# Patient Record
Sex: MALE | Race: WHITE | NOT HISPANIC OR LATINO
[De-identification: names, ages, dates, MRNs, and addresses within clinical notes are randomized per-mention and may not be internally consistent; named-entity substitution may affect disease eponyms.]

---

## 2017-01-30 ENCOUNTER — RX RENEWAL (OUTPATIENT)
Age: 62
End: 2017-01-30

## 2017-02-24 ENCOUNTER — RX RENEWAL (OUTPATIENT)
Age: 62
End: 2017-02-24

## 2017-02-27 ENCOUNTER — RX RENEWAL (OUTPATIENT)
Age: 62
End: 2017-02-27

## 2017-03-02 ENCOUNTER — APPOINTMENT (OUTPATIENT)
Dept: INTERNAL MEDICINE | Facility: CLINIC | Age: 62
End: 2017-03-02

## 2017-03-02 VITALS
BODY MASS INDEX: 33.09 KG/M2 | SYSTOLIC BLOOD PRESSURE: 160 MMHG | HEART RATE: 80 BPM | WEIGHT: 244 LBS | DIASTOLIC BLOOD PRESSURE: 101 MMHG

## 2017-03-08 ENCOUNTER — APPOINTMENT (OUTPATIENT)
Dept: INTERNAL MEDICINE | Facility: CLINIC | Age: 62
End: 2017-03-08

## 2017-03-10 LAB
25(OH)D3 SERPL-MCNC: 13.7 NG/ML
ALBUMIN SERPL ELPH-MCNC: 4.9 G/DL
ALP BLD-CCNC: 70 U/L
ALT SERPL-CCNC: 39 U/L
ANION GAP SERPL CALC-SCNC: 20 MMOL/L
AST SERPL-CCNC: 27 U/L
BASOPHILS # BLD AUTO: 0.04 K/UL
BASOPHILS NFR BLD AUTO: 0.3 %
BILIRUB SERPL-MCNC: 0.3 MG/DL
BUN SERPL-MCNC: 19 MG/DL
CALCIUM SERPL-MCNC: 10 MG/DL
CHLORIDE SERPL-SCNC: 103 MMOL/L
CHOLEST SERPL-MCNC: 193 MG/DL
CHOLEST/HDLC SERPL: 5.1 RATIO
CO2 SERPL-SCNC: 20 MMOL/L
CREAT SERPL-MCNC: 1.05 MG/DL
EOSINOPHIL # BLD AUTO: 0.12 K/UL
EOSINOPHIL NFR BLD AUTO: 0.9 %
GLUCOSE SERPL-MCNC: 79 MG/DL
HBA1C MFR BLD HPLC: 6.1 %
HCT VFR BLD CALC: 40.2 %
HDLC SERPL-MCNC: 38 MG/DL
HGB BLD-MCNC: 13.8 G/DL
IMM GRANULOCYTES NFR BLD AUTO: 0.4 %
LDLC SERPL CALC-MCNC: 91 MG/DL
LYMPHOCYTES # BLD AUTO: 4.43 K/UL
LYMPHOCYTES NFR BLD AUTO: 33.1 %
MAN DIFF?: NORMAL
MCHC RBC-ENTMCNC: 30.3 PG
MCHC RBC-ENTMCNC: 34.3 GM/DL
MCV RBC AUTO: 88.4 FL
MONOCYTES # BLD AUTO: 1 K/UL
MONOCYTES NFR BLD AUTO: 7.5 %
NEUTROPHILS # BLD AUTO: 7.73 K/UL
NEUTROPHILS NFR BLD AUTO: 57.8 %
PLATELET # BLD AUTO: 304 K/UL
POTASSIUM SERPL-SCNC: 5 MMOL/L
PROT SERPL-MCNC: 7.1 G/DL
PSA SERPL-MCNC: 0.55 NG/ML
RBC # BLD: 4.55 M/UL
RBC # FLD: 13.7 %
SODIUM SERPL-SCNC: 143 MMOL/L
TRIGL SERPL-MCNC: 321 MG/DL
TSH SERPL-ACNC: 2.59 UIU/ML
WBC # FLD AUTO: 13.37 K/UL

## 2017-03-27 ENCOUNTER — RX RENEWAL (OUTPATIENT)
Age: 62
End: 2017-03-27

## 2017-05-16 ENCOUNTER — RX RENEWAL (OUTPATIENT)
Age: 62
End: 2017-05-16

## 2017-05-16 ENCOUNTER — APPOINTMENT (OUTPATIENT)
Dept: DERMATOLOGY | Facility: CLINIC | Age: 62
End: 2017-05-16

## 2017-05-22 ENCOUNTER — APPOINTMENT (OUTPATIENT)
Dept: DERMATOLOGY | Facility: CLINIC | Age: 62
End: 2017-05-22

## 2017-05-22 VITALS
BODY MASS INDEX: 33.32 KG/M2 | SYSTOLIC BLOOD PRESSURE: 148 MMHG | WEIGHT: 246 LBS | HEIGHT: 72 IN | DIASTOLIC BLOOD PRESSURE: 80 MMHG

## 2017-09-29 ENCOUNTER — MEDICATION RENEWAL (OUTPATIENT)
Age: 62
End: 2017-09-29

## 2018-01-12 ENCOUNTER — RX RENEWAL (OUTPATIENT)
Age: 63
End: 2018-01-12

## 2018-01-23 ENCOUNTER — TRANSCRIPTION ENCOUNTER (OUTPATIENT)
Age: 63
End: 2018-01-23

## 2018-01-31 ENCOUNTER — APPOINTMENT (OUTPATIENT)
Dept: INTERNAL MEDICINE | Facility: CLINIC | Age: 63
End: 2018-01-31
Payer: MEDICARE

## 2018-01-31 VITALS
HEART RATE: 101 BPM | DIASTOLIC BLOOD PRESSURE: 100 MMHG | WEIGHT: 243 LBS | BODY MASS INDEX: 32.91 KG/M2 | SYSTOLIC BLOOD PRESSURE: 150 MMHG | TEMPERATURE: 98 F | OXYGEN SATURATION: 97 % | HEIGHT: 72 IN

## 2018-01-31 PROCEDURE — 99214 OFFICE O/P EST MOD 30 MIN: CPT | Mod: 25

## 2018-01-31 PROCEDURE — 36415 COLL VENOUS BLD VENIPUNCTURE: CPT

## 2018-04-23 ENCOUNTER — RX RENEWAL (OUTPATIENT)
Age: 63
End: 2018-04-23

## 2018-05-14 LAB
25(OH)D3 SERPL-MCNC: 18.2 NG/ML
ALBUMIN SERPL ELPH-MCNC: 4.8 G/DL
ALP BLD-CCNC: 119 U/L
ALT SERPL-CCNC: 42 U/L
ANION GAP SERPL CALC-SCNC: 17 MMOL/L
AST SERPL-CCNC: 24 U/L
BASOPHILS # BLD AUTO: 0.04 K/UL
BASOPHILS NFR BLD AUTO: 0.3 %
BILIRUB SERPL-MCNC: 0.2 MG/DL
BUN SERPL-MCNC: 23 MG/DL
CALCIUM SERPL-MCNC: 9.8 MG/DL
CHLORIDE SERPL-SCNC: 106 MMOL/L
CHOLEST SERPL-MCNC: 197 MG/DL
CHOLEST/HDLC SERPL: 6 RATIO
CO2 SERPL-SCNC: 21 MMOL/L
CREAT SERPL-MCNC: 1.11 MG/DL
EOSINOPHIL # BLD AUTO: 0.3 K/UL
EOSINOPHIL NFR BLD AUTO: 2.2 %
GLUCOSE BLDC GLUCOMTR-MCNC: 110
GLUCOSE SERPL-MCNC: 115 MG/DL
HBA1C MFR BLD HPLC: 5.9 %
HBA1C MFR BLD HPLC: 6
HCT VFR BLD CALC: 43.7 %
HDLC SERPL-MCNC: 33 MG/DL
HGB BLD-MCNC: 14.6 G/DL
IMM GRANULOCYTES NFR BLD AUTO: 0.1 %
LDLC SERPL CALC-MCNC: NORMAL
LYMPHOCYTES # BLD AUTO: 4.39 K/UL
LYMPHOCYTES NFR BLD AUTO: 32.3 %
MAN DIFF?: NORMAL
MCHC RBC-ENTMCNC: 30.4 PG
MCHC RBC-ENTMCNC: 33.4 GM/DL
MCV RBC AUTO: 91 FL
MONOCYTES # BLD AUTO: 1.21 K/UL
MONOCYTES NFR BLD AUTO: 8.9 %
NEUTROPHILS # BLD AUTO: 7.64 K/UL
NEUTROPHILS NFR BLD AUTO: 56.2 %
PLATELET # BLD AUTO: 251 K/UL
POTASSIUM SERPL-SCNC: 4.9 MMOL/L
PROT SERPL-MCNC: 7.1 G/DL
PSA SERPL-MCNC: 0.61 NG/ML
RBC # BLD: 4.8 M/UL
RBC # FLD: 14.6 %
SODIUM SERPL-SCNC: 144 MMOL/L
TRIGL SERPL-MCNC: 492 MG/DL
TSH SERPL-ACNC: 3.04 UIU/ML
WBC # FLD AUTO: 13.6 K/UL

## 2018-05-16 ENCOUNTER — APPOINTMENT (OUTPATIENT)
Dept: INTERNAL MEDICINE | Facility: CLINIC | Age: 63
End: 2018-05-16
Payer: MEDICARE

## 2018-05-16 VITALS
HEIGHT: 72 IN | HEART RATE: 92 BPM | BODY MASS INDEX: 33.86 KG/M2 | OXYGEN SATURATION: 97 % | TEMPERATURE: 98.4 F | WEIGHT: 250 LBS | DIASTOLIC BLOOD PRESSURE: 90 MMHG | SYSTOLIC BLOOD PRESSURE: 150 MMHG

## 2018-05-16 PROCEDURE — 99214 OFFICE O/P EST MOD 30 MIN: CPT

## 2018-05-16 RX ORDER — AZITHROMYCIN 250 MG/1
250 TABLET, FILM COATED ORAL
Qty: 6 | Refills: 0 | Status: DISCONTINUED | COMMUNITY
Start: 2017-03-29 | End: 2018-05-16

## 2018-05-16 RX ORDER — CYCLOBENZAPRINE HYDROCHLORIDE 5 MG/1
5 TABLET, FILM COATED ORAL
Qty: 30 | Refills: 0 | Status: DISCONTINUED | COMMUNITY
Start: 2017-05-16 | End: 2018-05-16

## 2018-05-16 RX ORDER — AMOXICILLIN 875 MG/1
875 TABLET, FILM COATED ORAL
Qty: 20 | Refills: 0 | Status: COMPLETED | COMMUNITY
Start: 2018-04-04

## 2018-05-16 NOTE — REVIEW OF SYSTEMS
[Fever] : no fever [Chills] : no chills [Fatigue] : fatigue [Recent Change In Weight] : ~T no recent weight change [Night Sweats] : no night sweats [Hot Flashes] : no hot flashes [Vision Problems] : no vision problems [Abdominal Pain] : no abdominal pain [Frequency] : frequency [Impotence] : impotence [Joint Pain] : joint pain [Back Pain] : back pain [Skin Rash] : no skin rash [Suicidal] : not suicidal [Anxiety] : anxiety [Depression] : depression [Negative] : Heme/Lymph

## 2018-05-16 NOTE — PHYSICAL EXAM
[No Acute Distress] : no acute distress [Well Nourished] : well nourished [Well Developed] : well developed [Well-Appearing] : well-appearing [Normal Sclera/Conjunctiva] : normal sclera/conjunctiva [Fundoscopic Exam Performed] : fundoscopic ~T exam ~C was performed [Normal Outer Ear/Nose] : the outer ears and nose were normal in appearance [Normal Oropharynx] : the oropharynx was normal [No JVD] : no jugular venous distention [Supple] : supple [No Lymphadenopathy] : no lymphadenopathy [Thyroid Normal, No Nodules] : the thyroid was normal and there were no nodules present [No Respiratory Distress] : no respiratory distress  [Clear to Auscultation] : lungs were clear to auscultation bilaterally [No Accessory Muscle Use] : no accessory muscle use [Normal Rate] : normal rate  [Regular Rhythm] : with a regular rhythm [Normal S1, S2] : normal S1 and S2 [No Murmur] : no murmur heard [No Carotid Bruits] : no carotid bruits [Pedal Pulses Present] : the pedal pulses are present [No Edema] : there was no peripheral edema [No Extremity Clubbing/Cyanosis] : no extremity clubbing/cyanosis [Soft] : abdomen soft [Non Tender] : non-tender [Non-distended] : non-distended [No Masses] : no abdominal mass palpated [No HSM] : no HSM [Normal Bowel Sounds] : normal bowel sounds [Normal Supraclavicular Nodes] : no supraclavicular lymphadenopathy [Normal Posterior Cervical Nodes] : no posterior cervical lymphadenopathy [Normal Anterior Cervical Nodes] : no anterior cervical lymphadenopathy [No CVA Tenderness] : no CVA  tenderness [No Spinal Tenderness] : no spinal tenderness [No Joint Swelling] : no joint swelling [Grossly Normal Strength/Tone] : grossly normal strength/tone [No Rash] : no rash [Normal Gait] : normal gait [Coordination Grossly Intact] : coordination grossly intact [No Focal Deficits] : no focal deficits [Normal Affect] : the affect was normal [Normal Insight/Judgement] : insight and judgment were intact [de-identified] : Obese

## 2018-05-16 NOTE — ASSESSMENT
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 \par \par Problem # 1 Depression: Doing well on Wellbutrin.  Added BuSpar. \par Problem # 2 GERD: continue omeprazole. \par Problem # 3 Cluster headaches: resolved\par Problem # 4 Back pain: on NSAIDs as needed.   Sees pain management and has received nerve blocks.  Considering surgery.  Add tramadol\par Problem # 5 HTN: Elevated today but has had good control on meds.  Will follow.\par Problem # 6 hyperlipidemia: check level today. \par Problem # 7 BPH: on terazosin. F/U urology. Viagra for ED.\par Problem # 8 Bone spur: podiatry consult.\par Problem # 9 Vitamin D deficiency: start 2000 unit daily.\par Problem # 10 Health care maintenance: Flu shot last fall. Check labs. \par \par

## 2018-05-16 NOTE — HEALTH RISK ASSESSMENT
[No falls in past year] : Patient reported no falls in the past year [0] : 2) Feeling down, depressed, or hopeless: Not at all (0) [] : No [de-identified] : ortho-spine [[id number]] : 0

## 2018-05-16 NOTE — HISTORY OF PRESENT ILLNESS
[Spouse] : spouse [de-identified] : 62-year-old male with hypertension, hyperlipidemia, and GERD here for follow-up. Has h/o chronic low back pain and has been seeing pain service. S/p back injection. some relief but pain is still present. Not taking gabapentin any more. Still out of work. Lost some weight.  Feels fatigued. has nocturia   Bone spur pain.\par \par

## 2018-05-17 ENCOUNTER — TRANSCRIPTION ENCOUNTER (OUTPATIENT)
Age: 63
End: 2018-05-17

## 2018-05-18 ENCOUNTER — TRANSCRIPTION ENCOUNTER (OUTPATIENT)
Age: 63
End: 2018-05-18

## 2018-05-25 LAB
BASOPHILS # BLD AUTO: 0.04 K/UL
BASOPHILS NFR BLD AUTO: 0.4 %
CHOLEST SERPL-MCNC: 177 MG/DL
CHOLEST/HDLC SERPL: 5.7 RATIO
EOSINOPHIL # BLD AUTO: 0.22 K/UL
EOSINOPHIL NFR BLD AUTO: 1.9 %
HCT VFR BLD CALC: 40 %
HDLC SERPL-MCNC: 31 MG/DL
HGB BLD-MCNC: 13.3 G/DL
IMM GRANULOCYTES NFR BLD AUTO: 0.1 %
LDLC SERPL CALC-MCNC: 88 MG/DL
LYMPHOCYTES # BLD AUTO: 3.88 K/UL
LYMPHOCYTES NFR BLD AUTO: 34.2 %
MAN DIFF?: NORMAL
MCHC RBC-ENTMCNC: 30.1 PG
MCHC RBC-ENTMCNC: 33.3 GM/DL
MCV RBC AUTO: 90.5 FL
MONOCYTES # BLD AUTO: 0.96 K/UL
MONOCYTES NFR BLD AUTO: 8.5 %
NEUTROPHILS # BLD AUTO: 6.22 K/UL
NEUTROPHILS NFR BLD AUTO: 54.9 %
PLATELET # BLD AUTO: 243 K/UL
RBC # BLD: 4.42 M/UL
RBC # FLD: 14.7 %
TRIGL SERPL-MCNC: 288 MG/DL
WBC # FLD AUTO: 11.33 K/UL

## 2018-06-08 ENCOUNTER — MEDICATION RENEWAL (OUTPATIENT)
Age: 63
End: 2018-06-08

## 2018-06-13 ENCOUNTER — RX RENEWAL (OUTPATIENT)
Age: 63
End: 2018-06-13

## 2018-06-20 ENCOUNTER — APPOINTMENT (OUTPATIENT)
Dept: INTERNAL MEDICINE | Facility: CLINIC | Age: 63
End: 2018-06-20

## 2018-07-02 ENCOUNTER — RX RENEWAL (OUTPATIENT)
Age: 63
End: 2018-07-02

## 2018-09-18 ENCOUNTER — NON-APPOINTMENT (OUTPATIENT)
Age: 63
End: 2018-09-18

## 2018-09-18 ENCOUNTER — LABORATORY RESULT (OUTPATIENT)
Age: 63
End: 2018-09-18

## 2018-09-18 ENCOUNTER — APPOINTMENT (OUTPATIENT)
Dept: INTERNAL MEDICINE | Facility: CLINIC | Age: 63
End: 2018-09-18
Payer: MEDICARE

## 2018-09-18 VITALS
DIASTOLIC BLOOD PRESSURE: 82 MMHG | BODY MASS INDEX: 34.13 KG/M2 | HEART RATE: 100 BPM | WEIGHT: 252 LBS | SYSTOLIC BLOOD PRESSURE: 178 MMHG | HEIGHT: 72 IN | OXYGEN SATURATION: 96 %

## 2018-09-18 VITALS — SYSTOLIC BLOOD PRESSURE: 134 MMHG | DIASTOLIC BLOOD PRESSURE: 78 MMHG

## 2018-09-18 PROCEDURE — 99215 OFFICE O/P EST HI 40 MIN: CPT | Mod: 25

## 2018-09-18 PROCEDURE — 93000 ELECTROCARDIOGRAM COMPLETE: CPT

## 2018-09-18 RX ORDER — BUPROPION HYDROCHLORIDE 150 MG/1
150 TABLET, EXTENDED RELEASE ORAL DAILY
Qty: 30 | Refills: 3 | Status: DISCONTINUED | COMMUNITY
Start: 2017-03-02 | End: 2018-09-18

## 2018-09-18 RX ORDER — BUSPIRONE HYDROCHLORIDE 7.5 MG/1
7.5 TABLET ORAL
Qty: 60 | Refills: 2 | Status: DISCONTINUED | COMMUNITY
Start: 2018-01-31 | End: 2018-09-18

## 2018-09-18 RX ORDER — SILDENAFIL CITRATE 100 MG/1
100 TABLET, FILM COATED ORAL
Qty: 10 | Refills: 5 | Status: DISCONTINUED | COMMUNITY
Start: 2017-03-02 | End: 2018-09-18

## 2018-09-25 ENCOUNTER — APPOINTMENT (OUTPATIENT)
Dept: CV DIAGNOSTICS | Facility: HOSPITAL | Age: 63
End: 2018-09-25

## 2018-09-25 ENCOUNTER — APPOINTMENT (OUTPATIENT)
Dept: CARDIOLOGY | Facility: CLINIC | Age: 63
End: 2018-09-25
Payer: MEDICARE

## 2018-09-25 ENCOUNTER — OUTPATIENT (OUTPATIENT)
Dept: OUTPATIENT SERVICES | Facility: HOSPITAL | Age: 63
LOS: 1 days | End: 2018-09-25
Payer: MEDICARE

## 2018-09-25 VITALS
HEIGHT: 72 IN | SYSTOLIC BLOOD PRESSURE: 163 MMHG | HEART RATE: 84 BPM | BODY MASS INDEX: 34.27 KG/M2 | WEIGHT: 253 LBS | DIASTOLIC BLOOD PRESSURE: 89 MMHG | OXYGEN SATURATION: 95 % | TEMPERATURE: 99.1 F

## 2018-09-25 VITALS — DIASTOLIC BLOOD PRESSURE: 80 MMHG | SYSTOLIC BLOOD PRESSURE: 150 MMHG

## 2018-09-25 DIAGNOSIS — Z98.89 OTHER SPECIFIED POSTPROCEDURAL STATES: Chronic | ICD-10-CM

## 2018-09-25 DIAGNOSIS — R94.31 ABNORMAL ELECTROCARDIOGRAM [ECG] [EKG]: ICD-10-CM

## 2018-09-25 LAB
ALBUMIN SERPL ELPH-MCNC: 4.5 G/DL
ALP BLD-CCNC: 99 U/L
ALT SERPL-CCNC: 27 U/L
ANION GAP SERPL CALC-SCNC: 14 MMOL/L
APPEARANCE: CLEAR
APTT BLD: 29.2 SEC
AST SERPL-CCNC: 23 U/L
BASOPHILS # BLD AUTO: 0.03 K/UL
BASOPHILS NFR BLD AUTO: 0.3 %
BILIRUB SERPL-MCNC: 0.3 MG/DL
BILIRUBIN URINE: NEGATIVE
BLOOD URINE: NEGATIVE
BUN SERPL-MCNC: 23 MG/DL
CALCIUM SERPL-MCNC: 9.6 MG/DL
CHLORIDE SERPL-SCNC: 110 MMOL/L
CO2 SERPL-SCNC: 22 MMOL/L
COLOR: YELLOW
CREAT SERPL-MCNC: 1.22 MG/DL
EOSINOPHIL # BLD AUTO: 0.24 K/UL
EOSINOPHIL NFR BLD AUTO: 2.1 %
GLUCOSE QUALITATIVE U: NEGATIVE MG/DL
GLUCOSE SERPL-MCNC: 114 MG/DL
HCT VFR BLD CALC: 39.9 %
HGB BLD-MCNC: 13.3 G/DL
IMM GRANULOCYTES NFR BLD AUTO: 0.2 %
INR PPP: 0.99 RATIO
KETONES URINE: NEGATIVE
LEUKOCYTE ESTERASE URINE: NEGATIVE
LYMPHOCYTES # BLD AUTO: 3.25 K/UL
LYMPHOCYTES NFR BLD AUTO: 28.9 %
MAN DIFF?: NORMAL
MCHC RBC-ENTMCNC: 30.2 PG
MCHC RBC-ENTMCNC: 33.3 GM/DL
MCV RBC AUTO: 90.7 FL
MONOCYTES # BLD AUTO: 0.82 K/UL
MONOCYTES NFR BLD AUTO: 7.3 %
NEUTROPHILS # BLD AUTO: 6.89 K/UL
NEUTROPHILS NFR BLD AUTO: 61.2 %
NITRITE URINE: NEGATIVE
PH URINE: 5.5
PLATELET # BLD AUTO: 209 K/UL
POTASSIUM SERPL-SCNC: 4 MMOL/L
PROT SERPL-MCNC: 6.8 G/DL
PROTEIN URINE: ABNORMAL MG/DL
PT BLD: 11.2 SEC
RBC # BLD: 4.4 M/UL
RBC # FLD: 14.1 %
SODIUM SERPL-SCNC: 147 MMOL/L
SPECIFIC GRAVITY URINE: 1.02
UROBILINOGEN URINE: NEGATIVE MG/DL
WBC # FLD AUTO: 11.25 K/UL

## 2018-09-25 PROCEDURE — 93016 CV STRESS TEST SUPVJ ONLY: CPT

## 2018-09-25 PROCEDURE — 99204 OFFICE O/P NEW MOD 45 MIN: CPT

## 2018-09-25 PROCEDURE — 93018 CV STRESS TEST I&R ONLY: CPT

## 2018-09-25 PROCEDURE — A9500: CPT

## 2018-09-25 PROCEDURE — 78452 HT MUSCLE IMAGE SPECT MULT: CPT | Mod: 26

## 2018-09-25 PROCEDURE — 93017 CV STRESS TEST TRACING ONLY: CPT

## 2018-09-25 PROCEDURE — 78452 HT MUSCLE IMAGE SPECT MULT: CPT

## 2018-09-26 ENCOUNTER — APPOINTMENT (OUTPATIENT)
Dept: CARDIOLOGY | Facility: CLINIC | Age: 63
End: 2018-09-26
Payer: MEDICARE

## 2018-09-26 PROCEDURE — 93306 TTE W/DOPPLER COMPLETE: CPT

## 2018-09-27 ENCOUNTER — TRANSCRIPTION ENCOUNTER (OUTPATIENT)
Age: 63
End: 2018-09-27

## 2018-09-27 LAB
ANION GAP SERPL CALC-SCNC: 15 MMOL/L
BUN SERPL-MCNC: 20 MG/DL
CALCIUM SERPL-MCNC: 9.8 MG/DL
CHLORIDE SERPL-SCNC: 102 MMOL/L
CO2 SERPL-SCNC: 23 MMOL/L
CREAT SERPL-MCNC: 1.1 MG/DL
GLUCOSE SERPL-MCNC: 99 MG/DL
POTASSIUM SERPL-SCNC: 3.7 MMOL/L
SODIUM SERPL-SCNC: 140 MMOL/L

## 2018-09-27 NOTE — PLAN
[FreeTextEntry1] : advised post-op f/u with surgeon and with us PRN\par -advised f/u with PMD within 1 month

## 2018-09-27 NOTE — REVIEW OF SYSTEMS
[Lower Ext Edema] : lower extremity edema [Dyspnea on Exertion] : dyspnea on exertion [Back Pain] : back pain [Fever] : no fever [Pain] : no pain [Sore Throat] : no sore throat [Chest Pain] : no chest pain [Palpitations] : no palpitations [Claudication] : no  leg claudication [Orthopena] : no orthopnea [Paroysmal Nocturnal Dyspnea] : no paroysmal nocturnal dyspnea [Cough] : no cough [Abdominal Pain] : no abdominal pain [Diarrhea] : no diarrhea [Dysuria] : no dysuria [Joint Swelling] : no joint swelling [Skin Rash] : no skin rash [Dizziness] : no dizziness [Fainting] : no fainting [Easy Bleeding] : no easy bleeding [Easy Bruising] : no easy bruising

## 2018-09-27 NOTE — ASSESSMENT
[Cardiology consultation] : Cardiology consultation [Modify medications prior to procedure] : Modify medications prior to procedure [As per surgery] : as per surgery [High Risk Surgery - Intraperitoneal, Intrathoracic or Supringuinal Vascular Procedures] : High Risk Surgery - Intraperitoneal, Intrathoracic or Supringuinal Vascular Procedures - No (0) [Ischemic Heart Disease] : Ischemic Heart Disease - No (0) [Congestive Heart Failure] : Congestive Heart Failure - No (0) [Prior Cerebrovascular Accident or TIA] : Prior Cerebrovascular Accident or TIA - No (0) [Creatinine >= 2mg/dL (1 Point)] : Creatinine >= 2mg/dL - No (0) [Insulin-dependent Diabetic (1 Point)] : Insulin-dependent Diabetic - No (0) [0] : 0 , RCRI Class: I, Risk of Post-Op Cardiac Complications: 0.4%, Procedure Risk: Low-Risk [Patient Optimized for Surgery] : Patient optimized for surgery [FreeTextEntry7] : NO NSAIDS/ASA prior to surgery, only tylenol PRN any pain prior to surgery all advised to the pt. otherwise cont current meds

## 2018-09-27 NOTE — HISTORY OF PRESENT ILLNESS
[Unable to assess] : unable to assess [NSAIDs: _____] : NSAIDs: [unfilled] [Aortic Stenosis] : no aortic stenosis [Atrial Fibrillation] : no atrial fibrillation [Coronary Artery Disease] : no coronary artery disease [Recent Myocardial Infarction] : no recent myocardial infarction [Implantable Device/Pacemaker] : no implantable device/pacemaker [Asthma] : no asthma [COPD] : no COPD [Sleep Apnea] : no sleep apnea [Smoker] : not a smoker [Family Member] : no family member with adverse anesthesia reaction/sudden death [Self] : no previous adverse anesthesia reaction [Chronic Anticoagulation] : no chronic anticoagulation [Chronic Kidney Disease] : no chronic kidney disease [Diabetes] : no diabetes [FreeTextEntry1] : spinal fusion L3-4-5 [FreeTextEntry2] : 10/1/18 [FreeTextEntry3] : Dr. Long Ayon, [place] [FreeTextEntry4] : \par Pt with chronic low back pain, sciatica symptoms from spinal stenosis, lumbar radiculopathy\par -medication, PT, and injections have not been helpful per pt\par -pain has gotten worse\par -pt with trouble walking now and has not been walking much due to pain [FreeTextEntry6] : pt has some SOB with exertion [FreeTextEntry7] : EKG in 2013 that was normal \par -no other cardiac testing in the past [FreeTextEntry8] : pt unable to walk due to back pain

## 2018-09-27 NOTE — PHYSICAL EXAM
[No Acute Distress] : no acute distress [Well Developed] : well developed [Well-Appearing] : well-appearing [Normal Sclera/Conjunctiva] : normal sclera/conjunctiva [PERRL] : pupils equal round and reactive to light [EOMI] : extraocular movements intact [Normal Oropharynx] : the oropharynx was normal [Supple] : supple [No Lymphadenopathy] : no lymphadenopathy [No Respiratory Distress] : no respiratory distress  [Clear to Auscultation] : lungs were clear to auscultation bilaterally [No Accessory Muscle Use] : no accessory muscle use [Normal Rate] : normal rate  [Regular Rhythm] : with a regular rhythm [Normal S1, S2] : normal S1 and S2 [Soft] : abdomen soft [Non Tender] : non-tender [Normal Bowel Sounds] : normal bowel sounds [Normal Posterior Cervical Nodes] : no posterior cervical lymphadenopathy [Normal Anterior Cervical Nodes] : no anterior cervical lymphadenopathy [No CVA Tenderness] : no CVA  tenderness [No Spinal Tenderness] : no spinal tenderness [No Focal Deficits] : no focal deficits [Speech Grossly Normal] : speech grossly normal [Normal Affect] : the affect was normal [Alert and Oriented x3] : oriented to person, place, and time [Normal Mood] : the mood was normal [Normal Insight/Judgement] : insight and judgment were intact [Acne] : no acne [de-identified] : 3/6 systolic murmur in the aortic region [de-identified] : bipedal edema, no calf tenderness and no redness in the calfs bilaterally [de-identified] : normal turgor

## 2018-09-27 NOTE — RESULTS/DATA
[ECG Reviewed] : reviewed [NSR] : normal sinus rhythm [P Waves Normal] : the P wave is normal [Normal QRS] : the QRS is normal [T Waves Flat] : the T waves are flat [V5] : V5 [V6] : V6 [Interval Change] : there was an interval change in the ECG [] : results reviewed [de-identified] : unremarkable [de-identified] : WNL [de-identified] : repeat electrolytes are WNL and otherwise CMP was unremarkable [FreeTextEntry4] : compared to prior EKG in 2013 which was WNL [de-identified] : UA unremarkable

## 2018-09-28 ENCOUNTER — APPOINTMENT (OUTPATIENT)
Dept: CV DIAGNOSITCS | Facility: HOSPITAL | Age: 63
End: 2018-09-28

## 2018-10-01 ENCOUNTER — MEDICATION RENEWAL (OUTPATIENT)
Age: 63
End: 2018-10-01

## 2018-10-17 ENCOUNTER — RX RENEWAL (OUTPATIENT)
Age: 63
End: 2018-10-17

## 2018-11-30 ENCOUNTER — RX RENEWAL (OUTPATIENT)
Age: 63
End: 2018-11-30

## 2019-03-15 ENCOUNTER — TRANSCRIPTION ENCOUNTER (OUTPATIENT)
Age: 64
End: 2019-03-15

## 2019-03-27 ENCOUNTER — APPOINTMENT (OUTPATIENT)
Dept: INTERNAL MEDICINE | Facility: CLINIC | Age: 64
End: 2019-03-27
Payer: MEDICARE

## 2019-03-27 VITALS
SYSTOLIC BLOOD PRESSURE: 142 MMHG | HEIGHT: 72 IN | TEMPERATURE: 97.8 F | DIASTOLIC BLOOD PRESSURE: 84 MMHG | HEART RATE: 96 BPM | WEIGHT: 245 LBS | OXYGEN SATURATION: 97 % | BODY MASS INDEX: 33.18 KG/M2

## 2019-03-27 PROCEDURE — 36415 COLL VENOUS BLD VENIPUNCTURE: CPT

## 2019-03-27 PROCEDURE — G0442 ANNUAL ALCOHOL SCREEN 15 MIN: CPT

## 2019-03-27 PROCEDURE — G0444 DEPRESSION SCREEN ANNUAL: CPT

## 2019-03-27 PROCEDURE — G0439: CPT | Mod: 25

## 2019-03-29 NOTE — COUNSELING
[Weight management counseling provided] : Weight management [Healthy eating counseling provided] : healthy eating [Behavioral health counseling provided] : behavioral health  [Fall prevention counseling provided] : fall prevention  [None] : None [Good understanding] : Patient has a good understanding of lifestyle changes and the steps needed to achieve self management goals [ - Annual Alcohol Misuse Screening] : Annual Alcohol Misuse Screening [ - Annual Depression Screening] : Annual Depression Screening

## 2019-03-29 NOTE — HISTORY OF PRESENT ILLNESS
[de-identified] : 63-year-old male with hypertension, hyperlipidemia, and GERD here for CPE. Has h/o chronic low back pain and has been seeing pain service. S/p back surgery. some relief but pain is still present. Not taking gabapentin any more. Still out of work. Lost some weight.  Feels fatigued. has nocturia   Bone spur pain.  Of Note cardiac w/u before surgery with t-wave flattening and mildly reversible perfusion defect.  Patient was with out symptoms and no further therapy of other intervention needed. \par \par

## 2019-03-29 NOTE — PHYSICAL EXAM
[No Acute Distress] : no acute distress [Well Nourished] : well nourished [Well Developed] : well developed [Well-Appearing] : well-appearing [Normal Sclera/Conjunctiva] : normal sclera/conjunctiva [Fundoscopic Exam Performed] : fundoscopic ~T exam ~C was performed [Normal Outer Ear/Nose] : the outer ears and nose were normal in appearance [Normal Oropharynx] : the oropharynx was normal [No JVD] : no jugular venous distention [Supple] : supple [No Lymphadenopathy] : no lymphadenopathy [Thyroid Normal, No Nodules] : the thyroid was normal and there were no nodules present [No Respiratory Distress] : no respiratory distress  [Clear to Auscultation] : lungs were clear to auscultation bilaterally [No Accessory Muscle Use] : no accessory muscle use [Normal Rate] : normal rate  [Regular Rhythm] : with a regular rhythm [Normal S1, S2] : normal S1 and S2 [No Murmur] : no murmur heard [No Carotid Bruits] : no carotid bruits [Pedal Pulses Present] : the pedal pulses are present [No Edema] : there was no peripheral edema [No Extremity Clubbing/Cyanosis] : no extremity clubbing/cyanosis [Soft] : abdomen soft [Non Tender] : non-tender [Non-distended] : non-distended [No Masses] : no abdominal mass palpated [No HSM] : no HSM [Normal Bowel Sounds] : normal bowel sounds [Normal Supraclavicular Nodes] : no supraclavicular lymphadenopathy [Normal Posterior Cervical Nodes] : no posterior cervical lymphadenopathy [Normal Anterior Cervical Nodes] : no anterior cervical lymphadenopathy [No CVA Tenderness] : no CVA  tenderness [No Spinal Tenderness] : no spinal tenderness [No Joint Swelling] : no joint swelling [Grossly Normal Strength/Tone] : grossly normal strength/tone [No Rash] : no rash [Normal Gait] : normal gait [Coordination Grossly Intact] : coordination grossly intact [No Focal Deficits] : no focal deficits [Normal Affect] : the affect was normal [Normal Insight/Judgement] : insight and judgment were intact [de-identified] : Obese

## 2019-03-29 NOTE — REVIEW OF SYSTEMS
[Fatigue] : fatigue [Frequency] : frequency [Impotence] : impotence [Joint Pain] : joint pain [Back Pain] : back pain [Anxiety] : anxiety [Depression] : depression [Negative] : Heme/Lymph [Fever] : no fever [Chills] : no chills [Hot Flashes] : no hot flashes [Night Sweats] : no night sweats [Recent Change In Weight] : ~T no recent weight change [Vision Problems] : no vision problems [Abdominal Pain] : no abdominal pain [Skin Rash] : no skin rash [Suicidal] : not suicidal

## 2019-03-29 NOTE — HEALTH RISK ASSESSMENT
[Fair] : ~his/her~ current health as fair  [Good] : ~his/her~  mood as  good [No falls in past year] : Patient reported no falls in the past year [1] : 1) Little interest or pleasure doing things for several days (1) [0] : 2) Feeling down, depressed, or hopeless: Not at all (0) [Patient reported colonoscopy was normal] : Patient reported colonoscopy was normal [None] : None [With Family] : lives with family [On disability] : on disability [College] : College [] :  [# Of Children ___] : has [unfilled] children [Sexually Active] : sexually active [Feels Safe at Home] : Feels safe at home [Fully functional (bathing, dressing, toileting, transferring, walking, feeding)] : Fully functional (bathing, dressing, toileting, transferring, walking, feeding) [Fully functional (using the telephone, shopping, preparing meals, housekeeping, doing laundry, using] : Fully functional and needs no help or supervision to perform IADLs (using the telephone, shopping, preparing meals, housekeeping, doing laundry, using transportation, managing medications and managing finances) [Reports normal functional visual acuity (ie: able to read med bottle)] : Reports normal functional visual acuity [] : No [de-identified] : walking [de-identified] : regular [[id number]] : 1 [Change in mental status noted] : No change in mental status noted [Language] : denies difficulty with language [Behavior] : denies difficulty with behavior [Learning/Retaining New Information] : denies difficulty learning/retaining new information [Handling Complex Tasks] : denies difficulty handling complex tasks [Reasoning] : denies difficulty with reasoning [Spatial Ability and Orientation] : denies difficulty with spatial ability and orientation [Reports changes in hearing] : Reports no changes in hearing [Reports changes in vision] : Reports no changes in vision [Reports changes in dental health] : Reports no changes in dental health [TB Exposure] : is not being exposed to tuberculosis [ColonoscopyDate] : 12/20111

## 2019-03-29 NOTE — ASSESSMENT
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   Recent surgery.\par \par Problem # 1 Depression: Doing well on Wellbutrin.  Also on buspar. \par Problem # 2 GERD: continue omeprazole. Increase dose.  GI referral\par Problem # 3 Cluster headaches: resolved\par Problem # 4 Back pain: on NSAIDs as needed.   Sees pain management and has received nerve blocks.  Restart gabapentin.\par Problem # 5 HTN: Elevated today but has had good control on meds.  Will follow.\par Problem # 6 hyperlipidemia: check level today. \par Problem # 7 BPH: on terazosin. F/U urology. Viagra for ED.\par Problem # 8 Bone spur: podiatry consult.\par Problem # 9 Health care maintenance: Flu shot last fall. Check labs.  optho and GI referrals\par \par

## 2019-04-15 ENCOUNTER — RX RENEWAL (OUTPATIENT)
Age: 64
End: 2019-04-15

## 2019-04-16 LAB
25(OH)D3 SERPL-MCNC: 26.6 NG/ML
ALBUMIN SERPL ELPH-MCNC: 5.1 G/DL
ALP BLD-CCNC: 139 U/L
ALT SERPL-CCNC: 28 U/L
ANION GAP SERPL CALC-SCNC: 15 MMOL/L
AST SERPL-CCNC: 18 U/L
BASOPHILS # BLD AUTO: 0.07 K/UL
BASOPHILS NFR BLD AUTO: 0.5 %
BILIRUB SERPL-MCNC: 0.2 MG/DL
BUN SERPL-MCNC: 23 MG/DL
CALCIUM SERPL-MCNC: 9.7 MG/DL
CHLORIDE SERPL-SCNC: 106 MMOL/L
CHOLEST SERPL-MCNC: 179 MG/DL
CHOLEST/HDLC SERPL: 4.8 RATIO
CO2 SERPL-SCNC: 22 MMOL/L
CREAT SERPL-MCNC: 1.15 MG/DL
EOSINOPHIL # BLD AUTO: 0.21 K/UL
EOSINOPHIL NFR BLD AUTO: 1.5 %
ESTIMATED AVERAGE GLUCOSE: 126 MG/DL
GLUCOSE SERPL-MCNC: 114 MG/DL
HBA1C MFR BLD HPLC: 6 %
HCT VFR BLD CALC: 44.1 %
HDLC SERPL-MCNC: 37 MG/DL
HGB BLD-MCNC: 13.8 G/DL
IMM GRANULOCYTES NFR BLD AUTO: 0.3 %
LDLC SERPL CALC-MCNC: 67 MG/DL
LYMPHOCYTES # BLD AUTO: 4.3 K/UL
LYMPHOCYTES NFR BLD AUTO: 31 %
MAN DIFF?: NORMAL
MCHC RBC-ENTMCNC: 28.1 PG
MCHC RBC-ENTMCNC: 31.3 GM/DL
MCV RBC AUTO: 89.8 FL
MONOCYTES # BLD AUTO: 1.32 K/UL
MONOCYTES NFR BLD AUTO: 9.5 %
NEUTROPHILS # BLD AUTO: 7.92 K/UL
NEUTROPHILS NFR BLD AUTO: 57.2 %
PLATELET # BLD AUTO: 305 K/UL
POTASSIUM SERPL-SCNC: 4.7 MMOL/L
PROT SERPL-MCNC: 7.3 G/DL
PSA SERPL-MCNC: 0.75 NG/ML
RBC # BLD: 4.91 M/UL
RBC # FLD: 16 %
SODIUM SERPL-SCNC: 143 MMOL/L
TRIGL SERPL-MCNC: 374 MG/DL
TSH SERPL-ACNC: 2.83 UIU/ML
WBC # FLD AUTO: 13.86 K/UL

## 2019-04-19 LAB
BASOPHILS # BLD AUTO: 0.07 K/UL
BASOPHILS NFR BLD AUTO: 0.6 %
CRP SERPL-MCNC: 1.08 MG/DL
EOSINOPHIL # BLD AUTO: 0.27 K/UL
EOSINOPHIL NFR BLD AUTO: 2.1 %
ERYTHROCYTE [SEDIMENTATION RATE] IN BLOOD BY WESTERGREN METHOD: 10 MM/HR
HCT VFR BLD CALC: 43.5 %
HGB BLD-MCNC: 13.5 G/DL
IMM GRANULOCYTES NFR BLD AUTO: 0.4 %
LAP WBC-ACNC: 86
LYMPHOCYTES # BLD AUTO: 3.45 K/UL
LYMPHOCYTES NFR BLD AUTO: 27.3 %
MAN DIFF?: NORMAL
MCHC RBC-ENTMCNC: 28.5 PG
MCHC RBC-ENTMCNC: 31 GM/DL
MCV RBC AUTO: 91.8 FL
MONOCYTES # BLD AUTO: 1.15 K/UL
MONOCYTES NFR BLD AUTO: 9.1 %
NEUTROPHILS # BLD AUTO: 7.66 K/UL
NEUTROPHILS NFR BLD AUTO: 60.5 %
PLATELET # BLD AUTO: 273 K/UL
RBC # BLD: 4.74 M/UL
RBC # FLD: 15.2 %
WBC # FLD AUTO: 12.65 K/UL

## 2019-05-14 ENCOUNTER — RX RENEWAL (OUTPATIENT)
Age: 64
End: 2019-05-14

## 2019-08-05 ENCOUNTER — RX RENEWAL (OUTPATIENT)
Age: 64
End: 2019-08-05

## 2019-08-19 ENCOUNTER — APPOINTMENT (OUTPATIENT)
Dept: INTERNAL MEDICINE | Facility: CLINIC | Age: 64
End: 2019-08-19
Payer: MEDICARE

## 2019-08-19 VITALS
BODY MASS INDEX: 34 KG/M2 | WEIGHT: 251 LBS | HEIGHT: 72 IN | OXYGEN SATURATION: 98 % | DIASTOLIC BLOOD PRESSURE: 98 MMHG | TEMPERATURE: 98.3 F | SYSTOLIC BLOOD PRESSURE: 170 MMHG | HEART RATE: 84 BPM

## 2019-08-19 PROCEDURE — 99214 OFFICE O/P EST MOD 30 MIN: CPT

## 2019-08-19 RX ORDER — TRAMADOL HYDROCHLORIDE 50 MG/1
50 TABLET, COATED ORAL
Qty: 90 | Refills: 2 | Status: DISCONTINUED | COMMUNITY
Start: 2018-05-16 | End: 2019-08-19

## 2019-08-19 RX ORDER — MULTIVIT-MIN/FOLIC/VIT K/LYCOP 400-300MCG
50 MCG TABLET ORAL
Qty: 90 | Refills: 1 | Status: DISCONTINUED | COMMUNITY
Start: 2018-05-16 | End: 2019-08-19

## 2019-08-19 RX ORDER — CHOLECALCIFEROL (VITAMIN D3) 50 MCG
50 MCG TABLET ORAL
Qty: 90 | Refills: 3 | Status: DISCONTINUED | COMMUNITY
Start: 2018-11-30 | End: 2019-08-19

## 2019-08-19 NOTE — REVIEW OF SYSTEMS
[Fever] : no fever [Chills] : no chills [Fatigue] : fatigue [Hot Flashes] : no hot flashes [Night Sweats] : no night sweats [Recent Change In Weight] : ~T no recent weight change [Vision Problems] : no vision problems [Abdominal Pain] : no abdominal pain [Frequency] : frequency [Impotence] : impotence [Joint Pain] : joint pain [Back Pain] : back pain [Skin Rash] : no skin rash [Suicidal] : not suicidal [Anxiety] : anxiety [Depression] : depression [Negative] : Heme/Lymph

## 2019-08-19 NOTE — PHYSICAL EXAM
[No Acute Distress] : no acute distress [Well Nourished] : well nourished [Well Developed] : well developed [Normal] : no acute distress, well nourished, well developed and well-appearing [Well-Appearing] : well-appearing [Speech Grossly Normal] : speech grossly normal [Normal Affect] : the affect was normal [Normal Mood] : the mood was normal [Normal Insight/Judgement] : insight and judgment were intact

## 2019-08-19 NOTE — HISTORY OF PRESENT ILLNESS
[FreeTextEntry8] : 64-year-old male with hypertension, hyperlipidemia, and GERD here for acute visit. Has h/o chronic low back pain and has been seeing pain service. S/p back surgery. some relief but pain is still present. Back on gabapentin.  Last month had left shoulder and neck pain.  MRI/CT scan  c/w with c5-6 c6-7 stenosis.  Surgery recommended. here for my opinion.  No other symptoms. \par \par  [Spouse] : spouse

## 2019-08-19 NOTE — ASSESSMENT
[FreeTextEntry1] : 64-year-old male with hypertension, hyperlipidemia, and GERD here for acute visit. Has h/o chronic low back pain and has been seeing pain service. S/p back surgery. some relief but pain is still present. Back on gabapentin.  Last month had left shoulder and neck pain.  MRI/CT scan  c/w with c5-6 c6-7 stenosis.  Surgery recommended. here for my opinion.  No other symptoms. \par \par Problem # 1 Neck/shoulder pain: Severe disease.  I advised him to consider surgery in light of radiologic findings.  Tramadol given prn.\par Problem # 2 GERD: continue omeprazole. \par Problem # 3 Cluster headaches: better this week\par Problem # 4 Back pain: on NSAIDs as needed.   Sees pain management and has received nerve blocks\par Problem # 5 HTN: Elevated today. States that it has been chronically elevated.  Lisinopril started.\par Problem # 6 BPH: on terazosin. F/U urology. Viagra for ED.\par Problem # 7  Health care maintenance: Flu shot last fall. \par \par

## 2019-08-19 NOTE — HEALTH RISK ASSESSMENT
[] : No [No] : In the past 12 months have you used drugs other than those required for medical reasons? No [No falls in past year] : Patient reported no falls in the past year [de-identified] : spine surgery; pain management

## 2019-10-17 ENCOUNTER — RX RENEWAL (OUTPATIENT)
Age: 64
End: 2019-10-17

## 2019-12-30 ENCOUNTER — LABORATORY RESULT (OUTPATIENT)
Age: 64
End: 2019-12-30

## 2019-12-30 ENCOUNTER — NON-APPOINTMENT (OUTPATIENT)
Age: 64
End: 2019-12-30

## 2019-12-30 ENCOUNTER — APPOINTMENT (OUTPATIENT)
Dept: INTERNAL MEDICINE | Facility: CLINIC | Age: 64
End: 2019-12-30
Payer: MEDICARE

## 2019-12-30 VITALS
HEART RATE: 91 BPM | OXYGEN SATURATION: 98 % | WEIGHT: 252 LBS | SYSTOLIC BLOOD PRESSURE: 140 MMHG | BODY MASS INDEX: 34.13 KG/M2 | HEIGHT: 72 IN | DIASTOLIC BLOOD PRESSURE: 86 MMHG | TEMPERATURE: 98.3 F

## 2019-12-30 DIAGNOSIS — Z87.442 PERSONAL HISTORY OF URINARY CALCULI: ICD-10-CM

## 2019-12-30 DIAGNOSIS — Z82.3 FAMILY HISTORY OF STROKE: ICD-10-CM

## 2019-12-30 PROCEDURE — 99214 OFFICE O/P EST MOD 30 MIN: CPT | Mod: 25

## 2019-12-30 PROCEDURE — 93000 ELECTROCARDIOGRAM COMPLETE: CPT

## 2019-12-30 PROCEDURE — 36415 COLL VENOUS BLD VENIPUNCTURE: CPT

## 2019-12-30 NOTE — RESULTS/DATA
[] : results reviewed [Normal] : The 12 - lead ECG is normal [ECG Reviewed] : reviewed [NSR] : normal sinus rhythm [No Acute Ischemia] : No acute ischemia [P Waves Normal] : the P wave is normal [Atrial Rate___] : the atrial rate is [unfilled] beats per minute [Normal QRS] : the QRS is normal [ECG Intervals PA.] : PA interval is normal [ECG Axis] : the QRS axis is normal [Normal ST Segments] : the ST segments are normal [ECG T. Waves] : normal [No Interval Change] : no interval change

## 2019-12-30 NOTE — PHYSICAL EXAM
[No Acute Distress] : no acute distress [Well Nourished] : well nourished [Well Developed] : well developed [Well-Appearing] : well-appearing [Normal] : no acute distress, well nourished, well developed and well-appearing [Normal Sclera/Conjunctiva] : normal sclera/conjunctiva [Normal Oropharynx] : the oropharynx was normal [No JVD] : no jugular venous distention [No Respiratory Distress] : no respiratory distress  [No Accessory Muscle Use] : no accessory muscle use [Clear to Auscultation] : lungs were clear to auscultation bilaterally [Normal Rate] : normal rate  [Regular Rhythm] : with a regular rhythm [Normal S1, S2] : normal S1 and S2 [No Murmur] : no murmur heard [No Carotid Bruits] : no carotid bruits [Pedal Pulses Present] : the pedal pulses are present [No Edema] : there was no peripheral edema [No Extremity Clubbing/Cyanosis] : no extremity clubbing/cyanosis [Soft] : abdomen soft [Non Tender] : non-tender [Non-distended] : non-distended [No Masses] : no abdominal mass palpated [No HSM] : no HSM [Normal Bowel Sounds] : normal bowel sounds [Normal Anterior Cervical Nodes] : no anterior cervical lymphadenopathy [No CVA Tenderness] : no CVA  tenderness [Grossly Normal Strength/Tone] : grossly normal strength/tone [No Joint Swelling] : no joint swelling [No Rash] : no rash [Coordination Grossly Intact] : coordination grossly intact [No Focal Deficits] : no focal deficits [Normal Gait] : normal gait [Speech Grossly Normal] : speech grossly normal [Normal Affect] : the affect was normal [Normal Mood] : the mood was normal [Normal Insight/Judgement] : insight and judgment were intact

## 2020-01-02 LAB
ALBUMIN SERPL ELPH-MCNC: 4.7 G/DL
ALP BLD-CCNC: 129 U/L
ALT SERPL-CCNC: 33 U/L
ANION GAP SERPL CALC-SCNC: 15 MMOL/L
APTT BLD: 29.6 SEC
AST SERPL-CCNC: 23 U/L
BASOPHILS # BLD AUTO: 0.06 K/UL
BASOPHILS NFR BLD AUTO: 0.5 %
BILIRUB SERPL-MCNC: 0.3 MG/DL
BUN SERPL-MCNC: 18 MG/DL
CALCIUM SERPL-MCNC: 9.6 MG/DL
CHLORIDE SERPL-SCNC: 108 MMOL/L
CO2 SERPL-SCNC: 21 MMOL/L
CREAT SERPL-MCNC: 1.08 MG/DL
EOSINOPHIL # BLD AUTO: 0.21 K/UL
EOSINOPHIL NFR BLD AUTO: 1.7 %
GLUCOSE SERPL-MCNC: 90 MG/DL
HCT VFR BLD CALC: 43.2 %
HGB BLD-MCNC: 13.7 G/DL
IMM GRANULOCYTES NFR BLD AUTO: 0.5 %
LYMPHOCYTES # BLD AUTO: 3.75 K/UL
LYMPHOCYTES NFR BLD AUTO: 30.1 %
MAN DIFF?: NORMAL
MCHC RBC-ENTMCNC: 29.5 PG
MCHC RBC-ENTMCNC: 31.7 GM/DL
MCV RBC AUTO: 93.1 FL
MONOCYTES # BLD AUTO: 1.22 K/UL
MONOCYTES NFR BLD AUTO: 9.8 %
NEUTROPHILS # BLD AUTO: 7.14 K/UL
NEUTROPHILS NFR BLD AUTO: 57.4 %
PLATELET # BLD AUTO: 265 K/UL
POTASSIUM SERPL-SCNC: 4.6 MMOL/L
PROT SERPL-MCNC: 6.7 G/DL
RBC # BLD: 4.64 M/UL
RBC # FLD: 14.3 %
SODIUM SERPL-SCNC: 144 MMOL/L
WBC # FLD AUTO: 12.44 K/UL

## 2020-01-02 NOTE — HISTORY OF PRESENT ILLNESS
[No Pertinent Cardiac History] : no history of aortic stenosis, atrial fibrillation, coronary artery disease, recent myocardial infarction, or implantable device/pacemaker [No Pertinent Pulmonary History] : no history of asthma, COPD, sleep apnea, or smoking [No Adverse Anesthesia Reaction] : no adverse anesthesia reaction in self or family member [(Patient denies any chest pain, claudication, dyspnea on exertion, orthopnea, palpitations or syncope)] : Patient denies any chest pain, claudication, dyspnea on exertion, orthopnea, palpitations or syncope [Sleep Apnea] : sleep apnea [Good (7-10 METs)] : Good (7-10 METs) [Aortic Stenosis] : no aortic stenosis [Atrial Fibrillation] : no atrial fibrillation [Coronary Artery Disease] : no coronary artery disease [Implantable Device/Pacemaker] : no implantable device/pacemaker [Recent Myocardial Infarction] : no recent myocardial infarction [COPD] : no COPD [Asthma] : no asthma [Smoker] : not a smoker [Family Member] : no family member with adverse anesthesia reaction/sudden death [Chronic Anticoagulation] : no chronic anticoagulation [Self] : no previous adverse anesthesia reaction [Chronic Kidney Disease] : no chronic kidney disease [Diabetes] : no diabetes [FreeTextEntry1] : posterior cervical fusion [FreeTextEntry2] : 1/15/2020 [FreeTextEntry7] : Exercise Stress Test: mild reversible defect inferolateral wall.  [FreeTextEntry4] : 64-year-old male with hypertension, hyperlipidemia, and GERD here for preop clearance for posterior cervical fusion. Has h/o chronic low back pain and has been seeing pain service. S/p back surgery in 2018. some relief but pain is still present. Not taking gabapentin any more.  Of Note cardiac w/u before surgery in 2018 was negative.  Seen by cardiology as well.  Also evaluated for obstructive sleep apnea and diagnosed with mild ODIN not requiring CPAP.\par  [FreeTextEntry3] : Long Ayon

## 2020-01-02 NOTE — ASSESSMENT
[High Risk Surgery - Intraperitoneal, Intrathoracic or Supringuinal Vascular Procedures] : High Risk Surgery - Intraperitoneal, Intrathoracic or Supringuinal Vascular Procedures - No (0) [Ischemic Heart Disease] : Ischemic Heart Disease - No (0) [Congestive Heart Failure] : Congestive Heart Failure - No (0) [Prior Cerebrovascular Accident or TIA] : Prior Cerebrovascular Accident or TIA - No (0) [Creatinine >= 2mg/dL (1 Point)] : Creatinine >= 2mg/dL - No (0) [Insulin-dependent Diabetic (1 Point)] : Insulin-dependent Diabetic - No (0) [0] : 0 , RCRI Class: I, Risk of Post-Op Cardiac Complications: 0.4%, Procedure Risk: Low-Risk [No Further Testing Recommended] : no further testing recommended [Patient Optimized for Surgery] : Patient optimized for surgery [As per surgery] : as per surgery [Modify medications prior to procedure] : Modify medications prior to procedure [FreeTextEntry4] : 64-year-old male with hypertension, hyperlipidemia, and GERD here for preop clearance for posterior cervical fusion. Has h/o chronic low back pain and has been seeing pain service. S/p back surgery in 2018. some relief but pain is still present. Not taking gabapentin any more.  Of Note cardiac w/u before surgery in 2018 was negative.  Seen by cardiology as well.  Also evaluated for obstructive sleep apnea and diagnosed with mild ODIN not requiring CPAP.\par \par Doing better on Lisinopril for BP.  Will increase to 10 mg daily.

## 2020-01-02 NOTE — REVIEW OF SYSTEMS
[Fatigue] : fatigue [Frequency] : frequency [Impotence] : impotence [Joint Pain] : joint pain [Back Pain] : back pain [Anxiety] : anxiety [Depression] : depression [Negative] : Neurological [Fever] : no fever [Chills] : no chills [Hot Flashes] : no hot flashes [Night Sweats] : no night sweats [Recent Change In Weight] : ~T no recent weight change [Vision Problems] : no vision problems [Abdominal Pain] : no abdominal pain [Skin Rash] : no skin rash [Suicidal] : not suicidal [Easy Bleeding] : no easy bleeding

## 2020-01-11 ENCOUNTER — RX RENEWAL (OUTPATIENT)
Age: 65
End: 2020-01-11

## 2020-02-25 ENCOUNTER — INPATIENT (INPATIENT)
Facility: HOSPITAL | Age: 65
LOS: 6 days | Discharge: HOME | DRG: 871 | End: 2020-03-03
Attending: STUDENT IN AN ORGANIZED HEALTH CARE EDUCATION/TRAINING PROGRAM | Admitting: HOSPITALIST
Payer: MEDICARE

## 2020-02-25 VITALS
DIASTOLIC BLOOD PRESSURE: 83 MMHG | WEIGHT: 225.09 LBS | HEART RATE: 114 BPM | TEMPERATURE: 98 F | SYSTOLIC BLOOD PRESSURE: 131 MMHG | OXYGEN SATURATION: 95 % | RESPIRATION RATE: 18 BRPM

## 2020-02-25 DIAGNOSIS — I10 ESSENTIAL (PRIMARY) HYPERTENSION: ICD-10-CM
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
## 2020-02-25 DIAGNOSIS — N39.0 URINARY TRACT INFECTION, SITE NOT SPECIFIED: ICD-10-CM

## 2020-02-25 DIAGNOSIS — M48.00 SPINAL STENOSIS, SITE UNSPECIFIED: ICD-10-CM

## 2020-02-25 DIAGNOSIS — A41.9 SEPSIS, UNSPECIFIED ORGANISM: ICD-10-CM

## 2020-02-25 DIAGNOSIS — Z02.9 ENCOUNTER FOR ADMINISTRATIVE EXAMINATIONS, UNSPECIFIED: ICD-10-CM

## 2020-02-25 DIAGNOSIS — Z98.89 OTHER SPECIFIED POSTPROCEDURAL STATES: Chronic | ICD-10-CM

## 2020-02-25 DIAGNOSIS — Z29.9 ENCOUNTER FOR PROPHYLACTIC MEASURES, UNSPECIFIED: ICD-10-CM

## 2020-02-25 LAB
ALBUMIN SERPL ELPH-MCNC: 3.8 G/DL — SIGNIFICANT CHANGE UP (ref 3.3–5)
ALP SERPL-CCNC: 123 U/L — HIGH (ref 40–120)
ALT FLD-CCNC: 19 U/L — SIGNIFICANT CHANGE UP (ref 10–45)
ANION GAP SERPL CALC-SCNC: 19 MMOL/L — HIGH (ref 5–17)
APPEARANCE UR: ABNORMAL
AST SERPL-CCNC: 22 U/L — SIGNIFICANT CHANGE UP (ref 10–40)
BACTERIA # UR AUTO: ABNORMAL
BASOPHILS # BLD AUTO: 0.1 K/UL — SIGNIFICANT CHANGE UP (ref 0–0.2)
BASOPHILS NFR BLD AUTO: 0.5 % — SIGNIFICANT CHANGE UP (ref 0–2)
BILIRUB SERPL-MCNC: 0.9 MG/DL — SIGNIFICANT CHANGE UP (ref 0.2–1.2)
BILIRUB UR-MCNC: NEGATIVE — SIGNIFICANT CHANGE UP
BUN SERPL-MCNC: 18 MG/DL — SIGNIFICANT CHANGE UP (ref 7–23)
CALCIUM SERPL-MCNC: 9.8 MG/DL — SIGNIFICANT CHANGE UP (ref 8.4–10.5)
CHLORIDE SERPL-SCNC: 96 MMOL/L — SIGNIFICANT CHANGE UP (ref 96–108)
CO2 SERPL-SCNC: 20 MMOL/L — LOW (ref 22–31)
COLOR SPEC: YELLOW — SIGNIFICANT CHANGE UP
CREAT SERPL-MCNC: 1.24 MG/DL — SIGNIFICANT CHANGE UP (ref 0.5–1.3)
DIFF PNL FLD: ABNORMAL
EOSINOPHIL # BLD AUTO: 0.02 K/UL — SIGNIFICANT CHANGE UP (ref 0–0.5)
EOSINOPHIL NFR BLD AUTO: 0.1 % — SIGNIFICANT CHANGE UP (ref 0–6)
EPI CELLS # UR: 0 /HPF — SIGNIFICANT CHANGE UP
GAS PNL BLDV: SIGNIFICANT CHANGE UP
GAS PNL BLDV: SIGNIFICANT CHANGE UP
GLUCOSE SERPL-MCNC: 119 MG/DL — HIGH (ref 70–99)
GLUCOSE UR QL: NEGATIVE — SIGNIFICANT CHANGE UP
HCT VFR BLD CALC: 39 % — SIGNIFICANT CHANGE UP (ref 39–50)
HGB BLD-MCNC: 12.5 G/DL — LOW (ref 13–17)
HYALINE CASTS # UR AUTO: 2 /LPF — SIGNIFICANT CHANGE UP (ref 0–2)
IMM GRANULOCYTES NFR BLD AUTO: 0.6 % — SIGNIFICANT CHANGE UP (ref 0–1.5)
KETONES UR-MCNC: NEGATIVE — SIGNIFICANT CHANGE UP
LEUKOCYTE ESTERASE UR-ACNC: ABNORMAL
LYMPHOCYTES # BLD AUTO: 13.9 % — SIGNIFICANT CHANGE UP (ref 13–44)
LYMPHOCYTES # BLD AUTO: 2.83 K/UL — SIGNIFICANT CHANGE UP (ref 1–3.3)
MCHC RBC-ENTMCNC: 29.4 PG — SIGNIFICANT CHANGE UP (ref 27–34)
MCHC RBC-ENTMCNC: 32.1 GM/DL — SIGNIFICANT CHANGE UP (ref 32–36)
MCV RBC AUTO: 91.8 FL — SIGNIFICANT CHANGE UP (ref 80–100)
MONOCYTES # BLD AUTO: 2.41 K/UL — HIGH (ref 0–0.9)
MONOCYTES NFR BLD AUTO: 11.8 % — SIGNIFICANT CHANGE UP (ref 2–14)
NEUTROPHILS # BLD AUTO: 14.89 K/UL — HIGH (ref 1.8–7.4)
NEUTROPHILS NFR BLD AUTO: 73.1 % — SIGNIFICANT CHANGE UP (ref 43–77)
NITRITE UR-MCNC: NEGATIVE — SIGNIFICANT CHANGE UP
NRBC # BLD: 0 /100 WBCS — SIGNIFICANT CHANGE UP (ref 0–0)
PH UR: 6.5 — SIGNIFICANT CHANGE UP (ref 5–8)
PLATELET # BLD AUTO: 284 K/UL — SIGNIFICANT CHANGE UP (ref 150–400)
POTASSIUM SERPL-MCNC: 4.6 MMOL/L — SIGNIFICANT CHANGE UP (ref 3.5–5.3)
POTASSIUM SERPL-SCNC: 4.6 MMOL/L — SIGNIFICANT CHANGE UP (ref 3.5–5.3)
PROT SERPL-MCNC: 7.3 G/DL — SIGNIFICANT CHANGE UP (ref 6–8.3)
PROT UR-MCNC: ABNORMAL
RAPID RVP RESULT: SIGNIFICANT CHANGE UP
RBC # BLD: 4.25 M/UL — SIGNIFICANT CHANGE UP (ref 4.2–5.8)
RBC # FLD: 14.8 % — HIGH (ref 10.3–14.5)
RBC CASTS # UR COMP ASSIST: 32 /HPF — HIGH (ref 0–4)
SODIUM SERPL-SCNC: 135 MMOL/L — SIGNIFICANT CHANGE UP (ref 135–145)
SP GR SPEC: 1.02 — SIGNIFICANT CHANGE UP (ref 1.01–1.02)
UROBILINOGEN FLD QL: NEGATIVE — SIGNIFICANT CHANGE UP
WBC # BLD: 20.38 K/UL — HIGH (ref 3.8–10.5)
WBC # FLD AUTO: 20.38 K/UL — HIGH (ref 3.8–10.5)
WBC UR QL: 102 /HPF — HIGH (ref 0–5)

## 2020-02-25 PROCEDURE — 70450 CT HEAD/BRAIN W/O DYE: CPT | Mod: 26

## 2020-02-25 PROCEDURE — 71045 X-RAY EXAM CHEST 1 VIEW: CPT | Mod: 26

## 2020-02-25 PROCEDURE — 72125 CT NECK SPINE W/O DYE: CPT | Mod: 26

## 2020-02-25 PROCEDURE — 99285 EMERGENCY DEPT VISIT HI MDM: CPT

## 2020-02-25 PROCEDURE — 93010 ELECTROCARDIOGRAM REPORT: CPT

## 2020-02-25 PROCEDURE — 99223 1ST HOSP IP/OBS HIGH 75: CPT

## 2020-02-25 RX ORDER — HYDROCHLOROTHIAZIDE 25 MG
50 TABLET ORAL DAILY
Refills: 0 | Status: DISCONTINUED | OUTPATIENT
Start: 2020-02-25 | End: 2020-02-25

## 2020-02-25 RX ORDER — ACETAMINOPHEN 500 MG
650 TABLET ORAL ONCE
Refills: 0 | Status: DISCONTINUED | OUTPATIENT
Start: 2020-02-25 | End: 2020-02-25

## 2020-02-25 RX ORDER — SODIUM CHLORIDE 9 MG/ML
1000 INJECTION, SOLUTION INTRAVENOUS
Refills: 0 | Status: DISCONTINUED | OUTPATIENT
Start: 2020-02-25 | End: 2020-02-26

## 2020-02-25 RX ORDER — BACLOFEN 100 %
5 POWDER (GRAM) MISCELLANEOUS THREE TIMES A DAY
Refills: 0 | Status: DISCONTINUED | OUTPATIENT
Start: 2020-02-25 | End: 2020-03-03

## 2020-02-25 RX ORDER — POLYETHYLENE GLYCOL 3350 17 G/17G
17 POWDER, FOR SOLUTION ORAL DAILY
Refills: 0 | Status: DISCONTINUED | OUTPATIENT
Start: 2020-02-25 | End: 2020-02-26

## 2020-02-25 RX ORDER — ACETAMINOPHEN 500 MG
650 TABLET ORAL EVERY 6 HOURS
Refills: 0 | Status: DISCONTINUED | OUTPATIENT
Start: 2020-02-25 | End: 2020-02-26

## 2020-02-25 RX ORDER — DILTIAZEM HCL 120 MG
240 CAPSULE, EXT RELEASE 24 HR ORAL DAILY
Refills: 0 | Status: DISCONTINUED | OUTPATIENT
Start: 2020-02-25 | End: 2020-03-03

## 2020-02-25 RX ORDER — CEFTRIAXONE 500 MG/1
1000 INJECTION, POWDER, FOR SOLUTION INTRAMUSCULAR; INTRAVENOUS EVERY 24 HOURS
Refills: 0 | Status: COMPLETED | OUTPATIENT
Start: 2020-02-25 | End: 2020-02-28

## 2020-02-25 RX ORDER — IBUPROFEN 200 MG
600 TABLET ORAL ONCE
Refills: 0 | Status: COMPLETED | OUTPATIENT
Start: 2020-02-25 | End: 2020-02-25

## 2020-02-25 RX ORDER — LANOLIN ALCOHOL/MO/W.PET/CERES
5 CREAM (GRAM) TOPICAL AT BEDTIME
Refills: 0 | Status: DISCONTINUED | OUTPATIENT
Start: 2020-02-25 | End: 2020-03-03

## 2020-02-25 RX ORDER — OXYCODONE HYDROCHLORIDE 5 MG/1
2.5 TABLET ORAL EVERY 4 HOURS
Refills: 0 | Status: DISCONTINUED | OUTPATIENT
Start: 2020-02-25 | End: 2020-03-03

## 2020-02-25 RX ORDER — PANTOPRAZOLE SODIUM 20 MG/1
40 TABLET, DELAYED RELEASE ORAL
Refills: 0 | Status: DISCONTINUED | OUTPATIENT
Start: 2020-02-25 | End: 2020-03-03

## 2020-02-25 RX ORDER — PIPERACILLIN AND TAZOBACTAM 4; .5 G/20ML; G/20ML
3.38 INJECTION, POWDER, LYOPHILIZED, FOR SOLUTION INTRAVENOUS ONCE
Refills: 0 | Status: COMPLETED | OUTPATIENT
Start: 2020-02-25 | End: 2020-02-25

## 2020-02-25 RX ORDER — GABAPENTIN 400 MG/1
600 CAPSULE ORAL THREE TIMES A DAY
Refills: 0 | Status: DISCONTINUED | OUTPATIENT
Start: 2020-02-25 | End: 2020-03-03

## 2020-02-25 RX ORDER — OXYCODONE AND ACETAMINOPHEN 5; 325 MG/1; MG/1
1 TABLET ORAL EVERY 4 HOURS
Refills: 0 | Status: DISCONTINUED | OUTPATIENT
Start: 2020-02-25 | End: 2020-02-25

## 2020-02-25 RX ORDER — BACLOFEN 100 %
1 POWDER (GRAM) MISCELLANEOUS
Qty: 0 | Refills: 0 | DISCHARGE

## 2020-02-25 RX ORDER — ACETAMINOPHEN 500 MG
975 TABLET ORAL ONCE
Refills: 0 | Status: COMPLETED | OUTPATIENT
Start: 2020-02-25 | End: 2020-02-25

## 2020-02-25 RX ORDER — ENOXAPARIN SODIUM 100 MG/ML
40 INJECTION SUBCUTANEOUS DAILY
Refills: 0 | Status: DISCONTINUED | OUTPATIENT
Start: 2020-02-25 | End: 2020-03-03

## 2020-02-25 RX ORDER — SODIUM CHLORIDE 9 MG/ML
2000 INJECTION INTRAMUSCULAR; INTRAVENOUS; SUBCUTANEOUS ONCE
Refills: 0 | Status: COMPLETED | OUTPATIENT
Start: 2020-02-25 | End: 2020-02-25

## 2020-02-25 RX ORDER — ACETAMINOPHEN 500 MG
2 TABLET ORAL
Qty: 0 | Refills: 0 | DISCHARGE

## 2020-02-25 RX ORDER — ATORVASTATIN CALCIUM 80 MG/1
80 TABLET, FILM COATED ORAL AT BEDTIME
Refills: 0 | Status: DISCONTINUED | OUTPATIENT
Start: 2020-02-25 | End: 2020-03-03

## 2020-02-25 RX ADMIN — SODIUM CHLORIDE 2000 MILLILITER(S): 9 INJECTION INTRAMUSCULAR; INTRAVENOUS; SUBCUTANEOUS at 11:30

## 2020-02-25 RX ADMIN — Medication 975 MILLIGRAM(S): at 13:11

## 2020-02-25 RX ADMIN — Medication 650 MILLIGRAM(S): at 18:26

## 2020-02-25 RX ADMIN — SODIUM CHLORIDE 2000 MILLILITER(S): 9 INJECTION INTRAMUSCULAR; INTRAVENOUS; SUBCUTANEOUS at 10:00

## 2020-02-25 RX ADMIN — Medication 650 MILLIGRAM(S): at 23:47

## 2020-02-25 RX ADMIN — ATORVASTATIN CALCIUM 80 MILLIGRAM(S): 80 TABLET, FILM COATED ORAL at 22:56

## 2020-02-25 RX ADMIN — PIPERACILLIN AND TAZOBACTAM 200 GRAM(S): 4; .5 INJECTION, POWDER, LYOPHILIZED, FOR SOLUTION INTRAVENOUS at 10:26

## 2020-02-25 RX ADMIN — GABAPENTIN 600 MILLIGRAM(S): 400 CAPSULE ORAL at 22:57

## 2020-02-25 RX ADMIN — CEFTRIAXONE 100 MILLIGRAM(S): 500 INJECTION, POWDER, FOR SOLUTION INTRAMUSCULAR; INTRAVENOUS at 18:26

## 2020-02-25 RX ADMIN — Medication 600 MILLIGRAM(S): at 10:26

## 2020-02-25 RX ADMIN — Medication 600 MILLIGRAM(S): at 11:00

## 2020-02-25 RX ADMIN — PIPERACILLIN AND TAZOBACTAM 3.38 GRAM(S): 4; .5 INJECTION, POWDER, LYOPHILIZED, FOR SOLUTION INTRAVENOUS at 10:58

## 2020-02-25 RX ADMIN — Medication 5 MILLIGRAM(S): at 22:56

## 2020-02-25 NOTE — CHART NOTE - NSCHARTNOTEFT_GEN_A_CORE
SUNITHA ABERNATHY, PGY 3    TO BE COMPLETED WITHIN 6 HOURS OF INITIAL ASSESSMENT:    For use in patients that have 2 sepsis criteria and new organ dysfunction   •	New or increased oxygen requirement  •	Creatinine >2mg/dL  •	Bilirubin>2mg/dL  •	Platelet <100,00/mm3  •	INR >1.5, PTT>60  •	Lactate >2    If patient persistent hypotension (SBP<90) or any lactate >4 then provider evaluation (Physician/PA/NP) within 30 minutes of bolus completion is required.    Vital Signs Last 24 Hrs  T(C): 38.6 (25 Feb 2020 11:20), Max: 38.8 (25 Feb 2020 09:40)  T(F): 101.5 (25 Feb 2020 11:20), Max: 101.9 (25 Feb 2020 09:40)  HR: 119 (25 Feb 2020 11:20) (109 - 120)  BP: 143/93 (25 Feb 2020 11:20) (131/83 - 155/89)  BP(mean): --  RR: 21 (25 Feb 2020 11:20) (18 - 24)  SpO2: 96% (25 Feb 2020 11:20) (92% - 97%)  		  LUNGS:  [ X ]Clear bilaterally [  ] Wheeze [  ] Rhonchi [  ] Rales [  ] Crackles; Other:  HEART: [ X ]RRR [  ] No murmur[  ]  Normal S1S2[  ] Tachycardia;  Other:  CAPILLARY REFULL:  	Fingers: [ X ] less than 2 seconds [  ] more than 2 seconds                                           Toes: [ X ]  less than 2 seconds [  ] more than 2 seconds   PERIPHERAL PULSES:  Radial: [ X ] Palpable  [  ]  non-palpable                                         Dorsalis Pedis: [ X ] Palpable  [  ] non-palpable                                         Posterior Tibial: [ X ] Palpable  [  ] non-palpable                                          Other:  SKIN:   [  ]  Diaphoretic  [  ]  mottling  [  ]  Cold extremities  [ X ]  Warm [  ]  Dry                      Other:    BEDSIDE ULTRASOUND FINDINGS (IF APPLICABLE):    Labs:  25 Feb 2020 10:48    135    |  96     |  18     ----------------------------<  119    4.6     |  20     |  1.24     Ca    9.8        25 Feb 2020 10:48    TPro  7.3    /  Alb  3.8    /  TBili  0.9    /  DBili  x      /  AST  22     /  ALT  19     /  AlkPhos  123    25 Feb 2020 10:48                          12.5   20.38 )-----------( 284      ( 25 Feb 2020 10:48 )             39.0       Lactate:    Plan (orders must be placed in EMR):     [  ]  Check Repeat Lactate   [ X ]  No change in current plan  [  ]  Start Vasopressors:  [  ]  Repeat Fluid Bolus:  [  ] other:    Care Discussed with Consultants/Other Providers [ ] YES  [ X ] NO

## 2020-02-25 NOTE — H&P ADULT - PROBLEM SELECTOR PLAN 7
Dispo:   1.  Name of PCP:   2.  PCP Contacted on Admission: [ ] Y    [ ] N    3.  PCP contacted at Discharge: [ ] Y    [ ] N    [ ] N/A  4.  Post-Discharge Appointment Date and Location:  5.  Summary of Handoff given to PCP: Dispo:   1.  Name of PCP: Dr Rizzo  2.  PCP Contacted on Admission: [x] Y    [ ] N  emailed 2/25  3.  PCP contacted at Discharge: [ ] Y    [ ] N    [ ] N/A  4.  Post-Discharge Appointment Date and Location:  5.  Summary of Handoff given to PCP:

## 2020-02-25 NOTE — H&P ADULT - ASSESSMENT
64 M with HTN, HLD, GERD, s/p cervical spine fusion in January 2020, course c/b bilateral arm weakness after surgery, presenting from [facility] with fever, weakness and dysuria, admitted for sepsis 2/2 UTI. 65 yo M with HTN, HLD, BPH, GERD, s/p cervical spine fusion in January 2020 c/b postsurgical bilateral arm weakness presenting from [facility] with fever, weakness and dysuria, admitted for severe sepsis 2/2 UTI.

## 2020-02-25 NOTE — H&P ADULT - PROBLEM SELECTOR PLAN 5
Dispo:   1.  Name of PCP:   2.  PCP Contacted on Admission: [ ] Y    [ ] N    3.  PCP contacted at Discharge: [ ] Y    [ ] N    [ ] N/A  4.  Post-Discharge Appointment Date and Location:  5.  Summary of Handoff given to PCP: On hydrochlorothiazide 50 daily at home. On hydrochlorothiazide 50 daily at home. Hold HCTZ for now. Can restart if BP permits. borderline low SBP 100s, in setting of sepsis  - On hydrochlorothiazide 50 daily at home. Hold HCTZ for now. Can restart if BP permits  - c/w home cardizem  - monitor bp

## 2020-02-25 NOTE — PATIENT PROFILE ADULT - NSPROMUTANXFEARADDRESSFT_GEN_A_NUR
Provide frequent assistance while patient is treated for fevers/chills. Patient requires assistance with ADL's, feeding, etc.

## 2020-02-25 NOTE — ED PROVIDER NOTE - OBJECTIVE STATEMENT
64M with PMH of HTN, HLD, GERD, s/p cervical spine fusion on XXXX c/b right arm weakness presents from [facility] rehab center with fever and dysuria since yesterday. Also reports mechanical fall today. No head trauma or LOC. Has been on Macrobid since yesterday. Reports improvement in urinary symptoms since that time. 64M with PMH of HTN, HLD, GERD, s/p cervical spine fusion in January 2020 c/b bilateral arm weakness presents from [facility] rehab center with fever and dysuria over the past several days. Has been on Macrobid since yesterday. Reports improvement in urinary symptoms but continued fevers. Also reports mechanical fall today. No head trauma or LOC. Jordan any other symptoms including abd pain, neck stiffness, N/V/D, chest pain, SOB.

## 2020-02-25 NOTE — H&P ADULT - NSHPPHYSICALEXAM_GEN_ALL_CORE
Vital Signs Last 24 Hrs  T(C): 38.6 (25 Feb 2020 11:20), Max: 38.8 (25 Feb 2020 09:40)  T(F): 101.5 (25 Feb 2020 11:20), Max: 101.9 (25 Feb 2020 09:40)  HR: 119 (25 Feb 2020 11:20) (109 - 120)  BP: 143/93 (25 Feb 2020 11:20) (131/83 - 155/89)  BP(mean): --  RR: 21 (25 Feb 2020 11:20) (18 - 24)  SpO2: 96% (25 Feb 2020 11:20) (92% - 97%) Vital Signs Last 24 Hrs  T(C): 38.6 (25 Feb 2020 11:20), Max: 38.8 (25 Feb 2020 09:40)  T(F): 101.5 (25 Feb 2020 11:20), Max: 101.9 (25 Feb 2020 09:40)  HR: 119 (25 Feb 2020 11:20) (109 - 120)  BP: 143/93 (25 Feb 2020 11:20) (131/83 - 155/89)  BP(mean): --  RR: 21 (25 Feb 2020 11:20) (18 - 24)  SpO2: 96% (25 Feb 2020 11:20) (92% - 97%)    PHYSICAL EXAM:  GENERAL: NAD, well-developed, irritable  HEAD:  Atraumatic, Normocephalic  EYES: EOMI, conjunctiva and sclera clear  NECK: Supple, No JVD  CHEST/LUNG: Clear to auscultation bilaterally; No wheeze, ronchi or rales, no increased WOB  HEART: Regular rate and rhythm; No murmurs, rubs, or gallops  ABDOMEN: Soft, Nontender, Nondistended; Bowel sounds present, no rebound or guarding  EXTREMITIES:  2+ Peripheral Pulses, No clubbing, cyanosis, or edema  PSYCH: AAOx3  NEUROLOGY: non-focal  SKIN: No rashes or lesions Vital Signs Last 24 Hrs  T(C): 38.6 (25 Feb 2020 11:20), Max: 38.8 (25 Feb 2020 09:40)  T(F): 101.5 (25 Feb 2020 11:20), Max: 101.9 (25 Feb 2020 09:40)  HR: 119 (25 Feb 2020 11:20) (109 - 120)  BP: 143/93 (25 Feb 2020 11:20) (131/83 - 155/89)  BP(mean): --  RR: 21 (25 Feb 2020 11:20) (18 - 24)  SpO2: 96% (25 Feb 2020 11:20) (92% - 97%)    PHYSICAL EXAM:  GENERAL: NAD, well-developed, irritable  HEAD:  Atraumatic, Normocephalic  EYES: EOMI, conjunctiva and sclera clear  NECK: Supple, No JVD  CHEST/LUNG: Clear to auscultation bilaterally; No wheeze, ronchi or rales, no increased WOB  HEART: Regular rate and rhythm; No murmurs, rubs, or gallops  ABDOMEN: Soft, Nontender, Nondistended; Bowel sounds present, no rebound or guarding, no suprapubic or CVA tenderness  EXTREMITIES:  2+ Peripheral Pulses, No clubbing, cyanosis, or edema  PSYCH: AAOx3  NEUROLOGY: 5/5 strength in b/l LEs, 4/5 strength in LUE, 3/5 strength in RUE  SKIN: No rashes or lesions Vital Signs Last 24 Hrs  T(C): 38.6 (25 Feb 2020 11:20), Max: 38.8 (25 Feb 2020 09:40)  T(F): 101.5 (25 Feb 2020 11:20), Max: 101.9 (25 Feb 2020 09:40)  HR: 119 (25 Feb 2020 11:20) (109 - 120)  BP: 143/93 (25 Feb 2020 11:20) (131/83 - 155/89)  BP(mean): --  RR: 21 (25 Feb 2020 11:20) (18 - 24)  SpO2: 96% (25 Feb 2020 11:20) (92% - 97%)    PHYSICAL EXAM:  GENERAL: NAD, well-developed, easily irritable, breathing comfortably w NC  HEAD:  Atraumatic, Normocephalic  EYES: EOMI, conjunctiva and sclera clear  NECK: Supple, No JVD  CHEST/LUNG: Clear to auscultation bilaterally; No wheeze, ronchi or rales, no increased WOB  HEART: Regular rate and rhythm; No murmurs, rubs, or gallops  ABDOMEN: Soft, Nontender, Nondistended; Bowel sounds present, no rebound or guarding, no suprapubic or CVA tenderness  EXTREMITIES:  2+ Peripheral Pulses, No clubbing, cyanosis, or edema  PSYCH: AAOx3  NEUROLOGY: 5/5 strength in b/l LEs, 4/5 strength in LUE, 3/5 strength in RUE  SKIN: No rashes or lesions

## 2020-02-25 NOTE — PATIENT PROFILE ADULT - NSPROMUTINFOINDIVIDFT_GEN_A_NUR
Provide assistance w/ feeding and ADL's r/t upper extremity weakness/involuntary movement. Patient wears Neck brace when ambulating at home

## 2020-02-25 NOTE — ED PROVIDER NOTE - NS ED ROS FT
ROS:  GENERAL: +fever  EYES: no change in vision  HEENT: no trouble swallowing, no trouble speaking  CARDIAC: no chest pain  PULMONARY: no cough, no shortness of breath  GI: no abdominal pain, no nausea, no vomiting, no diarrhea, no constipation  : No dysuria, no frequency, no change in appearance, or odor of urine  SKIN: no rashes  NEURO: no headache, no weakness  MSK: No joint pain    Dillon Velasquez PGY2

## 2020-02-25 NOTE — H&P ADULT - ATTENDING COMMENTS
65 yo M PMH recent cervical spine fusion c/b b/l UE weakness now receiving physical therapy in rehab, hx BPH, HTN, HLD p/w f/dysuria admitted with severe sepsis due to UTI. Lactic acidosis now resolved. c/w ceftriaxone iv, f/u blood and urine cultures, check bladder scan for PVR and if >300cc, start tamsulosin for BPH as etiology of UTI may be related to urine retention/obstruction.

## 2020-02-25 NOTE — PATIENT PROFILE ADULT - TRANSPORTATION
Probiotic containing lactobacillius. We may need to cut down the thyroid dose. I will have you stop the iron if blood work better. This will make the stools less dark. no

## 2020-02-25 NOTE — PATIENT PROFILE ADULT - VISION (WITH CORRECTIVE LENSES IF THE PATIENT USUALLY WEARS THEM):
Reading glasses to be brought in by wife/Partially impaired: cannot see medication labels or newsprint, but can see obstacles in path, and the surrounding layout; can count fingers at arm's length

## 2020-02-25 NOTE — ED PROVIDER NOTE - CLINICAL SUMMARY MEDICAL DECISION MAKING FREE TEXT BOX
Urosepsis. Pt well appearing, normotensive. Fluids, abx, & admit. Low suspicion for any traumatic injury from the fall based one exam and history. Urosepsis. Pt well appearing, normotensive. Fluids, abx, & admit. Low suspicion for any traumatic injury from the fall based one exam and history. ZR

## 2020-02-25 NOTE — H&P ADULT - NSICDXPASTMEDICALHX_GEN_ALL_CORE_FT
PAST MEDICAL HISTORY:  BPH (benign prostatic hypertrophy)     Cluster headache     GERD (gastroesophageal reflux disease)     High cholesterol     HTN (hypertension)     HTN (hypertension)     Hyperlipidemia     Spinal stenosis

## 2020-02-25 NOTE — H&P ADULT - HISTORY OF PRESENT ILLNESS
64M with PMH of HTN, HLD, GERD, s/p cervical spine fusion in January 2020 c/b bilateral arm weakness presents from [facility] rehab center with fever and dysuria over the past several days. Has been on Macrobid since yesterday. Reports improvement in urinary symptoms but continued fevers. Also reports mechanical fall today. No head trauma or LOC. Jordan any other symptoms including abd pain, neck stiffness, N/V/D, chest pain, SOB. 64 M with HTN, HLD, GERD, s/p cervical spine fusion in January 2020, course c/b bilateral arm weakness after surgery, presenting from [facility] with fever, weakness and dysuria since yesterday. Patient was started on macrobid for UTI. He reports having a mechanical fall today; describes hitting his head on the bed. Denies LOC, no prodromal symptoms before fall. Symptoms include dysuria, urinary frequency, fevers and chills in rehab. Denies nausea or vomiting.     In the ED, patient febrile to 101.9 and tachycardic to 120. Remaining vital signs were wnl. Patient was given zosyn and 2L NS. UA was positive for LE. CT cervical spine and head did not show any acute changes. 63 yo M with HTN, HLD, GERD, BPH, s/p cervical spine fusion in January 2020 c/b postsurgical bilateral arm weakness presenting from [facility] with fever, weakness and dysuria since yesterday. Patient was started on macrobid for UTI. He reports hitting his head on bedrail while moving about in bed yesterday. Denies LOC, no prodromal symptoms before fall. Symptoms include dysuria, urinary frequency, fevers and chills in rehab. Denies nausea or vomiting, abd pain, new back pain.    In the ED, patient febrile to 101.9 and tachycardic to 120. Remaining vital signs were wnl. Patient was given zosyn and 2L NS. UA was positive for LE. CT cervical spine and head did not show any acute changes.

## 2020-02-25 NOTE — ED PROVIDER NOTE - PHYSICAL EXAMINATION
Gen: AAOx3, non-toxic  Head: NCAT, no midline spinal tenderness   HEENT: EOMI, oral mucosa moist, normal conjunctiva  Lung: CTAB, no respiratory distress, no wheezes/rhonchi/rales B/L, speaking in full sentences  CV: RRR, no murmurs, rubs or gallops  Abd: soft, NTND, no guarding, no CVA tenderness  MSK: no visible deformities  Neuro: unchanged R and L upper extremity weakness. no other deficits   Skin: Warm, well perfused, no rash  Psych: normal affect.     ~Dillon Velasquez PGY2

## 2020-02-25 NOTE — H&P ADULT - PROBLEM SELECTOR PLAN 3
Patient is s/p laminectomy.  - continue oxycodone 5 mg q 6 hrs prn   - patient was on baclofen in rehab, but unable to verify on Istop. Will hold for now. Patient is s/p laminectomy.  - continue oxycodone 5 mg q 6 hrs prn  - patient was on baclofen in rehab, but unable to verify on Istop. Will hold for now.  - F/u blood cultures. Patient is s/p posterior cervical laminectomy.  - continue oxycodone 2.5 mg q 4 hrs prn  - Will continue baclofen prn.  - F/u blood cultures. Patient is s/p posterior cervical laminectomy.  - continue home oxycodone 2.5 mg q 4 hrs prn  - Will continue baclofen prn.

## 2020-02-25 NOTE — H&P ADULT - PROBLEM SELECTOR PLAN 1
Patient with symptoms of UTI, meeting sepsis criteria.  - F/u urine cx  - Start CTX Patient with symptoms of UTI, meeting sepsis criteria.  - F/u urine cx  - Start CTX for 7 days. Patient with symptoms of UTI, meeting sepsis criteria.  - F/u urine cx  - Start CTX for 7 days.  - Bladder scan ordered. Will start tamsulosin if distension noted. Patient with symptoms of UTI, meeting sepsis criteria. Cause of UTI 2/2 BPH and urine retention?  - F/u urine cx  - Start CTX for 7 days.  - Bladder scan ordered to check for PVR. Will start tamsulosin if urine retention noted, given hx of BPH and not currently on meds.

## 2020-02-25 NOTE — ED PROVIDER NOTE - PMH
BPH (benign prostatic hypertrophy)    Cluster headache    GERD (gastroesophageal reflux disease)    High cholesterol    HTN (hypertension)    HTN (hypertension)    Hyperlipidemia    Spinal stenosis

## 2020-02-25 NOTE — ED ADULT NURSE REASSESSMENT NOTE - NS ED NURSE REASSESS COMMENT FT1
Pt AAOx4, NAD, resp nonlabored, resting comfortably in bed with family at bedside . Pt denies headache, dizziness, chest pain, palpitations, SOB, abd pain, n/v/d, urinary symptoms, weakness at this time. Pt admitted awaiting bed . Safety maintained.

## 2020-02-25 NOTE — CHART NOTE - NSCHARTNOTEFT_GEN_A_CORE
Istop inquiry Reference #: [id number]    Patient Name:	Mckinley Wilburn	YOB: 1955  Address:	[address] [place] [address]  [place] [address]	Sex:	Male    Rx Written	Rx Dispensed	Drug	                        Quantity     Days Supply    Prescriber Name  02/22/2020	02/22/2020	oxycodone hcl 5 mg tablet	10	 4	      ZamanAudra SUNDEEP    Patient Name:	Mckinley Wilburn	YOB: 1955  Address:	[address] [place] [address]	Sex:	Male  Rx Written	Rx Dispensed	Drug	                          Quantity  Days Supply 	Prescriber Name  08/19/2019	10/15/2019	tramadol hcl 50 mg tablet	18	 6	Claude Rizzo)  09/25/2019	09/25/2019	acetaminophen-cod #3 [id number]	2	Claude Younger  08/19/2019	08/23/2019	tramadol hcl 50 mg tablet  	21	 7	Claude Rizzo)

## 2020-02-25 NOTE — H&P ADULT - NSHPREVIEWOFSYSTEMS_GEN_ALL_CORE
CONSTITUTIONAL: + weakness, + fevers , + chills  EYES/ENT: No visual changes;  No vertigo or throat pain   NECK: No pain or stiffness  RESPIRATORY: No cough, wheezing, hemoptysis; No shortness of breath  CARDIOVASCULAR: No chest pain or palpitations  GASTROINTESTINAL: No abdominal or epigastric pain. No nausea, vomiting, or hematemesis; No diarrhea or constipation. No melena or hematochezia.  GENITOURINARY: + dysuria, + frequency, no hematuria  NEUROLOGICAL: 5/5 strength in b/l LEs, 4/5 strength in LUE, 3/5 strength in RUE  SKIN: No itching, rashes CONSTITUTIONAL: + weakness, + fevers , + chills  EYES/ENT: No visual changes;  No vertigo or throat pain   NECK: No pain or stiffness  RESPIRATORY: No cough, wheezing, hemoptysis; No shortness of breath  CARDIOVASCULAR: No chest pain or palpitations  GASTROINTESTINAL: No abdominal or epigastric pain. No nausea, vomiting, or hematemesis; No diarrhea or constipation. No melena or hematochezia.  GENITOURINARY: + dysuria, + frequency, no hematuria  NEUROLOGICAL: 5/5 strength in b/l LEs, 4/5 strength in LUE, 3/5 strength in RUE  SKIN: No itching, rashes  PSYCHIATRIC: No depression, anxiety, mood swings, or difficulty sleeping  HEME/LYMPH: No easy bruising, or bleeding gums

## 2020-02-25 NOTE — ED ADULT NURSE NOTE - OBJECTIVE STATEMENT
0910 64 yr old WM brought to ER via ambulance on stretcher from [facility] for further eval and tx of fever and burning upon urination. also bumped head accidentally on bed rail this Am when he lost his balance. No LOC. On Lovenox currently. s/p cervical fusion surgery Prashanth 15, 2020, States unable to move arms since then. Has been at rehab center for physical therapy. Was diagnosed with UTI yesterday and started on antibiotics. Droplet isolation maintained. Fall risk precautions maintained. c/o bodyaches, weakness and pain with urination. states fever was 102 at [facility]. states Tylenol was given this morning. A&Ox4. Bilat arm weakness. Lungs clear. abd soft. States he received a flu shot this season.

## 2020-02-25 NOTE — H&P ADULT - NSICDXFAMILYHX_GEN_ALL_CORE_FT
FAMILY HISTORY:  Family history of prostate cancer  Family history of stroke    Sibling  Still living? Yes, Estimated age: 61-70  Family history of breast cancer, Age at diagnosis: Age Unknown  Family history of oral cancer, Age at diagnosis: Age Unknown

## 2020-02-25 NOTE — ED ADULT NURSE REASSESSMENT NOTE - NS ED NURSE REASSESS COMMENT FT1
Pt AAOx4, NAD, resp nonlabored, resting comfortably in bed with family at bedside. Pt denies headache, dizziness, chest pain, palpitations, SOB, abd pain, n/v/d, urinary symptoms, weakness at this time.  . Safety maintained.

## 2020-02-25 NOTE — PROVIDER CONTACT NOTE (CHANGE IN STATUS NOTIFICATION) - ACTION/TREATMENT ORDERED:
Dr. Mejía paged x2 per provider to RN order. MAR notified as well. PRN Tylenol ok to give early w/ cold packs for persistent temperature. Will continue to monitor closely.

## 2020-02-25 NOTE — ED ADULT NURSE NOTE - HOW OFTEN DO YOU HAVE A DRINK CONTAINING ALCOHOL?
After Visit Summary   2/6/2017    Maggie Navarrete    MRN: [id number]           Patient Information     Date Of Birth          1949        Visit Information        Provider Department      2/6/2017 10:00 AM Ab Gutierrez MD [facility]        Today's Diagnoses     Uterus cancer, sarcoma (H)    -  1       Follow-ups after your visit        Your next 10 appointments already scheduled     Feb 24, 2017 10:30 AM CST   [facility] with  [facility]   [facility] ([facility])    [address] [id number] [phone]            Feb 24, 2017 11:00 AM CST   (Arrive by 10:45 AM)   CT CHEST ABDOMEN PELVIS W/O & W CONTRAST with UCCT2   [facility] CT ([facility])    [address] [place] [id number] [phone]           Please bring any scans or X-rays taken at other hospitals, if similar tests were done. Also bring a list of your medicines, including vitamins, minerals and over-the-counter drugs. It is safest to leave personal items at home.  Be sure to tell your doctor:   If you have any allergies.   If there s any chance you are pregnant.   If you are breastfeeding.   If you have any special needs.  You may have contrast for this exam. To prepare:   Do not eat or drink for 2 hours before your exam. If you need to take medicine, you may take it with small sips of water. (We may ask you to take liquid medicine as well.)   The day before your exam, drink extra fluids at least six 8-ounce glasses (unless your doctor tells you to restrict your fluids).  Patients over 70 or patients with diabetes or kidney problems:   If you haven t had a blood test (creatinine test) within the last 30 days, go to your clinic or Diagnostic Imaging Department for this test.  If you have diabetes:   If your kidney function is normal, continue taking  your metformin (Avandamet, Glucophage, Glucovance, Metaglip) on the day of your exam.   If your kidney function is abnormal, wait 48 hours before restarting this medicine.  You will have oral contrast for this exam:   You will drink the contrast at home. Get this from your clinic or Diagnostic Imaging Department. Please follow the directions given.  Please wear loose clothing, such as a sweat suit or jogging clothes. Avoid snaps, zippers and other metal. We may ask you to undress and put on a hospital gown.  If you have any questions, please call the Imaging Department where you will have your exam.            Feb 27, 2017 11:30 AM CST   (Arrive by 11:15 AM)   Return Visit with Ab Aguilar MD   [facility])    [address] [place] [id number] [phone]            Feb 27, 2017  1:00 PM CST   Infusion 120 with UC ONCOLOGY INFUSION, UC 18 ATC   [facility] ([facility])    [address] [place] [address]   [phone]            Feb 27, 2017  1:00 PM CST   Consult HOD with Vivienne Still RD   [facility] [place] ([facility])    [address] [place] [address] 84  [facility] [id number]               Feb 28, 2017  1:30 PM CST   Infusion 120 with UC ONCOLOGY INFUSION, UC 27 ATC   [facility] ([facility])    [address] [place] [address]   [phone]            Mar 01, 2017  1:30 PM CST   Infusion 120 with UC ONCOLOGY INFUSION, UC 27 ATC   [facility] ([facility])    [address] [place] [id number] [phone]              Who to contact     If you have questions or need follow up information about today's clinic visit or your schedule please  contact [facility] directly at [phone].  Normal or non-critical lab and imaging results will be communicated to you by MyChart, letter or phone within 4 business days after the clinic has received the results. If you do not hear from us within 7 days, please contact the clinic through [employer] or phone. If you have a critical or abnormal lab result, we will notify you by phone as soon as possible.  Submit refill requests through [employer] or call your pharmacy and they will forward the refill request to us. Please allow 3 business days for your refill to be completed.          Additional Information About Your Visit        [employer] Information     [employer] gives you secure access to your electronic health record. If you see a primary care provider, you can also send messages to your care team and make appointments. If you have questions, please call your primary care clinic.  If you do not have a primary care provider, please call [phone] and they will assist you.        Care EveryWhere ID     This is your Care EveryWhere ID. This could be used by other organizations to access your [place] medical records  [id number]        Your Vitals Were     Pulse Temperature Respirations Height Pulse Oximetry BMI (Body Mass Index)    108 97.7  F (36.5  C) (Oral) 18 1.524 m (5') 99% 23.9 kg/m2       Blood Pressure from Last 3 Encounters:   02/08/17 146/72   02/07/17 152/70   02/06/17 129/65    Weight from Last 3 Encounters:   02/06/17 55.5 kg (122 lb 6.4 oz)   01/16/17 55.7 kg (122 lb 12.8 oz)   12/28/16 56.7 kg (124 lb 14.4 oz)              Today, you had the following     No orders found for display       Primary Care Provider Office Phone # Fax #    Kristine Flynn MD PhD [phone]        [facility] [address] [facility] [address]  [facility] [address]        Thank you!     Thank you for choosing [facility]  for your care. Our goal is always to provide you with  excellent care. Hearing back from our patients is one way we can continue to improve our services. Please take a few minutes to complete the written survey that you may receive in the mail after your visit with us. Thank you!             Your Updated Medication List - Protect others around you: Learn how to safely use, store and throw away your medicines at www.disposemymeds.org.          This list is accurate as of: 2/6/17 11:59 PM.  Always use your most recent med list.                   Brand Name Dispense Instructions for use    alendronate 35 MG tablet    FOSAMAX    12 tablet    Take 1 tablet (35 mg) by mouth every 7 days       CALCIUM CITRATE + PO      Take 200 mg by mouth daily as needed (She supplements to meet her 1,200mg goal depending on how much dietary calcium she has gotten that day (up to 400mg once daily))       enoxaparin 80 MG/0.8ML injection    LOVENOX         FIRST-MOUTHWASH BLM Susp     237 mL    Swish and swallow 5-10 mLs in mouth every 6 hours as needed       HYDROcodone-acetaminophen 5-325 MG per tablet    NORCO    30 tablet    1-2 tablets every 6 hours as needed for pain       levothyroxine 100 MCG tablet    SYNTHROID/LEVOTHROID     Take 100 mcg by mouth daily       LORazepam 0.5 MG tablet    ATIVAN    30 tablet    Take 1 tablet (0.5 mg) by mouth every 4 hours as needed (Anxiety, Nausea/Vomiting or Sleep)       MELATONIN PO      Take 2.5 mg by mouth       MULTIVITAMINS PO      Take 1 tablet by mouth daily.       ondansetron 4 MG tablet    ZOFRAN    30 tablet    Take 1 tablet (4 mg) by mouth every 8 hours as needed (Nausea/Vomiting)       prochlorperazine 10 MG tablet    COMPAZINE    30 tablet    Take 1 tablet (10 mg) by mouth every 6 hours as needed (Nausea/Vomiting)       sterile water (bottle) irrigation          tretinoin 0.025 % cream    RETIN-A    45 g    Apply a pea-sized amount to cleansed and dried face at night       venlafaxine 75 MG 24 hr capsule    EFFEXOR-XR    90 capsule     Take 1 capsule (75 mg) by mouth daily       ZANTAC PO      Take 150 mg by mouth daily       zolpidem 5 MG tablet    AMBIEN    25 tablet    Take 1 tablet (5 mg) by mouth nightly as needed for sleep          Never

## 2020-02-25 NOTE — H&P ADULT - PROBLEM SELECTOR PLAN 2
- patient has tachycardic, febrile, with leukocytosis  - S/p 2 L IVF  - Start CTX  - F/u urine and blood cultures.  - Low c/f complications from recent laminectomy, but will f/u blood cultures. - patient was tachycardic, febrile, with leukocytosis  - S/p 2 L IVF  - Start CTX  - F/u urine and blood cultures.  - Low c/f complications from recent laminectomy. No signs on abscess on CTA cervical, but will f/u blood cultures. - patient was tachycardic, febrile, with leukocytosis, lactate elevated to 3.3  - S/p 2 L IVF  - Start CTX  - F/u urine and blood cultures.  - Low c/f complications from recent laminectomy. No signs on abscess on CTA cervical, but will f/u blood cultures.  - Encourage po intake. tachycardic, febrile, with leukocytosis, AG with lactate elevated to 3.3 (now normalizing)  S/p 2 L IVF  - F/u urine and blood cultures.  - abx as above  - Low c/f complications from recent laminectomy. No signs on abscess on CTA cervical, but will f/u blood cultures.  - Encourage po intake.

## 2020-02-25 NOTE — H&P ADULT - NSHPLABSRESULTS_GEN_ALL_CORE
CBC Full  -  ( 25 Feb 2020 10:48 )  WBC Count : 20.38 K/uL  RBC Count : 4.25 M/uL  Hemoglobin : 12.5 g/dL  Hematocrit : 39.0 %  Platelet Count - Automated : 284 K/uL  Mean Cell Volume : 91.8 fl  Mean Cell Hemoglobin : 29.4 pg  Mean Cell Hemoglobin Concentration : 32.1 gm/dL  Auto Neutrophil # : 14.89 K/uL  Auto Lymphocyte # : 2.83 K/uL  Auto Monocyte # : 2.41 K/uL  Auto Eosinophil # : 0.02 K/uL  Auto Basophil # : 0.10 K/uL  Auto Neutrophil % : 73.1 %  Auto Lymphocyte % : 13.9 %  Auto Monocyte % : 11.8 %  Auto Eosinophil % : 0.1 %  Auto Basophil % : 0.5 %    02-25    135  |  96  |  18  ----------------------------<  119<H>  4.6   |  20<L>  |  1.24    Ca    9.8      25 Feb 2020 10:48    TPro  7.3  /  Alb  3.8  /  TBili  0.9  /  DBili  x   /  AST  22  /  ALT  19  /  AlkPhos  123<H>  02-25 CBC Full  -  ( 25 Feb 2020 10:48 )  WBC Count : 20.38 K/uL  RBC Count : 4.25 M/uL  Hemoglobin : 12.5 g/dL  Hematocrit : 39.0 %  Platelet Count - Automated : 284 K/uL  Mean Cell Volume : 91.8 fl  Mean Cell Hemoglobin : 29.4 pg  Mean Cell Hemoglobin Concentration : 32.1 gm/dL  Auto Neutrophil # : 14.89 K/uL  Auto Lymphocyte # : 2.83 K/uL  Auto Monocyte # : 2.41 K/uL  Auto Eosinophil # : 0.02 K/uL  Auto Basophil # : 0.10 K/uL  Auto Neutrophil % : 73.1 %  Auto Lymphocyte % : 13.9 %  Auto Monocyte % : 11.8 %  Auto Eosinophil % : 0.1 %  Auto Basophil % : 0.5 %    02-25    135  |  96  |  18  ----------------------------<  119<H>  4.6   |  20<L>  |  1.24    Ca    9.8      25 Feb 2020 10:48    TPro  7.3  /  Alb  3.8  /  TBili  0.9  /  DBili  x   /  AST  22  /  ALT  19  /  AlkPhos  123<H>  02-25    EKG: sinus tachycardia    CT Head and CT cervical spine:  Brain CT: No acute hemorrhage mass effect or displaced calvarial fracture.    Cervical spine CT: No acute fracture or traumatic subluxation. Multilevel degenerative changes and postsurgical changes. CBC Full  -  ( 25 Feb 2020 10:48 )  WBC Count : 20.38 K/uL  RBC Count : 4.25 M/uL  Hemoglobin : 12.5 g/dL  Hematocrit : 39.0 %  Platelet Count - Automated : 284 K/uL  Mean Cell Volume : 91.8 fl  Mean Cell Hemoglobin : 29.4 pg  Mean Cell Hemoglobin Concentration : 32.1 gm/dL  Auto Neutrophil # : 14.89 K/uL  Auto Lymphocyte # : 2.83 K/uL  Auto Monocyte # : 2.41 K/uL  Auto Eosinophil # : 0.02 K/uL  Auto Basophil # : 0.10 K/uL  Auto Neutrophil % : 73.1 %  Auto Lymphocyte % : 13.9 %  Auto Monocyte % : 11.8 %  Auto Eosinophil % : 0.1 %  Auto Basophil % : 0.5 %    02-25    135  |  96  |  18  ----------------------------<  119<H>  4.6   |  20<L>  |  1.24    Ca    9.8      25 Feb 2020 10:48    TPro  7.3  /  Alb  3.8  /  TBili  0.9  /  DBili  x   /  AST  22  /  ALT  19  /  AlkPhos  123<H>  02-25    EKG personally reviewed with sinus tachycardia    CT Head and CT cervical spine:  Brain CT: No acute hemorrhage mass effect or displaced calvarial fracture.    Cervical spine CT: No acute fracture or traumatic subluxation. Multilevel degenerative changes and postsurgical changes.    Labs reviewed.  CXR personally reviewed no infiltrate

## 2020-02-25 NOTE — H&P ADULT - PROBLEM SELECTOR PLAN 4
DVT ppx: lovenox  DASH diet Patient describes mechanical fall and hitting head.  - CTH shows no acute changes. F/u orthostatics and PT recommendations. Patient describes ?mechanical fall and head trauma against bed rail.  - CTH shows no acute changes  - F/u orthostatics and PT recommendations.

## 2020-02-25 NOTE — H&P ADULT - NSHPSOCIALHISTORY_GEN_ALL_CORE
[facility] Prior to surgery in 1/2020 patient was living at home with wife and children. He was walking without assistive devices; able to do all ADLs independently. Distant smoking history, denies etoh or recreational drug use.

## 2020-02-26 LAB
ANION GAP SERPL CALC-SCNC: 15 MMOL/L — SIGNIFICANT CHANGE UP (ref 5–17)
BUN SERPL-MCNC: 14 MG/DL — SIGNIFICANT CHANGE UP (ref 7–23)
CALCIUM SERPL-MCNC: 8.7 MG/DL — SIGNIFICANT CHANGE UP (ref 8.4–10.5)
CHLORIDE SERPL-SCNC: 97 MMOL/L — SIGNIFICANT CHANGE UP (ref 96–108)
CO2 SERPL-SCNC: 22 MMOL/L — SIGNIFICANT CHANGE UP (ref 22–31)
CREAT SERPL-MCNC: 1.05 MG/DL — SIGNIFICANT CHANGE UP (ref 0.5–1.3)
CULTURE RESULTS: NO GROWTH — SIGNIFICANT CHANGE UP
GLUCOSE SERPL-MCNC: 131 MG/DL — HIGH (ref 70–99)
HCT VFR BLD CALC: 32.1 % — LOW (ref 39–50)
HCV AB S/CO SERPL IA: 0.11 S/CO — SIGNIFICANT CHANGE UP (ref 0–0.99)
HCV AB SERPL-IMP: SIGNIFICANT CHANGE UP
HGB BLD-MCNC: 10.5 G/DL — LOW (ref 13–17)
MAGNESIUM SERPL-MCNC: 1.6 MG/DL — SIGNIFICANT CHANGE UP (ref 1.6–2.6)
MCHC RBC-ENTMCNC: 29.5 PG — SIGNIFICANT CHANGE UP (ref 27–34)
MCHC RBC-ENTMCNC: 32.7 GM/DL — SIGNIFICANT CHANGE UP (ref 32–36)
MCV RBC AUTO: 90.2 FL — SIGNIFICANT CHANGE UP (ref 80–100)
NRBC # BLD: 0 /100 WBCS — SIGNIFICANT CHANGE UP (ref 0–0)
PHOSPHATE SERPL-MCNC: 3.2 MG/DL — SIGNIFICANT CHANGE UP (ref 2.5–4.5)
PLATELET # BLD AUTO: 225 K/UL — SIGNIFICANT CHANGE UP (ref 150–400)
POTASSIUM SERPL-MCNC: 3.2 MMOL/L — LOW (ref 3.5–5.3)
POTASSIUM SERPL-SCNC: 3.2 MMOL/L — LOW (ref 3.5–5.3)
RBC # BLD: 3.56 M/UL — LOW (ref 4.2–5.8)
RBC # FLD: 14.6 % — HIGH (ref 10.3–14.5)
SODIUM SERPL-SCNC: 134 MMOL/L — LOW (ref 135–145)
SPECIMEN SOURCE: SIGNIFICANT CHANGE UP
WBC # BLD: 17.39 K/UL — HIGH (ref 3.8–10.5)
WBC # FLD AUTO: 17.39 K/UL — HIGH (ref 3.8–10.5)

## 2020-02-26 PROCEDURE — 99233 SBSQ HOSP IP/OBS HIGH 50: CPT | Mod: GC

## 2020-02-26 RX ORDER — HYDROCHLOROTHIAZIDE 25 MG
50 TABLET ORAL DAILY
Refills: 0 | Status: DISCONTINUED | OUTPATIENT
Start: 2020-02-26 | End: 2020-03-03

## 2020-02-26 RX ORDER — ONDANSETRON 8 MG/1
4 TABLET, FILM COATED ORAL EVERY 6 HOURS
Refills: 0 | Status: DISCONTINUED | OUTPATIENT
Start: 2020-02-26 | End: 2020-03-03

## 2020-02-26 RX ORDER — ONDANSETRON 8 MG/1
4 TABLET, FILM COATED ORAL ONCE
Refills: 0 | Status: COMPLETED | OUTPATIENT
Start: 2020-02-26 | End: 2020-02-26

## 2020-02-26 RX ORDER — SODIUM CHLORIDE 9 MG/ML
1000 INJECTION, SOLUTION INTRAVENOUS
Refills: 0 | Status: COMPLETED | OUTPATIENT
Start: 2020-02-26 | End: 2020-02-26

## 2020-02-26 RX ORDER — ACETAMINOPHEN 500 MG
650 TABLET ORAL EVERY 6 HOURS
Refills: 0 | Status: DISCONTINUED | OUTPATIENT
Start: 2020-02-26 | End: 2020-03-03

## 2020-02-26 RX ORDER — POTASSIUM CHLORIDE 20 MEQ
40 PACKET (EA) ORAL ONCE
Refills: 0 | Status: COMPLETED | OUTPATIENT
Start: 2020-02-26 | End: 2020-02-26

## 2020-02-26 RX ADMIN — Medication 650 MILLIGRAM(S): at 13:01

## 2020-02-26 RX ADMIN — Medication 650 MILLIGRAM(S): at 05:27

## 2020-02-26 RX ADMIN — ENOXAPARIN SODIUM 40 MILLIGRAM(S): 100 INJECTION SUBCUTANEOUS at 12:58

## 2020-02-26 RX ADMIN — GABAPENTIN 600 MILLIGRAM(S): 400 CAPSULE ORAL at 12:58

## 2020-02-26 RX ADMIN — OXYCODONE HYDROCHLORIDE 2.5 MILLIGRAM(S): 5 TABLET ORAL at 23:10

## 2020-02-26 RX ADMIN — Medication 40 MILLIEQUIVALENT(S): at 18:07

## 2020-02-26 RX ADMIN — ONDANSETRON 4 MILLIGRAM(S): 8 TABLET, FILM COATED ORAL at 18:44

## 2020-02-26 RX ADMIN — PANTOPRAZOLE SODIUM 40 MILLIGRAM(S): 20 TABLET, DELAYED RELEASE ORAL at 05:27

## 2020-02-26 RX ADMIN — GABAPENTIN 600 MILLIGRAM(S): 400 CAPSULE ORAL at 05:28

## 2020-02-26 RX ADMIN — SODIUM CHLORIDE 100 MILLILITER(S): 9 INJECTION, SOLUTION INTRAVENOUS at 11:00

## 2020-02-26 RX ADMIN — Medication 5 MILLIGRAM(S): at 23:11

## 2020-02-26 RX ADMIN — GABAPENTIN 600 MILLIGRAM(S): 400 CAPSULE ORAL at 23:10

## 2020-02-26 RX ADMIN — ATORVASTATIN CALCIUM 80 MILLIGRAM(S): 80 TABLET, FILM COATED ORAL at 23:10

## 2020-02-26 RX ADMIN — Medication 650 MILLIGRAM(S): at 05:57

## 2020-02-26 RX ADMIN — Medication 240 MILLIGRAM(S): at 05:27

## 2020-02-26 RX ADMIN — Medication 650 MILLIGRAM(S): at 00:17

## 2020-02-26 RX ADMIN — Medication 650 MILLIGRAM(S): at 13:45

## 2020-02-26 RX ADMIN — Medication 50 MILLIGRAM(S): at 18:06

## 2020-02-26 NOTE — PROGRESS NOTE ADULT - PROBLEM SELECTOR PLAN 2
P/w tachycardic, febrile, with leukocytosis, AG with lactate elevated to 3.3 (now normalizing),  s/p 2 L IVF  - F/u urine and blood cultures  - abx as above  - Low c/f complications from recent laminectomy. No signs on abscess on CTA cervical, but will f/u blood cultures.  - Encourage po intake P/w tachycardic, febrile, with leukocytosis, AG with lactate elevated to 3.3 (now normalizing),  s/p 2 L IVF  - F/u urine and blood cultures: 2/25 UCx NGTD; 2/25 BCx pending; 2/26 BCx pending  - abx as above  - Low c/f complications from recent laminectomy. No signs on abscess on CTA cervical, but will f/u blood cultures  - Encourage po intake  - zofran prn

## 2020-02-26 NOTE — PROGRESS NOTE ADULT - ATTENDING COMMENTS
pt seen and examined.  above plan discussed on rounds today.  In addition,    Severe sepsis 2/2 UTI - c/w Ceftriaxone. Ucx noted to be negative, but sample was collected post abx administration.  leukocytosis is improving but pt is still having rigors. Bld Cxs checked while patient was having rigors with hopes that if he is bacteremic we would have a higher chance of inoculating the bld culture sample.  c/w fluid resuscitation.

## 2020-02-26 NOTE — PROVIDER CONTACT NOTE (OTHER) - ACTION/TREATMENT ORDERED:
administer Tylenol now
Continue to monitor VS Q 4 hours.  MD to assess at bedside, Administer hydrochlorothiazide for BP and Zofran PO for nausea.
Provider made aware.  Tylenol administered for headache, Ice packs given for comfort, continue to r

## 2020-02-26 NOTE — PROGRESS NOTE ADULT - PROBLEM SELECTOR PLAN 1
Patient with symptoms of UTI, meeting sepsis criteria. Cause of UTI 2/2 BPH and urine retention?  - F/u 2/25 urine cx  - Start CTX (2/25- ) x7 days given complicated UTI  - Bladder scan ordered to check for PVR. Will start tamsulosin if urine retention noted, given hx of BPH and not currently on meds  - tylenol  - consider either CT A/P to eval for pyelonephritis if pt continues to spike Patient with symptoms of UTI, met sepsis criteria on admission. Cause of UTI 2/2 BPH and urinary retention  - F/u 2/25 urine cx  - Start CTX (2/25- ) x5-7, d2 days given complicated UTI  - Bladder scan ordered to check for PVR. Will start tamsulosin if urine retention noted, given hx of BPH and not currently on meds  - tylenol 650 prn  - consider CT A/P to eval for pyelonephritis if pt continues to spike

## 2020-02-26 NOTE — PHYSICAL THERAPY INITIAL EVALUATION ADULT - PASSIVE RANGE OF MOTION EXAMINATION, REHAB EVAL
Right UE Passive ROM was WFL (within functional limits)/Left UE Passive ROM was WFL (within functional limits)

## 2020-02-26 NOTE — PHYSICAL THERAPY INITIAL EVALUATION ADULT - PERTINENT HX OF CURRENT PROBLEM, REHAB EVAL
63 yo M with HTN, HLD, GERD, BPH, s/p cervical spine fusion in January 2020 c/b postsurgical bilateral arm weakness presenting from [facility] with fever, weakness and dysuria since yesterday. Patient was started on macrobid for UTI. He reports hitting his head on bedrail while moving about in bed yesterday.

## 2020-02-26 NOTE — PHYSICAL THERAPY INITIAL EVALUATION ADULT - PRECAUTIONS/LIMITATIONS, REHAB EVAL
Denies LOC, no prodromal symptoms before fall. Symptoms include dysuria, urinary frequency, fevers and chills in rehab. Denies nausea or vomiting, abd pain, new back pain. In the ED, patient febrile to 101.9 and tachycardic to 120. Remaining vital signs were wnl. Patient was given zosyn and 2L NS. UA was positive for LE. CT cervical spine and head did not show any acute changes. + severe sepsis 2/2 UTI.

## 2020-02-26 NOTE — PROVIDER CONTACT NOTE (OTHER) - ASSESSMENT
pt aox4, rigoring, sweats on forehead, c/o chills, denies SOB or CP
Pt alert and oriented x4 , c/o slight headache, tylenol administered.  Pt with no other complaints visibly diaphoretic
Pt alert and oriented, Pt with visible rigors.  Pt afebrile, complains that he feels chills ad nauseous.

## 2020-02-26 NOTE — PROGRESS NOTE ADULT - SUBJECTIVE AND OBJECTIVE BOX
Tiny Harrell MD M[facility]  PGY-1 Internal Medicine  [facility] Pager: [phone] | NS: [phone]  For Night coverage 7pm-7am: NS: page [phone] Team 1-3, page [phone] Team4 & Care Model    Patient is a 64y old  Male who presents with a chief complaint of Fever, UTI (2020 13:55)    Subjective: Febrile o/n, receiving tylenol. Patient seen and examined. Denies CP, HA, Abd pain, N/V, diarrhea/constipation. ROS+ for fevers, dysuria.     MEDICATIONS  (STANDING):  atorvastatin 80 milliGRAM(s) Oral at bedtime  cefTRIAXone   IVPB 1000 milliGRAM(s) IV Intermittent every 24 hours  diltiazem    milliGRAM(s) Oral daily  enoxaparin Injectable 40 milliGRAM(s) SubCutaneous daily  gabapentin 600 milliGRAM(s) Oral three times a day  lactated ringers. 1000 milliLiter(s) (75 mL/Hr) IV Continuous <Continuous>  melatonin 5 milliGRAM(s) Oral at bedtime  pantoprazole    Tablet 40 milliGRAM(s) Oral before breakfast  polyethylene glycol 3350 17 Gram(s) Oral daily    MEDICATIONS  (PRN):  acetaminophen   Tablet .. 650 milliGRAM(s) Oral every 6 hours PRN Temp greater or equal to 38C (100.4F)  baclofen 5 milliGRAM(s) Oral three times a day PRN Muscle Spasm  oxyCODONE    IR 2.5 milliGRAM(s) Oral every 4 hours PRN Severe Pain (7 - 10)      Objective:  Vital Signs Last 24 Hrs  T(C): 38.8 (20 @ 04:24), Max: 39.3 (20 @ 22:02)  T(F): 101.8 (20 @ 04:24), Max: 102.8 (20 @ 22:02)  HR: 125 (20 @ 04:24) (89 - 125)  BP: 165/94 (20 @ 04:24) (100/63 - 165/94)  BP(mean): --  RR: 22 (20 @ 04:24) (18 - 24)  SpO2: 98% (20 @ 04:24) (92% - 99%)                I&O's Summary    2020 07:01  -  2020 07:00  --------------------------------------------------------  IN: 0 mL / OUT: 1125 mL / NET: -1125 mL        PHYSICAL EXAM:  GENERAL: NAD, Alert and awake  EYES: EOMI, conjunctiva and sclera clear  LUNGS: Clear to auscultation bilaterally; No wheezing  HEART: Regular rate and rhythm; Normal S1 and S2; No murmurs  ABDOMEN: Soft, Nontender, Nondistended; Bowel sounds present, ***CVA tenderness  EXTREMITIES: No LE swelling, 2+ peripheral pulses    LABS:                        10.5   17.39 )-----------( 225      ( 2020 07:03 )             32.1                         12.5   20.38 )-----------( 284      ( 2020 10:48 )             39.0               02-26    134<L>  |  97  |  14  ----------------------------<  131<H>  3.2<L>   |  22  |  1.05  -25    135  |  96  |  18  ----------------------------<  119<H>  4.6   |  20<L>  |  1.24    Ca    8.7      2020 06:58  Ca    9.8      2020 10:48  Phos  3.2     -  Mg     1.6         TPro  7.3  /  Alb  3.8  /  TBili  0.9  /  DBili  x   /  AST  22  /  ALT  19  /  AlkPhos  123<H>  0225      Urinalysis Basic - ( 2020 12:59 )    Color: Yellow / Appearance: Slightly Turbid / S.017 / pH: x  Gluc: x / Ketone: Negative  / Bili: Negative / Urobili: Negative   Blood: x / Protein: 30 mg/dL / Nitrite: Negative   Leuk Esterase: Large / RBC: 32 /hpf /  /HPF   Sq Epi: x / Non Sq Epi: 0 /hpf / Bacteria: Few            CAPILLARY BLOOD GLUCOSE          RADIOLOGY & ADDITIONAL TESTS:  < from: CT Head No Cont (20 @ 12:28) >  Impression:    Brain CT: No acute hemorrhage mass effect or displaced calvarial fracture.    Cervical spine CT: No acute fracture or traumatic subluxation. Multilevel degenerative changes and postsurgical changes.    < end of copied text >        Consultants involved in case:   Consultant(s) Notes Reviewed:  [ ] YES  [ ] NO:   Care Discussed with Consultants/Other Providers [ ] YES  [ ] NO Tiny Harrell MD MHS  PGY-1 Internal Medicine  [facility] Pager: [phone] | NS: [phone]  For Night coverage 7pm-7am: NS: page [phone] Team 1-3, page [phone] Team4 & Care Model    Patient is a 64y old  Male who presents with a chief complaint of Fever, UTI (2020 13:55)    Subjective: Febrile o/n, receiving tylenol. Patient seen and examined. Denies CP, HA, Abd pain, N/V, diarrhea/constipation. ROS+ for fevers, dysuria.   Had rigors on exam this morning; given tylenol and repeat BCx were taken today.    MEDICATIONS  (STANDING):  atorvastatin 80 milliGRAM(s) Oral at bedtime  cefTRIAXone   IVPB 1000 milliGRAM(s) IV Intermittent every 24 hours  diltiazem    milliGRAM(s) Oral daily  enoxaparin Injectable 40 milliGRAM(s) SubCutaneous daily  gabapentin 600 milliGRAM(s) Oral three times a day  lactated ringers. 1000 milliLiter(s) (75 mL/Hr) IV Continuous <Continuous>  melatonin 5 milliGRAM(s) Oral at bedtime  pantoprazole    Tablet 40 milliGRAM(s) Oral before breakfast  polyethylene glycol 3350 17 Gram(s) Oral daily    MEDICATIONS  (PRN):  acetaminophen   Tablet .. 650 milliGRAM(s) Oral every 6 hours PRN Temp greater or equal to 38C (100.4F)  baclofen 5 milliGRAM(s) Oral three times a day PRN Muscle Spasm  oxyCODONE    IR 2.5 milliGRAM(s) Oral every 4 hours PRN Severe Pain (7 - 10)      Objective:  Vital Signs Last 24 Hrs  T(C): 38.8 (20 @ 04:24), Max: 39.3 (20 @ 22:02)  T(F): 101.8 (20 @ 04:24), Max: 102.8 (20 @ 22:02)  HR: 125 (20 @ 04:24) (89 - 125)  BP: 165/94 (20 @ 04:24) (100/63 - 165/94)  BP(mean): --  RR: 22 (20 @ 04:24) (18 - 24)  SpO2: 98% (20 @ 04:24) (92% - 99%)                I&O's Summary    2020 07:01  -  2020 07:00  --------------------------------------------------------  IN: 0 mL / OUT: 1125 mL / NET: -1125 mL        PHYSICAL EXAM:  GENERAL: NAD, Alert and awake  EYES: EOMI, conjunctiva and sclera clear  LUNGS: Clear to auscultation bilaterally; No wheezing  HEART: Regular rate and rhythm; Normal S1 and S2; No murmurs  ABDOMEN: Soft, Nontender, Nondistended; Bowel sounds present, ***CVA tenderness  EXTREMITIES: No LE swelling, 2+ peripheral pulses    LABS:                        10.5   17.39 )-----------( 225      ( 2020 07:03 )             32.1                         12.5   20.38 )-----------( 284      ( 2020 10:48 )             39.0           02-    134<L>  |  97  |  14  ----------------------------<  131<H>  3.2<L>   |  22  |  1.05  25    135  |  96  |  18  ----------------------------<  119<H>  4.6   |  20<L>  |  1.24    Ca    8.7      2020 06:58  Ca    9.8      2020 10:48  Phos  3.2       Mg     1.6         TPro  7.3  /  Alb  3.8  /  TBili  0.9  /  DBili  x   /  AST  22  /  ALT  19  /  AlkPhos  123<H>  0225    Urinalysis Basic - ( 2020 12:59 )    Color: Yellow / Appearance: Slightly Turbid / S.017 / pH: x  Gluc: x / Ketone: Negative  / Bili: Negative / Urobili: Negative   Blood: x / Protein: 30 mg/dL / Nitrite: Negative   Leuk Esterase: Large / RBC: 32 /hpf /  /HPF   Sq Epi: x / Non Sq Epi: 0 /hpf / Bacteria: Few        CAPILLARY BLOOD GLUCOSE                IMAGING: Radiology personally reviewed  RADIOLOGY & ADDITIONAL TESTS:  < from: CT Head No Cont (20 @ 12:28) >  Impression:    Brain CT: No acute hemorrhage mass effect or displaced calvarial fracture.    Cervical spine CT: No acute fracture or traumatic subluxation. Multilevel degenerative changes and postsurgical changes.    < end of copied text >

## 2020-02-26 NOTE — PROGRESS NOTE ADULT - PROBLEM SELECTOR PLAN 3
Patient is s/p posterior cervical laminectomy.  - continue home oxycodone 2.5 mg q 4 hrs prn  - Will continue baclofen prn. Patient is s/p posterior cervical laminectomy.  - continue home oxycodone 2.5 mg q 4 hrs prn  - continue baclofen prn

## 2020-02-26 NOTE — PHYSICAL THERAPY INITIAL EVALUATION ADULT - ACTIVE RANGE OF MOTION EXAMINATION, REHAB EVAL
Right LE Active ROM was WFL (within functional limits)/Left LE Active ROM was WFL (within functional limits)

## 2020-02-26 NOTE — PROGRESS NOTE ADULT - PROBLEM SELECTOR PLAN 4
Patient describes ?mechanical fall and head trauma against bed rail.  - CTH shows no acute changes  - F/u orthostatics and PT recommendations. Patient describes mild head trauma against bed rail at [facility]  - [facility] 2/25 shows no acute changes  - F/u orthostatics and PT recommendations  - OT c/s 2/26 for UE weakness and rehabilitation

## 2020-02-26 NOTE — PHYSICAL THERAPY INITIAL EVALUATION ADULT - MANUAL MUSCLE TESTING RESULTS, REHAB EVAL
LE's grossly 3/5, Agustin shoulders 0/5, R wrist ext 4-/5, R elbow flext 2/5, R elbow ext 0/5, L wrist flex 3+/5, L wrist ext 3+/5, L elbow flex 3/5, L elbow ext 0/5

## 2020-02-27 LAB
ANION GAP SERPL CALC-SCNC: 16 MMOL/L — SIGNIFICANT CHANGE UP (ref 5–17)
BASOPHILS # BLD AUTO: 0.05 K/UL — SIGNIFICANT CHANGE UP (ref 0–0.2)
BASOPHILS NFR BLD AUTO: 0.3 % — SIGNIFICANT CHANGE UP (ref 0–2)
BUN SERPL-MCNC: 11 MG/DL — SIGNIFICANT CHANGE UP (ref 7–23)
CALCIUM SERPL-MCNC: 9.1 MG/DL — SIGNIFICANT CHANGE UP (ref 8.4–10.5)
CHLORIDE SERPL-SCNC: 97 MMOL/L — SIGNIFICANT CHANGE UP (ref 96–108)
CO2 SERPL-SCNC: 24 MMOL/L — SIGNIFICANT CHANGE UP (ref 22–31)
CREAT SERPL-MCNC: 1.07 MG/DL — SIGNIFICANT CHANGE UP (ref 0.5–1.3)
EOSINOPHIL # BLD AUTO: 0.06 K/UL — SIGNIFICANT CHANGE UP (ref 0–0.5)
EOSINOPHIL NFR BLD AUTO: 0.4 % — SIGNIFICANT CHANGE UP (ref 0–6)
GLUCOSE SERPL-MCNC: 132 MG/DL — HIGH (ref 70–99)
HCT VFR BLD CALC: 32.8 % — LOW (ref 39–50)
HGB BLD-MCNC: 10.7 G/DL — LOW (ref 13–17)
IMM GRANULOCYTES NFR BLD AUTO: 0.6 % — SIGNIFICANT CHANGE UP (ref 0–1.5)
LYMPHOCYTES # BLD AUTO: 18.6 % — SIGNIFICANT CHANGE UP (ref 13–44)
LYMPHOCYTES # BLD AUTO: 2.91 K/UL — SIGNIFICANT CHANGE UP (ref 1–3.3)
MAGNESIUM SERPL-MCNC: 1.8 MG/DL — SIGNIFICANT CHANGE UP (ref 1.6–2.6)
MCHC RBC-ENTMCNC: 29.4 PG — SIGNIFICANT CHANGE UP (ref 27–34)
MCHC RBC-ENTMCNC: 32.6 GM/DL — SIGNIFICANT CHANGE UP (ref 32–36)
MCV RBC AUTO: 90.1 FL — SIGNIFICANT CHANGE UP (ref 80–100)
MONOCYTES # BLD AUTO: 2.62 K/UL — HIGH (ref 0–0.9)
MONOCYTES NFR BLD AUTO: 16.7 % — HIGH (ref 2–14)
NEUTROPHILS # BLD AUTO: 9.94 K/UL — HIGH (ref 1.8–7.4)
NEUTROPHILS NFR BLD AUTO: 63.4 % — SIGNIFICANT CHANGE UP (ref 43–77)
NRBC # BLD: 0 /100 WBCS — SIGNIFICANT CHANGE UP (ref 0–0)
PHOSPHATE SERPL-MCNC: 3.9 MG/DL — SIGNIFICANT CHANGE UP (ref 2.5–4.5)
PLATELET # BLD AUTO: 222 K/UL — SIGNIFICANT CHANGE UP (ref 150–400)
POTASSIUM SERPL-MCNC: 3 MMOL/L — LOW (ref 3.5–5.3)
POTASSIUM SERPL-SCNC: 3 MMOL/L — LOW (ref 3.5–5.3)
RBC # BLD: 3.64 M/UL — LOW (ref 4.2–5.8)
RBC # FLD: 14.6 % — HIGH (ref 10.3–14.5)
SODIUM SERPL-SCNC: 137 MMOL/L — SIGNIFICANT CHANGE UP (ref 135–145)
WBC # BLD: 15.67 K/UL — HIGH (ref 3.8–10.5)
WBC # FLD AUTO: 15.67 K/UL — HIGH (ref 3.8–10.5)

## 2020-02-27 PROCEDURE — 99233 SBSQ HOSP IP/OBS HIGH 50: CPT | Mod: GC

## 2020-02-27 RX ORDER — LIDOCAINE 4 G/100G
1 CREAM TOPICAL THREE TIMES A DAY
Refills: 0 | Status: DISCONTINUED | OUTPATIENT
Start: 2020-02-27 | End: 2020-02-27

## 2020-02-27 RX ORDER — POTASSIUM CHLORIDE 20 MEQ
40 PACKET (EA) ORAL EVERY 4 HOURS
Refills: 0 | Status: COMPLETED | OUTPATIENT
Start: 2020-02-27 | End: 2020-02-27

## 2020-02-27 RX ORDER — TAMSULOSIN HYDROCHLORIDE 0.4 MG/1
0.4 CAPSULE ORAL AT BEDTIME
Refills: 0 | Status: DISCONTINUED | OUTPATIENT
Start: 2020-02-27 | End: 2020-03-03

## 2020-02-27 RX ORDER — LIDOCAINE 4 G/100G
1 CREAM TOPICAL DAILY
Refills: 0 | Status: DISCONTINUED | OUTPATIENT
Start: 2020-02-27 | End: 2020-03-03

## 2020-02-27 RX ADMIN — LIDOCAINE 1 PATCH: 4 CREAM TOPICAL at 19:23

## 2020-02-27 RX ADMIN — PANTOPRAZOLE SODIUM 40 MILLIGRAM(S): 20 TABLET, DELAYED RELEASE ORAL at 05:58

## 2020-02-27 RX ADMIN — Medication 50 MILLIGRAM(S): at 09:09

## 2020-02-27 RX ADMIN — GABAPENTIN 600 MILLIGRAM(S): 400 CAPSULE ORAL at 21:26

## 2020-02-27 RX ADMIN — Medication 5 MILLIGRAM(S): at 02:18

## 2020-02-27 RX ADMIN — CEFTRIAXONE 100 MILLIGRAM(S): 500 INJECTION, POWDER, FOR SOLUTION INTRAMUSCULAR; INTRAVENOUS at 02:46

## 2020-02-27 RX ADMIN — Medication 5 MILLIGRAM(S): at 21:26

## 2020-02-27 RX ADMIN — Medication 40 MILLIEQUIVALENT(S): at 09:11

## 2020-02-27 RX ADMIN — TAMSULOSIN HYDROCHLORIDE 0.4 MILLIGRAM(S): 0.4 CAPSULE ORAL at 21:26

## 2020-02-27 RX ADMIN — Medication 650 MILLIGRAM(S): at 02:19

## 2020-02-27 RX ADMIN — OXYCODONE HYDROCHLORIDE 2.5 MILLIGRAM(S): 5 TABLET ORAL at 21:26

## 2020-02-27 RX ADMIN — LIDOCAINE 1 PATCH: 4 CREAM TOPICAL at 16:52

## 2020-02-27 RX ADMIN — ENOXAPARIN SODIUM 40 MILLIGRAM(S): 100 INJECTION SUBCUTANEOUS at 13:40

## 2020-02-27 RX ADMIN — OXYCODONE HYDROCHLORIDE 2.5 MILLIGRAM(S): 5 TABLET ORAL at 13:43

## 2020-02-27 RX ADMIN — SODIUM CHLORIDE 100 MILLILITER(S): 9 INJECTION, SOLUTION INTRAVENOUS at 02:53

## 2020-02-27 RX ADMIN — Medication 40 MILLIEQUIVALENT(S): at 17:53

## 2020-02-27 RX ADMIN — GABAPENTIN 600 MILLIGRAM(S): 400 CAPSULE ORAL at 13:40

## 2020-02-27 RX ADMIN — Medication 40 MILLIEQUIVALENT(S): at 13:40

## 2020-02-27 RX ADMIN — OXYCODONE HYDROCHLORIDE 2.5 MILLIGRAM(S): 5 TABLET ORAL at 22:30

## 2020-02-27 RX ADMIN — OXYCODONE HYDROCHLORIDE 2.5 MILLIGRAM(S): 5 TABLET ORAL at 14:40

## 2020-02-27 RX ADMIN — ATORVASTATIN CALCIUM 80 MILLIGRAM(S): 80 TABLET, FILM COATED ORAL at 21:26

## 2020-02-27 RX ADMIN — Medication 240 MILLIGRAM(S): at 05:57

## 2020-02-27 RX ADMIN — GABAPENTIN 600 MILLIGRAM(S): 400 CAPSULE ORAL at 05:57

## 2020-02-27 NOTE — OCCUPATIONAL THERAPY INITIAL EVALUATION ADULT - LUE MMT, REHAB EVAL
shoulder flexion/extension/abduction=1+/5, elbow flexion=2-/5, elbow extension=3/5, pronation=3/5, supination=2/5, wrist flexion/extension=3/5, digit flexion/extension=3/5

## 2020-02-27 NOTE — OCCUPATIONAL THERAPY INITIAL EVALUATION ADULT - RUE MMT, REHAB EVAL
shoulder flexion/extension/abduction=1/5, elbow flexion=0/5, elbow extension=3/5, pronation=3/5, supination=2/5, wrist flexion/extension=3/5, digit flexion/extension=3/5

## 2020-02-27 NOTE — PROGRESS NOTE ADULT - ATTENDING COMMENTS
pt seen and examined.  above plan discussed on rounds today.  In addition,    Severe sepsis 2/2 UTI - c/w Ceftriaxone. Ucx noted to be negative, but sample was collected post abx administration. Ucx from 2/23 showing pansensitive E.Coli.  leukocytosis is improving but fever curve improving. Bld Cxs NGTD.  Dispo: Pt to be evaluated for Acute Rehab therapy

## 2020-02-27 NOTE — OCCUPATIONAL THERAPY INITIAL EVALUATION ADULT - LIVES WITH, PROFILE
Prior to surgery in Jan. 2020, pt lived in a house with his wife, +4 steps to enter, +first floor set-up, +shower tub, +pt is a

## 2020-02-27 NOTE — OCCUPATIONAL THERAPY INITIAL EVALUATION ADULT - FINE MOTOR COORDINATION TRAINING, OT EVAL
Goal: Pt will require MIN A manipulate buttons/zippers/fasteners on shirts/pants in 4 weeks to increase independence for ADL performance

## 2020-02-27 NOTE — PROGRESS NOTE ADULT - PROBLEM SELECTOR PLAN 4
Patient describes mild head trauma against bed rail at [facility]  - [facility] 2/25 shows no acute changes  - F/u orthostatics and PT recommendations  - OT c/s 2/26 for UE weakness and rehabilitation Patient describes mild head trauma against bed rail at [facility] 2/25 shows no acute changes  - F/u orthostatics and PT recommendations: AR  - OT c/s 2/26 for UE weakness and rehabilitation  - physiatry c/s 2/26 emailed

## 2020-02-27 NOTE — PROGRESS NOTE ADULT - ASSESSMENT
65 yo M with HTN, HLD, BPH, GERD, s/p cervical spine fusion in January 2020 c/b postsurgical bilateral arm weakness presenting from [facility] with fever, weakness and dysuria, admitted for severe sepsis 2/2 UTI, treated with antibiotics.

## 2020-02-27 NOTE — OCCUPATIONAL THERAPY INITIAL EVALUATION ADULT - PERTINENT HX OF CURRENT PROBLEM, REHAB EVAL
65 yo M with HTN, HLD, GERD, BPH, s/p cervical spine fusion in January 2020 c/b postsurgical bilateral arm weakness presenting from [facility] with fever, weakness and dysuria since yesterday. Patient was started on macrobid for UTI. He reports hitting his head on bedrail while moving about in bed yesterday. CT cervical spine and head did not show any acute changes.

## 2020-02-27 NOTE — PROGRESS NOTE ADULT - SUBJECTIVE AND OBJECTIVE BOX
Tiny Harrell MD MHS  PGY-1 Internal Medicine  [facility] Pager: [phone] | NS: [phone]  For Night coverage 7pm-7am: NS: page [phone] Team 1-3, page [phone] Team4 & Care Model    Patient is a 64y old  Male who presents with a chief complaint of Fever, UTI (2020 13:55)    Subjective: Afebrile o/n. No acute events overnight. Patient seen and examined. Denies CP, HA, Abd pain, N/V, diarrhea/constipation. Dysuria resolving. BCx from  and UCxs from  and  NGTD.    MEDICATIONS  (STANDING):  atorvastatin 80 milliGRAM(s) Oral at bedtime  cefTRIAXone   IVPB 1000 milliGRAM(s) IV Intermittent every 24 hours  diltiazem    milliGRAM(s) Oral daily  enoxaparin Injectable 40 milliGRAM(s) SubCutaneous daily  gabapentin 600 milliGRAM(s) Oral three times a day  hydrochlorothiazide 50 milliGRAM(s) Oral daily  melatonin 5 milliGRAM(s) Oral at bedtime  pantoprazole    Tablet 40 milliGRAM(s) Oral before breakfast    MEDICATIONS  (PRN):  acetaminophen   Tablet .. 650 milliGRAM(s) Oral every 6 hours PRN Temp greater or equal to 38C (100.4F), Mild Pain (1 - 3)  baclofen 5 milliGRAM(s) Oral three times a day PRN Muscle Spasm  ondansetron Injectable 4 milliGRAM(s) IV Push every 6 hours PRN Nausea and/or Vomiting  oxyCODONE    IR 2.5 milliGRAM(s) Oral every 4 hours PRN Severe Pain (7 - 10)    Allergies    No Known Allergies    Intolerances    Shrimp- Nasal congestion (Other)      VITAL SIGNS:  ICU Vital Signs Last 24 Hrs  T(C): 37 (2020 04:33), Max: 38.1 (2020 14:30)  T(F): 98.6 (2020 04:33), Max: 100.6 (2020 14:30)  HR: 98 (2020 05:50) (80 - 111)  BP: 126/75 (2020 05:50) (113/68 - 154/98)  BP(mean): --  ABP: --  ABP(mean): --  RR: 18 (2020 04:33) (18 - 20)  SpO2: 96% (2020 04:33) (94% - 100%)    PHYSICAL EXAM:  GENERAL: NAD, Alert and awake  EYES: EOMI, conjunctiva and sclera clear  LUNGS: Clear to auscultation bilaterally; No wheezing  HEART: Regular rate and rhythm; Normal S1 and S2; No murmurs  ABDOMEN: Soft, Nontender, Nondistended; Bowel sounds present, No CVA tenderness  EXTREMITIES: No LE swelling, 2+ peripheral pulses    LABS:                        10.5   17.39 )-----------( 225      ( 2020 07:03 )             32.1                         12.5   20.38 )-----------( 284      ( 2020 10:48 )             39.0               134<L>  |  97  |  14  ----------------------------<  131<H>  3.2<L>   |  22  |  1.05  25    135  |  96  |  18  ----------------------------<  119<H>  4.6   |  20<L>  |  1.24    Ca    8.7      2020 06:58  Ca    9.8      2020 10:48  Phos  3.2       Mg     1.6         TPro  7.3  /  Alb  3.8  /  TBili  0.9  /  DBili  x   /  AST  22  /  ALT  19  /  AlkPhos  123<H>  25    Urinalysis Basic - ( 2020 12:59 )    Color: Yellow / Appearance: Slightly Turbid / S.017 / pH: x  Gluc: x / Ketone: Negative  / Bili: Negative / Urobili: Negative   Blood: x / Protein: 30 mg/dL / Nitrite: Negative   Leuk Esterase: Large / RBC: 32 /hpf /  /HPF   Sq Epi: x / Non Sq Epi: 0 /hpf / Bacteria: Few    CAPILLARY BLOOD GLUCOSE      IMAGING: Radiology personally reviewed    RADIOLOGY & ADDITIONAL TESTS:  < from: CT Head No Cont (20 @ 12:28) >  Impression:    Brain CT: No acute hemorrhage mass effect or displaced calvarial fracture.    Cervical spine CT: No acute fracture or traumatic subluxation. Multilevel degenerative changes and postsurgical changes.    < end of copied text > Tiny Harrell MD MHS  PGY-1 Internal Medicine  [facility] Pager: [phone] | NS: [phone]  For Night coverage 7pm-7am: NS: page [phone] Team 1-3, page [phone] Team4 & Care Model    Patient is a 64y old  Male who presents with a chief complaint of Fever, UTI (25 Feb 2020 13:55)    Subjective: Afebrile o/n. No acute events overnight. Patient seen and examined. Denies CP, HA, Abd pain, N/V, diarrhea/constipation. Dysuria resolving. BCx from 2/25 and UCxs from 2/23 and 2/25 NGTD.    MEDICATIONS  (STANDING):  atorvastatin 80 milliGRAM(s) Oral at bedtime  cefTRIAXone   IVPB 1000 milliGRAM(s) IV Intermittent every 24 hours  diltiazem    milliGRAM(s) Oral daily  enoxaparin Injectable 40 milliGRAM(s) SubCutaneous daily  gabapentin 600 milliGRAM(s) Oral three times a day  hydrochlorothiazide 50 milliGRAM(s) Oral daily  melatonin 5 milliGRAM(s) Oral at bedtime  pantoprazole    Tablet 40 milliGRAM(s) Oral before breakfast    MEDICATIONS  (PRN):  acetaminophen   Tablet .. 650 milliGRAM(s) Oral every 6 hours PRN Temp greater or equal to 38C (100.4F), Mild Pain (1 - 3)  baclofen 5 milliGRAM(s) Oral three times a day PRN Muscle Spasm  ondansetron Injectable 4 milliGRAM(s) IV Push every 6 hours PRN Nausea and/or Vomiting  oxyCODONE    IR 2.5 milliGRAM(s) Oral every 4 hours PRN Severe Pain (7 - 10)    Allergies    No Known Allergies    Intolerances    Shrimp- Nasal congestion (Other)      VITAL SIGNS:  ICU Vital Signs Last 24 Hrs  T(C): 37 (27 Feb 2020 04:33), Max: 38.1 (26 Feb 2020 14:30)  T(F): 98.6 (27 Feb 2020 04:33), Max: 100.6 (26 Feb 2020 14:30)  HR: 98 (27 Feb 2020 05:50) (80 - 111)  BP: 126/75 (27 Feb 2020 05:50) (113/68 - 154/98)  BP(mean): --  ABP: --  ABP(mean): --  RR: 18 (27 Feb 2020 04:33) (18 - 20)  SpO2: 96% (27 Feb 2020 04:33) (94% - 100%)    PHYSICAL EXAM:  GENERAL: NAD, Alert and awake  EYES: EOMI, conjunctiva and sclera clear  LUNGS: Clear to auscultation bilaterally; No wheezing  HEART: Regular rate and rhythm; Normal S1 and S2; No murmurs  ABDOMEN: Soft, Nontender, Nondistended; Bowel sounds present, No CVA tenderness  EXTREMITIES: No LE swelling, 2+ peripheral pulses    LABS:                        10.7   15.67 )-----------( 222      ( 27 Feb 2020 07:11 )             32.8                         10.5   17.39 )-----------( 225      ( 26 Feb 2020 07:03 )             32.1                         12.5   20.38 )-----------( 284      ( 25 Feb 2020 10:48 )             39.0           02-27    137  |  97  |  11  ----------------------------<  132<H>  3.0<L>   |  24  |  1.07  02-26    134<L>  |  97  |  14  ----------------------------<  131<H>  3.2<L>   |  22  |  1.05  02-25    135  |  96  |  18  ----------------------------<  119<H>  4.6   |  20<L>  |  1.24    Ca    9.1      27 Feb 2020 07:10  Ca    8.7      26 Feb 2020 06:58  Ca    9.8      25 Feb 2020 10:48  Phos  3.9     02-27  Mg     1.8     02-27    TPro  7.3  /  Alb  3.8  /  TBili  0.9  /  DBili  x   /  AST  22  /  ALT  19  /  AlkPhos  123<H>  02-25        CAPILLARY BLOOD GLUCOSE                IMAGING: Radiology personally reviewed  < from: CT Head No Cont (02.25.20 @ 12:28) >  Impression:    Brain CT: No acute hemorrhage mass effect or displaced calvarial fracture.    Cervical spine CT: No acute fracture or traumatic subluxation. Multilevel degenerative changes and postsurgical changes.    < end of copied text >

## 2020-02-27 NOTE — PROGRESS NOTE ADULT - PROBLEM SELECTOR PLAN 2
P/w tachycardic, febrile, with leukocytosis, AG with lactate elevated to 3.3 (now normalizing),  s/p 2 L IVF  - F/u urine and blood cultures: 2/25 UCx NGTD; 2/25 BCx pending; 2/26 BCx pending  - abx as above  - Low c/f complications from recent laminectomy. No signs on abscess on CTA cervical, but will f/u blood cultures  - Encourage po intake  - zofran prn

## 2020-02-27 NOTE — PROGRESS NOTE ADULT - PROBLEM SELECTOR PLAN 3
Patient is s/p posterior cervical laminectomy.  - continue home oxycodone 2.5 mg q 4 hrs prn  - continue baclofen prn Patient is s/p posterior cervical laminectomy.  - continue home oxycodone 2.5 mg q 4 hrs prn  - continue baclofen prn  - lidocaine gel and warm compresses, added 2/27

## 2020-02-27 NOTE — OCCUPATIONAL THERAPY INITIAL EVALUATION ADULT - PLANNED THERAPY INTERVENTIONS, OT EVAL
ADL retraining/bed mobility training/fine motor coordination training/transfer training/balance training

## 2020-02-27 NOTE — PROGRESS NOTE ADULT - PROBLEM SELECTOR PLAN 6
DVT ppx: lovenox  DASH diet  PT pending DVT ppx: lovenox  Diet: DASH diet  PT: AR, pending physiatry consultation

## 2020-02-27 NOTE — PROGRESS NOTE ADULT - PROBLEM SELECTOR PLAN 1
Patient with symptoms of UTI, met sepsis criteria on admission. Cause of UTI 2/2 BPH and urinary retention  - F/u 2/25 urine cx  - Start CTX (2/25- ) x5-7, d2 days given complicated UTI  - Bladder scan ordered to check for PVR. Will start tamsulosin if urine retention noted, given hx of BPH and not currently on meds  - tylenol 650 prn  - consider CT A/P to eval for pyelonephritis if pt continues to spike vs rectal exam to evaluate for prostatitis Patient with symptoms of UTI, met sepsis criteria on admission. Cause of UTI 2/2 BPH and urinary retention  - F/u 2/25 urine cx NGTD and BCx from 2/26  - Start CTX (2/25- ) x5-7, d2 days given complicated UTI. Transition to PO Abx once no fever [id number]  - tylenol 650 prn  - fever curve downtrending 2/27, will ctm  - started flomax on 2/27

## 2020-02-28 LAB
ANION GAP SERPL CALC-SCNC: 14 MMOL/L — SIGNIFICANT CHANGE UP (ref 5–17)
BASOPHILS # BLD AUTO: 0.06 K/UL — SIGNIFICANT CHANGE UP (ref 0–0.2)
BASOPHILS NFR BLD AUTO: 0.5 % — SIGNIFICANT CHANGE UP (ref 0–2)
BUN SERPL-MCNC: 13 MG/DL — SIGNIFICANT CHANGE UP (ref 7–23)
CALCIUM SERPL-MCNC: 9.4 MG/DL — SIGNIFICANT CHANGE UP (ref 8.4–10.5)
CHLORIDE SERPL-SCNC: 98 MMOL/L — SIGNIFICANT CHANGE UP (ref 96–108)
CO2 SERPL-SCNC: 23 MMOL/L — SIGNIFICANT CHANGE UP (ref 22–31)
CREAT SERPL-MCNC: 1.04 MG/DL — SIGNIFICANT CHANGE UP (ref 0.5–1.3)
EOSINOPHIL # BLD AUTO: 0.1 K/UL — SIGNIFICANT CHANGE UP (ref 0–0.5)
EOSINOPHIL NFR BLD AUTO: 0.9 % — SIGNIFICANT CHANGE UP (ref 0–6)
GLUCOSE SERPL-MCNC: 124 MG/DL — HIGH (ref 70–99)
HCT VFR BLD CALC: 33.2 % — LOW (ref 39–50)
HGB BLD-MCNC: 10.9 G/DL — LOW (ref 13–17)
IMM GRANULOCYTES NFR BLD AUTO: 0.4 % — SIGNIFICANT CHANGE UP (ref 0–1.5)
LYMPHOCYTES # BLD AUTO: 2.96 K/UL — SIGNIFICANT CHANGE UP (ref 1–3.3)
LYMPHOCYTES # BLD AUTO: 26.1 % — SIGNIFICANT CHANGE UP (ref 13–44)
MAGNESIUM SERPL-MCNC: 1.9 MG/DL — SIGNIFICANT CHANGE UP (ref 1.6–2.6)
MCHC RBC-ENTMCNC: 29.8 PG — SIGNIFICANT CHANGE UP (ref 27–34)
MCHC RBC-ENTMCNC: 32.8 GM/DL — SIGNIFICANT CHANGE UP (ref 32–36)
MCV RBC AUTO: 90.7 FL — SIGNIFICANT CHANGE UP (ref 80–100)
MONOCYTES # BLD AUTO: 1.48 K/UL — HIGH (ref 0–0.9)
MONOCYTES NFR BLD AUTO: 13 % — SIGNIFICANT CHANGE UP (ref 2–14)
NEUTROPHILS # BLD AUTO: 6.71 K/UL — SIGNIFICANT CHANGE UP (ref 1.8–7.4)
NEUTROPHILS NFR BLD AUTO: 59.1 % — SIGNIFICANT CHANGE UP (ref 43–77)
NRBC # BLD: 0 /100 WBCS — SIGNIFICANT CHANGE UP (ref 0–0)
PHOSPHATE SERPL-MCNC: 3.6 MG/DL — SIGNIFICANT CHANGE UP (ref 2.5–4.5)
PLATELET # BLD AUTO: 230 K/UL — SIGNIFICANT CHANGE UP (ref 150–400)
POTASSIUM SERPL-MCNC: 4 MMOL/L — SIGNIFICANT CHANGE UP (ref 3.5–5.3)
POTASSIUM SERPL-SCNC: 4 MMOL/L — SIGNIFICANT CHANGE UP (ref 3.5–5.3)
RBC # BLD: 3.66 M/UL — LOW (ref 4.2–5.8)
RBC # FLD: 14.6 % — HIGH (ref 10.3–14.5)
SODIUM SERPL-SCNC: 135 MMOL/L — SIGNIFICANT CHANGE UP (ref 135–145)
WBC # BLD: 11.36 K/UL — HIGH (ref 3.8–10.5)
WBC # FLD AUTO: 11.36 K/UL — HIGH (ref 3.8–10.5)

## 2020-02-28 PROCEDURE — 99232 SBSQ HOSP IP/OBS MODERATE 35: CPT | Mod: GC

## 2020-02-28 PROCEDURE — 99222 1ST HOSP IP/OBS MODERATE 55: CPT | Mod: GC

## 2020-02-28 RX ORDER — CEPHALEXIN 500 MG
500 CAPSULE ORAL EVERY 12 HOURS
Refills: 0 | Status: DISCONTINUED | OUTPATIENT
Start: 2020-02-29 | End: 2020-03-02

## 2020-02-28 RX ADMIN — Medication 650 MILLIGRAM(S): at 02:06

## 2020-02-28 RX ADMIN — OXYCODONE HYDROCHLORIDE 2.5 MILLIGRAM(S): 5 TABLET ORAL at 20:00

## 2020-02-28 RX ADMIN — GABAPENTIN 600 MILLIGRAM(S): 400 CAPSULE ORAL at 13:29

## 2020-02-28 RX ADMIN — GABAPENTIN 600 MILLIGRAM(S): 400 CAPSULE ORAL at 05:15

## 2020-02-28 RX ADMIN — ATORVASTATIN CALCIUM 80 MILLIGRAM(S): 80 TABLET, FILM COATED ORAL at 21:37

## 2020-02-28 RX ADMIN — LIDOCAINE 1 PATCH: 4 CREAM TOPICAL at 19:48

## 2020-02-28 RX ADMIN — PANTOPRAZOLE SODIUM 40 MILLIGRAM(S): 20 TABLET, DELAYED RELEASE ORAL at 05:15

## 2020-02-28 RX ADMIN — Medication 50 MILLIGRAM(S): at 05:15

## 2020-02-28 RX ADMIN — Medication 650 MILLIGRAM(S): at 03:00

## 2020-02-28 RX ADMIN — Medication 5 MILLIGRAM(S): at 21:37

## 2020-02-28 RX ADMIN — LIDOCAINE 1 PATCH: 4 CREAM TOPICAL at 23:05

## 2020-02-28 RX ADMIN — Medication 650 MILLIGRAM(S): at 23:45

## 2020-02-28 RX ADMIN — TAMSULOSIN HYDROCHLORIDE 0.4 MILLIGRAM(S): 0.4 CAPSULE ORAL at 21:37

## 2020-02-28 RX ADMIN — GABAPENTIN 600 MILLIGRAM(S): 400 CAPSULE ORAL at 21:37

## 2020-02-28 RX ADMIN — CEFTRIAXONE 100 MILLIGRAM(S): 500 INJECTION, POWDER, FOR SOLUTION INTRAMUSCULAR; INTRAVENOUS at 02:07

## 2020-02-28 RX ADMIN — LIDOCAINE 1 PATCH: 4 CREAM TOPICAL at 04:13

## 2020-02-28 RX ADMIN — ENOXAPARIN SODIUM 40 MILLIGRAM(S): 100 INJECTION SUBCUTANEOUS at 11:54

## 2020-02-28 RX ADMIN — LIDOCAINE 1 PATCH: 4 CREAM TOPICAL at 11:53

## 2020-02-28 RX ADMIN — CEFTRIAXONE 100 MILLIGRAM(S): 500 INJECTION, POWDER, FOR SOLUTION INTRAMUSCULAR; INTRAVENOUS at 17:08

## 2020-02-28 RX ADMIN — Medication 240 MILLIGRAM(S): at 05:15

## 2020-02-28 NOTE — PROGRESS NOTE ADULT - SUBJECTIVE AND OBJECTIVE BOX
Tiny Harrell MD MHS  PGY-1 Internal Medicine  [facility] Pager: [phone] | NS: [phone]  For Night coverage 7pm-7am: NS: page [phone] Team 1-3, page [phone] Team4 & Care Model    Patient is a 64y old  Male who presents with a chief complaint of Fever, UTI (25 Feb 2020 13:55)    Subjective: Afebrile o/n. No acute events overnight. Patient seen and examined. Denies CP, HA, Abd pain, N/V, diarrhea/constipation. Dysuria resolved. BCx from 2/25 and UCxs from 2/23 and 2/25 NGTD.    MEDICATIONS  (STANDING):  atorvastatin 80 milliGRAM(s) Oral at bedtime  cefTRIAXone   IVPB 1000 milliGRAM(s) IV Intermittent every 24 hours  diltiazem    milliGRAM(s) Oral daily  enoxaparin Injectable 40 milliGRAM(s) SubCutaneous daily  gabapentin 600 milliGRAM(s) Oral three times a day  hydrochlorothiazide 50 milliGRAM(s) Oral daily  lidocaine   Patch 1 Patch Transdermal daily  melatonin 5 milliGRAM(s) Oral at bedtime  pantoprazole    Tablet 40 milliGRAM(s) Oral before breakfast  tamsulosin 0.4 milliGRAM(s) Oral at bedtime    MEDICATIONS  (PRN):  acetaminophen   Tablet .. 650 milliGRAM(s) Oral every 6 hours PRN Temp greater or equal to 38C (100.4F), Mild Pain (1 - 3)  baclofen 5 milliGRAM(s) Oral three times a day PRN Muscle Spasm  ondansetron Injectable 4 milliGRAM(s) IV Push every 6 hours PRN Nausea and/or Vomiting  oxyCODONE    IR 2.5 milliGRAM(s) Oral every 4 hours PRN Severe Pain (7 - 10)    Allergies    No Known Allergies    Intolerances    Shrimp- Nasal congestion (Other)      VITAL SIGNS:  ICU Vital Signs Last 24 Hrs  T(C): 36.7 (28 Feb 2020 05:10), Max: 36.7 (27 Feb 2020 21:21)  T(F): 98 (28 Feb 2020 05:10), Max: 98.1 (27 Feb 2020 21:21)  HR: 85 (28 Feb 2020 05:10) (84 - 106)  BP: 110/68 (28 Feb 2020 05:10) (110/68 - 167/80)  BP(mean): --  ABP: --  ABP(mean): --  RR: 18 (28 Feb 2020 05:10) (18 - 18)  SpO2: 96% (28 Feb 2020 05:10) (95% - 96%)    PHYSICAL EXAM:  GENERAL: NAD, Alert and awake  EYES: EOMI, conjunctiva and sclera clear  LUNGS: Clear to auscultation bilaterally; No wheezing  HEART: Regular rate and rhythm; Normal S1 and S2; No murmurs  ABDOMEN: Soft, Nontender, Nondistended; Bowel sounds present, No CVA tenderness  EXTREMITIES: No LE swelling, 2+ peripheral pulses    LABS:                        10.9   11.36 )-----------( 230      ( 28 Feb 2020 07:19 )             33.2                         10.7   15.67 )-----------( 222      ( 27 Feb 2020 07:11 )             32.8                         10.5   17.39 )-----------( 225      ( 26 Feb 2020 07:03 )             32.1           02-28    135  |  98  |  13  ----------------------------<  124<H>  4.0   |  23  |  1.04  02-27    137  |  97  |  11  ----------------------------<  132<H>  3.0<L>   |  24  |  1.07  02-26    134<L>  |  97  |  14  ----------------------------<  131<H>  3.2<L>   |  22  |  1.05    Ca    9.4      28 Feb 2020 07:19  Ca    9.1      27 Feb 2020 07:10  Ca    8.7      26 Feb 2020 06:58  Phos  3.6     02-28  Mg     1.9     02-28    TPro  7.3  /  Alb  3.8  /  TBili  0.9  /  DBili  x   /  AST  22  /  ALT  19  /  AlkPhos  123<H>  02-25        CAPILLARY BLOOD GLUCOSE                IMAGING: Radiology personally reviewed  < from: CT Head No Cont (02.25.20 @ 12:28) >  Impression:    Brain CT: No acute hemorrhage mass effect or displaced calvarial fracture.    Cervical spine CT: No acute fracture or traumatic subluxation. Multilevel degenerative changes and postsurgical changes.    < end of copied text > Tiny Harrell MD MHS  PGY-1 Internal Medicine  [facility] Pager: [phone] | NS: [phone]  For Night coverage 7pm-7am: NS: page [phone] Team 1-3, page [phone] Team4 & Care Model    Patient is a 64y old  Male who presents with a chief complaint of Fever, UTI (25 Feb 2020 13:55)    Subjective: Afebrile o/n. No acute events overnight. Patient seen and examined. Denies CP, HA, Abd pain, N/V, diarrhea/constipation. Dysuria resolved. BCx from 2/25 and UCxs from 2/25 NGTD. [facility] EColi pan-sensitive. Will switch IV CTX Abx to PO Keflex tomorrow . Appreciate Physiatry recs.    MEDICATIONS  (STANDING):  atorvastatin 80 milliGRAM(s) Oral at bedtime  cefTRIAXone   IVPB 1000 milliGRAM(s) IV Intermittent every 24 hours  diltiazem    milliGRAM(s) Oral daily  enoxaparin Injectable 40 milliGRAM(s) SubCutaneous daily  gabapentin 600 milliGRAM(s) Oral three times a day  hydrochlorothiazide 50 milliGRAM(s) Oral daily  lidocaine   Patch 1 Patch Transdermal daily  melatonin 5 milliGRAM(s) Oral at bedtime  pantoprazole    Tablet 40 milliGRAM(s) Oral before breakfast  tamsulosin 0.4 milliGRAM(s) Oral at bedtime    MEDICATIONS  (PRN):  acetaminophen   Tablet .. 650 milliGRAM(s) Oral every 6 hours PRN Temp greater or equal to 38C (100.4F), Mild Pain (1 - 3)  baclofen 5 milliGRAM(s) Oral three times a day PRN Muscle Spasm  ondansetron Injectable 4 milliGRAM(s) IV Push every 6 hours PRN Nausea and/or Vomiting  oxyCODONE    IR 2.5 milliGRAM(s) Oral every 4 hours PRN Severe Pain (7 - 10)    Allergies    No Known Allergies    Intolerances    Shrimp- Nasal congestion (Other)      VITAL SIGNS:  ICU Vital Signs Last 24 Hrs  T(C): 36.7 (28 Feb 2020 05:10), Max: 36.7 (27 Feb 2020 21:21)  T(F): 98 (28 Feb 2020 05:10), Max: 98.1 (27 Feb 2020 21:21)  HR: 85 (28 Feb 2020 05:10) (84 - 106)  BP: 110/68 (28 Feb 2020 05:10) (110/68 - 167/80)  BP(mean): --  ABP: --  ABP(mean): --  RR: 18 (28 Feb 2020 05:10) (18 - 18)  SpO2: 96% (28 Feb 2020 05:10) (95% - 96%)    PHYSICAL EXAM:  GENERAL: NAD, Alert and awake  EYES: EOMI, conjunctiva and sclera clear  LUNGS: Clear to auscultation bilaterally; No wheezing  HEART: Regular rate and rhythm; Normal S1 and S2; No murmurs  ABDOMEN: Soft, Nontender, Nondistended; Bowel sounds present, No CVA tenderness  EXTREMITIES: No LE swelling, 2+ peripheral pulses    LABS:                        10.9   11.36 )-----------( 230      ( 28 Feb 2020 07:19 )             33.2                         10.7   15.67 )-----------( 222      ( 27 Feb 2020 07:11 )             32.8                         10.5   17.39 )-----------( 225      ( 26 Feb 2020 07:03 )             32.1           02-28    135  |  98  |  13  ----------------------------<  124<H>  4.0   |  23  |  1.04  02-27    137  |  97  |  11  ----------------------------<  132<H>  3.0<L>   |  24  |  1.07  02-26    134<L>  |  97  |  14  ----------------------------<  131<H>  3.2<L>   |  22  |  1.05    Ca    9.4      28 Feb 2020 07:19  Ca    9.1      27 Feb 2020 07:10  Ca    8.7      26 Feb 2020 06:58  Phos  3.6     02-28  Mg     1.9     02-28    TPro  7.3  /  Alb  3.8  /  TBili  0.9  /  DBili  x   /  AST  22  /  ALT  19  /  AlkPhos  123<H>  02-25        CAPILLARY BLOOD GLUCOSE    Culture - Urine (02.23.20 @ 21:32)    -  Amikacin: S <=16    -  Ampicillin: S <=8 These ampicillin results predict results for amoxicillin    -  Ampicillin/Sulbactam: S <=8/4 Enterobacter, Citrobacter, and Serratia may develop resistance during prolonged therapy (3-4 days)    -  Aztreonam: S <=4    -  Cefazolin: S <=8 (MIC_CL_COM_ENTERIC_CEFAZU) For uncomplicated UTI with K. pneumoniae, E. coli, or P. mirablis: CYNTHIA <=16 is sensitive and CYNTHIA >=32 is resistant. This also predicts results for oral agents cefaclor, cefdinir, cefpodoxime, cefprozil, cefuroxime axetil, cephalexin and locarbef for uncomplicated UTI. Note that some isolates may be susceptible to these agents while testing resistant to cefazolin.    -  Cefepime: S <=4    -  Cefoxitin: S <=8    -  Ceftriaxone: S <=1 Enterobacter, Citrobacter, and Serratia may develop resistance during prolonged therapy    -  Ciprofloxacin: S <=1    -  Gentamicin: S <=4    -  Imipenem: S <=1    -  Levofloxacin: S <=2    -  Meropenem: S <=1    -  Nitrofurantoin: S <=32 Should not be used to treat pyelonephritis    -  Piperacillin/Tazobactam: S <=16    -  Tigecycline: S <=2    -  Tobramycin: S <=4    -  Trimethoprim/Sulfamethoxazole: S <=2/38    Specimen Source: .Urine CL CATCH MDSTRM    Culture Results:   >100,000 CFU/ml Escherichia coli    Organism Identification: Escherichia coli    Organism: Escherichia coli    Method Type: CYNTHIA        IMAGING: Radiology personally reviewed  < from: CT Head No Cont (02.25.20 @ 12:28) >  Impression:    Brain CT: No acute hemorrhage mass effect or displaced calvarial fracture.    Cervical spine CT: No acute fracture or traumatic subluxation. Multilevel degenerative changes and postsurgical changes.    < end of copied text >

## 2020-02-28 NOTE — PROGRESS NOTE ADULT - PROBLEM SELECTOR PLAN 6
DVT ppx: lovenox  Diet: DASH diet  PT: AR, pending physiatry consultation DVT ppx: lovenox  Diet: DASH diet  PT: AR, pending physiatry eval

## 2020-02-28 NOTE — PROGRESS NOTE ADULT - PROBLEM SELECTOR PLAN 4
Patient describes mild head trauma against bed rail at [facility] 2/25 shows no acute changes  - F/u orthostatics and PT recommendations: AR  - OT c/s 2/26 for UE weakness and rehabilitation  - physiatry c/s 2/26 emailed Patient describes mild head trauma against bed rail at [facility]  - [facility] 2/25 shows no acute changes  - F/u orthostatics and PT recommendations: AR  - OT c/s 2/26 for UE weakness and rehabilitation  - physiatry c/s 2/26, recs appreciated

## 2020-02-28 NOTE — PROGRESS NOTE ADULT - PROBLEM SELECTOR PLAN 3
Patient is s/p posterior cervical laminectomy.  - continue home oxycodone 2.5 mg q 4 hrs prn  - continue baclofen prn  - lidocaine gel and warm compresses, added 2/27 Patient is s/p posterior cervical laminectomy.  - continue home oxycodone 2.5 mg q 4 hrs prn  - continue baclofen prn  - lidocaine patches and warm compresses, added 2/27  - wear neck brace when not in bed

## 2020-02-28 NOTE — PROGRESS NOTE ADULT - ASSESSMENT
63 yo M with HTN, HLD, BPH, GERD, s/p cervical spine fusion in January 2020 c/b postsurgical bilateral arm weakness presenting from [facility] with fever, weakness and dysuria, admitted for severe sepsis 2/2 UTI, treated with IV antibiotics.

## 2020-02-28 NOTE — CONSULT NOTE ADULT - ASSESSMENT
JANELLE DEL CID is a 63yo Male w/ recent cervical laminectomy/fusion for cervical stenosis c/b post-op arm weakness admitted for sepsis due to UTI, now with gait instability, ADL impairments and functional impairments.     Precautions: Fall, spine    Rehab:  ***INCOMPLETE*** JANELLE DEL CID is a 65yo Male w/ recent cervical laminectomy/fusion for cervical stenosis c/b post-op arm weakness admitted for sepsis due to UTI, now with gait instability, ADL impairments and functional impairments.     Precautions: Fall, spine    Rehab: Patient now at same functional level as when he was last discharged from acute rehabilitation at [facility]. Would not be a candidate for further acute rehabilitation.  - Continue bedside therapy as well as OOB throughout the day with mobilization with staff to maintain cardiopulmonary function and prevention of secondary complications related to debility.   - Recommend DIMAS, patient DOES NOT meet acute inpatient rehabilitation criteria

## 2020-02-28 NOTE — PROGRESS NOTE ADULT - ATTENDING COMMENTS
pt seen and examined.  above plan discussed on rounds today.  In addition,    Severe sepsis 2/2 UTI - c/w Ceftriaxone. Ucx noted to be negative, but sample was collected post abx administration. Ucx from 2/23 showing pansensitive E.Coli.  leukocytosis is improving and fever curve improving. Bld Cxs NGTD. Will plan to discharge on keflex to complete a 7 day course.   Dispo: [facility]

## 2020-02-28 NOTE — CONSULT NOTE ADULT - ATTENDING COMMENTS
Seen and examined with resident. Agree with note.   Patient  is a 63yo Male w/ recent cervical laminectomy/fusion for cervical stenosis c/b post-op arm weakness admitted for sepsis due to UTI, now with gait instability, ADL impairments and functional impairments.   Patient will need subacute rehabilitation when stable.

## 2020-02-28 NOTE — CONSULT NOTE ADULT - SUBJECTIVE AND OBJECTIVE BOX
Patient is a 64y old  Male who presents with a chief complaint of Fever, UTI (28 Feb 2020 08:24)    HPI:  JANELLE DEL CID is a 63yo Male with HTN, HLD, GERD, BPH, s/p C3-7 posterior laminectomy/fusion in January 2020 c/b postsurgical bilateral arm weakness presenting from [facility] with fever, weakness and dysuria since yesterday. Patient was started on macrobid for UTI. He reports hitting his head on bedrail while moving about in bed yesterday.     Hospital Course:  n the ED, patient febrile to 101.9 and tachycardic to 120. Sepsis, +UA, Started on zosyn, now CTX. CT cervical spine and head did not show any acute changes.     REVIEW OF SYSTEMS  Constitutional - Denies fevers, chills  HEENT - Denies changes in vision or hearing  Respiratory - Denies cough, dyspnea  Cardiovascular - Denies chest pain, palpitations  Gastrointestinal - Denies n/v, constipation, bowel incontinence  Genitourinary - Denies dysuria, urinary incontinence  Neurological - Denies weakness, numbness, headaches  Skin - Denies rashes  Musculoskeletal - Denies arthralgia, myalgias, back pain  Psychiatric - Denies depressed mood, anxiety    PAST MEDICAL & SURGICAL HISTORY  Cluster headache  BPH (benign prostatic hypertrophy)  Spinal stenosis  GERD (gastroesophageal reflux disease)  Hyperlipidemia  HTN (hypertension)  High cholesterol  HTN (hypertension)  H/O laminectomy      SOCIAL HISTORY  Presents from [facility] for subacute after post-op arm weakness after cervical surgery.  Lives w/ wife in , ____   Prior to Jan surgery independent in ambulation and ADLs. Since surgery unable to move arms.    CURRENT FUNCTIONAL STATUS  Bed mobility - modA  Transfers - Aleah  Gait - Aleah 40'   ADL's - maxA UE dressing    FAMILY HISTORY:  Family history of breast cancer (Sibling)  Family history of oral cancer (Sibling): tongue ca  Family history of stroke  Family history of prostate cancer    ALLERGIES  No Known Allergies  Shrimp- Nasal congestion (Other)    MEDICATIONS   acetaminophen   Tablet .. 650 milliGRAM(s) Oral every 6 hours PRN  atorvastatin 80 milliGRAM(s) Oral at bedtime  baclofen 5 milliGRAM(s) Oral three times a day PRN  cefTRIAXone   IVPB 1000 milliGRAM(s) IV Intermittent every 24 hours  diltiazem    milliGRAM(s) Oral daily  enoxaparin Injectable 40 milliGRAM(s) SubCutaneous daily  gabapentin 600 milliGRAM(s) Oral three times a day  hydrochlorothiazide 50 milliGRAM(s) Oral daily  lidocaine   Patch 1 Patch Transdermal daily  melatonin 5 milliGRAM(s) Oral at bedtime  ondansetron Injectable 4 milliGRAM(s) IV Push every 6 hours PRN  oxyCODONE    IR 2.5 milliGRAM(s) Oral every 4 hours PRN  pantoprazole    Tablet 40 milliGRAM(s) Oral before breakfast  tamsulosin 0.4 milliGRAM(s) Oral at bedtime    ----------------------------------------------------------------------------------------  PHYSICAL EXAM  VITALS  T(C): 36.7 (02-28 @ 05:10)  HR: 85 (02-28 @ 05:10)  BP: 110/68 (02-28 @ 05:10)  RR: 18 (02-28 @ 05:10)  SpO2: 96% (02-28 @ 05:10)    Constitutional - NAD, Comfortable  HEENT - NCAT  Chest - No respiratory distress. No use of accessory muscles of respiration.  Cardiovascular - Warm, well perfused  Abdomen - nondistended  Extremities - no C/C/E  Neurologic Exam -                    Cognitive - Awake, Alert, AAO to self, place, date, year, situation     Communication - Fluent, No dysarthria     Cranial Nerves - CN 2-12 intact     Motor -                     LEFT    UE - ShAB 5/5, EF 5/5, EE 5/5, WE 5/5,  5/5                    RIGHT UE - ShAB 5/5, EF 5/5, EE 5/5, WE 5/5,  5/5                    LEFT    LE - HF 5/5, KE 5/5, DF 5/5, PF 5/5                    RIGHT LE - HF 5/5, KE 5/5, DF 5/5, PF 5/5        Sensory - Intact to LT     Reflexes - DTR Intact, No primitive reflexive     Coordination - FTN intact  Psychiatric - Mood stable, Affect WNL    RECENT LABS/IMAGING                        10.9   11.36 )-----------( 230      ( 28 Feb 2020 07:19 )             33.2     02-28    135  |  98  |  13  ----------------------------<  124<H>  4.0   |  23  |  1.04    Ca    9.4      28 Feb 2020 07:19  Phos  3.6     02-28  Mg     1.9     02-28    < from: CT Head No Cont (02.25.20 @ 12:28) >  Impression:  Brain CT: No acute hemorrhage mass effect or displaced calvarial fracture.  Cervical spine CT: No acute fracture or traumatic subluxation. Multilevel degenerative changes and postsurgical changes.    < from: CT Cervical Spine No Cont (02.25.20 @ 12:28) >  Cervical spine CT:  There is straightening of the normal cervical lordosis. No acute fracture or traumatic subluxation is identified. There are postsurgical changes with instrumented posterior fusion at C4-C7. The surgical hardware is intact. There is no evidence for loosening laminectomy defects are present at C3-C7. Mild multilevel degenerative changes present osteophyte formation, uncovertebral joint and facet arthropathy most pronounced at C4-5 through C6-7. Multilevel neural foraminal stenosis is present. Patient is a 64y old  Male who presents with a chief complaint of Fever, UTI (28 Feb 2020 08:24)    HPI:  JANELLE DEL CID is a 65yo Male with HTN, HLD, GERD, BPH, s/p C3-7 posterior laminectomy/fusion in January 2020 @[facility] c/b postsurgical bilateral arm weakness , was @[facility] for acute SCI rehab for 1mo, subsequently [facility], now presenting from [email or web address] with fever, weakness and dysuria since yesterday. Patient was started on macrobid for UTI. He reports hitting his head on bedrail while moving about in bed yesterday.     Hospital Course:  n the ED, patient febrile to 101.9 and tachycardic to 120. Sepsis, +UA, Started on zosyn, now CTX. CT cervical spine and head did not show any acute changes.     REVIEW OF SYSTEMS  Constitutional - Denies fevers, chills  HEENT - Denies changes in vision or hearing  Respiratory - Denies cough, dyspnea  Cardiovascular - Denies chest pain, palpitations  Gastrointestinal - Denies n/v, constipation, bowel incontinence  Genitourinary - Denies dysuria, urinary incontinence  Neurological - +Hand weakness +fingertip numbness  Skin - Denies rashes  Musculoskeletal - Denies arthralgia, myalgias, back pain  Psychiatric - Denies depressed mood, anxiety    PAST MEDICAL & SURGICAL HISTORY  Cluster headache  BPH (benign prostatic hypertrophy)  Spinal stenosis  GERD (gastroesophageal reflux disease)  Hyperlipidemia  HTN (hypertension)  High cholesterol  HTN (hypertension)  H/O laminectomy      SOCIAL HISTORY  Presents from [email or web address] for subacute after post-op arm weakness after cervical surgery.  Lives w/ wife in   Prior to Jan surgery independent in ambulation and ADLs. Since surgery unable to move arms. Upon discharge from [facility] acute rehab, pt still needed assist with ADLs, CG with gait. Was only at [email or web address]     CURRENT FUNCTIONAL STATUS  Bed mobility - modA  Transfers - Aleah  Gait - Aleah 40'   ADL's - maxA UE dressing    FAMILY HISTORY:  Family history of breast cancer (Sibling)  Family history of oral cancer (Sibling): tongue ca  Family history of stroke  Family history of prostate cancer    ALLERGIES  No Known Allergies  Shrimp- Nasal congestion (Other)    MEDICATIONS   acetaminophen   Tablet .. 650 milliGRAM(s) Oral every 6 hours PRN  atorvastatin 80 milliGRAM(s) Oral at bedtime  baclofen 5 milliGRAM(s) Oral three times a day PRN  cefTRIAXone   IVPB 1000 milliGRAM(s) IV Intermittent every 24 hours  diltiazem    milliGRAM(s) Oral daily  enoxaparin Injectable 40 milliGRAM(s) SubCutaneous daily  gabapentin 600 milliGRAM(s) Oral three times a day  hydrochlorothiazide 50 milliGRAM(s) Oral daily  lidocaine   Patch 1 Patch Transdermal daily  melatonin 5 milliGRAM(s) Oral at bedtime  ondansetron Injectable 4 milliGRAM(s) IV Push every 6 hours PRN  oxyCODONE    IR 2.5 milliGRAM(s) Oral every 4 hours PRN  pantoprazole    Tablet 40 milliGRAM(s) Oral before breakfast  tamsulosin 0.4 milliGRAM(s) Oral at bedtime    ----------------------------------------------------------------------------------------  PHYSICAL EXAM  VITALS  T(C): 36.7 (02-28 @ 05:10)  HR: 85 (02-28 @ 05:10)  BP: 110/68 (02-28 @ 05:10)  RR: 18 (02-28 @ 05:10)  SpO2: 96% (02-28 @ 05:10)    Constitutional - NAD, Comfortable  HEENT - NCAT  Chest - No respiratory distress. No use of accessory muscles of respiration.  Cardiovascular - Warm, well perfused  Abdomen - nondistended  Extremities - no C/C/E  Neurologic Exam -                    Cognitive - Awake, Alert, AAO to self, place, date, year, situation     Communication - Fluent, No dysarthria     Cranial Nerves - CN 2-12 intact     Motor -                     LEFT    UE - ShAB 1/5, EF 3/5, EE 3/5, WE 4/5,  5/5                    RIGHT UE - ShAB 0/5, EF 0/5, EE 2/5, WE 4/5,  5/5                    LEFT    LE - 5/5                    RIGHT LE - 5/5        Sensory - Intact to LT     Reflexes - DTR Intact, No primitive reflexive     Coordination - unable  Psychiatric - Mood stable, Affect WNL    RECENT LABS/IMAGING                        10.9   11.36 )-----------( 230      ( 28 Feb 2020 07:19 )             33.2     02-28    135  |  98  |  13  ----------------------------<  124<H>  4.0   |  23  |  1.04    Ca    9.4      28 Feb 2020 07:19  Phos  3.6     02-28  Mg     1.9     02-28    < from: CT Head No Cont (02.25.20 @ 12:28) >  Impression:  Brain CT: No acute hemorrhage mass effect or displaced calvarial fracture.  Cervical spine CT: No acute fracture or traumatic subluxation. Multilevel degenerative changes and postsurgical changes.    < from: CT Cervical Spine No Cont (02.25.20 @ 12:28) >  Cervical spine CT:  There is straightening of the normal cervical lordosis. No acute fracture or traumatic subluxation is identified. There are postsurgical changes with instrumented posterior fusion at C4-C7. The surgical hardware is intact. There is no evidence for loosening laminectomy defects are present at C3-C7. Mild multilevel degenerative changes present osteophyte formation, uncovertebral joint and facet arthropathy most pronounced at C4-5 through C6-7. Multilevel neural foraminal stenosis is present.

## 2020-02-28 NOTE — PROGRESS NOTE ADULT - PROBLEM SELECTOR PLAN 1
Patient with symptoms of UTI, met sepsis criteria on admission. Cause of UTI 2/2 BPH and urinary retention  - F/u 2/25 urine cx NGTD and BCx from 2/26  - Start CTX (2/25- ) x5-7, d2 days given complicated UTI. Transition to PO Abx once no fever [id number]  - tylenol 650 prn  - fever curve downtrending 2/27, will ctm  - started flomax on 2/27

## 2020-02-28 NOTE — PROGRESS NOTE ADULT - PROBLEM SELECTOR PLAN 2
P/w tachycardic, febrile, with leukocytosis, AG with lactate elevated to 3.3 (now normalizing),  s/p 2 L IVF  - F/u urine and blood cultures: 2/25 UCx NGTD; 2/25 BCx pending; 2/26 BCx pending  - abx as above  - Low c/f complications from recent laminectomy. No signs on abscess on CTA cervical, but will f/u blood cultures  - Encourage po intake  - zofran prn Resolved. P/w tachycardic, febrile, with leukocytosis, AG with lactate elevated to 3.3 (now normalizing),  s/p 2 L IVF  - UCx 2/23 100,000 CFU pan-sensitive E. Coli  - Abx as above  - Low c/f complications from recent laminectomy. No signs on abscess on CTA cervical, but will f/u blood cultures  - Encourage po intake  - Zofran prn

## 2020-02-29 LAB
ANION GAP SERPL CALC-SCNC: 17 MMOL/L — SIGNIFICANT CHANGE UP (ref 5–17)
BASOPHILS # BLD AUTO: 0.07 K/UL — SIGNIFICANT CHANGE UP (ref 0–0.2)
BASOPHILS NFR BLD AUTO: 0.6 % — SIGNIFICANT CHANGE UP (ref 0–2)
BUN SERPL-MCNC: 15 MG/DL — SIGNIFICANT CHANGE UP (ref 7–23)
CALCIUM SERPL-MCNC: 9.5 MG/DL — SIGNIFICANT CHANGE UP (ref 8.4–10.5)
CHLORIDE SERPL-SCNC: 100 MMOL/L — SIGNIFICANT CHANGE UP (ref 96–108)
CO2 SERPL-SCNC: 22 MMOL/L — SIGNIFICANT CHANGE UP (ref 22–31)
CREAT SERPL-MCNC: 0.99 MG/DL — SIGNIFICANT CHANGE UP (ref 0.5–1.3)
EOSINOPHIL # BLD AUTO: 0.14 K/UL — SIGNIFICANT CHANGE UP (ref 0–0.5)
EOSINOPHIL NFR BLD AUTO: 1.3 % — SIGNIFICANT CHANGE UP (ref 0–6)
GLUCOSE SERPL-MCNC: 124 MG/DL — HIGH (ref 70–99)
HCT VFR BLD CALC: 34 % — LOW (ref 39–50)
HGB BLD-MCNC: 11.2 G/DL — LOW (ref 13–17)
IMM GRANULOCYTES NFR BLD AUTO: 0.5 % — SIGNIFICANT CHANGE UP (ref 0–1.5)
LYMPHOCYTES # BLD AUTO: 2.83 K/UL — SIGNIFICANT CHANGE UP (ref 1–3.3)
LYMPHOCYTES # BLD AUTO: 26.3 % — SIGNIFICANT CHANGE UP (ref 13–44)
MAGNESIUM SERPL-MCNC: 1.8 MG/DL — SIGNIFICANT CHANGE UP (ref 1.6–2.6)
MCHC RBC-ENTMCNC: 29.7 PG — SIGNIFICANT CHANGE UP (ref 27–34)
MCHC RBC-ENTMCNC: 32.9 GM/DL — SIGNIFICANT CHANGE UP (ref 32–36)
MCV RBC AUTO: 90.2 FL — SIGNIFICANT CHANGE UP (ref 80–100)
MONOCYTES # BLD AUTO: 1.14 K/UL — HIGH (ref 0–0.9)
MONOCYTES NFR BLD AUTO: 10.6 % — SIGNIFICANT CHANGE UP (ref 2–14)
NEUTROPHILS # BLD AUTO: 6.55 K/UL — SIGNIFICANT CHANGE UP (ref 1.8–7.4)
NEUTROPHILS NFR BLD AUTO: 60.7 % — SIGNIFICANT CHANGE UP (ref 43–77)
NRBC # BLD: 0 /100 WBCS — SIGNIFICANT CHANGE UP (ref 0–0)
PHOSPHATE SERPL-MCNC: 4.7 MG/DL — HIGH (ref 2.5–4.5)
PLATELET # BLD AUTO: 261 K/UL — SIGNIFICANT CHANGE UP (ref 150–400)
POTASSIUM SERPL-MCNC: 3.8 MMOL/L — SIGNIFICANT CHANGE UP (ref 3.5–5.3)
POTASSIUM SERPL-SCNC: 3.8 MMOL/L — SIGNIFICANT CHANGE UP (ref 3.5–5.3)
RBC # BLD: 3.77 M/UL — LOW (ref 4.2–5.8)
RBC # FLD: 14.5 % — SIGNIFICANT CHANGE UP (ref 10.3–14.5)
SODIUM SERPL-SCNC: 139 MMOL/L — SIGNIFICANT CHANGE UP (ref 135–145)
WBC # BLD: 10.78 K/UL — HIGH (ref 3.8–10.5)
WBC # FLD AUTO: 10.78 K/UL — HIGH (ref 3.8–10.5)

## 2020-02-29 PROCEDURE — 99232 SBSQ HOSP IP/OBS MODERATE 35: CPT | Mod: GC

## 2020-02-29 RX ADMIN — OXYCODONE HYDROCHLORIDE 2.5 MILLIGRAM(S): 5 TABLET ORAL at 17:46

## 2020-02-29 RX ADMIN — GABAPENTIN 600 MILLIGRAM(S): 400 CAPSULE ORAL at 22:19

## 2020-02-29 RX ADMIN — OXYCODONE HYDROCHLORIDE 2.5 MILLIGRAM(S): 5 TABLET ORAL at 06:31

## 2020-02-29 RX ADMIN — PANTOPRAZOLE SODIUM 40 MILLIGRAM(S): 20 TABLET, DELAYED RELEASE ORAL at 06:24

## 2020-02-29 RX ADMIN — OXYCODONE HYDROCHLORIDE 2.5 MILLIGRAM(S): 5 TABLET ORAL at 14:09

## 2020-02-29 RX ADMIN — Medication 650 MILLIGRAM(S): at 00:15

## 2020-02-29 RX ADMIN — Medication 5 MILLIGRAM(S): at 22:19

## 2020-02-29 RX ADMIN — LIDOCAINE 1 PATCH: 4 CREAM TOPICAL at 13:55

## 2020-02-29 RX ADMIN — Medication 500 MILLIGRAM(S): at 06:24

## 2020-02-29 RX ADMIN — Medication 240 MILLIGRAM(S): at 06:24

## 2020-02-29 RX ADMIN — ATORVASTATIN CALCIUM 80 MILLIGRAM(S): 80 TABLET, FILM COATED ORAL at 22:18

## 2020-02-29 RX ADMIN — ENOXAPARIN SODIUM 40 MILLIGRAM(S): 100 INJECTION SUBCUTANEOUS at 13:54

## 2020-02-29 RX ADMIN — OXYCODONE HYDROCHLORIDE 2.5 MILLIGRAM(S): 5 TABLET ORAL at 18:10

## 2020-02-29 RX ADMIN — TAMSULOSIN HYDROCHLORIDE 0.4 MILLIGRAM(S): 0.4 CAPSULE ORAL at 22:19

## 2020-02-29 RX ADMIN — OXYCODONE HYDROCHLORIDE 2.5 MILLIGRAM(S): 5 TABLET ORAL at 22:22

## 2020-02-29 RX ADMIN — GABAPENTIN 600 MILLIGRAM(S): 400 CAPSULE ORAL at 13:54

## 2020-02-29 RX ADMIN — OXYCODONE HYDROCHLORIDE 2.5 MILLIGRAM(S): 5 TABLET ORAL at 22:30

## 2020-02-29 RX ADMIN — Medication 50 MILLIGRAM(S): at 06:24

## 2020-02-29 RX ADMIN — GABAPENTIN 600 MILLIGRAM(S): 400 CAPSULE ORAL at 06:24

## 2020-02-29 RX ADMIN — Medication 500 MILLIGRAM(S): at 17:42

## 2020-02-29 RX ADMIN — OXYCODONE HYDROCHLORIDE 2.5 MILLIGRAM(S): 5 TABLET ORAL at 13:52

## 2020-02-29 NOTE — PROGRESS NOTE ADULT - PROBLEM SELECTOR PLAN 3
Patient is s/p posterior cervical laminectomy.  - continue home oxycodone 2.5 mg q 4 hrs prn  - continue baclofen prn  - lidocaine patches and warm compresses, added 2/27  - wear neck brace when not in bed

## 2020-02-29 NOTE — PROGRESS NOTE ADULT - ATTENDING COMMENTS
pt seen and examined.  above plan discussed on rounds today.  In addition,    Severe sepsis 2/2 UTI - c/w Ceftriaxone. Ucx noted to be negative, but sample was collected post abx administration. Ucx from 2/23 showing pansensitive E.Coli.  leukocytosis is improving and fevers resolved. Bld Cxs NGTD. Will plan to discharge on keflex to complete a 7 day course.   Dispo: [facility] likely monday. pt seen and examined.  above plan discussed on rounds today.  In addition,    Severe sepsis 2/2 UTI - resolved. can transition to po abx. Ucx noted to be negative, but sample was collected post abx administration. Ucx from 2/23 showing pansensitive E.Coli.  leukocytosis is improving and fevers resolved. Bld Cxs NGTD. Will plan to discharge on keflex to complete a 7 day course.   Dispo: [facility] likely monday.

## 2020-02-29 NOTE — PROGRESS NOTE ADULT - PROBLEM SELECTOR PLAN 1
Patient with symptoms of UTI, met sepsis criteria on admission. Cause of UTI 2/2 BPH and urinary retention; dysuria now resolved  - 2/23 UCx: pan-sensitive E. Coli  - Start CTX (2/25- 2/28) x5d Transition to PO Abx Keflex (2/29 -3/2) for 7d course   - tylenol 650 prn  - fever curve downtrending 2/27, will ctm  - started flomax on 2/27, good urine output

## 2020-02-29 NOTE — PROGRESS NOTE ADULT - PROBLEM SELECTOR PLAN 4
Patient describes mild head trauma against bed rail at [facility]  - [facility] 2/25 shows no acute changes  - F/u orthostatics and PT recommendations: AR  - OT c/s 2/26 for UE weakness and rehabilitation  - physiatry c/s 2/26, recs appreciated

## 2020-02-29 NOTE — PROGRESS NOTE ADULT - SUBJECTIVE AND OBJECTIVE BOX
Tiny Harrell MD [facility]  PGY-1 Internal Medicine  [facility] Pager: [phone] | NS: [phone]  For Night coverage 7pm-7am: NS: page [phone] Team 1-3, page [phone] Team4 & Care Model    Patient is a 64y old  Male who presents with a chief complaint of Fever, UTI (25 Feb 2020 13:55)    Subjective: Afebrile o/n. No acute events overnight. Patient seen and examined. Denies CP, HA, Abd pain, N/V, diarrhea/constipation. Dysuria resolved. [facility] + EColi pan-sensitive. Will start PO Keflex today through 3/2 for a 7day course. Physiatry recs for [facility]; awaiting CM for placement.    MEDICATIONS  (STANDING):  atorvastatin 80 milliGRAM(s) Oral at bedtime  cephalexin 500 milliGRAM(s) Oral every 12 hours  diltiazem    milliGRAM(s) Oral daily  enoxaparin Injectable 40 milliGRAM(s) SubCutaneous daily  gabapentin 600 milliGRAM(s) Oral three times a day  hydrochlorothiazide 50 milliGRAM(s) Oral daily  lidocaine   Patch 1 Patch Transdermal daily  melatonin 5 milliGRAM(s) Oral at bedtime  pantoprazole    Tablet 40 milliGRAM(s) Oral before breakfast  tamsulosin 0.4 milliGRAM(s) Oral at bedtime    MEDICATIONS  (PRN):  acetaminophen   Tablet .. 650 milliGRAM(s) Oral every 6 hours PRN Temp greater or equal to 38C (100.4F), Mild Pain (1 - 3)  baclofen 5 milliGRAM(s) Oral three times a day PRN Muscle Spasm  ondansetron Injectable 4 milliGRAM(s) IV Push every 6 hours PRN Nausea and/or Vomiting  oxyCODONE    IR 2.5 milliGRAM(s) Oral every 4 hours PRN Severe Pain (7 - 10)    Allergies    No Known Allergies    Intolerances    Shrimp- Nasal congestion (Other)      VITAL SIGNS:  ICU Vital Signs Last 24 Hrs  T(C): 36.6 (29 Feb 2020 04:38), Max: 37.1 (28 Feb 2020 14:56)  T(F): 97.8 (29 Feb 2020 04:38), Max: 98.7 (28 Feb 2020 14:56)  HR: 80 (29 Feb 2020 04:38) (74 - 100)  BP: 101/68 (29 Feb 2020 04:38) (101/68 - 124/72)  BP(mean): --  ABP: --  ABP(mean): --  RR: 20 (29 Feb 2020 04:38) (18 - 20)  SpO2: 95% (29 Feb 2020 04:38) (91% - 99%)    PHYSICAL EXAM:  GENERAL: NAD, Alert and awake  EYES: EOMI, conjunctiva and sclera clear  LUNGS: Clear to auscultation bilaterally; No wheezing  HEART: Regular rate and rhythm; Normal S1 and S2; No murmurs  ABDOMEN: Soft, Nontender, Nondistended; Bowel sounds present, No CVA tenderness  EXTREMITIES: No LE swelling, 2+ peripheral pulses    LABS:                        10.9   11.36 )-----------( 230      ( 28 Feb 2020 07:19 )             33.2                         10.7   15.67 )-----------( 222      ( 27 Feb 2020 07:11 )             32.8           02-29    139  |  100  |  15  ----------------------------<  124<H>  3.8   |  22  |  0.99  02-28    135  |  98  |  13  ----------------------------<  124<H>  4.0   |  23  |  1.04  02-27    137  |  97  |  11  ----------------------------<  132<H>  3.0<L>   |  24  |  1.07    Ca    9.5      29 Feb 2020 06:54  Ca    9.4      28 Feb 2020 07:19  Ca    9.1      27 Feb 2020 07:10  Phos  4.7     02-29  Mg     1.8     02-29          CAPILLARY BLOOD GLUCOSE      Culture - Urine (02.23.20 @ 21:32)    -  Amikacin: S <=16    -  Ampicillin: S <=8 These ampicillin results predict results for amoxicillin    -  Ampicillin/Sulbactam: S <=8/4 Enterobacter, Citrobacter, and Serratia may develop resistance during prolonged therapy (3-4 days)    -  Aztreonam: S <=4    -  Cefazolin: S <=8 (MIC_CL_COM_ENTERIC_CEFAZU) For uncomplicated UTI with K. pneumoniae, E. coli, or P. mirablis: CYNTHIA <=16 is sensitive and CYNTHIA >=32 is resistant. This also predicts results for oral agents cefaclor, cefdinir, cefpodoxime, cefprozil, cefuroxime axetil, cephalexin and locarbef for uncomplicated UTI. Note that some isolates may be susceptible to these agents while testing resistant to cefazolin.    -  Cefepime: S <=4    -  Cefoxitin: S <=8    -  Ceftriaxone: S <=1 Enterobacter, Citrobacter, and Serratia may develop resistance during prolonged therapy    -  Ciprofloxacin: S <=1    -  Gentamicin: S <=4    -  Imipenem: S <=1    -  Levofloxacin: S <=2    -  Meropenem: S <=1    -  Nitrofurantoin: S <=32 Should not be used to treat pyelonephritis    -  Piperacillin/Tazobactam: S <=16    -  Tigecycline: S <=2    -  Tobramycin: S <=4    -  Trimethoprim/Sulfamethoxazole: S <=2/38    Specimen Source: .Urine CL CATCH MDSTRM    Culture Results:   >100,000 CFU/ml Escherichia coli    Organism Identification: Escherichia coli    Organism: Escherichia coli    Method Type: CYNTHIA      IMAGING: Radiology personally reviewed  < from: CT Head No Cont (02.25.20 @ 12:28) >  Impression:    Brain CT: No acute hemorrhage mass effect or displaced calvarial fracture.    Cervical spine CT: No acute fracture or traumatic subluxation. Multilevel degenerative changes and postsurgical changes.    < end of copied text >

## 2020-02-29 NOTE — PROGRESS NOTE ADULT - PROBLEM SELECTOR PLAN 2
Resolved. P/w tachycardic, febrile, with leukocytosis, AG with lactate elevated to 3.3 (now normalizing),  s/p 2 L IVF  - UCx 2/23 100,000 CFU pan-sensitive E. Coli  - Abx as above  - Low c/f complications from recent laminectomy. No signs on abscess on CTA cervical, but will f/u blood cultures  - Encourage po intake  - Zofran prn

## 2020-03-01 ENCOUNTER — TRANSCRIPTION ENCOUNTER (OUTPATIENT)
Age: 65
End: 2020-03-01

## 2020-03-01 LAB
BASOPHILS # BLD AUTO: 0.08 K/UL — SIGNIFICANT CHANGE UP (ref 0–0.2)
BASOPHILS NFR BLD AUTO: 0.7 % — SIGNIFICANT CHANGE UP (ref 0–2)
CULTURE RESULTS: SIGNIFICANT CHANGE UP
CULTURE RESULTS: SIGNIFICANT CHANGE UP
EOSINOPHIL # BLD AUTO: 0.21 K/UL — SIGNIFICANT CHANGE UP (ref 0–0.5)
EOSINOPHIL NFR BLD AUTO: 1.7 % — SIGNIFICANT CHANGE UP (ref 0–6)
HCT VFR BLD CALC: 37.1 % — LOW (ref 39–50)
HGB BLD-MCNC: 12.1 G/DL — LOW (ref 13–17)
IMM GRANULOCYTES NFR BLD AUTO: 0.7 % — SIGNIFICANT CHANGE UP (ref 0–1.5)
LYMPHOCYTES # BLD AUTO: 25 % — SIGNIFICANT CHANGE UP (ref 13–44)
LYMPHOCYTES # BLD AUTO: 3.06 K/UL — SIGNIFICANT CHANGE UP (ref 1–3.3)
MCHC RBC-ENTMCNC: 29.8 PG — SIGNIFICANT CHANGE UP (ref 27–34)
MCHC RBC-ENTMCNC: 32.6 GM/DL — SIGNIFICANT CHANGE UP (ref 32–36)
MCV RBC AUTO: 91.4 FL — SIGNIFICANT CHANGE UP (ref 80–100)
MONOCYTES # BLD AUTO: 1.12 K/UL — HIGH (ref 0–0.9)
MONOCYTES NFR BLD AUTO: 9.1 % — SIGNIFICANT CHANGE UP (ref 2–14)
NEUTROPHILS # BLD AUTO: 7.7 K/UL — HIGH (ref 1.8–7.4)
NEUTROPHILS NFR BLD AUTO: 62.8 % — SIGNIFICANT CHANGE UP (ref 43–77)
NRBC # BLD: 0 /100 WBCS — SIGNIFICANT CHANGE UP (ref 0–0)
PLATELET # BLD AUTO: 270 K/UL — SIGNIFICANT CHANGE UP (ref 150–400)
RBC # BLD: 4.06 M/UL — LOW (ref 4.2–5.8)
RBC # FLD: 14.4 % — SIGNIFICANT CHANGE UP (ref 10.3–14.5)
SPECIMEN SOURCE: SIGNIFICANT CHANGE UP
SPECIMEN SOURCE: SIGNIFICANT CHANGE UP
WBC # BLD: 12.25 K/UL — HIGH (ref 3.8–10.5)
WBC # FLD AUTO: 12.25 K/UL — HIGH (ref 3.8–10.5)

## 2020-03-01 PROCEDURE — 99232 SBSQ HOSP IP/OBS MODERATE 35: CPT | Mod: GC

## 2020-03-01 RX ORDER — LIDOCAINE 4 G/100G
1 CREAM TOPICAL
Qty: 0 | Refills: 0 | DISCHARGE
Start: 2020-03-01

## 2020-03-01 RX ORDER — TAMSULOSIN HYDROCHLORIDE 0.4 MG/1
1 CAPSULE ORAL
Qty: 0 | Refills: 0 | DISCHARGE
Start: 2020-03-01

## 2020-03-01 RX ORDER — ACETAMINOPHEN 500 MG
2 TABLET ORAL
Qty: 0 | Refills: 0 | DISCHARGE
Start: 2020-03-01

## 2020-03-01 RX ADMIN — TAMSULOSIN HYDROCHLORIDE 0.4 MILLIGRAM(S): 0.4 CAPSULE ORAL at 21:09

## 2020-03-01 RX ADMIN — Medication 500 MILLIGRAM(S): at 06:53

## 2020-03-01 RX ADMIN — GABAPENTIN 600 MILLIGRAM(S): 400 CAPSULE ORAL at 06:54

## 2020-03-01 RX ADMIN — Medication 500 MILLIGRAM(S): at 17:01

## 2020-03-01 RX ADMIN — GABAPENTIN 600 MILLIGRAM(S): 400 CAPSULE ORAL at 14:03

## 2020-03-01 RX ADMIN — GABAPENTIN 600 MILLIGRAM(S): 400 CAPSULE ORAL at 21:09

## 2020-03-01 RX ADMIN — ATORVASTATIN CALCIUM 80 MILLIGRAM(S): 80 TABLET, FILM COATED ORAL at 21:09

## 2020-03-01 RX ADMIN — LIDOCAINE 1 PATCH: 4 CREAM TOPICAL at 04:07

## 2020-03-01 RX ADMIN — LIDOCAINE 1 PATCH: 4 CREAM TOPICAL at 22:19

## 2020-03-01 RX ADMIN — Medication 5 MILLIGRAM(S): at 21:09

## 2020-03-01 RX ADMIN — OXYCODONE HYDROCHLORIDE 2.5 MILLIGRAM(S): 5 TABLET ORAL at 22:09

## 2020-03-01 RX ADMIN — OXYCODONE HYDROCHLORIDE 2.5 MILLIGRAM(S): 5 TABLET ORAL at 17:01

## 2020-03-01 RX ADMIN — Medication 240 MILLIGRAM(S): at 06:53

## 2020-03-01 RX ADMIN — LIDOCAINE 1 PATCH: 4 CREAM TOPICAL at 19:17

## 2020-03-01 RX ADMIN — OXYCODONE HYDROCHLORIDE 2.5 MILLIGRAM(S): 5 TABLET ORAL at 21:09

## 2020-03-01 RX ADMIN — ENOXAPARIN SODIUM 40 MILLIGRAM(S): 100 INJECTION SUBCUTANEOUS at 11:00

## 2020-03-01 RX ADMIN — PANTOPRAZOLE SODIUM 40 MILLIGRAM(S): 20 TABLET, DELAYED RELEASE ORAL at 06:53

## 2020-03-01 RX ADMIN — OXYCODONE HYDROCHLORIDE 2.5 MILLIGRAM(S): 5 TABLET ORAL at 17:27

## 2020-03-01 RX ADMIN — OXYCODONE HYDROCHLORIDE 2.5 MILLIGRAM(S): 5 TABLET ORAL at 11:48

## 2020-03-01 RX ADMIN — LIDOCAINE 1 PATCH: 4 CREAM TOPICAL at 10:58

## 2020-03-01 RX ADMIN — OXYCODONE HYDROCHLORIDE 2.5 MILLIGRAM(S): 5 TABLET ORAL at 11:05

## 2020-03-01 RX ADMIN — Medication 50 MILLIGRAM(S): at 06:53

## 2020-03-01 NOTE — DISCHARGE NOTE PROVIDER - NSDCFUADDAPPT_GEN_ALL_CORE_FT
1. Please follow up with your primary care doctor within two weeks of discharge from the hospital. 1. Please follow up with your primary care doctor within two weeks of discharge from the hospital.  Please have your white blood cell level checked at this appointment as it was high upon discharge.

## 2020-03-01 NOTE — PROGRESS NOTE ADULT - ATTENDING COMMENTS
pt seen and examined.  above plan discussed on rounds today.  In addition,    Severe sepsis 2/2 UTI - resolved. can transition to po abx. Ucx noted to be negative, but sample was collected post abx administration. Ucx from 2/23 showing pansensitive E.Coli.  leukocytosis is improving and fevers resolved. Bld Cxs NGTD. Will plan to discharge on keflex to complete a 7 day course.   Dispo: [facility] likely monday.

## 2020-03-01 NOTE — PROGRESS NOTE ADULT - PROBLEM SELECTOR PLAN 4
Patient describes mild head trauma against bed rail at [facility]  - [facility] 2/25 shows no acute changes  - F/u orthostatics and PT recommendations: AR  - OT c/s 2/26 for UE weakness and rehabilitation  - physiatry c/s 2/26, recs appreciated

## 2020-03-01 NOTE — PROGRESS NOTE ADULT - PROBLEM SELECTOR PLAN 1
Patient with symptoms of UTI, met sepsis criteria on admission. Cause of UTI 2/2 BPH and urinary retention; dysuria now resolved  - 2/23 UCx: pan-sensitive E. Coli 2/25 cultures neg  - Start CTX (2/25- 2/28) x5d Transition to PO Abx Keflex (2/29 -3/2) for 7d course   - tylenol 650 prn  - fever curve downtrending 2/27, will ctm  - started flomax on 2/27, good urine output

## 2020-03-01 NOTE — DISCHARGE NOTE PROVIDER - NSDCMRMEDTOKEN_GEN_ALL_CORE_FT
acetaminophen 325 mg oral tablet: 2 tab(s) orally every 6 hours, As needed, Temp greater or equal to 38C (100.4F), Mild Pain (1 - 3)  atorvastatin 80 mg oral tablet: 1 tab(s) orally once a day  baclofen 5 mg oral tablet: 1 tab(s) orally 3 times a day  Cardizem  mg/24 hours oral tablet, extended release: 1 tab(s) orally once a day  gabapentin 300 mg oral capsule: 2 cap(s) orally 3 times a day  hydroCHLOROthiazide 50 mg oral tablet: 1 tab(s) orally once a day  lidocaine 5% topical film: Apply topically to affected area in back once a day  Lovenox 40 mg/0.4 mL injectable solution: 1 milliliter(s) subcutaneous once a day  Macrobid 100 mg oral capsule: 1 cap(s) orally 2 times a day x 5 days (Day 2)  Melatonin 5 mg oral tablet: 1 tab(s) orally once a day (at bedtime)  oxycodone-acetaminophen 2.5 mg-300 mg oral tablet: 1 tab(s) orally every 4 hours, As Needed  Protonix 40 mg oral delayed release tablet: 1 tab(s) orally once a day  Senna 8.6 mg oral tablet: 2 tab(s) orally once a day (at bedtime)  tamsulosin 0.4 mg oral capsule: 1 cap(s) orally once a day (at bedtime) acetaminophen 325 mg oral tablet: 2 tab(s) orally every 6 hours, As needed, Temp greater or equal to 38C (100.4F), Mild Pain (1 - 3)  atorvastatin 80 mg oral tablet: 1 tab(s) orally once a day  baclofen 5 mg oral tablet: 1 tab(s) orally 3 times a day  Cardizem  mg/24 hours oral tablet, extended release: 1 tab(s) orally once a day  cefpodoxime 200 mg oral tablet: 1 tab(s) orally every 12 hours    -please take through 3/6  gabapentin 300 mg oral capsule: 2 cap(s) orally 3 times a day  hydroCHLOROthiazide 50 mg oral tablet: 1 tab(s) orally once a day  lidocaine 5% topical film: Apply topically to affected area in back once a day  Lovenox 40 mg/0.4 mL injectable solution: 1 milliliter(s) subcutaneous once a day  Melatonin 5 mg oral tablet: 1 tab(s) orally once a day (at bedtime)  oxycodone-acetaminophen 2.5 mg-300 mg oral tablet: 1 tab(s) orally every 4 hours, As Needed  Protonix 40 mg oral delayed release tablet: 1 tab(s) orally once a day  Senna 8.6 mg oral tablet: 2 tab(s) orally once a day (at bedtime)  tamsulosin 0.4 mg oral capsule: 1 cap(s) orally once a day (at bedtime)

## 2020-03-01 NOTE — DISCHARGE NOTE PROVIDER - HOSPITAL COURSE
65 yo M with HTN, HLD, GERD, BPH, s/p cervical spine fusion (laminectomy) in January 2020 at outside hospital c/b postsurgical bilateral arm weakness, was at [facility] rehab for 4 days when he developed fever, weakness and dysuria 1d PTA (2/24). He reports hitting his head on bedrail yesterday without LOC or prodromal symptoms. Associated sx: dysuria, urinary frequency, fever and chills in rehab. Denies n/v, abd pain, new back pain. Admitted for sepsis (febrile 101.9 and tachy 120) from UTI. UCx 2/23 grew pan-sensitive E. Coli and was treated with CTX (2/25-2/29) switched to PO Keflex (2/29-3/2). Patient was also treated wih 0.4mg flomax to help with BPH and prevent recurrence of UTI in the future.         Patient was evaluated by PT who recommends rehab facility, evaluated by physiatry on 2/28 who recommended [facility]. CM is assisting in [facility] placement.         Patient medically optimized for discharge to [facility]. 65 yo M with HTN, HLD, GERD, BPH, s/p cervical spine fusion (laminectomy) in January 2020 at outside hospital c/b postsurgical bilateral arm weakness, was at [facility] rehab for 4 days when he developed fever, weakness and dysuria 1d PTA (2/24). He reports hitting his head on bedrail yesterday without LOC or prodromal symptoms. Associated sx: dysuria, urinary frequency, fever and chills in rehab. Denies n/v, abd pain, new back pain. Admitted for sepsis (febrile 101.9 and tachy 120) from UTI. UCx 2/23 grew pan-sensitive E. Coli and was treated with CTX (2/25-2/29) switched to PO Keflex (2/29-3/2) and then cefpodoime (3/2-3/6). Patient was also treated wih 0.4mg flomax to help with BPH and prevent recurrence of UTI in the future.         Patient was evaluated by PT who recommends rehab facility, evaluated by physiatry on 2/28 who recommended [facility]. CM is assisting in [facility] placement.         Patient medically optimized for discharge to [facility] [place]. 63 yo M with HTN, HLD, GERD, BPH, s/p cervical spine fusion (laminectomy) in January 2020 at outside hospital c/b postsurgical bilateral arm weakness, was at [facility] rehab for 4 days when he developed fever, weakness and dysuria 1d PTA (2/24). He reports hitting his head on bedrail yesterday without LOC or prodromal symptoms. Associated sx: dysuria, urinary frequency, fever and chills in rehab. Denies n/v, abd pain, new back pain. Admitted for sepsis (febrile 101.9 and tachy 120) from UTI. UCx 2/23 grew pan-sensitive E. Coli and was treated with CTX (2/25-2/29) switched to PO Keflex (2/29-3/2) and then cefpodoime (3/2-3/6). Patient was also treated wih 0.4mg flomax to help with BPH and prevent recurrence of UTI in the future.         Patient was evaluated by PT who recommends rehab facility, evaluated by physiatry on 2/28 who recommended [facility]. CM is assisting in [facility] placement.         Patient medically optimized for discharge to [facility]. He will complete his cefpodoxime on 3/6. 65 yo M with HTN, HLD, GERD, BPH, s/p cervical spine fusion (laminectomy) in January 2020 at outside hospital c/b postsurgical bilateral arm weakness, was at [facility] rehab for 4 days when he developed fever, weakness and dysuria 1d PTA (2/24). He reports hitting his head on bedrail yesterday without LOC or prodromal symptoms. Associated sx: dysuria, urinary frequency, fever and chills in rehab. Denies n/v, abd pain, new back pain. Admitted for sepsis (febrile 101.9 and tachy 120) from UTI. UCx 2/23 grew pan-sensitive E. Coli and was treated with CTX (2/25-2/29) switched to PO Keflex (2/29-3/2) and then cefpodoime (3/2-3/6). Patient was also treated wih 0.4mg flomax to help with BPH and prevent recurrence of UTI in the future.  Patient had leukocytosis on discharge, which should be followed up by PCP following discharge from rehab.        Patient was evaluated by PT who recommends rehab facility, evaluated by physiatry on 2/28 who recommended [facility]. CM is assisting in [facility] placement.         Patient medically optimized for discharge to [facility]. He will complete his cefpodoxime on 3/6. 63 yo M with HTN, HLD, GERD, BPH, s/p cervical spine fusion (laminectomy) in January 2020 at outside hospital c/b postsurgical bilateral arm weakness, was at [facility] rehab for 4 days when he developed fever, weakness and dysuria 1d PTA (2/24). He reports hitting his head on bedrail yesterday without LOC or prodromal symptoms. Associated sx: dysuria, urinary frequency, fever and chills in rehab. Denies n/v, abd pain, new back pain. Admitted for sepsis (febrile 101.9 and tachy 120) from UTI. UCx 2/23 grew pan-sensitive E. Coli and was treated with CTX (2/25-2/29) switched to PO Keflex (2/29-3/2) and then cefpodoime (3/2-3/6). Patient was also treated wih 0.4mg flomax to help with BPH and prevent recurrence of UTI in the future.  Patient had leukocytosis on discharge, which should be followed up by PCP following discharge from rehab.        Patient was evaluated by PT who recommends rehab facility, evaluated by physiatry on 2/28 who recommended [facility] Patient medically optimized for discharge to [facility] He to complete his cefpodoxime on 3/6.

## 2020-03-01 NOTE — PROGRESS NOTE ADULT - SUBJECTIVE AND OBJECTIVE BOX
Contact info:  Ale George MD  Internal Medicine, PGY3  Pager: [phone] (NS)/[phone] (JASMINA)    M-F 7AM-7PM: pager covered by primary day team  Mon-Sun 7PM-7AM: Night float page [phone] for teams 1-3, [phone] for teams 4, CMA, CMB  Sa-Sun 7AM-12PM: please see contact sheet in front of chart and/or Provider to RN, primary day team  Sa-Sun 12PM-7PM: Page [phone] for teams 1-4 (NS), primary team for CMA/CMB, [facility] please page covering resident    24 HOUR EVENTS/ROS:    MEDICATIONS:  diltiazem    milliGRAM(s) Oral daily  enoxaparin Injectable 40 milliGRAM(s) SubCutaneous daily  hydrochlorothiazide 50 milliGRAM(s) Oral daily  tamsulosin 0.4 milliGRAM(s) Oral at bedtime    cephalexin 500 milliGRAM(s) Oral every 12 hours      acetaminophen   Tablet .. 650 milliGRAM(s) Oral every 6 hours PRN  baclofen 5 milliGRAM(s) Oral three times a day PRN  gabapentin 600 milliGRAM(s) Oral three times a day  melatonin 5 milliGRAM(s) Oral at bedtime  ondansetron Injectable 4 milliGRAM(s) IV Push every 6 hours PRN  oxyCODONE    IR 2.5 milliGRAM(s) Oral every 4 hours PRN    pantoprazole    Tablet 40 milliGRAM(s) Oral before breakfast    atorvastatin 80 milliGRAM(s) Oral at bedtime    lidocaine   Patch 1 Patch Transdermal daily      PHYSICAL EXAM:  T(C): 37.1 (03-01-20 @ 04:46), Max: 37.1 (03-01-20 @ 04:46)  HR: 90 (03-01-20 @ 04:46) (88 - 93)  BP: 114/78 (03-01-20 @ 04:46) (114/78 - 126/82)  RR: 18 (03-01-20 @ 04:46) (18 - 18)  SpO2: 94% (03-01-20 @ 04:46) (94% - 96%)  Wt(kg): --  Daily     Daily   I&O's Summary    29 Feb 2020 07:01  -  01 Mar 2020 07:00  --------------------------------------------------------  IN: 380 mL / OUT: 800 mL / NET: -420 mL      TELEMETRY:     Appearance: NAD	  HEENT:   Normal oral mucosa, PERRL, EOMI	  Lymphatic: No lymphadenopathy  Cardiovascular: Normal S1 S2, No JVD, No murmurs, No edema  Respiratory: Lungs clear to auscultation	  Neuro/psych: Grossly non-focal, CN 2-12 intact, AAOX3, mood and affect normal  Gastrointestinal:  Soft, Non-tender, + BS	  Skin: No rashes, No ecchymoses, No cyanosis	  Extremities: Normal range of motion, No clubbing, cyanosis or edema  Vascular: Peripheral pulses palpable 2+ bilaterally    LABS:	 	                        12.1   12.25 )-----------( 270      ( 01 Mar 2020 07:17 )             37.1     02-29    139  |  100  |  15  ----------------------------<  124<H>  3.8   |  22  |  0.99    Ca    9.5      29 Feb 2020 06:54  Phos  4.7     02-29  Mg     1.8     02-29      proBNP:   Lipid Profile:   HgA1c:   TSH:   FS: CAPILLARY BLOOD GLUCOSE        BCX/UCX:   Coags:     CARDIAC MARKERS:            	    ECG:  	  RADIOLOGY:    Consult notes reviewed:  Care discussed with providers: Contact info:  Ale George MD  Internal Medicine, PGY3  Pager: [phone] (NS)/[phone] ([facility])    M-F 7AM-7PM: pager covered by primary day team  Mon-Sun 7PM-7AM: Night float page [phone] for teams 1-3, [phone] for teams 4, CMA, CMB  Sa-Sun 7AM-12PM: please see contact sheet in front of chart and/or Provider to RN, primary day team  Sa-Sun 12PM-7PM: Page [phone] for teams 1-4 (NS), primary team for CMA/CMB, [facility] please page covering resident    24 HOUR EVENTS/ROS: Pt denies dysuria, hematuria. Tolerating PO well, denies F/C, CP/SOB, N/V.    MEDICATIONS:  diltiazem    milliGRAM(s) Oral daily  enoxaparin Injectable 40 milliGRAM(s) SubCutaneous daily  hydrochlorothiazide 50 milliGRAM(s) Oral daily  tamsulosin 0.4 milliGRAM(s) Oral at bedtime  cephalexin 500 milliGRAM(s) Oral every 12 hours  acetaminophen   Tablet .. 650 milliGRAM(s) Oral every 6 hours PRN  baclofen 5 milliGRAM(s) Oral three times a day PRN  gabapentin 600 milliGRAM(s) Oral three times a day  melatonin 5 milliGRAM(s) Oral at bedtime  ondansetron Injectable 4 milliGRAM(s) IV Push every 6 hours PRN  oxyCODONE    IR 2.5 milliGRAM(s) Oral every 4 hours PRN  pantoprazole    Tablet 40 milliGRAM(s) Oral before breakfast  atorvastatin 80 milliGRAM(s) Oral at bedtime  lidocaine   Patch 1 Patch Transdermal daily      PHYSICAL EXAM:  T(C): 37.1 (03-01-20 @ 04:46), Max: 37.1 (03-01-20 @ 04:46)  HR: 90 (03-01-20 @ 04:46) (88 - 93)  BP: 114/78 (03-01-20 @ 04:46) (114/78 - 126/82)  RR: 18 (03-01-20 @ 04:46) (18 - 18)  SpO2: 94% (03-01-20 @ 04:46) (94% - 96%)  Wt(kg): --  Daily     Daily   I&O's Summary    29 Feb 2020 07:01  -  01 Mar 2020 07:00  --------------------------------------------------------  IN: 380 mL / OUT: 800 mL / NET: -420 mL    PHYSICAL EXAM:  GENERAL: NAD, Alert and awake  EYES: EOMI, conjunctiva and sclera clear  LUNGS: Clear to auscultation bilaterally; No wheezing  HEART: Regular rate and rhythm; Normal S1 and S2; No murmurs  ABDOMEN: Soft, Nontender, Nondistended; Bowel sounds present, No CVA tenderness  EXTREMITIES: No LE swelling, 2+ peripheral pulses  LABS:	 	                        12.1   12.25 )-----------( 270      ( 01 Mar 2020 07:17 )             37.1     02-29    139  |  100  |  15  ----------------------------<  124<H>  3.8   |  22  |  0.99    Ca    9.5      29 Feb 2020 06:54  Phos  4.7     02-29  Mg     1.8     02-29    Culture - Urine (02.25.20 @ 18:54)    Specimen Source: .Urine Clean Catch (Midstream)    Culture Results:   No growth    Culture - Urine (02.23.20 @ 21:32)    -  Gentamicin: S <=4    -  Imipenem: S <=1    -  Levofloxacin: S <=2    -  Meropenem: S <=1    -  Nitrofurantoin: S <=32 Should not be used to treat pyelonephritis    -  Piperacillin/Tazobactam: S <=16    -  Tigecycline: S <=2    -  Tobramycin: S <=4    -  Trimethoprim/Sulfamethoxazole: S <=2/38    -  Amikacin: S <=16    -  Ampicillin: S <=8 These ampicillin results predict results for amoxicillin    -  Ampicillin/Sulbactam: S <=8/4 Enterobacter, Citrobacter, and Serratia may develop resistance during prolonged therapy (3-4 days)    -  Aztreonam: S <=4    -  Cefazolin: S <=8 (MIC_CL_COM_ENTERIC_CEFAZU) For uncomplicated UTI with K. pneumoniae, E. coli, or P. mirablis: CYNTHIA <=16 is sensitive and CYNTHIA >=32 is resistant. This also predicts results for oral agents cefaclor, cefdinir, cefpodoxime, cefprozil, cefuroxime axetil, cephalexin and locarbef for uncomplicated UTI. Note that some isolates may be susceptible to these agents while testing resistant to cefazolin.    -  Cefepime: S <=4    -  Cefoxitin: S <=8    -  Ceftriaxone: S <=1 Enterobacter, Citrobacter, and Serratia may develop resistance during prolonged therapy    -  Ciprofloxacin: S <=1    Specimen Source: .Urine CL CATCH MDSTRM    Culture Results:   >100,000 CFU/ml Escherichia coli    Organism Identification: Escherichia coli    Organism: Escherichia coli    Method Type: CYNTHIA

## 2020-03-01 NOTE — DISCHARGE NOTE PROVIDER - CARE PROVIDER_API CALL
Claude Rizzo; MPH)  Internal Medicine  [address], [address]  [place] [address]  Phone: [phone]  Fax: [phone]  Established Patient  Follow Up Time: 2 weeks

## 2020-03-01 NOTE — PROGRESS NOTE ADULT - ASSESSMENT
65 yo M with HTN, HLD, BPH, GERD, s/p cervical spine fusion in January 2020 c/b postsurgical bilateral arm weakness presenting from [facility] with fever, weakness and dysuria, admitted for severe sepsis 2/2 UTI, treated with IV antibiotics.

## 2020-03-01 NOTE — DISCHARGE NOTE PROVIDER - NSDCCPCAREPLAN_GEN_ALL_CORE_FT
PRINCIPAL DISCHARGE DIAGNOSIS  Diagnosis: UTI (urinary tract infection)  Assessment and Plan of Treatment: You came in with urinary tract infection, with symptoms of dysuria (painful urination), and urine cultures positive for E. Coli bacteria. This is likely in the setting of benign prostatic hypertrophy. We treated you with antibiotics for seven days, and started you on Flomax, a medication that will help with urinary retention to prevent more urinary tract infections in the future. Please follow up with your primary care doctor within 2-4 weeks of discharge from the hospital. PRINCIPAL DISCHARGE DIAGNOSIS  Diagnosis: UTI (urinary tract infection)  Assessment and Plan of Treatment: You came in with urinary tract infection, with symptoms of dysuria (painful urination), and urine cultures positive for E. Coli bacteria. This is likely in the setting of benign prostatic hypertrophy. We treated you with antibiotics for seven days, and started you on Flomax, a medication that will help with urinary retention to prevent more urinary tract infections in the future.  Please continue to take cefpodoxime through 3/6.  Please follow up with your primary care doctor within 2-4 weeks of discharge from the hospital. PRINCIPAL DISCHARGE DIAGNOSIS  Diagnosis: UTI (urinary tract infection)  Assessment and Plan of Treatment: You came in with urinary tract infection, with symptoms of dysuria (painful urination), and urine cultures positive for E. Coli bacteria. This is likely in the setting of benign prostatic hypertrophy. We treated you with antibiotics for seven days, and started you on Flomax, a medication that will help with urinary retention to prevent more urinary tract infections in the future.  Please continue to take cefpodoxime through 3/6.  Please follow up with your primary care doctor within 2-4 weeks of discharge from the hospital.      SECONDARY DISCHARGE DIAGNOSES  Diagnosis: Leukocytosis  Assessment and Plan of Treatment: you had an elevated white blood cell count.  please follow up with your primary care doctor after discharge to have this level re-checked PRINCIPAL DISCHARGE DIAGNOSIS  Diagnosis: UTI (urinary tract infection)  Assessment and Plan of Treatment: You came in with urinary tract infection, with symptoms of dysuria (painful urination), and urine cultures positive for E. Coli bacteria. This is likely in the setting of benign prostatic hypertrophy. We treated you with antibiotics for seven days, and started you on Flomax, a medication that will help with urinary retention to prevent more urinary tract infections in the future.  Please continue to take cefpodoxime through 3/6.  Please follow up with your primary care doctor within 2-4 weeks of discharge from the hospital.      SECONDARY DISCHARGE DIAGNOSES  Diagnosis: Leukocytosis  Assessment and Plan of Treatment: You were found to have an elevated white blood cell count, or leukocytosis, which is often elevated in a state of inflammation or infection. It was determined that your leukocytosis was caused by your urinary tract infection. Please follow up with your primary care doctor after discharge to have this level re-checked.

## 2020-03-02 LAB
BASOPHILS # BLD AUTO: 0.07 K/UL — SIGNIFICANT CHANGE UP (ref 0–0.2)
BASOPHILS NFR BLD AUTO: 0.5 % — SIGNIFICANT CHANGE UP (ref 0–2)
EOSINOPHIL # BLD AUTO: 0.23 K/UL — SIGNIFICANT CHANGE UP (ref 0–0.5)
EOSINOPHIL NFR BLD AUTO: 1.6 % — SIGNIFICANT CHANGE UP (ref 0–6)
HCT VFR BLD CALC: 36.5 % — LOW (ref 39–50)
HGB BLD-MCNC: 11.9 G/DL — LOW (ref 13–17)
IMM GRANULOCYTES NFR BLD AUTO: 1 % — SIGNIFICANT CHANGE UP (ref 0–1.5)
LYMPHOCYTES # BLD AUTO: 23.2 % — SIGNIFICANT CHANGE UP (ref 13–44)
LYMPHOCYTES # BLD AUTO: 3.28 K/UL — SIGNIFICANT CHANGE UP (ref 1–3.3)
MCHC RBC-ENTMCNC: 29.3 PG — SIGNIFICANT CHANGE UP (ref 27–34)
MCHC RBC-ENTMCNC: 32.6 GM/DL — SIGNIFICANT CHANGE UP (ref 32–36)
MCV RBC AUTO: 89.9 FL — SIGNIFICANT CHANGE UP (ref 80–100)
MONOCYTES # BLD AUTO: 1.22 K/UL — HIGH (ref 0–0.9)
MONOCYTES NFR BLD AUTO: 8.6 % — SIGNIFICANT CHANGE UP (ref 2–14)
NEUTROPHILS # BLD AUTO: 9.19 K/UL — HIGH (ref 1.8–7.4)
NEUTROPHILS NFR BLD AUTO: 65.1 % — SIGNIFICANT CHANGE UP (ref 43–77)
NRBC # BLD: 0 /100 WBCS — SIGNIFICANT CHANGE UP (ref 0–0)
PLATELET # BLD AUTO: 307 K/UL — SIGNIFICANT CHANGE UP (ref 150–400)
RBC # BLD: 4.06 M/UL — LOW (ref 4.2–5.8)
RBC # FLD: 14.4 % — SIGNIFICANT CHANGE UP (ref 10.3–14.5)
WBC # BLD: 14.13 K/UL — HIGH (ref 3.8–10.5)
WBC # FLD AUTO: 14.13 K/UL — HIGH (ref 3.8–10.5)

## 2020-03-02 PROCEDURE — 99232 SBSQ HOSP IP/OBS MODERATE 35: CPT | Mod: GC

## 2020-03-02 RX ORDER — CEFPODOXIME PROXETIL 100 MG
200 TABLET ORAL EVERY 12 HOURS
Refills: 0 | Status: DISCONTINUED | OUTPATIENT
Start: 2020-03-02 | End: 2020-03-03

## 2020-03-02 RX ADMIN — ENOXAPARIN SODIUM 40 MILLIGRAM(S): 100 INJECTION SUBCUTANEOUS at 11:54

## 2020-03-02 RX ADMIN — OXYCODONE HYDROCHLORIDE 2.5 MILLIGRAM(S): 5 TABLET ORAL at 17:35

## 2020-03-02 RX ADMIN — GABAPENTIN 600 MILLIGRAM(S): 400 CAPSULE ORAL at 21:59

## 2020-03-02 RX ADMIN — Medication 500 MILLIGRAM(S): at 06:49

## 2020-03-02 RX ADMIN — OXYCODONE HYDROCHLORIDE 2.5 MILLIGRAM(S): 5 TABLET ORAL at 21:59

## 2020-03-02 RX ADMIN — TAMSULOSIN HYDROCHLORIDE 0.4 MILLIGRAM(S): 0.4 CAPSULE ORAL at 21:59

## 2020-03-02 RX ADMIN — Medication 240 MILLIGRAM(S): at 06:48

## 2020-03-02 RX ADMIN — ATORVASTATIN CALCIUM 80 MILLIGRAM(S): 80 TABLET, FILM COATED ORAL at 21:59

## 2020-03-02 RX ADMIN — OXYCODONE HYDROCHLORIDE 2.5 MILLIGRAM(S): 5 TABLET ORAL at 22:57

## 2020-03-02 RX ADMIN — Medication 50 MILLIGRAM(S): at 06:48

## 2020-03-02 RX ADMIN — Medication 200 MILLIGRAM(S): at 17:30

## 2020-03-02 RX ADMIN — GABAPENTIN 600 MILLIGRAM(S): 400 CAPSULE ORAL at 06:48

## 2020-03-02 RX ADMIN — GABAPENTIN 600 MILLIGRAM(S): 400 CAPSULE ORAL at 15:20

## 2020-03-02 RX ADMIN — Medication 5 MILLIGRAM(S): at 21:59

## 2020-03-02 RX ADMIN — PANTOPRAZOLE SODIUM 40 MILLIGRAM(S): 20 TABLET, DELAYED RELEASE ORAL at 06:48

## 2020-03-02 NOTE — DIETITIAN INITIAL EVALUATION ADULT. - PROBLEM SELECTOR PLAN 5
borderline low SBP 100s, in setting of sepsis  - On hydrochlorothiazide 50 daily at home. Hold HCTZ for now. Can restart if BP permits  - c/w home cardizem  - monitor bp

## 2020-03-02 NOTE — DIETITIAN INITIAL EVALUATION ADULT. - PROBLEM SELECTOR PLAN 3
Patient is s/p posterior cervical laminectomy.  - continue home oxycodone 2.5 mg q 4 hrs prn  - Will continue baclofen prn. O-T Advancement Flap Text: The defect edges were debeveled with a #15 scalpel blade.  Given the location of the defect, shape of the defect and the proximity to free margins an O-T advancement flap was deemed most appropriate.  Using a sterile surgical marker, an appropriate advancement flap was drawn incorporating the defect and placing the expected incisions within the relaxed skin tension lines where possible.    The area thus outlined was incised deep to adipose tissue with a #15 scalpel blade.  The skin margins were undermined to an appropriate distance in all directions utilizing iris scissors.

## 2020-03-02 NOTE — DIETITIAN INITIAL EVALUATION ADULT. - PROBLEM SELECTOR PLAN 4
Patient describes ?mechanical fall and head trauma against bed rail.  - CTH shows no acute changes  - F/u orthostatics and PT recommendations.

## 2020-03-02 NOTE — PROGRESS NOTE ADULT - ATTENDING COMMENTS
Pt seen and examined. Agree with above:    64M admitted with severe sepsis 2/2 UTI s/p course of abx with resolution of sepsis as well as improvement in symptoms. Uptrending leukocytosis - switch to vantin as to treat as complicated UTI for 10-14 days total. Pt stable for discharge back to rehab - awaiting the bed availability.     Tina Wallace MD  Division of Hospital Medicine  Cell: [phone]  Pager: [phone]  Office: [phone]

## 2020-03-02 NOTE — PROGRESS NOTE ADULT - PROBLEM SELECTOR PLAN 1
likely 2/2 BPH/retention  - 2/23 UCx: pan-sensitive E. Coli   - s/p ctx  - c/w keflex through 3/2  - tylenol 650 prn  - c/w flomax

## 2020-03-02 NOTE — PROGRESS NOTE ADULT - PROBLEM SELECTOR PLAN 7
Dispo:   1.  Name of PCP: Dr Rizzo  2.  PCP Contacted on Admission: [x] Y    [ ] N  emailed 2/25  3.  PCP contacted at Discharge: [ ] Y    [ ] N    [ ] N/A  4.  Post-Discharge Appointment Date and Location:  5.  Summary of Handoff given to PCP:

## 2020-03-02 NOTE — DIETITIAN INITIAL EVALUATION ADULT. - OTHER INFO
PTA, pt endorses fair appetite and intake, states he has been at [facility] rehab centers over last 1-1.5 months and his intake has been less due to dislike of institution foods. Pt experiences some difficulty feeding self due to paralysis in arms, cannot always lift food to mouth. Pt reports weight of 252 pounds 6 weeks ago, reporting weight loss due to decreased PO intake and suspected muscle loss. Noted pt with dosing wt of 227.5 pounds suggesting weight loss.   Pt denies any micronutrient supplementation PTA.  States shrimp allergy is not a true allergy but does cause nasal congestion - pt consumes other shellfish.     Pt denies any N+V, last BM today per pt.  Since admission, pt with fair appetite and intake, typically consumes ~50% of meals.  Pt reports dislike of institution foods in house, would likely eat more if he enjoyed the food.  Pt open to receiving nutrition supplements while in house, discussed options.    Provided recommendations to optimize PO and protein intake, recommended small frequent meals by ordering nutrient-dense snacks and leaving non-perishable food away from tray for later consumption during the day or between meals, to start with protein, and sips of supplement throughout the day; reviewed foods with protein and menu order procedures in hospital.

## 2020-03-02 NOTE — DIETITIAN INITIAL EVALUATION ADULT. - PHYSICAL APPEARANCE
debilitated/other (specify) nutrition focused physical exam performed with pt permission: findings significant for mild fat loss to triceps and mild muscle depletion in temples.  Per RN assessment, skin intact. Per flow sheets pt with no edema.  Ht: 72 inches Wt: 227.5 pounds BMI: 30.9 kg/m2 IBW: 178 pounds (+/-10%) %IBW: 128%

## 2020-03-02 NOTE — DIETITIAN INITIAL EVALUATION ADULT. - ADD RECOMMEND
1. Continue with DASH/TLC diet, consider Ensure Enlive 2x daily (700 wilfred and 40 gm protein) to optimize PO intake.  2. Provide assistance with meals and PO encouragement as needed.  3. Provided recommendations to increase PO intake.  4. Consider obtaining HgA1c.  5. Malnutrition sticker placed - provider alerted.  5. Will continue to monitor PO intake, labs, weights, BM, skin, clinical course.

## 2020-03-02 NOTE — PROGRESS NOTE ADULT - PROBLEM SELECTOR PLAN 3
Patient is s/p posterior cervical laminectomy.  - continue home oxycodone 2.5 mg q 4 hrs prn  - continue baclofen prn  - lidocaine patches and warm compresses  - wear neck brace when not in bed

## 2020-03-02 NOTE — DIETITIAN INITIAL EVALUATION ADULT. - PROBLEM SELECTOR PLAN 2
tachycardic, febrile, with leukocytosis, AG with lactate elevated to 3.3 (now normalizing)  S/p 2 L IVF  - F/u urine and blood cultures.  - abx as above  - Low c/f complications from recent laminectomy. No signs on abscess on CTA cervical, but will f/u blood cultures.  - Encourage po intake.

## 2020-03-02 NOTE — DIETITIAN INITIAL EVALUATION ADULT. - REASON INDICATOR FOR ASSESSMENT
Pt seen for Length Of Stay assessment on 5MONTI.  Information obtained from pt and EMR. Per chart: pt is a "63 yo M with HTN, HLD, BPH, GERD, s/p cervical spine fusion in January 2020 c/b postsurgical bilateral arm weakness presenting from [facility] with fever, weakness and dysuria, admitted for severe sepsis 2/2 UTI, treated with IV antibiotics"

## 2020-03-02 NOTE — PROGRESS NOTE ADULT - PROBLEM SELECTOR PLAN 6
DVT ppx: lovenox  Diet: DASH diet  PT: [facility] after physiatry eval DVT ppx: lovenox  Diet: DASH diet  PT: [facility] after physiatry eval    patient requires and always asks for assistance when out of bed

## 2020-03-02 NOTE — CHART NOTE - NSCHARTNOTEFT_GEN_A_CORE
Upon Nutritional Assessment by the Registered Dietitian your patient was determined to meet criteria / has evidence of the following diagnosis/diagnoses:          [ ]  Mild Protein Calorie Malnutrition        [ ]  Moderate Protein Calorie Malnutrition        [x] Severe Protein Calorie Malnutrition        [ ] Unspecified Protein Calorie Malnutrition        [ ] Underweight / BMI <19        [ ] Morbid Obesity / BMI > 40      Findings as based on:  [x] Comprehensive nutrition assessment:  pt with 9.5% wt loss x 6 weeks, meeting <75% of energy needs x >1 month.   [x] Nutrition Focused Physical Exam:  mild fat loss to triceps and mild muscle depletion in temples.  [ ] Other:       Nutrition Plan/Recommendations:    1. Continue with DASH/TLC diet, consider Ensure Enlive 2x daily (700 wilfred and 40 gm protein) to optimize PO intake.  2. Provide assistance with meals and PO encouragement as needed.  3. Provided recommendations to increase PO intake.  4. Consider obtaining HgA1c.  5. Will continue to monitor PO intake, labs, weights, BM, skin, clinical course.    RD remains available.     Nanda Crews RD, #[phone]     PROVIDER Section:     By signing this assessment you are acknowledging and agree with the diagnosis/diagnoses assigned by the Registered Dietitian    Comments:

## 2020-03-02 NOTE — PROGRESS NOTE ADULT - PROBLEM SELECTOR PLAN 4
Patient describes mild head trauma against bed rail at [facility]  - [facility] 2/25 shows no acute changes  - F/u orthostatics and PT recommendations  - OT c/s 2/26 for UE weakness and rehabilitation  - physiatry c/s 2/26, recs appreciated

## 2020-03-02 NOTE — PROGRESS NOTE ADULT - SUBJECTIVE AND OBJECTIVE BOX
Authored by Dr Norris Mast [phone] [facility]/ [id number] [facility]    Patient is a 64y old  Male who presents with a chief complaint of Fever, UTI (01 Mar 2020 22:52)    SUBJECTIVE / OVERNIGHT EVENTS:    No acute events overnight, patient tolerating PO well.  NO CP, SOB, fever/chills.    REVIEW OF SYSTEMS:    CONSTITUTIONAL: Denies weakness, fevers, chills  EYES/ENT: Denies visual changes, vertigo, throat pain   NECK: Denies pain, stiffness  RESPIRATORY: Denies cough, wheezing, hemoptysis; Denies shortness of breath  CARDIOVASCULAR: Denies chest pain or palpitations  GASTROINTESTINAL: Denies abdominal or epigastric pain. Denies nausea, vomiting, hematemesis, diarrhea, constipation, melena, or hematochezia  GENITOURINARY: Denies dysuria, frequency or hematuria  NEUROLOGICAL: Denies numbness or weakness  SKIN: Denies itching, rashes      MEDICATIONS  (STANDING):  atorvastatin 80 milliGRAM(s) Oral at bedtime  cephalexin 500 milliGRAM(s) Oral every 12 hours  diltiazem    milliGRAM(s) Oral daily  enoxaparin Injectable 40 milliGRAM(s) SubCutaneous daily  gabapentin 600 milliGRAM(s) Oral three times a day  hydrochlorothiazide 50 milliGRAM(s) Oral daily  lidocaine   Patch 1 Patch Transdermal daily  melatonin 5 milliGRAM(s) Oral at bedtime  pantoprazole    Tablet 40 milliGRAM(s) Oral before breakfast  tamsulosin 0.4 milliGRAM(s) Oral at bedtime    MEDICATIONS  (PRN):  acetaminophen   Tablet .. 650 milliGRAM(s) Oral every 6 hours PRN Temp greater or equal to 38C (100.4F), Mild Pain (1 - 3)  baclofen 5 milliGRAM(s) Oral three times a day PRN Muscle Spasm  ondansetron Injectable 4 milliGRAM(s) IV Push every 6 hours PRN Nausea and/or Vomiting  oxyCODONE    IR 2.5 milliGRAM(s) Oral every 4 hours PRN Severe Pain (7 - 10)      Vital Signs Last 24 Hrs  T(C): 36.6 (02 Mar 2020 05:14), Max: 36.6 (02 Mar 2020 05:14)  T(F): 97.9 (02 Mar 2020 05:14), Max: 97.9 (02 Mar 2020 05:14)  HR: 94 (02 Mar 2020 06:45) (91 - 103)  BP: 112/72 (02 Mar 2020 06:45) (99/63 - 125/80)  BP(mean): --  RR: 20 (02 Mar 2020 05:14) (18 - 20)  SpO2: 91% (02 Mar 2020 05:14) (91% - 96%)  CAPILLARY BLOOD GLUCOSE        I&O's Summary      PHYSICAL EXAM  GENERAL: NAD, well-developed  EYES: conjunctiva and sclera clear  NECK: Supple, No JVD  ENT: MMM  CHEST/LUNG: Clear to auscultation bilaterally; No wheeze  HEART: Regular rate and rhythm; No murmurs  ABDOMEN: Soft, Nontender, Nondistended; Bowel sounds present  EXTREMITIES:  2+ Peripheral Pulses, No clubbing, cyanosis, or edema  NEURO: nonfocal, AOx3  PSYCH: calm   SKIN: No rashes or lesions    LABS:                        11.9   14.13 )-----------( 307      ( 02 Mar 2020 07:20 )             36.5                       RADIOLOGY & ADDITIONAL TESTS:    Imaging Personally Reviewed:  Consultant(s) Notes Reviewed:    Care Discussed with Consultants/Other Providers:

## 2020-03-02 NOTE — DIETITIAN INITIAL EVALUATION ADULT. - PROBLEM SELECTOR PLAN 1
Patient with symptoms of UTI, meeting sepsis criteria. Cause of UTI 2/2 BPH and urine retention?  - F/u urine cx  - Start CTX for 7 days.  - Bladder scan ordered to check for PVR. Will start tamsulosin if urine retention noted, given hx of BPH and not currently on meds.

## 2020-03-03 ENCOUNTER — TRANSCRIPTION ENCOUNTER (OUTPATIENT)
Age: 65
End: 2020-03-03

## 2020-03-03 VITALS
TEMPERATURE: 98 F | RESPIRATION RATE: 18 BRPM | SYSTOLIC BLOOD PRESSURE: 132 MMHG | DIASTOLIC BLOOD PRESSURE: 90 MMHG | HEART RATE: 99 BPM | OXYGEN SATURATION: 94 %

## 2020-03-03 DIAGNOSIS — N39.0 URINARY TRACT INFECTION, SITE NOT SPECIFIED: ICD-10-CM

## 2020-03-03 LAB
ANION GAP SERPL CALC-SCNC: 19 MMOL/L — HIGH (ref 5–17)
BASOPHILS # BLD AUTO: 0.08 K/UL — SIGNIFICANT CHANGE UP (ref 0–0.2)
BASOPHILS NFR BLD AUTO: 0.5 % — SIGNIFICANT CHANGE UP (ref 0–2)
BUN SERPL-MCNC: 25 MG/DL — HIGH (ref 7–23)
CALCIUM SERPL-MCNC: 9.5 MG/DL — SIGNIFICANT CHANGE UP (ref 8.4–10.5)
CHLORIDE SERPL-SCNC: 99 MMOL/L — SIGNIFICANT CHANGE UP (ref 96–108)
CO2 SERPL-SCNC: 22 MMOL/L — SIGNIFICANT CHANGE UP (ref 22–31)
CREAT SERPL-MCNC: 1.17 MG/DL — SIGNIFICANT CHANGE UP (ref 0.5–1.3)
CULTURE RESULTS: SIGNIFICANT CHANGE UP
EOSINOPHIL # BLD AUTO: 0.32 K/UL — SIGNIFICANT CHANGE UP (ref 0–0.5)
EOSINOPHIL NFR BLD AUTO: 2 % — SIGNIFICANT CHANGE UP (ref 0–6)
GLUCOSE SERPL-MCNC: 117 MG/DL — HIGH (ref 70–99)
HCT VFR BLD CALC: 36.8 % — LOW (ref 39–50)
HGB BLD-MCNC: 12 G/DL — LOW (ref 13–17)
IMM GRANULOCYTES NFR BLD AUTO: 0.9 % — SIGNIFICANT CHANGE UP (ref 0–1.5)
LYMPHOCYTES # BLD AUTO: 26.5 % — SIGNIFICANT CHANGE UP (ref 13–44)
LYMPHOCYTES # BLD AUTO: 4.3 K/UL — HIGH (ref 1–3.3)
MAGNESIUM SERPL-MCNC: 1.8 MG/DL — SIGNIFICANT CHANGE UP (ref 1.6–2.6)
MCHC RBC-ENTMCNC: 29.7 PG — SIGNIFICANT CHANGE UP (ref 27–34)
MCHC RBC-ENTMCNC: 32.6 GM/DL — SIGNIFICANT CHANGE UP (ref 32–36)
MCV RBC AUTO: 91.1 FL — SIGNIFICANT CHANGE UP (ref 80–100)
MONOCYTES # BLD AUTO: 1.36 K/UL — HIGH (ref 0–0.9)
MONOCYTES NFR BLD AUTO: 8.4 % — SIGNIFICANT CHANGE UP (ref 2–14)
NEUTROPHILS # BLD AUTO: 10.02 K/UL — HIGH (ref 1.8–7.4)
NEUTROPHILS NFR BLD AUTO: 61.7 % — SIGNIFICANT CHANGE UP (ref 43–77)
NRBC # BLD: 0 /100 WBCS — SIGNIFICANT CHANGE UP (ref 0–0)
PHOSPHATE SERPL-MCNC: 4.6 MG/DL — HIGH (ref 2.5–4.5)
PLATELET # BLD AUTO: 355 K/UL — SIGNIFICANT CHANGE UP (ref 150–400)
POTASSIUM SERPL-MCNC: 3.9 MMOL/L — SIGNIFICANT CHANGE UP (ref 3.5–5.3)
POTASSIUM SERPL-SCNC: 3.9 MMOL/L — SIGNIFICANT CHANGE UP (ref 3.5–5.3)
RBC # BLD: 4.04 M/UL — LOW (ref 4.2–5.8)
RBC # FLD: 14.6 % — HIGH (ref 10.3–14.5)
SODIUM SERPL-SCNC: 140 MMOL/L — SIGNIFICANT CHANGE UP (ref 135–145)
SPECIMEN SOURCE: SIGNIFICANT CHANGE UP
WBC # BLD: 16.6 K/UL — HIGH (ref 3.8–10.5)
WBC # FLD AUTO: 16.6 K/UL — HIGH (ref 3.8–10.5)

## 2020-03-03 PROCEDURE — 84100 ASSAY OF PHOSPHORUS: CPT

## 2020-03-03 PROCEDURE — 83605 ASSAY OF LACTIC ACID: CPT

## 2020-03-03 PROCEDURE — 96365 THER/PROPH/DIAG IV INF INIT: CPT

## 2020-03-03 PROCEDURE — 87633 RESP VIRUS 12-25 TARGETS: CPT

## 2020-03-03 PROCEDURE — 97110 THERAPEUTIC EXERCISES: CPT

## 2020-03-03 PROCEDURE — 87086 URINE CULTURE/COLONY COUNT: CPT

## 2020-03-03 PROCEDURE — 82435 ASSAY OF BLOOD CHLORIDE: CPT

## 2020-03-03 PROCEDURE — 97112 NEUROMUSCULAR REEDUCATION: CPT

## 2020-03-03 PROCEDURE — 87798 DETECT AGENT NOS DNA AMP: CPT

## 2020-03-03 PROCEDURE — 97116 GAIT TRAINING THERAPY: CPT

## 2020-03-03 PROCEDURE — 87486 CHLMYD PNEUM DNA AMP PROBE: CPT

## 2020-03-03 PROCEDURE — 82947 ASSAY GLUCOSE BLOOD QUANT: CPT

## 2020-03-03 PROCEDURE — 80053 COMPREHEN METABOLIC PANEL: CPT

## 2020-03-03 PROCEDURE — 85014 HEMATOCRIT: CPT

## 2020-03-03 PROCEDURE — 84132 ASSAY OF SERUM POTASSIUM: CPT

## 2020-03-03 PROCEDURE — 87040 BLOOD CULTURE FOR BACTERIA: CPT

## 2020-03-03 PROCEDURE — 97165 OT EVAL LOW COMPLEX 30 MIN: CPT

## 2020-03-03 PROCEDURE — 82803 BLOOD GASES ANY COMBINATION: CPT

## 2020-03-03 PROCEDURE — 93005 ELECTROCARDIOGRAM TRACING: CPT

## 2020-03-03 PROCEDURE — 87581 M.PNEUMON DNA AMP PROBE: CPT

## 2020-03-03 PROCEDURE — 85027 COMPLETE CBC AUTOMATED: CPT

## 2020-03-03 PROCEDURE — 70450 CT HEAD/BRAIN W/O DYE: CPT

## 2020-03-03 PROCEDURE — 97530 THERAPEUTIC ACTIVITIES: CPT

## 2020-03-03 PROCEDURE — 86803 HEPATITIS C AB TEST: CPT

## 2020-03-03 PROCEDURE — 82330 ASSAY OF CALCIUM: CPT

## 2020-03-03 PROCEDURE — 81001 URINALYSIS AUTO W/SCOPE: CPT

## 2020-03-03 PROCEDURE — 83735 ASSAY OF MAGNESIUM: CPT

## 2020-03-03 PROCEDURE — 84295 ASSAY OF SERUM SODIUM: CPT

## 2020-03-03 PROCEDURE — 97162 PT EVAL MOD COMPLEX 30 MIN: CPT

## 2020-03-03 PROCEDURE — 99285 EMERGENCY DEPT VISIT HI MDM: CPT | Mod: 25

## 2020-03-03 PROCEDURE — 99239 HOSP IP/OBS DSCHRG MGMT >30: CPT | Mod: GC

## 2020-03-03 PROCEDURE — 72125 CT NECK SPINE W/O DYE: CPT

## 2020-03-03 PROCEDURE — 96361 HYDRATE IV INFUSION ADD-ON: CPT

## 2020-03-03 PROCEDURE — 71045 X-RAY EXAM CHEST 1 VIEW: CPT

## 2020-03-03 PROCEDURE — 80048 BASIC METABOLIC PNL TOTAL CA: CPT

## 2020-03-03 RX ORDER — CEFPODOXIME PROXETIL 100 MG
1 TABLET ORAL
Qty: 0 | Refills: 0 | DISCHARGE
Start: 2020-03-03 | End: 2020-03-06

## 2020-03-03 RX ORDER — NITROFURANTOIN MACROCRYSTAL 50 MG
1 CAPSULE ORAL
Qty: 0 | Refills: 0 | DISCHARGE

## 2020-03-03 RX ADMIN — Medication 240 MILLIGRAM(S): at 05:22

## 2020-03-03 RX ADMIN — GABAPENTIN 600 MILLIGRAM(S): 400 CAPSULE ORAL at 13:11

## 2020-03-03 RX ADMIN — Medication 50 MILLIGRAM(S): at 05:22

## 2020-03-03 RX ADMIN — GABAPENTIN 600 MILLIGRAM(S): 400 CAPSULE ORAL at 05:23

## 2020-03-03 RX ADMIN — ENOXAPARIN SODIUM 40 MILLIGRAM(S): 100 INJECTION SUBCUTANEOUS at 13:11

## 2020-03-03 RX ADMIN — PANTOPRAZOLE SODIUM 40 MILLIGRAM(S): 20 TABLET, DELAYED RELEASE ORAL at 05:23

## 2020-03-03 RX ADMIN — Medication 200 MILLIGRAM(S): at 05:22

## 2020-03-03 NOTE — PROGRESS NOTE ADULT - PROBLEM SELECTOR PROBLEM 1
Complicated UTI (urinary tract infection)
UTI (urinary tract infection)

## 2020-03-03 NOTE — DISCHARGE NOTE NURSING/CASE MANAGEMENT/SOCIAL WORK - PATIENT PORTAL LINK FT
You can access the FollowMyHealth Patient Portal offered by [facility] by registering at the following website: [email or web address]. By joining [employer]’s FollowMyHealth portal, you will also be able to view your health information using other applications (apps) compatible with our system.

## 2020-03-03 NOTE — PROGRESS NOTE ADULT - SUBJECTIVE AND OBJECTIVE BOX
Authored by Dr Norris Mast [phone] [facility]/ [id number] [facility]    Patient is a 64y old  Male who presents with a chief complaint of Fever, UTI (02 Mar 2020 08:23)    SUBJECTIVE / OVERNIGHT EVENTS:    No acute events overnight.  No fever/chills, CP, SOB.    REVIEW OF SYSTEMS:    CONSTITUTIONAL: Denies weakness, fevers, chills  EYES/ENT: Denies visual changes, vertigo, throat pain   NECK: Denies pain, stiffness  RESPIRATORY: Denies cough, wheezing, hemoptysis; Denies shortness of breath  CARDIOVASCULAR: Denies chest pain or palpitations  GASTROINTESTINAL: Denies abdominal or epigastric pain. Denies nausea, vomiting, hematemesis, diarrhea, constipation, melena, or hematochezia  GENITOURINARY: Denies dysuria, frequency or hematuria  NEUROLOGICAL: Denies numbness or weakness  SKIN: Denies itching, rashes      MEDICATIONS  (STANDING):  atorvastatin 80 milliGRAM(s) Oral at bedtime  cefpodoxime 200 milliGRAM(s) Oral every 12 hours  diltiazem    milliGRAM(s) Oral daily  enoxaparin Injectable 40 milliGRAM(s) SubCutaneous daily  gabapentin 600 milliGRAM(s) Oral three times a day  hydrochlorothiazide 50 milliGRAM(s) Oral daily  lidocaine   Patch 1 Patch Transdermal daily  melatonin 5 milliGRAM(s) Oral at bedtime  pantoprazole    Tablet 40 milliGRAM(s) Oral before breakfast  tamsulosin 0.4 milliGRAM(s) Oral at bedtime    MEDICATIONS  (PRN):  acetaminophen   Tablet .. 650 milliGRAM(s) Oral every 6 hours PRN Temp greater or equal to 38C (100.4F), Mild Pain (1 - 3)  baclofen 5 milliGRAM(s) Oral three times a day PRN Muscle Spasm  ondansetron Injectable 4 milliGRAM(s) IV Push every 6 hours PRN Nausea and/or Vomiting  oxyCODONE    IR 2.5 milliGRAM(s) Oral every 4 hours PRN Severe Pain (7 - 10)      Vital Signs Last 24 Hrs  T(C): 36.7 (03 Mar 2020 04:24), Max: 36.8 (02 Mar 2020 20:48)  T(F): 98.1 (03 Mar 2020 04:24), Max: 98.2 (02 Mar 2020 20:48)  HR: 82 (03 Mar 2020 04:24) (82 - 105)  BP: 114/74 (03 Mar 2020 04:24) (114/74 - 117/82)  BP(mean): --  RR: 17 (03 Mar 2020 04:24) (17 - 20)  SpO2: 92% (03 Mar 2020 04:24) (92% - 94%)  CAPILLARY BLOOD GLUCOSE        I&O's Summary    02 Mar 2020 07:01  -  03 Mar 2020 07:00  --------------------------------------------------------  IN: 530 mL / OUT: 0 mL / NET: 530 mL        PHYSICAL EXAM  GENERAL: NAD, well-developed  EYES: conjunctiva and sclera clear  NECK: Supple, No JVD  ENT: MMM  CHEST/LUNG: Clear to auscultation bilaterally; No wheeze  HEART: Regular rate and rhythm; No murmurs  ABDOMEN: Soft, Nontender, Nondistended; Bowel sounds present  EXTREMITIES:  2+ Peripheral Pulses, No clubbing, cyanosis, or edema  NEURO: nonfocal, AOx3  PSYCH: calm   SKIN: No rashes or lesions    LABS:                        12.0   16.60 )-----------( 355      ( 03 Mar 2020 07:26 )             36.8                       RADIOLOGY & ADDITIONAL TESTS:    Imaging Personally Reviewed:  Consultant(s) Notes Reviewed:    Care Discussed with Consultants/Other Providers:

## 2020-03-03 NOTE — PROGRESS NOTE ADULT - PROBLEM SELECTOR PLAN 1
likely 2/2 BPH/retention  - 2/23 UCx: pan-sensitive E. Coli   - s/p ctx and keflex  - c/w cefpodoxime (3/3- )  - tylenol 650 prn  - c/w flomax

## 2020-03-03 NOTE — PROGRESS NOTE ADULT - ATTENDING COMMENTS
Pt seen and examined. Agree with above with additional findings:    64M with sepsis 2/2 UTI s/p abx course with improvement in symptoms and resolution of sepsis. Switched to vantin yesterday for treatment of complicated UTI. Leukocytosis still trending up from yesterday but pt remains symptomatics, hemodynamically stable, no signs/symptoms of infectious etiology at this time. Suspect leukocytosis to improve upon completion of abx. He was instructed to follow up with PMD to check cbc to make sure normalization. Pt verbalized the understandings. Pt is stable for discharge to rehab    46 minutes spent on discharge process    Tina Wallace MD  Division of Hospital Medicine  Cell: [phone]  Pager: [phone]  Office: [phone]

## 2020-03-03 NOTE — PROGRESS NOTE ADULT - PROBLEM SELECTOR PLAN 3
Patient describes mild head trauma against bed rail at [facility]  - [facility] 2/25 shows no acute changes  - F/u orthostatics and PT recommendations  - OT c/s 2/26 for UE weakness and rehabilitation  - physiatry c/s 2/26, recs appreciated

## 2020-03-03 NOTE — PROGRESS NOTE ADULT - PROBLEM SELECTOR PLAN 5
DVT ppx: lovenox  Diet: DASH diet  PT: DIMAS after physiatry eval    patient requires and always asks for assistance when out of bed
borderline low SBP 100s, in setting of sepsis  - On hydrochlorothiazide 50 daily at home. Hold HCTZ for now. Can restart if BP permits  - c/w home cardizem  - monitor bp
borderline low SBP 100s, in setting of sepsis  - On hydrochlorothiazide 50 daily at home. restarted 2/26  - c/w home cardizem  - monitor bp
borderline low SBP 100s, in setting of sepsis  - c/w HCTZ  - c/w home cardizem  - monitor bp
borderline low SBP 100s, in setting of sepsis  - On hydrochlorothiazide 50 daily at home. restarted 2/26  - c/w home cardizem  - monitor bp

## 2020-03-03 NOTE — PROGRESS NOTE ADULT - PROBLEM SELECTOR PROBLEM 6
Discharge planning issues
Prophylactic measure

## 2020-03-03 NOTE — PROGRESS NOTE ADULT - PROBLEM SELECTOR PROBLEM 3
Fall
Spinal stenosis

## 2020-03-24 ENCOUNTER — APPOINTMENT (OUTPATIENT)
Dept: INTERNAL MEDICINE | Facility: CLINIC | Age: 65
End: 2020-03-24
Payer: MEDICARE

## 2020-03-24 VITALS
DIASTOLIC BLOOD PRESSURE: 90 MMHG | HEIGHT: 72 IN | BODY MASS INDEX: 30.88 KG/M2 | OXYGEN SATURATION: 98 % | WEIGHT: 228 LBS | TEMPERATURE: 98 F | HEART RATE: 94 BPM | SYSTOLIC BLOOD PRESSURE: 150 MMHG

## 2020-03-24 VITALS — SYSTOLIC BLOOD PRESSURE: 144 MMHG | DIASTOLIC BLOOD PRESSURE: 92 MMHG

## 2020-03-24 VITALS — SYSTOLIC BLOOD PRESSURE: 142 MMHG | DIASTOLIC BLOOD PRESSURE: 90 MMHG

## 2020-03-24 PROCEDURE — 99496 TRANSJ CARE MGMT HIGH F2F 7D: CPT

## 2020-03-24 RX ORDER — TERAZOSIN 1 MG/1
1 CAPSULE ORAL
Qty: 180 | Refills: 1 | Status: DISCONTINUED | COMMUNITY
Start: 2017-03-02 | End: 2020-03-24

## 2020-03-24 RX ORDER — LISINOPRIL 10 MG/1
10 TABLET ORAL
Qty: 30 | Refills: 3 | Status: DISCONTINUED | COMMUNITY
Start: 2019-08-19 | End: 2020-03-24

## 2020-03-25 ENCOUNTER — TRANSCRIPTION ENCOUNTER (OUTPATIENT)
Age: 65
End: 2020-03-25

## 2020-03-26 NOTE — REVIEW OF SYSTEMS
[Frequency] : frequency [Negative] : Heme/Lymph [Fever] : no fever [Chills] : no chills [Fatigue] : no fatigue [Night Sweats] : no night sweats [Chest Pain] : no chest pain [Palpitations] : no palpitations [Lower Ext Edema] : no lower extremity edema [Orthopena] : no orthopnea [Paroxysmal Nocturnal Dyspnea] : no paroxysmal nocturnal dyspnea [Shortness Of Breath] : no shortness of breath [Wheezing] : no wheezing [Cough] : no cough [Dyspnea on Exertion] : not dyspnea on exertion [Dysuria] : no dysuria [Incontinence] : no incontinence [Hematuria] : no hematuria [Headache] : no headache [Fainting] : no fainting [Confusion] : no confusion [Unsteady Walk] : no ataxia [FreeTextEntry9] : see HPI - upper extremity weakness, neck and left arm pain [de-identified] : occasional dizziness; upper extremity weakness, numbness and paresthesias [de-identified] : stress/frustration/disappointment

## 2020-03-26 NOTE — HISTORY OF PRESENT ILLNESS
[Post-hospitalization from ___ Hospital] : Post-hospitalization from [unfilled] Hospital [Admitted on: ___] : The patient was admitted on [unfilled] [Discharged on ___] : discharged on [unfilled] [FreeTextEntry2] : 64 y.o. male with hypertension, hyperlipidemia, GERD, depression, anxiety, back and neck pain s/p lumbar spinal fusion 10/2018.  He presents for follow up s/p cervical spinal fusion on 1/25/20.  \par \par He was admitted to [facility] rehab following his cervical spinal fusion d/t paralysis of his upper extremities.  He entered rehab on 2/21 and was discharged home 3 days ago on Saturday, 3/21/20.  Will be getting some services at home, also able to do some exercises on his own.  Scheduled for PT and social work this week, possible OT as well.  \par Has gained some strength in the left arm, able to lift it slightly, but not able to get it over head.  Unable to lift right arm.  Still reports good  strength, slightly decreased on the right, still has numbness/paresthesias throughout the arms and hands.  He is needs to bend over when eating in order to reach his utensils to his mouth.  This causes additional neck and back strain.  He is continuing the gabapentin, had not used baclofen in 3 days (has not picked up prescription) and not sure he notices any difference.  He uses the oxycodone as needed as well.  Pain is in the neck and the left arm and shoulder.  \par Mood has improved now that he is home and since he has seen some progress, but still has moments of extreme frustration.  Wife has been able to help with dressing, bathing, toileting.  \par \par Brought to [facility] from rehab for UTI, admitted for 1 week then discharged back to [facility].  \par \par Has some dizzy episodes, otherwise feeling well.  No fever/chills/sweats, cough, dyspnea, chest pain, GI symptoms,  symptoms.  \par \par Med rec done: \par Back on tamsulosin.  \par Not needing senna.  \par Not sure the baclofen or gabapentin is helping.  \par Lisinopril stopped, started hydrochlorothiazide, which he has not taken in 3 days as has not picked up prescriptions.  \par \par Scheduled for follow up with surgery next week.

## 2020-03-26 NOTE — RESULTS/DATA
[FreeTextEntry1] : 3/19/20 CBC and CMP reviewed - anemia improving, WBCs at baseline, CMP normal\par 2/25/20 UA noted for bacteria and +LE, no urine culture results seen

## 2020-03-26 NOTE — ASSESSMENT
[FreeTextEntry1] : 64 y.o. male with hypertension, hyperlipidemia, GERD, depression, anxiety, back and neck pain s/p lumbar spinal fusion 10/2018 cervical stenosis s/p posterior cervical fusion on 1/25/20 complicated by upper extremity paralysis.  Returned home from rehab 3 days ago.  \par \par 1. Upper extremity weakness and numbness s/p cervical fusion \par Strength and sensation improving on the left.  Residual weakness and numbness throughout the right arm.  Although seems to be generating some nerve response. \par Will be starting home OT and PT.  Home exercises/ROM encouraged with patient and wife.  \par Continue gabapentin for pain.  Tylenol as needed.  Oxycodone as needed. \par Unsure if he wants to continue baclofen.  Discussed use of muscle relaxant which may facilitate his PT/OT work.  \par \par 2.  Hypertension\par Elevated today, only taking diltiazem.  \par Will restart hydrochlorothiazide.  Check home readings, BP cuff sent into the pharmacy.  \par \par 3.  Depression, anxiety\par Reports mood OK at present, better now that he is home. \par Will reach out if desires additional assistance.  \par \par 4.  Back pain\par Had been work recently.\par Discussed working with PT on back pain as well.  Home exercises/stretches limited d/t neck pain and upper extremity weakness and numbness.  \par

## 2020-03-26 NOTE — PHYSICAL EXAM
[No Edema] : there was no peripheral edema [Normal] : no CVA or spinal tenderness [No Rash] : no rash [Normal Gait] : normal gait [Normal Affect] : the affect was normal [Alert and Oriented x3] : oriented to person, place, and time [Normal Insight/Judgement] : insight and judgment were intact [de-identified] : upper extremity weakness left arm 3/5, right arm 0/5; slightly decreased  strength on the right; mildly ttp through right arm and shoulder; noted laxity at right shoulder  [de-identified] : decreased sensation throughout left and right arms, greater on the right

## 2020-03-31 RX ORDER — TRAMADOL HYDROCHLORIDE 50 MG/1
50 TABLET, COATED ORAL
Qty: 60 | Refills: 0 | Status: DISCONTINUED | COMMUNITY
Start: 2019-08-19 | End: 2020-03-31

## 2020-04-06 ENCOUNTER — RX RENEWAL (OUTPATIENT)
Age: 65
End: 2020-04-06

## 2020-05-11 ENCOUNTER — TRANSCRIPTION ENCOUNTER (OUTPATIENT)
Age: 65
End: 2020-05-11

## 2020-05-21 ENCOUNTER — APPOINTMENT (OUTPATIENT)
Dept: INTERNAL MEDICINE | Facility: CLINIC | Age: 65
End: 2020-05-21
Payer: MEDICARE

## 2020-05-21 ENCOUNTER — TRANSCRIPTION ENCOUNTER (OUTPATIENT)
Age: 65
End: 2020-05-21

## 2020-05-21 PROCEDURE — 99213 OFFICE O/P EST LOW 20 MIN: CPT | Mod: 25,95

## 2020-05-21 NOTE — REVIEW OF SYSTEMS
[Fever] : fever [Chills] : chills [Earache] : no earache [Nasal Discharge] : no nasal discharge [Postnasal Drip] : no postnasal drip [Sore Throat] : no sore throat [Chest Pain] : no chest pain [Hoarseness] : no hoarseness [Lower Ext Edema] : no lower extremity edema [Palpitations] : no palpitations [Paroxysmal Nocturnal Dyspnea] : no paroxysmal nocturnal dyspnea [Orthopena] : no orthopnea [Shortness Of Breath] : no shortness of breath [Wheezing] : no wheezing [Cough] : no cough [Abdominal Pain] : no abdominal pain [Nausea] : nausea [Constipation] : no constipation [Diarrhea] : no diarrhea [Heartburn] : heartburn [Vomiting] : no vomiting [Dysuria] : no dysuria [Hematuria] : no hematuria [Frequency] : frequency [Itching] : no itching [Skin Rash] : no skin rash [Fainting] : no fainting [Headache] : no headache [Confusion] : no confusion [Negative] : Heme/Lymph [FreeTextEntry2] : see HPI [FreeTextEntry9] : continued back pain, neck pain, arm pain, nothing new [de-identified] : lightheaded

## 2020-05-21 NOTE — PHYSICAL EXAM
[No Acute Distress] : no acute distress [Normal Affect] : the affect was normal [Normal Voice/Communication] : normal voice/communication [de-identified] : no pain with wife pushing over the abdomen [Normal Insight/Judgement] : insight and judgment were intact [de-identified] : wife and patient inspected skin - no areas of infection noted

## 2020-05-21 NOTE — HISTORY OF PRESENT ILLNESS
[Home] : at home, [unfilled] , at the time of the visit. [Medical Office: ([facility])___] : at the medical office located in  [Verbal consent obtained from patient] : the patient, [unfilled] [FreeTextEntry8] : Evaluated today via telehealth using [employer] platform with 2-way video communication. \par \par Severe GERD last night.  Woke up around 4am.  Took 2 Tums and felt better.  Felt well when woke up this morning, but developed chills at 7:30 am, temp 98.2.  Chills continues for about 45 min.  Then sweats, body aches, head and eyes got very heavy, nausea.  Temp then up to 103.4.  \par Took 2 Tylenol.  Also [place] for the nausea.  Temp now 100.9.  Feeling very lightheaded.  \par No abdominal pain, no new back pain, no diarrhea, no constipation.    Nausea still lingering, still some feeling of heartburn (continues 40mg omeprazole daily). \par No dysuria, no hematuria, but maybe some frequency.  Still extreme weakness with right arm, but making some progress.  No skin ulcerations, sores, redness, ulcers.  No URI symptoms.  No dyspnea, no cough.  \par Has been staying home during COVID-19 infection, other than short walks with the dog.  Wears a mask, no contact with anyone when out.  2 kids - 12 and 16, also staying home.  Wife goes out about 1x/week for essentials.  Everyone else has been feeling well.

## 2020-05-21 NOTE — ASSESSMENT
[FreeTextEntry1] : \par Fever/chills:\par H/o UTI with similar presentation while in rehab, likely source.  \par Will come in for lab work.  If UA abnormal start antibiotic.  \par Advised ER evaluation if worsening symptoms such as return of high fever, confusion, abdominal pain, vomiting\par

## 2020-05-26 LAB
ALBUMIN SERPL ELPH-MCNC: 4.6 G/DL
ALP BLD-CCNC: 99 U/L
ALT SERPL-CCNC: 23 U/L
ANION GAP SERPL CALC-SCNC: 19 MMOL/L
APPEARANCE: CLEAR
APPEARANCE: CLEAR
AST SERPL-CCNC: 19 U/L
BACTERIA: NEGATIVE
BASOPHILS # BLD AUTO: 0.07 K/UL
BASOPHILS NFR BLD AUTO: 0.3 %
BILIRUB SERPL-MCNC: 0.2 MG/DL
BILIRUBIN URINE: NEGATIVE
BILIRUBIN URINE: NEGATIVE
BLOOD URINE: NEGATIVE
BLOOD URINE: NEGATIVE
BUN SERPL-MCNC: 24 MG/DL
CALCIUM SERPL-MCNC: 9.7 MG/DL
CHLORIDE SERPL-SCNC: 101 MMOL/L
CO2 SERPL-SCNC: 22 MMOL/L
COLOR: NORMAL
COLOR: NORMAL
CREAT SERPL-MCNC: 1.1 MG/DL
EOSINOPHIL # BLD AUTO: 0.05 K/UL
EOSINOPHIL NFR BLD AUTO: 0.2 %
ESTIMATED AVERAGE GLUCOSE: 128 MG/DL
GLUCOSE QUALITATIVE U: NEGATIVE
GLUCOSE QUALITATIVE U: NEGATIVE
GLUCOSE SERPL-MCNC: 122 MG/DL
HBA1C MFR BLD HPLC: 6.1 %
HCT VFR BLD CALC: 41.7 %
HGB BLD-MCNC: 13.6 G/DL
HYALINE CASTS: 0 /LPF
IMM GRANULOCYTES NFR BLD AUTO: 0.4 %
KETONES URINE: NEGATIVE
KETONES URINE: NEGATIVE
LEUKOCYTE ESTERASE URINE: NEGATIVE
LEUKOCYTE ESTERASE URINE: NEGATIVE
LYMPHOCYTES # BLD AUTO: 2.21 K/UL
LYMPHOCYTES NFR BLD AUTO: 10.7 %
MAN DIFF?: NORMAL
MCHC RBC-ENTMCNC: 28.5 PG
MCHC RBC-ENTMCNC: 32.6 GM/DL
MCV RBC AUTO: 87.2 FL
MICROSCOPIC-UA: NORMAL
MONOCYTES # BLD AUTO: 1.4 K/UL
MONOCYTES NFR BLD AUTO: 6.8 %
NEUTROPHILS # BLD AUTO: 16.92 K/UL
NEUTROPHILS NFR BLD AUTO: 81.6 %
NITRITE URINE: NEGATIVE
NITRITE URINE: NEGATIVE
PH URINE: 6
PH URINE: 6
PLATELET # BLD AUTO: 261 K/UL
POTASSIUM SERPL-SCNC: 4.1 MMOL/L
PROT SERPL-MCNC: 6.5 G/DL
PROTEIN URINE: NEGATIVE
PROTEIN URINE: NEGATIVE
PSA SERPL-MCNC: 0.61 NG/ML
RBC # BLD: 4.78 M/UL
RBC # FLD: 14.3 %
RED BLOOD CELLS URINE: 1 /HPF
SODIUM SERPL-SCNC: 141 MMOL/L
SPECIFIC GRAVITY URINE: 1.02
SPECIFIC GRAVITY URINE: 1.02
SQUAMOUS EPITHELIAL CELLS: 0 /HPF
UROBILINOGEN URINE: NORMAL
UROBILINOGEN URINE: NORMAL
WBC # FLD AUTO: 20.73 K/UL
WHITE BLOOD CELLS URINE: 1 /HPF

## 2020-07-22 ENCOUNTER — TRANSCRIPTION ENCOUNTER (OUTPATIENT)
Age: 65
End: 2020-07-22

## 2020-11-01 ENCOUNTER — RX RENEWAL (OUTPATIENT)
Age: 65
End: 2020-11-01

## 2020-11-11 ENCOUNTER — RX RENEWAL (OUTPATIENT)
Age: 65
End: 2020-11-11

## 2020-11-13 ENCOUNTER — RX RENEWAL (OUTPATIENT)
Age: 65
End: 2020-11-13

## 2020-12-28 ENCOUNTER — TRANSCRIPTION ENCOUNTER (OUTPATIENT)
Age: 65
End: 2020-12-28

## 2021-01-29 ENCOUNTER — RX RENEWAL (OUTPATIENT)
Age: 66
End: 2021-01-29

## 2021-02-16 ENCOUNTER — NON-APPOINTMENT (OUTPATIENT)
Age: 66
End: 2021-02-16

## 2021-02-16 ENCOUNTER — TRANSCRIPTION ENCOUNTER (OUTPATIENT)
Age: 66
End: 2021-02-16

## 2021-02-16 NOTE — HISTORY OF PRESENT ILLNESS
[FreeTextEntry1] : \par Wife and son tested positive for COVID yesterday.  Patient developed symptoms yesterday - dry cough, fever, body aches, chills, headache, burning sensation through the chest with coughing.  Temp up to 102.  Breathing feels normal.  No appetite, no nausea, no vomiting, no diarrhea.  Thinks has loss of taste.  Has sinus congestion and sore throat.  \par O2 sat 99%\par \par Lots of fluids encouraged.  Tylenol as needed.\par Qualifies for monoclonal ab treatment, but needs +test result.  \par Will try to get home draw testing done tomorrow, discussed urgent care as alternative option.  \par \par Follow up over the phone tomorrow.

## 2021-02-17 ENCOUNTER — NON-APPOINTMENT (OUTPATIENT)
Age: 66
End: 2021-02-17

## 2021-02-17 NOTE — HISTORY OF PRESENT ILLNESS
[FreeTextEntry1] : Day 3\par \par Feels slightly better today.  \par Less body aches.  No fever, but has chills.  Cough is less.  Breathing still normal.  O2 sat 98-99%.\par Developed diarrhea today.  No appetite, no nausea or vomiting.  Trying to drink lots of water.  \par Awaiting home draw hopefully today for testing, then can sign up for monoclonal ab treatment.  \par \par Will follow up over the phone in 2 days.  Patient will call sooner with any concerns.  \par

## 2021-02-18 ENCOUNTER — TRANSCRIPTION ENCOUNTER (OUTPATIENT)
Age: 66
End: 2021-02-18

## 2021-02-18 ENCOUNTER — NON-APPOINTMENT (OUTPATIENT)
Age: 66
End: 2021-02-18

## 2021-02-18 LAB — SARS-COV-2 N GENE NPH QL NAA+PROBE: DETECTED

## 2021-02-20 ENCOUNTER — APPOINTMENT (OUTPATIENT)
Dept: DISASTER EMERGENCY | Facility: HOSPITAL | Age: 66
End: 2021-02-20

## 2021-02-21 ENCOUNTER — TRANSCRIPTION ENCOUNTER (OUTPATIENT)
Age: 66
End: 2021-02-21

## 2021-02-21 ENCOUNTER — NON-APPOINTMENT (OUTPATIENT)
Age: 66
End: 2021-02-21

## 2021-02-21 ENCOUNTER — INPATIENT (INPATIENT)
Facility: HOSPITAL | Age: 66
LOS: 25 days | Discharge: HOME | DRG: 177 | End: 2021-03-19
Attending: INTERNAL MEDICINE | Admitting: INTERNAL MEDICINE
Payer: COMMERCIAL

## 2021-02-21 VITALS
SYSTOLIC BLOOD PRESSURE: 128 MMHG | TEMPERATURE: 101 F | HEIGHT: 72 IN | DIASTOLIC BLOOD PRESSURE: 74 MMHG | OXYGEN SATURATION: 95 % | RESPIRATION RATE: 22 BRPM | HEART RATE: 80 BPM | WEIGHT: 199.96 LBS

## 2021-02-21 DIAGNOSIS — Z71.89 OTHER SPECIFIED COUNSELING: ICD-10-CM

## 2021-02-21 DIAGNOSIS — U07.1 COVID-19: ICD-10-CM

## 2021-02-21 DIAGNOSIS — E87.6 HYPOKALEMIA: ICD-10-CM

## 2021-02-21 DIAGNOSIS — K21.9 GASTRO-ESOPHAGEAL REFLUX DISEASE WITHOUT ESOPHAGITIS: ICD-10-CM

## 2021-02-21 DIAGNOSIS — Z29.9 ENCOUNTER FOR PROPHYLACTIC MEASURES, UNSPECIFIED: ICD-10-CM

## 2021-02-21 DIAGNOSIS — N17.9 ACUTE KIDNEY FAILURE, UNSPECIFIED: ICD-10-CM

## 2021-02-21 DIAGNOSIS — Z98.89 OTHER SPECIFIED POSTPROCEDURAL STATES: Chronic | ICD-10-CM

## 2021-02-21 DIAGNOSIS — E78.5 HYPERLIPIDEMIA, UNSPECIFIED: ICD-10-CM

## 2021-02-21 DIAGNOSIS — I10 ESSENTIAL (PRIMARY) HYPERTENSION: ICD-10-CM

## 2021-02-21 LAB
ALBUMIN SERPL ELPH-MCNC: 3.6 G/DL — SIGNIFICANT CHANGE UP (ref 3.5–5)
ALP SERPL-CCNC: 116 U/L — SIGNIFICANT CHANGE UP (ref 40–120)
ALT FLD-CCNC: 32 U/L DA — SIGNIFICANT CHANGE UP (ref 10–60)
ANION GAP SERPL CALC-SCNC: 12 MMOL/L — SIGNIFICANT CHANGE UP (ref 5–17)
APTT BLD: 28.9 SEC — SIGNIFICANT CHANGE UP (ref 27.5–35.5)
AST SERPL-CCNC: 38 U/L — SIGNIFICANT CHANGE UP (ref 10–40)
BASOPHILS # BLD AUTO: 0.01 K/UL — SIGNIFICANT CHANGE UP (ref 0–0.2)
BASOPHILS NFR BLD AUTO: 0.2 % — SIGNIFICANT CHANGE UP (ref 0–2)
BILIRUB SERPL-MCNC: 0.5 MG/DL — SIGNIFICANT CHANGE UP (ref 0.2–1.2)
BUN SERPL-MCNC: 33 MG/DL — HIGH (ref 7–18)
CALCIUM SERPL-MCNC: 8.5 MG/DL — SIGNIFICANT CHANGE UP (ref 8.4–10.5)
CHLORIDE SERPL-SCNC: 99 MMOL/L — SIGNIFICANT CHANGE UP (ref 96–108)
CO2 SERPL-SCNC: 25 MMOL/L — SIGNIFICANT CHANGE UP (ref 22–31)
CREAT ?TM UR-MCNC: 169 MG/DL — SIGNIFICANT CHANGE UP
CREAT SERPL-MCNC: 1.67 MG/DL — HIGH (ref 0.5–1.3)
D DIMER BLD IA.RAPID-MCNC: 290 NG/ML DDU — HIGH
EOSINOPHIL # BLD AUTO: 0 K/UL — SIGNIFICANT CHANGE UP (ref 0–0.5)
EOSINOPHIL NFR BLD AUTO: 0 % — SIGNIFICANT CHANGE UP (ref 0–6)
FERRITIN SERPL-MCNC: 640 NG/ML — HIGH (ref 30–400)
GLUCOSE SERPL-MCNC: 103 MG/DL — HIGH (ref 70–99)
HCT VFR BLD CALC: 43.6 % — SIGNIFICANT CHANGE UP (ref 39–50)
HGB BLD-MCNC: 14.5 G/DL — SIGNIFICANT CHANGE UP (ref 13–17)
IMM GRANULOCYTES NFR BLD AUTO: 0.3 % — SIGNIFICANT CHANGE UP (ref 0–1.5)
INR BLD: 1.13 RATIO — SIGNIFICANT CHANGE UP (ref 0.88–1.16)
LACTATE SERPL-SCNC: 2 MMOL/L — SIGNIFICANT CHANGE UP (ref 0.7–2)
LYMPHOCYTES # BLD AUTO: 1.26 K/UL — SIGNIFICANT CHANGE UP (ref 1–3.3)
LYMPHOCYTES # BLD AUTO: 21.5 % — SIGNIFICANT CHANGE UP (ref 13–44)
MCHC RBC-ENTMCNC: 28.3 PG — SIGNIFICANT CHANGE UP (ref 27–34)
MCHC RBC-ENTMCNC: 33.3 GM/DL — SIGNIFICANT CHANGE UP (ref 32–36)
MCV RBC AUTO: 85 FL — SIGNIFICANT CHANGE UP (ref 80–100)
MONOCYTES # BLD AUTO: 0.49 K/UL — SIGNIFICANT CHANGE UP (ref 0–0.9)
MONOCYTES NFR BLD AUTO: 8.4 % — SIGNIFICANT CHANGE UP (ref 2–14)
NEUTROPHILS # BLD AUTO: 4.08 K/UL — SIGNIFICANT CHANGE UP (ref 1.8–7.4)
NEUTROPHILS NFR BLD AUTO: 69.6 % — SIGNIFICANT CHANGE UP (ref 43–77)
NRBC # BLD: 0 /100 WBCS — SIGNIFICANT CHANGE UP (ref 0–0)
PLATELET # BLD AUTO: 131 K/UL — LOW (ref 150–400)
POTASSIUM SERPL-MCNC: 3.2 MMOL/L — LOW (ref 3.5–5.3)
POTASSIUM SERPL-SCNC: 3.2 MMOL/L — LOW (ref 3.5–5.3)
PROT SERPL-MCNC: 7.3 G/DL — SIGNIFICANT CHANGE UP (ref 6–8.3)
PROTHROM AB SERPL-ACNC: 13.4 SEC — SIGNIFICANT CHANGE UP (ref 10.6–13.6)
RBC # BLD: 5.13 M/UL — SIGNIFICANT CHANGE UP (ref 4.2–5.8)
RBC # FLD: 14.4 % — SIGNIFICANT CHANGE UP (ref 10.3–14.5)
SARS-COV-2 RNA SPEC QL NAA+PROBE: DETECTED
SODIUM SERPL-SCNC: 136 MMOL/L — SIGNIFICANT CHANGE UP (ref 135–145)
SODIUM UR-SCNC: 8 MMOL/L — SIGNIFICANT CHANGE UP
TROPONIN I SERPL-MCNC: 0.02 NG/ML — SIGNIFICANT CHANGE UP (ref 0–0.04)
WBC # BLD: 5.86 K/UL — SIGNIFICANT CHANGE UP (ref 3.8–10.5)
WBC # FLD AUTO: 5.86 K/UL — SIGNIFICANT CHANGE UP (ref 3.8–10.5)

## 2021-02-21 PROCEDURE — 99285 EMERGENCY DEPT VISIT HI MDM: CPT

## 2021-02-21 PROCEDURE — 71045 X-RAY EXAM CHEST 1 VIEW: CPT | Mod: 26

## 2021-02-21 RX ORDER — PANTOPRAZOLE SODIUM 20 MG/1
1 TABLET, DELAYED RELEASE ORAL
Qty: 0 | Refills: 0 | DISCHARGE

## 2021-02-21 RX ORDER — DEXAMETHASONE 0.5 MG/5ML
6 ELIXIR ORAL ONCE
Refills: 0 | Status: COMPLETED | OUTPATIENT
Start: 2021-02-21 | End: 2021-02-21

## 2021-02-21 RX ORDER — POTASSIUM CHLORIDE 20 MEQ
10 PACKET (EA) ORAL ONCE
Refills: 0 | Status: COMPLETED | OUTPATIENT
Start: 2021-02-21 | End: 2021-02-21

## 2021-02-21 RX ORDER — ACETAMINOPHEN 500 MG
650 TABLET ORAL EVERY 6 HOURS
Refills: 0 | Status: DISCONTINUED | OUTPATIENT
Start: 2021-02-21 | End: 2021-03-19

## 2021-02-21 RX ORDER — SENNA PLUS 8.6 MG/1
2 TABLET ORAL
Qty: 0 | Refills: 0 | DISCHARGE

## 2021-02-21 RX ORDER — LANOLIN ALCOHOL/MO/W.PET/CERES
1 CREAM (GRAM) TOPICAL
Qty: 0 | Refills: 0 | DISCHARGE

## 2021-02-21 RX ORDER — ONDANSETRON 8 MG/1
4 TABLET, FILM COATED ORAL EVERY 6 HOURS
Refills: 0 | Status: DISCONTINUED | OUTPATIENT
Start: 2021-02-21 | End: 2021-03-19

## 2021-02-21 RX ORDER — HEPARIN SODIUM 5000 [USP'U]/ML
5000 INJECTION INTRAVENOUS; SUBCUTANEOUS EVERY 8 HOURS
Refills: 0 | Status: DISCONTINUED | OUTPATIENT
Start: 2021-02-21 | End: 2021-02-22

## 2021-02-21 RX ORDER — PANTOPRAZOLE SODIUM 20 MG/1
40 TABLET, DELAYED RELEASE ORAL
Refills: 0 | Status: DISCONTINUED | OUTPATIENT
Start: 2021-02-21 | End: 2021-03-15

## 2021-02-21 RX ORDER — SODIUM CHLORIDE 9 MG/ML
500 INJECTION INTRAMUSCULAR; INTRAVENOUS; SUBCUTANEOUS ONCE
Refills: 0 | Status: COMPLETED | OUTPATIENT
Start: 2021-02-21 | End: 2021-02-21

## 2021-02-21 RX ORDER — DEXAMETHASONE 0.5 MG/5ML
6 ELIXIR ORAL DAILY
Refills: 0 | Status: COMPLETED | OUTPATIENT
Start: 2021-02-22 | End: 2021-03-02

## 2021-02-21 RX ORDER — ENOXAPARIN SODIUM 100 MG/ML
1 INJECTION SUBCUTANEOUS
Qty: 0 | Refills: 0 | DISCHARGE

## 2021-02-21 RX ORDER — POTASSIUM CHLORIDE 20 MEQ
20 PACKET (EA) ORAL ONCE
Refills: 0 | Status: COMPLETED | OUTPATIENT
Start: 2021-02-21 | End: 2021-02-21

## 2021-02-21 RX ORDER — ACETAMINOPHEN 500 MG
650 TABLET ORAL ONCE
Refills: 0 | Status: COMPLETED | OUTPATIENT
Start: 2021-02-21 | End: 2021-02-21

## 2021-02-21 RX ORDER — BACLOFEN 100 %
1 POWDER (GRAM) MISCELLANEOUS
Qty: 0 | Refills: 0 | DISCHARGE

## 2021-02-21 RX ORDER — ATORVASTATIN CALCIUM 80 MG/1
80 TABLET, FILM COATED ORAL AT BEDTIME
Refills: 0 | Status: DISCONTINUED | OUTPATIENT
Start: 2021-02-21 | End: 2021-03-19

## 2021-02-21 RX ORDER — GABAPENTIN 400 MG/1
2 CAPSULE ORAL
Qty: 0 | Refills: 0 | DISCHARGE

## 2021-02-21 RX ORDER — DILTIAZEM HCL 120 MG
240 CAPSULE, EXT RELEASE 24 HR ORAL DAILY
Refills: 0 | Status: DISCONTINUED | OUTPATIENT
Start: 2021-02-21 | End: 2021-03-19

## 2021-02-21 RX ADMIN — ATORVASTATIN CALCIUM 80 MILLIGRAM(S): 80 TABLET, FILM COATED ORAL at 21:44

## 2021-02-21 RX ADMIN — Medication 100 MILLIEQUIVALENT(S): at 19:20

## 2021-02-21 RX ADMIN — Medication 650 MILLIGRAM(S): at 17:59

## 2021-02-21 RX ADMIN — Medication 20 MILLIEQUIVALENT(S): at 19:01

## 2021-02-21 RX ADMIN — HEPARIN SODIUM 5000 UNIT(S): 5000 INJECTION INTRAVENOUS; SUBCUTANEOUS at 21:44

## 2021-02-21 RX ADMIN — SODIUM CHLORIDE 500 MILLILITER(S): 9 INJECTION INTRAMUSCULAR; INTRAVENOUS; SUBCUTANEOUS at 17:59

## 2021-02-21 RX ADMIN — Medication 6 MILLIGRAM(S): at 17:59

## 2021-02-21 NOTE — H&P ADULT - PROBLEM SELECTOR PLAN 1
patient has been COVID positive since 2/16  presented due to worsening symptoms  COVID positive in ED  saturating well on 2L nasal cannula  given dose of decadron; will continue decadron for total of 10 days   isolation precautions  f/u COVID antibodies  monitor O2 sats; titrate oxygen as tolerable   tylenol, robitussin and zofran PRN   f/u COVID markers

## 2021-02-21 NOTE — H&P ADULT - NSHPSOCIALHISTORY_GEN_ALL_CORE
Patient lives at home with his wife and children. Former smoker and smoked for 25 years and quit in 2002. Denies any alcohol use or use of illicit drugs.

## 2021-02-21 NOTE — H&P ADULT - HISTORY OF PRESENT ILLNESS
Patient is a 65 year old male from home, with PMH of HTN, HLD and GERD, who presented to the ED due to SOB, cough, fever, body aches for the past 6 days. Patient states he was tested positive for COVID on 2/16. Patient states his wife and son who he lives with have also tested positive for COVID as well. Patient states that he noted to have been saturating at 88% on room air and was advised to come to the ED by PCP for oxygen supplementation. Patient also complains of nausea, diarrhea and poor appetite at home for the past few days. Patient denies any chest pain, vomiting, abdominal pain or constipation.

## 2021-02-21 NOTE — H&P ADULT - ASSESSMENT
Patient is a 65 year old male from home, with PMH of HTN, HLD and GERD, who presented to the ED due to SOB, cough, fever, body aches for the past 6 days.    Saturating at 91% on room air. Potassium of 3.2. Fever noted at 100.8. Saturating 95% on 2L nasal cannula. COVID positive. D-dimer of 290. EKG showing sinus tachycardia. Given 500cc NS bolus. Given dose of tylenol, decadron and potassium 20mg PO and 10mg IV in ED.     Admitted for hypoxic respiratory failure secondary to COVID PNA.

## 2021-02-21 NOTE — ED PROVIDER NOTE - CLINICAL SUMMARY MEDICAL DECISION MAKING FREE TEXT BOX
Will closely monitor O2 level. PulseOx good on 2L nasal canula. Will obtain labs, EKG and CXR. Given IV Dexamethazone. Will closely monitor O2 level. Pulse Ox good on 2L nasal canula. Will obtain labs, EKG and CXR. Given IV Dexamethasone.

## 2021-02-21 NOTE — H&P ADULT - PROBLEM SELECTOR PLAN 2
noted to have potassium of 3.2 on admission   supplement ordered  monitor BMP daily and replace electrolytes as needed

## 2021-02-21 NOTE — ED PROVIDER NOTE - OBJECTIVE STATEMENT
64 y/o male h/o HTN, HLD, GERD, former smoker, was dx'ed with covid on February 16th as outpatient, states since then has been having progressive shortness of breath, chest pressure, body aches, N/D, cough, and fever.

## 2021-02-21 NOTE — H&P ADULT - ATTENDING COMMENTS
Patient is a 65 year old male from home, with PMH of HTN, HLD and GERD, who presented to the ED due to SOB, cough, fever, body aches for the past 6 days. Patient states he was tested positive for COVID on 2/16. Patient states his wife and son who he lives with have also tested positive for COVID as well. Patient states that he noted to have been saturating at 88% on room air and was advised to come to the ED by PCP for oxygen supplementation. Patient also complains of nausea, diarrhea and poor appetite at home for the past few days. Patient denies any chest pain, vomiting, abdominal pain or constipation.       assessment  --  pneumonia 2nd to covid 19, marya, hypokalemia, h/o  HTN, HLD, GERD    plan  --  admit to med, decadron, vit c, vitamin d, zinc, pepcid, singulair, contact and airborne isolation, cont albuterol inhaler, cont supportive care with tylenol prn, robitussin prn and O2 via nasal canula as needed, supplement potassium as needed, ivf    covid-19 antibody test, procalcitonin, D-dimer, esr, crp, ldh, ferritin, lactate, cbc, bmp, mg, phos, lipids, tsh, bld cx, ua, ucx      pulm cons

## 2021-02-21 NOTE — HISTORY OF PRESENT ILLNESS
[FreeTextEntry1] : \par Day 6\par Feeling worse through the weekend. \par Scheduled for monoclonal antibody infusion yesterday, but unable to drive d/t dizziness and kids do not drive, did not receive infusion.  \par Feels dizzy, chills, body aches, nausea, diarrhea, slight cough, slight dyspnea at times.  However, O2 sat down to 80% while on the phone with only a high of 90% at rest.\par Not able to move around much d/t dizziness.\par Drinking lots of water, some broth, otherwise not eating.  \par Discussed home care nurse evaluation and O2, however, may be able to get monoclonal ab infusion or other treatment inpatient.  [facility] ambulance called to bring patient to ER, discussed [facility].  \par Spoke with wife who is also quite sick with COVID symptoms, but ok to remain home at this time.  \par

## 2021-02-21 NOTE — H&P ADULT - PROBLEM SELECTOR PLAN 4
continue home med of diltiazem  hold HCTZ in setting of MECHELLE  monitor BP and adjust meds as needed

## 2021-02-22 DIAGNOSIS — U07.1 COVID-19: ICD-10-CM

## 2021-02-22 DIAGNOSIS — Z02.9 ENCOUNTER FOR ADMINISTRATIVE EXAMINATIONS, UNSPECIFIED: ICD-10-CM

## 2021-02-22 DIAGNOSIS — Z29.9 ENCOUNTER FOR PROPHYLACTIC MEASURES, UNSPECIFIED: ICD-10-CM

## 2021-02-22 LAB
24R-OH-CALCIDIOL SERPL-MCNC: 17.3 NG/ML — LOW (ref 30–80)
A1C WITH ESTIMATED AVERAGE GLUCOSE RESULT: 6.1 % — HIGH (ref 4–5.6)
ALBUMIN SERPL ELPH-MCNC: 3.2 G/DL — LOW (ref 3.5–5)
ALP SERPL-CCNC: 96 U/L — SIGNIFICANT CHANGE UP (ref 40–120)
ALT FLD-CCNC: 29 U/L DA — SIGNIFICANT CHANGE UP (ref 10–60)
ANION GAP SERPL CALC-SCNC: 11 MMOL/L — SIGNIFICANT CHANGE UP (ref 5–17)
APTT BLD: 32.6 SEC — SIGNIFICANT CHANGE UP (ref 27.5–35.5)
AST SERPL-CCNC: 36 U/L — SIGNIFICANT CHANGE UP (ref 10–40)
BASOPHILS # BLD AUTO: 0.01 K/UL — SIGNIFICANT CHANGE UP (ref 0–0.2)
BASOPHILS NFR BLD AUTO: 0.2 % — SIGNIFICANT CHANGE UP (ref 0–2)
BILIRUB SERPL-MCNC: 0.4 MG/DL — SIGNIFICANT CHANGE UP (ref 0.2–1.2)
BUN SERPL-MCNC: 28 MG/DL — HIGH (ref 7–18)
CALCIUM SERPL-MCNC: 8.3 MG/DL — LOW (ref 8.4–10.5)
CHLORIDE SERPL-SCNC: 102 MMOL/L — SIGNIFICANT CHANGE UP (ref 96–108)
CHOLEST SERPL-MCNC: 137 MG/DL — SIGNIFICANT CHANGE UP
CO2 SERPL-SCNC: 21 MMOL/L — LOW (ref 22–31)
CREAT SERPL-MCNC: 1.18 MG/DL — SIGNIFICANT CHANGE UP (ref 0.5–1.3)
CRP SERPL-MCNC: 4.28 MG/DL — HIGH (ref 0–0.4)
CRP SERPL-MCNC: 5.33 MG/DL — HIGH (ref 0–0.4)
EOSINOPHIL # BLD AUTO: 0 K/UL — SIGNIFICANT CHANGE UP (ref 0–0.5)
EOSINOPHIL NFR BLD AUTO: 0 % — SIGNIFICANT CHANGE UP (ref 0–6)
ERYTHROCYTE [SEDIMENTATION RATE] IN BLOOD: 28 MM/HR — HIGH (ref 0–20)
ESTIMATED AVERAGE GLUCOSE: 128 MG/DL — HIGH (ref 68–114)
FERRITIN SERPL-MCNC: 629 NG/ML — HIGH (ref 30–400)
GLUCOSE SERPL-MCNC: 130 MG/DL — HIGH (ref 70–99)
HCT VFR BLD CALC: 36.7 % — LOW (ref 39–50)
HDLC SERPL-MCNC: 27 MG/DL — LOW
HGB BLD-MCNC: 12.5 G/DL — LOW (ref 13–17)
IMM GRANULOCYTES NFR BLD AUTO: 0.4 % — SIGNIFICANT CHANGE UP (ref 0–1.5)
LDH SERPL L TO P-CCNC: 362 U/L — HIGH (ref 120–225)
LIPID PNL WITH DIRECT LDL SERPL: 69 MG/DL — SIGNIFICANT CHANGE UP
LYMPHOCYTES # BLD AUTO: 1.16 K/UL — SIGNIFICANT CHANGE UP (ref 1–3.3)
LYMPHOCYTES # BLD AUTO: 22.9 % — SIGNIFICANT CHANGE UP (ref 13–44)
MAGNESIUM SERPL-MCNC: 2 MG/DL — SIGNIFICANT CHANGE UP (ref 1.6–2.6)
MCHC RBC-ENTMCNC: 28.5 PG — SIGNIFICANT CHANGE UP (ref 27–34)
MCHC RBC-ENTMCNC: 34.1 GM/DL — SIGNIFICANT CHANGE UP (ref 32–36)
MCV RBC AUTO: 83.8 FL — SIGNIFICANT CHANGE UP (ref 80–100)
MONOCYTES # BLD AUTO: 0.45 K/UL — SIGNIFICANT CHANGE UP (ref 0–0.9)
MONOCYTES NFR BLD AUTO: 8.9 % — SIGNIFICANT CHANGE UP (ref 2–14)
NEUTROPHILS # BLD AUTO: 3.42 K/UL — SIGNIFICANT CHANGE UP (ref 1.8–7.4)
NEUTROPHILS NFR BLD AUTO: 67.6 % — SIGNIFICANT CHANGE UP (ref 43–77)
NON HDL CHOLESTEROL: 110 MG/DL — SIGNIFICANT CHANGE UP
NRBC # BLD: 0 /100 WBCS — SIGNIFICANT CHANGE UP (ref 0–0)
PHOSPHATE SERPL-MCNC: 2.7 MG/DL — SIGNIFICANT CHANGE UP (ref 2.5–4.5)
PLATELET # BLD AUTO: 130 K/UL — LOW (ref 150–400)
POTASSIUM SERPL-MCNC: 3.6 MMOL/L — SIGNIFICANT CHANGE UP (ref 3.5–5.3)
POTASSIUM SERPL-SCNC: 3.6 MMOL/L — SIGNIFICANT CHANGE UP (ref 3.5–5.3)
PROCALCITONIN SERPL-MCNC: 0.15 NG/ML — HIGH (ref 0.02–0.1)
PROT SERPL-MCNC: 6.1 G/DL — SIGNIFICANT CHANGE UP (ref 6–8.3)
RBC # BLD: 4.38 M/UL — SIGNIFICANT CHANGE UP (ref 4.2–5.8)
RBC # FLD: 14.2 % — SIGNIFICANT CHANGE UP (ref 10.3–14.5)
SARS-COV-2 IGG SERPL QL IA: NEGATIVE — SIGNIFICANT CHANGE UP
SARS-COV-2 IGM SERPL IA-ACNC: 0.07 INDEX — SIGNIFICANT CHANGE UP
SODIUM SERPL-SCNC: 134 MMOL/L — LOW (ref 135–145)
TRIGL SERPL-MCNC: 207 MG/DL — HIGH
TROPONIN I SERPL-MCNC: <0.015 NG/ML — SIGNIFICANT CHANGE UP (ref 0–0.04)
TSH SERPL-MCNC: 0.89 UU/ML — SIGNIFICANT CHANGE UP (ref 0.34–4.82)
VIT B12 SERPL-MCNC: 1083 PG/ML — SIGNIFICANT CHANGE UP (ref 232–1245)
WBC # BLD: 5.06 K/UL — SIGNIFICANT CHANGE UP (ref 3.8–10.5)
WBC # FLD AUTO: 5.06 K/UL — SIGNIFICANT CHANGE UP (ref 3.8–10.5)

## 2021-02-22 RX ORDER — ENOXAPARIN SODIUM 100 MG/ML
40 INJECTION SUBCUTANEOUS DAILY
Refills: 0 | Status: DISCONTINUED | OUTPATIENT
Start: 2021-02-22 | End: 2021-03-12

## 2021-02-22 RX ORDER — ZINC SULFATE TAB 220 MG (50 MG ZINC EQUIVALENT) 220 (50 ZN) MG
220 TAB ORAL DAILY
Refills: 0 | Status: DISCONTINUED | OUTPATIENT
Start: 2021-02-22 | End: 2021-03-19

## 2021-02-22 RX ORDER — REMDESIVIR 5 MG/ML
INJECTION INTRAVENOUS
Refills: 0 | Status: DISCONTINUED | OUTPATIENT
Start: 2021-02-22 | End: 2021-02-22

## 2021-02-22 RX ORDER — REMDESIVIR 5 MG/ML
200 INJECTION INTRAVENOUS EVERY 24 HOURS
Refills: 0 | Status: COMPLETED | OUTPATIENT
Start: 2021-02-22 | End: 2021-02-22

## 2021-02-22 RX ORDER — REMDESIVIR 5 MG/ML
INJECTION INTRAVENOUS
Refills: 0 | Status: COMPLETED | OUTPATIENT
Start: 2021-02-22 | End: 2021-02-26

## 2021-02-22 RX ORDER — HYDROCHLOROTHIAZIDE 25 MG
50 TABLET ORAL DAILY
Refills: 0 | Status: DISCONTINUED | OUTPATIENT
Start: 2021-02-22 | End: 2021-03-19

## 2021-02-22 RX ORDER — CHOLECALCIFEROL (VITAMIN D3) 125 MCG
2000 CAPSULE ORAL DAILY
Refills: 0 | Status: DISCONTINUED | OUTPATIENT
Start: 2021-02-22 | End: 2021-03-19

## 2021-02-22 RX ORDER — ASCORBIC ACID 60 MG
1000 TABLET,CHEWABLE ORAL DAILY
Refills: 0 | Status: DISCONTINUED | OUTPATIENT
Start: 2021-02-22 | End: 2021-03-19

## 2021-02-22 RX ORDER — MONTELUKAST 4 MG/1
10 TABLET, CHEWABLE ORAL AT BEDTIME
Refills: 0 | Status: DISCONTINUED | OUTPATIENT
Start: 2021-02-22 | End: 2021-03-19

## 2021-02-22 RX ORDER — REMDESIVIR 5 MG/ML
100 INJECTION INTRAVENOUS EVERY 24 HOURS
Refills: 0 | Status: COMPLETED | OUTPATIENT
Start: 2021-02-23 | End: 2021-02-26

## 2021-02-22 RX ADMIN — Medication 6 MILLIGRAM(S): at 05:35

## 2021-02-22 RX ADMIN — Medication 240 MILLIGRAM(S): at 05:35

## 2021-02-22 RX ADMIN — HEPARIN SODIUM 5000 UNIT(S): 5000 INJECTION INTRAVENOUS; SUBCUTANEOUS at 12:09

## 2021-02-22 RX ADMIN — REMDESIVIR 500 MILLIGRAM(S): 5 INJECTION INTRAVENOUS at 22:01

## 2021-02-22 RX ADMIN — HEPARIN SODIUM 5000 UNIT(S): 5000 INJECTION INTRAVENOUS; SUBCUTANEOUS at 05:35

## 2021-02-22 RX ADMIN — Medication 650 MILLIGRAM(S): at 23:25

## 2021-02-22 RX ADMIN — Medication 650 MILLIGRAM(S): at 05:36

## 2021-02-22 RX ADMIN — ZINC SULFATE TAB 220 MG (50 MG ZINC EQUIVALENT) 220 MILLIGRAM(S): 220 (50 ZN) TAB at 11:38

## 2021-02-22 RX ADMIN — Medication 650 MILLIGRAM(S): at 14:42

## 2021-02-22 RX ADMIN — MONTELUKAST 10 MILLIGRAM(S): 4 TABLET, CHEWABLE ORAL at 21:41

## 2021-02-22 RX ADMIN — Medication 2000 UNIT(S): at 11:38

## 2021-02-22 RX ADMIN — ATORVASTATIN CALCIUM 80 MILLIGRAM(S): 80 TABLET, FILM COATED ORAL at 21:41

## 2021-02-22 RX ADMIN — PANTOPRAZOLE SODIUM 40 MILLIGRAM(S): 20 TABLET, DELAYED RELEASE ORAL at 05:35

## 2021-02-22 RX ADMIN — Medication 1000 MILLIGRAM(S): at 11:38

## 2021-02-22 NOTE — PROGRESS NOTE ADULT - PROBLEM SELECTOR PLAN 1
-SPO2 94% on 4LNC  -COVID antibody negative  -started on remdesivir therapy  -c/w decadron  -c/w incentive spirometer therapy  -Pulmonology / [facility] -SPO2 94% on 4LNC  -COVID antibody negative  -febrile, resolved with Acetaminophen   -no leukocytosis  -started on remdesivir therapy  -c/w decadron  -c/w incentive spirometer therapy  -Pulmonology / [facility]

## 2021-02-22 NOTE — PROGRESS NOTE ADULT - ASSESSMENT
Patient is a 65 year old male from home, with PMH of HTN, HLD and GERD. patient presented to the ED due to SOB, cough, fever, body aches for the past 6 days. Patient states he was tested positive for COVID on 2/16. Patient states his wife and son who he lives with have also tested positive for COVID as well. Patient states that he noted to have been saturating at 88% on room air and was advised to come to the ED by PCP for oxygen supplementation. Patient also complains of nausea, diarrhea and poor appetite at home for the past few days. Patient admitted to medicine for acute respiratory failure secondary to COVID infection.

## 2021-02-22 NOTE — PROGRESS NOTE ADULT - SUBJECTIVE AND OBJECTIVE BOX
Patient is a 65y old  Male who presents with a chief complaint of SOB, fever, cough, body aches (21 Feb 2021 20:52)    pt seen in icu [  ], reg med floor [   ], bed [  ], chair at bedside [   ], a+o x3 [  ], lethargic [  ],  nad [  ]    cisneros [  ], ngt [  ], peg [  ], et tube [  ], cent line [  ], picc line [  ]        Allergies    No Known Allergies  Shrimp- Nasal congestion (Other)        Vitals    T(F): 98.9 (02-21-21 @ 22:20), Max: 100.8 (02-21-21 @ 16:55)  HR: 89 (02-21-21 @ 22:20) (80 - 97)  BP: 137/77 (02-21-21 @ 22:20) (128/74 - 141/82)  RR: 22 (02-21-21 @ 22:20) (16 - 22)  SpO2: 94% (02-21-21 @ 22:20) (91% - 96%)  Wt(kg): --  CAPILLARY BLOOD GLUCOSE          Labs                          14.5   5.86  )-----------( 131      ( 21 Feb 2021 17:53 )             43.6       02-21    136  |  99  |  33<H>  ----------------------------<  103<H>  3.2<L>   |  25  |  1.67<H>    Ca    8.5      21 Feb 2021 17:53    TPro  7.3  /  Alb  3.6  /  TBili  0.5  /  DBili  x   /  AST  38  /  ALT  32  /  AlkPhos  116  02-21      CARDIAC MARKERS ( 21 Feb 2021 17:53 )  0.023 ng/mL / x     / x     / x     / x                Radiology Results      Meds    MEDICATIONS  (STANDING):  atorvastatin 80 milliGRAM(s) Oral at bedtime  dexAMETHasone  Injectable 6 milliGRAM(s) IV Push daily  diltiazem    milliGRAM(s) Oral daily  heparin   Injectable 5000 Unit(s) SubCutaneous every 8 hours  pantoprazole    Tablet 40 milliGRAM(s) Oral before breakfast      MEDICATIONS  (PRN):  acetaminophen   Tablet .. 650 milliGRAM(s) Oral every 6 hours PRN Temp greater or equal to 38C (100.4F), Moderate Pain (4 - 6)  guaiFENesin   Syrup  (Sugar-Free) 200 milliGRAM(s) Oral every 6 hours PRN Cough  ondansetron Injectable 4 milliGRAM(s) IV Push every 6 hours PRN Nausea and/or Vomiting      Physical Exam    Neuro :  no focal deficits  Respiratory: CTA B/L  CV: RRR, S1S2, no murmurs,   Abdominal: Soft, NT, ND +BS,  Extremities: No edema, + peripheral pulses    ASSESSMENT    Infection due to severe acute respiratory syndrome coronavirus 2 (SARS-CoV-2)    Cluster headache    BPH (benign prostatic hypertrophy)    Spinal stenosis    GERD (gastroesophageal reflux disease)    Hyperlipidemia    HTN (hypertension)    High cholesterol    HTN (hypertension)    H/O laminectomy        PLAN     Patient is a 65y old  Male who presents with a chief complaint of SOB, fever, cough, body aches (21 Feb 2021 20:52)    pt seen in icu [  ], reg med floor [  x ], bed [x  ], chair at bedside [   ], a+o x3 [x ], lethargic [  ],  nad [x  ]      Allergies    No Known Allergies  Shrimp- Nasal congestion (Other)        Vitals    T(F): 98.9 (02-21-21 @ 22:20), Max: 100.8 (02-21-21 @ 16:55)  HR: 89 (02-21-21 @ 22:20) (80 - 97)  BP: 137/77 (02-21-21 @ 22:20) (128/74 - 141/82)  RR: 22 (02-21-21 @ 22:20) (16 - 22)  SpO2: 94% (02-21-21 @ 22:20) (91% - 96%)  Wt(kg): --  CAPILLARY BLOOD GLUCOSE          Labs                          14.5   5.86  )-----------( 131      ( 21 Feb 2021 17:53 )             43.6       02-21    136  |  99  |  33<H>  ----------------------------<  103<H>  3.2<L>   |  25  |  1.67<H>    Ca    8.5      21 Feb 2021 17:53    TPro  7.3  /  Alb  3.6  /  TBili  0.5  /  DBili  x   /  AST  38  /  ALT  32  /  AlkPhos  116  02-21      CARDIAC MARKERS ( 21 Feb 2021 17:53 )  0.023 ng/mL / x     / x     / x     / x                Radiology Results      Meds    MEDICATIONS  (STANDING):  atorvastatin 80 milliGRAM(s) Oral at bedtime  dexAMETHasone  Injectable 6 milliGRAM(s) IV Push daily  diltiazem    milliGRAM(s) Oral daily  heparin   Injectable 5000 Unit(s) SubCutaneous every 8 hours  pantoprazole    Tablet 40 milliGRAM(s) Oral before breakfast      MEDICATIONS  (PRN):  acetaminophen   Tablet .. 650 milliGRAM(s) Oral every 6 hours PRN Temp greater or equal to 38C (100.4F), Moderate Pain (4 - 6)  guaiFENesin   Syrup  (Sugar-Free) 200 milliGRAM(s) Oral every 6 hours PRN Cough  ondansetron Injectable 4 milliGRAM(s) IV Push every 6 hours PRN Nausea and/or Vomiting      Physical Exam    Neuro :  no focal deficits  Respiratory: CTA B/L  CV: RRR, S1S2, no murmurs,   Abdominal: Soft, NT, ND +BS,  Extremities: No edema, + peripheral pulses    ASSESSMENT    pneumonia 2nd to covid 19,   marya,   hypokalemia,   h/o Cluster headache  BPH (benign prostatic hypertrophy)  Spinal stenosis s/p laminectomy  GERD (gastroesophageal reflux disease)  Hyperlipidemia  HTN (hypertension)            PLAN      contact and airborne isolation  hold remdesevir 2nd to marya  cont dexamethasone  pepcid, singulair, vit c, vit d, zinc   pulm cons  cont albuterol inhaler   f/u procalcitonin,   F/u D-dimer, esr, crp, ldh, ferritin, lactate,   tmx 100.8  cont tylenol prn,   robitussin prn   O2 sat (91% - 96%) n/c 2  cont O2 via nasal canula as needed   cont ivf  supplement potassium for hypokalemia as needed  cont current meds

## 2021-02-22 NOTE — CONSULT NOTE ADULT - SUBJECTIVE AND OBJECTIVE BOX
PULMONARY CONSULT NOTE    JANELLE DEL CID  [id number]    Patient is a 65y old  Male who presents with a chief complaint of SOB, fever, cough, body aches (22 Feb 2021 06:16)    HISTORY OF PRESENT ILLNESS:  History of Present Illness:  Reason for Admission: SOB, fever, cough, body aches  History of Present Illness:   Patient is a 65 year old male from home, with PMH of HTN, HLD and GERD, who presented to the ED due to SOB, cough, fever, body aches for the past 6 days. Patient states he was tested positive for COVID on 2/16. Patient states his wife and son who he lives with have also tested positive for COVID as well. Patient states that he noted to have been saturating at 88% on room air and was advised to come to the ED by PCP for oxygen supplementation. Patient also complains of nausea, diarrhea and poor appetite at home for the past few days. Patient denies any chest pain, vomiting, abdominal pain or constipation.     Pt is awake, alert, lying in bed in NAD. On NC. Saturating 93%.     MEDICATIONS  (STANDING):  ascorbic acid 1000 milliGRAM(s) Oral daily  atorvastatin 80 milliGRAM(s) Oral at bedtime  cholecalciferol 2000 Unit(s) Oral daily  dexAMETHasone  Injectable 6 milliGRAM(s) IV Push daily  diltiazem    milliGRAM(s) Oral daily  heparin   Injectable 5000 Unit(s) SubCutaneous every 8 hours  montelukast 10 milliGRAM(s) Oral at bedtime  pantoprazole    Tablet 40 milliGRAM(s) Oral before breakfast  zinc sulfate 220 milliGRAM(s) Oral daily      MEDICATIONS  (PRN):  acetaminophen   Tablet .. 650 milliGRAM(s) Oral every 6 hours PRN Temp greater or equal to 38C (100.4F), Moderate Pain (4 - 6)  guaiFENesin   Syrup  (Sugar-Free) 200 milliGRAM(s) Oral every 6 hours PRN Cough  ondansetron Injectable 4 milliGRAM(s) IV Push every 6 hours PRN Nausea and/or Vomiting      Allergies    No Known Allergies    Intolerances    Shrimp- Nasal congestion (Other)      PAST MEDICAL & SURGICAL HISTORY:  Cluster headache    BPH (benign prostatic hypertrophy)    Spinal stenosis    GERD (gastroesophageal reflux disease)    Hyperlipidemia    HTN (hypertension)    High cholesterol    HTN (hypertension)    H/O laminectomy  2009, 2010 and 2013        FAMILY HISTORY:  Family history of breast cancer (Sibling)    Family history of oral cancer (Sibling)  tongue ca    Family history of stroke    Family history of prostate cancer        SOCIAL HISTORY  Smoking History:     REVIEW OF SYSTEMS:    CONSTITUTIONAL:  No fevers, chills, sweats    HEENT:  Eyes:  No diplopia or blurred vision. ENT:  No earache, sore throat or runny nose.    CARDIOVASCULAR:  No pressure, squeezing, tightness, or heaviness about the chest; no palpitations.    RESPIRATORY:  Per HPI    GASTROINTESTINAL:  No abdominal pain, nausea, vomiting or diarrhea.    GENITOURINARY:  No dysuria, frequency or urgency.    NEUROLOGIC:  No paresthesias, fasciculations, seizures or weakness.    PSYCHIATRIC:  No disorder of thought or mood.    Vital Signs Last 24 Hrs  T(C): 37.8 (22 Feb 2021 08:23), Max: 39.2 (22 Feb 2021 05:00)  T(F): 100.1 (22 Feb 2021 08:23), Max: 102.5 (22 Feb 2021 05:00)  HR: 99 (22 Feb 2021 05:00) (80 - 99)  BP: 142/83 (22 Feb 2021 05:00) (128/74 - 142/83)  BP(mean): --  RR: 20 (22 Feb 2021 05:00) (16 - 22)  SpO2: 93% (22 Feb 2021 05:00) (91% - 96%)  I&O's Detail      PHYSICAL EXAMINATION:    GENERAL: The patient is a well-developed, well-nourished _____in no apparent distress.     HEENT: Head is normocephalic and atraumatic. Extraocular muscles are intact. Mucous membranes are moist.     NECK: Supple.     LUNGS: Clear to auscultation without wheezing, rales, or rhonchi. Respirations unlabored    HEART: Regular rate and rhythm without murmur.    ABDOMEN: Soft, nontender, and nondistended.  No hepatosplenomegaly is noted.    EXTREMITIES: Without any cyanosis, clubbing, rash, lesions or edema.    NEUROLOGIC: Grossly intact.      LABS:                        12.5   5.06  )-----------( 130      ( 22 Feb 2021 07:22 )             36.7     02-22    134<L>  |  102  |  28<H>  ----------------------------<  130<H>  3.6   |  21<L>  |  1.18    Ca    8.3<L>      22 Feb 2021 07:22  Phos  2.7     02-22  Mg     2.0     02-22    TPro  6.1  /  Alb  3.2<L>  /  TBili  0.4  /  DBili  x   /  AST  36  /  ALT  29  /  AlkPhos  96  02-22    PT/INR - ( 21 Feb 2021 17:53 )   PT: 13.4 sec;   INR: 1.13 ratio         PTT - ( 22 Feb 2021 07:22 )  PTT:32.6 sec      CARDIAC MARKERS ( 22 Feb 2021 07:22 )  <0.015 ng/mL / x     / x     / x     / x      CARDIAC MARKERS ( 21 Feb 2021 17:53 )  0.023 ng/mL / x     / x     / x     / x        COVID-19 PCR . (02.21.21 @ 17:53)   COVID-19 PCR: Detected: EUA/IVD   D-Dimer Assay, Quantitative: 290 ng/mL DDU (02-21-21 @ 17:53)      Lactate, Blood: 2.0 mmol/L (02-21-21 @ 17:53)    Procalcitonin, Serum: 0.15 ng/mL (02-21-21 @ 21:49)      MICROBIOLOGY:    RADIOLOGY & ADDITIONAL STUDIES:    CXR:  < from: Xray Chest 1 View-PORTABLE IMMEDIATE (02.21.21 @ 17:45) >  IMPRESSION:    Bilateral infiltrates suggestive of Covid pneumonia.    < end of copied text >    Ct scan chest:    ekg;    echo:

## 2021-02-22 NOTE — PROGRESS NOTE ADULT - SUBJECTIVE AND OBJECTIVE BOX
NP Note discussed with  Primary Attending    Patient is a 65y old  Male who presents with a chief complaint of SOB, fever, cough, body aches (22 Feb 2021 11:53)      INTERVAL HPI/OVERNIGHT EVENTS: Patient seen and examined at bedside. SPO2 94% on 4LNC at rest, requiring up titration of oxygen therapy.     MEDICATIONS  (STANDING):  ascorbic acid 1000 milliGRAM(s) Oral daily  atorvastatin 80 milliGRAM(s) Oral at bedtime  cholecalciferol 2000 Unit(s) Oral daily  dexAMETHasone  Injectable 6 milliGRAM(s) IV Push daily  diltiazem    milliGRAM(s) Oral daily  heparin   Injectable 5000 Unit(s) SubCutaneous every 8 hours  montelukast 10 milliGRAM(s) Oral at bedtime  pantoprazole    Tablet 40 milliGRAM(s) Oral before breakfast  zinc sulfate 220 milliGRAM(s) Oral daily    MEDICATIONS  (PRN):  acetaminophen   Tablet .. 650 milliGRAM(s) Oral every 6 hours PRN Temp greater or equal to 38C (100.4F), Moderate Pain (4 - 6)  guaiFENesin   Syrup  (Sugar-Free) 200 milliGRAM(s) Oral every 6 hours PRN Cough  ondansetron Injectable 4 milliGRAM(s) IV Push every 6 hours PRN Nausea and/or Vomiting      __________________________________________________  REVIEW OF SYSTEMS:    CONSTITUTIONAL: No fever,   EYES: no acute visual disturbances  NECK: No pain or stiffness  RESPIRATORY: No cough; No shortness of breath  CARDIOVASCULAR: No chest pain, no palpitations  GASTROINTESTINAL: No pain. No nausea or vomiting; No diarrhea   NEUROLOGICAL: No headache or numbness, no tremors  MUSCULOSKELETAL: No joint pain, no muscle pain  GENITOURINARY: no dysuria, no frequency, no hesitancy  PSYCHIATRY: no depression , no anxiety  ALL OTHER  ROS negative        Vital Signs Last 24 Hrs  T(C): 37.1 (22 Feb 2021 12:56), Max: 39.2 (22 Feb 2021 05:00)  T(F): 98.7 (22 Feb 2021 12:56), Max: 102.5 (22 Feb 2021 05:00)  HR: 80 (22 Feb 2021 12:56) (80 - 99)  BP: 121/72 (22 Feb 2021 12:56) (121/72 - 142/83)  BP(mean): --  RR: 18 (22 Feb 2021 12:56) (16 - 22)  SpO2: 94% (22 Feb 2021 12:56) (93% - 96%)    ________________________________________________  PHYSICAL EXAM:  GENERAL: NAD  HEENT: Normocephalic;  conjunctivae and sclerae clear; moist mucous membranes;   NECK : supple  CHEST/LUNG: b/l bibasilar crackles, slightly diminished lung sound throughout   HEART: S1 S2  regular; no murmurs, gallops or rubs  ABDOMEN: Soft, Nontender, Nondistended; Bowel sounds present  EXTREMITIES: no cyanosis; no edema; no calf tenderness  SKIN: warm and dry; no rash  NERVOUS SYSTEM:  Awake and alert; Oriented  to place, person and time ; no new deficits    _________________________________________________  LABS:                        12.5   5.06  )-----------( 130      ( 22 Feb 2021 07:22 )             36.7     02-22    134<L>  |  102  |  28<H>  ----------------------------<  130<H>  3.6   |  21<L>  |  1.18    Ca    8.3<L>      22 Feb 2021 07:22  Phos  2.7     02-22  Mg     2.0     02-22    TPro  6.1  /  Alb  3.2<L>  /  TBili  0.4  /  DBili  x   /  AST  36  /  ALT  29  /  AlkPhos  96  02-22    PT/INR - ( 21 Feb 2021 17:53 )   PT: 13.4 sec;   INR: 1.13 ratio         PTT - ( 22 Feb 2021 07:22 )  PTT:32.6 sec    CAPILLARY BLOOD GLUCOSE    RADIOLOGY & ADDITIONAL TESTS:  < from: Xray Chest 1 View-PORTABLE IMMEDIATE (02.21.21 @ 17:45) >    EXAM:  XR CHEST PORTABLE IMMED 1V                            PROCEDURE DATE:  02/21/2021          INTERPRETATION:  CLINICAL INDICATION: 65 years  Male with Shortness of Breath, Cough,  Fever.    COMPARISON: 3/1/2016    AP view of the chest demonstrates patchy bilateral airspace infiltrates most pronounced in the right upper lobe. There is no pleural effusion. There is no pneumothorax.    The heart, mediastinum and marcie cannot be assessed due to projection.    The pulmonary vasculature is normal.    Mild thoracic degenerative changes are present. The patient is status post ACDF of the cervical spine.    IMPRESSION:    Bilateral infiltrates suggestive of Covid pneumonia.      < end of copied text >    Imaging  Reviewed:  YES    Consultant(s) Notes Reviewed:   YES      Plan of care was discussed with patient and /or primary care giver; all questions and concerns were addressed

## 2021-02-23 DIAGNOSIS — E55.9 VITAMIN D DEFICIENCY, UNSPECIFIED: ICD-10-CM

## 2021-02-23 LAB
ALBUMIN SERPL ELPH-MCNC: 3.1 G/DL — LOW (ref 3.5–5)
ALBUMIN SERPL ELPH-MCNC: 3.1 G/DL — LOW (ref 3.5–5)
ALP SERPL-CCNC: 94 U/L — SIGNIFICANT CHANGE UP (ref 40–120)
ALP SERPL-CCNC: 99 U/L — SIGNIFICANT CHANGE UP (ref 40–120)
ALT FLD-CCNC: 37 U/L DA — SIGNIFICANT CHANGE UP (ref 10–60)
ALT FLD-CCNC: 37 U/L DA — SIGNIFICANT CHANGE UP (ref 10–60)
ANION GAP SERPL CALC-SCNC: 12 MMOL/L — SIGNIFICANT CHANGE UP (ref 5–17)
AST SERPL-CCNC: 47 U/L — HIGH (ref 10–40)
AST SERPL-CCNC: 48 U/L — HIGH (ref 10–40)
BILIRUB DIRECT SERPL-MCNC: <0.1 MG/DL — SIGNIFICANT CHANGE UP (ref 0–0.2)
BILIRUB INDIRECT FLD-MCNC: >0.4 MG/DL — SIGNIFICANT CHANGE UP (ref 0.2–1)
BILIRUB SERPL-MCNC: 0.5 MG/DL — SIGNIFICANT CHANGE UP (ref 0.2–1.2)
BILIRUB SERPL-MCNC: 0.5 MG/DL — SIGNIFICANT CHANGE UP (ref 0.2–1.2)
BUN SERPL-MCNC: 27 MG/DL — HIGH (ref 7–18)
CALCIUM SERPL-MCNC: 8.6 MG/DL — SIGNIFICANT CHANGE UP (ref 8.4–10.5)
CHLORIDE SERPL-SCNC: 102 MMOL/L — SIGNIFICANT CHANGE UP (ref 96–108)
CO2 SERPL-SCNC: 20 MMOL/L — LOW (ref 22–31)
CREAT SERPL-MCNC: 1.17 MG/DL — SIGNIFICANT CHANGE UP (ref 0.5–1.3)
GLUCOSE SERPL-MCNC: 138 MG/DL — HIGH (ref 70–99)
HCT VFR BLD CALC: 36.7 % — LOW (ref 39–50)
HGB BLD-MCNC: 12.8 G/DL — LOW (ref 13–17)
MAGNESIUM SERPL-MCNC: 1.9 MG/DL — SIGNIFICANT CHANGE UP (ref 1.6–2.6)
MCHC RBC-ENTMCNC: 29.6 PG — SIGNIFICANT CHANGE UP (ref 27–34)
MCHC RBC-ENTMCNC: 34.9 GM/DL — SIGNIFICANT CHANGE UP (ref 32–36)
MCV RBC AUTO: 84.8 FL — SIGNIFICANT CHANGE UP (ref 80–100)
NRBC # BLD: 0 /100 WBCS — SIGNIFICANT CHANGE UP (ref 0–0)
PHOSPHATE SERPL-MCNC: 2.6 MG/DL — SIGNIFICANT CHANGE UP (ref 2.5–4.5)
PLATELET # BLD AUTO: 142 K/UL — LOW (ref 150–400)
POTASSIUM SERPL-MCNC: 3.4 MMOL/L — LOW (ref 3.5–5.3)
POTASSIUM SERPL-SCNC: 3.4 MMOL/L — LOW (ref 3.5–5.3)
PROT SERPL-MCNC: 6.1 G/DL — SIGNIFICANT CHANGE UP (ref 6–8.3)
PROT SERPL-MCNC: 6.3 G/DL — SIGNIFICANT CHANGE UP (ref 6–8.3)
RBC # BLD: 4.33 M/UL — SIGNIFICANT CHANGE UP (ref 4.2–5.8)
RBC # FLD: 14.3 % — SIGNIFICANT CHANGE UP (ref 10.3–14.5)
SODIUM SERPL-SCNC: 134 MMOL/L — LOW (ref 135–145)
WBC # BLD: 7.25 K/UL — SIGNIFICANT CHANGE UP (ref 3.8–10.5)
WBC # FLD AUTO: 7.25 K/UL — SIGNIFICANT CHANGE UP (ref 3.8–10.5)

## 2021-02-23 PROCEDURE — 71045 X-RAY EXAM CHEST 1 VIEW: CPT | Mod: 26

## 2021-02-23 RX ORDER — POTASSIUM CHLORIDE 20 MEQ
40 PACKET (EA) ORAL ONCE
Refills: 0 | Status: COMPLETED | OUTPATIENT
Start: 2021-02-23 | End: 2021-02-23

## 2021-02-23 RX ADMIN — Medication 1000 MILLIGRAM(S): at 12:13

## 2021-02-23 RX ADMIN — ZINC SULFATE TAB 220 MG (50 MG ZINC EQUIVALENT) 220 MILLIGRAM(S): 220 (50 ZN) TAB at 12:14

## 2021-02-23 RX ADMIN — Medication 650 MILLIGRAM(S): at 05:18

## 2021-02-23 RX ADMIN — Medication 40 MILLIEQUIVALENT(S): at 12:16

## 2021-02-23 RX ADMIN — ENOXAPARIN SODIUM 40 MILLIGRAM(S): 100 INJECTION SUBCUTANEOUS at 12:14

## 2021-02-23 RX ADMIN — PANTOPRAZOLE SODIUM 40 MILLIGRAM(S): 20 TABLET, DELAYED RELEASE ORAL at 05:17

## 2021-02-23 RX ADMIN — MONTELUKAST 10 MILLIGRAM(S): 4 TABLET, CHEWABLE ORAL at 21:30

## 2021-02-23 RX ADMIN — Medication 2000 UNIT(S): at 12:14

## 2021-02-23 RX ADMIN — ATORVASTATIN CALCIUM 80 MILLIGRAM(S): 80 TABLET, FILM COATED ORAL at 21:30

## 2021-02-23 RX ADMIN — Medication 50 MILLIGRAM(S): at 05:24

## 2021-02-23 RX ADMIN — Medication 6 MILLIGRAM(S): at 05:16

## 2021-02-23 RX ADMIN — Medication 240 MILLIGRAM(S): at 05:24

## 2021-02-23 RX ADMIN — REMDESIVIR 500 MILLIGRAM(S): 5 INJECTION INTRAVENOUS at 21:30

## 2021-02-23 NOTE — PROGRESS NOTE ADULT - PROBLEM SELECTOR PLAN 7
-need to complete remdesivir and decadron therapy  -monitor O2 sat. consider further titration O2 when necessary. -K 3.4, likely from COVID 19, decreased appetite  -replaced oral supplement  -monitor BMP

## 2021-02-23 NOTE — PROGRESS NOTE ADULT - SUBJECTIVE AND OBJECTIVE BOX
Patient is a 65y old  Male who presents with a chief complaint of SOB, fever, cough, body aches (22 Feb 2021 18:06)      pt seen in icu [  ], reg med floor [  x ], bed [x  ], chair at bedside [   ], a+o x3 [x ], lethargic [  ],    nad [x  ]      Allergies    No Known Allergies  Shrimp- Nasal congestion (Other)        Vitals    T(F): 102.8 (02-23-21 @ 05:04), Max: 102.8 (02-23-21 @ 05:04)  HR: 104 (02-23-21 @ 05:04) (80 - 104)  BP: 139/75 (02-23-21 @ 05:04) (121/72 - 139/75)  RR: 19 (02-23-21 @ 05:04) (18 - 20)  SpO2: 92% (02-23-21 @ 05:04) (92% - 94%)  Wt(kg): --  CAPILLARY BLOOD GLUCOSE          Labs                          12.5   5.06  )-----------( 130      ( 22 Feb 2021 07:22 )             36.7       02-22    134<L>  |  102  |  28<H>  ----------------------------<  130<H>  3.6   |  21<L>  |  1.18    Ca    8.3<L>      22 Feb 2021 07:22  Phos  2.7     02-22  Mg     2.0     02-22    TPro  6.1  /  Alb  3.2<L>  /  TBili  0.4  /  DBili  x   /  AST  36  /  ALT  29  /  AlkPhos  96  02-22      CARDIAC MARKERS ( 22 Feb 2021 07:22 )  <0.015 ng/mL / x     / x     / x     / x      CARDIAC MARKERS ( 21 Feb 2021 17:53 )  0.023 ng/mL / x     / x     / x     / x            .Blood Blood-Peripheral  02-21 @ 22:03   No growth to date.  --  --      .Blood Blood-Peripheral  02-21 @ 22:00   No growth to date.  --  --          Radiology Results      Meds    MEDICATIONS  (STANDING):  ascorbic acid 1000 milliGRAM(s) Oral daily  atorvastatin 80 milliGRAM(s) Oral at bedtime  cholecalciferol 2000 Unit(s) Oral daily  dexAMETHasone  Injectable 6 milliGRAM(s) IV Push daily  diltiazem    milliGRAM(s) Oral daily  enoxaparin Injectable 40 milliGRAM(s) SubCutaneous daily  hydrochlorothiazide 50 milliGRAM(s) Oral daily  montelukast 10 milliGRAM(s) Oral at bedtime  pantoprazole    Tablet 40 milliGRAM(s) Oral before breakfast  remdesivir  IVPB   IV Intermittent   remdesivir  IVPB 100 milliGRAM(s) IV Intermittent every 24 hours  zinc sulfate 220 milliGRAM(s) Oral daily      MEDICATIONS  (PRN):  acetaminophen   Tablet .. 650 milliGRAM(s) Oral every 6 hours PRN Temp greater or equal to 38C (100.4F), Moderate Pain (4 - 6)  guaiFENesin   Syrup  (Sugar-Free) 200 milliGRAM(s) Oral every 6 hours PRN Cough  ondansetron Injectable 4 milliGRAM(s) IV Push every 6 hours PRN Nausea and/or Vomiting      Physical Exam    Neuro :  no focal deficits  Respiratory: CTA B/L  CV: RRR, S1S2, no murmurs,   Abdominal: Soft, NT, ND +BS,  Extremities: No edema, + peripheral pulses    ASSESSMENT    pneumonia 2nd to covid 19,   marya,   hypokalemia,   h/o Cluster headache  BPH (benign prostatic hypertrophy)  Spinal stenosis s/p laminectomy  GERD (gastroesophageal reflux disease)  Hyperlipidemia  HTN (hypertension)            PLAN      contact and airborne isolation  hold remdesevir 2nd to marya  cont dexamethasone  pepcid, singulair, vit c, vit d, zinc   pulm cons  cont albuterol inhaler   f/u procalcitonin,   F/u D-dimer, esr, crp, ldh, ferritin, lactate,   tmx 100.8  cont tylenol prn,   robitussin prn   O2 sat (91% - 96%) n/c 2  cont O2 via nasal canula as needed   cont ivf  supplement potassium for hypokalemia as needed  cont current meds       Patient is a 65y old  Male who presents with a chief complaint of SOB, fever, cough, body aches (22 Feb 2021 18:06)      pt seen in icu [  ], reg med floor [  x ], bed [x  ], chair at bedside [   ], a+o x3 [x ], lethargic [  ],    nad [x  ]      Allergies    No Known Allergies  Shrimp- Nasal congestion (Other)        Vitals    T(F): 102.8 (02-23-21 @ 05:04), Max: 102.8 (02-23-21 @ 05:04)  HR: 104 (02-23-21 @ 05:04) (80 - 104)  BP: 139/75 (02-23-21 @ 05:04) (121/72 - 139/75)  RR: 19 (02-23-21 @ 05:04) (18 - 20)  SpO2: 92% (02-23-21 @ 05:04) (92% - 94%)  Wt(kg): --  CAPILLARY BLOOD GLUCOSE          Labs                          12.5   5.06  )-----------( 130      ( 22 Feb 2021 07:22 )             36.7       02-22    134<L>  |  102  |  28<H>  ----------------------------<  130<H>  3.6   |  21<L>  |  1.18    Ca    8.3<L>      22 Feb 2021 07:22  Phos  2.7     02-22  Mg     2.0     02-22    TPro  6.1  /  Alb  3.2<L>  /  TBili  0.4  /  DBili  x   /  AST  36  /  ALT  29  /  AlkPhos  96  02-22    Ferritin, Serum (02.22.21 @ 12:34)   Ferritin, Serum: 629 ng/mL  Lactate Dehydrogenase, Serum (02.22.21 @ 07:22)   Lactate Dehydrogenase, Serum: 362 U/L   Lactate, Blood (02.21.21 @ 17:53)   Lactate, Blood: 2.0 mmol/L   C-Reactive Protein, Serum (02.22.21 @ 12:28)   C-Reactive Protein, Serum: 4.28 mg/dL   D-Dimer Assay, Quantitative (02.21.21 @ 17:53)   D-Dimer Assay, Quantitative: 290COVID-19 Antibody - for prior infection screening (02.22.21 @ 12:25)   COVID-19 IgG Antibody Index: 0.07:    Procalcitonin, Serum (02.21.21 @ 21:49)   Procalcitonin, Serum: 0.15:      CARDIAC MARKERS ( 22 Feb 2021 07:22 )  <0.015 ng/mL / x     / x     / x     / x      CARDIAC MARKERS ( 21 Feb 2021 17:53 )  0.023 ng/mL / x     / x     / x     / x            .Blood Blood-Peripheral  02-21 @ 22:03   No growth to date.  --  --      .Blood Blood-Peripheral  02-21 @ 22:00   No growth to date.  --  --          Radiology Results      Meds    MEDICATIONS  (STANDING):  ascorbic acid 1000 milliGRAM(s) Oral daily  atorvastatin 80 milliGRAM(s) Oral at bedtime  cholecalciferol 2000 Unit(s) Oral daily  dexAMETHasone  Injectable 6 milliGRAM(s) IV Push daily  diltiazem    milliGRAM(s) Oral daily  enoxaparin Injectable 40 milliGRAM(s) SubCutaneous daily  hydrochlorothiazide 50 milliGRAM(s) Oral daily  montelukast 10 milliGRAM(s) Oral at bedtime  pantoprazole    Tablet 40 milliGRAM(s) Oral before breakfast  remdesivir  IVPB   IV Intermittent   remdesivir  IVPB 100 milliGRAM(s) IV Intermittent every 24 hours  zinc sulfate 220 milliGRAM(s) Oral daily      MEDICATIONS  (PRN):  acetaminophen   Tablet .. 650 milliGRAM(s) Oral every 6 hours PRN Temp greater or equal to 38C (100.4F), Moderate Pain (4 - 6)  guaiFENesin   Syrup  (Sugar-Free) 200 milliGRAM(s) Oral every 6 hours PRN Cough  ondansetron Injectable 4 milliGRAM(s) IV Push every 6 hours PRN Nausea and/or Vomiting      Physical Exam    Neuro :  no focal deficits  Respiratory: CTA B/L  CV: RRR, S1S2, no murmurs,   Abdominal: Soft, NT, ND +BS,  Extremities: No edema, + peripheral pulses    ASSESSMENT    pneumonia 2nd to covid 19,   marya,   hypokalemia,   h/o Cluster headache  BPH (benign prostatic hypertrophy)  Spinal stenosis s/p laminectomy  GERD (gastroesophageal reflux disease)  Hyperlipidemia  HTN (hypertension)            PLAN      contact and airborne isolation  cont remdesevir until 2/26/21  covid ab neg  cont dexamethasone  pepcid, singulair, vit c, vit d, zinc   pulm cons  cont albuterol inhaler   procalcitonin neg noted above,   D-dimer, crp, ldh, ferritin, lactate noted above,   tmx 102.8  cont tylenol prn,   robitussin prn   blood cx neg  O2 sat (92% - 94%) n/c 4L  cont O2 via nasal canula as needed   cont ivf   serum creat wnl   potassium wnl   cont current meds

## 2021-02-23 NOTE — CONSULT NOTE ADULT - ASSESSMENT
1. PNA 2nd to Covid-19  - PCR positive.   - CXR noted.  - Continue Vit C, Vit D, Zinc   - Continue Dexamethasone   - Continue Singulair and Pepcid   - Robitussin PRN for cough  - Tylenol PRN for temp   - O2 Supp - taper as tolerated by pt.   - Monitor O2 Sat.  - Monitor labs  - F.U CXR  - Prone positioning as tolerated   - Isolation precautions   - DVT and GI PPX     2. HTN  - Continue meds   - Monitor BP     3. HLD  - Cont meds  - Lipid panel     4. GERD  - Antireflux diet   - Cont meds
Patient is a 65 year old male from home, with PMH of HTN, HLD and GERD, who presented to the ED due to SOB, cough, fever, body aches for the past 6 days. Admitted for Acute hypoxic Respiratory failure secondary to Covid 19.     Acute Hypoxic Failure secondary to SARS-Cov 2:  -Patient admitted for AHRF sec to Covid   -Chest X-ray : worsening bilateral infiltrates   -Desaturated to 80's on 6L NC, currently saturating well on 8L Oxygen pendant.   -Follow repeat D- dimer, Ferritin, CRP   -Patient is currently in no distress and saturating well, would recommend to switch to NRB   -C/w Decadron and Remdesivir   -Patient can be managed on floor, no need for ICU level care at this time.

## 2021-02-23 NOTE — PROGRESS NOTE ADULT - ASSESSMENT
1. PNA 2nd to Covid-19  - PCR positive.   - CXR noted.  - Continue Vit C, Vit D, Zinc   - Continue Dexamethasone and Remdesivir  - Continue Singulair and Pepcid   - Robitussin PRN for cough  - Tylenol PRN for temp   - O2 Supp - on 4L NC. Taper as feasible.  - Monitor O2 Sat.  - Monitor labs  - F.U CXR  - Prone positioning as tolerated   - Incentive Spirometry  - Isolation precautions   - DVT and GI PPX     2. HTN  - Continue meds   - Monitor BP     3. HLD  - Cont meds  - Lipid panel     4. GERD  - Antireflux diet   - Cont meds

## 2021-02-23 NOTE — PROGRESS NOTE ADULT - PROBLEM SELECTOR PLAN 6
-DVT PPX: Lovenox  -GI PPX: PPI -Vt D 17.3  -c/w vitamin D supplement  -c/w diet with Ca/vt D  -f/u blood level as outpt

## 2021-02-23 NOTE — PROGRESS NOTE ADULT - SUBJECTIVE AND OBJECTIVE BOX
NP Note discussed with  Primary Attending    Patient is a 65y old  Male who presents with a chief complaint of SOB, fever, cough, body aches (23 Feb 2021 11:36)    HPI-Patient is a 65 year old male from home, with PMH of HTN, HLD and GERD. patient presented to the ED due to SOB, cough, fever, body aches for the past 6 days. Patient states he was tested positive for COVID on 2/16. Patient states his wife and son who he lives with have also tested positive for COVID as well. Patient states that he noted to have been saturating at 88% on room air and was advised to come to the ED by PCP for oxygen supplementation. Patient also complains of nausea, diarrhea and poor appetite at home for the past few days. Patient admitted to medicine for acute respiratory failure secondary to COVID infection. started Remdesivir, Decadron Day # 2. Pt is saturating 83% on room air. improving to 95% on 4L oxygen NC.       INTERVAL HPI/OVERNIGHT EVENTS: seen at bedside. c/o sob on exertion, eating. OOB. denies fever, chest pain     MEDICATIONS  (STANDING):  ascorbic acid 1000 milliGRAM(s) Oral daily  atorvastatin 80 milliGRAM(s) Oral at bedtime  cholecalciferol 2000 Unit(s) Oral daily  dexAMETHasone  Injectable 6 milliGRAM(s) IV Push daily  diltiazem    milliGRAM(s) Oral daily  enoxaparin Injectable 40 milliGRAM(s) SubCutaneous daily  hydrochlorothiazide 50 milliGRAM(s) Oral daily  montelukast 10 milliGRAM(s) Oral at bedtime  pantoprazole    Tablet 40 milliGRAM(s) Oral before breakfast  remdesivir  IVPB   IV Intermittent   remdesivir  IVPB 100 milliGRAM(s) IV Intermittent every 24 hours  zinc sulfate 220 milliGRAM(s) Oral daily    MEDICATIONS  (PRN):  acetaminophen   Tablet .. 650 milliGRAM(s) Oral every 6 hours PRN Temp greater or equal to 38C (100.4F), Moderate Pain (4 - 6)  guaiFENesin   Syrup  (Sugar-Free) 200 milliGRAM(s) Oral every 6 hours PRN Cough  ondansetron Injectable 4 milliGRAM(s) IV Push every 6 hours PRN Nausea and/or Vomiting      __________________________________________________  REVIEW OF SYSTEMS:    CONSTITUTIONAL: No fever,   EYES: no acute visual disturbances  NECK: No pain or stiffness  RESPIRATORY: No cough; _+ shortness of breath on exertion  CARDIOVASCULAR: No chest pain, no palpitations  GASTROINTESTINAL: No pain. No nausea or vomiting; No diarrhea   NEUROLOGICAL: No headache or numbness, no tremors  MUSCULOSKELETAL: No joint pain, no muscle pain  GENITOURINARY: no dysuria, no frequency, no hesitancy  PSYCHIATRY: no depression , no anxiety  ALL OTHER  ROS negative        Vital Signs Last 24 Hrs  T(C): 36.4 (23 Feb 2021 12:42), Max: 39.3 (23 Feb 2021 05:04)  T(F): 97.6 (23 Feb 2021 12:42), Max: 102.8 (23 Feb 2021 05:04)  HR: 80 (23 Feb 2021 12:42) (80 - 104)  BP: 125/77 (23 Feb 2021 12:42) (125/77 - 139/75)  BP(mean): --  RR: 30 (23 Feb 2021 12:45) (19 - 30)  SpO2: 90% (23 Feb 2021 13:00) (80% - 95%)    ________________________________________________  PHYSICAL EXAM:  GENERAL: NAD, conversant  HEENT: Normocephalic;  conjunctivae and sclerae clear; moist mucous membranes;   NECK : supple  CHEST/LUNG: Clear to auscultation bilaterally with fair air entry Tachypneic on room air  HEART: S1 S2  regular; no murmurs, gallops or rubs  ABDOMEN: Soft, Nontender, Nondistended; Bowel sounds present  EXTREMITIES: no cyanosis; no edema; no calf tenderness  SKIN: warm and dry; no rash  NERVOUS SYSTEM:  Awake and alert; Oriented  to place, person and time ; no new deficits    _________________________________________________  LABS:                        12.8   7.25  )-----------( 142      ( 23 Feb 2021 08:49 )             36.7     02-23    134<L>  |  102  |  27<H>  ----------------------------<  138<H>  3.4<L>   |  20<L>  |  1.17    Ca    8.6      23 Feb 2021 08:49  Phos  2.6     02-23  Mg     1.9     02-23    TPro  6.1  /  Alb  3.1<L>  /  TBili  0.5  /  DBili  <0.1  /  AST  48<H>  /  ALT  37  /  AlkPhos  94  02-23    PT/INR - ( 21 Feb 2021 17:53 )   PT: 13.4 sec;   INR: 1.13 ratio         PTT - ( 22 Feb 2021 07:22 )  PTT:32.6 sec    CAPILLARY BLOOD GLUCOSE            RADIOLOGY & ADDITIONAL TESTS:    Imaging  Reviewed:  YES   < from: Xray Chest 1 View-PORTABLE IMMEDIATE (02.21.21 @ 17:45) >    EXAM:  XR CHEST PORTABLE IMMED 1V                            PROCEDURE DATE:  02/21/2021          INTERPRETATION:  CLINICAL INDICATION: 65 years  Male with Shortness of Breath, Cough,  Fever.    COMPARISON: 3/1/2016    AP view of the chest demonstrates patchy bilateral airspace infiltrates most pronounced in the right upper lobe. There is no pleural effusion. There is no pneumothorax.    The heart, mediastinum and marcie cannot be assessed due to projection.    The pulmonary vasculature is normal.    Mild thoracic degenerative changes are present. The patient is status post ACDF of the cervical spine.    IMPRESSION:    Bilateral infiltrates suggestive of Covid pneumonia.            BRICE ST MD; Attending Radiologist  This document has been electronically signed. Feb 22 2021  6:58AM    < end of copied text >      Consultant(s) Notes Reviewed:   YES      Plan of care was discussed with patient and /or primary care giver; all questions and concerns were addressed

## 2021-02-23 NOTE — CONSULT NOTE ADULT - ATTENDING COMMENTS
Patient is a 65 year old male from home, with PMH of HTN, HLD and GERD, who presented to the ED due to SOB, cough, fever, body aches for the past 6 days. Admitted for Acute hypoxic Respiratory failure due to b/l pna  secondary to Covid 19 infection       Sugg;  -pat is relatively comfortable with O2 via pendent   -continue to monitor pulse oximetry  -monitor biomarkers TIW  -will not accept to icu at this time   -please reconsult as needed    d/c with floor NP and seen and examined with PGY-3

## 2021-02-23 NOTE — CHART NOTE - NSCHARTNOTEFT_GEN_A_CORE
Pt was reevaluated and noted 85%-88% with Pendant optimizer 12L. Pt states he keeps breathing through mouth, feeling uncomfortable with pendant NC.  Switched to NRB mask now. Improving to 93%. Pt feels better now.

## 2021-02-23 NOTE — PROGRESS NOTE ADULT - PROBLEM SELECTOR PLAN 1
-O2 sat 83 % on room air at rest  -SPO2 94% on 4LNC  -COVID antibody negative  -febrile, resolved with Acetaminophen   -no leukocytosis  -c/w remdesivir  D # 2  -c/w decadron D # 2  -c/w incentive spirometer therapy  -Pulmonology / [place]

## 2021-02-23 NOTE — PROGRESS NOTE ADULT - SUBJECTIVE AND OBJECTIVE BOX
Pt is awake, alert, lying in bed on 4L NC. Short of breath after ambulating to restroom. Saturation went up to 94% with NC. Had fever this AM, but has improved.     INTERVAL HPI/OVERNIGHT EVENTS:      VITAL SIGNS:  T(F): 102.8 (02-23-21 @ 05:04)  HR: 104 (02-23-21 @ 05:04)  BP: 139/75 (02-23-21 @ 05:04)  RR: 30 (02-23-21 @ 08:43)  SpO2: 90% (02-23-21 @ 08:43)  Wt(kg): --  I&O's Detail          REVIEW OF SYSTEMS:    CONSTITUTIONAL:  No fevers, chills, sweats    HEENT:  Eyes:  No diplopia or blurred vision. ENT:  No earache, sore throat or runny nose.    CARDIOVASCULAR:  No pressure, squeezing, tightness, or heaviness about the chest; no palpitations.    RESPIRATORY:  Per HPI    GASTROINTESTINAL:  No abdominal pain, nausea, vomiting or diarrhea.    GENITOURINARY:  No dysuria, frequency or urgency.    NEUROLOGIC:  No paresthesias, fasciculations, seizures or weakness.    PSYCHIATRIC:  No disorder of thought or mood.      PHYSICAL EXAM:    Constitutional: Well developed and nourished  Eyes: Perrla  ENMT: normal  Neck: supple  Respiratory: good air entry  Cardiovascular: S1 S2 regular  Gastrointestinal: Soft, Non tender  Extremities: No edema  Vascular: normal  Neurological: Awake, alert,Ox3  Musculoskeletal: Normal      MEDICATIONS  (STANDING):  ascorbic acid 1000 milliGRAM(s) Oral daily  atorvastatin 80 milliGRAM(s) Oral at bedtime  cholecalciferol 2000 Unit(s) Oral daily  dexAMETHasone  Injectable 6 milliGRAM(s) IV Push daily  diltiazem    milliGRAM(s) Oral daily  enoxaparin Injectable 40 milliGRAM(s) SubCutaneous daily  hydrochlorothiazide 50 milliGRAM(s) Oral daily  montelukast 10 milliGRAM(s) Oral at bedtime  pantoprazole    Tablet 40 milliGRAM(s) Oral before breakfast  potassium chloride    Tablet ER 40 milliEquivalent(s) Oral once  remdesivir  IVPB   IV Intermittent   remdesivir  IVPB 100 milliGRAM(s) IV Intermittent every 24 hours  zinc sulfate 220 milliGRAM(s) Oral daily    MEDICATIONS  (PRN):  acetaminophen   Tablet .. 650 milliGRAM(s) Oral every 6 hours PRN Temp greater or equal to 38C (100.4F), Moderate Pain (4 - 6)  guaiFENesin   Syrup  (Sugar-Free) 200 milliGRAM(s) Oral every 6 hours PRN Cough  ondansetron Injectable 4 milliGRAM(s) IV Push every 6 hours PRN Nausea and/or Vomiting      Allergies    No Known Allergies    Intolerances    Shrimp- Nasal congestion (Other)      LABS:                        12.8   7.25  )-----------( 142      ( 23 Feb 2021 08:49 )             36.7     02-23    134<L>  |  102  |  27<H>  ----------------------------<  138<H>  3.4<L>   |  20<L>  |  1.17    Ca    8.6      23 Feb 2021 08:49  Phos  2.6     02-23  Mg     1.9     02-23    TPro  6.1  /  Alb  3.1<L>  /  TBili  0.5  /  DBili  <0.1  /  AST  48<H>  /  ALT  37  /  AlkPhos  94  02-23    PT/INR - ( 21 Feb 2021 17:53 )   PT: 13.4 sec;   INR: 1.13 ratio         PTT - ( 22 Feb 2021 07:22 )  PTT:32.6 sec      CARDIAC MARKERS ( 22 Feb 2021 07:22 )  <0.015 ng/mL / x     / x     / x     / x      CARDIAC MARKERS ( 21 Feb 2021 17:53 )  0.023 ng/mL / x     / x     / x     / x          CAPILLARY BLOOD GLUCOSE        pro-bnp -- 02-21 @ 17:53     d-dimer 290  02-21 @ 17:53      RADIOLOGY & ADDITIONAL TESTS:    CXR:    < from: Xray Chest 1 View-PORTABLE IMMEDIATE (02.21.21 @ 17:45) >  IMPRESSION:    Bilateral infiltrates suggestive of Covid pneumonia.    < end of copied text >  Ct scan chest:    ekg;    echo:

## 2021-02-23 NOTE — CONSULT NOTE ADULT - SUBJECTIVE AND OBJECTIVE BOX
Patient is a 65y old  Male who presents with a chief complaint of SOB, fever, cough, body aches (23 Feb 2021 16:34)      HPI:  Patient is a 65 year old male from home, with PMH of HTN, HLD and GERD, who presented to the ED due to SOB, cough, fever, body aches for the past 6 days. Patient states he was tested positive for COVID on 2/16. Patient states his wife and son who he lives with have also tested positive for COVID as well. Patient states that he noted to have been saturating at 88% on room air and was advised to come to the ED by PCP for oxygen supplementation. Patient also complains of nausea, diarrhea and poor appetite at home for the past few days. Patient denies any chest pain, vomiting, abdominal pain or constipation.  (21 Feb 2021 20:52)    Brief Hospital Course: Patient is admitted for AHRF secondary to Covid, was saturating well on 4L NC. However, this afternoon his sats dropped to 80's, he was switched to an oxygen pendant 8L and was saturating 95%. ICU was consulted for further evaluation.   At present, patient's VS are stable, he is saturating well, VS stable. Patient appears comfortable, will switch to NRB.       Allergies    No Known Allergies    Intolerances    Shrimp- Nasal congestion (Other)      MEDICATIONS  (STANDING):  ascorbic acid 1000 milliGRAM(s) Oral daily  atorvastatin 80 milliGRAM(s) Oral at bedtime  cholecalciferol 2000 Unit(s) Oral daily  dexAMETHasone  Injectable 6 milliGRAM(s) IV Push daily  diltiazem    milliGRAM(s) Oral daily  enoxaparin Injectable 40 milliGRAM(s) SubCutaneous daily  hydrochlorothiazide 50 milliGRAM(s) Oral daily  montelukast 10 milliGRAM(s) Oral at bedtime  pantoprazole    Tablet 40 milliGRAM(s) Oral before breakfast  remdesivir  IVPB   IV Intermittent   remdesivir  IVPB 100 milliGRAM(s) IV Intermittent every 24 hours  zinc sulfate 220 milliGRAM(s) Oral daily    MEDICATIONS  (PRN):  acetaminophen   Tablet .. 650 milliGRAM(s) Oral every 6 hours PRN Temp greater or equal to 38C (100.4F), Moderate Pain (4 - 6)  guaiFENesin   Syrup  (Sugar-Free) 200 milliGRAM(s) Oral every 6 hours PRN Cough  ondansetron Injectable 4 milliGRAM(s) IV Push every 6 hours PRN Nausea and/or Vomiting      Daily     Daily     Drug Dosing Weight  Height (cm): 182.9 (21 Feb 2021 16:55)  Weight (kg): 90.7 (21 Feb 2021 16:55)  BMI (kg/m2): 27.1 (21 Feb 2021 16:55)  BSA (m2): 2.13 (21 Feb 2021 16:55)    PAST MEDICAL & SURGICAL HISTORY:  Cluster headache    BPH (benign prostatic hypertrophy)    Spinal stenosis    GERD (gastroesophageal reflux disease)    Hyperlipidemia    HTN (hypertension)    High cholesterol    HTN (hypertension)    H/O laminectomy  2009, 2010 and 2013        FAMILY HISTORY:  Family history of breast cancer (Sibling)    Family history of oral cancer (Sibling)  tongue ca    Family history of stroke    Family history of prostate cancer        SOCIAL HISTORY:    ADVANCE DIRECTIVES:    REVIEW OF SYSTEMS:    CONSTITUTIONAL: No fever, weight loss, or fatigue  EYES: No eye pain, visual disturbances, or discharge  ENMT:  No difficulty hearing, tinnitus, vertigo; No sinus or throat pain  NECK: No pain or stiffness  BREASTS: No pain, masses, or nipple discharge  RESPIRATORY: cough, shortness of breath   CARDIOVASCULAR: No chest pain, palpitations, dizziness, or leg swelling  GASTROINTESTINAL: No abdominal or epigastric pain. No nausea, vomiting, or hematemesis; No diarrhea or constipation. No melena or hematochezia.  GENITOURINARY: No dysuria, frequency, hematuria, or incontinence  NEUROLOGICAL: No headaches, memory loss, loss of strength, numbness, or tremors  ENDOCRINE: No heat or cold intolerance; No hair loss  MUSCULOSKELETAL: No joint pain or swelling; No muscle, back, or extremity pain            ICU Vital Signs Last 24 Hrs  T(C): 36.2 (23 Feb 2021 15:59), Max: 39.3 (23 Feb 2021 05:04)  T(F): 97.2 (23 Feb 2021 15:59), Max: 102.8 (23 Feb 2021 05:04)  HR: 79 (23 Feb 2021 15:59) (79 - 104)  BP: 108/65 (23 Feb 2021 15:59) (108/65 - 139/75)  BP(mean): --  ABP: --  ABP(mean): --  RR: 40 (23 Feb 2021 16:42) (19 - 40)  SpO2: 93% (23 Feb 2021 16:42) (80% - 95%)          I&O's Detail      PHYSICAL EXAM:    GENERAL: NAD, well-groomed, well-developed  HEAD:  Atraumatic, Normocephalic  EYES: EOMI, PERRLA, conjunctiva and sclera clear  ENMT: No tonsillar erythema, exudates, or enlargement; Moist mucous membranes, Good dentition, No lesions  NECK: Supple, No JVD, Normal thyroid  NERVOUS SYSTEM:  Alert & Oriented X3, Good concentration; Motor Strength 5/5 B/L upper and lower extremities; DTRs 2+ intact and symmetric  CHEST/LUNG: Clear to percussion bilaterally; No rales, rhonchi, wheezing, or rubs  HEART: Regular rate and rhythm; No murmurs, rubs, or gallops  ABDOMEN: Soft, Nontender, Nondistended; Bowel sounds present  EXTREMITIES:  2+ Peripheral Pulses, No clubbing, cyanosis, or edema  LYMPH: No lymphadenopathy noted  SKIN: No rashes or lesions    LABS:  CBC Full  -  ( 23 Feb 2021 08:49 )  WBC Count : 7.25 K/uL  RBC Count : 4.33 M/uL  Hemoglobin : 12.8 g/dL  Hematocrit : 36.7 %  Platelet Count - Automated : 142 K/uL  Mean Cell Volume : 84.8 fl  Mean Cell Hemoglobin : 29.6 pg  Mean Cell Hemoglobin Concentration : 34.9 gm/dL  Auto Neutrophil # : x  Auto Lymphocyte # : x  Auto Monocyte # : x  Auto Eosinophil # : x  Auto Basophil # : x  Auto Neutrophil % : x  Auto Lymphocyte % : x  Auto Monocyte % : x  Auto Eosinophil % : x  Auto Basophil % : x    02-23    134<L>  |  102  |  27<H>  ----------------------------<  138<H>  3.4<L>   |  20<L>  |  1.17    Ca    8.6      23 Feb 2021 08:49  Phos  2.6     02-23  Mg     1.9     02-23    TPro  6.1  /  Alb  3.1<L>  /  TBili  0.5  /  DBili  <0.1  /  AST  48<H>  /  ALT  37  /  AlkPhos  94  02-23    CAPILLARY BLOOD GLUCOSE        PT/INR - ( 21 Feb 2021 17:53 )   PT: 13.4 sec;   INR: 1.13 ratio         PTT - ( 22 Feb 2021 07:22 )  PTT:32.6 sec    CARDIAC MARKERS ( 22 Feb 2021 07:22 )  <0.015 ng/mL / x     / x     / x     / x      CARDIAC MARKERS ( 21 Feb 2021 17:53 )  0.023 ng/mL / x     / x     / x     / x          Culture Results:   No growth to date. (02-21 @ 22:03)  Culture Results:   No growth to date. (02-21 @ 22:00)        RADIOLOGY:  < from: Xray Chest 1 View- PORTABLE-Urgent (Xray Chest 1 View- PORTABLE-Urgent .) (02.23.21 @ 16:41) >  IMPRESSION:    Increasing bilateral infiltrates        < end of copied text >      CRITICAL CARE TIME SPENT:30 minutes

## 2021-02-23 NOTE — CHART NOTE - NSCHARTNOTEFT_GEN_A_CORE
Pt consulted for ICU. recommended non rebreather mask however, pt is doing better with Pendant optimizer 12L, now 93-95%. Mildly tachypneic. improving with position change to semi fowlers. continue current oxygen therapy. prone position as needed. will reconsult ICU if pt is deteriorating further. Pt consulted for ICU. recommended non rebreather mask however, pt is doing better with Pendant optimizer 12L, now 93-95%. Mildly tachypneic. improving with position change to semi fowlers. continue current oxygen therapy. prone position as needed. will reconsult ICU if pt is deteriorating further.  Updated pt condition to wife Brooklyn Wilburn at [phone]. explain plan of care with possible needs high flow O2 therapy in ICU if pt is not tolerating current oxygen. She verbalized understanding.

## 2021-02-24 LAB
ALBUMIN SERPL ELPH-MCNC: 3.1 G/DL — LOW (ref 3.5–5)
ALP SERPL-CCNC: 96 U/L — SIGNIFICANT CHANGE UP (ref 40–120)
ALT FLD-CCNC: 45 U/L DA — SIGNIFICANT CHANGE UP (ref 10–60)
ANION GAP SERPL CALC-SCNC: 7 MMOL/L — SIGNIFICANT CHANGE UP (ref 5–17)
AST SERPL-CCNC: 56 U/L — HIGH (ref 10–40)
BILIRUB SERPL-MCNC: 0.6 MG/DL — SIGNIFICANT CHANGE UP (ref 0.2–1.2)
BUN SERPL-MCNC: 30 MG/DL — HIGH (ref 7–18)
CALCIUM SERPL-MCNC: 9 MG/DL — SIGNIFICANT CHANGE UP (ref 8.4–10.5)
CHLORIDE SERPL-SCNC: 103 MMOL/L — SIGNIFICANT CHANGE UP (ref 96–108)
CO2 SERPL-SCNC: 26 MMOL/L — SIGNIFICANT CHANGE UP (ref 22–31)
CREAT SERPL-MCNC: 1.15 MG/DL — SIGNIFICANT CHANGE UP (ref 0.5–1.3)
GLUCOSE SERPL-MCNC: 123 MG/DL — HIGH (ref 70–99)
HCT VFR BLD CALC: 40.4 % — SIGNIFICANT CHANGE UP (ref 39–50)
HGB BLD-MCNC: 13.5 G/DL — SIGNIFICANT CHANGE UP (ref 13–17)
MAGNESIUM SERPL-MCNC: 2.2 MG/DL — SIGNIFICANT CHANGE UP (ref 1.6–2.6)
MCHC RBC-ENTMCNC: 28.9 PG — SIGNIFICANT CHANGE UP (ref 27–34)
MCHC RBC-ENTMCNC: 33.4 GM/DL — SIGNIFICANT CHANGE UP (ref 32–36)
MCV RBC AUTO: 86.5 FL — SIGNIFICANT CHANGE UP (ref 80–100)
NRBC # BLD: 0 /100 WBCS — SIGNIFICANT CHANGE UP (ref 0–0)
PHOSPHATE SERPL-MCNC: 3.6 MG/DL — SIGNIFICANT CHANGE UP (ref 2.5–4.5)
PLATELET # BLD AUTO: 149 K/UL — LOW (ref 150–400)
POTASSIUM SERPL-MCNC: 4.6 MMOL/L — SIGNIFICANT CHANGE UP (ref 3.5–5.3)
POTASSIUM SERPL-SCNC: 4.6 MMOL/L — SIGNIFICANT CHANGE UP (ref 3.5–5.3)
PROT SERPL-MCNC: 6.4 G/DL — SIGNIFICANT CHANGE UP (ref 6–8.3)
RBC # BLD: 4.67 M/UL — SIGNIFICANT CHANGE UP (ref 4.2–5.8)
RBC # FLD: 14.5 % — SIGNIFICANT CHANGE UP (ref 10.3–14.5)
SODIUM SERPL-SCNC: 136 MMOL/L — SIGNIFICANT CHANGE UP (ref 135–145)
WBC # BLD: 6.77 K/UL — SIGNIFICANT CHANGE UP (ref 3.8–10.5)
WBC # FLD AUTO: 6.77 K/UL — SIGNIFICANT CHANGE UP (ref 3.8–10.5)

## 2021-02-24 RX ADMIN — Medication 50 MILLIGRAM(S): at 05:33

## 2021-02-24 RX ADMIN — Medication 2000 UNIT(S): at 11:04

## 2021-02-24 RX ADMIN — PANTOPRAZOLE SODIUM 40 MILLIGRAM(S): 20 TABLET, DELAYED RELEASE ORAL at 05:32

## 2021-02-24 RX ADMIN — Medication 240 MILLIGRAM(S): at 05:32

## 2021-02-24 RX ADMIN — Medication 6 MILLIGRAM(S): at 05:33

## 2021-02-24 RX ADMIN — Medication 200 MILLIGRAM(S): at 21:22

## 2021-02-24 RX ADMIN — MONTELUKAST 10 MILLIGRAM(S): 4 TABLET, CHEWABLE ORAL at 21:21

## 2021-02-24 RX ADMIN — ZINC SULFATE TAB 220 MG (50 MG ZINC EQUIVALENT) 220 MILLIGRAM(S): 220 (50 ZN) TAB at 11:04

## 2021-02-24 RX ADMIN — Medication 200 MILLIGRAM(S): at 04:07

## 2021-02-24 RX ADMIN — Medication 1000 MILLIGRAM(S): at 12:07

## 2021-02-24 RX ADMIN — Medication 650 MILLIGRAM(S): at 04:06

## 2021-02-24 RX ADMIN — ATORVASTATIN CALCIUM 80 MILLIGRAM(S): 80 TABLET, FILM COATED ORAL at 21:21

## 2021-02-24 RX ADMIN — REMDESIVIR 500 MILLIGRAM(S): 5 INJECTION INTRAVENOUS at 21:22

## 2021-02-24 RX ADMIN — ENOXAPARIN SODIUM 40 MILLIGRAM(S): 100 INJECTION SUBCUTANEOUS at 11:04

## 2021-02-24 NOTE — PROGRESS NOTE ADULT - SUBJECTIVE AND OBJECTIVE BOX
Patient is a 65y old  Male who presents with a chief complaint of SOB, fever, cough, body aches (23 Feb 2021 16:34)    pt seen in icu [  ], reg med floor [  x ], bed [x  ], chair at bedside [   ], a+o x3 [x ], lethargic [  ],    nad [x  ]      Allergies    No Known Allergies  Shrimp- Nasal congestion (Other)        Vitals    T(F): 97.2 (02-23-21 @ 21:46), Max: 97.6 (02-23-21 @ 12:42)  HR: 69 (02-23-21 @ 23:08) (69 - 80)  BP: 117/62 (02-23-21 @ 21:46) (108/65 - 125/77)  RR: 24 (02-23-21 @ 23:08) (24 - 40)  SpO2: 96% (02-23-21 @ 23:08) (80% - 96%)  Wt(kg): --  CAPILLARY BLOOD GLUCOSE          Labs                          12.8   7.25  )-----------( 142      ( 23 Feb 2021 08:49 )             36.7       02-23    134<L>  |  102  |  27<H>  ----------------------------<  138<H>  3.4<L>   |  20<L>  |  1.17    Ca    8.6      23 Feb 2021 08:49  Phos  2.6     02-23  Mg     1.9     02-23    TPro  6.1  /  Alb  3.1<L>  /  TBili  0.5  /  DBili  <0.1  /  AST  48<H>  /  ALT  37  /  AlkPhos  94  02-23      CARDIAC MARKERS ( 22 Feb 2021 07:22 )  <0.015 ng/mL / x     / x     / x     / x            .Blood Blood-Peripheral  02-21 @ 22:03   No growth to date.  --  --      .Blood Blood-Peripheral  02-21 @ 22:00   No growth to date.  --  --          Radiology Results      Meds    MEDICATIONS  (STANDING):  ascorbic acid 1000 milliGRAM(s) Oral daily  atorvastatin 80 milliGRAM(s) Oral at bedtime  cholecalciferol 2000 Unit(s) Oral daily  dexAMETHasone  Injectable 6 milliGRAM(s) IV Push daily  diltiazem    milliGRAM(s) Oral daily  enoxaparin Injectable 40 milliGRAM(s) SubCutaneous daily  hydrochlorothiazide 50 milliGRAM(s) Oral daily  montelukast 10 milliGRAM(s) Oral at bedtime  pantoprazole    Tablet 40 milliGRAM(s) Oral before breakfast  remdesivir  IVPB   IV Intermittent   remdesivir  IVPB 100 milliGRAM(s) IV Intermittent every 24 hours  zinc sulfate 220 milliGRAM(s) Oral daily      MEDICATIONS  (PRN):  acetaminophen   Tablet .. 650 milliGRAM(s) Oral every 6 hours PRN Temp greater or equal to 38C (100.4F), Moderate Pain (4 - 6)  guaiFENesin   Syrup  (Sugar-Free) 200 milliGRAM(s) Oral every 6 hours PRN Cough  ondansetron Injectable 4 milliGRAM(s) IV Push every 6 hours PRN Nausea and/or Vomiting      Physical Exam    Neuro :  no focal deficits  Respiratory: CTA B/L  CV: RRR, S1S2, no murmurs,   Abdominal: Soft, NT, ND +BS,  Extremities: No edema, + peripheral pulses    ASSESSMENT    pneumonia 2nd to covid 19,   marya,   hypokalemia,   h/o Cluster headache  BPH (benign prostatic hypertrophy)  Spinal stenosis s/p laminectomy  GERD (gastroesophageal reflux disease)  Hyperlipidemia  HTN (hypertension)            PLAN      contact and airborne isolation  cont remdesevir until 2/26/21  covid ab neg  cont dexamethasone  pepcid, singulair, vit c, vit d, zinc   pulm cons  cont albuterol inhaler   procalcitonin neg noted above,   D-dimer, crp, ldh, ferritin, lactate noted above,   tmx 102.8  cont tylenol prn,   robitussin prn   blood cx neg  O2 sat (92% - 94%) n/c 4L  cont O2 via nasal canula as needed   cont ivf   serum creat wnl   potassium wnl   cont current meds         Patient is a 65y old  Male who presents with a chief complaint of SOB, fever, cough, body aches (23 Feb 2021 16:34)    pt seen in icu [  ], reg med floor [  x ], bed [x  ], chair at bedside [   ], a+o x3 [x ], lethargic [  ],    nad [x  ]    pt desat overnight and started on nrbm    Allergies    No Known Allergies  Shrimp- Nasal congestion (Other)        Vitals    T(F): 97.2 (02-23-21 @ 21:46), Max: 97.6 (02-23-21 @ 12:42)  HR: 69 (02-23-21 @ 23:08) (69 - 80)  BP: 117/62 (02-23-21 @ 21:46) (108/65 - 125/77)  RR: 24 (02-23-21 @ 23:08) (24 - 40)  SpO2: 96% (02-23-21 @ 23:08) (80% - 96%)  Wt(kg): --  CAPILLARY BLOOD GLUCOSE          Labs                          12.8   7.25  )-----------( 142      ( 23 Feb 2021 08:49 )             36.7       02-23    134<L>  |  102  |  27<H>  ----------------------------<  138<H>  3.4<L>   |  20<L>  |  1.17    Ca    8.6      23 Feb 2021 08:49  Phos  2.6     02-23  Mg     1.9     02-23    TPro  6.1  /  Alb  3.1<L>  /  TBili  0.5  /  DBili  <0.1  /  AST  48<H>  /  ALT  37  /  AlkPhos  94  02-23      CARDIAC MARKERS ( 22 Feb 2021 07:22 )  <0.015 ng/mL / x     / x     / x     / x            .Blood Blood-Peripheral  02-21 @ 22:03   No growth to date.  --  --      .Blood Blood-Peripheral  02-21 @ 22:00   No growth to date.  --  --          Radiology Results      Meds    MEDICATIONS  (STANDING):  ascorbic acid 1000 milliGRAM(s) Oral daily  atorvastatin 80 milliGRAM(s) Oral at bedtime  cholecalciferol 2000 Unit(s) Oral daily  dexAMETHasone  Injectable 6 milliGRAM(s) IV Push daily  diltiazem    milliGRAM(s) Oral daily  enoxaparin Injectable 40 milliGRAM(s) SubCutaneous daily  hydrochlorothiazide 50 milliGRAM(s) Oral daily  montelukast 10 milliGRAM(s) Oral at bedtime  pantoprazole    Tablet 40 milliGRAM(s) Oral before breakfast  remdesivir  IVPB   IV Intermittent   remdesivir  IVPB 100 milliGRAM(s) IV Intermittent every 24 hours  zinc sulfate 220 milliGRAM(s) Oral daily      MEDICATIONS  (PRN):  acetaminophen   Tablet .. 650 milliGRAM(s) Oral every 6 hours PRN Temp greater or equal to 38C (100.4F), Moderate Pain (4 - 6)  guaiFENesin   Syrup  (Sugar-Free) 200 milliGRAM(s) Oral every 6 hours PRN Cough  ondansetron Injectable 4 milliGRAM(s) IV Push every 6 hours PRN Nausea and/or Vomiting      Physical Exam    Neuro :  no focal deficits  Respiratory: CTA B/L  CV: RRR, S1S2, no murmurs,   Abdominal: Soft, NT, ND +BS,  Extremities: No edema, + peripheral pulses    ASSESSMENT    pneumonia 2nd to covid 19,   marya,   hypokalemia,   h/o Cluster headache  BPH (benign prostatic hypertrophy)  Spinal stenosis s/p laminectomy  GERD (gastroesophageal reflux disease)  Hyperlipidemia  HTN (hypertension)            PLAN      contact and airborne isolation  cont remdesevir until 2/26/21  covid ab neg  cont dexamethasone  pepcid, singulair, vit c, vit d, zinc   pulm f/u  cont albuterol inhaler   procalcitonin neg noted  ,   D-dimer, crp, ldh, ferritin, lactate noted ,   afebrile  cont tylenol prn,   robitussin prn   blood cx neg  O2 sat (80% - 96%) nrbm 15 L  cont O2 via nrbm and taper as tolerated    icu cons noted  continue to monitor pulse oximetry  monitor biomarkers TIW  will not accept to icu at this time  cont incentive spirometer   serum creat wnl   supplement potassium as needed for hypokalemia  cont current meds

## 2021-02-24 NOTE — PROGRESS NOTE ADULT - SUBJECTIVE AND OBJECTIVE BOX
Pt is awake, alert, lying in bed on NRM. Has been tachypneic, alternating between NRM and Oxymizer Pendant. Saturating 94% on NRM this AM. Comfortable. ICU consulted, no escalation at present.     INTERVAL HPI/OVERNIGHT EVENTS:      VITAL SIGNS:  T(F): 99.4 (02-24-21 @ 04:50)  HR: 67 (02-24-21 @ 04:50)  BP: 116/68 (02-24-21 @ 04:50)  RR: 26 (02-24-21 @ 09:09)  SpO2: 94% (02-24-21 @ 09:09)  Wt(kg): --  I&O's Detail          REVIEW OF SYSTEMS:    CONSTITUTIONAL:  No fevers, chills, sweats    HEENT:  Eyes:  No diplopia or blurred vision. ENT:  No earache, sore throat or runny nose.    CARDIOVASCULAR:  No pressure, squeezing, tightness, or heaviness about the chest; no palpitations.    RESPIRATORY:  Per HPI    GASTROINTESTINAL:  No abdominal pain, nausea, vomiting or diarrhea.    GENITOURINARY:  No dysuria, frequency or urgency.    NEUROLOGIC:  No paresthesias, fasciculations, seizures or weakness.    PSYCHIATRIC:  No disorder of thought or mood.      PHYSICAL EXAM:    Constitutional: Well developed and nourished  Eyes:Perrla  ENMT: normal  Neck:supple  Respiratory: good air entry; tachypneic at times.   Cardiovascular: S1 S2 regular  Gastrointestinal: Soft, Non tender  Extremities: No edema  Vascular:normal  Neurological:Awake, alert,Ox3  Musculoskeletal:Normal      MEDICATIONS  (STANDING):  ascorbic acid 1000 milliGRAM(s) Oral daily  atorvastatin 80 milliGRAM(s) Oral at bedtime  cholecalciferol 2000 Unit(s) Oral daily  dexAMETHasone  Injectable 6 milliGRAM(s) IV Push daily  diltiazem    milliGRAM(s) Oral daily  enoxaparin Injectable 40 milliGRAM(s) SubCutaneous daily  hydrochlorothiazide 50 milliGRAM(s) Oral daily  montelukast 10 milliGRAM(s) Oral at bedtime  pantoprazole    Tablet 40 milliGRAM(s) Oral before breakfast  remdesivir  IVPB   IV Intermittent   remdesivir  IVPB 100 milliGRAM(s) IV Intermittent every 24 hours  zinc sulfate 220 milliGRAM(s) Oral daily    MEDICATIONS  (PRN):  acetaminophen   Tablet .. 650 milliGRAM(s) Oral every 6 hours PRN Temp greater or equal to 38C (100.4F), Moderate Pain (4 - 6)  guaiFENesin   Syrup  (Sugar-Free) 200 milliGRAM(s) Oral every 6 hours PRN Cough  ondansetron Injectable 4 milliGRAM(s) IV Push every 6 hours PRN Nausea and/or Vomiting      Allergies    No Known Allergies    Intolerances    Shrimp- Nasal congestion (Other)      LABS:                        13.5   6.77  )-----------( 149      ( 24 Feb 2021 08:21 )             40.4     02-24    136  |  103  |  30<H>  ----------------------------<  123<H>  4.6   |  26  |  1.15    Ca    9.0      24 Feb 2021 08:21  Phos  3.6     02-24  Mg     2.2     02-24    TPro  6.4  /  Alb  3.1<L>  /  TBili  0.6  /  DBili  x   /  AST  56<H>  /  ALT  45  /  AlkPhos  96  02-24              CAPILLARY BLOOD GLUCOSE        pro-bnp -- 02-21 @ 17:53     d-dimer 290  02-21 @ 17:53      RADIOLOGY & ADDITIONAL TESTS:    CXR:    < from: Xray Chest 1 View- PORTABLE-Urgent (Xray Chest 1 View- PORTABLE-Urgent .) (02.23.21 @ 16:41) >  IMPRESSION:    Increasing bilateral infiltrates    < end of copied text >  Ct scan chest:    ekg;    echo:

## 2021-02-24 NOTE — PROGRESS NOTE ADULT - PROBLEM SELECTOR PLAN 1
-O2 sat 83 % on room air at rest, non tolerating NC, pendant optimizer  -COVID antibody negative  -febrile, resolved with Acetaminophen   -no leukocytosis  -c/w remdesivir  D # 3  -c/w decadron D # 3  -c/w incentive spirometer therapy  -Pulmonology / [facility]  -ICU consulted for hypoxia with pendant optimizer. no plan to ICU admission  -on NRB 15L, O2 sat 94-95%  -unable prone due to neck pain  -monitor O2 sat

## 2021-02-24 NOTE — PROGRESS NOTE ADULT - SUBJECTIVE AND OBJECTIVE BOX
NP Note discussed with  Primary Attending    Patient is a 65y old  Male who presents with a chief complaint of SOB, fever, cough, body aches (24 Feb 2021 10:54)    HPI- Patient is a 65 year old male from home, with PMH of HTN, HLD and GERD. patient presented to the ED due to SOB, cough, fever, body aches for the past 6 days. Patient states he was tested positive for COVID on 2/16. Patient states his wife and son who he lives with have also tested positive for COVID as well. Patient states that he noted to have been saturating at 88% on room air and was advised to come to the ED by PCP for oxygen supplementation. Patient also complains of nausea, diarrhea and poor appetite at home for the past few days. Patient admitted to medicine for acute respiratory failure secondary to COVID infection.     Pt consulted for ICU 2/23 for hypoxia with pendant 12L NC optimizer, Pt was not tolerating with pendant optimizer due to mouth breather. CXR shows worsening b/l infiltrates. switched to NRB 15L. now saturating 95%. encouraged pron but not tolerated due to neck pain.   on Remdesivir, Decadron Day # 3.          INTERVAL HPI/OVERNIGHT EVENTS: no new complaints    MEDICATIONS  (STANDING):  ascorbic acid 1000 milliGRAM(s) Oral daily  atorvastatin 80 milliGRAM(s) Oral at bedtime  cholecalciferol 2000 Unit(s) Oral daily  dexAMETHasone  Injectable 6 milliGRAM(s) IV Push daily  diltiazem    milliGRAM(s) Oral daily  enoxaparin Injectable 40 milliGRAM(s) SubCutaneous daily  hydrochlorothiazide 50 milliGRAM(s) Oral daily  montelukast 10 milliGRAM(s) Oral at bedtime  pantoprazole    Tablet 40 milliGRAM(s) Oral before breakfast  remdesivir  IVPB   IV Intermittent   remdesivir  IVPB 100 milliGRAM(s) IV Intermittent every 24 hours  zinc sulfate 220 milliGRAM(s) Oral daily    MEDICATIONS  (PRN):  acetaminophen   Tablet .. 650 milliGRAM(s) Oral every 6 hours PRN Temp greater or equal to 38C (100.4F), Moderate Pain (4 - 6)  guaiFENesin   Syrup  (Sugar-Free) 200 milliGRAM(s) Oral every 6 hours PRN Cough  ondansetron Injectable 4 milliGRAM(s) IV Push every 6 hours PRN Nausea and/or Vomiting      __________________________________________________  REVIEW OF SYSTEMS:    CONSTITUTIONAL: No fever,   EYES: no acute visual disturbances  NECK: No pain or stiffness  RESPIRATORY: No cough; No shortness of breath  CARDIOVASCULAR: No chest pain, no palpitations  GASTROINTESTINAL: No pain. No nausea or vomiting; No diarrhea   NEUROLOGICAL: No headache or numbness, no tremors  MUSCULOSKELETAL: No joint pain, no muscle pain  GENITOURINARY: no dysuria, no frequency, no hesitancy  PSYCHIATRY: no depression , no anxiety  ALL OTHER  ROS negative        Vital Signs Last 24 Hrs  T(C): 36.6 (24 Feb 2021 12:43), Max: 37.4 (24 Feb 2021 04:50)  T(F): 97.9 (24 Feb 2021 12:43), Max: 99.4 (24 Feb 2021 04:50)  HR: 67 (24 Feb 2021 12:43) (67 - 72)  BP: 99/64 (24 Feb 2021 12:43) (99/64 - 117/62)  BP(mean): --  RR: 24 (24 Feb 2021 14:49) (22 - 40)  SpO2: 91% (24 Feb 2021 14:49) (91% - 99%)    ________________________________________________  PHYSICAL EXAM:  GENERAL: NAD, ill appearing  HEENT: Normocephalic;  conjunctivae and sclerae clear; moist mucous membranes;   NECK : supple  CHEST/LUNG: Clear to auscultation bilaterally with fair air entry Eupneic on O2  HEART: S1 S2  regular; no murmurs, gallops or rubs  ABDOMEN: Soft, Nontender, Nondistended; Bowel sounds present  EXTREMITIES: no cyanosis; no edema; no calf tenderness  SKIN: warm and dry; no rash  NERVOUS SYSTEM:  Awake and alert; Oriented  to place, person and time ; no new deficits    _________________________________________________  LABS:                        13.5   6.77  )-----------( 149      ( 24 Feb 2021 08:21 )             40.4     02-24    136  |  103  |  30<H>  ----------------------------<  123<H>  4.6   |  26  |  1.15    Ca    9.0      24 Feb 2021 08:21  Phos  3.6     02-24  Mg     2.2     02-24    TPro  6.4  /  Alb  3.1<L>  /  TBili  0.6  /  DBili  x   /  AST  56<H>  /  ALT  45  /  AlkPhos  96  02-24        CAPILLARY BLOOD GLUCOSE            RADIOLOGY & ADDITIONAL TESTS:    Imaging  Reviewed:  YES  < from: Xray Chest 1 View- PORTABLE-Urgent (Xray Chest 1 View- PORTABLE-Urgent .) (02.23.21 @ 16:41) >    EXAM:  XR CHEST PORTABLE URGENT 1V                            PROCEDURE DATE:  02/23/2021          INTERPRETATION:  CLINICAL INDICATION: 65 years  Male with increasing SOB. Covid    COMPARISON: 2/21/2021    AP view of the chest demonstrates increasing patchy bilateral interstitial and airspace infiltrates. There is no pleural effusion. The right hemidiaphragm is again elevated. There is no pneumothorax.    The heart, mediastinum and marcie cannot be assessed due to projection.    The pulmonary vasculature is normal.    Mild thoracic degenerative changes are present.    IMPRESSION:    Increasing bilateral infiltrates            BRICE ST MD; Attending Radiologist  This document has been electronically signed. Feb 23 2021  4:50PM    < end of copied text >      Consultant(s) Notes Reviewed:   YES      Plan of care was discussed with patient and /or primary care giver; all questions and concerns were addressed

## 2021-02-24 NOTE — PROGRESS NOTE ADULT - ASSESSMENT
1. PNA 2nd to Covid-19  - PCR positive.   - CXR noted, 2/23 increasing infiltrates.   - Continue Vit C, Vit D, Zinc   - Continue Dexamethasone and Remdesivir  - Continue Singulair and Pepcid   - ICU consult noted - no escalation at present.   - Robitussin PRN for cough  - Tylenol PRN for temp   - O2 Supp - alternating between NRM and pendant.   - Monitor O2 Sat.  - Monitor labs  - F.U CXR  - Prone positioning as tolerated   - Incentive Spirometry  - Isolation precautions   - DVT and GI PPX     2. HTN  - Continue meds   - Monitor BP     3. HLD  - Cont meds  - Lipid panel     4. GERD  - Antireflux diet   - Cont meds

## 2021-02-25 LAB
ALBUMIN SERPL ELPH-MCNC: 3.1 G/DL — LOW (ref 3.5–5)
ALP SERPL-CCNC: 108 U/L — SIGNIFICANT CHANGE UP (ref 40–120)
ALT FLD-CCNC: 57 U/L DA — SIGNIFICANT CHANGE UP (ref 10–60)
ANION GAP SERPL CALC-SCNC: 7 MMOL/L — SIGNIFICANT CHANGE UP (ref 5–17)
AST SERPL-CCNC: 46 U/L — HIGH (ref 10–40)
BASOPHILS # BLD AUTO: 0.02 K/UL — SIGNIFICANT CHANGE UP (ref 0–0.2)
BASOPHILS NFR BLD AUTO: 0.2 % — SIGNIFICANT CHANGE UP (ref 0–2)
BILIRUB SERPL-MCNC: 0.7 MG/DL — SIGNIFICANT CHANGE UP (ref 0.2–1.2)
BUN SERPL-MCNC: 30 MG/DL — HIGH (ref 7–18)
CALCIUM SERPL-MCNC: 8.7 MG/DL — SIGNIFICANT CHANGE UP (ref 8.4–10.5)
CHLORIDE SERPL-SCNC: 102 MMOL/L — SIGNIFICANT CHANGE UP (ref 96–108)
CO2 SERPL-SCNC: 25 MMOL/L — SIGNIFICANT CHANGE UP (ref 22–31)
CREAT SERPL-MCNC: 1.17 MG/DL — SIGNIFICANT CHANGE UP (ref 0.5–1.3)
CRP SERPL-MCNC: 1.58 MG/DL — HIGH (ref 0–0.4)
D DIMER BLD IA.RAPID-MCNC: 283 NG/ML DDU — HIGH
EOSINOPHIL # BLD AUTO: 0 K/UL — SIGNIFICANT CHANGE UP (ref 0–0.5)
EOSINOPHIL NFR BLD AUTO: 0 % — SIGNIFICANT CHANGE UP (ref 0–6)
ERYTHROCYTE [SEDIMENTATION RATE] IN BLOOD: 20 MM/HR — SIGNIFICANT CHANGE UP (ref 0–20)
FERRITIN SERPL-MCNC: 652 NG/ML — HIGH (ref 30–400)
GLUCOSE SERPL-MCNC: 130 MG/DL — HIGH (ref 70–99)
HCT VFR BLD CALC: 41.2 % — SIGNIFICANT CHANGE UP (ref 39–50)
HGB BLD-MCNC: 13.7 G/DL — SIGNIFICANT CHANGE UP (ref 13–17)
IMM GRANULOCYTES NFR BLD AUTO: 0.7 % — SIGNIFICANT CHANGE UP (ref 0–1.5)
LYMPHOCYTES # BLD AUTO: 1.15 K/UL — SIGNIFICANT CHANGE UP (ref 1–3.3)
LYMPHOCYTES # BLD AUTO: 10.2 % — LOW (ref 13–44)
MAGNESIUM SERPL-MCNC: 2.2 MG/DL — SIGNIFICANT CHANGE UP (ref 1.6–2.6)
MCHC RBC-ENTMCNC: 28.3 PG — SIGNIFICANT CHANGE UP (ref 27–34)
MCHC RBC-ENTMCNC: 33.3 GM/DL — SIGNIFICANT CHANGE UP (ref 32–36)
MCV RBC AUTO: 85.1 FL — SIGNIFICANT CHANGE UP (ref 80–100)
MONOCYTES # BLD AUTO: 0.64 K/UL — SIGNIFICANT CHANGE UP (ref 0–0.9)
MONOCYTES NFR BLD AUTO: 5.7 % — SIGNIFICANT CHANGE UP (ref 2–14)
NEUTROPHILS # BLD AUTO: 9.38 K/UL — HIGH (ref 1.8–7.4)
NEUTROPHILS NFR BLD AUTO: 83.2 % — HIGH (ref 43–77)
NRBC # BLD: 0 /100 WBCS — SIGNIFICANT CHANGE UP (ref 0–0)
PHOSPHATE SERPL-MCNC: 2.7 MG/DL — SIGNIFICANT CHANGE UP (ref 2.5–4.5)
PLATELET # BLD AUTO: 192 K/UL — SIGNIFICANT CHANGE UP (ref 150–400)
POTASSIUM SERPL-MCNC: 4.1 MMOL/L — SIGNIFICANT CHANGE UP (ref 3.5–5.3)
POTASSIUM SERPL-SCNC: 4.1 MMOL/L — SIGNIFICANT CHANGE UP (ref 3.5–5.3)
PROT SERPL-MCNC: 6.4 G/DL — SIGNIFICANT CHANGE UP (ref 6–8.3)
RBC # BLD: 4.84 M/UL — SIGNIFICANT CHANGE UP (ref 4.2–5.8)
RBC # FLD: 13.9 % — SIGNIFICANT CHANGE UP (ref 10.3–14.5)
SODIUM SERPL-SCNC: 134 MMOL/L — LOW (ref 135–145)
WBC # BLD: 11.27 K/UL — HIGH (ref 3.8–10.5)
WBC # FLD AUTO: 11.27 K/UL — HIGH (ref 3.8–10.5)

## 2021-02-25 RX ORDER — SODIUM CHLORIDE 0.65 %
1 AEROSOL, SPRAY (ML) NASAL THREE TIMES A DAY
Refills: 0 | Status: DISCONTINUED | OUTPATIENT
Start: 2021-02-25 | End: 2021-03-19

## 2021-02-25 RX ADMIN — Medication 50 MILLIGRAM(S): at 05:39

## 2021-02-25 RX ADMIN — Medication 240 MILLIGRAM(S): at 05:39

## 2021-02-25 RX ADMIN — Medication 6 MILLIGRAM(S): at 05:40

## 2021-02-25 RX ADMIN — ENOXAPARIN SODIUM 40 MILLIGRAM(S): 100 INJECTION SUBCUTANEOUS at 12:38

## 2021-02-25 RX ADMIN — ATORVASTATIN CALCIUM 80 MILLIGRAM(S): 80 TABLET, FILM COATED ORAL at 22:31

## 2021-02-25 RX ADMIN — Medication 1000 MILLIGRAM(S): at 12:38

## 2021-02-25 RX ADMIN — Medication 200 MILLIGRAM(S): at 12:37

## 2021-02-25 RX ADMIN — MONTELUKAST 10 MILLIGRAM(S): 4 TABLET, CHEWABLE ORAL at 22:31

## 2021-02-25 RX ADMIN — PANTOPRAZOLE SODIUM 40 MILLIGRAM(S): 20 TABLET, DELAYED RELEASE ORAL at 05:39

## 2021-02-25 RX ADMIN — REMDESIVIR 500 MILLIGRAM(S): 5 INJECTION INTRAVENOUS at 22:32

## 2021-02-25 RX ADMIN — ZINC SULFATE TAB 220 MG (50 MG ZINC EQUIVALENT) 220 MILLIGRAM(S): 220 (50 ZN) TAB at 12:38

## 2021-02-25 RX ADMIN — Medication 2000 UNIT(S): at 12:38

## 2021-02-25 NOTE — PROGRESS NOTE ADULT - ASSESSMENT
1. PNA 2nd to Covid-19  - PCR positive.   - CXR noted, 2/23 increasing infiltrates.   - Continue Vit C, Vit D, Zinc   - Continue Dexamethasone and Remdesivir  - Continue Singulair and Pepcid   - ICU consult noted - no escalation at present.   - Robitussin PRN for cough  - Tylenol PRN for temp   - O2 Supp - on NRM   - Monitor O2 Sat.  - Monitor labs  - F.U CXR  - Unable to tolerate prone positioning due to prior neck surgery.   - Encouraged side lying periodically as tolerated.   - Incentive Spirometry  - Isolation precautions   - Nasal saline spray for dryness   - Cont Ensure Compact    - DVT and GI PPX     2. HTN  - Continue meds   - Monitor BP     3. HLD  - Cont meds  - Lipid panel     4. GERD  - Antireflux diet   - Cont meds

## 2021-02-25 NOTE — PROGRESS NOTE ADULT - SUBJECTIVE AND OBJECTIVE BOX
NP Note discussed with  Primary Attending    Patient is a 65y old  Male who presents with a chief complaint of SOB, fever, cough, body aches (25 Feb 2021 11:18)    HPI-  Patient is a 65 year old male from home, with PMH of HTN, HLD and GERD. patient presented to the ED due to SOB, cough, fever, body aches for the past 6 days. Patient states he was tested positive for COVID on 2/16. Patient states his wife and son who he lives with have also tested positive for COVID as well. Patient states that he noted to have been saturating at 88% on room air and was advised to come to the ED by PCP for oxygen supplementation. Patient also complains of nausea, diarrhea and poor appetite at home for the past few days. Patient admitted to medicine for acute respiratory failure secondary to COVID infection.     Pt consulted for ICU 2/23 for hypoxia with pendant 12L NC optimizer, Pt was not tolerating with pendant optimizer due to mouth breather. CXR shows worsening b/l infiltrates. switched to NRB 15L. now saturating 95%. encouraged pron but not tolerated due to neck pain.   on Remdesivir, Decadron Day # 4.      INTERVAL HPI/OVERNIGHT EVENTS: seen at bedside. feeling better today still sob on exertion or eating. Saturating 91-94% 15L NRB.     MEDICATIONS  (STANDING):  ascorbic acid 1000 milliGRAM(s) Oral daily  atorvastatin 80 milliGRAM(s) Oral at bedtime  cholecalciferol 2000 Unit(s) Oral daily  dexAMETHasone  Injectable 6 milliGRAM(s) IV Push daily  diltiazem    milliGRAM(s) Oral daily  enoxaparin Injectable 40 milliGRAM(s) SubCutaneous daily  hydrochlorothiazide 50 milliGRAM(s) Oral daily  montelukast 10 milliGRAM(s) Oral at bedtime  pantoprazole    Tablet 40 milliGRAM(s) Oral before breakfast  remdesivir  IVPB   IV Intermittent   remdesivir  IVPB 100 milliGRAM(s) IV Intermittent every 24 hours  zinc sulfate 220 milliGRAM(s) Oral daily    MEDICATIONS  (PRN):  acetaminophen   Tablet .. 650 milliGRAM(s) Oral every 6 hours PRN Temp greater or equal to 38C (100.4F), Moderate Pain (4 - 6)  guaiFENesin   Syrup  (Sugar-Free) 200 milliGRAM(s) Oral every 6 hours PRN Cough  ondansetron Injectable 4 milliGRAM(s) IV Push every 6 hours PRN Nausea and/or Vomiting  sodium chloride 0.65% Nasal 1 Spray(s) Both Nostrils three times a day PRN Nasal Congestion      __________________________________________________  REVIEW OF SYSTEMS:    CONSTITUTIONAL: No fever,   EYES: no acute visual disturbances  NECK: No pain or stiffness  RESPIRATORY: No cough; No shortness of breath  CARDIOVASCULAR: No chest pain, no palpitations  GASTROINTESTINAL: No pain. No nausea or vomiting; No diarrhea   NEUROLOGICAL: No headache or numbness, no tremors  MUSCULOSKELETAL: No joint pain, no muscle pain  GENITOURINARY: no dysuria, no frequency, no hesitancy  PSYCHIATRY: no depression , no anxiety  ALL OTHER  ROS negative        Vital Signs Last 24 Hrs  T(C): 36.4 (25 Feb 2021 13:02), Max: 36.9 (25 Feb 2021 04:45)  T(F): 97.5 (25 Feb 2021 13:02), Max: 98.5 (25 Feb 2021 04:45)  HR: 71 (25 Feb 2021 13:02) (70 - 72)  BP: 115/71 (25 Feb 2021 13:02) (115/71 - 125/70)  BP(mean): --  RR: 26 (25 Feb 2021 13:02) (20 - 44)  SpO2: 91% (25 Feb 2021 13:02) (88% - 92%)    ________________________________________________  PHYSICAL EXAM:  GENERAL: NAD  HEENT: Normocephalic;  conjunctivae and sclerae clear; moist mucous membranes;   NECK : supple  CHEST/LUNG: Clear to auscultation bilaterally with fair air entry   HEART: S1 S2  regular; no murmurs, gallops or rubs  ABDOMEN: Soft, Nontender, Nondistended; Bowel sounds present  EXTREMITIES: no cyanosis; no edema; no calf tenderness  SKIN: warm and dry; no rash  NERVOUS SYSTEM:  Awake and alert; Oriented  to place, person and time ; no new deficits    _________________________________________________  LABS:                        13.7   11.27 )-----------( 192      ( 25 Feb 2021 09:06 )             41.2     02-25    134<L>  |  102  |  30<H>  ----------------------------<  130<H>  4.1   |  25  |  1.17    Ca    8.7      25 Feb 2021 09:06  Phos  2.7     02-25  Mg     2.2     02-25    TPro  6.4  /  Alb  3.1<L>  /  TBili  0.7  /  DBili  x   /  AST  46<H>  /  ALT  57  /  AlkPhos  108  02-25        CAPILLARY BLOOD GLUCOSE            RADIOLOGY & ADDITIONAL TESTS:    Imaging  Reviewed:  YES    < from: Xray Chest 1 View- PORTABLE-Urgent (Xray Chest 1 View- PORTABLE-Urgent .) (02.23.21 @ 16:41) >    EXAM:  XR CHEST PORTABLE URGENT 1V                            PROCEDURE DATE:  02/23/2021          INTERPRETATION:  CLINICAL INDICATION: 65 years  Male with increasing SOB. Covid    COMPARISON: 2/21/2021    AP view of the chest demonstrates increasing patchy bilateral interstitial and airspace infiltrates. There is no pleural effusion. The right hemidiaphragm is again elevated. There is no pneumothorax.    The heart, mediastinum and marcie cannot be assessed due to projection.    The pulmonary vasculature is normal.    Mild thoracic degenerative changes are present.    IMPRESSION:    Increasing bilateral infiltrates            BRICE ST MD; Attending Radiologist  This document has been electronically signed. Feb 23 2021  4:50PM    < end of copied text >    Consultant(s) Notes Reviewed:   YES    Plan of care was discussed with patient and /or primary care giver; all questions and concerns were addressed

## 2021-02-25 NOTE — PROGRESS NOTE ADULT - SUBJECTIVE AND OBJECTIVE BOX
Pt is awake, alert, restless. On NRM. Saturating 91% this AM. Reports he gets more SOB with any exertion. Notes difficulty eating due to SOB and use of NRM. On nutritional supplement ensure.     INTERVAL HPI/OVERNIGHT EVENTS:      VITAL SIGNS:  T(F): 98.5 (02-25-21 @ 04:45)  HR: 70 (02-25-21 @ 04:45)  BP: 125/70 (02-25-21 @ 04:45)  RR: 40 (02-25-21 @ 09:44)  SpO2: 91% (02-25-21 @ 09:44)  Wt(kg): --  I&O's Detail    24 Feb 2021 07:01  -  25 Feb 2021 07:00  --------------------------------------------------------  IN:    IV PiggyBack: 250 mL  Total IN: 250 mL    OUT:    Voided (mL): 400 mL  Total OUT: 400 mL    Total NET: -150 mL    REVIEW OF SYSTEMS:    CONSTITUTIONAL:  No fevers, chills, sweats    HEENT:  Eyes:  No diplopia or blurred vision. ENT:  No earache, sore throat or runny nose.    CARDIOVASCULAR:  No pressure, squeezing, tightness, or heaviness about the chest; no palpitations.    RESPIRATORY:  Per HPI    GASTROINTESTINAL:  No abdominal pain, nausea, vomiting or diarrhea.    GENITOURINARY:  No dysuria, frequency or urgency.    NEUROLOGIC:  No paresthesias, fasciculations, seizures or weakness.    PSYCHIATRIC:  No disorder of thought or mood.      PHYSICAL EXAM:    Constitutional: Well developed and nourished  Eyes:Perrla  ENMT: normal  Neck:supple  Respiratory: tachypneic   Cardiovascular: S1 S2 regular  Gastrointestinal: Soft, Non tender  Extremities: No edema  Vascular:normal  Neurological:Awake, alert,Ox3  Musculoskeletal: restless.       MEDICATIONS  (STANDING):  ascorbic acid 1000 milliGRAM(s) Oral daily  atorvastatin 80 milliGRAM(s) Oral at bedtime  cholecalciferol 2000 Unit(s) Oral daily  dexAMETHasone  Injectable 6 milliGRAM(s) IV Push daily  diltiazem    milliGRAM(s) Oral daily  enoxaparin Injectable 40 milliGRAM(s) SubCutaneous daily  hydrochlorothiazide 50 milliGRAM(s) Oral daily  montelukast 10 milliGRAM(s) Oral at bedtime  pantoprazole    Tablet 40 milliGRAM(s) Oral before breakfast  remdesivir  IVPB   IV Intermittent   remdesivir  IVPB 100 milliGRAM(s) IV Intermittent every 24 hours  zinc sulfate 220 milliGRAM(s) Oral daily    MEDICATIONS  (PRN):  acetaminophen   Tablet .. 650 milliGRAM(s) Oral every 6 hours PRN Temp greater or equal to 38C (100.4F), Moderate Pain (4 - 6)  guaiFENesin   Syrup  (Sugar-Free) 200 milliGRAM(s) Oral every 6 hours PRN Cough  ondansetron Injectable 4 milliGRAM(s) IV Push every 6 hours PRN Nausea and/or Vomiting      Allergies    No Known Allergies    Intolerances    Shrimp- Nasal congestion (Other)      LABS:                        13.7   11.27 )-----------( 192      ( 25 Feb 2021 09:06 )             41.2     02-25    134<L>  |  102  |  30<H>  ----------------------------<  130<H>  4.1   |  25  |  1.17    Ca    8.7      25 Feb 2021 09:06  Phos  2.7     02-25  Mg     2.2     02-25    TPro  6.4  /  Alb  3.1<L>  /  TBili  0.7  /  DBili  x   /  AST  46<H>  /  ALT  57  /  AlkPhos  108  02-25              CAPILLARY BLOOD GLUCOSE        pro-bnp -- 02-25 @ 09:06     d-dimer 283  02-25 @ 09:06  pro-bnp -- 02-21 @ 17:53     d-dimer 290  02-21 @ 17:53      RADIOLOGY & ADDITIONAL TESTS:    CXR:    < from: Xray Chest 1 View- PORTABLE-Urgent (Xray Chest 1 View- PORTABLE-Urgent .) (02.23.21 @ 16:41) >  IMPRESSION:    Increasing bilateral infiltrates    < end of copied text >  Ct scan chest:    ekg;    echo:

## 2021-02-25 NOTE — PROGRESS NOTE ADULT - PROBLEM SELECTOR PLAN 1
-O2 sat 83 % on room air at rest, non tolerating NC or pendant optimizer, pt is mouth breather  -COVID antibody negative  -febrile, resolved with Acetaminophen   -no leukocytosis  -c/w remdesivir  D # 4  -c/w decadron D # 4  -c/w incentive spirometer therapy  -Pulmonology / [place]  -ICU consulted for hypoxia with pendant optimizer. no plan to ICU admission  -on NRB 15L, O2 sat 91-94%  -unable prone due to neck pain  -monitor O2 sat

## 2021-02-26 LAB
ALBUMIN SERPL ELPH-MCNC: 3.1 G/DL — LOW (ref 3.5–5)
ALP SERPL-CCNC: 117 U/L — SIGNIFICANT CHANGE UP (ref 40–120)
ALT FLD-CCNC: 60 U/L DA — SIGNIFICANT CHANGE UP (ref 10–60)
ANION GAP SERPL CALC-SCNC: 9 MMOL/L — SIGNIFICANT CHANGE UP (ref 5–17)
AST SERPL-CCNC: 43 U/L — HIGH (ref 10–40)
BASOPHILS # BLD AUTO: 0.01 K/UL — SIGNIFICANT CHANGE UP (ref 0–0.2)
BASOPHILS NFR BLD AUTO: 0.1 % — SIGNIFICANT CHANGE UP (ref 0–2)
BILIRUB SERPL-MCNC: 0.6 MG/DL — SIGNIFICANT CHANGE UP (ref 0.2–1.2)
BUN SERPL-MCNC: 27 MG/DL — HIGH (ref 7–18)
CALCIUM SERPL-MCNC: 8.6 MG/DL — SIGNIFICANT CHANGE UP (ref 8.4–10.5)
CHLORIDE SERPL-SCNC: 100 MMOL/L — SIGNIFICANT CHANGE UP (ref 96–108)
CO2 SERPL-SCNC: 25 MMOL/L — SIGNIFICANT CHANGE UP (ref 22–31)
CREAT SERPL-MCNC: 1.18 MG/DL — SIGNIFICANT CHANGE UP (ref 0.5–1.3)
CULTURE RESULTS: SIGNIFICANT CHANGE UP
CULTURE RESULTS: SIGNIFICANT CHANGE UP
EOSINOPHIL # BLD AUTO: 0.01 K/UL — SIGNIFICANT CHANGE UP (ref 0–0.5)
EOSINOPHIL NFR BLD AUTO: 0.1 % — SIGNIFICANT CHANGE UP (ref 0–6)
GLUCOSE SERPL-MCNC: 135 MG/DL — HIGH (ref 70–99)
HCT VFR BLD CALC: 39.3 % — SIGNIFICANT CHANGE UP (ref 39–50)
HGB BLD-MCNC: 13.4 G/DL — SIGNIFICANT CHANGE UP (ref 13–17)
IMM GRANULOCYTES NFR BLD AUTO: 0.6 % — SIGNIFICANT CHANGE UP (ref 0–1.5)
LYMPHOCYTES # BLD AUTO: 0.95 K/UL — LOW (ref 1–3.3)
LYMPHOCYTES # BLD AUTO: 7.3 % — LOW (ref 13–44)
MCHC RBC-ENTMCNC: 29 PG — SIGNIFICANT CHANGE UP (ref 27–34)
MCHC RBC-ENTMCNC: 34.1 GM/DL — SIGNIFICANT CHANGE UP (ref 32–36)
MCV RBC AUTO: 85.1 FL — SIGNIFICANT CHANGE UP (ref 80–100)
MONOCYTES # BLD AUTO: 0.73 K/UL — SIGNIFICANT CHANGE UP (ref 0–0.9)
MONOCYTES NFR BLD AUTO: 5.6 % — SIGNIFICANT CHANGE UP (ref 2–14)
NEUTROPHILS # BLD AUTO: 11.26 K/UL — HIGH (ref 1.8–7.4)
NEUTROPHILS NFR BLD AUTO: 86.3 % — HIGH (ref 43–77)
NRBC # BLD: 0 /100 WBCS — SIGNIFICANT CHANGE UP (ref 0–0)
PHOSPHATE SERPL-MCNC: 2.7 MG/DL — SIGNIFICANT CHANGE UP (ref 2.5–4.5)
PLATELET # BLD AUTO: 213 K/UL — SIGNIFICANT CHANGE UP (ref 150–400)
POTASSIUM SERPL-MCNC: 3.6 MMOL/L — SIGNIFICANT CHANGE UP (ref 3.5–5.3)
POTASSIUM SERPL-SCNC: 3.6 MMOL/L — SIGNIFICANT CHANGE UP (ref 3.5–5.3)
PROT SERPL-MCNC: 6.2 G/DL — SIGNIFICANT CHANGE UP (ref 6–8.3)
RBC # BLD: 4.62 M/UL — SIGNIFICANT CHANGE UP (ref 4.2–5.8)
RBC # FLD: 13.9 % — SIGNIFICANT CHANGE UP (ref 10.3–14.5)
SODIUM SERPL-SCNC: 134 MMOL/L — LOW (ref 135–145)
SPECIMEN SOURCE: SIGNIFICANT CHANGE UP
SPECIMEN SOURCE: SIGNIFICANT CHANGE UP
WBC # BLD: 13.04 K/UL — HIGH (ref 3.8–10.5)
WBC # FLD AUTO: 13.04 K/UL — HIGH (ref 3.8–10.5)

## 2021-02-26 RX ADMIN — REMDESIVIR 500 MILLIGRAM(S): 5 INJECTION INTRAVENOUS at 22:51

## 2021-02-26 RX ADMIN — ENOXAPARIN SODIUM 40 MILLIGRAM(S): 100 INJECTION SUBCUTANEOUS at 12:15

## 2021-02-26 RX ADMIN — Medication 2000 UNIT(S): at 12:15

## 2021-02-26 RX ADMIN — ATORVASTATIN CALCIUM 80 MILLIGRAM(S): 80 TABLET, FILM COATED ORAL at 22:51

## 2021-02-26 RX ADMIN — Medication 1000 MILLIGRAM(S): at 12:15

## 2021-02-26 RX ADMIN — PANTOPRAZOLE SODIUM 40 MILLIGRAM(S): 20 TABLET, DELAYED RELEASE ORAL at 05:48

## 2021-02-26 RX ADMIN — Medication 50 MILLIGRAM(S): at 05:48

## 2021-02-26 RX ADMIN — Medication 6 MILLIGRAM(S): at 05:49

## 2021-02-26 RX ADMIN — Medication 240 MILLIGRAM(S): at 05:47

## 2021-02-26 RX ADMIN — ZINC SULFATE TAB 220 MG (50 MG ZINC EQUIVALENT) 220 MILLIGRAM(S): 220 (50 ZN) TAB at 12:15

## 2021-02-26 RX ADMIN — MONTELUKAST 10 MILLIGRAM(S): 4 TABLET, CHEWABLE ORAL at 22:51

## 2021-02-26 NOTE — PROGRESS NOTE ADULT - SUBJECTIVE AND OBJECTIVE BOX
Pt is awake, alert, lying in bed. Less Tachypneic today. Comfortable. Saturation improved.     INTERVAL HPI/OVERNIGHT EVENTS:      VITAL SIGNS:  T(F): 97.8 (02-26-21 @ 05:05)  HR: 94 (02-26-21 @ 05:05)  BP: 140/75 (02-26-21 @ 05:05)  RR: 20 (02-26-21 @ 05:05)  SpO2: 93% (02-26-21 @ 05:05)  Wt(kg): --  I&O's Detail          REVIEW OF SYSTEMS:    CONSTITUTIONAL:  No fevers, chills, sweats    HEENT:  Eyes:  No diplopia or blurred vision. ENT:  No earache, sore throat or runny nose.    CARDIOVASCULAR:  No pressure, squeezing, tightness, or heaviness about the chest; no palpitations.    RESPIRATORY:  Per HPI    GASTROINTESTINAL:  No abdominal pain, nausea, vomiting or diarrhea.    GENITOURINARY:  No dysuria, frequency or urgency.    NEUROLOGIC:  No paresthesias, fasciculations, seizures or weakness.    PSYCHIATRIC:  No disorder of thought or mood.      PHYSICAL EXAM:    Constitutional: Well developed and nourished  Eyes:Perrla  ENMT: normal  Neck:supple  Respiratory: good air entry; less tachypneic   Cardiovascular: S1 S2 regular  Gastrointestinal: Soft, Non tender  Extremities: No edema  Vascular:normal  Neurological:Awake, alert,Ox3  Musculoskeletal:Normal      MEDICATIONS  (STANDING):  ascorbic acid 1000 milliGRAM(s) Oral daily  atorvastatin 80 milliGRAM(s) Oral at bedtime  cholecalciferol 2000 Unit(s) Oral daily  dexAMETHasone  Injectable 6 milliGRAM(s) IV Push daily  diltiazem    milliGRAM(s) Oral daily  enoxaparin Injectable 40 milliGRAM(s) SubCutaneous daily  hydrochlorothiazide 50 milliGRAM(s) Oral daily  montelukast 10 milliGRAM(s) Oral at bedtime  pantoprazole    Tablet 40 milliGRAM(s) Oral before breakfast  remdesivir  IVPB   IV Intermittent   remdesivir  IVPB 100 milliGRAM(s) IV Intermittent every 24 hours  zinc sulfate 220 milliGRAM(s) Oral daily    MEDICATIONS  (PRN):  acetaminophen   Tablet .. 650 milliGRAM(s) Oral every 6 hours PRN Temp greater or equal to 38C (100.4F), Moderate Pain (4 - 6)  guaiFENesin   Syrup  (Sugar-Free) 200 milliGRAM(s) Oral every 6 hours PRN Cough  ondansetron Injectable 4 milliGRAM(s) IV Push every 6 hours PRN Nausea and/or Vomiting  sodium chloride 0.65% Nasal 1 Spray(s) Both Nostrils three times a day PRN Nasal Congestion      Allergies    No Known Allergies    Intolerances    Shrimp- Nasal congestion (Other)      LABS:                        13.4   13.04 )-----------( 213      ( 26 Feb 2021 08:32 )             39.3     02-26    134<L>  |  100  |  27<H>  ----------------------------<  135<H>  3.6   |  25  |  1.18    Ca    8.6      26 Feb 2021 08:32  Phos  2.7     02-26  Mg     2.2     02-25    TPro  6.2  /  Alb  3.1<L>  /  TBili  0.6  /  DBili  x   /  AST  43<H>  /  ALT  60  /  AlkPhos  117  02-26              CAPILLARY BLOOD GLUCOSE        pro-bnp -- 02-25 @ 09:06     d-dimer 283  02-25 @ 09:06  pro-bnp -- 02-21 @ 17:53     d-dimer 290  02-21 @ 17:53      RADIOLOGY & ADDITIONAL TESTS:    CXR:    Ct scan chest:  < from: CT Cervical Spine No Cont (02.25.20 @ 12:28) >    Impression:    Brain CT: No acute hemorrhage mass effect or displaced calvarial fracture.    Cervical spine CT: No acute fracture or traumatic subluxation. Multilevel degenerative changes and postsurgical changes.      < end of copied text >    ekg;    echo:

## 2021-02-26 NOTE — PROGRESS NOTE ADULT - ASSESSMENT
1. PNA 2nd to Covid-19  - PCR positive.   - CXR noted, 2/23 increasing infiltrates.   - Continue Vit C, Vit D, Zinc   - Continue Dexamethasone and Remdesivir  - Continue Singulair and Pepcid   - ICU consult noted - no escalation at present.   - Robitussin PRN for cough  - Tylenol PRN for temp   - O2 Supp - Taper O2 as tolerated.    - Monitor O2 Sat.  - Monitor labs  - F.U CXR  - Unable to tolerate prone positioning due to prior neck surgery.   - Encouraged side lying periodically as tolerated.   - Incentive Spirometry  - Isolation precautions   - Nasal saline spray for dryness   - Cont Ensure Compact    - DVT and GI PPX     2. HTN  - Continue meds   - Monitor BP     3. HLD  - Cont meds  - Lipid panel     4. GERD  - Antireflux diet   - Cont meds

## 2021-02-26 NOTE — PROGRESS NOTE ADULT - SUBJECTIVE AND OBJECTIVE BOX
Patient is a 65y old  Male who presents with a chief complaint of SOB, fever, cough, body aches (25 Feb 2021 15:39)    pt seen in icu [  ], reg med floor [  x ], bed [x  ], chair at bedside [   ], a+o x3 [x ], lethargic [  ],    nad [x  ]      Allergies    No Known Allergies  Shrimp- Nasal congestion (Other)        Vitals    T(F): 97.8 (02-26-21 @ 05:05), Max: 97.8 (02-26-21 @ 05:05)  HR: 94 (02-26-21 @ 05:05) (71 - 94)  BP: 140/75 (02-26-21 @ 05:05) (115/71 - 140/75)  RR: 20 (02-26-21 @ 05:05) (20 - 44)  SpO2: 93% (02-26-21 @ 05:05) (88% - 93%)  Wt(kg): --  CAPILLARY BLOOD GLUCOSE          Labs                          13.7   11.27 )-----------( 192      ( 25 Feb 2021 09:06 )             41.2       02-25    134<L>  |  102  |  30<H>  ----------------------------<  130<H>  4.1   |  25  |  1.17    Ca    8.7      25 Feb 2021 09:06  Phos  2.7     02-25  Mg     2.2     02-25    TPro  6.4  /  Alb  3.1<L>  /  TBili  0.7  /  DBili  x   /  AST  46<H>  /  ALT  57  /  AlkPhos  108  02-25            .Blood Blood-Peripheral  02-21 @ 22:03   No growth to date.  --  --      .Blood Blood-Peripheral  02-21 @ 22:00   No growth to date.  --  --          Radiology Results      Meds    MEDICATIONS  (STANDING):  ascorbic acid 1000 milliGRAM(s) Oral daily  atorvastatin 80 milliGRAM(s) Oral at bedtime  cholecalciferol 2000 Unit(s) Oral daily  dexAMETHasone  Injectable 6 milliGRAM(s) IV Push daily  diltiazem    milliGRAM(s) Oral daily  enoxaparin Injectable 40 milliGRAM(s) SubCutaneous daily  hydrochlorothiazide 50 milliGRAM(s) Oral daily  montelukast 10 milliGRAM(s) Oral at bedtime  pantoprazole    Tablet 40 milliGRAM(s) Oral before breakfast  remdesivir  IVPB   IV Intermittent   remdesivir  IVPB 100 milliGRAM(s) IV Intermittent every 24 hours  zinc sulfate 220 milliGRAM(s) Oral daily      MEDICATIONS  (PRN):  acetaminophen   Tablet .. 650 milliGRAM(s) Oral every 6 hours PRN Temp greater or equal to 38C (100.4F), Moderate Pain (4 - 6)  guaiFENesin   Syrup  (Sugar-Free) 200 milliGRAM(s) Oral every 6 hours PRN Cough  ondansetron Injectable 4 milliGRAM(s) IV Push every 6 hours PRN Nausea and/or Vomiting  sodium chloride 0.65% Nasal 1 Spray(s) Both Nostrils three times a day PRN Nasal Congestion      Physical Exam    Neuro :  no focal deficits  Respiratory: CTA B/L  CV: RRR, S1S2, no murmurs,   Abdominal: Soft, NT, ND +BS,  Extremities: No edema, + peripheral pulses    ASSESSMENT    pneumonia 2nd to covid 19,   marya,   hypokalemia,   h/o Cluster headache  BPH (benign prostatic hypertrophy)  Spinal stenosis s/p laminectomy  GERD (gastroesophageal reflux disease)  Hyperlipidemia  HTN (hypertension)            PLAN      contact and airborne isolation  cont remdesevir until 2/26/21  covid ab neg  cont dexamethasone  pepcid, singulair, vit c, vit d, zinc   pulm f/u  cont albuterol inhaler   procalcitonin neg noted  ,   D-dimer, crp, ldh, ferritin, lactate noted ,   afebrile  cont tylenol prn,   robitussin prn   blood cx neg  O2 sat (91% - 94%) nrbm 15 L  cont O2 via nrbm and taper as tolerated    pt low threshold for intubation and icu montitoring  icu cons noted  continue to monitor pulse oximetry  monitor biomarkers TIW  will not accept to icu at this time  cont incentive spirometer   serum creat wnl   cont current meds         Patient is a 65y old  Male who presents with a chief complaint of SOB, fever, cough, body aches (25 Feb 2021 15:39)    pt seen in icu [  ], reg med floor [  x ], bed [x  ], chair at bedside [   ], a+o x3 [x ], lethargic [  ],    nad [x  ]      Allergies    No Known Allergies  Shrimp- Nasal congestion (Other)        Vitals    T(F): 97.8 (02-26-21 @ 05:05), Max: 97.8 (02-26-21 @ 05:05)  HR: 94 (02-26-21 @ 05:05) (71 - 94)  BP: 140/75 (02-26-21 @ 05:05) (115/71 - 140/75)  RR: 20 (02-26-21 @ 05:05) (20 - 44)  SpO2: 93% (02-26-21 @ 05:05) (88% - 93%)  Wt(kg): --  CAPILLARY BLOOD GLUCOSE          Labs                          13.7   11.27 )-----------( 192      ( 25 Feb 2021 09:06 )             41.2       02-25    134<L>  |  102  |  30<H>  ----------------------------<  130<H>  4.1   |  25  |  1.17    Ca    8.7      25 Feb 2021 09:06  Phos  2.7     02-25  Mg     2.2     02-25    TPro  6.4  /  Alb  3.1<L>  /  TBili  0.7  /  DBili  x   /  AST  46<H>  /  ALT  57  /  AlkPhos  108  02-25            .Blood Blood-Peripheral  02-21 @ 22:03   No growth to date.  --  --      .Blood Blood-Peripheral  02-21 @ 22:00   No growth to date.  --  --          Radiology Results      Meds    MEDICATIONS  (STANDING):  ascorbic acid 1000 milliGRAM(s) Oral daily  atorvastatin 80 milliGRAM(s) Oral at bedtime  cholecalciferol 2000 Unit(s) Oral daily  dexAMETHasone  Injectable 6 milliGRAM(s) IV Push daily  diltiazem    milliGRAM(s) Oral daily  enoxaparin Injectable 40 milliGRAM(s) SubCutaneous daily  hydrochlorothiazide 50 milliGRAM(s) Oral daily  montelukast 10 milliGRAM(s) Oral at bedtime  pantoprazole    Tablet 40 milliGRAM(s) Oral before breakfast  remdesivir  IVPB   IV Intermittent   remdesivir  IVPB 100 milliGRAM(s) IV Intermittent every 24 hours  zinc sulfate 220 milliGRAM(s) Oral daily      MEDICATIONS  (PRN):  acetaminophen   Tablet .. 650 milliGRAM(s) Oral every 6 hours PRN Temp greater or equal to 38C (100.4F), Moderate Pain (4 - 6)  guaiFENesin   Syrup  (Sugar-Free) 200 milliGRAM(s) Oral every 6 hours PRN Cough  ondansetron Injectable 4 milliGRAM(s) IV Push every 6 hours PRN Nausea and/or Vomiting  sodium chloride 0.65% Nasal 1 Spray(s) Both Nostrils three times a day PRN Nasal Congestion      Physical Exam    Neuro :  no focal deficits  Respiratory: CTA B/L  CV: RRR, S1S2, no murmurs,   Abdominal: Soft, NT, ND +BS,  Extremities: No edema, + peripheral pulses    ASSESSMENT    pneumonia 2nd to covid 19,   marya,   hypokalemia,   h/o Cluster headache  BPH (benign prostatic hypertrophy)  Spinal stenosis s/p laminectomy  GERD (gastroesophageal reflux disease)  Hyperlipidemia  HTN (hypertension)            PLAN      contact and airborne isolation  cont remdesevir until 2/26/21  covid ab neg  cont dexamethasone  pepcid, singulair, vit c, vit d, zinc   pulm f/u  cont albuterol inhaler   procalcitonin neg noted  ,   D-dimer, crp, ldh, ferritin, lactate noted ,   afebrile  cont tylenol prn,   robitussin prn   blood cx neg  O2 sat  (88% - 93%) nrbm 15 L  cont O2 via nrbm and taper as tolerated    pt low threshold for intubation and icu montitoring  icu cons noted  continue to monitor pulse oximetry  monitor biomarkers TIW  not accept to icu at this time  cont incentive spirometer   serum creat wnl   cont current meds

## 2021-02-26 NOTE — PROGRESS NOTE ADULT - ASSESSMENT
HPI-  Patient is a 65 year old male from home, with PMH of HTN, HLD and GERD. presented to the ED due to SOB, cough, fever, body aches.  Patient states he was tested positive for COVID on 2/16. Noted to have saturation 88% on room air at home   Pt was consulted by ICU 2/23 for hypoxia with pendant 12L NC optimizer, but not tolerated optimizer well.  CXR shows worsening b/l infiltrates. Pt was placed on  NRB 15L. with improvement in saturation to 95%. encouraged pron but not tolerated due to neck pain.   Continued with Remdesivir and Decadron      Pt was seen at bedside, states is feeling better, NAD, speaking in full sentences Saturating 95% 15L NRB. No fever.

## 2021-02-26 NOTE — PROGRESS NOTE ADULT - SUBJECTIVE AND OBJECTIVE BOX
NP Note discussed with  primary attending    Patient is a 65y old  Male who presents with a chief complaint of SOB, fever, cough, body aches (26 Feb 2021 11:01)      INTERVAL HPI/OVERNIGHT EVENTS: no new complaints    MEDICATIONS  (STANDING):  ascorbic acid 1000 milliGRAM(s) Oral daily  atorvastatin 80 milliGRAM(s) Oral at bedtime  cholecalciferol 2000 Unit(s) Oral daily  dexAMETHasone  Injectable 6 milliGRAM(s) IV Push daily  diltiazem    milliGRAM(s) Oral daily  enoxaparin Injectable 40 milliGRAM(s) SubCutaneous daily  hydrochlorothiazide 50 milliGRAM(s) Oral daily  montelukast 10 milliGRAM(s) Oral at bedtime  pantoprazole    Tablet 40 milliGRAM(s) Oral before breakfast  remdesivir  IVPB   IV Intermittent   remdesivir  IVPB 100 milliGRAM(s) IV Intermittent every 24 hours  zinc sulfate 220 milliGRAM(s) Oral daily    MEDICATIONS  (PRN):  acetaminophen   Tablet .. 650 milliGRAM(s) Oral every 6 hours PRN Temp greater or equal to 38C (100.4F), Moderate Pain (4 - 6)  guaiFENesin   Syrup  (Sugar-Free) 200 milliGRAM(s) Oral every 6 hours PRN Cough  ondansetron Injectable 4 milliGRAM(s) IV Push every 6 hours PRN Nausea and/or Vomiting  sodium chloride 0.65% Nasal 1 Spray(s) Both Nostrils three times a day PRN Nasal Congestion      __________________________________________________  REVIEW OF SYSTEMS:    CONSTITUTIONAL: No fever,   EYES: no acute visual disturbances  NECK: No pain or stiffness  RESPIRATORY: + cough; + intermittent  shortness of breath  CARDIOVASCULAR: No chest pain, no palpitations  GASTROINTESTINAL: No pain. No nausea or vomiting; No diarrhea   NEUROLOGICAL: No headache or numbness, no tremors  MUSCULOSKELETAL: No joint pain, no muscle pain  GENITOURINARY: no dysuria, no frequency, no hesitancy  PSYCHIATRY: no depression , no anxiety  ALL OTHER  ROS negative        Vital Signs Last 24 Hrs  T(C): 36.4 (26 Feb 2021 12:57), Max: 36.6 (26 Feb 2021 05:05)  T(F): 97.6 (26 Feb 2021 12:57), Max: 97.8 (26 Feb 2021 05:05)  HR: 77 (26 Feb 2021 12:57) (77 - 94)  BP: 120/73 (26 Feb 2021 12:57) (120/73 - 140/75)  BP(mean): --  RR: 22 (26 Feb 2021 12:57) (20 - 28)  SpO2: 93% (26 Feb 2021 12:57) (89% - 93%)    ________________________________________________  PHYSICAL EXAM:  GENERAL: NAD  HEENT: Normocephalic;  conjunctivae and sclerae clear; moist mucous membranes;   NECK : supple  CHEST/LUNG: scattered rhonchi bilaterally   HEART: S1 S2  regular; no murmurs, gallops or rubs  ABDOMEN: Soft, Nontender, Nondistended; Bowel sounds present  EXTREMITIES: no cyanosis; no edema; no calf tenderness  SKIN: warm and dry; no rash  NERVOUS SYSTEM:  Awake and alert; Oriented  to place, person and time ; no new deficits    _________________________________________________  LABS:                        13.4   13.04 )-----------( 213      ( 26 Feb 2021 08:32 )             39.3     02-26    134<L>  |  100  |  27<H>  ----------------------------<  135<H>  3.6   |  25  |  1.18    Ca    8.6      26 Feb 2021 08:32  Phos  2.7     02-26  Mg     2.2     02-25    TPro  6.2  /  Alb  3.1<L>  /  TBili  0.6  /  DBili  x   /  AST  43<H>  /  ALT  60  /  AlkPhos  117  02-26        CAPILLARY BLOOD GLUCOSE            RADIOLOGY & ADDITIONAL TESTS:    Imaging Personally Reviewed:  YES/NO    Consultant(s) Notes Reviewed:   YES/ No    Care Discussed with Consultants :     Plan of care was discussed with patient and /or primary care giver; all questions and concerns were addressed and care was aligned with patient's wishes.

## 2021-02-26 NOTE — PROGRESS NOTE ADULT - PROBLEM SELECTOR PLAN 1
-O2 sat 83 % on room air at rest, non tolerating NC or pendant optimizer, pt is mouth breather  -COVID antibody negative  -c/w remdesivir  D # 5  -c/w decadron D # 5  -c/w incentive spirometer therapy  -Pulmonology / [place]  -on NRB 15L, O2 sat 94%  -unable prone due to neck pain  -monitor O2 sat titrate oxygen as tolerated

## 2021-02-27 LAB
ALBUMIN SERPL ELPH-MCNC: 3 G/DL — LOW (ref 3.5–5)
ALP SERPL-CCNC: 113 U/L — SIGNIFICANT CHANGE UP (ref 40–120)
ALT FLD-CCNC: 57 U/L DA — SIGNIFICANT CHANGE UP (ref 10–60)
ANION GAP SERPL CALC-SCNC: 10 MMOL/L — SIGNIFICANT CHANGE UP (ref 5–17)
AST SERPL-CCNC: 33 U/L — SIGNIFICANT CHANGE UP (ref 10–40)
BILIRUB DIRECT SERPL-MCNC: 0.2 MG/DL — SIGNIFICANT CHANGE UP (ref 0–0.2)
BILIRUB INDIRECT FLD-MCNC: 0.6 MG/DL — SIGNIFICANT CHANGE UP (ref 0.2–1)
BILIRUB SERPL-MCNC: 0.8 MG/DL — SIGNIFICANT CHANGE UP (ref 0.2–1.2)
BUN SERPL-MCNC: 26 MG/DL — HIGH (ref 7–18)
CALCIUM SERPL-MCNC: 8.6 MG/DL — SIGNIFICANT CHANGE UP (ref 8.4–10.5)
CHLORIDE SERPL-SCNC: 99 MMOL/L — SIGNIFICANT CHANGE UP (ref 96–108)
CO2 SERPL-SCNC: 24 MMOL/L — SIGNIFICANT CHANGE UP (ref 22–31)
CREAT SERPL-MCNC: 1.14 MG/DL — SIGNIFICANT CHANGE UP (ref 0.5–1.3)
GLUCOSE SERPL-MCNC: 119 MG/DL — HIGH (ref 70–99)
HCT VFR BLD CALC: 39.1 % — SIGNIFICANT CHANGE UP (ref 39–50)
HGB BLD-MCNC: 13.2 G/DL — SIGNIFICANT CHANGE UP (ref 13–17)
MCHC RBC-ENTMCNC: 28.4 PG — SIGNIFICANT CHANGE UP (ref 27–34)
MCHC RBC-ENTMCNC: 33.8 GM/DL — SIGNIFICANT CHANGE UP (ref 32–36)
MCV RBC AUTO: 84.1 FL — SIGNIFICANT CHANGE UP (ref 80–100)
MRSA PCR RESULT.: SIGNIFICANT CHANGE UP
NRBC # BLD: 0 /100 WBCS — SIGNIFICANT CHANGE UP (ref 0–0)
PLATELET # BLD AUTO: 253 K/UL — SIGNIFICANT CHANGE UP (ref 150–400)
POTASSIUM SERPL-MCNC: 3.8 MMOL/L — SIGNIFICANT CHANGE UP (ref 3.5–5.3)
POTASSIUM SERPL-SCNC: 3.8 MMOL/L — SIGNIFICANT CHANGE UP (ref 3.5–5.3)
PROT SERPL-MCNC: 6.3 G/DL — SIGNIFICANT CHANGE UP (ref 6–8.3)
RBC # BLD: 4.65 M/UL — SIGNIFICANT CHANGE UP (ref 4.2–5.8)
RBC # FLD: 14 % — SIGNIFICANT CHANGE UP (ref 10.3–14.5)
S AUREUS DNA NOSE QL NAA+PROBE: DETECTED
SODIUM SERPL-SCNC: 133 MMOL/L — LOW (ref 135–145)
WBC # BLD: 16.61 K/UL — HIGH (ref 3.8–10.5)
WBC # FLD AUTO: 16.61 K/UL — HIGH (ref 3.8–10.5)

## 2021-02-27 RX ORDER — ACETAMINOPHEN 500 MG
1000 TABLET ORAL ONCE
Refills: 0 | Status: COMPLETED | OUTPATIENT
Start: 2021-02-27 | End: 2021-02-27

## 2021-02-27 RX ADMIN — Medication 50 MILLIGRAM(S): at 06:01

## 2021-02-27 RX ADMIN — Medication 6 MILLIGRAM(S): at 06:01

## 2021-02-27 RX ADMIN — Medication 240 MILLIGRAM(S): at 06:01

## 2021-02-27 RX ADMIN — Medication 2000 UNIT(S): at 12:15

## 2021-02-27 RX ADMIN — MONTELUKAST 10 MILLIGRAM(S): 4 TABLET, CHEWABLE ORAL at 21:20

## 2021-02-27 RX ADMIN — PANTOPRAZOLE SODIUM 40 MILLIGRAM(S): 20 TABLET, DELAYED RELEASE ORAL at 06:01

## 2021-02-27 RX ADMIN — ZINC SULFATE TAB 220 MG (50 MG ZINC EQUIVALENT) 220 MILLIGRAM(S): 220 (50 ZN) TAB at 12:15

## 2021-02-27 RX ADMIN — Medication 400 MILLIGRAM(S): at 06:01

## 2021-02-27 RX ADMIN — ATORVASTATIN CALCIUM 80 MILLIGRAM(S): 80 TABLET, FILM COATED ORAL at 21:20

## 2021-02-27 RX ADMIN — Medication 1000 MILLIGRAM(S): at 12:15

## 2021-02-27 RX ADMIN — ENOXAPARIN SODIUM 40 MILLIGRAM(S): 100 INJECTION SUBCUTANEOUS at 12:15

## 2021-02-27 RX ADMIN — Medication 200 MILLIGRAM(S): at 21:32

## 2021-02-27 NOTE — PROGRESS NOTE ADULT - ASSESSMENT
1. PNA 2nd to Covid-19  - PCR positive.   - CXR noted, 2/23 increasing infiltrates.   - Continue Vit C, Vit D, Zinc   - Continue Dexamethasone, Off Remdesivir  - Continue Singulair and Pepcid   - ICU consult noted - no escalation at present.   - Robitussin PRN for cough  - Tylenol PRN for temp   - O2 Supp - Taper O2 as tolerated.    - Monitor O2 Sat.  - Monitor labs  - F.U CXR  - Unable to tolerate prone positioning due to prior neck surgery.   - Encouraged side lying periodically as tolerated.   - Incentive Spirometry  - Isolation precautions   - Nasal saline spray for dryness   - Cont Ensure Compact    - DVT and GI PPX     2. HTN  - Continue meds   - Monitor BP     3. HLD  - Cont meds  - Lipid panel     4. GERD  - Antireflux diet   - Cont meds

## 2021-02-27 NOTE — PROGRESS NOTE ADULT - SUBJECTIVE AND OBJECTIVE BOX
Patient is a 65y old  Male who presents with a chief complaint of SOB, fever, cough, body aches (26 Feb 2021 19:00)    pt seen in icu [  ], reg med floor [  x ], bed [x  ], chair at bedside [   ], a+o x3 [x ], lethargic [  ],    nad [x  ]      Allergies    No Known Allergies  Shrimp- Nasal congestion (Other)        Vitals    T(F): 102.3 (02-27-21 @ 04:45), Max: 102.3 (02-27-21 @ 04:45)  HR: 103 (02-27-21 @ 04:45) (73 - 103)  BP: 118/62 (02-27-21 @ 04:45) (118/62 - 120/73)  RR: 20 (02-27-21 @ 04:45) (20 - 22)  SpO2: 94% (02-27-21 @ 04:45) (92% - 94%)  Wt(kg): --  CAPILLARY BLOOD GLUCOSE          Labs                          13.4   13.04 )-----------( 213      ( 26 Feb 2021 08:32 )             39.3       02-26    134<L>  |  100  |  27<H>  ----------------------------<  135<H>  3.6   |  25  |  1.18    Ca    8.6      26 Feb 2021 08:32  Phos  2.7     02-26  Mg     2.2     02-25    TPro  6.2  /  Alb  3.1<L>  /  TBili  0.6  /  DBili  x   /  AST  43<H>  /  ALT  60  /  AlkPhos  117  02-26            .Blood Blood-Peripheral  02-21 @ 22:03   No Growth Final  --  --      .Blood Blood-Peripheral  02-21 @ 22:00   No Growth Final  --  --          Radiology Results      Meds    MEDICATIONS  (STANDING):  ascorbic acid 1000 milliGRAM(s) Oral daily  atorvastatin 80 milliGRAM(s) Oral at bedtime  cholecalciferol 2000 Unit(s) Oral daily  dexAMETHasone  Injectable 6 milliGRAM(s) IV Push daily  diltiazem    milliGRAM(s) Oral daily  enoxaparin Injectable 40 milliGRAM(s) SubCutaneous daily  hydrochlorothiazide 50 milliGRAM(s) Oral daily  montelukast 10 milliGRAM(s) Oral at bedtime  pantoprazole    Tablet 40 milliGRAM(s) Oral before breakfast  zinc sulfate 220 milliGRAM(s) Oral daily      MEDICATIONS  (PRN):  acetaminophen   Tablet .. 650 milliGRAM(s) Oral every 6 hours PRN Temp greater or equal to 38C (100.4F), Moderate Pain (4 - 6)  guaiFENesin   Syrup  (Sugar-Free) 200 milliGRAM(s) Oral every 6 hours PRN Cough  ondansetron Injectable 4 milliGRAM(s) IV Push every 6 hours PRN Nausea and/or Vomiting  sodium chloride 0.65% Nasal 1 Spray(s) Both Nostrils three times a day PRN Nasal Congestion      Physical Exam    Neuro :  no focal deficits  Respiratory: CTA B/L  CV: RRR, S1S2, no murmurs,   Abdominal: Soft, NT, ND +BS,  Extremities: No edema, + peripheral pulses    ASSESSMENT    pneumonia 2nd to covid 19,   marya,   hypokalemia,   h/o Cluster headache  BPH (benign prostatic hypertrophy)  Spinal stenosis s/p laminectomy  GERD (gastroesophageal reflux disease)  Hyperlipidemia  HTN (hypertension)            PLAN      contact and airborne isolation  cont remdesevir until 2/26/21  covid ab neg  cont dexamethasone  pepcid, singulair, vit c, vit d, zinc   pulm f/u  cont albuterol inhaler   procalcitonin neg noted  ,   D-dimer, crp, ldh, ferritin, lactate noted ,   afebrile  cont tylenol prn,   robitussin prn   blood cx neg  O2 sat  (88% - 93%) nrbm 15 L  cont O2 via nrbm and taper as tolerated    pt low threshold for intubation and icu montitoring  icu cons noted  continue to monitor pulse oximetry  monitor biomarkers TIW  not accept to icu at this time  cont incentive spirometer   serum creat wnl   cont current meds           Patient is a 65y old  Male who presents with a chief complaint of SOB, fever, cough, body aches (26 Feb 2021 19:00)    pt seen in icu [  ], reg med floor [  x ], bed [x  ], chair at bedside [   ], a+o x3 [x ], lethargic [  ],    nad [x  ]      Allergies    No Known Allergies  Shrimp- Nasal congestion (Other)        Vitals    T(F): 102.3 (02-27-21 @ 04:45), Max: 102.3 (02-27-21 @ 04:45)  HR: 103 (02-27-21 @ 04:45) (73 - 103)  BP: 118/62 (02-27-21 @ 04:45) (118/62 - 120/73)  RR: 20 (02-27-21 @ 04:45) (20 - 22)  SpO2: 94% (02-27-21 @ 04:45) (92% - 94%)  Wt(kg): --  CAPILLARY BLOOD GLUCOSE          Labs                          13.4   13.04 )-----------( 213      ( 26 Feb 2021 08:32 )             39.3       02-26    134<L>  |  100  |  27<H>  ----------------------------<  135<H>  3.6   |  25  |  1.18    Ca    8.6      26 Feb 2021 08:32  Phos  2.7     02-26  Mg     2.2     02-25    TPro  6.2  /  Alb  3.1<L>  /  TBili  0.6  /  DBili  x   /  AST  43<H>  /  ALT  60  /  AlkPhos  117  02-26            .Blood Blood-Peripheral  02-21 @ 22:03   No Growth Final  --  --      .Blood Blood-Peripheral  02-21 @ 22:00   No Growth Final  --  --          Radiology Results      Meds    MEDICATIONS  (STANDING):  ascorbic acid 1000 milliGRAM(s) Oral daily  atorvastatin 80 milliGRAM(s) Oral at bedtime  cholecalciferol 2000 Unit(s) Oral daily  dexAMETHasone  Injectable 6 milliGRAM(s) IV Push daily  diltiazem    milliGRAM(s) Oral daily  enoxaparin Injectable 40 milliGRAM(s) SubCutaneous daily  hydrochlorothiazide 50 milliGRAM(s) Oral daily  montelukast 10 milliGRAM(s) Oral at bedtime  pantoprazole    Tablet 40 milliGRAM(s) Oral before breakfast  zinc sulfate 220 milliGRAM(s) Oral daily      MEDICATIONS  (PRN):  acetaminophen   Tablet .. 650 milliGRAM(s) Oral every 6 hours PRN Temp greater or equal to 38C (100.4F), Moderate Pain (4 - 6)  guaiFENesin   Syrup  (Sugar-Free) 200 milliGRAM(s) Oral every 6 hours PRN Cough  ondansetron Injectable 4 milliGRAM(s) IV Push every 6 hours PRN Nausea and/or Vomiting  sodium chloride 0.65% Nasal 1 Spray(s) Both Nostrils three times a day PRN Nasal Congestion      Physical Exam    Neuro :  no focal deficits  Respiratory: CTA B/L  CV: RRR, S1S2, no murmurs,   Abdominal: Soft, NT, ND +BS,  Extremities: No edema, + peripheral pulses    ASSESSMENT    pneumonia 2nd to covid 19,   marya,   hypokalemia,   h/o Cluster headache  BPH (benign prostatic hypertrophy)  Spinal stenosis s/p laminectomy  GERD (gastroesophageal reflux disease)  Hyperlipidemia  HTN (hypertension)            PLAN      contact and airborne isolation  cont remdesevir until 2/26/21  covid ab neg  cont dexamethasone  pepcid, singulair, vit c, vit d, zinc   pulm f/u  cont albuterol inhaler   procalcitonin neg noted  ,   D-dimer, crp, ldh, ferritin, lactate noted ,   tmx 102.3  cont tylenol prn,   robitussin prn   blood cx neg  O2 sat (92% - 94%) nrbm 15 L  cont O2 via nrbm and taper as tolerated    pt low threshold for intubation and icu montitoring  icu cons noted  continue to monitor pulse oximetry  monitor biomarkers TIW  not accept to icu at this time  cont incentive spirometer   serum creat wnl   cont current meds

## 2021-02-27 NOTE — PROGRESS NOTE ADULT - SUBJECTIVE AND OBJECTIVE BOX
Patient is a 65y old  Male who presents with a chief complaint of SOB, fever, cough, body aches (27 Feb 2021 06:17)  Awake, alert, comfortable in bed in NAD. Still on NRBM. Has [place]    INTERVAL HPI/OVERNIGHT EVENTS:      VITAL SIGNS:  T(F): 99.2 (02-27-21 @ 07:43)  HR: 103 (02-27-21 @ 04:45)  BP: 118/62 (02-27-21 @ 04:45)  RR: 20 (02-27-21 @ 04:45)  SpO2: 94% (02-27-21 @ 04:45)  Wt(kg): --  I&O's Detail          REVIEW OF SYSTEMS:    CONSTITUTIONAL:  No fevers, chills, sweats    HEENT:  Eyes:  No diplopia or blurred vision. ENT:  No earache, sore throat or runny nose.    CARDIOVASCULAR:  No pressure, squeezing, tightness, or heaviness about the chest; no palpitations.    RESPIRATORY:  Per HPI    GASTROINTESTINAL:  No abdominal pain, nausea, vomiting or diarrhea.    GENITOURINARY:  No dysuria, frequency or urgency.    NEUROLOGIC:  No paresthesias, fasciculations, seizures or weakness.    PSYCHIATRIC:  No disorder of thought or mood.      PHYSICAL EXAM:    Constitutional: Well developed and nourished  Eyes:Perrla  ENMT: normal  Neck:supple  Respiratory: good air entry  Cardiovascular: S1 S2 regular  Gastrointestinal: Soft, Non tender  Extremities: No edema  Vascular:normal  Neurological:Awake, alert,Ox3  Musculoskeletal:Normal      MEDICATIONS  (STANDING):  ascorbic acid 1000 milliGRAM(s) Oral daily  atorvastatin 80 milliGRAM(s) Oral at bedtime  cholecalciferol 2000 Unit(s) Oral daily  dexAMETHasone  Injectable 6 milliGRAM(s) IV Push daily  diltiazem    milliGRAM(s) Oral daily  enoxaparin Injectable 40 milliGRAM(s) SubCutaneous daily  hydrochlorothiazide 50 milliGRAM(s) Oral daily  montelukast 10 milliGRAM(s) Oral at bedtime  pantoprazole    Tablet 40 milliGRAM(s) Oral before breakfast  zinc sulfate 220 milliGRAM(s) Oral daily    MEDICATIONS  (PRN):  acetaminophen   Tablet .. 650 milliGRAM(s) Oral every 6 hours PRN Temp greater or equal to 38C (100.4F), Moderate Pain (4 - 6)  guaiFENesin   Syrup  (Sugar-Free) 200 milliGRAM(s) Oral every 6 hours PRN Cough  ondansetron Injectable 4 milliGRAM(s) IV Push every 6 hours PRN Nausea and/or Vomiting  sodium chloride 0.65% Nasal 1 Spray(s) Both Nostrils three times a day PRN Nasal Congestion      Allergies    No Known Allergies    Intolerances    Shrimp- Nasal congestion (Other)      LABS:                        13.2   16.61 )-----------( 253      ( 27 Feb 2021 08:25 )             39.1     02-27    133<L>  |  99  |  26<H>  ----------------------------<  119<H>  3.8   |  24  |  1.14    Ca    8.6      27 Feb 2021 08:25  Phos  2.7     02-26    TPro  6.3  /  Alb  3.0<L>  /  TBili  0.8  /  DBili  0.2  /  AST  33  /  ALT  57  /  AlkPhos  113  02-27              CAPILLARY BLOOD GLUCOSE        pro-bnp -- 02-25 @ 09:06     d-dimer 283  02-25 @ 09:06  pro-bnp -- 02-21 @ 17:53     d-dimer 290  02-21 @ 17:53      RADIOLOGY & ADDITIONAL TESTS:    CXR:  < from: Xray Chest 1 View- PORTABLE-Urgent (Xray Chest 1 View- PORTABLE-Urgent .) (02.23.21 @ 16:41) >  IMPRESSION:    Increasing bilateral infiltrates      < end of copied text >    Ct scan chest:    ekg;    echo:

## 2021-02-28 ENCOUNTER — TRANSCRIPTION ENCOUNTER (OUTPATIENT)
Age: 66
End: 2021-02-28

## 2021-02-28 LAB
ALBUMIN SERPL ELPH-MCNC: 3 G/DL — LOW (ref 3.5–5)
ALP SERPL-CCNC: 115 U/L — SIGNIFICANT CHANGE UP (ref 40–120)
ALT FLD-CCNC: 53 U/L DA — SIGNIFICANT CHANGE UP (ref 10–60)
AST SERPL-CCNC: 25 U/L — SIGNIFICANT CHANGE UP (ref 10–40)
BILIRUB DIRECT SERPL-MCNC: 0.2 MG/DL — SIGNIFICANT CHANGE UP (ref 0–0.2)
BILIRUB INDIRECT FLD-MCNC: 0.5 MG/DL — SIGNIFICANT CHANGE UP (ref 0.2–1)
BILIRUB SERPL-MCNC: 0.7 MG/DL — SIGNIFICANT CHANGE UP (ref 0.2–1.2)
CREAT SERPL-MCNC: 1.21 MG/DL — SIGNIFICANT CHANGE UP (ref 0.5–1.3)
D DIMER BLD IA.RAPID-MCNC: 428 NG/ML DDU — HIGH
FERRITIN SERPL-MCNC: 725 NG/ML — HIGH (ref 30–400)
PROT SERPL-MCNC: 6.6 G/DL — SIGNIFICANT CHANGE UP (ref 6–8.3)

## 2021-02-28 RX ADMIN — ATORVASTATIN CALCIUM 80 MILLIGRAM(S): 80 TABLET, FILM COATED ORAL at 20:47

## 2021-02-28 RX ADMIN — Medication 240 MILLIGRAM(S): at 06:24

## 2021-02-28 RX ADMIN — MONTELUKAST 10 MILLIGRAM(S): 4 TABLET, CHEWABLE ORAL at 20:47

## 2021-02-28 RX ADMIN — ENOXAPARIN SODIUM 40 MILLIGRAM(S): 100 INJECTION SUBCUTANEOUS at 11:34

## 2021-02-28 RX ADMIN — Medication 1000 MILLIGRAM(S): at 11:33

## 2021-02-28 RX ADMIN — Medication 6 MILLIGRAM(S): at 06:24

## 2021-02-28 RX ADMIN — ZINC SULFATE TAB 220 MG (50 MG ZINC EQUIVALENT) 220 MILLIGRAM(S): 220 (50 ZN) TAB at 11:34

## 2021-02-28 RX ADMIN — PANTOPRAZOLE SODIUM 40 MILLIGRAM(S): 20 TABLET, DELAYED RELEASE ORAL at 06:24

## 2021-02-28 RX ADMIN — Medication 200 MILLIGRAM(S): at 20:48

## 2021-02-28 RX ADMIN — Medication 2000 UNIT(S): at 11:34

## 2021-02-28 RX ADMIN — Medication 50 MILLIGRAM(S): at 06:24

## 2021-02-28 NOTE — DISCHARGE NOTE PROVIDER - NSDCCPCAREPLAN_GEN_ALL_CORE_FT
PRINCIPAL DISCHARGE DIAGNOSIS  Diagnosis: Acute hypoxemic respiratory failure due to COVID-19  Assessment and Plan of Treatment: You was found to have positive COVID 19 infection  Due to COVID 19 you required high amount of oxygen to maintain oxygen saturation. You completed remdersivir and decadron course as [facility] guideline for COVID infection  Now you were able to titrate down oxygen support to ---xxx so you can be discharged from the hospital and continue to use oxygen at home as prescribed  as far as quarantine, you completed the quarantine period since you stayed more than 14 days .      SECONDARY DISCHARGE DIAGNOSES  Diagnosis: Hypokalemia  Assessment and Plan of Treatment: this low potassium can have causes that are not due to underlying desease. can be due to inadequate dietary intake for your case  continue to have blood work to check potassium level and then get supplement is needed based on blood work   Weakness, fatigue, constipation are expected due to low potassium leve in your blood  If you have arrhythmia, chest pain please call your primary care provider right away       PRINCIPAL DISCHARGE DIAGNOSIS  Diagnosis: Acute hypoxemic respiratory failure due to COVID-19  Assessment and Plan of Treatment: CORONAVIRUS INSTRUCTIONS:   Based on your current clinical status and stability, it has been determined that you no longer need hospitalization and can recover while remaining in self-quarantine at home.  1. You should restrict activities outside your home, except for getting medical care.   2. Do not go to work, school, or public areas.   3. Avoid using public transportation, ride-sharing, or taxis.   4. Separate yourself from other people and animals in your home as much as possible.  When you are around other people (e.g., sharing a room or vehicle) you should wear a facemask.  5. Wash your hands often with soap and water for at least 20 seconds, especially after blowing your nose, coughing, or sneezing; going to the bathroom; and before eating or preparing food.  6. Cover your mouth and nose with a tissue when you cough or sneeze. Throw used tissues in a lined trash can. Immediately wash your hands with soap and water for at least 20 seconds  7. High touch surfaces include counters, tabletops, doorknobs, bathroom fixtures, toilets, phones, keyboards, tablets, and bedside tables.  8. Avoid sharing dishes, drinking glasses, cups, eating utensils, towels, or bedding with other people or pets in your home. After using these items, they should be washed thoroughly with soap and water.  You are strongly advised to seek prompt medical attention if your illness worsens or you develop new symptoms like fever, leg swelling or difficulty breathing.  You are being discharged on a course of steroids. Please do not abruptly stop taking this medication unless directed to by a medical professional.   COVID-19 makes patients more susceptible to blood clots. You will  need to take XXXXX for 30 days as prevention.   Please follow-up with your primary care provider in a week.       PRINCIPAL DISCHARGE DIAGNOSIS  Diagnosis: Acute hypoxemic respiratory failure due to COVID-19  Assessment and Plan of Treatment: CORONAVIRUS INSTRUCTIONS:   Based on your current clinical status and stability, it has been determined that you no longer need hospitalization and can recover while remaining in self-quarantine at home.  1. You should restrict activities outside your home, except for getting medical care.   2. Do not go to work, school, or public areas.   3. Avoid using public transportation, ride-sharing, or taxis.   4. Separate yourself from other people and animals in your home as much as possible.  When you are around other people (e.g., sharing a room or vehicle) you should wear a facemask.  5. Wash your hands often with soap and water for at least 20 seconds, especially after blowing your nose, coughing, or sneezing; going to the bathroom; and before eating or preparing food.  6. Cover your mouth and nose with a tissue when you cough or sneeze. Throw used tissues in a lined trash can. Immediately wash your hands with soap and water for at least 20 seconds  7. High touch surfaces include counters, tabletops, doorknobs, bathroom fixtures, toilets, phones, keyboards, tablets, and bedside tables.  8. Avoid sharing dishes, drinking glasses, cups, eating utensils, towels, or bedding with other people or pets in your home. After using these items, they should be washed thoroughly with soap and water.  You are strongly advised to seek prompt medical attention if your illness worsens or you develop new symptoms like fever, leg swelling or difficulty breathing.  You completed a course of steroids.   COVID-19 makes patients more susceptible to blood clots. You will  need to take Xarelto daily for 30 days as prevention.   Please follow-up with your primary care provider in a week.      SECONDARY DISCHARGE DIAGNOSES  Diagnosis: Renal calculi  Assessment and Plan of Treatment: A kidney stone was found in your right kidney stone incidentially found on your CT scan. No further treatment is needed as you have no symptoms. Managing a kidney stone at home involves treating your pain as needed, taking medication to help the stone pass more quickly (if your health care provider recommends this), and straining your urine so that the stone can be saved for testing once it does pass.

## 2021-02-28 NOTE — DIETITIAN INITIAL EVALUATION ADULT. - PROBLEM SELECTOR PLAN 2
noted to have potassium of 3.2 on admission   supplement ordered  monitor BMP daily and replace electrolytes as needed plan per MD

## 2021-02-28 NOTE — DISCHARGE NOTE PROVIDER - NSFOLLOWUPCLINICS_GEN_ALL_ED_FT
[facility]  Internal Medicine  [address] [place] [address]  Phone: [phone]  Fax: [phone]  Follow Up Time: 1 week

## 2021-02-28 NOTE — DIETITIAN INITIAL EVALUATION ADULT. - PROBLEM SELECTOR PLAN 1
patient has been COVID positive since 2/16  presented due to worsening symptoms  COVID positive in ED  saturating well on 2L nasal cannula  given dose of decadron; will continue decadron for total of 10 days   isolation precautions  f/u COVID antibodies  monitor O2 sats; titrate oxygen as tolerable   tylenol, robitussin and zofran PRN   f/u COVID markers plan per MD

## 2021-02-28 NOTE — PROGRESS NOTE ADULT - SUBJECTIVE AND OBJECTIVE BOX
Patient is a 65y old  Male who presents with a chief complaint of SOB, fever, cough, body aches (28 Feb 2021 06:14)  Awake, alert, laying in bed in NAD. Still on NRBM. Feels sob after exertion    INTERVAL HPI/OVERNIGHT EVENTS:      VITAL SIGNS:  T(F): 97.8 (02-28-21 @ 05:05)  HR: 68 (02-28-21 @ 05:05)  BP: 111/71 (02-28-21 @ 05:05)  RR: 20 (02-28-21 @ 05:05)  SpO2: 92% (02-28-21 @ 05:05)  Wt(kg): --  I&O's Detail          REVIEW OF SYSTEMS:    CONSTITUTIONAL:  No fevers, chills, sweats    HEENT:  Eyes:  No diplopia or blurred vision. ENT:  No earache, sore throat or runny nose.    CARDIOVASCULAR:  No pressure, squeezing, tightness, or heaviness about the chest; no palpitations.    RESPIRATORY:  Per HPI    GASTROINTESTINAL:  No abdominal pain, nausea, vomiting or diarrhea.    GENITOURINARY:  No dysuria, frequency or urgency.    NEUROLOGIC:  No paresthesias, fasciculations, seizures or weakness.    PSYCHIATRIC:  No disorder of thought or mood.      PHYSICAL EXAM:    Constitutional: Well developed and nourished  Eyes:Perrla  ENMT: normal  Neck:supple  Respiratory: good air entry  Cardiovascular: S1 S2 regular  Gastrointestinal: Soft, Non tender  Extremities: No edema  Vascular:normal  Neurological:Awake, alert,Ox3  Musculoskeletal:Normal      MEDICATIONS  (STANDING):  ascorbic acid 1000 milliGRAM(s) Oral daily  atorvastatin 80 milliGRAM(s) Oral at bedtime  cholecalciferol 2000 Unit(s) Oral daily  dexAMETHasone  Injectable 6 milliGRAM(s) IV Push daily  diltiazem    milliGRAM(s) Oral daily  enoxaparin Injectable 40 milliGRAM(s) SubCutaneous daily  hydrochlorothiazide 50 milliGRAM(s) Oral daily  montelukast 10 milliGRAM(s) Oral at bedtime  pantoprazole    Tablet 40 milliGRAM(s) Oral before breakfast  zinc sulfate 220 milliGRAM(s) Oral daily    MEDICATIONS  (PRN):  acetaminophen   Tablet .. 650 milliGRAM(s) Oral every 6 hours PRN Temp greater or equal to 38C (100.4F), Moderate Pain (4 - 6)  guaiFENesin   Syrup  (Sugar-Free) 200 milliGRAM(s) Oral every 6 hours PRN Cough  ondansetron Injectable 4 milliGRAM(s) IV Push every 6 hours PRN Nausea and/or Vomiting  sodium chloride 0.65% Nasal 1 Spray(s) Both Nostrils three times a day PRN Nasal Congestion      Allergies    No Known Allergies    Intolerances    Shrimp- Nasal congestion (Other)      LABS:                        13.2   16.61 )-----------( 253      ( 27 Feb 2021 08:25 )             39.1     02-28    x   |  x   |  x   ----------------------------<  x   x    |  x   |  1.21    Ca    8.6      27 Feb 2021 08:25    TPro  6.6  /  Alb  3.0<L>  /  TBili  0.7  /  DBili  0.2  /  AST  25  /  ALT  53  /  AlkPhos  115  02-28              CAPILLARY BLOOD GLUCOSE    COVID-19 PCR: Detected: EUA/IVD   You can help in the fight against COVID-19. [employer] may contact   you to see if you are interested in voluntarily participating in one of   our clinical trials.   This test has been validated by [employer] to be accurate;   though it has not been FDA cleared/approved by the usual pathway   As with all laboratory test, results should be correlated with clinical   findings.   https://www.fda.gov/media/400904/download   https://www.fda.gov/media/355699/download (02.21.21 @ 17:53) COVID-19 IgG Antibody Index: 0.07: Roche ECLIA Total AB (ALICE)   NOTE: This result index represents a total antibody measurement, which   includes IgG, IgA, and IgM   Measures Nucleocapsid   Negative <= 0.99 Index   Positive >= 1.00 Index Index (02.22.21 @ 12:25)     pro-bnp -- 02-28 @ 06:44     d-dimer 428  02-28 @ 06:44  pro-bnp -- 02-25 @ 09:06     d-dimer 283  02-25 @ 09:06  pro-bnp -- 02-21 @ 17:53     d-dimer 290  02-21 @ 17:53      RADIOLOGY & ADDITIONAL TESTS:    CXR:    Ct scan chest:    ekg;    echo:

## 2021-02-28 NOTE — PROGRESS NOTE ADULT - SUBJECTIVE AND OBJECTIVE BOX
Patient is a 65y old  Male who presents with a chief complaint of SOB, fever, cough, body aches (27 Feb 2021 11:07)    pt seen in icu [  ], reg med floor [  x ], bed [x  ], chair at bedside [   ], a+o x3 [x ], lethargic [  ],    nad [x  ]        Allergies    No Known Allergies  Shrimp- Nasal congestion (Other)        Vitals    T(F): 97.8 (02-28-21 @ 05:05), Max: 99.2 (02-27-21 @ 07:43)  HR: 68 (02-28-21 @ 05:05) (68 - 77)  BP: 111/71 (02-28-21 @ 05:05) (111/71 - 117/69)  RR: 20 (02-28-21 @ 05:05) (20 - 20)  SpO2: 92% (02-28-21 @ 05:05) (91% - 98%)  Wt(kg): --  CAPILLARY BLOOD GLUCOSE          Labs                          13.2   16.61 )-----------( 253      ( 27 Feb 2021 08:25 )             39.1       02-27    133<L>  |  99  |  26<H>  ----------------------------<  119<H>  3.8   |  24  |  1.14    Ca    8.6      27 Feb 2021 08:25  Phos  2.7     02-26    TPro  6.3  /  Alb  3.0<L>  /  TBili  0.8  /  DBili  0.2  /  AST  33  /  ALT  57  /  AlkPhos  113  02-27            .Blood Blood-Peripheral  02-21 @ 22:03   No Growth Final  --  --      .Blood Blood-Peripheral  02-21 @ 22:00   No Growth Final  --  --          Radiology Results      Meds    MEDICATIONS  (STANDING):  ascorbic acid 1000 milliGRAM(s) Oral daily  atorvastatin 80 milliGRAM(s) Oral at bedtime  cholecalciferol 2000 Unit(s) Oral daily  dexAMETHasone  Injectable 6 milliGRAM(s) IV Push daily  diltiazem    milliGRAM(s) Oral daily  enoxaparin Injectable 40 milliGRAM(s) SubCutaneous daily  hydrochlorothiazide 50 milliGRAM(s) Oral daily  montelukast 10 milliGRAM(s) Oral at bedtime  pantoprazole    Tablet 40 milliGRAM(s) Oral before breakfast  zinc sulfate 220 milliGRAM(s) Oral daily      MEDICATIONS  (PRN):  acetaminophen   Tablet .. 650 milliGRAM(s) Oral every 6 hours PRN Temp greater or equal to 38C (100.4F), Moderate Pain (4 - 6)  guaiFENesin   Syrup  (Sugar-Free) 200 milliGRAM(s) Oral every 6 hours PRN Cough  ondansetron Injectable 4 milliGRAM(s) IV Push every 6 hours PRN Nausea and/or Vomiting  sodium chloride 0.65% Nasal 1 Spray(s) Both Nostrils three times a day PRN Nasal Congestion      Physical Exam    Neuro :  no focal deficits  Respiratory: CTA B/L  CV: RRR, S1S2, no murmurs,   Abdominal: Soft, NT, ND +BS,  Extremities: No edema, + peripheral pulses    ASSESSMENT    pneumonia 2nd to covid 19,   marya,   hypokalemia,   h/o Cluster headache  BPH (benign prostatic hypertrophy)  Spinal stenosis s/p laminectomy  GERD (gastroesophageal reflux disease)  Hyperlipidemia  HTN (hypertension)            PLAN      contact and airborne isolation  cont remdesevir until 2/26/21  covid ab neg  cont dexamethasone  pepcid, singulair, vit c, vit d, zinc   pulm f/u  cont albuterol inhaler   procalcitonin neg noted  ,   D-dimer, crp, ldh, ferritin, lactate noted ,   tmx 102.3  cont tylenol prn,   robitussin prn   blood cx neg  O2 sat (92% - 94%) nrbm 15 L  cont O2 via nrbm and taper as tolerated    pt low threshold for intubation and icu montitoring  icu cons noted  continue to monitor pulse oximetry  monitor biomarkers TIW  not accept to icu at this time  cont incentive spirometer   serum creat wnl   cont current meds     Patient is a 65y old  Male who presents with a chief complaint of SOB, fever, cough, body aches (27 Feb 2021 11:07)    pt seen in icu [  ], reg med floor [  x ], bed [x  ], chair at bedside [   ], a+o x3 [x ], lethargic [  ],    nad [x  ]        Allergies    No Known Allergies  Shrimp- Nasal congestion (Other)        Vitals    T(F): 97.8 (02-28-21 @ 05:05), Max: 99.2 (02-27-21 @ 07:43)  HR: 68 (02-28-21 @ 05:05) (68 - 77)  BP: 111/71 (02-28-21 @ 05:05) (111/71 - 117/69)  RR: 20 (02-28-21 @ 05:05) (20 - 20)  SpO2: 92% (02-28-21 @ 05:05) (91% - 98%)  Wt(kg): --  CAPILLARY BLOOD GLUCOSE          Labs                          13.2   16.61 )-----------( 253      ( 27 Feb 2021 08:25 )             39.1       02-27    133<L>  |  99  |  26<H>  ----------------------------<  119<H>  3.8   |  24  |  1.14    Ca    8.6      27 Feb 2021 08:25  Phos  2.7     02-26    TPro  6.3  /  Alb  3.0<L>  /  TBili  0.8  /  DBili  0.2  /  AST  33  /  ALT  57  /  AlkPhos  113  02-27            .Blood Blood-Peripheral  02-21 @ 22:03   No Growth Final  --  --      .Blood Blood-Peripheral  02-21 @ 22:00   No Growth Final  --  --          Radiology Results      Meds    MEDICATIONS  (STANDING):  ascorbic acid 1000 milliGRAM(s) Oral daily  atorvastatin 80 milliGRAM(s) Oral at bedtime  cholecalciferol 2000 Unit(s) Oral daily  dexAMETHasone  Injectable 6 milliGRAM(s) IV Push daily  diltiazem    milliGRAM(s) Oral daily  enoxaparin Injectable 40 milliGRAM(s) SubCutaneous daily  hydrochlorothiazide 50 milliGRAM(s) Oral daily  montelukast 10 milliGRAM(s) Oral at bedtime  pantoprazole    Tablet 40 milliGRAM(s) Oral before breakfast  zinc sulfate 220 milliGRAM(s) Oral daily      MEDICATIONS  (PRN):  acetaminophen   Tablet .. 650 milliGRAM(s) Oral every 6 hours PRN Temp greater or equal to 38C (100.4F), Moderate Pain (4 - 6)  guaiFENesin   Syrup  (Sugar-Free) 200 milliGRAM(s) Oral every 6 hours PRN Cough  ondansetron Injectable 4 milliGRAM(s) IV Push every 6 hours PRN Nausea and/or Vomiting  sodium chloride 0.65% Nasal 1 Spray(s) Both Nostrils three times a day PRN Nasal Congestion      Physical Exam    Neuro :  no focal deficits  Respiratory: CTA B/L  CV: RRR, S1S2, no murmurs,   Abdominal: Soft, NT, ND +BS,  Extremities: No edema, + peripheral pulses    ASSESSMENT    pneumonia 2nd to covid 19,   marya,   hypokalemia,   h/o Cluster headache  BPH (benign prostatic hypertrophy)  Spinal stenosis s/p laminectomy  GERD (gastroesophageal reflux disease)  Hyperlipidemia  HTN (hypertension)            PLAN      contact and airborne isolation  completed remdesevir 2/26/21  covid ab neg  cont dexamethasone  pepcid, singulair, vit c, vit d, zinc   pulm f/u  cont albuterol inhaler   procalcitonin neg noted  ,   D-dimer, crp, ldh, ferritin, lactate noted ,   afebrile  cont tylenol prn,   robitussin prn   blood cx neg  O2 sat (91% - 98%) nrbm 15 L  cont O2 via nrbm and taper as tolerated    pt low threshold for intubation and icu monitoring  icu cons noted  continue to monitor pulse oximetry  monitor biomarkers TIW  not accept to icu at this time  cont incentive spirometer   serum creat wnl   cont current meds

## 2021-02-28 NOTE — DIETITIAN INITIAL EVALUATION ADULT. - FACTORS AFF FOOD INTAKE
acute on chronic comorbidities including Covid19 infection/persistent lack of appetite/Episcopalian/ethnic/cultural/personal food preferences

## 2021-02-28 NOTE — DIETITIAN INITIAL EVALUATION ADULT. - PROBLEM SELECTOR PLAN 4
continue home med of diltiazem  hold HCTZ in setting of MECHELLE  monitor BP and adjust meds as needed plan per MD

## 2021-02-28 NOTE — DIETITIAN INITIAL EVALUATION ADULT. - PERTINENT MEDS FT
MEDICATIONS  (STANDING):  ascorbic acid 1000 milliGRAM(s) Oral daily  atorvastatin 80 milliGRAM(s) Oral at bedtime  cholecalciferol 2000 Unit(s) Oral daily  dexAMETHasone  Injectable 6 milliGRAM(s) IV Push daily  diltiazem    milliGRAM(s) Oral daily  enoxaparin Injectable 40 milliGRAM(s) SubCutaneous daily  hydrochlorothiazide 50 milliGRAM(s) Oral daily  montelukast 10 milliGRAM(s) Oral at bedtime  pantoprazole    Tablet 40 milliGRAM(s) Oral before breakfast  zinc sulfate 220 milliGRAM(s) Oral daily

## 2021-02-28 NOTE — DISCHARGE NOTE PROVIDER - NSDCMRMEDTOKEN_GEN_ALL_CORE_FT
atorvastatin 80 mg oral tablet: 1 tab(s) orally once a day  Cardizem  mg/24 hours oral tablet, extended release: 1 tab(s) orally once a day  hydroCHLOROthiazide 50 mg oral tablet: 1 tab(s) orally once a day  omeprazole 40 mg oral delayed release capsule: 1 cap(s) orally once a day   atorvastatin 80 mg oral tablet: 1 tab(s) orally once a day  Cardizem  mg/24 hours oral tablet, extended release: 1 tab(s) orally once a day  hydroCHLOROthiazide 50 mg oral tablet: 1 tab(s) orally once a day  omeprazole 40 mg oral delayed release capsule: 1 cap(s) orally once a day  Optimizer Pendent: 99 months  J96, U07.1   acetaminophen 325 mg oral tablet: 2 tab(s) orally every 6 hours, As needed, Temp greater or equal to 38C (100.4F), Moderate Pain (4 - 6)  albuterol 90 mcg/inh inhalation aerosol: 2 puff(s) inhaled every 6 hours, As needed, Bronchospasm  ascorbic acid 1000 mg oral tablet: 1 tab(s) orally once a day  atorvastatin 80 mg oral tablet: 1 tab(s) orally once a day  Cardizem  mg/24 hours oral tablet, extended release: 1 tab(s) orally once a day  cholecalciferol oral tablet: 2000 unit(s) orally once a day  famotidine 20 mg oral tablet: 1 tab(s) orally 2 times a day  guaiFENesin 100 mg/5 mL oral liquid: 10 milliliter(s) orally every 6 hours, As needed, Cough  hydroCHLOROthiazide 50 mg oral tablet: 1 tab(s) orally once a day  montelukast 10 mg oral tablet: 1 tab(s) orally once a day (at bedtime)  sodium chloride 0.65% nasal spray: 1  nasal 2 times a day  Xarelto 10 mg oral tablet: 1 tab(s) orally once a day   zinc sulfate 220 mg oral capsule: 1 cap(s) orally once a day

## 2021-02-28 NOTE — DIETITIAN INITIAL EVALUATION ADULT. - PROBLEM SELECTOR PLAN 3
noted to have creatinine of 1.67 on admission   baseline appears to be normal   f/u urine lytes  given 500cc NS bolus in ED  monitor BMP daily   will hold HCTZ plan per MD

## 2021-02-28 NOTE — DIETITIAN INITIAL EVALUATION ADULT. - OTHER INFO
Pt lives home with family PTA, alert, oriented, COVID19+qasim, in airborne/contact isolation room, unable to conduct a face to face interview due to limited contact restrictions related to pt's medical condition and isolation precautions, attempt made to contact pt via bedside phone ( # [phone]), not answering, called cell phone # [phone] list in EMR, actually it's his wife's phone, spoke to family, reported decreased intake x a few days PTA, still not eating much in hospital, denied recent drastic wt changes, food choices obtained and forwarded to Dietary Pt lives home with family PTA, alert, oriented, COVID19+qasim, in airborne/contact isolation room, unable to conduct a face to face interview due to limited contact restrictions related to pt's medical condition and isolation precautions, attempt made to contact pt via bedside phone ( # [phone]), not answering, called cell phone # [phone] list in EMR, actually it's his wife's phone, spoke to family, reported decreased intake x a few days PTA, still not eating much in hospital, denied recent drastic wt changes, food choices obtained and forwarded to Dietary. [id number]=9.1, no h/o DM, pt not a good candidate for diet education at present

## 2021-02-28 NOTE — DISCHARGE NOTE PROVIDER - HOSPITAL COURSE
Patient is a 65 year old male from home, with PMH of HTN, HLD and GERD presented to the ED due to SOB, cough, fever, body aches.  Patient states he was tested positive for COVID on 2/16. Noted to have saturation 88% on room air at home. CXR showed Bilateral infiltrates suggestive of Covid pneumonia.  Pt was admitted for Acute respiratory failure due to COVID-19.  pneumonia. Started on Remdesivir and Dexamethasone   Pt was consulted by ICU 2/23 for persistent  hypoxia with pendant 12L NC optimizer, but not tolerated optimizer well. Repeat  CXR shows worsening b/l infiltrates. Pt was placed on  NRB 15L. with improvement in saturation to 95%. encouraged pron but not tolerated due to neck pain. Pulmonology followed     INCOMPLETE    Patient is a 65 year old male from home, with PMH of HTN, HLD and GERD presented to the ED due to SOB, cough, fever, body aches.  Patient states he was tested positive for COVID on 2/16. Noted to have saturation 88% on room air at home. CXR showed Bilateral infiltrates suggestive of Covid pneumonia.  Pt was admitted for Acute respiratory failure due to COVID-19.  pneumonia. Completed the course of  Remdesivir and Dexamethasone. Due to hypoxia on high amount of optimyzer pendant, ICU consulted however, saturation improved promptly with NRB mask. Pt was able to tolerated O2 titration slowly, eventually pt met the goal o2 saturation for d/c home with home O2.     incomplete 3/4   . Patient is a 65 year old male from home, with PMH of HTN, HLD and GERD presented to the ED due to SOB, cough, fever, body aches.  Patient states he was tested positive for COVID on 2/16. Noted to have saturation 88% on room air at home. CXR showed Bilateral infiltrates suggestive of Covid pneumonia.  Pt was admitted for Acute respiratory failure due to COVID-19.  pneumonia. Completed the course of  Remdesivir and Dexamethasone. Due to hypoxia on high amount of optimyzer pendant, ICU consulted however, saturation improved promptly with NRB mask. Pt was able to tolerated O2 titration slowly, eventually pt met the goal o2 saturation for d/c home with home O2.       >>>>>>>>>>>>>>>>>>>>>>>>>>>>>>>>>>>>>INCOMPLETE>>>>>>>>>>>>>>>>>>>>>>>>>>>>>>>>>>>>>>>>>>>  . Patient is a 65 year old male from home, with PMH of HTN, HLD and GERD who presented to the ED with c/o SOB, cough, fevers and myalgias for 6 days it he setting of a recent COVID-19 diagnosis. Patient reports desaturations to 88% on room air for which his PCP advised evaluation in the ED. Patient admitted to medicine for further management of his acute respiratory failure secondary to COVID infection. He is now s/p a course of  Remdesivir and Decadron. Leukocytosis improving. Remains on oxymizer pendant not tolerating off N/C- home O2 delivered. Plan to wean to N/C for safe discharge. Repeat CXR with ? free air under diaphragm, CT A/P without any acute findings - incidental renal exophytic hyperdensity noted and renal US recommended, however, given patient's COVID-19 status and non-emergent exam patient will have to f/u as OP. Patient was able to transfer out of bed to the chair today and tolerated well.  It was determined that you were medically stable and cleared for discharge home to self care.     This is a brief hospital course. Please refer to daily notes for a more in depth course. Patient is a 65 year old male from home, with PMH of HTN, HLD and GERD who presented to the ED with c/o SOB, cough, fevers and myalgias for 6 days it he setting of a recent COVID-19 diagnosis. Patient reports desaturations to 88% on room air for which his PCP advised evaluation in the ED. Patient admitted to medicine for further management of his acute respiratory failure secondary to COVID infection. He is now s/p a course of  Remdesivir and Decadron. Leukocytosis resolved. Weaned off oxymizer pendant but continued to require nasal cannula for supplemental 02. - home O2 delivered.  Repeat CXR with ? free air under diaphragm, CT A/P without any acute findings - incidental renal exophytic hyperdensity noted. Follow up renal US confirms nonobstrucitve renal calculi, however patient is asymptomatic.   Outpatient plan to continue extended VTE prophylaxis Xarelto for 30 days     It was determined that you were medically optimized for discharge home with home PT.     This is a brief hospital course. Please refer to daily notes for a more in depth course.

## 2021-02-28 NOTE — DIETITIAN INITIAL EVALUATION ADULT. - PERTINENT LABORATORY DATA
02-28 Na--    Glu --    K+ --    Cr  1.21 mg/dL BUN --      02-26 Phos 2.7 mg/dL   02-28 Alb 3.0 g/dL<L>       02-22 Chol 137 mg/dL LDL --    HDL 27 mg/dL<L> Trig 207 mg/dL<H>  02-22-21 @ 12:25 HgbA1C 6.1 [4.0 - 5.6]

## 2021-03-01 LAB
ALBUMIN SERPL ELPH-MCNC: 2.8 G/DL — LOW (ref 3.5–5)
ALP SERPL-CCNC: 110 U/L — SIGNIFICANT CHANGE UP (ref 40–120)
ALT FLD-CCNC: 57 U/L DA — SIGNIFICANT CHANGE UP (ref 10–60)
ANION GAP SERPL CALC-SCNC: 12 MMOL/L — SIGNIFICANT CHANGE UP (ref 5–17)
AST SERPL-CCNC: 21 U/L — SIGNIFICANT CHANGE UP (ref 10–40)
BILIRUB DIRECT SERPL-MCNC: 0.2 MG/DL — SIGNIFICANT CHANGE UP (ref 0–0.2)
BILIRUB INDIRECT FLD-MCNC: 0.5 MG/DL — SIGNIFICANT CHANGE UP (ref 0.2–1)
BILIRUB SERPL-MCNC: 0.7 MG/DL — SIGNIFICANT CHANGE UP (ref 0.2–1.2)
BUN SERPL-MCNC: 35 MG/DL — HIGH (ref 7–18)
CALCIUM SERPL-MCNC: 9 MG/DL — SIGNIFICANT CHANGE UP (ref 8.4–10.5)
CHLORIDE SERPL-SCNC: 98 MMOL/L — SIGNIFICANT CHANGE UP (ref 96–108)
CO2 SERPL-SCNC: 25 MMOL/L — SIGNIFICANT CHANGE UP (ref 22–31)
CREAT SERPL-MCNC: 1.17 MG/DL — SIGNIFICANT CHANGE UP (ref 0.5–1.3)
GLUCOSE SERPL-MCNC: 125 MG/DL — HIGH (ref 70–99)
HCT VFR BLD CALC: 39.6 % — SIGNIFICANT CHANGE UP (ref 39–50)
HGB BLD-MCNC: 13.6 G/DL — SIGNIFICANT CHANGE UP (ref 13–17)
MCHC RBC-ENTMCNC: 29.1 PG — SIGNIFICANT CHANGE UP (ref 27–34)
MCHC RBC-ENTMCNC: 34.3 GM/DL — SIGNIFICANT CHANGE UP (ref 32–36)
MCV RBC AUTO: 84.6 FL — SIGNIFICANT CHANGE UP (ref 80–100)
NRBC # BLD: 0 /100 WBCS — SIGNIFICANT CHANGE UP (ref 0–0)
PLATELET # BLD AUTO: 380 K/UL — SIGNIFICANT CHANGE UP (ref 150–400)
POTASSIUM SERPL-MCNC: 3.9 MMOL/L — SIGNIFICANT CHANGE UP (ref 3.5–5.3)
POTASSIUM SERPL-SCNC: 3.9 MMOL/L — SIGNIFICANT CHANGE UP (ref 3.5–5.3)
PROT SERPL-MCNC: 6.6 G/DL — SIGNIFICANT CHANGE UP (ref 6–8.3)
RBC # BLD: 4.68 M/UL — SIGNIFICANT CHANGE UP (ref 4.2–5.8)
RBC # FLD: 14.1 % — SIGNIFICANT CHANGE UP (ref 10.3–14.5)
SODIUM SERPL-SCNC: 135 MMOL/L — SIGNIFICANT CHANGE UP (ref 135–145)
WBC # BLD: 13.14 K/UL — HIGH (ref 3.8–10.5)
WBC # FLD AUTO: 13.14 K/UL — HIGH (ref 3.8–10.5)

## 2021-03-01 RX ORDER — MUPIROCIN 20 MG/G
1 OINTMENT TOPICAL
Refills: 0 | Status: COMPLETED | OUTPATIENT
Start: 2021-03-01 | End: 2021-03-06

## 2021-03-01 RX ORDER — CHLORHEXIDINE GLUCONATE 213 G/1000ML
1 SOLUTION TOPICAL
Refills: 0 | Status: DISCONTINUED | OUTPATIENT
Start: 2021-03-01 | End: 2021-03-19

## 2021-03-01 RX ORDER — MUPIROCIN 20 MG/G
1 OINTMENT TOPICAL
Refills: 0 | Status: DISCONTINUED | OUTPATIENT
Start: 2021-03-01 | End: 2021-03-01

## 2021-03-01 RX ADMIN — ZINC SULFATE TAB 220 MG (50 MG ZINC EQUIVALENT) 220 MILLIGRAM(S): 220 (50 ZN) TAB at 11:24

## 2021-03-01 RX ADMIN — Medication 200 MILLIGRAM(S): at 04:46

## 2021-03-01 RX ADMIN — Medication 240 MILLIGRAM(S): at 04:46

## 2021-03-01 RX ADMIN — Medication 6 MILLIGRAM(S): at 04:45

## 2021-03-01 RX ADMIN — ATORVASTATIN CALCIUM 80 MILLIGRAM(S): 80 TABLET, FILM COATED ORAL at 22:11

## 2021-03-01 RX ADMIN — PANTOPRAZOLE SODIUM 40 MILLIGRAM(S): 20 TABLET, DELAYED RELEASE ORAL at 05:00

## 2021-03-01 RX ADMIN — Medication 2000 UNIT(S): at 11:24

## 2021-03-01 RX ADMIN — MUPIROCIN 1 APPLICATION(S): 20 OINTMENT TOPICAL at 17:38

## 2021-03-01 RX ADMIN — CHLORHEXIDINE GLUCONATE 1 APPLICATION(S): 213 SOLUTION TOPICAL at 17:36

## 2021-03-01 RX ADMIN — MONTELUKAST 10 MILLIGRAM(S): 4 TABLET, CHEWABLE ORAL at 22:11

## 2021-03-01 RX ADMIN — Medication 1000 MILLIGRAM(S): at 11:24

## 2021-03-01 RX ADMIN — ENOXAPARIN SODIUM 40 MILLIGRAM(S): 100 INJECTION SUBCUTANEOUS at 11:24

## 2021-03-01 RX ADMIN — Medication 50 MILLIGRAM(S): at 05:00

## 2021-03-01 NOTE — PROGRESS NOTE ADULT - SUBJECTIVE AND OBJECTIVE BOX
NP Note discussed with  Primary Attending    Patient is a 65y old  Male who presents with a chief complaint of SOB, fever, cough, body aches (01 Mar 2021 11:10)    HPI-  Patient is a 65 year old male from home, with PMH of HTN, HLD and GERD. presented to the ED due to SOB, cough, fever, body aches.  Patient states he was tested positive for COVID on 2/16. Noted to have saturation 88% on room air at home   Pt was consulted by ICU 2/23 for hypoxia with pendant 12L NC optimizer, but not tolerated optimizer well.  CXR shows worsening b/l infiltrates. Pt was placed on  NRB 15L. with improvement in saturation to 95%. encouraged pron but not tolerated due to neck pain.   Completed  Remdesivir and continue Decadron  D8      Pt was seen at bedside, states is feeling better, NAD, speaking in full sentences Saturating 95% 12 L NRB. No fever.  INTERVAL HPI/OVERNIGHT EVENTS: no new complaints    MEDICATIONS  (STANDING):  ascorbic acid 1000 milliGRAM(s) Oral daily  atorvastatin 80 milliGRAM(s) Oral at bedtime  chlorhexidine 2% Cloths 1 Application(s) Topical <User Schedule>  cholecalciferol 2000 Unit(s) Oral daily  dexAMETHasone  Injectable 6 milliGRAM(s) IV Push daily  diltiazem    milliGRAM(s) Oral daily  enoxaparin Injectable 40 milliGRAM(s) SubCutaneous daily  hydrochlorothiazide 50 milliGRAM(s) Oral daily  montelukast 10 milliGRAM(s) Oral at bedtime  mupirocin 2% Ointment 1 Application(s) Both Nostrils two times a day  pantoprazole    Tablet 40 milliGRAM(s) Oral before breakfast  zinc sulfate 220 milliGRAM(s) Oral daily    MEDICATIONS  (PRN):  acetaminophen   Tablet .. 650 milliGRAM(s) Oral every 6 hours PRN Temp greater or equal to 38C (100.4F), Moderate Pain (4 - 6)  guaiFENesin   Syrup  (Sugar-Free) 200 milliGRAM(s) Oral every 6 hours PRN Cough  ondansetron Injectable 4 milliGRAM(s) IV Push every 6 hours PRN Nausea and/or Vomiting  sodium chloride 0.65% Nasal 1 Spray(s) Both Nostrils three times a day PRN Nasal Congestion      __________________________________________________  REVIEW OF SYSTEMS:    CONSTITUTIONAL: No fever,   EYES: no acute visual disturbances  NECK: No pain or stiffness  RESPIRATORY: No cough; No shortness of breath  CARDIOVASCULAR: No chest pain, no palpitations  GASTROINTESTINAL: No pain. No nausea or vomiting; No diarrhea   NEUROLOGICAL: No headache or numbness, no tremors  MUSCULOSKELETAL: No joint pain, no muscle pain  GENITOURINARY: no dysuria, no frequency, no hesitancy  PSYCHIATRY: no depression , no anxiety  ALL OTHER  ROS negative        Vital Signs Last 24 Hrs  T(C): 36.8 (01 Mar 2021 13:24), Max: 36.8 (01 Mar 2021 13:24)  T(F): 98.2 (01 Mar 2021 13:24), Max: 98.2 (01 Mar 2021 13:24)  HR: 71 (01 Mar 2021 13:24) (65 - 71)  BP: 122/79 (01 Mar 2021 13:24) (119/71 - 123/69)  BP(mean): --  RR: 24 (01 Mar 2021 13:24) (22 - 24)  SpO2: 96% (01 Mar 2021 13:24) (94% - 96%)    ________________________________________________  PHYSICAL EXAM:  GENERAL: NAD  HEENT: Normocephalic;  conjunctivae and sclerae clear; moist mucous membranes;   NECK : supple  CHEST/LUNG: diminished lung sounds but   HEART: S1 S2  regular; no murmurs, gallops or rubs  ABDOMEN: Soft, Nontender, Nondistended; Bowel sounds present  EXTREMITIES: no cyanosis; no edema; no calf tenderness  SKIN: warm and dry; no rash  NERVOUS SYSTEM:  Awake and alert; Oriented  to place, person and time ; no new deficits    _________________________________________________  LABS:                        13.6   13.14 )-----------( 380      ( 01 Mar 2021 08:39 )             39.6     03-01    135  |  98  |  35<H>  ----------------------------<  125<H>  3.9   |  25  |  1.17    Ca    9.0      01 Mar 2021 08:39    TPro  6.6  /  Alb  2.8<L>  /  TBili  0.7  /  DBili  0.2  /  AST  21  /  ALT  57  /  AlkPhos  110  03-01        CAPILLARY BLOOD GLUCOSE            RADIOLOGY & ADDITIONAL TESTS:    Imaging Personally Reviewed:  YES/NO    Consultant(s) Notes Reviewed:   YES/ No    Care Discussed with Consultants :     Plan of care was discussed with patient and /or primary care giver; all questions and concerns were addressed and care was aligned with patient's wishes.

## 2021-03-01 NOTE — PROGRESS NOTE ADULT - PROBLEM SELECTOR PLAN 1
-O2 sat 83 % on room air at rest, non tolerating NC or pendant optimizer, pt is mouth breather  -COVID antibody negative  -c/w remdesivir  D # 5  -c/w decadron D # 5  -c/w incentive spirometer therapy  -Pulmonology / [facility]  -on NRB 15L, O2 sat 94%  -unable prone due to neck pain  -monitor O2 sat titrate oxygen as tolerated with hypoxia  requiring NRB mask now   COVID antibody negative, completed remdersivir  continue decardon D8   pt is mouth breather - unable to tolerate pendant   c/w incentive spirometer therapy  Pulmonology Dr. Reid  titrate down oxygen as tolerates    -- on NRB 12L, O2 sat 94% - 95%   unable prone due to neck pain  monitor O2 sat

## 2021-03-01 NOTE — PROGRESS NOTE ADULT - PROBLEM SELECTOR PLAN 2
-likely prerenal to dehydration  -creatinine downtrended  -baseline creatinine 1.17  -monitor BMP likely prerenal to dehydration  Cr 1.7 on admission  resolved MECHELLE   baseline creatinine 1.17 - remains at baseline   monitor BMP

## 2021-03-01 NOTE — PROGRESS NOTE ADULT - ASSESSMENT
1. PNA 2nd to Covid-19  - PCR positive.   - CXR noted, 2/23 increasing infiltrates.   - Continue Vit C, Vit D, Zinc   - Continue Dexamethasone, Off Remdesivir  - Continue Singulair and Pepcid   - ICU consult noted - no escalation at present.   - Robitussin PRN for cough  - Tylenol PRN for temp   - O2 Supp - Taper O2 as tolerated; NRM  - Monitor O2 Sat.  - Monitor labs  - F.U CXR  - Unable to tolerate prone positioning due to prior neck surgery.   - Encouraged side lying periodically as tolerated.   - Incentive Spirometry  - Isolation precautions   - Nasal saline spray for dryness   - Cont Ensure Compact    - DVT and GI PPX     2. HTN  - Continue meds   - Monitor BP     3. HLD  - Cont meds  - Lipid panel     4. GERD  - Antireflux diet   - Cont meds

## 2021-03-01 NOTE — PROGRESS NOTE ADULT - SUBJECTIVE AND OBJECTIVE BOX
Patient is a 65y old  Male who presents with a chief complaint of SOB, fever, cough, body aches (28 Feb 2021 15:08)    pt seen in icu [  ], reg med floor [  x ], bed [x  ], chair at bedside [   ], a+o x3 [x ], lethargic [  ],    nad [x  ]      Allergies    No Known Allergies  Shrimp- Nasal congestion (Other)        Vitals    T(F): 96.1 (03-01-21 @ 04:40), Max: 97.8 (02-28-21 @ 13:41)  HR: 65 (03-01-21 @ 04:40) (65 - 75)  BP: 123/69 (03-01-21 @ 04:40) (119/71 - 127/72)  RR: 22 (03-01-21 @ 04:40) (22 - 24)  SpO2: 95% (03-01-21 @ 04:40) (88% - 95%)  Wt(kg): --  CAPILLARY BLOOD GLUCOSE          Labs                          13.2   16.61 )-----------( 253      ( 27 Feb 2021 08:25 )             39.1       02-28    x   |  x   |  x   ----------------------------<  x   x    |  x   |  1.21    Ca    8.6      27 Feb 2021 08:25    TPro  6.6  /  Alb  3.0<L>  /  TBili  0.7  /  DBili  0.2  /  AST  25  /  ALT  53  /  AlkPhos  115  02-28            .Blood Blood-Peripheral  02-21 @ 22:03   No Growth Final  --  --      .Blood Blood-Peripheral  02-21 @ 22:00   No Growth Final  --  --          Radiology Results      Meds    MEDICATIONS  (STANDING):  ascorbic acid 1000 milliGRAM(s) Oral daily  atorvastatin 80 milliGRAM(s) Oral at bedtime  cholecalciferol 2000 Unit(s) Oral daily  dexAMETHasone  Injectable 6 milliGRAM(s) IV Push daily  diltiazem    milliGRAM(s) Oral daily  enoxaparin Injectable 40 milliGRAM(s) SubCutaneous daily  hydrochlorothiazide 50 milliGRAM(s) Oral daily  montelukast 10 milliGRAM(s) Oral at bedtime  pantoprazole    Tablet 40 milliGRAM(s) Oral before breakfast  zinc sulfate 220 milliGRAM(s) Oral daily      MEDICATIONS  (PRN):  acetaminophen   Tablet .. 650 milliGRAM(s) Oral every 6 hours PRN Temp greater or equal to 38C (100.4F), Moderate Pain (4 - 6)  guaiFENesin   Syrup  (Sugar-Free) 200 milliGRAM(s) Oral every 6 hours PRN Cough  ondansetron Injectable 4 milliGRAM(s) IV Push every 6 hours PRN Nausea and/or Vomiting  sodium chloride 0.65% Nasal 1 Spray(s) Both Nostrils three times a day PRN Nasal Congestion      Physical Exam    Neuro :  no focal deficits  Respiratory: CTA B/L  CV: RRR, S1S2, no murmurs,   Abdominal: Soft, NT, ND +BS,  Extremities: No edema, + peripheral pulses    ASSESSMENT    pneumonia 2nd to covid 19,   marya,   hypokalemia,   h/o Cluster headache  BPH (benign prostatic hypertrophy)  Spinal stenosis s/p laminectomy  GERD (gastroesophageal reflux disease)  Hyperlipidemia  HTN (hypertension)            PLAN      contact and airborne isolation  completed remdesevir 2/26/21  covid ab neg  cont dexamethasone  pepcid, singulair, vit c, vit d, zinc   pulm f/u  cont albuterol inhaler   procalcitonin neg noted  ,   D-dimer, crp, ldh, ferritin, lactate noted ,   afebrile  cont tylenol prn,   robitussin prn   blood cx neg  O2 sat (91% - 98%) nrbm 15 L  cont O2 via nrbm and taper as tolerated    pt low threshold for intubation and icu monitoring  icu cons noted  continue to monitor pulse oximetry  monitor biomarkers TIW  not accept to icu at this time  cont incentive spirometer   serum creat wnl   cont current meds           Patient is a 65y old  Male who presents with a chief complaint of SOB, fever, cough, body aches (28 Feb 2021 15:08)    pt seen in icu [  ], reg med floor [  x ], bed [x  ], chair at bedside [   ], a+o x3 [x ], lethargic [  ],    nad [x  ]      Allergies    No Known Allergies  Shrimp- Nasal congestion (Other)        Vitals    T(F): 96.1 (03-01-21 @ 04:40), Max: 97.8 (02-28-21 @ 13:41)  HR: 65 (03-01-21 @ 04:40) (65 - 75)  BP: 123/69 (03-01-21 @ 04:40) (119/71 - 127/72)  RR: 22 (03-01-21 @ 04:40) (22 - 24)  SpO2: 95% (03-01-21 @ 04:40) (88% - 95%)  Wt(kg): --  CAPILLARY BLOOD GLUCOSE          Labs                          13.2   16.61 )-----------( 253      ( 27 Feb 2021 08:25 )             39.1       02-28    x   |  x   |  x   ----------------------------<  x   x    |  x   |  1.21    Ca    8.6      27 Feb 2021 08:25    TPro  6.6  /  Alb  3.0<L>  /  TBili  0.7  /  DBili  0.2  /  AST  25  /  ALT  53  /  AlkPhos  115  02-28            .Blood Blood-Peripheral  02-21 @ 22:03   No Growth Final  --  --      .Blood Blood-Peripheral  02-21 @ 22:00   No Growth Final  --  --          Radiology Results      Meds    MEDICATIONS  (STANDING):  ascorbic acid 1000 milliGRAM(s) Oral daily  atorvastatin 80 milliGRAM(s) Oral at bedtime  cholecalciferol 2000 Unit(s) Oral daily  dexAMETHasone  Injectable 6 milliGRAM(s) IV Push daily  diltiazem    milliGRAM(s) Oral daily  enoxaparin Injectable 40 milliGRAM(s) SubCutaneous daily  hydrochlorothiazide 50 milliGRAM(s) Oral daily  montelukast 10 milliGRAM(s) Oral at bedtime  pantoprazole    Tablet 40 milliGRAM(s) Oral before breakfast  zinc sulfate 220 milliGRAM(s) Oral daily      MEDICATIONS  (PRN):  acetaminophen   Tablet .. 650 milliGRAM(s) Oral every 6 hours PRN Temp greater or equal to 38C (100.4F), Moderate Pain (4 - 6)  guaiFENesin   Syrup  (Sugar-Free) 200 milliGRAM(s) Oral every 6 hours PRN Cough  ondansetron Injectable 4 milliGRAM(s) IV Push every 6 hours PRN Nausea and/or Vomiting  sodium chloride 0.65% Nasal 1 Spray(s) Both Nostrils three times a day PRN Nasal Congestion      Physical Exam    Neuro :  no focal deficits  Respiratory: CTA B/L  CV: RRR, S1S2, no murmurs,   Abdominal: Soft, NT, ND +BS,  Extremities: No edema, + peripheral pulses    ASSESSMENT    pneumonia 2nd to covid 19,   marya,   hypokalemia,   h/o Cluster headache  BPH (benign prostatic hypertrophy)  Spinal stenosis s/p laminectomy  GERD (gastroesophageal reflux disease)  Hyperlipidemia  HTN (hypertension)            PLAN      contact and airborne isolation  completed remdesevir 2/26/21  covid ab neg  cont dexamethasone  pepcid, singulair, vit c, vit d, zinc   pulm f/u  cont albuterol inhaler   cont lovenox  procalcitonin neg noted  ,   D-dimer, crp, ldh, ferritin, lactate noted ,   afebrile  cont tylenol prn,   robitussin prn   blood cx neg  O2 sat (88% - 95%) nrbm 15 L  cont O2 via nrbm and taper as tolerated    pt low threshold for intubation and icu monitoring  icu cons noted  continue to monitor pulse oximetry  monitor biomarkers TIW  not accept to icu at this time  cont incentive spirometer   serum creat wnl   cont current meds

## 2021-03-01 NOTE — PROGRESS NOTE ADULT - SUBJECTIVE AND OBJECTIVE BOX
Pt is awake, alert, lying in bed in NAD. On NRM - tapering O2. Saturating 95%. Less tachypneic.     INTERVAL HPI/OVERNIGHT EVENTS:      VITAL SIGNS:  T(F): 96.1 (03-01-21 @ 04:40)  HR: 65 (03-01-21 @ 04:40)  BP: 123/69 (03-01-21 @ 04:40)  RR: 22 (03-01-21 @ 04:40)  SpO2: 95% (03-01-21 @ 04:40)  Wt(kg): --  I&O's Detail          REVIEW OF SYSTEMS:    CONSTITUTIONAL:  No fevers, chills, sweats    HEENT:  Eyes:  No diplopia or blurred vision. ENT:  No earache, sore throat or runny nose.    CARDIOVASCULAR:  No pressure, squeezing, tightness, or heaviness about the chest; no palpitations.    RESPIRATORY:  Per HPI    GASTROINTESTINAL:  No abdominal pain, nausea, vomiting or diarrhea.    GENITOURINARY:  No dysuria, frequency or urgency.    NEUROLOGIC:  No paresthesias, fasciculations, seizures or weakness.    PSYCHIATRIC:  No disorder of thought or mood.      PHYSICAL EXAM:    Constitutional: Well developed and nourished  Eyes:Perrla  ENMT: normal  Neck:supple  Respiratory: good air entry  Cardiovascular: S1 S2 regular  Gastrointestinal: Soft, Non tender  Extremities: No edema  Vascular:normal  Neurological:Awake, alert,Ox3  Musculoskeletal:Normal      MEDICATIONS  (STANDING):  ascorbic acid 1000 milliGRAM(s) Oral daily  atorvastatin 80 milliGRAM(s) Oral at bedtime  cholecalciferol 2000 Unit(s) Oral daily  dexAMETHasone  Injectable 6 milliGRAM(s) IV Push daily  diltiazem    milliGRAM(s) Oral daily  enoxaparin Injectable 40 milliGRAM(s) SubCutaneous daily  hydrochlorothiazide 50 milliGRAM(s) Oral daily  montelukast 10 milliGRAM(s) Oral at bedtime  pantoprazole    Tablet 40 milliGRAM(s) Oral before breakfast  zinc sulfate 220 milliGRAM(s) Oral daily    MEDICATIONS  (PRN):  acetaminophen   Tablet .. 650 milliGRAM(s) Oral every 6 hours PRN Temp greater or equal to 38C (100.4F), Moderate Pain (4 - 6)  guaiFENesin   Syrup  (Sugar-Free) 200 milliGRAM(s) Oral every 6 hours PRN Cough  ondansetron Injectable 4 milliGRAM(s) IV Push every 6 hours PRN Nausea and/or Vomiting  sodium chloride 0.65% Nasal 1 Spray(s) Both Nostrils three times a day PRN Nasal Congestion      Allergies    No Known Allergies    Intolerances    Shrimp- Nasal congestion (Other)      LABS:                        13.6   13.14 )-----------( 380      ( 01 Mar 2021 08:39 )             39.6     03-01    135  |  98  |  35<H>  ----------------------------<  125<H>  3.9   |  25  |  1.17    Ca    9.0      01 Mar 2021 08:39    TPro  6.6  /  Alb  2.8<L>  /  TBili  0.7  /  DBili  0.2  /  AST  21  /  ALT  57  /  AlkPhos  110  03-01              CAPILLARY BLOOD GLUCOSE        pro-bnp -- 02-28 @ 06:44     d-dimer 428  02-28 @ 06:44  pro-bnp -- 02-25 @ 09:06     d-dimer 283  02-25 @ 09:06      RADIOLOGY & ADDITIONAL TESTS:    CXR:  x  Ct scan chest:    ekg;    echo:

## 2021-03-01 NOTE — PROGRESS NOTE ADULT - PROBLEM SELECTOR PLAN 3
-continue home med of diltiazem and hydrochlorothiazide  -monitor BP continue home med of diltiazem and hydrochlorothiazide  monitor BP

## 2021-03-01 NOTE — PROGRESS NOTE ADULT - PROBLEM SELECTOR PLAN 6
-Vt D 17.3  -c/w vitamin D supplement  -c/w diet with Ca/vt D  -f/u blood level as outpt Vt D 17.3  c/w vitamin D supplement  c/w diet with Ca/vt D  -f/u blood level as outpt

## 2021-03-02 LAB
ALBUMIN SERPL ELPH-MCNC: 3.4 G/DL — LOW (ref 3.5–5)
ALP SERPL-CCNC: 129 U/L — HIGH (ref 40–120)
ALT FLD-CCNC: 62 U/L DA — HIGH (ref 10–60)
ANION GAP SERPL CALC-SCNC: 12 MMOL/L — SIGNIFICANT CHANGE UP (ref 5–17)
AST SERPL-CCNC: 24 U/L — SIGNIFICANT CHANGE UP (ref 10–40)
BILIRUB DIRECT SERPL-MCNC: 0.1 MG/DL — SIGNIFICANT CHANGE UP (ref 0–0.2)
BILIRUB INDIRECT FLD-MCNC: 0.5 MG/DL — SIGNIFICANT CHANGE UP (ref 0.2–1)
BILIRUB SERPL-MCNC: 0.6 MG/DL — SIGNIFICANT CHANGE UP (ref 0.2–1.2)
BUN SERPL-MCNC: 31 MG/DL — HIGH (ref 7–18)
CALCIUM SERPL-MCNC: 9.1 MG/DL — SIGNIFICANT CHANGE UP (ref 8.4–10.5)
CHLORIDE SERPL-SCNC: 97 MMOL/L — SIGNIFICANT CHANGE UP (ref 96–108)
CO2 SERPL-SCNC: 27 MMOL/L — SIGNIFICANT CHANGE UP (ref 22–31)
CREAT SERPL-MCNC: 1.24 MG/DL — SIGNIFICANT CHANGE UP (ref 0.5–1.3)
GLUCOSE SERPL-MCNC: 111 MG/DL — HIGH (ref 70–99)
HCT VFR BLD CALC: 45.1 % — SIGNIFICANT CHANGE UP (ref 39–50)
HGB BLD-MCNC: 15 G/DL — SIGNIFICANT CHANGE UP (ref 13–17)
MCHC RBC-ENTMCNC: 28.6 PG — SIGNIFICANT CHANGE UP (ref 27–34)
MCHC RBC-ENTMCNC: 33.3 GM/DL — SIGNIFICANT CHANGE UP (ref 32–36)
MCV RBC AUTO: 85.9 FL — SIGNIFICANT CHANGE UP (ref 80–100)
NRBC # BLD: 0 /100 WBCS — SIGNIFICANT CHANGE UP (ref 0–0)
PLATELET # BLD AUTO: 511 K/UL — HIGH (ref 150–400)
POTASSIUM SERPL-MCNC: 3.6 MMOL/L — SIGNIFICANT CHANGE UP (ref 3.5–5.3)
POTASSIUM SERPL-SCNC: 3.6 MMOL/L — SIGNIFICANT CHANGE UP (ref 3.5–5.3)
PROT SERPL-MCNC: 7.7 G/DL — SIGNIFICANT CHANGE UP (ref 6–8.3)
RBC # BLD: 5.25 M/UL — SIGNIFICANT CHANGE UP (ref 4.2–5.8)
RBC # FLD: 13.7 % — SIGNIFICANT CHANGE UP (ref 10.3–14.5)
SODIUM SERPL-SCNC: 136 MMOL/L — SIGNIFICANT CHANGE UP (ref 135–145)
WBC # BLD: 20.87 K/UL — HIGH (ref 3.8–10.5)
WBC # FLD AUTO: 20.87 K/UL — HIGH (ref 3.8–10.5)

## 2021-03-02 RX ADMIN — MUPIROCIN 1 APPLICATION(S): 20 OINTMENT TOPICAL at 06:28

## 2021-03-02 RX ADMIN — PANTOPRAZOLE SODIUM 40 MILLIGRAM(S): 20 TABLET, DELAYED RELEASE ORAL at 06:27

## 2021-03-02 RX ADMIN — ENOXAPARIN SODIUM 40 MILLIGRAM(S): 100 INJECTION SUBCUTANEOUS at 13:28

## 2021-03-02 RX ADMIN — ATORVASTATIN CALCIUM 80 MILLIGRAM(S): 80 TABLET, FILM COATED ORAL at 22:32

## 2021-03-02 RX ADMIN — MUPIROCIN 1 APPLICATION(S): 20 OINTMENT TOPICAL at 18:52

## 2021-03-02 RX ADMIN — Medication 6 MILLIGRAM(S): at 06:27

## 2021-03-02 RX ADMIN — Medication 1000 MILLIGRAM(S): at 13:27

## 2021-03-02 RX ADMIN — CHLORHEXIDINE GLUCONATE 1 APPLICATION(S): 213 SOLUTION TOPICAL at 06:28

## 2021-03-02 RX ADMIN — ZINC SULFATE TAB 220 MG (50 MG ZINC EQUIVALENT) 220 MILLIGRAM(S): 220 (50 ZN) TAB at 13:27

## 2021-03-02 RX ADMIN — Medication 2000 UNIT(S): at 13:27

## 2021-03-02 RX ADMIN — MONTELUKAST 10 MILLIGRAM(S): 4 TABLET, CHEWABLE ORAL at 22:32

## 2021-03-02 NOTE — PROGRESS NOTE ADULT - SUBJECTIVE AND OBJECTIVE BOX
Pt is awake, alert, lying in bed - comfortably. Saturating well on NRM. Alternating between pendent and NRM.     INTERVAL HPI/OVERNIGHT EVENTS:      VITAL SIGNS:  T(F): 97.7 (03-02-21 @ 05:19)  HR: 62 (03-02-21 @ 05:19)  BP: 107/75 (03-02-21 @ 05:19)  RR: 20 (03-02-21 @ 05:19)  SpO2: 95% (03-02-21 @ 05:19)  Wt(kg): --  I&O's Detail    REVIEW OF SYSTEMS:    CONSTITUTIONAL:  No fevers, chills, sweats    HEENT:  Eyes:  No diplopia or blurred vision. ENT:  No earache, sore throat or runny nose.    CARDIOVASCULAR:  No pressure, squeezing, tightness, or heaviness about the chest; no palpitations.    RESPIRATORY:  Per HPI    GASTROINTESTINAL:  No abdominal pain, nausea, vomiting or diarrhea.    GENITOURINARY:  No dysuria, frequency or urgency.    NEUROLOGIC:  No paresthesias, fasciculations, seizures or weakness.    PSYCHIATRIC:  No disorder of thought or mood.      PHYSICAL EXAM:    Constitutional: Well developed and nourished  Eyes: Perrla  ENMT: normal  Neck: supple  Respiratory: good air entry  Cardiovascular: S1 S2 regular  Gastrointestinal: Soft, Non tender  Extremities: No edema  Vascular :normal  Neurological: Awake, alert,Ox3  Musculoskeletal: Normal      MEDICATIONS  (STANDING):  ascorbic acid 1000 milliGRAM(s) Oral daily  atorvastatin 80 milliGRAM(s) Oral at bedtime  chlorhexidine 2% Cloths 1 Application(s) Topical <User Schedule>  cholecalciferol 2000 Unit(s) Oral daily  diltiazem    milliGRAM(s) Oral daily  enoxaparin Injectable 40 milliGRAM(s) SubCutaneous daily  hydrochlorothiazide 50 milliGRAM(s) Oral daily  montelukast 10 milliGRAM(s) Oral at bedtime  mupirocin 2% Ointment 1 Application(s) Both Nostrils two times a day  pantoprazole    Tablet 40 milliGRAM(s) Oral before breakfast  zinc sulfate 220 milliGRAM(s) Oral daily    MEDICATIONS  (PRN):  acetaminophen   Tablet .. 650 milliGRAM(s) Oral every 6 hours PRN Temp greater or equal to 38C (100.4F), Moderate Pain (4 - 6)  guaiFENesin   Syrup  (Sugar-Free) 200 milliGRAM(s) Oral every 6 hours PRN Cough  ondansetron Injectable 4 milliGRAM(s) IV Push every 6 hours PRN Nausea and/or Vomiting  sodium chloride 0.65% Nasal 1 Spray(s) Both Nostrils three times a day PRN Nasal Congestion      Allergies    No Known Allergies    Intolerances    Shrimp- Nasal congestion (Other)      LABS:                        15.0   20.87 )-----------( 511      ( 02 Mar 2021 09:27 )             45.1     03-02    136  |  97  |  31<H>  ----------------------------<  111<H>  3.6   |  27  |  1.24    Ca    9.1      02 Mar 2021 09:27    TPro  7.7  /  Alb  3.4<L>  /  TBili  0.6  /  DBili  0.1  /  AST  24  /  ALT  62<H>  /  AlkPhos  129<H>  03-02      CAPILLARY BLOOD GLUCOSE    pro-bnp -- 02-28 @ 06:44     d-dimer 428  02-28 @ 06:44  pro-bnp -- 02-25 @ 09:06     d-dimer 283  02-25 @ 09:06      RADIOLOGY & ADDITIONAL TESTS:    CXR:    Ct scan chest:    ekg;    echo:

## 2021-03-02 NOTE — PROGRESS NOTE ADULT - PROBLEM SELECTOR PLAN 1
with hypoxia  requiring NRB mask now   COVID antibody negative, completed remdersivir  continue decardon   pt is mouth breather - unable to tolerate pendant   c/w incentive spirometer therapy  Pulmonology Dr. Reid  titrate down oxygen as tolerates    -- on NRB 12L, O2 sat 94% - 95%   unable prone due to neck pain  monitor O2 sat  O2 Supp - Taper O2 as tolerated; NRM and pendent - alernating.

## 2021-03-02 NOTE — PROGRESS NOTE ADULT - ASSESSMENT
1. PNA 2nd to Covid-19  - PCR positive.   - CXR noted, 2/23 increasing infiltrates.   - Continue Vit C, Vit D, Zinc   - Continue Dexamethasone, Off Remdesivir  - Continue Singulair and Pepcid   - ICU consult noted - no escalation at present.   - Robitussin PRN for cough  - Tylenol PRN for temp   - O2 Supp - Taper O2 as tolerated; NRM and pendent - alernating.   - Monitor O2 Sat.  - Monitor labs  - F.U CXR  - Unable to tolerate prone positioning due to prior neck surgery.   - Encouraged side lying periodically as tolerated.   - Incentive Spirometry  - Isolation precautions   - Nasal saline spray for dryness   - Cont Ensure Compact    - DVT and GI PPX     2. HTN  - Continue meds   - Monitor BP     3. HLD  - Cont meds  - Lipid panel     4. GERD  - Antireflux diet   - Cont meds

## 2021-03-02 NOTE — PROGRESS NOTE ADULT - SUBJECTIVE AND OBJECTIVE BOX
NP Note discussed with  Primary Attending    Patient is a 65y old  Male who presents with a chief complaint of SOB, fever, cough, body aches (02 Mar 2021 10:59)      INTERVAL HPI/OVERNIGHT EVENTS: no new complaints    MEDICATIONS  (STANDING):  ascorbic acid 1000 milliGRAM(s) Oral daily  atorvastatin 80 milliGRAM(s) Oral at bedtime  chlorhexidine 2% Cloths 1 Application(s) Topical <User Schedule>  cholecalciferol 2000 Unit(s) Oral daily  diltiazem    milliGRAM(s) Oral daily  enoxaparin Injectable 40 milliGRAM(s) SubCutaneous daily  hydrochlorothiazide 50 milliGRAM(s) Oral daily  montelukast 10 milliGRAM(s) Oral at bedtime  mupirocin 2% Ointment 1 Application(s) Both Nostrils two times a day  pantoprazole    Tablet 40 milliGRAM(s) Oral before breakfast  zinc sulfate 220 milliGRAM(s) Oral daily    MEDICATIONS  (PRN):  acetaminophen   Tablet .. 650 milliGRAM(s) Oral every 6 hours PRN Temp greater or equal to 38C (100.4F), Moderate Pain (4 - 6)  guaiFENesin   Syrup  (Sugar-Free) 200 milliGRAM(s) Oral every 6 hours PRN Cough  ondansetron Injectable 4 milliGRAM(s) IV Push every 6 hours PRN Nausea and/or Vomiting  sodium chloride 0.65% Nasal 1 Spray(s) Both Nostrils three times a day PRN Nasal Congestion      __________________________________________________  REVIEW OF SYSTEMS:    CONSTITUTIONAL: No fever,   EYES: no acute visual disturbances  NECK: No pain or stiffness  RESPIRATORY: No cough; No shortness of breath  CARDIOVASCULAR: No chest pain, no palpitations  GASTROINTESTINAL: No pain. No nausea or vomiting; No diarrhea   NEUROLOGICAL: No headache or numbness, no tremors  MUSCULOSKELETAL: No joint pain, no muscle pain  GENITOURINARY: no dysuria, no frequency, no hesitancy  PSYCHIATRY: no depression , no anxiety  ALL OTHER  ROS negative        Vital Signs Last 24 Hrs  T(C): 36.5 (02 Mar 2021 05:19), Max: 36.8 (01 Mar 2021 13:24)  T(F): 97.7 (02 Mar 2021 05:19), Max: 98.2 (01 Mar 2021 13:24)  HR: 62 (02 Mar 2021 05:19) (62 - 71)  BP: 107/75 (02 Mar 2021 05:19) (107/75 - 122/79)  BP(mean): --  RR: 20 (02 Mar 2021 05:19) (20 - 24)  SpO2: 95% (02 Mar 2021 05:19) (95% - 96%)    ________________________________________________  PHYSICAL EXAM:  GENERAL: NAD  HEENT: Normocephalic;  conjunctivae and sclerae clear; moist mucous membranes;   NECK : supple  CHEST/LUNG: Clear to auscultation bilaterally with good air entry   HEART: S1 S2  regular; no murmurs, gallops or rubs  ABDOMEN: Soft, Nontender, Nondistended; Bowel sounds present  EXTREMITIES: no cyanosis; no edema; no calf tenderness  SKIN: warm and dry; no rash  NERVOUS SYSTEM:  Awake and alert; Oriented  to place, person and time ; no new deficits    _________________________________________________  LABS:                        15.0   20.87 )-----------( 511      ( 02 Mar 2021 09:27 )             45.1     03-02    136  |  97  |  31<H>  ----------------------------<  111<H>  3.6   |  27  |  1.24    Ca    9.1      02 Mar 2021 09:27    TPro  7.7  /  Alb  3.4<L>  /  TBili  0.6  /  DBili  0.1  /  AST  24  /  ALT  62<H>  /  AlkPhos  129<H>  03-02        CAPILLARY BLOOD GLUCOSE            RADIOLOGY & ADDITIONAL TESTS:    Imaging Personally Reviewed:  YES/NO    Consultant(s) Notes Reviewed:   YES/ No    Care Discussed with Consultants :     Plan of care was discussed with patient and /or primary care giver; all questions and concerns were addressed and care was aligned with patient's wishes.

## 2021-03-02 NOTE — PROGRESS NOTE ADULT - SUBJECTIVE AND OBJECTIVE BOX
Patient is a 65y old  Male who presents with a chief complaint of SOB, fever, cough, body aches (01 Mar 2021 15:08)    pt seen in icu [  ], reg med floor [  x ], bed [x  ], chair at bedside [   ], a+o x3 [x ], lethargic [  ],    nad [x  ]        Allergies    No Known Allergies  Shrimp- Nasal congestion (Other)        Vitals    T(F): 97.7 (03-02-21 @ 05:19), Max: 98.2 (03-01-21 @ 13:24)  HR: 62 (03-02-21 @ 05:19) (62 - 71)  BP: 107/75 (03-02-21 @ 05:19) (107/75 - 122/79)  RR: 20 (03-02-21 @ 05:19) (20 - 24)  SpO2: 95% (03-02-21 @ 05:19) (95% - 96%)  Wt(kg): --  CAPILLARY BLOOD GLUCOSE          Labs                          13.6   13.14 )-----------( 380      ( 01 Mar 2021 08:39 )             39.6       03-01    135  |  98  |  35<H>  ----------------------------<  125<H>  3.9   |  25  |  1.17    Ca    9.0      01 Mar 2021 08:39    TPro  6.6  /  Alb  2.8<L>  /  TBili  0.7  /  DBili  0.2  /  AST  21  /  ALT  57  /  AlkPhos  110  03-01            .Blood Blood-Peripheral  02-21 @ 22:03   No Growth Final  --  --      .Blood Blood-Peripheral  02-21 @ 22:00   No Growth Final  --  --          Radiology Results      Meds    MEDICATIONS  (STANDING):  ascorbic acid 1000 milliGRAM(s) Oral daily  atorvastatin 80 milliGRAM(s) Oral at bedtime  chlorhexidine 2% Cloths 1 Application(s) Topical <User Schedule>  cholecalciferol 2000 Unit(s) Oral daily  dexAMETHasone  Injectable 6 milliGRAM(s) IV Push daily  diltiazem    milliGRAM(s) Oral daily  enoxaparin Injectable 40 milliGRAM(s) SubCutaneous daily  hydrochlorothiazide 50 milliGRAM(s) Oral daily  montelukast 10 milliGRAM(s) Oral at bedtime  mupirocin 2% Ointment 1 Application(s) Both Nostrils two times a day  pantoprazole    Tablet 40 milliGRAM(s) Oral before breakfast  zinc sulfate 220 milliGRAM(s) Oral daily      MEDICATIONS  (PRN):  acetaminophen   Tablet .. 650 milliGRAM(s) Oral every 6 hours PRN Temp greater or equal to 38C (100.4F), Moderate Pain (4 - 6)  guaiFENesin   Syrup  (Sugar-Free) 200 milliGRAM(s) Oral every 6 hours PRN Cough  ondansetron Injectable 4 milliGRAM(s) IV Push every 6 hours PRN Nausea and/or Vomiting  sodium chloride 0.65% Nasal 1 Spray(s) Both Nostrils three times a day PRN Nasal Congestion      Physical Exam    Neuro :  no focal deficits  Respiratory: CTA B/L  CV: RRR, S1S2, no murmurs,   Abdominal: Soft, NT, ND +BS,  Extremities: No edema, + peripheral pulses    ASSESSMENT    pneumonia 2nd to covid 19,   marya,   hypokalemia,   h/o Cluster headache  BPH (benign prostatic hypertrophy)  Spinal stenosis s/p laminectomy  GERD (gastroesophageal reflux disease)  Hyperlipidemia  HTN (hypertension)            PLAN      contact and airborne isolation  completed remdesevir 2/26/21  covid ab neg  cont dexamethasone  pepcid, singulair, vit c, vit d, zinc   pulm f/u  cont albuterol inhaler   cont lovenox  procalcitonin neg noted  ,   D-dimer, crp, ldh, ferritin, lactate noted ,   afebrile  cont tylenol prn,   robitussin prn   blood cx neg  O2 sat (88% - 95%) nrbm 15 L  cont O2 via nrbm and taper as tolerated    pt low threshold for intubation and icu monitoring  icu cons noted  continue to monitor pulse oximetry  monitor biomarkers TIW  not accept to icu at this time  cont incentive spirometer   serum creat wnl   cont current meds       Patient is a 65y old  Male who presents with a chief complaint of SOB, fever, cough, body aches (01 Mar 2021 15:08)    pt seen in icu [  ], reg med floor [  x ], bed [x  ], chair at bedside [   ], a+o x3 [x ], lethargic [  ],    nad [x  ]        Allergies    No Known Allergies  Shrimp- Nasal congestion (Other)        Vitals    T(F): 97.7 (03-02-21 @ 05:19), Max: 98.2 (03-01-21 @ 13:24)  HR: 62 (03-02-21 @ 05:19) (62 - 71)  BP: 107/75 (03-02-21 @ 05:19) (107/75 - 122/79)  RR: 20 (03-02-21 @ 05:19) (20 - 24)  SpO2: 95% (03-02-21 @ 05:19) (95% - 96%)  Wt(kg): --  CAPILLARY BLOOD GLUCOSE          Labs                          13.6   13.14 )-----------( 380      ( 01 Mar 2021 08:39 )             39.6       03-01    135  |  98  |  35<H>  ----------------------------<  125<H>  3.9   |  25  |  1.17    Ca    9.0      01 Mar 2021 08:39    TPro  6.6  /  Alb  2.8<L>  /  TBili  0.7  /  DBili  0.2  /  AST  21  /  ALT  57  /  AlkPhos  110  03-01            .Blood Blood-Peripheral  02-21 @ 22:03   No Growth Final  --  --      .Blood Blood-Peripheral  02-21 @ 22:00   No Growth Final  --  --          Radiology Results      Meds    MEDICATIONS  (STANDING):  ascorbic acid 1000 milliGRAM(s) Oral daily  atorvastatin 80 milliGRAM(s) Oral at bedtime  chlorhexidine 2% Cloths 1 Application(s) Topical <User Schedule>  cholecalciferol 2000 Unit(s) Oral daily  dexAMETHasone  Injectable 6 milliGRAM(s) IV Push daily  diltiazem    milliGRAM(s) Oral daily  enoxaparin Injectable 40 milliGRAM(s) SubCutaneous daily  hydrochlorothiazide 50 milliGRAM(s) Oral daily  montelukast 10 milliGRAM(s) Oral at bedtime  mupirocin 2% Ointment 1 Application(s) Both Nostrils two times a day  pantoprazole    Tablet 40 milliGRAM(s) Oral before breakfast  zinc sulfate 220 milliGRAM(s) Oral daily      MEDICATIONS  (PRN):  acetaminophen   Tablet .. 650 milliGRAM(s) Oral every 6 hours PRN Temp greater or equal to 38C (100.4F), Moderate Pain (4 - 6)  guaiFENesin   Syrup  (Sugar-Free) 200 milliGRAM(s) Oral every 6 hours PRN Cough  ondansetron Injectable 4 milliGRAM(s) IV Push every 6 hours PRN Nausea and/or Vomiting  sodium chloride 0.65% Nasal 1 Spray(s) Both Nostrils three times a day PRN Nasal Congestion      Physical Exam    Neuro :  no focal deficits  Respiratory: CTA B/L  CV: RRR, S1S2, no murmurs,   Abdominal: Soft, NT, ND +BS,  Extremities: No edema, + peripheral pulses    ASSESSMENT    pneumonia 2nd to covid 19,   marya,   hypokalemia,   h/o Cluster headache  BPH (benign prostatic hypertrophy)  Spinal stenosis s/p laminectomy  GERD (gastroesophageal reflux disease)  Hyperlipidemia  HTN (hypertension)            PLAN      contact and airborne isolation  completed remdesevir 2/26/21  covid ab neg  cont dexamethasone  pepcid, singulair, vit c, vit d, zinc   pulm f/u  cont albuterol inhaler   cont lovenox  procalcitonin neg noted  ,   D-dimer, crp, ldh, ferritin, lactate noted ,   afebrile  cont tylenol prn,   robitussin prn   blood cx neg  O2 sat (95% - 96%) optimizer pendant 12 L  cont O2 via pendant and taper as tolerated    pt low threshold for intubation and icu monitoring  icu cons noted  continue to monitor pulse oximetry  monitor biomarkers TIW  not accept to icu at this time  cont incentive spirometer   serum creat wnl   cont current meds

## 2021-03-02 NOTE — PROGRESS NOTE ADULT - PROBLEM SELECTOR PLAN 2
likely prerenal to dehydration  Cr 1.7 on admission  resolved MECHELLE   baseline creatinine 1.17 - remains at baseline   monitor BMP

## 2021-03-03 LAB
ALBUMIN SERPL ELPH-MCNC: 3.1 G/DL — LOW (ref 3.5–5)
ALP SERPL-CCNC: 118 U/L — SIGNIFICANT CHANGE UP (ref 40–120)
ALT FLD-CCNC: 61 U/L DA — HIGH (ref 10–60)
ANION GAP SERPL CALC-SCNC: 10 MMOL/L — SIGNIFICANT CHANGE UP (ref 5–17)
AST SERPL-CCNC: 20 U/L — SIGNIFICANT CHANGE UP (ref 10–40)
BILIRUB DIRECT SERPL-MCNC: 0.2 MG/DL — SIGNIFICANT CHANGE UP (ref 0–0.2)
BILIRUB INDIRECT FLD-MCNC: 0.4 MG/DL — SIGNIFICANT CHANGE UP (ref 0.2–1)
BILIRUB SERPL-MCNC: 0.6 MG/DL — SIGNIFICANT CHANGE UP (ref 0.2–1.2)
BUN SERPL-MCNC: 27 MG/DL — HIGH (ref 7–18)
CALCIUM SERPL-MCNC: 9 MG/DL — SIGNIFICANT CHANGE UP (ref 8.4–10.5)
CHLORIDE SERPL-SCNC: 100 MMOL/L — SIGNIFICANT CHANGE UP (ref 96–108)
CO2 SERPL-SCNC: 27 MMOL/L — SIGNIFICANT CHANGE UP (ref 22–31)
CREAT SERPL-MCNC: 1.17 MG/DL — SIGNIFICANT CHANGE UP (ref 0.5–1.3)
D DIMER BLD IA.RAPID-MCNC: 344 NG/ML DDU — HIGH
FERRITIN SERPL-MCNC: 584 NG/ML — HIGH (ref 30–400)
GLUCOSE SERPL-MCNC: 89 MG/DL — SIGNIFICANT CHANGE UP (ref 70–99)
HCT VFR BLD CALC: 44.2 % — SIGNIFICANT CHANGE UP (ref 39–50)
HGB BLD-MCNC: 14.7 G/DL — SIGNIFICANT CHANGE UP (ref 13–17)
LDH SERPL L TO P-CCNC: 398 U/L — HIGH (ref 120–225)
MCHC RBC-ENTMCNC: 28.5 PG — SIGNIFICANT CHANGE UP (ref 27–34)
MCHC RBC-ENTMCNC: 33.3 GM/DL — SIGNIFICANT CHANGE UP (ref 32–36)
MCV RBC AUTO: 85.8 FL — SIGNIFICANT CHANGE UP (ref 80–100)
NRBC # BLD: 0 /100 WBCS — SIGNIFICANT CHANGE UP (ref 0–0)
PLATELET # BLD AUTO: 446 K/UL — HIGH (ref 150–400)
POTASSIUM SERPL-MCNC: 3.6 MMOL/L — SIGNIFICANT CHANGE UP (ref 3.5–5.3)
POTASSIUM SERPL-SCNC: 3.6 MMOL/L — SIGNIFICANT CHANGE UP (ref 3.5–5.3)
PROCALCITONIN SERPL-MCNC: 0.07 NG/ML — SIGNIFICANT CHANGE UP (ref 0.02–0.1)
PROT SERPL-MCNC: 7.2 G/DL — SIGNIFICANT CHANGE UP (ref 6–8.3)
RBC # BLD: 5.15 M/UL — SIGNIFICANT CHANGE UP (ref 4.2–5.8)
RBC # FLD: 13.8 % — SIGNIFICANT CHANGE UP (ref 10.3–14.5)
SODIUM SERPL-SCNC: 137 MMOL/L — SIGNIFICANT CHANGE UP (ref 135–145)
WBC # BLD: 18.3 K/UL — HIGH (ref 3.8–10.5)
WBC # FLD AUTO: 18.3 K/UL — HIGH (ref 3.8–10.5)

## 2021-03-03 RX ADMIN — ENOXAPARIN SODIUM 40 MILLIGRAM(S): 100 INJECTION SUBCUTANEOUS at 12:09

## 2021-03-03 RX ADMIN — Medication 2000 UNIT(S): at 12:09

## 2021-03-03 RX ADMIN — CHLORHEXIDINE GLUCONATE 1 APPLICATION(S): 213 SOLUTION TOPICAL at 05:53

## 2021-03-03 RX ADMIN — Medication 1000 MILLIGRAM(S): at 12:09

## 2021-03-03 RX ADMIN — MONTELUKAST 10 MILLIGRAM(S): 4 TABLET, CHEWABLE ORAL at 21:05

## 2021-03-03 RX ADMIN — ZINC SULFATE TAB 220 MG (50 MG ZINC EQUIVALENT) 220 MILLIGRAM(S): 220 (50 ZN) TAB at 12:09

## 2021-03-03 RX ADMIN — Medication 200 MILLIGRAM(S): at 05:53

## 2021-03-03 RX ADMIN — MUPIROCIN 1 APPLICATION(S): 20 OINTMENT TOPICAL at 17:42

## 2021-03-03 RX ADMIN — ATORVASTATIN CALCIUM 80 MILLIGRAM(S): 80 TABLET, FILM COATED ORAL at 21:05

## 2021-03-03 RX ADMIN — PANTOPRAZOLE SODIUM 40 MILLIGRAM(S): 20 TABLET, DELAYED RELEASE ORAL at 05:53

## 2021-03-03 RX ADMIN — MUPIROCIN 1 APPLICATION(S): 20 OINTMENT TOPICAL at 05:53

## 2021-03-03 NOTE — PROGRESS NOTE ADULT - SUBJECTIVE AND OBJECTIVE BOX
Patient is a 65y old  Male who presents with a chief complaint of SOB, fever, cough, body aches (02 Mar 2021 13:10)    pt seen in icu [  ], reg med floor [  x ], bed [x  ], chair at bedside [   ], a+o x3 [x ], lethargic [  ],    nad [x  ]      Allergies    No Known Allergies  Shrimp- Nasal congestion (Other)        Vitals    T(F): 99.5 (03-03-21 @ 05:19), Max: 99.5 (03-03-21 @ 05:19)  HR: 67 (03-03-21 @ 05:19) (67 - 76)  BP: 109/75 (03-03-21 @ 05:19) (109/75 - 120/66)  RR: 22 (03-03-21 @ 05:19) (20 - 22)  SpO2: 94% (03-03-21 @ 05:19) (94% - 96%)  Wt(kg): --  CAPILLARY BLOOD GLUCOSE          Labs                          15.0   20.87 )-----------( 511      ( 02 Mar 2021 09:27 )             45.1       03-02    136  |  97  |  31<H>  ----------------------------<  111<H>  3.6   |  27  |  1.24    Ca    9.1      02 Mar 2021 09:27    TPro  7.7  /  Alb  3.4<L>  /  TBili  0.6  /  DBili  0.1  /  AST  24  /  ALT  62<H>  /  AlkPhos  129<H>  03-02            .Blood Blood-Peripheral  02-21 @ 22:03   No Growth Final  --  --      .Blood Blood-Peripheral  02-21 @ 22:00   No Growth Final  --  --          Radiology Results      Meds    MEDICATIONS  (STANDING):  ascorbic acid 1000 milliGRAM(s) Oral daily  atorvastatin 80 milliGRAM(s) Oral at bedtime  chlorhexidine 2% Cloths 1 Application(s) Topical <User Schedule>  cholecalciferol 2000 Unit(s) Oral daily  diltiazem    milliGRAM(s) Oral daily  enoxaparin Injectable 40 milliGRAM(s) SubCutaneous daily  hydrochlorothiazide 50 milliGRAM(s) Oral daily  montelukast 10 milliGRAM(s) Oral at bedtime  mupirocin 2% Ointment 1 Application(s) Both Nostrils two times a day  pantoprazole    Tablet 40 milliGRAM(s) Oral before breakfast  zinc sulfate 220 milliGRAM(s) Oral daily      MEDICATIONS  (PRN):  acetaminophen   Tablet .. 650 milliGRAM(s) Oral every 6 hours PRN Temp greater or equal to 38C (100.4F), Moderate Pain (4 - 6)  guaiFENesin   Syrup  (Sugar-Free) 200 milliGRAM(s) Oral every 6 hours PRN Cough  ondansetron Injectable 4 milliGRAM(s) IV Push every 6 hours PRN Nausea and/or Vomiting  sodium chloride 0.65% Nasal 1 Spray(s) Both Nostrils three times a day PRN Nasal Congestion      Physical Exam    Neuro :  no focal deficits  Respiratory: CTA B/L  CV: RRR, S1S2, no murmurs,   Abdominal: Soft, NT, ND +BS,  Extremities: No edema, + peripheral pulses    ASSESSMENT    pneumonia 2nd to covid 19,   marya,   hypokalemia,   h/o Cluster headache  BPH (benign prostatic hypertrophy)  Spinal stenosis s/p laminectomy  GERD (gastroesophageal reflux disease)  Hyperlipidemia  HTN (hypertension)            PLAN      contact and airborne isolation  completed remdesevir 2/26/21  covid ab neg  cont dexamethasone  pepcid, singulair, vit c, vit d, zinc   pulm f/u  cont albuterol inhaler   cont lovenox  procalcitonin neg noted  ,   D-dimer, crp, ldh, ferritin, lactate noted ,   afebrile  cont tylenol prn,   robitussin prn   blood cx neg  O2 sat (95% - 96%) optimizer pendant 12 L  cont O2 via pendant and taper as tolerated    pt low threshold for intubation and icu monitoring  icu cons noted  continue to monitor pulse oximetry  monitor biomarkers TIW  not accept to icu at this time  cont incentive spirometer   serum creat wnl   cont current meds         Patient is a 65y old  Male who presents with a chief complaint of SOB, fever, cough, body aches (02 Mar 2021 13:10)    pt seen in icu [  ], reg med floor [  x ], bed [x  ], chair at bedside [   ], a+o x3 [x ], lethargic [  ],    nad [x  ]      Allergies    No Known Allergies  Shrimp- Nasal congestion (Other)        Vitals    T(F): 99.5 (03-03-21 @ 05:19), Max: 99.5 (03-03-21 @ 05:19)  HR: 67 (03-03-21 @ 05:19) (67 - 76)  BP: 109/75 (03-03-21 @ 05:19) (109/75 - 120/66)  RR: 22 (03-03-21 @ 05:19) (20 - 22)  SpO2: 94% (03-03-21 @ 05:19) (94% - 96%)  Wt(kg): --  CAPILLARY BLOOD GLUCOSE          Labs                          15.0   20.87 )-----------( 511      ( 02 Mar 2021 09:27 )             45.1       03-02    136  |  97  |  31<H>  ----------------------------<  111<H>  3.6   |  27  |  1.24    Ca    9.1      02 Mar 2021 09:27    TPro  7.7  /  Alb  3.4<L>  /  TBili  0.6  /  DBili  0.1  /  AST  24  /  ALT  62<H>  /  AlkPhos  129<H>  03-02            .Blood Blood-Peripheral  02-21 @ 22:03   No Growth Final  --  --      .Blood Blood-Peripheral  02-21 @ 22:00   No Growth Final  --  --          Radiology Results      Meds    MEDICATIONS  (STANDING):  ascorbic acid 1000 milliGRAM(s) Oral daily  atorvastatin 80 milliGRAM(s) Oral at bedtime  chlorhexidine 2% Cloths 1 Application(s) Topical <User Schedule>  cholecalciferol 2000 Unit(s) Oral daily  diltiazem    milliGRAM(s) Oral daily  enoxaparin Injectable 40 milliGRAM(s) SubCutaneous daily  hydrochlorothiazide 50 milliGRAM(s) Oral daily  montelukast 10 milliGRAM(s) Oral at bedtime  mupirocin 2% Ointment 1 Application(s) Both Nostrils two times a day  pantoprazole    Tablet 40 milliGRAM(s) Oral before breakfast  zinc sulfate 220 milliGRAM(s) Oral daily      MEDICATIONS  (PRN):  acetaminophen   Tablet .. 650 milliGRAM(s) Oral every 6 hours PRN Temp greater or equal to 38C (100.4F), Moderate Pain (4 - 6)  guaiFENesin   Syrup  (Sugar-Free) 200 milliGRAM(s) Oral every 6 hours PRN Cough  ondansetron Injectable 4 milliGRAM(s) IV Push every 6 hours PRN Nausea and/or Vomiting  sodium chloride 0.65% Nasal 1 Spray(s) Both Nostrils three times a day PRN Nasal Congestion      Physical Exam    Neuro :  no focal deficits  Respiratory: CTA B/L  CV: RRR, S1S2, no murmurs,   Abdominal: Soft, NT, ND +BS,  Extremities: No edema, + peripheral pulses    ASSESSMENT    pneumonia 2nd to covid 19,   marya,   hypokalemia,   h/o Cluster headache  BPH (benign prostatic hypertrophy)  Spinal stenosis s/p laminectomy  GERD (gastroesophageal reflux disease)  Hyperlipidemia  HTN (hypertension)            PLAN      contact and airborne isolation  completed remdesevir 2/26/21  covid ab neg  cont dexamethasone  pepcid, singulair, vit c, vit d, zinc   pulm f/u  cont albuterol inhaler   cont lovenox  procalcitonin neg noted  ,   D-dimer, crp, ldh, ferritin, lactate noted ,   afebrile  cont tylenol prn,   robitussin prn   blood cx neg  O2 sat (94% - 96%) optimizer pendant 6 L  cont O2 via pendant and taper as tolerated    pt low threshold for intubation and icu monitoring  icu cons noted  continue to monitor pulse oximetry  monitor biomarkers TIW  not accept to icu at this time  cont incentive spirometer   serum creat wnl   cont current meds

## 2021-03-03 NOTE — PROGRESS NOTE ADULT - SUBJECTIVE AND OBJECTIVE BOX
NP Note discussed with  Primary Attending    Patient is a 65y old  Male who presents with a chief complaint of SOB, fever, cough, body aches (03 Mar 2021 06:26)    HPI-  Patient is a 65 year old male from home, with PMH of HTN, HLD and GERD. presented to the ED due to SOB, cough, fever, body aches.  Patient states he was tested positive for COVID on 2/16. Noted to have saturation 88% on room air at home   Pt was consulted by ICU 2/23 for hypoxia with pendant 12L NC optimizer, but not tolerated optimizer well.  CXR shows worsening b/l infiltrates. Pt was placed on  NRB 15L. with improvement in saturation to 95%. encouraged pron but not tolerated due to neck pain.  now titrated  down to pendant 6l/ min       Completed Remdesivir and Decadron      Pt was seen at bedside, states is feeling better, NAD, speaking in full sentences Saturating 95% 15L NRB. No fever.   INTERVAL HPI/OVERNIGHT EVENTS: no new complaints    MEDICATIONS  (STANDING):  ascorbic acid 1000 milliGRAM(s) Oral daily  atorvastatin 80 milliGRAM(s) Oral at bedtime  chlorhexidine 2% Cloths 1 Application(s) Topical <User Schedule>  cholecalciferol 2000 Unit(s) Oral daily  diltiazem    milliGRAM(s) Oral daily  enoxaparin Injectable 40 milliGRAM(s) SubCutaneous daily  hydrochlorothiazide 50 milliGRAM(s) Oral daily  montelukast 10 milliGRAM(s) Oral at bedtime  mupirocin 2% Ointment 1 Application(s) Both Nostrils two times a day  pantoprazole    Tablet 40 milliGRAM(s) Oral before breakfast  zinc sulfate 220 milliGRAM(s) Oral daily    MEDICATIONS  (PRN):  acetaminophen   Tablet .. 650 milliGRAM(s) Oral every 6 hours PRN Temp greater or equal to 38C (100.4F), Moderate Pain (4 - 6)  guaiFENesin   Syrup  (Sugar-Free) 200 milliGRAM(s) Oral every 6 hours PRN Cough  ondansetron Injectable 4 milliGRAM(s) IV Push every 6 hours PRN Nausea and/or Vomiting  sodium chloride 0.65% Nasal 1 Spray(s) Both Nostrils three times a day PRN Nasal Congestion      __________________________________________________  REVIEW OF SYSTEMS:    CONSTITUTIONAL: No fever,   EYES: no acute visual disturbances  NECK: No pain or stiffness  RESPIRATORY: No cough; No shortness of breath at resting, SOB when ambulating   CARDIOVASCULAR: No chest pain, no palpitations  GASTROINTESTINAL: No pain. No nausea or vomiting; No diarrhea   NEUROLOGICAL: No headache or numbness, no tremors  MUSCULOSKELETAL: No joint pain, no muscle pain  GENITOURINARY: no dysuria, no frequency, no hesitancy  PSYCHIATRY: no depression , no anxiety  ALL OTHER  ROS negative        Vital Signs Last 24 Hrs  T(C): 37.5 (03 Mar 2021 05:19), Max: 37.5 (03 Mar 2021 05:19)  T(F): 99.5 (03 Mar 2021 05:19), Max: 99.5 (03 Mar 2021 05:19)  HR: 67 (03 Mar 2021 05:19) (67 - 76)  BP: 109/75 (03 Mar 2021 05:19) (109/75 - 120/66)  BP(mean): --  RR: 22 (03 Mar 2021 05:19) (20 - 22)  SpO2: 94% (03 Mar 2021 05:19) (94% - 96%)    ________________________________________________  PHYSICAL EXAM:  GENERAL: NAD  HEENT: Normocephalic;  conjunctivae and sclerae clear; moist mucous membranes;   NECK : supple  CHEST/LUNG: Clear to auscultation bilaterally with good air entry , diminished breath sounds to both lungs, no wheezing or cough   HEART: S1 S2  regular; no murmurs, gallops or rubs  ABDOMEN: Soft, Nontender, Nondistended; Bowel sounds present  EXTREMITIES: no cyanosis; no edema; no calf tenderness  SKIN: warm and dry; no rash  NERVOUS SYSTEM:  Awake and alert; Oriented  to place, person and time ; no new deficits    _________________________________________________  LABS:                        15.0   20.87 )-----------( 511      ( 02 Mar 2021 09:27 )             45.1     03-02    136  |  97  |  31<H>  ----------------------------<  111<H>  3.6   |  27  |  1.24    Ca    9.1      02 Mar 2021 09:27    TPro  7.7  /  Alb  3.4<L>  /  TBili  0.6  /  DBili  0.1  /  AST  24  /  ALT  62<H>  /  AlkPhos  129<H>  03-02        CAPILLARY BLOOD GLUCOSE            RADIOLOGY & ADDITIONAL TESTS:  < from: Xray Chest 1 View- PORTABLE-Urgent (Xray Chest 1 View- PORTABLE-Urgent .) (02.23.21 @ 16:41) >    EXAM:  XR CHEST PORTABLE URGENT 1V                            PROCEDURE DATE:  02/23/2021          INTERPRETATION:  CLINICAL INDICATION: 65 years  Male with increasing SOB. Covid    COMPARISON: 2/21/2021    AP view of the chest demonstrates increasing patchy bilateral interstitial and airspace infiltrates. There is no pleural effusion. The right hemidiaphragm is again elevated. There is no pneumothorax.    The heart, mediastinum and marcie cannot be assessed due to projection.    The pulmonary vasculature is normal.    Mild thoracic degenerative changes are present.    IMPRESSION:    Increasing bilateral infiltrates            BRICE ST MD; Attending Radiologist  This document has been electronically signed. Feb 23 2021  4:50PM    < end of copied text >    Imaging Personally Reviewed:  YES    Consultant(s) Notes Reviewed:   YES  Care Discussed with Consultants : pulmonology     Plan of care was discussed with patient and /or primary care giver; all questions and concerns were addressed and care was aligned with patient's wishes.

## 2021-03-03 NOTE — PROGRESS NOTE ADULT - PROBLEM SELECTOR PLAN 1
with hypoxia  required  NRB mask   currently saturating >90 % on pendant 6liter   COVID antibody negative, completed remdersivir  completed  decardon   c/w incentive spirometer therapy  Pulmonology Dr. Reid  titrate down oxygen as tolerates   unable prone due to neck pain  monitor O2 sat  O2 Supp - Taper O2 as tolerated; NRM and pendent - alernating  reassure using O2 when to use bathroom

## 2021-03-03 NOTE — PROGRESS NOTE ADULT - SUBJECTIVE AND OBJECTIVE BOX
Pt is awake, alert, lying in bed in NAD. Resting comfortably. Sat 94%.     INTERVAL HPI/OVERNIGHT EVENTS:      VITAL SIGNS:  T(F): 99.5 (03-03-21 @ 05:19)  HR: 67 (03-03-21 @ 05:19)  BP: 109/75 (03-03-21 @ 05:19)  RR: 22 (03-03-21 @ 05:19)  SpO2: 94% (03-03-21 @ 05:19)  Wt(kg): --  I&O's Detail          REVIEW OF SYSTEMS:    CONSTITUTIONAL:  No fevers, chills, sweats    HEENT:  Eyes:  No diplopia or blurred vision. ENT:  No earache, sore throat or runny nose.    CARDIOVASCULAR:  No pressure, squeezing, tightness, or heaviness about the chest; no palpitations.    RESPIRATORY:  Per HPI    GASTROINTESTINAL:  No abdominal pain, nausea, vomiting or diarrhea.    GENITOURINARY:  No dysuria, frequency or urgency.    NEUROLOGIC:  No paresthesias, fasciculations, seizures or weakness.    PSYCHIATRIC:  No disorder of thought or mood.      PHYSICAL EXAM:    Constitutional: Well developed and nourished  Eyes:Perrla  ENMT: normal  Neck:supple  Respiratory: good air entry  Cardiovascular: S1 S2 regular  Gastrointestinal: Soft, Non tender  Extremities: No edema  Vascular:normal  Neurological:Awake, alert,Ox3  Musculoskeletal:Normal      MEDICATIONS  (STANDING):  ascorbic acid 1000 milliGRAM(s) Oral daily  atorvastatin 80 milliGRAM(s) Oral at bedtime  chlorhexidine 2% Cloths 1 Application(s) Topical <User Schedule>  cholecalciferol 2000 Unit(s) Oral daily  diltiazem    milliGRAM(s) Oral daily  enoxaparin Injectable 40 milliGRAM(s) SubCutaneous daily  hydrochlorothiazide 50 milliGRAM(s) Oral daily  montelukast 10 milliGRAM(s) Oral at bedtime  mupirocin 2% Ointment 1 Application(s) Both Nostrils two times a day  pantoprazole    Tablet 40 milliGRAM(s) Oral before breakfast  zinc sulfate 220 milliGRAM(s) Oral daily    MEDICATIONS  (PRN):  acetaminophen   Tablet .. 650 milliGRAM(s) Oral every 6 hours PRN Temp greater or equal to 38C (100.4F), Moderate Pain (4 - 6)  guaiFENesin   Syrup  (Sugar-Free) 200 milliGRAM(s) Oral every 6 hours PRN Cough  ondansetron Injectable 4 milliGRAM(s) IV Push every 6 hours PRN Nausea and/or Vomiting  sodium chloride 0.65% Nasal 1 Spray(s) Both Nostrils three times a day PRN Nasal Congestion      Allergies    No Known Allergies    Intolerances    Shrimp- Nasal congestion (Other)      LABS:                        14.7   18.30 )-----------( 446      ( 03 Mar 2021 09:19 )             44.2     03-03    137  |  100  |  27<H>  ----------------------------<  89  3.6   |  27  |  1.17    Ca    9.0      03 Mar 2021 09:19    TPro  7.2  /  Alb  3.1<L>  /  TBili  0.6  /  DBili  0.2  /  AST  20  /  ALT  61<H>  /  AlkPhos  118  03-03              CAPILLARY BLOOD GLUCOSE        pro-bnp -- 03-03 @ 09:19     d-dimer 344  03-03 @ 09:19  pro-bnp -- 02-28 @ 06:44     d-dimer 428  02-28 @ 06:44  pro-bnp -- 02-25 @ 09:06     d-dimer 283  02-25 @ 09:06      RADIOLOGY & ADDITIONAL TESTS:    CXR:    Ct scan chest:    ekg;    echo:

## 2021-03-04 LAB
ALBUMIN SERPL ELPH-MCNC: 2.6 G/DL — LOW (ref 3.5–5)
ALP SERPL-CCNC: 105 U/L — SIGNIFICANT CHANGE UP (ref 40–120)
ALT FLD-CCNC: 46 U/L DA — SIGNIFICANT CHANGE UP (ref 10–60)
ANION GAP SERPL CALC-SCNC: 9 MMOL/L — SIGNIFICANT CHANGE UP (ref 5–17)
AST SERPL-CCNC: 21 U/L — SIGNIFICANT CHANGE UP (ref 10–40)
BILIRUB SERPL-MCNC: 0.8 MG/DL — SIGNIFICANT CHANGE UP (ref 0.2–1.2)
BUN SERPL-MCNC: 20 MG/DL — HIGH (ref 7–18)
CALCIUM SERPL-MCNC: 8.6 MG/DL — SIGNIFICANT CHANGE UP (ref 8.4–10.5)
CHLORIDE SERPL-SCNC: 99 MMOL/L — SIGNIFICANT CHANGE UP (ref 96–108)
CO2 SERPL-SCNC: 25 MMOL/L — SIGNIFICANT CHANGE UP (ref 22–31)
CREAT SERPL-MCNC: 1.19 MG/DL — SIGNIFICANT CHANGE UP (ref 0.5–1.3)
D DIMER BLD IA.RAPID-MCNC: 515 NG/ML DDU — HIGH
GLUCOSE SERPL-MCNC: 94 MG/DL — SIGNIFICANT CHANGE UP (ref 70–99)
HCT VFR BLD CALC: 40.4 % — SIGNIFICANT CHANGE UP (ref 39–50)
HGB BLD-MCNC: 13.6 G/DL — SIGNIFICANT CHANGE UP (ref 13–17)
MCHC RBC-ENTMCNC: 28.6 PG — SIGNIFICANT CHANGE UP (ref 27–34)
MCHC RBC-ENTMCNC: 33.7 GM/DL — SIGNIFICANT CHANGE UP (ref 32–36)
MCV RBC AUTO: 84.9 FL — SIGNIFICANT CHANGE UP (ref 80–100)
NRBC # BLD: 0 /100 WBCS — SIGNIFICANT CHANGE UP (ref 0–0)
PLATELET # BLD AUTO: 385 K/UL — SIGNIFICANT CHANGE UP (ref 150–400)
POTASSIUM SERPL-MCNC: 3.8 MMOL/L — SIGNIFICANT CHANGE UP (ref 3.5–5.3)
POTASSIUM SERPL-SCNC: 3.8 MMOL/L — SIGNIFICANT CHANGE UP (ref 3.5–5.3)
PROT SERPL-MCNC: 6.3 G/DL — SIGNIFICANT CHANGE UP (ref 6–8.3)
RBC # BLD: 4.76 M/UL — SIGNIFICANT CHANGE UP (ref 4.2–5.8)
RBC # FLD: 14 % — SIGNIFICANT CHANGE UP (ref 10.3–14.5)
SODIUM SERPL-SCNC: 133 MMOL/L — LOW (ref 135–145)
WBC # BLD: 15.04 K/UL — HIGH (ref 3.8–10.5)
WBC # FLD AUTO: 15.04 K/UL — HIGH (ref 3.8–10.5)

## 2021-03-04 RX ADMIN — MUPIROCIN 1 APPLICATION(S): 20 OINTMENT TOPICAL at 05:16

## 2021-03-04 RX ADMIN — Medication 2000 UNIT(S): at 12:12

## 2021-03-04 RX ADMIN — CHLORHEXIDINE GLUCONATE 1 APPLICATION(S): 213 SOLUTION TOPICAL at 05:16

## 2021-03-04 RX ADMIN — PANTOPRAZOLE SODIUM 40 MILLIGRAM(S): 20 TABLET, DELAYED RELEASE ORAL at 05:15

## 2021-03-04 RX ADMIN — MONTELUKAST 10 MILLIGRAM(S): 4 TABLET, CHEWABLE ORAL at 21:05

## 2021-03-04 RX ADMIN — Medication 1 SPRAY(S): at 12:13

## 2021-03-04 RX ADMIN — MUPIROCIN 1 APPLICATION(S): 20 OINTMENT TOPICAL at 17:35

## 2021-03-04 RX ADMIN — Medication 1000 MILLIGRAM(S): at 12:12

## 2021-03-04 RX ADMIN — Medication 50 MILLIGRAM(S): at 05:15

## 2021-03-04 RX ADMIN — ATORVASTATIN CALCIUM 80 MILLIGRAM(S): 80 TABLET, FILM COATED ORAL at 21:05

## 2021-03-04 RX ADMIN — ZINC SULFATE TAB 220 MG (50 MG ZINC EQUIVALENT) 220 MILLIGRAM(S): 220 (50 ZN) TAB at 12:12

## 2021-03-04 RX ADMIN — Medication 240 MILLIGRAM(S): at 05:16

## 2021-03-04 RX ADMIN — ENOXAPARIN SODIUM 40 MILLIGRAM(S): 100 INJECTION SUBCUTANEOUS at 12:12

## 2021-03-04 RX ADMIN — Medication 200 MILLIGRAM(S): at 21:05

## 2021-03-04 NOTE — PROGRESS NOTE ADULT - SUBJECTIVE AND OBJECTIVE BOX
Pt is awake, alert, lying in bed in NAD. On Oxymizer pendent - still desaturating with ambulation/exertion. Lowgrade temp.     INTERVAL HPI/OVERNIGHT EVENTS:      VITAL SIGNS:  T(F): 99.1 (03-04-21 @ 05:29)  HR: 96 (03-04-21 @ 05:29)  BP: 126/71 (03-04-21 @ 05:29)  RR: 19 (03-04-21 @ 05:29)  SpO2: 90% (03-04-21 @ 09:15)  Wt(kg): --  I&O's Detail          REVIEW OF SYSTEMS:    CONSTITUTIONAL:  No fevers, chills, sweats    HEENT:  Eyes:  No diplopia or blurred vision. ENT:  No earache, sore throat or runny nose.    CARDIOVASCULAR:  No pressure, squeezing, tightness, or heaviness about the chest; no palpitations.    RESPIRATORY:  Per HPI    GASTROINTESTINAL:  No abdominal pain, nausea, vomiting or diarrhea.    GENITOURINARY:  No dysuria, frequency or urgency.    NEUROLOGIC:  No paresthesias, fasciculations, seizures or weakness.    PSYCHIATRIC:  No disorder of thought or mood.      PHYSICAL EXAM:    Constitutional: Well developed and nourished  Eyes:Perrla  ENMT: normal  Neck:supple  Respiratory: good air entry  Cardiovascular: S1 S2 regular  Gastrointestinal: Soft, Non tender  Extremities: No edema  Vascular:normal  Neurological:Awake, alert,Ox3  Musculoskeletal:Normal      MEDICATIONS  (STANDING):  ascorbic acid 1000 milliGRAM(s) Oral daily  atorvastatin 80 milliGRAM(s) Oral at bedtime  chlorhexidine 2% Cloths 1 Application(s) Topical <User Schedule>  cholecalciferol 2000 Unit(s) Oral daily  diltiazem    milliGRAM(s) Oral daily  enoxaparin Injectable 40 milliGRAM(s) SubCutaneous daily  hydrochlorothiazide 50 milliGRAM(s) Oral daily  montelukast 10 milliGRAM(s) Oral at bedtime  mupirocin 2% Ointment 1 Application(s) Both Nostrils two times a day  pantoprazole    Tablet 40 milliGRAM(s) Oral before breakfast  zinc sulfate 220 milliGRAM(s) Oral daily    MEDICATIONS  (PRN):  acetaminophen   Tablet .. 650 milliGRAM(s) Oral every 6 hours PRN Temp greater or equal to 38C (100.4F), Moderate Pain (4 - 6)  guaiFENesin   Syrup  (Sugar-Free) 200 milliGRAM(s) Oral every 6 hours PRN Cough  ondansetron Injectable 4 milliGRAM(s) IV Push every 6 hours PRN Nausea and/or Vomiting  sodium chloride 0.65% Nasal 1 Spray(s) Both Nostrils three times a day PRN Nasal Congestion      Allergies    No Known Allergies    Intolerances    Shrimp- Nasal congestion (Other)      LABS:                        13.6   15.04 )-----------( 385      ( 04 Mar 2021 08:03 )             40.4     03-04    133<L>  |  99  |  20<H>  ----------------------------<  94  3.8   |  25  |  1.19    Ca    8.6      04 Mar 2021 08:03    TPro  6.3  /  Alb  2.6<L>  /  TBili  0.8  /  DBili  x   /  AST  21  /  ALT  46  /  AlkPhos  105  03-04              CAPILLARY BLOOD GLUCOSE        pro-bnp -- 03-04 @ 08:03     d-dimer 515  03-04 @ 08:03  pro-bnp -- 03-03 @ 09:19     d-dimer 344  03-03 @ 09:19  pro-bnp -- 02-28 @ 06:44     d-dimer 428  02-28 @ 06:44      RADIOLOGY & ADDITIONAL TESTS:    CXR:    Ct scan chest:    ekg;    echo:

## 2021-03-04 NOTE — PROGRESS NOTE ADULT - SUBJECTIVE AND OBJECTIVE BOX
NP Note discussed with  Primary Attending    Patient is a 65y old  Male who presents with a chief complaint of SOB, fever, cough, body aches (04 Mar 2021 11:07)      INTERVAL HPI/OVERNIGHT EVENTS: Patient seen and examined at bedside. Patient SpO2 94% 6L optimizer at rest, SpO2 86% 6L optimizer on ambulation.    MEDICATIONS  (STANDING):  ascorbic acid 1000 milliGRAM(s) Oral daily  atorvastatin 80 milliGRAM(s) Oral at bedtime  chlorhexidine 2% Cloths 1 Application(s) Topical <User Schedule>  cholecalciferol 2000 Unit(s) Oral daily  diltiazem    milliGRAM(s) Oral daily  enoxaparin Injectable 40 milliGRAM(s) SubCutaneous daily  hydrochlorothiazide 50 milliGRAM(s) Oral daily  montelukast 10 milliGRAM(s) Oral at bedtime  mupirocin 2% Ointment 1 Application(s) Both Nostrils two times a day  pantoprazole    Tablet 40 milliGRAM(s) Oral before breakfast  zinc sulfate 220 milliGRAM(s) Oral daily    MEDICATIONS  (PRN):  acetaminophen   Tablet .. 650 milliGRAM(s) Oral every 6 hours PRN Temp greater or equal to 38C (100.4F), Moderate Pain (4 - 6)  guaiFENesin   Syrup  (Sugar-Free) 200 milliGRAM(s) Oral every 6 hours PRN Cough  ondansetron Injectable 4 milliGRAM(s) IV Push every 6 hours PRN Nausea and/or Vomiting  sodium chloride 0.65% Nasal 1 Spray(s) Both Nostrils three times a day PRN Nasal Congestion      __________________________________________________  REVIEW OF SYSTEMS:    CONSTITUTIONAL: No fever,   EYES: no acute visual disturbances  NECK: No pain or stiffness  RESPIRATORY: No cough; shortness of breath  CARDIOVASCULAR: No chest pain, no palpitations  GASTROINTESTINAL: No pain. No nausea or vomiting; No diarrhea   NEUROLOGICAL: No headache or numbness, no tremors  MUSCULOSKELETAL: No joint pain, no muscle pain  GENITOURINARY: no dysuria, no frequency, no hesitancy  PSYCHIATRY: no depression , no anxiety  ALL OTHER  ROS negative        Vital Signs Last 24 Hrs  T(C): 36.6 (04 Mar 2021 14:31), Max: 37.3 (04 Mar 2021 05:29)  T(F): 97.8 (04 Mar 2021 14:31), Max: 99.1 (04 Mar 2021 05:29)  HR: 98 (04 Mar 2021 14:31) (96 - 116)  BP: 102/73 (04 Mar 2021 14:31) (102/73 - 126/71)  BP(mean): --  RR: 20 (04 Mar 2021 14:31) (19 - 20)  SpO2: 94% (04 Mar 2021 14:31) (86% - 94%)    ________________________________________________  PHYSICAL EXAM:  GENERAL: NAD  HEENT: Normocephalic;  conjunctivae and sclerae clear; moist mucous membranes;   NECK : supple  CHEST/LUNG: diminished  HEART: S1 S2  regular; no murmurs, gallops or rubs  ABDOMEN: Soft, Nontender, Nondistended; Bowel sounds present  EXTREMITIES: no cyanosis; no edema; no calf tenderness  SKIN: pale, warm and dry; no rash  NERVOUS SYSTEM:  Awake and alert; Oriented  to place, person and time ; no new deficits    _________________________________________________  LABS:                        13.6   15.04 )-----------( 385      ( 04 Mar 2021 08:03 )             40.4     03-04    133<L>  |  99  |  20<H>  ----------------------------<  94  3.8   |  25  |  1.19    Ca    8.6      04 Mar 2021 08:03    TPro  6.3  /  Alb  2.6<L>  /  TBili  0.8  /  DBili  x   /  AST  21  /  ALT  46  /  AlkPhos  105  03-04        CAPILLARY BLOOD GLUCOSE    RADIOLOGY & ADDITIONAL TESTS:  < from: Xray Chest 1 View- PORTABLE-Urgent (Xray Chest 1 View- PORTABLE-Urgent .) (02.23.21 @ 16:41) >    EXAM:  XR CHEST PORTABLE URGENT 1V                            PROCEDURE DATE:  02/23/2021          INTERPRETATION:  CLINICAL INDICATION: 65 years  Male with increasing SOB. Covid    COMPARISON: 2/21/2021    AP view of the chest demonstrates increasing patchy bilateral interstitial and airspace infiltrates. There is no pleural effusion. The right hemidiaphragm is again elevated. There is no pneumothorax.    The heart, mediastinum and marcie cannot be assessed due to projection.    The pulmonary vasculature is normal.    Mild thoracic degenerative changes are present.    IMPRESSION:    Increasing bilateral infiltrates    < end of copied text >      Imaging  Reviewed:  YES    Consultant(s) Notes Reviewed:   YES      Plan of care was discussed with patient and /or primary care giver; all questions and concerns were addressed

## 2021-03-04 NOTE — PROGRESS NOTE ADULT - PROBLEM SELECTOR PLAN 1
-SpO2 94% 6L optimizer at rest, SpO2 86% 6L optimizer on ambulation.  -COVID antibody negative  -completed remdersivir  -completed  decadron   -c/w incentive spirometer therapy  Pulmonology Dr. Reid

## 2021-03-04 NOTE — PROGRESS NOTE ADULT - SUBJECTIVE AND OBJECTIVE BOX
Patient is a 65y old  Male who presents with a chief complaint of SOB, fever, cough, body aches (03 Mar 2021 11:17)    pt seen in icu [  ], reg med floor [  x ], bed [x  ], chair at bedside [   ], a+o x3 [x ], lethargic [  ],    nad [x  ]      Allergies    No Known Allergies  Shrimp- Nasal congestion (Other)        Vitals    T(F): 99.1 (03-04-21 @ 05:29), Max: 99.1 (03-04-21 @ 05:29)  HR: 96 (03-04-21 @ 05:29) (89 - 116)  BP: 126/71 (03-04-21 @ 05:29) (121/69 - 129/77)  RR: 19 (03-04-21 @ 05:29) (19 - 20)  SpO2: 90% (03-04-21 @ 05:29) (89% - 90%)  Wt(kg): --  CAPILLARY BLOOD GLUCOSE          Labs                          14.7   18.30 )-----------( 446      ( 03 Mar 2021 09:19 )             44.2       03-03    137  |  100  |  27<H>  ----------------------------<  89  3.6   |  27  |  1.17    Ca    9.0      03 Mar 2021 09:19    TPro  7.2  /  Alb  3.1<L>  /  TBili  0.6  /  DBili  0.2  /  AST  20  /  ALT  61<H>  /  AlkPhos  118  03-03            .Blood Blood-Peripheral  02-21 @ 22:03   No Growth Final  --  --      .Blood Blood-Peripheral  02-21 @ 22:00   No Growth Final  --  --          Radiology Results      Meds    MEDICATIONS  (STANDING):  ascorbic acid 1000 milliGRAM(s) Oral daily  atorvastatin 80 milliGRAM(s) Oral at bedtime  chlorhexidine 2% Cloths 1 Application(s) Topical <User Schedule>  cholecalciferol 2000 Unit(s) Oral daily  diltiazem    milliGRAM(s) Oral daily  enoxaparin Injectable 40 milliGRAM(s) SubCutaneous daily  hydrochlorothiazide 50 milliGRAM(s) Oral daily  montelukast 10 milliGRAM(s) Oral at bedtime  mupirocin 2% Ointment 1 Application(s) Both Nostrils two times a day  pantoprazole    Tablet 40 milliGRAM(s) Oral before breakfast  zinc sulfate 220 milliGRAM(s) Oral daily      MEDICATIONS  (PRN):  acetaminophen   Tablet .. 650 milliGRAM(s) Oral every 6 hours PRN Temp greater or equal to 38C (100.4F), Moderate Pain (4 - 6)  guaiFENesin   Syrup  (Sugar-Free) 200 milliGRAM(s) Oral every 6 hours PRN Cough  ondansetron Injectable 4 milliGRAM(s) IV Push every 6 hours PRN Nausea and/or Vomiting  sodium chloride 0.65% Nasal 1 Spray(s) Both Nostrils three times a day PRN Nasal Congestion      Physical Exam    Neuro :  no focal deficits  Respiratory: CTA B/L  CV: RRR, S1S2, no murmurs,   Abdominal: Soft, NT, ND +BS,  Extremities: No edema, + peripheral pulses    ASSESSMENT    pneumonia 2nd to covid 19,   marya,   hypokalemia,   h/o Cluster headache  BPH (benign prostatic hypertrophy)  Spinal stenosis s/p laminectomy  GERD (gastroesophageal reflux disease)  Hyperlipidemia  HTN (hypertension)            PLAN      contact and airborne isolation  completed remdesevir 2/26/21  covid ab neg  cont dexamethasone  pepcid, singulair, vit c, vit d, zinc   pulm f/u  cont albuterol inhaler   cont lovenox  procalcitonin neg noted  ,   D-dimer, crp, ldh, ferritin, lactate noted ,   afebrile  cont tylenol prn,   robitussin prn   blood cx neg  O2 sat (94% - 96%) optimizer pendant 6 L  cont O2 via pendant and taper as tolerated    pt low threshold for intubation and icu monitoring  icu cons noted  continue to monitor pulse oximetry  monitor biomarkers TIW  not accept to icu at this time  cont incentive spirometer   serum creat wnl   cont current meds       Patient is a 65y old  Male who presents with a chief complaint of SOB, fever, cough, body aches (03 Mar 2021 11:17)    pt seen in icu [  ], reg med floor [  x ], bed [x  ], chair at bedside [   ], a+o x3 [x ], lethargic [  ],    nad [x  ]      Allergies    No Known Allergies  Shrimp- Nasal congestion (Other)        Vitals    T(F): 99.1 (03-04-21 @ 05:29), Max: 99.1 (03-04-21 @ 05:29)  HR: 96 (03-04-21 @ 05:29) (89 - 116)  BP: 126/71 (03-04-21 @ 05:29) (121/69 - 129/77)  RR: 19 (03-04-21 @ 05:29) (19 - 20)  SpO2: 90% (03-04-21 @ 05:29) (89% - 90%)  Wt(kg): --  CAPILLARY BLOOD GLUCOSE          Labs                          14.7   18.30 )-----------( 446      ( 03 Mar 2021 09:19 )             44.2       03-03    137  |  100  |  27<H>  ----------------------------<  89  3.6   |  27  |  1.17    Ca    9.0      03 Mar 2021 09:19    TPro  7.2  /  Alb  3.1<L>  /  TBili  0.6  /  DBili  0.2  /  AST  20  /  ALT  61<H>  /  AlkPhos  118  03-03            .Blood Blood-Peripheral  02-21 @ 22:03   No Growth Final  --  --      .Blood Blood-Peripheral  02-21 @ 22:00   No Growth Final  --  --          Radiology Results      Meds    MEDICATIONS  (STANDING):  ascorbic acid 1000 milliGRAM(s) Oral daily  atorvastatin 80 milliGRAM(s) Oral at bedtime  chlorhexidine 2% Cloths 1 Application(s) Topical <User Schedule>  cholecalciferol 2000 Unit(s) Oral daily  diltiazem    milliGRAM(s) Oral daily  enoxaparin Injectable 40 milliGRAM(s) SubCutaneous daily  hydrochlorothiazide 50 milliGRAM(s) Oral daily  montelukast 10 milliGRAM(s) Oral at bedtime  mupirocin 2% Ointment 1 Application(s) Both Nostrils two times a day  pantoprazole    Tablet 40 milliGRAM(s) Oral before breakfast  zinc sulfate 220 milliGRAM(s) Oral daily      MEDICATIONS  (PRN):  acetaminophen   Tablet .. 650 milliGRAM(s) Oral every 6 hours PRN Temp greater or equal to 38C (100.4F), Moderate Pain (4 - 6)  guaiFENesin   Syrup  (Sugar-Free) 200 milliGRAM(s) Oral every 6 hours PRN Cough  ondansetron Injectable 4 milliGRAM(s) IV Push every 6 hours PRN Nausea and/or Vomiting  sodium chloride 0.65% Nasal 1 Spray(s) Both Nostrils three times a day PRN Nasal Congestion      Physical Exam    Neuro :  no focal deficits  Respiratory: CTA B/L  CV: RRR, S1S2, no murmurs,   Abdominal: Soft, NT, ND +BS,  Extremities: No edema, + peripheral pulses    ASSESSMENT    pneumonia 2nd to covid 19,   marya,   hypokalemia,   h/o Cluster headache  BPH (benign prostatic hypertrophy)  Spinal stenosis s/p laminectomy  GERD (gastroesophageal reflux disease)  Hyperlipidemia  HTN (hypertension)            PLAN      contact and airborne isolation  completed remdesevir 2/26/21  covid ab neg  cont dexamethasone  pepcid, singulair, vit c, vit d, zinc   pulm f/u  cont albuterol inhaler   cont lovenox  procalcitonin neg noted  ,   D-dimer, crp, ldh, ferritin, lactate noted ,   afebrile  cont tylenol prn,   robitussin prn   blood cx neg  O2 sat (89% - 90%) optimizer pendant 4 L  cont O2 via pendant and taper as tolerated    pt low threshold for intubation and icu monitoring  icu cons noted  continue to monitor pulse oximetry  monitor biomarkers TIW  not accept to icu at this time  cont incentive spirometer   serum creat wnl   cont current meds

## 2021-03-05 LAB
ALBUMIN SERPL ELPH-MCNC: 2.6 G/DL — LOW (ref 3.5–5)
ALP SERPL-CCNC: 96 U/L — SIGNIFICANT CHANGE UP (ref 40–120)
ALT FLD-CCNC: 48 U/L DA — SIGNIFICANT CHANGE UP (ref 10–60)
ANION GAP SERPL CALC-SCNC: 11 MMOL/L — SIGNIFICANT CHANGE UP (ref 5–17)
AST SERPL-CCNC: 19 U/L — SIGNIFICANT CHANGE UP (ref 10–40)
BILIRUB SERPL-MCNC: 0.8 MG/DL — SIGNIFICANT CHANGE UP (ref 0.2–1.2)
BUN SERPL-MCNC: 17 MG/DL — SIGNIFICANT CHANGE UP (ref 7–18)
CALCIUM SERPL-MCNC: 8.4 MG/DL — SIGNIFICANT CHANGE UP (ref 8.4–10.5)
CHLORIDE SERPL-SCNC: 97 MMOL/L — SIGNIFICANT CHANGE UP (ref 96–108)
CO2 SERPL-SCNC: 25 MMOL/L — SIGNIFICANT CHANGE UP (ref 22–31)
CREAT SERPL-MCNC: 1.16 MG/DL — SIGNIFICANT CHANGE UP (ref 0.5–1.3)
GLUCOSE SERPL-MCNC: 106 MG/DL — HIGH (ref 70–99)
HCT VFR BLD CALC: 41.1 % — SIGNIFICANT CHANGE UP (ref 39–50)
HGB BLD-MCNC: 13.9 G/DL — SIGNIFICANT CHANGE UP (ref 13–17)
MAGNESIUM SERPL-MCNC: 2.1 MG/DL — SIGNIFICANT CHANGE UP (ref 1.6–2.6)
MCHC RBC-ENTMCNC: 28.8 PG — SIGNIFICANT CHANGE UP (ref 27–34)
MCHC RBC-ENTMCNC: 33.8 GM/DL — SIGNIFICANT CHANGE UP (ref 32–36)
MCV RBC AUTO: 85.3 FL — SIGNIFICANT CHANGE UP (ref 80–100)
NRBC # BLD: 0 /100 WBCS — SIGNIFICANT CHANGE UP (ref 0–0)
PHOSPHATE SERPL-MCNC: 3 MG/DL — SIGNIFICANT CHANGE UP (ref 2.5–4.5)
PLATELET # BLD AUTO: 375 K/UL — SIGNIFICANT CHANGE UP (ref 150–400)
POTASSIUM SERPL-MCNC: 3.7 MMOL/L — SIGNIFICANT CHANGE UP (ref 3.5–5.3)
POTASSIUM SERPL-SCNC: 3.7 MMOL/L — SIGNIFICANT CHANGE UP (ref 3.5–5.3)
PROT SERPL-MCNC: 6.4 G/DL — SIGNIFICANT CHANGE UP (ref 6–8.3)
RBC # BLD: 4.82 M/UL — SIGNIFICANT CHANGE UP (ref 4.2–5.8)
RBC # FLD: 14.1 % — SIGNIFICANT CHANGE UP (ref 10.3–14.5)
SODIUM SERPL-SCNC: 133 MMOL/L — LOW (ref 135–145)
WBC # BLD: 17.24 K/UL — HIGH (ref 3.8–10.5)
WBC # FLD AUTO: 17.24 K/UL — HIGH (ref 3.8–10.5)

## 2021-03-05 RX ORDER — IBUPROFEN 200 MG
600 TABLET ORAL ONCE
Refills: 0 | Status: COMPLETED | OUTPATIENT
Start: 2021-03-05 | End: 2021-03-05

## 2021-03-05 RX ADMIN — MUPIROCIN 1 APPLICATION(S): 20 OINTMENT TOPICAL at 17:48

## 2021-03-05 RX ADMIN — PANTOPRAZOLE SODIUM 40 MILLIGRAM(S): 20 TABLET, DELAYED RELEASE ORAL at 06:18

## 2021-03-05 RX ADMIN — CHLORHEXIDINE GLUCONATE 1 APPLICATION(S): 213 SOLUTION TOPICAL at 06:17

## 2021-03-05 RX ADMIN — Medication 600 MILLIGRAM(S): at 22:47

## 2021-03-05 RX ADMIN — Medication 200 MILLIGRAM(S): at 21:12

## 2021-03-05 RX ADMIN — Medication 240 MILLIGRAM(S): at 06:17

## 2021-03-05 RX ADMIN — MONTELUKAST 10 MILLIGRAM(S): 4 TABLET, CHEWABLE ORAL at 21:04

## 2021-03-05 RX ADMIN — ZINC SULFATE TAB 220 MG (50 MG ZINC EQUIVALENT) 220 MILLIGRAM(S): 220 (50 ZN) TAB at 12:04

## 2021-03-05 RX ADMIN — MUPIROCIN 1 APPLICATION(S): 20 OINTMENT TOPICAL at 06:17

## 2021-03-05 RX ADMIN — Medication 600 MILLIGRAM(S): at 21:04

## 2021-03-05 RX ADMIN — Medication 2000 UNIT(S): at 12:04

## 2021-03-05 RX ADMIN — Medication 50 MILLIGRAM(S): at 06:17

## 2021-03-05 RX ADMIN — Medication 200 MILLIGRAM(S): at 12:28

## 2021-03-05 RX ADMIN — ENOXAPARIN SODIUM 40 MILLIGRAM(S): 100 INJECTION SUBCUTANEOUS at 12:04

## 2021-03-05 RX ADMIN — Medication 1000 MILLIGRAM(S): at 12:04

## 2021-03-05 RX ADMIN — ATORVASTATIN CALCIUM 80 MILLIGRAM(S): 80 TABLET, FILM COATED ORAL at 21:04

## 2021-03-05 NOTE — PROGRESS NOTE ADULT - SUBJECTIVE AND OBJECTIVE BOX
NP Note discussed with  Primary Attending    Patient is a 65y old  Male who presents with a chief complaint of SOB, fever, cough, body aches (05 Mar 2021 10:33)      INTERVAL HPI/OVERNIGHT EVENTS: Patient seen and examined at bedside. Patient comfortable in bed, SpO2 90-93% 6L optimizer at rest,    MEDICATIONS  (STANDING):  ascorbic acid 1000 milliGRAM(s) Oral daily  atorvastatin 80 milliGRAM(s) Oral at bedtime  chlorhexidine 2% Cloths 1 Application(s) Topical <User Schedule>  cholecalciferol 2000 Unit(s) Oral daily  diltiazem    milliGRAM(s) Oral daily  enoxaparin Injectable 40 milliGRAM(s) SubCutaneous daily  hydrochlorothiazide 50 milliGRAM(s) Oral daily  montelukast 10 milliGRAM(s) Oral at bedtime  mupirocin 2% Ointment 1 Application(s) Both Nostrils two times a day  pantoprazole    Tablet 40 milliGRAM(s) Oral before breakfast  zinc sulfate 220 milliGRAM(s) Oral daily    MEDICATIONS  (PRN):  acetaminophen   Tablet .. 650 milliGRAM(s) Oral every 6 hours PRN Temp greater or equal to 38C (100.4F), Moderate Pain (4 - 6)  guaiFENesin   Syrup  (Sugar-Free) 200 milliGRAM(s) Oral every 6 hours PRN Cough  ondansetron Injectable 4 milliGRAM(s) IV Push every 6 hours PRN Nausea and/or Vomiting  sodium chloride 0.65% Nasal 1 Spray(s) Both Nostrils three times a day PRN Nasal Congestion      __________________________________________________  REVIEW OF SYSTEMS:    CONSTITUTIONAL: No fever,   EYES: no acute visual disturbances  NECK: No pain or stiffness  RESPIRATORY: No cough; No shortness of breath  CARDIOVASCULAR: No chest pain, no palpitations  GASTROINTESTINAL: No pain. No nausea or vomiting; No diarrhea   NEUROLOGICAL: No headache or numbness, no tremors  MUSCULOSKELETAL: No joint pain, no muscle pain  GENITOURINARY: no dysuria, no frequency, no hesitancy  PSYCHIATRY: no depression , no anxiety  ALL OTHER  ROS negative        Vital Signs Last 24 Hrs  T(C): 36.6 (05 Mar 2021 13:16), Max: 36.9 (05 Mar 2021 05:20)  T(F): 97.8 (05 Mar 2021 13:16), Max: 98.5 (05 Mar 2021 05:20)  HR: 99 (05 Mar 2021 13:16) (77 - 99)  BP: 101/72 (05 Mar 2021 13:16) (101/72 - 112/64)  BP(mean): --  RR: 22 (05 Mar 2021 13:16) (19 - 22)  SpO2: 97% (05 Mar 2021 13:16) (88% - 97%)    ________________________________________________  PHYSICAL EXAM:  GENERAL: NAD  HEENT: Normocephalic;  conjunctivae and sclerae clear; moist mucous membranes;   NECK : supple  CHEST/LUNG: diminshed   HEART: S1 S2  regular; no murmurs, gallops or rubs  ABDOMEN: Soft, Nontender, Nondistended; Bowel sounds present  EXTREMITIES: no cyanosis; no edema; no calf tenderness  SKIN: warm and dry; no rash  NERVOUS SYSTEM:  Awake and alert; Oriented  to place, person and time ; no new deficits    _________________________________________________  LABS:                        13.9   17.24 )-----------( 375      ( 05 Mar 2021 08:19 )             41.1     03-05    133<L>  |  97  |  17  ----------------------------<  106<H>  3.7   |  25  |  1.16    Ca    8.4      05 Mar 2021 08:19  Phos  3.0     03-05  Mg     2.1     03-05    TPro  6.4  /  Alb  2.6<L>  /  TBili  0.8  /  DBili  x   /  AST  19  /  ALT  48  /  AlkPhos  96  03-05        CAPILLARY BLOOD GLUCOSE            RADIOLOGY & ADDITIONAL TESTS:  < from: Xray Chest 1 View- PORTABLE-Urgent (Xray Chest 1 View- PORTABLE-Urgent .) (02.23.21 @ 16:41) >    EXAM:  XR CHEST PORTABLE URGENT 1V                            PROCEDURE DATE:  02/23/2021          INTERPRETATION:  CLINICAL INDICATION: 65 years  Male with increasing SOB. Covid    COMPARISON: 2/21/2021    AP view of the chest demonstrates increasing patchy bilateral interstitial and airspace infiltrates. There is no pleural effusion. The right hemidiaphragm is again elevated. There is no pneumothorax.    The heart, mediastinum and marcie cannot be assessed due to projection.    The pulmonary vasculature is normal.    Mild thoracic degenerative changes are present.    IMPRESSION:    Increasing bilateral infiltrates    < end of copied text >      Imaging  Reviewed:  YES    Consultant(s) Notes Reviewed:   YES      Plan of care was discussed with patient and /or primary care giver; all questions and concerns were addressed

## 2021-03-05 NOTE — PROGRESS NOTE ADULT - PROBLEM SELECTOR PLAN 1
-SpO2 90-93% 6L optimizer at rest  -COVID antibody negative  -completed remdersivir  -completed  decadron   -c/w incentive spirometer therapy  Pulmonology Dr. Reid

## 2021-03-05 NOTE — PROGRESS NOTE ADULT - SUBJECTIVE AND OBJECTIVE BOX
Patient is a 65y old  Male who presents with a chief complaint of SOB, fever, cough, body aches (04 Mar 2021 16:36)    pt seen in icu [  ], reg med floor [  x ], bed [x  ], chair at bedside [   ], a+o x3 [x ], lethargic [  ],    nad [x  ]      Allergies    No Known Allergies  Shrimp- Nasal congestion (Other)        Vitals    T(F): 98.5 (03-05-21 @ 05:20), Max: 98.5 (03-05-21 @ 05:20)  HR: 91 (03-05-21 @ 05:20) (77 - 98)  BP: 112/64 (03-05-21 @ 05:20) (102/73 - 112/64)  RR: 19 (03-05-21 @ 05:20) (19 - 20)  SpO2: 92% (03-05-21 @ 05:20) (86% - 94%)  Wt(kg): --  CAPILLARY BLOOD GLUCOSE          Labs                          13.6   15.04 )-----------( 385      ( 04 Mar 2021 08:03 )             40.4       03-04    133<L>  |  99  |  20<H>  ----------------------------<  94  3.8   |  25  |  1.19    Ca    8.6      04 Mar 2021 08:03    TPro  6.3  /  Alb  2.6<L>  /  TBili  0.8  /  DBili  x   /  AST  21  /  ALT  46  /  AlkPhos  105  03-04            .Blood Blood-Peripheral  02-21 @ 22:03   No Growth Final  --  --      .Blood Blood-Peripheral  02-21 @ 22:00   No Growth Final  --  --          Radiology Results      Meds    MEDICATIONS  (STANDING):  ascorbic acid 1000 milliGRAM(s) Oral daily  atorvastatin 80 milliGRAM(s) Oral at bedtime  chlorhexidine 2% Cloths 1 Application(s) Topical <User Schedule>  cholecalciferol 2000 Unit(s) Oral daily  diltiazem    milliGRAM(s) Oral daily  enoxaparin Injectable 40 milliGRAM(s) SubCutaneous daily  hydrochlorothiazide 50 milliGRAM(s) Oral daily  montelukast 10 milliGRAM(s) Oral at bedtime  mupirocin 2% Ointment 1 Application(s) Both Nostrils two times a day  pantoprazole    Tablet 40 milliGRAM(s) Oral before breakfast  zinc sulfate 220 milliGRAM(s) Oral daily      MEDICATIONS  (PRN):  acetaminophen   Tablet .. 650 milliGRAM(s) Oral every 6 hours PRN Temp greater or equal to 38C (100.4F), Moderate Pain (4 - 6)  guaiFENesin   Syrup  (Sugar-Free) 200 milliGRAM(s) Oral every 6 hours PRN Cough  ondansetron Injectable 4 milliGRAM(s) IV Push every 6 hours PRN Nausea and/or Vomiting  sodium chloride 0.65% Nasal 1 Spray(s) Both Nostrils three times a day PRN Nasal Congestion      Physical Exam    Neuro :  no focal deficits  Respiratory: CTA B/L  CV: RRR, S1S2, no murmurs,   Abdominal: Soft, NT, ND +BS,  Extremities: No edema, + peripheral pulses    ASSESSMENT    pneumonia 2nd to covid 19,   marya,   hypokalemia,   h/o Cluster headache  BPH (benign prostatic hypertrophy)  Spinal stenosis s/p laminectomy  GERD (gastroesophageal reflux disease)  Hyperlipidemia  HTN (hypertension)            PLAN      contact and airborne isolation  completed remdesevir 2/26/21  covid ab neg  cont dexamethasone  pepcid, singulair, vit c, vit d, zinc   pulm f/u  cont albuterol inhaler   cont lovenox  procalcitonin neg noted  ,   D-dimer, crp, ldh, ferritin, lactate noted ,   afebrile  cont tylenol prn,   robitussin prn   blood cx neg  O2 sat (89% - 90%) optimizer pendant 4 L  cont O2 via pendant and taper as tolerated    pt low threshold for intubation and icu monitoring  icu cons noted  continue to monitor pulse oximetry  monitor biomarkers TIW  not accept to icu at this time  cont incentive spirometer   serum creat wnl   cont current meds       Patient is a 65y old  Male who presents with a chief complaint of SOB, fever, cough, body aches (04 Mar 2021 16:36)    pt seen in icu [  ], reg med floor [  x ], bed [x  ], chair at bedside [   ], a+o x3 [x ], lethargic [  ],    nad [x  ]      Allergies    No Known Allergies  Shrimp- Nasal congestion (Other)        Vitals    T(F): 98.5 (03-05-21 @ 05:20), Max: 98.5 (03-05-21 @ 05:20)  HR: 91 (03-05-21 @ 05:20) (77 - 98)  BP: 112/64 (03-05-21 @ 05:20) (102/73 - 112/64)  RR: 19 (03-05-21 @ 05:20) (19 - 20)  SpO2: 92% (03-05-21 @ 05:20) (86% - 94%)  Wt(kg): --  CAPILLARY BLOOD GLUCOSE          Labs                          13.6   15.04 )-----------( 385      ( 04 Mar 2021 08:03 )             40.4       03-04    133<L>  |  99  |  20<H>  ----------------------------<  94  3.8   |  25  |  1.19    Ca    8.6      04 Mar 2021 08:03    TPro  6.3  /  Alb  2.6<L>  /  TBili  0.8  /  DBili  x   /  AST  21  /  ALT  46  /  AlkPhos  105  03-04            .Blood Blood-Peripheral  02-21 @ 22:03   No Growth Final  --  --      .Blood Blood-Peripheral  02-21 @ 22:00   No Growth Final  --  --          Radiology Results      Meds    MEDICATIONS  (STANDING):  ascorbic acid 1000 milliGRAM(s) Oral daily  atorvastatin 80 milliGRAM(s) Oral at bedtime  chlorhexidine 2% Cloths 1 Application(s) Topical <User Schedule>  cholecalciferol 2000 Unit(s) Oral daily  diltiazem    milliGRAM(s) Oral daily  enoxaparin Injectable 40 milliGRAM(s) SubCutaneous daily  hydrochlorothiazide 50 milliGRAM(s) Oral daily  montelukast 10 milliGRAM(s) Oral at bedtime  mupirocin 2% Ointment 1 Application(s) Both Nostrils two times a day  pantoprazole    Tablet 40 milliGRAM(s) Oral before breakfast  zinc sulfate 220 milliGRAM(s) Oral daily      MEDICATIONS  (PRN):  acetaminophen   Tablet .. 650 milliGRAM(s) Oral every 6 hours PRN Temp greater or equal to 38C (100.4F), Moderate Pain (4 - 6)  guaiFENesin   Syrup  (Sugar-Free) 200 milliGRAM(s) Oral every 6 hours PRN Cough  ondansetron Injectable 4 milliGRAM(s) IV Push every 6 hours PRN Nausea and/or Vomiting  sodium chloride 0.65% Nasal 1 Spray(s) Both Nostrils three times a day PRN Nasal Congestion      Physical Exam    Neuro :  no focal deficits  Respiratory: CTA B/L  CV: RRR, S1S2, no murmurs,   Abdominal: Soft, NT, ND +BS,  Extremities: No edema, + peripheral pulses    ASSESSMENT    pneumonia 2nd to covid 19,   marya,   hypokalemia,   h/o Cluster headache  BPH (benign prostatic hypertrophy)  Spinal stenosis s/p laminectomy  GERD (gastroesophageal reflux disease)  Hyperlipidemia  HTN (hypertension)            PLAN      contact and airborne isolation  completed remdesevir 2/26/21  covid ab neg  completed dexamethasone 3/2/21  pepcid, singulair, vit c, vit d, zinc   pulm f/u  cont albuterol inhaler   cont lovenox  procalcitonin neg noted  ,   D-dimer, crp, ldh, ferritin, lactate noted ,   afebrile  cont tylenol prn,   robitussin prn   blood cx neg  O2 sat (86% - 94%) optimizer pendant 6 L   sat ambulating 86 on optimizer 6L  cont O2 via pendant and taper as tolerated    incentive spirometer   pt low threshold for intubation and icu monitoring  icu cons noted  continue to monitor pulse oximetry  monitor biomarkers TIW  not accept to icu at this time  cont incentive spirometer   serum creat wnl   cont current meds

## 2021-03-05 NOTE — PROGRESS NOTE ADULT - SUBJECTIVE AND OBJECTIVE BOX
Pt is awake, alert, lying in bed in NAD. On Pendent this AM. Sats 92%.     INTERVAL HPI/OVERNIGHT EVENTS:      VITAL SIGNS:  T(F): 98.5 (03-05-21 @ 05:20)  HR: 91 (03-05-21 @ 05:20)  BP: 112/64 (03-05-21 @ 05:20)  RR: 19 (03-05-21 @ 05:20)  SpO2: 92% (03-05-21 @ 05:20)  Wt(kg): --  I&O's Detail          REVIEW OF SYSTEMS:    CONSTITUTIONAL:  No fevers, chills, sweats    HEENT:  Eyes:  No diplopia or blurred vision. ENT:  No earache, sore throat or runny nose.    CARDIOVASCULAR:  No pressure, squeezing, tightness, or heaviness about the chest; no palpitations.    RESPIRATORY:  Per HPI    GASTROINTESTINAL:  No abdominal pain, nausea, vomiting or diarrhea.    GENITOURINARY:  No dysuria, frequency or urgency.    NEUROLOGIC:  No paresthesias, fasciculations, seizures or weakness.    PSYCHIATRIC:  No disorder of thought or mood.      PHYSICAL EXAM:    Constitutional: Well developed and nourished  Eyes:Perrla  ENMT: normal  Neck:supple  Respiratory: good air entry  Cardiovascular: S1 S2 regular  Gastrointestinal: Soft, Non tender  Extremities: No edema  Vascular:normal  Neurological:Awake, alert,Ox3  Musculoskeletal:Normal      MEDICATIONS  (STANDING):  ascorbic acid 1000 milliGRAM(s) Oral daily  atorvastatin 80 milliGRAM(s) Oral at bedtime  chlorhexidine 2% Cloths 1 Application(s) Topical <User Schedule>  cholecalciferol 2000 Unit(s) Oral daily  diltiazem    milliGRAM(s) Oral daily  enoxaparin Injectable 40 milliGRAM(s) SubCutaneous daily  hydrochlorothiazide 50 milliGRAM(s) Oral daily  montelukast 10 milliGRAM(s) Oral at bedtime  mupirocin 2% Ointment 1 Application(s) Both Nostrils two times a day  pantoprazole    Tablet 40 milliGRAM(s) Oral before breakfast  zinc sulfate 220 milliGRAM(s) Oral daily    MEDICATIONS  (PRN):  acetaminophen   Tablet .. 650 milliGRAM(s) Oral every 6 hours PRN Temp greater or equal to 38C (100.4F), Moderate Pain (4 - 6)  guaiFENesin   Syrup  (Sugar-Free) 200 milliGRAM(s) Oral every 6 hours PRN Cough  ondansetron Injectable 4 milliGRAM(s) IV Push every 6 hours PRN Nausea and/or Vomiting  sodium chloride 0.65% Nasal 1 Spray(s) Both Nostrils three times a day PRN Nasal Congestion      Allergies    No Known Allergies    Intolerances    Shrimp- Nasal congestion (Other)      LABS:                        13.9   17.24 )-----------( 375      ( 05 Mar 2021 08:19 )             41.1     03-05    133<L>  |  97  |  17  ----------------------------<  106<H>  3.7   |  25  |  1.16    Ca    8.4      05 Mar 2021 08:19  Phos  3.0     03-05  Mg     2.1     03-05    TPro  6.4  /  Alb  2.6<L>  /  TBili  0.8  /  DBili  x   /  AST  19  /  ALT  48  /  AlkPhos  96  03-05              CAPILLARY BLOOD GLUCOSE        pro-bnp -- 03-04 @ 08:03     d-dimer 515  03-04 @ 08:03  pro-bnp -- 03-03 @ 09:19     d-dimer 344  03-03 @ 09:19  pro-bnp -- 02-28 @ 06:44     d-dimer 428  02-28 @ 06:44      RADIOLOGY & ADDITIONAL TESTS:    CXR:    Ct scan chest:    ekg;    echo:

## 2021-03-05 NOTE — CHART NOTE - NSCHARTNOTEFT_GEN_A_CORE
EVENT: Received telephone call from RN that pt is c/o of R foot/ankle pain which he rated as a 8/10    HPI: Patient is a 65 year old male from home, with PMH of HTN, HLD and GERD, who presented to the ED due to SOB, cough, fever, body aches for the past 6 days. Saturating at 91% on room air. Potassium of 3.2. Fever noted at 100.8. Saturating 95% on 2L nasal cannula. COVID positive. D-dimer of 290. EKG showing sinus tachycardia. Given 500cc NS bolus. Given dose of Tylenol, decadron and potassium 20mg PO and 10mg IV in ED.  Admitted for hypoxic respiratory failure secondary to COVID PNA.     Subjective: "Tylenol does not help my pain, I need something stronger"    OBJECTIVE:  Vital Signs Last 24 Hrs  T(C): 37.7 (05 Mar 2021 20:27), Max: 37.7 (05 Mar 2021 20:27)  T(F): 99.8 (05 Mar 2021 20:27), Max: 99.8 (05 Mar 2021 20:27)  HR: 94 (05 Mar 2021 20:27) (91 - 99)  BP: 109/65 (05 Mar 2021 20:27) (101/72 - 112/64)  BP(mean): --  RR: 21 (05 Mar 2021 20:27) (19 - 22)  SpO2: 89% (05 Mar 2021 20:27) (89% - 97%)    FOCUSED PHYSICAL EXAM:  Neuro: awake, alert, oriented x 3. No neuro deficit  Cardiovascular: Pulses +2 B/L in lower and upper extremities, HR regular, BP stable, No edema.  Respiratory: Respirations regular, unlabored, breath sounds clear B/L.   GI: Abdomen soft, non-tender, positive bowel sounds.  : no bladder distention noted. No complaints at this time.  Skin: Dry, intact, no bruising, no diaphoresis.    LABS:                        13.9   17.24 )-----------( 375      ( 05 Mar 2021 08:19 )             41.1     03-05    133<L>  |  97  |  17  ----------------------------<  106<H>  3.7   |  25  |  1.16    Ca    8.4      05 Mar 2021 08:19  Phos  3.0     03-05  Mg     2.1     03-05    TPro  6.4  /  Alb  2.6<L>  /  TBili  0.8  /  DBili  x   /  AST  19  /  ALT  48  /  AlkPhos  96  03-05      EKG:   IMAGING:      ASSESSMENT/Problem: R foot/ankle pain      PLAN:   1. Motrin 600mg, PO x 1 dose ordered  2. Monitor response to Motrin  3. Cont present care/treatment  4. Supportive care

## 2021-03-06 LAB
ALBUMIN SERPL ELPH-MCNC: 2.6 G/DL — LOW (ref 3.5–5)
ALP SERPL-CCNC: 130 U/L — HIGH (ref 40–120)
ALT FLD-CCNC: 71 U/L DA — HIGH (ref 10–60)
ANION GAP SERPL CALC-SCNC: 10 MMOL/L — SIGNIFICANT CHANGE UP (ref 5–17)
AST SERPL-CCNC: 26 U/L — SIGNIFICANT CHANGE UP (ref 10–40)
BILIRUB SERPL-MCNC: 0.8 MG/DL — SIGNIFICANT CHANGE UP (ref 0.2–1.2)
BUN SERPL-MCNC: 21 MG/DL — HIGH (ref 7–18)
CALCIUM SERPL-MCNC: 9.3 MG/DL — SIGNIFICANT CHANGE UP (ref 8.4–10.5)
CHLORIDE SERPL-SCNC: 100 MMOL/L — SIGNIFICANT CHANGE UP (ref 96–108)
CO2 SERPL-SCNC: 26 MMOL/L — SIGNIFICANT CHANGE UP (ref 22–31)
CREAT SERPL-MCNC: 1.37 MG/DL — HIGH (ref 0.5–1.3)
GLUCOSE SERPL-MCNC: 130 MG/DL — HIGH (ref 70–99)
HCT VFR BLD CALC: 42.3 % — SIGNIFICANT CHANGE UP (ref 39–50)
HGB BLD-MCNC: 14.2 G/DL — SIGNIFICANT CHANGE UP (ref 13–17)
MAGNESIUM SERPL-MCNC: 2.4 MG/DL — SIGNIFICANT CHANGE UP (ref 1.6–2.6)
MCHC RBC-ENTMCNC: 28.9 PG — SIGNIFICANT CHANGE UP (ref 27–34)
MCHC RBC-ENTMCNC: 33.6 GM/DL — SIGNIFICANT CHANGE UP (ref 32–36)
MCV RBC AUTO: 86.2 FL — SIGNIFICANT CHANGE UP (ref 80–100)
NRBC # BLD: 0 /100 WBCS — SIGNIFICANT CHANGE UP (ref 0–0)
PHOSPHATE SERPL-MCNC: 2.8 MG/DL — SIGNIFICANT CHANGE UP (ref 2.5–4.5)
PLATELET # BLD AUTO: 344 K/UL — SIGNIFICANT CHANGE UP (ref 150–400)
POTASSIUM SERPL-MCNC: 4.3 MMOL/L — SIGNIFICANT CHANGE UP (ref 3.5–5.3)
POTASSIUM SERPL-SCNC: 4.3 MMOL/L — SIGNIFICANT CHANGE UP (ref 3.5–5.3)
PROT SERPL-MCNC: 6.9 G/DL — SIGNIFICANT CHANGE UP (ref 6–8.3)
RBC # BLD: 4.91 M/UL — SIGNIFICANT CHANGE UP (ref 4.2–5.8)
RBC # FLD: 14.2 % — SIGNIFICANT CHANGE UP (ref 10.3–14.5)
SODIUM SERPL-SCNC: 136 MMOL/L — SIGNIFICANT CHANGE UP (ref 135–145)
WBC # BLD: 16.79 K/UL — HIGH (ref 3.8–10.5)
WBC # FLD AUTO: 16.79 K/UL — HIGH (ref 3.8–10.5)

## 2021-03-06 RX ORDER — IBUPROFEN 200 MG
600 TABLET ORAL ONCE
Refills: 0 | Status: COMPLETED | OUTPATIENT
Start: 2021-03-06 | End: 2021-03-06

## 2021-03-06 RX ADMIN — ATORVASTATIN CALCIUM 80 MILLIGRAM(S): 80 TABLET, FILM COATED ORAL at 21:23

## 2021-03-06 RX ADMIN — Medication 600 MILLIGRAM(S): at 18:37

## 2021-03-06 RX ADMIN — MUPIROCIN 1 APPLICATION(S): 20 OINTMENT TOPICAL at 06:10

## 2021-03-06 RX ADMIN — CHLORHEXIDINE GLUCONATE 1 APPLICATION(S): 213 SOLUTION TOPICAL at 06:10

## 2021-03-06 RX ADMIN — Medication 200 MILLIGRAM(S): at 06:10

## 2021-03-06 RX ADMIN — Medication 2000 UNIT(S): at 11:12

## 2021-03-06 RX ADMIN — PANTOPRAZOLE SODIUM 40 MILLIGRAM(S): 20 TABLET, DELAYED RELEASE ORAL at 06:10

## 2021-03-06 RX ADMIN — Medication 1000 MILLIGRAM(S): at 11:12

## 2021-03-06 RX ADMIN — Medication 600 MILLIGRAM(S): at 17:37

## 2021-03-06 RX ADMIN — Medication 200 MILLIGRAM(S): at 17:37

## 2021-03-06 RX ADMIN — ZINC SULFATE TAB 220 MG (50 MG ZINC EQUIVALENT) 220 MILLIGRAM(S): 220 (50 ZN) TAB at 11:12

## 2021-03-06 RX ADMIN — MONTELUKAST 10 MILLIGRAM(S): 4 TABLET, CHEWABLE ORAL at 21:23

## 2021-03-06 RX ADMIN — ENOXAPARIN SODIUM 40 MILLIGRAM(S): 100 INJECTION SUBCUTANEOUS at 11:12

## 2021-03-06 NOTE — CHART NOTE - NSCHARTNOTEFT_GEN_A_CORE
RN reports patient having right foot pain    HPI: Patient is a 65 year old male from home, with PMH of HTN, HLD and GERD, who presented to the ED due to SOB, cough, fever, body aches for the past 6 days. Saturating at 91% on room air. Potassium of 3.2. Fever noted at 100.8. Saturating 95% on 2L nasal cannula. COVID positive. D-dimer of 290. EKG showing sinus tachycardia. Given 500cc NS bolus. Given dose of Tylenol, decadron and potassium 20mg PO and 10mg IV in ED.  Admitted for hypoxic respiratory failure secondary to COVID PNA.     Subjective: "I need something for my pain". Tylenol wont help, I get this pain at times, Motrin worked for me well yesterday    MEDICATIONS  (STANDING):  ascorbic acid 1000 milliGRAM(s) Oral daily  atorvastatin 80 milliGRAM(s) Oral at bedtime  chlorhexidine 2% Cloths 1 Application(s) Topical <User Schedule>  cholecalciferol 2000 Unit(s) Oral daily  diltiazem    milliGRAM(s) Oral daily  enoxaparin Injectable 40 milliGRAM(s) SubCutaneous daily  hydrochlorothiazide 50 milliGRAM(s) Oral daily  montelukast 10 milliGRAM(s) Oral at bedtime  pantoprazole    Tablet 40 milliGRAM(s) Oral before breakfast  zinc sulfate 220 milliGRAM(s) Oral daily    MEDICATIONS  (PRN):  acetaminophen   Tablet .. 650 milliGRAM(s) Oral every 6 hours PRN Temp greater or equal to 38C (100.4F), Moderate Pain (4 - 6)  guaiFENesin   Syrup  (Sugar-Free) 200 milliGRAM(s) Oral every 6 hours PRN Cough  ondansetron Injectable 4 milliGRAM(s) IV Push every 6 hours PRN Nausea and/or Vomiting  sodium chloride 0.65% Nasal 1 Spray(s) Both Nostrils three times a day PRN Nasal Congestion       Vital Signs Last 24 Hrs  T(C): 36.6 (06 Mar 2021 13:01), Max: 37.7 (05 Mar 2021 20:27)  T(F): 97.9 (06 Mar 2021 13:01), Max: 99.8 (05 Mar 2021 20:27)  HR: 107 (06 Mar 2021 13:01) (90 - 107)  BP: 125/74 (06 Mar 2021 13:01) (102/68 - 125/74)  BP(mean): --  RR: 20 (06 Mar 2021 13:01) (20 - 21)  SpO2: 96% (06 Mar 2021 13:01) (89% - 96%)      PHYSICAL EXAM    Neuro: awake, alert, oriented x 3=  Cardiovascular:  RRR, S1 S2 , No mumurs   Respiratory: Respirations regular, unlabored, breath sounds clear B/L.   GI: Abdomen soft, non-tender, positive bowel sounds.  : no bladder distention noted. No complaints at this time.  Skin: Dry, intact, no bruising, no diaphoresis.  musculoskeletal - FINK X 4, Right foot pain - no edema noted     PLAN    Will give one dose of Ibuprofen 600 mg PO  Monitor motrin effectiveness  Will continue current managemnt  Will follow

## 2021-03-06 NOTE — PROGRESS NOTE ADULT - SUBJECTIVE AND OBJECTIVE BOX
Patient is a 65y old  Male who presents with a chief complaint of SOB, fever, cough, body aches (05 Mar 2021 15:32)    pt seen in icu [  ], reg med floor [  x ], bed [x  ], chair at bedside [   ], a+o x3 [x ], lethargic [  ],    nad [x  ]        Allergies    No Known Allergies  Shrimp- Nasal congestion (Other)        Vitals    T(F): 97.6 (03-06-21 @ 05:10), Max: 99.8 (03-05-21 @ 20:27)  HR: 99 (03-06-21 @ 05:10) (90 - 99)  BP: 102/68 (03-06-21 @ 05:10) (101/72 - 109/65)  RR: 21 (03-06-21 @ 05:10) (21 - 22)  SpO2: 93% (03-06-21 @ 05:10) (89% - 97%)  Wt(kg): --  CAPILLARY BLOOD GLUCOSE          Labs                          13.9   17.24 )-----------( 375      ( 05 Mar 2021 08:19 )             41.1       03-05    133<L>  |  97  |  17  ----------------------------<  106<H>  3.7   |  25  |  1.16    Ca    8.4      05 Mar 2021 08:19  Phos  3.0     03-05  Mg     2.1     03-05    TPro  6.4  /  Alb  2.6<L>  /  TBili  0.8  /  DBili  x   /  AST  19  /  ALT  48  /  AlkPhos  96  03-05            .Blood Blood-Peripheral  02-21 @ 22:03   No Growth Final  --  --      .Blood Blood-Peripheral  02-21 @ 22:00   No Growth Final  --  --          Radiology Results      Meds    MEDICATIONS  (STANDING):  ascorbic acid 1000 milliGRAM(s) Oral daily  atorvastatin 80 milliGRAM(s) Oral at bedtime  chlorhexidine 2% Cloths 1 Application(s) Topical <User Schedule>  cholecalciferol 2000 Unit(s) Oral daily  diltiazem    milliGRAM(s) Oral daily  enoxaparin Injectable 40 milliGRAM(s) SubCutaneous daily  hydrochlorothiazide 50 milliGRAM(s) Oral daily  montelukast 10 milliGRAM(s) Oral at bedtime  pantoprazole    Tablet 40 milliGRAM(s) Oral before breakfast  zinc sulfate 220 milliGRAM(s) Oral daily      MEDICATIONS  (PRN):  acetaminophen   Tablet .. 650 milliGRAM(s) Oral every 6 hours PRN Temp greater or equal to 38C (100.4F), Moderate Pain (4 - 6)  guaiFENesin   Syrup  (Sugar-Free) 200 milliGRAM(s) Oral every 6 hours PRN Cough  ondansetron Injectable 4 milliGRAM(s) IV Push every 6 hours PRN Nausea and/or Vomiting  sodium chloride 0.65% Nasal 1 Spray(s) Both Nostrils three times a day PRN Nasal Congestion      Physical Exam    Neuro :  no focal deficits  Respiratory: CTA B/L  CV: RRR, S1S2, no murmurs,   Abdominal: Soft, NT, ND +BS,  Extremities: No edema, + peripheral pulses    ASSESSMENT    pneumonia 2nd to covid 19,   marya,   hypokalemia,   h/o Cluster headache  BPH (benign prostatic hypertrophy)  Spinal stenosis s/p laminectomy  GERD (gastroesophageal reflux disease)  Hyperlipidemia  HTN (hypertension)            PLAN      contact and airborne isolation  completed remdesevir 2/26/21  covid ab neg  completed dexamethasone 3/2/21  pepcid, singulair, vit c, vit d, zinc   pulm f/u  cont albuterol inhaler   cont lovenox  procalcitonin neg noted  ,   D-dimer, crp, ldh, ferritin, lactate noted ,   afebrile  cont tylenol prn,   robitussin prn   blood cx neg  O2 sat (86% - 94%) optimizer pendant 6 L   sat ambulating 86 on optimizer 6L  cont O2 via pendant and taper as tolerated    incentive spirometer   pt low threshold for intubation and icu monitoring  icu cons noted  continue to monitor pulse oximetry  monitor biomarkers TIW  not accept to icu at this time  cont incentive spirometer   serum creat wnl   cont current meds         Patient is a 65y old  Male who presents with a chief complaint of SOB, fever, cough, body aches (05 Mar 2021 15:32)    pt seen in icu [  ], reg med floor [  x ], bed [x  ], chair at bedside [   ], a+o x3 [x ], lethargic [  ],    nad [x  ]        Allergies    No Known Allergies  Shrimp- Nasal congestion (Other)        Vitals    T(F): 97.6 (03-06-21 @ 05:10), Max: 99.8 (03-05-21 @ 20:27)  HR: 99 (03-06-21 @ 05:10) (90 - 99)  BP: 102/68 (03-06-21 @ 05:10) (101/72 - 109/65)  RR: 21 (03-06-21 @ 05:10) (21 - 22)  SpO2: 93% (03-06-21 @ 05:10) (89% - 97%)  Wt(kg): --  CAPILLARY BLOOD GLUCOSE          Labs                          13.9   17.24 )-----------( 375      ( 05 Mar 2021 08:19 )             41.1       03-05    133<L>  |  97  |  17  ----------------------------<  106<H>  3.7   |  25  |  1.16    Ca    8.4      05 Mar 2021 08:19  Phos  3.0     03-05  Mg     2.1     03-05    TPro  6.4  /  Alb  2.6<L>  /  TBili  0.8  /  DBili  x   /  AST  19  /  ALT  48  /  AlkPhos  96  03-05            .Blood Blood-Peripheral  02-21 @ 22:03   No Growth Final  --  --      .Blood Blood-Peripheral  02-21 @ 22:00   No Growth Final  --  --          Radiology Results      Meds    MEDICATIONS  (STANDING):  ascorbic acid 1000 milliGRAM(s) Oral daily  atorvastatin 80 milliGRAM(s) Oral at bedtime  chlorhexidine 2% Cloths 1 Application(s) Topical <User Schedule>  cholecalciferol 2000 Unit(s) Oral daily  diltiazem    milliGRAM(s) Oral daily  enoxaparin Injectable 40 milliGRAM(s) SubCutaneous daily  hydrochlorothiazide 50 milliGRAM(s) Oral daily  montelukast 10 milliGRAM(s) Oral at bedtime  pantoprazole    Tablet 40 milliGRAM(s) Oral before breakfast  zinc sulfate 220 milliGRAM(s) Oral daily      MEDICATIONS  (PRN):  acetaminophen   Tablet .. 650 milliGRAM(s) Oral every 6 hours PRN Temp greater or equal to 38C (100.4F), Moderate Pain (4 - 6)  guaiFENesin   Syrup  (Sugar-Free) 200 milliGRAM(s) Oral every 6 hours PRN Cough  ondansetron Injectable 4 milliGRAM(s) IV Push every 6 hours PRN Nausea and/or Vomiting  sodium chloride 0.65% Nasal 1 Spray(s) Both Nostrils three times a day PRN Nasal Congestion      Physical Exam    Neuro :  no focal deficits  Respiratory: CTA B/L  CV: RRR, S1S2, no murmurs,   Abdominal: Soft, NT, ND +BS,  Extremities: No edema, + peripheral pulses    ASSESSMENT    pneumonia 2nd to covid 19,   marya,   hypokalemia,   h/o Cluster headache  BPH (benign prostatic hypertrophy)  Spinal stenosis s/p laminectomy  GERD (gastroesophageal reflux disease)  Hyperlipidemia  HTN (hypertension)            PLAN      contact and airborne isolation  completed remdesevir 2/26/21  covid ab neg  completed dexamethasone 3/2/21  pepcid, singulair, vit c, vit d, zinc   pulm f/u  cont albuterol inhaler   cont lovenox  procalcitonin neg noted  ,   D-dimer, crp, ldh, ferritin, lactate noted ,   afebrile  cont tylenol prn,   robitussin prn   blood cx neg  O2 sat (89% - 97%) optimizer pendant 6 L   sat ambulating 86 on optimizer 6L  cont O2 via pendant and taper as tolerated    incentive spirometer   pt low threshold for intubation and icu monitoring  icu cons noted  continue to monitor pulse oximetry  monitor biomarkers TIW  not accept to icu at this time  cont incentive spirometer   serum creat wnl   cont current meds

## 2021-03-06 NOTE — PROGRESS NOTE ADULT - SUBJECTIVE AND OBJECTIVE BOX
Patient is a 65y old  Male who presents with a chief complaint of SOB, fever, cough, body aches (06 Mar 2021 06:18)  Patient is awake, alert, laying in bed in NAD. Doing well with oxygen supp via optimizer. Still with [place]    INTERVAL HPI/OVERNIGHT EVENTS:      VITAL SIGNS:  T(F): 97.6 (03-06-21 @ 05:10)  HR: 99 (03-06-21 @ 05:10)  BP: 102/68 (03-06-21 @ 05:10)  RR: 21 (03-06-21 @ 05:10)  SpO2: 93% (03-06-21 @ 05:10)  Wt(kg): --  I&O's Detail          REVIEW OF SYSTEMS:    CONSTITUTIONAL:  No fevers, chills, sweats    HEENT:  Eyes:  No diplopia or blurred vision. ENT:  No earache, sore throat or runny nose.    CARDIOVASCULAR:  No pressure, squeezing, tightness, or heaviness about the chest; no palpitations.    RESPIRATORY:  Per HPI    GASTROINTESTINAL:  No abdominal pain, nausea, vomiting or diarrhea.    GENITOURINARY:  No dysuria, frequency or urgency.    NEUROLOGIC:  No paresthesias, fasciculations, seizures or weakness.    PSYCHIATRIC:  No disorder of thought or mood.      PHYSICAL EXAM:    Constitutional: Well developed and nourished  Eyes:Perrla  ENMT: normal  Neck:supple  Respiratory: good air entry  Cardiovascular: S1 S2 regular  Gastrointestinal: Soft, Non tender  Extremities: No edema  Vascular:normal  Neurological:Awake, alert,Ox3  Musculoskeletal:Normal      MEDICATIONS  (STANDING):  ascorbic acid 1000 milliGRAM(s) Oral daily  atorvastatin 80 milliGRAM(s) Oral at bedtime  chlorhexidine 2% Cloths 1 Application(s) Topical <User Schedule>  cholecalciferol 2000 Unit(s) Oral daily  diltiazem    milliGRAM(s) Oral daily  enoxaparin Injectable 40 milliGRAM(s) SubCutaneous daily  hydrochlorothiazide 50 milliGRAM(s) Oral daily  montelukast 10 milliGRAM(s) Oral at bedtime  pantoprazole    Tablet 40 milliGRAM(s) Oral before breakfast  zinc sulfate 220 milliGRAM(s) Oral daily    MEDICATIONS  (PRN):  acetaminophen   Tablet .. 650 milliGRAM(s) Oral every 6 hours PRN Temp greater or equal to 38C (100.4F), Moderate Pain (4 - 6)  guaiFENesin   Syrup  (Sugar-Free) 200 milliGRAM(s) Oral every 6 hours PRN Cough  ondansetron Injectable 4 milliGRAM(s) IV Push every 6 hours PRN Nausea and/or Vomiting  sodium chloride 0.65% Nasal 1 Spray(s) Both Nostrils three times a day PRN Nasal Congestion      Allergies    No Known Allergies    Intolerances    Shrimp- Nasal congestion (Other)      LABS:                        14.2   16.79 )-----------( 344      ( 06 Mar 2021 08:31 )             42.3     03-06    136  |  100  |  21<H>  ----------------------------<  130<H>  4.3   |  26  |  1.37<H>    Ca    9.3      06 Mar 2021 08:31  Phos  2.8     03-06  Mg     2.4     03-06    TPro  6.9  /  Alb  2.6<L>  /  TBili  0.8  /  DBili  x   /  AST  26  /  ALT  71<H>  /  AlkPhos  130<H>  03-06              CAPILLARY BLOOD GLUCOSE        pro-bnp -- 03-04 @ 08:03     d-dimer 515  03-04 @ 08:03  pro-bnp -- 03-03 @ 09:19     d-dimer 344  03-03 @ 09:19  pro-bnp -- 02-28 @ 06:44     d-dimer 428  02-28 @ 06:44      RADIOLOGY & ADDITIONAL TESTS:    CXR:    Ct scan chest:    ekg;    echo:

## 2021-03-06 NOTE — PROGRESS NOTE ADULT - ASSESSMENT
1. PNA 2nd to Covid-19  - PCR positive.   - CXR noted, 2/23 increasing infiltrates.   - Continue Vit C, Vit D, Zinc   - Continue Dexamethasone, Off Remdesivir  - Continue Singulair and Pepcid   - ICU consult noted - no escalation at present.   - Robitussin PRN for cough  - Tylenol PRN for temp   - O2 Supp - Taper O2 as tolerated; NRM and pendent - alernating.   - Monitor O2 Sat.  - Monitor labs  - F.U CXR  - Unable to tolerate prone positioning due to prior neck surgery.   - Encouraged side lying periodically as tolerated.   - Incentive Spirometry  - Isolation precautions   - Nasal saline spray for dryness   - Cont Ensure Compact    - DVT and GI PPX     2. HTN  - Continue meds   - Monitor BP     3. HLD  - Cont meds  - Lipid panel     4. GERD  - Antireflux diet   - Cont meds    5. MECHELLE  Avoid nephrotoxic drugs  Renal eval  monitor BMP  IVF

## 2021-03-07 LAB
ALBUMIN SERPL ELPH-MCNC: 2.5 G/DL — LOW (ref 3.5–5)
ALP SERPL-CCNC: 198 U/L — HIGH (ref 40–120)
ALT FLD-CCNC: 97 U/L DA — HIGH (ref 10–60)
ANION GAP SERPL CALC-SCNC: 7 MMOL/L — SIGNIFICANT CHANGE UP (ref 5–17)
AST SERPL-CCNC: 32 U/L — SIGNIFICANT CHANGE UP (ref 10–40)
BASOPHILS # BLD AUTO: 0.03 K/UL — SIGNIFICANT CHANGE UP (ref 0–0.2)
BASOPHILS NFR BLD AUTO: 0.2 % — SIGNIFICANT CHANGE UP (ref 0–2)
BILIRUB SERPL-MCNC: 0.6 MG/DL — SIGNIFICANT CHANGE UP (ref 0.2–1.2)
BUN SERPL-MCNC: 20 MG/DL — HIGH (ref 7–18)
CALCIUM SERPL-MCNC: 9 MG/DL — SIGNIFICANT CHANGE UP (ref 8.4–10.5)
CHLORIDE SERPL-SCNC: 102 MMOL/L — SIGNIFICANT CHANGE UP (ref 96–108)
CO2 SERPL-SCNC: 28 MMOL/L — SIGNIFICANT CHANGE UP (ref 22–31)
CREAT SERPL-MCNC: 1.22 MG/DL — SIGNIFICANT CHANGE UP (ref 0.5–1.3)
CRP SERPL-MCNC: 131 MG/L — HIGH
D DIMER BLD IA.RAPID-MCNC: 336 NG/ML DDU — HIGH
EOSINOPHIL # BLD AUTO: 0.17 K/UL — SIGNIFICANT CHANGE UP (ref 0–0.5)
EOSINOPHIL NFR BLD AUTO: 1.1 % — SIGNIFICANT CHANGE UP (ref 0–6)
FERRITIN SERPL-MCNC: 944 NG/ML — HIGH (ref 30–400)
GLUCOSE BLDC GLUCOMTR-MCNC: 116 MG/DL — HIGH (ref 70–99)
GLUCOSE SERPL-MCNC: 124 MG/DL — HIGH (ref 70–99)
HCT VFR BLD CALC: 41 % — SIGNIFICANT CHANGE UP (ref 39–50)
HGB BLD-MCNC: 13.6 G/DL — SIGNIFICANT CHANGE UP (ref 13–17)
IMM GRANULOCYTES NFR BLD AUTO: 0.6 % — SIGNIFICANT CHANGE UP (ref 0–1.5)
LDH SERPL L TO P-CCNC: 265 U/L — HIGH (ref 120–225)
LYMPHOCYTES # BLD AUTO: 14.3 % — SIGNIFICANT CHANGE UP (ref 13–44)
LYMPHOCYTES # BLD AUTO: 2.24 K/UL — SIGNIFICANT CHANGE UP (ref 1–3.3)
MAGNESIUM SERPL-MCNC: 2.4 MG/DL — SIGNIFICANT CHANGE UP (ref 1.6–2.6)
MCHC RBC-ENTMCNC: 28.5 PG — SIGNIFICANT CHANGE UP (ref 27–34)
MCHC RBC-ENTMCNC: 33.2 GM/DL — SIGNIFICANT CHANGE UP (ref 32–36)
MCV RBC AUTO: 85.8 FL — SIGNIFICANT CHANGE UP (ref 80–100)
MONOCYTES # BLD AUTO: 1.02 K/UL — HIGH (ref 0–0.9)
MONOCYTES NFR BLD AUTO: 6.5 % — SIGNIFICANT CHANGE UP (ref 2–14)
NEUTROPHILS # BLD AUTO: 12.14 K/UL — HIGH (ref 1.8–7.4)
NEUTROPHILS NFR BLD AUTO: 77.3 % — HIGH (ref 43–77)
NRBC # BLD: 0 /100 WBCS — SIGNIFICANT CHANGE UP (ref 0–0)
PHOSPHATE SERPL-MCNC: 3 MG/DL — SIGNIFICANT CHANGE UP (ref 2.5–4.5)
PLATELET # BLD AUTO: 338 K/UL — SIGNIFICANT CHANGE UP (ref 150–400)
POTASSIUM SERPL-MCNC: 4.1 MMOL/L — SIGNIFICANT CHANGE UP (ref 3.5–5.3)
POTASSIUM SERPL-SCNC: 4.1 MMOL/L — SIGNIFICANT CHANGE UP (ref 3.5–5.3)
PROT SERPL-MCNC: 7 G/DL — SIGNIFICANT CHANGE UP (ref 6–8.3)
RBC # BLD: 4.78 M/UL — SIGNIFICANT CHANGE UP (ref 4.2–5.8)
RBC # FLD: 13.9 % — SIGNIFICANT CHANGE UP (ref 10.3–14.5)
SODIUM SERPL-SCNC: 137 MMOL/L — SIGNIFICANT CHANGE UP (ref 135–145)
WBC # BLD: 15.7 K/UL — HIGH (ref 3.8–10.5)
WBC # FLD AUTO: 15.7 K/UL — HIGH (ref 3.8–10.5)

## 2021-03-07 RX ORDER — IBUPROFEN 200 MG
600 TABLET ORAL ONCE
Refills: 0 | Status: COMPLETED | OUTPATIENT
Start: 2021-03-07 | End: 2021-03-07

## 2021-03-07 RX ORDER — GABAPENTIN 400 MG/1
200 CAPSULE ORAL AT BEDTIME
Refills: 0 | Status: DISCONTINUED | OUTPATIENT
Start: 2021-03-07 | End: 2021-03-09

## 2021-03-07 RX ORDER — POLYETHYLENE GLYCOL 3350 17 G/17G
17 POWDER, FOR SOLUTION ORAL DAILY
Refills: 0 | Status: DISCONTINUED | OUTPATIENT
Start: 2021-03-07 | End: 2021-03-19

## 2021-03-07 RX ADMIN — POLYETHYLENE GLYCOL 3350 17 GRAM(S): 17 POWDER, FOR SOLUTION ORAL at 11:20

## 2021-03-07 RX ADMIN — GABAPENTIN 200 MILLIGRAM(S): 400 CAPSULE ORAL at 22:40

## 2021-03-07 RX ADMIN — Medication 1000 MILLIGRAM(S): at 11:20

## 2021-03-07 RX ADMIN — CHLORHEXIDINE GLUCONATE 1 APPLICATION(S): 213 SOLUTION TOPICAL at 06:21

## 2021-03-07 RX ADMIN — MONTELUKAST 10 MILLIGRAM(S): 4 TABLET, CHEWABLE ORAL at 22:40

## 2021-03-07 RX ADMIN — Medication 600 MILLIGRAM(S): at 12:15

## 2021-03-07 RX ADMIN — ENOXAPARIN SODIUM 40 MILLIGRAM(S): 100 INJECTION SUBCUTANEOUS at 11:19

## 2021-03-07 RX ADMIN — ZINC SULFATE TAB 220 MG (50 MG ZINC EQUIVALENT) 220 MILLIGRAM(S): 220 (50 ZN) TAB at 11:20

## 2021-03-07 RX ADMIN — ATORVASTATIN CALCIUM 80 MILLIGRAM(S): 80 TABLET, FILM COATED ORAL at 22:40

## 2021-03-07 RX ADMIN — PANTOPRAZOLE SODIUM 40 MILLIGRAM(S): 20 TABLET, DELAYED RELEASE ORAL at 06:21

## 2021-03-07 RX ADMIN — Medication 600 MILLIGRAM(S): at 12:45

## 2021-03-07 RX ADMIN — Medication 2000 UNIT(S): at 11:20

## 2021-03-07 NOTE — PROGRESS NOTE ADULT - SUBJECTIVE AND OBJECTIVE BOX
Patient is a 65y old  Male who presents with a chief complaint of SOB, fever, cough, body aches (06 Mar 2021 10:18)    pt seen in icu [  ], reg med floor [  x ], bed [x  ], chair at bedside [   ], a+o x3 [x ], lethargic [  ],    nad [x  ]      Allergies    No Known Allergies  Shrimp- Nasal congestion (Other)        Vitals    T(F): 97.2 (03-07-21 @ 05:56), Max: 97.9 (03-06-21 @ 13:01)  HR: 81 (03-07-21 @ 05:56) (81 - 111)  BP: 105/67 (03-07-21 @ 05:56) (105/67 - 125/74)  RR: 19 (03-07-21 @ 05:56) (19 - 21)  SpO2: 97% (03-07-21 @ 05:56) (93% - 97%)  Wt(kg): --  CAPILLARY BLOOD GLUCOSE          Labs                          14.2   16.79 )-----------( 344      ( 06 Mar 2021 08:31 )             42.3       03-06    136  |  100  |  21<H>  ----------------------------<  130<H>  4.3   |  26  |  1.37<H>    Ca    9.3      06 Mar 2021 08:31  Phos  2.8     03-06  Mg     2.4     03-06    TPro  6.9  /  Alb  2.6<L>  /  TBili  0.8  /  DBili  x   /  AST  26  /  ALT  71<H>  /  AlkPhos  130<H>  03-06            .Blood Blood-Peripheral  02-21 @ 22:03   No Growth Final  --  --      .Blood Blood-Peripheral  02-21 @ 22:00   No Growth Final  --  --          Radiology Results      Meds    MEDICATIONS  (STANDING):  ascorbic acid 1000 milliGRAM(s) Oral daily  atorvastatin 80 milliGRAM(s) Oral at bedtime  chlorhexidine 2% Cloths 1 Application(s) Topical <User Schedule>  cholecalciferol 2000 Unit(s) Oral daily  diltiazem    milliGRAM(s) Oral daily  enoxaparin Injectable 40 milliGRAM(s) SubCutaneous daily  hydrochlorothiazide 50 milliGRAM(s) Oral daily  montelukast 10 milliGRAM(s) Oral at bedtime  pantoprazole    Tablet 40 milliGRAM(s) Oral before breakfast  zinc sulfate 220 milliGRAM(s) Oral daily      MEDICATIONS  (PRN):  acetaminophen   Tablet .. 650 milliGRAM(s) Oral every 6 hours PRN Temp greater or equal to 38C (100.4F), Moderate Pain (4 - 6)  guaiFENesin   Syrup  (Sugar-Free) 200 milliGRAM(s) Oral every 6 hours PRN Cough  ondansetron Injectable 4 milliGRAM(s) IV Push every 6 hours PRN Nausea and/or Vomiting  sodium chloride 0.65% Nasal 1 Spray(s) Both Nostrils three times a day PRN Nasal Congestion      Physical Exam    Neuro :  no focal deficits  Respiratory: CTA B/L  CV: RRR, S1S2, no murmurs,   Abdominal: Soft, NT, ND +BS,  Extremities: No edema, + peripheral pulses    ASSESSMENT    pneumonia 2nd to covid 19,   marya,   hypokalemia,   h/o Cluster headache  BPH (benign prostatic hypertrophy)  Spinal stenosis s/p laminectomy  GERD (gastroesophageal reflux disease)  Hyperlipidemia  HTN (hypertension)            PLAN      contact and airborne isolation  completed remdesevir 2/26/21  covid ab neg  completed dexamethasone 3/2/21  pepcid, singulair, vit c, vit d, zinc   pulm f/u  cont albuterol inhaler   cont lovenox  procalcitonin neg noted  ,   D-dimer, crp, ldh, ferritin, lactate noted ,   afebrile  cont tylenol prn,   robitussin prn   blood cx neg  O2 sat (89% - 97%) optimizer pendant 6 L   sat ambulating 86 on optimizer 6L  cont O2 via pendant and taper as tolerated    incentive spirometer   pt low threshold for intubation and icu monitoring  icu cons noted  continue to monitor pulse oximetry  monitor biomarkers TIW  not accept to icu at this time  cont incentive spirometer   serum creat wnl   cont current meds         Patient is a 65y old  Male who presents with a chief complaint of SOB, fever, cough, body aches (06 Mar 2021 10:18)    pt seen in icu [  ], reg med floor [  x ], bed [x  ], chair at bedside [   ], a+o x3 [x ], lethargic [  ],    nad [x  ]      Allergies    No Known Allergies  Shrimp- Nasal congestion (Other)        Vitals    T(F): 97.2 (03-07-21 @ 05:56), Max: 97.9 (03-06-21 @ 13:01)  HR: 81 (03-07-21 @ 05:56) (81 - 111)  BP: 105/67 (03-07-21 @ 05:56) (105/67 - 125/74)  RR: 19 (03-07-21 @ 05:56) (19 - 21)  SpO2: 97% (03-07-21 @ 05:56) (93% - 97%)  Wt(kg): --  CAPILLARY BLOOD GLUCOSE          Labs                          14.2   16.79 )-----------( 344      ( 06 Mar 2021 08:31 )             42.3       03-06    136  |  100  |  21<H>  ----------------------------<  130<H>  4.3   |  26  |  1.37<H>    Ca    9.3      06 Mar 2021 08:31  Phos  2.8     03-06  Mg     2.4     03-06    TPro  6.9  /  Alb  2.6<L>  /  TBili  0.8  /  DBili  x   /  AST  26  /  ALT  71<H>  /  AlkPhos  130<H>  03-06            .Blood Blood-Peripheral  02-21 @ 22:03   No Growth Final  --  --      .Blood Blood-Peripheral  02-21 @ 22:00   No Growth Final  --  --          Radiology Results      Meds    MEDICATIONS  (STANDING):  ascorbic acid 1000 milliGRAM(s) Oral daily  atorvastatin 80 milliGRAM(s) Oral at bedtime  chlorhexidine 2% Cloths 1 Application(s) Topical <User Schedule>  cholecalciferol 2000 Unit(s) Oral daily  diltiazem    milliGRAM(s) Oral daily  enoxaparin Injectable 40 milliGRAM(s) SubCutaneous daily  hydrochlorothiazide 50 milliGRAM(s) Oral daily  montelukast 10 milliGRAM(s) Oral at bedtime  pantoprazole    Tablet 40 milliGRAM(s) Oral before breakfast  zinc sulfate 220 milliGRAM(s) Oral daily      MEDICATIONS  (PRN):  acetaminophen   Tablet .. 650 milliGRAM(s) Oral every 6 hours PRN Temp greater or equal to 38C (100.4F), Moderate Pain (4 - 6)  guaiFENesin   Syrup  (Sugar-Free) 200 milliGRAM(s) Oral every 6 hours PRN Cough  ondansetron Injectable 4 milliGRAM(s) IV Push every 6 hours PRN Nausea and/or Vomiting  sodium chloride 0.65% Nasal 1 Spray(s) Both Nostrils three times a day PRN Nasal Congestion      Physical Exam    Neuro :  no focal deficits  Respiratory: CTA B/L  CV: RRR, S1S2, no murmurs,   Abdominal: Soft, NT, ND +BS,  Extremities: No edema, + peripheral pulses    ASSESSMENT    pneumonia 2nd to covid 19,   marya,   hypokalemia,   h/o Cluster headache  BPH (benign prostatic hypertrophy)  Spinal stenosis s/p laminectomy  GERD (gastroesophageal reflux disease)  Hyperlipidemia  HTN (hypertension)            PLAN      contact and airborne isolation  completed remdesevir 2/26/21  covid ab neg  completed dexamethasone 3/2/21  pepcid, singulair, vit c, vit d, zinc   pulm f/u  cont albuterol inhaler   cont lovenox  procalcitonin neg noted  ,   D-dimer, crp, ldh, ferritin, lactate noted ,   afebrile  cont tylenol prn,   robitussin prn   blood cx neg  O2 sat (93% - 97%) optimizer pendant 6 L   sat ambulating 86 on optimizer 6L  cont O2 via pendant and taper as tolerated    incentive spirometer   pt low threshold for intubation and icu monitoring  icu cons noted  continue to monitor pulse oximetry  monitor biomarkers TIW  not accept to icu at this time  cont incentive spirometer   serum creat wnl   cont current meds

## 2021-03-07 NOTE — CHART NOTE - NSCHARTNOTEFT_GEN_A_CORE
RN reports patient having right foot pain    HPI: Patient is a 65 year old male from home, with PMH of HTN, HLD and GERD, who presented to the ED due to SOB, cough, fever, body aches for the past 6 days. Saturating at 91% on room air. Potassium of 3.2. Fever noted at 100.8. Saturating 95% on 2L nasal cannula. COVID positive. D-dimer of 290. EKG showing sinus tachycardia. Given 500cc NS bolus. Given dose of Tylenol, decadron and potassium 20mg PO and 10mg IV in ED.  Admitted for hypoxic respiratory failure secondary to COVID PNA.     Subjective: "I need something for my right foot pain". Patient reports that he gets this similar pain on and off in the past as well. He states his PCP thinks that he may have gout , also had X rays taken in the past which was negative for fracture .    Vital Signs Last 24 Hrs  T(C): 36.2 (07 Mar 2021 05:56), Max: 36.6 (06 Mar 2021 13:01)  T(F): 97.2 (07 Mar 2021 05:56), Max: 97.9 (06 Mar 2021 13:01)  HR: 81 (07 Mar 2021 05:56) (81 - 111)  BP: 105/67 (07 Mar 2021 05:56) (105/67 - 125/74)  BP(mean): --  RR: 19 (07 Mar 2021 05:56) (19 - 21)  SpO2: 97% (07 Mar 2021 05:56) (93% - 97%)    MEDICATIONS  (STANDING):  ascorbic acid 1000 milliGRAM(s) Oral daily  atorvastatin 80 milliGRAM(s) Oral at bedtime  chlorhexidine 2% Cloths 1 Application(s) Topical <User Schedule>  cholecalciferol 2000 Unit(s) Oral daily  diltiazem    milliGRAM(s) Oral daily  enoxaparin Injectable 40 milliGRAM(s) SubCutaneous daily  gabapentin 200 milliGRAM(s) Oral at bedtime  hydrochlorothiazide 50 milliGRAM(s) Oral daily  ibuprofen  Tablet. 600 milliGRAM(s) Oral once  montelukast 10 milliGRAM(s) Oral at bedtime  pantoprazole    Tablet 40 milliGRAM(s) Oral before breakfast  polyethylene glycol 3350 17 Gram(s) Oral daily  zinc sulfate 220 milliGRAM(s) Oral daily    MEDICATIONS  (PRN):  acetaminophen   Tablet .. 650 milliGRAM(s) Oral every 6 hours PRN Temp greater or equal to 38C (100.4F), Moderate Pain (4 - 6)  guaiFENesin   Syrup  (Sugar-Free) 200 milliGRAM(s) Oral every 6 hours PRN Cough  ondansetron Injectable 4 milliGRAM(s) IV Push every 6 hours PRN Nausea and/or Vomiting  sodium chloride 0.65% Nasal 1 Spray(s) Both Nostrils three times a day PRN Nasal Congestion    PHYSICAL EXAM    Neuro: awake, alert, oriented x 3  Cardiovascular:  RRR, S1 S2 , No mumurs   Respiratory: Respirations regular, unlabored, breath sounds clear B/L.   GI: Abdomen soft, non-tender, positive bowel sounds.  : no bladder distention noted. No complaints at this time.  Skin: Dry, intact, no bruising, no diaphoresis.  musculoskeletal - FINK X 4, Right foot pain - no edema noted     PLAN    Will give one dose of Ibuprofen 600 mg PO (Spoke to patient pharmacy and noted that he was given Indomethacin 50 mg tid )  Monitor Ibuprofen effectiveness  Will continue current management  Will follow.

## 2021-03-07 NOTE — PROGRESS NOTE ADULT - SUBJECTIVE AND OBJECTIVE BOX
Patient is a 65y old  Male who presents with a chief complaint of SOB, fever, cough, body aches (07 Mar 2021 06:18)  Awake, alert, comfortable out of bed in NAD. Remains on oxygen supp via optimizer    INTERVAL HPI/OVERNIGHT EVENTS:      VITAL SIGNS:  T(F): 97.2 (03-07-21 @ 05:56)  HR: 81 (03-07-21 @ 05:56)  BP: 105/67 (03-07-21 @ 05:56)  RR: 19 (03-07-21 @ 05:56)  SpO2: 97% (03-07-21 @ 05:56)  Wt(kg): --  I&O's Detail          REVIEW OF SYSTEMS:    CONSTITUTIONAL:  No fevers, chills, sweats    HEENT:  Eyes:  No diplopia or blurred vision. ENT:  No earache, sore throat or runny nose.    CARDIOVASCULAR:  No pressure, squeezing, tightness, or heaviness about the chest; no palpitations.    RESPIRATORY:  Per HPI    GASTROINTESTINAL:  No abdominal pain, nausea, vomiting or diarrhea.    GENITOURINARY:  No dysuria, frequency or urgency.    NEUROLOGIC:  No paresthesias, fasciculations, seizures or weakness.    PSYCHIATRIC:  No disorder of thought or mood.      PHYSICAL EXAM:    Constitutional: Well developed and nourished  Eyes:Perrla  ENMT: normal  Neck:supple  Respiratory: good air entry  Cardiovascular: S1 S2 regular  Gastrointestinal: Soft, Non tender  Extremities: No edema  Vascular:normal  Neurological:Awake, alert,Ox3  Musculoskeletal:Normal      MEDICATIONS  (STANDING):  ascorbic acid 1000 milliGRAM(s) Oral daily  atorvastatin 80 milliGRAM(s) Oral at bedtime  chlorhexidine 2% Cloths 1 Application(s) Topical <User Schedule>  cholecalciferol 2000 Unit(s) Oral daily  diltiazem    milliGRAM(s) Oral daily  enoxaparin Injectable 40 milliGRAM(s) SubCutaneous daily  gabapentin 200 milliGRAM(s) Oral at bedtime  hydrochlorothiazide 50 milliGRAM(s) Oral daily  montelukast 10 milliGRAM(s) Oral at bedtime  pantoprazole    Tablet 40 milliGRAM(s) Oral before breakfast  polyethylene glycol 3350 17 Gram(s) Oral daily  zinc sulfate 220 milliGRAM(s) Oral daily    MEDICATIONS  (PRN):  acetaminophen   Tablet .. 650 milliGRAM(s) Oral every 6 hours PRN Temp greater or equal to 38C (100.4F), Moderate Pain (4 - 6)  guaiFENesin   Syrup  (Sugar-Free) 200 milliGRAM(s) Oral every 6 hours PRN Cough  ondansetron Injectable 4 milliGRAM(s) IV Push every 6 hours PRN Nausea and/or Vomiting  sodium chloride 0.65% Nasal 1 Spray(s) Both Nostrils three times a day PRN Nasal Congestion      Allergies    No Known Allergies    Intolerances    Shrimp- Nasal congestion (Other)      LABS:                        13.6   15.70 )-----------( 338      ( 07 Mar 2021 07:52 )             41.0     03-07    137  |  102  |  20<H>  ----------------------------<  124<H>  4.1   |  28  |  1.22    Ca    9.0      07 Mar 2021 07:52  Phos  3.0     03-07  Mg     2.4     03-07    TPro  7.0  /  Alb  2.5<L>  /  TBili  0.6  /  DBili  x   /  AST  32  /  ALT  97<H>  /  AlkPhos  198<H>  03-07              CAPILLARY BLOOD GLUCOSE        pro-bnp -- 03-07 @ 07:52     d-dimer 336  03-07 @ 07:52  pro-bnp -- 03-04 @ 08:03     d-dimer 515  03-04 @ 08:03  pro-bnp -- 03-03 @ 09:19     d-dimer 344  03-03 @ 09:19      RADIOLOGY & ADDITIONAL TESTS:    CXR:  < from: Xray Chest 1 View- PORTABLE-Urgent (Xray Chest 1 View- PORTABLE-Urgent .) (02.23.21 @ 16:41) >  IMPRESSION:    Increasing bilateral infiltrates    < end of copied text >    Ct scan chest:    ekg;    echo:

## 2021-03-07 NOTE — PROGRESS NOTE ADULT - ASSESSMENT
1. PNA 2nd to Covid-19  - PCR positive.   - CXR noted, 2/23 increasing infiltrates.   - Continue Vit C, Vit D, Zinc   - Continue Dexamethasone, Off Remdesivir  - Continue Singulair and Pepcid   - ICU consult noted - no escalation at present.   - Robitussin PRN for cough  - Tylenol PRN for temp   - O2 Supp - Taper O2 as tolerated; NRM and pendent - alernating.   - Monitor O2 Sat.  - Monitor labs  - F.U CXR  - Unable to tolerate prone positioning due to prior neck surgery.   - Encouraged side lying periodically as tolerated.   - Incentive Spirometry  - Isolation precautions   - Nasal saline spray for dryness   - Cont Ensure Compact    - DVT and GI PPX     2. HTN  - Continue meds   - Monitor BP     3. HLD  - Cont meds  - Lipid panel     4. GERD  - Antireflux diet   - Cont meds    5. MECHELLE  Improved  Avoid nephrotoxic drugs  monitor BMP  IVF

## 2021-03-08 LAB
ALBUMIN SERPL ELPH-MCNC: 2.5 G/DL — LOW (ref 3.5–5)
ALP SERPL-CCNC: 195 U/L — HIGH (ref 40–120)
ALT FLD-CCNC: 87 U/L DA — HIGH (ref 10–60)
ANION GAP SERPL CALC-SCNC: 9 MMOL/L — SIGNIFICANT CHANGE UP (ref 5–17)
AST SERPL-CCNC: 25 U/L — SIGNIFICANT CHANGE UP (ref 10–40)
BASOPHILS # BLD AUTO: 0.06 K/UL — SIGNIFICANT CHANGE UP (ref 0–0.2)
BASOPHILS NFR BLD AUTO: 0.4 % — SIGNIFICANT CHANGE UP (ref 0–2)
BILIRUB SERPL-MCNC: 0.6 MG/DL — SIGNIFICANT CHANGE UP (ref 0.2–1.2)
BUN SERPL-MCNC: 23 MG/DL — HIGH (ref 7–18)
CALCIUM SERPL-MCNC: 9 MG/DL — SIGNIFICANT CHANGE UP (ref 8.4–10.5)
CHLORIDE SERPL-SCNC: 99 MMOL/L — SIGNIFICANT CHANGE UP (ref 96–108)
CO2 SERPL-SCNC: 28 MMOL/L — SIGNIFICANT CHANGE UP (ref 22–31)
CREAT SERPL-MCNC: 1.26 MG/DL — SIGNIFICANT CHANGE UP (ref 0.5–1.3)
EOSINOPHIL # BLD AUTO: 0.16 K/UL — SIGNIFICANT CHANGE UP (ref 0–0.5)
EOSINOPHIL NFR BLD AUTO: 1.2 % — SIGNIFICANT CHANGE UP (ref 0–6)
GLUCOSE SERPL-MCNC: 122 MG/DL — HIGH (ref 70–99)
HCT VFR BLD CALC: 39.6 % — SIGNIFICANT CHANGE UP (ref 39–50)
HGB BLD-MCNC: 13.1 G/DL — SIGNIFICANT CHANGE UP (ref 13–17)
IMM GRANULOCYTES NFR BLD AUTO: 0.6 % — SIGNIFICANT CHANGE UP (ref 0–1.5)
LYMPHOCYTES # BLD AUTO: 1.9 K/UL — SIGNIFICANT CHANGE UP (ref 1–3.3)
LYMPHOCYTES # BLD AUTO: 14 % — SIGNIFICANT CHANGE UP (ref 13–44)
MAGNESIUM SERPL-MCNC: 2.4 MG/DL — SIGNIFICANT CHANGE UP (ref 1.6–2.6)
MCHC RBC-ENTMCNC: 28.9 PG — SIGNIFICANT CHANGE UP (ref 27–34)
MCHC RBC-ENTMCNC: 33.1 GM/DL — SIGNIFICANT CHANGE UP (ref 32–36)
MCV RBC AUTO: 87.4 FL — SIGNIFICANT CHANGE UP (ref 80–100)
MONOCYTES # BLD AUTO: 0.98 K/UL — HIGH (ref 0–0.9)
MONOCYTES NFR BLD AUTO: 7.2 % — SIGNIFICANT CHANGE UP (ref 2–14)
NEUTROPHILS # BLD AUTO: 10.4 K/UL — HIGH (ref 1.8–7.4)
NEUTROPHILS NFR BLD AUTO: 76.6 % — SIGNIFICANT CHANGE UP (ref 43–77)
NRBC # BLD: 0 /100 WBCS — SIGNIFICANT CHANGE UP (ref 0–0)
PHOSPHATE SERPL-MCNC: 3.5 MG/DL — SIGNIFICANT CHANGE UP (ref 2.5–4.5)
PLATELET # BLD AUTO: 335 K/UL — SIGNIFICANT CHANGE UP (ref 150–400)
POTASSIUM SERPL-MCNC: 3.8 MMOL/L — SIGNIFICANT CHANGE UP (ref 3.5–5.3)
POTASSIUM SERPL-SCNC: 3.8 MMOL/L — SIGNIFICANT CHANGE UP (ref 3.5–5.3)
PROT SERPL-MCNC: 6.8 G/DL — SIGNIFICANT CHANGE UP (ref 6–8.3)
RBC # BLD: 4.53 M/UL — SIGNIFICANT CHANGE UP (ref 4.2–5.8)
RBC # FLD: 14.3 % — SIGNIFICANT CHANGE UP (ref 10.3–14.5)
SODIUM SERPL-SCNC: 136 MMOL/L — SIGNIFICANT CHANGE UP (ref 135–145)
WBC # BLD: 13.58 K/UL — HIGH (ref 3.8–10.5)
WBC # FLD AUTO: 13.58 K/UL — HIGH (ref 3.8–10.5)

## 2021-03-08 PROCEDURE — 71045 X-RAY EXAM CHEST 1 VIEW: CPT | Mod: 26

## 2021-03-08 RX ADMIN — Medication 650 MILLIGRAM(S): at 12:30

## 2021-03-08 RX ADMIN — Medication 200 MILLIGRAM(S): at 11:20

## 2021-03-08 RX ADMIN — ENOXAPARIN SODIUM 40 MILLIGRAM(S): 100 INJECTION SUBCUTANEOUS at 11:19

## 2021-03-08 RX ADMIN — Medication 650 MILLIGRAM(S): at 11:18

## 2021-03-08 RX ADMIN — PANTOPRAZOLE SODIUM 40 MILLIGRAM(S): 20 TABLET, DELAYED RELEASE ORAL at 06:47

## 2021-03-08 RX ADMIN — CHLORHEXIDINE GLUCONATE 1 APPLICATION(S): 213 SOLUTION TOPICAL at 06:48

## 2021-03-08 RX ADMIN — ZINC SULFATE TAB 220 MG (50 MG ZINC EQUIVALENT) 220 MILLIGRAM(S): 220 (50 ZN) TAB at 11:18

## 2021-03-08 RX ADMIN — POLYETHYLENE GLYCOL 3350 17 GRAM(S): 17 POWDER, FOR SOLUTION ORAL at 11:19

## 2021-03-08 RX ADMIN — GABAPENTIN 200 MILLIGRAM(S): 400 CAPSULE ORAL at 21:14

## 2021-03-08 RX ADMIN — Medication 2000 UNIT(S): at 11:18

## 2021-03-08 RX ADMIN — Medication 1000 MILLIGRAM(S): at 11:20

## 2021-03-08 RX ADMIN — ATORVASTATIN CALCIUM 80 MILLIGRAM(S): 80 TABLET, FILM COATED ORAL at 21:12

## 2021-03-08 RX ADMIN — MONTELUKAST 10 MILLIGRAM(S): 4 TABLET, CHEWABLE ORAL at 21:12

## 2021-03-08 NOTE — CHART NOTE - NSCHARTNOTEFT_GEN_A_CORE
Assessment:   65yMalePatient is a 65y old  Male who presents with a chief complaint of SOB, fever, cough, body aches (08 Mar 2021 09:11)      Factors impacting intake: [ ] none [ ] nausea  [ ] vomiting [ ] diarrhea [ ] constipation  [ ]chewing problems [ ] swallowing issues  [x ] other: unable to interview patient face to face as has covid-Ambrocio. contacted patient On extension in room and reports consuming<50% of food On tray. also drinking 1-2 of cans of ensure compact/d. No GI issues.     Diet Presciption: Diet, DASH/TLC:   Sodium & Cholesterol Restricted  Supplement Feeding Modality:  Oral  Ensure Compact Cans or Servings Per Day:  1       Frequency:  Three Times a day (02-23-21 @ 18:58)    Intake: inadequate     Current Weight: none   % Weight Change    Pertinent Medications: MEDICATIONS  (STANDING):  ascorbic acid 1000 milliGRAM(s) Oral daily  atorvastatin 80 milliGRAM(s) Oral at bedtime  chlorhexidine 2% Cloths 1 Application(s) Topical <User Schedule>  cholecalciferol 2000 Unit(s) Oral daily  diltiazem    milliGRAM(s) Oral daily  enoxaparin Injectable 40 milliGRAM(s) SubCutaneous daily  gabapentin 200 milliGRAM(s) Oral at bedtime  hydrochlorothiazide 50 milliGRAM(s) Oral daily  montelukast 10 milliGRAM(s) Oral at bedtime  pantoprazole    Tablet 40 milliGRAM(s) Oral before breakfast  polyethylene glycol 3350 17 Gram(s) Oral daily  zinc sulfate 220 milliGRAM(s) Oral daily    MEDICATIONS  (PRN):  acetaminophen   Tablet .. 650 milliGRAM(s) Oral every 6 hours PRN Temp greater or equal to 38C (100.4F), Moderate Pain (4 - 6)  guaiFENesin   Syrup  (Sugar-Free) 200 milliGRAM(s) Oral every 6 hours PRN Cough  ondansetron Injectable 4 milliGRAM(s) IV Push every 6 hours PRN Nausea and/or Vomiting  sodium chloride 0.65% Nasal 1 Spray(s) Both Nostrils three times a day PRN Nasal Congestion    Pertinent Labs: 03-08 Na136 mmol/L Glu 122 mg/dL<H> K+ 3.8 mmol/L Cr  1.26 mg/dL BUN 23 mg/dL<H> 03-08 Phos 3.5 mg/dL 03-08 Alb 2.5 g/dL<L> 02-22 Chol 137 mg/dL LDL --    HDL 27 mg/dL<L> Trig 207 mg/dL<H>     CAPILLARY BLOOD GLUCOSE      POCT Blood Glucose.: 116 mg/dL (07 Mar 2021 17:06)    Skin: No  pressure ulcers     Estimated Needs:   [x ] no change since previous assessment  [ ] recalculated:     Previous Nutrition Diagnosis:   [ ] Inadequate Energy Intake [x ]Inadequate Oral Intake [ ] Excessive Energy Intake   [ ] Underweight [ ] Increased Nutrient Needs [ ] Overweight/Obesity   [ ] Altered GI Function [ ] Unintended Weight Loss [ ] Food & Nutrition Related Knowledge Deficit [ ] Malnutrition     Nutrition Diagnosis is [x] ongoing  [ ] resolved [ ] not applicable     New Nutrition Diagnosis: [ ] not applicable       Interventions:   Recommend  [ ] Change Diet To:  [ ] Nutrition Supplement  [ ] Nutrition Support  [ x] Other: provide DASH/TLC diet and ensure compact tid as ordered. patient will tolerate diet with improved po intake     Monitoring and Evaluation:   [ x] PO intake [ x ] Tolerance to diet prescription [ x ] weights [ x ] labs[ x ] follow up per protocol  [ ] other:

## 2021-03-08 NOTE — PROGRESS NOTE ADULT - SUBJECTIVE AND OBJECTIVE BOX
Patient is a 65y old  Male who presents with a chief complaint of SOB, fever, cough, body aches (07 Mar 2021 09:32)    pt seen in icu [  ], reg med floor [  x ], bed [x  ], chair at bedside [   ], a+o x3 [x ], lethargic [  ],    nad [x  ]      Allergies    No Known Allergies  Shrimp- Nasal congestion (Other)        Vitals    T(F): 98.6 (03-08-21 @ 05:52), Max: 98.8 (03-07-21 @ 12:57)  HR: 101 (03-08-21 @ 05:52) (88 - 101)  BP: 105/69 (03-08-21 @ 05:52) (105/69 - 121/76)  RR: 18 (03-08-21 @ 05:52) (18 - 20)  SpO2: 94% (03-08-21 @ 05:52) (94% - 97%)  Wt(kg): --  CAPILLARY BLOOD GLUCOSE      POCT Blood Glucose.: 116 mg/dL (07 Mar 2021 17:06)      Labs                          13.6   15.70 )-----------( 338      ( 07 Mar 2021 07:52 )             41.0       03-07    137  |  102  |  20<H>  ----------------------------<  124<H>  4.1   |  28  |  1.22    Ca    9.0      07 Mar 2021 07:52  Phos  3.0     03-07  Mg     2.4     03-07    TPro  7.0  /  Alb  2.5<L>  /  TBili  0.6  /  DBili  x   /  AST  32  /  ALT  97<H>  /  AlkPhos  198<H>  03-07            .Blood Blood-Peripheral  02-21 @ 22:03   No Growth Final  --  --      .Blood Blood-Peripheral  02-21 @ 22:00   No Growth Final  --  --          Radiology Results      Meds    MEDICATIONS  (STANDING):  ascorbic acid 1000 milliGRAM(s) Oral daily  atorvastatin 80 milliGRAM(s) Oral at bedtime  chlorhexidine 2% Cloths 1 Application(s) Topical <User Schedule>  cholecalciferol 2000 Unit(s) Oral daily  diltiazem    milliGRAM(s) Oral daily  enoxaparin Injectable 40 milliGRAM(s) SubCutaneous daily  gabapentin 200 milliGRAM(s) Oral at bedtime  hydrochlorothiazide 50 milliGRAM(s) Oral daily  montelukast 10 milliGRAM(s) Oral at bedtime  pantoprazole    Tablet 40 milliGRAM(s) Oral before breakfast  polyethylene glycol 3350 17 Gram(s) Oral daily  zinc sulfate 220 milliGRAM(s) Oral daily      MEDICATIONS  (PRN):  acetaminophen   Tablet .. 650 milliGRAM(s) Oral every 6 hours PRN Temp greater or equal to 38C (100.4F), Moderate Pain (4 - 6)  guaiFENesin   Syrup  (Sugar-Free) 200 milliGRAM(s) Oral every 6 hours PRN Cough  ondansetron Injectable 4 milliGRAM(s) IV Push every 6 hours PRN Nausea and/or Vomiting  sodium chloride 0.65% Nasal 1 Spray(s) Both Nostrils three times a day PRN Nasal Congestion      Physical Exam    Neuro :  no focal deficits  Respiratory: CTA B/L  CV: RRR, S1S2, no murmurs,   Abdominal: Soft, NT, ND +BS,  Extremities: No edema, + peripheral pulses    ASSESSMENT    pneumonia 2nd to covid 19,   marya,   hypokalemia,   h/o Cluster headache  BPH (benign prostatic hypertrophy)  Spinal stenosis s/p laminectomy  GERD (gastroesophageal reflux disease)  Hyperlipidemia  HTN (hypertension)            PLAN      contact and airborne isolation  completed remdesevir 2/26/21  covid ab neg  completed dexamethasone 3/2/21  pepcid, singulair, vit c, vit d, zinc   pulm f/u  cont albuterol inhaler   cont lovenox  procalcitonin neg noted  ,   D-dimer, crp, ldh, ferritin, lactate noted ,   afebrile  cont tylenol prn,   robitussin prn   blood cx neg  O2 sat (93% - 97%) optimizer pendant 6 L   sat ambulating 86 on optimizer 6L  cont O2 via pendant and taper as tolerated    incentive spirometer   pt low threshold for intubation and icu monitoring  icu cons noted  continue to monitor pulse oximetry  monitor biomarkers TIW  not accept to icu at this time  cont incentive spirometer   serum creat wnl   cont current meds       Patient is a 65y old  Male who presents with a chief complaint of SOB, fever, cough, body aches (07 Mar 2021 09:32)    pt seen in icu [  ], reg med floor [  x ], bed [x  ], chair at bedside [   ], a+o x3 [x ], lethargic [  ],    nad [x  ]      Allergies    No Known Allergies  Shrimp- Nasal congestion (Other)        Vitals    T(F): 98.6 (03-08-21 @ 05:52), Max: 98.8 (03-07-21 @ 12:57)  HR: 101 (03-08-21 @ 05:52) (88 - 101)  BP: 105/69 (03-08-21 @ 05:52) (105/69 - 121/76)  RR: 18 (03-08-21 @ 05:52) (18 - 20)  SpO2: 94% (03-08-21 @ 05:52) (94% - 97%)  Wt(kg): --  CAPILLARY BLOOD GLUCOSE      POCT Blood Glucose.: 116 mg/dL (07 Mar 2021 17:06)      Labs                          13.6   15.70 )-----------( 338      ( 07 Mar 2021 07:52 )             41.0       03-07    137  |  102  |  20<H>  ----------------------------<  124<H>  4.1   |  28  |  1.22    Ca    9.0      07 Mar 2021 07:52  Phos  3.0     03-07  Mg     2.4     03-07    TPro  7.0  /  Alb  2.5<L>  /  TBili  0.6  /  DBili  x   /  AST  32  /  ALT  97<H>  /  AlkPhos  198<H>  03-07            .Blood Blood-Peripheral  02-21 @ 22:03   No Growth Final  --  --      .Blood Blood-Peripheral  02-21 @ 22:00   No Growth Final  --  --          Radiology Results      Meds    MEDICATIONS  (STANDING):  ascorbic acid 1000 milliGRAM(s) Oral daily  atorvastatin 80 milliGRAM(s) Oral at bedtime  chlorhexidine 2% Cloths 1 Application(s) Topical <User Schedule>  cholecalciferol 2000 Unit(s) Oral daily  diltiazem    milliGRAM(s) Oral daily  enoxaparin Injectable 40 milliGRAM(s) SubCutaneous daily  gabapentin 200 milliGRAM(s) Oral at bedtime  hydrochlorothiazide 50 milliGRAM(s) Oral daily  montelukast 10 milliGRAM(s) Oral at bedtime  pantoprazole    Tablet 40 milliGRAM(s) Oral before breakfast  polyethylene glycol 3350 17 Gram(s) Oral daily  zinc sulfate 220 milliGRAM(s) Oral daily      MEDICATIONS  (PRN):  acetaminophen   Tablet .. 650 milliGRAM(s) Oral every 6 hours PRN Temp greater or equal to 38C (100.4F), Moderate Pain (4 - 6)  guaiFENesin   Syrup  (Sugar-Free) 200 milliGRAM(s) Oral every 6 hours PRN Cough  ondansetron Injectable 4 milliGRAM(s) IV Push every 6 hours PRN Nausea and/or Vomiting  sodium chloride 0.65% Nasal 1 Spray(s) Both Nostrils three times a day PRN Nasal Congestion      Physical Exam    Neuro :  no focal deficits  Respiratory: CTA B/L  CV: RRR, S1S2, no murmurs,   Abdominal: Soft, NT, ND +BS,  Extremities: No edema, + peripheral pulses    ASSESSMENT    pneumonia 2nd to covid 19,   marya,   hypokalemia,   h/o Cluster headache  BPH (benign prostatic hypertrophy)  Spinal stenosis s/p laminectomy  GERD (gastroesophageal reflux disease)  Hyperlipidemia  HTN (hypertension)            PLAN      contact and airborne isolation  completed remdesevir 2/26/21  covid ab neg  completed dexamethasone 3/2/21  pepcid, singulair, vit c, vit d, zinc   pulm f/u  cont albuterol inhaler   cont lovenox  procalcitonin neg noted  ,   D-dimer, crp, ldh, ferritin, lactate noted ,   afebrile  cont tylenol prn,   robitussin prn   blood cx neg  O2 sat (94% - 97%) optimizer pendant 4 L   sat ambulating 86 on optimizer 6L  cont O2 via pendant and taper as tolerated    incentive spirometer   pt low threshold for intubation and icu monitoring  icu cons noted  continue to monitor pulse oximetry  monitor biomarkers TIW  not accept to icu at this time  cont incentive spirometer   serum creat wnl   cont current meds

## 2021-03-08 NOTE — PROGRESS NOTE ADULT - PROBLEM SELECTOR PLAN 1
-SpO2 97% 4L optimizer at rest  -COVID antibody negative  -completed remdersivir  -completed  decadron   -c/w incentive spirometer therapy  Pulmonology Dr. Reid

## 2021-03-08 NOTE — PROGRESS NOTE ADULT - SUBJECTIVE AND OBJECTIVE BOX
NP Note discussed with  Primary Attending    Patient is a 65y old  Male who presents with a chief complaint of SOB, fever, cough, body aches (08 Mar 2021 09:11)      INTERVAL HPI/OVERNIGHT EVENTS: Patient seen and examined at bedside. Patient comfortable in bed. SpO2 97% 4L optimizer at rest.     MEDICATIONS  (STANDING):  ascorbic acid 1000 milliGRAM(s) Oral daily  atorvastatin 80 milliGRAM(s) Oral at bedtime  chlorhexidine 2% Cloths 1 Application(s) Topical <User Schedule>  cholecalciferol 2000 Unit(s) Oral daily  diltiazem    milliGRAM(s) Oral daily  enoxaparin Injectable 40 milliGRAM(s) SubCutaneous daily  gabapentin 200 milliGRAM(s) Oral at bedtime  hydrochlorothiazide 50 milliGRAM(s) Oral daily  montelukast 10 milliGRAM(s) Oral at bedtime  pantoprazole    Tablet 40 milliGRAM(s) Oral before breakfast  polyethylene glycol 3350 17 Gram(s) Oral daily  zinc sulfate 220 milliGRAM(s) Oral daily    MEDICATIONS  (PRN):  acetaminophen   Tablet .. 650 milliGRAM(s) Oral every 6 hours PRN Temp greater or equal to 38C (100.4F), Moderate Pain (4 - 6)  guaiFENesin   Syrup  (Sugar-Free) 200 milliGRAM(s) Oral every 6 hours PRN Cough  ondansetron Injectable 4 milliGRAM(s) IV Push every 6 hours PRN Nausea and/or Vomiting  sodium chloride 0.65% Nasal 1 Spray(s) Both Nostrils three times a day PRN Nasal Congestion      __________________________________________________  REVIEW OF SYSTEMS:    CONSTITUTIONAL: No fever,   EYES: no acute visual disturbances  NECK: No pain or stiffness  RESPIRATORY: No cough; No shortness of breath  CARDIOVASCULAR: No chest pain, no palpitations  GASTROINTESTINAL: No pain. No nausea or vomiting; No diarrhea   NEUROLOGICAL: No headache or numbness, no tremors  MUSCULOSKELETAL: No joint pain, no muscle pain  GENITOURINARY: no dysuria, no frequency, no hesitancy  PSYCHIATRY: no depression , no anxiety  ALL OTHER  ROS negative        Vital Signs Last 24 Hrs  T(C): 37.1 (08 Mar 2021 13:30), Max: 37.1 (08 Mar 2021 13:30)  T(F): 98.8 (08 Mar 2021 13:30), Max: 98.8 (08 Mar 2021 13:30)  HR: 75 (08 Mar 2021 13:30) (75 - 106)  BP: 112/68 (08 Mar 2021 13:30) (105/69 - 121/76)  BP(mean): --  RR: 20 (08 Mar 2021 13:30) (18 - 22)  SpO2: 97% (08 Mar 2021 13:30) (91% - 97%)    ________________________________________________  PHYSICAL EXAM:  GENERAL: NAD  HEENT: Normocephalic;  conjunctivae and sclerae clear; moist mucous membranes;   NECK : supple  CHEST/LUNG: diminshed  HEART: S1 S2  regular; no murmurs, gallops or rubs  ABDOMEN: Soft, Nontender, Nondistended; Bowel sounds present  EXTREMITIES: no cyanosis; no edema; no calf tenderness  SKIN: warm and dry; no rash  NERVOUS SYSTEM:  Awake and alert; Oriented  to place, person and time ; no new deficits    _________________________________________________  LABS:                        13.1   13.58 )-----------( 335      ( 08 Mar 2021 07:51 )             39.6     03-08    136  |  99  |  23<H>  ----------------------------<  122<H>  3.8   |  28  |  1.26    Ca    9.0      08 Mar 2021 07:51  Phos  3.5     03-08  Mg     2.4     03-08    TPro  6.8  /  Alb  2.5<L>  /  TBili  0.6  /  DBili  x   /  AST  25  /  ALT  87<H>  /  AlkPhos  195<H>  03-08        CAPILLARY BLOOD GLUCOSE      POCT Blood Glucose.: 116 mg/dL (07 Mar 2021 17:06)    RADIOLOGY & ADDITIONAL TESTS:  < from: Xray Chest 1 View- PORTABLE-Urgent (Xray Chest 1 View- PORTABLE-Urgent .) (03.08.21 @ 11:24) >    EXAM:  XR CHEST PORTABLE URGENT 1V                            PROCEDURE DATE:  03/08/2021          INTERPRETATION:  CLINICAL STATEMENT: Follow-up chest pain.    TECHNIQUE: AP view of the chest.    COMPARISON: 2/23/2021    FINDINGS/  IMPRESSION:  Bilateral airspace opacities overall worsening. Elevation right hemidiaphragm.    Heart size cannot be accurately assessed in this projection.    Overlying chin obscures lung apices.    < end of copied text >    Imaging  Reviewed:  YES    Consultant(s) Notes Reviewed:   YES      Plan of care was discussed with patient and /or primary care giver; all questions and concerns were addressed

## 2021-03-08 NOTE — PROGRESS NOTE ADULT - SUBJECTIVE AND OBJECTIVE BOX
Patient is a 65y old  Male who presents with a chief complaint of SOB, fever, cough, body aches (08 Mar 2021 06:15)  Patient is awake, alert, laying in bed in NAD. Still on oxygen supp via optimizer    INTERVAL HPI/OVERNIGHT EVENTS:      VITAL SIGNS:  T(F): 98.6 (03-08-21 @ 05:52)  HR: 101 (03-08-21 @ 05:52)  BP: 105/69 (03-08-21 @ 05:52)  RR: 18 (03-08-21 @ 05:52)  SpO2: 94% (03-08-21 @ 05:52)  Wt(kg): --  I&O's Detail          REVIEW OF SYSTEMS:    CONSTITUTIONAL:  No fevers, chills, sweats    HEENT:  Eyes:  No diplopia or blurred vision. ENT:  No earache, sore throat or runny nose.    CARDIOVASCULAR:  No pressure, squeezing, tightness, or heaviness about the chest; no palpitations.    RESPIRATORY:  Per HPI    GASTROINTESTINAL:  No abdominal pain, nausea, vomiting or diarrhea.    GENITOURINARY:  No dysuria, frequency or urgency.    NEUROLOGIC:  No paresthesias, fasciculations, seizures or weakness.    PSYCHIATRIC:  No disorder of thought or mood.      PHYSICAL EXAM:    Constitutional: Well developed and nourished  Eyes:Perrla  ENMT: normal  Neck:supple  Respiratory: good air entry  Cardiovascular: S1 S2 regular  Gastrointestinal: Soft, Non tender  Extremities: No edema  Vascular:normal  Neurological:Awake, alert,Ox3  Musculoskeletal:Normal      MEDICATIONS  (STANDING):  ascorbic acid 1000 milliGRAM(s) Oral daily  atorvastatin 80 milliGRAM(s) Oral at bedtime  chlorhexidine 2% Cloths 1 Application(s) Topical <User Schedule>  cholecalciferol 2000 Unit(s) Oral daily  diltiazem    milliGRAM(s) Oral daily  enoxaparin Injectable 40 milliGRAM(s) SubCutaneous daily  gabapentin 200 milliGRAM(s) Oral at bedtime  hydrochlorothiazide 50 milliGRAM(s) Oral daily  montelukast 10 milliGRAM(s) Oral at bedtime  pantoprazole    Tablet 40 milliGRAM(s) Oral before breakfast  polyethylene glycol 3350 17 Gram(s) Oral daily  zinc sulfate 220 milliGRAM(s) Oral daily    MEDICATIONS  (PRN):  acetaminophen   Tablet .. 650 milliGRAM(s) Oral every 6 hours PRN Temp greater or equal to 38C (100.4F), Moderate Pain (4 - 6)  guaiFENesin   Syrup  (Sugar-Free) 200 milliGRAM(s) Oral every 6 hours PRN Cough  ondansetron Injectable 4 milliGRAM(s) IV Push every 6 hours PRN Nausea and/or Vomiting  sodium chloride 0.65% Nasal 1 Spray(s) Both Nostrils three times a day PRN Nasal Congestion      Allergies    No Known Allergies    Intolerances    Shrimp- Nasal congestion (Other)      LABS:                        13.1   13.58 )-----------( 335      ( 08 Mar 2021 07:51 )             39.6     03-08    136  |  99  |  23<H>  ----------------------------<  122<H>  3.8   |  28  |  1.26    Ca    9.0      08 Mar 2021 07:51  Phos  3.5     03-08  Mg     2.4     03-08    TPro  6.8  /  Alb  2.5<L>  /  TBili  0.6  /  DBili  x   /  AST  25  /  ALT  87<H>  /  AlkPhos  195<H>  03-08              CAPILLARY BLOOD GLUCOSE      POCT Blood Glucose.: 116 mg/dL (07 Mar 2021 17:06)    pro-bnp -- 03-07 @ 07:52     d-dimer 336  03-07 @ 07:52  pro-bnp -- 03-04 @ 08:03     d-dimer 515  03-04 @ 08:03  pro-bnp -- 03-03 @ 09:19     d-dimer 344  03-03 @ 09:19      RADIOLOGY & ADDITIONAL TESTS:    CXR:    Ct scan chest:    ekg;    echo:

## 2021-03-09 LAB
ALBUMIN SERPL ELPH-MCNC: 2.5 G/DL — LOW (ref 3.5–5)
ALP SERPL-CCNC: 221 U/L — HIGH (ref 40–120)
ALT FLD-CCNC: 101 U/L DA — HIGH (ref 10–60)
ANION GAP SERPL CALC-SCNC: 8 MMOL/L — SIGNIFICANT CHANGE UP (ref 5–17)
AST SERPL-CCNC: 41 U/L — HIGH (ref 10–40)
BASOPHILS # BLD AUTO: 0.05 K/UL — SIGNIFICANT CHANGE UP (ref 0–0.2)
BASOPHILS NFR BLD AUTO: 0.3 % — SIGNIFICANT CHANGE UP (ref 0–2)
BILIRUB SERPL-MCNC: 0.6 MG/DL — SIGNIFICANT CHANGE UP (ref 0.2–1.2)
BUN SERPL-MCNC: 19 MG/DL — HIGH (ref 7–18)
CALCIUM SERPL-MCNC: 9 MG/DL — SIGNIFICANT CHANGE UP (ref 8.4–10.5)
CHLORIDE SERPL-SCNC: 100 MMOL/L — SIGNIFICANT CHANGE UP (ref 96–108)
CO2 SERPL-SCNC: 28 MMOL/L — SIGNIFICANT CHANGE UP (ref 22–31)
CREAT SERPL-MCNC: 1.1 MG/DL — SIGNIFICANT CHANGE UP (ref 0.5–1.3)
EOSINOPHIL # BLD AUTO: 0.11 K/UL — SIGNIFICANT CHANGE UP (ref 0–0.5)
EOSINOPHIL NFR BLD AUTO: 0.6 % — SIGNIFICANT CHANGE UP (ref 0–6)
GLUCOSE SERPL-MCNC: 109 MG/DL — HIGH (ref 70–99)
HCT VFR BLD CALC: 38.7 % — LOW (ref 39–50)
HGB BLD-MCNC: 12.7 G/DL — LOW (ref 13–17)
IMM GRANULOCYTES NFR BLD AUTO: 0.7 % — SIGNIFICANT CHANGE UP (ref 0–1.5)
LYMPHOCYTES # BLD AUTO: 11.6 % — LOW (ref 13–44)
LYMPHOCYTES # BLD AUTO: 2.09 K/UL — SIGNIFICANT CHANGE UP (ref 1–3.3)
MAGNESIUM SERPL-MCNC: 2.3 MG/DL — SIGNIFICANT CHANGE UP (ref 1.6–2.6)
MCHC RBC-ENTMCNC: 28.4 PG — SIGNIFICANT CHANGE UP (ref 27–34)
MCHC RBC-ENTMCNC: 32.8 GM/DL — SIGNIFICANT CHANGE UP (ref 32–36)
MCV RBC AUTO: 86.6 FL — SIGNIFICANT CHANGE UP (ref 80–100)
MONOCYTES # BLD AUTO: 1.39 K/UL — HIGH (ref 0–0.9)
MONOCYTES NFR BLD AUTO: 7.7 % — SIGNIFICANT CHANGE UP (ref 2–14)
NEUTROPHILS # BLD AUTO: 14.21 K/UL — HIGH (ref 1.8–7.4)
NEUTROPHILS NFR BLD AUTO: 79.1 % — HIGH (ref 43–77)
NRBC # BLD: 0 /100 WBCS — SIGNIFICANT CHANGE UP (ref 0–0)
PHOSPHATE SERPL-MCNC: 3.1 MG/DL — SIGNIFICANT CHANGE UP (ref 2.5–4.5)
PLATELET # BLD AUTO: 323 K/UL — SIGNIFICANT CHANGE UP (ref 150–400)
POTASSIUM SERPL-MCNC: 4.1 MMOL/L — SIGNIFICANT CHANGE UP (ref 3.5–5.3)
POTASSIUM SERPL-SCNC: 4.1 MMOL/L — SIGNIFICANT CHANGE UP (ref 3.5–5.3)
PROT SERPL-MCNC: 6.7 G/DL — SIGNIFICANT CHANGE UP (ref 6–8.3)
RBC # BLD: 4.47 M/UL — SIGNIFICANT CHANGE UP (ref 4.2–5.8)
RBC # FLD: 14 % — SIGNIFICANT CHANGE UP (ref 10.3–14.5)
SODIUM SERPL-SCNC: 136 MMOL/L — SIGNIFICANT CHANGE UP (ref 135–145)
WBC # BLD: 17.98 K/UL — HIGH (ref 3.8–10.5)
WBC # FLD AUTO: 17.98 K/UL — HIGH (ref 3.8–10.5)

## 2021-03-09 RX ORDER — GABAPENTIN 400 MG/1
200 CAPSULE ORAL
Refills: 0 | Status: DISCONTINUED | OUTPATIENT
Start: 2021-03-09 | End: 2021-03-19

## 2021-03-09 RX ADMIN — Medication 1000 MILLIGRAM(S): at 12:41

## 2021-03-09 RX ADMIN — PANTOPRAZOLE SODIUM 40 MILLIGRAM(S): 20 TABLET, DELAYED RELEASE ORAL at 05:39

## 2021-03-09 RX ADMIN — CHLORHEXIDINE GLUCONATE 1 APPLICATION(S): 213 SOLUTION TOPICAL at 05:39

## 2021-03-09 RX ADMIN — ATORVASTATIN CALCIUM 80 MILLIGRAM(S): 80 TABLET, FILM COATED ORAL at 21:06

## 2021-03-09 RX ADMIN — Medication 2000 UNIT(S): at 12:41

## 2021-03-09 RX ADMIN — Medication 200 MILLIGRAM(S): at 05:43

## 2021-03-09 RX ADMIN — Medication 200 MILLIGRAM(S): at 21:06

## 2021-03-09 RX ADMIN — ENOXAPARIN SODIUM 40 MILLIGRAM(S): 100 INJECTION SUBCUTANEOUS at 12:41

## 2021-03-09 RX ADMIN — MONTELUKAST 10 MILLIGRAM(S): 4 TABLET, CHEWABLE ORAL at 21:06

## 2021-03-09 RX ADMIN — GABAPENTIN 200 MILLIGRAM(S): 400 CAPSULE ORAL at 17:16

## 2021-03-09 RX ADMIN — Medication 650 MILLIGRAM(S): at 14:22

## 2021-03-09 RX ADMIN — GABAPENTIN 200 MILLIGRAM(S): 400 CAPSULE ORAL at 14:22

## 2021-03-09 RX ADMIN — Medication 650 MILLIGRAM(S): at 15:00

## 2021-03-09 RX ADMIN — ZINC SULFATE TAB 220 MG (50 MG ZINC EQUIVALENT) 220 MILLIGRAM(S): 220 (50 ZN) TAB at 12:41

## 2021-03-09 RX ADMIN — POLYETHYLENE GLYCOL 3350 17 GRAM(S): 17 POWDER, FOR SOLUTION ORAL at 12:41

## 2021-03-09 RX ADMIN — Medication 50 MILLIGRAM(S): at 05:39

## 2021-03-09 RX ADMIN — Medication 240 MILLIGRAM(S): at 05:39

## 2021-03-09 NOTE — PROGRESS NOTE ADULT - SUBJECTIVE AND OBJECTIVE BOX
NP Note discussed with  Primary Attending    Patient is a 65y old  Male who presents with a chief complaint of SOB, fever, cough, body aches (09 Mar 2021 09:02)      INTERVAL HPI/OVERNIGHT EVENTS: Patient seen and examined at bedside. Patient SpO2 95% 4L optimizer at rest.     MEDICATIONS  (STANDING):  ascorbic acid 1000 milliGRAM(s) Oral daily  atorvastatin 80 milliGRAM(s) Oral at bedtime  chlorhexidine 2% Cloths 1 Application(s) Topical <User Schedule>  cholecalciferol 2000 Unit(s) Oral daily  diltiazem    milliGRAM(s) Oral daily  enoxaparin Injectable 40 milliGRAM(s) SubCutaneous daily  gabapentin 200 milliGRAM(s) Oral at bedtime  hydrochlorothiazide 50 milliGRAM(s) Oral daily  montelukast 10 milliGRAM(s) Oral at bedtime  pantoprazole    Tablet 40 milliGRAM(s) Oral before breakfast  polyethylene glycol 3350 17 Gram(s) Oral daily  zinc sulfate 220 milliGRAM(s) Oral daily    MEDICATIONS  (PRN):  acetaminophen   Tablet .. 650 milliGRAM(s) Oral every 6 hours PRN Temp greater or equal to 38C (100.4F), Moderate Pain (4 - 6)  guaiFENesin   Syrup  (Sugar-Free) 200 milliGRAM(s) Oral every 6 hours PRN Cough  ondansetron Injectable 4 milliGRAM(s) IV Push every 6 hours PRN Nausea and/or Vomiting  sodium chloride 0.65% Nasal 1 Spray(s) Both Nostrils three times a day PRN Nasal Congestion      __________________________________________________  REVIEW OF SYSTEMS:    CONSTITUTIONAL: No fever,   EYES: no acute visual disturbances  NECK: No pain or stiffness  RESPIRATORY: No cough; No shortness of breath  CARDIOVASCULAR: No chest pain, no palpitations  GASTROINTESTINAL: No pain. No nausea or vomiting; No diarrhea   NEUROLOGICAL: No headache or numbness, no tremors  MUSCULOSKELETAL: No joint pain, no muscle pain  GENITOURINARY: no dysuria, no frequency, no hesitancy  PSYCHIATRY: no depression , no anxiety  ALL OTHER  ROS negative        Vital Signs Last 24 Hrs  T(C): 36.8 (09 Mar 2021 05:00), Max: 37.1 (08 Mar 2021 13:30)  T(F): 98.2 (09 Mar 2021 05:00), Max: 98.8 (08 Mar 2021 13:30)  HR: 96 (09 Mar 2021 05:00) (75 - 101)  BP: 116/75 (09 Mar 2021 05:00) (112/68 - 117/65)  BP(mean): --  RR: 16 (09 Mar 2021 12:07) (16 - 20)  SpO2: 95% (09 Mar 2021 12:07) (93% - 97%)    ________________________________________________  PHYSICAL EXAM:  GENERAL: NAD  HEENT: Normocephalic;  conjunctivae and sclerae clear; moist mucous membranes;   NECK : supple  CHEST/LUNG: diminished   HEART: S1 S2  regular; no murmurs, gallops or rubs  ABDOMEN: Soft, Nontender, Nondistended; Bowel sounds present  EXTREMITIES: no cyanosis; no edema; no calf tenderness  SKIN: warm and dry; no rash  NERVOUS SYSTEM:  Awake and alert; Oriented  to place, person and time ; no new deficits    _________________________________________________  LABS:                        12.7   17.98 )-----------( 323      ( 09 Mar 2021 08:38 )             38.7     03-09    136  |  100  |  19<H>  ----------------------------<  109<H>  4.1   |  28  |  1.10    Ca    9.0      09 Mar 2021 08:38  Phos  3.1     03-09  Mg     2.3     03-09    TPro  6.7  /  Alb  2.5<L>  /  TBili  0.6  /  DBili  x   /  AST  41<H>  /  ALT  101<H>  /  AlkPhos  221<H>  03-09        CAPILLARY BLOOD GLUCOSE    RADIOLOGY & ADDITIONAL TESTS:  < from: Xray Chest 1 View- PORTABLE-Urgent (Xray Chest 1 View- PORTABLE-Urgent .) (03.08.21 @ 11:24) >  EXAM:  XR CHEST PORTABLE URGENT 1V                            PROCEDURE DATE:  03/08/2021          INTERPRETATION:  CLINICAL STATEMENT: Follow-up chest pain.    TECHNIQUE: AP view of the chest.    COMPARISON: 2/23/2021    FINDINGS/  IMPRESSION:  Bilateral airspace opacities overall worsening. Elevation right hemidiaphragm.    Heart size cannot be accurately assessed in this projection.    Overlying chin obscures lung apices.      < end of copied text >    Imaging  Reviewed:  YES    Consultant(s) Notes Reviewed:   YES      Plan of care was discussed with patient and /or primary care giver; all questions and concerns were addressed  NP Note discussed with  Primary Attending    Patient is a 65y old  Male who presents with a chief complaint of SOB, fever, cough, body aches (09 Mar 2021 09:02)      INTERVAL HPI/OVERNIGHT EVENTS: Patient seen and examined at bedside. Patient SpO2 95% 4L optimizer at rest, SpO2 87% RA at rest.    MEDICATIONS  (STANDING):  ascorbic acid 1000 milliGRAM(s) Oral daily  atorvastatin 80 milliGRAM(s) Oral at bedtime  chlorhexidine 2% Cloths 1 Application(s) Topical <User Schedule>  cholecalciferol 2000 Unit(s) Oral daily  diltiazem    milliGRAM(s) Oral daily  enoxaparin Injectable 40 milliGRAM(s) SubCutaneous daily  gabapentin 200 milliGRAM(s) Oral at bedtime  hydrochlorothiazide 50 milliGRAM(s) Oral daily  montelukast 10 milliGRAM(s) Oral at bedtime  pantoprazole    Tablet 40 milliGRAM(s) Oral before breakfast  polyethylene glycol 3350 17 Gram(s) Oral daily  zinc sulfate 220 milliGRAM(s) Oral daily    MEDICATIONS  (PRN):  acetaminophen   Tablet .. 650 milliGRAM(s) Oral every 6 hours PRN Temp greater or equal to 38C (100.4F), Moderate Pain (4 - 6)  guaiFENesin   Syrup  (Sugar-Free) 200 milliGRAM(s) Oral every 6 hours PRN Cough  ondansetron Injectable 4 milliGRAM(s) IV Push every 6 hours PRN Nausea and/or Vomiting  sodium chloride 0.65% Nasal 1 Spray(s) Both Nostrils three times a day PRN Nasal Congestion      __________________________________________________  REVIEW OF SYSTEMS:    CONSTITUTIONAL: No fever,   EYES: no acute visual disturbances  NECK: No pain or stiffness  RESPIRATORY: No cough; No shortness of breath  CARDIOVASCULAR: No chest pain, no palpitations  GASTROINTESTINAL: No pain. No nausea or vomiting; No diarrhea   NEUROLOGICAL: No headache or numbness, no tremors  MUSCULOSKELETAL: No joint pain, no muscle pain  GENITOURINARY: no dysuria, no frequency, no hesitancy  PSYCHIATRY: no depression , no anxiety  ALL OTHER  ROS negative        Vital Signs Last 24 Hrs  T(C): 36.8 (09 Mar 2021 05:00), Max: 37.1 (08 Mar 2021 13:30)  T(F): 98.2 (09 Mar 2021 05:00), Max: 98.8 (08 Mar 2021 13:30)  HR: 96 (09 Mar 2021 05:00) (75 - 101)  BP: 116/75 (09 Mar 2021 05:00) (112/68 - 117/65)  BP(mean): --  RR: 16 (09 Mar 2021 12:07) (16 - 20)  SpO2: 95% (09 Mar 2021 12:07) (93% - 97%)    ________________________________________________  PHYSICAL EXAM:  GENERAL: NAD  HEENT: Normocephalic;  conjunctivae and sclerae clear; moist mucous membranes;   NECK : supple  CHEST/LUNG: diminished   HEART: S1 S2  regular; no murmurs, gallops or rubs  ABDOMEN: Soft, Nontender, Nondistended; Bowel sounds present  EXTREMITIES: no cyanosis; no edema; no calf tenderness  SKIN: warm and dry; no rash  NERVOUS SYSTEM:  Awake and alert; Oriented  to place, person and time ; no new deficits    _________________________________________________  LABS:                        12.7   17.98 )-----------( 323      ( 09 Mar 2021 08:38 )             38.7     03-09    136  |  100  |  19<H>  ----------------------------<  109<H>  4.1   |  28  |  1.10    Ca    9.0      09 Mar 2021 08:38  Phos  3.1     03-09  Mg     2.3     03-09    TPro  6.7  /  Alb  2.5<L>  /  TBili  0.6  /  DBili  x   /  AST  41<H>  /  ALT  101<H>  /  AlkPhos  221<H>  03-09        CAPILLARY BLOOD GLUCOSE    RADIOLOGY & ADDITIONAL TESTS:  < from: Xray Chest 1 View- PORTABLE-Urgent (Xray Chest 1 View- PORTABLE-Urgent .) (03.08.21 @ 11:24) >  EXAM:  XR CHEST PORTABLE URGENT 1V                            PROCEDURE DATE:  03/08/2021          INTERPRETATION:  CLINICAL STATEMENT: Follow-up chest pain.    TECHNIQUE: AP view of the chest.    COMPARISON: 2/23/2021    FINDINGS/  IMPRESSION:  Bilateral airspace opacities overall worsening. Elevation right hemidiaphragm.    Heart size cannot be accurately assessed in this projection.    Overlying chin obscures lung apices.      < end of copied text >    Imaging  Reviewed:  YES    Consultant(s) Notes Reviewed:   YES      Plan of care was discussed with patient and /or primary care giver; all questions and concerns were addressed

## 2021-03-09 NOTE — PROGRESS NOTE ADULT - SUBJECTIVE AND OBJECTIVE BOX
Patient is a 65y old  Male who presents with a chief complaint of SOB, fever, cough, body aches (08 Mar 2021 14:15)    pt seen in icu [  ], reg med floor [  x ], bed [x  ], chair at bedside [   ], a+o x3 [x ], lethargic [  ],    nad [x  ]      Allergies    No Known Allergies  Shrimp- Nasal congestion (Other)        Vitals    T(F): 97.6 (03-08-21 @ 20:46), Max: 98.8 (03-08-21 @ 13:30)  HR: 101 (03-08-21 @ 20:46) (75 - 106)  BP: 117/65 (03-08-21 @ 20:46) (112/68 - 117/65)  RR: 20 (03-08-21 @ 20:46) (20 - 22)  SpO2: 96% (03-08-21 @ 20:46) (91% - 97%)  Wt(kg): --  CAPILLARY BLOOD GLUCOSE      POCT Blood Glucose.: 116 mg/dL (07 Mar 2021 17:06)      Labs                          13.1   13.58 )-----------( 335      ( 08 Mar 2021 07:51 )             39.6       03-08    136  |  99  |  23<H>  ----------------------------<  122<H>  3.8   |  28  |  1.26    Ca    9.0      08 Mar 2021 07:51  Phos  3.5     03-08  Mg     2.4     03-08    TPro  6.8  /  Alb  2.5<L>  /  TBili  0.6  /  DBili  x   /  AST  25  /  ALT  87<H>  /  AlkPhos  195<H>  03-08            .Blood Blood-Peripheral  02-21 @ 22:03   No Growth Final  --  --      .Blood Blood-Peripheral  02-21 @ 22:00   No Growth Final  --  --          Radiology Results      Meds    MEDICATIONS  (STANDING):  ascorbic acid 1000 milliGRAM(s) Oral daily  atorvastatin 80 milliGRAM(s) Oral at bedtime  chlorhexidine 2% Cloths 1 Application(s) Topical <User Schedule>  cholecalciferol 2000 Unit(s) Oral daily  diltiazem    milliGRAM(s) Oral daily  enoxaparin Injectable 40 milliGRAM(s) SubCutaneous daily  gabapentin 200 milliGRAM(s) Oral at bedtime  hydrochlorothiazide 50 milliGRAM(s) Oral daily  montelukast 10 milliGRAM(s) Oral at bedtime  pantoprazole    Tablet 40 milliGRAM(s) Oral before breakfast  polyethylene glycol 3350 17 Gram(s) Oral daily  zinc sulfate 220 milliGRAM(s) Oral daily      MEDICATIONS  (PRN):  acetaminophen   Tablet .. 650 milliGRAM(s) Oral every 6 hours PRN Temp greater or equal to 38C (100.4F), Moderate Pain (4 - 6)  guaiFENesin   Syrup  (Sugar-Free) 200 milliGRAM(s) Oral every 6 hours PRN Cough  ondansetron Injectable 4 milliGRAM(s) IV Push every 6 hours PRN Nausea and/or Vomiting  sodium chloride 0.65% Nasal 1 Spray(s) Both Nostrils three times a day PRN Nasal Congestion      Physical Exam    Neuro :  no focal deficits  Respiratory: CTA B/L  CV: RRR, S1S2, no murmurs,   Abdominal: Soft, NT, ND +BS,  Extremities: No edema, + peripheral pulses    ASSESSMENT    pneumonia 2nd to covid 19,   marya,   hypokalemia,   h/o Cluster headache  BPH (benign prostatic hypertrophy)  Spinal stenosis s/p laminectomy  GERD (gastroesophageal reflux disease)  Hyperlipidemia  HTN (hypertension)            PLAN      contact and airborne isolation  completed remdesevir 2/26/21  covid ab neg  completed dexamethasone 3/2/21  pepcid, singulair, vit c, vit d, zinc   pulm f/u  cont albuterol inhaler   cont lovenox  procalcitonin neg noted  ,   D-dimer, crp, ldh, ferritin, lactate noted ,   afebrile  cont tylenol prn,   robitussin prn   blood cx neg  O2 sat (94% - 97%) optimizer pendant 4 L   sat ambulating 86 on optimizer 6L  cont O2 via pendant and taper as tolerated    incentive spirometer   pt low threshold for intubation and icu monitoring  icu cons noted  continue to monitor pulse oximetry  monitor biomarkers TIW  not accept to icu at this time  cont incentive spirometer   serum creat wnl   cont current meds       Patient is a 65y old  Male who presents with a chief complaint of SOB, fever, cough, body aches (08 Mar 2021 14:15)    pt seen in icu [  ], reg med floor [  x ], bed [x  ], chair at bedside [   ], a+o x3 [x ], lethargic [  ],    nad [x  ]      Allergies    No Known Allergies  Shrimp- Nasal congestion (Other)        Vitals    T(F): 97.6 (03-08-21 @ 20:46), Max: 98.8 (03-08-21 @ 13:30)  HR: 101 (03-08-21 @ 20:46) (75 - 106)  BP: 117/65 (03-08-21 @ 20:46) (112/68 - 117/65)  RR: 20 (03-08-21 @ 20:46) (20 - 22)  SpO2: 96% (03-08-21 @ 20:46) (91% - 97%)  Wt(kg): --  CAPILLARY BLOOD GLUCOSE      POCT Blood Glucose.: 116 mg/dL (07 Mar 2021 17:06)      Labs                          13.1   13.58 )-----------( 335      ( 08 Mar 2021 07:51 )             39.6       03-08    136  |  99  |  23<H>  ----------------------------<  122<H>  3.8   |  28  |  1.26    Ca    9.0      08 Mar 2021 07:51  Phos  3.5     03-08  Mg     2.4     03-08    TPro  6.8  /  Alb  2.5<L>  /  TBili  0.6  /  DBili  x   /  AST  25  /  ALT  87<H>  /  AlkPhos  195<H>  03-08            .Blood Blood-Peripheral  02-21 @ 22:03   No Growth Final  --  --      .Blood Blood-Peripheral  02-21 @ 22:00   No Growth Final  --  --          Radiology Results      Meds    MEDICATIONS  (STANDING):  ascorbic acid 1000 milliGRAM(s) Oral daily  atorvastatin 80 milliGRAM(s) Oral at bedtime  chlorhexidine 2% Cloths 1 Application(s) Topical <User Schedule>  cholecalciferol 2000 Unit(s) Oral daily  diltiazem    milliGRAM(s) Oral daily  enoxaparin Injectable 40 milliGRAM(s) SubCutaneous daily  gabapentin 200 milliGRAM(s) Oral at bedtime  hydrochlorothiazide 50 milliGRAM(s) Oral daily  montelukast 10 milliGRAM(s) Oral at bedtime  pantoprazole    Tablet 40 milliGRAM(s) Oral before breakfast  polyethylene glycol 3350 17 Gram(s) Oral daily  zinc sulfate 220 milliGRAM(s) Oral daily      MEDICATIONS  (PRN):  acetaminophen   Tablet .. 650 milliGRAM(s) Oral every 6 hours PRN Temp greater or equal to 38C (100.4F), Moderate Pain (4 - 6)  guaiFENesin   Syrup  (Sugar-Free) 200 milliGRAM(s) Oral every 6 hours PRN Cough  ondansetron Injectable 4 milliGRAM(s) IV Push every 6 hours PRN Nausea and/or Vomiting  sodium chloride 0.65% Nasal 1 Spray(s) Both Nostrils three times a day PRN Nasal Congestion      Physical Exam    Neuro :  no focal deficits  Respiratory: CTA B/L  CV: RRR, S1S2, no murmurs,   Abdominal: Soft, NT, ND +BS,  Extremities: No edema, + peripheral pulses    ASSESSMENT    pneumonia 2nd to covid 19,   marya,   hypokalemia,   h/o Cluster headache  BPH (benign prostatic hypertrophy)  Spinal stenosis s/p laminectomy  GERD (gastroesophageal reflux disease)  Hyperlipidemia  HTN (hypertension)            PLAN      contact and airborne isolation  completed remdesevir 2/26/21  covid ab neg  completed dexamethasone 3/2/21  pepcid, singulair, vit c, vit d, zinc   pulm f/u  cont albuterol inhaler   cont lovenox  procalcitonin neg noted  ,   D-dimer, crp, ldh, ferritin, lactate noted ,   afebrile  cont tylenol prn,   robitussin prn   blood cx neg  O2 sat (91% - 97%) optimizer pendant 5 L   sat ambulating 86 on optimizer 6L  cont O2 via pendant and taper as tolerated    incentive spirometer   pt low threshold for intubation and icu monitoring  icu cons noted  continue to monitor pulse oximetry  monitor biomarkers TIW  not accept to icu at this time  cont incentive spirometer   serum creat wnl   cont current meds

## 2021-03-09 NOTE — PROGRESS NOTE ADULT - ASSESSMENT
1. PNA 2nd to Covid-19  - PCR positive.   - CXR noted with  increasing infiltrates.   - Continue Vit C, Vit D, Zinc   - Continue Dexamethasone, Off Remdesivir  - Continue Singulair and Pepcid   - ICU consult noted - no escalation at present.   - Robitussin PRN for cough  - Tylenol PRN for temp   - O2 Supp - Taper O2 as tolerated; NRM and pendent - alernating.   - Monitor O2 Sat.  - Monitor labs  - F.U CXR  - Unable to tolerate prone positioning due to prior neck surgery.   - Encouraged side lying periodically as tolerated.   - Incentive Spirometry  - Isolation precautions   - Nasal saline spray for dryness   - Cont Ensure Compact    - DVT and GI PPX     2. HTN  - Continue meds   - Monitor BP     3. HLD  - Cont meds  - Lipid panel     4. GERD  - Antireflux diet   - Cont meds    5. MECHELLE  Improved  Avoid nephrotoxic drugs  monitor BMP  IVF

## 2021-03-09 NOTE — PROGRESS NOTE ADULT - PROBLEM SELECTOR PLAN 1
-SpO2 95% 4L optimizer at rest  -COVID antibody negative  -completed remdersivir  -completed  decadron   -c/w incentive spirometer therapy  -f/u repeat chest x-ray 3/12  Pulmonology Dr. Reid

## 2021-03-10 LAB
ALBUMIN SERPL ELPH-MCNC: 2.4 G/DL — LOW (ref 3.5–5)
ALP SERPL-CCNC: 184 U/L — HIGH (ref 40–120)
ALT FLD-CCNC: 92 U/L DA — HIGH (ref 10–60)
ANION GAP SERPL CALC-SCNC: 9 MMOL/L — SIGNIFICANT CHANGE UP (ref 5–17)
AST SERPL-CCNC: 29 U/L — SIGNIFICANT CHANGE UP (ref 10–40)
BILIRUB SERPL-MCNC: 0.6 MG/DL — SIGNIFICANT CHANGE UP (ref 0.2–1.2)
BUN SERPL-MCNC: 16 MG/DL — SIGNIFICANT CHANGE UP (ref 7–18)
CALCIUM SERPL-MCNC: 9.2 MG/DL — SIGNIFICANT CHANGE UP (ref 8.4–10.5)
CHLORIDE SERPL-SCNC: 102 MMOL/L — SIGNIFICANT CHANGE UP (ref 96–108)
CO2 SERPL-SCNC: 25 MMOL/L — SIGNIFICANT CHANGE UP (ref 22–31)
CREAT SERPL-MCNC: 1.13 MG/DL — SIGNIFICANT CHANGE UP (ref 0.5–1.3)
GLUCOSE SERPL-MCNC: 116 MG/DL — HIGH (ref 70–99)
HCT VFR BLD CALC: 35.6 % — LOW (ref 39–50)
HGB BLD-MCNC: 11.9 G/DL — LOW (ref 13–17)
MAGNESIUM SERPL-MCNC: 2.2 MG/DL — SIGNIFICANT CHANGE UP (ref 1.6–2.6)
MCHC RBC-ENTMCNC: 28.8 PG — SIGNIFICANT CHANGE UP (ref 27–34)
MCHC RBC-ENTMCNC: 33.4 GM/DL — SIGNIFICANT CHANGE UP (ref 32–36)
MCV RBC AUTO: 86.2 FL — SIGNIFICANT CHANGE UP (ref 80–100)
NRBC # BLD: 0 /100 WBCS — SIGNIFICANT CHANGE UP (ref 0–0)
PHOSPHATE SERPL-MCNC: 3.6 MG/DL — SIGNIFICANT CHANGE UP (ref 2.5–4.5)
PLATELET # BLD AUTO: 271 K/UL — SIGNIFICANT CHANGE UP (ref 150–400)
POTASSIUM SERPL-MCNC: 3.5 MMOL/L — SIGNIFICANT CHANGE UP (ref 3.5–5.3)
POTASSIUM SERPL-SCNC: 3.5 MMOL/L — SIGNIFICANT CHANGE UP (ref 3.5–5.3)
PROT SERPL-MCNC: 6.7 G/DL — SIGNIFICANT CHANGE UP (ref 6–8.3)
RBC # BLD: 4.13 M/UL — LOW (ref 4.2–5.8)
RBC # FLD: 14 % — SIGNIFICANT CHANGE UP (ref 10.3–14.5)
SODIUM SERPL-SCNC: 136 MMOL/L — SIGNIFICANT CHANGE UP (ref 135–145)
WBC # BLD: 14.42 K/UL — HIGH (ref 3.8–10.5)
WBC # FLD AUTO: 14.42 K/UL — HIGH (ref 3.8–10.5)

## 2021-03-10 RX ADMIN — ZINC SULFATE TAB 220 MG (50 MG ZINC EQUIVALENT) 220 MILLIGRAM(S): 220 (50 ZN) TAB at 11:46

## 2021-03-10 RX ADMIN — Medication 200 MILLIGRAM(S): at 21:41

## 2021-03-10 RX ADMIN — Medication 2000 UNIT(S): at 11:46

## 2021-03-10 RX ADMIN — CHLORHEXIDINE GLUCONATE 1 APPLICATION(S): 213 SOLUTION TOPICAL at 06:15

## 2021-03-10 RX ADMIN — POLYETHYLENE GLYCOL 3350 17 GRAM(S): 17 POWDER, FOR SOLUTION ORAL at 11:46

## 2021-03-10 RX ADMIN — Medication 240 MILLIGRAM(S): at 06:15

## 2021-03-10 RX ADMIN — Medication 650 MILLIGRAM(S): at 22:22

## 2021-03-10 RX ADMIN — Medication 650 MILLIGRAM(S): at 14:26

## 2021-03-10 RX ADMIN — GABAPENTIN 200 MILLIGRAM(S): 400 CAPSULE ORAL at 06:15

## 2021-03-10 RX ADMIN — GABAPENTIN 200 MILLIGRAM(S): 400 CAPSULE ORAL at 17:32

## 2021-03-10 RX ADMIN — Medication 650 MILLIGRAM(S): at 23:00

## 2021-03-10 RX ADMIN — Medication 50 MILLIGRAM(S): at 06:15

## 2021-03-10 RX ADMIN — ATORVASTATIN CALCIUM 80 MILLIGRAM(S): 80 TABLET, FILM COATED ORAL at 21:40

## 2021-03-10 RX ADMIN — ENOXAPARIN SODIUM 40 MILLIGRAM(S): 100 INJECTION SUBCUTANEOUS at 11:46

## 2021-03-10 RX ADMIN — Medication 1000 MILLIGRAM(S): at 11:46

## 2021-03-10 RX ADMIN — PANTOPRAZOLE SODIUM 40 MILLIGRAM(S): 20 TABLET, DELAYED RELEASE ORAL at 06:15

## 2021-03-10 RX ADMIN — Medication 650 MILLIGRAM(S): at 16:26

## 2021-03-10 RX ADMIN — Medication 650 MILLIGRAM(S): at 07:40

## 2021-03-10 RX ADMIN — Medication 650 MILLIGRAM(S): at 06:40

## 2021-03-10 RX ADMIN — MONTELUKAST 10 MILLIGRAM(S): 4 TABLET, CHEWABLE ORAL at 21:40

## 2021-03-10 RX ADMIN — Medication 200 MILLIGRAM(S): at 06:16

## 2021-03-10 NOTE — PROGRESS NOTE ADULT - SUBJECTIVE AND OBJECTIVE BOX
NP Note discussed with  Primary Attending    Patient is a 65y old  Male who presents with a chief complaint of SOB, fever, cough, body aches (10 Mar 2021 09:27)      INTERVAL HPI/OVERNIGHT EVENTS: Patient seen and examined at bedside. SpO2 93% 3L optimizer at rest, no new complaints    MEDICATIONS  (STANDING):  ascorbic acid 1000 milliGRAM(s) Oral daily  atorvastatin 80 milliGRAM(s) Oral at bedtime  chlorhexidine 2% Cloths 1 Application(s) Topical <User Schedule>  cholecalciferol 2000 Unit(s) Oral daily  diltiazem    milliGRAM(s) Oral daily  enoxaparin Injectable 40 milliGRAM(s) SubCutaneous daily  gabapentin 200 milliGRAM(s) Oral two times a day  hydrochlorothiazide 50 milliGRAM(s) Oral daily  montelukast 10 milliGRAM(s) Oral at bedtime  pantoprazole    Tablet 40 milliGRAM(s) Oral before breakfast  polyethylene glycol 3350 17 Gram(s) Oral daily  zinc sulfate 220 milliGRAM(s) Oral daily    MEDICATIONS  (PRN):  acetaminophen   Tablet .. 650 milliGRAM(s) Oral every 6 hours PRN Temp greater or equal to 38C (100.4F), Moderate Pain (4 - 6)  guaiFENesin   Syrup  (Sugar-Free) 200 milliGRAM(s) Oral every 6 hours PRN Cough  ondansetron Injectable 4 milliGRAM(s) IV Push every 6 hours PRN Nausea and/or Vomiting  sodium chloride 0.65% Nasal 1 Spray(s) Both Nostrils three times a day PRN Nasal Congestion      __________________________________________________  REVIEW OF SYSTEMS:    CONSTITUTIONAL: No fever,   EYES: no acute visual disturbances  NECK: No pain or stiffness  RESPIRATORY: No cough; No shortness of breath  CARDIOVASCULAR: No chest pain, no palpitations  GASTROINTESTINAL: No pain. No nausea or vomiting; No diarrhea   NEUROLOGICAL: No headache or numbness, no tremors  MUSCULOSKELETAL: No joint pain, no muscle pain  GENITOURINARY: no dysuria, no frequency, no hesitancy  PSYCHIATRY: no depression , no anxiety  ALL OTHER  ROS negative        Vital Signs Last 24 Hrs  T(C): 37.7 (10 Mar 2021 12:50), Max: 37.7 (10 Mar 2021 12:50)  T(F): 99.8 (10 Mar 2021 12:50), Max: 99.8 (10 Mar 2021 12:50)  HR: 108 (10 Mar 2021 12:50) (78 - 108)  BP: 123/74 (10 Mar 2021 12:50) (103/64 - 123/74)  BP(mean): --  RR: 22 (10 Mar 2021 14:19) (18 - 36)  SpO2: 93% (10 Mar 2021 14:19) (92% - 95%)    ________________________________________________  PHYSICAL EXAM:  GENERAL: NAD  HEENT: Normocephalic;  conjunctivae and sclerae clear; moist mucous membranes;   NECK : supple  CHEST/LUNG: diminished   HEART: S1 S2  regular; no murmurs, gallops or rubs  ABDOMEN: Soft, Nontender, Nondistended; Bowel sounds present  EXTREMITIES: no cyanosis; no edema; no calf tenderness  SKIN: warm and dry; no rash  NERVOUS SYSTEM:  Awake and alert; Oriented  to place, person and time ; no new   _________________________________________________  LABS:                        11.9   14.42 )-----------( 271      ( 10 Mar 2021 08:37 )             35.6     03-10    136  |  102  |  16  ----------------------------<  116<H>  3.5   |  25  |  1.13    Ca    9.2      10 Mar 2021 08:37  Phos  3.6     03-10  Mg     2.2     03-10    TPro  6.7  /  Alb  2.4<L>  /  TBili  0.6  /  DBili  x   /  AST  29  /  ALT  92<H>  /  AlkPhos  184<H>  03-10        CAPILLARY BLOOD GLUCOSE    RADIOLOGY & ADDITIONAL TESTS:  < from: Xray Chest 1 View- PORTABLE-Urgent (Xray Chest 1 View- PORTABLE-Urgent .) (03.08.21 @ 11:24) >    EXAM:  XR CHEST PORTABLE URGENT 1V                            PROCEDURE DATE:  03/08/2021          INTERPRETATION:  CLINICAL STATEMENT: Follow-up chest pain.    TECHNIQUE: AP view of the chest.    COMPARISON: 2/23/2021    FINDINGS/  IMPRESSION:  Bilateral airspace opacities overall worsening. Elevation right hemidiaphragm.    Heart size cannot be accurately assessed in this projection.    Overlying chin obscures lung apices.    < end of copied text >    Imaging  Reviewed:  YES    Consultant(s) Notes Reviewed:   YES      Plan of care was discussed with patient and /or primary care giver; all questions and concerns were addressed

## 2021-03-10 NOTE — PROGRESS NOTE ADULT - SUBJECTIVE AND OBJECTIVE BOX
Patient is a 65y old  Male who presents with a chief complaint of SOB, fever, cough, body aches (10 Mar 2021 06:15)  Awake, alert, laying in bed in NAD. Still with oxygen supp via optimizer. Gets sob upon exertion with desaturation    INTERVAL HPI/OVERNIGHT EVENTS:      VITAL SIGNS:  T(F): 98 (03-10-21 @ 05:10)  HR: 90 (03-10-21 @ 05:10)  BP: 114/65 (03-10-21 @ 05:10)  RR: 18 (03-10-21 @ 05:10)  SpO2: 93% (03-10-21 @ 05:10)  Wt(kg): --  I&O's Detail          REVIEW OF SYSTEMS:    CONSTITUTIONAL:  No fevers, chills, sweats    HEENT:  Eyes:  No diplopia or blurred vision. ENT:  No earache, sore throat or runny nose.    CARDIOVASCULAR:  No pressure, squeezing, tightness, or heaviness about the chest; no palpitations.    RESPIRATORY:  Per HPI    GASTROINTESTINAL:  No abdominal pain, nausea, vomiting or diarrhea.    GENITOURINARY:  No dysuria, frequency or urgency.    NEUROLOGIC:  No paresthesias, fasciculations, seizures or weakness.    PSYCHIATRIC:  No disorder of thought or mood.      PHYSICAL EXAM:    Constitutional: Well developed and nourished  Eyes:Perrla  ENMT: normal  Neck:supple  Respiratory: good air entry  Cardiovascular: S1 S2 regular  Gastrointestinal: Soft, Non tender  Extremities: No edema  Vascular:normal  Neurological:Awake, alert,Ox3  Musculoskeletal:Normal      MEDICATIONS  (STANDING):  ascorbic acid 1000 milliGRAM(s) Oral daily  atorvastatin 80 milliGRAM(s) Oral at bedtime  chlorhexidine 2% Cloths 1 Application(s) Topical <User Schedule>  cholecalciferol 2000 Unit(s) Oral daily  diltiazem    milliGRAM(s) Oral daily  enoxaparin Injectable 40 milliGRAM(s) SubCutaneous daily  gabapentin 200 milliGRAM(s) Oral two times a day  hydrochlorothiazide 50 milliGRAM(s) Oral daily  montelukast 10 milliGRAM(s) Oral at bedtime  pantoprazole    Tablet 40 milliGRAM(s) Oral before breakfast  polyethylene glycol 3350 17 Gram(s) Oral daily  zinc sulfate 220 milliGRAM(s) Oral daily    MEDICATIONS  (PRN):  acetaminophen   Tablet .. 650 milliGRAM(s) Oral every 6 hours PRN Temp greater or equal to 38C (100.4F), Moderate Pain (4 - 6)  guaiFENesin   Syrup  (Sugar-Free) 200 milliGRAM(s) Oral every 6 hours PRN Cough  ondansetron Injectable 4 milliGRAM(s) IV Push every 6 hours PRN Nausea and/or Vomiting  sodium chloride 0.65% Nasal 1 Spray(s) Both Nostrils three times a day PRN Nasal Congestion      Allergies    No Known Allergies    Intolerances    Shrimp- Nasal congestion (Other)      LABS:                        11.9   14.42 )-----------( 271      ( 10 Mar 2021 08:37 )             35.6     03-10    136  |  102  |  16  ----------------------------<  116<H>  3.5   |  25  |  1.13    Ca    9.2      10 Mar 2021 08:37  Phos  3.6     03-10  Mg     2.2     03-10    TPro  6.7  /  Alb  2.4<L>  /  TBili  0.6  /  DBili  x   /  AST  29  /  ALT  92<H>  /  AlkPhos  184<H>  03-10              CAPILLARY BLOOD GLUCOSE        pro-bnp -- 03-07 @ 07:52     d-dimer 336  03-07 @ 07:52  pro-bnp -- 03-04 @ 08:03     d-dimer 515  03-04 @ 08:03      RADIOLOGY & ADDITIONAL TESTS:    CXR:  < from: Xray Chest 1 View- PORTABLE-Urgent (Xray Chest 1 View- PORTABLE-Urgent .) (03.08.21 @ 11:24) >  IMPRESSION:  Bilateral airspace opacities overall worsening. Elevation right hemidiaphragm.    Heart size cannot be accurately assessed in this projection.    Overlying chin obscures lung apices.      < end of copied text >    Ct scan chest:    ekg;    echo:

## 2021-03-10 NOTE — PROGRESS NOTE ADULT - SUBJECTIVE AND OBJECTIVE BOX
Patient is a 65y old  Male who presents with a chief complaint of SOB, fever, cough, body aches (09 Mar 2021 12:51)    pt seen in icu [  ], reg med floor [  x ], bed [x  ], chair at bedside [   ], a+o x3 [x ], lethargic [  ],    nad [x  ]      Allergies    No Known Allergies  Shrimp- Nasal congestion (Other)        Vitals    T(F): 98 (03-10-21 @ 05:10), Max: 98.8 (03-09-21 @ 12:55)  HR: 90 (03-10-21 @ 05:10) (78 - 98)  BP: 114/65 (03-10-21 @ 05:10) (102/64 - 114/65)  RR: 18 (03-10-21 @ 05:10) (16 - 20)  SpO2: 93% (03-10-21 @ 05:10) (87% - 98%)  Wt(kg): --  CAPILLARY BLOOD GLUCOSE          Labs                          12.7   17.98 )-----------( 323      ( 09 Mar 2021 08:38 )             38.7       03-09    136  |  100  |  19<H>  ----------------------------<  109<H>  4.1   |  28  |  1.10    Ca    9.0      09 Mar 2021 08:38  Phos  3.1     03-09  Mg     2.3     03-09    TPro  6.7  /  Alb  2.5<L>  /  TBili  0.6  /  DBili  x   /  AST  41<H>  /  ALT  101<H>  /  AlkPhos  221<H>  03-09            .Blood Blood-Peripheral  02-21 @ 22:03   No Growth Final  --  --      .Blood Blood-Peripheral  02-21 @ 22:00   No Growth Final  --  --          Radiology Results      Meds    MEDICATIONS  (STANDING):  ascorbic acid 1000 milliGRAM(s) Oral daily  atorvastatin 80 milliGRAM(s) Oral at bedtime  chlorhexidine 2% Cloths 1 Application(s) Topical <User Schedule>  cholecalciferol 2000 Unit(s) Oral daily  diltiazem    milliGRAM(s) Oral daily  enoxaparin Injectable 40 milliGRAM(s) SubCutaneous daily  gabapentin 200 milliGRAM(s) Oral two times a day  hydrochlorothiazide 50 milliGRAM(s) Oral daily  montelukast 10 milliGRAM(s) Oral at bedtime  pantoprazole    Tablet 40 milliGRAM(s) Oral before breakfast  polyethylene glycol 3350 17 Gram(s) Oral daily  zinc sulfate 220 milliGRAM(s) Oral daily      MEDICATIONS  (PRN):  acetaminophen   Tablet .. 650 milliGRAM(s) Oral every 6 hours PRN Temp greater or equal to 38C (100.4F), Moderate Pain (4 - 6)  guaiFENesin   Syrup  (Sugar-Free) 200 milliGRAM(s) Oral every 6 hours PRN Cough  ondansetron Injectable 4 milliGRAM(s) IV Push every 6 hours PRN Nausea and/or Vomiting  sodium chloride 0.65% Nasal 1 Spray(s) Both Nostrils three times a day PRN Nasal Congestion      Physical Exam    Neuro :  no focal deficits  Respiratory: CTA B/L  CV: RRR, S1S2, no murmurs,   Abdominal: Soft, NT, ND +BS,  Extremities: No edema, + peripheral pulses    ASSESSMENT    pneumonia 2nd to covid 19,   marya,   hypokalemia,   h/o Cluster headache  BPH (benign prostatic hypertrophy)  Spinal stenosis s/p laminectomy  GERD (gastroesophageal reflux disease)  Hyperlipidemia  HTN (hypertension)            PLAN      contact and airborne isolation  completed remdesevir 2/26/21  covid ab neg  completed dexamethasone 3/2/21  pepcid, singulair, vit c, vit d, zinc   pulm f/u  cont albuterol inhaler   cont lovenox  procalcitonin neg noted  ,   D-dimer, crp, ldh, ferritin, lactate noted ,   afebrile  cont tylenol prn,   robitussin prn   blood cx neg  O2 sat (91% - 97%) optimizer pendant 5 L   sat ambulating 86 on optimizer 6L  cont O2 via pendant and taper as tolerated    incentive spirometer   pt low threshold for intubation and icu monitoring  icu cons noted  continue to monitor pulse oximetry  monitor biomarkers TIW  not accept to icu at this time  cont incentive spirometer   serum creat wnl   cont current meds     Patient is a 65y old  Male who presents with a chief complaint of SOB, fever, cough, body aches (09 Mar 2021 12:51)    pt seen in icu [  ], reg med floor [  x ], bed [x  ], chair at bedside [   ], a+o x3 [x ], lethargic [  ],    nad [x  ]      Allergies    No Known Allergies  Shrimp- Nasal congestion (Other)        Vitals    T(F): 98 (03-10-21 @ 05:10), Max: 98.8 (03-09-21 @ 12:55)  HR: 90 (03-10-21 @ 05:10) (78 - 98)  BP: 114/65 (03-10-21 @ 05:10) (102/64 - 114/65)  RR: 18 (03-10-21 @ 05:10) (16 - 20)  SpO2: 93% (03-10-21 @ 05:10) (87% - 98%)  Wt(kg): --  CAPILLARY BLOOD GLUCOSE          Labs                          12.7   17.98 )-----------( 323      ( 09 Mar 2021 08:38 )             38.7       03-09    136  |  100  |  19<H>  ----------------------------<  109<H>  4.1   |  28  |  1.10    Ca    9.0      09 Mar 2021 08:38  Phos  3.1     03-09  Mg     2.3     03-09    TPro  6.7  /  Alb  2.5<L>  /  TBili  0.6  /  DBili  x   /  AST  41<H>  /  ALT  101<H>  /  AlkPhos  221<H>  03-09            .Blood Blood-Peripheral  02-21 @ 22:03   No Growth Final  --  --      .Blood Blood-Peripheral  02-21 @ 22:00   No Growth Final  --  --          Radiology Results      Meds    MEDICATIONS  (STANDING):  ascorbic acid 1000 milliGRAM(s) Oral daily  atorvastatin 80 milliGRAM(s) Oral at bedtime  chlorhexidine 2% Cloths 1 Application(s) Topical <User Schedule>  cholecalciferol 2000 Unit(s) Oral daily  diltiazem    milliGRAM(s) Oral daily  enoxaparin Injectable 40 milliGRAM(s) SubCutaneous daily  gabapentin 200 milliGRAM(s) Oral two times a day  hydrochlorothiazide 50 milliGRAM(s) Oral daily  montelukast 10 milliGRAM(s) Oral at bedtime  pantoprazole    Tablet 40 milliGRAM(s) Oral before breakfast  polyethylene glycol 3350 17 Gram(s) Oral daily  zinc sulfate 220 milliGRAM(s) Oral daily      MEDICATIONS  (PRN):  acetaminophen   Tablet .. 650 milliGRAM(s) Oral every 6 hours PRN Temp greater or equal to 38C (100.4F), Moderate Pain (4 - 6)  guaiFENesin   Syrup  (Sugar-Free) 200 milliGRAM(s) Oral every 6 hours PRN Cough  ondansetron Injectable 4 milliGRAM(s) IV Push every 6 hours PRN Nausea and/or Vomiting  sodium chloride 0.65% Nasal 1 Spray(s) Both Nostrils three times a day PRN Nasal Congestion      Physical Exam    Neuro :  no focal deficits  Respiratory: CTA B/L  CV: RRR, S1S2, no murmurs,   Abdominal: Soft, NT, ND +BS,  Extremities: No edema, + peripheral pulses    ASSESSMENT    pneumonia 2nd to covid 19,   marya,   hypokalemia,   h/o Cluster headache  BPH (benign prostatic hypertrophy)  Spinal stenosis s/p laminectomy  GERD (gastroesophageal reflux disease)  Hyperlipidemia  HTN (hypertension)            PLAN      contact and airborne isolation  completed remdesevir 2/26/21  covid ab neg  completed dexamethasone 3/2/21  pepcid, singulair, vit c, vit d, zinc   pulm f/u  cont albuterol inhaler   cont lovenox  procalcitonin neg noted  ,   D-dimer, crp, ldh, ferritin, lactate noted ,   afebrile  cont tylenol prn,   robitussin prn   blood cx neg  O2 sat (87% - 98%) optimizer pendant 3L   sat ambulating 86 on optimizer 6L  cont O2 via pendant and taper as tolerated    incentive spirometer   pt low threshold for intubation and icu monitoring  icu cons noted  continue to monitor pulse oximetry  monitor biomarkers TIW  not accept to icu at this time  cont incentive spirometer   serum creat wnl   cont current meds

## 2021-03-11 LAB
ALBUMIN SERPL ELPH-MCNC: 2.3 G/DL — LOW (ref 3.5–5)
ALP SERPL-CCNC: 171 U/L — HIGH (ref 40–120)
ALT FLD-CCNC: 79 U/L DA — HIGH (ref 10–60)
ANION GAP SERPL CALC-SCNC: 9 MMOL/L — SIGNIFICANT CHANGE UP (ref 5–17)
AST SERPL-CCNC: 28 U/L — SIGNIFICANT CHANGE UP (ref 10–40)
BILIRUB SERPL-MCNC: 0.5 MG/DL — SIGNIFICANT CHANGE UP (ref 0.2–1.2)
BUN SERPL-MCNC: 15 MG/DL — SIGNIFICANT CHANGE UP (ref 7–18)
CALCIUM SERPL-MCNC: 8.8 MG/DL — SIGNIFICANT CHANGE UP (ref 8.4–10.5)
CHLORIDE SERPL-SCNC: 100 MMOL/L — SIGNIFICANT CHANGE UP (ref 96–108)
CO2 SERPL-SCNC: 28 MMOL/L — SIGNIFICANT CHANGE UP (ref 22–31)
CREAT SERPL-MCNC: 1.07 MG/DL — SIGNIFICANT CHANGE UP (ref 0.5–1.3)
GLUCOSE SERPL-MCNC: 96 MG/DL — SIGNIFICANT CHANGE UP (ref 70–99)
HCT VFR BLD CALC: 34.4 % — LOW (ref 39–50)
HGB BLD-MCNC: 11.3 G/DL — LOW (ref 13–17)
MAGNESIUM SERPL-MCNC: 2.1 MG/DL — SIGNIFICANT CHANGE UP (ref 1.6–2.6)
MCHC RBC-ENTMCNC: 28.7 PG — SIGNIFICANT CHANGE UP (ref 27–34)
MCHC RBC-ENTMCNC: 32.8 GM/DL — SIGNIFICANT CHANGE UP (ref 32–36)
MCV RBC AUTO: 87.3 FL — SIGNIFICANT CHANGE UP (ref 80–100)
NRBC # BLD: 0 /100 WBCS — SIGNIFICANT CHANGE UP (ref 0–0)
PHOSPHATE SERPL-MCNC: 3.6 MG/DL — SIGNIFICANT CHANGE UP (ref 2.5–4.5)
PLATELET # BLD AUTO: 260 K/UL — SIGNIFICANT CHANGE UP (ref 150–400)
POTASSIUM SERPL-MCNC: 3.5 MMOL/L — SIGNIFICANT CHANGE UP (ref 3.5–5.3)
POTASSIUM SERPL-SCNC: 3.5 MMOL/L — SIGNIFICANT CHANGE UP (ref 3.5–5.3)
PROT SERPL-MCNC: 6.5 G/DL — SIGNIFICANT CHANGE UP (ref 6–8.3)
RBC # BLD: 3.94 M/UL — LOW (ref 4.2–5.8)
RBC # FLD: 13.8 % — SIGNIFICANT CHANGE UP (ref 10.3–14.5)
SODIUM SERPL-SCNC: 137 MMOL/L — SIGNIFICANT CHANGE UP (ref 135–145)
WBC # BLD: 12.76 K/UL — HIGH (ref 3.8–10.5)
WBC # FLD AUTO: 12.76 K/UL — HIGH (ref 3.8–10.5)

## 2021-03-11 RX ORDER — IBUPROFEN 200 MG
600 TABLET ORAL ONCE
Refills: 0 | Status: COMPLETED | OUTPATIENT
Start: 2021-03-11 | End: 2021-03-11

## 2021-03-11 RX ORDER — ALLOPURINOL 300 MG
100 TABLET ORAL DAILY
Refills: 0 | Status: DISCONTINUED | OUTPATIENT
Start: 2021-03-11 | End: 2021-03-19

## 2021-03-11 RX ADMIN — PANTOPRAZOLE SODIUM 40 MILLIGRAM(S): 20 TABLET, DELAYED RELEASE ORAL at 06:21

## 2021-03-11 RX ADMIN — Medication 650 MILLIGRAM(S): at 21:05

## 2021-03-11 RX ADMIN — Medication 2000 UNIT(S): at 12:16

## 2021-03-11 RX ADMIN — Medication 650 MILLIGRAM(S): at 13:00

## 2021-03-11 RX ADMIN — Medication 240 MILLIGRAM(S): at 06:21

## 2021-03-11 RX ADMIN — Medication 650 MILLIGRAM(S): at 20:05

## 2021-03-11 RX ADMIN — Medication 600 MILLIGRAM(S): at 18:36

## 2021-03-11 RX ADMIN — POLYETHYLENE GLYCOL 3350 17 GRAM(S): 17 POWDER, FOR SOLUTION ORAL at 12:16

## 2021-03-11 RX ADMIN — ENOXAPARIN SODIUM 40 MILLIGRAM(S): 100 INJECTION SUBCUTANEOUS at 12:16

## 2021-03-11 RX ADMIN — Medication 1000 MILLIGRAM(S): at 12:16

## 2021-03-11 RX ADMIN — GABAPENTIN 200 MILLIGRAM(S): 400 CAPSULE ORAL at 18:33

## 2021-03-11 RX ADMIN — CHLORHEXIDINE GLUCONATE 1 APPLICATION(S): 213 SOLUTION TOPICAL at 06:21

## 2021-03-11 RX ADMIN — Medication 650 MILLIGRAM(S): at 12:00

## 2021-03-11 RX ADMIN — MONTELUKAST 10 MILLIGRAM(S): 4 TABLET, CHEWABLE ORAL at 21:01

## 2021-03-11 RX ADMIN — Medication 600 MILLIGRAM(S): at 17:34

## 2021-03-11 RX ADMIN — Medication 200 MILLIGRAM(S): at 12:42

## 2021-03-11 RX ADMIN — Medication 100 MILLIGRAM(S): at 18:56

## 2021-03-11 RX ADMIN — ZINC SULFATE TAB 220 MG (50 MG ZINC EQUIVALENT) 220 MILLIGRAM(S): 220 (50 ZN) TAB at 12:16

## 2021-03-11 RX ADMIN — Medication 50 MILLIGRAM(S): at 06:21

## 2021-03-11 RX ADMIN — GABAPENTIN 200 MILLIGRAM(S): 400 CAPSULE ORAL at 06:21

## 2021-03-11 RX ADMIN — ATORVASTATIN CALCIUM 80 MILLIGRAM(S): 80 TABLET, FILM COATED ORAL at 21:01

## 2021-03-11 NOTE — PROGRESS NOTE ADULT - PROBLEM SELECTOR PLAN 2
-likely prerenal to dehydration  -Cr 1.7 on admission  -resolved MECHELLE   -baseline creatinine 1.17 - remains at baseline   -monitor BMP

## 2021-03-11 NOTE — PROGRESS NOTE ADULT - SUBJECTIVE AND OBJECTIVE BOX
Patient is a 65y old  Male who presents with a chief complaint of SOB, fever, cough, body aches (10 Mar 2021 14:54)    pt seen in icu [  ], reg med floor [  x ], bed [x  ], chair at bedside [   ], a+o x3 [x ], lethargic [  ],    nad [x  ]        Allergies    No Known Allergies  Shrimp- Nasal congestion (Other)        Vitals    T(F): 98.8 (03-11-21 @ 05:03), Max: 100 (03-10-21 @ 19:00)  HR: 82 (03-11-21 @ 05:03) (82 - 108)  BP: 119/73 (03-11-21 @ 05:03) (113/59 - 123/74)  RR: 18 (03-11-21 @ 05:03) (18 - 36)  SpO2: 93% (03-11-21 @ 05:03) (84% - 95%)  Wt(kg): --  CAPILLARY BLOOD GLUCOSE          Labs                          11.3   12.76 )-----------( 260      ( 11 Mar 2021 06:02 )             34.4       03-10    136  |  102  |  16  ----------------------------<  116<H>  3.5   |  25  |  1.13    Ca    9.2      10 Mar 2021 08:37  Phos  3.6     03-10  Mg     2.2     03-10    TPro  6.7  /  Alb  2.4<L>  /  TBili  0.6  /  DBili  x   /  AST  29  /  ALT  92<H>  /  AlkPhos  184<H>  03-10            .Blood Blood-Peripheral  02-21 @ 22:03   No Growth Final  --  --      .Blood Blood-Peripheral  02-21 @ 22:00   No Growth Final  --  --          Radiology Results      Meds    MEDICATIONS  (STANDING):  ascorbic acid 1000 milliGRAM(s) Oral daily  atorvastatin 80 milliGRAM(s) Oral at bedtime  chlorhexidine 2% Cloths 1 Application(s) Topical <User Schedule>  cholecalciferol 2000 Unit(s) Oral daily  diltiazem    milliGRAM(s) Oral daily  enoxaparin Injectable 40 milliGRAM(s) SubCutaneous daily  gabapentin 200 milliGRAM(s) Oral two times a day  hydrochlorothiazide 50 milliGRAM(s) Oral daily  montelukast 10 milliGRAM(s) Oral at bedtime  pantoprazole    Tablet 40 milliGRAM(s) Oral before breakfast  polyethylene glycol 3350 17 Gram(s) Oral daily  zinc sulfate 220 milliGRAM(s) Oral daily      MEDICATIONS  (PRN):  acetaminophen   Tablet .. 650 milliGRAM(s) Oral every 6 hours PRN Temp greater or equal to 38C (100.4F), Moderate Pain (4 - 6)  guaiFENesin   Syrup  (Sugar-Free) 200 milliGRAM(s) Oral every 6 hours PRN Cough  ondansetron Injectable 4 milliGRAM(s) IV Push every 6 hours PRN Nausea and/or Vomiting  sodium chloride 0.65% Nasal 1 Spray(s) Both Nostrils three times a day PRN Nasal Congestion      Physical Exam    Neuro :  no focal deficits  Respiratory: CTA B/L  CV: RRR, S1S2, no murmurs,   Abdominal: Soft, NT, ND +BS,  Extremities: No edema, + peripheral pulses    ASSESSMENT    pneumonia 2nd to covid 19,   marya,   hypokalemia,   h/o Cluster headache  BPH (benign prostatic hypertrophy)  Spinal stenosis s/p laminectomy  GERD (gastroesophageal reflux disease)  Hyperlipidemia  HTN (hypertension)            PLAN      contact and airborne isolation  completed remdesevir 2/26/21  covid ab neg  completed dexamethasone 3/2/21  pepcid, singulair, vit c, vit d, zinc   pulm f/u  cont albuterol inhaler   cont lovenox  procalcitonin neg noted  ,   D-dimer, crp, ldh, ferritin, lactate noted ,   afebrile  cont tylenol prn,   robitussin prn   blood cx neg  O2 sat (87% - 98%) optimizer pendant 3L   sat ambulating 86 on optimizer 6L  cont O2 via pendant and taper as tolerated    incentive spirometer   pt low threshold for intubation and icu monitoring  icu cons noted  continue to monitor pulse oximetry  monitor biomarkers TIW  not accept to icu at this time  cont incentive spirometer   serum creat wnl   cont current meds         Patient is a 65y old  Male who presents with a chief complaint of SOB, fever, cough, body aches (10 Mar 2021 14:54)    pt seen in icu [  ], reg med floor [  x ], bed [x  ], chair at bedside [   ], a+o x3 [x ], lethargic [  ],    nad [x  ]        Allergies    No Known Allergies  Shrimp- Nasal congestion (Other)        Vitals    T(F): 98.8 (03-11-21 @ 05:03), Max: 100 (03-10-21 @ 19:00)  HR: 82 (03-11-21 @ 05:03) (82 - 108)  BP: 119/73 (03-11-21 @ 05:03) (113/59 - 123/74)  RR: 18 (03-11-21 @ 05:03) (18 - 36)  SpO2: 93% (03-11-21 @ 05:03) (84% - 95%)  Wt(kg): --  CAPILLARY BLOOD GLUCOSE          Labs                          11.3   12.76 )-----------( 260      ( 11 Mar 2021 06:02 )             34.4       03-10    136  |  102  |  16  ----------------------------<  116<H>  3.5   |  25  |  1.13    Ca    9.2      10 Mar 2021 08:37  Phos  3.6     03-10  Mg     2.2     03-10    TPro  6.7  /  Alb  2.4<L>  /  TBili  0.6  /  DBili  x   /  AST  29  /  ALT  92<H>  /  AlkPhos  184<H>  03-10            .Blood Blood-Peripheral  02-21 @ 22:03   No Growth Final  --  --      .Blood Blood-Peripheral  02-21 @ 22:00   No Growth Final  --  --          Radiology Results      Meds    MEDICATIONS  (STANDING):  ascorbic acid 1000 milliGRAM(s) Oral daily  atorvastatin 80 milliGRAM(s) Oral at bedtime  chlorhexidine 2% Cloths 1 Application(s) Topical <User Schedule>  cholecalciferol 2000 Unit(s) Oral daily  diltiazem    milliGRAM(s) Oral daily  enoxaparin Injectable 40 milliGRAM(s) SubCutaneous daily  gabapentin 200 milliGRAM(s) Oral two times a day  hydrochlorothiazide 50 milliGRAM(s) Oral daily  montelukast 10 milliGRAM(s) Oral at bedtime  pantoprazole    Tablet 40 milliGRAM(s) Oral before breakfast  polyethylene glycol 3350 17 Gram(s) Oral daily  zinc sulfate 220 milliGRAM(s) Oral daily      MEDICATIONS  (PRN):  acetaminophen   Tablet .. 650 milliGRAM(s) Oral every 6 hours PRN Temp greater or equal to 38C (100.4F), Moderate Pain (4 - 6)  guaiFENesin   Syrup  (Sugar-Free) 200 milliGRAM(s) Oral every 6 hours PRN Cough  ondansetron Injectable 4 milliGRAM(s) IV Push every 6 hours PRN Nausea and/or Vomiting  sodium chloride 0.65% Nasal 1 Spray(s) Both Nostrils three times a day PRN Nasal Congestion      Physical Exam    Neuro :  no focal deficits  Respiratory: CTA B/L  CV: RRR, S1S2, no murmurs,   Abdominal: Soft, NT, ND +BS,  Extremities: No edema, + peripheral pulses    ASSESSMENT    pneumonia 2nd to covid 19,   marya,   hypokalemia,   h/o Cluster headache  BPH (benign prostatic hypertrophy)  Spinal stenosis s/p laminectomy  GERD (gastroesophageal reflux disease)  Hyperlipidemia  HTN (hypertension)            PLAN      contact and airborne isolation  completed remdesevir 2/26/21  covid ab neg  completed dexamethasone 3/2/21  pepcid, singulair, vit c, vit d, zinc   pulm f/u  cont albuterol inhaler   cont lovenox  procalcitonin neg noted  ,   D-dimer, crp, ldh, ferritin, lactate noted ,   afebrile  cont tylenol prn,   robitussin prn   blood cx neg  O2 sat (84% - 95%) optimizer pendant 4L   sat ambulating 84 on optimizer 4L  cont O2 via pendant and taper as tolerated    may give trial of n/c  incentive spirometer   pt low threshold for intubation and icu monitoring  icu cons noted  continue to monitor pulse oximetry  monitor biomarkers TIW  not accept to icu at this time  cont incentive spirometer   serum creat wnl   cont current meds

## 2021-03-11 NOTE — PROGRESS NOTE ADULT - PROBLEM SELECTOR PLAN 1
-SpO2 93% 4L optimizer at rest  -COVID antibody negative  -completed remdersivir  -completed  decadron   -c/w incentive spirometer therapy  -f/u repeat chest x-ray 3/12  -Pulmonology Dr. Reid

## 2021-03-11 NOTE — PROGRESS NOTE ADULT - ASSESSMENT
1. PNA 2nd to Covid-19  - PCR positive.   - CXR noted with  increasing infiltrates.   - Continue Vit C, Vit D, Zinc   - Off Dexamethasone, Off Remdesivir  - Continue Singulair and Pepcid   - ICU consult noted - no escalation at present.   - Robitussin PRN for cough  - Tylenol PRN for temp   - O2 Supp - Taper O2 as tolerated  - Monitor O2 Sat at rest and after exertion.  - Monitor labs  - F.U CXR  - Unable to tolerate prone positioning due to prior neck surgery.   - Encouraged side lying periodically as tolerated.   - Incentive Spirometry  - Isolation precautions   - Nasal saline spray for dryness   - Cont Ensure Compact    - DVT and GI PPX     2. HTN  - Continue meds   - Monitor BP     3. HLD  - Cont meds  - Lipid panel     4. GERD  - Antireflux diet   - Cont meds    5. MECHELLE  Improved  Avoid nephrotoxic drugs  monitor BMP  IVF

## 2021-03-11 NOTE — PROGRESS NOTE ADULT - ASSESSMENT
Patient is a 65 year old male from home, with PMH of HTN, HLD and GERD. patient presented to the ED due to SOB, cough, fever, body aches for the past 6 days. Patient states he was tested positive for COVID on 2/16. Patient states his wife and son who he lives with have also tested positive for COVID as well. Patient states that he noted to have been saturating at 88% on room air and was advised to come to the ED by PCP for oxygen supplementation. Patient also complains of nausea, diarrhea and poor appetite at home for the past few days. Patient admitted to medicine for acute respiratory failure secondary to COVID infection. completed Remdesivir and Decadron. Leukocytosis improving. noted low grade temp today. 100F. O2 sat 93% on 4L NC optimizer pendant. not tolerating off oxygen. Home O2 ordered.

## 2021-03-11 NOTE — PROGRESS NOTE ADULT - SUBJECTIVE AND OBJECTIVE BOX
Patient is a 65y old  Male who presents with a chief complaint of SOB, fever, cough, body aches (11 Mar 2021 06:18)  Awake, alert, comfortable in bed in NAD. Still with significant [place]. Remains on oxygen supp via optimizer    INTERVAL HPI/OVERNIGHT EVENTS:      VITAL SIGNS:  T(F): 98.8 (03-11-21 @ 05:03)  HR: 82 (03-11-21 @ 05:03)  BP: 119/73 (03-11-21 @ 05:03)  RR: 18 (03-11-21 @ 08:39)  SpO2: 99% (03-11-21 @ 08:39)  Wt(kg): --  I&O's Detail          REVIEW OF SYSTEMS:    CONSTITUTIONAL:  No fevers, chills, sweats    HEENT:  Eyes:  No diplopia or blurred vision. ENT:  No earache, sore throat or runny nose.    CARDIOVASCULAR:  No pressure, squeezing, tightness, or heaviness about the chest; no palpitations.    RESPIRATORY:  Per HPI    GASTROINTESTINAL:  No abdominal pain, nausea, vomiting or diarrhea.    GENITOURINARY:  No dysuria, frequency or urgency.    NEUROLOGIC:  No paresthesias, fasciculations, seizures or weakness.    PSYCHIATRIC:  No disorder of thought or mood.      PHYSICAL EXAM:    Constitutional: Well developed and nourished  Eyes:Perrla  ENMT: normal  Neck:supple  Respiratory: good air entry  Cardiovascular: S1 S2 regular  Gastrointestinal: Soft, Non tender  Extremities: No edema  Vascular:normal  Neurological:Awake, alert,Ox3  Musculoskeletal:Normal      MEDICATIONS  (STANDING):  ascorbic acid 1000 milliGRAM(s) Oral daily  atorvastatin 80 milliGRAM(s) Oral at bedtime  chlorhexidine 2% Cloths 1 Application(s) Topical <User Schedule>  cholecalciferol 2000 Unit(s) Oral daily  diltiazem    milliGRAM(s) Oral daily  enoxaparin Injectable 40 milliGRAM(s) SubCutaneous daily  gabapentin 200 milliGRAM(s) Oral two times a day  hydrochlorothiazide 50 milliGRAM(s) Oral daily  montelukast 10 milliGRAM(s) Oral at bedtime  pantoprazole    Tablet 40 milliGRAM(s) Oral before breakfast  polyethylene glycol 3350 17 Gram(s) Oral daily  zinc sulfate 220 milliGRAM(s) Oral daily    MEDICATIONS  (PRN):  acetaminophen   Tablet .. 650 milliGRAM(s) Oral every 6 hours PRN Temp greater or equal to 38C (100.4F), Moderate Pain (4 - 6)  guaiFENesin   Syrup  (Sugar-Free) 200 milliGRAM(s) Oral every 6 hours PRN Cough  ondansetron Injectable 4 milliGRAM(s) IV Push every 6 hours PRN Nausea and/or Vomiting  sodium chloride 0.65% Nasal 1 Spray(s) Both Nostrils three times a day PRN Nasal Congestion      Allergies    No Known Allergies    Intolerances    Shrimp- Nasal congestion (Other)      LABS:                        11.3   12.76 )-----------( 260      ( 11 Mar 2021 06:02 )             34.4     03-11    137  |  100  |  15  ----------------------------<  96  3.5   |  28  |  1.07    Ca    8.8      11 Mar 2021 06:02  Phos  3.6     03-11  Mg     2.1     03-11    TPro  6.5  /  Alb  2.3<L>  /  TBili  0.5  /  DBili  x   /  AST  28  /  ALT  79<H>  /  AlkPhos  171<H>  03-11              CAPILLARY BLOOD GLUCOSE        pro-bnp -- 03-07 @ 07:52     d-dimer 336  03-07 @ 07:52      RADIOLOGY & ADDITIONAL TESTS:    CXR:    Ct scan chest:    ekg;    echo:

## 2021-03-11 NOTE — PROGRESS NOTE ADULT - SUBJECTIVE AND OBJECTIVE BOX
NP Note discussed with  Primary Attending    Patient is a 65y old  Male who presents with a chief complaint of SOB, fever, cough, body aches (11 Mar 2021 09:35)      INTERVAL HPI/OVERNIGHT EVENTS: seen at bedside, resting comfortable. c/o sob while walking even with oxygen.     MEDICATIONS  (STANDING):  ascorbic acid 1000 milliGRAM(s) Oral daily  atorvastatin 80 milliGRAM(s) Oral at bedtime  chlorhexidine 2% Cloths 1 Application(s) Topical <User Schedule>  cholecalciferol 2000 Unit(s) Oral daily  diltiazem    milliGRAM(s) Oral daily  enoxaparin Injectable 40 milliGRAM(s) SubCutaneous daily  gabapentin 200 milliGRAM(s) Oral two times a day  hydrochlorothiazide 50 milliGRAM(s) Oral daily  montelukast 10 milliGRAM(s) Oral at bedtime  pantoprazole    Tablet 40 milliGRAM(s) Oral before breakfast  polyethylene glycol 3350 17 Gram(s) Oral daily  zinc sulfate 220 milliGRAM(s) Oral daily    MEDICATIONS  (PRN):  acetaminophen   Tablet .. 650 milliGRAM(s) Oral every 6 hours PRN Temp greater or equal to 38C (100.4F), Moderate Pain (4 - 6)  guaiFENesin   Syrup  (Sugar-Free) 200 milliGRAM(s) Oral every 6 hours PRN Cough  ondansetron Injectable 4 milliGRAM(s) IV Push every 6 hours PRN Nausea and/or Vomiting  sodium chloride 0.65% Nasal 1 Spray(s) Both Nostrils three times a day PRN Nasal Congestion      __________________________________________________  REVIEW OF SYSTEMS:    CONSTITUTIONAL: No fever,   EYES: no acute visual disturbances  NECK: No pain or stiffness  RESPIRATORY: No cough; + shortness of breath on exertion  CARDIOVASCULAR: No chest pain, no palpitations  GASTROINTESTINAL: No pain. No nausea or vomiting; No diarrhea   NEUROLOGICAL: No headache or numbness, no tremors  MUSCULOSKELETAL: No joint pain, no muscle pain  GENITOURINARY: no dysuria, no frequency, no hesitancy  PSYCHIATRY: no depression , no anxiety  ALL OTHER  ROS negative        Vital Signs Last 24 Hrs  T(C): 37.1 (11 Mar 2021 05:03), Max: 37.8 (10 Mar 2021 19:00)  T(F): 98.8 (11 Mar 2021 05:03), Max: 100 (10 Mar 2021 19:00)  HR: 82 (11 Mar 2021 05:03) (82 - 100)  BP: 119/73 (11 Mar 2021 05:03) (113/59 - 119/73)  BP(mean): --  RR: 28 (11 Mar 2021 11:09) (18 - 30)  SpO2: 90% (11 Mar 2021 11:09) (77% - 99%)    ________________________________________________  PHYSICAL EXAM:  GENERAL: NAD  HEENT: Normocephalic;  conjunctivae and sclerae clear; moist mucous membranes;   NECK : supple  CHEST/LUNG: Clear to auscultation bilaterally with good air entry, Eupneic at rest   HEART: S1 S2  regular; no murmurs, gallops or rubs  ABDOMEN: Soft, Nontender, Nondistended; Bowel sounds present  EXTREMITIES: no cyanosis; no edema; no calf tenderness  SKIN: warm and dry; no rash  NERVOUS SYSTEM:  Awake and alert; Oriented  to place, person and time ; no new deficits    _________________________________________________  LABS:                        11.3   12.76 )-----------( 260      ( 11 Mar 2021 06:02 )             34.4     03-11    137  |  100  |  15  ----------------------------<  96  3.5   |  28  |  1.07    Ca    8.8      11 Mar 2021 06:02  Phos  3.6     03-11  Mg     2.1     03-11    TPro  6.5  /  Alb  2.3<L>  /  TBili  0.5  /  DBili  x   /  AST  28  /  ALT  79<H>  /  AlkPhos  171<H>  03-11        CAPILLARY BLOOD GLUCOSE            RADIOLOGY & ADDITIONAL TESTS:    Imaging  Reviewed:  YES    < from: Xray Chest 1 View- PORTABLE-Urgent (Xray Chest 1 View- PORTABLE-Urgent .) (03.08.21 @ 11:24) >    EXAM:  XR CHEST PORTABLE URGENT 1V                            PROCEDURE DATE:  03/08/2021          INTERPRETATION:  CLINICAL STATEMENT: Follow-up chest pain.    TECHNIQUE: AP view of the chest.    COMPARISON: 2/23/2021    FINDINGS/  IMPRESSION:  Bilateral airspace opacities overall worsening. Elevation right hemidiaphragm.    Heart size cannot be accurately assessed in this projection.    Overlying chin obscures lung apices.              STACIA KAPADIA MD; Attending Radiologist  This document has been electronically signed. Mar  8 2021 12:27PM    < end of copied text >    Consultant(s) Notes Reviewed:   YES      Plan of care was discussed with patient and /or primary care giver; all questions and concerns were addressed

## 2021-03-12 LAB
ALBUMIN SERPL ELPH-MCNC: 2.3 G/DL — LOW (ref 3.5–5)
ALP SERPL-CCNC: 166 U/L — HIGH (ref 40–120)
ALT FLD-CCNC: 75 U/L DA — HIGH (ref 10–60)
ANION GAP SERPL CALC-SCNC: 8 MMOL/L — SIGNIFICANT CHANGE UP (ref 5–17)
AST SERPL-CCNC: 26 U/L — SIGNIFICANT CHANGE UP (ref 10–40)
BASOPHILS # BLD AUTO: 0.05 K/UL — SIGNIFICANT CHANGE UP (ref 0–0.2)
BASOPHILS NFR BLD AUTO: 0.5 % — SIGNIFICANT CHANGE UP (ref 0–2)
BILIRUB SERPL-MCNC: 0.4 MG/DL — SIGNIFICANT CHANGE UP (ref 0.2–1.2)
BUN SERPL-MCNC: 19 MG/DL — HIGH (ref 7–18)
CALCIUM SERPL-MCNC: 8.9 MG/DL — SIGNIFICANT CHANGE UP (ref 8.4–10.5)
CHLORIDE SERPL-SCNC: 101 MMOL/L — SIGNIFICANT CHANGE UP (ref 96–108)
CO2 SERPL-SCNC: 28 MMOL/L — SIGNIFICANT CHANGE UP (ref 22–31)
CREAT SERPL-MCNC: 1.17 MG/DL — SIGNIFICANT CHANGE UP (ref 0.5–1.3)
EOSINOPHIL # BLD AUTO: 0.16 K/UL — SIGNIFICANT CHANGE UP (ref 0–0.5)
EOSINOPHIL NFR BLD AUTO: 1.6 % — SIGNIFICANT CHANGE UP (ref 0–6)
GLUCOSE SERPL-MCNC: 164 MG/DL — HIGH (ref 70–99)
HCT VFR BLD CALC: 34.6 % — LOW (ref 39–50)
HGB BLD-MCNC: 11.5 G/DL — LOW (ref 13–17)
IMM GRANULOCYTES NFR BLD AUTO: 0.4 % — SIGNIFICANT CHANGE UP (ref 0–1.5)
LYMPHOCYTES # BLD AUTO: 2.07 K/UL — SIGNIFICANT CHANGE UP (ref 1–3.3)
LYMPHOCYTES # BLD AUTO: 21.1 % — SIGNIFICANT CHANGE UP (ref 13–44)
MCHC RBC-ENTMCNC: 29.2 PG — SIGNIFICANT CHANGE UP (ref 27–34)
MCHC RBC-ENTMCNC: 33.2 GM/DL — SIGNIFICANT CHANGE UP (ref 32–36)
MCV RBC AUTO: 87.8 FL — SIGNIFICANT CHANGE UP (ref 80–100)
MONOCYTES # BLD AUTO: 0.46 K/UL — SIGNIFICANT CHANGE UP (ref 0–0.9)
MONOCYTES NFR BLD AUTO: 4.7 % — SIGNIFICANT CHANGE UP (ref 2–14)
NEUTROPHILS # BLD AUTO: 7.01 K/UL — SIGNIFICANT CHANGE UP (ref 1.8–7.4)
NEUTROPHILS NFR BLD AUTO: 71.7 % — SIGNIFICANT CHANGE UP (ref 43–77)
NRBC # BLD: 0 /100 WBCS — SIGNIFICANT CHANGE UP (ref 0–0)
PLATELET # BLD AUTO: 254 K/UL — SIGNIFICANT CHANGE UP (ref 150–400)
POTASSIUM SERPL-MCNC: 3.3 MMOL/L — LOW (ref 3.5–5.3)
POTASSIUM SERPL-SCNC: 3.3 MMOL/L — LOW (ref 3.5–5.3)
PROT SERPL-MCNC: 6.8 G/DL — SIGNIFICANT CHANGE UP (ref 6–8.3)
RBC # BLD: 3.94 M/UL — LOW (ref 4.2–5.8)
RBC # FLD: 14 % — SIGNIFICANT CHANGE UP (ref 10.3–14.5)
SODIUM SERPL-SCNC: 137 MMOL/L — SIGNIFICANT CHANGE UP (ref 135–145)
URATE SERPL-MCNC: 8.6 MG/DL — SIGNIFICANT CHANGE UP (ref 3.4–8.8)
WBC # BLD: 9.79 K/UL — SIGNIFICANT CHANGE UP (ref 3.8–10.5)
WBC # FLD AUTO: 9.79 K/UL — SIGNIFICANT CHANGE UP (ref 3.8–10.5)

## 2021-03-12 PROCEDURE — 71045 X-RAY EXAM CHEST 1 VIEW: CPT | Mod: 26

## 2021-03-12 PROCEDURE — 74176 CT ABD & PELVIS W/O CONTRAST: CPT | Mod: 26

## 2021-03-12 RX ORDER — ENOXAPARIN SODIUM 100 MG/ML
40 INJECTION SUBCUTANEOUS DAILY
Refills: 0 | Status: DISCONTINUED | OUTPATIENT
Start: 2021-03-12 | End: 2021-03-19

## 2021-03-12 RX ORDER — ENOXAPARIN SODIUM 100 MG/ML
100 INJECTION SUBCUTANEOUS EVERY 12 HOURS
Refills: 0 | Status: DISCONTINUED | OUTPATIENT
Start: 2021-03-12 | End: 2021-03-12

## 2021-03-12 RX ORDER — POTASSIUM CHLORIDE 20 MEQ
40 PACKET (EA) ORAL ONCE
Refills: 0 | Status: COMPLETED | OUTPATIENT
Start: 2021-03-12 | End: 2021-03-12

## 2021-03-12 RX ADMIN — CHLORHEXIDINE GLUCONATE 1 APPLICATION(S): 213 SOLUTION TOPICAL at 06:18

## 2021-03-12 RX ADMIN — PANTOPRAZOLE SODIUM 40 MILLIGRAM(S): 20 TABLET, DELAYED RELEASE ORAL at 06:34

## 2021-03-12 RX ADMIN — Medication 40 MILLIEQUIVALENT(S): at 17:42

## 2021-03-12 RX ADMIN — Medication 240 MILLIGRAM(S): at 06:34

## 2021-03-12 RX ADMIN — ZINC SULFATE TAB 220 MG (50 MG ZINC EQUIVALENT) 220 MILLIGRAM(S): 220 (50 ZN) TAB at 12:22

## 2021-03-12 RX ADMIN — ENOXAPARIN SODIUM 40 MILLIGRAM(S): 100 INJECTION SUBCUTANEOUS at 12:22

## 2021-03-12 RX ADMIN — GABAPENTIN 200 MILLIGRAM(S): 400 CAPSULE ORAL at 06:35

## 2021-03-12 RX ADMIN — Medication 650 MILLIGRAM(S): at 17:47

## 2021-03-12 RX ADMIN — Medication 100 MILLIGRAM(S): at 16:06

## 2021-03-12 RX ADMIN — Medication 1000 MILLIGRAM(S): at 12:22

## 2021-03-12 RX ADMIN — POLYETHYLENE GLYCOL 3350 17 GRAM(S): 17 POWDER, FOR SOLUTION ORAL at 12:22

## 2021-03-12 RX ADMIN — Medication 650 MILLIGRAM(S): at 17:38

## 2021-03-12 RX ADMIN — Medication 50 MILLIGRAM(S): at 06:34

## 2021-03-12 RX ADMIN — ATORVASTATIN CALCIUM 80 MILLIGRAM(S): 80 TABLET, FILM COATED ORAL at 22:02

## 2021-03-12 RX ADMIN — Medication 2000 UNIT(S): at 12:22

## 2021-03-12 RX ADMIN — GABAPENTIN 200 MILLIGRAM(S): 400 CAPSULE ORAL at 17:38

## 2021-03-12 RX ADMIN — MONTELUKAST 10 MILLIGRAM(S): 4 TABLET, CHEWABLE ORAL at 22:02

## 2021-03-12 NOTE — PROGRESS NOTE ADULT - SUBJECTIVE AND OBJECTIVE BOX
Patient is a 65y old  Male who presents with a chief complaint of SOB, fever, cough, body aches (12 Mar 2021 06:22)  Awake, alert, laying in b ed in NAD. Remains on oxygen supp via optimizer. Has [place] still    INTERVAL HPI/OVERNIGHT EVENTS:      VITAL SIGNS:  T(F): 97 (03-12-21 @ 05:33)  HR: 64 (03-12-21 @ 05:33)  BP: 107/69 (03-12-21 @ 05:33)  RR: 20 (03-12-21 @ 05:33)  SpO2: 93% (03-12-21 @ 05:33)  Wt(kg): --  I&O's Detail          REVIEW OF SYSTEMS:    CONSTITUTIONAL:  No fevers, chills, sweats    HEENT:  Eyes:  No diplopia or blurred vision. ENT:  No earache, sore throat or runny nose.    CARDIOVASCULAR:  No pressure, squeezing, tightness, or heaviness about the chest; no palpitations.    RESPIRATORY:  Per HPI    GASTROINTESTINAL:  No abdominal pain, nausea, vomiting or diarrhea.    GENITOURINARY:  No dysuria, frequency or urgency.    NEUROLOGIC:  No paresthesias, fasciculations, seizures or weakness.    PSYCHIATRIC:  No disorder of thought or mood.      PHYSICAL EXAM:    Constitutional: Well developed and nourished  Eyes:Perrla  ENMT: normal  Neck:supple  Respiratory: good air entry  Cardiovascular: S1 S2 regular  Gastrointestinal: Soft, Non tender  Extremities: No edema  Vascular:normal  Neurological:Awake, alert,Ox3  Musculoskeletal:Normal      MEDICATIONS  (STANDING):  allopurinol 100 milliGRAM(s) Oral daily  ascorbic acid 1000 milliGRAM(s) Oral daily  atorvastatin 80 milliGRAM(s) Oral at bedtime  chlorhexidine 2% Cloths 1 Application(s) Topical <User Schedule>  cholecalciferol 2000 Unit(s) Oral daily  diltiazem    milliGRAM(s) Oral daily  enoxaparin Injectable 40 milliGRAM(s) SubCutaneous daily  gabapentin 200 milliGRAM(s) Oral two times a day  hydrochlorothiazide 50 milliGRAM(s) Oral daily  montelukast 10 milliGRAM(s) Oral at bedtime  pantoprazole    Tablet 40 milliGRAM(s) Oral before breakfast  polyethylene glycol 3350 17 Gram(s) Oral daily  zinc sulfate 220 milliGRAM(s) Oral daily    MEDICATIONS  (PRN):  acetaminophen   Tablet .. 650 milliGRAM(s) Oral every 6 hours PRN Temp greater or equal to 38C (100.4F), Moderate Pain (4 - 6)  guaiFENesin   Syrup  (Sugar-Free) 200 milliGRAM(s) Oral every 6 hours PRN Cough  ondansetron Injectable 4 milliGRAM(s) IV Push every 6 hours PRN Nausea and/or Vomiting  sodium chloride 0.65% Nasal 1 Spray(s) Both Nostrils three times a day PRN Nasal Congestion      Allergies    No Known Allergies    Intolerances    Shrimp- Nasal congestion (Other)      LABS:                        11.5   9.79  )-----------( 254      ( 12 Mar 2021 09:16 )             34.6     03-11    137  |  100  |  15  ----------------------------<  96  3.5   |  28  |  1.07    Ca    8.8      11 Mar 2021 06:02  Phos  3.6     03-11  Mg     2.1     03-11    TPro  6.5  /  Alb  2.3<L>  /  TBili  0.5  /  DBili  x   /  AST  28  /  ALT  79<H>  /  AlkPhos  171<H>  03-11              CAPILLARY BLOOD GLUCOSE        pro-bnp -- 03-07 @ 07:52     d-dimer 336  03-07 @ 07:52      RADIOLOGY & ADDITIONAL TESTS:    CXR:    Ct scan chest:    ekg;    echo:

## 2021-03-12 NOTE — PROGRESS NOTE ADULT - PROBLEM SELECTOR PLAN 1
Improving  -SpO2 95% 4L optimizer at rest  -COVID antibody negative  -completed remdersivir  -completed  decadron   -c/w incentive spirometer therapy  -f/u repeat chest x-ray found possible free air under diaphram   pending urgent CT A/P  -Pulmonology Dr. Reid

## 2021-03-12 NOTE — PROGRESS NOTE ADULT - SUBJECTIVE AND OBJECTIVE BOX
NP Note discussed with  primary attending    Patient is a 65y old  Male who presents with a chief complaint of SOB, fever, cough, body aches (12 Mar 2021 09:47)    Patient is a 65 year old male from home, with PMH of HTN, HLD and GERD. patient presented to the ED due to SOB, cough, fever, body aches for the past 6 days. Patient states he was tested positive for COVID on 2/16. Patient states his wife and son who he lives with have also tested positive for COVID as well. Patient states that he noted to have been saturating at 88% on room air and was advised to come to the ED by PCP for oxygen supplementation. Patient also complains of nausea, diarrhea and poor appetite at home for the past few days. Patient admitted to medicine for acute respiratory failure secondary to COVID infection. completed Remdesivir and Decadron. Leukocytosis improving. Remains on oxymizer pendant not tolerating off N/C. 02sat 95% at rest on oxymizer Home O2 ordered. Plan to wean to N/C for safe discharge. Repeat CXR found possible free air under diaphram, URGENT CT A/P  ordered for evaluate findings.     Patient seen and evaluated at bedside, appears comfortable on oxymizer and offers no complaints .    INTERVAL HPI/OVERNIGHT EVENTS: no new complaints    MEDICATIONS  (STANDING):  allopurinol 100 milliGRAM(s) Oral daily  ascorbic acid 1000 milliGRAM(s) Oral daily  atorvastatin 80 milliGRAM(s) Oral at bedtime  chlorhexidine 2% Cloths 1 Application(s) Topical <User Schedule>  cholecalciferol 2000 Unit(s) Oral daily  diltiazem    milliGRAM(s) Oral daily  enoxaparin Injectable 40 milliGRAM(s) SubCutaneous daily  gabapentin 200 milliGRAM(s) Oral two times a day  hydrochlorothiazide 50 milliGRAM(s) Oral daily  montelukast 10 milliGRAM(s) Oral at bedtime  pantoprazole    Tablet 40 milliGRAM(s) Oral before breakfast  polyethylene glycol 3350 17 Gram(s) Oral daily  zinc sulfate 220 milliGRAM(s) Oral daily    MEDICATIONS  (PRN):  acetaminophen   Tablet .. 650 milliGRAM(s) Oral every 6 hours PRN Temp greater or equal to 38C (100.4F), Moderate Pain (4 - 6)  guaiFENesin   Syrup  (Sugar-Free) 200 milliGRAM(s) Oral every 6 hours PRN Cough  ondansetron Injectable 4 milliGRAM(s) IV Push every 6 hours PRN Nausea and/or Vomiting  sodium chloride 0.65% Nasal 1 Spray(s) Both Nostrils three times a day PRN Nasal Congestion      __________________________________________________  REVIEW OF SYSTEMS:    CONSTITUTIONAL: No fever,   EYES: no acute visual disturbances  NECK: No pain or stiffness  RESPIRATORY: +COUGH +GUALLPA  CARDIOVASCULAR: No chest pain, no palpitations  GASTROINTESTINAL: No pain. No nausea or vomiting; No diarrhea   NEUROLOGICAL: No headache or numbness, no tremors  MUSCULOSKELETAL: No joint pain, no muscle pain  GENITOURINARY: no dysuria, no frequency, no hesitancy  PSYCHIATRY: no depression , no anxiety  ALL OTHER  ROS negative        Vital Signs Last 24 Hrs  T(C): 36.5 (12 Mar 2021 13:05), Max: 36.5 (12 Mar 2021 13:05)  T(F): 97.7 (12 Mar 2021 13:05), Max: 97.7 (12 Mar 2021 13:05)  HR: 96 (12 Mar 2021 13:05) (64 - 102)  BP: 108/68 (12 Mar 2021 13:05) (107/69 - 128/58)  BP(mean): --  RR: 20 (12 Mar 2021 13:05) (20 - 22)  SpO2: 91% (12 Mar 2021 13:05) (91% - 93%)    ________________________________________________  PHYSICAL EXAM:  GENERAL: NAD  HEENT: Normocephalic;  conjunctivae and sclerae clear; moist mucous membranes;   NECK : supple  CHEST/LUNG: Diminished b/l   HEART: S1 S2  regular; no murmurs, gallops or rubs  ABDOMEN: Soft, Nontender, Nondistended; Bowel sounds present  EXTREMITIES: no cyanosis; no edema; no calf tenderness  SKIN: warm and dry; no rash  NERVOUS SYSTEM:  Awake and alert; Oriented  to place, person and time ;     _________________________________________________  LABS:                        11.5   9.79  )-----------( 254      ( 12 Mar 2021 09:16 )             34.6     03-12    137  |  101  |  19<H>  ----------------------------<  164<H>  3.3<L>   |  28  |  1.17    Ca    8.9      12 Mar 2021 09:16  Phos  3.6     03-11  Mg     2.1     03-11    TPro  6.8  /  Alb  2.3<L>  /  TBili  0.4  /  DBili  x   /  AST  26  /  ALT  75<H>  /  AlkPhos  166<H>  03-12        CAPILLARY BLOOD GLUCOSE      Plan of care was discussed with patient and /or primary care giver; all questions and concerns were addressed and care was aligned with patient's wishes.

## 2021-03-12 NOTE — PROGRESS NOTE ADULT - PROBLEM SELECTOR PLAN 2
Improving   -likely prerenal to dehydration  -baseline creatinine 1.17 - remains at baseline   -monitor BMP

## 2021-03-12 NOTE — PROGRESS NOTE ADULT - SUBJECTIVE AND OBJECTIVE BOX
Patient is a 65y old  Male who presents with a chief complaint of SOB, fever, cough, body aches (11 Mar 2021 14:26)    pt seen in icu [  ], reg med floor [  x ], bed [x  ], chair at bedside [   ], a+o x3 [x ], lethargic [  ],    nad [x  ]      Allergies    No Known Allergies  Shrimp- Nasal congestion (Other)        Vitals    T(F): 97 (03-12-21 @ 05:33), Max: 99.8 (03-11-21 @ 14:30)  HR: 64 (03-12-21 @ 05:33) (64 - 102)  BP: 107/69 (03-12-21 @ 05:33) (104/62 - 128/58)  RR: 20 (03-12-21 @ 05:33) (18 - 28)  SpO2: 93% (03-12-21 @ 05:33) (77% - 99%)  Wt(kg): --  CAPILLARY BLOOD GLUCOSE          Labs                          11.3   12.76 )-----------( 260      ( 11 Mar 2021 06:02 )             34.4       03-11    137  |  100  |  15  ----------------------------<  96  3.5   |  28  |  1.07    Ca    8.8      11 Mar 2021 06:02  Phos  3.6     03-11  Mg     2.1     03-11    TPro  6.5  /  Alb  2.3<L>  /  TBili  0.5  /  DBili  x   /  AST  28  /  ALT  79<H>  /  AlkPhos  171<H>  03-11            .Blood Blood-Peripheral  02-21 @ 22:03   No Growth Final  --  --      .Blood Blood-Peripheral  02-21 @ 22:00   No Growth Final  --  --          Radiology Results      Meds    MEDICATIONS  (STANDING):  allopurinol 100 milliGRAM(s) Oral daily  ascorbic acid 1000 milliGRAM(s) Oral daily  atorvastatin 80 milliGRAM(s) Oral at bedtime  chlorhexidine 2% Cloths 1 Application(s) Topical <User Schedule>  cholecalciferol 2000 Unit(s) Oral daily  diltiazem    milliGRAM(s) Oral daily  enoxaparin Injectable 40 milliGRAM(s) SubCutaneous daily  gabapentin 200 milliGRAM(s) Oral two times a day  hydrochlorothiazide 50 milliGRAM(s) Oral daily  montelukast 10 milliGRAM(s) Oral at bedtime  pantoprazole    Tablet 40 milliGRAM(s) Oral before breakfast  polyethylene glycol 3350 17 Gram(s) Oral daily  zinc sulfate 220 milliGRAM(s) Oral daily      MEDICATIONS  (PRN):  acetaminophen   Tablet .. 650 milliGRAM(s) Oral every 6 hours PRN Temp greater or equal to 38C (100.4F), Moderate Pain (4 - 6)  guaiFENesin   Syrup  (Sugar-Free) 200 milliGRAM(s) Oral every 6 hours PRN Cough  ondansetron Injectable 4 milliGRAM(s) IV Push every 6 hours PRN Nausea and/or Vomiting  sodium chloride 0.65% Nasal 1 Spray(s) Both Nostrils three times a day PRN Nasal Congestion      Physical Exam    Neuro :  no focal deficits  Respiratory: CTA B/L  CV: RRR, S1S2, no murmurs,   Abdominal: Soft, NT, ND +BS,  Extremities: No edema, + peripheral pulses    ASSESSMENT    pneumonia 2nd to covid 19,   marya,   hypokalemia,   h/o Cluster headache  BPH (benign prostatic hypertrophy)  Spinal stenosis s/p laminectomy  GERD (gastroesophageal reflux disease)  Hyperlipidemia  HTN (hypertension)            PLAN      contact and airborne isolation  completed remdesevir 2/26/21  covid ab neg  completed dexamethasone 3/2/21  pepcid, singulair, vit c, vit d, zinc   pulm f/u  cont albuterol inhaler   cont lovenox  procalcitonin neg noted  ,   D-dimer, crp, ldh, ferritin, lactate noted ,   afebrile  cont tylenol prn,   robitussin prn   blood cx neg  O2 sat (84% - 95%) optimizer pendant 4L   sat ambulating 84 on optimizer 4L  cont O2 via pendant and taper as tolerated    may give trial of n/c  incentive spirometer   pt low threshold for intubation and icu monitoring  icu cons noted  continue to monitor pulse oximetry  monitor biomarkers TIW  not accept to icu at this time  cont incentive spirometer   serum creat wnl   cont current meds     Patient is a 65y old  Male who presents with a chief complaint of SOB, fever, cough, body aches (11 Mar 2021 14:26)    pt seen in icu [  ], reg med floor [  x ], bed [x  ], chair at bedside [   ], a+o x3 [x ], lethargic [  ],    nad [x  ]      Allergies    No Known Allergies  Shrimp- Nasal congestion (Other)        Vitals    T(F): 97 (03-12-21 @ 05:33), Max: 99.8 (03-11-21 @ 14:30)  HR: 64 (03-12-21 @ 05:33) (64 - 102)  BP: 107/69 (03-12-21 @ 05:33) (104/62 - 128/58)  RR: 20 (03-12-21 @ 05:33) (18 - 28)  SpO2: 93% (03-12-21 @ 05:33) (77% - 99%)  Wt(kg): --  CAPILLARY BLOOD GLUCOSE          Labs                          11.3   12.76 )-----------( 260      ( 11 Mar 2021 06:02 )             34.4       03-11    137  |  100  |  15  ----------------------------<  96  3.5   |  28  |  1.07    Ca    8.8      11 Mar 2021 06:02  Phos  3.6     03-11  Mg     2.1     03-11    TPro  6.5  /  Alb  2.3<L>  /  TBili  0.5  /  DBili  x   /  AST  28  /  ALT  79<H>  /  AlkPhos  171<H>  03-11            .Blood Blood-Peripheral  02-21 @ 22:03   No Growth Final  --  --      .Blood Blood-Peripheral  02-21 @ 22:00   No Growth Final  --  --          Radiology Results      Meds    MEDICATIONS  (STANDING):  allopurinol 100 milliGRAM(s) Oral daily  ascorbic acid 1000 milliGRAM(s) Oral daily  atorvastatin 80 milliGRAM(s) Oral at bedtime  chlorhexidine 2% Cloths 1 Application(s) Topical <User Schedule>  cholecalciferol 2000 Unit(s) Oral daily  diltiazem    milliGRAM(s) Oral daily  enoxaparin Injectable 40 milliGRAM(s) SubCutaneous daily  gabapentin 200 milliGRAM(s) Oral two times a day  hydrochlorothiazide 50 milliGRAM(s) Oral daily  montelukast 10 milliGRAM(s) Oral at bedtime  pantoprazole    Tablet 40 milliGRAM(s) Oral before breakfast  polyethylene glycol 3350 17 Gram(s) Oral daily  zinc sulfate 220 milliGRAM(s) Oral daily      MEDICATIONS  (PRN):  acetaminophen   Tablet .. 650 milliGRAM(s) Oral every 6 hours PRN Temp greater or equal to 38C (100.4F), Moderate Pain (4 - 6)  guaiFENesin   Syrup  (Sugar-Free) 200 milliGRAM(s) Oral every 6 hours PRN Cough  ondansetron Injectable 4 milliGRAM(s) IV Push every 6 hours PRN Nausea and/or Vomiting  sodium chloride 0.65% Nasal 1 Spray(s) Both Nostrils three times a day PRN Nasal Congestion      Physical Exam    Neuro :  no focal deficits  Respiratory: CTA B/L  CV: RRR, S1S2, no murmurs,   Abdominal: Soft, NT, ND +BS,  Extremities: No edema, + peripheral pulses    ASSESSMENT    pneumonia 2nd to covid 19,   marya,   hypokalemia,   h/o Cluster headache  BPH (benign prostatic hypertrophy)  Spinal stenosis s/p laminectomy  GERD (gastroesophageal reflux disease)  Hyperlipidemia  HTN (hypertension)            PLAN      contact and airborne isolation  completed remdesevir 2/26/21  covid ab neg  completed dexamethasone 3/2/21  pepcid, singulair, vit c, vit d, zinc   pulm f/u  cont albuterol inhaler   cont lovenox  procalcitonin neg noted  ,   D-dimer, crp, ldh, ferritin, lactate noted ,   afebrile  cont tylenol prn,   robitussin prn   blood cx neg  O2 sat (77% - 99%) optimizer pendant 4L   sat ambulating 84 on optimizer 4L  cont O2 via pendant and taper as tolerated    may give trial of n/c  incentive spirometer   pt low threshold for intubation and icu monitoring  icu cons noted  continue to monitor pulse oximetry  monitor biomarkers TIW  not accept to icu at this time  cont incentive spirometer   serum creat wnl   cont current meds

## 2021-03-13 LAB
ANION GAP SERPL CALC-SCNC: 8 MMOL/L — SIGNIFICANT CHANGE UP (ref 5–17)
BUN SERPL-MCNC: 20 MG/DL — HIGH (ref 7–18)
CALCIUM SERPL-MCNC: 9.3 MG/DL — SIGNIFICANT CHANGE UP (ref 8.4–10.5)
CHLORIDE SERPL-SCNC: 102 MMOL/L — SIGNIFICANT CHANGE UP (ref 96–108)
CO2 SERPL-SCNC: 28 MMOL/L — SIGNIFICANT CHANGE UP (ref 22–31)
CREAT SERPL-MCNC: 1.18 MG/DL — SIGNIFICANT CHANGE UP (ref 0.5–1.3)
GLUCOSE SERPL-MCNC: 128 MG/DL — HIGH (ref 70–99)
HCT VFR BLD CALC: 36.1 % — LOW (ref 39–50)
HGB BLD-MCNC: 11.9 G/DL — LOW (ref 13–17)
MCHC RBC-ENTMCNC: 28.7 PG — SIGNIFICANT CHANGE UP (ref 27–34)
MCHC RBC-ENTMCNC: 33 GM/DL — SIGNIFICANT CHANGE UP (ref 32–36)
MCV RBC AUTO: 87.2 FL — SIGNIFICANT CHANGE UP (ref 80–100)
NRBC # BLD: 0 /100 WBCS — SIGNIFICANT CHANGE UP (ref 0–0)
PLATELET # BLD AUTO: 284 K/UL — SIGNIFICANT CHANGE UP (ref 150–400)
POTASSIUM SERPL-MCNC: 3.8 MMOL/L — SIGNIFICANT CHANGE UP (ref 3.5–5.3)
POTASSIUM SERPL-SCNC: 3.8 MMOL/L — SIGNIFICANT CHANGE UP (ref 3.5–5.3)
RBC # BLD: 4.14 M/UL — LOW (ref 4.2–5.8)
RBC # FLD: 13.9 % — SIGNIFICANT CHANGE UP (ref 10.3–14.5)
SODIUM SERPL-SCNC: 138 MMOL/L — SIGNIFICANT CHANGE UP (ref 135–145)
WBC # BLD: 10.54 K/UL — HIGH (ref 3.8–10.5)
WBC # FLD AUTO: 10.54 K/UL — HIGH (ref 3.8–10.5)

## 2021-03-13 RX ADMIN — GABAPENTIN 200 MILLIGRAM(S): 400 CAPSULE ORAL at 18:12

## 2021-03-13 RX ADMIN — PANTOPRAZOLE SODIUM 40 MILLIGRAM(S): 20 TABLET, DELAYED RELEASE ORAL at 06:26

## 2021-03-13 RX ADMIN — CHLORHEXIDINE GLUCONATE 1 APPLICATION(S): 213 SOLUTION TOPICAL at 06:25

## 2021-03-13 RX ADMIN — GABAPENTIN 200 MILLIGRAM(S): 400 CAPSULE ORAL at 06:26

## 2021-03-13 RX ADMIN — MONTELUKAST 10 MILLIGRAM(S): 4 TABLET, CHEWABLE ORAL at 21:36

## 2021-03-13 RX ADMIN — ENOXAPARIN SODIUM 40 MILLIGRAM(S): 100 INJECTION SUBCUTANEOUS at 11:27

## 2021-03-13 RX ADMIN — Medication 2000 UNIT(S): at 11:26

## 2021-03-13 RX ADMIN — ZINC SULFATE TAB 220 MG (50 MG ZINC EQUIVALENT) 220 MILLIGRAM(S): 220 (50 ZN) TAB at 11:26

## 2021-03-13 RX ADMIN — Medication 50 MILLIGRAM(S): at 06:25

## 2021-03-13 RX ADMIN — Medication 240 MILLIGRAM(S): at 06:26

## 2021-03-13 RX ADMIN — ATORVASTATIN CALCIUM 80 MILLIGRAM(S): 80 TABLET, FILM COATED ORAL at 21:37

## 2021-03-13 RX ADMIN — Medication 1000 MILLIGRAM(S): at 11:27

## 2021-03-13 RX ADMIN — Medication 100 MILLIGRAM(S): at 11:27

## 2021-03-13 NOTE — PROGRESS NOTE ADULT - SUBJECTIVE AND OBJECTIVE BOX
Patient is a 65y old  Male who presents with a chief complaint of SOB, fever, cough, body aches (13 Mar 2021 06:22)  Awake, alert, comfortable in bed in NAD. Remains on oxygen supp via optimizer 6 lits    INTERVAL HPI/OVERNIGHT EVENTS:      VITAL SIGNS:  T(F): 98.4 (03-13-21 @ 05:18)  HR: 82 (03-13-21 @ 05:18)  BP: 114/67 (03-13-21 @ 05:18)  RR: 18 (03-13-21 @ 05:18)  SpO2: 96% (03-13-21 @ 05:18)  Wt(kg): --  I&O's Detail          REVIEW OF SYSTEMS:    CONSTITUTIONAL:  No fevers, chills, sweats    HEENT:  Eyes:  No diplopia or blurred vision. ENT:  No earache, sore throat or runny nose.    CARDIOVASCULAR:  No pressure, squeezing, tightness, or heaviness about the chest; no palpitations.    RESPIRATORY:  Per HPI    GASTROINTESTINAL:  No abdominal pain, nausea, vomiting or diarrhea.    GENITOURINARY:  No dysuria, frequency or urgency.    NEUROLOGIC:  No paresthesias, fasciculations, seizures or weakness.    PSYCHIATRIC:  No disorder of thought or mood.      PHYSICAL EXAM:    Constitutional: Well developed and nourished  Eyes:Perrla  ENMT: normal  Neck:supple  Respiratory: good air entry  Cardiovascular: S1 S2 regular  Gastrointestinal: Soft, Non tender  Extremities: No edema  Vascular:normal  Neurological:Awake, alert,Ox3  Musculoskeletal:Normal      MEDICATIONS  (STANDING):  allopurinol 100 milliGRAM(s) Oral daily  ascorbic acid 1000 milliGRAM(s) Oral daily  atorvastatin 80 milliGRAM(s) Oral at bedtime  chlorhexidine 2% Cloths 1 Application(s) Topical <User Schedule>  cholecalciferol 2000 Unit(s) Oral daily  diltiazem    milliGRAM(s) Oral daily  enoxaparin Injectable 40 milliGRAM(s) SubCutaneous daily  gabapentin 200 milliGRAM(s) Oral two times a day  hydrochlorothiazide 50 milliGRAM(s) Oral daily  montelukast 10 milliGRAM(s) Oral at bedtime  pantoprazole    Tablet 40 milliGRAM(s) Oral before breakfast  polyethylene glycol 3350 17 Gram(s) Oral daily  zinc sulfate 220 milliGRAM(s) Oral daily    MEDICATIONS  (PRN):  acetaminophen   Tablet .. 650 milliGRAM(s) Oral every 6 hours PRN Temp greater or equal to 38C (100.4F), Moderate Pain (4 - 6)  guaiFENesin   Syrup  (Sugar-Free) 200 milliGRAM(s) Oral every 6 hours PRN Cough  ondansetron Injectable 4 milliGRAM(s) IV Push every 6 hours PRN Nausea and/or Vomiting  sodium chloride 0.65% Nasal 1 Spray(s) Both Nostrils three times a day PRN Nasal Congestion      Allergies    No Known Allergies    Intolerances    Shrimp- Nasal congestion (Other)      LABS:                        11.9   10.54 )-----------( 284      ( 13 Mar 2021 08:47 )             36.1     03-13    138  |  102  |  20<H>  ----------------------------<  128<H>  3.8   |  28  |  1.18    Ca    9.3      13 Mar 2021 08:47    TPro  6.8  /  Alb  2.3<L>  /  TBili  0.4  /  DBili  x   /  AST  26  /  ALT  75<H>  /  AlkPhos  166<H>  03-12              CAPILLARY BLOOD GLUCOSE        pro-bnp -- 03-07 @ 07:52     d-dimer 336  03-07 @ 07:52      RADIOLOGY & ADDITIONAL TESTS:    CXR:    Ct scan chest:    < from: CT Abdomen and Pelvis No Cont (03.12.21 @ 22:10) >  LOWER CHEST: Scattered bilateral groundglass opacities, likely in keeping with multifocal pneumonia of reported COVID-19. Short-term chest radiographic imaging follow-up is advised.    < end of copied text >  < from: CT Abdomen and Pelvis No Cont (03.12.21 @ 22:10) >  IMPRESSION:    No ascites or pneumoperitoneum.    Punctate nonobstructive bilateral renal calculi without hydronephrosis. 1.2 cm right renal exophytic hyperdensity. This may represent hemorrhagic or proteinaceous cyst. Consider nonemergent correlation with renal ultrasound for better characterization.    Scattered bilateral groundglass opacities, likely in keeping with multifocal pneumonia of reported COVID-19. Short-term chest radiographic imaging follow-up is advised.        < end of copied text >  ekg;    echo:

## 2021-03-13 NOTE — PROGRESS NOTE ADULT - SUBJECTIVE AND OBJECTIVE BOX
Patient is a 65y old  Male who presents with a chief complaint of SOB, fever, cough, body aches (12 Mar 2021 13:00)    pt seen in icu [  ], reg med floor [  x ], bed [x  ], chair at bedside [   ], a+o x3 [x ], lethargic [  ],    nad [x  ]        Allergies    No Known Allergies  Shrimp- Nasal congestion (Other)        Vitals    T(F): 98.4 (03-13-21 @ 05:18), Max: 98.4 (03-13-21 @ 05:18)  HR: 82 (03-13-21 @ 05:18) (82 - 103)  BP: 114/67 (03-13-21 @ 05:18) (108/68 - 117/72)  RR: 18 (03-13-21 @ 05:18) (18 - 20)  SpO2: 96% (03-13-21 @ 05:18) (88% - 97%)  Wt(kg): --  CAPILLARY BLOOD GLUCOSE          Labs                          11.5   9.79  )-----------( 254      ( 12 Mar 2021 09:16 )             34.6       03-12    137  |  101  |  19<H>  ----------------------------<  164<H>  3.3<L>   |  28  |  1.17    Ca    8.9      12 Mar 2021 09:16    TPro  6.8  /  Alb  2.3<L>  /  TBili  0.4  /  DBili  x   /  AST  26  /  ALT  75<H>  /  AlkPhos  166<H>  03-12            .Blood Blood-Peripheral  02-21 @ 22:03   No Growth Final  --  --      .Blood Blood-Peripheral  02-21 @ 22:00   No Growth Final  --  --          Radiology Results      Meds    MEDICATIONS  (STANDING):  allopurinol 100 milliGRAM(s) Oral daily  ascorbic acid 1000 milliGRAM(s) Oral daily  atorvastatin 80 milliGRAM(s) Oral at bedtime  chlorhexidine 2% Cloths 1 Application(s) Topical <User Schedule>  cholecalciferol 2000 Unit(s) Oral daily  diltiazem    milliGRAM(s) Oral daily  enoxaparin Injectable 40 milliGRAM(s) SubCutaneous daily  gabapentin 200 milliGRAM(s) Oral two times a day  hydrochlorothiazide 50 milliGRAM(s) Oral daily  montelukast 10 milliGRAM(s) Oral at bedtime  pantoprazole    Tablet 40 milliGRAM(s) Oral before breakfast  polyethylene glycol 3350 17 Gram(s) Oral daily  zinc sulfate 220 milliGRAM(s) Oral daily      MEDICATIONS  (PRN):  acetaminophen   Tablet .. 650 milliGRAM(s) Oral every 6 hours PRN Temp greater or equal to 38C (100.4F), Moderate Pain (4 - 6)  guaiFENesin   Syrup  (Sugar-Free) 200 milliGRAM(s) Oral every 6 hours PRN Cough  ondansetron Injectable 4 milliGRAM(s) IV Push every 6 hours PRN Nausea and/or Vomiting  sodium chloride 0.65% Nasal 1 Spray(s) Both Nostrils three times a day PRN Nasal Congestion      Physical Exam    Neuro :  no focal deficits  Respiratory: CTA B/L  CV: RRR, S1S2, no murmurs,   Abdominal: Soft, NT, ND +BS,  Extremities: No edema, + peripheral pulses    ASSESSMENT    pneumonia 2nd to covid 19,   marya,   hypokalemia,   h/o Cluster headache  BPH (benign prostatic hypertrophy)  Spinal stenosis s/p laminectomy  GERD (gastroesophageal reflux disease)  Hyperlipidemia  HTN (hypertension)            PLAN      contact and airborne isolation  completed remdesevir 2/26/21  covid ab neg  completed dexamethasone 3/2/21  pepcid, singulair, vit c, vit d, zinc   pulm f/u  cont albuterol inhaler   cont lovenox  procalcitonin neg noted  ,   D-dimer, crp, ldh, ferritin, lactate noted ,   afebrile  cont tylenol prn,   robitussin prn   blood cx neg  O2 sat (77% - 99%) optimizer pendant 4L   sat ambulating 84 on optimizer 4L  cont O2 via pendant and taper as tolerated    may give trial of n/c  incentive spirometer   pt low threshold for intubation and icu monitoring  icu cons noted  continue to monitor pulse oximetry  monitor biomarkers TIW  not accept to icu at this time  cont incentive spirometer   serum creat wnl   cont current meds       Patient is a 65y old  Male who presents with a chief complaint of SOB, fever, cough, body aches (12 Mar 2021 13:00)    pt seen in icu [  ], reg med floor [  x ], bed [x  ], chair at bedside [   ], a+o x3 [x ], lethargic [  ],    nad [x  ]        Allergies    No Known Allergies  Shrimp- Nasal congestion (Other)        Vitals    T(F): 98.4 (03-13-21 @ 05:18), Max: 98.4 (03-13-21 @ 05:18)  HR: 82 (03-13-21 @ 05:18) (82 - 103)  BP: 114/67 (03-13-21 @ 05:18) (108/68 - 117/72)  RR: 18 (03-13-21 @ 05:18) (18 - 20)  SpO2: 96% (03-13-21 @ 05:18) (88% - 97%)  Wt(kg): --  CAPILLARY BLOOD GLUCOSE          Labs                          11.5   9.79  )-----------( 254      ( 12 Mar 2021 09:16 )             34.6       03-12    137  |  101  |  19<H>  ----------------------------<  164<H>  3.3<L>   |  28  |  1.17    Ca    8.9      12 Mar 2021 09:16    TPro  6.8  /  Alb  2.3<L>  /  TBili  0.4  /  DBili  x   /  AST  26  /  ALT  75<H>  /  AlkPhos  166<H>  03-12            .Blood Blood-Peripheral  02-21 @ 22:03   No Growth Final  --  --      .Blood Blood-Peripheral  02-21 @ 22:00   No Growth Final  --  --          Radiology Results      < from: CT Abdomen and Pelvis No Cont (03.12.21 @ 22:10) >  IMPRESSION:    No ascites or pneumoperitoneum.    Punctate nonobstructive bilateral renal calculi without hydronephrosis. 1.2 cm right renal exophytic hyperdensity. This may represent hemorrhagic or proteinaceous cyst. Consider nonemergent correlation with renal ultrasound for better characterization.    Scattered bilateral groundglass opacities, likely in keeping with multifocal pneumonia of reported COVID-19. Short-term chest radiographic imaging follow-up is advised    < end of copied text >          Meds    MEDICATIONS  (STANDING):  allopurinol 100 milliGRAM(s) Oral daily  ascorbic acid 1000 milliGRAM(s) Oral daily  atorvastatin 80 milliGRAM(s) Oral at bedtime  chlorhexidine 2% Cloths 1 Application(s) Topical <User Schedule>  cholecalciferol 2000 Unit(s) Oral daily  diltiazem    milliGRAM(s) Oral daily  enoxaparin Injectable 40 milliGRAM(s) SubCutaneous daily  gabapentin 200 milliGRAM(s) Oral two times a day  hydrochlorothiazide 50 milliGRAM(s) Oral daily  montelukast 10 milliGRAM(s) Oral at bedtime  pantoprazole    Tablet 40 milliGRAM(s) Oral before breakfast  polyethylene glycol 3350 17 Gram(s) Oral daily  zinc sulfate 220 milliGRAM(s) Oral daily      MEDICATIONS  (PRN):  acetaminophen   Tablet .. 650 milliGRAM(s) Oral every 6 hours PRN Temp greater or equal to 38C (100.4F), Moderate Pain (4 - 6)  guaiFENesin   Syrup  (Sugar-Free) 200 milliGRAM(s) Oral every 6 hours PRN Cough  ondansetron Injectable 4 milliGRAM(s) IV Push every 6 hours PRN Nausea and/or Vomiting  sodium chloride 0.65% Nasal 1 Spray(s) Both Nostrils three times a day PRN Nasal Congestion      Physical Exam    Neuro :  no focal deficits  Respiratory: CTA B/L  CV: RRR, S1S2, no murmurs,   Abdominal: Soft, NT, ND +BS,  Extremities: No edema, + peripheral pulses    ASSESSMENT    pneumonia 2nd to covid 19,   marya,   hypokalemia,   h/o Cluster headache  BPH (benign prostatic hypertrophy)  Spinal stenosis s/p laminectomy  GERD (gastroesophageal reflux disease)  Hyperlipidemia  HTN (hypertension)            PLAN      contact and airborne isolation  completed remdesevir 2/26/21  covid ab neg  completed dexamethasone 3/2/21  pepcid, singulair, vit c, vit d, zinc   pulm f/u  cont albuterol inhaler   cont lovenox  procalcitonin neg noted  ,   D-dimer, crp, ldh, ferritin, lactate noted ,   afebrile  cont tylenol prn,   robitussin prn   blood cx neg  O2 sat (88% - 97%) optimizer pendant 6L   sat ambulating 84 on optimizer 4L  cont O2 via pendant and taper as tolerated    incentive spirometer   pt low threshold for intubation and icu monitoring  icu cons noted  continue to monitor pulse oximetry  monitor biomarkers TIW  not accept to icu at this time  cont incentive spirometer   serum creat wnl   ct abd pelv with No ascites or pneumoperitoneum. Punctate nonobstructive bilateral renal calculi without hydronephrosis. 1.2 cm right renal exophytic hyperdensity. This may represent hemorrhagic or proteinaceous cyst. Consider nonemergent correlation   with renal ultrasound for better characterization. Scattered bilateral groundglass opacities, likely in keeping with multifocal pneumonia of reported COVID-19. Short-term chest radiographic imaging follow-up is advised noted above  f/u renal us to eval right renal hyperdensity  cont current meds

## 2021-03-14 RX ADMIN — ENOXAPARIN SODIUM 40 MILLIGRAM(S): 100 INJECTION SUBCUTANEOUS at 12:27

## 2021-03-14 RX ADMIN — PANTOPRAZOLE SODIUM 40 MILLIGRAM(S): 20 TABLET, DELAYED RELEASE ORAL at 06:03

## 2021-03-14 RX ADMIN — Medication 1 SPRAY(S): at 21:38

## 2021-03-14 RX ADMIN — Medication 100 MILLIGRAM(S): at 12:27

## 2021-03-14 RX ADMIN — CHLORHEXIDINE GLUCONATE 1 APPLICATION(S): 213 SOLUTION TOPICAL at 05:53

## 2021-03-14 RX ADMIN — GABAPENTIN 200 MILLIGRAM(S): 400 CAPSULE ORAL at 17:44

## 2021-03-14 RX ADMIN — Medication 2000 UNIT(S): at 12:27

## 2021-03-14 RX ADMIN — ATORVASTATIN CALCIUM 80 MILLIGRAM(S): 80 TABLET, FILM COATED ORAL at 21:30

## 2021-03-14 RX ADMIN — Medication 50 MILLIGRAM(S): at 05:52

## 2021-03-14 RX ADMIN — MONTELUKAST 10 MILLIGRAM(S): 4 TABLET, CHEWABLE ORAL at 21:30

## 2021-03-14 RX ADMIN — ZINC SULFATE TAB 220 MG (50 MG ZINC EQUIVALENT) 220 MILLIGRAM(S): 220 (50 ZN) TAB at 12:27

## 2021-03-14 RX ADMIN — Medication 1000 MILLIGRAM(S): at 12:27

## 2021-03-14 RX ADMIN — Medication 240 MILLIGRAM(S): at 05:52

## 2021-03-14 RX ADMIN — GABAPENTIN 200 MILLIGRAM(S): 400 CAPSULE ORAL at 05:55

## 2021-03-14 NOTE — PROGRESS NOTE ADULT - SUBJECTIVE AND OBJECTIVE BOX
Patient is a 65y old  Male who presents with a chief complaint of SOB, fever, cough, body aches (14 Mar 2021 06:25)  Patient is laying in bed in NAD. Still on oxygen supp via optimizer. Oxygen tapered to 4 lites    INTERVAL HPI/OVERNIGHT EVENTS:      VITAL SIGNS:  T(F): 98.7 (03-14-21 @ 05:52)  HR: 84 (03-14-21 @ 05:52)  BP: 114/69 (03-14-21 @ 05:52)  RR: 18 (03-14-21 @ 05:52)  SpO2: 94% (03-13-21 @ 21:43)  Wt(kg): --  I&O's Detail          REVIEW OF SYSTEMS:    CONSTITUTIONAL:  No fevers, chills, sweats    HEENT:  Eyes:  No diplopia or blurred vision. ENT:  No earache, sore throat or runny nose.    CARDIOVASCULAR:  No pressure, squeezing, tightness, or heaviness about the chest; no palpitations.    RESPIRATORY:  Per HPI    GASTROINTESTINAL:  No abdominal pain, nausea, vomiting or diarrhea.    GENITOURINARY:  No dysuria, frequency or urgency.    NEUROLOGIC:  No paresthesias, fasciculations, seizures or weakness.    PSYCHIATRIC:  No disorder of thought or mood.      PHYSICAL EXAM:    Constitutional: Well developed and nourished  Eyes:Perrla  ENMT: normal  Neck:supple  Respiratory: good air entry  Cardiovascular: S1 S2 regular  Gastrointestinal: Soft, Non tender  Extremities: No edema  Vascular:normal  Neurological:Awake, alert,Ox3  Musculoskeletal:Normal      MEDICATIONS  (STANDING):  allopurinol 100 milliGRAM(s) Oral daily  ascorbic acid 1000 milliGRAM(s) Oral daily  atorvastatin 80 milliGRAM(s) Oral at bedtime  chlorhexidine 2% Cloths 1 Application(s) Topical <User Schedule>  cholecalciferol 2000 Unit(s) Oral daily  diltiazem    milliGRAM(s) Oral daily  enoxaparin Injectable 40 milliGRAM(s) SubCutaneous daily  gabapentin 200 milliGRAM(s) Oral two times a day  hydrochlorothiazide 50 milliGRAM(s) Oral daily  montelukast 10 milliGRAM(s) Oral at bedtime  pantoprazole    Tablet 40 milliGRAM(s) Oral before breakfast  polyethylene glycol 3350 17 Gram(s) Oral daily  zinc sulfate 220 milliGRAM(s) Oral daily    MEDICATIONS  (PRN):  acetaminophen   Tablet .. 650 milliGRAM(s) Oral every 6 hours PRN Temp greater or equal to 38C (100.4F), Moderate Pain (4 - 6)  guaiFENesin   Syrup  (Sugar-Free) 200 milliGRAM(s) Oral every 6 hours PRN Cough  ondansetron Injectable 4 milliGRAM(s) IV Push every 6 hours PRN Nausea and/or Vomiting  sodium chloride 0.65% Nasal 1 Spray(s) Both Nostrils three times a day PRN Nasal Congestion      Allergies    No Known Allergies    Intolerances    Shrimp- Nasal congestion (Other)      LABS:                        11.9   10.54 )-----------( 284      ( 13 Mar 2021 08:47 )             36.1     03-13    138  |  102  |  20<H>  ----------------------------<  128<H>  3.8   |  28  |  1.18    Ca    9.3      13 Mar 2021 08:47                CAPILLARY BLOOD GLUCOSE            RADIOLOGY & ADDITIONAL TESTS:    CXR:    Ct scan chest:    ekg;    echo:

## 2021-03-14 NOTE — PROGRESS NOTE ADULT - SUBJECTIVE AND OBJECTIVE BOX
Patient is a 65y old  Male who presents with a chief complaint of SOB, fever, cough, body aches (13 Mar 2021 09:38)    pt seen in icu [  ], reg med floor [  x ], bed [x  ], chair at bedside [   ], a+o x3 [x ], lethargic [  ],    nad [x  ]        Allergies    No Known Allergies  Shrimp- Nasal congestion (Other)        Vitals    T(F): 98.7 (03-14-21 @ 05:52), Max: 98.7 (03-14-21 @ 05:52)  HR: 84 (03-14-21 @ 05:52) (84 - 87)  BP: 114/69 (03-14-21 @ 05:52) (109/63 - 119/68)  RR: 18 (03-14-21 @ 05:52) (18 - 18)  SpO2: 94% (03-13-21 @ 21:43) (94% - 97%)  Wt(kg): --  CAPILLARY BLOOD GLUCOSE          Labs                          11.9   10.54 )-----------( 284      ( 13 Mar 2021 08:47 )             36.1       03-13    138  |  102  |  20<H>  ----------------------------<  128<H>  3.8   |  28  |  1.18    Ca    9.3      13 Mar 2021 08:47    TPro  6.8  /  Alb  2.3<L>  /  TBili  0.4  /  DBili  x   /  AST  26  /  ALT  75<H>  /  AlkPhos  166<H>  03-12            .Blood Blood-Peripheral  02-21 @ 22:03   No Growth Final  --  --      .Blood Blood-Peripheral  02-21 @ 22:00   No Growth Final  --  --          Radiology Results      Meds    MEDICATIONS  (STANDING):  allopurinol 100 milliGRAM(s) Oral daily  ascorbic acid 1000 milliGRAM(s) Oral daily  atorvastatin 80 milliGRAM(s) Oral at bedtime  chlorhexidine 2% Cloths 1 Application(s) Topical <User Schedule>  cholecalciferol 2000 Unit(s) Oral daily  diltiazem    milliGRAM(s) Oral daily  enoxaparin Injectable 40 milliGRAM(s) SubCutaneous daily  gabapentin 200 milliGRAM(s) Oral two times a day  hydrochlorothiazide 50 milliGRAM(s) Oral daily  montelukast 10 milliGRAM(s) Oral at bedtime  pantoprazole    Tablet 40 milliGRAM(s) Oral before breakfast  polyethylene glycol 3350 17 Gram(s) Oral daily  zinc sulfate 220 milliGRAM(s) Oral daily      MEDICATIONS  (PRN):  acetaminophen   Tablet .. 650 milliGRAM(s) Oral every 6 hours PRN Temp greater or equal to 38C (100.4F), Moderate Pain (4 - 6)  guaiFENesin   Syrup  (Sugar-Free) 200 milliGRAM(s) Oral every 6 hours PRN Cough  ondansetron Injectable 4 milliGRAM(s) IV Push every 6 hours PRN Nausea and/or Vomiting  sodium chloride 0.65% Nasal 1 Spray(s) Both Nostrils three times a day PRN Nasal Congestion      Physical Exam    Neuro :  no focal deficits  Respiratory: CTA B/L  CV: RRR, S1S2, no murmurs,   Abdominal: Soft, NT, ND +BS,  Extremities: No edema, + peripheral pulses    ASSESSMENT    pneumonia 2nd to covid 19,   marya,   hypokalemia,   h/o Cluster headache  BPH (benign prostatic hypertrophy)  Spinal stenosis s/p laminectomy  GERD (gastroesophageal reflux disease)  Hyperlipidemia  HTN (hypertension)            PLAN      contact and airborne isolation  completed remdesevir 2/26/21  covid ab neg  completed dexamethasone 3/2/21  pepcid, singulair, vit c, vit d, zinc   pulm f/u  cont albuterol inhaler   cont lovenox  procalcitonin neg noted  ,   D-dimer, crp, ldh, ferritin, lactate noted ,   afebrile  cont tylenol prn,   robitussin prn   blood cx neg  O2 sat (88% - 97%) optimizer pendant 6L   sat ambulating 84 on optimizer 4L  cont O2 via pendant and taper as tolerated    incentive spirometer   pt low threshold for intubation and icu monitoring  icu cons noted  continue to monitor pulse oximetry  monitor biomarkers TIW  not accept to icu at this time  cont incentive spirometer   serum creat wnl   ct abd pelv with No ascites or pneumoperitoneum. Punctate nonobstructive bilateral renal calculi without hydronephrosis. 1.2 cm right renal exophytic hyperdensity. This may represent hemorrhagic or proteinaceous cyst. Consider nonemergent correlation   with renal ultrasound for better characterization. Scattered bilateral groundglass opacities, likely in keeping with multifocal pneumonia of reported COVID-19. Short-term chest radiographic imaging follow-up is advised noted above  f/u renal us to eval right renal hyperdensity  cont current meds               Patient is a 65y old  Male who presents with a chief complaint of SOB, fever, cough, body aches (13 Mar 2021 09:38)    pt seen in icu [  ], reg med floor [  x ], bed [x  ], chair at bedside [   ], a+o x3 [x ], lethargic [  ],    nad [x  ]        Allergies    No Known Allergies  Shrimp- Nasal congestion (Other)        Vitals    T(F): 98.7 (03-14-21 @ 05:52), Max: 98.7 (03-14-21 @ 05:52)  HR: 84 (03-14-21 @ 05:52) (84 - 87)  BP: 114/69 (03-14-21 @ 05:52) (109/63 - 119/68)  RR: 18 (03-14-21 @ 05:52) (18 - 18)  SpO2: 94% (03-13-21 @ 21:43) (94% - 97%)  Wt(kg): --  CAPILLARY BLOOD GLUCOSE          Labs                          11.9   10.54 )-----------( 284      ( 13 Mar 2021 08:47 )             36.1       03-13    138  |  102  |  20<H>  ----------------------------<  128<H>  3.8   |  28  |  1.18    Ca    9.3      13 Mar 2021 08:47    TPro  6.8  /  Alb  2.3<L>  /  TBili  0.4  /  DBili  x   /  AST  26  /  ALT  75<H>  /  AlkPhos  166<H>  03-12            .Blood Blood-Peripheral  02-21 @ 22:03   No Growth Final  --  --      .Blood Blood-Peripheral  02-21 @ 22:00   No Growth Final  --  --          Radiology Results      Meds    MEDICATIONS  (STANDING):  allopurinol 100 milliGRAM(s) Oral daily  ascorbic acid 1000 milliGRAM(s) Oral daily  atorvastatin 80 milliGRAM(s) Oral at bedtime  chlorhexidine 2% Cloths 1 Application(s) Topical <User Schedule>  cholecalciferol 2000 Unit(s) Oral daily  diltiazem    milliGRAM(s) Oral daily  enoxaparin Injectable 40 milliGRAM(s) SubCutaneous daily  gabapentin 200 milliGRAM(s) Oral two times a day  hydrochlorothiazide 50 milliGRAM(s) Oral daily  montelukast 10 milliGRAM(s) Oral at bedtime  pantoprazole    Tablet 40 milliGRAM(s) Oral before breakfast  polyethylene glycol 3350 17 Gram(s) Oral daily  zinc sulfate 220 milliGRAM(s) Oral daily      MEDICATIONS  (PRN):  acetaminophen   Tablet .. 650 milliGRAM(s) Oral every 6 hours PRN Temp greater or equal to 38C (100.4F), Moderate Pain (4 - 6)  guaiFENesin   Syrup  (Sugar-Free) 200 milliGRAM(s) Oral every 6 hours PRN Cough  ondansetron Injectable 4 milliGRAM(s) IV Push every 6 hours PRN Nausea and/or Vomiting  sodium chloride 0.65% Nasal 1 Spray(s) Both Nostrils three times a day PRN Nasal Congestion      Physical Exam    Neuro :  no focal deficits  Respiratory: CTA B/L  CV: RRR, S1S2, no murmurs,   Abdominal: Soft, NT, ND +BS,  Extremities: No edema, + peripheral pulses    ASSESSMENT    pneumonia 2nd to covid 19,   marya,   hypokalemia,   h/o Cluster headache  BPH (benign prostatic hypertrophy)  Spinal stenosis s/p laminectomy  GERD (gastroesophageal reflux disease)  Hyperlipidemia  HTN (hypertension)            PLAN      contact and airborne isolation  completed remdesevir 2/26/21  covid ab neg  completed dexamethasone 3/2/21  pepcid, singulair, vit c, vit d, zinc   pulm f/u  cont albuterol inhaler   cont lovenox  procalcitonin neg noted  ,   D-dimer, crp, ldh, ferritin, lactate noted ,   afebrile  cont tylenol prn,   robitussin prn   blood cx neg  O2 sat (94% - 97%) optimizer pendant 6L   sat ambulating 84 on optimizer 4L  cont O2 via pendant and taper as tolerated    incentive spirometer   pt low threshold for intubation and icu monitoring  icu cons noted  continue to monitor pulse oximetry  monitor biomarkers TIW  not accept to icu at this time  cont incentive spirometer   serum creat wnl   ct abd pelv with No ascites or pneumoperitoneum. Punctate nonobstructive bilateral renal calculi without hydronephrosis. 1.2 cm right renal exophytic hyperdensity. This may represent hemorrhagic or proteinaceous cyst. Consider nonemergent correlation   with renal ultrasound for better characterization. Scattered bilateral groundglass opacities, likely in keeping with multifocal pneumonia of reported COVID-19. Short-term chest radiographic imaging follow-up is advised noted above  f/u renal us to eval right renal hyperdensity  cont current meds

## 2021-03-15 LAB
ALBUMIN SERPL ELPH-MCNC: 2.6 G/DL — LOW (ref 3.5–5)
ALP SERPL-CCNC: 185 U/L — HIGH (ref 40–120)
ALT FLD-CCNC: 109 U/L DA — HIGH (ref 10–60)
ANION GAP SERPL CALC-SCNC: 11 MMOL/L — SIGNIFICANT CHANGE UP (ref 5–17)
AST SERPL-CCNC: 43 U/L — HIGH (ref 10–40)
BILIRUB SERPL-MCNC: 0.5 MG/DL — SIGNIFICANT CHANGE UP (ref 0.2–1.2)
BUN SERPL-MCNC: 22 MG/DL — HIGH (ref 7–18)
CALCIUM SERPL-MCNC: 9.6 MG/DL — SIGNIFICANT CHANGE UP (ref 8.4–10.5)
CHLORIDE SERPL-SCNC: 101 MMOL/L — SIGNIFICANT CHANGE UP (ref 96–108)
CO2 SERPL-SCNC: 28 MMOL/L — SIGNIFICANT CHANGE UP (ref 22–31)
CREAT SERPL-MCNC: 1.09 MG/DL — SIGNIFICANT CHANGE UP (ref 0.5–1.3)
GLUCOSE SERPL-MCNC: 99 MG/DL — SIGNIFICANT CHANGE UP (ref 70–99)
HCT VFR BLD CALC: 36.7 % — LOW (ref 39–50)
HGB BLD-MCNC: 12.2 G/DL — LOW (ref 13–17)
MCHC RBC-ENTMCNC: 29 PG — SIGNIFICANT CHANGE UP (ref 27–34)
MCHC RBC-ENTMCNC: 33.2 GM/DL — SIGNIFICANT CHANGE UP (ref 32–36)
MCV RBC AUTO: 87.4 FL — SIGNIFICANT CHANGE UP (ref 80–100)
NRBC # BLD: 0 /100 WBCS — SIGNIFICANT CHANGE UP (ref 0–0)
PLATELET # BLD AUTO: 314 K/UL — SIGNIFICANT CHANGE UP (ref 150–400)
POTASSIUM SERPL-MCNC: 3.7 MMOL/L — SIGNIFICANT CHANGE UP (ref 3.5–5.3)
POTASSIUM SERPL-SCNC: 3.7 MMOL/L — SIGNIFICANT CHANGE UP (ref 3.5–5.3)
PROT SERPL-MCNC: 7.4 G/DL — SIGNIFICANT CHANGE UP (ref 6–8.3)
RBC # BLD: 4.2 M/UL — SIGNIFICANT CHANGE UP (ref 4.2–5.8)
RBC # FLD: 14.1 % — SIGNIFICANT CHANGE UP (ref 10.3–14.5)
SODIUM SERPL-SCNC: 140 MMOL/L — SIGNIFICANT CHANGE UP (ref 135–145)
WBC # BLD: 8.85 K/UL — SIGNIFICANT CHANGE UP (ref 3.8–10.5)
WBC # FLD AUTO: 8.85 K/UL — SIGNIFICANT CHANGE UP (ref 3.8–10.5)

## 2021-03-15 RX ORDER — FAMOTIDINE 10 MG/ML
20 INJECTION INTRAVENOUS
Refills: 0 | Status: DISCONTINUED | OUTPATIENT
Start: 2021-03-15 | End: 2021-03-19

## 2021-03-15 RX ORDER — ALBUTEROL 90 UG/1
2 AEROSOL, METERED ORAL EVERY 6 HOURS
Refills: 0 | Status: DISCONTINUED | OUTPATIENT
Start: 2021-03-15 | End: 2021-03-19

## 2021-03-15 RX ADMIN — FAMOTIDINE 20 MILLIGRAM(S): 10 INJECTION INTRAVENOUS at 19:12

## 2021-03-15 RX ADMIN — MONTELUKAST 10 MILLIGRAM(S): 4 TABLET, CHEWABLE ORAL at 21:38

## 2021-03-15 RX ADMIN — Medication 100 MILLIGRAM(S): at 12:10

## 2021-03-15 RX ADMIN — Medication 1000 MILLIGRAM(S): at 12:10

## 2021-03-15 RX ADMIN — GABAPENTIN 200 MILLIGRAM(S): 400 CAPSULE ORAL at 17:55

## 2021-03-15 RX ADMIN — Medication 240 MILLIGRAM(S): at 06:15

## 2021-03-15 RX ADMIN — PANTOPRAZOLE SODIUM 40 MILLIGRAM(S): 20 TABLET, DELAYED RELEASE ORAL at 06:15

## 2021-03-15 RX ADMIN — Medication 50 MILLIGRAM(S): at 06:15

## 2021-03-15 RX ADMIN — Medication 2000 UNIT(S): at 12:10

## 2021-03-15 RX ADMIN — POLYETHYLENE GLYCOL 3350 17 GRAM(S): 17 POWDER, FOR SOLUTION ORAL at 12:09

## 2021-03-15 RX ADMIN — GABAPENTIN 200 MILLIGRAM(S): 400 CAPSULE ORAL at 06:15

## 2021-03-15 RX ADMIN — ZINC SULFATE TAB 220 MG (50 MG ZINC EQUIVALENT) 220 MILLIGRAM(S): 220 (50 ZN) TAB at 12:11

## 2021-03-15 RX ADMIN — ATORVASTATIN CALCIUM 80 MILLIGRAM(S): 80 TABLET, FILM COATED ORAL at 21:38

## 2021-03-15 RX ADMIN — CHLORHEXIDINE GLUCONATE 1 APPLICATION(S): 213 SOLUTION TOPICAL at 06:15

## 2021-03-15 RX ADMIN — ENOXAPARIN SODIUM 40 MILLIGRAM(S): 100 INJECTION SUBCUTANEOUS at 12:09

## 2021-03-15 NOTE — PROGRESS NOTE ADULT - ASSESSMENT
1. PNA 2nd to Covid-19  - PCR positive.   - CXR noted with  increasing infiltrates.   - CT abd/pelvis noted 3/12  - Continue Vit C, Vit D, Zinc   - Off Dexamethasone, Off Remdesivir  - Continue Singulair and Pepcid   - ICU consult noted - no escalation at present.   - Robitussin PRN for cough  - Tylenol PRN for temp   - O2 Supp - Taper O2 as tolerated - still using pendant.   - Monitor O2 Sat at rest and after exertion.  - Monitor labs  - F.U CXR  - Unable to tolerate prone positioning due to prior neck surgery.   - Encouraged side lying periodically as tolerated.   - Incentive Spirometry  - Isolation precautions   - Nasal saline spray for dryness    - DVT and GI PPX     2. HTN  - Continue meds   - Monitor BP     3. HLD  - Cont meds  - Lipid panel     4. GERD  - Antireflux diet   - Cont meds    5. MECHELLE  Improved  Avoid nephrotoxic drugs  monitor BMP    6. Renal Hyperdensity - Right  CT abdomen/pelvis noted.   US to further eval.

## 2021-03-15 NOTE — PROGRESS NOTE ADULT - PROBLEM SELECTOR PLAN 2
Improving   -likely prerenal to dehydration  -baseline creatinine 1.17 - remains at baseline   -monitor BMP Improving   - likely hemodynamically mediated in the setting of hypovolemia  - creatinine 1.09 - remains at baseline   - avoid nephrotoxic agents  - monitor BMP

## 2021-03-15 NOTE — PROGRESS NOTE ADULT - SUBJECTIVE AND OBJECTIVE BOX
Pt is awake, alert, lying in bed on Oxymizer pendant. Saturating 96%. For US of kidney.     INTERVAL HPI/OVERNIGHT EVENTS:      VITAL SIGNS:  T(F): 97.7 (03-15-21 @ 05:04)  HR: 83 (03-15-21 @ 05:04)  BP: 121/70 (03-15-21 @ 05:04)  RR: 19 (03-15-21 @ 05:04)  SpO2: 96% (03-15-21 @ 05:04)  Wt(kg): --  I&O's Detail    14 Mar 2021 07:01  -  15 Mar 2021 07:00  --------------------------------------------------------  IN:  Total IN: 0 mL    OUT:    Voided (mL): 400 mL  Total OUT: 400 mL    Total NET: -400 mL    REVIEW OF SYSTEMS:    CONSTITUTIONAL:  No fevers, chills, sweats    HEENT:  Eyes:  No diplopia or blurred vision. ENT:  No earache, sore throat or runny nose.    CARDIOVASCULAR:  No pressure, squeezing, tightness, or heaviness about the chest; no palpitations.    RESPIRATORY:  Per HPI    GASTROINTESTINAL:  No abdominal pain, nausea, vomiting or diarrhea.    GENITOURINARY:  No dysuria, frequency or urgency.    NEUROLOGIC:  No paresthesias, fasciculations, seizures or weakness.    PSYCHIATRIC:  No disorder of thought or mood.      PHYSICAL EXAM:    Constitutional: Well developed and nourished  Eyes: Perrla  ENMT: normal  Neck: supple  Respiratory: good air entry  Cardiovascular: S1 S2 regular  Gastrointestinal: Soft, Non tender  Extremities: No edema  Vascular: normal  Neurological: Awake, alert,Ox3  Musculoskeletal: Normal      MEDICATIONS  (STANDING):  allopurinol 100 milliGRAM(s) Oral daily  ascorbic acid 1000 milliGRAM(s) Oral daily  atorvastatin 80 milliGRAM(s) Oral at bedtime  chlorhexidine 2% Cloths 1 Application(s) Topical <User Schedule>  cholecalciferol 2000 Unit(s) Oral daily  diltiazem    milliGRAM(s) Oral daily  enoxaparin Injectable 40 milliGRAM(s) SubCutaneous daily  gabapentin 200 milliGRAM(s) Oral two times a day  hydrochlorothiazide 50 milliGRAM(s) Oral daily  montelukast 10 milliGRAM(s) Oral at bedtime  pantoprazole    Tablet 40 milliGRAM(s) Oral before breakfast  polyethylene glycol 3350 17 Gram(s) Oral daily  zinc sulfate 220 milliGRAM(s) Oral daily    MEDICATIONS  (PRN):  acetaminophen   Tablet .. 650 milliGRAM(s) Oral every 6 hours PRN Temp greater or equal to 38C (100.4F), Moderate Pain (4 - 6)  guaiFENesin   Syrup  (Sugar-Free) 200 milliGRAM(s) Oral every 6 hours PRN Cough  ondansetron Injectable 4 milliGRAM(s) IV Push every 6 hours PRN Nausea and/or Vomiting  sodium chloride 0.65% Nasal 1 Spray(s) Both Nostrils three times a day PRN Nasal Congestion      Allergies    No Known Allergies    Intolerances    Shrimp- Nasal congestion (Other)      LABS:                        12.2   8.85  )-----------( 314      ( 15 Mar 2021 08:29 )             36.7     03-15    140  |  101  |  22<H>  ----------------------------<  99  3.7   |  28  |  1.09    Ca    9.6      15 Mar 2021 08:29    TPro  7.4  /  Alb  2.6<L>  /  TBili  0.5  /  DBili  x   /  AST  43<H>  /  ALT  109<H>  /  AlkPhos  185<H>  03-15              CAPILLARY BLOOD GLUCOSE            RADIOLOGY & ADDITIONAL TESTS:    CXR:    Ct scan chest:    < from: CT Abdomen and Pelvis No Cont (03.12.21 @ 22:10) >  IMPRESSION:    No ascites or pneumoperitoneum.    Punctate nonobstructive bilateral renal calculi without hydronephrosis. 1.2 cm right renal exophytic hyperdensity. This may represent hemorrhagic or proteinaceous cyst. Consider nonemergent correlation with renal ultrasound for better characterization.    Scattered bilateral groundglass opacities, likely in keeping with multifocal pneumonia of reported COVID-19. Short-term chest radiographic imaging follow-up is advised.    < end of copied text >  ekg;    echo:

## 2021-03-15 NOTE — PROGRESS NOTE ADULT - PROBLEM SELECTOR PLAN 3
-continue home med of diltiazem and hydrochlorothiazide  -monitor BP diltiazem and hydrochlorothiazide with parameters

## 2021-03-15 NOTE — PROGRESS NOTE ADULT - PROBLEM SELECTOR PLAN 1
Improving  -SpO2 95% 4L optimizer at rest  -COVID antibody negative  -completed remdersivir  -completed  decadron   -c/w incentive spirometer therapy  -f/u repeat chest x-ray found possible free air under diaphram   pending urgent CT A/P  -Pulmonology Dr. Reid Improving  s/p remdesivir, dexamethasone  continue with 4L via oximizer pendant   - c/w incentive spirometer therapy  - CT chest with GGO's c/w COVID-19 infection  - Vitamin C, D, Zinc  - Albuterol INH, Montelukast  - Pepcid BID  -Pulmonology Dr. Reid

## 2021-03-15 NOTE — PROGRESS NOTE ADULT - SUBJECTIVE AND OBJECTIVE BOX
NP Note discussed with  Primary Attending    Patient is a 65y old  Male who presents with a chief complaint of SOB, fever, cough, body aches (15 Mar 2021 11:10)      INTERVAL HPI/OVERNIGHT EVENTS: no new complaints    MEDICATIONS  (STANDING):  allopurinol 100 milliGRAM(s) Oral daily  ascorbic acid 1000 milliGRAM(s) Oral daily  atorvastatin 80 milliGRAM(s) Oral at bedtime  chlorhexidine 2% Cloths 1 Application(s) Topical <User Schedule>  cholecalciferol 2000 Unit(s) Oral daily  diltiazem    milliGRAM(s) Oral daily  enoxaparin Injectable 40 milliGRAM(s) SubCutaneous daily  gabapentin 200 milliGRAM(s) Oral two times a day  hydrochlorothiazide 50 milliGRAM(s) Oral daily  montelukast 10 milliGRAM(s) Oral at bedtime  pantoprazole    Tablet 40 milliGRAM(s) Oral before breakfast  polyethylene glycol 3350 17 Gram(s) Oral daily  zinc sulfate 220 milliGRAM(s) Oral daily    MEDICATIONS  (PRN):  acetaminophen   Tablet .. 650 milliGRAM(s) Oral every 6 hours PRN Temp greater or equal to 38C (100.4F), Moderate Pain (4 - 6)  guaiFENesin   Syrup  (Sugar-Free) 200 milliGRAM(s) Oral every 6 hours PRN Cough  ondansetron Injectable 4 milliGRAM(s) IV Push every 6 hours PRN Nausea and/or Vomiting  sodium chloride 0.65% Nasal 1 Spray(s) Both Nostrils three times a day PRN Nasal Congestion      __________________________________________________  REVIEW OF SYSTEMS:    CONSTITUTIONAL: No fever,   EYES: no acute visual disturbances  NECK: No pain or stiffness  RESPIRATORY: No cough; No shortness of breath  CARDIOVASCULAR: No chest pain, no palpitations  GASTROINTESTINAL: No pain. No nausea or vomiting; No diarrhea   NEUROLOGICAL: No headache or numbness, no tremors  MUSCULOSKELETAL: No joint pain, no muscle pain  GENITOURINARY: no dysuria, no frequency, no hesitancy  PSYCHIATRY: no depression , no anxiety  ALL OTHER  ROS negative        Vital Signs Last 24 Hrs  T(C): 36.6 (15 Mar 2021 13:44), Max: 36.7 (14 Mar 2021 20:47)  T(F): 97.9 (15 Mar 2021 13:44), Max: 98 (14 Mar 2021 20:47)  HR: 81 (15 Mar 2021 13:44) (81 - 88)  BP: 112/67 (15 Mar 2021 13:44) (112/67 - 121/70)  BP(mean): --  RR: 18 (15 Mar 2021 13:44) (18 - 19)  SpO2: 97% (15 Mar 2021 13:44) (92% - 97%)    ________________________________________________  PHYSICAL EXAM:  GENERAL: NAD  HEENT: Normocephalic;  conjunctivae and sclerae clear; moist mucous membranes;   NECK : supple  CHEST/LUNG: diffuse rhonchi   HEART: S1 S2  regular; no murmurs, gallops or rubs  ABDOMEN: Soft, Nontender, Nondistended; Bowel sounds present  EXTREMITIES: no cyanosis; no edema; no calf tenderness  SKIN: warm and dry; no rash  NERVOUS SYSTEM:  Awake and alert; Oriented  to place, person and time ; no new deficits    _________________________________________________  LABS:                        12.2   8.85  )-----------( 314      ( 15 Mar 2021 08:29 )             36.7     03-15    140  |  101  |  22<H>  ----------------------------<  99  3.7   |  28  |  1.09    Ca    9.6      15 Mar 2021 08:29    TPro  7.4  /  Alb  2.6<L>  /  TBili  0.5  /  DBili  x   /  AST  43<H>  /  ALT  109<H>  /  AlkPhos  185<H>  03-15        CAPILLARY BLOOD GLUCOSE            RADIOLOGY & ADDITIONAL TESTS:    Imaging  Reviewed:  YES  < from: CT Abdomen and Pelvis No Cont (03.12.21 @ 22:10) >  IMPRESSION:    No ascites or pneumoperitoneum.    Punctate nonobstructive bilateral renal calculi without hydronephrosis. 1.2 cm right renal exophytic hyperdensity. This may represent hemorrhagic or proteinaceous cyst. Consider nonemergent correlation with renal ultrasound for better characterization.    Scattered bilateral groundglass opacities, likely in keeping with multifocal pneumonia of reported COVID-19. Short-term chest radiographic imaging follow-up is advised.        Consultant(s) Notes Reviewed:   YES      Plan of care was discussed with patient and /or primary care giver; all questions and concerns were addressed

## 2021-03-15 NOTE — PROGRESS NOTE ADULT - ASSESSMENT
Patient is a 65 year old male from home, with PMH of HTN, HLD and GERD who presented to the ED due to SOB, cough, fever, body aches for the past 6 days. Patient states he was tested positive for COVID on 2/16. Patient states his wife and son who he lives with have also tested positive for COVID as well. Patient states that he noted to have been saturating at 88% on room air and was advised to come to the ED by PCP for oxygen supplementation. Patient also complains of nausea, diarrhea and poor appetite at home for the past few days. Patient admitted to medicine for acute respiratory failure secondary to COVID infection. completed Remdesivir and Decadron. Leukocytosis improving. Remains on oxymizer pendant not tolerating off N/C. 02sat 95% at rest on oxymizer Home O2 ordered. Plan to wean to N/C for safe discharge. Repeat CXR found possible free air under diaphram, URGENT CT A/P  ordered for evaluate findings.  Patient is a 65 year old male from home, with PMH of HTN, HLD and GERD who presented to the ED with c/o SOB, cough, fevers and myalgias for 6 days it he setting of a recent COVID-19 diagnosis. Patient reports desaturations to 88% on room air for which his PCP advised evaluation in the ED. Patient admitted to medicine for further management of his acute respiratory failure secondary to COVID infection. He is now s/p a course of  Remdesivir and Decadron. Leukocytosis improving. Remains on oxymizer pendant not tolerating off N/C- home O2 delivered. Plan to wean to N/C for safe discharge. Repeat CXR with ? free air under diaphragm, CT A/P without any acute findings - incidental renal exophytic hyperdensity noted and renal US recommended, however, given patient's COVID-19 status and non-emergent exam patient will have to f/u as OP.

## 2021-03-15 NOTE — PROGRESS NOTE ADULT - PROBLEM SELECTOR PLAN 8
-SpO2 95% 4 L optimizer at rest  must be weaning off to n/c prior to d/c  -ordered home oxygen -SpO2 95% 4 L optimizer at rest  must be weaning off to n/c prior to d/c  - home oxygen delivered

## 2021-03-15 NOTE — PROGRESS NOTE ADULT - SUBJECTIVE AND OBJECTIVE BOX
Patient is a 65y old  Male who presents with a chief complaint of SOB, fever, cough, body aches (14 Mar 2021 10:35)    pt seen in icu [  ], reg med floor [  x ], bed [x  ], chair at bedside [   ], a+o x3 [x ], lethargic [  ],    nad [x  ]      Allergies    No Known Allergies  Shrimp- Nasal congestion (Other)        Vitals    T(F): 97.7 (03-15-21 @ 05:04), Max: 98.1 (03-14-21 @ 14:33)  HR: 83 (03-15-21 @ 05:04) (83 - 102)  BP: 121/70 (03-15-21 @ 05:04) (108/65 - 121/70)  RR: 19 (03-15-21 @ 05:04) (18 - 20)  SpO2: 96% (03-15-21 @ 05:04) (92% - 96%)  Wt(kg): --  CAPILLARY BLOOD GLUCOSE          Labs                          11.9   10.54 )-----------( 284      ( 13 Mar 2021 08:47 )             36.1       03-13    138  |  102  |  20<H>  ----------------------------<  128<H>  3.8   |  28  |  1.18    Ca    9.3      13 Mar 2021 08:47              .Blood Blood-Peripheral  02-21 @ 22:03   No Growth Final  --  --      .Blood Blood-Peripheral  02-21 @ 22:00   No Growth Final  --  --          Radiology Results      Meds    MEDICATIONS  (STANDING):  allopurinol 100 milliGRAM(s) Oral daily  ascorbic acid 1000 milliGRAM(s) Oral daily  atorvastatin 80 milliGRAM(s) Oral at bedtime  chlorhexidine 2% Cloths 1 Application(s) Topical <User Schedule>  cholecalciferol 2000 Unit(s) Oral daily  diltiazem    milliGRAM(s) Oral daily  enoxaparin Injectable 40 milliGRAM(s) SubCutaneous daily  gabapentin 200 milliGRAM(s) Oral two times a day  hydrochlorothiazide 50 milliGRAM(s) Oral daily  montelukast 10 milliGRAM(s) Oral at bedtime  pantoprazole    Tablet 40 milliGRAM(s) Oral before breakfast  polyethylene glycol 3350 17 Gram(s) Oral daily  zinc sulfate 220 milliGRAM(s) Oral daily      MEDICATIONS  (PRN):  acetaminophen   Tablet .. 650 milliGRAM(s) Oral every 6 hours PRN Temp greater or equal to 38C (100.4F), Moderate Pain (4 - 6)  guaiFENesin   Syrup  (Sugar-Free) 200 milliGRAM(s) Oral every 6 hours PRN Cough  ondansetron Injectable 4 milliGRAM(s) IV Push every 6 hours PRN Nausea and/or Vomiting  sodium chloride 0.65% Nasal 1 Spray(s) Both Nostrils three times a day PRN Nasal Congestion      Physical Exam    Neuro :  no focal deficits  Respiratory: CTA B/L  CV: RRR, S1S2, no murmurs,   Abdominal: Soft, NT, ND +BS,  Extremities: No edema, + peripheral pulses    ASSESSMENT    pneumonia 2nd to covid 19,   marya,   hypokalemia,   h/o Cluster headache  BPH (benign prostatic hypertrophy)  Spinal stenosis s/p laminectomy  GERD (gastroesophageal reflux disease)  Hyperlipidemia  HTN (hypertension)            PLAN      contact and airborne isolation  completed remdesevir 2/26/21  covid ab neg  completed dexamethasone 3/2/21  pepcid, singulair, vit c, vit d, zinc   pulm f/u  cont albuterol inhaler   cont lovenox  procalcitonin neg noted  ,   D-dimer, crp, ldh, ferritin, lactate noted ,   afebrile  cont tylenol prn,   robitussin prn   blood cx neg  O2 sat (94% - 97%) optimizer pendant 6L   sat ambulating 84 on optimizer 4L  cont O2 via pendant and taper as tolerated    incentive spirometer   pt low threshold for intubation and icu monitoring  icu cons noted  continue to monitor pulse oximetry  monitor biomarkers TIW  not accept to icu at this time  cont incentive spirometer   serum creat wnl   ct abd pelv with No ascites or pneumoperitoneum. Punctate nonobstructive bilateral renal calculi without hydronephrosis. 1.2 cm right renal exophytic hyperdensity. This may represent hemorrhagic or proteinaceous cyst. Consider nonemergent correlation   with renal ultrasound for better characterization. Scattered bilateral groundglass opacities, likely in keeping with multifocal pneumonia of reported COVID-19. Short-term chest radiographic imaging follow-up is advised noted above  f/u renal us to eval right renal hyperdensity  cont current meds         Patient is a 65y old  Male who presents with a chief complaint of SOB, fever, cough, body aches (14 Mar 2021 10:35)    pt seen in icu [  ], reg med floor [  x ], bed [x  ], chair at bedside [   ], a+o x3 [x ], lethargic [  ],    nad [x  ]      Allergies    No Known Allergies  Shrimp- Nasal congestion (Other)        Vitals    T(F): 97.7 (03-15-21 @ 05:04), Max: 98.1 (03-14-21 @ 14:33)  HR: 83 (03-15-21 @ 05:04) (83 - 102)  BP: 121/70 (03-15-21 @ 05:04) (108/65 - 121/70)  RR: 19 (03-15-21 @ 05:04) (18 - 20)  SpO2: 96% (03-15-21 @ 05:04) (92% - 96%)  Wt(kg): --  CAPILLARY BLOOD GLUCOSE          Labs                          11.9   10.54 )-----------( 284      ( 13 Mar 2021 08:47 )             36.1       03-13    138  |  102  |  20<H>  ----------------------------<  128<H>  3.8   |  28  |  1.18    Ca    9.3      13 Mar 2021 08:47              .Blood Blood-Peripheral  02-21 @ 22:03   No Growth Final  --  --      .Blood Blood-Peripheral  02-21 @ 22:00   No Growth Final  --  --          Radiology Results      Meds    MEDICATIONS  (STANDING):  allopurinol 100 milliGRAM(s) Oral daily  ascorbic acid 1000 milliGRAM(s) Oral daily  atorvastatin 80 milliGRAM(s) Oral at bedtime  chlorhexidine 2% Cloths 1 Application(s) Topical <User Schedule>  cholecalciferol 2000 Unit(s) Oral daily  diltiazem    milliGRAM(s) Oral daily  enoxaparin Injectable 40 milliGRAM(s) SubCutaneous daily  gabapentin 200 milliGRAM(s) Oral two times a day  hydrochlorothiazide 50 milliGRAM(s) Oral daily  montelukast 10 milliGRAM(s) Oral at bedtime  pantoprazole    Tablet 40 milliGRAM(s) Oral before breakfast  polyethylene glycol 3350 17 Gram(s) Oral daily  zinc sulfate 220 milliGRAM(s) Oral daily      MEDICATIONS  (PRN):  acetaminophen   Tablet .. 650 milliGRAM(s) Oral every 6 hours PRN Temp greater or equal to 38C (100.4F), Moderate Pain (4 - 6)  guaiFENesin   Syrup  (Sugar-Free) 200 milliGRAM(s) Oral every 6 hours PRN Cough  ondansetron Injectable 4 milliGRAM(s) IV Push every 6 hours PRN Nausea and/or Vomiting  sodium chloride 0.65% Nasal 1 Spray(s) Both Nostrils three times a day PRN Nasal Congestion      Physical Exam    Neuro :  no focal deficits  Respiratory: CTA B/L  CV: RRR, S1S2, no murmurs,   Abdominal: Soft, NT, ND +BS,  Extremities: No edema, + peripheral pulses    ASSESSMENT    pneumonia 2nd to covid 19,   marya,   hypokalemia,   h/o Cluster headache  BPH (benign prostatic hypertrophy)  Spinal stenosis s/p laminectomy  GERD (gastroesophageal reflux disease)  Hyperlipidemia  HTN (hypertension)            PLAN      contact and airborne isolation  completed remdesevir 2/26/21  covid ab neg  completed dexamethasone 3/2/21  pepcid, singulair, vit c, vit d, zinc   pulm f/u  cont albuterol inhaler   cont lovenox  procalcitonin neg noted  ,   D-dimer, crp, ldh, ferritin, lactate noted ,   afebrile  cont tylenol prn,   robitussin prn   blood cx neg  O2 sat (92% - 96%) optimizer pendant 4L   cont O2 via pendant and taper as tolerated    incentive spirometer   pt low threshold for intubation and icu monitoring  icu cons noted  continue to monitor pulse oximetry  monitor biomarkers TIW  not accept to icu at this time  cont incentive spirometer   serum creat wnl   ct abd pelv with No ascites or pneumoperitoneum. Punctate nonobstructive bilateral renal calculi without hydronephrosis. 1.2 cm right renal exophytic hyperdensity. This may represent hemorrhagic or proteinaceous cyst. Consider nonemergent correlation   with renal ultrasound for better characterization. Scattered bilateral groundglass opacities, likely in keeping with multifocal pneumonia of reported COVID-19. Short-term chest radiographic imaging follow-up is advised noted above  f/u renal us to eval right renal hyperdensity  cont current meds

## 2021-03-16 LAB
ALBUMIN SERPL ELPH-MCNC: 2.8 G/DL — LOW (ref 3.5–5)
ALP SERPL-CCNC: 186 U/L — HIGH (ref 40–120)
ALT FLD-CCNC: 105 U/L DA — HIGH (ref 10–60)
ANION GAP SERPL CALC-SCNC: 9 MMOL/L — SIGNIFICANT CHANGE UP (ref 5–17)
AST SERPL-CCNC: 33 U/L — SIGNIFICANT CHANGE UP (ref 10–40)
BASOPHILS # BLD AUTO: 0.04 K/UL — SIGNIFICANT CHANGE UP (ref 0–0.2)
BASOPHILS NFR BLD AUTO: 0.4 % — SIGNIFICANT CHANGE UP (ref 0–2)
BILIRUB SERPL-MCNC: 0.4 MG/DL — SIGNIFICANT CHANGE UP (ref 0.2–1.2)
BUN SERPL-MCNC: 18 MG/DL — SIGNIFICANT CHANGE UP (ref 7–18)
CALCIUM SERPL-MCNC: 10 MG/DL — SIGNIFICANT CHANGE UP (ref 8.4–10.5)
CHLORIDE SERPL-SCNC: 97 MMOL/L — SIGNIFICANT CHANGE UP (ref 96–108)
CO2 SERPL-SCNC: 29 MMOL/L — SIGNIFICANT CHANGE UP (ref 22–31)
CREAT SERPL-MCNC: 1.13 MG/DL — SIGNIFICANT CHANGE UP (ref 0.5–1.3)
D DIMER BLD IA.RAPID-MCNC: 359 NG/ML DDU — HIGH
EOSINOPHIL # BLD AUTO: 0.29 K/UL — SIGNIFICANT CHANGE UP (ref 0–0.5)
EOSINOPHIL NFR BLD AUTO: 2.9 % — SIGNIFICANT CHANGE UP (ref 0–6)
FERRITIN SERPL-MCNC: 601 NG/ML — HIGH (ref 30–400)
GLUCOSE SERPL-MCNC: 117 MG/DL — HIGH (ref 70–99)
HCT VFR BLD CALC: 39.8 % — SIGNIFICANT CHANGE UP (ref 39–50)
HGB BLD-MCNC: 13.1 G/DL — SIGNIFICANT CHANGE UP (ref 13–17)
IMM GRANULOCYTES NFR BLD AUTO: 0.7 % — SIGNIFICANT CHANGE UP (ref 0–1.5)
LDH SERPL L TO P-CCNC: 211 U/L — SIGNIFICANT CHANGE UP (ref 120–225)
LYMPHOCYTES # BLD AUTO: 2.5 K/UL — SIGNIFICANT CHANGE UP (ref 1–3.3)
LYMPHOCYTES # BLD AUTO: 25.2 % — SIGNIFICANT CHANGE UP (ref 13–44)
MCHC RBC-ENTMCNC: 29 PG — SIGNIFICANT CHANGE UP (ref 27–34)
MCHC RBC-ENTMCNC: 32.9 GM/DL — SIGNIFICANT CHANGE UP (ref 32–36)
MCV RBC AUTO: 88.1 FL — SIGNIFICANT CHANGE UP (ref 80–100)
MONOCYTES # BLD AUTO: 0.82 K/UL — SIGNIFICANT CHANGE UP (ref 0–0.9)
MONOCYTES NFR BLD AUTO: 8.3 % — SIGNIFICANT CHANGE UP (ref 2–14)
NEUTROPHILS # BLD AUTO: 6.19 K/UL — SIGNIFICANT CHANGE UP (ref 1.8–7.4)
NEUTROPHILS NFR BLD AUTO: 62.5 % — SIGNIFICANT CHANGE UP (ref 43–77)
NRBC # BLD: 0 /100 WBCS — SIGNIFICANT CHANGE UP (ref 0–0)
PLATELET # BLD AUTO: 363 K/UL — SIGNIFICANT CHANGE UP (ref 150–400)
POTASSIUM SERPL-MCNC: 3.6 MMOL/L — SIGNIFICANT CHANGE UP (ref 3.5–5.3)
POTASSIUM SERPL-SCNC: 3.6 MMOL/L — SIGNIFICANT CHANGE UP (ref 3.5–5.3)
PROT SERPL-MCNC: 7.8 G/DL — SIGNIFICANT CHANGE UP (ref 6–8.3)
RBC # BLD: 4.52 M/UL — SIGNIFICANT CHANGE UP (ref 4.2–5.8)
RBC # FLD: 14.3 % — SIGNIFICANT CHANGE UP (ref 10.3–14.5)
SARS-COV-2 RNA SPEC QL NAA+PROBE: SIGNIFICANT CHANGE UP
SODIUM SERPL-SCNC: 135 MMOL/L — SIGNIFICANT CHANGE UP (ref 135–145)
WBC # BLD: 9.91 K/UL — SIGNIFICANT CHANGE UP (ref 3.8–10.5)
WBC # FLD AUTO: 9.91 K/UL — SIGNIFICANT CHANGE UP (ref 3.8–10.5)

## 2021-03-16 RX ADMIN — FAMOTIDINE 20 MILLIGRAM(S): 10 INJECTION INTRAVENOUS at 05:41

## 2021-03-16 RX ADMIN — GABAPENTIN 200 MILLIGRAM(S): 400 CAPSULE ORAL at 17:21

## 2021-03-16 RX ADMIN — Medication 100 MILLIGRAM(S): at 12:30

## 2021-03-16 RX ADMIN — ZINC SULFATE TAB 220 MG (50 MG ZINC EQUIVALENT) 220 MILLIGRAM(S): 220 (50 ZN) TAB at 12:30

## 2021-03-16 RX ADMIN — CHLORHEXIDINE GLUCONATE 1 APPLICATION(S): 213 SOLUTION TOPICAL at 05:41

## 2021-03-16 RX ADMIN — FAMOTIDINE 20 MILLIGRAM(S): 10 INJECTION INTRAVENOUS at 17:21

## 2021-03-16 RX ADMIN — ENOXAPARIN SODIUM 40 MILLIGRAM(S): 100 INJECTION SUBCUTANEOUS at 12:30

## 2021-03-16 RX ADMIN — Medication 50 MILLIGRAM(S): at 05:41

## 2021-03-16 RX ADMIN — MONTELUKAST 10 MILLIGRAM(S): 4 TABLET, CHEWABLE ORAL at 21:17

## 2021-03-16 RX ADMIN — Medication 240 MILLIGRAM(S): at 05:41

## 2021-03-16 RX ADMIN — POLYETHYLENE GLYCOL 3350 17 GRAM(S): 17 POWDER, FOR SOLUTION ORAL at 12:31

## 2021-03-16 RX ADMIN — Medication 650 MILLIGRAM(S): at 10:38

## 2021-03-16 RX ADMIN — Medication 1000 MILLIGRAM(S): at 12:30

## 2021-03-16 RX ADMIN — Medication 650 MILLIGRAM(S): at 12:29

## 2021-03-16 RX ADMIN — ATORVASTATIN CALCIUM 80 MILLIGRAM(S): 80 TABLET, FILM COATED ORAL at 21:17

## 2021-03-16 RX ADMIN — Medication 2000 UNIT(S): at 12:31

## 2021-03-16 RX ADMIN — GABAPENTIN 200 MILLIGRAM(S): 400 CAPSULE ORAL at 05:41

## 2021-03-16 NOTE — PROGRESS NOTE ADULT - SUBJECTIVE AND OBJECTIVE BOX
Pt is awake, alert, oob to chair in NAD. On Oxymizer pendant. Saturating 96%.     INTERVAL HPI/OVERNIGHT EVENTS:      VITAL SIGNS:  T(F): 97.7 (03-16-21 @ 05:47)  HR: 92 (03-16-21 @ 05:47)  BP: 114/78 (03-16-21 @ 05:47)  RR: 18 (03-16-21 @ 05:47)  SpO2: 96% (03-16-21 @ 05:47)  Wt(kg): --  I&O's Detail    REVIEW OF SYSTEMS:    CONSTITUTIONAL:  No fevers, chills, sweats    HEENT:  Eyes:  No diplopia or blurred vision. ENT:  No earache, sore throat or runny nose.    CARDIOVASCULAR:  No pressure, squeezing, tightness, or heaviness about the chest; no palpitations.    RESPIRATORY:  Per HPI    GASTROINTESTINAL:  No abdominal pain, nausea, vomiting or diarrhea.    GENITOURINARY:  No dysuria, frequency or urgency.    NEUROLOGIC:  No paresthesias, fasciculations, seizures or weakness.    PSYCHIATRIC:  No disorder of thought or mood.      PHYSICAL EXAM:    Constitutional: Well developed and nourished  Eyes: Perrla  ENMT: normal  Neck: supple  Respiratory: good air entry  Cardiovascular: S1 S2 regular  Gastrointestinal: Soft, Non tender  Extremities: No edema  Vascular: normal  Neurological: Awake, alert,Ox3  Musculoskeletal: Normal      MEDICATIONS  (STANDING):  allopurinol 100 milliGRAM(s) Oral daily  ascorbic acid 1000 milliGRAM(s) Oral daily  atorvastatin 80 milliGRAM(s) Oral at bedtime  chlorhexidine 2% Cloths 1 Application(s) Topical <User Schedule>  cholecalciferol 2000 Unit(s) Oral daily  diltiazem    milliGRAM(s) Oral daily  enoxaparin Injectable 40 milliGRAM(s) SubCutaneous daily  famotidine    Tablet 20 milliGRAM(s) Oral two times a day  gabapentin 200 milliGRAM(s) Oral two times a day  hydrochlorothiazide 50 milliGRAM(s) Oral daily  montelukast 10 milliGRAM(s) Oral at bedtime  polyethylene glycol 3350 17 Gram(s) Oral daily  zinc sulfate 220 milliGRAM(s) Oral daily    MEDICATIONS  (PRN):  acetaminophen   Tablet .. 650 milliGRAM(s) Oral every 6 hours PRN Temp greater or equal to 38C (100.4F), Moderate Pain (4 - 6)  ALBUTerol    90 MICROgram(s) HFA Inhaler 2 Puff(s) Inhalation every 6 hours PRN Bronchospasm  guaiFENesin   Syrup  (Sugar-Free) 200 milliGRAM(s) Oral every 6 hours PRN Cough  ondansetron Injectable 4 milliGRAM(s) IV Push every 6 hours PRN Nausea and/or Vomiting  sodium chloride 0.65% Nasal 1 Spray(s) Both Nostrils three times a day PRN Nasal Congestion      Allergies    No Known Allergies    Intolerances    Shrimp- Nasal congestion (Other)      LABS:                        13.1   9.91  )-----------( 363      ( 16 Mar 2021 09:01 )             39.8     03-16    135  |  97  |  18  ----------------------------<  117<H>  3.6   |  29  |  1.13    Ca    10.0      16 Mar 2021 09:01    TPro  7.8  /  Alb  2.8<L>  /  TBili  0.4  /  DBili  x   /  AST  33  /  ALT  105<H>  /  AlkPhos  186<H>  03-16    CAPILLARY BLOOD GLUCOSE    pro-bnp -- 03-16 @ 09:01     d-dimer 359  03-16 @ 09:01      RADIOLOGY & ADDITIONAL TESTS:    CXR:    Ct scan chest:    ekg;    echo:

## 2021-03-16 NOTE — PROGRESS NOTE ADULT - PROBLEM SELECTOR PLAN 2
Improving   - likely hemodynamically mediated in the setting of hypovolemia  - creatinine 1.09 - remains at baseline   - avoid nephrotoxic agents  - monitor BMP

## 2021-03-16 NOTE — PROGRESS NOTE ADULT - PROBLEM SELECTOR PLAN 8
-SpO2 95% 4 L optimizer at rest  must be weaned off to n/c prior to d/c  - home oxygen delivered -SpO2 95% 4 L optimizer at rest  must be weaned off to n/c prior to d/c  - home oxygen delivered  - physical therapy consult placed for d/c recommendations

## 2021-03-16 NOTE — PROGRESS NOTE ADULT - SUBJECTIVE AND OBJECTIVE BOX
Patient is a 65y old  Male who presents with a chief complaint of SOB, fever, cough, body aches (15 Mar 2021 16:48)    pt seen in icu [  ], reg med floor [  x ], bed [x  ], chair at bedside [   ], a+o x3 [x ], lethargic [  ],    nad [x  ]        Allergies    No Known Allergies  Shrimp- Nasal congestion (Other)        Vitals    T(F): 97.7 (03-16-21 @ 05:47), Max: 97.9 (03-15-21 @ 13:44)  HR: 92 (03-16-21 @ 05:47) (81 - 95)  BP: 114/78 (03-16-21 @ 05:47) (112/67 - 116/69)  RR: 18 (03-16-21 @ 05:47) (18 - 18)  SpO2: 96% (03-16-21 @ 05:47) (96% - 99%)  Wt(kg): --  CAPILLARY BLOOD GLUCOSE          Labs                          12.2   8.85  )-----------( 314      ( 15 Mar 2021 08:29 )             36.7       03-15    140  |  101  |  22<H>  ----------------------------<  99  3.7   |  28  |  1.09    Ca    9.6      15 Mar 2021 08:29    TPro  7.4  /  Alb  2.6<L>  /  TBili  0.5  /  DBili  x   /  AST  43<H>  /  ALT  109<H>  /  AlkPhos  185<H>  03-15            .Blood Blood-Peripheral  02-21 @ 22:03   No Growth Final  --  --      .Blood Blood-Peripheral  02-21 @ 22:00   No Growth Final  --  --          Radiology Results      Meds    MEDICATIONS  (STANDING):  allopurinol 100 milliGRAM(s) Oral daily  ascorbic acid 1000 milliGRAM(s) Oral daily  atorvastatin 80 milliGRAM(s) Oral at bedtime  chlorhexidine 2% Cloths 1 Application(s) Topical <User Schedule>  cholecalciferol 2000 Unit(s) Oral daily  diltiazem    milliGRAM(s) Oral daily  enoxaparin Injectable 40 milliGRAM(s) SubCutaneous daily  famotidine    Tablet 20 milliGRAM(s) Oral two times a day  gabapentin 200 milliGRAM(s) Oral two times a day  hydrochlorothiazide 50 milliGRAM(s) Oral daily  montelukast 10 milliGRAM(s) Oral at bedtime  polyethylene glycol 3350 17 Gram(s) Oral daily  zinc sulfate 220 milliGRAM(s) Oral daily      MEDICATIONS  (PRN):  acetaminophen   Tablet .. 650 milliGRAM(s) Oral every 6 hours PRN Temp greater or equal to 38C (100.4F), Moderate Pain (4 - 6)  ALBUTerol    90 MICROgram(s) HFA Inhaler 2 Puff(s) Inhalation every 6 hours PRN Bronchospasm  guaiFENesin   Syrup  (Sugar-Free) 200 milliGRAM(s) Oral every 6 hours PRN Cough  ondansetron Injectable 4 milliGRAM(s) IV Push every 6 hours PRN Nausea and/or Vomiting  sodium chloride 0.65% Nasal 1 Spray(s) Both Nostrils three times a day PRN Nasal Congestion      Physical Exam    Neuro :  no focal deficits  Respiratory: CTA B/L  CV: RRR, S1S2, no murmurs,   Abdominal: Soft, NT, ND +BS,  Extremities: No edema, + peripheral pulses    ASSESSMENT    pneumonia 2nd to covid 19,   marya,   hypokalemia,   h/o Cluster headache  BPH (benign prostatic hypertrophy)  Spinal stenosis s/p laminectomy  GERD (gastroesophageal reflux disease)  Hyperlipidemia  HTN (hypertension)            PLAN      contact and airborne isolation  completed remdesevir 2/26/21  covid ab neg  completed dexamethasone 3/2/21  pepcid, singulair, vit c, vit d, zinc   pulm f/u  cont albuterol inhaler   cont lovenox  procalcitonin neg noted  ,   D-dimer, crp, ldh, ferritin, lactate noted ,   afebrile  cont tylenol prn,   robitussin prn   blood cx neg  O2 sat (92% - 96%) optimizer pendant 4L   cont O2 via pendant and taper as tolerated    incentive spirometer   pt low threshold for intubation and icu monitoring  icu cons noted  continue to monitor pulse oximetry  monitor biomarkers TIW  not accept to icu at this time  cont incentive spirometer   serum creat wnl   ct abd pelv with No ascites or pneumoperitoneum. Punctate nonobstructive bilateral renal calculi without hydronephrosis. 1.2 cm right renal exophytic hyperdensity. This may represent hemorrhagic or proteinaceous cyst. Consider nonemergent correlation   with renal ultrasound for better characterization. Scattered bilateral groundglass opacities, likely in keeping with multifocal pneumonia of reported COVID-19. Short-term chest radiographic imaging follow-up is advised noted above  f/u renal us to eval right renal hyperdensity  cont current meds     Patient is a 65y old  Male who presents with a chief complaint of SOB, fever, cough, body aches (15 Mar 2021 16:48)    pt seen in icu [  ], reg med floor [  x ], bed [x  ], chair at bedside [   ], a+o x3 [x ], lethargic [  ],    nad [x  ]        Allergies    No Known Allergies  Shrimp- Nasal congestion (Other)        Vitals    T(F): 97.7 (03-16-21 @ 05:47), Max: 97.9 (03-15-21 @ 13:44)  HR: 92 (03-16-21 @ 05:47) (81 - 95)  BP: 114/78 (03-16-21 @ 05:47) (112/67 - 116/69)  RR: 18 (03-16-21 @ 05:47) (18 - 18)  SpO2: 96% (03-16-21 @ 05:47) (96% - 99%)  Wt(kg): --  CAPILLARY BLOOD GLUCOSE          Labs                          12.2   8.85  )-----------( 314      ( 15 Mar 2021 08:29 )             36.7       03-15    140  |  101  |  22<H>  ----------------------------<  99  3.7   |  28  |  1.09    Ca    9.6      15 Mar 2021 08:29    TPro  7.4  /  Alb  2.6<L>  /  TBili  0.5  /  DBili  x   /  AST  43<H>  /  ALT  109<H>  /  AlkPhos  185<H>  03-15            .Blood Blood-Peripheral  02-21 @ 22:03   No Growth Final  --  --      .Blood Blood-Peripheral  02-21 @ 22:00   No Growth Final  --  --          Radiology Results      Meds    MEDICATIONS  (STANDING):  allopurinol 100 milliGRAM(s) Oral daily  ascorbic acid 1000 milliGRAM(s) Oral daily  atorvastatin 80 milliGRAM(s) Oral at bedtime  chlorhexidine 2% Cloths 1 Application(s) Topical <User Schedule>  cholecalciferol 2000 Unit(s) Oral daily  diltiazem    milliGRAM(s) Oral daily  enoxaparin Injectable 40 milliGRAM(s) SubCutaneous daily  famotidine    Tablet 20 milliGRAM(s) Oral two times a day  gabapentin 200 milliGRAM(s) Oral two times a day  hydrochlorothiazide 50 milliGRAM(s) Oral daily  montelukast 10 milliGRAM(s) Oral at bedtime  polyethylene glycol 3350 17 Gram(s) Oral daily  zinc sulfate 220 milliGRAM(s) Oral daily      MEDICATIONS  (PRN):  acetaminophen   Tablet .. 650 milliGRAM(s) Oral every 6 hours PRN Temp greater or equal to 38C (100.4F), Moderate Pain (4 - 6)  ALBUTerol    90 MICROgram(s) HFA Inhaler 2 Puff(s) Inhalation every 6 hours PRN Bronchospasm  guaiFENesin   Syrup  (Sugar-Free) 200 milliGRAM(s) Oral every 6 hours PRN Cough  ondansetron Injectable 4 milliGRAM(s) IV Push every 6 hours PRN Nausea and/or Vomiting  sodium chloride 0.65% Nasal 1 Spray(s) Both Nostrils three times a day PRN Nasal Congestion      Physical Exam    Neuro :  no focal deficits  Respiratory: CTA B/L  CV: RRR, S1S2, no murmurs,   Abdominal: Soft, NT, ND +BS,  Extremities: No edema, + peripheral pulses    ASSESSMENT    pneumonia 2nd to covid 19,   marya,   hypokalemia,   h/o Cluster headache  BPH (benign prostatic hypertrophy)  Spinal stenosis s/p laminectomy  GERD (gastroesophageal reflux disease)  Hyperlipidemia  HTN (hypertension)            PLAN      contact and airborne isolation  completed remdesevir 2/26/21  covid ab neg  completed dexamethasone 3/2/21  pepcid, singulair, vit c, vit d, zinc   pulm f/u  cont albuterol inhaler   cont lovenox  procalcitonin neg noted  ,   D-dimer, crp, ldh, ferritin, lactate noted ,   afebrile  cont tylenol prn,   robitussin prn   blood cx neg  O2 sat (96% - 99%) optimizer pendant 4L   cont O2 via pendant and taper as tolerated    incentive spirometer   pt low threshold for intubation and icu monitoring  icu cons noted  continue to monitor pulse oximetry  monitor biomarkers TIW  not accept to icu at this time  cont incentive spirometer   serum creat wnl   ct abd pelv with No ascites or pneumoperitoneum. Punctate nonobstructive bilateral renal calculi without hydronephrosis. 1.2 cm right renal exophytic hyperdensity. This may represent hemorrhagic or proteinaceous cyst. Consider nonemergent correlation   with renal ultrasound for better characterization. Scattered bilateral groundglass opacities, likely in keeping with multifocal pneumonia of reported COVID-19. Short-term chest radiographic imaging follow-up is advised noted above  f/u renal us to eval right renal hyperdensity  phys tx eval  f/u rept covid test  cont current meds   d/c plan for possible k[facility] pending phys tx eval and covid result

## 2021-03-16 NOTE — PROGRESS NOTE ADULT - ASSESSMENT
Patient is a 65 year old male from home, with PMH of HTN, HLD and GERD who presented to the ED with c/o SOB, cough, fevers and myalgias for 6 days it he setting of a recent COVID-19 diagnosis. Patient reports desaturations to 88% on room air for which his PCP advised evaluation in the ED. Patient admitted to medicine for further management of his acute respiratory failure secondary to COVID infection. He is now s/p a course of  Remdesivir and Decadron. Leukocytosis improving. Remains on oxymizer pendant not tolerating off N/C- home O2 delivered. Plan to wean to N/C for safe discharge. Repeat CXR with ? free air under diaphragm, CT A/P without any acute findings - incidental renal exophytic hyperdensity noted and renal US recommended, however, given patient's COVID-19 status and non-emergent exam patient will have to f/u as OP. Patient was able to transfer out of bed to the chair today and tolerated well.

## 2021-03-16 NOTE — PROGRESS NOTE ADULT - PROBLEM SELECTOR PLAN 1
Improving  s/p remdesivir, dexamethasone  continue with 4L via oximizer pendant   - c/w incentive spirometer therapy  - CT chest with GGO's c/w COVID-19 infection  - Vitamin C, D, Zinc  - Albuterol INH, Montelukast  - Pepcid BID  -Pulmonology Dr. Reid

## 2021-03-16 NOTE — PROGRESS NOTE ADULT - SUBJECTIVE AND OBJECTIVE BOX
NP Note discussed with  Primary Attending    Patient is a 65y old  Male who presents with a chief complaint of SOB, fever, cough, body aches (15 Mar 2021 11:10)      INTERVAL HPI/OVERNIGHT EVENTS: no new complaints    MEDICATIONS  (STANDING):  allopurinol 100 milliGRAM(s) Oral daily  ascorbic acid 1000 milliGRAM(s) Oral daily  atorvastatin 80 milliGRAM(s) Oral at bedtime  chlorhexidine 2% Cloths 1 Application(s) Topical <User Schedule>  cholecalciferol 2000 Unit(s) Oral daily  diltiazem    milliGRAM(s) Oral daily  enoxaparin Injectable 40 milliGRAM(s) SubCutaneous daily  gabapentin 200 milliGRAM(s) Oral two times a day  hydrochlorothiazide 50 milliGRAM(s) Oral daily  montelukast 10 milliGRAM(s) Oral at bedtime  pantoprazole    Tablet 40 milliGRAM(s) Oral before breakfast  polyethylene glycol 3350 17 Gram(s) Oral daily  zinc sulfate 220 milliGRAM(s) Oral daily    MEDICATIONS  (PRN):  acetaminophen   Tablet .. 650 milliGRAM(s) Oral every 6 hours PRN Temp greater or equal to 38C (100.4F), Moderate Pain (4 - 6)  guaiFENesin   Syrup  (Sugar-Free) 200 milliGRAM(s) Oral every 6 hours PRN Cough  ondansetron Injectable 4 milliGRAM(s) IV Push every 6 hours PRN Nausea and/or Vomiting  sodium chloride 0.65% Nasal 1 Spray(s) Both Nostrils three times a day PRN Nasal Congestion      __________________________________________________  REVIEW OF SYSTEMS:    CONSTITUTIONAL: No fever,   EYES: no acute visual disturbances  NECK: No pain or stiffness  RESPIRATORY: No cough; No shortness of breath  CARDIOVASCULAR: No chest pain, no palpitations  GASTROINTESTINAL: No pain. No nausea or vomiting; No diarrhea   NEUROLOGICAL: No headache or numbness, no tremors  MUSCULOSKELETAL: No joint pain, no muscle pain  GENITOURINARY: no dysuria, no frequency, no hesitancy  PSYCHIATRY: no depression , no anxiety  ALL OTHER  ROS negative        Vital Signs Last 24 Hrs  T(C): 36.6 (15 Mar 2021 13:44), Max: 36.7 (14 Mar 2021 20:47)  T(F): 97.9 (15 Mar 2021 13:44), Max: 98 (14 Mar 2021 20:47)  HR: 81 (15 Mar 2021 13:44) (81 - 88)  BP: 112/67 (15 Mar 2021 13:44) (112/67 - 121/70)  BP(mean): --  RR: 18 (15 Mar 2021 13:44) (18 - 19)  SpO2: 97% (15 Mar 2021 13:44) (92% - 97%)    ________________________________________________  PHYSICAL EXAM:  GENERAL: NAD  HEENT: Normocephalic;  conjunctivae and sclerae clear; moist mucous membranes;   NECK : supple  CHEST/LUNG: diffuse rhonchi, fair inspiratory effort    HEART: S1 S2  regular; no murmurs, gallops or rubs  ABDOMEN: Soft, Nontender, Nondistended; Bowel sounds present  EXTREMITIES: no cyanosis; no edema; no calf tenderness  SKIN: warm and dry; no rash  NERVOUS SYSTEM:  Awake and alert; Oriented  to place, person and time ; no new deficits    _________________________________________________  LABS:                        12.2   8.85  )-----------( 314      ( 15 Mar 2021 08:29 )             36.7     03-15    140  |  101  |  22<H>  ----------------------------<  99  3.7   |  28  |  1.09    Ca    9.6      15 Mar 2021 08:29    TPro  7.4  /  Alb  2.6<L>  /  TBili  0.5  /  DBili  x   /  AST  43<H>  /  ALT  109<H>  /  AlkPhos  185<H>  03-15        CAPILLARY BLOOD GLUCOSE            RADIOLOGY & ADDITIONAL TESTS:    Imaging  Reviewed:  YES  < from: CT Abdomen and Pelvis No Cont (03.12.21 @ 22:10) >  IMPRESSION:    No ascites or pneumoperitoneum.    Punctate nonobstructive bilateral renal calculi without hydronephrosis. 1.2 cm right renal exophytic hyperdensity. This may represent hemorrhagic or proteinaceous cyst. Consider nonemergent correlation with renal ultrasound for better characterization.    Scattered bilateral groundglass opacities, likely in keeping with multifocal pneumonia of reported COVID-19. Short-term chest radiographic imaging follow-up is advised.        Consultant(s) Notes Reviewed:   YES      Plan of care was discussed with patient and /or primary care giver; all questions and concerns were addressed

## 2021-03-17 LAB
ALBUMIN SERPL ELPH-MCNC: 2.8 G/DL — LOW (ref 3.5–5)
ALP SERPL-CCNC: 162 U/L — HIGH (ref 40–120)
ALT FLD-CCNC: 83 U/L DA — HIGH (ref 10–60)
ANION GAP SERPL CALC-SCNC: 10 MMOL/L — SIGNIFICANT CHANGE UP (ref 5–17)
AST SERPL-CCNC: 23 U/L — SIGNIFICANT CHANGE UP (ref 10–40)
BILIRUB SERPL-MCNC: 0.5 MG/DL — SIGNIFICANT CHANGE UP (ref 0.2–1.2)
BUN SERPL-MCNC: 21 MG/DL — HIGH (ref 7–18)
CALCIUM SERPL-MCNC: 9.8 MG/DL — SIGNIFICANT CHANGE UP (ref 8.4–10.5)
CHLORIDE SERPL-SCNC: 98 MMOL/L — SIGNIFICANT CHANGE UP (ref 96–108)
CO2 SERPL-SCNC: 30 MMOL/L — SIGNIFICANT CHANGE UP (ref 22–31)
CREAT SERPL-MCNC: 1.22 MG/DL — SIGNIFICANT CHANGE UP (ref 0.5–1.3)
GLUCOSE SERPL-MCNC: 109 MG/DL — HIGH (ref 70–99)
HCT VFR BLD CALC: 35.3 % — LOW (ref 39–50)
HGB BLD-MCNC: 11.7 G/DL — LOW (ref 13–17)
MCHC RBC-ENTMCNC: 29 PG — SIGNIFICANT CHANGE UP (ref 27–34)
MCHC RBC-ENTMCNC: 33.1 GM/DL — SIGNIFICANT CHANGE UP (ref 32–36)
MCV RBC AUTO: 87.4 FL — SIGNIFICANT CHANGE UP (ref 80–100)
NRBC # BLD: 0 /100 WBCS — SIGNIFICANT CHANGE UP (ref 0–0)
PLATELET # BLD AUTO: 348 K/UL — SIGNIFICANT CHANGE UP (ref 150–400)
POTASSIUM SERPL-MCNC: 3.4 MMOL/L — LOW (ref 3.5–5.3)
POTASSIUM SERPL-SCNC: 3.4 MMOL/L — LOW (ref 3.5–5.3)
PROT SERPL-MCNC: 7.1 G/DL — SIGNIFICANT CHANGE UP (ref 6–8.3)
RBC # BLD: 4.04 M/UL — LOW (ref 4.2–5.8)
RBC # FLD: 14.1 % — SIGNIFICANT CHANGE UP (ref 10.3–14.5)
SODIUM SERPL-SCNC: 138 MMOL/L — SIGNIFICANT CHANGE UP (ref 135–145)
WBC # BLD: 10.71 K/UL — HIGH (ref 3.8–10.5)
WBC # FLD AUTO: 10.71 K/UL — HIGH (ref 3.8–10.5)

## 2021-03-17 RX ORDER — POTASSIUM CHLORIDE 20 MEQ
40 PACKET (EA) ORAL ONCE
Refills: 0 | Status: COMPLETED | OUTPATIENT
Start: 2021-03-17 | End: 2021-03-17

## 2021-03-17 RX ADMIN — ENOXAPARIN SODIUM 40 MILLIGRAM(S): 100 INJECTION SUBCUTANEOUS at 11:25

## 2021-03-17 RX ADMIN — CHLORHEXIDINE GLUCONATE 1 APPLICATION(S): 213 SOLUTION TOPICAL at 05:22

## 2021-03-17 RX ADMIN — Medication 650 MILLIGRAM(S): at 05:41

## 2021-03-17 RX ADMIN — Medication 1000 MILLIGRAM(S): at 11:25

## 2021-03-17 RX ADMIN — FAMOTIDINE 20 MILLIGRAM(S): 10 INJECTION INTRAVENOUS at 05:23

## 2021-03-17 RX ADMIN — Medication 40 MILLIEQUIVALENT(S): at 09:49

## 2021-03-17 RX ADMIN — MONTELUKAST 10 MILLIGRAM(S): 4 TABLET, CHEWABLE ORAL at 21:26

## 2021-03-17 RX ADMIN — GABAPENTIN 200 MILLIGRAM(S): 400 CAPSULE ORAL at 05:23

## 2021-03-17 RX ADMIN — Medication 240 MILLIGRAM(S): at 05:23

## 2021-03-17 RX ADMIN — Medication 650 MILLIGRAM(S): at 04:44

## 2021-03-17 RX ADMIN — GABAPENTIN 200 MILLIGRAM(S): 400 CAPSULE ORAL at 17:13

## 2021-03-17 RX ADMIN — FAMOTIDINE 20 MILLIGRAM(S): 10 INJECTION INTRAVENOUS at 17:14

## 2021-03-17 RX ADMIN — Medication 50 MILLIGRAM(S): at 05:23

## 2021-03-17 RX ADMIN — Medication 100 MILLIGRAM(S): at 11:25

## 2021-03-17 RX ADMIN — ATORVASTATIN CALCIUM 80 MILLIGRAM(S): 80 TABLET, FILM COATED ORAL at 21:26

## 2021-03-17 RX ADMIN — ZINC SULFATE TAB 220 MG (50 MG ZINC EQUIVALENT) 220 MILLIGRAM(S): 220 (50 ZN) TAB at 11:25

## 2021-03-17 RX ADMIN — Medication 2000 UNIT(S): at 11:25

## 2021-03-17 NOTE — PHYSICAL THERAPY INITIAL EVALUATION ADULT - PERTINENT HX OF CURRENT PROBLEM, REHAB EVAL
Patient is a 65 year old male from home, with PMH of HTN, HLD and GERD, who presented to the ED due to SOB, cough, fever, body aches for the past 6 days. Patient states he was tested positive for COVID on 2/16. but current test (3/16/21) shows negative result.

## 2021-03-17 NOTE — PHYSICAL THERAPY INITIAL EVALUATION ADULT - ADDITIONAL COMMENTS
Patient reports living in a private house with 4 steps to access first floor from curbside. Patient independent in all ADL's, ambulation and IADL's prior to Covid-19 sickness.

## 2021-03-17 NOTE — PROGRESS NOTE ADULT - ASSESSMENT
Patient is a 65 year old male from home, with PMH of HTN, HLD and GERD who presented to the ED with c/o SOB, cough, fevers and myalgias for 6 days it he setting of a recent COVID-19 diagnosis. Patient reports desaturations to 88% on room air for which his PCP advised evaluation in the ED. Patient admitted to medicine for further management of his acute respiratory failure secondary to COVID infection. He is now s/p a course of  Remdesivir and Decadron. Leukocytosis improving. Remains on oxymizer pendant not tolerating off N/C- home O2 delivered. Plan to wean to N/C for safe discharge. Repeat CXR with ? free air under diaphragm, CT A/P without any acute findings - incidental renal exophytic hyperdensity noted and renal US recommended, however, given patient's COVID-19 status and non-emergent exam patient will have to f/u as OP. Patient was able to transfer out of bed to the chair today and tolerated well. Patient pending PT consult for discharge recommendations.

## 2021-03-17 NOTE — PHYSICAL THERAPY INITIAL EVALUATION ADULT - DIAGNOSIS, PT EVAL
Patient with Covid-19 related weakness, difficulty in breathing and limitation of functional mobility

## 2021-03-17 NOTE — PROGRESS NOTE ADULT - PROBLEM SELECTOR PLAN 1
Improving  s/p remdesivir, dexamethasone  continue with 4L via oximizer pendant   - c/w incentive spirometer therapy  - CT chest with GGO's c/w COVID-19 infection  - Vitamin C, D, Zinc  - Albuterol INH, Montelukast  - Pepcid BID  -Pulmonology Dr. Reid Improving  s/p remdesivir, dexamethasone  continue with 3L via oximizer pendant   - c/w incentive spirometer therapy  - CT chest with GGO's c/w COVID-19 infection  - Vitamin C, D, Zinc  - Albuterol INH, Montelukast  - Pepcid BID  -Pulmonology Dr. Reid

## 2021-03-17 NOTE — PROGRESS NOTE ADULT - SUBJECTIVE AND OBJECTIVE BOX
Pt is lying in bed in NAD. Saturating 97% on NC.     INTERVAL HPI/OVERNIGHT EVENTS:      VITAL SIGNS:  T(F): 98.9 (03-17-21 @ 05:18)  HR: 95 (03-17-21 @ 05:18)  BP: 121/75 (03-17-21 @ 05:18)  RR: 18 (03-17-21 @ 05:18)  SpO2: 97% (03-17-21 @ 05:18)  Wt(kg): --  I&O's Detail          REVIEW OF SYSTEMS:    CONSTITUTIONAL:  No fevers, chills, sweats    HEENT:  Eyes:  No diplopia or blurred vision. ENT:  No earache, sore throat or runny nose.    CARDIOVASCULAR:  No pressure, squeezing, tightness, or heaviness about the chest; no palpitations.    RESPIRATORY:  Per HPI    GASTROINTESTINAL:  No abdominal pain, nausea, vomiting or diarrhea.    GENITOURINARY:  No dysuria, frequency or urgency.    NEUROLOGIC:  No paresthesias, fasciculations, seizures or weakness.    PSYCHIATRIC:  No disorder of thought or mood.      PHYSICAL EXAM:    Constitutional: Well developed and nourished  Eyes:Perrla  ENMT: normal  Neck:supple  Respiratory: good air entry  Cardiovascular: S1 S2 regular  Gastrointestinal: Soft, Non tender  Extremities: No edema  Vascular:normal  Neurological:Awake, alert,Ox3  Musculoskeletal:Normal      MEDICATIONS  (STANDING):  allopurinol 100 milliGRAM(s) Oral daily  ascorbic acid 1000 milliGRAM(s) Oral daily  atorvastatin 80 milliGRAM(s) Oral at bedtime  chlorhexidine 2% Cloths 1 Application(s) Topical <User Schedule>  cholecalciferol 2000 Unit(s) Oral daily  diltiazem    milliGRAM(s) Oral daily  enoxaparin Injectable 40 milliGRAM(s) SubCutaneous daily  famotidine    Tablet 20 milliGRAM(s) Oral two times a day  gabapentin 200 milliGRAM(s) Oral two times a day  hydrochlorothiazide 50 milliGRAM(s) Oral daily  montelukast 10 milliGRAM(s) Oral at bedtime  polyethylene glycol 3350 17 Gram(s) Oral daily  zinc sulfate 220 milliGRAM(s) Oral daily    MEDICATIONS  (PRN):  acetaminophen   Tablet .. 650 milliGRAM(s) Oral every 6 hours PRN Temp greater or equal to 38C (100.4F), Moderate Pain (4 - 6)  ALBUTerol    90 MICROgram(s) HFA Inhaler 2 Puff(s) Inhalation every 6 hours PRN Bronchospasm  guaiFENesin   Syrup  (Sugar-Free) 200 milliGRAM(s) Oral every 6 hours PRN Cough  ondansetron Injectable 4 milliGRAM(s) IV Push every 6 hours PRN Nausea and/or Vomiting  sodium chloride 0.65% Nasal 1 Spray(s) Both Nostrils three times a day PRN Nasal Congestion      Allergies    No Known Allergies    Intolerances    Shrimp- Nasal congestion (Other)      LABS:                        11.7   10.71 )-----------( 348      ( 17 Mar 2021 07:15 )             35.3     03-17    138  |  98  |  21<H>  ----------------------------<  109<H>  3.4<L>   |  30  |  1.22    Ca    9.8      17 Mar 2021 07:15    TPro  7.1  /  Alb  2.8<L>  /  TBili  0.5  /  DBili  x   /  AST  23  /  ALT  83<H>  /  AlkPhos  162<H>  03-17              CAPILLARY BLOOD GLUCOSE        pro-bnp -- 03-16 @ 09:01     d-dimer 359  03-16 @ 09:01      RADIOLOGY & ADDITIONAL TESTS:  COVID-19 PCR . (03.16.21 @ 17:30)   COVID-19 PCR: NotDetec: EUA/IVD   CXR:    Ct scan chest:    ekg;    echo:

## 2021-03-17 NOTE — PROGRESS NOTE ADULT - PROBLEM SELECTOR PLAN 8
-SpO2 95% 4 L optimizer at rest  must be weaned off to n/c prior to d/c  - home oxygen delivered  - physical therapy consult placed for d/c recommendations -SpO2 95% 3 L optimizer at rest  must be weaned off to n/c prior to d/c  - home oxygen delivered  - physical therapy consult placed for d/c recommendations

## 2021-03-17 NOTE — PROGRESS NOTE ADULT - SUBJECTIVE AND OBJECTIVE BOX
Patient is a 65y old  Male who presents with a chief complaint of SOB, fever, cough, body aches (16 Mar 2021 12:27)    pt seen in icu [  ], reg med floor [  x ], bed [x  ], chair at bedside [   ], a+o x3 [x ], lethargic [  ],    nad [x  ]      Allergies    No Known Allergies  Shrimp- Nasal congestion (Other)        Vitals    T(F): 98.9 (03-17-21 @ 05:18), Max: 98.9 (03-17-21 @ 05:18)  HR: 95 (03-17-21 @ 05:18) (86 - 95)  BP: 121/75 (03-17-21 @ 05:18) (105/72 - 129/76)  RR: 18 (03-17-21 @ 05:18) (18 - 18)  SpO2: 97% (03-17-21 @ 05:18) (97% - 100%)  Wt(kg): --  CAPILLARY BLOOD GLUCOSE          Labs                          13.1   9.91  )-----------( 363      ( 16 Mar 2021 09:01 )             39.8       03-16    135  |  97  |  18  ----------------------------<  117<H>  3.6   |  29  |  1.13    Ca    10.0      16 Mar 2021 09:01    TPro  7.8  /  Alb  2.8<L>  /  TBili  0.4  /  DBili  x   /  AST  33  /  ALT  105<H>  /  AlkPhos  186<H>  03-16            .Blood Blood-Peripheral  02-21 @ 22:03   No Growth Final  --  --      .Blood Blood-Peripheral  02-21 @ 22:00   No Growth Final  --  --          Radiology Results      Meds    MEDICATIONS  (STANDING):  allopurinol 100 milliGRAM(s) Oral daily  ascorbic acid 1000 milliGRAM(s) Oral daily  atorvastatin 80 milliGRAM(s) Oral at bedtime  chlorhexidine 2% Cloths 1 Application(s) Topical <User Schedule>  cholecalciferol 2000 Unit(s) Oral daily  diltiazem    milliGRAM(s) Oral daily  enoxaparin Injectable 40 milliGRAM(s) SubCutaneous daily  famotidine    Tablet 20 milliGRAM(s) Oral two times a day  gabapentin 200 milliGRAM(s) Oral two times a day  hydrochlorothiazide 50 milliGRAM(s) Oral daily  montelukast 10 milliGRAM(s) Oral at bedtime  polyethylene glycol 3350 17 Gram(s) Oral daily  zinc sulfate 220 milliGRAM(s) Oral daily      MEDICATIONS  (PRN):  acetaminophen   Tablet .. 650 milliGRAM(s) Oral every 6 hours PRN Temp greater or equal to 38C (100.4F), Moderate Pain (4 - 6)  ALBUTerol    90 MICROgram(s) HFA Inhaler 2 Puff(s) Inhalation every 6 hours PRN Bronchospasm  guaiFENesin   Syrup  (Sugar-Free) 200 milliGRAM(s) Oral every 6 hours PRN Cough  ondansetron Injectable 4 milliGRAM(s) IV Push every 6 hours PRN Nausea and/or Vomiting  sodium chloride 0.65% Nasal 1 Spray(s) Both Nostrils three times a day PRN Nasal Congestion      Physical Exam    Neuro :  no focal deficits  Respiratory: CTA B/L  CV: RRR, S1S2, no murmurs,   Abdominal: Soft, NT, ND +BS,  Extremities: No edema, + peripheral pulses    ASSESSMENT    pneumonia 2nd to covid 19,   marya,   hypokalemia,   h/o Cluster headache  BPH (benign prostatic hypertrophy)  Spinal stenosis s/p laminectomy  GERD (gastroesophageal reflux disease)  Hyperlipidemia  HTN (hypertension)            PLAN      contact and airborne isolation  completed remdesevir 2/26/21  covid ab neg  completed dexamethasone 3/2/21  pepcid, singulair, vit c, vit d, zinc   pulm f/u  cont albuterol inhaler   cont lovenox  procalcitonin neg noted  ,   D-dimer, crp, ldh, ferritin, lactate noted ,   afebrile  cont tylenol prn,   robitussin prn   blood cx neg  O2 sat (96% - 99%) optimizer pendant 4L   cont O2 via pendant and taper as tolerated    incentive spirometer   pt low threshold for intubation and icu monitoring  icu cons noted  continue to monitor pulse oximetry  monitor biomarkers TIW  not accept to icu at this time  cont incentive spirometer   serum creat wnl   ct abd pelv with No ascites or pneumoperitoneum. Punctate nonobstructive bilateral renal calculi without hydronephrosis. 1.2 cm right renal exophytic hyperdensity. This may represent hemorrhagic or proteinaceous cyst. Consider nonemergent correlation   with renal ultrasound for better characterization. Scattered bilateral groundglass opacities, likely in keeping with multifocal pneumonia of reported COVID-19. Short-term chest radiographic imaging follow-up is advised noted above  f/u renal us to eval right renal hyperdensity  phys tx eval  f/u rept covid test  cont current meds   d/c plan for possible kishore pending phys tx eval and covid result          Patient is a 65y old  Male who presents with a chief complaint of SOB, fever, cough, body aches (16 Mar 2021 12:27)    pt seen in icu [  ], reg med floor [  x ], bed [x  ], chair at bedside [   ], a+o x3 [x ], lethargic [  ],    nad [x  ]      Allergies    No Known Allergies  Shrimp- Nasal congestion (Other)        Vitals    T(F): 98.9 (03-17-21 @ 05:18), Max: 98.9 (03-17-21 @ 05:18)  HR: 95 (03-17-21 @ 05:18) (86 - 95)  BP: 121/75 (03-17-21 @ 05:18) (105/72 - 129/76)  RR: 18 (03-17-21 @ 05:18) (18 - 18)  SpO2: 97% (03-17-21 @ 05:18) (97% - 100%)  Wt(kg): --  CAPILLARY BLOOD GLUCOSE          Labs                          13.1   9.91  )-----------( 363      ( 16 Mar 2021 09:01 )             39.8       03-16    135  |  97  |  18  ----------------------------<  117<H>  3.6   |  29  |  1.13    Ca    10.0      16 Mar 2021 09:01    TPro  7.8  /  Alb  2.8<L>  /  TBili  0.4  /  DBili  x   /  AST  33  /  ALT  105<H>  /  AlkPhos  186<H>  03-16      COVID-19 PCR . (03.16.21 @ 17:30)   COVID-19 PCR: NotDetec:       .Blood Blood-Peripheral  02-21 @ 22:03   No Growth Final  --  --      .Blood Blood-Peripheral  02-21 @ 22:00   No Growth Final  --  --          Radiology Results      Meds    MEDICATIONS  (STANDING):  allopurinol 100 milliGRAM(s) Oral daily  ascorbic acid 1000 milliGRAM(s) Oral daily  atorvastatin 80 milliGRAM(s) Oral at bedtime  chlorhexidine 2% Cloths 1 Application(s) Topical <User Schedule>  cholecalciferol 2000 Unit(s) Oral daily  diltiazem    milliGRAM(s) Oral daily  enoxaparin Injectable 40 milliGRAM(s) SubCutaneous daily  famotidine    Tablet 20 milliGRAM(s) Oral two times a day  gabapentin 200 milliGRAM(s) Oral two times a day  hydrochlorothiazide 50 milliGRAM(s) Oral daily  montelukast 10 milliGRAM(s) Oral at bedtime  polyethylene glycol 3350 17 Gram(s) Oral daily  zinc sulfate 220 milliGRAM(s) Oral daily      MEDICATIONS  (PRN):  acetaminophen   Tablet .. 650 milliGRAM(s) Oral every 6 hours PRN Temp greater or equal to 38C (100.4F), Moderate Pain (4 - 6)  ALBUTerol    90 MICROgram(s) HFA Inhaler 2 Puff(s) Inhalation every 6 hours PRN Bronchospasm  guaiFENesin   Syrup  (Sugar-Free) 200 milliGRAM(s) Oral every 6 hours PRN Cough  ondansetron Injectable 4 milliGRAM(s) IV Push every 6 hours PRN Nausea and/or Vomiting  sodium chloride 0.65% Nasal 1 Spray(s) Both Nostrils three times a day PRN Nasal Congestion      Physical Exam    Neuro :  no focal deficits  Respiratory: CTA B/L  CV: RRR, S1S2, no murmurs,   Abdominal: Soft, NT, ND +BS,  Extremities: No edema, + peripheral pulses    ASSESSMENT    pneumonia 2nd to covid 19,   marya,   hypokalemia,   h/o Cluster headache  BPH (benign prostatic hypertrophy)  Spinal stenosis s/p laminectomy  GERD (gastroesophageal reflux disease)  Hyperlipidemia  HTN (hypertension)            PLAN      contact and airborne isolation  completed remdesevir 2/26/21  covid ab neg  completed dexamethasone 3/2/21  pepcid, singulair, vit c, vit d, zinc   pulm f/u  cont albuterol inhaler   cont lovenox  procalcitonin neg noted  ,   D-dimer, crp, ldh, ferritin, lactate noted ,   afebrile  cont tylenol prn,   robitussin prn   blood cx neg  O2 sat (97% - 100%) venturi mask  cont O2 via venturi mask and taper as tolerated    incentive spirometer   pt low threshold for intubation and icu monitoring  icu cons noted  continue to monitor pulse oximetry  monitor biomarkers TIW  not accept to icu at this time  cont incentive spirometer   serum creat wnl   ct abd pelv with No ascites or pneumoperitoneum. Punctate nonobstructive bilateral renal calculi without hydronephrosis. 1.2 cm right renal exophytic hyperdensity. This may represent hemorrhagic or proteinaceous cyst. Consider nonemergent correlation   with renal ultrasound for better characterization. Scattered bilateral groundglass opacities, likely in keeping with multifocal pneumonia of reported COVID-19. Short-term chest radiographic imaging follow-up is advised noted above  f/u renal us to eval right renal hyperdensity  phys tx eval  rept covid test 3/16/21 neg  cont current meds   d/c plan for possible kishore pending phys tx eval

## 2021-03-17 NOTE — PHYSICAL THERAPY INITIAL EVALUATION ADULT - GENERAL OBSERVATIONS, REHAB EVAL
Patient was seen for PT evaluation today. EMR, laboratory and radiology results reviewed. Pt received supine in bed, NAD, [id number] in situ set at 3L

## 2021-03-18 DIAGNOSIS — N20.0 CALCULUS OF KIDNEY: ICD-10-CM

## 2021-03-18 LAB
ALBUMIN SERPL ELPH-MCNC: 2.9 G/DL — LOW (ref 3.5–5)
ALP SERPL-CCNC: 164 U/L — HIGH (ref 40–120)
ALT FLD-CCNC: 83 U/L DA — HIGH (ref 10–60)
ANION GAP SERPL CALC-SCNC: 6 MMOL/L — SIGNIFICANT CHANGE UP (ref 5–17)
AST SERPL-CCNC: 25 U/L — SIGNIFICANT CHANGE UP (ref 10–40)
BASOPHILS # BLD AUTO: 0.04 K/UL — SIGNIFICANT CHANGE UP (ref 0–0.2)
BASOPHILS NFR BLD AUTO: 0.4 % — SIGNIFICANT CHANGE UP (ref 0–2)
BILIRUB SERPL-MCNC: 0.4 MG/DL — SIGNIFICANT CHANGE UP (ref 0.2–1.2)
BUN SERPL-MCNC: 23 MG/DL — HIGH (ref 7–18)
CALCIUM SERPL-MCNC: 10 MG/DL — SIGNIFICANT CHANGE UP (ref 8.4–10.5)
CHLORIDE SERPL-SCNC: 100 MMOL/L — SIGNIFICANT CHANGE UP (ref 96–108)
CO2 SERPL-SCNC: 31 MMOL/L — SIGNIFICANT CHANGE UP (ref 22–31)
CREAT SERPL-MCNC: 1.23 MG/DL — SIGNIFICANT CHANGE UP (ref 0.5–1.3)
EOSINOPHIL # BLD AUTO: 0.24 K/UL — SIGNIFICANT CHANGE UP (ref 0–0.5)
EOSINOPHIL NFR BLD AUTO: 2.7 % — SIGNIFICANT CHANGE UP (ref 0–6)
GLUCOSE SERPL-MCNC: 111 MG/DL — HIGH (ref 70–99)
HCT VFR BLD CALC: 38.2 % — LOW (ref 39–50)
HGB BLD-MCNC: 12.6 G/DL — LOW (ref 13–17)
IMM GRANULOCYTES NFR BLD AUTO: 0.8 % — SIGNIFICANT CHANGE UP (ref 0–1.5)
LYMPHOCYTES # BLD AUTO: 2.14 K/UL — SIGNIFICANT CHANGE UP (ref 1–3.3)
LYMPHOCYTES # BLD AUTO: 23.9 % — SIGNIFICANT CHANGE UP (ref 13–44)
MCHC RBC-ENTMCNC: 29.1 PG — SIGNIFICANT CHANGE UP (ref 27–34)
MCHC RBC-ENTMCNC: 33 GM/DL — SIGNIFICANT CHANGE UP (ref 32–36)
MCV RBC AUTO: 88.2 FL — SIGNIFICANT CHANGE UP (ref 80–100)
MONOCYTES # BLD AUTO: 1.12 K/UL — HIGH (ref 0–0.9)
MONOCYTES NFR BLD AUTO: 12.5 % — SIGNIFICANT CHANGE UP (ref 2–14)
NEUTROPHILS # BLD AUTO: 5.34 K/UL — SIGNIFICANT CHANGE UP (ref 1.8–7.4)
NEUTROPHILS NFR BLD AUTO: 59.7 % — SIGNIFICANT CHANGE UP (ref 43–77)
NRBC # BLD: 0 /100 WBCS — SIGNIFICANT CHANGE UP (ref 0–0)
PLATELET # BLD AUTO: 387 K/UL — SIGNIFICANT CHANGE UP (ref 150–400)
POTASSIUM SERPL-MCNC: 4 MMOL/L — SIGNIFICANT CHANGE UP (ref 3.5–5.3)
POTASSIUM SERPL-SCNC: 4 MMOL/L — SIGNIFICANT CHANGE UP (ref 3.5–5.3)
PROT SERPL-MCNC: 7.6 G/DL — SIGNIFICANT CHANGE UP (ref 6–8.3)
RBC # BLD: 4.33 M/UL — SIGNIFICANT CHANGE UP (ref 4.2–5.8)
RBC # FLD: 14.2 % — SIGNIFICANT CHANGE UP (ref 10.3–14.5)
SODIUM SERPL-SCNC: 137 MMOL/L — SIGNIFICANT CHANGE UP (ref 135–145)
WBC # BLD: 8.95 K/UL — SIGNIFICANT CHANGE UP (ref 3.8–10.5)
WBC # FLD AUTO: 8.95 K/UL — SIGNIFICANT CHANGE UP (ref 3.8–10.5)

## 2021-03-18 PROCEDURE — 76775 US EXAM ABDO BACK WALL LIM: CPT | Mod: 26

## 2021-03-18 RX ADMIN — GABAPENTIN 200 MILLIGRAM(S): 400 CAPSULE ORAL at 17:05

## 2021-03-18 RX ADMIN — Medication 100 MILLIGRAM(S): at 12:03

## 2021-03-18 RX ADMIN — FAMOTIDINE 20 MILLIGRAM(S): 10 INJECTION INTRAVENOUS at 17:06

## 2021-03-18 RX ADMIN — Medication 240 MILLIGRAM(S): at 05:47

## 2021-03-18 RX ADMIN — Medication 2000 UNIT(S): at 12:03

## 2021-03-18 RX ADMIN — Medication 650 MILLIGRAM(S): at 18:19

## 2021-03-18 RX ADMIN — CHLORHEXIDINE GLUCONATE 1 APPLICATION(S): 213 SOLUTION TOPICAL at 05:47

## 2021-03-18 RX ADMIN — Medication 50 MILLIGRAM(S): at 05:47

## 2021-03-18 RX ADMIN — ATORVASTATIN CALCIUM 80 MILLIGRAM(S): 80 TABLET, FILM COATED ORAL at 21:25

## 2021-03-18 RX ADMIN — POLYETHYLENE GLYCOL 3350 17 GRAM(S): 17 POWDER, FOR SOLUTION ORAL at 12:03

## 2021-03-18 RX ADMIN — ENOXAPARIN SODIUM 40 MILLIGRAM(S): 100 INJECTION SUBCUTANEOUS at 12:02

## 2021-03-18 RX ADMIN — MONTELUKAST 10 MILLIGRAM(S): 4 TABLET, CHEWABLE ORAL at 21:25

## 2021-03-18 RX ADMIN — Medication 1000 MILLIGRAM(S): at 12:03

## 2021-03-18 RX ADMIN — GABAPENTIN 200 MILLIGRAM(S): 400 CAPSULE ORAL at 05:47

## 2021-03-18 RX ADMIN — ZINC SULFATE TAB 220 MG (50 MG ZINC EQUIVALENT) 220 MILLIGRAM(S): 220 (50 ZN) TAB at 12:03

## 2021-03-18 RX ADMIN — ALBUTEROL 2 PUFF(S): 90 AEROSOL, METERED ORAL at 21:25

## 2021-03-18 RX ADMIN — FAMOTIDINE 20 MILLIGRAM(S): 10 INJECTION INTRAVENOUS at 05:47

## 2021-03-18 NOTE — PROGRESS NOTE ADULT - SUBJECTIVE AND OBJECTIVE BOX
Patient is a 65y old  Male who presents with a chief complaint of SOB, fever, cough, body aches (17 Mar 2021 11:08)    pt seen in icu [  ], reg med floor [  x ], bed [x  ], chair at bedside [   ], a+o x3 [x ], lethargic [  ],    nad [x  ]      Allergies    No Known Allergies  Shrimp- Nasal congestion (Other)        Vitals    T(F): 97.9 (03-18-21 @ 05:23), Max: 99.5 (03-17-21 @ 20:20)  HR: 90 (03-18-21 @ 05:23) (90 - 100)  BP: 123/72 (03-18-21 @ 05:23) (107/78 - 123/72)  RR: 19 (03-18-21 @ 05:23) (18 - 20)  SpO2: 97% (03-18-21 @ 05:23) (95% - 97%)  Wt(kg): --  CAPILLARY BLOOD GLUCOSE          Labs                          12.6   8.95  )-----------( 387      ( 18 Mar 2021 06:01 )             38.2       03-17    138  |  98  |  21<H>  ----------------------------<  109<H>  3.4<L>   |  30  |  1.22    Ca    9.8      17 Mar 2021 07:15    TPro  7.1  /  Alb  2.8<L>  /  TBili  0.5  /  DBili  x   /  AST  23  /  ALT  83<H>  /  AlkPhos  162<H>  03-17            .Blood Blood-Peripheral  02-21 @ 22:03   No Growth Final  --  --      .Blood Blood-Peripheral  02-21 @ 22:00   No Growth Final  --  --          Radiology Results      Meds    MEDICATIONS  (STANDING):  allopurinol 100 milliGRAM(s) Oral daily  ascorbic acid 1000 milliGRAM(s) Oral daily  atorvastatin 80 milliGRAM(s) Oral at bedtime  chlorhexidine 2% Cloths 1 Application(s) Topical <User Schedule>  cholecalciferol 2000 Unit(s) Oral daily  diltiazem    milliGRAM(s) Oral daily  enoxaparin Injectable 40 milliGRAM(s) SubCutaneous daily  famotidine    Tablet 20 milliGRAM(s) Oral two times a day  gabapentin 200 milliGRAM(s) Oral two times a day  hydrochlorothiazide 50 milliGRAM(s) Oral daily  montelukast 10 milliGRAM(s) Oral at bedtime  polyethylene glycol 3350 17 Gram(s) Oral daily  zinc sulfate 220 milliGRAM(s) Oral daily      MEDICATIONS  (PRN):  acetaminophen   Tablet .. 650 milliGRAM(s) Oral every 6 hours PRN Temp greater or equal to 38C (100.4F), Moderate Pain (4 - 6)  ALBUTerol    90 MICROgram(s) HFA Inhaler 2 Puff(s) Inhalation every 6 hours PRN Bronchospasm  guaiFENesin   Syrup  (Sugar-Free) 200 milliGRAM(s) Oral every 6 hours PRN Cough  ondansetron Injectable 4 milliGRAM(s) IV Push every 6 hours PRN Nausea and/or Vomiting  sodium chloride 0.65% Nasal 1 Spray(s) Both Nostrils three times a day PRN Nasal Congestion      Physical Exam    Neuro :  no focal deficits  Respiratory: CTA B/L  CV: RRR, S1S2, no murmurs,   Abdominal: Soft, NT, ND +BS,  Extremities: No edema, + peripheral pulses    ASSESSMENT    pneumonia 2nd to covid 19,   marya,   hypokalemia,   h/o Cluster headache  BPH (benign prostatic hypertrophy)  Spinal stenosis s/p laminectomy  GERD (gastroesophageal reflux disease)  Hyperlipidemia  HTN (hypertension)            PLAN      contact and airborne isolation  completed remdesevir 2/26/21  covid ab neg  completed dexamethasone 3/2/21  pepcid, singulair, vit c, vit d, zinc   pulm f/u  cont albuterol inhaler   cont lovenox  procalcitonin neg noted  ,   D-dimer, crp, ldh, ferritin, lactate noted ,   afebrile  cont tylenol prn,   robitussin prn   blood cx neg  O2 sat (97% - 100%) venturi mask  cont O2 via venturi mask and taper as tolerated    incentive spirometer   pt low threshold for intubation and icu monitoring  icu cons noted  continue to monitor pulse oximetry  monitor biomarkers TIW  not accept to icu at this time  cont incentive spirometer   serum creat wnl   ct abd pelv with No ascites or pneumoperitoneum. Punctate nonobstructive bilateral renal calculi without hydronephrosis. 1.2 cm right renal exophytic hyperdensity. This may represent hemorrhagic or proteinaceous cyst. Consider nonemergent correlation   with renal ultrasound for better characterization. Scattered bilateral groundglass opacities, likely in keeping with multifocal pneumonia of reported COVID-19. Short-term chest radiographic imaging follow-up is advised noted above  f/u renal us to eval right renal hyperdensity  phys tx eval  rept covid test 3/16/21 neg  cont current meds   d/c plan for possible kishore pending phys tx eval        Patient is a 65y old  Male who presents with a chief complaint of SOB, fever, cough, body aches (17 Mar 2021 11:08)    pt seen in icu [  ], reg med floor [  x ], bed [x  ], chair at bedside [   ], a+o x3 [x ], lethargic [  ],    nad [x  ]      Allergies    No Known Allergies  Shrimp- Nasal congestion (Other)        Vitals    T(F): 97.9 (03-18-21 @ 05:23), Max: 99.5 (03-17-21 @ 20:20)  HR: 90 (03-18-21 @ 05:23) (90 - 100)  BP: 123/72 (03-18-21 @ 05:23) (107/78 - 123/72)  RR: 19 (03-18-21 @ 05:23) (18 - 20)  SpO2: 97% (03-18-21 @ 05:23) (95% - 97%)  Wt(kg): --  CAPILLARY BLOOD GLUCOSE          Labs                          12.6   8.95  )-----------( 387      ( 18 Mar 2021 06:01 )             38.2       03-17    138  |  98  |  21<H>  ----------------------------<  109<H>  3.4<L>   |  30  |  1.22    Ca    9.8      17 Mar 2021 07:15    TPro  7.1  /  Alb  2.8<L>  /  TBili  0.5  /  DBili  x   /  AST  23  /  ALT  83<H>  /  AlkPhos  162<H>  03-17            .Blood Blood-Peripheral  02-21 @ 22:03   No Growth Final  --  --      .Blood Blood-Peripheral  02-21 @ 22:00   No Growth Final  --  --          Radiology Results      Meds    MEDICATIONS  (STANDING):  allopurinol 100 milliGRAM(s) Oral daily  ascorbic acid 1000 milliGRAM(s) Oral daily  atorvastatin 80 milliGRAM(s) Oral at bedtime  chlorhexidine 2% Cloths 1 Application(s) Topical <User Schedule>  cholecalciferol 2000 Unit(s) Oral daily  diltiazem    milliGRAM(s) Oral daily  enoxaparin Injectable 40 milliGRAM(s) SubCutaneous daily  famotidine    Tablet 20 milliGRAM(s) Oral two times a day  gabapentin 200 milliGRAM(s) Oral two times a day  hydrochlorothiazide 50 milliGRAM(s) Oral daily  montelukast 10 milliGRAM(s) Oral at bedtime  polyethylene glycol 3350 17 Gram(s) Oral daily  zinc sulfate 220 milliGRAM(s) Oral daily      MEDICATIONS  (PRN):  acetaminophen   Tablet .. 650 milliGRAM(s) Oral every 6 hours PRN Temp greater or equal to 38C (100.4F), Moderate Pain (4 - 6)  ALBUTerol    90 MICROgram(s) HFA Inhaler 2 Puff(s) Inhalation every 6 hours PRN Bronchospasm  guaiFENesin   Syrup  (Sugar-Free) 200 milliGRAM(s) Oral every 6 hours PRN Cough  ondansetron Injectable 4 milliGRAM(s) IV Push every 6 hours PRN Nausea and/or Vomiting  sodium chloride 0.65% Nasal 1 Spray(s) Both Nostrils three times a day PRN Nasal Congestion      Physical Exam    Neuro :  no focal deficits  Respiratory: CTA B/L  CV: RRR, S1S2, no murmurs,   Abdominal: Soft, NT, ND +BS,  Extremities: No edema, + peripheral pulses    ASSESSMENT    pneumonia 2nd to covid 19,   marya,   hypokalemia,   h/o Cluster headache  BPH (benign prostatic hypertrophy)  Spinal stenosis s/p laminectomy  GERD (gastroesophageal reflux disease)  Hyperlipidemia  HTN (hypertension)            PLAN    rept covid test 3/16/21 neg  d/c contact and airborne isolation   pt completed quaranteen  completed remdesevir 2/26/21  covid ab neg  completed dexamethasone 3/2/21  pepcid, singulair, vit c, vit d, zinc   pulm f/u  cont albuterol inhaler   cont lovenox  procalcitonin neg noted  ,   D-dimer, crp, ldh, ferritin, lactate noted ,   afebrile  cont tylenol prn,   robitussin prn   blood cx neg  O2 sat (95% - 97%) optimizer pendant 3L  cont O2 via optimizer pendant and taper as tolerated    incentive spirometer   pt low threshold for intubation and icu monitoring  icu cons noted  continue to monitor pulse oximetry  monitor biomarkers TIW  not accept to icu at this time  cont incentive spirometer   serum creat wnl   ct abd pelv with No ascites or pneumoperitoneum. Punctate nonobstructive bilateral renal calculi without hydronephrosis. 1.2 cm right renal exophytic hyperdensity. This may represent hemorrhagic or proteinaceous cyst. Consider nonemergent correlation   with renal ultrasound for better characterization. Scattered bilateral groundglass opacities, likely in keeping with multifocal pneumonia of reported COVID-19. Short-term chest radiographic imaging follow-up is advised noted above  f/u renal us to eval right renal hyperdensity  phys tx eval noted and rec phys tx 2-3x/week x 4-6 weeks with rolling walker (5 inch wheels) at home w/ home PT; home w/ assist  cont current meds   d/c plan for home with O2 in am if stable

## 2021-03-18 NOTE — PROGRESS NOTE ADULT - SUBJECTIVE AND OBJECTIVE BOX
-*-* INCOMPLETE     NP Note discussed with  Primary Attending    Patient is a 65y old  Male who presents with a chief complaint of SOB, fever, cough, body aches (18 Mar 2021 06:19)      INTERVAL HPI/OVERNIGHT EVENTS: no new complaints    MEDICATIONS  (STANDING):  allopurinol 100 milliGRAM(s) Oral daily  ascorbic acid 1000 milliGRAM(s) Oral daily  atorvastatin 80 milliGRAM(s) Oral at bedtime  chlorhexidine 2% Cloths 1 Application(s) Topical <User Schedule>  cholecalciferol 2000 Unit(s) Oral daily  diltiazem    milliGRAM(s) Oral daily  enoxaparin Injectable 40 milliGRAM(s) SubCutaneous daily  famotidine    Tablet 20 milliGRAM(s) Oral two times a day  gabapentin 200 milliGRAM(s) Oral two times a day  hydrochlorothiazide 50 milliGRAM(s) Oral daily  montelukast 10 milliGRAM(s) Oral at bedtime  polyethylene glycol 3350 17 Gram(s) Oral daily  zinc sulfate 220 milliGRAM(s) Oral daily    MEDICATIONS  (PRN):  acetaminophen   Tablet .. 650 milliGRAM(s) Oral every 6 hours PRN Temp greater or equal to 38C (100.4F), Moderate Pain (4 - 6)  ALBUTerol    90 MICROgram(s) HFA Inhaler 2 Puff(s) Inhalation every 6 hours PRN Bronchospasm  guaiFENesin   Syrup  (Sugar-Free) 200 milliGRAM(s) Oral every 6 hours PRN Cough  ondansetron Injectable 4 milliGRAM(s) IV Push every 6 hours PRN Nausea and/or Vomiting  sodium chloride 0.65% Nasal 1 Spray(s) Both Nostrils three times a day PRN Nasal Congestion      __________________________________________________  REVIEW OF SYSTEMS:    CONSTITUTIONAL: No fever,   EYES: no acute visual disturbances  NECK: No pain or stiffness  RESPIRATORY: No cough; No shortness of breath  CARDIOVASCULAR: No chest pain, no palpitations  GASTROINTESTINAL: No pain. No nausea or vomiting; No diarrhea   NEUROLOGICAL: No headache or numbness, no tremors  MUSCULOSKELETAL: No joint pain, no muscle pain  GENITOURINARY: no dysuria, no frequency, no hesitancy  PSYCHIATRY: no depression , no anxiety  ALL OTHER  ROS negative        Vital Signs Last 24 Hrs  T(C): 36.6 (18 Mar 2021 05:23), Max: 37.5 (17 Mar 2021 20:20)  T(F): 97.9 (18 Mar 2021 05:23), Max: 99.5 (17 Mar 2021 20:20)  HR: 90 (18 Mar 2021 05:23) (90 - 100)  BP: 123/72 (18 Mar 2021 05:23) (107/78 - 123/72)  BP(mean): --  RR: 19 (18 Mar 2021 05:23) (18 - 20)  SpO2: 97% (18 Mar 2021 05:23) (95% - 97%)    ________________________________________________  PHYSICAL EXAM:  GENERAL: NAD  HEENT: Normocephalic;  conjunctivae and sclerae clear; moist mucous membranes;   NECK : supple  CHEST/LUNG: Clear to auscultation bilaterally with good air entry   HEART: S1 S2  regular; no murmurs, gallops or rubs  ABDOMEN: Soft, Nontender, Nondistended; Bowel sounds present  EXTREMITIES: no cyanosis; no edema; no calf tenderness  SKIN: warm and dry; no rash  NERVOUS SYSTEM:  Awake and alert; Oriented  to place, person and time ; no new deficits    _________________________________________________  LABS:                        12.6   8.95  )-----------( 387      ( 18 Mar 2021 06:01 )             38.2     03-18    137  |  100  |  23<H>  ----------------------------<  111<H>  4.0   |  31  |  1.23    Ca    10.0      18 Mar 2021 06:01    TPro  7.6  /  Alb  2.9<L>  /  TBili  0.4  /  DBili  x   /  AST  25  /  ALT  83<H>  /  AlkPhos  164<H>  03-18        CAPILLARY BLOOD GLUCOSE            RADIOLOGY & ADDITIONAL TESTS:    Imaging Personally Reviewed:  YES/NO    Consultant(s) Notes Reviewed:   YES/ No    Care Discussed with Consultants :     Plan of care was discussed with patient and /or primary care giver; all questions and concerns were addressed and care was aligned with patient's wishes.     NP Note discussed with  Primary Attending    Patient is a 65y old  Male who presents with a chief complaint of SOB, fever, cough, body aches (18 Mar 2021 06:19)    Patient is a 65 year old male from home, with PMH of HTN, HLD and GERD who presented to the ED with c/o SOB, cough, fevers and myalgias for 6 days PTA it he setting of a recent COVID-19 diagnosis. Patient reports desaturations to 88% on room air for which his PCP advised evaluation in the ED. Patient admitted to medicine for further management of his acute respiratory failure secondary to COVID infection. He is now s/p a course of  Remdesivir and Decadron. Leukocytosis resolved.  Repeat CXR with ? free air under diaphragm, CT A/P without any acute findings - incidental renal exophytic hyperdensity noted and renal US which found nonobtrusive renal calculi. Patient was able to tolerate n/c 3L, transfer out of bed to the chair today and tolerated well. Home oxygen delivered, plan for discharge home tomorrow.     INTERVAL HPI/OVERNIGHT EVENTS: no new complaints    MEDICATIONS  (STANDING):  allopurinol 100 milliGRAM(s) Oral daily  ascorbic acid 1000 milliGRAM(s) Oral daily  atorvastatin 80 milliGRAM(s) Oral at bedtime  chlorhexidine 2% Cloths 1 Application(s) Topical <User Schedule>  cholecalciferol 2000 Unit(s) Oral daily  diltiazem    milliGRAM(s) Oral daily  enoxaparin Injectable 40 milliGRAM(s) SubCutaneous daily  famotidine    Tablet 20 milliGRAM(s) Oral two times a day  gabapentin 200 milliGRAM(s) Oral two times a day  hydrochlorothiazide 50 milliGRAM(s) Oral daily  montelukast 10 milliGRAM(s) Oral at bedtime  polyethylene glycol 3350 17 Gram(s) Oral daily  zinc sulfate 220 milliGRAM(s) Oral daily    MEDICATIONS  (PRN):  acetaminophen   Tablet .. 650 milliGRAM(s) Oral every 6 hours PRN Temp greater or equal to 38C (100.4F), Moderate Pain (4 - 6)  ALBUTerol    90 MICROgram(s) HFA Inhaler 2 Puff(s) Inhalation every 6 hours PRN Bronchospasm  guaiFENesin   Syrup  (Sugar-Free) 200 milliGRAM(s) Oral every 6 hours PRN Cough  ondansetron Injectable 4 milliGRAM(s) IV Push every 6 hours PRN Nausea and/or Vomiting  sodium chloride 0.65% Nasal 1 Spray(s) Both Nostrils three times a day PRN Nasal Congestion      __________________________________________________  REVIEW OF SYSTEMS:    CONSTITUTIONAL: No fever,   EYES: no acute visual disturbances  NECK: No pain or stiffness  RESPIRATORY: No cough; No shortness of breath  CARDIOVASCULAR: No chest pain, no palpitations  GASTROINTESTINAL: No pain. No nausea or vomiting; No diarrhea   NEUROLOGICAL: No headache or numbness, no tremors  MUSCULOSKELETAL: No joint pain, no muscle pain  GENITOURINARY: no dysuria, no frequency, no hesitancy  PSYCHIATRY: no depression , no anxiety  ALL OTHER  ROS negative        Vital Signs Last 24 Hrs  T(C): 36.6 (18 Mar 2021 05:23), Max: 37.5 (17 Mar 2021 20:20)  T(F): 97.9 (18 Mar 2021 05:23), Max: 99.5 (17 Mar 2021 20:20)  HR: 90 (18 Mar 2021 05:23) (90 - 100)  BP: 123/72 (18 Mar 2021 05:23) (107/78 - 123/72)  BP(mean): --  RR: 19 (18 Mar 2021 05:23) (18 - 20)  SpO2: 97% (18 Mar 2021 05:23) (95% - 97%)    ________________________________________________  PHYSICAL EXAM:  GENERAL: NAD  HEENT: Normocephalic;  conjunctivae and sclerae clear; moist mucous membranes;   NECK : supple  CHEST/LUNG: diffuse rhonchi, fair inspiratory effort    HEART: S1 S2  regular; no murmurs, gallops or rubs  ABDOMEN: Soft, Nontender, Nondistended; Bowel sounds present  EXTREMITIES: no cyanosis; no edema; no calf tenderness  SKIN: warm and dry; no rash  NERVOUS SYSTEM:  Awake and alert; Oriented  to place, person and time _________________________________________________  LABS:                        12.6   8.95  )-----------( 387      ( 18 Mar 2021 06:01 )             38.2     03-18    137  |  100  |  23<H>  ----------------------------<  111<H>  4.0   |  31  |  1.23    Ca    10.0      18 Mar 2021 06:01    TPro  7.6  /  Alb  2.9<L>  /  TBili  0.4  /  DBili  x   /  AST  25  /  ALT  83<H>  /  AlkPhos  164<H>  03-18        CAPILLARY BLOOD GLUCOSE    Plan of care was discussed with patient and /or primary care giver; all questions and concerns were addressed and care was aligned with patient's wishes.

## 2021-03-18 NOTE — PROGRESS NOTE ADULT - PROBLEM SELECTOR PROBLEM 3
HTN (hypertension)

## 2021-03-18 NOTE — PROGRESS NOTE ADULT - PROBLEM SELECTOR PLAN 1
Improving  s/p remdesivir, dexamethasone  Weaned from oxymizer to n/c 3L and tolerating well  - c/w incentive spirometer therapy  - CT chest with GGO's c/w COVID-19 infection  - Vitamin C, D, Zinc  - Albuterol INH, Montelukast  - Pepcid BID  -Pulmonology Dr. Reid

## 2021-03-18 NOTE — PROGRESS NOTE ADULT - PROBLEM SELECTOR PROBLEM 2
MECHELLE (acute kidney injury)

## 2021-03-18 NOTE — PROGRESS NOTE ADULT - PROBLEM SELECTOR PROBLEM 1
Acute respiratory failure due to COVID-19

## 2021-03-18 NOTE — PROGRESS NOTE ADULT - PROBLEM SELECTOR PLAN 2
Improving   - likely hemodynamically mediated in the setting of hypovolemia  - avoid nephrotoxic agents  - monitor BMP

## 2021-03-18 NOTE — PROGRESS NOTE ADULT - SUBJECTIVE AND OBJECTIVE BOX
Pt is awake, alert, lying in bed in NAD. On O2 NC - saturating 97%. Feels better. Ambulating more without SOB.     INTERVAL HPI/OVERNIGHT EVENTS:      VITAL SIGNS:  T(F): 97.9 (03-18-21 @ 05:23)  HR: 90 (03-18-21 @ 05:23)  BP: 123/72 (03-18-21 @ 05:23)  RR: 19 (03-18-21 @ 05:23)  SpO2: 97% (03-18-21 @ 05:23)  Wt(kg): --  I&O's Detail          REVIEW OF SYSTEMS:    CONSTITUTIONAL:  No fevers, chills, sweats    HEENT:  Eyes:  No diplopia or blurred vision. ENT:  No earache, sore throat or runny nose.    CARDIOVASCULAR:  No pressure, squeezing, tightness, or heaviness about the chest; no palpitations.    RESPIRATORY:  Per HPI    GASTROINTESTINAL:  No abdominal pain, nausea, vomiting or diarrhea.    GENITOURINARY:  No dysuria, frequency or urgency.    NEUROLOGIC:  No paresthesias, fasciculations, seizures or weakness.    PSYCHIATRIC:  No disorder of thought or mood.      PHYSICAL EXAM:    Constitutional: Well developed and nourished  Eyes: Perrla  ENMT: normal  Neck: supple  Respiratory: good air entry  Cardiovascular: S1 S2 regular  Gastrointestinal: Soft, Non tender  Extremities: No edema  Vascular: normal  Neurological: Awake, alert,Ox3  Musculoskeletal: Normal      MEDICATIONS  (STANDING):  allopurinol 100 milliGRAM(s) Oral daily  ascorbic acid 1000 milliGRAM(s) Oral daily  atorvastatin 80 milliGRAM(s) Oral at bedtime  chlorhexidine 2% Cloths 1 Application(s) Topical <User Schedule>  cholecalciferol 2000 Unit(s) Oral daily  diltiazem    milliGRAM(s) Oral daily  enoxaparin Injectable 40 milliGRAM(s) SubCutaneous daily  famotidine    Tablet 20 milliGRAM(s) Oral two times a day  gabapentin 200 milliGRAM(s) Oral two times a day  hydrochlorothiazide 50 milliGRAM(s) Oral daily  montelukast 10 milliGRAM(s) Oral at bedtime  polyethylene glycol 3350 17 Gram(s) Oral daily  zinc sulfate 220 milliGRAM(s) Oral daily    MEDICATIONS  (PRN):  acetaminophen   Tablet .. 650 milliGRAM(s) Oral every 6 hours PRN Temp greater or equal to 38C (100.4F), Moderate Pain (4 - 6)  ALBUTerol    90 MICROgram(s) HFA Inhaler 2 Puff(s) Inhalation every 6 hours PRN Bronchospasm  guaiFENesin   Syrup  (Sugar-Free) 200 milliGRAM(s) Oral every 6 hours PRN Cough  ondansetron Injectable 4 milliGRAM(s) IV Push every 6 hours PRN Nausea and/or Vomiting  sodium chloride 0.65% Nasal 1 Spray(s) Both Nostrils three times a day PRN Nasal Congestion      Allergies    No Known Allergies    Intolerances    Shrimp- Nasal congestion (Other)      LABS:                        12.6   8.95  )-----------( 387      ( 18 Mar 2021 06:01 )             38.2     03-18    137  |  100  |  23<H>  ----------------------------<  111<H>  4.0   |  31  |  1.23    Ca    10.0      18 Mar 2021 06:01    TPro  7.6  /  Alb  2.9<L>  /  TBili  0.4  /  DBili  x   /  AST  25  /  ALT  83<H>  /  AlkPhos  164<H>  03-18              CAPILLARY BLOOD GLUCOSE        pro-bnp -- 03-16 @ 09:01     d-dimer 359  03-16 @ 09:01      RADIOLOGY & ADDITIONAL TESTS:    CXR:    Ct scan chest:    ekg;    echo:

## 2021-03-18 NOTE — PROGRESS NOTE ADULT - ASSESSMENT
1. PNA 2nd to Covid-19  - PCR negative 3/16.   - CXR noted with  increasing infiltrates.   - CT abd/pelvis noted 3/12  - Continue Vit C, Vit D, Zinc   - Off Dexamethasone, Off Remdesivir  - Continue Singulair and Pepcid   - ICU consult noted - no escalation at present.   - Robitussin PRN for cough  - Tylenol PRN for temp   - O2 Supp - Taper O2 as tolerated -  on NC.   - Monitor O2 Sat   - Monitor labs  - F.U CXR  - Incentive Spirometry  - DVT and GI PPX   - PT eval     2. HTN  - Continue meds   - Monitor BP     3. HLD  - Cont meds  - Lipid panel     4. GERD  - Antireflux diet   - Cont meds    5. MECHELLE  Improved  Avoid nephrotoxic drugs  monitor BMP    6. Renal Hyperdensity - Right  CT abdomen/pelvis noted.   US to further eval.

## 2021-03-18 NOTE — PROGRESS NOTE ADULT - PROBLEM SELECTOR PROBLEM 4
Hyperlipidemia

## 2021-03-18 NOTE — PROGRESS NOTE ADULT - PROBLEM SELECTOR PLAN 9
-need to complete remdesivir (last day 2/26) and decadron therapy  -monitor O2 sat. consider further titration O2 when necessary.
-need to complete remdesivir and decadron therapy  -monitor O2 sat. consider further titration O2 when necessary.
-need to complete remdesivir (last day 2/26) and decadron therapy  -monitor O2 sat. consider further titration O2 when necessary.
-need to complete remdesivir (last day 2/26) and decadron therapy  -monitor O2 sat. consider further titration O2 when necessary.
Successfully weaned to n/c today   - home oxygen delivered  plan for d/c in am
-need to complete remdesivir and decadron therapy  -monitor O2 sat. consider further titration O2 when necessary.

## 2021-03-19 ENCOUNTER — TRANSCRIPTION ENCOUNTER (OUTPATIENT)
Age: 66
End: 2021-03-19

## 2021-03-19 VITALS
DIASTOLIC BLOOD PRESSURE: 62 MMHG | HEART RATE: 100 BPM | OXYGEN SATURATION: 97 % | SYSTOLIC BLOOD PRESSURE: 86 MMHG | TEMPERATURE: 97 F | RESPIRATION RATE: 18 BRPM

## 2021-03-19 PROCEDURE — 71045 X-RAY EXAM CHEST 1 VIEW: CPT

## 2021-03-19 PROCEDURE — 83615 LACTATE (LD) (LDH) ENZYME: CPT

## 2021-03-19 PROCEDURE — 82306 VITAMIN D 25 HYDROXY: CPT

## 2021-03-19 PROCEDURE — 36415 COLL VENOUS BLD VENIPUNCTURE: CPT

## 2021-03-19 PROCEDURE — 80048 BASIC METABOLIC PNL TOTAL CA: CPT

## 2021-03-19 PROCEDURE — 84100 ASSAY OF PHOSPHORUS: CPT

## 2021-03-19 PROCEDURE — 93005 ELECTROCARDIOGRAM TRACING: CPT

## 2021-03-19 PROCEDURE — 96374 THER/PROPH/DIAG INJ IV PUSH: CPT

## 2021-03-19 PROCEDURE — 94640 AIRWAY INHALATION TREATMENT: CPT

## 2021-03-19 PROCEDURE — 96375 TX/PRO/DX INJ NEW DRUG ADDON: CPT

## 2021-03-19 PROCEDURE — 74176 CT ABD & PELVIS W/O CONTRAST: CPT

## 2021-03-19 PROCEDURE — 87640 STAPH A DNA AMP PROBE: CPT

## 2021-03-19 PROCEDURE — 99285 EMERGENCY DEPT VISIT HI MDM: CPT

## 2021-03-19 PROCEDURE — U0005: CPT

## 2021-03-19 PROCEDURE — 85610 PROTHROMBIN TIME: CPT

## 2021-03-19 PROCEDURE — 86140 C-REACTIVE PROTEIN: CPT

## 2021-03-19 PROCEDURE — 76775 US EXAM ABDO BACK WALL LIM: CPT

## 2021-03-19 PROCEDURE — 83605 ASSAY OF LACTIC ACID: CPT

## 2021-03-19 PROCEDURE — 86769 SARS-COV-2 COVID-19 ANTIBODY: CPT

## 2021-03-19 PROCEDURE — 85025 COMPLETE CBC W/AUTO DIFF WBC: CPT

## 2021-03-19 PROCEDURE — 84300 ASSAY OF URINE SODIUM: CPT

## 2021-03-19 PROCEDURE — 82570 ASSAY OF URINE CREATININE: CPT

## 2021-03-19 PROCEDURE — 80076 HEPATIC FUNCTION PANEL: CPT

## 2021-03-19 PROCEDURE — 87635 SARS-COV-2 COVID-19 AMP PRB: CPT

## 2021-03-19 PROCEDURE — 85730 THROMBOPLASTIN TIME PARTIAL: CPT

## 2021-03-19 PROCEDURE — 84443 ASSAY THYROID STIM HORMONE: CPT

## 2021-03-19 PROCEDURE — 85652 RBC SED RATE AUTOMATED: CPT

## 2021-03-19 PROCEDURE — 85379 FIBRIN DEGRADATION QUANT: CPT

## 2021-03-19 PROCEDURE — 97161 PT EVAL LOW COMPLEX 20 MIN: CPT

## 2021-03-19 PROCEDURE — 87040 BLOOD CULTURE FOR BACTERIA: CPT

## 2021-03-19 PROCEDURE — 87641 MR-STAPH DNA AMP PROBE: CPT

## 2021-03-19 PROCEDURE — 80061 LIPID PANEL: CPT

## 2021-03-19 PROCEDURE — 84484 ASSAY OF TROPONIN QUANT: CPT

## 2021-03-19 PROCEDURE — 84550 ASSAY OF BLOOD/URIC ACID: CPT

## 2021-03-19 PROCEDURE — 82962 GLUCOSE BLOOD TEST: CPT

## 2021-03-19 PROCEDURE — 82728 ASSAY OF FERRITIN: CPT

## 2021-03-19 PROCEDURE — 85027 COMPLETE CBC AUTOMATED: CPT

## 2021-03-19 PROCEDURE — 83036 HEMOGLOBIN GLYCOSYLATED A1C: CPT

## 2021-03-19 PROCEDURE — 82607 VITAMIN B-12: CPT

## 2021-03-19 PROCEDURE — 83735 ASSAY OF MAGNESIUM: CPT

## 2021-03-19 PROCEDURE — 84145 PROCALCITONIN (PCT): CPT

## 2021-03-19 PROCEDURE — 80053 COMPREHEN METABOLIC PANEL: CPT

## 2021-03-19 RX ORDER — CHOLECALCIFEROL (VITAMIN D3) 125 MCG
2000 CAPSULE ORAL
Qty: 0 | Refills: 0 | DISCHARGE
Start: 2021-03-19

## 2021-03-19 RX ORDER — ALBUTEROL 90 UG/1
2 AEROSOL, METERED ORAL
Qty: 1 | Refills: 0
Start: 2021-03-19

## 2021-03-19 RX ORDER — ZINC SULFATE TAB 220 MG (50 MG ZINC EQUIVALENT) 220 (50 ZN) MG
1 TAB ORAL
Qty: 0 | Refills: 0 | DISCHARGE
Start: 2021-03-19

## 2021-03-19 RX ORDER — RIVAROXABAN 15 MG-20MG
1 KIT ORAL
Qty: 30 | Refills: 0
Start: 2021-03-19 | End: 2021-04-17

## 2021-03-19 RX ORDER — FAMOTIDINE 10 MG/ML
1 INJECTION INTRAVENOUS
Qty: 60 | Refills: 0
Start: 2021-03-19 | End: 2021-04-17

## 2021-03-19 RX ORDER — OMEPRAZOLE 10 MG/1
1 CAPSULE, DELAYED RELEASE ORAL
Qty: 0 | Refills: 0 | DISCHARGE

## 2021-03-19 RX ORDER — ACETAMINOPHEN 500 MG
2 TABLET ORAL
Qty: 0 | Refills: 0 | DISCHARGE
Start: 2021-03-19

## 2021-03-19 RX ORDER — ASCORBIC ACID 60 MG
1 TABLET,CHEWABLE ORAL
Qty: 0 | Refills: 0 | DISCHARGE
Start: 2021-03-19

## 2021-03-19 RX ORDER — MONTELUKAST 4 MG/1
1 TABLET, CHEWABLE ORAL
Qty: 14 | Refills: 0
Start: 2021-03-19 | End: 2021-04-01

## 2021-03-19 RX ORDER — SODIUM CHLORIDE 0.65 %
1 AEROSOL, SPRAY (ML) NASAL
Qty: 0 | Refills: 0 | DISCHARGE
Start: 2021-03-19

## 2021-03-19 RX ADMIN — Medication 1000 MILLIGRAM(S): at 12:27

## 2021-03-19 RX ADMIN — GABAPENTIN 200 MILLIGRAM(S): 400 CAPSULE ORAL at 06:25

## 2021-03-19 RX ADMIN — FAMOTIDINE 20 MILLIGRAM(S): 10 INJECTION INTRAVENOUS at 06:26

## 2021-03-19 RX ADMIN — ZINC SULFATE TAB 220 MG (50 MG ZINC EQUIVALENT) 220 MILLIGRAM(S): 220 (50 ZN) TAB at 12:27

## 2021-03-19 RX ADMIN — Medication 100 MILLIGRAM(S): at 12:27

## 2021-03-19 RX ADMIN — Medication 2000 UNIT(S): at 12:27

## 2021-03-19 RX ADMIN — Medication 50 MILLIGRAM(S): at 06:25

## 2021-03-19 RX ADMIN — ENOXAPARIN SODIUM 40 MILLIGRAM(S): 100 INJECTION SUBCUTANEOUS at 12:27

## 2021-03-19 RX ADMIN — CHLORHEXIDINE GLUCONATE 1 APPLICATION(S): 213 SOLUTION TOPICAL at 06:25

## 2021-03-19 RX ADMIN — Medication 240 MILLIGRAM(S): at 06:25

## 2021-03-19 RX ADMIN — POLYETHYLENE GLYCOL 3350 17 GRAM(S): 17 POWDER, FOR SOLUTION ORAL at 12:26

## 2021-03-19 NOTE — PROGRESS NOTE ADULT - PROVIDER SPECIALTY LIST ADULT
Internal Medicine
Pulmonology
Internal Medicine
Pulmonology
Internal Medicine
Pulmonology
Internal Medicine
Pulmonology
Internal Medicine

## 2021-03-19 NOTE — PROGRESS NOTE ADULT - ASSESSMENT
1. PNA 2nd to Covid-19  - PCR negative 3/16.   - CXR noted with increasing infiltrates  - CT abd/pelvis noted 3/12  - Continue Vit C, Vit D, Zinc   - Off Dexamethasone, Off Remdesivir  - Continue Singulair and Pepcid   - ICU consult noted - no escalation at present.   - Robitussin PRN for cough  - Tylenol PRN for temp   - O2 Supp - D.C planning with home O2.   - Monitor O2 Sat   - Monitor labs  - F.U CXR  - Incentive Spirometry  - DVT and GI PPX   - PT F/U   - D/C planning. F/U as OP in 1-2 weeks.     2. HTN  - Continue meds   - Monitor BP     3. HLD  - Cont meds  - Lipid panel     4. GERD  - Antireflux diet   - Cont meds    5. MECHELLE  Improved  Avoid nephrotoxic drugs  monitor BMP    6. Renal Hyperdensity - Right  CT abdomen/pelvis noted.   US noted - non-obstructing renal calculi.  F.U as OP.  Fluids.

## 2021-03-19 NOTE — DISCHARGE NOTE NURSING/CASE MANAGEMENT/SOCIAL WORK - PATIENT PORTAL LINK FT
You can access the FollowMyHealth Patient Portal offered by [facility] by registering at the following website: [email or web address]. By joining [facility]’s FollowMyHealth portal, you will also be able to view your health information using other applications (apps) compatible with our system.

## 2021-03-19 NOTE — PROGRESS NOTE ADULT - SUBJECTIVE AND OBJECTIVE BOX
Patient is a 65y old  Male who presents with a chief complaint of SOB, fever, cough, body aches (18 Mar 2021 12:00)    pt seen in icu [  ], reg med floor [  x ], bed [x  ], chair at bedside [   ], a+o x3 [x ], lethargic [  ],    nad [x  ]      Allergies    No Known Allergies  Shrimp- Nasal congestion (Other)        Vitals    T(F): 97.6 (03-19-21 @ 06:00), Max: 98.6 (03-18-21 @ 20:15)  HR: 91 (03-19-21 @ 06:00) (90 - 100)  BP: 129/77 (03-19-21 @ 06:00) (112/67 - 129/77)  RR: 18 (03-19-21 @ 06:00) (18 - 22)  SpO2: 100% (03-19-21 @ 06:00) (94% - 100%)  Wt(kg): --  CAPILLARY BLOOD GLUCOSE          Labs                          12.6   8.95  )-----------( 387      ( 18 Mar 2021 06:01 )             38.2       03-18    137  |  100  |  23<H>  ----------------------------<  111<H>  4.0   |  31  |  1.23    Ca    10.0      18 Mar 2021 06:01    TPro  7.6  /  Alb  2.9<L>  /  TBili  0.4  /  DBili  x   /  AST  25  /  ALT  83<H>  /  AlkPhos  164<H>  03-18            .Blood Blood-Peripheral  02-21 @ 22:03   No Growth Final  --  --      .Blood Blood-Peripheral  02-21 @ 22:00   No Growth Final  --  --          Radiology Results      Meds    MEDICATIONS  (STANDING):  allopurinol 100 milliGRAM(s) Oral daily  ascorbic acid 1000 milliGRAM(s) Oral daily  atorvastatin 80 milliGRAM(s) Oral at bedtime  chlorhexidine 2% Cloths 1 Application(s) Topical <User Schedule>  cholecalciferol 2000 Unit(s) Oral daily  diltiazem    milliGRAM(s) Oral daily  enoxaparin Injectable 40 milliGRAM(s) SubCutaneous daily  famotidine    Tablet 20 milliGRAM(s) Oral two times a day  gabapentin 200 milliGRAM(s) Oral two times a day  hydrochlorothiazide 50 milliGRAM(s) Oral daily  montelukast 10 milliGRAM(s) Oral at bedtime  polyethylene glycol 3350 17 Gram(s) Oral daily  zinc sulfate 220 milliGRAM(s) Oral daily      MEDICATIONS  (PRN):  acetaminophen   Tablet .. 650 milliGRAM(s) Oral every 6 hours PRN Temp greater or equal to 38C (100.4F), Moderate Pain (4 - 6)  ALBUTerol    90 MICROgram(s) HFA Inhaler 2 Puff(s) Inhalation every 6 hours PRN Bronchospasm  guaiFENesin   Syrup  (Sugar-Free) 200 milliGRAM(s) Oral every 6 hours PRN Cough  ondansetron Injectable 4 milliGRAM(s) IV Push every 6 hours PRN Nausea and/or Vomiting  sodium chloride 0.65% Nasal 1 Spray(s) Both Nostrils three times a day PRN Nasal Congestion      Physical Exam    Neuro :  no focal deficits  Respiratory: CTA B/L  CV: RRR, S1S2, no murmurs,   Abdominal: Soft, NT, ND +BS,  Extremities: No edema, + peripheral pulses    ASSESSMENT    pneumonia 2nd to covid 19,   marya,   hypokalemia,   h/o Cluster headache  BPH (benign prostatic hypertrophy)  Spinal stenosis s/p laminectomy  GERD (gastroesophageal reflux disease)  Hyperlipidemia  HTN (hypertension)            PLAN    rept covid test 3/16/21 neg  d/c contact and airborne isolation   pt completed quaranteen  completed remdesevir 2/26/21  covid ab neg  completed dexamethasone 3/2/21  pepcid, singulair, vit c, vit d, zinc   pulm f/u  cont albuterol inhaler   cont lovenox  procalcitonin neg noted  ,   D-dimer, crp, ldh, ferritin, lactate noted ,   afebrile  cont tylenol prn,   robitussin prn   blood cx neg  O2 sat (95% - 97%) optimizer pendant 3L  cont O2 via optimizer pendant and taper as tolerated    incentive spirometer   pt low threshold for intubation and icu monitoring  icu cons noted  continue to monitor pulse oximetry  monitor biomarkers TIW  not accept to icu at this time  cont incentive spirometer   serum creat wnl   ct abd pelv with No ascites or pneumoperitoneum. Punctate nonobstructive bilateral renal calculi without hydronephrosis. 1.2 cm right renal exophytic hyperdensity. This may represent hemorrhagic or proteinaceous cyst. Consider nonemergent correlation   with renal ultrasound for better characterization. Scattered bilateral groundglass opacities, likely in keeping with multifocal pneumonia of reported COVID-19. Short-term chest radiographic imaging follow-up is advised noted above  f/u renal us to eval right renal hyperdensity  phys tx eval noted and rec phys tx 2-3x/week x 4-6 weeks with rolling walker (5 inch wheels) at home w/ home PT; home w/ assist  cont current meds   d/c plan for home with O2 in am if stable           Patient is a 65y old  Male who presents with a chief complaint of SOB, fever, cough, body aches (18 Mar 2021 12:00)    pt seen in icu [  ], reg med floor [  x ], bed [x  ], chair at bedside [   ], a+o x3 [x ], lethargic [  ],    nad [x  ]      Allergies    No Known Allergies  Shrimp- Nasal congestion (Other)        Vitals    T(F): 97.6 (03-19-21 @ 06:00), Max: 98.6 (03-18-21 @ 20:15)  HR: 91 (03-19-21 @ 06:00) (90 - 100)  BP: 129/77 (03-19-21 @ 06:00) (112/67 - 129/77)  RR: 18 (03-19-21 @ 06:00) (18 - 22)  SpO2: 100% (03-19-21 @ 06:00) (94% - 100%)  Wt(kg): --  CAPILLARY BLOOD GLUCOSE          Labs                          12.6   8.95  )-----------( 387      ( 18 Mar 2021 06:01 )             38.2       03-18    137  |  100  |  23<H>  ----------------------------<  111<H>  4.0   |  31  |  1.23    Ca    10.0      18 Mar 2021 06:01    TPro  7.6  /  Alb  2.9<L>  /  TBili  0.4  /  DBili  x   /  AST  25  /  ALT  83<H>  /  AlkPhos  164<H>  03-18            .Blood Blood-Peripheral  02-21 @ 22:03   No Growth Final  --  --      .Blood Blood-Peripheral  02-21 @ 22:00   No Growth Final  --  --          Radiology Results      < from: US Renal (03.18.21 @ 14:58) >  FINDINGS:    Right kidney: 9.7 cm. No renal mass or hydronephrosis. Punctate mid pole calculus is noted. 8 mm upper pole calculus Upper pole exophytic cyst measures 1.3 x 1.1 x 1.2 cm    Left kidney: 10.8 cm. No renal mass hydronephrosis or calculi.    Urinary bladder: Within normal limits.    IMPRESSION:    Nonobstructive right renal calculi.    < end of copied text >    Meds    MEDICATIONS  (STANDING):  allopurinol 100 milliGRAM(s) Oral daily  ascorbic acid 1000 milliGRAM(s) Oral daily  atorvastatin 80 milliGRAM(s) Oral at bedtime  chlorhexidine 2% Cloths 1 Application(s) Topical <User Schedule>  cholecalciferol 2000 Unit(s) Oral daily  diltiazem    milliGRAM(s) Oral daily  enoxaparin Injectable 40 milliGRAM(s) SubCutaneous daily  famotidine    Tablet 20 milliGRAM(s) Oral two times a day  gabapentin 200 milliGRAM(s) Oral two times a day  hydrochlorothiazide 50 milliGRAM(s) Oral daily  montelukast 10 milliGRAM(s) Oral at bedtime  polyethylene glycol 3350 17 Gram(s) Oral daily  zinc sulfate 220 milliGRAM(s) Oral daily      MEDICATIONS  (PRN):  acetaminophen   Tablet .. 650 milliGRAM(s) Oral every 6 hours PRN Temp greater or equal to 38C (100.4F), Moderate Pain (4 - 6)  ALBUTerol    90 MICROgram(s) HFA Inhaler 2 Puff(s) Inhalation every 6 hours PRN Bronchospasm  guaiFENesin   Syrup  (Sugar-Free) 200 milliGRAM(s) Oral every 6 hours PRN Cough  ondansetron Injectable 4 milliGRAM(s) IV Push every 6 hours PRN Nausea and/or Vomiting  sodium chloride 0.65% Nasal 1 Spray(s) Both Nostrils three times a day PRN Nasal Congestion      Physical Exam    Neuro :  no focal deficits  Respiratory: CTA B/L  CV: RRR, S1S2, no murmurs,   Abdominal: Soft, NT, ND +BS,  Extremities: No edema, + peripheral pulses    ASSESSMENT    pneumonia 2nd to covid 19,   marya,   hypokalemia,   h/o Cluster headache  BPH (benign prostatic hypertrophy)  Spinal stenosis s/p laminectomy  GERD (gastroesophageal reflux disease)  Hyperlipidemia  HTN (hypertension)            PLAN    rept covid test 3/16/21 neg  d/c contact and airborne isolation   pt completed quaranteen  completed remdesevir 2/26/21  covid ab neg  completed dexamethasone 3/2/21  pepcid, singulair, vit c, vit d, zinc   pulm f/u  cont albuterol inhaler   cont lovenox  procalcitonin neg noted  ,   D-dimer, crp, ldh, ferritin, lactate noted ,   afebrile  cont tylenol prn,   robitussin prn   blood cx neg  O2 sat (94% - 100%) n/c 3L  cont O2 via n/c and taper as tolerated    incentive spirometer   icu cons noted  continue to monitor pulse oximetry  monitor biomarkers TIW  not accept to icu at this time  cont incentive spirometer   serum creat wnl   ct abd pelv with No ascites or pneumoperitoneum. Punctate nonobstructive bilateral renal calculi without hydronephrosis. 1.2 cm right renal exophytic hyperdensity. This may represent hemorrhagic or proteinaceous cyst. Consider nonemergent correlation   with renal ultrasound for better characterization. Scattered bilateral groundglass opacities, likely in keeping with multifocal pneumonia of reported COVID-19. Short-term chest radiographic imaging follow-up is advised noted above  renal us with Right kidney: 9.7 cm. No renal mass or hydronephrosis. Punctate mid pole calculus is noted. 8 mm upper pole calculus Upper pole exophytic cyst measures 1.3 x 1.1 x 1.2 cm, Left kidney: 10.8 cm. No renal mass hydronephrosis or calculi.  Urinary bladder: Within normal limits noted above.  phys tx eval noted and rec phys tx 2-3x/week x 4-6 weeks with rolling walker (5 inch wheels) at home w/ home PT; home w/ assist  cont current meds   d/c plan for home with O2

## 2021-03-19 NOTE — PROGRESS NOTE ADULT - SUBJECTIVE AND OBJECTIVE BOX
Pt is awake, alert, lying in bed in NAD. Feels well. Saturating 90% on RA and 97% on 2L. For D/C home with home O2.     INTERVAL HPI/OVERNIGHT EVENTS:      VITAL SIGNS:  T(F): 97.6 (03-19-21 @ 06:00)  HR: 91 (03-19-21 @ 06:00)  BP: 129/77 (03-19-21 @ 06:00)  RR: 18 (03-19-21 @ 11:09)  SpO2: 90% (03-19-21 @ 11:09)  Wt(kg): --  I&O's Detail          REVIEW OF SYSTEMS:    CONSTITUTIONAL:  No fevers, chills, sweats    HEENT:  Eyes:  No diplopia or blurred vision. ENT:  No earache, sore throat or runny nose.    CARDIOVASCULAR:  No pressure, squeezing, tightness, or heaviness about the chest; no palpitations.    RESPIRATORY:  Per HPI    GASTROINTESTINAL:  No abdominal pain, nausea, vomiting or diarrhea.    GENITOURINARY:  No dysuria, frequency or urgency.    NEUROLOGIC:  No paresthesias, fasciculations, seizures or weakness.    PSYCHIATRIC:  No disorder of thought or mood.      PHYSICAL EXAM:    Constitutional: Well developed and nourished  Eyes:Perrla  ENMT: normal  Neck:supple  Respiratory: good air entry  Cardiovascular: S1 S2 regular  Gastrointestinal: Soft, Non tender  Extremities: No edema  Vascular:normal  Neurological:Awake, alert,Ox3  Musculoskeletal:Normal      MEDICATIONS  (STANDING):  allopurinol 100 milliGRAM(s) Oral daily  ascorbic acid 1000 milliGRAM(s) Oral daily  atorvastatin 80 milliGRAM(s) Oral at bedtime  chlorhexidine 2% Cloths 1 Application(s) Topical <User Schedule>  cholecalciferol 2000 Unit(s) Oral daily  diltiazem    milliGRAM(s) Oral daily  enoxaparin Injectable 40 milliGRAM(s) SubCutaneous daily  famotidine    Tablet 20 milliGRAM(s) Oral two times a day  gabapentin 200 milliGRAM(s) Oral two times a day  hydrochlorothiazide 50 milliGRAM(s) Oral daily  montelukast 10 milliGRAM(s) Oral at bedtime  polyethylene glycol 3350 17 Gram(s) Oral daily  zinc sulfate 220 milliGRAM(s) Oral daily    MEDICATIONS  (PRN):  acetaminophen   Tablet .. 650 milliGRAM(s) Oral every 6 hours PRN Temp greater or equal to 38C (100.4F), Moderate Pain (4 - 6)  ALBUTerol    90 MICROgram(s) HFA Inhaler 2 Puff(s) Inhalation every 6 hours PRN Bronchospasm  guaiFENesin   Syrup  (Sugar-Free) 200 milliGRAM(s) Oral every 6 hours PRN Cough  ondansetron Injectable 4 milliGRAM(s) IV Push every 6 hours PRN Nausea and/or Vomiting  sodium chloride 0.65% Nasal 1 Spray(s) Both Nostrils three times a day PRN Nasal Congestion      Allergies    No Known Allergies    Intolerances    Shrimp- Nasal congestion (Other)      LABS:                        12.6   8.95  )-----------( 387      ( 18 Mar 2021 06:01 )             38.2     03-18    137  |  100  |  23<H>  ----------------------------<  111<H>  4.0   |  31  |  1.23    Ca    10.0      18 Mar 2021 06:01    TPro  7.6  /  Alb  2.9<L>  /  TBili  0.4  /  DBili  x   /  AST  25  /  ALT  83<H>  /  AlkPhos  164<H>  03-18              CAPILLARY BLOOD GLUCOSE        pro-bnp -- 03-16 @ 09:01     d-dimer 359  03-16 @ 09:01      RADIOLOGY & ADDITIONAL TESTS:    < from: US Renal (03.18.21 @ 14:58) >  IMPRESSION:    Nonobstructive right renal calculi.    < end of copied text >  CXR:    Ct scan chest:    ekg;    echo:

## 2021-03-19 NOTE — PROGRESS NOTE ADULT - REASON FOR ADMISSION
SOB, fever, cough, body aches

## 2021-03-24 RX ORDER — SULFAMETHOXAZOLE AND TRIMETHOPRIM 800; 160 MG/1; MG/1
800-160 TABLET ORAL TWICE DAILY
Qty: 14 | Refills: 0 | Status: DISCONTINUED | COMMUNITY
Start: 2020-05-21 | End: 2021-03-23

## 2021-03-25 ENCOUNTER — APPOINTMENT (OUTPATIENT)
Dept: PULMONOLOGY | Facility: CLINIC | Age: 66
End: 2021-03-25
Payer: MEDICARE

## 2021-03-25 ENCOUNTER — APPOINTMENT (OUTPATIENT)
Dept: INTERNAL MEDICINE | Facility: CLINIC | Age: 66
End: 2021-03-25
Payer: MEDICARE

## 2021-03-25 PROCEDURE — 99213 OFFICE O/P EST LOW 20 MIN: CPT | Mod: 95

## 2021-03-25 PROCEDURE — 99202 OFFICE O/P NEW SF 15 MIN: CPT | Mod: 95

## 2021-03-25 RX ORDER — PANTOPRAZOLE 20 MG/1
20 TABLET, DELAYED RELEASE ORAL
Qty: 90 | Refills: 2 | Status: DISCONTINUED | COMMUNITY
Start: 2020-03-24 | End: 2021-03-25

## 2021-03-25 RX ORDER — DOCUSATE SODIUM 100 MG/1
100 CAPSULE ORAL 3 TIMES DAILY
Qty: 60 | Refills: 3 | Status: ACTIVE | COMMUNITY
Start: 2021-03-25 | End: 1900-01-01

## 2021-03-25 RX ORDER — BACLOFEN 5 MG/1
5 TABLET ORAL
Refills: 0 | Status: DISCONTINUED | COMMUNITY
Start: 2020-03-31 | End: 2021-03-25

## 2021-03-25 RX ORDER — OXYCODONE 5 MG/1
5 TABLET ORAL
Refills: 0 | Status: DISCONTINUED | COMMUNITY
Start: 2020-03-31 | End: 2021-03-25

## 2021-03-25 NOTE — REVIEW OF SYSTEMS
[Fatigue] : fatigue [Cough] : cough [Dyspnea on Exertion] : dyspnea on exertion [Constipation] : constipation [Heartburn] : heartburn [Back Pain] : back pain [Negative] : ENT [Fever] : no fever [Chills] : no chills [Night Sweats] : no night sweats [Recent Change In Weight] : ~T no recent weight change [Chest Pain] : no chest pain [Palpitations] : no palpitations [Lower Ext Edema] : no lower extremity edema [Orthopena] : no orthopnea [Paroxysmal Nocturnal Dyspnea] : no paroxysmal nocturnal dyspnea [Wheezing] : no wheezing [Abdominal Pain] : no abdominal pain [Nausea] : no nausea [Diarrhea] : no diarrhea [Vomiting] : no vomiting [Dysuria] : no dysuria [Hematuria] : no hematuria [Joint Pain] : no joint pain [Joint Swelling] : no joint swelling [Headache] : no headache [Dizziness] : no dizziness [Fainting] : no fainting [Confusion] : no confusion [Insomnia] : no insomnia [Anxiety] : no anxiety [FreeTextEntry2] : 20 pound weight loss during hospitalization

## 2021-03-25 NOTE — PLAN
[FreeTextEntry1] : 65 year old male with history of COVID pneumonia presents for follow up.  He is currently doing well on oxygen.  I ask that he monitor his oxygen levels with and without O2 and will touch base with him on Monday.  He will continue the xarelto as well.  \par \par I was present for the key portions of the history elicited by the nurse practitioner. I agree with the assessment and plan as written\par

## 2021-03-25 NOTE — ASSESSMENT
[FreeTextEntry1] : Discharge follow up s/p monthlong hospitalization for COVID-19 infection.\par Using O2 at all times at home.  Still with considerable dyspnea upon exertion.  Has not checked O2 with activity.  >95% at rest.  \par Will refer for pulmonary follow up.  \par Start stool softener for constipation.\par Home nursing visits have started, expect home PT soon as well. \par Discussed activity as tolerated.  Patient is avoiding being sedentary. \par Did not start xarelto at discharge d/t high copay.   Has been taking low dose ASA.  New Rx sent into [facility] which will dispense it free of charge, wife will pick it up.\par Plan for office visit in 1 month.  \par

## 2021-03-25 NOTE — HISTORY OF PRESENT ILLNESS
[Post-hospitalization from ___ Hospital] : Post-hospitalization from [unfilled] Hospital [Discharge Summary] : discharge summary [Pertinent Labs] : pertinent labs [Radiology Findings] : radiology findings [Discharge Med List] : discharge medication list [Med Reconciliation] : medication reconciliation has been completed [Admitted on: ___] : The patient was admitted on [unfilled] [Discharged on ___] : discharged on [unfilled] [FreeTextEntry2] : Patient is a 65 year old male from home, with PMH of HTN, HLD and GERD who presented to the ED with c/o SOB, cough, fevers and myalgias for 6 days i nthe setting of a recent COVID-19 diagnosis. Patient reports desaturations to 88% on room air for which his PCP advised evaluation in the ED. Patient admitted to medicine for further management of his acute respiratory failure secondary to COVID infection. He is now s/p a course of  Remdesivir and Decadron. \par Leukocytosis resolved. Weaned off oxymizer pendant but continued to require nasal cannula for supplemental 02. - home O2 delivered.  Repeat CXR with ? free air under diaphragm, CT A/P without any acute findings - incidental renal \par exophytic hyperdensity noted. Follow up renal US confirms nonobstrucitve renal calculi, however patient is  asymptomatic. \par Outpatient plan to continue extended VTE prophylaxis Xarelto for 30 days \par It was determined that you were medically optimized for discharge home with home PT. \par \par This is a brief hospital course. Please refer to daily notes for a more in depth course. \par \par Discharge Medications	\par acetaminophen 325 mg oral tablet: 2 tab(s) orally every 6 hours, As needed\par albuterol 90 mcg/inh inhalation aerosol: 2 puff(s) inhaled every 6 hours, As needed, Bronchospasm \par ascorbic acid 1000 mg oral tablet: 1 tab(s) orally once a day \par atorvastatin 80 mg oral tablet: 1 tab(s) orally once a day \par Cardizem  mg/24 hours oral tablet, extended release: 1 tab(s) orally once a day \par cholecalciferol oral tablet: 2000 unit(s) orally once a day \par famotidine 20 mg oral tablet: 1 tab(s) orally 2 times a day \par guaiFENesin 100 mg/5 mL oral liquid: 10 milliliter(s) orally every 6 hours, As needed, Cough \par hydroCHLOROthiazide 50 mg oral tablet: 1 tab(s) orally once a day \par montelukast 10 mg oral tablet: 1 tab(s) orally once a day (at bedtime) \par sodium chloride 0.65% nasal spray: 1  nasal 2 times a day \par Xarelto 10 mg oral tablet: 1 tab(s) orally once a day \par zinc sulfate 220 mg oral capsule: 1 cap(s) orally once a day \par  [de-identified] : \par Walking better since being home.  Breathing is better, but still winded with exertion or much talking.  Dry cough when short of breath.  \par O2 this morning was 97% at rest, has not check when ambulating.  Continues 2 L supplemental O2 via nasal cannula.  \par Lost 20 pounds during hospitalization.  Still no taste or smell.  Trying to stay hydrated.  \par No diarrhea, no nausea, no vomiting.  Has been constipated, eating prunes.  \par Given MiraLax while hospitalized, which was unpleasant d/t difficulty of getting to the bathroom. \par Currently getting around himself, may require some help with carrying oxygen.  \par Can stand for about 5 minutes before getting short of breath.  No dizziness.  No chest pain. No stairs at home.  \par Has some back pain. \par Excepts to be getting PT, has not started yet.  \par No pressure wounds.  \par Has not started xarelto d/t insurance coverage, taking low dose ASA.  [facility] for [facility], which wife will .

## 2021-03-25 NOTE — HISTORY OF PRESENT ILLNESS
[Home] : at home, [unfilled] , at the time of the visit. [Medical Office: ([facility])___] : at the medical office located in  [Verbal consent obtained from patient] : the patient, [unfilled] [FreeTextEntry1] : 65 year old male with history of COVID pneumonia. Initially tested positive 2/16.  He became hypoxic at home and his oxygen saturation was in the low 80s.\par  He was hospitalized for 1 month due to hypoxia.  In the hospital he was started on remdesivir and decadron as well as Xarelto for elevated d dimer.  \par Currently, pt is feeling a little better.  He continues to use oxygen at 2 L 24 hours/day.  His resting O2 saturation with oxygen on is 95-96%.  He tries to ambulate around the house to strengthen himself because he still feels very weak and fatigued.  \par He still does not have much sense of taste or smell but is eating and staying hydrated.  \par He denies nausea, vomiting, or dirrhea.

## 2021-03-26 ENCOUNTER — APPOINTMENT (OUTPATIENT)
Dept: PULMONOLOGY | Facility: CLINIC | Age: 66
End: 2021-03-26

## 2021-03-28 RX ORDER — INHALER, ASSIST DEVICES
SPACER (EA) MISCELLANEOUS
Qty: 1 | Refills: 0 | Status: ACTIVE | COMMUNITY
Start: 2021-03-28 | End: 1900-01-01

## 2021-03-30 ENCOUNTER — NON-APPOINTMENT (OUTPATIENT)
Age: 66
End: 2021-03-30

## 2021-04-01 ENCOUNTER — NON-APPOINTMENT (OUTPATIENT)
Age: 66
End: 2021-04-01

## 2021-04-01 RX ORDER — FLUTICASONE PROPIONATE AND SALMETEROL 50; 500 UG/1; UG/1
500-50 POWDER RESPIRATORY (INHALATION) TWICE DAILY
Qty: 1 | Refills: 3 | Status: DISCONTINUED | COMMUNITY
Start: 2021-03-28 | End: 2021-04-01

## 2021-04-01 RX ORDER — FLUTICASONE PROPIONATE AND SALMETEROL 500; 50 UG/1; UG/1
500-50 POWDER RESPIRATORY (INHALATION) TWICE DAILY
Qty: 1 | Refills: 3 | Status: DISCONTINUED | COMMUNITY
Start: 2021-03-28 | End: 2021-04-01

## 2021-04-03 ENCOUNTER — LABORATORY RESULT (OUTPATIENT)
Age: 66
End: 2021-04-03

## 2021-04-05 LAB
ALBUMIN SERPL ELPH-MCNC: 4.5 G/DL
ALP BLD-CCNC: 135 U/L
ALT SERPL-CCNC: 28 U/L
ANION GAP SERPL CALC-SCNC: 17 MMOL/L
AST SERPL-CCNC: 18 U/L
BASOPHILS # BLD AUTO: 0.07 K/UL
BASOPHILS NFR BLD AUTO: 0.4 %
BILIRUB SERPL-MCNC: 0.6 MG/DL
BUN SERPL-MCNC: 15 MG/DL
CALCIUM SERPL-MCNC: 10 MG/DL
CHLORIDE SERPL-SCNC: 102 MMOL/L
CO2 SERPL-SCNC: 25 MMOL/L
CREAT SERPL-MCNC: 1.09 MG/DL
CRP SERPL-MCNC: 141 MG/L
DEPRECATED D DIMER PPP IA-ACNC: 388 NG/ML DDU
EOSINOPHIL # BLD AUTO: 0.18 K/UL
EOSINOPHIL NFR BLD AUTO: 0.9 %
FERRITIN SERPL-MCNC: 542 NG/ML
GLUCOSE SERPL-MCNC: 115 MG/DL
HCT VFR BLD CALC: 39.5 %
HGB BLD-MCNC: 12.6 G/DL
IMM GRANULOCYTES NFR BLD AUTO: 0.4 %
LYMPHOCYTES # BLD AUTO: 2.16 K/UL
LYMPHOCYTES NFR BLD AUTO: 11.3 %
MAN DIFF?: NORMAL
MCHC RBC-ENTMCNC: 29.2 PG
MCHC RBC-ENTMCNC: 31.9 GM/DL
MCV RBC AUTO: 91.4 FL
MONOCYTES # BLD AUTO: 1.67 K/UL
MONOCYTES NFR BLD AUTO: 8.8 %
NEUTROPHILS # BLD AUTO: 14.9 K/UL
NEUTROPHILS NFR BLD AUTO: 78.2 %
PLATELET # BLD AUTO: 307 K/UL
POTASSIUM SERPL-SCNC: 4.4 MMOL/L
PROT SERPL-MCNC: 7.1 G/DL
RBC # BLD: 4.32 M/UL
RBC # FLD: 15.5 %
SODIUM SERPL-SCNC: 144 MMOL/L
WBC # FLD AUTO: 19.06 K/UL

## 2021-04-06 ENCOUNTER — OUTPATIENT (OUTPATIENT)
Dept: OUTPATIENT SERVICES | Facility: HOSPITAL | Age: 66
LOS: 1 days | End: 2021-04-06
Payer: MEDICARE

## 2021-04-06 ENCOUNTER — APPOINTMENT (OUTPATIENT)
Dept: RADIOLOGY | Facility: CLINIC | Age: 66
End: 2021-04-06
Payer: MEDICARE

## 2021-04-06 DIAGNOSIS — Z98.89 OTHER SPECIFIED POSTPROCEDURAL STATES: Chronic | ICD-10-CM

## 2021-04-06 DIAGNOSIS — U07.1 COVID-19: ICD-10-CM

## 2021-04-06 PROCEDURE — 71046 X-RAY EXAM CHEST 2 VIEWS: CPT | Mod: 26

## 2021-04-06 PROCEDURE — 71046 X-RAY EXAM CHEST 2 VIEWS: CPT

## 2021-04-08 ENCOUNTER — LABORATORY RESULT (OUTPATIENT)
Age: 66
End: 2021-04-08

## 2021-04-09 LAB
BASOPHILS # BLD AUTO: 0.08 K/UL
BASOPHILS NFR BLD AUTO: 0.5 %
CRP SERPL-MCNC: 11 MG/L
DEPRECATED D DIMER PPP IA-ACNC: 237 NG/ML DDU
EOSINOPHIL # BLD AUTO: 0.43 K/UL
EOSINOPHIL NFR BLD AUTO: 2.5 %
HCT VFR BLD CALC: 38.2 %
HGB BLD-MCNC: 12.3 G/DL
IMM GRANULOCYTES NFR BLD AUTO: 0.6 %
LYMPHOCYTES # BLD AUTO: 3.92 K/UL
LYMPHOCYTES NFR BLD AUTO: 22.6 %
MAN DIFF?: NORMAL
MCHC RBC-ENTMCNC: 29.5 PG
MCHC RBC-ENTMCNC: 32.2 GM/DL
MCV RBC AUTO: 91.6 FL
MONOCYTES # BLD AUTO: 1.65 K/UL
MONOCYTES NFR BLD AUTO: 9.5 %
NEUTROPHILS # BLD AUTO: 11.18 K/UL
NEUTROPHILS NFR BLD AUTO: 64.3 %
PLATELET # BLD AUTO: 332 K/UL
RBC # BLD: 4.17 M/UL
RBC # FLD: 15.7 %
WBC # FLD AUTO: 17.37 K/UL

## 2021-04-09 RX ORDER — BLOOD PRESSURE TEST KIT-LARGE
KIT MISCELLANEOUS
Qty: 1 | Refills: 0 | Status: ACTIVE | COMMUNITY
Start: 2020-03-24 | End: 1900-01-01

## 2021-04-09 RX ORDER — NEBULIZER ACCESSORIES
KIT MISCELLANEOUS
Qty: 1 | Refills: 1 | Status: ACTIVE | COMMUNITY
Start: 2021-04-09 | End: 1900-01-01

## 2021-04-09 RX ORDER — ALBUTEROL SULFATE 2.5 MG/3ML
(2.5 MG/3ML) SOLUTION RESPIRATORY (INHALATION)
Qty: 1 | Refills: 3 | Status: ACTIVE | COMMUNITY
Start: 2021-04-09 | End: 1900-01-01

## 2021-04-14 ENCOUNTER — OUTPATIENT (OUTPATIENT)
Dept: OUTPATIENT SERVICES | Facility: HOSPITAL | Age: 66
LOS: 1 days | End: 2021-04-14
Payer: MEDICARE

## 2021-04-14 ENCOUNTER — APPOINTMENT (OUTPATIENT)
Dept: CT IMAGING | Facility: IMAGING CENTER | Age: 66
End: 2021-04-14
Payer: MEDICARE

## 2021-04-14 ENCOUNTER — APPOINTMENT (OUTPATIENT)
Dept: INTERNAL MEDICINE | Facility: CLINIC | Age: 66
End: 2021-04-14
Payer: MEDICARE

## 2021-04-14 DIAGNOSIS — Z98.89 OTHER SPECIFIED POSTPROCEDURAL STATES: Chronic | ICD-10-CM

## 2021-04-14 DIAGNOSIS — U07.1 COVID-19: ICD-10-CM

## 2021-04-14 PROCEDURE — 71275 CT ANGIOGRAPHY CHEST: CPT

## 2021-04-14 PROCEDURE — 71275 CT ANGIOGRAPHY CHEST: CPT | Mod: 26

## 2021-04-14 PROCEDURE — 99447 NTRPROF PH1/NTRNET/EHR 11-20: CPT

## 2021-04-15 NOTE — REVIEW OF SYSTEMS
[Fatigue] : fatigue [Cough] : cough [Dyspnea on Exertion] : dyspnea on exertion [Depression] : depression [Negative] : Gastrointestinal [Fever] : no fever [Chills] : no chills [Night Sweats] : no night sweats [Earache] : no earache [Nasal Discharge] : no nasal discharge [Sore Throat] : no sore throat [Hoarseness] : no hoarseness [Chest Pain] : no chest pain [Lower Ext Edema] : no lower extremity edema [Orthopena] : no orthopnea [Paroxysmal Nocturnal Dyspnea] : no paroxysmal nocturnal dyspnea [Wheezing] : no wheezing [Suicidal] : not suicidal [Insomnia] : no insomnia [Anxiety] : no anxiety [FreeTextEntry9] : chronic neck and back pain  [de-identified] : right arm weakness s/p cervical spine surgery 1 year ago

## 2021-04-15 NOTE — HISTORY OF PRESENT ILLNESS
[Home] : at home, [unfilled] , at the time of the visit. [Medical Office: ([facility])___] : at the medical office located in  [Verbal consent obtained from patient] : the patient, [unfilled] [de-identified] : \delfina 65 y.o. male with hypertension, hyperlipidemia, GERD, depression, anxiety, back and neck pain s/p lumbar spinal fusion 10/2018 then cervical spinal fusion 1/2020 c/b right arm weakness. \par \delfina Contracted COVID 2 months ago with nearly 4 week hospital stay, discharged home on 3/19 with supplemental oxygen.  \par He had done follow up with pulmonology/[facility] program and started inhalers for continued dyspnea with exertion and continued oxygen requirements.  \par He had been stable and not requiring O2 at rest, then felt some worsening dyspnea and cough last week.  He spoke with the pulmonologist office who recommended ED evaluation, but patient declined.   \par He had a chest x-ray which showed some improvement.\par He had repeat labs done, which were also improved but d-dimer remains elevated. \delfina Had been prescribed DVT prophylaxis at discharge with Xarelto, but was without it for about 4 days d/t cost.  He is now taking a 30 day course.  Given this we decided to get a CTA of the chest, which he went for this morning.  \par \delfina Has good and bad moments.  Overall up and about more.  Felt exhausted and short of breath for the walk through the parking lot of radiaology office (did not bring O2, too heavy).  Cough seems to be better.  \par \delfina Has been using nebulizer twice daily.  \par \par He was also taking colchicine for gout flare.

## 2021-04-15 NOTE — ASSESSMENT
[FreeTextEntry1] : Dyspnea with exertion s/p COVID-19 infection:\par CTA this morning, negative for PE\par Chest x-ray last week noted some improvement\par Labs improving - CRP decreasing, leukocytosis improving, d-dimer decreasing, ferritin decreasing\par Will continue to monitor labs\par Continue inhalers\par Complete 30 days of xarelto\par Check labs and follow up in 2 weeks.  Patient will call sooner with any concerns. \par PT completed this week.  Copay too high in facility to continue, will see if can continue at home any longer. \par \par Schedule CPE with PCP

## 2021-04-28 ENCOUNTER — APPOINTMENT (OUTPATIENT)
Dept: INTERNAL MEDICINE | Facility: CLINIC | Age: 66
End: 2021-04-28
Payer: MEDICARE

## 2021-04-28 LAB
ALBUMIN SERPL ELPH-MCNC: 4.8 G/DL
ALP BLD-CCNC: 99 U/L
ALT SERPL-CCNC: 22 U/L
ANION GAP SERPL CALC-SCNC: 18 MMOL/L
AST SERPL-CCNC: 21 U/L
BASOPHILS # BLD AUTO: 0.08 K/UL
BASOPHILS NFR BLD AUTO: 0.7 %
BILIRUB SERPL-MCNC: 0.6 MG/DL
BUN SERPL-MCNC: 18 MG/DL
CALCIUM SERPL-MCNC: 9.8 MG/DL
CHLORIDE SERPL-SCNC: 103 MMOL/L
CO2 SERPL-SCNC: 23 MMOL/L
CREAT SERPL-MCNC: 1.19 MG/DL
DEPRECATED D DIMER PPP IA-ACNC: 272 NG/ML DDU
EOSINOPHIL # BLD AUTO: 0.29 K/UL
EOSINOPHIL NFR BLD AUTO: 2.4 %
FERRITIN SERPL-MCNC: 116 NG/ML
GLUCOSE SERPL-MCNC: 91 MG/DL
HCT VFR BLD CALC: 41.9 %
HGB BLD-MCNC: 13 G/DL
IMM GRANULOCYTES NFR BLD AUTO: 0.3 %
LYMPHOCYTES # BLD AUTO: 3 K/UL
LYMPHOCYTES NFR BLD AUTO: 25 %
MAN DIFF?: NORMAL
MCHC RBC-ENTMCNC: 29.1 PG
MCHC RBC-ENTMCNC: 31 GM/DL
MCV RBC AUTO: 93.9 FL
MONOCYTES # BLD AUTO: 1.36 K/UL
MONOCYTES NFR BLD AUTO: 11.3 %
NEUTROPHILS # BLD AUTO: 7.22 K/UL
NEUTROPHILS NFR BLD AUTO: 60.3 %
PLATELET # BLD AUTO: 316 K/UL
POTASSIUM SERPL-SCNC: 4.2 MMOL/L
PROT SERPL-MCNC: 7 G/DL
RBC # BLD: 4.46 M/UL
RBC # FLD: 17.1 %
SODIUM SERPL-SCNC: 144 MMOL/L
URATE SERPL-MCNC: 9.1 MG/DL
WBC # FLD AUTO: 11.99 K/UL

## 2021-04-28 PROCEDURE — 99214 OFFICE O/P EST MOD 30 MIN: CPT | Mod: 95

## 2021-04-28 RX ORDER — RIVAROXABAN 10 MG/1
10 TABLET, FILM COATED ORAL
Qty: 30 | Refills: 0 | Status: COMPLETED | COMMUNITY
Start: 2021-03-24 | End: 2021-04-28

## 2021-04-28 RX ORDER — TAMSULOSIN HYDROCHLORIDE 0.4 MG/1
0.4 CAPSULE ORAL
Qty: 90 | Refills: 3 | Status: DISCONTINUED | COMMUNITY
Start: 2020-03-31 | End: 2021-04-28

## 2021-04-28 RX ORDER — GABAPENTIN 300 MG/1
300 CAPSULE ORAL 3 TIMES DAILY
Refills: 0 | Status: DISCONTINUED | COMMUNITY
Start: 2019-03-27 | End: 2021-04-28

## 2021-04-28 NOTE — REVIEW OF SYSTEMS
[Negative] : Psychiatric [Abdominal Pain] : no abdominal pain [Nausea] : no nausea [Diarrhea] : no diarrhea [FreeTextEntry6] : dyspnea on exertion improving, cough improving  [FreeTextEntry7] : constipation the past few days  [FreeTextEntry9] : chronic neck and back pain and stiffness

## 2021-04-28 NOTE — ASSESSMENT
[FreeTextEntry1] : 65 y.o. male with hypertension, hyperlipidemia, GERD, depression, anxiety, back and neck pain s/p lumbar spinal fusion 10/2018 then cervical spinal fusion 1/2020 c/b right arm weakness, s/p COVID-19 infection with 4 week hospital stay (not intubated) discharged home 3/19/21.  \par \par s/p COVID infection:\par Continues to improve, no longer requires oxygen.  \par Labs done yesterday, reviewed - WBC at baseline, ferritin normal, d-dimer still mildly elevated, CMP normal. \par Will taper down Symbicort to 1 puff twice daily.  Cost is an issue with the inhaler, so will stop when it runs and monitor is cough and dyspnea from there.  \par \par GERD:\par Controlled on PPI\par \par Gout:\par Uric acid high - 9.2\par Has been taking colchicine, but continues to have some pain.  Will do prednisone taper and hopefully start allopurinol.  \par \par Chronic neck and back pain:\par Would like to resume PT, which I agree he could benefit from especially given his recent debilitation from COVID infection and hospitalization.  \par Will put in request for home PT.  \par \par Constipation:\par Discussed hydration, fiber, stool softener\par

## 2021-04-28 NOTE — PHYSICAL EXAM
[No Acute Distress] : no acute distress [Normal Voice/Communication] : normal voice/communication [Normal] : no acute distress, well nourished, well developed and well-appearing [No Respiratory Distress] : no respiratory distress

## 2021-04-28 NOTE — HISTORY OF PRESENT ILLNESS
[Home] : at home, [unfilled] , at the time of the visit. [Medical Office: ([facility])___] : at the medical office located in  [de-identified] : 65 y.o. male with hypertension, hyperlipidemia, GERD, depression, anxiety, back and neck pain s/p lumbar spinal fusion 10/2018 then cervical spinal fusion 1/2020 c/b right arm weakness, s/p COVID-19 infection with 4 week hospital stay (not intubated) discharged home 3/19/21.  \par \par Telehealth follow up today s/p COVID-19 infection.  \par Continues to improve.  Still has dyspnea with exertion, but endurance continues to improve.  Has done 1/2 mile outside.  Not using oxygen. O2 sat 94-96%, has occasional 99%.  Not much cough.  Some constipation, otherwise no GI symptoms.  Appetite is good. \par Nebulizer periodically, which does seem to help.  Symbicort 2 puffs twice daily.  \par Xarelto completed.  \par COVID immunization schedule May 14 - Moderna.  \par Nursing visits topped as of Monday.  Blood pressure has been good.  \par Would like to try to continue PT at home, particularly has neck pain.  \par May be going down to [place] end of June, looking forward to getting away for a while.  Has family there.  Wife and kids are doing well.   \par Gout pain improved, but still there, both feet.  Continues to take colchicine.  \par

## 2021-04-29 ENCOUNTER — RX RENEWAL (OUTPATIENT)
Age: 66
End: 2021-04-29

## 2021-05-11 ENCOUNTER — TRANSCRIPTION ENCOUNTER (OUTPATIENT)
Age: 66
End: 2021-05-11

## 2021-05-15 ENCOUNTER — RX RENEWAL (OUTPATIENT)
Age: 66
End: 2021-05-15

## 2021-06-03 ENCOUNTER — INPATIENT (INPATIENT)
Facility: HOSPITAL | Age: 66
LOS: 1 days | Discharge: HOME | DRG: 287 | End: 2021-06-05
Attending: INTERNAL MEDICINE | Admitting: INTERNAL MEDICINE
Payer: MEDICARE

## 2021-06-03 VITALS
TEMPERATURE: 98 F | DIASTOLIC BLOOD PRESSURE: 92 MMHG | RESPIRATION RATE: 18 BRPM | WEIGHT: 235.01 LBS | OXYGEN SATURATION: 98 % | HEART RATE: 108 BPM | SYSTOLIC BLOOD PRESSURE: 178 MMHG | HEIGHT: 72 IN

## 2021-06-03 DIAGNOSIS — Z98.89 OTHER SPECIFIED POSTPROCEDURAL STATES: Chronic | ICD-10-CM

## 2021-06-03 DIAGNOSIS — E78.5 HYPERLIPIDEMIA, UNSPECIFIED: ICD-10-CM

## 2021-06-03 DIAGNOSIS — M43.20 FUSION OF SPINE, SITE UNSPECIFIED: Chronic | ICD-10-CM

## 2021-06-03 DIAGNOSIS — U07.1 COVID-19: ICD-10-CM

## 2021-06-03 DIAGNOSIS — I10 ESSENTIAL (PRIMARY) HYPERTENSION: ICD-10-CM

## 2021-06-03 DIAGNOSIS — I24.9 ACUTE ISCHEMIC HEART DISEASE, UNSPECIFIED: ICD-10-CM

## 2021-06-03 LAB
ALBUMIN SERPL ELPH-MCNC: 4.9 G/DL — SIGNIFICANT CHANGE UP (ref 3.3–5)
ALP SERPL-CCNC: 116 U/L — SIGNIFICANT CHANGE UP (ref 40–120)
ALT FLD-CCNC: 25 U/L — SIGNIFICANT CHANGE UP (ref 10–45)
ANION GAP SERPL CALC-SCNC: 14 MMOL/L — SIGNIFICANT CHANGE UP (ref 5–17)
APPEARANCE UR: CLEAR — SIGNIFICANT CHANGE UP
AST SERPL-CCNC: 28 U/L — SIGNIFICANT CHANGE UP (ref 10–40)
BASE EXCESS BLDV CALC-SCNC: 3 MMOL/L — SIGNIFICANT CHANGE UP (ref -2–3)
BASOPHILS # BLD AUTO: 0.06 K/UL — SIGNIFICANT CHANGE UP (ref 0–0.2)
BASOPHILS NFR BLD AUTO: 0.6 % — SIGNIFICANT CHANGE UP (ref 0–2)
BILIRUB SERPL-MCNC: 0.6 MG/DL — SIGNIFICANT CHANGE UP (ref 0.2–1.2)
BILIRUB UR-MCNC: NEGATIVE — SIGNIFICANT CHANGE UP
BUN SERPL-MCNC: 19 MG/DL — SIGNIFICANT CHANGE UP (ref 7–23)
CA-I SERPL-SCNC: 1.14 MMOL/L — LOW (ref 1.15–1.33)
CALCIUM SERPL-MCNC: 9.8 MG/DL — SIGNIFICANT CHANGE UP (ref 8.4–10.5)
CHLORIDE SERPL-SCNC: 99 MMOL/L — SIGNIFICANT CHANGE UP (ref 96–108)
CO2 BLDV-SCNC: 28.3 MMOL/L — HIGH (ref 22–26)
CO2 SERPL-SCNC: 27 MMOL/L — SIGNIFICANT CHANGE UP (ref 22–31)
COLOR SPEC: YELLOW — SIGNIFICANT CHANGE UP
CREAT SERPL-MCNC: 1.09 MG/DL — SIGNIFICANT CHANGE UP (ref 0.5–1.3)
CRP SERPL-MCNC: 48.7 MG/L — HIGH (ref 0–4)
D DIMER BLD IA.RAPID-MCNC: 249 NG/ML DDU — HIGH
DIFF PNL FLD: NEGATIVE — SIGNIFICANT CHANGE UP
EOSINOPHIL # BLD AUTO: 0.01 K/UL — SIGNIFICANT CHANGE UP (ref 0–0.5)
EOSINOPHIL NFR BLD AUTO: 0.1 % — SIGNIFICANT CHANGE UP (ref 0–6)
FERRITIN SERPL-MCNC: 107 NG/ML — SIGNIFICANT CHANGE UP (ref 30–400)
GAS PNL BLDV: 135 MMOL/L — LOW (ref 136–145)
GAS PNL BLDV: SIGNIFICANT CHANGE UP
GAS PNL BLDV: SIGNIFICANT CHANGE UP
GLUCOSE SERPL-MCNC: 122 MG/DL — HIGH (ref 70–99)
GLUCOSE UR QL: NEGATIVE — SIGNIFICANT CHANGE UP
HCO3 BLDV-SCNC: 27 MMOL/L — SIGNIFICANT CHANGE UP (ref 22–29)
HCT VFR BLD CALC: 43.5 % — SIGNIFICANT CHANGE UP (ref 39–50)
HGB BLD-MCNC: 14.7 G/DL — SIGNIFICANT CHANGE UP (ref 13–17)
IMM GRANULOCYTES NFR BLD AUTO: 0.6 % — SIGNIFICANT CHANGE UP (ref 0–1.5)
KETONES UR-MCNC: NEGATIVE — SIGNIFICANT CHANGE UP
LACTATE SERPL-SCNC: 2.1 MMOL/L — HIGH (ref 0.5–2)
LEUKOCYTE ESTERASE UR-ACNC: NEGATIVE — SIGNIFICANT CHANGE UP
LYMPHOCYTES # BLD AUTO: 1.05 K/UL — SIGNIFICANT CHANGE UP (ref 1–3.3)
LYMPHOCYTES # BLD AUTO: 10 % — LOW (ref 13–44)
MCHC RBC-ENTMCNC: 30.1 PG — SIGNIFICANT CHANGE UP (ref 27–34)
MCHC RBC-ENTMCNC: 33.8 GM/DL — SIGNIFICANT CHANGE UP (ref 32–36)
MCV RBC AUTO: 89 FL — SIGNIFICANT CHANGE UP (ref 80–100)
MONOCYTES # BLD AUTO: 0.98 K/UL — HIGH (ref 0–0.9)
MONOCYTES NFR BLD AUTO: 9.4 % — SIGNIFICANT CHANGE UP (ref 2–14)
NEUTROPHILS # BLD AUTO: 8.3 K/UL — HIGH (ref 1.8–7.4)
NEUTROPHILS NFR BLD AUTO: 79.3 % — HIGH (ref 43–77)
NITRITE UR-MCNC: NEGATIVE — SIGNIFICANT CHANGE UP
NRBC # BLD: 0 /100 WBCS — SIGNIFICANT CHANGE UP (ref 0–0)
PCO2 BLDV: 39 MMHG — LOW (ref 42–55)
PH BLDV: 7.45 — HIGH (ref 7.32–7.43)
PH UR: 6 — SIGNIFICANT CHANGE UP (ref 5–8)
PLATELET # BLD AUTO: 278 K/UL — SIGNIFICANT CHANGE UP (ref 150–400)
PO2 BLDV: 22 MMHG — SIGNIFICANT CHANGE UP
POTASSIUM BLDV-SCNC: 3.5 MMOL/L — SIGNIFICANT CHANGE UP (ref 3.5–5.1)
POTASSIUM SERPL-MCNC: 3.5 MMOL/L — SIGNIFICANT CHANGE UP (ref 3.5–5.3)
POTASSIUM SERPL-SCNC: 3.5 MMOL/L — SIGNIFICANT CHANGE UP (ref 3.5–5.3)
PROCALCITONIN SERPL-MCNC: 0.28 NG/ML — HIGH (ref 0.02–0.1)
PROT SERPL-MCNC: 8 G/DL — SIGNIFICANT CHANGE UP (ref 6–8.3)
PROT UR-MCNC: NEGATIVE MG/DL — SIGNIFICANT CHANGE UP
RBC # BLD: 4.89 M/UL — SIGNIFICANT CHANGE UP (ref 4.2–5.8)
RBC # FLD: 14.9 % — HIGH (ref 10.3–14.5)
SAO2 % BLDV: 37.3 % — SIGNIFICANT CHANGE UP
SARS-COV-2 RNA SPEC QL NAA+PROBE: NEGATIVE — SIGNIFICANT CHANGE UP
SARS-COV-2 RNA SPEC QL NAA+PROBE: SIGNIFICANT CHANGE UP
SODIUM SERPL-SCNC: 140 MMOL/L — SIGNIFICANT CHANGE UP (ref 135–145)
SP GR SPEC: 1.02 — SIGNIFICANT CHANGE UP (ref 1–1.03)
TROPONIN T SERPL-MCNC: 0.08 NG/ML — CRITICAL HIGH (ref 0–0.01)
UROBILINOGEN FLD QL: 0.2 E.U./DL — SIGNIFICANT CHANGE UP
WBC # BLD: 10.46 K/UL — SIGNIFICANT CHANGE UP (ref 3.8–10.5)
WBC # FLD AUTO: 10.46 K/UL — SIGNIFICANT CHANGE UP (ref 3.8–10.5)

## 2021-06-03 PROCEDURE — 99285 EMERGENCY DEPT VISIT HI MDM: CPT

## 2021-06-03 PROCEDURE — 74174 CTA ABD&PLVS W/CONTRAST: CPT | Mod: 26,MA

## 2021-06-03 PROCEDURE — 71275 CT ANGIOGRAPHY CHEST: CPT | Mod: 26,MA

## 2021-06-03 PROCEDURE — 71045 X-RAY EXAM CHEST 1 VIEW: CPT | Mod: 26

## 2021-06-03 PROCEDURE — 93010 ELECTROCARDIOGRAM REPORT: CPT

## 2021-06-03 RX ORDER — ASPIRIN/CALCIUM CARB/MAGNESIUM 324 MG
325 TABLET ORAL ONCE
Refills: 0 | Status: COMPLETED | OUTPATIENT
Start: 2021-06-03 | End: 2021-06-03

## 2021-06-03 RX ORDER — BUDESONIDE AND FORMOTEROL FUMARATE DIHYDRATE 160; 4.5 UG/1; UG/1
2 AEROSOL RESPIRATORY (INHALATION)
Refills: 0 | Status: DISCONTINUED | OUTPATIENT
Start: 2021-06-03 | End: 2021-06-05

## 2021-06-03 RX ORDER — ALBUTEROL 90 UG/1
2 AEROSOL, METERED ORAL EVERY 6 HOURS
Refills: 0 | Status: DISCONTINUED | OUTPATIENT
Start: 2021-06-03 | End: 2021-06-05

## 2021-06-03 RX ORDER — CLOPIDOGREL BISULFATE 75 MG/1
300 TABLET, FILM COATED ORAL ONCE
Refills: 0 | Status: COMPLETED | OUTPATIENT
Start: 2021-06-03 | End: 2021-06-03

## 2021-06-03 RX ORDER — DILTIAZEM HCL 120 MG
240 CAPSULE, EXT RELEASE 24 HR ORAL DAILY
Refills: 0 | Status: DISCONTINUED | OUTPATIENT
Start: 2021-06-03 | End: 2021-06-03

## 2021-06-03 RX ORDER — PANTOPRAZOLE SODIUM 20 MG/1
40 TABLET, DELAYED RELEASE ORAL
Refills: 0 | Status: DISCONTINUED | OUTPATIENT
Start: 2021-06-03 | End: 2021-06-05

## 2021-06-03 RX ORDER — FAMOTIDINE 10 MG/ML
20 INJECTION INTRAVENOUS ONCE
Refills: 0 | Status: COMPLETED | OUTPATIENT
Start: 2021-06-03 | End: 2021-06-03

## 2021-06-03 RX ORDER — HEPARIN SODIUM 5000 [USP'U]/ML
INJECTION INTRAVENOUS; SUBCUTANEOUS
Qty: 25000 | Refills: 0 | Status: DISCONTINUED | OUTPATIENT
Start: 2021-06-03 | End: 2021-06-04

## 2021-06-03 RX ORDER — METOPROLOL TARTRATE 50 MG
25 TABLET ORAL
Refills: 0 | Status: DISCONTINUED | OUTPATIENT
Start: 2021-06-03 | End: 2021-06-05

## 2021-06-03 RX ORDER — HEPARIN SODIUM 5000 [USP'U]/ML
6000 INJECTION INTRAVENOUS; SUBCUTANEOUS EVERY 6 HOURS
Refills: 0 | Status: DISCONTINUED | OUTPATIENT
Start: 2021-06-03 | End: 2021-06-04

## 2021-06-03 RX ORDER — ATORVASTATIN CALCIUM 80 MG/1
80 TABLET, FILM COATED ORAL AT BEDTIME
Refills: 0 | Status: DISCONTINUED | OUTPATIENT
Start: 2021-06-03 | End: 2021-06-05

## 2021-06-03 RX ORDER — ASPIRIN/CALCIUM CARB/MAGNESIUM 324 MG
81 TABLET ORAL DAILY
Refills: 0 | Status: DISCONTINUED | OUTPATIENT
Start: 2021-06-04 | End: 2021-06-05

## 2021-06-03 RX ORDER — HYDROCHLOROTHIAZIDE 25 MG
50 TABLET ORAL DAILY
Refills: 0 | Status: DISCONTINUED | OUTPATIENT
Start: 2021-06-03 | End: 2021-06-05

## 2021-06-03 RX ORDER — HEPARIN SODIUM 5000 [USP'U]/ML
5000 INJECTION INTRAVENOUS; SUBCUTANEOUS ONCE
Refills: 0 | Status: COMPLETED | OUTPATIENT
Start: 2021-06-03 | End: 2021-06-03

## 2021-06-03 RX ORDER — SODIUM CHLORIDE 9 MG/ML
1000 INJECTION INTRAMUSCULAR; INTRAVENOUS; SUBCUTANEOUS ONCE
Refills: 0 | Status: COMPLETED | OUTPATIENT
Start: 2021-06-03 | End: 2021-06-03

## 2021-06-03 RX ORDER — ONDANSETRON 8 MG/1
4 TABLET, FILM COATED ORAL ONCE
Refills: 0 | Status: COMPLETED | OUTPATIENT
Start: 2021-06-03 | End: 2021-06-03

## 2021-06-03 RX ADMIN — ONDANSETRON 4 MILLIGRAM(S): 8 TABLET, FILM COATED ORAL at 16:11

## 2021-06-03 RX ADMIN — SODIUM CHLORIDE 1000 MILLILITER(S): 9 INJECTION INTRAMUSCULAR; INTRAVENOUS; SUBCUTANEOUS at 20:23

## 2021-06-03 RX ADMIN — CLOPIDOGREL BISULFATE 300 MILLIGRAM(S): 75 TABLET, FILM COATED ORAL at 20:22

## 2021-06-03 RX ADMIN — HEPARIN SODIUM 1000 UNIT(S)/HR: 5000 INJECTION INTRAVENOUS; SUBCUTANEOUS at 20:22

## 2021-06-03 RX ADMIN — SODIUM CHLORIDE 1000 MILLILITER(S): 9 INJECTION INTRAMUSCULAR; INTRAVENOUS; SUBCUTANEOUS at 16:39

## 2021-06-03 RX ADMIN — HEPARIN SODIUM 5000 UNIT(S): 5000 INJECTION INTRAVENOUS; SUBCUTANEOUS at 20:21

## 2021-06-03 RX ADMIN — ATORVASTATIN CALCIUM 80 MILLIGRAM(S): 80 TABLET, FILM COATED ORAL at 23:24

## 2021-06-03 RX ADMIN — Medication 325 MILLIGRAM(S): at 17:51

## 2021-06-03 RX ADMIN — FAMOTIDINE 20 MILLIGRAM(S): 10 INJECTION INTRAVENOUS at 16:11

## 2021-06-03 NOTE — H&P ADULT - NSICDXFAMILYHX_GEN_ALL_CORE_FT
FAMILY HISTORY:  Father  Still living? Unknown  Family history of stroke, Age at diagnosis: Age Unknown

## 2021-06-03 NOTE — H&P ADULT - NSHPSOCIALHISTORY_GEN_ALL_CORE
Tobacco: former smoker, quit 25 yrs ago  ETOH/illicit drugs: denies Tobacco: former smoker, quit 18 yrs ago in 2003, previously 1ppd x 24 yrs  ETOH: 2 drinks/4 times per week  illicit drugs: denies

## 2021-06-03 NOTE — H&P ADULT - NSHPLABSRESULTS_GEN_ALL_CORE
KG revealed SR@100BPM with no acute ST/T wave changes. EKG revealed SR@100BPM with no acute ST/T wave changes.

## 2021-06-03 NOTE — ED ADULT NURSE NOTE - OBJECTIVE STATEMENT
pt received into RESUS room a&ox3 ambulatory appears uncomfortable arrives via old walk in triage area was clutching his chest hyperventilating brought in chair to resus room upgraded to MD [place] no 12 lead ekg done nsr noted to ccm resp rapid but easily redirected. Reports sudden onset midsternal CP with SOB today about an hour ago no hx of similar. Denies head ache admits to some chills body aches as he got the 2nd dose of covid vaccine yesterday. Had covid in Feb hospitalized x 1 month no intubation. abd soft nondistended c/o nausea and epigastric burning. 18G placed to RAC labs drawn and sent pt in nad

## 2021-06-03 NOTE — H&P ADULT - HISTORY OF PRESENT ILLNESS
65 yr old M with FHx of CAD, former smoker, PMHx of HTN, hyperlipidemia, GERD, hx of COVID-19 in 2/2021 (hospitalized for 1 week@[facility]), received 2nd dose of Moderna vaccine (6/2/21) who presents to [facility] ED 6/3/21 c/o an episode of chest pain an hour PTA. Patient reports he was driving when he had sudden onset of nonradiating midsternal chest pressure 8/10 in severity, associated with SOB. Paitent denies any nausea/vomitting, diaphoresis, palpitations, dizziness, recent PND, orthopnea or LE edema. Patient reports last night he did experience fever, chills and LUE soreness which he attributed to the vaccine.     In ED, BP: 178/92, HR: 108, RR: 18, Temp: 98.4F, O2 sat: 98% RA. EKG revealed SR@100BPM with no acute ST/T wave changes. CXR unremarkable. CT Angio Chest/Abdomen/pelvis revealed no evidence of aortic aneurysm or dissection, negative for PE.    Labs notable for: K 3.5, 1st Troponin T 0.07 with second Troponin T 0.08, , , CRP 48.7, Lactate 2.1. UA unremarkable.     Patient R/I ACS, given ASA 325mg PO x 1 dose, Plavix 300mg PO x 1 dose and started on Heparin gtt as ER attending discussed with Dr. Figueroa.     Patient currently stable, admitted to cardiac telemetry and management of ACS.    65 yr old M former heavy smoker, PMHx of HTN, hyperlipidemia, GERD, hx of COVID-19 in 2/2021 (hospitalized for ~1 month, discharged w/ home O2--no longer requiring it), received 2nd dose of Pfizer vaccine (6/2/21) who presents to [facility] ED 6/3/21 c/o an episode of chest pain an hour PTA. Patient reports he was driving when he had sudden onset of non-radiating sharp midsternal chest pain 8/10 in severity, associated with SOB. Patient denies any nausea, vomiting, diaphoresis, palpitations, dizziness, recent PND, orthopnea, LE edema, recent travel, sick contacts, or prior cardiac work-up. Patient reports last night he did experience fever, chills and LUE soreness which he attributed to the vaccine. Patient does admit to a steady decline in his ET over the past year, states he becomes winded when climbing >1/2 flight of stairs or walking >1-2 city blocks at fast pace.    In ED, BP: 178/92, HR: 108, RR: 18, Temp: 98.4F, O2 sat: 98% RA. EKG revealed SR@100BPM with no acute ST/T wave changes. CXR unremarkable. CT Angio Chest/Abdomen/pelvis revealed no evidence of aortic aneurysm or dissection, negative for PE.    Labs notable for: K 3.5, 1st Troponin T 0.07 with second Troponin T 0.08, , , CRP 48.7, Lactate 2.1. UA unremarkable.     Patient R/I ACS, given ASA 325mg PO x 1 dose, Plavix 300mg PO x 1 dose and started on Heparin gtt in ED.     Patient currently stable, admitted to cardiac telemetry and management of ACS.    65 yr old M former heavy smoker, PMHx of HTN, hyperlipidemia, GERD, hx of COVID-19 in 2/2021 (hospitalized for ~1 month, discharged w/ home O2--no longer requiring it), received 2nd dose of Pfizer vaccine (6/2/21) who presents to [facility] ED 6/3/21 c/o an episode of chest pain x 1 hour.  Patient reports symptom onset was ~4PM while driving, describes it as sudden onset of non-radiating sharp midsternal chest pain 8/10 in severity, associated with SOB. Patient denies any nausea, vomiting, diaphoresis, palpitations, dizziness, recent PND, orthopnea, LE edema, recent travel, sick contacts, or prior cardiac work-up. Patient reports last night he did experience fever, chills and LUE soreness which he attributed to the vaccine. Patient does admit to a steady decline in his ET over the past year, states he becomes winded when climbing >1/2 flight of stairs or walking >1-2 city blocks at fast pace.    In ED, BP: 178/92, HR: 108, RR: 18, Temp: 98.4F, O2 sat: 98% RA. EKG revealed SR@100BPM with no acute ST/T wave changes. CXR unremarkable. CT Angio Chest/Abdomen/pelvis revealed no evidence of aortic aneurysm or dissection, negative for PE.    Labs notable for: K 3.5, 1st Troponin T 0.07 with second Troponin T 0.08, , , CRP 48.7, Lactate 2.1. UA unremarkable.     Patient given ASA 325mg PO x 1 dose, Plavix 300mg PO x 1 dose and started on Heparin gtt in ED.     Patient R/I ACS, admitted to cardiac telemetry and further ischemic work-up.

## 2021-06-03 NOTE — H&P ADULT - NSICDXPASTMEDICALHX_GEN_ALL_CORE_FT
PAST MEDICAL HISTORY:  HLD (hyperlipidemia)     HTN (hypertension)      PAST MEDICAL HISTORY:  COVID-19     HLD (hyperlipidemia)     HTN (hypertension)

## 2021-06-03 NOTE — PATIENT PROFILE ADULT - NS PRO AD NO ADVANCE DIRECTIVE
"Anesthesia Transfer of Care Note    Patient: Laly Baker    Procedure(s) Performed: Procedure(s) (LRB):  PHACOEMULSIFICATION-ASPIRATION-CATARACT (Right)  INSERTION-INTRAOCULAR LENS (IOL) (Right)    Patient location: PACU    Anesthesia Type: MAC    Transport from OR: Transported from OR on room air with adequate spontaneous ventilation    Post pain: adequate analgesia    Post assessment: no apparent anesthetic complications    Post vital signs: stable    Level of consciousness: awake    Nausea/Vomiting: no nausea/vomiting    Complications: none    Transfer of care protocol was followed      Last vitals:   Visit Vitals  /63 (BP Location: Right arm, Patient Position: Lying, BP Method: Automatic)   Pulse 70   Temp 36.7 °C (98.1 °F) (Skin)   Resp 16   Ht 5' 4" (1.626 m)   Wt 43.5 kg (96 lb)   SpO2 100%   Breastfeeding? No   BMI 16.48 kg/m²     "
No

## 2021-06-03 NOTE — H&P ADULT - PROBLEM SELECTOR PLAN 2
stable, continue home medications Hydrochlorothiazide 50mg by mouth daily. Holding Diltiazem for now. Will start Metoprolol Tartrate 25mg PO BID. stable, continue home medications Hydrochlorothiazide 50mg by mouth daily. Holding home Diltiazem ER 240mg QD for now. Will start Metoprolol Tartrate 25mg PO BID.

## 2021-06-03 NOTE — H&P ADULT - ASSESSMENT
65 yr old M former heavy smoker, PMHx of HTN, hyperlipidemia, GERD, hx of COVID-19 in 2/2021, received 2nd dose of Pfizer vaccine (6/2/21) who presents to [facility] ED 6/3/21 c/o an episode of non-radiating sharp midsternal chest pain 8/10 in severity, associated with SOB, EKG nonischemic, CT Angio Chest/Abd/pelvis negative for dissection/PE. 1st Troponin T 0.07 with second Troponin T 0.08, . Patient R/I ACS, admitted to cardiac telemetry and further ischemic work-up.

## 2021-06-03 NOTE — H&P ADULT - PROBLEM SELECTOR PLAN 1
currently chest pain free,  EKG revealed SR@100BPM with no acute ST/T wave changes. CXR unremarkable. CT Angio Chest/Abdomen/pelvis revealed no evidence of aortic aneurysm or dissection, negative for PE.  --1st Troponin T 0.07 with second Troponin T 0.08, .  --R/I ACS, loaded with ASA 325mg PO x 1 dose, Plavix 300mg PO x 1 dose and started on Heparin gtt in ED.   --Will obtain Echocardiogram.  --NPO after midnight for possible cardiac catheterization in AM. currently chest pain free,  EKG revealed SR@100BPM with no acute ST/T wave changes. CXR unremarkable. CT Angio Chest/Abdomen/pelvis revealed no evidence of aortic aneurysm or dissection, negative for PE.  --1st Troponin T 0.07 with second Troponin T 0.08, . Will continue to trend to peak.  --R/I ACS, loaded with ASA 325mg PO x 1 dose, Plavix 300mg PO x 1 dose and started on Heparin gtt in ED.   --Will obtain Echocardiogram.  --NPO after midnight for possible cardiac catheterization in AM.

## 2021-06-03 NOTE — ED PROVIDER NOTE - CLINICAL SUMMARY MEDICAL DECISION MAKING FREE TEXT BOX
ACS  --Cp  x 1 hr while driving  nausea  hx recent covid vaccine 1 day ago  2nd dose of moderna  no EKG changes   + trop .07   Covid neg  dw dr lassiter asa plavix heparin admit tele 66 yo M with x HTN former smoker with chest pain x 1 hr while driving --concern for ACS - hx recent covid vaccine 1 day ago  2nd dose of pfizer- no EKG changes   + trop .07   Covid neg  dw dr lassiter asa plavix heparin admit tele

## 2021-06-03 NOTE — ED PROVIDER NOTE - OBJECTIVE STATEMENT
66 yo male with hx HTN GERD elev chol, with onset of midsternal non-radiating pain 1 hr ago while driving in from [employer] [facility]   no syncope no N/V or diaphoresis  no back pain - had 2nd dose of covid vaccine 1 day ago- no sx adfter vaccine other than sore L arm  no spicy foods last night  no cough ro fevers or chills at present  did not eat anything all day --he had Covid in Feb 2021  hosp x 1 week at [facility] [place]  not intubated   no prior known hx CAD or MI  ? fam hx CAD 64 yo male with hx HTN GERD elev chol, with onset of midsternal non-radiating pain 1 hr ago while driving in from [employer] [facility]- no recent cough/ aches urinary sx-  no syncope no N/V or diaphoresis  no back pain - had 2nd dose of covid vaccine 1 day ago- no sx adfter vaccine other than sore L arm  no spicy foods last night  no cough ro fevers or chills at present  did not eat anything all day --he had Covid in Feb 2021 - hosp x 1 week at [place]  not intubated   no prior known hx CAD or MI  ? fam hx CAD 64 yo male with hx HTN GERD elev chol, with onset of midsternal non-radiating pain 1 hr ago while driving in from [place] [facility]- no recent cough/ aches urinary sx-  no syncope no N/V or diaphoresis  no back pain - had 2nd dose of covid vaccine 1 day ago- had fever chills last nite and sore L arm  no spicy foods last night  no cough ro fevers or chills at present  did not eat anything all day --he had Covid in Feb 2021 - hosp x 1 week at [place]  not intubated   no prior known hx CAD or MI  ? fam hx CAD

## 2021-06-04 ENCOUNTER — TRANSCRIPTION ENCOUNTER (OUTPATIENT)
Age: 66
End: 2021-06-04

## 2021-06-04 LAB
A1C WITH ESTIMATED AVERAGE GLUCOSE RESULT: 5.8 % — HIGH (ref 4–5.6)
ANION GAP SERPL CALC-SCNC: 12 MMOL/L — SIGNIFICANT CHANGE UP (ref 5–17)
APTT BLD: 60.3 SEC — HIGH (ref 27.5–35.5)
BLD GP AB SCN SERPL QL: NEGATIVE — SIGNIFICANT CHANGE UP
BUN SERPL-MCNC: 18 MG/DL — SIGNIFICANT CHANGE UP (ref 7–23)
CALCIUM SERPL-MCNC: 9 MG/DL — SIGNIFICANT CHANGE UP (ref 8.4–10.5)
CHLORIDE SERPL-SCNC: 103 MMOL/L — SIGNIFICANT CHANGE UP (ref 96–108)
CHOLEST SERPL-MCNC: 145 MG/DL — SIGNIFICANT CHANGE UP
CO2 SERPL-SCNC: 25 MMOL/L — SIGNIFICANT CHANGE UP (ref 22–31)
COVID-19 SPIKE DOMAIN AB INTERP: POSITIVE
COVID-19 SPIKE DOMAIN ANTIBODY RESULT: >250 U/ML — HIGH
CREAT SERPL-MCNC: 1.12 MG/DL — SIGNIFICANT CHANGE UP (ref 0.5–1.3)
ESTIMATED AVERAGE GLUCOSE: 120 MG/DL — HIGH (ref 68–114)
GLUCOSE SERPL-MCNC: 112 MG/DL — HIGH (ref 70–99)
HCT VFR BLD CALC: 39.6 % — SIGNIFICANT CHANGE UP (ref 39–50)
HCV AB S/CO SERPL IA: 0.03 S/CO — SIGNIFICANT CHANGE UP
HCV AB SERPL-IMP: SIGNIFICANT CHANGE UP
HDLC SERPL-MCNC: 36 MG/DL — LOW
HGB BLD-MCNC: 12.8 G/DL — LOW (ref 13–17)
LIPID PNL WITH DIRECT LDL SERPL: 60 MG/DL — SIGNIFICANT CHANGE UP
MAGNESIUM SERPL-MCNC: 1.8 MG/DL — SIGNIFICANT CHANGE UP (ref 1.6–2.6)
MCHC RBC-ENTMCNC: 29.2 PG — SIGNIFICANT CHANGE UP (ref 27–34)
MCHC RBC-ENTMCNC: 32.3 GM/DL — SIGNIFICANT CHANGE UP (ref 32–36)
MCV RBC AUTO: 90.2 FL — SIGNIFICANT CHANGE UP (ref 80–100)
NON HDL CHOLESTEROL: 109 MG/DL — SIGNIFICANT CHANGE UP
NRBC # BLD: 0 /100 WBCS — SIGNIFICANT CHANGE UP (ref 0–0)
PLATELET # BLD AUTO: 214 K/UL — SIGNIFICANT CHANGE UP (ref 150–400)
POTASSIUM SERPL-MCNC: 3.4 MMOL/L — LOW (ref 3.5–5.3)
POTASSIUM SERPL-SCNC: 3.4 MMOL/L — LOW (ref 3.5–5.3)
RBC # BLD: 4.39 M/UL — SIGNIFICANT CHANGE UP (ref 4.2–5.8)
RBC # FLD: 15 % — HIGH (ref 10.3–14.5)
RH IG SCN BLD-IMP: POSITIVE — SIGNIFICANT CHANGE UP
SARS-COV-2 IGG+IGM SERPL QL IA: >250 U/ML — HIGH
SARS-COV-2 IGG+IGM SERPL QL IA: POSITIVE
SODIUM SERPL-SCNC: 140 MMOL/L — SIGNIFICANT CHANGE UP (ref 135–145)
TRIGL SERPL-MCNC: 243 MG/DL — HIGH
WBC # BLD: 6.79 K/UL — SIGNIFICANT CHANGE UP (ref 3.8–10.5)
WBC # FLD AUTO: 6.79 K/UL — SIGNIFICANT CHANGE UP (ref 3.8–10.5)

## 2021-06-04 PROCEDURE — 93458 L HRT ARTERY/VENTRICLE ANGIO: CPT | Mod: 26

## 2021-06-04 PROCEDURE — 99152 MOD SED SAME PHYS/QHP 5/>YRS: CPT

## 2021-06-04 PROCEDURE — 99222 1ST HOSP IP/OBS MODERATE 55: CPT

## 2021-06-04 PROCEDURE — 93306 TTE W/DOPPLER COMPLETE: CPT | Mod: 26

## 2021-06-04 RX ORDER — HEPARIN SODIUM 5000 [USP'U]/ML
INJECTION INTRAVENOUS; SUBCUTANEOUS
Qty: 25000 | Refills: 0 | Status: DISCONTINUED | OUTPATIENT
Start: 2021-06-04 | End: 2021-06-04

## 2021-06-04 RX ORDER — SODIUM CHLORIDE 9 MG/ML
500 INJECTION INTRAMUSCULAR; INTRAVENOUS; SUBCUTANEOUS
Refills: 0 | Status: DISCONTINUED | OUTPATIENT
Start: 2021-06-04 | End: 2021-06-05

## 2021-06-04 RX ORDER — HEPARIN SODIUM 5000 [USP'U]/ML
6000 INJECTION INTRAVENOUS; SUBCUTANEOUS EVERY 6 HOURS
Refills: 0 | Status: DISCONTINUED | OUTPATIENT
Start: 2021-06-04 | End: 2021-06-04

## 2021-06-04 RX ORDER — MAGNESIUM SULFATE 500 MG/ML
1 VIAL (ML) INJECTION ONCE
Refills: 0 | Status: COMPLETED | OUTPATIENT
Start: 2021-06-04 | End: 2021-06-04

## 2021-06-04 RX ORDER — CLOPIDOGREL BISULFATE 75 MG/1
300 TABLET, FILM COATED ORAL ONCE
Refills: 0 | Status: COMPLETED | OUTPATIENT
Start: 2021-06-04 | End: 2021-06-04

## 2021-06-04 RX ORDER — POTASSIUM CHLORIDE 20 MEQ
40 PACKET (EA) ORAL ONCE
Refills: 0 | Status: COMPLETED | OUTPATIENT
Start: 2021-06-04 | End: 2021-06-04

## 2021-06-04 RX ADMIN — ATORVASTATIN CALCIUM 80 MILLIGRAM(S): 80 TABLET, FILM COATED ORAL at 22:16

## 2021-06-04 RX ADMIN — PANTOPRAZOLE SODIUM 40 MILLIGRAM(S): 20 TABLET, DELAYED RELEASE ORAL at 06:16

## 2021-06-04 RX ADMIN — Medication 81 MILLIGRAM(S): at 12:16

## 2021-06-04 RX ADMIN — Medication 25 MILLIGRAM(S): at 22:16

## 2021-06-04 RX ADMIN — Medication 40 MILLIEQUIVALENT(S): at 07:33

## 2021-06-04 RX ADMIN — Medication 25 MILLIGRAM(S): at 06:16

## 2021-06-04 RX ADMIN — BUDESONIDE AND FORMOTEROL FUMARATE DIHYDRATE 2 PUFF(S): 160; 4.5 AEROSOL RESPIRATORY (INHALATION) at 06:25

## 2021-06-04 RX ADMIN — Medication 100 GRAM(S): at 07:33

## 2021-06-04 RX ADMIN — SODIUM CHLORIDE 50 MILLILITER(S): 9 INJECTION INTRAMUSCULAR; INTRAVENOUS; SUBCUTANEOUS at 21:53

## 2021-06-04 RX ADMIN — HEPARIN SODIUM 1000 UNIT(S)/HR: 5000 INJECTION INTRAVENOUS; SUBCUTANEOUS at 02:32

## 2021-06-04 RX ADMIN — CLOPIDOGREL BISULFATE 300 MILLIGRAM(S): 75 TABLET, FILM COATED ORAL at 07:32

## 2021-06-04 RX ADMIN — Medication 50 MILLIGRAM(S): at 07:33

## 2021-06-04 RX ADMIN — HEPARIN SODIUM 1000 UNIT(S)/HR: 5000 INJECTION INTRAVENOUS; SUBCUTANEOUS at 10:28

## 2021-06-04 NOTE — PROGRESS NOTE ADULT - ASSESSMENT
65 yr old M former heavy smoker, PMHx of HTN, hyperlipidemia, GERD, hx of COVID-19 in 2/2021, received 2nd dose of Pfizer vaccine (6/2/21) who presents to [facility] ED 6/3/21 c/o an episode of non-radiating sharp midsternal chest pain 8/10 in severity, associated with SOB. NPO for cardiac cath today.  65 yr old M former heavy smoker, PMHx of HTN, hyperlipidemia, GERD, hx of COVID-19 in 2/2021, received 2nd dose of Pfizer vaccine (6/2/21) who presents to [facility] ED 6/3/21 c/o an episode of non-radiating sharp midsternal chest pain 8/10 in severity, associated with SOB. NPO for cardiac cath today.    ASA III Mallampati III  Precath consented    Risks & benefits of procedure and alternative therapy have been explained to the patient including but not limited to: allergic reaction, bleeding w/possible need for blood transfusion, infection, renal and vascular compromise, limb damage, arrhythmia, stroke, vessel dissection/perforation, Myocardial infarction, emergent CABG. Informed consent obtained and in chart.

## 2021-06-04 NOTE — DISCHARGE NOTE PROVIDER - CARE PROVIDER_API CALL
Jazmín Denton)  Adult Congenital Heart Disease; Cardiovascular Disease; Internal Medicine  [address], [address]  [place] [address]  Phone: [phone]  Fax: [phone]  Follow Up Time: 2 weeks

## 2021-06-04 NOTE — DISCHARGE NOTE PROVIDER - NSDCFUADDINST_GEN_ALL_CORE_FT
You underwent a coronary angiogram and should wait 3 days before returning to ordinary activities. Do not drive for 2 days. Consult your doctor before returning to vigorous activity. You may return to work in 3-5 days. The catheter from your wrist was removed and you should remove the dressing in 24 hours. You may shower once the dressing is removed, but avoid baths, hot tubs, or swimming for 5 days to prevent infection. If you notice bleeding from the site, hardening and pain at the site, drainage or redness from the site, coolness/paleness of the extremity, swelling, or fever, please call [phone]. Please continue your aspirin as prescribed unless otherwise indicated by your cardiologist. All of your prescriptions have been sent to the pharmacy. Please follow up with Dr. Denton in 1-2 weeks. All of your needed prescriptions have been sent

## 2021-06-04 NOTE — DISCHARGE NOTE PROVIDER - NSDCCPCAREPLAN_GEN_ALL_CORE_FT
PRINCIPAL DISCHARGE DIAGNOSIS  Diagnosis: ACS (acute coronary syndrome)  Assessment and Plan of Treatment:       SECONDARY DISCHARGE DIAGNOSES  Diagnosis: Mild coronary artery disease  Assessment and Plan of Treatment: non obstructive by cath 6/4/21    Diagnosis: HTN (hypertension)  Assessment and Plan of Treatment: Continue HCTZ and Diltiazem.    Diagnosis: Hyperlipidemia  Assessment and Plan of Treatment: Continue atorvastatin.     PRINCIPAL DISCHARGE DIAGNOSIS  Diagnosis: Nonobstructive atherosclerosis of coronary artery  Assessment and Plan of Treatment: You came in with chest pain, you underwent a cardiac catheterization which revealed nonobstructive disease. You also underwent an ultrasound of your heart which was normal. Please continue      SECONDARY DISCHARGE DIAGNOSES  Diagnosis: HTN (hypertension)  Assessment and Plan of Treatment: Continue HCTZ and Diltiazem.    Diagnosis: Hyperlipidemia  Assessment and Plan of Treatment: Continue atorvastatin.     PRINCIPAL DISCHARGE DIAGNOSIS  Diagnosis: Nonobstructive atherosclerosis of coronary artery  Assessment and Plan of Treatment: You came in with chest pain, you underwent a cardiac catheterization which revealed nonobstructive disease. You also underwent an ultrasound of your heart which was normal. Please continue aspirin 81mg orally daily and atorvastatin 80mg orally daily.   -Follow up with cardiologist in 1-2 weeks.      SECONDARY DISCHARGE DIAGNOSES  Diagnosis: HTN (hypertension)  Assessment and Plan of Treatment: You have a history of elevated blood pressure and you should continue your blood pressure medications as prescribed.   -Monitor blood pressure daily with goal <140/90.    Diagnosis: Hyperlipidemia  Assessment and Plan of Treatment: Please continue your atorvastatin 80mg orally daily.     PRINCIPAL DISCHARGE DIAGNOSIS  Diagnosis: Nonobstructive atherosclerosis of coronary artery  Assessment and Plan of Treatment: You came in with chest pain, you underwent a cardiac catheterization which revealed nonobstructive disease. You also underwent an ultrasound of your heart which was normal. Please continue aspirin 81mg orally daily, metoprolol succinate 50mg orally daily, and atorvastatin 80mg orally daily.   -Follow up with cardiologist in 1-2 weeks.      SECONDARY DISCHARGE DIAGNOSES  Diagnosis: HTN (hypertension)  Assessment and Plan of Treatment: You have a history of elevated blood pressure and you should continue your blood pressure medications as prescribed.   -Monitor blood pressure daily with goal <140/90.    Diagnosis: Hyperlipidemia  Assessment and Plan of Treatment: Please continue your atorvastatin 80mg orally daily.

## 2021-06-04 NOTE — PROGRESS NOTE ADULT - SUBJECTIVE AND OBJECTIVE BOX
PROCEDURE: CORONARY ANGIOGRAM, LHC, LVGRAM,  INDICATION: NSTEMI  REFERRING: DR. TY MD   ATTENDING: DR. TERENCE SKAGGS MD   ACCESS: RRA    FINDINGS   LM= normal   Lcx= normal (dominant vessel)  mLAD= 30%  RCA= mon dominant, normal        LVEF:60 %  LVEDP: 13 mmHg    A/P  -minimal non obstructive CAD  -aspirin 81 mg daily, statin therapy
Interventional Cardiology PA Adult Progress Note    Subjective Assessment: Patient is examined at bedside this AM, feeling well. Denies CP, SOB, palpitations, or any other complaints.   	  MEDICATIONS:  hydrochlorothiazide 50 milliGRAM(s) Oral daily  metoprolol tartrate 25 milliGRAM(s) Oral two times a day  ALBUTerol    90 MICROgram(s) HFA Inhaler 2 Puff(s) Inhalation every 6 hours PRN  budesonide  80 MICROgram(s)/formoterol 4.5 MICROgram(s) Inhaler 2 Puff(s) Inhalation two times a day  pantoprazole    Tablet 40 milliGRAM(s) Oral before breakfast  atorvastatin 80 milliGRAM(s) Oral at bedtime  aspirin enteric coated 81 milliGRAM(s) Oral daily  heparin   Injectable 6000 Unit(s) IV Push every 6 hours PRN  heparin  Infusion.  Unit(s)/Hr IV Continuous <Continuous>      	    [PHYSICAL EXAM:  TELEMETRY:  T(C): 36.6 (06-04-21 @ 10:21), Max: 36.9 (06-03-21 @ 15:43)  HR: 78 (06-04-21 @ 12:17) (75 - 108)  BP: 119/69 (06-04-21 @ 12:17) (103/59 - 178/92)  RR: 18 (06-04-21 @ 12:17) (16 - 18)  SpO2: 98% (06-04-21 @ 12:17) (93% - 98%)    I&O's Summary    03 Jun 2021 07:01  -  04 Jun 2021 07:00  --------------------------------------------------------  IN: 440 mL / OUT: 900 mL / NET: -460 mL    04 Jun 2021 07:01  -  04 Jun 2021 13:03  --------------------------------------------------------  IN: 230 mL / OUT: 0 mL / NET: 230 mL      Height (cm): 182.9 (06-03 @ 21:29)  Weight (kg): 106.6 (06-03 @ 21:29)  BMI (kg/m2): 31.9 (06-03 @ 21:29)  BSA (m2): 2.28 (06-03 @ 21:29)                                       Appearance: Normal	  HEENT:   Normal oral mucosa, PERRL, EOMI	  Neck: Supple, - JVD; it   Cardiovascular: Normal S1 S2, No JVD, No murmurs,   Respiratory: Lungs clear to auscultation, No Rales, Rhonchi, Wheezing	  Gastrointestinal:  Soft, Non-tender, + BS	  Skin: No rashes, No ecchymoses, No cyanosis  Extremities: Normal range of motion, No clubbing, cyanosis or edema  Vascular: Peripheral pulses palpable 2+ bilaterally  Neurologic: Non-focal  Psychiatry: A & O x 3, Mood & affect appropriate      	    LABS:	 	               12.8   6.79  )-----------( 214      ( 04 Jun 2021 06:44 )             39.6     06-04    140  |  103  |  18  ----------------------------<  112<H>  3.4<L>   |  25  |  1.12    Ca    9.0      04 Jun 2021 06:44  Mg     1.8     06-04    TPro  8.0  /  Alb  4.9  /  TBili  0.6  /  DBili  x   /  AST  28  /  ALT  25  /  AlkPhos  116  06-03    proBNP: Serum Pro-Brain Natriuretic Peptide: 33 pg/mL (06-03 @ 16:12)       PT/INR - ( 03 Jun 2021 16:12 )   PT: 12.8 sec;   INR: 1.07          PTT - ( 04 Jun 2021 08:40 )  PTT:60.3 sec

## 2021-06-04 NOTE — PROGRESS NOTE ADULT - ATTENDING COMMENTS
Initial attending contact date  6/4/21    . See PA note written above for details. I reviewed the PA documentation. I have personally seen and examined this patient. I reviewed vitals, labs, medications, cardiac studies, and additional imaging. I agree with the above PA's findings and plans as written above with the following additions/statements.    -65 yr old M former heavy smoker, PMHx of HTN, hyperlipidemia, GERD, hx of COVID-19 in 2/2021 admitted with NSTEMI  -Currently CP free. Trop .11, EKG Sinus tach @ 100 without ischemic changes   -Plan for cardiac cath today  -Cont ASA/plavix/IV heparin/atorva/metoprolol  -ECHO with normal LVEF  -BP controlled plan   -Likely DC 6/5  pending results of cath

## 2021-06-04 NOTE — DISCHARGE NOTE PROVIDER - HOSPITAL COURSE
HPI:  65 yr old M former heavy smoker, PMHx of HTN, hyperlipidemia, GERD, hx of COVID-19 in 2/2021 (hospitalized for ~1 month, discharged w/ home O2--no longer requiring it), received 2nd dose of Pfizer vaccine (6/2/21) who presents to [facility] ED 6/3/21 c/o an episode of chest pain x 1 hour.  Patient reports symptom onset was ~4PM while driving, describes it as sudden onset of non-radiating sharp midsternal chest pain 8/10 in severity, associated with SOB.     R/I NSTEMI with positive troponin, underwent diagnostic cath revealing non-obstructive CAD; echo with EF 60-65%.    Now stable for d/c home. 65 yr old M former heavy smoker, PMHx of HTN, hyperlipidemia, GERD, hx of COVID-19 in 2/2021 (hospitalized for ~1 month, discharged w/ home O2--no longer requiring it), received 2nd dose of Pfizer vaccine (6/2/21) who presents to [facility] ED 6/3/21 c/o an episode of chest pain x 1 hour.  Patient reports symptom onset was ~4PM while driving, describes it as sudden onset of non-radiating sharp midsternal chest pain 8/10 in severity, associated with SOB. Patient R/I NSTEMI with positive troponin, underwent diagnostic cath revealing non-obstructive CAD; echo with EF 60-65%.  R radial band removed without complications. Overnight, no events. This AM, feels well, denies CP, SOB, n/v/d, LE edema. VSS, no events on tele, labs K 3.7 repleted w/ KCL 40meq. PE sig for R radial site stable c/d/i, no bleeding, pain or hematoma. Patient was seen and examined by attending who agrees with above d/c plan. All meds rx to pharmacy and explained to patient. Patient instructed to return if sx worsen including not limited to chest pain, shortness of breath.

## 2021-06-04 NOTE — PROGRESS NOTE ADULT - PROBLEM SELECTOR PLAN 2
stable, continue home medications Hydrochlorothiazide 50mg by mouth daily. Holding home Diltiazem ER 240mg QD for now. continue Metoprolol Tartrate 25mg PO BID.

## 2021-06-04 NOTE — DISCHARGE NOTE PROVIDER - NSDCMRMEDTOKEN_GEN_ALL_CORE_FT
acetaminophen 325 mg oral tablet: 2 tab(s) orally every 6 hours, As needed, Temp greater or equal to 38C (100.4F), Moderate Pain (4 - 6)  Albuterol (Eqv-Proventil HFA) 90 mcg/inh inhalation aerosol: 2 puff(s) inhaled every 6 hours, As Needed  albuterol 90 mcg/inh inhalation aerosol: 2 puff(s) inhaled every 6 hours, As needed, Bronchospasm  ascorbic acid 1000 mg oral tablet: 1 tab(s) orally once a day  atorvastatin 80 mg oral tablet: 1 tab(s) orally once a day  atorvastatin 80 mg oral tablet: 1 tab(s) orally once a day  Cardizem  mg/24 hours oral tablet, extended release: 1 tab(s) orally once a day  cholecalciferol oral tablet: 2000 unit(s) orally once a day  DilTIAZem (Eqv-Cardizem CD) 240 mg/24 hours oral capsule, extended release: 1 cap(s) orally once a day  famotidine 20 mg oral tablet: 1 tab(s) orally 2 times a day  guaiFENesin 100 mg/5 mL oral liquid: 10 milliliter(s) orally every 6 hours, As needed, Cough  hydroCHLOROthiazide 50 mg oral tablet: 1 tab(s) orally once a day  hydroCHLOROthiazide 50 mg oral tablet: 1 tab(s) orally once a day  montelukast 10 mg oral tablet: 1 tab(s) orally once a day (at bedtime)  omeprazole 40 mg oral delayed release capsule: 1 cap(s) orally once a day  sodium chloride 0.65% nasal spray: 1  nasal 2 times a day  Symbicort 80 mcg-4.5 mcg/inh inhalation aerosol: 2 puff(s) inhaled 2 times a day  Xarelto 10 mg oral tablet: 1 tab(s) orally once a day   zinc sulfate 220 mg oral capsule: 1 cap(s) orally once a day   acetaminophen 325 mg oral tablet: 2 tab(s) orally every 6 hours, As needed, Temp greater or equal to 38C (100.4F), Moderate Pain (4 - 6)  Albuterol (Eqv-Proventil HFA) 90 mcg/inh inhalation aerosol: 2 puff(s) inhaled every 6 hours, As Needed  ascorbic acid 1000 mg oral tablet: 1 tab(s) orally once a day  aspirin 81 mg oral delayed release tablet: 1 tab(s) orally once a day  atorvastatin 80 mg oral tablet: 1 tab(s) orally once a day  cholecalciferol oral tablet: 2000 unit(s) orally once a day  DilTIAZem (Eqv-Cardizem CD) 240 mg/24 hours oral capsule, extended release: 1 cap(s) orally once a day  famotidine 20 mg oral tablet: 1 tab(s) orally 2 times a day  guaiFENesin 100 mg/5 mL oral liquid: 10 milliliter(s) orally every 6 hours, As needed, Cough  hydroCHLOROthiazide 50 mg oral tablet: 1 tab(s) orally once a day  metoprolol succinate 50 mg oral capsule, extended release: 1 cap(s) orally once a day  montelukast 10 mg oral tablet: 1 tab(s) orally once a day (at bedtime)  omeprazole 40 mg oral delayed release capsule: 1 cap(s) orally once a day  sodium chloride 0.65% nasal spray: 1  nasal 2 times a day  Symbicort 80 mcg-4.5 mcg/inh inhalation aerosol: 2 puff(s) inhaled 2 times a day  zinc sulfate 220 mg oral capsule: 1 cap(s) orally once a day

## 2021-06-04 NOTE — PROGRESS NOTE ADULT - PROBLEM SELECTOR PLAN 1
currently chest pain free  -Troponin T 0.07> 0.08>0.11   -continue ASA/Plavix/Statin   -Will obtain Echocardiogram.  -NPO for cath today, consented. Consent in chart

## 2021-06-04 NOTE — PROGRESS NOTE ADULT - PROBLEM SELECTOR PLAN 4
Hx of COVID 19 requiring hospitalization for ~1 month in 2/2021.  --patient initially required home O2 upon discharge from hospital, now 95-98% on RA.   --continue Symbicort 2 puffs INH BID and Albuterol HFA PRN.    VTE PPx: on Heparin gtt  GI PPx: on PPI  CODE: FULL  Dispo: pending cardiac cath     Case d/w Dr. Figueroa

## 2021-06-05 ENCOUNTER — TRANSCRIPTION ENCOUNTER (OUTPATIENT)
Age: 66
End: 2021-06-05

## 2021-06-05 VITALS — TEMPERATURE: 98 F

## 2021-06-05 LAB
ANION GAP SERPL CALC-SCNC: 12 MMOL/L — SIGNIFICANT CHANGE UP (ref 5–17)
BUN SERPL-MCNC: 17 MG/DL — SIGNIFICANT CHANGE UP (ref 7–23)
CALCIUM SERPL-MCNC: 9.2 MG/DL — SIGNIFICANT CHANGE UP (ref 8.4–10.5)
CHLORIDE SERPL-SCNC: 102 MMOL/L — SIGNIFICANT CHANGE UP (ref 96–108)
CO2 SERPL-SCNC: 23 MMOL/L — SIGNIFICANT CHANGE UP (ref 22–31)
CREAT SERPL-MCNC: 1.06 MG/DL — SIGNIFICANT CHANGE UP (ref 0.5–1.3)
CULTURE RESULTS: SIGNIFICANT CHANGE UP
GLUCOSE SERPL-MCNC: 112 MG/DL — HIGH (ref 70–99)
HCT VFR BLD CALC: 39.2 % — SIGNIFICANT CHANGE UP (ref 39–50)
HGB BLD-MCNC: 12.9 G/DL — LOW (ref 13–17)
MAGNESIUM SERPL-MCNC: 1.8 MG/DL — SIGNIFICANT CHANGE UP (ref 1.6–2.6)
MCHC RBC-ENTMCNC: 29.9 PG — SIGNIFICANT CHANGE UP (ref 27–34)
MCHC RBC-ENTMCNC: 32.9 GM/DL — SIGNIFICANT CHANGE UP (ref 32–36)
MCV RBC AUTO: 90.7 FL — SIGNIFICANT CHANGE UP (ref 80–100)
NRBC # BLD: 0 /100 WBCS — SIGNIFICANT CHANGE UP (ref 0–0)
PLATELET # BLD AUTO: 218 K/UL — SIGNIFICANT CHANGE UP (ref 150–400)
POTASSIUM SERPL-MCNC: 3.7 MMOL/L — SIGNIFICANT CHANGE UP (ref 3.5–5.3)
POTASSIUM SERPL-SCNC: 3.7 MMOL/L — SIGNIFICANT CHANGE UP (ref 3.5–5.3)
RBC # BLD: 4.32 M/UL — SIGNIFICANT CHANGE UP (ref 4.2–5.8)
RBC # FLD: 15 % — HIGH (ref 10.3–14.5)
SODIUM SERPL-SCNC: 137 MMOL/L — SIGNIFICANT CHANGE UP (ref 135–145)
SPECIMEN SOURCE: SIGNIFICANT CHANGE UP
WBC # BLD: 8.81 K/UL — SIGNIFICANT CHANGE UP (ref 3.8–10.5)
WBC # FLD AUTO: 8.81 K/UL — SIGNIFICANT CHANGE UP (ref 3.8–10.5)

## 2021-06-05 PROCEDURE — 84132 ASSAY OF SERUM POTASSIUM: CPT

## 2021-06-05 PROCEDURE — 85379 FIBRIN DEGRADATION QUANT: CPT

## 2021-06-05 PROCEDURE — 85027 COMPLETE CBC AUTOMATED: CPT

## 2021-06-05 PROCEDURE — C1894: CPT

## 2021-06-05 PROCEDURE — 84145 PROCALCITONIN (PCT): CPT

## 2021-06-05 PROCEDURE — 86900 BLOOD TYPING SEROLOGIC ABO: CPT

## 2021-06-05 PROCEDURE — 93306 TTE W/DOPPLER COMPLETE: CPT

## 2021-06-05 PROCEDURE — 86769 SARS-COV-2 COVID-19 ANTIBODY: CPT

## 2021-06-05 PROCEDURE — C1769: CPT

## 2021-06-05 PROCEDURE — 99285 EMERGENCY DEPT VISIT HI MDM: CPT | Mod: 25

## 2021-06-05 PROCEDURE — 74174 CTA ABD&PLVS W/CONTRAST: CPT

## 2021-06-05 PROCEDURE — 83880 ASSAY OF NATRIURETIC PEPTIDE: CPT

## 2021-06-05 PROCEDURE — 80048 BASIC METABOLIC PNL TOTAL CA: CPT

## 2021-06-05 PROCEDURE — 85610 PROTHROMBIN TIME: CPT

## 2021-06-05 PROCEDURE — 84484 ASSAY OF TROPONIN QUANT: CPT

## 2021-06-05 PROCEDURE — 96375 TX/PRO/DX INJ NEW DRUG ADDON: CPT

## 2021-06-05 PROCEDURE — 82728 ASSAY OF FERRITIN: CPT

## 2021-06-05 PROCEDURE — 82803 BLOOD GASES ANY COMBINATION: CPT

## 2021-06-05 PROCEDURE — 85384 FIBRINOGEN ACTIVITY: CPT

## 2021-06-05 PROCEDURE — 84295 ASSAY OF SERUM SODIUM: CPT

## 2021-06-05 PROCEDURE — 71045 X-RAY EXAM CHEST 1 VIEW: CPT

## 2021-06-05 PROCEDURE — 87635 SARS-COV-2 COVID-19 AMP PRB: CPT

## 2021-06-05 PROCEDURE — 82550 ASSAY OF CK (CPK): CPT

## 2021-06-05 PROCEDURE — 96374 THER/PROPH/DIAG INJ IV PUSH: CPT

## 2021-06-05 PROCEDURE — 82330 ASSAY OF CALCIUM: CPT

## 2021-06-05 PROCEDURE — 71275 CT ANGIOGRAPHY CHEST: CPT

## 2021-06-05 PROCEDURE — 87040 BLOOD CULTURE FOR BACTERIA: CPT

## 2021-06-05 PROCEDURE — 87086 URINE CULTURE/COLONY COUNT: CPT

## 2021-06-05 PROCEDURE — U0003: CPT

## 2021-06-05 PROCEDURE — 87389 HIV-1 AG W/HIV-1&-2 AB AG IA: CPT

## 2021-06-05 PROCEDURE — 99239 HOSP IP/OBS DSCHRG MGMT >30: CPT

## 2021-06-05 PROCEDURE — 83615 LACTATE (LD) (LDH) ENZYME: CPT

## 2021-06-05 PROCEDURE — 80053 COMPREHEN METABOLIC PANEL: CPT

## 2021-06-05 PROCEDURE — 85025 COMPLETE CBC W/AUTO DIFF WBC: CPT

## 2021-06-05 PROCEDURE — 83605 ASSAY OF LACTIC ACID: CPT

## 2021-06-05 PROCEDURE — 81003 URINALYSIS AUTO W/O SCOPE: CPT

## 2021-06-05 PROCEDURE — 86850 RBC ANTIBODY SCREEN: CPT

## 2021-06-05 PROCEDURE — 86803 HEPATITIS C AB TEST: CPT

## 2021-06-05 PROCEDURE — 80061 LIPID PANEL: CPT

## 2021-06-05 PROCEDURE — 83735 ASSAY OF MAGNESIUM: CPT

## 2021-06-05 PROCEDURE — 86901 BLOOD TYPING SEROLOGIC RH(D): CPT

## 2021-06-05 PROCEDURE — 94640 AIRWAY INHALATION TREATMENT: CPT

## 2021-06-05 PROCEDURE — 93005 ELECTROCARDIOGRAM TRACING: CPT

## 2021-06-05 PROCEDURE — U0005: CPT

## 2021-06-05 PROCEDURE — 83036 HEMOGLOBIN GLYCOSYLATED A1C: CPT

## 2021-06-05 PROCEDURE — 86140 C-REACTIVE PROTEIN: CPT

## 2021-06-05 PROCEDURE — 82553 CREATINE MB FRACTION: CPT

## 2021-06-05 PROCEDURE — 36415 COLL VENOUS BLD VENIPUNCTURE: CPT

## 2021-06-05 PROCEDURE — C1887: CPT

## 2021-06-05 PROCEDURE — 85730 THROMBOPLASTIN TIME PARTIAL: CPT

## 2021-06-05 RX ORDER — ATORVASTATIN CALCIUM 80 MG/1
1 TABLET, FILM COATED ORAL
Qty: 0 | Refills: 0 | DISCHARGE

## 2021-06-05 RX ORDER — POTASSIUM CHLORIDE 20 MEQ
40 PACKET (EA) ORAL ONCE
Refills: 0 | Status: COMPLETED | OUTPATIENT
Start: 2021-06-05 | End: 2021-06-05

## 2021-06-05 RX ORDER — MAGNESIUM OXIDE 400 MG ORAL TABLET 241.3 MG
400 TABLET ORAL ONCE
Refills: 0 | Status: COMPLETED | OUTPATIENT
Start: 2021-06-05 | End: 2021-06-05

## 2021-06-05 RX ORDER — ASPIRIN/CALCIUM CARB/MAGNESIUM 324 MG
1 TABLET ORAL
Qty: 30 | Refills: 6
Start: 2021-06-05 | End: 2021-12-31

## 2021-06-05 RX ORDER — METOPROLOL TARTRATE 50 MG
1 TABLET ORAL
Qty: 30 | Refills: 3
Start: 2021-06-05 | End: 2021-10-02

## 2021-06-05 RX ORDER — ATORVASTATIN CALCIUM 80 MG/1
1 TABLET, FILM COATED ORAL
Qty: 30 | Refills: 3
Start: 2021-06-05 | End: 2021-10-02

## 2021-06-05 RX ADMIN — Medication 25 MILLIGRAM(S): at 06:04

## 2021-06-05 RX ADMIN — Medication 50 MILLIGRAM(S): at 06:04

## 2021-06-05 RX ADMIN — PANTOPRAZOLE SODIUM 40 MILLIGRAM(S): 20 TABLET, DELAYED RELEASE ORAL at 06:04

## 2021-06-05 RX ADMIN — Medication 81 MILLIGRAM(S): at 11:32

## 2021-06-05 RX ADMIN — MAGNESIUM OXIDE 400 MG ORAL TABLET 400 MILLIGRAM(S): 241.3 TABLET ORAL at 09:24

## 2021-06-05 RX ADMIN — Medication 40 MILLIEQUIVALENT(S): at 09:24

## 2021-06-05 RX ADMIN — BUDESONIDE AND FORMOTEROL FUMARATE DIHYDRATE 2 PUFF(S): 160; 4.5 AEROSOL RESPIRATORY (INHALATION) at 06:04

## 2021-06-05 NOTE — DISCHARGE NOTE NURSING/CASE MANAGEMENT/SOCIAL WORK - PATIENT PORTAL LINK FT
You can access the FollowMyHealth Patient Portal offered by [facility] by registering at the following website: [email or web address]. By joining [employer]’s FollowMyHealth portal, you will also be able to view your health information using other applications (apps) compatible with our system.

## 2021-06-08 ENCOUNTER — NON-APPOINTMENT (OUTPATIENT)
Age: 66
End: 2021-06-08

## 2021-06-08 PROBLEM — U07.1 COVID-19: Chronic | Status: ACTIVE | Noted: 2021-06-03

## 2021-06-08 PROBLEM — E78.5 HYPERLIPIDEMIA, UNSPECIFIED: Chronic | Status: ACTIVE | Noted: 2021-06-03

## 2021-06-08 PROBLEM — I10 ESSENTIAL (PRIMARY) HYPERTENSION: Chronic | Status: ACTIVE | Noted: 2021-06-03

## 2021-06-08 LAB
CULTURE RESULTS: SIGNIFICANT CHANGE UP
CULTURE RESULTS: SIGNIFICANT CHANGE UP
SPECIMEN SOURCE: SIGNIFICANT CHANGE UP
SPECIMEN SOURCE: SIGNIFICANT CHANGE UP

## 2021-06-09 ENCOUNTER — APPOINTMENT (OUTPATIENT)
Dept: INTERNAL MEDICINE | Facility: CLINIC | Age: 66
End: 2021-06-09
Payer: MEDICARE

## 2021-06-09 ENCOUNTER — NON-APPOINTMENT (OUTPATIENT)
Age: 66
End: 2021-06-09

## 2021-06-09 VITALS
SYSTOLIC BLOOD PRESSURE: 130 MMHG | HEART RATE: 93 BPM | OXYGEN SATURATION: 98 % | TEMPERATURE: 98.1 F | BODY MASS INDEX: 30.2 KG/M2 | HEIGHT: 72 IN | WEIGHT: 223 LBS | DIASTOLIC BLOOD PRESSURE: 70 MMHG

## 2021-06-09 PROCEDURE — 93000 ELECTROCARDIOGRAM COMPLETE: CPT

## 2021-06-09 PROCEDURE — 99496 TRANSJ CARE MGMT HIGH F2F 7D: CPT | Mod: 25

## 2021-06-09 NOTE — PHYSICAL EXAM
[Normal Voice/Communication] : normal voice/communication [Normal Rate] : normal rate  [Regular Rhythm] : with a regular rhythm [Normal S1, S2] : normal S1 and S2 [No Carotid Bruits] : no carotid bruits [Pedal Pulses Present] : the pedal pulses are present [No Edema] : there was no peripheral edema [Normal Supraclavicular Nodes] : no supraclavicular lymphadenopathy [Normal Axillary Nodes] : no axillary lymphadenopathy [Normal] : no posterior cervical lymphadenopathy and no anterior cervical lymphadenopathy

## 2021-06-10 ENCOUNTER — NON-APPOINTMENT (OUTPATIENT)
Age: 66
End: 2021-06-10

## 2021-06-10 LAB
ALBUMIN SERPL ELPH-MCNC: 5 G/DL
ALP BLD-CCNC: 115 U/L
ALT SERPL-CCNC: 30 U/L
ANION GAP SERPL CALC-SCNC: 16 MMOL/L
AST SERPL-CCNC: 23 U/L
BASOPHILS # BLD AUTO: 0.08 K/UL
BASOPHILS NFR BLD AUTO: 0.7 %
BILIRUB SERPL-MCNC: 0.4 MG/DL
BUN SERPL-MCNC: 22 MG/DL
CALCIUM SERPL-MCNC: 10.1 MG/DL
CHLORIDE SERPL-SCNC: 104 MMOL/L
CK MB BLD-MCNC: 13.6 NG/ML
CO2 SERPL-SCNC: 23 MMOL/L
CREAT SERPL-MCNC: 1.29 MG/DL
CRP SERPL-MCNC: 6 MG/L
EOSINOPHIL # BLD AUTO: 0.2 K/UL
EOSINOPHIL NFR BLD AUTO: 1.6 %
ERYTHROCYTE [SEDIMENTATION RATE] IN BLOOD BY WESTERGREN METHOD: 17 MM/HR
FERRITIN SERPL-MCNC: 80 NG/ML
GLUCOSE SERPL-MCNC: 121 MG/DL
HCT VFR BLD CALC: 40.9 %
HGB BLD-MCNC: 13.4 G/DL
IMM GRANULOCYTES NFR BLD AUTO: 0.5 %
LYMPHOCYTES # BLD AUTO: 3.24 K/UL
LYMPHOCYTES NFR BLD AUTO: 26.4 %
MAN DIFF?: NORMAL
MCHC RBC-ENTMCNC: 29.9 PG
MCHC RBC-ENTMCNC: 32.8 GM/DL
MCV RBC AUTO: 91.3 FL
MONOCYTES # BLD AUTO: 0.97 K/UL
MONOCYTES NFR BLD AUTO: 7.9 %
NEUTROPHILS # BLD AUTO: 7.74 K/UL
NEUTROPHILS NFR BLD AUTO: 62.9 %
NT-PROBNP SERPL-MCNC: 23 PG/ML
PLATELET # BLD AUTO: 291 K/UL
POTASSIUM SERPL-SCNC: 4.4 MMOL/L
PROT SERPL-MCNC: 6.6 G/DL
RBC # BLD: 4.48 M/UL
RBC # FLD: 14.8 %
SODIUM SERPL-SCNC: 142 MMOL/L
TROPONIN I SERPL-MCNC: 0.01 NG/ML
TROPONIN-T, HIGH SENSITIVITY: 106 NG/L
WBC # FLD AUTO: 12.29 K/UL

## 2021-06-14 DIAGNOSIS — I25.10 ATHEROSCLEROTIC HEART DISEASE OF NATIVE CORONARY ARTERY WITHOUT ANGINA PECTORIS: ICD-10-CM

## 2021-06-14 DIAGNOSIS — K21.9 GASTRO-ESOPHAGEAL REFLUX DISEASE WITHOUT ESOPHAGITIS: ICD-10-CM

## 2021-06-14 DIAGNOSIS — Z79.82 LONG TERM (CURRENT) USE OF ASPIRIN: ICD-10-CM

## 2021-06-14 DIAGNOSIS — Z87.891 PERSONAL HISTORY OF NICOTINE DEPENDENCE: ICD-10-CM

## 2021-06-14 DIAGNOSIS — Z86.16 PERSONAL HISTORY OF COVID-19: ICD-10-CM

## 2021-06-14 DIAGNOSIS — E78.5 HYPERLIPIDEMIA, UNSPECIFIED: ICD-10-CM

## 2021-06-14 DIAGNOSIS — I10 ESSENTIAL (PRIMARY) HYPERTENSION: ICD-10-CM

## 2021-06-14 DIAGNOSIS — Z82.3 FAMILY HISTORY OF STROKE: ICD-10-CM

## 2021-06-14 DIAGNOSIS — R07.9 CHEST PAIN, UNSPECIFIED: ICD-10-CM

## 2021-06-14 DIAGNOSIS — Z79.01 LONG TERM (CURRENT) USE OF ANTICOAGULANTS: ICD-10-CM

## 2021-06-30 NOTE — ASSESSMENT
[FreeTextEntry1] : # NSTEMI:\par - EKG today - NSR, no ischemia\par - Cath report reviewed  - "nonobstructive CAD" with 30% stenosis of LAD, EF 60% \par - Echo report reviewed - mild aortic stenosis, EF 61%\par - CT angio chest, abdomen pelvis reviewed - "bilateral patchy groundglass lung opacities particularly peripherally with some areas associated with traction bronchiectasis; 1.5 cm exophytic cortical lesion in the mid right kidney posteriorly. Internal density measurement is 23 Hounsfield units. It is somewhat high for a simple cyst. It is indeterminate. Findings can be correlated with targeted renal sonography on a nonemergent basis. Two tiny nonobstructing calculi upper pole right kidney.  No evidence of aortic aneurysm or dissection."\par - Labs reviewed  - continued uptrend of troponin, elevated CKMB, mild anemia, elevated CRP\par - Will repeat labs today\par - Start low dose ASA\par - Will plan to continue diltiazem, remains off of metoprolol.  Will discuss with cardiology.  \par - Schedule cardiology follow up\par - US follow up of renal lesion\par - ADD, 6/30/21 - reviewed renal US from 3/12/2021 during COVID hospitalization, US images consistent with  "upper pole exophytic cyst renal cyst measuring 1.3 x 1.1 x 1.2 cm", will monitor.  \par  \par # Dyspnea with exertion s/p COVID-19 pneumonia:\par - Hospitalized x 1 month.  Received remdesivir and dexamethasone\par - Continues Symbicort and PRN albuterol \par - CT lats week (approx 4 months after start of infection) shows patchy ground glass opacities and associated traction bronchiectasis \par - Pulmonology follow up\par

## 2021-06-30 NOTE — HISTORY OF PRESENT ILLNESS
[Post-hospitalization from ___ Hospital] : Post-hospitalization from [unfilled] Hospital [Admitted on: ___] : The patient was admitted on [unfilled] [Discharged on ___] : discharged on [unfilled] [Discharge Summary] : discharge summary [Pertinent Labs] : pertinent labs [Radiology Findings] : radiology findings [Discharge Med List] : discharge medication list [Patient Contacted By: ____] : and contacted by [unfilled] [FreeTextEntry2] : \par CC/Diagnosis: ACS (ACUTE CORONARY SYNDROME)\par Admit date/time: 06/03/2021 20:05:00\par Discharge date/time: 06/05/2021 10:04:00\par Admitting MD: WINSTON CRAWFORD\par \par "65 yr old M former heavy smoker, PMHx of HTN, hyperlipidemia, GERD, hx of COVID-19 in 2/2021 (hospitalized for ~1 month, discharged w/ home O2--no longer requiring it), received 2nd dose of Pfizer vaccine (6/2/21) who presents to [facility] ED 6/3/21 c/o an episode of chest pain x 1 hour. Patient reports symptom onset was ~4PM while driving, describes it as sudden onset of non-radiating sharp midsternal chest pain 8/10 in severity, associated with SOB. Patient R/I NSTEMI with positive troponin, underwent diagnostic cath revealing non-obstructive CAD; echo with EF 60-65%. R radial band removed without complications.\par Overnight, no events. This AM, feels well, denies CP, SOB, n/v/d, LE edema. VSS, no events on tele, labs K 3.7 repleted w/ KCL 40meq. PE sig for R radial site stable c/d/i, no bleeding, pain or hematoma. Patient was seen and examined by attending who agrees with above d/c plan. All meds rx to pharmacy and explained to patient. Patient instructed to return if sx worsen including not limited to chest pain, shortness of breath.\par \par Discharge Medications \par acetaminophen 325 mg oral tablet: 2 tab(s) orally every 6 hours, As needed, Temp greater or equal to 38C (100.4F), Moderate Pain (4 - 6)\par Albuterol (Eqv-Proventil HFA) 90 mcg/inh inhalation aerosol: 2 puff(s) inhaled every 6 hours, As Needed\par ascorbic acid 1000 mg oral tablet: 1 tab(s) orally once a day\par aspirin 81 mg oral delayed release tablet: 1 tab(s) orally once a day\par atorvastatin 80 mg oral tablet: 1 tab(s) orally once a day\par cholecalciferol oral tablet: 2000 unit(s) orally once a day\par DilTIAZem (Eqv-Cardizem CD) 240 mg/24 hours oral capsule, extended release: 1 cap(s) orally once a day\par famotidine 20 mg oral tablet: 1 tab(s) orally 2 times a day\par guaiFENesin 100 mg/5 mL oral liquid: 10 milliliter(s) orally every 6 hours, As needed, Cough\par hydroCHLOROthiazide 50 mg oral tablet: 1 tab(s) orally once a day\par metoprolol succinate 50 mg oral capsule, extended release: 1 cap(s) orally once a day\par montelukast 10 mg oral tablet: 1 tab(s) orally once a day (at bedtime)\par omeprazole 40 mg oral delayed release capsule: 1 cap(s) orally once a day\par sodium chloride 0.65% nasal spray: 1 nasal 2 times a day\par Symbicort 80 mcg-4.5 mcg/inh inhalation aerosol: 2 puff(s) inhaled 2 times a day\par zinc sulfate 220 mg oral capsule: 1 cap(s) orally once a day" \par  [de-identified] : No return of chest pain.  Continues to experience dyspnea with exertion since COVID-19 infection.  No longer using home O2.  \par Denies any edema, palpitations, dizziness.  \par \delfina Had started diltiazem several years ago d/t cluster headaches.  Was seeing PCP (internal med/cardiology) when living in [place], started it for elevated BP and cluster headaches.  Cluster headaches occur infrequently, but regularly, about every 2 years.   \par \delfina Has not picked up metoprolol or ASA yet since d/c (4 days).  \par \par COVID vaccine (#2, [facility]) day prior to chest pain.  Vaccine given around 11 am, developed fever, chills, sweats, aches around 7pm.  Felt better through the next morning and was feeling overall well when he developed the chest pain in the afternoon. \par \delfina Continues to have neck pain and stiffness, back feeling ok.  Right arm weakness has improved s/p cervical spine surgery last year. \par

## 2021-07-30 NOTE — ED ADULT TRIAGE NOTE - HEART RATE (BEATS/MIN)
Eating breakfast upon arrival. reports going to go out and ride mower for awhile. Cautioned to not stay out for long periods due to heat voiced understanding. grandson here to help him. INR stabilizing if stable next check will discharge to have done at MD office. Pt aware discharge was upcoming if INR remains stable 80

## 2021-08-04 ENCOUNTER — TRANSCRIPTION ENCOUNTER (OUTPATIENT)
Age: 66
End: 2021-08-04

## 2021-08-04 ENCOUNTER — NON-APPOINTMENT (OUTPATIENT)
Age: 66
End: 2021-08-04

## 2021-08-04 RX ORDER — PREDNISONE 20 MG/1
20 TABLET ORAL
Qty: 18 | Refills: 0 | Status: DISCONTINUED | COMMUNITY
Start: 2021-04-28 | End: 2021-08-04

## 2021-08-05 ENCOUNTER — APPOINTMENT (OUTPATIENT)
Dept: INTERNAL MEDICINE | Facility: CLINIC | Age: 66
End: 2021-08-05
Payer: MEDICARE

## 2021-08-05 VITALS
WEIGHT: 226 LBS | SYSTOLIC BLOOD PRESSURE: 134 MMHG | OXYGEN SATURATION: 95 % | TEMPERATURE: 98.3 F | BODY MASS INDEX: 30.61 KG/M2 | HEART RATE: 98 BPM | HEIGHT: 72 IN | DIASTOLIC BLOOD PRESSURE: 80 MMHG

## 2021-08-05 PROCEDURE — 99214 OFFICE O/P EST MOD 30 MIN: CPT

## 2021-08-05 RX ORDER — FAMOTIDINE 20 MG/1
20 TABLET, FILM COATED ORAL
Qty: 60 | Refills: 2 | Status: DISCONTINUED | COMMUNITY
Start: 2021-03-28 | End: 2021-08-05

## 2021-08-05 NOTE — ASSESSMENT
[FreeTextEntry1] : Anxiety and depression:\par Severe anxiety on LASHELL-7, moderate depression on PHQ-9.  No SI/HI.\par Would like to start medication.  Discussed Cymbalta as it may also help with the neck pain and radicular symptoms he has with that.  \par Can continue xanax as needed should panic attack reoccur.  \par Strongly encouraged to start therapy, will consider, referral provided for  behavioral health.  \par Follow up scheduled with PCP next month, patient will call sooner with any concerns\par \par Chest pain, elevated troponin:\par Symptoms s/p COVID vaccination, cardiac cath showed nonobstructive CAD\par Dose not want to take metoprolol, agreeable to restarting low dose aspirin\par Cardiology follow up again encouraged. \par \par Dyspnea with exertion s/p COVID-19 infection 2/2021:\par Pulmonary follow up if continues, but will address above first\par Discussed Symbicort for maintenance and albuterol only as needed\par \par Right arm weakness, neck pain s/p spinal surgery:\par Has improved significantly \par Cannot continue PT d/t high co-pay.  Encouraged to continue home exercises\par Will be starting Cymbalta primarily for his mood, but discussed may help with the pain  \par \par Hypertension:\par Controlled, continues diltiazem and hctz\par \par hyperlipidemia:\par controlled on high dose statin\par \par PCP follow up scheduled

## 2021-08-05 NOTE — PHYSICAL EXAM
[Normal Voice/Communication] : normal voice/communication [Normal] : normal rate, regular rhythm, normal S1 and S2 and no murmur heard [No Edema] : there was no peripheral edema

## 2021-08-05 NOTE — HISTORY OF PRESENT ILLNESS
[FreeTextEntry8] : 66 y.o. male, PMHx of HTN, hyperlipidemia, GERD, hx of COVID-19 in 2/2021, cervical radiculopathy s/p fusion c/b right UE weakness which is improving \par Spoke on the phone yesterday - "Difficulty breathing.  Taking short breaths.  Not sure if just anxiety.  \par Started to develop some of these symptoms last night, noticing more this morning.  \par No wheeze.  No chest pain.  Does have tingling in this arm when lying down, not with standing.  \par No headaches, no dizziness unless bending over. \par Prior to today was getting shortness of breath on and off, especially with exertion, ever since COVID infection in the late winter.  \par Tried nebulizer, but did not seem to help.  \par Does feel like depression is worse.  Going through a divorce.  H/o anxiety and depression, not currently being treated.  \par O2 sat while on the phone is 98%.  HR 80.  \delfina Was in [place] for 3 weeks with his daughter, had a great time.  Visited with sister in [place] for 5 days.  Returned yesterday, about 2.5hr drive.  \par ER evaluation 2 months ago for chest pain, had cath done - nonobstructive CAD max stenosis 30% LAD, echo with EF of 60%.  \par Will treat for panic attack with alprazolam.  Patient will follow up for in-office visit tomorrow.  ER evaluation if no improvement with xanax or any worsening."\par \delfina Took the xanax last night with some improvement, sleep well through the night.  No dyspnea today, but still has edgy feeling.  Divorce talks for the last several months, started in April.  Papers served on his birthday, [birth year].  Had been away for a few weeks in July with his daughter visiting family in [place].  Thinks yesterday he finally reconciled with the idea of divorce and it really hit him. \par Couples counseling discussed, but wife not interested.  Feels like there are lots of secrets on his wife's side, she has become very distant, often not home.  Lost her job.  Shortness of breath yesterday was different that the dyspnea upon exertion he has been experiencing since COVID infection.  Today feels OK, breathing comfortably.  Never any chest pain.  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  Wife currently unemployed.  Patient on disability.  \par Not sure he wants to do therapy on his own.  \par Prescribed antidepressants and antianxiety medicine in the past, but never took it.  Does not recall that being prescribed, never felt he needed it.  \par \par Never took metoprolol prescribed at last visit, stopped aspirin after it finished.  Takes Symbicort sometimes, takes albuterol daily, but not sure it helps.

## 2021-09-30 LAB
ALBUMIN SERPL ELPH-MCNC: 5.1 G/DL
ALP BLD-CCNC: 122 U/L
ALT SERPL-CCNC: 23 U/L
ANION GAP SERPL CALC-SCNC: 16 MMOL/L
AST SERPL-CCNC: 25 U/L
BASOPHILS # BLD AUTO: 0.08 K/UL
BASOPHILS NFR BLD AUTO: 0.6 %
BILIRUB SERPL-MCNC: <0.2 MG/DL
BUN SERPL-MCNC: 25 MG/DL
CALCIUM SERPL-MCNC: 9.9 MG/DL
CHLORIDE SERPL-SCNC: 106 MMOL/L
CO2 SERPL-SCNC: 22 MMOL/L
CREAT SERPL-MCNC: 1.22 MG/DL
EOSINOPHIL # BLD AUTO: 0.21 K/UL
EOSINOPHIL NFR BLD AUTO: 1.6 %
GLUCOSE SERPL-MCNC: 91 MG/DL
HCT VFR BLD CALC: 45.6 %
HGB BLD-MCNC: 14.1 G/DL
IMM GRANULOCYTES NFR BLD AUTO: 0.3 %
LYMPHOCYTES # BLD AUTO: 3.42 K/UL
LYMPHOCYTES NFR BLD AUTO: 25.8 %
MAN DIFF?: NORMAL
MCHC RBC-ENTMCNC: 29.3 PG
MCHC RBC-ENTMCNC: 30.9 GM/DL
MCV RBC AUTO: 94.8 FL
MONOCYTES # BLD AUTO: 1.2 K/UL
MONOCYTES NFR BLD AUTO: 9.1 %
NEUTROPHILS # BLD AUTO: 8.29 K/UL
NEUTROPHILS NFR BLD AUTO: 62.6 %
PLATELET # BLD AUTO: 291 K/UL
POTASSIUM SERPL-SCNC: 4.4 MMOL/L
PROT SERPL-MCNC: 6.8 G/DL
RBC # BLD: 4.81 M/UL
RBC # FLD: 14.6 %
SODIUM SERPL-SCNC: 145 MMOL/L
URATE SERPL-MCNC: 10.5 MG/DL
WBC # FLD AUTO: 13.24 K/UL

## 2021-10-22 ENCOUNTER — TRANSCRIPTION ENCOUNTER (OUTPATIENT)
Age: 66
End: 2021-10-22

## 2021-10-28 NOTE — COUNSELING
Please triage and sent to Dr Borja   [Weight management counseling provided] : Weight management [Healthy eating counseling provided] : healthy eating [Activity counseling provided] : activity [Behavioral health counseling provided] : behavioral health  [Weight Self Once Weekly] : Weight self once weekly [Low Fat Diet] : Low fat diet [Decrease Portions] : Decrease food portions [Low Salt Diet] : Low salt diet [Walking] : Walking [Engage in a relaxing activity] : Engage in a relaxing activity [None] : None [Good understanding] : Patient has a good understanding of lifestyle changes and the steps needed to achieve self management goals

## 2021-11-07 ENCOUNTER — RX RENEWAL (OUTPATIENT)
Age: 66
End: 2021-11-07

## 2021-11-10 ENCOUNTER — APPOINTMENT (OUTPATIENT)
Dept: INTERNAL MEDICINE | Facility: CLINIC | Age: 66
End: 2021-11-10
Payer: MEDICARE

## 2021-11-10 VITALS
BODY MASS INDEX: 30.79 KG/M2 | DIASTOLIC BLOOD PRESSURE: 84 MMHG | OXYGEN SATURATION: 96 % | WEIGHT: 227 LBS | SYSTOLIC BLOOD PRESSURE: 138 MMHG | TEMPERATURE: 98 F | HEART RATE: 116 BPM

## 2021-11-10 DIAGNOSIS — Z86.69 PERSONAL HISTORY OF OTHER DISEASES OF THE NERVOUS SYSTEM AND SENSE ORGANS: ICD-10-CM

## 2021-11-10 DIAGNOSIS — Z87.898 PERSONAL HISTORY OF OTHER SPECIFIED CONDITIONS: ICD-10-CM

## 2021-11-10 DIAGNOSIS — Z87.19 PERSONAL HISTORY OF OTHER DISEASES OF THE DIGESTIVE SYSTEM: ICD-10-CM

## 2021-11-10 DIAGNOSIS — M77.9 ENTHESOPATHY, UNSPECIFIED: ICD-10-CM

## 2021-11-10 DIAGNOSIS — R01.1 CARDIAC MURMUR, UNSPECIFIED: ICD-10-CM

## 2021-11-10 DIAGNOSIS — Z87.39 PERSONAL HISTORY OF OTHER DISEASES OF THE MUSCULOSKELETAL SYSTEM AND CONNECTIVE TISSUE: ICD-10-CM

## 2021-11-10 DIAGNOSIS — Z87.438 PERSONAL HISTORY OF OTHER DISEASES OF MALE GENITAL ORGANS: ICD-10-CM

## 2021-11-10 DIAGNOSIS — Z01.810 ENCOUNTER FOR PREPROCEDURAL CARDIOVASCULAR EXAMINATION: ICD-10-CM

## 2021-11-10 DIAGNOSIS — Z01.818 ENCOUNTER FOR OTHER PREPROCEDURAL EXAMINATION: ICD-10-CM

## 2021-11-10 PROCEDURE — G0438: CPT

## 2021-11-10 PROCEDURE — G0442 ANNUAL ALCOHOL SCREEN 15 MIN: CPT

## 2021-11-14 PROBLEM — Z87.438 HISTORY OF IMPOTENCE: Status: RESOLVED | Noted: 2017-03-02 | Resolved: 2021-11-14

## 2021-11-14 PROBLEM — M77.9 BONE SPUR: Status: RESOLVED | Noted: 2018-01-31 | Resolved: 2021-11-14

## 2021-11-14 PROBLEM — Z01.810 PREOP CARDIOVASCULAR EXAM: Status: RESOLVED | Noted: 2018-09-25 | Resolved: 2021-11-14

## 2021-11-14 PROBLEM — R01.1 SYSTOLIC MURMUR: Status: RESOLVED | Noted: 2018-09-18 | Resolved: 2021-11-14

## 2021-11-14 NOTE — HISTORY OF PRESENT ILLNESS
[de-identified] : 66-year-old male with hypertension, hyperlipidemia, and GERD here for CPE. Has h/o chronic low back pain and had been seeing pain service. S/p back surgery. some relief but pain is still present. Not taking gabapentin any more. Still out of work. Lost some weight.  Feels fatigued. has nocturia   Bone spur pain.  Of Note cardiac w/u before surgery with t-wave flattening and mildly reversible perfusion defect.  Patient was with out symptoms and no further therapy of other intervention needed.  Last surgery left him with significant lower extremity and upper extremity deficits that are slowly resolving.  6 months ago had COVID and continues to have fatigue.  Wife left markus and they are in the process of divorce. Nocturia/ polyuria  was on tamsulosin. \par

## 2021-11-14 NOTE — HEALTH RISK ASSESSMENT
[Fair] :  ~his/her~ mood as fair [No] : In the past 12 months have you used drugs other than those required for medical reasons? No [No falls in past year] : Patient reported no falls in the past year [2] : 2) Feeling down, depressed, or hopeless for more than half of the days (2) [PHQ-2 Positive] : PHQ-2 Positive [Not at All (0)] : 8.) Moving or speaking so slowly that other people could have noticed, or the opposite, moving or speaking faster than usual? Not at all [Several Days (1)] : 7.) Trouble concentrating on things, such as reading a newspaper or watching television? Several days [Mild] : severity of depression is mild [Somewhat Difficult] : How difficult have these problems made it for you to do your work, take care of things at home, or get along with people? Somewhat difficult [I have developed a follow-up plan documented below in the note.] : I have developed a follow-up plan documented below in the note. [No Retinopathy] : No retinopathy [Patient reported colonoscopy was normal] : Patient reported colonoscopy was normal [None] : None [Alone] : lives alone [On disability] : on disability [] :  [Feels Safe at Home] : Feels safe at home [Fully functional (bathing, dressing, toileting, transferring, walking, feeding)] : Fully functional (bathing, dressing, toileting, transferring, walking, feeding) [Fully functional (using the telephone, shopping, preparing meals, housekeeping, doing laundry, using] : Fully functional and needs no help or supervision to perform IADLs (using the telephone, shopping, preparing meals, housekeeping, doing laundry, using transportation, managing medications and managing finances) [Reports normal functional visual acuity (ie: able to read med bottle)] : Reports normal functional visual acuity [] : No [Audit-CScore] : 0 [de-identified] : physical therapy [de-identified] : low fat [[facility]] : 5 [[id number]] : 4 [[id number]] : 7 [LowDoseCTScan] : NA [Change in mental status noted] : No change in mental status noted [Language] : denies difficulty with language [Behavior] : denies difficulty with behavior [Learning/Retaining New Information] : denies difficulty learning/retaining new information [Handling Complex Tasks] : denies difficulty handling complex tasks [Reasoning] : denies difficulty with reasoning [Spatial Ability and Orientation] : denies difficulty with spatial ability and orientation [Sexually Active] : not sexually active [High Risk Behavior] : no high risk behavior [Reports changes in hearing] : Reports no changes in hearing [Reports changes in vision] : Reports no changes in vision [Reports changes in dental health] : Reports no changes in dental health [MammogramDate] : NA [PapSmearDate] : NA [ColonoscopyDate] : 10/2011

## 2021-11-14 NOTE — ASSESSMENT
[FreeTextEntry1] : 66-year-old male with hypertension, hyperlipidemia, BPH, CAD, depression/anxiety, Gout, and GERD s/p COVID here for CPE. Has h/o chronic low back pain and had been seeing pain service. S/p back surgery. some relief but pain is still present. Not taking gabapentin any more. Still out of work. Lost some weight.  Feels fatigued. has nocturia   Bone spur pain.  Of Note cardiac w/u before surgery with t-wave flattening and mildly reversible perfusion defect.  Patient was with out symptoms and no further therapy of other intervention needed.  Last surgery left him with significant lower extremity and upper extremity deficits that are slowly resolving.  6 months ago had COVID and continues to have fatigue.  Wife left him and they are in the process of divorce. Nocturia/ polyuria  was on tamsulosin. \par \par Problem # 1 Depression/Anxiety: Doing better on Cymbalta.  \par Problem # 2 GERD: continue omeprazole. \par Problem # 3 Gout: resolved. on colchicine as needed\par Problem # 4 Back pain: on NSAIDs as needed.   Sees pain management and has received nerve blocks\par Problem # 5 HTN: Controlled today on meds. \par Problem # 6 hyperlipidemia: check level today.   on Lipitor. \par Problem # 7 BPH: Had bee on tamsulosin.  Start Alfuzosin. \par Problem # 8 CAD: stable.  ON ASA.  Risk factor management. \par Problem # 9 s/p COVID:  on inhalers for dyspnea.  slowly improving. \par Problem # 10 Health care maintenance: Flu shot this fall. Had COVID vaccine s/p COVID. Check labs. \par \par

## 2021-11-14 NOTE — PHYSICAL EXAM
[No Acute Distress] : no acute distress [Well Nourished] : well nourished [Well Developed] : well developed [Well-Appearing] : well-appearing [Normal Voice/Communication] : normal voice/communication [Normal] : no acute distress, well nourished, well developed and well-appearing [Normal Sclera/Conjunctiva] : normal sclera/conjunctiva [PERRL] : pupils equal round and reactive to light [EOMI] : extraocular movements intact [Normal Oropharynx] : the oropharynx was normal [No JVD] : no jugular venous distention [No Lymphadenopathy] : no lymphadenopathy [Supple] : supple [Thyroid Normal, No Nodules] : the thyroid was normal and there were no nodules present [No Respiratory Distress] : no respiratory distress  [No Accessory Muscle Use] : no accessory muscle use [Clear to Auscultation] : lungs were clear to auscultation bilaterally [Normal Rate] : normal rate  [Regular Rhythm] : with a regular rhythm [Normal S1, S2] : normal S1 and S2 [No Murmur] : no murmur heard [No Carotid Bruits] : no carotid bruits [No Abdominal Bruit] : a ~M bruit was not heard ~T in the abdomen [Pedal Pulses Present] : the pedal pulses are present [No Edema] : there was no peripheral edema [No Palpable Aorta] : no palpable aorta [No Extremity Clubbing/Cyanosis] : no extremity clubbing/cyanosis [Soft] : abdomen soft [Non Tender] : non-tender [Non-distended] : non-distended [No Masses] : no abdominal mass palpated [No HSM] : no HSM [Normal Bowel Sounds] : normal bowel sounds [Normal Supraclavicular Nodes] : no supraclavicular lymphadenopathy [Normal Anterior Cervical Nodes] : no anterior cervical lymphadenopathy [No CVA Tenderness] : no CVA  tenderness [No Spinal Tenderness] : no spinal tenderness [No Joint Swelling] : no joint swelling [Grossly Normal Strength/Tone] : grossly normal strength/tone [No Rash] : no rash [Coordination Grossly Intact] : coordination grossly intact [No Focal Deficits] : no focal deficits [Normal Gait] : normal gait [Speech Grossly Normal] : speech grossly normal [Normal Affect] : the affect was normal [Alert and Oriented x3] : oriented to person, place, and time [Normal Mood] : the mood was normal [Normal Insight/Judgement] : insight and judgment were intact

## 2021-11-14 NOTE — REVIEW OF SYSTEMS
[Fatigue] : fatigue [Frequency] : frequency [Impotence] : impotence [Joint Pain] : joint pain [Back Pain] : back pain [Unsteady Walk] : ataxia [Anxiety] : anxiety [Depression] : depression [Negative] : Heme/Lymph [Fever] : no fever [Chills] : no chills [Hot Flashes] : no hot flashes [Night Sweats] : no night sweats [Recent Change In Weight] : ~T no recent weight change [Vision Problems] : no vision problems [Abdominal Pain] : no abdominal pain [Skin Rash] : no skin rash [Suicidal] : not suicidal [Easy Bleeding] : no easy bleeding

## 2021-11-14 NOTE — COUNSELING
[Fall prevention counseling provided] : Fall prevention counseling provided [Adequate lighting] : Adequate lighting [Use proper foot wear] : Use proper foot wear [Use recommended devices] : Use recommended devices [Behavioral health counseling provided] : Behavioral health counseling provided [Sleep ___ hours/day] : Sleep [unfilled] hours/day [Engage in a relaxing activity] : Engage in a relaxing activity [AUDIT-C Screening administered and reviewed] : AUDIT-C Screening administered and reviewed [Potential consequences of obesity discussed] : Potential consequences of obesity discussed [None] : None [Good understanding] : Patient has a good understanding of lifestyle changes and steps needed to achieve self management goal

## 2021-11-16 ENCOUNTER — APPOINTMENT (OUTPATIENT)
Dept: GASTROENTEROLOGY | Facility: CLINIC | Age: 66
End: 2021-11-16
Payer: MEDICARE

## 2021-11-16 VITALS
TEMPERATURE: 98 F | DIASTOLIC BLOOD PRESSURE: 83 MMHG | BODY MASS INDEX: 30.61 KG/M2 | SYSTOLIC BLOOD PRESSURE: 128 MMHG | RESPIRATION RATE: 16 BRPM | WEIGHT: 226 LBS | HEART RATE: 105 BPM | OXYGEN SATURATION: 97 % | HEIGHT: 72 IN

## 2021-11-16 PROCEDURE — 99204 OFFICE O/P NEW MOD 45 MIN: CPT

## 2021-11-16 NOTE — HISTORY OF PRESENT ILLNESS
[de-identified] : 66-year-old male, last seen 7 years ago for reflux complaints\par Continues to have reflux symptoms, also regurgitation, throat clearing, occasional soreness\par Generally compliant with omeprazole 40 mg daily\par Denies any difficulty swallowing\par Last upper endoscopy showed retained food, some suspicion for gastroparesis, no further testing however at that time\par Patient denies any vomiting, denies early satiety\par Did lose weight recently associated with 1 month Covid related hospitalization February 2021\par Last colonoscopy 10 years ago\par Rare bouts of constipation\par \par Denies coronary disease congestive heart failure or dysrhythmia\par \par Social history: Retired, 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

## 2021-11-16 NOTE — ASSESSMENT
[FreeTextEntry1] : 66-year-old male persistent reflux cannot rule out Harkins's, cannot rule out gastroparesis\par Average risk for colon cancer and polyps\par \par Plan\par Indications risks benefits and alternatives to upper endoscopy  and colonoscopy reviewed with patient\par He is agreeable to examination\par Gastric emptying study ordered as well\par Telephone follow-up after completion of testing

## 2021-11-18 ENCOUNTER — OUTPATIENT (OUTPATIENT)
Dept: OUTPATIENT SERVICES | Facility: HOSPITAL | Age: 66
LOS: 1 days | End: 2021-11-18
Payer: MEDICARE

## 2021-11-18 ENCOUNTER — APPOINTMENT (OUTPATIENT)
Dept: CT IMAGING | Facility: IMAGING CENTER | Age: 66
End: 2021-11-18
Payer: MEDICARE

## 2021-11-18 DIAGNOSIS — M43.20 FUSION OF SPINE, SITE UNSPECIFIED: Chronic | ICD-10-CM

## 2021-11-18 DIAGNOSIS — Z00.8 ENCOUNTER FOR OTHER GENERAL EXAMINATION: ICD-10-CM

## 2021-11-18 DIAGNOSIS — U07.1 COVID-19: ICD-10-CM

## 2021-11-18 DIAGNOSIS — G47.33 OBSTRUCTIVE SLEEP APNEA (ADULT) (PEDIATRIC): ICD-10-CM

## 2021-11-18 DIAGNOSIS — Z98.89 OTHER SPECIFIED POSTPROCEDURAL STATES: Chronic | ICD-10-CM

## 2021-11-18 PROCEDURE — 71250 CT THORAX DX C-: CPT

## 2021-11-18 PROCEDURE — 71250 CT THORAX DX C-: CPT | Mod: 26

## 2021-11-22 ENCOUNTER — APPOINTMENT (OUTPATIENT)
Dept: HEMATOLOGY ONCOLOGY | Facility: CLINIC | Age: 66
End: 2021-11-22
Payer: MEDICARE

## 2021-11-22 ENCOUNTER — NON-APPOINTMENT (OUTPATIENT)
Age: 66
End: 2021-11-22

## 2021-11-22 VITALS
HEART RATE: 107 BPM | HEIGHT: 72 IN | WEIGHT: 230 LBS | OXYGEN SATURATION: 97 % | DIASTOLIC BLOOD PRESSURE: 81 MMHG | SYSTOLIC BLOOD PRESSURE: 136 MMHG | TEMPERATURE: 98.5 F | RESPIRATION RATE: 18 BRPM | BODY MASS INDEX: 31.15 KG/M2

## 2021-11-22 PROCEDURE — 85025 COMPLETE CBC W/AUTO DIFF WBC: CPT | Mod: GC

## 2021-11-22 PROCEDURE — 99205 OFFICE O/P NEW HI 60 MIN: CPT | Mod: GC

## 2021-11-23 ENCOUNTER — NON-APPOINTMENT (OUTPATIENT)
Age: 66
End: 2021-11-23

## 2021-11-23 LAB
ALBUMIN SERPL ELPH-MCNC: 4.4 G/DL
ALP BLD-CCNC: 156 U/L
ALT SERPL-CCNC: 24 U/L
ANION GAP SERPL CALC-SCNC: 17 MMOL/L
AST SERPL-CCNC: 17 U/L
BILIRUB SERPL-MCNC: 0.3 MG/DL
BUN SERPL-MCNC: 24 MG/DL
CALCIUM SERPL-MCNC: 9.4 MG/DL
CHLORIDE SERPL-SCNC: 103 MMOL/L
CO2 SERPL-SCNC: 22 MMOL/L
CREAT SERPL-MCNC: 1.15 MG/DL
GLUCOSE SERPL-MCNC: 102 MG/DL
LDH SERPL-CCNC: 242 U/L
MAGNESIUM SERPL-MCNC: 1.7 MG/DL
PHOSPHATE SERPL-MCNC: 3.5 MG/DL
POTASSIUM SERPL-SCNC: 4.2 MMOL/L
PROT SERPL-MCNC: 6.7 G/DL
SODIUM SERPL-SCNC: 141 MMOL/L
URATE SERPL-MCNC: 7.2 MG/DL

## 2021-11-23 NOTE — ASSESSMENT
[FreeTextEntry1] : Mr. Wilburn is a 66 year old male with history of BPH, gout, HTN, HLD, GERD, nonobstructive CAD (dx by cardiac cath 6/2021), and prior COVID19 infection who was referred for persistent leukocytosis. \par \par #Leukocytosis \par - present since at least 2013 according to records \par - WBC has fluctuated from normal up to 20K, more recently 13.24 8/5/21, neutrophil predominant but also a mild lymphocytosis and monocytosis \par - He has had 2 hospital admissions this year, 2/2021 for COVID19 and 6/2021 for chest pain where he had imaging done and no evidence of malignancy. No hepatosplenomegaly or lymphadenopathy in the chest/abdomen/pelvis. \par - physical exam today without evidence of enlarged spleen and no peripheral lymphadenopathy appreciated  \par - overall patient feeling well except for residual symptoms of shortness of breath after COVID19 infection. No b symptoms. \par - differential broad and unclear if truly a monoclonal process is underlying considering he has had a leukocytosis since 2013. \par - will send CBC with diff, peripheral flow cytometry and BCR-ABL\par - plan to call patient once workup complete to discuss results. \par

## 2021-11-23 NOTE — REVIEW OF SYSTEMS
[Shortness Of Breath] : shortness of breath [SOB on Exertion] : shortness of breath during exertion [Anxiety] : anxiety [Depression] : depression [Negative] : Heme/Lymph [Wheezing] : no wheezing [Cough] : no cough [Suicidal] : not suicidal [Insomnia] : no insomnia [FreeTextEntry8] : nocturia 2/2 BPH

## 2021-11-23 NOTE — END OF VISIT
[FreeTextEntry3] : Patient seen and case discussed with Dr. Varma- intermittent leukocytosis for ~ 2+ years, will check flow cytometry, BCR-ABL to rule out a clonal process- if negative will monitor  [Time Spent: ___ minutes] : I have spent [unfilled] minutes of time on the encounter.

## 2021-11-23 NOTE — HISTORY OF PRESENT ILLNESS
[Disease:__________________________] : Disease: [unfilled] [de-identified] : Mr. Wilburn is a 66 year old male with history of BPH, gout, HTN, HLD, GERD, nonobstructive CAD (dx by cardiac cath 6/2021), and prior COVID19 infection who was referred for persistent leukocytosis. His leukocytosis according to records has been intermittent but dating back to at least 2013. The patient states that he was told he had an elevated WBC since about a year ago but not sure about prior to this. He was admitted 2/2021 for COVID19 infection requiring hospitalization for approx 1 month and discharged with oxygen which he is no longer requiring. He had his 2nd Pfizer vaccine on 6/2/21 which shortly after had chest pain prompting an ER visit and admission where he had a LHC and revealed nonobstructive CAD. He had a CT C/A/P with contrast at that time revealing a borderline enlarged liver with right hepatic lobe 19.4cm, normal spleen and no lymphadenopathy.  He had persistent shortness of breath although oxygenation improved and CT Chest was done on 11/18/21 which revealed mild bilateral groundglass opacities with bronchiolar dilatation predominantly in the upper lobes along with a RUL calcified granuloma. No lymphadenopathy appreciated. His TTE in June 2021 had a normal EF of 60-65%. His WBC trend is as stated below: \par \par 8/5/21 WBC 13.24 ANC 8.29 ALC 3.42 AMC 1.20 \par 6/3/21 WBC 10.16 ANC 8.30 ALC 1.05 AMC 0.98 \par Multiple labs from March through June 2021 with normal WBC ranging 6.79-10.46 \par 3/2/17 WBC 13.37 ANC 7.71 ALC 4.43 AMC 1.0 \par 10/2013 WBC 11.3 no diff\par \par Today patient states he overall feels well. He has some anxiety/depression as currently undergoing a divorce. Since COVID he has had shortness of breath that persists and has not improved except oxygenation. He has nocturia but no dysuria which he was prescribed medication for an enlarged prostate. Otherwise has no specific complaints. Denies fevers, chills, night sweats (although had for approx 2 months after COVID which has since dissipated). Appetite is good and weight is stable, if anything gaining weight. Denies headache, vision changes, chest pain, abd pain, n/v/d/c, rash, easy bruising/bleeding. He is always clearing his throat and plans to have a colonoscopy and upper endoscopy with gastroenterologist at end of December or early January. No family history of blood disorders.

## 2021-11-23 NOTE — RESULTS/DATA
[FreeTextEntry1] : 8/5/21 WBC 13.24 ANC 8.29 ALC 3.42 AMC 1.20 \par 6/3/21 WBC 10.16 ANC 8.30 ALC 1.05 AMC 0.98 \par Multiple labs from March through June 2021 with normal WBC ranging 6.79-10.46 \par 3/2/17 WBC 13.37 ANC 7.71 ALC 4.43 AMC 1.0 \par 10/2013 WBC 11.3 no diff\par \par CT C/A/P 6/3/21: \par liver borderline enlarged with right lobe 19.4cm. Spleen normal. No lymphadenopathy\par \par CT Chest 11/18/21: bilateral mild groundglass opacities with superimposed mild bronchiolar dilatation predominantly in the upper lobes. RUL calcified granuloma. No lymphadenopathy

## 2021-11-24 LAB
25(OH)D3 SERPL-MCNC: 15.2 NG/ML
ALBUMIN MFR SERPL ELPH: 61.5 %
ALBUMIN SERPL ELPH-MCNC: 4.7 G/DL
ALBUMIN SERPL-MCNC: 4.1 G/DL
ALBUMIN/GLOB SERPL: 1.6 RATIO
ALP BLD-CCNC: 134 U/L
ALPHA1 GLOB MFR SERPL ELPH: 6.6 %
ALPHA1 GLOB SERPL ELPH-MCNC: 0.4 G/DL
ALPHA2 GLOB MFR SERPL ELPH: 13.3 %
ALPHA2 GLOB SERPL ELPH-MCNC: 0.9 G/DL
ALT SERPL-CCNC: 21 U/L
ANION GAP SERPL CALC-SCNC: 19 MMOL/L
AST SERPL-CCNC: 18 U/L
B-GLOBULIN MFR SERPL ELPH: 11.6 %
B-GLOBULIN SERPL ELPH-MCNC: 0.8 G/DL
BILIRUB SERPL-MCNC: 0.2 MG/DL
BUN SERPL-MCNC: 28 MG/DL
CALCIUM SERPL-MCNC: 9.5 MG/DL
CHLORIDE SERPL-SCNC: 106 MMOL/L
CO2 SERPL-SCNC: 19 MMOL/L
CREAT SERPL-MCNC: 1.1 MG/DL
DEPRECATED KAPPA LC FREE/LAMBDA SER: 1.11 RATIO
ESTIMATED AVERAGE GLUCOSE: 126 MG/DL
GAMMA GLOB FLD ELPH-MCNC: 0.5 G/DL
GAMMA GLOB MFR SERPL ELPH: 7 %
GLUCOSE SERPL-MCNC: 109 MG/DL
HBA1C MFR BLD HPLC: 6 %
IGA SER QL IEP: 48 MG/DL
IGG SER QL IEP: 522 MG/DL
IGM SER QL IEP: 32 MG/DL
INTERPRETATION SERPL IEP-IMP: NORMAL
KAPPA LC CSF-MCNC: 0.91 MG/DL
KAPPA LC SERPL-MCNC: 1.01 MG/DL
M PROTEIN SPEC IFE-MCNC: NORMAL
POTASSIUM SERPL-SCNC: 4 MMOL/L
PROT SERPL-MCNC: 6.7 G/DL
SODIUM SERPL-SCNC: 145 MMOL/L
TSH SERPL-ACNC: 1.92 UIU/ML
URATE SERPL-MCNC: 7.6 MG/DL

## 2021-11-30 LAB — T(9;22)(ABL1,BCR)/CONTROL BLD/T: NORMAL

## 2021-12-21 ENCOUNTER — RX RENEWAL (OUTPATIENT)
Age: 66
End: 2021-12-21

## 2021-12-23 ENCOUNTER — RX RENEWAL (OUTPATIENT)
Age: 66
End: 2021-12-23

## 2021-12-28 NOTE — H&P ADULT - PROBLEM SELECTOR PLAN 4
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    
Hx of COVID 19 requiring hospitalization for ~1 month in 2/2021.  --patient initially required home O2 upon discharge from hospital, now 95-98% on RA.   --continue Symbicort 2 puffs INH BID and Albuterol HFA PRN.    VTE PPx: on Heparin gtt  GI PPx: on PPI  CODE: FULL

## 2022-01-12 ENCOUNTER — APPOINTMENT (OUTPATIENT)
Dept: INTERNAL MEDICINE | Facility: CLINIC | Age: 67
End: 2022-01-12
Payer: MEDICARE

## 2022-01-12 VITALS
WEIGHT: 233 LBS | SYSTOLIC BLOOD PRESSURE: 118 MMHG | OXYGEN SATURATION: 96 % | DIASTOLIC BLOOD PRESSURE: 78 MMHG | HEART RATE: 108 BPM | BODY MASS INDEX: 31.6 KG/M2 | TEMPERATURE: 98.6 F

## 2022-01-12 DIAGNOSIS — Z80.8 FAMILY HISTORY OF MALIGNANT NEOPLASM OF OTHER ORGANS OR SYSTEMS: ICD-10-CM

## 2022-01-12 DIAGNOSIS — Z87.898 PERSONAL HISTORY OF OTHER SPECIFIED CONDITIONS: ICD-10-CM

## 2022-01-12 DIAGNOSIS — Z86.69 PERSONAL HISTORY OF OTHER DISEASES OF THE NERVOUS SYSTEM AND SENSE ORGANS: ICD-10-CM

## 2022-01-12 DIAGNOSIS — R35.8 XXOTHER POLYURIA: ICD-10-CM

## 2022-01-12 DIAGNOSIS — Z86.018 PERSONAL HISTORY OF OTHER BENIGN NEOPLASM: ICD-10-CM

## 2022-01-12 DIAGNOSIS — L91.8 OTHER HYPERTROPHIC DISORDERS OF THE SKIN: ICD-10-CM

## 2022-01-12 DIAGNOSIS — R06.83 SNORING: ICD-10-CM

## 2022-01-12 DIAGNOSIS — Z20.822 CONTACT WITH AND (SUSPECTED) EXPOSURE TO COVID-19: ICD-10-CM

## 2022-01-12 DIAGNOSIS — Z86.79 PERSONAL HISTORY OF OTHER DISEASES OF THE CIRCULATORY SYSTEM: ICD-10-CM

## 2022-01-12 DIAGNOSIS — Z86.59 PERSONAL HISTORY OF OTHER MENTAL AND BEHAVIORAL DISORDERS: ICD-10-CM

## 2022-01-12 DIAGNOSIS — Z12.11 ENCOUNTER FOR SCREENING FOR MALIGNANT NEOPLASM OF COLON: ICD-10-CM

## 2022-01-12 DIAGNOSIS — Z87.39 PERSONAL HISTORY OF OTHER DISEASES OF THE MUSCULOSKELETAL SYSTEM AND CONNECTIVE TISSUE: ICD-10-CM

## 2022-01-12 DIAGNOSIS — Z87.2 PERSONAL HISTORY OF DISEASES OF THE SKIN AND SUBCUTANEOUS TISSUE: ICD-10-CM

## 2022-01-12 DIAGNOSIS — U07.1 COVID-19: ICD-10-CM

## 2022-01-12 DIAGNOSIS — M25.512 PAIN IN LEFT SHOULDER: ICD-10-CM

## 2022-01-12 DIAGNOSIS — M51.36 OTHER INTERVERTEBRAL DISC DEGENERATION, LUMBAR REGION: ICD-10-CM

## 2022-01-12 DIAGNOSIS — L72.0 EPIDERMAL CYST: ICD-10-CM

## 2022-01-12 DIAGNOSIS — R50.9 FEVER, UNSPECIFIED: ICD-10-CM

## 2022-01-12 DIAGNOSIS — K30 FUNCTIONAL DYSPEPSIA: ICD-10-CM

## 2022-01-12 DIAGNOSIS — L73.8 OTHER SPECIFIED FOLLICULAR DISORDERS: ICD-10-CM

## 2022-01-12 DIAGNOSIS — B35.1 TINEA UNGUIUM: ICD-10-CM

## 2022-01-12 PROCEDURE — 99214 OFFICE O/P EST MOD 30 MIN: CPT

## 2022-01-15 PROBLEM — Z86.59 HISTORY OF ANXIETY: Status: RESOLVED | Noted: 2018-01-31 | Resolved: 2022-01-15

## 2022-01-15 PROBLEM — R50.9 FEVER AND CHILLS: Status: RESOLVED | Noted: 2020-05-21 | Resolved: 2021-04-28

## 2022-01-15 PROBLEM — Z86.018 HISTORY OF MULTIPLE MELANOCYTIC NEVI: Status: RESOLVED | Noted: 2017-05-22 | Resolved: 2022-01-15

## 2022-01-15 PROBLEM — U07.1 INFECTION DUE TO 2019 NOVEL CORONAVIRUS: Status: RESOLVED | Noted: 2021-03-25 | Resolved: 2022-01-15

## 2022-01-15 PROBLEM — Z20.822 SUSPECTED COVID-19 VIRUS INFECTION: Status: RESOLVED | Noted: 2021-02-16 | Resolved: 2022-01-15

## 2022-01-15 PROBLEM — Z12.11 COLON CANCER SCREENING: Status: RESOLVED | Noted: 2021-11-16 | Resolved: 2022-01-15

## 2022-01-15 PROBLEM — Z86.79 HISTORY OF ABNORMAL ELECTROCARDIOGRAPHY: Status: RESOLVED | Noted: 2018-09-18 | Resolved: 2022-01-15

## 2022-01-15 PROBLEM — Z87.2 HISTORY OF SEBORRHEIC KERATOSIS: Status: RESOLVED | Noted: 2017-05-22 | Resolved: 2022-01-15

## 2022-01-15 PROBLEM — L73.8 SEBACEOUS HYPERPLASIA OF FACE: Status: RESOLVED | Noted: 2017-05-22 | Resolved: 2022-01-15

## 2022-01-15 PROBLEM — L91.8 SKIN TAG: Status: RESOLVED | Noted: 2017-03-02 | Resolved: 2022-01-15

## 2022-01-15 PROBLEM — M25.512 LEFT SHOULDER PAIN, UNSPECIFIED CHRONICITY: Status: RESOLVED | Noted: 2019-08-19 | Resolved: 2022-01-15

## 2022-01-15 PROBLEM — L72.0 EPIDERMAL INCLUSION CYST: Status: RESOLVED | Noted: 2017-05-22 | Resolved: 2022-01-15

## 2022-01-15 NOTE — ASSESSMENT
[FreeTextEntry1] : 66-year-old male with hypertension, hyperlipidemia, and GERD here for follow-up. Has h/o chronic low back pain and had been seeing pain service. S/p back surgery. some relief but pain is still present. Not taking gabapentin any more. Still out of work. Lost some weight.  Feels fatigued. has nocturia   Bone spur pain.  Of Note cardiac w/u before surgery with t-wave flattening and mildly reversible perfusion defect.  Patient was without symptoms and no further therapy or other intervention needed.  Last surgery left him with significant lower extremity and upper extremity deficits that are slowly resolving.  6 months ago had COVID and continues to have fatigue.  In the process of divorce. Nocturia/ polyuria.\par \par Problem # 1 Depression/Anxiety: Doing better on Cymbalta.  \par Problem # 2 GERD: continue omeprazole. \par Problem # 3 Gout: resolved. on colchicine as needed\par Problem # 4 Back pain: on NSAIDs as needed.   Sees pain management and has received nerve blocks\par Problem # 5 HTN: Controlled today on meds. \par Problem # 6 hyperlipidemia: check level today.   on Lipitor. \par Problem # 7 BPH:  Not much relief with Alfuzosin. Finasteride started. \par Problem # 8 CAD: stable.  ON ASA.  Risk factor management.  Deconditioned.  Advised not to take Ventolin for exertional dyspnea. \par Problem # 9 s/p COVID:  on inhalers for dyspnea.  slowly improving. \par Problem # 10 Anemia:  Seen by hematology.  W/u including SPEP, flow cytometry, BCR/ABL unremarkable.  No evidence of CML, All or myeloma. \par Problem # 11 Health care maintenance: Flu shot this fall. Had COVID vaccine s/p COVID. Check labs. \par \par

## 2022-01-15 NOTE — HISTORY OF PRESENT ILLNESS
[de-identified] : 66-year-old male with hypertension, hyperlipidemia, and GERD here for follow-up. Has h/o chronic low back pain and had been seeing pain service. S/p back surgery. some relief but pain is still present. Not taking gabapentin any more. Still out of work. Lost some weight.  Feels fatigued. has nocturia   Bone spur pain.  Of Note cardiac w/u before surgery with t-wave flattening and mildly reversible perfusion defect.  Patient was without symptoms and no further therapy or other intervention needed.  Last surgery left him with significant lower extremity and upper extremity deficits that are slowly resolving.  6 months ago had COVID and continues to have fatigue.  In the process of divorce. Nocturia/ polyuria.  Started on alfuzosin last visit. Has had some dyspnea on exertion, no wheezes.  Using Ventolin inhaler. NO chest pain or pressure.

## 2022-01-15 NOTE — PHYSICAL EXAM
[No Acute Distress] : no acute distress [Well Nourished] : well nourished [Well Developed] : well developed [Well-Appearing] : well-appearing [Normal Voice/Communication] : normal voice/communication [Normal] : no acute distress, well nourished, well developed and well-appearing [Normal Sclera/Conjunctiva] : normal sclera/conjunctiva [Normal Oropharynx] : the oropharynx was normal [No JVD] : no jugular venous distention [No Lymphadenopathy] : no lymphadenopathy [Supple] : supple [Thyroid Normal, No Nodules] : the thyroid was normal and there were no nodules present [No Respiratory Distress] : no respiratory distress  [No Accessory Muscle Use] : no accessory muscle use [Clear to Auscultation] : lungs were clear to auscultation bilaterally [Normal Rate] : normal rate  [Regular Rhythm] : with a regular rhythm [Normal S1, S2] : normal S1 and S2 [No Murmur] : no murmur heard [No Carotid Bruits] : no carotid bruits [No Abdominal Bruit] : a ~M bruit was not heard ~T in the abdomen [Pedal Pulses Present] : the pedal pulses are present [No Edema] : there was no peripheral edema [No Palpable Aorta] : no palpable aorta [No Extremity Clubbing/Cyanosis] : no extremity clubbing/cyanosis [Soft] : abdomen soft [Non Tender] : non-tender [Non-distended] : non-distended [No Masses] : no abdominal mass palpated [No HSM] : no HSM [Normal Bowel Sounds] : normal bowel sounds [Normal Supraclavicular Nodes] : no supraclavicular lymphadenopathy [Normal Anterior Cervical Nodes] : no anterior cervical lymphadenopathy [No CVA Tenderness] : no CVA  tenderness [No Spinal Tenderness] : no spinal tenderness [No Joint Swelling] : no joint swelling [Grossly Normal Strength/Tone] : grossly normal strength/tone [No Rash] : no rash [Coordination Grossly Intact] : coordination grossly intact [No Focal Deficits] : no focal deficits [Normal Gait] : normal gait [Speech Grossly Normal] : speech grossly normal [Normal Affect] : the affect was normal [Alert and Oriented x3] : oriented to person, place, and time [Normal Mood] : the mood was normal [Normal Insight/Judgement] : insight and judgment were intact

## 2022-01-18 ENCOUNTER — RX RENEWAL (OUTPATIENT)
Age: 67
End: 2022-01-18

## 2022-01-28 ENCOUNTER — APPOINTMENT (OUTPATIENT)
Dept: DISASTER EMERGENCY | Facility: CLINIC | Age: 67
End: 2022-01-28

## 2022-01-29 ENCOUNTER — TRANSCRIPTION ENCOUNTER (OUTPATIENT)
Age: 67
End: 2022-01-29

## 2022-01-31 ENCOUNTER — OUTPATIENT (OUTPATIENT)
Dept: OUTPATIENT SERVICES | Facility: HOSPITAL | Age: 67
LOS: 1 days | End: 2022-01-31
Payer: MEDICARE

## 2022-01-31 ENCOUNTER — APPOINTMENT (OUTPATIENT)
Dept: GASTROENTEROLOGY | Facility: HOSPITAL | Age: 67
End: 2022-01-31

## 2022-01-31 ENCOUNTER — RX RENEWAL (OUTPATIENT)
Age: 67
End: 2022-01-31

## 2022-01-31 VITALS
OXYGEN SATURATION: 96 % | DIASTOLIC BLOOD PRESSURE: 89 MMHG | HEART RATE: 88 BPM | SYSTOLIC BLOOD PRESSURE: 137 MMHG | RESPIRATION RATE: 20 BRPM

## 2022-01-31 VITALS — OXYGEN SATURATION: 97 %

## 2022-01-31 DIAGNOSIS — Z98.89 OTHER SPECIFIED POSTPROCEDURAL STATES: Chronic | ICD-10-CM

## 2022-01-31 DIAGNOSIS — M43.20 FUSION OF SPINE, SITE UNSPECIFIED: Chronic | ICD-10-CM

## 2022-01-31 DIAGNOSIS — K21.9 GASTRO-ESOPHAGEAL REFLUX DISEASE WITHOUT ESOPHAGITIS: ICD-10-CM

## 2022-01-31 DIAGNOSIS — Z12.11 ENCOUNTER FOR SCREENING FOR MALIGNANT NEOPLASM OF COLON: ICD-10-CM

## 2022-01-31 PROCEDURE — G0121: CPT

## 2022-01-31 PROCEDURE — 45378 DIAGNOSTIC COLONOSCOPY: CPT

## 2022-01-31 PROCEDURE — 43235 EGD DIAGNOSTIC BRUSH WASH: CPT

## 2022-01-31 DEVICE — NET RETRV ROT ROTH 2.5MMX230CM: Type: IMPLANTABLE DEVICE | Status: FUNCTIONAL

## 2022-01-31 RX ORDER — SODIUM CHLORIDE 9 MG/ML
500 INJECTION INTRAMUSCULAR; INTRAVENOUS; SUBCUTANEOUS
Refills: 0 | Status: DISCONTINUED | OUTPATIENT
Start: 2022-01-31 | End: 2022-02-14

## 2022-01-31 RX ORDER — BUDESONIDE AND FORMOTEROL FUMARATE DIHYDRATE 160; 4.5 UG/1; UG/1
2 AEROSOL RESPIRATORY (INHALATION)
Qty: 0 | Refills: 0 | DISCHARGE

## 2022-01-31 RX ORDER — OMEPRAZOLE 10 MG/1
1 CAPSULE, DELAYED RELEASE ORAL
Qty: 0 | Refills: 0 | DISCHARGE

## 2022-01-31 RX ORDER — DILTIAZEM HCL 120 MG
1 CAPSULE, EXT RELEASE 24 HR ORAL
Qty: 0 | Refills: 0 | DISCHARGE

## 2022-01-31 RX ORDER — ALBUTEROL 90 UG/1
2 AEROSOL, METERED ORAL
Qty: 0 | Refills: 0 | DISCHARGE

## 2022-01-31 NOTE — ASU PATIENT PROFILE, ADULT - FALL HARM RISK - UNIVERSAL INTERVENTIONS
Call bell, personal items and telephone in reach/Instruct patient to call for assistance before getting out of bed or chair/Non-slip footwear when patient is out of bed/[place] to call system/Physically safe environment - no spills, clutter or unnecessary equipment/Purposeful Proactive Rounding/Room/bathroom lighting operational, light cord in reach

## 2022-01-31 NOTE — PRE PROCEDURE NOTE - PRE PROCEDURE EVALUATION
Attending Physician:        Emery Arellano MD                    Procedure:    Indication for Procedure: reflux and screening  ________________________________________________________  PAST MEDICAL & SURGICAL HISTORY:  HTN (hypertension)    High cholesterol    HTN (hypertension)    Hyperlipidemia    GERD (gastroesophageal reflux disease)    Spinal stenosis    BPH (benign prostatic hypertrophy)    Cluster headache    HTN (hypertension)    HLD (hyperlipidemia)    COVID-19    H/O laminectomy  2009, 2010 and 2013    Fusion of spine      ALLERGIES:  No Known Allergies  Shrimp- Nasal congestion (Other)    HOME MEDICATIONS:  acetaminophen 325 mg oral tablet: 2 tab(s) orally every 6 hours, As needed, Temp greater or equal to 38C (100.4F), Moderate Pain (4 - 6)  Albuterol (Eqv-Proventil HFA) 90 mcg/inh inhalation aerosol: 2 puff(s) inhaled every 6 hours, As Needed  ascorbic acid 1000 mg oral tablet: 1 tab(s) orally once a day  cholecalciferol oral tablet: 2000 unit(s) orally once a day  DilTIAZem (Eqv-Cardizem CD) 240 mg/24 hours oral capsule, extended release: 1 cap(s) orally once a day  guaiFENesin 100 mg/5 mL oral liquid: 10 milliliter(s) orally every 6 hours, As needed, Cough  hydroCHLOROthiazide 50 mg oral tablet: 1 tab(s) orally once a day  omeprazole 40 mg oral delayed release capsule: 1 cap(s) orally once a day  sodium chloride 0.65% nasal spray: 1  nasal 2 times a day  Symbicort 80 mcg-4.5 mcg/inh inhalation aerosol: 2 puff(s) inhaled 2 times a day  zinc sulfate 220 mg oral capsule: 1 cap(s) orally once a day    AICD/PPM: [ ] yes   [x ] no    PERTINENT LAB DATA:                      PHYSICAL EXAMINATION:    T(C): --  HR: --  BP: --  RR: --  SpO2: --    Constitutional: NAD  HEENT: PERRLA, EOMI,    Neck:  No JVD  Respiratory: CTAB/L  Cardiovascular: S1 and S2  Gastrointestinal: BS+, soft, NT/ND  Extremities: No peripheral edema  Neurological: A/O x 3, no focal deficits  Psychiatric: Normal mood, normal affect  Skin: No rashes    ASA Class: I [ ]  II [ x]  III [ ]  IV [ ]    COMMENTS:    The patient is a suitable candidate for the planned procedure unless box checked [ ]  No, explain:

## 2022-01-31 NOTE — ASU PATIENT PROFILE, ADULT - NSICDXPASTMEDICALHX_GEN_ALL_CORE_FT
PAST MEDICAL HISTORY:  BPH (benign prostatic hypertrophy)     Cluster headache     COVID-19     GERD (gastroesophageal reflux disease)     High cholesterol     HLD (hyperlipidemia)     HTN (hypertension)     HTN (hypertension)     HTN (hypertension)     Hyperlipidemia     Spinal stenosis

## 2022-01-31 NOTE — ASU DISCHARGE PLAN (ADULT/PEDIATRIC) - NS MD DC FALL RISK RISK
For information on Fall & Injury Prevention, visit: [email or web address] OR  [email or web address] OR  https://www.cdc.gov/steadi/patient.html

## 2022-01-31 NOTE — PRE-ANESTHESIA EVALUATION ADULT - NSANTHOSAYNRD_GEN_A_CORE
No. ODIN screening performed.  STOP BANG Legend: 0-2 = LOW Risk; 3-4 = INTERMEDIATE Risk; 5-8 = HIGH Risk

## 2022-04-06 NOTE — PROGRESS NOTE ADULT - PROBLEM SELECTOR PLAN 6
PCT found patient on floor in the bathroom, denies pain, no bleeding noted. Vt D 17.3  c/w vitamin D supplement  c/w diet with Ca/vt D  -f/u blood level as outpt

## 2022-04-13 ENCOUNTER — APPOINTMENT (OUTPATIENT)
Dept: INTERNAL MEDICINE | Facility: CLINIC | Age: 67
End: 2022-04-13
Payer: MEDICARE

## 2022-04-13 VITALS
WEIGHT: 241 LBS | DIASTOLIC BLOOD PRESSURE: 80 MMHG | HEIGHT: 72 IN | OXYGEN SATURATION: 98 % | TEMPERATURE: 98.7 F | BODY MASS INDEX: 32.64 KG/M2 | SYSTOLIC BLOOD PRESSURE: 140 MMHG | HEART RATE: 97 BPM

## 2022-04-13 DIAGNOSIS — Z87.891 PERSONAL HISTORY OF NICOTINE DEPENDENCE: ICD-10-CM

## 2022-04-13 DIAGNOSIS — Z72.89 OTHER PROBLEMS RELATED TO LIFESTYLE: ICD-10-CM

## 2022-04-13 DIAGNOSIS — M25.551 PAIN IN RIGHT HIP: ICD-10-CM

## 2022-04-13 PROCEDURE — 99214 OFFICE O/P EST MOD 30 MIN: CPT

## 2022-04-13 RX ORDER — POLYETHYLENE GLYCOL-3350 AND ELECTROLYTES 236; 6.74; 5.86; 2.97; 22.74 G/274.31G; G/274.31G; G/274.31G; G/274.31G; G/274.31G
236 POWDER, FOR SOLUTION ORAL
Qty: 1 | Refills: 0 | Status: DISCONTINUED | COMMUNITY
Start: 2021-11-19 | End: 2022-04-13

## 2022-04-13 RX ORDER — SODIUM SULFATE, POTASSIUM SULFATE, MAGNESIUM SULFATE 17.5; 3.13; 1.6 G/ML; G/ML; G/ML
17.5-3.13-1.6 SOLUTION, CONCENTRATE ORAL
Qty: 1 | Refills: 0 | Status: DISCONTINUED | COMMUNITY
Start: 2021-11-16 | End: 2022-04-13

## 2022-04-13 RX ORDER — DULOXETINE HYDROCHLORIDE 30 MG/1
30 CAPSULE, DELAYED RELEASE PELLETS ORAL
Qty: 180 | Refills: 1 | Status: DISCONTINUED | COMMUNITY
Start: 2021-08-05 | End: 2022-04-13

## 2022-04-13 RX ORDER — DULOXETINE HYDROCHLORIDE 60 MG/1
60 CAPSULE, DELAYED RELEASE PELLETS ORAL
Qty: 90 | Refills: 1 | Status: DISCONTINUED | COMMUNITY
Start: 2022-01-31 | End: 2022-04-13

## 2022-04-13 RX ORDER — METOPROLOL SUCCINATE 25 MG/1
25 TABLET, EXTENDED RELEASE ORAL DAILY
Refills: 0 | Status: DISCONTINUED | COMMUNITY
Start: 2021-06-10 | End: 2022-04-13

## 2022-04-15 NOTE — HISTORY OF PRESENT ILLNESS
[de-identified] : 66-year-old male with hypertension, hyperlipidemia, and GERD here for follow-up. Has h/o chronic low back pain and had been seeing pain service. S/p back surgery. some relief but pain is still present. Not taking gabapentin any more. Still out of work. Lost some weight.  Feels fatigued. has nocturia   Bone spur pain.  Of Note cardiac w/u before surgery with t-wave flattening and mildly reversible perfusion defect.  Patient was without symptoms and no further therapy or other intervention needed.  Last surgery left him with significant lower extremity and upper extremity deficits that are slowly resolving.  8 months ago had COVID and continues to have fatigue.  In the process of divorce. Nocturia/ polyuria.  Started on alfuzosin and then finasteride last visit. no change in symptoms but he d/gee alfuzosin.  Has had some improvement in dyspnea on exertion, no wheezes.  Using Ventolin inhaler. NO chest pain or pressure. Had cluster headache attack characterized right sided headed, eye tearing and congestion.   Has hip pain

## 2022-04-15 NOTE — ASSESSMENT
[FreeTextEntry1] : 66-year-old male with hypertension, hyperlipidemia, and GERD here for follow-up. Has h/o chronic low back pain and had been seeing pain service. S/p back surgery. some relief but pain is still present. Not taking gabapentin any more. Still out of work. Lost some weight.  Feels fatigued. has nocturia   Bone spur pain.  Of Note cardiac w/u before surgery with t-wave flattening and mildly reversible perfusion defect.  Patient was without symptoms and no further therapy or other intervention needed.  Last surgery left him with significant lower extremity and upper extremity deficits that are slowly resolving.  8 months ago had COVID and continues to have fatigue.  In the process of divorce. Nocturia/ polyuria.  Started on alfuzosin and then finasteride last visit. no change in symptoms but he d/gee alfuzosin.  Has had some improvement in dyspnea on exertion, no wheezes.  Using Ventolin inhaler. NO chest pain or pressure. Had cluster headache attack characterized right sided headed, eye tearing and congestion. \par \par Problem # 1 Depression/Anxiety: Doing better on Cymbalta.  \par Problem # 2 GERD: continue omeprazole. \par Problem # 3 Gout: resolved. on colchicine as needed\par Problem # 4 Back pain/hip pain: on NSAIDs as needed.   Sees pain management and has received nerve blocks.  Refer to orthopedics\par Problem # 5 HTN: Controlled today on meds. \par Problem # 6 hyperlipidemia:   on Lipitor. \par Problem # 7 BPH:  stopped  Alfuzosin. when Finasteride started.  will restart\par Problem # 8 CAD: better.  ON ASA.  Risk factor management.  Deconditioned.  Advised not to take Ventolin for exertional dyspnea. \par Problem # 9 s/p COVID:  on inhalers for dyspnea.  improving. \par Problem # 10 Anemia:  Seen by hematology.  W/u including SPEP, flow cytometry, BCR/ABL unremarkable.  No evidence of CML, All or myeloma. \par Problem # 11 headache:  c/w cluster headache.  Treated with cold compresses and NSAIDs.\par Problem # 12 Health care maintenance: had Flu shot. Had COVID vaccine s/p COVID.\par \par

## 2022-04-18 ENCOUNTER — APPOINTMENT (OUTPATIENT)
Dept: ORTHOPEDIC SURGERY | Facility: CLINIC | Age: 67
End: 2022-04-18
Payer: MEDICARE

## 2022-04-18 ENCOUNTER — NON-APPOINTMENT (OUTPATIENT)
Age: 67
End: 2022-04-18

## 2022-04-18 VITALS
HEART RATE: 102 BPM | DIASTOLIC BLOOD PRESSURE: 84 MMHG | HEIGHT: 72 IN | SYSTOLIC BLOOD PRESSURE: 148 MMHG | WEIGHT: 240 LBS | BODY MASS INDEX: 32.51 KG/M2

## 2022-04-18 PROCEDURE — 73502 X-RAY EXAM HIP UNI 2-3 VIEWS: CPT

## 2022-04-18 PROCEDURE — 99204 OFFICE O/P NEW MOD 45 MIN: CPT

## 2022-04-18 NOTE — PHYSICAL EXAM
[Coxalgic] : coxalgic [LE] : Sensory: Intact in bilateral lower extremities [DP] : dorsalis pedis 2+ and symmetric bilaterally [PT] : posterior tibial 2+ and symmetric bilaterally [Normal] : Alert and in no acute distress [Poor Appearance] : well-appearing [Acute Distress] : not in acute distress [Obese] : obese [de-identified] : The patient has no respiratory distress. Mood and affect are normal. The patient is alert and oriented to person, place and time.\par Examination of the lumbar spine demonstrates midline scar from prior surgery.  There is mild tenderness to the left of the midline.  He has lumbar spine flexion of 50 degrees, right and left lateral flexion of 5 degrees with mild right sided lower lumbar pain.  He is able to walk on toes and heels.  Trendelenburg is negative.  He has stiffness and pain with attempted motion of the right hip.  There is lesser stiffness and no pain with motion of the left hip.  He has right hip flexion of 65 degrees limited by pain.  There is no pain with knee motion.  Calves are soft and nontender.  The skin is intact.  There is no lymphedema. [de-identified] : AP and lateral x-rays of the right hip taken today demonstrate severe osteoarthritis of the right hip.  There are no fractures or dislocations.

## 2022-04-18 NOTE — DISCUSSION/SUMMARY
[de-identified] : The patient has osteoarthritis of the right hip.  The patient has lumbar spondylosis and has had lumbar spine surgery.  I have discussed the pathology and natural history with him in detail.  He is not getting adequate relief from ibuprofen.  He is started on a course of nabumetone.  Medication risks have been reviewed.  He is advised to lose weight.  He is encouraged to exercise.  He is not interested in considering surgery at this time.  He will return as needed.

## 2022-04-18 NOTE — HISTORY OF PRESENT ILLNESS
[de-identified] : 66 year old male presents today with right hip and groin pain x 2 years, getting progressively worse. The pain is mainly in the groin. He has pain at night and has to keep a pillow between his legs to support his hip. The pain wakes him from sleep. He has difficulty lifting his leg to put on a pair of pants or socks. He does not have much pain while walking. He takes ibuprofen 800 mg for back pain which does help with his hip pain. He is s/p cervical fusion in 2020 and lumbar fusion in 2018. He had an xray of his lumbar spine last week and it was noted that he has severe osteoarthritis of the right hip.

## 2022-04-26 NOTE — DISCHARGE NOTE PROVIDER - NSDCHOSPICE_GEN_A_CORE
At baseline level of functioning  Mother present at visit today  Continues to work full time  Continue with supportive care as needed  No

## 2022-04-27 ENCOUNTER — TRANSCRIPTION ENCOUNTER (OUTPATIENT)
Age: 67
End: 2022-04-27

## 2022-04-27 RX ORDER — SUMATRIPTAN 20 MG/1
20 SPRAY NASAL
Qty: 1 | Refills: 1 | Status: ACTIVE | COMMUNITY
Start: 2022-04-27 | End: 1900-01-01

## 2022-05-23 ENCOUNTER — TRANSCRIPTION ENCOUNTER (OUTPATIENT)
Age: 67
End: 2022-05-23

## 2022-05-27 ENCOUNTER — EMERGENCY (EMERGENCY)
Facility: HOSPITAL | Age: 67
LOS: 1 days | Discharge: HOME | End: 2022-05-27
Attending: EMERGENCY MEDICINE
Payer: COMMERCIAL

## 2022-05-27 VITALS
DIASTOLIC BLOOD PRESSURE: 80 MMHG | RESPIRATION RATE: 18 BRPM | HEART RATE: 73 BPM | OXYGEN SATURATION: 99 % | TEMPERATURE: 98 F | SYSTOLIC BLOOD PRESSURE: 133 MMHG

## 2022-05-27 VITALS
TEMPERATURE: 98 F | DIASTOLIC BLOOD PRESSURE: 79 MMHG | HEART RATE: 86 BPM | OXYGEN SATURATION: 96 % | SYSTOLIC BLOOD PRESSURE: 135 MMHG | WEIGHT: 242.07 LBS | HEIGHT: 72 IN | RESPIRATION RATE: 18 BRPM

## 2022-05-27 DIAGNOSIS — M43.20 FUSION OF SPINE, SITE UNSPECIFIED: Chronic | ICD-10-CM

## 2022-05-27 DIAGNOSIS — Z98.89 OTHER SPECIFIED POSTPROCEDURAL STATES: Chronic | ICD-10-CM

## 2022-05-27 PROCEDURE — 96374 THER/PROPH/DIAG INJ IV PUSH: CPT

## 2022-05-27 PROCEDURE — 96375 TX/PRO/DX INJ NEW DRUG ADDON: CPT

## 2022-05-27 PROCEDURE — 99284 EMERGENCY DEPT VISIT MOD MDM: CPT

## 2022-05-27 PROCEDURE — 99284 EMERGENCY DEPT VISIT MOD MDM: CPT | Mod: 25

## 2022-05-27 RX ORDER — KETOROLAC TROMETHAMINE 30 MG/ML
15 SYRINGE (ML) INJECTION ONCE
Refills: 0 | Status: DISCONTINUED | OUTPATIENT
Start: 2022-05-27 | End: 2022-05-27

## 2022-05-27 RX ORDER — METOCLOPRAMIDE HCL 10 MG
10 TABLET ORAL ONCE
Refills: 0 | Status: COMPLETED | OUTPATIENT
Start: 2022-05-27 | End: 2022-05-27

## 2022-05-27 RX ORDER — DEXAMETHASONE 0.5 MG/5ML
6 ELIXIR ORAL ONCE
Refills: 0 | Status: COMPLETED | OUTPATIENT
Start: 2022-05-27 | End: 2022-05-27

## 2022-05-27 RX ADMIN — Medication 15 MILLIGRAM(S): at 03:00

## 2022-05-27 RX ADMIN — Medication 15 MILLIGRAM(S): at 02:07

## 2022-05-27 RX ADMIN — Medication 10 MILLIGRAM(S): at 01:25

## 2022-05-27 RX ADMIN — Medication 6 MILLIGRAM(S): at 02:07

## 2022-05-27 NOTE — ED PROVIDER NOTE - NSICDXFAMILYHX_GEN_ALL_CORE_FT
FAMILY HISTORY:  Family history of prostate cancer  Family history of stroke    Father  Still living? Unknown  Family history of stroke, Age at diagnosis: Age Unknown    Sibling  Still living? Yes, Estimated age: 61-70  Family history of breast cancer, Age at diagnosis: Age Unknown  Family history of oral cancer, Age at diagnosis: Age Unknown

## 2022-05-27 NOTE — ED ADULT TRIAGE NOTE - TEMPERATURE IN FAHRENHEIT (DEGREES F)
98.1 I have personally performed a face to face diagnostic evaluation on this patient. I have reviewed the PA note and agree with the history, exam, and plan of care, except as noted.

## 2022-05-27 NOTE — ED PROVIDER NOTE - CPE EDP PSYCH NORM
Samples Given: Ultravate Lotion Apply to AA BID PRN Plan: Discussed re-evaluating in 4-6 weeks if still persistent. Detail Level: Simple normal...

## 2022-05-27 NOTE — ED ADULT NURSE NOTE - NSIMPLEMENTINTERV_GEN_ALL_ED
Implemented All Universal Safety Interventions:  [place] to call system. Call bell, personal items and telephone within reach. Instruct patient to call for assistance. Room bathroom lighting operational. Non-slip footwear when patient is off stretcher. Physically safe environment: no spills, clutter or unnecessary equipment. Stretcher in lowest position, wheels locked, appropriate side rails in place.

## 2022-05-27 NOTE — ED PROVIDER NOTE - CLINICAL SUMMARY MEDICAL DECISION MAKING FREE TEXT BOX
Nate SEGURA PGY-2: 66 yr old M former heavy smoker, PMHx of HTN, hyperlipidemia, GERD, hx of COVID-19 in 2/2021, cluster headaches, presenting with right-sided headache as well as R eye tearing and pain x 1 day. Clinical picture most c/w recurrent cluster HA. No facial rash or vesicles TM/auditory canal to suggest [place] syndrome. No temporal ttp or visual disturbance to suggest GCA. Will give supplemental O2 and analgesia, and reassess.

## 2022-05-27 NOTE — ED ADULT NURSE NOTE - OBJECTIVE STATEMENT
66y M with PMHx of HTN, HLD, GERD presents to the ED for a headache x1 day. Pt reports he feels pain on the R side of head and R eye, no trauma related. Pt reports he continues to feel headaches despite pain medication. Pt denies cp, sob, dizziness, blurred vision, numbness, tingling, n/v/d, fever, chills. Upon assessment, pt is A&Ox3, breathing spontaneously, unlabored, speaking in full sentences. Pt is able to move all extremities without difficulty. Able to follow commands. Skin is warm, dry and intact. Neuro intact. Sensation intact. Pt safety and comfort provided.

## 2022-05-27 NOTE — ED ADULT NURSE NOTE - NS PRO PASSIVE SMOKE EXP
Yes... if observed, discontinue puree & resume NPO status/change of breathing pattern/oral hygiene/position upright (90Y)/cough/gurgly voice/fever/pneumonia/throat clearing/upper respiratory infection

## 2022-05-27 NOTE — ED PROVIDER NOTE - NSFOLLOWUPINSTRUCTIONS_ED_ALL_ED_FT
Acute Headache    WHAT YOU NEED TO KNOW:    An acute headache is pain or discomfort that starts suddenly and gets worse quickly. You may have an acute headache only when you feel stress or eat certain foods. Other acute headache pain can happen every day, and sometimes several times a day.    DISCHARGE INSTRUCTIONS:    Return to the emergency department if:    You have severe pain.    You have numbness or weakness on one side of your face or body.    You have a headache that occurs after a blow to the head, a fall, or other trauma.    You have a headache, are forgetful or confused, or have trouble speaking.    You have a headache, stiff neck, and a fever.  Contact your healthcare provider if:    You have a constant headache and are vomiting.    You have a headache each day that does not get better, even after treatment.    You have changes in your headaches, or new symptoms that occur when you have a headache.    You have questions or concerns about your condition or care.  Medicines: You may need any of the following:    Prescription pain medicine may be given. The medicine your healthcare provider recommends will depend on the kind of headaches you have. You will need to take prescription headache medicines as directed to prevent a problem called rebound headache. These headaches happen with regular use of pain relievers for headache disorders.    NSAIDs, such as ibuprofen, help decrease swelling, pain, and fever. This medicine is available with or without a doctor's order. NSAIDs can cause stomach bleeding or kidney problems in certain people. If you take blood thinner medicine, always ask your healthcare provider if NSAIDs are safe for you. Always read the medicine label and follow directions.    Acetaminophen decreases pain and fever. It is available without a doctor's order. Ask how much to take and how often to take it. Follow directions. Read the labels of all other medicines you are using to see if they also contain acetaminophen, or ask your doctor or pharmacist. Acetaminophen can cause liver damage if not taken correctly. Do not use more than 3 grams (3,000 milligrams) total of acetaminophen in one day.    Antidepressants may be given for some kinds of headaches.    Take your medicine as directed. Contact your healthcare provider if you think your medicine is not helping or if you have side effects. Tell him or her if you are allergic to any medicine. Keep a list of the medicines, vitamins, and herbs you take. Include the amounts, and when and why you take them. Bring the list or the pill bottles to follow-up visits. Carry your medicine list with you in case of an emergency.  Manage your symptoms:    Apply heat or ice on the headache area. Use a heat or ice pack. For an ice pack, you can also put crushed ice in a plastic bag. Cover the pack or bag with a towel before you apply it to your skin. Ice and heat both help decrease pain, and heat also helps decrease muscle spasms. Apply heat for 20 to 30 minutes every 2 hours. Apply ice for 15 to 20 minutes every hour. Apply heat or ice for as long and for as many days as directed. You may alternate heat and ice.    Relax your muscles. Lie down in a comfortable position and close your eyes. Relax your muscles slowly. Start at your toes and work your way up your body.    Keep a record of your headaches. Write down when your headaches start and stop. Include your symptoms and what you were doing when the headache began. Record what you ate or drank for 24 hours before the headache started. Describe the pain and where it hurts. Keep track of what you did to treat your headache and if it worked.  Prevent an acute headache:    Avoid anything that triggers an acute headache. Examples include exposure to chemicals, going to high altitude, or not getting enough sleep. Create a regular sleep routine. Go to sleep at the same time and wake up at the same time each day. Do not use electronic devices before bedtime. These may trigger a headache or prevent you from sleeping well.    Do not smoke. Nicotine and other chemicals in cigarettes and cigars can trigger an acute headache or make it worse. Ask your healthcare provider for information if you currently smoke and need help to quit. E-cigarettes or smokeless tobacco still contain nicotine. Talk to your healthcare provider before you use these products.    Limit alcohol as directed. Alcohol can trigger an acute headache or make it worse. If you have cluster headaches, do not drink alcohol during an episode. For other types of headaches, ask your healthcare provider if it is safe for you to drink alcohol. Ask how much is safe for you to drink, and how often.    Exercise as directed. Exercise can reduce tension and help with headache pain. Aim for 30 minutes of physical activity on most days of the week. Your healthcare provider can help you create an exercise plan.    Eat a variety of healthy foods. Healthy foods include fruits, vegetables, low-fat dairy products, lean meats, fish, whole grains, and cooked beans. Your healthcare provider or dietitian can help you create meals plans if you need to avoid foods that trigger headaches.  Follow up with your healthcare provider as directed: Bring your headache record with you when you see your healthcare provider. Write down your questions so you remember to ask them during your visits.

## 2022-05-27 NOTE — ED ADULT TRIAGE NOTE - CHIEF COMPLAINT QUOTE
burning, tingling in head; pain behind right eye since yesterday (Wednesday); feels like it is worsening; pt ambulatory, no symptoms in extremities; took ibuprofen, benadryl, tylenol at home; hx cluster ha

## 2022-05-27 NOTE — ED PROVIDER NOTE - OBJECTIVE STATEMENT
66 yr old M former heavy smoker, PMHx of HTN, hyperlipidemia, GERD, hx of COVID-19 in 2/2021 66 yr old M former heavy smoker, PMHx of HTN, hyperlipidemia, GERD, hx of COVID-19 in 2/2021, cluster headaches, presenting with right-sided headache as well as R eye tearing and pain x 1 day. Patient complaining of non-remitting symptoms all day today, took tylenol and ibuprofen today with continued symptoms. States presentation is typical of past cluster headaches. Denies any visual loss/disturbance or temporal tenderness. No ear pain. Endorses sharp pain behind his R eye. No weakness/numbness, nausea/vomiting, fever or chills.     NKDA

## 2022-05-27 NOTE — ED PROVIDER NOTE - ATTENDING CONTRIBUTION TO CARE
Attending MD Voss:  I personally have seen and examined this patient. I have performed a substantive portion of the visit including all aspects of the medical decision making.  Resident note reviewed and agree on plan of care and except where noted.        66 yr old M former heavy smoker, PMHx of HTN, hyperlipidemia, GERD, hx of COVID-19 in 2/2021, cluster headaches, presenting with right-sided headache as well as R eye tearing and pain x 1 day. Symptoms consistent with prior cluster headaches but more persistent. Neurologic examination wnl. Suspect recurrent cluster headache. Do not suspect alternative pathology based on longstanding history of symptom complex in past and normal neuro examination. Plan for supplemental O2, analgesia, reassess        *The above represents an initial assessment/impression. Please refer to progress notes for potential changes in patient clinical course*

## 2022-05-27 NOTE — ED PROVIDER NOTE - PROGRESS NOTE DETAILS
Nate SEGURA PGY-2: patient reassessed at bedside, symptoms improved. Will continue to give oxygen and reassess. Nate SEGURA PGY-2: Pt was re-evaluated at bedside, VSS, feeling well overall. Return precautions and follow up plan with PCP and/or specialist were discussed. Time was taken to answer any questions that the patient had before providing them with discharge paperwork. Nate SEGURA PGY-2: patient reassessed, headache completely resolved

## 2022-05-27 NOTE — ED PROVIDER NOTE - PATIENT PORTAL LINK FT
You can access the FollowMyHealth Patient Portal offered by [facility] by registering at the following website: [email or web address]. By joining [employer]’s FollowMyHealth portal, you will also be able to view your health information using other applications (apps) compatible with our system.

## 2022-06-17 ENCOUNTER — RX RENEWAL (OUTPATIENT)
Age: 67
End: 2022-06-17

## 2022-06-23 ENCOUNTER — TRANSCRIPTION ENCOUNTER (OUTPATIENT)
Age: 67
End: 2022-06-23

## 2022-06-23 DIAGNOSIS — J06.9 ACUTE UPPER RESPIRATORY INFECTION, UNSPECIFIED: ICD-10-CM

## 2022-06-23 RX ORDER — FLUTICASONE PROPIONATE 50 UG/1
50 SPRAY, METERED NASAL
Qty: 1 | Refills: 3 | Status: ACTIVE | COMMUNITY
Start: 2022-06-23 | End: 1900-01-01

## 2022-06-23 RX ORDER — ALBUTEROL SULFATE 90 UG/1
108 (90 BASE) INHALANT RESPIRATORY (INHALATION)
Qty: 1 | Refills: 5 | Status: ACTIVE | COMMUNITY
Start: 2022-06-23 | End: 1900-01-01

## 2022-07-07 ENCOUNTER — RX RENEWAL (OUTPATIENT)
Age: 67
End: 2022-07-07

## 2022-07-08 ENCOUNTER — NON-APPOINTMENT (OUTPATIENT)
Age: 67
End: 2022-07-08

## 2022-07-08 ENCOUNTER — TRANSCRIPTION ENCOUNTER (OUTPATIENT)
Age: 67
End: 2022-07-08

## 2022-07-08 DIAGNOSIS — R05.9 COUGH, UNSPECIFIED: ICD-10-CM

## 2022-07-09 ENCOUNTER — OUTPATIENT (OUTPATIENT)
Dept: OUTPATIENT SERVICES | Facility: HOSPITAL | Age: 67
LOS: 1 days | End: 2022-07-09
Payer: MEDICARE

## 2022-07-09 ENCOUNTER — APPOINTMENT (OUTPATIENT)
Dept: RADIOLOGY | Facility: IMAGING CENTER | Age: 67
End: 2022-07-09

## 2022-07-09 DIAGNOSIS — Z98.89 OTHER SPECIFIED POSTPROCEDURAL STATES: Chronic | ICD-10-CM

## 2022-07-09 DIAGNOSIS — M43.20 FUSION OF SPINE, SITE UNSPECIFIED: Chronic | ICD-10-CM

## 2022-07-09 DIAGNOSIS — R05.9 COUGH, UNSPECIFIED: ICD-10-CM

## 2022-07-09 PROCEDURE — 71046 X-RAY EXAM CHEST 2 VIEWS: CPT

## 2022-07-09 PROCEDURE — 71046 X-RAY EXAM CHEST 2 VIEWS: CPT | Mod: 26

## 2022-07-14 ENCOUNTER — TRANSCRIPTION ENCOUNTER (OUTPATIENT)
Age: 67
End: 2022-07-14

## 2022-08-08 ENCOUNTER — RX RENEWAL (OUTPATIENT)
Age: 67
End: 2022-08-08

## 2022-08-08 RX ORDER — NABUMETONE 750 MG/1
750 TABLET, FILM COATED ORAL
Qty: 60 | Refills: 0 | Status: ACTIVE | COMMUNITY
Start: 2022-04-18 | End: 1900-01-01

## 2022-08-22 NOTE — PROGRESS NOTE ADULT - PROBLEM SELECTOR PROBLEM 7
Detail Level: Detailed Depth Of Biopsy: dermis Was A Bandage Applied: Yes Size Of Lesion In Cm: 0 Biopsy Type: H and E Biopsy Method: Personna blade Anesthesia Type: 1% lidocaine with epinephrine Anesthesia Volume In Cc (Will Not Render If 0): 1 Hemostasis: Electrocautery Wound Care: Petrolatum Dressing: bandage Type Of Destruction Used: Curettage Curettage Text: The wound bed was treated with curettage and cautery after the biopsy was performed. Cryotherapy Text: The wound bed was treated with cryotherapy after the biopsy was performed. Electrodesiccation Text: The wound bed was treated with electrodesiccation after the biopsy was performed. Electrodesiccation And Curettage Text: The wound bed was treated with electrodesiccation and curettage after the biopsy was performed. Prophylactic measure Silver Nitrate Text: The wound bed was treated with silver nitrate after the biopsy was performed. Lab: 6 Render Path Notes In Note?: No Consent: Written consent was obtained and risks were reviewed including but not limited to scarring, infection, bleeding, scabbing, incomplete removal, nerve damage and allergy to anesthesia. Post-Care Instructions: I reviewed with the patient in detail post-care instructions. Patient is to keep the biopsy site dry overnight, and then apply bacitracin twice daily until healed. Notification Instructions: Patient will be notified of biopsy results. However, patient instructed to call the office if not contacted within 2 weeks. Billing Type: Third-Party Bill Information: Selecting Yes will display possible errors in your note based on the variables you have selected. This validation is only offered as a suggestion for you. PLEASE NOTE THAT THE VALIDATION TEXT WILL BE REMOVED WHEN YOU FINALIZE YOUR NOTE. IF YOU WANT TO FAX A PRELIMINARY NOTE YOU WILL NEED TO TOGGLE THIS TO 'NO' IF YOU DO NOT WANT IT IN YOUR FAXED NOTE.

## 2022-09-15 ENCOUNTER — OUTPATIENT (OUTPATIENT)
Dept: OUTPATIENT SERVICES | Facility: HOSPITAL | Age: 67
LOS: 1 days | End: 2022-09-15
Payer: MEDICARE

## 2022-09-15 ENCOUNTER — APPOINTMENT (OUTPATIENT)
Dept: RADIOLOGY | Facility: IMAGING CENTER | Age: 67
End: 2022-09-15

## 2022-09-15 DIAGNOSIS — R05.9 COUGH, UNSPECIFIED: ICD-10-CM

## 2022-09-15 DIAGNOSIS — Z98.89 OTHER SPECIFIED POSTPROCEDURAL STATES: Chronic | ICD-10-CM

## 2022-09-15 DIAGNOSIS — M43.20 FUSION OF SPINE, SITE UNSPECIFIED: Chronic | ICD-10-CM

## 2022-09-15 PROCEDURE — 71045 X-RAY EXAM CHEST 1 VIEW: CPT | Mod: 26

## 2022-09-15 PROCEDURE — 71045 X-RAY EXAM CHEST 1 VIEW: CPT

## 2022-10-12 ENCOUNTER — APPOINTMENT (OUTPATIENT)
Dept: INTERNAL MEDICINE | Facility: CLINIC | Age: 67
End: 2022-10-12

## 2022-10-19 ENCOUNTER — APPOINTMENT (OUTPATIENT)
Dept: INTERNAL MEDICINE | Facility: CLINIC | Age: 67
End: 2022-10-19

## 2022-10-19 ENCOUNTER — RX RENEWAL (OUTPATIENT)
Age: 67
End: 2022-10-19

## 2022-10-19 VITALS
HEIGHT: 72 IN | SYSTOLIC BLOOD PRESSURE: 134 MMHG | BODY MASS INDEX: 33.86 KG/M2 | DIASTOLIC BLOOD PRESSURE: 84 MMHG | OXYGEN SATURATION: 96 % | HEART RATE: 99 BPM | TEMPERATURE: 97.7 F | WEIGHT: 250 LBS

## 2022-10-19 PROCEDURE — 99214 OFFICE O/P EST MOD 30 MIN: CPT

## 2022-10-21 NOTE — PHYSICAL EXAM
[No Acute Distress] : no acute distress [Well Nourished] : well nourished [Well Developed] : well developed [Well-Appearing] : well-appearing [Normal Voice/Communication] : normal voice/communication [Normal] : no acute distress, well nourished, well developed and well-appearing [Normal Sclera/Conjunctiva] : normal sclera/conjunctiva [No JVD] : no jugular venous distention [No Lymphadenopathy] : no lymphadenopathy [No Respiratory Distress] : no respiratory distress  [No Accessory Muscle Use] : no accessory muscle use [Clear to Auscultation] : lungs were clear to auscultation bilaterally [Normal Rate] : normal rate  [Regular Rhythm] : with a regular rhythm [Normal S1, S2] : normal S1 and S2 [No Murmur] : no murmur heard [No Carotid Bruits] : no carotid bruits [No Abdominal Bruit] : a ~M bruit was not heard ~T in the abdomen [Pedal Pulses Present] : the pedal pulses are present [No Edema] : there was no peripheral edema [No Palpable Aorta] : no palpable aorta [No Extremity Clubbing/Cyanosis] : no extremity clubbing/cyanosis [Soft] : abdomen soft [Non Tender] : non-tender [Non-distended] : non-distended [No Masses] : no abdominal mass palpated [No HSM] : no HSM [Normal Bowel Sounds] : normal bowel sounds [Normal Supraclavicular Nodes] : no supraclavicular lymphadenopathy [Normal Anterior Cervical Nodes] : no anterior cervical lymphadenopathy [No CVA Tenderness] : no CVA  tenderness [No Spinal Tenderness] : no spinal tenderness [No Joint Swelling] : no joint swelling [Grossly Normal Strength/Tone] : grossly normal strength/tone [No Rash] : no rash [Coordination Grossly Intact] : coordination grossly intact [No Focal Deficits] : no focal deficits [Normal Gait] : normal gait [Speech Grossly Normal] : speech grossly normal [Normal Affect] : the affect was normal [Alert and Oriented x3] : oriented to person, place, and time [Normal Mood] : the mood was normal [Normal Insight/Judgement] : insight and judgment were intact

## 2022-10-23 NOTE — ASSESSMENT
[FreeTextEntry1] : 67-year-old male with hypertension, hyperlipidemia, and GERD here for follow-up. Has h/o chronic low back pain and had been seeing pain service. S/p back surgery. some relief but pain is still present. Not taking gabapentin any more. Still out of work. Lost some weight.  Feels fatigued. has nocturia   Bone spur pain.  Of Note cardiac w/u before surgery with t-wave flattening and mildly reversible perfusion defect.  Patient was without symptoms and no further therapy or other intervention needed.  Last surgery left him with significant lower extremity and upper extremity deficits that are slowly resolving.  8 months ago had COVID and continues to have fatigue.  In the process of divorce. Nocturia/ polyuria.  Started on alfuzosin and then finasteride last visit. no change in symptoms but he d/gee alfuzosin.  Has had some improvement in dyspnea on exertion, no wheezes.  Using Ventolin inhaler. NO chest pain or pressure. Had cluster headache attack characterized right sided headed, eye tearing and congestion. \par \par Problem # 1 Depression/Anxiety: Doing better on Cymbalta.  \par Problem # 2 GERD: continue omeprazole. \par Problem # 3 Gout: resolved. on colchicine as needed\par Problem # 4 Back pain/hip pain: on NSAIDs as needed.   Sees pain management and has received nerve blocks.  Refer to orthopedics.  Start gabapentin\par Problem # 5 HTN: Controlled today on meds. \par Problem # 6 hyperlipidemia:   on Lipitor. \par Problem # 7 BPH:  stopped  Alfuzosin. when Finasteride started.  will restart\par Problem # 8 CAD: better.  ON ASA.  Risk factor management.  Deconditioned.  Advised not to take Ventolin for exertional dyspnea. \par Problem # 9 s/p COVID:  on inhalers for dyspnea.  improving. \par Problem # 10 Anemia:  Seen by hematology.  W/u including SPEP, flow cytometry, BCR/ABL unremarkable.  No evidence of CML, All or myeloma. \par Problem # 11 headache:  c/w cluster headache.  Treated with cold compresses and NSAIDs.\par Problem # 12 Health care maintenance: had Flu shot. Had COVID vaccine s/p COVID.\par \par

## 2022-10-23 NOTE — HISTORY OF PRESENT ILLNESS
[de-identified] : 67-year-old male with hypertension, hyperlipidemia, and GERD here for follow-up. Has h/o chronic low back pain and had been seeing pain service. S/p back surgery. some relief but pain is still present. Not taking gabapentin any more. Still out of work. Lost some weight.  Feels fatigued. has nocturia   Bone spur pain.  Of Note cardiac w/u before surgery with t-wave flattening and mildly reversible perfusion defect.  Patient was without symptoms and no further therapy or other intervention needed.  Last surgery left him with significant lower extremity and upper extremity deficits that are slowly resolving.  8 months ago had COVID and continues to have fatigue.  In the process of divorce. Nocturia/ polyuria.  Started on alfuzosin and then finasteride last visit. no change in symptoms but he d/gee alfuzosin.  Has had some improvement in dyspnea on exertion, no wheezes.  Using Ventolin inhaler. NO chest pain or pressure. Had cluster headache attack characterized right sided headed, eye tearing and congestion.   Has hip pain

## 2022-10-28 LAB
25(OH)D3 SERPL-MCNC: 29.3 NG/ML
ALBUMIN SERPL ELPH-MCNC: 4.8 G/DL
ALP BLD-CCNC: 158 U/L
ALT SERPL-CCNC: 35 U/L
ANION GAP SERPL CALC-SCNC: 16 MMOL/L
AST SERPL-CCNC: 27 U/L
BASOPHILS # BLD AUTO: 0.07 K/UL
BASOPHILS NFR BLD AUTO: 0.5 %
BILIRUB SERPL-MCNC: 0.4 MG/DL
BUN SERPL-MCNC: 21 MG/DL
CALCIUM SERPL-MCNC: 9.9 MG/DL
CHLORIDE SERPL-SCNC: 107 MMOL/L
CO2 SERPL-SCNC: 24 MMOL/L
CREAT SERPL-MCNC: 1.2 MG/DL
EGFR: 67 ML/MIN/1.73M2
EOSINOPHIL # BLD AUTO: 0.28 K/UL
EOSINOPHIL NFR BLD AUTO: 1.9 %
ESTIMATED AVERAGE GLUCOSE: 126 MG/DL
GLUCOSE SERPL-MCNC: 118 MG/DL
HBA1C MFR BLD HPLC: 6 %
HCT VFR BLD CALC: 41.8 %
HGB BLD-MCNC: 14.4 G/DL
IMM GRANULOCYTES NFR BLD AUTO: 0.2 %
LYMPHOCYTES # BLD AUTO: 3.74 K/UL
LYMPHOCYTES NFR BLD AUTO: 25.7 %
MAN DIFF?: NORMAL
MCHC RBC-ENTMCNC: 31.4 PG
MCHC RBC-ENTMCNC: 34.4 GM/DL
MCV RBC AUTO: 91.3 FL
MONOCYTES # BLD AUTO: 1.21 K/UL
MONOCYTES NFR BLD AUTO: 8.3 %
NEUTROPHILS # BLD AUTO: 9.2 K/UL
NEUTROPHILS NFR BLD AUTO: 63.4 %
PLATELET # BLD AUTO: 274 K/UL
POTASSIUM SERPL-SCNC: 4.4 MMOL/L
PROT SERPL-MCNC: 7 G/DL
RBC # BLD: 4.58 M/UL
RBC # FLD: 14.1 %
SODIUM SERPL-SCNC: 146 MMOL/L
WBC # FLD AUTO: 14.53 K/UL

## 2022-11-04 ENCOUNTER — RX RENEWAL (OUTPATIENT)
Age: 67
End: 2022-11-04

## 2022-11-14 NOTE — PROGRESS NOTE ADULT - SUBJECTIVE AND OBJECTIVE BOX
appt 11/16/22   Patient is a 65y old  Male who presents with a chief complaint of SOB, fever, cough, body aches (24 Feb 2021 17:31)    pt seen in icu [  ], reg med floor [  x ], bed [x  ], chair at bedside [   ], a+o x3 [x ], lethargic [  ],    nad [x  ]    pt desat overnight and started on nrbm      Allergies    No Known Allergies  Shrimp- Nasal congestion (Other)        Vitals    T(F): 98.5 (02-25-21 @ 04:45), Max: 98.5 (02-25-21 @ 04:45)  HR: 70 (02-25-21 @ 04:45) (67 - 72)  BP: 125/70 (02-25-21 @ 04:45) (99/64 - 125/70)  RR: 20 (02-25-21 @ 04:45) (20 - 26)  SpO2: 92% (02-25-21 @ 04:45) (91% - 94%)  Wt(kg): --  CAPILLARY BLOOD GLUCOSE          Labs                          13.5   6.77  )-----------( 149      ( 24 Feb 2021 08:21 )             40.4       02-24    136  |  103  |  30<H>  ----------------------------<  123<H>  4.6   |  26  |  1.15    Ca    9.0      24 Feb 2021 08:21  Phos  3.6     02-24  Mg     2.2     02-24    TPro  6.4  /  Alb  3.1<L>  /  TBili  0.6  /  DBili  x   /  AST  56<H>  /  ALT  45  /  AlkPhos  96  02-24            .Blood Blood-Peripheral  02-21 @ 22:03   No growth to date.  --  --      .Blood Blood-Peripheral  02-21 @ 22:00   No growth to date.  --  --          Radiology Results      Meds    MEDICATIONS  (STANDING):  ascorbic acid 1000 milliGRAM(s) Oral daily  atorvastatin 80 milliGRAM(s) Oral at bedtime  cholecalciferol 2000 Unit(s) Oral daily  dexAMETHasone  Injectable 6 milliGRAM(s) IV Push daily  diltiazem    milliGRAM(s) Oral daily  enoxaparin Injectable 40 milliGRAM(s) SubCutaneous daily  hydrochlorothiazide 50 milliGRAM(s) Oral daily  montelukast 10 milliGRAM(s) Oral at bedtime  pantoprazole    Tablet 40 milliGRAM(s) Oral before breakfast  remdesivir  IVPB   IV Intermittent   remdesivir  IVPB 100 milliGRAM(s) IV Intermittent every 24 hours  zinc sulfate 220 milliGRAM(s) Oral daily      MEDICATIONS  (PRN):  acetaminophen   Tablet .. 650 milliGRAM(s) Oral every 6 hours PRN Temp greater or equal to 38C (100.4F), Moderate Pain (4 - 6)  guaiFENesin   Syrup  (Sugar-Free) 200 milliGRAM(s) Oral every 6 hours PRN Cough  ondansetron Injectable 4 milliGRAM(s) IV Push every 6 hours PRN Nausea and/or Vomiting      Physical Exam    Neuro :  no focal deficits  Respiratory: CTA B/L  CV: RRR, S1S2, no murmurs,   Abdominal: Soft, NT, ND +BS,  Extremities: No edema, + peripheral pulses    ASSESSMENT    pneumonia 2nd to covid 19,   marya,   hypokalemia,   h/o Cluster headache  BPH (benign prostatic hypertrophy)  Spinal stenosis s/p laminectomy  GERD (gastroesophageal reflux disease)  Hyperlipidemia  HTN (hypertension)            PLAN      contact and airborne isolation  cont remdesevir until 2/26/21  covid ab neg  cont dexamethasone  pepcid, singulair, vit c, vit d, zinc   pulm f/u  cont albuterol inhaler   procalcitonin neg noted  ,   D-dimer, crp, ldh, ferritin, lactate noted ,   afebrile  cont tylenol prn,   robitussin prn   blood cx neg  O2 sat (80% - 96%) nrbm 15 L  cont O2 via nrbm and taper as tolerated    icu cons noted  continue to monitor pulse oximetry  monitor biomarkers TIW  will not accept to icu at this time  cont incentive spirometer   serum creat wnl   supplement potassium as needed for hypokalemia  cont current meds       Patient is a 65y old  Male who presents with a chief complaint of SOB, fever, cough, body aches (24 Feb 2021 17:31)    pt seen in icu [  ], reg med floor [  x ], bed [x  ], chair at bedside [   ], a+o x3 [x ], lethargic [  ],    nad [x  ]      Allergies    No Known Allergies  Shrimp- Nasal congestion (Other)        Vitals    T(F): 98.5 (02-25-21 @ 04:45), Max: 98.5 (02-25-21 @ 04:45)  HR: 70 (02-25-21 @ 04:45) (67 - 72)  BP: 125/70 (02-25-21 @ 04:45) (99/64 - 125/70)  RR: 20 (02-25-21 @ 04:45) (20 - 26)  SpO2: 92% (02-25-21 @ 04:45) (91% - 94%)  Wt(kg): --  CAPILLARY BLOOD GLUCOSE          Labs                          13.5   6.77  )-----------( 149      ( 24 Feb 2021 08:21 )             40.4       02-24    136  |  103  |  30<H>  ----------------------------<  123<H>  4.6   |  26  |  1.15    Ca    9.0      24 Feb 2021 08:21  Phos  3.6     02-24  Mg     2.2     02-24    TPro  6.4  /  Alb  3.1<L>  /  TBili  0.6  /  DBili  x   /  AST  56<H>  /  ALT  45  /  AlkPhos  96  02-24            .Blood Blood-Peripheral  02-21 @ 22:03   No growth to date.  --  --      .Blood Blood-Peripheral  02-21 @ 22:00   No growth to date.  --  --          Radiology Results      Meds    MEDICATIONS  (STANDING):  ascorbic acid 1000 milliGRAM(s) Oral daily  atorvastatin 80 milliGRAM(s) Oral at bedtime  cholecalciferol 2000 Unit(s) Oral daily  dexAMETHasone  Injectable 6 milliGRAM(s) IV Push daily  diltiazem    milliGRAM(s) Oral daily  enoxaparin Injectable 40 milliGRAM(s) SubCutaneous daily  hydrochlorothiazide 50 milliGRAM(s) Oral daily  montelukast 10 milliGRAM(s) Oral at bedtime  pantoprazole    Tablet 40 milliGRAM(s) Oral before breakfast  remdesivir  IVPB   IV Intermittent   remdesivir  IVPB 100 milliGRAM(s) IV Intermittent every 24 hours  zinc sulfate 220 milliGRAM(s) Oral daily      MEDICATIONS  (PRN):  acetaminophen   Tablet .. 650 milliGRAM(s) Oral every 6 hours PRN Temp greater or equal to 38C (100.4F), Moderate Pain (4 - 6)  guaiFENesin   Syrup  (Sugar-Free) 200 milliGRAM(s) Oral every 6 hours PRN Cough  ondansetron Injectable 4 milliGRAM(s) IV Push every 6 hours PRN Nausea and/or Vomiting      Physical Exam    Neuro :  no focal deficits  Respiratory: CTA B/L  CV: RRR, S1S2, no murmurs,   Abdominal: Soft, NT, ND +BS,  Extremities: No edema, + peripheral pulses    ASSESSMENT    pneumonia 2nd to covid 19,   marya,   hypokalemia,   h/o Cluster headache  BPH (benign prostatic hypertrophy)  Spinal stenosis s/p laminectomy  GERD (gastroesophageal reflux disease)  Hyperlipidemia  HTN (hypertension)            PLAN      contact and airborne isolation  cont remdesevir until 2/26/21  covid ab neg  cont dexamethasone  pepcid, singulair, vit c, vit d, zinc   pulm f/u  cont albuterol inhaler   procalcitonin neg noted  ,   D-dimer, crp, ldh, ferritin, lactate noted ,   afebrile  cont tylenol prn,   robitussin prn   blood cx neg  O2 sat (91% - 94%) nrbm 15 L  cont O2 via nrbm and taper as tolerated    pt low threshold for intubation and icu montitoring  icu cons noted  continue to monitor pulse oximetry  monitor biomarkers TIW  will not accept to icu at this time  cont incentive spirometer   serum creat wnl   cont current meds

## 2022-12-12 ENCOUNTER — RX RENEWAL (OUTPATIENT)
Age: 67
End: 2022-12-12

## 2022-12-23 NOTE — ED ADULT TRIAGE NOTE - HEIGHT IN INCHES
0 Propranolol Counseling:  I discussed with the patient the risks of propranolol including but not limited to low heart rate, low blood pressure, low blood sugar, restlessness and increased cold sensitivity. They should call the office if they experience any of these side effects.

## 2023-01-01 NOTE — PROGRESS NOTE ADULT - ASSESSMENT
1. PNA 2nd to Covid-19  - PCR negative 3/16.   - CXR noted with  increasing infiltrates.   - CT abd/pelvis noted 3/12  - Continue Vit C, Vit D, Zinc   - Off Dexamethasone, Off Remdesivir  - Continue Singulair and Pepcid   - ICU consult noted - no escalation at present.   - Robitussin PRN for cough  - Tylenol PRN for temp   - O2 Supp - Taper O2 as tolerated  - Monitor O2 Sat at rest and after exertion.  - Monitor labs  - F.U CXR  - Unable to tolerate prone positioning due to prior neck surgery.   - Encouraged side lying periodically as tolerated.   - Incentive Spirometry  - Isolation precautions   - Nasal saline spray for dryness    - DVT and GI PPX     2. HTN  - Continue meds   - Monitor BP     3. HLD  - Cont meds  - Lipid panel     4. GERD  - Antireflux diet   - Cont meds    5. MECHELLE  Improved  Avoid nephrotoxic drugs  monitor BMP    6. Renal Hyperdensity - Right  CT abdomen/pelvis noted.   US to further eval.  Statement Selected

## 2023-01-17 ENCOUNTER — RX RENEWAL (OUTPATIENT)
Age: 68
End: 2023-01-17

## 2023-01-20 ENCOUNTER — RX RENEWAL (OUTPATIENT)
Age: 68
End: 2023-01-20

## 2023-01-25 ENCOUNTER — APPOINTMENT (OUTPATIENT)
Dept: INTERNAL MEDICINE | Facility: CLINIC | Age: 68
End: 2023-01-25
Payer: MEDICARE

## 2023-01-25 VITALS
TEMPERATURE: 98.2 F | OXYGEN SATURATION: 98 % | WEIGHT: 250 LBS | SYSTOLIC BLOOD PRESSURE: 139 MMHG | BODY MASS INDEX: 33.86 KG/M2 | DIASTOLIC BLOOD PRESSURE: 84 MMHG | HEART RATE: 97 BPM | HEIGHT: 72 IN

## 2023-01-25 DIAGNOSIS — R35.1 NOCTURIA: ICD-10-CM

## 2023-01-25 DIAGNOSIS — Z80.0 FAMILY HISTORY OF MALIGNANT NEOPLASM OF DIGESTIVE ORGANS: ICD-10-CM

## 2023-01-25 PROCEDURE — G0439: CPT

## 2023-01-25 RX ORDER — ALFUZOSIN HYDROCHLORIDE 10 MG/1
10 TABLET, EXTENDED RELEASE ORAL DAILY
Qty: 90 | Refills: 3 | Status: DISCONTINUED | COMMUNITY
Start: 2021-11-10 | End: 2023-01-25

## 2023-01-25 RX ORDER — AZITHROMYCIN 250 MG/1
250 TABLET, FILM COATED ORAL
Qty: 1 | Refills: 0 | Status: DISCONTINUED | COMMUNITY
Start: 2022-07-14 | End: 2023-01-25

## 2023-01-25 RX ORDER — ALPRAZOLAM 0.25 MG/1
0.25 TABLET ORAL
Qty: 10 | Refills: 0 | Status: DISCONTINUED | COMMUNITY
Start: 2021-08-04 | End: 2023-01-25

## 2023-01-25 RX ORDER — BENZONATATE 100 MG/1
100 CAPSULE ORAL
Qty: 15 | Refills: 0 | Status: DISCONTINUED | COMMUNITY
Start: 2022-06-23 | End: 2023-01-25

## 2023-01-25 RX ORDER — COLCHICINE 0.6 MG/1
0.6 TABLET ORAL
Qty: 30 | Refills: 0 | Status: DISCONTINUED | COMMUNITY
Start: 2021-04-01 | End: 2023-01-25

## 2023-01-25 RX ORDER — FINASTERIDE 5 MG/1
5 TABLET, FILM COATED ORAL
Qty: 30 | Refills: 5 | Status: DISCONTINUED | COMMUNITY
Start: 2022-01-12 | End: 2023-01-25

## 2023-01-25 RX ORDER — BENZONATATE 100 MG/1
100 CAPSULE ORAL
Qty: 1 | Refills: 0 | Status: DISCONTINUED | COMMUNITY
Start: 2022-07-08 | End: 2023-01-25

## 2023-01-25 RX ORDER — GABAPENTIN 300 MG/1
300 CAPSULE ORAL
Qty: 90 | Refills: 3 | Status: DISCONTINUED | COMMUNITY
Start: 2022-10-19 | End: 2023-01-25

## 2023-01-25 RX ORDER — FINASTERIDE 5 MG/1
5 TABLET, FILM COATED ORAL
Qty: 30 | Refills: 5 | Status: DISCONTINUED | COMMUNITY
Start: 2023-01-20 | End: 2023-01-25

## 2023-01-25 RX ORDER — PREDNISONE 50 MG/1
50 TABLET ORAL
Qty: 5 | Refills: 0 | Status: DISCONTINUED | COMMUNITY
Start: 2022-07-14 | End: 2023-01-25

## 2023-01-25 NOTE — HEALTH RISK ASSESSMENT
[No] : In the past 12 months have you used drugs other than those required for medical reasons? No [No falls in past year] : Patient reported no falls in the past year [2] : 2) Feeling down, depressed, or hopeless for more than half of the days (2) [PHQ-2 Positive] : PHQ-2 Positive [Several Days (1)] : 7.) Trouble concentrating on things, such as reading a newspaper or watching television? Several days [Not at All (0)] : 8.) Moving or speaking so slowly that other people could have noticed, or the opposite, moving or speaking faster than usual? Not at all [Mild] : severity of depression is mild [Somewhat Difficult] : How difficult have these problems made it for you to do your work, take care of things at home, or get along with people? Somewhat difficult [I have developed a follow-up plan documented below in the note.] : I have developed a follow-up plan documented below in the note. [No Retinopathy] : No retinopathy [Patient reported colonoscopy was normal] : Patient reported colonoscopy was normal [None] : None [Alone] : lives alone [On disability] : on disability [] :  [Feels Safe at Home] : Feels safe at home [Fully functional (bathing, dressing, toileting, transferring, walking, feeding)] : Fully functional (bathing, dressing, toileting, transferring, walking, feeding) [Fully functional (using the telephone, shopping, preparing meals, housekeeping, doing laundry, using] : Fully functional and needs no help or supervision to perform IADLs (using the telephone, shopping, preparing meals, housekeeping, doing laundry, using transportation, managing medications and managing finances) [Reports normal functional visual acuity (ie: able to read med bottle)] : Reports normal functional visual acuity [Good] : ~his/her~  mood as  good [Former] : Former [5-9] : 5-9 [College] : College [Audit-CScore] : 0 [de-identified] : physical therapy [de-identified] : low fat [[facility]] : 5 [SFV2Iabre] : 4 [[id number]] : 7 [LowDoseCTScan] : NA [Change in mental status noted] : No change in mental status noted [Language] : denies difficulty with language [Behavior] : denies difficulty with behavior [Learning/Retaining New Information] : denies difficulty learning/retaining new information [Handling Complex Tasks] : denies difficulty handling complex tasks [Reasoning] : denies difficulty with reasoning [Spatial Ability and Orientation] : denies difficulty with spatial ability and orientation [Sexually Active] : not sexually active [High Risk Behavior] : no high risk behavior [Reports changes in hearing] : Reports no changes in hearing [Reports changes in vision] : Reports no changes in vision [Reports changes in dental health] : Reports no changes in dental health [PapSmearDate] : NA [MammogramDate] : NA [ColonoscopyDate] : 10/2011

## 2023-01-25 NOTE — HISTORY OF PRESENT ILLNESS
[de-identified] : 67-year-old male with hypertension, hyperlipidemia, and GERD here for CPE. Has h/o chronic low back pain and had been seeing pain service. S/p back surgery. some relief but pain is still present. Not taking gabapentin any more. Still out of work. Lost some weight.  Feels fatigued. has nocturia   Bone spur pain.  Of Note cardiac w/u before surgery with t-wave flattening and mildly reversible perfusion defect.  Patient was with out symptoms and no further therapy of other intervention needed.  Last surgery left him with significant lower extremity and upper extremity deficits that are slowly resolving.  Last year had COVID. Fatigue improving.  Wife left him and they are . Nocturia/ polyuria. Stopped tamsulosin and finasteride because it didn't’t work. . \par

## 2023-01-25 NOTE — ASSESSMENT
[FreeTextEntry1] : 67-year-old male with hypertension, hyperlipidemia, and GERD here for CPE. Has h/o chronic low back pain and had been seeing pain service. S/p back surgery. some relief but pain is still present. Not taking gabapentin any more. Still out of work. Lost some weight.  Feels fatigued. has nocturia   Bone spur pain.  Of Note cardiac w/u before surgery with t-wave flattening and mildly reversible perfusion defect.  Patient was with out symptoms and no further therapy of other intervention needed.  Last surgery left him with significant lower extremity and upper extremity deficits that are slowly resolving.  Last year had COVID. Fatigue improving.  Wife left him and they are . Nocturia/ polyuria. Stopped tamsulosin and finasteride because it didn't’t work. \par \par Problem # 1 Depression/Anxiety: Doing better on Cymbalta.  \par Problem # 2 GERD: continue omeprazole. \par Problem # 3 Gout: resolved. on Allopurinol\par Problem # 4 Back pain: on NSAIDs as needed.   Sees pain management and has received nerve blocks\par Problem # 5 HTN: Controlled today on meds. \par Problem # 6 hyperlipidemia: improved on Lipitor. \par Problem # 7 BPH: Had bee on tamsulosin.  Start Alfuzosin. \par Problem # 8 CAD: stable.  ON ASA.  Risk factor management. \par Problem # 9 s/p COVID:  no longer on inhalers for dyspnea.  improving. \par Problem # 10 Health care maintenance: Flu shot this fall. Had COVID vaccine s/p COVID. Needs eye exam. Check labs. \par \par

## 2023-02-01 LAB
25(OH)D3 SERPL-MCNC: 29.8 NG/ML
ALBUMIN SERPL ELPH-MCNC: 4.9 G/DL
ALP BLD-CCNC: 134 U/L
ALT SERPL-CCNC: 30 U/L
ANION GAP SERPL CALC-SCNC: 21 MMOL/L
AST SERPL-CCNC: 26 U/L
BASOPHILS # BLD AUTO: 0.08 K/UL
BASOPHILS NFR BLD AUTO: 0.5 %
BILIRUB SERPL-MCNC: 0.3 MG/DL
BUN SERPL-MCNC: 28 MG/DL
CALCIUM SERPL-MCNC: 10 MG/DL
CHLORIDE SERPL-SCNC: 103 MMOL/L
CHOLEST SERPL-MCNC: 177 MG/DL
CO2 SERPL-SCNC: 20 MMOL/L
CREAT SERPL-MCNC: 1.37 MG/DL
EGFR: 57 ML/MIN/1.73M2
EOSINOPHIL # BLD AUTO: 0.38 K/UL
EOSINOPHIL NFR BLD AUTO: 2.6 %
ESTIMATED AVERAGE GLUCOSE: 131 MG/DL
GLUCOSE SERPL-MCNC: 89 MG/DL
HBA1C MFR BLD HPLC: 6.2 %
HCT VFR BLD CALC: 45.3 %
HDLC SERPL-MCNC: 34 MG/DL
HGB BLD-MCNC: 15.1 G/DL
IMM GRANULOCYTES NFR BLD AUTO: 0.5 %
LDLC SERPL CALC-MCNC: NORMAL MG/DL
LYMPHOCYTES # BLD AUTO: 4.37 K/UL
LYMPHOCYTES NFR BLD AUTO: 29.9 %
MAN DIFF?: NORMAL
MCHC RBC-ENTMCNC: 30.8 PG
MCHC RBC-ENTMCNC: 33.3 GM/DL
MCV RBC AUTO: 92.4 FL
MONOCYTES # BLD AUTO: 1.3 K/UL
MONOCYTES NFR BLD AUTO: 8.9 %
NEUTROPHILS # BLD AUTO: 8.42 K/UL
NEUTROPHILS NFR BLD AUTO: 57.6 %
NONHDLC SERPL-MCNC: 143 MG/DL
PLATELET # BLD AUTO: 272 K/UL
POTASSIUM SERPL-SCNC: 4.1 MMOL/L
PROT SERPL-MCNC: 6.9 G/DL
PSA SERPL-MCNC: 0.76 NG/ML
RBC # BLD: 4.9 M/UL
RBC # FLD: 14.6 %
SODIUM SERPL-SCNC: 144 MMOL/L
TRIGL SERPL-MCNC: 445 MG/DL
TSH SERPL-ACNC: 2.34 UIU/ML
WBC # FLD AUTO: 14.62 K/UL

## 2023-02-23 ENCOUNTER — RX RENEWAL (OUTPATIENT)
Age: 68
End: 2023-02-23

## 2023-04-03 ENCOUNTER — APPOINTMENT (OUTPATIENT)
Dept: UROLOGY | Facility: CLINIC | Age: 68
End: 2023-04-03
Payer: MEDICARE

## 2023-04-03 VITALS
BODY MASS INDEX: 33.18 KG/M2 | DIASTOLIC BLOOD PRESSURE: 92 MMHG | WEIGHT: 245 LBS | SYSTOLIC BLOOD PRESSURE: 147 MMHG | HEIGHT: 72 IN | HEART RATE: 74 BPM

## 2023-04-03 PROCEDURE — 99204 OFFICE O/P NEW MOD 45 MIN: CPT

## 2023-04-03 NOTE — PRE PROCEDURE NOTE - PROCEDURE SERVICE
"Called pt about cx appt. Got the phone service "unavailable" unable to leave VM. Will use pt portal to establish contact.    Angle Bailey, PT, DPT        "
Endoscopy

## 2023-04-09 NOTE — LETTER BODY
[FreeTextEntry1] : Claude Rizzo MD, MPH\par [address], [address]\[place] [address]\par [phone]\par \par Dear Dr. Rizzo, \par \par REASON FOR VISIT: BPH\par \par This is a 67 year-old male with lower urinary tract symptoms and BPH. Patient is here today for evaluation. He was last seen by me 8 years ago. Patient reports he has weak uroflow, frequency, and hesitancy despite medical therapy. He denies any hematuria or urinary incontinence. His symptoms are aggravated by hydration. He denies any alleviating factors. He failed medical therapy. When he was last seen by me, he was taking Flomax and Oxybutynin with no improvement. Since then, he tried Flomax again and Proscar which were ineffective. He discontinued all medications. He denies any pain. All other review of systems are negative. He has cancer in his family medical history. He has previous surgical history. Past medical history, family history and social history were inquired and were noncontributory to current condition. The patient is a former smoker and does not drink alcohol. Medications and allergies were reviewed. He has no known allergies to medication. \par \par On examination, the patient is a well-appearing male in no acute distress. He is alert and oriented follows commands. He has normal mood and affect. He is normocephalic. Neck is supple. Oral no thrush. Respirations are unlabored. His abdomen is soft and nontender. Bladder is nonpalpable. No CVA tenderness. Neurologically he is grossly intact. No peripheral edema. Skin without gross abnormality. He has normal male external genitalia. Normal meatus. Bilateral testes are descended intrascrotally and normal to palpation. On rectal examination, there is normal sphincter tone. The prostate is clinically benign without focal induration or nodularity.\par \par His PSA is 0.76, which is within normal limits. \par \par ASSESSMENT: BPH\par \par I counseled the patient on the various etiology of his symptoms. I discussed the natural history of BPH and the treatment options available. I discussed the options of conservative management with fluid in dietary restrictions, herbal therapy, medical therapy, and minimally invasive procedures.  Risk and benefits were discussed. I answered his questions. I recommended he undergo a cystoscopy urodynamics procedure. I counseled the patient regarding the procedure. The risks and benefits were discussed. Alternatives were given. I answered the patient questions. The patient will take the necessary preparations for the procedure. Risks and alternatives were discussed. I answered the patient questions. The patient will follow-up as directed and will contact me with any questions or concerns. Thank you for the opportunity to participate in the care of Mr. DEL CID. I will keep you updated on his progress.\par \par Plan: Cysto UDS. Follow up as directed.

## 2023-04-09 NOTE — ADDENDUM
[FreeTextEntry1] : Entered by Elliott Yao, acting as scribe for Dr. El Sanders.\par The documentation recorded by the scribe accurately reflects the service I personally performed and the decisions made by me.

## 2023-04-13 ENCOUNTER — RX RENEWAL (OUTPATIENT)
Age: 68
End: 2023-04-13

## 2023-04-13 ENCOUNTER — NON-APPOINTMENT (OUTPATIENT)
Age: 68
End: 2023-04-13

## 2023-04-13 ENCOUNTER — APPOINTMENT (OUTPATIENT)
Dept: OPHTHALMOLOGY | Facility: CLINIC | Age: 68
End: 2023-04-13
Payer: MEDICARE

## 2023-04-13 PROCEDURE — 92004 COMPRE OPH EXAM NEW PT 1/>: CPT

## 2023-04-13 PROCEDURE — 95060 OPH MUCOUS MEMBRANE TESTS: CPT

## 2023-04-13 PROCEDURE — 92015 DETERMINE REFRACTIVE STATE: CPT

## 2023-05-03 ENCOUNTER — APPOINTMENT (OUTPATIENT)
Dept: INTERNAL MEDICINE | Facility: CLINIC | Age: 68
End: 2023-05-03
Payer: MEDICARE

## 2023-05-03 VITALS
SYSTOLIC BLOOD PRESSURE: 132 MMHG | OXYGEN SATURATION: 96 % | HEART RATE: 92 BPM | BODY MASS INDEX: 33.05 KG/M2 | TEMPERATURE: 98.8 F | WEIGHT: 244 LBS | HEIGHT: 72 IN | DIASTOLIC BLOOD PRESSURE: 86 MMHG

## 2023-05-03 DIAGNOSIS — B37.2 CANDIDIASIS OF SKIN AND NAIL: ICD-10-CM

## 2023-05-03 DIAGNOSIS — R29.898 OTHER SYMPTOMS AND SIGNS INVOLVING THE MUSCULOSKELETAL SYSTEM: ICD-10-CM

## 2023-05-03 DIAGNOSIS — Z87.39 PERSONAL HISTORY OF OTHER DISEASES OF THE MUSCULOSKELETAL SYSTEM AND CONNECTIVE TISSUE: ICD-10-CM

## 2023-05-03 DIAGNOSIS — U07.1 COVID-19: ICD-10-CM

## 2023-05-03 PROCEDURE — 99214 OFFICE O/P EST MOD 30 MIN: CPT

## 2023-05-03 RX ORDER — KETOCONAZOLE 20 MG/G
2 CREAM TOPICAL TWICE DAILY
Qty: 1 | Refills: 1 | Status: ACTIVE | COMMUNITY
Start: 2023-05-03 | End: 1900-01-01

## 2023-05-06 PROBLEM — R29.898 UPPER EXTREMITY WEAKNESS: Status: RESOLVED | Noted: 2020-03-26 | Resolved: 2021-11-14

## 2023-05-06 PROBLEM — U07.1 COVID-19 VIRUS INFECTION: Status: RESOLVED | Noted: 2021-03-24 | Resolved: 2022-01-15

## 2023-05-06 NOTE — REVIEW OF SYSTEMS
[Fatigue] : fatigue [Frequency] : frequency [Impotence] : impotence [Joint Pain] : joint pain [Back Pain] : back pain [Unsteady Walk] : ataxia [Anxiety] : anxiety [Depression] : depression [Negative] : Heme/Lymph [Fever] : no fever [Chills] : no chills [Hot Flashes] : no hot flashes [Night Sweats] : no night sweats [Vision Problems] : no vision problems [Recent Change In Weight] : ~T no recent weight change [Abdominal Pain] : no abdominal pain [Skin Rash] : no skin rash [Suicidal] : not suicidal [Easy Bleeding] : no easy bleeding

## 2023-05-06 NOTE — HISTORY OF PRESENT ILLNESS
[de-identified] : 67-year-old male with hypertension, hyperlipidemia, and GERD here for follow-up. Has h/o chronic low back pain and had been seeing pain service. S/p back surgery with some relief but pain is still present. Not taking gabapentin any more.  Feels fatigued. has nocturia   Bone spur pain.  Has had some improvement in dyspnea on exertion, no wheezes.  Using Ventolin inhaler. NO chest pain or pressure. Had cluster headache attack characterized right sided headed, eye tearing and congestion.   Has hip pain.  Monilial rash.

## 2023-05-06 NOTE — ASSESSMENT
[FreeTextEntry1] : 67-year-old male with hypertension, hyperlipidemia, and GERD here for follow-up. Has h/o chronic low back pain and had been seeing pain service. S/p back surgery with some relief but pain is still present. Not taking gabapentin any more.  Feels fatigued. has nocturia   Bone spur pain.  Has had some improvement in dyspnea on exertion, no wheezes.  Using Ventolin inhaler. NO chest pain or pressure. Had cluster headache attack characterized right sided headed, eye tearing and congestion.   Has hip pain.  Monilial rash. \par \par Problem # 1 Depression/Anxiety: Doing better on Cymbalta.  \par Problem # 2 GERD: continue omeprazole. \par Problem # 3 Gout: resolved. on colchicine as needed\par Problem # 4 Back pain/hip pain: on NSAIDs as needed.   Sees pain management and has received nerve blocks.  Refer to orthopedics.  Start gabapentin\par Problem # 5 HTN: Controlled today on meds. \par Problem # 6 hyperlipidemia:   on Lipitor. Check lipids, CMP\par Problem # 7 BPH:  stopped  Alfuzosin. when Finasteride started.  will restart\par Problem # 8 CAD: better.  ON ASA.  Risk factor management.  Deconditioned.  Advised not to take Ventolin for exertional dyspnea. \par Problem # 9 s/p COVID:  on inhalers for dyspnea.  improving. \par Problem # 10 Anemia:  Seen by hematology.  W/u including SPEP, flow cytometry, BCR/ABL unremarkable.  No evidence of CML, All or myeloma. \par Problem # 11 headache:  c/w cluster headache.  Treated with cold compresses and NSAIDs.\par Problem # 12 monilia: Ketoconazole cream.\par Problem # 13 Health care maintenance: had Flu shot. Had COVID vaccines s/p COVID.\par \par

## 2023-05-06 NOTE — PHYSICAL EXAM
[No Acute Distress] : no acute distress [Well Nourished] : well nourished [Well Developed] : well developed [Well-Appearing] : well-appearing [Normal Voice/Communication] : normal voice/communication [Normal] : no acute distress, well nourished, well developed and well-appearing [Normal Sclera/Conjunctiva] : normal sclera/conjunctiva [No JVD] : no jugular venous distention [No Lymphadenopathy] : no lymphadenopathy [No Respiratory Distress] : no respiratory distress  [No Accessory Muscle Use] : no accessory muscle use [Clear to Auscultation] : lungs were clear to auscultation bilaterally [Normal Rate] : normal rate  [Regular Rhythm] : with a regular rhythm [Normal S1, S2] : normal S1 and S2 [No Murmur] : no murmur heard [No Carotid Bruits] : no carotid bruits [No Abdominal Bruit] : a ~M bruit was not heard ~T in the abdomen [Pedal Pulses Present] : the pedal pulses are present [No Edema] : there was no peripheral edema [No Palpable Aorta] : no palpable aorta [No Extremity Clubbing/Cyanosis] : no extremity clubbing/cyanosis [Soft] : abdomen soft [Non Tender] : non-tender [Non-distended] : non-distended [No Masses] : no abdominal mass palpated [No HSM] : no HSM [Normal Bowel Sounds] : normal bowel sounds [No CVA Tenderness] : no CVA  tenderness [No Spinal Tenderness] : no spinal tenderness [No Joint Swelling] : no joint swelling [Grossly Normal Strength/Tone] : grossly normal strength/tone [No Rash] : no rash [Coordination Grossly Intact] : coordination grossly intact [No Focal Deficits] : no focal deficits [Normal Gait] : normal gait [Speech Grossly Normal] : speech grossly normal [Normal Affect] : the affect was normal [Alert and Oriented x3] : oriented to person, place, and time [Normal Mood] : the mood was normal [Normal Insight/Judgement] : insight and judgment were intact [de-identified] : erythematous rash in intertriginous area.

## 2023-05-11 NOTE — PROVIDER CONTACT NOTE (CRITICAL VALUE NOTIFICATION) - DATE AND TIME:
03-Jun-2021 20:25 03-Jun-2021 20:32 PROCEDURES:  Arthroscopic reconstruction, knee, ACL 11-May-2023 13:35:56  Eveline Cobian

## 2023-05-15 ENCOUNTER — APPOINTMENT (OUTPATIENT)
Dept: UROLOGY | Facility: CLINIC | Age: 68
End: 2023-05-15
Payer: MEDICARE

## 2023-05-15 ENCOUNTER — OUTPATIENT (OUTPATIENT)
Dept: OUTPATIENT SERVICES | Facility: HOSPITAL | Age: 68
LOS: 1 days | End: 2023-05-15
Payer: MEDICARE

## 2023-05-15 VITALS
OXYGEN SATURATION: 98 % | TEMPERATURE: 98 F | SYSTOLIC BLOOD PRESSURE: 155 MMHG | HEART RATE: 84 BPM | DIASTOLIC BLOOD PRESSURE: 79 MMHG

## 2023-05-15 DIAGNOSIS — Z98.89 OTHER SPECIFIED POSTPROCEDURAL STATES: Chronic | ICD-10-CM

## 2023-05-15 DIAGNOSIS — R35.0 FREQUENCY OF MICTURITION: ICD-10-CM

## 2023-05-15 DIAGNOSIS — M43.20 FUSION OF SPINE, SITE UNSPECIFIED: Chronic | ICD-10-CM

## 2023-05-15 PROCEDURE — 99214 OFFICE O/P EST MOD 30 MIN: CPT | Mod: 25

## 2023-05-15 PROCEDURE — 52000 CYSTOURETHROSCOPY: CPT

## 2023-05-15 PROCEDURE — 51741 ELECTRO-UROFLOWMETRY FIRST: CPT | Mod: 26

## 2023-05-15 PROCEDURE — 51728 CYSTOMETROGRAM W/VP: CPT | Mod: 26

## 2023-05-15 PROCEDURE — 51797 INTRAABDOMINAL PRESSURE TEST: CPT | Mod: 26

## 2023-05-15 PROCEDURE — 51784 ANAL/URINARY MUSCLE STUDY: CPT

## 2023-05-15 PROCEDURE — 51797 INTRAABDOMINAL PRESSURE TEST: CPT

## 2023-05-15 PROCEDURE — 51741 ELECTRO-UROFLOWMETRY FIRST: CPT

## 2023-05-15 PROCEDURE — 51784 ANAL/URINARY MUSCLE STUDY: CPT | Mod: 26

## 2023-05-15 PROCEDURE — 51728 CYSTOMETROGRAM W/VP: CPT

## 2023-05-15 NOTE — LETTER BODY
[FreeTextEntry1] : Claude Rizzo MD, MPH\par [address] [facility] [address], [address]\[place] [address]\par [phone]\par \par Dear Dr. Rizzo, \par \par REASON FOR VISIT: BPH\par \par This is a 67 year-old male with lower urinary tract symptoms and BPH. He is here today for a UDS procedure and follow up. He denies any hematuria or urinary incontinence. He denies any alleviating factors. He previously failed medical therapy. He was taking Flomax and Oxybutynin with no improvement. He tried Flomax again and Proscar which were ineffective. He discontinued all medications. He denies any pain. All other review of systems are negative. He has cancer in his family medical history. He has previous surgical history. Past medical history, family history and social history were inquired and were noncontributory to current condition. The patient is a former smoker and does not drink alcohol. Medications and allergies were reviewed. He has no known allergies to medication. \par \par On examination, the patient is a well-appearing male in no acute distress. He is alert and oriented follows commands. He has normal mood and affect. He is normocephalic. Neck is supple. Oral no thrush. Respirations are unlabored. His abdomen is soft and nontender. Bladder is nonpalpable. No CVA tenderness. Neurologically he is grossly intact. No peripheral edema. Skin without gross abnormality. He has normal male external genitalia. Normal meatus. Bilateral testes are descended intrascrotally and normal to palpation. On rectal examination, there is normal sphincter tone. The prostate is clinically benign without focal induration or nodularity.\par \par His UDS procedure today demonstrated normal capacity and normal pressure. There is an indeterminate level of bladder obstruction. 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 Pelvic floor discoordination. \par \par I counseled the patient. I recommended that he see physiatry for physical therapy regarding his pelvic floor discoordination. Risks and alternatives were discussed. I answered the patient questions. The patient will follow-up as directed and will contact me with any questions or concerns. Thank you for the opportunity to participate in the care of Mr. DEL CID. I will keep you updated on his progress.\par \par Plan: See physiatry. Follow up as directed. \par \par I spent 30-minutes time today on all issues related to this patient on today date of service including non face to face time.

## 2023-05-16 DIAGNOSIS — N40.1 BENIGN PROSTATIC HYPERPLASIA WITH LOWER URINARY TRACT SYMPTOMS: ICD-10-CM

## 2023-06-06 ENCOUNTER — RX RENEWAL (OUTPATIENT)
Age: 68
End: 2023-06-06

## 2023-07-05 ENCOUNTER — NON-APPOINTMENT (OUTPATIENT)
Age: 68
End: 2023-07-05

## 2023-07-05 ENCOUNTER — APPOINTMENT (OUTPATIENT)
Dept: OPHTHALMOLOGY | Facility: CLINIC | Age: 68
End: 2023-07-05
Payer: MEDICARE

## 2023-07-05 PROCEDURE — 92012 INTRM OPH EXAM EST PATIENT: CPT

## 2023-07-31 ENCOUNTER — RX RENEWAL (OUTPATIENT)
Age: 68
End: 2023-07-31

## 2023-07-31 RX ORDER — ASPIRIN 81 MG/1
81 TABLET, COATED ORAL
Qty: 90 | Refills: 3 | Status: ACTIVE | COMMUNITY
Start: 2022-08-08 | End: 1900-01-01

## 2023-08-02 ENCOUNTER — APPOINTMENT (OUTPATIENT)
Dept: INTERNAL MEDICINE | Facility: CLINIC | Age: 68
End: 2023-08-02

## 2023-08-14 NOTE — H&P ADULT - PROBLEM SELECTOR PLAN 3
noted to have creatinine of 1.67 on admission   baseline appears to be normal   f/u urine lytes  given 500cc NS bolus in ED  monitor BMP daily   will hold HCTZ
119.9

## 2023-08-21 ENCOUNTER — APPOINTMENT (OUTPATIENT)
Dept: OPHTHALMOLOGY | Facility: CLINIC | Age: 68
End: 2023-08-21

## 2023-08-22 NOTE — ED ADULT NURSE NOTE - CAS TRG GEN SKIN CONDITION
[place] Pregnancy And Lactation Text: This medication is Pregnancy Category C. It is unknown if this medication is excreted in breast milk. Warm

## 2023-09-07 ENCOUNTER — APPOINTMENT (OUTPATIENT)
Dept: UROLOGY | Facility: CLINIC | Age: 68
End: 2023-09-07

## 2023-10-09 ENCOUNTER — APPOINTMENT (OUTPATIENT)
Dept: INTERNAL MEDICINE | Facility: CLINIC | Age: 68
End: 2023-10-09
Payer: MEDICARE

## 2023-10-09 VITALS
RESPIRATION RATE: 17 BRPM | OXYGEN SATURATION: 96 % | WEIGHT: 250 LBS | BODY MASS INDEX: 33.86 KG/M2 | HEART RATE: 84 BPM | SYSTOLIC BLOOD PRESSURE: 137 MMHG | HEIGHT: 72 IN | DIASTOLIC BLOOD PRESSURE: 78 MMHG

## 2023-10-09 DIAGNOSIS — Z87.891 PERSONAL HISTORY OF NICOTINE DEPENDENCE: ICD-10-CM

## 2023-10-09 DIAGNOSIS — Z80.3 FAMILY HISTORY OF MALIGNANT NEOPLASM OF BREAST: ICD-10-CM

## 2023-10-09 DIAGNOSIS — M16.11 UNILATERAL PRIMARY OSTEOARTHRITIS, RIGHT HIP: ICD-10-CM

## 2023-10-09 DIAGNOSIS — I25.2 OLD MYOCARDIAL INFARCTION: ICD-10-CM

## 2023-10-09 DIAGNOSIS — R06.00 DYSPNEA, UNSPECIFIED: ICD-10-CM

## 2023-10-09 DIAGNOSIS — E78.5 HYPERLIPIDEMIA, UNSPECIFIED: ICD-10-CM

## 2023-10-09 DIAGNOSIS — Z78.9 OTHER SPECIFIED HEALTH STATUS: ICD-10-CM

## 2023-10-09 DIAGNOSIS — D72.829 ELEVATED WHITE BLOOD CELL COUNT, UNSPECIFIED: ICD-10-CM

## 2023-10-09 DIAGNOSIS — N28.9 DISORDER OF KIDNEY AND URETER, UNSPECIFIED: ICD-10-CM

## 2023-10-09 DIAGNOSIS — E78.1 PURE HYPERGLYCERIDEMIA: ICD-10-CM

## 2023-10-09 DIAGNOSIS — Z80.42 FAMILY HISTORY OF MALIGNANT NEOPLASM OF PROSTATE: ICD-10-CM

## 2023-10-09 PROCEDURE — 99213 OFFICE O/P EST LOW 20 MIN: CPT | Mod: 25

## 2023-10-09 PROCEDURE — G0439: CPT

## 2023-10-09 RX ORDER — BUDESONIDE AND FORMOTEROL FUMARATE DIHYDRATE 160; 4.5 UG/1; UG/1
160-4.5 AEROSOL RESPIRATORY (INHALATION)
Qty: 1 | Refills: 3 | Status: ACTIVE | COMMUNITY
Start: 2021-03-28 | End: 1900-01-01

## 2023-10-09 RX ORDER — SEMAGLUTIDE 0.25 MG/.5ML
0.25 INJECTION, SOLUTION SUBCUTANEOUS
Qty: 1 | Refills: 3 | Status: ACTIVE | COMMUNITY
Start: 2023-10-09 | End: 1900-01-01

## 2023-10-10 PROBLEM — I25.2 STATUS POST NON-ST ELEVATION MYOCARDIAL INFARCTION (NSTEMI): Status: ACTIVE | Noted: 2021-06-09

## 2023-10-10 PROBLEM — R06.00 DYSPNEA: Status: ACTIVE | Noted: 2022-08-30

## 2023-10-10 PROBLEM — D72.829 LEUKOCYTOSIS: Status: ACTIVE | Noted: 2019-04-16

## 2023-10-27 LAB
25(OH)D3 SERPL-MCNC: 35.8 NG/ML
ANION GAP SERPL CALC-SCNC: 16 MMOL/L
BASOPHILS # BLD AUTO: 0.06 K/UL
BASOPHILS NFR BLD AUTO: 0.5 %
BUN SERPL-MCNC: 27 MG/DL
CALCIUM SERPL-MCNC: 9.8 MG/DL
CHLORIDE SERPL-SCNC: 103 MMOL/L
CO2 SERPL-SCNC: 23 MMOL/L
CREAT SERPL-MCNC: 1.24 MG/DL
EGFR: 64 ML/MIN/1.73M2
EOSINOPHIL # BLD AUTO: 0.26 K/UL
EOSINOPHIL NFR BLD AUTO: 2.1 %
ESTIMATED AVERAGE GLUCOSE: 120 MG/DL
GLUCOSE SERPL-MCNC: 100 MG/DL
HBA1C MFR BLD HPLC: 5.8 %
HCT VFR BLD CALC: 42.9 %
HGB BLD-MCNC: 14.2 G/DL
IMM GRANULOCYTES NFR BLD AUTO: 0.2 %
LYMPHOCYTES # BLD AUTO: 3.86 K/UL
LYMPHOCYTES NFR BLD AUTO: 31.5 %
MAN DIFF?: NORMAL
MCHC RBC-ENTMCNC: 30.6 PG
MCHC RBC-ENTMCNC: 33.1 GM/DL
MCV RBC AUTO: 92.5 FL
MONOCYTES # BLD AUTO: 0.78 K/UL
MONOCYTES NFR BLD AUTO: 6.4 %
NEUTROPHILS # BLD AUTO: 7.27 K/UL
NEUTROPHILS NFR BLD AUTO: 59.3 %
PLATELET # BLD AUTO: 217 K/UL
POTASSIUM SERPL-SCNC: 4.1 MMOL/L
RBC # BLD: 4.64 M/UL
RBC # FLD: 14.7 %
SODIUM SERPL-SCNC: 142 MMOL/L
WBC # FLD AUTO: 12.25 K/UL

## 2023-10-30 ENCOUNTER — APPOINTMENT (OUTPATIENT)
Dept: OPHTHALMOLOGY | Facility: CLINIC | Age: 68
End: 2023-10-30
Payer: MEDICARE

## 2023-10-30 ENCOUNTER — NON-APPOINTMENT (OUTPATIENT)
Age: 68
End: 2023-10-30

## 2023-10-30 PROCEDURE — 92012 INTRM OPH EXAM EST PATIENT: CPT

## 2023-11-07 ENCOUNTER — RX RENEWAL (OUTPATIENT)
Age: 68
End: 2023-11-07

## 2023-11-13 NOTE — PROGRESS NOTE ADULT - ASSESSMENT
63 yo M with HTN, HLD, BPH, GERD, s/p cervical spine fusion in January 2020 c/b postsurgical bilateral arm weakness presenting from [facility] with fever, weakness and dysuria, admitted for severe sepsis 2/2 UTI. 65 yo M with HTN, HLD, BPH, GERD, s/p cervical spine fusion in January 2020 c/b postsurgical bilateral arm weakness presenting from [facility] with fever, weakness and dysuria, admitted for severe sepsis 2/2 UTI, treated with antibiotics. n/a

## 2023-11-14 ENCOUNTER — APPOINTMENT (OUTPATIENT)
Dept: OPHTHALMOLOGY | Facility: CLINIC | Age: 68
End: 2023-11-14

## 2023-12-01 ENCOUNTER — RX RENEWAL (OUTPATIENT)
Age: 68
End: 2023-12-01

## 2023-12-01 RX ORDER — ALLOPURINOL 100 MG/1
100 TABLET ORAL
Qty: 90 | Refills: 3 | Status: ACTIVE | COMMUNITY
Start: 2021-09-30 | End: 1900-01-01

## 2023-12-06 ENCOUNTER — TRANSCRIPTION ENCOUNTER (OUTPATIENT)
Age: 68
End: 2023-12-06

## 2023-12-06 ENCOUNTER — EMERGENCY (EMERGENCY)
Facility: HOSPITAL | Age: 68
LOS: 1 days | Discharge: HOME | End: 2023-12-06
Attending: EMERGENCY MEDICINE
Payer: MEDICARE

## 2023-12-06 VITALS
DIASTOLIC BLOOD PRESSURE: 79 MMHG | SYSTOLIC BLOOD PRESSURE: 136 MMHG | HEIGHT: 72 IN | TEMPERATURE: 98 F | RESPIRATION RATE: 24 BRPM | WEIGHT: 250 LBS | OXYGEN SATURATION: 95 % | HEART RATE: 114 BPM

## 2023-12-06 VITALS
TEMPERATURE: 99 F | SYSTOLIC BLOOD PRESSURE: 109 MMHG | DIASTOLIC BLOOD PRESSURE: 57 MMHG | HEART RATE: 77 BPM | OXYGEN SATURATION: 97 % | RESPIRATION RATE: 20 BRPM

## 2023-12-06 DIAGNOSIS — M43.20 FUSION OF SPINE, SITE UNSPECIFIED: Chronic | ICD-10-CM

## 2023-12-06 DIAGNOSIS — Z98.89 OTHER SPECIFIED POSTPROCEDURAL STATES: Chronic | ICD-10-CM

## 2023-12-06 LAB
ALBUMIN SERPL ELPH-MCNC: 4.3 G/DL — SIGNIFICANT CHANGE UP (ref 3.3–5)
ALBUMIN SERPL ELPH-MCNC: 4.3 G/DL — SIGNIFICANT CHANGE UP (ref 3.3–5)
ALP SERPL-CCNC: 142 U/L — HIGH (ref 40–120)
ALP SERPL-CCNC: 142 U/L — HIGH (ref 40–120)
ALT FLD-CCNC: 45 U/L — SIGNIFICANT CHANGE UP (ref 10–45)
ALT FLD-CCNC: 45 U/L — SIGNIFICANT CHANGE UP (ref 10–45)
ANION GAP SERPL CALC-SCNC: 14 MMOL/L — SIGNIFICANT CHANGE UP (ref 5–17)
ANION GAP SERPL CALC-SCNC: 14 MMOL/L — SIGNIFICANT CHANGE UP (ref 5–17)
APPEARANCE UR: CLEAR — SIGNIFICANT CHANGE UP
APPEARANCE UR: CLEAR — SIGNIFICANT CHANGE UP
APTT BLD: 28 SEC — SIGNIFICANT CHANGE UP (ref 24.5–35.6)
APTT BLD: 28 SEC — SIGNIFICANT CHANGE UP (ref 24.5–35.6)
AST SERPL-CCNC: 28 U/L — SIGNIFICANT CHANGE UP (ref 10–40)
AST SERPL-CCNC: 28 U/L — SIGNIFICANT CHANGE UP (ref 10–40)
BASE EXCESS BLDV CALC-SCNC: 1.7 MMOL/L — SIGNIFICANT CHANGE UP (ref -2–3)
BASE EXCESS BLDV CALC-SCNC: 1.7 MMOL/L — SIGNIFICANT CHANGE UP (ref -2–3)
BASOPHILS # BLD AUTO: 0.03 K/UL — SIGNIFICANT CHANGE UP (ref 0–0.2)
BASOPHILS # BLD AUTO: 0.03 K/UL — SIGNIFICANT CHANGE UP (ref 0–0.2)
BASOPHILS NFR BLD AUTO: 0.4 % — SIGNIFICANT CHANGE UP (ref 0–2)
BASOPHILS NFR BLD AUTO: 0.4 % — SIGNIFICANT CHANGE UP (ref 0–2)
BILIRUB SERPL-MCNC: 0.4 MG/DL — SIGNIFICANT CHANGE UP (ref 0.2–1.2)
BILIRUB SERPL-MCNC: 0.4 MG/DL — SIGNIFICANT CHANGE UP (ref 0.2–1.2)
BILIRUB UR-MCNC: NEGATIVE — SIGNIFICANT CHANGE UP
BILIRUB UR-MCNC: NEGATIVE — SIGNIFICANT CHANGE UP
BUN SERPL-MCNC: 25 MG/DL — HIGH (ref 7–23)
BUN SERPL-MCNC: 25 MG/DL — HIGH (ref 7–23)
CA-I SERPL-SCNC: 1.18 MMOL/L — SIGNIFICANT CHANGE UP (ref 1.15–1.33)
CA-I SERPL-SCNC: 1.18 MMOL/L — SIGNIFICANT CHANGE UP (ref 1.15–1.33)
CALCIUM SERPL-MCNC: 9.5 MG/DL — SIGNIFICANT CHANGE UP (ref 8.4–10.5)
CALCIUM SERPL-MCNC: 9.5 MG/DL — SIGNIFICANT CHANGE UP (ref 8.4–10.5)
CHLORIDE BLDV-SCNC: 105 MMOL/L — SIGNIFICANT CHANGE UP (ref 96–108)
CHLORIDE BLDV-SCNC: 105 MMOL/L — SIGNIFICANT CHANGE UP (ref 96–108)
CHLORIDE SERPL-SCNC: 104 MMOL/L — SIGNIFICANT CHANGE UP (ref 96–108)
CHLORIDE SERPL-SCNC: 104 MMOL/L — SIGNIFICANT CHANGE UP (ref 96–108)
CK MB CFR SERPL CALC: 5.4 NG/ML — SIGNIFICANT CHANGE UP (ref 0–6.7)
CK MB CFR SERPL CALC: 5.4 NG/ML — SIGNIFICANT CHANGE UP (ref 0–6.7)
CO2 BLDV-SCNC: 27 MMOL/L — HIGH (ref 22–26)
CO2 BLDV-SCNC: 27 MMOL/L — HIGH (ref 22–26)
CO2 SERPL-SCNC: 23 MMOL/L — SIGNIFICANT CHANGE UP (ref 22–31)
CO2 SERPL-SCNC: 23 MMOL/L — SIGNIFICANT CHANGE UP (ref 22–31)
COLOR SPEC: YELLOW — SIGNIFICANT CHANGE UP
COLOR SPEC: YELLOW — SIGNIFICANT CHANGE UP
CREAT SERPL-MCNC: 1.36 MG/DL — HIGH (ref 0.5–1.3)
CREAT SERPL-MCNC: 1.36 MG/DL — HIGH (ref 0.5–1.3)
D DIMER BLD IA.RAPID-MCNC: 340 NG/ML DDU — HIGH
D DIMER BLD IA.RAPID-MCNC: 340 NG/ML DDU — HIGH
DIFF PNL FLD: NEGATIVE — SIGNIFICANT CHANGE UP
DIFF PNL FLD: NEGATIVE — SIGNIFICANT CHANGE UP
EGFR: 57 ML/MIN/1.73M2 — LOW
EGFR: 57 ML/MIN/1.73M2 — LOW
EOSINOPHIL # BLD AUTO: 0.05 K/UL — SIGNIFICANT CHANGE UP (ref 0–0.5)
EOSINOPHIL # BLD AUTO: 0.05 K/UL — SIGNIFICANT CHANGE UP (ref 0–0.5)
EOSINOPHIL NFR BLD AUTO: 0.7 % — SIGNIFICANT CHANGE UP (ref 0–6)
EOSINOPHIL NFR BLD AUTO: 0.7 % — SIGNIFICANT CHANGE UP (ref 0–6)
FLUAV AG NPH QL: SIGNIFICANT CHANGE UP
FLUAV AG NPH QL: SIGNIFICANT CHANGE UP
FLUBV AG NPH QL: SIGNIFICANT CHANGE UP
FLUBV AG NPH QL: SIGNIFICANT CHANGE UP
GAS PNL BLDV: 139 MMOL/L — SIGNIFICANT CHANGE UP (ref 136–145)
GAS PNL BLDV: 139 MMOL/L — SIGNIFICANT CHANGE UP (ref 136–145)
GAS PNL BLDV: SIGNIFICANT CHANGE UP
GAS PNL BLDV: SIGNIFICANT CHANGE UP
GLUCOSE BLDV-MCNC: 127 MG/DL — HIGH (ref 70–99)
GLUCOSE BLDV-MCNC: 127 MG/DL — HIGH (ref 70–99)
GLUCOSE SERPL-MCNC: 125 MG/DL — HIGH (ref 70–99)
GLUCOSE SERPL-MCNC: 125 MG/DL — HIGH (ref 70–99)
GLUCOSE UR QL: NEGATIVE MG/DL — SIGNIFICANT CHANGE UP
GLUCOSE UR QL: NEGATIVE MG/DL — SIGNIFICANT CHANGE UP
HCO3 BLDV-SCNC: 26 MMOL/L — SIGNIFICANT CHANGE UP (ref 22–29)
HCO3 BLDV-SCNC: 26 MMOL/L — SIGNIFICANT CHANGE UP (ref 22–29)
HCT VFR BLD CALC: 36.7 % — LOW (ref 39–50)
HCT VFR BLD CALC: 36.7 % — LOW (ref 39–50)
HCT VFR BLDA CALC: 39 % — SIGNIFICANT CHANGE UP (ref 39–51)
HCT VFR BLDA CALC: 39 % — SIGNIFICANT CHANGE UP (ref 39–51)
HGB BLD CALC-MCNC: 12.9 G/DL — SIGNIFICANT CHANGE UP (ref 12.6–17.4)
HGB BLD CALC-MCNC: 12.9 G/DL — SIGNIFICANT CHANGE UP (ref 12.6–17.4)
HGB BLD-MCNC: 12.8 G/DL — LOW (ref 13–17)
HGB BLD-MCNC: 12.8 G/DL — LOW (ref 13–17)
IMM GRANULOCYTES NFR BLD AUTO: 0.4 % — SIGNIFICANT CHANGE UP (ref 0–0.9)
IMM GRANULOCYTES NFR BLD AUTO: 0.4 % — SIGNIFICANT CHANGE UP (ref 0–0.9)
INR BLD: 1.26 RATIO — HIGH (ref 0.85–1.18)
INR BLD: 1.26 RATIO — HIGH (ref 0.85–1.18)
KETONES UR-MCNC: ABNORMAL MG/DL
KETONES UR-MCNC: ABNORMAL MG/DL
LACTATE BLDV-MCNC: 2.1 MMOL/L — HIGH (ref 0.5–2)
LACTATE BLDV-MCNC: 2.1 MMOL/L — HIGH (ref 0.5–2)
LEUKOCYTE ESTERASE UR-ACNC: NEGATIVE — SIGNIFICANT CHANGE UP
LEUKOCYTE ESTERASE UR-ACNC: NEGATIVE — SIGNIFICANT CHANGE UP
LYMPHOCYTES # BLD AUTO: 1.93 K/UL — SIGNIFICANT CHANGE UP (ref 1–3.3)
LYMPHOCYTES # BLD AUTO: 1.93 K/UL — SIGNIFICANT CHANGE UP (ref 1–3.3)
LYMPHOCYTES # BLD AUTO: 27.7 % — SIGNIFICANT CHANGE UP (ref 13–44)
LYMPHOCYTES # BLD AUTO: 27.7 % — SIGNIFICANT CHANGE UP (ref 13–44)
MCHC RBC-ENTMCNC: 31.8 PG — SIGNIFICANT CHANGE UP (ref 27–34)
MCHC RBC-ENTMCNC: 31.8 PG — SIGNIFICANT CHANGE UP (ref 27–34)
MCHC RBC-ENTMCNC: 34.9 GM/DL — SIGNIFICANT CHANGE UP (ref 32–36)
MCHC RBC-ENTMCNC: 34.9 GM/DL — SIGNIFICANT CHANGE UP (ref 32–36)
MCV RBC AUTO: 91.1 FL — SIGNIFICANT CHANGE UP (ref 80–100)
MCV RBC AUTO: 91.1 FL — SIGNIFICANT CHANGE UP (ref 80–100)
MONOCYTES # BLD AUTO: 0.64 K/UL — SIGNIFICANT CHANGE UP (ref 0–0.9)
MONOCYTES # BLD AUTO: 0.64 K/UL — SIGNIFICANT CHANGE UP (ref 0–0.9)
MONOCYTES NFR BLD AUTO: 9.2 % — SIGNIFICANT CHANGE UP (ref 2–14)
MONOCYTES NFR BLD AUTO: 9.2 % — SIGNIFICANT CHANGE UP (ref 2–14)
NEUTROPHILS # BLD AUTO: 4.3 K/UL — SIGNIFICANT CHANGE UP (ref 1.8–7.4)
NEUTROPHILS # BLD AUTO: 4.3 K/UL — SIGNIFICANT CHANGE UP (ref 1.8–7.4)
NEUTROPHILS NFR BLD AUTO: 61.6 % — SIGNIFICANT CHANGE UP (ref 43–77)
NEUTROPHILS NFR BLD AUTO: 61.6 % — SIGNIFICANT CHANGE UP (ref 43–77)
NITRITE UR-MCNC: NEGATIVE — SIGNIFICANT CHANGE UP
NITRITE UR-MCNC: NEGATIVE — SIGNIFICANT CHANGE UP
NRBC # BLD: 0 /100 WBCS — SIGNIFICANT CHANGE UP (ref 0–0)
NRBC # BLD: 0 /100 WBCS — SIGNIFICANT CHANGE UP (ref 0–0)
PCO2 BLDV: 38 MMHG — LOW (ref 42–55)
PCO2 BLDV: 38 MMHG — LOW (ref 42–55)
PH BLDV: 7.44 — HIGH (ref 7.32–7.43)
PH BLDV: 7.44 — HIGH (ref 7.32–7.43)
PH UR: 5.5 — SIGNIFICANT CHANGE UP (ref 5–8)
PH UR: 5.5 — SIGNIFICANT CHANGE UP (ref 5–8)
PLATELET # BLD AUTO: 189 K/UL — SIGNIFICANT CHANGE UP (ref 150–400)
PLATELET # BLD AUTO: 189 K/UL — SIGNIFICANT CHANGE UP (ref 150–400)
PO2 BLDV: 57 MMHG — HIGH (ref 25–45)
PO2 BLDV: 57 MMHG — HIGH (ref 25–45)
POTASSIUM BLDV-SCNC: 3.3 MMOL/L — LOW (ref 3.5–5.1)
POTASSIUM BLDV-SCNC: 3.3 MMOL/L — LOW (ref 3.5–5.1)
POTASSIUM SERPL-MCNC: 3.3 MMOL/L — LOW (ref 3.5–5.3)
POTASSIUM SERPL-MCNC: 3.3 MMOL/L — LOW (ref 3.5–5.3)
POTASSIUM SERPL-SCNC: 3.3 MMOL/L — LOW (ref 3.5–5.3)
POTASSIUM SERPL-SCNC: 3.3 MMOL/L — LOW (ref 3.5–5.3)
PROT SERPL-MCNC: 6.5 G/DL — SIGNIFICANT CHANGE UP (ref 6–8.3)
PROT SERPL-MCNC: 6.5 G/DL — SIGNIFICANT CHANGE UP (ref 6–8.3)
PROT UR-MCNC: SIGNIFICANT CHANGE UP MG/DL
PROT UR-MCNC: SIGNIFICANT CHANGE UP MG/DL
PROTHROM AB SERPL-ACNC: 13.1 SEC — HIGH (ref 9.5–13)
PROTHROM AB SERPL-ACNC: 13.1 SEC — HIGH (ref 9.5–13)
RBC # BLD: 4.03 M/UL — LOW (ref 4.2–5.8)
RBC # BLD: 4.03 M/UL — LOW (ref 4.2–5.8)
RBC # FLD: 13.9 % — SIGNIFICANT CHANGE UP (ref 10.3–14.5)
RBC # FLD: 13.9 % — SIGNIFICANT CHANGE UP (ref 10.3–14.5)
RSV RNA NPH QL NAA+NON-PROBE: SIGNIFICANT CHANGE UP
RSV RNA NPH QL NAA+NON-PROBE: SIGNIFICANT CHANGE UP
SAO2 % BLDV: 88 % — SIGNIFICANT CHANGE UP (ref 67–88)
SAO2 % BLDV: 88 % — SIGNIFICANT CHANGE UP (ref 67–88)
SARS-COV-2 RNA SPEC QL NAA+PROBE: SIGNIFICANT CHANGE UP
SARS-COV-2 RNA SPEC QL NAA+PROBE: SIGNIFICANT CHANGE UP
SODIUM SERPL-SCNC: 141 MMOL/L — SIGNIFICANT CHANGE UP (ref 135–145)
SODIUM SERPL-SCNC: 141 MMOL/L — SIGNIFICANT CHANGE UP (ref 135–145)
SP GR SPEC: 1.03 — SIGNIFICANT CHANGE UP (ref 1–1.03)
SP GR SPEC: 1.03 — SIGNIFICANT CHANGE UP (ref 1–1.03)
TROPONIN T, HIGH SENSITIVITY RESULT: 34 NG/L — SIGNIFICANT CHANGE UP (ref 0–51)
TROPONIN T, HIGH SENSITIVITY RESULT: 34 NG/L — SIGNIFICANT CHANGE UP (ref 0–51)
TROPONIN T, HIGH SENSITIVITY RESULT: 38 NG/L — SIGNIFICANT CHANGE UP (ref 0–51)
TROPONIN T, HIGH SENSITIVITY RESULT: 38 NG/L — SIGNIFICANT CHANGE UP (ref 0–51)
TROPONIN T, HIGH SENSITIVITY RESULT: 44 NG/L — SIGNIFICANT CHANGE UP (ref 0–51)
TROPONIN T, HIGH SENSITIVITY RESULT: 44 NG/L — SIGNIFICANT CHANGE UP (ref 0–51)
UROBILINOGEN FLD QL: 1 MG/DL — SIGNIFICANT CHANGE UP (ref 0.2–1)
UROBILINOGEN FLD QL: 1 MG/DL — SIGNIFICANT CHANGE UP (ref 0.2–1)
WBC # BLD: 6.98 K/UL — SIGNIFICANT CHANGE UP (ref 3.8–10.5)
WBC # BLD: 6.98 K/UL — SIGNIFICANT CHANGE UP (ref 3.8–10.5)
WBC # FLD AUTO: 6.98 K/UL — SIGNIFICANT CHANGE UP (ref 3.8–10.5)
WBC # FLD AUTO: 6.98 K/UL — SIGNIFICANT CHANGE UP (ref 3.8–10.5)

## 2023-12-06 PROCEDURE — 85379 FIBRIN DEGRADATION QUANT: CPT

## 2023-12-06 PROCEDURE — 85018 HEMOGLOBIN: CPT

## 2023-12-06 PROCEDURE — 80053 COMPREHEN METABOLIC PANEL: CPT

## 2023-12-06 PROCEDURE — 84132 ASSAY OF SERUM POTASSIUM: CPT

## 2023-12-06 PROCEDURE — 85014 HEMATOCRIT: CPT

## 2023-12-06 PROCEDURE — 71275 CT ANGIOGRAPHY CHEST: CPT | Mod: MA

## 2023-12-06 PROCEDURE — 82553 CREATINE MB FRACTION: CPT

## 2023-12-06 PROCEDURE — 82947 ASSAY GLUCOSE BLOOD QUANT: CPT

## 2023-12-06 PROCEDURE — 84295 ASSAY OF SERUM SODIUM: CPT

## 2023-12-06 PROCEDURE — 85610 PROTHROMBIN TIME: CPT

## 2023-12-06 PROCEDURE — 85730 THROMBOPLASTIN TIME PARTIAL: CPT

## 2023-12-06 PROCEDURE — 82330 ASSAY OF CALCIUM: CPT

## 2023-12-06 PROCEDURE — 81003 URINALYSIS AUTO W/O SCOPE: CPT

## 2023-12-06 PROCEDURE — 99285 EMERGENCY DEPT VISIT HI MDM: CPT | Mod: 25

## 2023-12-06 PROCEDURE — 93005 ELECTROCARDIOGRAM TRACING: CPT

## 2023-12-06 PROCEDURE — 85025 COMPLETE CBC W/AUTO DIFF WBC: CPT

## 2023-12-06 PROCEDURE — 84484 ASSAY OF TROPONIN QUANT: CPT

## 2023-12-06 PROCEDURE — 71275 CT ANGIOGRAPHY CHEST: CPT | Mod: 26,MA

## 2023-12-06 PROCEDURE — 36415 COLL VENOUS BLD VENIPUNCTURE: CPT

## 2023-12-06 PROCEDURE — 87086 URINE CULTURE/COLONY COUNT: CPT

## 2023-12-06 PROCEDURE — 96374 THER/PROPH/DIAG INJ IV PUSH: CPT | Mod: XU

## 2023-12-06 PROCEDURE — 82435 ASSAY OF BLOOD CHLORIDE: CPT

## 2023-12-06 PROCEDURE — 82803 BLOOD GASES ANY COMBINATION: CPT

## 2023-12-06 PROCEDURE — 99285 EMERGENCY DEPT VISIT HI MDM: CPT

## 2023-12-06 PROCEDURE — 83605 ASSAY OF LACTIC ACID: CPT

## 2023-12-06 PROCEDURE — 87040 BLOOD CULTURE FOR BACTERIA: CPT

## 2023-12-06 PROCEDURE — 71045 X-RAY EXAM CHEST 1 VIEW: CPT | Mod: 26

## 2023-12-06 PROCEDURE — 87637 SARSCOV2&INF A&B&RSV AMP PRB: CPT

## 2023-12-06 PROCEDURE — 71045 X-RAY EXAM CHEST 1 VIEW: CPT

## 2023-12-06 PROCEDURE — 96375 TX/PRO/DX INJ NEW DRUG ADDON: CPT | Mod: XU

## 2023-12-06 RX ORDER — KETOROLAC TROMETHAMINE 30 MG/ML
15 SYRINGE (ML) INJECTION ONCE
Refills: 0 | Status: DISCONTINUED | OUTPATIENT
Start: 2023-12-06 | End: 2023-12-06

## 2023-12-06 RX ORDER — POTASSIUM CHLORIDE 20 MEQ
40 PACKET (EA) ORAL ONCE
Refills: 0 | Status: COMPLETED | OUTPATIENT
Start: 2023-12-06 | End: 2023-12-06

## 2023-12-06 RX ORDER — PANTOPRAZOLE SODIUM 20 MG/1
40 TABLET, DELAYED RELEASE ORAL ONCE
Refills: 0 | Status: COMPLETED | OUTPATIENT
Start: 2023-12-06 | End: 2023-12-06

## 2023-12-06 RX ORDER — ACETAMINOPHEN 500 MG
1000 TABLET ORAL ONCE
Refills: 0 | Status: COMPLETED | OUTPATIENT
Start: 2023-12-06 | End: 2023-12-06

## 2023-12-06 RX ADMIN — PANTOPRAZOLE SODIUM 40 MILLIGRAM(S): 20 TABLET, DELAYED RELEASE ORAL at 18:12

## 2023-12-06 RX ADMIN — Medication 15 MILLIGRAM(S): at 18:12

## 2023-12-06 RX ADMIN — Medication 40 MILLIEQUIVALENT(S): at 13:10

## 2023-12-06 RX ADMIN — Medication 400 MILLIGRAM(S): at 12:34

## 2023-12-06 NOTE — ED ADULT NURSE REASSESSMENT NOTE - NS ED NURSE REASSESS COMMENT FT1
report received from ALDO HARP. patient resting in NAD. patient on 2 L via NC at this time. patient denies active chest pain at this time but does endorse intermittent palpitations. patient appears diaphoretic at this time, rectal temp 98.7. MD Tiwari made aware. repeat ECG obtained. patient pending CTs. updated on plan of care. comfort and safety maintained

## 2023-12-06 NOTE — ED PROVIDER NOTE - CLINICAL SUMMARY MEDICAL DECISION MAKING FREE TEXT BOX
Attending MD Voss:  66-year-old gentleman with a history of hypertension hyperlipidemia presenting for evaluation of 4 to 5 days of severe body pain, 1 day of fever.  Patient denies any chest pain, initially when I interviewed him he stated no shortness of breath however his wife corrected him stating that he seems to be "huffing and puffing" since he has been sick for the last 4 to 5 days however chronicity of symptoms are bit unclear.  Patient has been taking 800 mg of Motrin x2 tablets for the past 2 days.  No travel.  No abdominal pain.  Urinating frequently but no dysuria.  No diarrhea.    Patient's vital signs are notable for heart rate 114 otherwise nonactionable.  Patient sitting in the stretcher in no apparent distress.  Oxygen saturation 95% in room air heart tachycardic with clear lungs.  Abdomen nontender.  Extremities warm and well-perfused.  No calf tenderness or swelling bilaterally.    Patient presenting for evaluation of 4 to 5 days of body aches, 1 day of fever with associated shortness of breath.  Differential diagnosis includes but is not limited to viral URI, pneumonia, CHF, pulmonary embolism.  Plan for labs chest x-ray D-dimer to exclude PE, screening biomarkers and reassessment.      *The above represents an initial assessment/impression. Please refer to progress notes for potential changes in patient clinical course*

## 2023-12-06 NOTE — ED PROVIDER NOTE - NS ED ATTENDING STATEMENT MOD
Attending with This was a shared visit with the GERI. I reviewed and verified the documentation and independently performed the documented:

## 2023-12-06 NOTE — ED PROVIDER NOTE - PATIENT PORTAL LINK FT
You can access the FollowMyHealth Patient Portal offered by [facility] by registering at the following website: [email or web address]. By joining [facility]’s FollowMyHealth portal, you will also be able to view your health information using other applications (apps) compatible with our system. You can access the FollowMyHealth Patient Portal offered by [facility] by registering at the following website: [email or web address]. By joining [facility]’s FollowMyHealth portal, you will also be able to view your health information using other applications (apps) compatible with our system.

## 2023-12-06 NOTE — ED PROVIDER NOTE - ATTENDING CONTRIBUTION TO CARE
Attending MD Voss:  I personally have seen and examined this patient. I have performed a substantive portion of the visit including all aspects of the medical decision making.  Resident note reviewed and agree on plan of care and except where noted.          *The above represents an initial assessment/impression. Please refer to progress notes for potential changes in patient clinical course*

## 2023-12-06 NOTE — ED ADULT NURSE NOTE - OBJECTIVE STATEMENT
Pt presents for eval of fever of 102.4 this morning, body aches, mostly lower torso and legs, increased urinary output, but no other urinary sx and increased shortness of breath, over and above his baseline.  He is able to speak in full sentences,  Oral mucosa slightly dry, not cracked. Pt presents for eval of fever of 102.4 this morning, body aches, mostly lower torso and legs, increased urinary output, but no other urinary sx and increased shortness of breath, over and above his baseline.  Reports pain was so severe that he took multiple doses of 1600 mg Motrin over past 2-3 days.  He is able to speak in full sentences,  Oral mucosa slightly dry, not cracked, skin warm and dry, color good.  Denies cough, sinus congestion or sick contacts.

## 2023-12-06 NOTE — ED PROVIDER NOTE - ATTENDING APP SHARED VISIT CONTRIBUTION OF CARE
Attending MD Voss:   I personally have seen and examined this patient. I have performed a substantive portion of the visit including all aspects of the medical decision making.   Physician assistant note reviewed and agree on plan of care and except where noted.                *The above represents an initial assessment/impression. Please refer to progress notes for potential changes in patient clinical course*

## 2023-12-06 NOTE — ED PROVIDER NOTE - NSFOLLOWUPINSTRUCTIONS_ED_ALL_ED_FT
Follow up with your cardiologist Dr. Rendon within the next 2-3 days  Bring a copy of your test results with you to your appointment  Continue your current medication regimen  Return to the Emergency Room if you experience new or worsening symptoms abdominal pain, nausea, vomiting, fever chills, cough, chest pain, shortness of breath, dizziness, slurred speech, weakness, gait abnormality

## 2023-12-06 NOTE — ED ADULT NURSE NOTE - NSFALLUNIVINTERV_ED_ALL_ED
Bed/Stretcher in lowest position, wheels locked, appropriate side rails in place/Call bell, personal items and telephone in reach/Instruct patient to call for assistance before getting out of bed/chair/stretcher/Non-slip footwear applied when patient is off stretcher/[place] to call system/Physically safe environment - no spills, clutter or unnecessary equipment/Purposeful proactive rounding/Room/bathroom lighting operational, light cord in reach Bed/Stretcher in lowest position, wheels locked, appropriate side rails in place/Call bell, personal items and telephone in reach/Instruct patient to call for assistance before getting out of bed/chair/stretcher/Non-slip footwear applied when patient is off stretcher/[place] to call system/Physically safe environment - no spills, clutter or unnecessary equipment/Purposeful proactive rounding/Room/bathroom lighting operational, light cord in reach

## 2023-12-07 ENCOUNTER — TRANSCRIPTION ENCOUNTER (OUTPATIENT)
Age: 68
End: 2023-12-07

## 2023-12-11 ENCOUNTER — TRANSCRIPTION ENCOUNTER (OUTPATIENT)
Age: 68
End: 2023-12-11

## 2023-12-11 LAB
CULTURE RESULTS: SIGNIFICANT CHANGE UP
SPECIMEN SOURCE: SIGNIFICANT CHANGE UP

## 2024-01-31 ENCOUNTER — APPOINTMENT (OUTPATIENT)
Dept: OPHTHALMOLOGY | Facility: CLINIC | Age: 69
End: 2024-01-31

## 2024-02-15 NOTE — H&P ADULT - NSHPPOAPULMEMBOLUS_GEN_A_CORE
I called and left a message on voicemail, letting patient know that new Rx has been called into [facility] as requested.    no

## 2024-02-29 ENCOUNTER — RX RENEWAL (OUTPATIENT)
Age: 69
End: 2024-02-29

## 2024-03-15 ENCOUNTER — RX RENEWAL (OUTPATIENT)
Age: 69
End: 2024-03-15

## 2024-04-02 ENCOUNTER — RX RENEWAL (OUTPATIENT)
Age: 69
End: 2024-04-02

## 2024-04-29 ENCOUNTER — APPOINTMENT (OUTPATIENT)
Dept: INTERNAL MEDICINE | Facility: CLINIC | Age: 69
End: 2024-04-29
Payer: MEDICARE

## 2024-04-29 VITALS
HEART RATE: 91 BPM | OXYGEN SATURATION: 97 % | SYSTOLIC BLOOD PRESSURE: 135 MMHG | HEIGHT: 72 IN | WEIGHT: 246 LBS | DIASTOLIC BLOOD PRESSURE: 80 MMHG | BODY MASS INDEX: 33.32 KG/M2 | TEMPERATURE: 97.8 F

## 2024-04-29 DIAGNOSIS — M54.16 RADICULOPATHY, LUMBAR REGION: ICD-10-CM

## 2024-04-29 DIAGNOSIS — G47.33 OBSTRUCTIVE SLEEP APNEA (ADULT) (PEDIATRIC): ICD-10-CM

## 2024-04-29 DIAGNOSIS — E66.9 OBESITY, UNSPECIFIED: ICD-10-CM

## 2024-04-29 DIAGNOSIS — I10 ESSENTIAL (PRIMARY) HYPERTENSION: ICD-10-CM

## 2024-04-29 DIAGNOSIS — G44.011 EPISODIC CLUSTER HEADACHE, INTRACTABLE: ICD-10-CM

## 2024-04-29 DIAGNOSIS — E55.9 VITAMIN D DEFICIENCY, UNSPECIFIED: ICD-10-CM

## 2024-04-29 DIAGNOSIS — M47.816 SPONDYLOSIS W/OUT MYELOPATHY OR RADICULOPATHY, LUMBAR REGION: ICD-10-CM

## 2024-04-29 DIAGNOSIS — K21.9 GASTRO-ESOPHAGEAL REFLUX DISEASE W/OUT ESOPHAGITIS: ICD-10-CM

## 2024-04-29 DIAGNOSIS — F41.8 OTHER SPECIFIED ANXIETY DISORDERS: ICD-10-CM

## 2024-04-29 DIAGNOSIS — M10.9 GOUT, UNSPECIFIED: ICD-10-CM

## 2024-04-29 DIAGNOSIS — Z00.00 ENCOUNTER FOR GENERAL ADULT MEDICAL EXAMINATION W/OUT ABNORMAL FINDINGS: ICD-10-CM

## 2024-04-29 DIAGNOSIS — N40.1 BENIGN PROSTATIC HYPERPLASIA WITH LOWER URINARY TRACT SYMPMS: ICD-10-CM

## 2024-04-29 PROCEDURE — G0439: CPT

## 2024-04-29 PROCEDURE — G2211 COMPLEX E/M VISIT ADD ON: CPT | Mod: NC,1L

## 2024-04-29 RX ORDER — TAMSULOSIN HYDROCHLORIDE 0.4 MG/1
0.4 CAPSULE ORAL
Qty: 90 | Refills: 3 | Status: ACTIVE | COMMUNITY
Start: 2024-04-29 | End: 1900-01-01

## 2024-04-30 RX ORDER — ASPIRIN 81 MG/1
81 TABLET ORAL
Qty: 90 | Refills: 3 | Status: DISCONTINUED | COMMUNITY
Start: 2021-06-10 | End: 2024-04-30

## 2024-04-30 RX ORDER — ACETAMINOPHEN 325 MG/1
325 TABLET ORAL
Qty: 30 | Refills: 0 | Status: DISCONTINUED | COMMUNITY
Start: 2023-12-28

## 2024-04-30 RX ORDER — ALBUTEROL SULFATE 90 UG/1
108 (90 BASE) INHALANT RESPIRATORY (INHALATION)
Qty: 1 | Refills: 6 | Status: DISCONTINUED | COMMUNITY
Start: 2021-03-19 | End: 2024-04-30

## 2024-04-30 RX ORDER — METHYLPREDNISOLONE 4 MG/1
4 TABLET ORAL
Qty: 21 | Refills: 0 | Status: DISCONTINUED | COMMUNITY
Start: 2024-01-02

## 2024-04-30 RX ORDER — HYDROCHLOROTHIAZIDE 50 MG/1
50 TABLET ORAL DAILY
Qty: 90 | Refills: 3 | Status: DISCONTINUED | COMMUNITY
Start: 2020-03-31 | End: 2024-04-30

## 2024-04-30 RX ORDER — IBUPROFEN 600 MG/1
600 TABLET, FILM COATED ORAL
Qty: 30 | Refills: 0 | Status: DISCONTINUED | COMMUNITY
Start: 2023-12-28

## 2024-04-30 NOTE — HISTORY OF PRESENT ILLNESS
[de-identified] : 68-year-old male with hypertension, hyperlipidemia, and GERD here for CPE. Has h/o chronic low back pain and had been seeing pain service. S/p back surgery. some relief but pain is still present. Not taking gabapentin any more. Lost some weight.  Feels fatigued. has nocturia   Bone spur pain.  Of Note cardiac w/u before surgery with t-wave flattening and mildly reversible perfusion defect.  Patient was without symptoms and no further therapy of other intervention needed.  Last surgery left him with significant lower extremity and upper extremity deficits that  have mostly resolved.   Recent divorce. Nocturia/ polyuria. Stopped tamsulosin and finasteride because it didn't't work.

## 2024-04-30 NOTE — ASSESSMENT
[FreeTextEntry1] : 68-year-old male with hypertension, hyperlipidemia, and GERD here for CPE. Has h/o chronic low back pain and had been seeing pain service. S/p back surgery. some relief but pain is still present. Not taking gabapentin any more. Lost some weight.  Feels fatigued. has nocturia   Bone spur pain.  Of Note cardiac w/u before surgery with t-wave flattening and mildly reversible perfusion defect.  Patient was without symptoms and no further therapy of other intervention needed.  Last surgery left him with significant lower extremity and upper extremity deficits that  have mostly resolved.   Recent divorce. Nocturia/ polyuria. Stopped tamsulosin and finasteride because it didn't't work.   Problem # 1 Depression/Anxiety: No longer on Cymbalta. doing better.  Problem # 2 GERD: continue omeprazole.  Problem # 3 Gout: no acute episodes. . on Allopurinol Problem # 4 Back pain: on NSAIDs as needed.   Sees pain management and has received nerve blocks Problem # 5 HTN: Controlled today on meds.  Problem # 6 hyperlipidemia: improved on Lipitor.  Problem # 7 BPH: Had bee on tamsulosin.  Restart Problem # 8 CAD: stable.  ON ASA.  Risk factor management.  Problem # 9 Obesity:  wegovy was not covered. Has h/o cardiac disease so phentermine/topiramate not a good idea.  Consider Metformin.  Problem # 10 Health care maintenance: Flu shot this fall. Had COVID vaccines s/p COVID. Needs eye exam. Check labs. Derm referral.

## 2024-04-30 NOTE — HEALTH RISK ASSESSMENT
[Good] : ~his/her~  mood as  good [No] : In the past 12 months have you used drugs other than those required for medical reasons? No [No falls in past year] : Patient reported no falls in the past year [2] : 2) Feeling down, depressed, or hopeless for more than half of the days (2) [PHQ-2 Positive] : PHQ-2 Positive [Several Days (1)] : 7.) Trouble concentrating on things, such as reading a newspaper or watching television? Several days [Not at All (0)] : 8.) Moving or speaking so slowly that other people could have noticed, or the opposite, moving or speaking faster than usual? Not at all [Mild] : Severity of Depression is Mild [Somewhat Difficult] : How difficult have these problems made it for you to do your work, take care of things at home, or get along with people? Somewhat difficult [I have developed a follow-up plan documented below in the note.] : I have developed a follow-up plan documented below in the note. [No Retinopathy] : No retinopathy [Patient reported colonoscopy was normal] : Patient reported colonoscopy was normal [None] : None [Alone] : lives alone [On disability] : on disability [College] : College [] :  [Feels Safe at Home] : Feels safe at home [Fully functional (bathing, dressing, toileting, transferring, walking, feeding)] : Fully functional (bathing, dressing, toileting, transferring, walking, feeding) [Fully functional (using the telephone, shopping, preparing meals, housekeeping, doing laundry, using] : Fully functional and needs no help or supervision to perform IADLs (using the telephone, shopping, preparing meals, housekeeping, doing laundry, using transportation, managing medications and managing finances) [Reports normal functional visual acuity (ie: able to read med bottle)] : Reports normal functional visual acuity [Audit-CScore] : 0 [de-identified] : physical therapy [de-identified] : low fat [[facility]] : 5 [EBW6Wmjlj] : 4 [[id number]] : 7 [LowDoseCTScan] : NA [Change in mental status noted] : No change in mental status noted [Language] : denies difficulty with language [Behavior] : denies difficulty with behavior [Learning/Retaining New Information] : denies difficulty learning/retaining new information [Handling Complex Tasks] : denies difficulty handling complex tasks [Reasoning] : denies difficulty with reasoning [Spatial Ability and Orientation] : denies difficulty with spatial ability and orientation [Sexually Active] : not sexually active [High Risk Behavior] : no high risk behavior [Reports changes in hearing] : Reports no changes in hearing [Reports changes in vision] : Reports no changes in vision [Reports changes in dental health] : Reports no changes in dental health [MammogramDate] : NA [PapSmearDate] : NA [ColonoscopyDate] : 10/2011

## 2024-05-02 ENCOUNTER — RX RENEWAL (OUTPATIENT)
Age: 69
End: 2024-05-02

## 2024-05-02 RX ORDER — ADHESIVE TAPE 3"X 2.3 YD
50 MCG TAPE, NON-MEDICATED TOPICAL
Qty: 90 | Refills: 3 | Status: ACTIVE | COMMUNITY
Start: 2022-04-13 | End: 1900-01-01

## 2024-06-03 ENCOUNTER — NON-APPOINTMENT (OUTPATIENT)
Age: 69
End: 2024-06-03

## 2024-06-03 LAB
25(OH)D3 SERPL-MCNC: 32.2 NG/ML
ALBUMIN SERPL ELPH-MCNC: 4.8 G/DL
ALP BLD-CCNC: 111 U/L
ALT SERPL-CCNC: 29 U/L
ANION GAP SERPL CALC-SCNC: 17 MMOL/L
AST SERPL-CCNC: 21 U/L
BASOPHILS # BLD AUTO: 0.04 K/UL
BASOPHILS NFR BLD AUTO: 0.4 %
BILIRUB SERPL-MCNC: 0.6 MG/DL
BUN SERPL-MCNC: 23 MG/DL
CALCIUM SERPL-MCNC: 9.6 MG/DL
CHLORIDE SERPL-SCNC: 105 MMOL/L
CHOLEST SERPL-MCNC: 172 MG/DL
CO2 SERPL-SCNC: 20 MMOL/L
CREAT SERPL-MCNC: 1.36 MG/DL
EGFR: 57 ML/MIN/1.73M2
EOSINOPHIL # BLD AUTO: 0.15 K/UL
EOSINOPHIL NFR BLD AUTO: 1.6 %
ESTIMATED AVERAGE GLUCOSE: 131 MG/DL
GLUCOSE SERPL-MCNC: 120 MG/DL
HBA1C MFR BLD HPLC: 6.2 %
HCT VFR BLD CALC: 39.4 %
HDLC SERPL-MCNC: 37 MG/DL
HGB BLD-MCNC: 13.6 G/DL
IMM GRANULOCYTES NFR BLD AUTO: 0.1 %
LDLC SERPL CALC-MCNC: 87 MG/DL
LYMPHOCYTES # BLD AUTO: 3.91 K/UL
LYMPHOCYTES NFR BLD AUTO: 42 %
MAN DIFF?: NORMAL
MCHC RBC-ENTMCNC: 33.3 PG
MCHC RBC-ENTMCNC: 34.5 GM/DL
MCV RBC AUTO: 96.3 FL
MONOCYTES # BLD AUTO: 0.53 K/UL
MONOCYTES NFR BLD AUTO: 5.7 %
NEUTROPHILS # BLD AUTO: 4.67 K/UL
NEUTROPHILS NFR BLD AUTO: 50.2 %
NONHDLC SERPL-MCNC: 135 MG/DL
PLATELET # BLD AUTO: 171 K/UL
POTASSIUM SERPL-SCNC: 4 MMOL/L
PROT SERPL-MCNC: 6.6 G/DL
PSA SERPL-MCNC: 0.93 NG/ML
RBC # BLD: 4.09 M/UL
RBC # FLD: 15.4 %
SODIUM SERPL-SCNC: 142 MMOL/L
TRIGL SERPL-MCNC: 291 MG/DL
TSH SERPL-ACNC: 2.51 UIU/ML
WBC # FLD AUTO: 9.31 K/UL

## 2024-06-04 ENCOUNTER — APPOINTMENT (OUTPATIENT)
Dept: DERMATOLOGY | Facility: CLINIC | Age: 69
End: 2024-06-04
Payer: MEDICARE

## 2024-06-04 VITALS — BODY MASS INDEX: 33.6 KG/M2 | HEIGHT: 71 IN | WEIGHT: 240 LBS

## 2024-06-04 DIAGNOSIS — Z12.83 ENCOUNTER FOR SCREENING FOR MALIGNANT NEOPLASM OF SKIN: ICD-10-CM

## 2024-06-04 DIAGNOSIS — L30.9 DERMATITIS, UNSPECIFIED: ICD-10-CM

## 2024-06-04 DIAGNOSIS — D22.9 MELANOCYTIC NEVI, UNSPECIFIED: ICD-10-CM

## 2024-06-04 PROCEDURE — 99204 OFFICE O/P NEW MOD 45 MIN: CPT | Mod: 25

## 2024-06-04 PROCEDURE — 11102 TANGNTL BX SKIN SINGLE LES: CPT

## 2024-06-04 RX ORDER — MOMETASONE FUROATE 1 MG/G
0.1 OINTMENT TOPICAL
Qty: 1 | Refills: 0 | Status: ACTIVE | COMMUNITY
Start: 2024-06-04 | End: 1900-01-01

## 2024-06-04 NOTE — HISTORY OF PRESENT ILLNESS
[FreeTextEntry1] : NP spots, bump under right axilla [de-identified] : NP Here for eval of moles and spots on body None new, growing, changing to his knowledge Also with tender bump in right axilla, frequently gets tender   SH: Here with daughter

## 2024-06-04 NOTE — PHYSICAL EXAM
[FreeTextEntry3] :  AAOx3, NAD, well-appearing / pleasant Total body skin exam performed within normal limits with the exception of:   Right cheek with pink-white papule Right axilla with mobile subcutaneous nodule Pink scaly thin plaque right dorsal hand Scattered well demarcated stuck on appearing brown plaques on the trunk and extremities. Fairly uniform and regular brown macules and papules on the trunk and extremities

## 2024-06-04 NOTE — ASSESSMENT
[FreeTextEntry1] : # Neoplasm of uncertain behavior Location: Right cheek DDx: R/o angioma vs NMSC vs other Biopsy by shave The risks/benefits/alternatives of skin biopsy were explained to the patient, which include and are not limited to bleeding, infection, scarring or discoloration of skin, and recurrence of lesion. Patient expressed understanding of these risks and provided consent to the procedure. Time out with verification of patient and lesion site was performed. Site was prepped with rubbing alcohol, lidocaine with epinephrine was injected for anesthesia, and biopsy was performed. Specimen sent to path.  Procedure was without complication and well tolerated. Wound care was discussed.  #Dermatitis R dorsal hand -Mometasone oint to AA bid as needed M-F until resolution. SED -If not resolved in 4-6 weeks, RTC as may need cryo or bx  #Cyst - Discussed benign appearance on today's exam, though definitive diagnosis and treatment would require surgical excision. However, if unchanging and not symptomatic, reasonable to watch - Disc can also treat symptomatically when inflamed with ILK - Given symptomatic nature, patient elects to proceed with excision. Risks/benefits reviewed. Pt will schedule  #SKs  - discussed benign nature; no therapy indicated at this time  # Multiple melanocytic nevi, all benign appearing on today's exam -Sun protection was discussed. The proper use of broad spectrum UVB/UVA sunscreen with SPF 30 or greater was reviewed and the need for re-application after swimming or sweating or 2-3 hours was emphasized. We discussed judicious use of clothing and avoidance of peak periods of sun exposure. I made the patient aware of need for year-round protection. ABCDEs of melanoma reviewed. -Self-skin check also reviewed -Counseled pt to monitor for changes   # Screening for skin cancer -ABCDEs of melanoma, sun safety, and self-skin check reviewed -Counseled pt to notify us of any changing or concerning lesions -RTC 12 months, sooner prn

## 2024-06-09 LAB — DERMATOLOGY BIOPSY: NORMAL

## 2024-06-10 ENCOUNTER — NON-APPOINTMENT (OUTPATIENT)
Age: 69
End: 2024-06-10

## 2024-06-11 ENCOUNTER — APPOINTMENT (OUTPATIENT)
Dept: DERMATOLOGY | Facility: CLINIC | Age: 69
End: 2024-06-11
Payer: MEDICARE

## 2024-06-11 PROCEDURE — 12031 INTMD RPR S/A/T/EXT 2.5 CM/<: CPT

## 2024-06-11 PROCEDURE — 11402 EXC TR-EXT B9+MARG 1.1-2 CM: CPT

## 2024-06-11 NOTE — HISTORY OF PRESENT ILLNESS
[FreeTextEntry1] : fu: excision of EIC R axilla [de-identified] : 69 y/o M here for excision of R axilla EIC, frequently gets tender 06/11/2024  Pertinent positives noted below History of HIV or hepatitis: No Blood thinners: aspirin Antibiotic Prophylaxis: None Medical implants: remote hx of spinal fusion  The patient's review of systems questionnaire was reviewed. Education needs were identified. There were no barriers to learning

## 2024-06-11 NOTE — ASSESSMENT
[FreeTextEntry1] : #neoplasm of uncertain behavior of skin Excision Operative Note  Indications: Alternative therapies were discussed. Given the size, location, and tumor type we decided that excision offers the best option for treatment.  Preoperative diagnosis: EIC Postoperative diagnosis: Same Location: R axilla Anesthetic: 1% lidocaine with 1:100,000 epinephrine Antiseptic: Chlorhexidine  Attending surgeon: Juju Crabtree MD Assistant(s): Sergio Yao MD (Dermsurgical resident) Estimated blood loss: < 5cc Complications: None Initial lesion size: 1.5cm x 0.5cm Surgical margin: NA Total excision size (always includes surgical margin): 1.5 x 0.5 cm Closure type: linear Length of closure: 1.8 cm Suture material: 4-0 Vicryl, 4-0 prolene The patient was brought back to the minor surgery operating room. Prior to the procedure the medical record was reviewed by the physician. A pre-procedure checklist in the presence of the patient was reviewed. A surgery " timeout " was observed. The following were confirmed during the surgery timeout: patient name, confirmation of consent, patient position, allergies, type of anesthesia, and antibiotic given (if any, see emr entry for any medications). The risks of the procedure, including bleeding, infection, nerve damage, possibility of recurrence, failure of the repair, and the certainty of a scar were discussed with the patient. Alternatives to the procedure, as well as their risks and benefits, were also discussed. The patient was offered an opportunity to ask any questions and, after expressing understanding of the proposed procedure and its alternatives, the patient signed the consent form. The patient was placed in appropriate position and the area was prepared and draped in the usual manner. Local anesthesia was obtained with the above mentioned anesthetic. Using a sterile surgical marking pen, an elliptical (or circular) excision was designed around the lesion and along relaxed skin tension lines, if possible, incorporating the margin of normal-appearing skin mentioned above. A full thickness skin incision using a #15 blade Bard-Simone scalpel was performed and the lesion was excised sharply in the subcutaneous plane. The specimen was submitted for histopathologic examination.  The defect was moderately undermined in the subcutaneous plane if needed to reduce wound closure tension and allow for easy wound edge eversion. Hemostasis was maintained with the electrosurgical unit. Interrupted, inverted, subcuticular buried mattress sutures were placed using the material mentioned above to approximate the dermal edges of the wound. Interrupted and running simple cutaneous sutures were used to further approximate and garret the wound edges. The final length of the repair is listed in the summary above. A firm pressure dressing was applied over vaseline ointment and Telfa. Verbal postoperative wound care instructions were given and a wound care hand out was dispensed. The patient was asked to follow up for a wound check as specified. The patient was also told to call us at anytime for signs of wound infection or any other concerns they might have regarding the surgery or the healing process.  The patient tolerated the procedure well and ambulated from the operatory without problem.  RTC 2 weeks (interested in skin tag removal then as well)

## 2024-06-17 LAB — DERMATOLOGY BIOPSY: NORMAL

## 2024-06-20 ENCOUNTER — NON-APPOINTMENT (OUTPATIENT)
Age: 69
End: 2024-06-20

## 2024-06-24 ENCOUNTER — NON-APPOINTMENT (OUTPATIENT)
Age: 69
End: 2024-06-24

## 2024-06-25 ENCOUNTER — APPOINTMENT (OUTPATIENT)
Dept: DERMATOLOGY | Facility: CLINIC | Age: 69
End: 2024-06-25
Payer: MEDICARE

## 2024-06-25 ENCOUNTER — APPOINTMENT (OUTPATIENT)
Dept: DERMATOLOGY | Facility: CLINIC | Age: 69
End: 2024-06-25
Payer: SELF-PAY

## 2024-06-25 DIAGNOSIS — L82.1 OTHER SEBORRHEIC KERATOSIS: ICD-10-CM

## 2024-06-25 DIAGNOSIS — L91.8 OTHER HYPERTROPHIC DISORDERS OF THE SKIN: ICD-10-CM

## 2024-06-25 DIAGNOSIS — D48.5 NEOPLASM OF UNCERTAIN BEHAVIOR OF SKIN: ICD-10-CM

## 2024-06-25 DIAGNOSIS — L72.9 FOLLICULAR CYST OF THE SKIN AND SUBCUTANEOUS TISSUE, UNSPECIFIED: ICD-10-CM

## 2024-06-25 PROCEDURE — 99212 OFFICE O/P EST SF 10 MIN: CPT | Mod: 25

## 2024-06-25 PROCEDURE — D0134: CPT

## 2024-06-25 PROCEDURE — 11102 TANGNTL BX SKIN SINGLE LES: CPT

## 2024-06-25 PROCEDURE — D0125: CPT

## 2024-07-02 LAB — DERMATOLOGY BIOPSY: NORMAL

## 2024-08-13 ENCOUNTER — RX RENEWAL (OUTPATIENT)
Age: 69
End: 2024-08-13

## 2024-08-18 NOTE — PRE PROCEDURE NOTE - GENERAL PROCEDURE NAME
EGD and colonoscopy Bed in lowest position, wheels locked, appropriate side rails in place/Call bell, personal items and telephone in reach/Instruct patient to call for assistance before getting out of bed or chair/Non-slip footwear when patient is out of bed/[place] to call system/Physically safe environment - no spills, clutter or unnecessary equipment/Purposeful Proactive Rounding/Room/bathroom lighting operational, light cord in reach

## 2024-08-20 ENCOUNTER — RX RENEWAL (OUTPATIENT)
Age: 69
End: 2024-08-20

## 2024-08-21 ENCOUNTER — RX RENEWAL (OUTPATIENT)
Age: 69
End: 2024-08-21

## 2024-09-02 ENCOUNTER — RX RENEWAL (OUTPATIENT)
Age: 69
End: 2024-09-02

## 2024-09-20 NOTE — PROGRESS NOTE ADULT - ASSESSMENT
1. PNA 2nd to Covid-19  - PCR positive.   - CXR noted with  increasing infiltrates.   - Continue Vit C, Vit D, Zinc   - Off Dexamethasone, Off Remdesivir  - Continue Singulair and Pepcid   - ICU consult noted - no escalation at present.   - Robitussin PRN for cough  - Tylenol PRN for temp   - O2 Supp - Taper O2 as tolerated; NRM and pendent - alernating.   - Monitor O2 Sat.  - Monitor labs  - F.U CXR  - Unable to tolerate prone positioning due to prior neck surgery.   - Encouraged side lying periodically as tolerated.   - Incentive Spirometry  - Isolation precautions   - Nasal saline spray for dryness   - Cont Ensure Compact    - DVT and GI PPX     2. HTN  - Continue meds   - Monitor BP     3. HLD  - Cont meds  - Lipid panel     4. GERD  - Antireflux diet   - Cont meds    5. MECHELLE  Improved  Avoid nephrotoxic drugs  monitor BMP  IVF Yes

## 2024-09-26 NOTE — PROGRESS NOTE ADULT - PROBLEM SELECTOR PLAN 1
ambulatory -SpO2 93% 3L optimizer at rest  -COVID antibody negative  -completed remdersivir  -completed  decadron   -c/w incentive spirometer therapy  -f/u repeat chest x-ray 3/12  Pulmonology Dr. Reid

## 2024-10-02 ENCOUNTER — APPOINTMENT (OUTPATIENT)
Dept: INTERNAL MEDICINE | Facility: CLINIC | Age: 69
End: 2024-10-02
Payer: MEDICARE

## 2024-10-02 VITALS
HEIGHT: 71 IN | TEMPERATURE: 98 F | OXYGEN SATURATION: 96 % | SYSTOLIC BLOOD PRESSURE: 112 MMHG | DIASTOLIC BLOOD PRESSURE: 71 MMHG | HEART RATE: 101 BPM | BODY MASS INDEX: 34.86 KG/M2 | WEIGHT: 249 LBS

## 2024-10-02 DIAGNOSIS — Z87.891 PERSONAL HISTORY OF NICOTINE DEPENDENCE: ICD-10-CM

## 2024-10-02 DIAGNOSIS — Z82.3 FAMILY HISTORY OF STROKE: ICD-10-CM

## 2024-10-02 DIAGNOSIS — Z00.00 ENCOUNTER FOR GENERAL ADULT MEDICAL EXAMINATION W/OUT ABNORMAL FINDINGS: ICD-10-CM

## 2024-10-02 DIAGNOSIS — K21.9 GASTRO-ESOPHAGEAL REFLUX DISEASE W/OUT ESOPHAGITIS: ICD-10-CM

## 2024-10-02 DIAGNOSIS — E78.1 PURE HYPERGLYCERIDEMIA: ICD-10-CM

## 2024-10-02 DIAGNOSIS — M10.9 GOUT, UNSPECIFIED: ICD-10-CM

## 2024-10-02 DIAGNOSIS — M25.551 PAIN IN RIGHT HIP: ICD-10-CM

## 2024-10-02 DIAGNOSIS — E78.5 HYPERLIPIDEMIA, UNSPECIFIED: ICD-10-CM

## 2024-10-02 DIAGNOSIS — F41.8 OTHER SPECIFIED ANXIETY DISORDERS: ICD-10-CM

## 2024-10-02 DIAGNOSIS — I10 ESSENTIAL (PRIMARY) HYPERTENSION: ICD-10-CM

## 2024-10-02 DIAGNOSIS — E66.9 OBESITY, UNSPECIFIED: ICD-10-CM

## 2024-10-02 PROCEDURE — 99214 OFFICE O/P EST MOD 30 MIN: CPT | Mod: 25

## 2024-10-02 PROCEDURE — 93000 ELECTROCARDIOGRAM COMPLETE: CPT | Mod: 59

## 2024-10-03 ENCOUNTER — LABORATORY RESULT (OUTPATIENT)
Age: 69
End: 2024-10-03

## 2024-10-03 DIAGNOSIS — D69.6 THROMBOCYTOPENIA, UNSPECIFIED: ICD-10-CM

## 2024-10-03 NOTE — PROGRESS NOTE ADULT - PROBLEM SELECTOR PROBLEM 5
GERD (gastroesophageal reflux disease)
Please clarify the integumentary diagnosis related to the documentation outlined in the query message.  Moisture associated dermatitis due to Fecal, Urinary, or Dual Incontinence   
GERD (gastroesophageal reflux disease)

## 2024-10-04 NOTE — ASSESSMENT
[Patient Optimized for Surgery] : Patient optimized for surgery [No Further Testing Recommended] : no further testing recommended [Modify anti-platelet treatment prior to procedure] : Modify anti-platelet treatment prior to procedure [Modify medications prior to procedure] : Modify medications prior to procedure [As per surgery] : as per surgery [High Risk Surgery - Intraperitoneal, Intrathoracic or Supringuinal Vascular Procedures] : High Risk Surgery - Intraperitoneal, Intrathoracic or Supringuinal Vascular Procedures - No (0) [Ischemic Heart Disease] : Ischemic Heart Disease - No (0) [Congestive Heart Failure] : Congestive Heart Failure - No (0) [Prior Cerebrovascular Accident or TIA] : Prior Cerebrovascular Accident or TIA - No (0) [Creatinine >= 2mg/dL (1 Point)] : Creatinine >= 2mg/dL - No (0) [Insulin-dependent Diabetic (1 Point)] : Insulin-dependent Diabetic - No (0) [0] : 0 , RCRI Class: I, Risk of Post-Op Cardiac Complications: 3.9%, 95% CI for Risk Estimate: 2.8% - 5.4% [Patient Requires Further Testing] : Patient requires further testing [Other: _____] : [unfilled] [FreeTextEntry4] : 69-year-old male with hypertension, asthma, ODIN, hyperlipidemia, and GERD here for CPE. Has h/o chronic low back pain and had been seeing pain service. Here for pre-op prior to r hip replacement.  Has h/o back surgery with no adverse events during surgery or due to anesthesia.  Has h/o non obstructive CAD with nl cath 3 years ago and mildly abnormal stress test. No bleeding diathesis.  Of note abnormal CBC with anemia, elevated MCV and RDW with drop in platelets.  Also worsening Kidney function with creatinine of 1.6. TSH, retic count, and other labs pending. Patient will stop NSAIDs, maintain hydration. Although still at an acceptable risk for surgery, would recommend hematology consult prior to surgery given elective procedure

## 2024-10-04 NOTE — HISTORY OF PRESENT ILLNESS
[No Pertinent Cardiac History] : no history of aortic stenosis, atrial fibrillation, coronary artery disease, recent myocardial infarction, or implantable device/pacemaker [Asthma] : asthma [Sleep Apnea] : sleep apnea [No Adverse Anesthesia Reaction] : no adverse anesthesia reaction in self or family member [(Patient denies any chest pain, claudication, dyspnea on exertion, orthopnea, palpitations or syncope)] : Patient denies any chest pain, claudication, dyspnea on exertion, orthopnea, palpitations or syncope [Moderate (4-6 METs)] : Moderate (4-6 METs) [Anti-Platelet Agents: _____] : Anti-Platelet Agents: [unfilled] [NSAIDs: _____] : NSAIDs: [unfilled] [Coronary Artery Disease] : coronary artery disease [Aortic Stenosis] : no aortic stenosis [Atrial Fibrillation] : no atrial fibrillation [Recent Myocardial Infarction] : no recent myocardial infarction [Implantable Device/Pacemaker] : no implantable device/pacemaker [Chronic Anticoagulation] : no chronic anticoagulation [Chronic Kidney Disease] : no chronic kidney disease [Diabetes] : no diabetes [FreeTextEntry1] : Right Total Hip [FreeTextEntry2] : 10/22/2024 [FreeTextEntry3] : DELL Shore [FreeTextEntry4] : 69-year-old male with hypertension, asthma, ODIN, hyperlipidemia, and GERD here for CPE. Has h/o chronic low back pain and had been seeing pain service.  [FreeTextEntry7] : 6-2021 Cardiac Cath - no angiographic evidence of coronary artery disease 9-2018 Mildly abnormal study

## 2024-10-04 NOTE — RESULTS/DATA
[] : results reviewed [Normal] : The 12 - lead ECG is normal [NSR] : normal sinus rhythm [Atrial Rate___] : the atrial rate is [unfilled] beats per minute [Ventricular Rate___] : ventricular rate is [unfilled] beats per minute [P Waves Normal] : the P wave is normal [ECG Intervals MN.] : MN interval is normal [Normal QRS] : the QRS is normal [ECG Axis] : the QRS axis is normal [Normal ST Segments] : the ST segments are normal [ECG T. Waves] : normal [No Interval Change] : no interval change [de-identified] : Hgb 12.3; .4; RDW 15.8; Plts 108 [de-identified] : Creatinine 1.6; BUN 26; Na 147; [id number] [de-identified] : Fructosamine   218 (nl) Hgb A1c 5.7%

## 2024-10-04 NOTE — REVIEW OF SYSTEMS
[Frequency] : frequency [Impotence] : impotence [Joint Pain] : joint pain [Back Pain] : back pain [Unsteady Walk] : ataxia [Anxiety] : anxiety [Depression] : depression [Negative] : Heme/Lymph [Fever] : no fever [Chills] : no chills [Fatigue] : no fatigue [Hot Flashes] : no hot flashes [Night Sweats] : no night sweats [Recent Change In Weight] : ~T no recent weight change [Vision Problems] : no vision problems [Abdominal Pain] : no abdominal pain [Skin Rash] : no skin rash [Suicidal] : not suicidal [Easy Bleeding] : no easy bleeding

## 2024-10-04 NOTE — RESULTS/DATA
[] : results reviewed [Normal] : The 12 - lead ECG is normal [NSR] : normal sinus rhythm [Atrial Rate___] : the atrial rate is [unfilled] beats per minute [Ventricular Rate___] : ventricular rate is [unfilled] beats per minute [P Waves Normal] : the P wave is normal [ECG Intervals NE.] : NE interval is normal [Normal QRS] : the QRS is normal [ECG Axis] : the QRS axis is normal [Normal ST Segments] : the ST segments are normal [ECG T. Waves] : normal [No Interval Change] : no interval change [de-identified] : Hgb 12.3; .4; RDW 15.8; Plts 108 [de-identified] : Creatinine 1.6; BUN 26; Na 147; [id number] [de-identified] : Fructosamine   218 (nl) Hgb A1c 5.7%

## 2024-10-04 NOTE — RESULTS/DATA
[] : results reviewed [Normal] : The 12 - lead ECG is normal [NSR] : normal sinus rhythm [Atrial Rate___] : the atrial rate is [unfilled] beats per minute [Ventricular Rate___] : ventricular rate is [unfilled] beats per minute [P Waves Normal] : the P wave is normal [ECG Intervals [place].] : [place] interval is normal [Normal QRS] : the QRS is normal [ECG Axis] : the QRS axis is normal [Normal ST Segments] : the ST segments are normal [ECG T. Waves] : normal [No Interval Change] : no interval change [de-identified] : Creatinine 1.6; BUN 26; Na 147; [id number] [de-identified] : Hgb 12.3; .4; RDW 15.8; Plts 108 [de-identified] : Fructosamine   218 (nl) Hgb A1c 5.7%

## 2024-10-07 LAB
ANION GAP SERPL CALC-SCNC: 18 MMOL/L
BASOPHILS # BLD AUTO: 0.02 K/UL
BASOPHILS NFR BLD AUTO: 0.4 %
BUN SERPL-MCNC: 26 MG/DL
CALCIUM SERPL-MCNC: 9.5 MG/DL
CHLORIDE SERPL-SCNC: 105 MMOL/L
CO2 SERPL-SCNC: 23 MMOL/L
CREAT SERPL-MCNC: 1.6 MG/DL
DEPRECATED KAPPA LC FREE/LAMBDA SER: 1.4 RATIO
EGFR: 47 ML/MIN/1.73M2
EOSINOPHIL # BLD AUTO: 0.14 K/UL
EOSINOPHIL NFR BLD AUTO: 2.6 %
ESTIMATED AVERAGE GLUCOSE: 117 MG/DL
FOLATE SERPL-MCNC: 12.4 NG/ML
FRUCTOSAMINE SERPL-MCNC: 218 UMOL/L
GLUCOSE SERPL-MCNC: 119 MG/DL
HBA1C MFR BLD HPLC: 5.7 %
HCT VFR BLD CALC: 36.5 %
HGB BLD-MCNC: 12.3 G/DL
IMM GRANULOCYTES NFR BLD AUTO: 0.2 %
KAPPA LC CSF-MCNC: 0.7 MG/DL
KAPPA LC SERPL-MCNC: 0.98 MG/DL
LYMPHOCYTES # BLD AUTO: 2.78 K/UL
LYMPHOCYTES NFR BLD AUTO: 50.9 %
MAN DIFF?: NORMAL
MCHC RBC-ENTMCNC: 33.7 GM/DL
MCHC RBC-ENTMCNC: 34.2 PG
MCV RBC AUTO: 101.4 FL
MONOCYTES # BLD AUTO: 0.17 K/UL
MONOCYTES NFR BLD AUTO: 3.1 %
NEUTROPHILS # BLD AUTO: 2.34 K/UL
NEUTROPHILS NFR BLD AUTO: 42.8 %
PLATELET # BLD AUTO: 108 K/UL
POTASSIUM SERPL-SCNC: 3.8 MMOL/L
RBC # BLD: 3.55 M/UL
RBC # BLD: 3.6 M/UL
RBC # FLD: 15.8 %
RETICS # AUTO: 2.4 %
RETICS AGGREG/RBC NFR: 86.3 K/UL
SODIUM SERPL-SCNC: 147 MMOL/L
TSH SERPL-ACNC: 2.05 UIU/ML
WBC # FLD AUTO: 5.46 K/UL

## 2024-10-09 DIAGNOSIS — D64.9 ANEMIA, UNSPECIFIED: ICD-10-CM

## 2024-10-09 DIAGNOSIS — Z01.818 ENCOUNTER FOR OTHER PREPROCEDURAL EXAMINATION: ICD-10-CM

## 2024-10-09 DIAGNOSIS — R79.89 OTHER SPECIFIED ABNORMAL FINDINGS OF BLOOD CHEMISTRY: ICD-10-CM

## 2024-10-09 LAB
ALBUMIN MFR SERPL ELPH: 68.5 %
ALBUMIN SERPL-MCNC: 4.5 G/DL
ALBUMIN/GLOB SERPL: 2.2 RATIO
ALPHA1 GLOB MFR SERPL ELPH: 5.5 %
ALPHA1 GLOB SERPL ELPH-MCNC: 0.4 G/DL
ALPHA2 GLOB MFR SERPL ELPH: 8.6 %
ALPHA2 GLOB SERPL ELPH-MCNC: 0.6 G/DL
B-GLOBULIN MFR SERPL ELPH: 11.3 %
B-GLOBULIN SERPL ELPH-MCNC: 0.7 G/DL
GAMMA GLOB FLD ELPH-MCNC: 0.4 G/DL
GAMMA GLOB MFR SERPL ELPH: 6.1 %
INTERPRETATION SERPL IEP-IMP: NORMAL
PROT SERPL-MCNC: 6.5 G/DL
PROT SERPL-MCNC: 6.5 G/DL

## 2024-10-11 LAB
ANION GAP SERPL CALC-SCNC: 22 MMOL/L
APPEARANCE: CLEAR
BASOPHILS # BLD AUTO: 0.03 K/UL
BASOPHILS NFR BLD AUTO: 0.4 %
BILIRUBIN URINE: NEGATIVE
BLOOD URINE: NEGATIVE
BUN SERPL-MCNC: 25 MG/DL
CALCIUM SERPL-MCNC: 10.2 MG/DL
CHLORIDE SERPL-SCNC: 100 MMOL/L
CO2 SERPL-SCNC: 20 MMOL/L
COLOR: YELLOW
CREAT SERPL-MCNC: 1.55 MG/DL
CREAT SPEC-SCNC: 192 MG/DL
EGFR: 48 ML/MIN/1.73M2
EOSINOPHIL # BLD AUTO: 0.09 K/UL
EOSINOPHIL NFR BLD AUTO: 1.3 %
GLUCOSE QUALITATIVE U: NEGATIVE MG/DL
GLUCOSE SERPL-MCNC: 133 MG/DL
HCT VFR BLD CALC: 40.5 %
HGB BLD-MCNC: 13.7 G/DL
IMM GRANULOCYTES NFR BLD AUTO: 0.1 %
KETONES URINE: NEGATIVE MG/DL
LEUKOCYTE ESTERASE URINE: NEGATIVE
LYMPHOCYTES # BLD AUTO: 3.92 K/UL
LYMPHOCYTES NFR BLD AUTO: 54.6 %
MAN DIFF?: NORMAL
MCHC RBC-ENTMCNC: 33.7 PG
MCHC RBC-ENTMCNC: 33.8 GM/DL
MCV RBC AUTO: 99.8 FL
MICROALBUMIN 24H UR DL<=1MG/L-MCNC: 4.1 MG/DL
MICROALBUMIN/CREAT 24H UR-RTO: 22 MG/G
MONOCYTES # BLD AUTO: 0.2 K/UL
MONOCYTES NFR BLD AUTO: 2.8 %
NEUTROPHILS # BLD AUTO: 2.93 K/UL
NEUTROPHILS NFR BLD AUTO: 40.8 %
NITRITE URINE: NEGATIVE
PH URINE: 6.5
PLATELET # BLD AUTO: 123 K/UL
POTASSIUM SERPL-SCNC: 3.9 MMOL/L
PROTEIN URINE: NORMAL MG/DL
RBC # BLD: 4.06 M/UL
RBC # FLD: 15.4 %
SODIUM SERPL-SCNC: 141 MMOL/L
SPECIFIC GRAVITY URINE: 1.02
UROBILINOGEN URINE: 0.2 MG/DL
VIT B12 SERPL-MCNC: 376 PG/ML
WBC # FLD AUTO: 7.18 K/UL

## 2024-10-15 LAB — METHYLMALONATE SERPL-SCNC: 226 NMOL/L

## 2024-11-07 NOTE — OCCUPATIONAL THERAPY INITIAL EVALUATION ADULT - MUSCLE TONE ASSESSMENT, REHAB EVAL

## 2024-11-18 NOTE — ED ADULT NURSE NOTE - NSIMPLEMENTINTERV_GEN_ALL_ED
Render In Strict Bullet Format?: No Plan: Annual labs required.  Contact office, if insurance changes. Continue Regimen: Humira 40mg inject SC every week Detail Level: Generalized Implemented All Universal Safety Interventions:  [place] to call system. Call bell, personal items and telephone within reach. Instruct patient to call for assistance. Room bathroom lighting operational. Non-slip footwear when patient is off stretcher. Physically safe environment: no spills, clutter or unnecessary equipment. Stretcher in lowest position, wheels locked, appropriate side rails in place.

## 2024-11-19 ENCOUNTER — NON-APPOINTMENT (OUTPATIENT)
Age: 69
End: 2024-11-19

## 2024-11-19 DIAGNOSIS — R53.83 OTHER FATIGUE: ICD-10-CM

## 2024-11-21 ENCOUNTER — LABORATORY RESULT (OUTPATIENT)
Age: 69
End: 2024-11-21

## 2024-11-22 ENCOUNTER — LABORATORY RESULT (OUTPATIENT)
Age: 69
End: 2024-11-22

## 2024-11-29 LAB — METHYLMALONATE SERPL-SCNC: 151 NMOL/L

## 2024-12-05 DIAGNOSIS — D64.9 ANEMIA, UNSPECIFIED: ICD-10-CM

## 2024-12-11 ENCOUNTER — NON-APPOINTMENT (OUTPATIENT)
Age: 69
End: 2024-12-11

## 2024-12-13 LAB
BASOPHILS # BLD AUTO: 0.02 K/UL
BASOPHILS NFR BLD AUTO: 0.2 %
EOSINOPHIL # BLD AUTO: 0.14 K/UL
EOSINOPHIL NFR BLD AUTO: 1.7 %
HCT VFR BLD CALC: 37.7 %
HGB BLD-MCNC: 12.5 G/DL
IMM GRANULOCYTES NFR BLD AUTO: 0.1 %
LYMPHOCYTES # BLD AUTO: 4.98 K/UL
LYMPHOCYTES NFR BLD AUTO: 59.9 %
MAN DIFF?: NORMAL
MCHC RBC-ENTMCNC: 33.2 G/DL
MCHC RBC-ENTMCNC: 34.4 PG
MCV RBC AUTO: 103.9 FL
MONOCYTES # BLD AUTO: 0.31 K/UL
MONOCYTES NFR BLD AUTO: 3.7 %
NEUTROPHILS # BLD AUTO: 2.85 K/UL
NEUTROPHILS NFR BLD AUTO: 34.4 %
PLATELET # BLD AUTO: 135 K/UL
PROT SERPL-MCNC: 6.8 G/DL
RBC # BLD: 3.63 M/UL
RBC # FLD: 15.8 %
WBC # FLD AUTO: 8.31 K/UL

## 2024-12-14 LAB
ALBUMIN SERPL ELPH-MCNC: 4.9 G/DL
ALP BLD-CCNC: 127 U/L
ALT SERPL-CCNC: 23 U/L
ANION GAP SERPL CALC-SCNC: 21 MMOL/L
AST SERPL-CCNC: 21 U/L
BILIRUB SERPL-MCNC: 0.4 MG/DL
BUN SERPL-MCNC: 20 MG/DL
CALCIUM SERPL-MCNC: 9.7 MG/DL
CHLORIDE SERPL-SCNC: 102 MMOL/L
CO2 SERPL-SCNC: 21 MMOL/L
CREAT SERPL-MCNC: 1.36 MG/DL
EGFR: 57 ML/MIN/1.73M2
GLUCOSE SERPL-MCNC: 108 MG/DL
POTASSIUM SERPL-SCNC: 4 MMOL/L
PROT SERPL-MCNC: 6.8 G/DL
PROT UR-MCNC: 14 MG/DL
SODIUM SERPL-SCNC: 144 MMOL/L

## 2024-12-26 ENCOUNTER — RX RENEWAL (OUTPATIENT)
Age: 69
End: 2024-12-26

## 2025-01-08 ENCOUNTER — APPOINTMENT (OUTPATIENT)
Dept: INTERNAL MEDICINE | Facility: CLINIC | Age: 70
End: 2025-01-08
Payer: MEDICARE

## 2025-01-08 ENCOUNTER — LABORATORY RESULT (OUTPATIENT)
Age: 70
End: 2025-01-08

## 2025-01-08 VITALS
HEIGHT: 71 IN | TEMPERATURE: 98.3 F | WEIGHT: 236 LBS | SYSTOLIC BLOOD PRESSURE: 116 MMHG | DIASTOLIC BLOOD PRESSURE: 72 MMHG | BODY MASS INDEX: 33.04 KG/M2 | HEART RATE: 118 BPM | OXYGEN SATURATION: 97 %

## 2025-01-08 DIAGNOSIS — K21.9 GASTRO-ESOPHAGEAL REFLUX DISEASE W/OUT ESOPHAGITIS: ICD-10-CM

## 2025-01-08 DIAGNOSIS — E55.9 VITAMIN D DEFICIENCY, UNSPECIFIED: ICD-10-CM

## 2025-01-08 DIAGNOSIS — Z87.39 PERSONAL HISTORY OF OTHER DISEASES OF THE MUSCULOSKELETAL SYSTEM AND CONNECTIVE TISSUE: ICD-10-CM

## 2025-01-08 DIAGNOSIS — Z01.818 ENCOUNTER FOR OTHER PREPROCEDURAL EXAMINATION: ICD-10-CM

## 2025-01-08 DIAGNOSIS — Z80.42 FAMILY HISTORY OF MALIGNANT NEOPLASM OF PROSTATE: ICD-10-CM

## 2025-01-08 DIAGNOSIS — I10 ESSENTIAL (PRIMARY) HYPERTENSION: ICD-10-CM

## 2025-01-08 DIAGNOSIS — E66.9 OBESITY, UNSPECIFIED: ICD-10-CM

## 2025-01-08 DIAGNOSIS — D69.6 THROMBOCYTOPENIA, UNSPECIFIED: ICD-10-CM

## 2025-01-08 DIAGNOSIS — G47.33 OBSTRUCTIVE SLEEP APNEA (ADULT) (PEDIATRIC): ICD-10-CM

## 2025-01-08 DIAGNOSIS — M54.16 RADICULOPATHY, LUMBAR REGION: ICD-10-CM

## 2025-01-08 DIAGNOSIS — M16.12 UNILATERAL PRIMARY OSTEOARTHRITIS, LEFT HIP: ICD-10-CM

## 2025-01-08 DIAGNOSIS — Z87.891 PERSONAL HISTORY OF NICOTINE DEPENDENCE: ICD-10-CM

## 2025-01-08 DIAGNOSIS — Z23 ENCOUNTER FOR IMMUNIZATION: ICD-10-CM

## 2025-01-08 DIAGNOSIS — E78.5 HYPERLIPIDEMIA, UNSPECIFIED: ICD-10-CM

## 2025-01-08 DIAGNOSIS — M47.816 SPONDYLOSIS W/OUT MYELOPATHY OR RADICULOPATHY, LUMBAR REGION: ICD-10-CM

## 2025-01-08 PROCEDURE — 99214 OFFICE O/P EST MOD 30 MIN: CPT | Mod: 25

## 2025-01-08 PROCEDURE — G0008: CPT

## 2025-01-08 PROCEDURE — 90662 IIV NO PRSV INCREASED AG IM: CPT

## 2025-01-08 PROCEDURE — 93000 ELECTROCARDIOGRAM COMPLETE: CPT

## 2025-01-11 PROBLEM — M16.12 OSTEOARTHRITIS OF LEFT HIP, UNSPECIFIED OSTEOARTHRITIS TYPE: Status: ACTIVE | Noted: 2025-01-11

## 2025-01-11 PROBLEM — M16.12 PRIMARY OSTEOARTHRITIS OF LEFT HIP: Status: ACTIVE | Noted: 2025-01-11

## 2025-01-18 DIAGNOSIS — N40.1 BENIGN PROSTATIC HYPERPLASIA WITH LOWER URINARY TRACT SYMPMS: ICD-10-CM

## 2025-01-18 LAB
ANION GAP SERPL CALC-SCNC: 17 MMOL/L
BASOPHILS # BLD AUTO: 0.02 K/UL
BASOPHILS NFR BLD AUTO: 0.3 %
BUN SERPL-MCNC: 24 MG/DL
CALCIUM SERPL-MCNC: 9.6 MG/DL
CHLORIDE SERPL-SCNC: 101 MMOL/L
CO2 SERPL-SCNC: 24 MMOL/L
CREAT SERPL-MCNC: 1.42 MG/DL
EGFR: 54 ML/MIN/1.73M2
EOSINOPHIL # BLD AUTO: 0.11 K/UL
EOSINOPHIL NFR BLD AUTO: 1.8 %
ESTIMATED AVERAGE GLUCOSE: 117 MG/DL
FRUCTOSAMINE SERPL-MCNC: 222 UMOL/L
GLUCOSE SERPL-MCNC: 158 MG/DL
HBA1C MFR BLD HPLC: 5.7 %
HCT VFR BLD CALC: 37.4 %
HGB BLD-MCNC: 12.1 G/DL
IMM GRANULOCYTES NFR BLD AUTO: 0.2 %
LYMPHOCYTES # BLD AUTO: 2.16 K/UL
LYMPHOCYTES NFR BLD AUTO: 34.9 %
MAN DIFF?: NORMAL
MCHC RBC-ENTMCNC: 32.4 G/DL
MCHC RBC-ENTMCNC: 34.6 PG
MCV RBC AUTO: 106.9 FL
MONOCYTES # BLD AUTO: 0.21 K/UL
MONOCYTES NFR BLD AUTO: 3.4 %
NEUTROPHILS # BLD AUTO: 3.68 K/UL
NEUTROPHILS NFR BLD AUTO: 59.4 %
PLATELET # BLD AUTO: 130 K/UL
POTASSIUM SERPL-SCNC: 3.8 MMOL/L
RBC # BLD: 3.5 M/UL
RBC # FLD: 14.8 %
SODIUM SERPL-SCNC: 142 MMOL/L
WBC # FLD AUTO: 6.19 K/UL

## 2025-01-21 ENCOUNTER — NON-APPOINTMENT (OUTPATIENT)
Age: 70
End: 2025-01-21

## 2025-01-22 LAB
ALBUMIN SERPL ELPH-MCNC: 4.3 G/DL
ALP BLD-CCNC: 118 U/L
ALT SERPL-CCNC: 23 U/L
ANION GAP SERPL CALC-SCNC: 16 MMOL/L
APPEARANCE: CLEAR
AST SERPL-CCNC: 17 U/L
BACTERIA UR CULT: NORMAL
BACTERIA: NEGATIVE /HPF
BASOPHILS # BLD AUTO: 0.02 K/UL
BASOPHILS NFR BLD AUTO: 0.3 %
BILIRUB SERPL-MCNC: 0.6 MG/DL
BILIRUBIN URINE: NEGATIVE
BLOOD URINE: NEGATIVE
BUN SERPL-MCNC: 25 MG/DL
CALCIUM SERPL-MCNC: 9.2 MG/DL
CAST: 9 /LPF
CHLORIDE SERPL-SCNC: 105 MMOL/L
CO2 SERPL-SCNC: 24 MMOL/L
COLOR: YELLOW
CREAT SERPL-MCNC: 1.38 MG/DL
EGFR: 56 ML/MIN/1.73M2
EOSINOPHIL # BLD AUTO: 0.11 K/UL
EOSINOPHIL NFR BLD AUTO: 1.9 %
EPITHELIAL CELLS: 0 /HPF
GLUCOSE QUALITATIVE U: NEGATIVE MG/DL
GLUCOSE SERPL-MCNC: 111 MG/DL
HCT VFR BLD CALC: 31 %
HGB BLD-MCNC: 10.7 G/DL
HYALINE CASTS: PRESENT
IMM GRANULOCYTES NFR BLD AUTO: 0.2 %
KETONES URINE: NEGATIVE MG/DL
LEUKOCYTE ESTERASE URINE: ABNORMAL
LYMPHOCYTES # BLD AUTO: 2.9 K/UL
LYMPHOCYTES NFR BLD AUTO: 50.2 %
MAN DIFF?: NORMAL
MCHC RBC-ENTMCNC: 33.4 PG
MCHC RBC-ENTMCNC: 34.5 G/DL
MCV RBC AUTO: 96.9 FL
MICROSCOPIC-UA: NORMAL
MONOCYTES # BLD AUTO: 0.2 K/UL
MONOCYTES NFR BLD AUTO: 3.5 %
NEUTROPHILS # BLD AUTO: 2.54 K/UL
NEUTROPHILS NFR BLD AUTO: 43.9 %
NITRITE URINE: NEGATIVE
PH URINE: 6
PLATELET # BLD AUTO: 118 K/UL
POTASSIUM SERPL-SCNC: 3.3 MMOL/L
PROT SERPL-MCNC: 6.2 G/DL
PROTEIN URINE: NORMAL MG/DL
PSA SERPL-MCNC: 1.08 NG/ML
RBC # BLD: 3.2 M/UL
RBC # FLD: 13.6 %
RED BLOOD CELLS URINE: 1 /HPF
REVIEW: NORMAL
SODIUM SERPL-SCNC: 145 MMOL/L
SPECIFIC GRAVITY URINE: 1.02
UROBILINOGEN URINE: 1 MG/DL
WBC # FLD AUTO: 5.78 K/UL
WHITE BLOOD CELLS URINE: 0 /HPF

## 2025-01-28 DIAGNOSIS — D64.9 ANEMIA, UNSPECIFIED: ICD-10-CM

## 2025-01-28 DIAGNOSIS — E87.6 HYPOKALEMIA: ICD-10-CM

## 2025-02-12 ENCOUNTER — APPOINTMENT (OUTPATIENT)
Dept: INTERNAL MEDICINE | Facility: CLINIC | Age: 70
End: 2025-02-12

## 2025-03-13 ENCOUNTER — APPOINTMENT (OUTPATIENT)
Dept: CARE COORDINATION | Facility: HOME HEALTH | Age: 70
End: 2025-03-13
Payer: MEDICARE

## 2025-03-13 VITALS — HEIGHT: 71 IN | WEIGHT: 236 LBS | BODY MASS INDEX: 33.04 KG/M2

## 2025-03-13 DIAGNOSIS — N18.31 CHRONIC KIDNEY DISEASE, STAGE 3A: ICD-10-CM

## 2025-03-13 DIAGNOSIS — D69.6 THROMBOCYTOPENIA, UNSPECIFIED: ICD-10-CM

## 2025-03-13 DIAGNOSIS — E66.9 OBESITY, UNSPECIFIED: ICD-10-CM

## 2025-03-13 DIAGNOSIS — I70.0 ATHEROSCLEROSIS OF AORTA: ICD-10-CM

## 2025-03-13 PROCEDURE — G0447 BEHAVIOR COUNSEL OBESITY 15M: CPT | Mod: XU,2W

## 2025-03-13 PROCEDURE — 99348 HOME/RES VST EST LOW MDM 30: CPT | Mod: 25,2W

## 2025-03-16 PROBLEM — I70.0 AORTIC ATHEROSCLEROSIS: Status: ACTIVE | Noted: 2025-03-16

## 2025-03-16 PROBLEM — N18.31 STAGE 3A CHRONIC KIDNEY DISEASE: Status: ACTIVE | Noted: 2025-03-16

## 2025-04-01 ENCOUNTER — RX RENEWAL (OUTPATIENT)
Age: 70
End: 2025-04-01

## 2025-04-25 ENCOUNTER — OUTPATIENT (OUTPATIENT)
Dept: OUTPATIENT SERVICES | Facility: HOSPITAL | Age: 70
LOS: 1 days | Discharge: HOME | End: 2025-04-25

## 2025-04-25 DIAGNOSIS — Z98.89 OTHER SPECIFIED POSTPROCEDURAL STATES: Chronic | ICD-10-CM

## 2025-04-25 DIAGNOSIS — M43.20 FUSION OF SPINE, SITE UNSPECIFIED: Chronic | ICD-10-CM

## 2025-04-25 DIAGNOSIS — C92.90 MYELOID LEUKEMIA, UNSPECIFIED, NOT HAVING ACHIEVED REMISSION: ICD-10-CM

## 2025-04-25 DIAGNOSIS — R35.89 OTHER POLYURIA: ICD-10-CM

## 2025-04-26 ENCOUNTER — NON-APPOINTMENT (OUTPATIENT)
Age: 70
End: 2025-04-26

## 2025-04-28 ENCOUNTER — RESULT REVIEW (OUTPATIENT)
Age: 70
End: 2025-04-28

## 2025-04-28 ENCOUNTER — APPOINTMENT (OUTPATIENT)
Dept: HEMATOLOGY ONCOLOGY | Facility: CLINIC | Age: 70
End: 2025-04-28
Payer: MEDICARE

## 2025-04-28 VITALS
HEIGHT: 71.06 IN | WEIGHT: 239.2 LBS | TEMPERATURE: 97.8 F | OXYGEN SATURATION: 99 % | RESPIRATION RATE: 18 BRPM | HEART RATE: 108 BPM | DIASTOLIC BLOOD PRESSURE: 81 MMHG | BODY MASS INDEX: 33.49 KG/M2 | SYSTOLIC BLOOD PRESSURE: 122 MMHG

## 2025-04-28 DIAGNOSIS — E53.8 DEFICIENCY OF OTHER SPECIFIED B GROUP VITAMINS: ICD-10-CM

## 2025-04-28 LAB
ANISOCYTOSIS BLD QL: SLIGHT — SIGNIFICANT CHANGE UP
BASOPHILS # BLD AUTO: 0 K/UL — SIGNIFICANT CHANGE UP (ref 0–0.2)
BASOPHILS NFR BLD AUTO: 0 % — SIGNIFICANT CHANGE UP (ref 0–2)
ELLIPTOCYTES BLD QL SMEAR: SLIGHT — SIGNIFICANT CHANGE UP
EOSINOPHIL # BLD AUTO: 0.06 K/UL — SIGNIFICANT CHANGE UP (ref 0–0.5)
EOSINOPHIL NFR BLD AUTO: 1.7 % — SIGNIFICANT CHANGE UP (ref 0–6)
HCT VFR BLD CALC: 30.5 % — LOW (ref 39–50)
HGB BLD-MCNC: 10.3 G/DL — LOW (ref 13–17)
IMM GRANULOCYTES NFR BLD AUTO: 0 % — SIGNIFICANT CHANGE UP (ref 0–0.9)
LYMPHOCYTES # BLD AUTO: 2.4 K/UL — SIGNIFICANT CHANGE UP (ref 1–3.3)
LYMPHOCYTES # BLD AUTO: 68 % — HIGH (ref 13–44)
MACROCYTES BLD QL: SLIGHT — SIGNIFICANT CHANGE UP
MCHC RBC-ENTMCNC: 33.8 G/DL — SIGNIFICANT CHANGE UP (ref 32–36)
MCHC RBC-ENTMCNC: 34.2 PG — HIGH (ref 27–34)
MCV RBC AUTO: 101.3 FL — HIGH (ref 80–100)
MONOCYTES # BLD AUTO: 0.1 K/UL — SIGNIFICANT CHANGE UP (ref 0–0.9)
MONOCYTES NFR BLD AUTO: 2.8 % — SIGNIFICANT CHANGE UP (ref 2–14)
NEUTROPHILS # BLD AUTO: 0.97 K/UL — LOW (ref 1.8–7.4)
NEUTROPHILS NFR BLD AUTO: 27.5 % — LOW (ref 43–77)
NRBC BLD AUTO-RTO: 0 /100 WBCS — SIGNIFICANT CHANGE UP (ref 0–0)
PLAT MORPH BLD: NORMAL — SIGNIFICANT CHANGE UP
PLATELET # BLD AUTO: 87 K/UL — LOW (ref 150–400)
POIKILOCYTOSIS BLD QL AUTO: SLIGHT — SIGNIFICANT CHANGE UP
RBC # BLD: 3.01 M/UL — LOW (ref 4.2–5.8)
RBC # FLD: 15.3 % — HIGH (ref 10.3–14.5)
RBC BLD AUTO: ABNORMAL
RETICS #: 68 K/UL — SIGNIFICANT CHANGE UP (ref 25–125)
RETICS/RBC NFR: 2.3 % — SIGNIFICANT CHANGE UP (ref 0.5–2.5)
WBC # BLD: 3.53 K/UL — LOW (ref 3.8–10.5)
WBC # FLD AUTO: 3.53 K/UL — LOW (ref 3.8–10.5)

## 2025-04-28 PROCEDURE — 99205 OFFICE O/P NEW HI 60 MIN: CPT | Mod: 25

## 2025-04-28 PROCEDURE — G2212 PROLONG OUTPT/OFFICE VIS: CPT

## 2025-04-30 ENCOUNTER — APPOINTMENT (OUTPATIENT)
Dept: INTERNAL MEDICINE | Facility: CLINIC | Age: 70
End: 2025-04-30
Payer: MEDICARE

## 2025-04-30 VITALS
HEIGHT: 71 IN | TEMPERATURE: 98.1 F | SYSTOLIC BLOOD PRESSURE: 106 MMHG | HEART RATE: 123 BPM | WEIGHT: 236 LBS | BODY MASS INDEX: 33.04 KG/M2 | DIASTOLIC BLOOD PRESSURE: 65 MMHG | OXYGEN SATURATION: 97 %

## 2025-04-30 DIAGNOSIS — G47.33 OBSTRUCTIVE SLEEP APNEA (ADULT) (PEDIATRIC): ICD-10-CM

## 2025-04-30 DIAGNOSIS — E55.9 VITAMIN D DEFICIENCY, UNSPECIFIED: ICD-10-CM

## 2025-04-30 DIAGNOSIS — Z80.42 FAMILY HISTORY OF MALIGNANT NEOPLASM OF PROSTATE: ICD-10-CM

## 2025-04-30 DIAGNOSIS — Z78.9 OTHER SPECIFIED HEALTH STATUS: ICD-10-CM

## 2025-04-30 DIAGNOSIS — R06.00 DYSPNEA, UNSPECIFIED: ICD-10-CM

## 2025-04-30 DIAGNOSIS — D64.9 ANEMIA, UNSPECIFIED: ICD-10-CM

## 2025-04-30 DIAGNOSIS — R79.89 OTHER SPECIFIED ABNORMAL FINDINGS OF BLOOD CHEMISTRY: ICD-10-CM

## 2025-04-30 DIAGNOSIS — M10.9 GOUT, UNSPECIFIED: ICD-10-CM

## 2025-04-30 DIAGNOSIS — E66.9 OBESITY, UNSPECIFIED: ICD-10-CM

## 2025-04-30 DIAGNOSIS — D72.829 ELEVATED WHITE BLOOD CELL COUNT, UNSPECIFIED: ICD-10-CM

## 2025-04-30 DIAGNOSIS — Z87.891 PERSONAL HISTORY OF NICOTINE DEPENDENCE: ICD-10-CM

## 2025-04-30 DIAGNOSIS — D69.6 THROMBOCYTOPENIA, UNSPECIFIED: ICD-10-CM

## 2025-04-30 DIAGNOSIS — E78.5 HYPERLIPIDEMIA, UNSPECIFIED: ICD-10-CM

## 2025-04-30 DIAGNOSIS — Z00.00 ENCOUNTER FOR GENERAL ADULT MEDICAL EXAMINATION W/OUT ABNORMAL FINDINGS: ICD-10-CM

## 2025-04-30 DIAGNOSIS — C92.00 ACUTE MYELOBLASTIC LEUKEMIA, NOT HAVING ACHIEVED REMISSION: ICD-10-CM

## 2025-04-30 DIAGNOSIS — Z80.0 FAMILY HISTORY OF MALIGNANT NEOPLASM OF DIGESTIVE ORGANS: ICD-10-CM

## 2025-04-30 DIAGNOSIS — N18.31 CHRONIC KIDNEY DISEASE, STAGE 3A: ICD-10-CM

## 2025-04-30 DIAGNOSIS — K21.9 GASTRO-ESOPHAGEAL REFLUX DISEASE W/OUT ESOPHAGITIS: ICD-10-CM

## 2025-04-30 DIAGNOSIS — I70.0 ATHEROSCLEROSIS OF AORTA: ICD-10-CM

## 2025-04-30 PROBLEM — E53.8 VITAMIN B12 DEFICIENCY: Status: ACTIVE | Noted: 2025-04-30

## 2025-04-30 LAB
ALBUMIN MFR SERPL ELPH: 63.1 %
ALBUMIN SERPL ELPH-MCNC: 4.3 G/DL
ALBUMIN SERPL-MCNC: 3.9 G/DL
ALBUMIN/GLOB SERPL: 1.7 RATIO
ALP BLD-CCNC: 103 U/L
ALPHA1 GLOB MFR SERPL ELPH: 6.7 %
ALPHA1 GLOB SERPL ELPH-MCNC: 0.4 G/DL
ALPHA2 GLOB MFR SERPL ELPH: 11.6 %
ALPHA2 GLOB SERPL ELPH-MCNC: 0.7 G/DL
ALT SERPL-CCNC: 19 U/L
ANION GAP SERPL CALC-SCNC: 15 MMOL/L
APPEARANCE: CLEAR
APTT BLD: 29.9 SEC
AST SERPL-CCNC: 19 U/L
B-GLOBULIN MFR SERPL ELPH: 11.9 %
B-GLOBULIN SERPL ELPH-MCNC: 0.7 G/DL
BACTERIA UR CULT: NORMAL
BACTERIA: NEGATIVE /HPF
BILIRUB SERPL-MCNC: 0.5 MG/DL
BILIRUBIN URINE: NEGATIVE
BLOOD URINE: NEGATIVE
BUN SERPL-MCNC: 29 MG/DL
CALCIUM SERPL-MCNC: 8.9 MG/DL
CAST: 5 /LPF
CHLORIDE SERPL-SCNC: 105 MMOL/L
CHOLEST SERPL-MCNC: 175 MG/DL
CO2 SERPL-SCNC: 22 MMOL/L
COLOR: YELLOW
CREAT SERPL-MCNC: 1.45 MG/DL
DEPRECATED D DIMER PPP IA-ACNC: 446 NG/ML DDU
DEPRECATED KAPPA LC FREE/LAMBDA SER: 1.07 RATIO
EGFRCR SERPLBLD CKD-EPI 2021: 52 ML/MIN/1.73M2
EPITHELIAL CELLS: 1 /HPF
ESTIMATED AVERAGE GLUCOSE: 120 MG/DL
FERRITIN SERPL-MCNC: 160 NG/ML
FIBRINOGEN PPP-MCNC: 537 MG/DL
FOLATE SERPL-MCNC: 11.7 NG/ML
GAMMA GLOB FLD ELPH-MCNC: 0.4 G/DL
GAMMA GLOB MFR SERPL ELPH: 6.7 %
GLUCOSE QUALITATIVE U: NEGATIVE MG/DL
GLUCOSE SERPL-MCNC: 102 MG/DL
HBA1C MFR BLD HPLC: 5.8 %
HBV CORE IGG+IGM SER QL: NONREACTIVE
HBV SURFACE AB SER QL: NONREACTIVE
HBV SURFACE AG SER QL: NONREACTIVE
HCV AB SER QL: NONREACTIVE
HCV S/CO RATIO: 0.06 S/CO
HDLC SERPL-MCNC: 39 MG/DL
HIV1+2 AB SPEC QL IA.RAPID: NONREACTIVE
HYALINE CASTS: PRESENT
IGA SER QL IEP: 38 MG/DL
IGG SER QL IEP: 421 MG/DL
IGM SER QL IEP: 19 MG/DL
INR PPP: 1.01 RATIO
INTERPRETATION SERPL IEP-IMP: NORMAL
KAPPA LC CSF-MCNC: 0.89 MG/DL
KAPPA LC SERPL-MCNC: 0.95 MG/DL
KETONES URINE: NEGATIVE MG/DL
LDH SERPL-CCNC: 230 U/L
LDLC SERPL-MCNC: 87 MG/DL
LEUKOCYTE ESTERASE URINE: NEGATIVE
M PROTEIN SPEC IFE-MCNC: NORMAL
MAGNESIUM SERPL-MCNC: 1.5 MG/DL
MICROSCOPIC-UA: NORMAL
NITRITE URINE: NEGATIVE
NONHDLC SERPL-MCNC: 135 MG/DL
PH URINE: 6
PHOSPHATE SERPL-MCNC: 3.8 MG/DL
POTASSIUM SERPL-SCNC: 4.1 MMOL/L
PROT SERPL-MCNC: 6.2 G/DL
PROT SERPL-MCNC: 6.2 G/DL
PROT SERPL-MCNC: 6.3 G/DL
PROTEIN URINE: NEGATIVE MG/DL
PSA SERPL-MCNC: 1.19 NG/ML
PSA SERPL-MCNC: 1.25 NG/ML
PT BLD: 11.9 SEC
RED BLOOD CELLS URINE: 2 /HPF
REVIEW: NORMAL
SODIUM SERPL-SCNC: 143 MMOL/L
SPECIFIC GRAVITY URINE: 1.02
TRIGL SERPL-MCNC: 292 MG/DL
URATE SERPL-MCNC: 7.4 MG/DL
UROBILINOGEN URINE: 0.2 MG/DL
VIT B12 SERPL-MCNC: 210 PG/ML
WHITE BLOOD CELLS URINE: 0 /HPF

## 2025-04-30 PROCEDURE — G0439: CPT

## 2025-04-30 PROCEDURE — G0444 DEPRESSION SCREEN ANNUAL: CPT

## 2025-05-02 ENCOUNTER — INPATIENT (INPATIENT)
Facility: HOSPITAL | Age: 70
LOS: 27 days | Discharge: HOME HEALTH | DRG: 837 | End: 2025-05-30
Attending: INTERNAL MEDICINE | Admitting: STUDENT IN AN ORGANIZED HEALTH CARE EDUCATION/TRAINING PROGRAM
Payer: COMMERCIAL

## 2025-05-02 VITALS
HEIGHT: 71.26 IN | WEIGHT: 236.56 LBS | SYSTOLIC BLOOD PRESSURE: 118 MMHG | OXYGEN SATURATION: 97 % | TEMPERATURE: 98 F | HEART RATE: 97 BPM | DIASTOLIC BLOOD PRESSURE: 71 MMHG | RESPIRATION RATE: 18 BRPM

## 2025-05-02 DIAGNOSIS — E78.00 PURE HYPERCHOLESTEROLEMIA, UNSPECIFIED: ICD-10-CM

## 2025-05-02 DIAGNOSIS — I10 ESSENTIAL (PRIMARY) HYPERTENSION: ICD-10-CM

## 2025-05-02 DIAGNOSIS — Z98.89 OTHER SPECIFIED POSTPROCEDURAL STATES: Chronic | ICD-10-CM

## 2025-05-02 DIAGNOSIS — M43.20 FUSION OF SPINE, SITE UNSPECIFIED: Chronic | ICD-10-CM

## 2025-05-02 DIAGNOSIS — B99.9 UNSPECIFIED INFECTIOUS DISEASE: ICD-10-CM

## 2025-05-02 DIAGNOSIS — C92.00 ACUTE MYELOBLASTIC LEUKEMIA, NOT HAVING ACHIEVED REMISSION: ICD-10-CM

## 2025-05-02 DIAGNOSIS — Z29.9 ENCOUNTER FOR PROPHYLACTIC MEASURES, UNSPECIFIED: ICD-10-CM

## 2025-05-02 DIAGNOSIS — M10.9 GOUT, UNSPECIFIED: ICD-10-CM

## 2025-05-02 DIAGNOSIS — N40.0 BENIGN PROSTATIC HYPERPLASIA WITHOUT LOWER URINARY TRACT SYMPTOMS: ICD-10-CM

## 2025-05-02 LAB
ADD ON TEST-SPECIMEN IN LAB: SIGNIFICANT CHANGE UP
ALBUMIN SERPL ELPH-MCNC: 4.2 G/DL — SIGNIFICANT CHANGE UP (ref 3.3–5)
ALBUMIN SERPL ELPH-MCNC: 4.2 G/DL — SIGNIFICANT CHANGE UP (ref 3.3–5)
ALP SERPL-CCNC: 102 U/L — SIGNIFICANT CHANGE UP (ref 40–120)
ALP SERPL-CCNC: 110 U/L — SIGNIFICANT CHANGE UP (ref 40–120)
ALT FLD-CCNC: 18 U/L — SIGNIFICANT CHANGE UP (ref 10–45)
ALT FLD-CCNC: 20 U/L — SIGNIFICANT CHANGE UP (ref 10–45)
ANION GAP SERPL CALC-SCNC: 19 MMOL/L — HIGH (ref 5–17)
ANION GAP SERPL CALC-SCNC: 21 MMOL/L — HIGH (ref 5–17)
APTT BLD: 22.5 SEC — LOW (ref 26.1–36.8)
APTT BLD: 27.3 SEC — SIGNIFICANT CHANGE UP (ref 26.1–36.8)
AST SERPL-CCNC: 17 U/L — SIGNIFICANT CHANGE UP (ref 10–40)
AST SERPL-CCNC: 18 U/L — SIGNIFICANT CHANGE UP (ref 10–40)
BASOPHILS # BLD AUTO: 0 K/UL — SIGNIFICANT CHANGE UP (ref 0–0.2)
BASOPHILS # BLD AUTO: 0.01 K/UL — SIGNIFICANT CHANGE UP (ref 0–0.2)
BASOPHILS NFR BLD AUTO: 0 % — SIGNIFICANT CHANGE UP (ref 0–2)
BASOPHILS NFR BLD AUTO: 0.3 % — SIGNIFICANT CHANGE UP (ref 0–2)
BILIRUB SERPL-MCNC: 0.5 MG/DL — SIGNIFICANT CHANGE UP (ref 0.2–1.2)
BILIRUB SERPL-MCNC: 0.6 MG/DL — SIGNIFICANT CHANGE UP (ref 0.2–1.2)
BLD GP AB SCN SERPL QL: NEGATIVE — SIGNIFICANT CHANGE UP
BUN SERPL-MCNC: 28 MG/DL — HIGH (ref 7–23)
BUN SERPL-MCNC: 30 MG/DL — HIGH (ref 7–23)
CALCIUM SERPL-MCNC: 8.6 MG/DL — SIGNIFICANT CHANGE UP (ref 8.4–10.5)
CALCIUM SERPL-MCNC: 9.2 MG/DL — SIGNIFICANT CHANGE UP (ref 8.4–10.5)
CHLORIDE SERPL-SCNC: 101 MMOL/L — SIGNIFICANT CHANGE UP (ref 96–108)
CHLORIDE SERPL-SCNC: 102 MMOL/L — SIGNIFICANT CHANGE UP (ref 96–108)
CK MB BLD-MCNC: 1.7 % — SIGNIFICANT CHANGE UP (ref 0–3.5)
CK MB CFR SERPL CALC: 5.6 NG/ML — SIGNIFICANT CHANGE UP (ref 0–6.7)
CK SERPL-CCNC: 329 U/L — HIGH (ref 30–200)
CO2 SERPL-SCNC: 18 MMOL/L — LOW (ref 22–31)
CO2 SERPL-SCNC: 20 MMOL/L — LOW (ref 22–31)
CREAT SERPL-MCNC: 1.31 MG/DL — HIGH (ref 0.5–1.3)
CREAT SERPL-MCNC: 1.31 MG/DL — HIGH (ref 0.5–1.3)
CREAT SERPL-MCNC: 1.5 MG/DL — HIGH (ref 0.5–1.3)
CYSTATIN C SERPL-MCNC: 1.57 MG/L — HIGH (ref 0.77–1.42)
D DIMER BLD IA.RAPID-MCNC: 550 NG/ML DDU — HIGH
EGFR: 50 ML/MIN/1.73M2 — LOW
EGFR: 50 ML/MIN/1.73M2 — LOW
EGFR: 59 ML/MIN/1.73M2 — LOW
EGFRCR-CYS SERPLBLD CKD-EPI 2021: 50 ML/MIN/1.73M2 — LOW
EOSINOPHIL # BLD AUTO: 0.04 K/UL — SIGNIFICANT CHANGE UP (ref 0–0.5)
EOSINOPHIL # BLD AUTO: 0.06 K/UL — SIGNIFICANT CHANGE UP (ref 0–0.5)
EOSINOPHIL NFR BLD AUTO: 1.2 % — SIGNIFICANT CHANGE UP (ref 0–6)
EOSINOPHIL NFR BLD AUTO: 1.8 % — SIGNIFICANT CHANGE UP (ref 0–6)
FIBRINOGEN PPP-MCNC: 585 MG/DL — HIGH (ref 200–445)
GAS PNL BLDV: SIGNIFICANT CHANGE UP
GFR/BSA.PRED SERPLBLD CYS-BASED-ARV: 41 ML/MIN/1.73M2 — LOW
GLUCOSE BLDC GLUCOMTR-MCNC: 110 MG/DL — HIGH (ref 70–99)
GLUCOSE SERPL-MCNC: 109 MG/DL — HIGH (ref 70–99)
GLUCOSE SERPL-MCNC: 144 MG/DL — HIGH (ref 70–99)
HCT VFR BLD CALC: 27.3 % — LOW (ref 39–50)
HCT VFR BLD CALC: 28.7 % — LOW (ref 39–50)
HGB BLD-MCNC: 9.1 G/DL — LOW (ref 13–17)
HGB BLD-MCNC: 9.8 G/DL — LOW (ref 13–17)
INR BLD: 1.08 RATIO — SIGNIFICANT CHANGE UP (ref 0.85–1.16)
INR BLD: 1.13 RATIO — SIGNIFICANT CHANGE UP (ref 0.85–1.16)
LDH SERPL L TO P-CCNC: 247 U/L — HIGH (ref 50–242)
LYMPHOCYTES # BLD AUTO: 1.87 K/UL — SIGNIFICANT CHANGE UP (ref 1–3.3)
LYMPHOCYTES # BLD AUTO: 2.58 K/UL — SIGNIFICANT CHANGE UP (ref 1–3.3)
LYMPHOCYTES # BLD AUTO: 60 % — HIGH (ref 13–44)
LYMPHOCYTES # BLD AUTO: 78.7 % — HIGH (ref 13–44)
MAGNESIUM SERPL-MCNC: 1.5 MG/DL — LOW (ref 1.6–2.6)
MAGNESIUM SERPL-MCNC: 1.6 MG/DL — SIGNIFICANT CHANGE UP (ref 1.6–2.6)
MCHC RBC-ENTMCNC: 33.3 G/DL — SIGNIFICANT CHANGE UP (ref 32–36)
MCHC RBC-ENTMCNC: 33.5 PG — SIGNIFICANT CHANGE UP (ref 27–34)
MCHC RBC-ENTMCNC: 34 PG — SIGNIFICANT CHANGE UP (ref 27–34)
MCHC RBC-ENTMCNC: 34.1 G/DL — SIGNIFICANT CHANGE UP (ref 32–36)
MCV RBC AUTO: 100.4 FL — HIGH (ref 80–100)
MCV RBC AUTO: 99.7 FL — SIGNIFICANT CHANGE UP (ref 80–100)
MONOCYTES # BLD AUTO: 0.06 K/UL — SIGNIFICANT CHANGE UP (ref 0–0.9)
MONOCYTES # BLD AUTO: 0.17 K/UL — SIGNIFICANT CHANGE UP (ref 0–0.9)
MONOCYTES NFR BLD AUTO: 1.8 % — LOW (ref 2–14)
MONOCYTES NFR BLD AUTO: 5.4 % — SIGNIFICANT CHANGE UP (ref 2–14)
NEUTROPHILS # BLD AUTO: 0.59 K/UL — LOW (ref 1.8–7.4)
NEUTROPHILS # BLD AUTO: 1.02 K/UL — LOW (ref 1.8–7.4)
NEUTROPHILS NFR BLD AUTO: 18 % — LOW (ref 43–77)
NEUTROPHILS NFR BLD AUTO: 27.3 % — LOW (ref 43–77)
NRBC BLD AUTO-RTO: 0 /100 WBCS — SIGNIFICANT CHANGE UP (ref 0–0)
PHOSPHATE SERPL-MCNC: 2.8 MG/DL — SIGNIFICANT CHANGE UP (ref 2.5–4.5)
PHOSPHATE SERPL-MCNC: 4.1 MG/DL — SIGNIFICANT CHANGE UP (ref 2.5–4.5)
PLATELET # BLD AUTO: 62 K/UL — LOW (ref 150–400)
PLATELET # BLD AUTO: 73 K/UL — LOW (ref 150–400)
POTASSIUM SERPL-MCNC: 3.5 MMOL/L — SIGNIFICANT CHANGE UP (ref 3.5–5.3)
POTASSIUM SERPL-MCNC: 3.7 MMOL/L — SIGNIFICANT CHANGE UP (ref 3.5–5.3)
POTASSIUM SERPL-SCNC: 3.5 MMOL/L — SIGNIFICANT CHANGE UP (ref 3.5–5.3)
POTASSIUM SERPL-SCNC: 3.7 MMOL/L — SIGNIFICANT CHANGE UP (ref 3.5–5.3)
PROT SERPL-MCNC: 6.3 G/DL — SIGNIFICANT CHANGE UP (ref 6–8.3)
PROT SERPL-MCNC: 6.6 G/DL — SIGNIFICANT CHANGE UP (ref 6–8.3)
PROTHROM AB SERPL-ACNC: 12.3 SEC — SIGNIFICANT CHANGE UP (ref 9.9–13.4)
PROTHROM AB SERPL-ACNC: 12.9 SEC — SIGNIFICANT CHANGE UP (ref 9.9–13.4)
RBC # BLD: 2.72 M/UL — LOW (ref 4.2–5.8)
RBC # BLD: 2.88 M/UL — LOW (ref 4.2–5.8)
RBC # FLD: 15 % — HIGH (ref 10.3–14.5)
RBC # FLD: 15 % — HIGH (ref 10.3–14.5)
RH IG SCN BLD-IMP: POSITIVE — SIGNIFICANT CHANGE UP
SODIUM SERPL-SCNC: 140 MMOL/L — SIGNIFICANT CHANGE UP (ref 135–145)
SODIUM SERPL-SCNC: 141 MMOL/L — SIGNIFICANT CHANGE UP (ref 135–145)
TROPONIN T, HIGH SENSITIVITY RESULT: 41 NG/L — SIGNIFICANT CHANGE UP (ref 0–51)
URATE SERPL-MCNC: 8.3 MG/DL — SIGNIFICANT CHANGE UP (ref 3.4–8.8)
WBC # BLD: 3.12 K/UL — LOW (ref 3.8–10.5)
WBC # BLD: 3.28 K/UL — LOW (ref 3.8–10.5)
WBC # FLD AUTO: 3.12 K/UL — LOW (ref 3.8–10.5)
WBC # FLD AUTO: 3.28 K/UL — LOW (ref 3.8–10.5)

## 2025-05-02 PROCEDURE — 36556 INSERT NON-TUNNEL CV CATH: CPT

## 2025-05-02 PROCEDURE — 78472 GATED HEART PLANAR SINGLE: CPT | Mod: 26

## 2025-05-02 PROCEDURE — 99223 1ST HOSP IP/OBS HIGH 75: CPT

## 2025-05-02 PROCEDURE — 76937 US GUIDE VASCULAR ACCESS: CPT | Mod: 26

## 2025-05-02 PROCEDURE — 77001 FLUOROGUIDE FOR VEIN DEVICE: CPT | Mod: 26

## 2025-05-02 PROCEDURE — 93010 ELECTROCARDIOGRAM REPORT: CPT

## 2025-05-02 PROCEDURE — 36556 INSERT NON-TUNNEL CV CATH: CPT | Mod: RT

## 2025-05-02 RX ORDER — POSACONAZOLE 100 MG/1
300 TABLET, DELAYED RELEASE ORAL EVERY 12 HOURS
Refills: 0 | Status: COMPLETED | OUTPATIENT
Start: 2025-05-02 | End: 2025-05-03

## 2025-05-02 RX ORDER — RASBURICASE 7.5 MG
3 KIT INTRAVENOUS ONCE
Refills: 0 | Status: COMPLETED | OUTPATIENT
Start: 2025-05-02 | End: 2025-05-02

## 2025-05-02 RX ORDER — ACETAMINOPHEN 500 MG/5ML
1000 LIQUID (ML) ORAL ONCE
Refills: 0 | Status: COMPLETED | OUTPATIENT
Start: 2025-05-02 | End: 2025-05-02

## 2025-05-02 RX ORDER — DILTIAZEM HYDROCHLORIDE 240 MG/1
240 TABLET, EXTENDED RELEASE ORAL DAILY
Refills: 0 | Status: DISCONTINUED | OUTPATIENT
Start: 2025-05-02 | End: 2025-05-30

## 2025-05-02 RX ORDER — DIPHENHYDRAMINE HCL 12.5MG/5ML
50 ELIXIR ORAL ONCE
Refills: 0 | Status: COMPLETED | OUTPATIENT
Start: 2025-05-02 | End: 2025-05-02

## 2025-05-02 RX ORDER — ATORVASTATIN CALCIUM 80 MG/1
80 TABLET, FILM COATED ORAL AT BEDTIME
Refills: 0 | Status: DISCONTINUED | OUTPATIENT
Start: 2025-05-02 | End: 2025-05-05

## 2025-05-02 RX ORDER — POSACONAZOLE 100 MG/1
300 TABLET, DELAYED RELEASE ORAL EVERY 24 HOURS
Refills: 0 | Status: DISCONTINUED | OUTPATIENT
Start: 2025-05-04 | End: 2025-05-26

## 2025-05-02 RX ORDER — FOSAPREPITANT 150 MG/5ML
150 INJECTION, POWDER, LYOPHILIZED, FOR SOLUTION INTRAVENOUS ONCE
Refills: 0 | Status: COMPLETED | OUTPATIENT
Start: 2025-05-02 | End: 2025-05-02

## 2025-05-02 RX ORDER — OXYCODONE HYDROCHLORIDE 30 MG/1
2.5 TABLET ORAL ONCE
Refills: 0 | Status: DISCONTINUED | OUTPATIENT
Start: 2025-05-02 | End: 2025-05-02

## 2025-05-02 RX ORDER — MAGNESIUM SULFATE 500 MG/ML
1 SYRINGE (ML) INJECTION ONCE
Refills: 0 | Status: COMPLETED | OUTPATIENT
Start: 2025-05-02 | End: 2025-05-02

## 2025-05-02 RX ORDER — ONDANSETRON HCL/PF 4 MG/2 ML
8 VIAL (ML) INJECTION EVERY 8 HOURS
Refills: 0 | Status: COMPLETED | OUTPATIENT
Start: 2025-05-02 | End: 2025-05-08

## 2025-05-02 RX ORDER — DAUNORUBICIN HYDROCHLORIDE 5 MG/ML
136 INJECTION, SOLUTION INTRAVENOUS EVERY 24 HOURS
Refills: 0 | Status: COMPLETED | OUTPATIENT
Start: 2025-05-02 | End: 2025-05-04

## 2025-05-02 RX ORDER — AMOXICILLIN 500 MG/1
500 CAPSULE ORAL
Refills: 0 | Status: DISCONTINUED | OUTPATIENT
Start: 2025-05-02 | End: 2025-05-11

## 2025-05-02 RX ORDER — METOCLOPRAMIDE HCL 10 MG
10 TABLET ORAL EVERY 6 HOURS
Refills: 0 | Status: DISCONTINUED | OUTPATIENT
Start: 2025-05-02 | End: 2025-05-30

## 2025-05-02 RX ORDER — OXYCODONE HYDROCHLORIDE 30 MG/1
5 TABLET ORAL EVERY 4 HOURS
Refills: 0 | Status: DISCONTINUED | OUTPATIENT
Start: 2025-05-02 | End: 2025-05-07

## 2025-05-02 RX ORDER — ACETAMINOPHEN 500 MG/5ML
650 LIQUID (ML) ORAL EVERY 6 HOURS
Refills: 0 | Status: DISCONTINUED | OUTPATIENT
Start: 2025-05-02 | End: 2025-05-30

## 2025-05-02 RX ORDER — TAMSULOSIN HYDROCHLORIDE 0.4 MG/1
0.4 CAPSULE ORAL AT BEDTIME
Refills: 0 | Status: DISCONTINUED | OUTPATIENT
Start: 2025-05-02 | End: 2025-05-30

## 2025-05-02 RX ORDER — ALBUTEROL SULFATE 2.5 MG/3ML
2 VIAL, NEBULIZER (ML) INHALATION EVERY 6 HOURS
Refills: 0 | Status: DISCONTINUED | OUTPATIENT
Start: 2025-05-02 | End: 2025-05-30

## 2025-05-02 RX ORDER — OXYCODONE HYDROCHLORIDE 30 MG/1
2.5 TABLET ORAL EVERY 4 HOURS
Refills: 0 | Status: DISCONTINUED | OUTPATIENT
Start: 2025-05-02 | End: 2025-05-07

## 2025-05-02 RX ORDER — POSACONAZOLE 100 MG/1
TABLET, DELAYED RELEASE ORAL
Refills: 0 | Status: DISCONTINUED | OUTPATIENT
Start: 2025-05-02 | End: 2025-05-26

## 2025-05-02 RX ORDER — METHYLPREDNISOLONE ACETATE 80 MG/ML
125 INJECTION, SUSPENSION INTRA-ARTICULAR; INTRALESIONAL; INTRAMUSCULAR; SOFT TISSUE ONCE
Refills: 0 | Status: COMPLETED | OUTPATIENT
Start: 2025-05-02 | End: 2025-05-02

## 2025-05-02 RX ORDER — CYTARABINE 20 MG/ML
227 VIAL (ML) INJECTION DAILY
Refills: 0 | Status: COMPLETED | OUTPATIENT
Start: 2025-05-02 | End: 2025-05-08

## 2025-05-02 RX ADMIN — OXYCODONE HYDROCHLORIDE 2.5 MILLIGRAM(S): 30 TABLET ORAL at 12:05

## 2025-05-02 RX ADMIN — Medication 20 MILLIGRAM(S): at 19:55

## 2025-05-02 RX ADMIN — Medication 20 MILLIEQUIVALENT(S): at 15:54

## 2025-05-02 RX ADMIN — AMOXICILLIN 500 MILLIGRAM(S): 500 CAPSULE ORAL at 17:51

## 2025-05-02 RX ADMIN — Medication 1000 MILLIGRAM(S): at 20:48

## 2025-05-02 RX ADMIN — Medication 500 MILLILITER(S): at 21:51

## 2025-05-02 RX ADMIN — METHYLPREDNISOLONE ACETATE 125 MILLIGRAM(S): 80 INJECTION, SUSPENSION INTRA-ARTICULAR; INTRALESIONAL; INTRAMUSCULAR; SOFT TISSUE at 19:52

## 2025-05-02 RX ADMIN — Medication 227 MILLIGRAM(S): at 17:55

## 2025-05-02 RX ADMIN — Medication 100 MILLILITER(S): at 14:23

## 2025-05-02 RX ADMIN — OXYCODONE HYDROCHLORIDE 2.5 MILLIGRAM(S): 30 TABLET ORAL at 21:50

## 2025-05-02 RX ADMIN — Medication 200 MILLIGRAM(S): at 15:55

## 2025-05-02 RX ADMIN — Medication 15 MILLILITER(S): at 17:55

## 2025-05-02 RX ADMIN — OXYCODONE HYDROCHLORIDE 2.5 MILLIGRAM(S): 30 TABLET ORAL at 13:00

## 2025-05-02 RX ADMIN — Medication 100 GRAM(S): at 15:55

## 2025-05-02 RX ADMIN — Medication 1 APPLICATION(S): at 17:55

## 2025-05-02 RX ADMIN — Medication 400 MILLIGRAM(S): at 19:57

## 2025-05-02 RX ADMIN — POSACONAZOLE 300 MILLIGRAM(S): 100 TABLET, DELAYED RELEASE ORAL at 17:51

## 2025-05-02 RX ADMIN — RASBURICASE 104 MILLIGRAM(S): KIT at 14:23

## 2025-05-02 RX ADMIN — Medication 15 MILLILITER(S): at 14:30

## 2025-05-02 RX ADMIN — Medication 650 MILLIGRAM(S): at 14:30

## 2025-05-02 RX ADMIN — ATORVASTATIN CALCIUM 80 MILLIGRAM(S): 80 TABLET, FILM COATED ORAL at 21:51

## 2025-05-02 RX ADMIN — FOSAPREPITANT 300 MILLIGRAM(S): 150 INJECTION, POWDER, LYOPHILIZED, FOR SOLUTION INTRAVENOUS at 15:47

## 2025-05-02 RX ADMIN — Medication 650 MILLIGRAM(S): at 15:30

## 2025-05-02 RX ADMIN — DAUNORUBICIN HYDROCHLORIDE 163.2 MILLIGRAM(S): 5 INJECTION, SOLUTION INTRAVENOUS at 17:36

## 2025-05-02 RX ADMIN — Medication 500 MILLIGRAM(S): at 17:51

## 2025-05-02 RX ADMIN — OXYCODONE HYDROCHLORIDE 2.5 MILLIGRAM(S): 30 TABLET ORAL at 22:50

## 2025-05-02 RX ADMIN — Medication 8 MILLIGRAM(S): at 15:46

## 2025-05-02 RX ADMIN — TAMSULOSIN HYDROCHLORIDE 0.4 MILLIGRAM(S): 0.4 CAPSULE ORAL at 21:51

## 2025-05-02 RX ADMIN — Medication 50 MILLIGRAM(S): at 19:50

## 2025-05-02 NOTE — PATIENT PROFILE ADULT - NSPRONUTRITIONRISK_GEN_A_NUR
Strained his lower back at work 1 1/2 weeks ago. He was unloading 30-40 pound canisters from a pallet.     Pain has been intermittent, worse with standing and walking.  Icing and taking Ibuprofen occasionally for pain.     H/o back surgery last year-lumbar microdiskectomy. States he normally has tingling in his right leg since surgery.     Patient works at [employer].  
No indicators present

## 2025-05-02 NOTE — H&P ADULT - RESPIRATORY
How Severe Is Your Lipoma?: moderate Is This A New Presentation, Or A Follow-Up?: Skin Lesion clear to auscultation bilaterally/no wheezes/no rales/no rhonchi

## 2025-05-02 NOTE — PRE PROCEDURE NOTE - PRE PROCEDURE EVALUATION
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    Allergies: No Known Allergies    Medications (Abx/Cardiac/Anticoagulation/Blood Products)      Data:  181  107.3  T(C): 36.9  HR: 97  BP: 118/71  RR: 18  SpO2: 97%    Exam  General: No acute distress  Chest: Non labored breathing  Abdomen: Non-distended  Extremities: No swelling, warm    -WBC 3.12 / HgB 9.8 / Hct 28.7 / Plt 73  -Na 141 / Cl 102 / BUN 30 / Glucose 144  -K 3.5 / CO2 20 / Cr 1.31  -ALT 20 / Alk Phos 110 / T.Bili 0.6  -INR1.08    Imaging:     Plan: 69y Male presents for non-tunneled central venous catheter placement.     -Risks/Benefits/alternatives explained with the patient and/or healthcare proxy and witnessed informed consent obtained.

## 2025-05-02 NOTE — RAPID RESPONSE TEAM SUMMARY - NSSITUATIONBACKGROUNDRRT_GEN_ALL_CORE
70 yo male with newly diagnosed ASXL1, DDX41 mutated AML, admitted for induction chemo with dauno 60 mg/m2+ cytarabine. 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   5/2 Admit for Induction 7+3 Dauno 60mg/m2 + Cytarabine 100mg/m2.     RRT called for infusion reaction. As primary nurse on arrival, states that patient was receiving chemotherapy at 1930 and had a reaction with chemotherapy subsequently immediately stopped. On evaluation, patient rigoring and starting that he is having a sensation of his throat tightening up. Vitals demonstrating fever, BP? due to rigors, and O2 saturation appropriate. Exam with patient rigoring, without audible stridor or via auscultation, no skin changes concerning for allergic reaction. At this time, anaphylaxis reaction is lower on my differential. More likely due to infusion reaction from new chemotherapy. Primary team at bedside with heme/onc fellow on phone. No contraindications to give steroids. Benadryl 50mg IV, solumedrol 125mg IV, famotidine 20mg IV, and IV tylenol provided. Full bloodwork ordered including blood cultures. Patient subsequently improved and was more comfortable.   - please hold off on further chemotherapy until evaluation by day team   - f/u infectious workup   - will likely need changes to chemo agents or pre-medication for infusions/slower infusions going forwards, to be managed by primary team

## 2025-05-02 NOTE — H&P ADULT - NS ATTEND AMEND GEN_ALL_CORE FT
Primary: Chitty    70 yo male with ASXL1-mutated and DCR77-jhrwewr AML. 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 Admitted for induction 7+3    Heme:   Discussed induction w/ 7+3, side effects reviewed and informed consent obtained  Transfuse for Hgb <7, plts <10k  Montior TLS labs BID  MUGA  TLC placement    ID:  Start neutropenic PPx    MSK:  Has chronic pain from prior surgeries - pain control

## 2025-05-02 NOTE — CONSULT NOTE ADULT - SUBJECTIVE AND OBJECTIVE BOX
Interventional Radiology    Evaluate for Procedure: nontunneled cvc placement    HPI: 69y Male with AML requiring venous access for chemotherapy referred to IR for nontunneled cvc placement.    Allergies: No Known Allergies    Medications (Abx/Cardiac/Anticoagulation/Blood Products)      Data:  181  107.3  T(C): 36.9  HR: 97  BP: 118/71  RR: 18  SpO2: 97%    -WBC 3.12 / HgB 9.8 / Hct 28.7 / Plt 73  -Na 141 / Cl 104 / BUN 25 / Glucose 125  -K 3.3 / CO2 23 / Cr 1.36  -ALT 45 / Alk Phos 142 / T.Bili 0.4  -INR 1.08 / PTT 27.3    Radiology: reviewed    Assessment/Plan: 69y Male with AML requiring venous access for chemotherapy referred to IR for nontunneled cvc placement.    - case reviewed and approved for today  - please place IR procedure order under NP Giles  - STAT labs in AM (cbc,coags, bmp, T&S)  - hold AC  - does not need to be NPO  - d/w primary team    Any questions or concerns regarding above please reach out to IR:   -Available on [employer] teams  -During working hours (7a-5p): call -[phone]  -Emergent issues after 5pm: page: [phone]  -Non-emergent consults: Please place a [place] order "IR Consult" with an appropriate callback number  -Scheduling questions: [phone]  -Clinic/Outpatient [phone]      FLORESITA Rousseau- BC  Available on teams

## 2025-05-02 NOTE — CHART NOTE - NSCHARTNOTEFT_GEN_A_CORE
Notified by RN Pt is having reaction to chemo. Pt seen at the bedside rigoring and tachycardic with HR in high 140s, low grade temp 100.3. Pt expressed concern of having a sensation of his throat tightening up. RNs advised to call a RRT stat.  During the RRT Pt received Benadryl 50mg IV, solumedrol 125mg IV, famotidine 20mg IV, and IV tylenol to treat the reaction to newly induced chemo.   Full bloodwork ordered including blood cultures. Patient subsequently improved and was more comfortable.     Vital Signs Last 24 Hrs  T(C): 37.9 (02 May 2025 19:40), Max: 37.9 (02 May 2025 19:40)  T(F): 100.3 (02 May 2025 19:40), Max: 100.3 (02 May 2025 19:40)  HR: 152 (02 May 2025 19:40) (81 - 152)  BP: 136/66 (02 May 2025 19:40) (118/71 - 136/66)  BP(mean): --  RR: 24 (02 May 2025 19:40) (18 - 24)  SpO2: 96% (02 May 2025 19:40) (96% - 97%)    Parameters below as of 02 May 2025 19:40  Patient On (Oxygen Delivery Method): room air      Plan:  - Will continue to hold off on further chemotherapy until the morning  - Pt lactate 5.6 on vbg, order for a stat 500cc bolus, will cont with the maintenance fluids 100cc/hr   - Will f/u infectious workup   - EKG reviewed sinus tachy, with a HR improved to 110s, cont with the bolus will cont to monitor.    On call fellow, Dr Colindres made aware of the RRT and agrees with the above mentioned plan.    Niki Oneill PA-C  Dept of Medicine #[id number] Notified by RN Pt is having reaction to chemo. Pt seen at the bedside rigoring and tachycardic with HR in high 140s, low grade temp 100.3. Pt expressed concern of having a sensation of his throat tightening up. RNs advised to call a RRT stat.  During the RRT Pt received Benadryl 50mg IV, solumedrol 125mg IV, famotidine 20mg IV, and IV tylenol to treat the reaction to newly induced chemo.   Full bloodwork ordered including blood cultures. Patient subsequently improved and was more comfortable.     Vital Signs Last 24 Hrs  T(C): 37.9 (02 May 2025 19:40), Max: 37.9 (02 May 2025 19:40)  T(F): 100.3 (02 May 2025 19:40), Max: 100.3 (02 May 2025 19:40)  HR: 152 (02 May 2025 19:40) (81 - 152)  BP: 136/66 (02 May 2025 19:40) (118/71 - 136/66)  BP(mean): --  RR: 24 (02 May 2025 19:40) (18 - 24)  SpO2: 96% (02 May 2025 19:40) (96% - 97%)    Parameters below as of 02 May 2025 19:40  Patient On (Oxygen Delivery Method): room air      Plan:  - Will continue to hold off on further chemotherapy until the morning  - Pt lactate 5.6 on vbg, order for a stat 500cc bolus, will cont with the maintenance fluids 100cc/hr   - Will f/u infectious workup   - EKG reviewed sinus tachy, with a HR improved to 110s, cont with the bolus will cont to monitor.    On call fellow, Dr Colindres made aware of the RRT and agrees with the above mentioned plan.    Niki Oneill PA-C  Dept of Medicine #[id number]    Pt Lactate blood repeated post bolus, and resulted 1.7. Pt reassessed, was comfortable without any acute complaints

## 2025-05-02 NOTE — ADVANCED PRACTICE NURSE CONSULT - ASSESSMENT
Pt alert and O x 4. Labs reviewed by Dr Looney and team, Cr 1.31, ok to treat per team. PT EF 63% MUGA 5/2/25. Right TLC inserted today 05/02/25 in IR, patent, positive blood return obtained and flushing well, site intact, no s/s of bleeding, redness or swelling. Chemotherapy teaching done, written materials provided, pt and wife verbalized understanding. Pt premedicated with zofran 8 mg IVP and emend 150 mg Iv. 2 certified RN verification done. At 17:36 Dauorubicin 60 mg/m2= 136 mg administered IVP over 10 min, administered via NS free flow back up line of NS, blood return checked Q 2-3 min during administration.  pt tolerated chemotherapy well. At 17:55 Cytarabine 100 mg/m2= 227 mg Iv started  at 23 cc/hr as a 24 hr continuous infusion via Right TLC. Pt tolerated well until 19:30 when patient began to have rigors and chills, Chemo paused immediately at 19:30. RRT called, pt received solu-medrol, IV tylenol, benadryl, and pepcid, see RRT note. Heme onc fellow endorsed to on call PA to stop chemo for remainder of night until team assess patient tomorrow a.m. Pt now resting comfortably with wife at bedside. Night shift RN present at RRT as it occured at change of shift. Primary RN aware of care plan. Nursing care is ongoing.    Pt alert and O x 4. Labs reviewed by Dr Looney and team, Cr 1.31, ok to treat per team. PT EF 63% MUGA 5/2/25. Right TLC inserted today 05/02/25 in IR, patent, positive blood return obtained and flushing well, site intact, no s/s of bleeding, redness or swelling. Chemotherapy teaching done, written materials provided, pt and wife verbalized understanding. Pt premedicated with zofran 8 mg IVP and emend 150 mg Iv. 2 certified RN verification done. At 17:36 Dauorubicin 60 mg/m2= 136 mg administered IVP over 10 min, administered via NS free flow back up line of NS, blood return checked Q 2-3 min during administration.  pt tolerated chemotherapy well. At 17:55 Cytarabine 100 mg/m2= 227 mg Iv started  at 23 cc/hr as a 24 hr continuous infusion via Right TLC. Pt tolerated well until 19:30 when patient began to have rigors and chills, Chemo paused immediately at 19:30. RRT called, pt received solu-medrol, IV tylenol, benadryl, and pepcid, administered by RRT team, see RRT note. Heme onc fellow endorsed to on call PA to stop chemo for remainder of night until team assess patient tomorrow a.m. Pt now resting comfortably with wife at bedside. Night shift RN present at RRT as it occured at change of shift. Primary RN aware of care plan. Nursing care is ongoing.    Pt alert and O x 4. Labs reviewed by Dr Looney and team, Cr 1.31, BUN 30 ok to treat per team. PT EF 63% MUGA 5/2/25. Right TLC inserted today 05/02/25 in IR, patent, positive blood return obtained and flushing well, site intact, no s/s of bleeding, redness or swelling. Chemotherapy teaching done, written materials provided, pt and wife verbalized understanding. Pt premedicated with zofran 8 mg IVP and emend 150 mg Iv. 2 certified RN verification done. At 17:36 Dauorubicin 60 mg/m2= 136 mg administered IVP over 10 min, administered via NS free flow back up line of NS, blood return checked Q 2-3 min during administration.  pt tolerated chemotherapy well. At 17:55 Cytarabine 100 mg/m2= 227 mg Iv started  at 23 cc/hr as a 24 hr continuous infusion via Right TLC. Pt tolerated well until 19:30 when patient began to have rigors and chills, Chemo paused immediately at 19:30. RRT called, pt received solu-medrol, IV tylenol, benadryl, and pepcid, administered by RRT team, see RRT note. Heme onc fellow endorsed to on call PA to stop chemo for remainder of night until team assess patient tomorrow a.m. Pt now resting comfortably with wife at bedside. Night shift RN present at RRT as it occured at change of shift. Primary RN aware of care plan. Nursing care is ongoing.

## 2025-05-02 NOTE — H&P ADULT - PROBLEM SELECTOR PLAN 1
new ASXL1-mutated and LPR87-ecbeins AML.  admitted for induction chemo with dauno 60 mg/m2+ cytarabine.   Hepatitis screen/HIV(-) as outpatient  MUGA normal EF, IR TLC    IVF, antiemetics, mouth care   monitor weight, I & O, diuresis PRN   Monitor CBC with diff QD, TLS labs BID, Coags & T & C TIW transfuse for hb<7, PLT <10  5/2 hyperuricemia: Rasburicase 3 mg ivx1, allopurinol 300 mg qd. Hypokalemia: KCL 20 meq po x1, hypomagnesemia: MG 1 g ivpb x1

## 2025-05-02 NOTE — H&P ADULT - NSHPLABSRESULTS_GEN_ALL_CORE
LABS:                            9.8    3.12  )-----------( 73       ( 02 May 2025 10:14 )             28.7         Mean Cell Volume : 99.7 fl  Mean Cell Hemoglobin : 34.0 pg  Mean Cell Hemoglobin Concentration : 34.1 g/dL  Auto Neutrophil # : x  Auto Lymphocyte # : x  Auto Monocyte # : x  Auto Eosinophil # : x  Auto Basophil # : x  Auto Neutrophil % : x  Auto Lymphocyte % : x  Auto Monocyte % : x  Auto Eosinophil % : x  Auto Basophil % : x      05-02    141  |  102  |  30[H]  ----------------------------<  144[H]  3.5   |  20[L]  |  1.31[H]    Ca    9.2      02 May 2025 10:13  Phos  2.8     05-02  Mg     1.5     05-02    TPro  6.6  /  Alb  4.2  /  TBili  0.6  /  DBili  x   /  AST  17  /  ALT  20  /  AlkPhos  110  05-02        PT/INR - ( 02 May 2025 10:13 )   PT: 12.3 sec;   INR: 1.08 ratio         PTT - ( 02 May 2025 10:13 )  PTT:27.3 sec      Uric Acid 8.3

## 2025-05-02 NOTE — H&P ADULT - HISTORY OF PRESENT ILLNESS
70 yo male with newly diagnosed ASXL1, DDX41 mutated AML, admitted for induction chemo with dauno 60 mg/m2+ cytarabine. PMH  BRCA2 carrier ([id number] 9610C>T), HTN, HLD, Gout, GERD, BPH, spinal stenosis (s/p multiple procedures 2018, 2020 laminectomies / fusions), hip surgeries (Oct 2024, Jan 2025), residual hand weakness related to surgeries, umbilical hernia repair (2014/15) referred here from Dr Atkinson at [facility] in [place] for new ASXL1-mutated and MRR94-qpyfosy AML.   5/2 Admit for Induction 7+3 Dauno 60mg/m2 + Cytarabine 100mg/m2     Pt endorsed chronic cervical and lower back pain, taking Ibuprofen PRN basis.

## 2025-05-02 NOTE — H&P ADULT - ASSESSMENT
68 yo male with newly diagnosed ASXL1, DDX41 mutated AML, admitted for induction chemo with dauno 60 mg/m2+ cytarabine. PMH  BRCA2 carrier ([id number] 9610C>T), HTN, HLD, Gout, GERD, BPH, spinal stenosis (s/p multiple procedures 2018, 2020 laminectomies / fusions), hip surgeries (Oct 2024, Jan 2025), residual hand weakness related to surgeries, umbilical hernia repair (2014/15) referred here from Dr Atkinson at [facility] in [place] for new ASXL1-mutated and NQH93-pkhblib AML.

## 2025-05-02 NOTE — PATIENT PROFILE ADULT - HOW PATIENT ADDRESSED, PROFILE
Notification Instructions: Patient will be notified of biopsy results. However, patient instructed to call the office if not contacted within 2 weeks. [place]

## 2025-05-03 LAB
ALBUMIN SERPL ELPH-MCNC: 3.7 G/DL — SIGNIFICANT CHANGE UP (ref 3.3–5)
ALBUMIN SERPL ELPH-MCNC: 4 G/DL — SIGNIFICANT CHANGE UP (ref 3.3–5)
ALP SERPL-CCNC: 103 U/L — SIGNIFICANT CHANGE UP (ref 40–120)
ALP SERPL-CCNC: 91 U/L — SIGNIFICANT CHANGE UP (ref 40–120)
ALT FLD-CCNC: 26 U/L — SIGNIFICANT CHANGE UP (ref 10–45)
ALT FLD-CCNC: 29 U/L — SIGNIFICANT CHANGE UP (ref 10–45)
ANION GAP SERPL CALC-SCNC: 14 MMOL/L — SIGNIFICANT CHANGE UP (ref 5–17)
ANION GAP SERPL CALC-SCNC: 15 MMOL/L — SIGNIFICANT CHANGE UP (ref 5–17)
ANISOCYTOSIS BLD QL: SLIGHT — SIGNIFICANT CHANGE UP
APPEARANCE UR: CLEAR — SIGNIFICANT CHANGE UP
AST SERPL-CCNC: 23 U/L — SIGNIFICANT CHANGE UP (ref 10–40)
AST SERPL-CCNC: 29 U/L — SIGNIFICANT CHANGE UP (ref 10–40)
BACTERIA # UR AUTO: NEGATIVE /HPF — SIGNIFICANT CHANGE UP
BASOPHILS # BLD AUTO: 0 K/UL — SIGNIFICANT CHANGE UP (ref 0–0.2)
BASOPHILS NFR BLD AUTO: 0 % — SIGNIFICANT CHANGE UP (ref 0–2)
BILIRUB SERPL-MCNC: 0.4 MG/DL — SIGNIFICANT CHANGE UP (ref 0.2–1.2)
BILIRUB SERPL-MCNC: 0.6 MG/DL — SIGNIFICANT CHANGE UP (ref 0.2–1.2)
BILIRUB UR-MCNC: NEGATIVE — SIGNIFICANT CHANGE UP
BUN SERPL-MCNC: 28 MG/DL — HIGH (ref 7–23)
BUN SERPL-MCNC: 30 MG/DL — HIGH (ref 7–23)
CALCIUM SERPL-MCNC: 8.6 MG/DL — SIGNIFICANT CHANGE UP (ref 8.4–10.5)
CALCIUM SERPL-MCNC: 8.7 MG/DL — SIGNIFICANT CHANGE UP (ref 8.4–10.5)
CAST: 6 /LPF — HIGH (ref 0–4)
CHLORIDE SERPL-SCNC: 103 MMOL/L — SIGNIFICANT CHANGE UP (ref 96–108)
CHLORIDE SERPL-SCNC: 105 MMOL/L — SIGNIFICANT CHANGE UP (ref 96–108)
CO2 SERPL-SCNC: 20 MMOL/L — LOW (ref 22–31)
CO2 SERPL-SCNC: 22 MMOL/L — SIGNIFICANT CHANGE UP (ref 22–31)
COLOR SPEC: YELLOW — SIGNIFICANT CHANGE UP
CREAT SERPL-MCNC: 1.28 MG/DL — SIGNIFICANT CHANGE UP (ref 0.5–1.3)
CREAT SERPL-MCNC: 1.31 MG/DL — HIGH (ref 0.5–1.3)
DACRYOCYTES BLD QL SMEAR: SLIGHT — SIGNIFICANT CHANGE UP
DIFF PNL FLD: NEGATIVE — SIGNIFICANT CHANGE UP
EGFR: 59 ML/MIN/1.73M2 — LOW
EGFR: 59 ML/MIN/1.73M2 — LOW
EGFR: 61 ML/MIN/1.73M2 — SIGNIFICANT CHANGE UP
EGFR: 61 ML/MIN/1.73M2 — SIGNIFICANT CHANGE UP
ELLIPTOCYTES BLD QL SMEAR: SLIGHT — SIGNIFICANT CHANGE UP
EOSINOPHIL # BLD AUTO: 0 K/UL — SIGNIFICANT CHANGE UP (ref 0–0.5)
EOSINOPHIL NFR BLD AUTO: 0 % — SIGNIFICANT CHANGE UP (ref 0–6)
FLUAV AG NPH QL: SIGNIFICANT CHANGE UP
FLUBV AG NPH QL: SIGNIFICANT CHANGE UP
GIANT PLATELETS BLD QL SMEAR: PRESENT — SIGNIFICANT CHANGE UP
GLUCOSE SERPL-MCNC: 137 MG/DL — HIGH (ref 70–99)
GLUCOSE SERPL-MCNC: 171 MG/DL — HIGH (ref 70–99)
GLUCOSE UR QL: NEGATIVE MG/DL — SIGNIFICANT CHANGE UP
HCT VFR BLD CALC: 26.2 % — LOW (ref 39–50)
HGB BLD-MCNC: 8.8 G/DL — LOW (ref 13–17)
KETONES UR-MCNC: NEGATIVE MG/DL — SIGNIFICANT CHANGE UP
LACTATE SERPL-SCNC: 1.7 MMOL/L — SIGNIFICANT CHANGE UP (ref 0.5–2)
LDH SERPL L TO P-CCNC: 239 U/L — SIGNIFICANT CHANGE UP (ref 50–242)
LDH SERPL L TO P-CCNC: 247 U/L — HIGH (ref 50–242)
LEUKOCYTE ESTERASE UR-ACNC: NEGATIVE — SIGNIFICANT CHANGE UP
LYMPHOCYTES # BLD AUTO: 0.49 K/UL — LOW (ref 1–3.3)
LYMPHOCYTES # BLD AUTO: 25.3 % — SIGNIFICANT CHANGE UP (ref 13–44)
MACROCYTES BLD QL: SLIGHT — SIGNIFICANT CHANGE UP
MAGNESIUM SERPL-MCNC: 1.6 MG/DL — SIGNIFICANT CHANGE UP (ref 1.6–2.6)
MAGNESIUM SERPL-MCNC: 1.7 MG/DL — SIGNIFICANT CHANGE UP (ref 1.6–2.6)
MANUAL SMEAR VERIFICATION: SIGNIFICANT CHANGE UP
MCHC RBC-ENTMCNC: 33.6 G/DL — SIGNIFICANT CHANGE UP (ref 32–36)
MCHC RBC-ENTMCNC: 34 PG — SIGNIFICANT CHANGE UP (ref 27–34)
MCV RBC AUTO: 101.2 FL — HIGH (ref 80–100)
MONOCYTES # BLD AUTO: 0 K/UL — SIGNIFICANT CHANGE UP (ref 0–0.9)
MONOCYTES NFR BLD AUTO: 0 % — LOW (ref 2–14)
NEUTROPHILS # BLD AUTO: 1.42 K/UL — LOW (ref 1.8–7.4)
NEUTROPHILS NFR BLD AUTO: 73.7 % — SIGNIFICANT CHANGE UP (ref 43–77)
NITRITE UR-MCNC: NEGATIVE — SIGNIFICANT CHANGE UP
PH UR: 5.5 — SIGNIFICANT CHANGE UP (ref 5–8)
PHOSPHATE SERPL-MCNC: 2.8 MG/DL — SIGNIFICANT CHANGE UP (ref 2.5–4.5)
PHOSPHATE SERPL-MCNC: 3.4 MG/DL — SIGNIFICANT CHANGE UP (ref 2.5–4.5)
PLAT MORPH BLD: NORMAL — SIGNIFICANT CHANGE UP
PLATELET # BLD AUTO: 64 K/UL — LOW (ref 150–400)
POIKILOCYTOSIS BLD QL AUTO: SLIGHT — SIGNIFICANT CHANGE UP
POLYCHROMASIA BLD QL SMEAR: SLIGHT — SIGNIFICANT CHANGE UP
POTASSIUM SERPL-MCNC: 3.9 MMOL/L — SIGNIFICANT CHANGE UP (ref 3.5–5.3)
POTASSIUM SERPL-MCNC: 3.9 MMOL/L — SIGNIFICANT CHANGE UP (ref 3.5–5.3)
POTASSIUM SERPL-SCNC: 3.9 MMOL/L — SIGNIFICANT CHANGE UP (ref 3.5–5.3)
POTASSIUM SERPL-SCNC: 3.9 MMOL/L — SIGNIFICANT CHANGE UP (ref 3.5–5.3)
PROT SERPL-MCNC: 5.5 G/DL — LOW (ref 6–8.3)
PROT SERPL-MCNC: 6 G/DL — SIGNIFICANT CHANGE UP (ref 6–8.3)
PROT UR-MCNC: NEGATIVE MG/DL — SIGNIFICANT CHANGE UP
RAPID RVP RESULT: SIGNIFICANT CHANGE UP
RBC # BLD: 2.59 M/UL — LOW (ref 4.2–5.8)
RBC # FLD: 14.9 % — HIGH (ref 10.3–14.5)
RBC BLD AUTO: ABNORMAL
RBC CASTS # UR COMP ASSIST: 0 /HPF — SIGNIFICANT CHANGE UP (ref 0–4)
REVIEW: SIGNIFICANT CHANGE UP
RSV RNA NPH QL NAA+NON-PROBE: SIGNIFICANT CHANGE UP
SARS-COV-2 RNA SPEC QL NAA+PROBE: SIGNIFICANT CHANGE UP
SARS-COV-2 RNA SPEC QL NAA+PROBE: SIGNIFICANT CHANGE UP
SODIUM SERPL-SCNC: 138 MMOL/L — SIGNIFICANT CHANGE UP (ref 135–145)
SODIUM SERPL-SCNC: 141 MMOL/L — SIGNIFICANT CHANGE UP (ref 135–145)
SOURCE RESPIRATORY: SIGNIFICANT CHANGE UP
SP GR SPEC: 1.02 — SIGNIFICANT CHANGE UP (ref 1–1.03)
SQUAMOUS # UR AUTO: 0 /HPF — SIGNIFICANT CHANGE UP (ref 0–5)
URATE SERPL-MCNC: 1 MG/DL — LOW (ref 3.4–8.8)
URATE SERPL-MCNC: 1.6 MG/DL — LOW (ref 3.4–8.8)
UROBILINOGEN FLD QL: 1 MG/DL — SIGNIFICANT CHANGE UP (ref 0.2–1)
VARIANT LYMPHS # BLD: 1 % — SIGNIFICANT CHANGE UP (ref 0–6)
VARIANT LYMPHS NFR BLD MANUAL: 1 % — SIGNIFICANT CHANGE UP (ref 0–6)
WBC # BLD: 1.93 K/UL — LOW (ref 3.8–10.5)
WBC # FLD AUTO: 1.93 K/UL — LOW (ref 3.8–10.5)
WBC UR QL: 0 /HPF — SIGNIFICANT CHANGE UP (ref 0–5)

## 2025-05-03 PROCEDURE — 99233 SBSQ HOSP IP/OBS HIGH 50: CPT

## 2025-05-03 PROCEDURE — 71045 X-RAY EXAM CHEST 1 VIEW: CPT | Mod: 26

## 2025-05-03 RX ORDER — DIPHENHYDRAMINE HCL 12.5MG/5ML
50 ELIXIR ORAL DAILY
Refills: 0 | Status: DISCONTINUED | OUTPATIENT
Start: 2025-05-03 | End: 2025-05-04

## 2025-05-03 RX ORDER — POLYETHYLENE GLYCOL 3350 17 G/17G
17 POWDER, FOR SOLUTION ORAL ONCE
Refills: 0 | Status: COMPLETED | OUTPATIENT
Start: 2025-05-03 | End: 2025-05-03

## 2025-05-03 RX ORDER — POLYETHYLENE GLYCOL 3350 17 G/17G
17 POWDER, FOR SOLUTION ORAL
Refills: 0 | Status: DISCONTINUED | OUTPATIENT
Start: 2025-05-03 | End: 2025-05-30

## 2025-05-03 RX ORDER — MAGNESIUM SULFATE 500 MG/ML
1 SYRINGE (ML) INJECTION ONCE
Refills: 0 | Status: COMPLETED | OUTPATIENT
Start: 2025-05-03 | End: 2025-05-03

## 2025-05-03 RX ORDER — HYDROCORTISONE 20 MG
100 TABLET ORAL EVERY 8 HOURS
Refills: 0 | Status: DISCONTINUED | OUTPATIENT
Start: 2025-05-03 | End: 2025-05-29

## 2025-05-03 RX ORDER — ACETAMINOPHEN 500 MG/5ML
650 LIQUID (ML) ORAL DAILY
Refills: 0 | Status: COMPLETED | OUTPATIENT
Start: 2025-05-03 | End: 2025-05-04

## 2025-05-03 RX ORDER — DIPHENHYDRAMINE HYDROCHLORIDE AND LIDOCAINE HYDROCHLORIDE AND ALUMINUM HYDROXIDE AND MAGNESIUM HYDRO
5 KIT
Refills: 0 | Status: DISCONTINUED | OUTPATIENT
Start: 2025-05-03 | End: 2025-05-11

## 2025-05-03 RX ADMIN — POSACONAZOLE 300 MILLIGRAM(S): 100 TABLET, DELAYED RELEASE ORAL at 06:12

## 2025-05-03 RX ADMIN — Medication 50 MILLIGRAM(S): at 16:01

## 2025-05-03 RX ADMIN — Medication 8 MILLIGRAM(S): at 06:13

## 2025-05-03 RX ADMIN — Medication 300 MILLIGRAM(S): at 11:07

## 2025-05-03 RX ADMIN — DILTIAZEM HYDROCHLORIDE 240 MILLIGRAM(S): 240 TABLET, EXTENDED RELEASE ORAL at 06:12

## 2025-05-03 RX ADMIN — TAMSULOSIN HYDROCHLORIDE 0.4 MILLIGRAM(S): 0.4 CAPSULE ORAL at 21:08

## 2025-05-03 RX ADMIN — POLYETHYLENE GLYCOL 3350 17 GRAM(S): 17 POWDER, FOR SOLUTION ORAL at 13:13

## 2025-05-03 RX ADMIN — Medication 100 MILLILITER(S): at 20:03

## 2025-05-03 RX ADMIN — Medication 15 MILLILITER(S): at 06:13

## 2025-05-03 RX ADMIN — AMOXICILLIN 500 MILLIGRAM(S): 500 CAPSULE ORAL at 06:12

## 2025-05-03 RX ADMIN — Medication 15 MILLILITER(S): at 23:37

## 2025-05-03 RX ADMIN — Medication 15 MILLILITER(S): at 17:19

## 2025-05-03 RX ADMIN — Medication 100 GRAM(S): at 18:12

## 2025-05-03 RX ADMIN — Medication 500 MILLIGRAM(S): at 06:12

## 2025-05-03 RX ADMIN — Medication 8 MILLIGRAM(S): at 21:08

## 2025-05-03 RX ADMIN — Medication 227 MILLIGRAM(S): at 17:05

## 2025-05-03 RX ADMIN — Medication 40 MILLIGRAM(S): at 06:13

## 2025-05-03 RX ADMIN — Medication 2000 UNIT(S): at 11:07

## 2025-05-03 RX ADMIN — Medication 15 MILLILITER(S): at 11:07

## 2025-05-03 RX ADMIN — DAUNORUBICIN HYDROCHLORIDE 163.2 MILLIGRAM(S): 5 INJECTION, SOLUTION INTRAVENOUS at 16:46

## 2025-05-03 RX ADMIN — Medication 500 MILLIGRAM(S): at 17:19

## 2025-05-03 RX ADMIN — AMOXICILLIN 500 MILLIGRAM(S): 500 CAPSULE ORAL at 17:23

## 2025-05-03 RX ADMIN — POSACONAZOLE 300 MILLIGRAM(S): 100 TABLET, DELAYED RELEASE ORAL at 17:20

## 2025-05-03 RX ADMIN — ATORVASTATIN CALCIUM 80 MILLIGRAM(S): 80 TABLET, FILM COATED ORAL at 21:07

## 2025-05-03 RX ADMIN — Medication 650 MILLIGRAM(S): at 16:02

## 2025-05-03 RX ADMIN — Medication 8 MILLIGRAM(S): at 14:43

## 2025-05-03 RX ADMIN — Medication 1 APPLICATION(S): at 07:58

## 2025-05-03 NOTE — PROGRESS NOTE ADULT - ASSESSMENT
68 yo male with newly diagnosed ASXL1, DDX41 mutated AML, admitted for induction chemo with dauno 60 mg/m2+ cytarabine. PMH  BRCA2 carrier ([id number] 9610C>T), HTN, HLD, Gout, GERD, BPH, spinal stenosis (s/p multiple procedures 2018, 2020 laminectomies / fusions), hip surgeries (Oct 2024, Jan 2025), residual hand weakness related to surgeries, umbilical hernia repair (2014/15) referred here from Dr Atkinson at [facility] in [place] for new ASXL1-mutated and ZPE72-zxbnixd AML.    68 yo male with newly diagnosed ASXL1, DDX41 mutated AML, admitted for induction chemo with 7+3:  dauno 60 mg/m2+ cytarabine. PMH  BRCA2 carrier ([id number] 9610C>T), HTN, HLD, Gout, GERD, BPH, spinal stenosis (s/p multiple procedures 2018, 2020 laminectomies / fusions), hip surgeries (Oct 2024, Jan 2025), residual hand weakness related to surgeries, umbilical hernia repair (2014/15) referred here from Dr Atkinson at [facility] in [place] for new ASXL1-mutated and QYU64-qdkxgis AML. Chemo started on 5/2, Complicated by rigors, chills, fever, SOB  and tachycardia 2 hours into day 1 infusion - likely infusion reaction. Pt has pancytopenia d/t chemo and disease

## 2025-05-03 NOTE — PROGRESS NOTE ADULT - SUBJECTIVE AND OBJECTIVE BOX
Diagnosis    Protocol/Chemo Regimen:  Day:      Pt endorsed:    Review of Systems: Patient denied nausea, vomiting, mouth pain,  chest pain, cough, dyspnea, abdominal pain, constipation, diarrhea, rectal pain,  rash, fatigue, headache, bleeding      Pain scale:                                        Location:    Diet:     Allergies    No Known Allergies    Intolerances    Shrimp- Nasal congestion (Other)      ANTIMICROBIALS  amoxicillin 500 milliGRAM(s) Oral two times a day  levoFLOXacin  Tablet 500 milliGRAM(s) Oral every 24 hours  posaconazole DR Tablet   Oral   posaconazole DR Tablet 300 milliGRAM(s) Oral every 12 hours  valACYclovir 500 milliGRAM(s) Oral two times a day      HEME/ONC MEDICATIONS  cytarabine IVPB (eMAR) 227 milliGRAM(s) IV Intermittent daily  DAUNOrubicin Injectable (eMAR) 136 milliGRAM(s) IV Push Chemo every 24 hours      STANDING MEDICATIONS  allopurinol 300 milliGRAM(s) Oral daily  atorvastatin 80 milliGRAM(s) Oral at bedtime  Biotene Dry Mouth Oral Rinse 15 milliLiter(s) Swish and Spit four times a day  chlorhexidine 4% Liquid 1 Application(s) Topical <User Schedule>  cholecalciferol 2000 Unit(s) Oral daily  diltiazem    milliGRAM(s) Oral daily  hydrochlorothiazide 50 milliGRAM(s) Oral daily  ondansetron Injectable 8 milliGRAM(s) IV Push every 8 hours  pantoprazole    Tablet 40 milliGRAM(s) Oral before breakfast  sodium chloride 0.9%. 1000 milliLiter(s) IV Continuous <Continuous>  tamsulosin 0.4 milliGRAM(s) Oral at bedtime      PRN MEDICATIONS  acetaminophen     Tablet .. 650 milliGRAM(s) Oral every 6 hours PRN  albuterol    90 MICROgram(s) HFA Inhaler 2 Puff(s) Inhalation every 6 hours PRN  metoclopramide Injectable 10 milliGRAM(s) IV Push every 6 hours PRN  oxyCODONE    IR 2.5 milliGRAM(s) Oral every 4 hours PRN  oxyCODONE    IR 5 milliGRAM(s) Oral every 4 hours PRN  sodium chloride 0.9% lock flush 10 milliLiter(s) IV Push every 1 hour PRN        Vital Signs Last 24 Hrs  T(C): 36.1 (03 May 2025 06:08), Max: 38.8 (02 May 2025 20:48)  T(F): 97 (03 May 2025 06:08), Max: 101.9 (02 May 2025 20:48)  HR: 65 (03 May 2025 06:08) (65 - 152)  BP: 117/72 (03 May 2025 06:08) (108/67 - 136/66)  BP(mean): --  RR: 17 (03 May 2025 06:08) (17 - 24)  SpO2: 96% (03 May 2025 06:08) (96% - 97%)    Parameters below as of 03 May 2025 06:08  Patient On (Oxygen Delivery Method): room air        PHYSICAL EXAM  General: adult in NAD  HEENT: clear oropharynx, anicteric sclera  Neck: supple  CV: normal S1/S2 RRR  Lungs: positive air movement b/l ant lungs,clear to auscultation, no wheezes, no rales  Abdomen: soft, + BS,  non-tender non-distended  Ext: no edema  Skin: no rash  Neuro: alert and oriented X 3, no focal deficits  Central Line:  TLC    LABS:                           8.8    1.93  )-----------( 64       ( 03 May 2025 07:25 )             26.2         Mean Cell Volume : 101.2 fl  Mean Cell Hemoglobin : 34.0 pg  Mean Cell Hemoglobin Concentration : 33.6 g/dL  Auto Neutrophil # : x  Auto Lymphocyte # : x  Auto Monocyte # : x  Auto Eosinophil # : x  Auto Basophil # : x  Auto Neutrophil % : x  Auto Lymphocyte % : x  Auto Monocyte % : x  Auto Eosinophil % : x  Auto Basophil % : x      05-03    138  |  103  |  28[H]  ----------------------------<  171[H]  3.9   |  20[L]  |  1.28    Ca    8.6      03 May 2025 07:29  Phos  3.4     05-03  Mg     1.7     05-03    TPro  6.0  /  Alb  4.0  /  TBili  0.6  /  DBili  x   /  AST  23  /  ALT  26  /  AlkPhos  103  05-03          PT/INR - ( 02 May 2025 20:18 )   PT: 12.9 sec;   INR: 1.13 ratio         PTT - ( 02 May 2025 20:18 )  PTT:22.5 sec      Uric Acid 1.6    RADIOLOGY & ADDITIONAL STUDIES:         Diagnosis: ASXL1-mutated and AER43-edszlwu AML    Protocol/Chemo Regimen: 7+3:  Daunorubicin 60 mg/m2+ Cytarabine   Day: 2     Over night event:  rigors, chills, fever, SOB, and tachycardia 2 hours into day 1 infusion - likely infusion reaction.    Pt endorsed: feel better now. chronic cervical/low back pain mild now. BM yesterday morning, slight constipated. dry throat     Review of Systems: Patient denied nausea, vomiting, mouth pain,  chest pain, cough, dyspnea, abdominal pain,  diarrhea, rectal pain,  rash, fatigue, headache, bleeding      Pain scale:       mild                                 Location: cervical/low back     Diet:  regular     Allergies: No Known Allergies    Intolerances:     Shrimp- Nasal congestion (Other)      ANTIMICROBIALS  amoxicillin 500 milliGRAM(s) Oral two times a day  levoFLOXacin  Tablet 500 milliGRAM(s) Oral every 24 hours  posaconazole DR Tablet   Oral   posaconazole DR Tablet 300 milliGRAM(s) Oral every 12 hours  valACYclovir 500 milliGRAM(s) Oral two times a day      HEME/ONC MEDICATIONS  cytarabine IVPB (eMAR) 227 milliGRAM(s) IV Intermittent daily  DAUNOrubicin Injectable (eMAR) 136 milliGRAM(s) IV Push Chemo every 24 hours      STANDING MEDICATIONS  allopurinol 300 milliGRAM(s) Oral daily  atorvastatin 80 milliGRAM(s) Oral at bedtime  Biotene Dry Mouth Oral Rinse 15 milliLiter(s) Swish and Spit four times a day  chlorhexidine 4% Liquid 1 Application(s) Topical <User Schedule>  cholecalciferol 2000 Unit(s) Oral daily  diltiazem    milliGRAM(s) Oral daily  hydrochlorothiazide 50 milliGRAM(s) Oral daily  ondansetron Injectable 8 milliGRAM(s) IV Push every 8 hours  pantoprazole    Tablet 40 milliGRAM(s) Oral before breakfast  sodium chloride 0.9%. 1000 milliLiter(s) IV Continuous <Continuous>  tamsulosin 0.4 milliGRAM(s) Oral at bedtime      PRN MEDICATIONS  acetaminophen     Tablet .. 650 milliGRAM(s) Oral every 6 hours PRN  albuterol    90 MICROgram(s) HFA Inhaler 2 Puff(s) Inhalation every 6 hours PRN  metoclopramide Injectable 10 milliGRAM(s) IV Push every 6 hours PRN  oxyCODONE    IR 2.5 milliGRAM(s) Oral every 4 hours PRN  oxyCODONE    IR 5 milliGRAM(s) Oral every 4 hours PRN  sodium chloride 0.9% lock flush 10 milliLiter(s) IV Push every 1 hour PRN        Vital Signs Last 24 Hrs  T(C): 36.1 (03 May 2025 06:08), Max: 38.8 (02 May 2025 20:48)  T(F): 97 (03 May 2025 06:08), Max: 101.9 (02 May 2025 20:48)  HR: 65 (03 May 2025 06:08) (65 - 152)  BP: 117/72 (03 May 2025 06:08) (108/67 - 136/66)  BP(mean): --  RR: 17 (03 May 2025 06:08) (17 - 24)  SpO2: 96% (03 May 2025 06:08) (96% - 97%)    Parameters below as of 03 May 2025 06:08  Patient On (Oxygen Delivery Method): room air        PHYSICAL EXAM  General: adult in NAD  HEENT: clear oropharynx, anicteric sclera  Neck: supple  CV: normal S1/S2 RRR  Lungs: positive air movement b/l ant lungs,clear to auscultation, no wheezes, no rales  Abdomen: soft, + BS,  non-tender non-distended  Ext: no edema  Skin: no rash  Neuro: alert and oriented X 3, no focal deficits  Central Line:  TLC CDI     LABS:                           8.8    1.93  )-----------( 64       ( 03 May 2025 07:25 )             26.2         Mean Cell Volume : 101.2 fl  Mean Cell Hemoglobin : 34.0 pg  Mean Cell Hemoglobin Concentration : 33.6 g/dL  Auto Neutrophil # : x  Auto Lymphocyte # : x  Auto Monocyte # : x  Auto Eosinophil # : x  Auto Basophil # : x  Auto Neutrophil % : x  Auto Lymphocyte % : x  Auto Monocyte % : x  Auto Eosinophil % : x  Auto Basophil % : x      05-03    138  |  103  |  28[H]  ----------------------------<  171[H]  3.9   |  20[L]  |  1.28    Ca    8.6      03 May 2025 07:29  Phos  3.4     05-03  Mg     1.7     05-03    TPro  6.0  /  Alb  4.0  /  TBili  0.6  /  DBili  x   /  AST  23  /  ALT  26  /  AlkPhos  103  05-03          PT/INR - ( 02 May 2025 20:18 )   PT: 12.9 sec;   INR: 1.13 ratio         PTT - ( 02 May 2025 20:18 )  PTT:22.5 sec      Uric Acid 1.6    Urinalysis + Microscopic Examination (05.03.25 @ 10:07)    pH Urine: 5.5   Urine Appearance: Clear   Color: Yellow   Specific Gravity: 1.017   Protein, Urine: Negative mg/dL   Glucose Qualitative, Urine: Negative mg/dL   Ketone - Urine: Negative mg/dL   Blood, Urine: Negative   Bilirubin: Negative   Urobilinogen: 1.0 mg/dL   Leukocyte Esterase Concentration: Negative   Nitrite: Negative   Review: Reviewed   White Blood Cell - Urine: 0 /HPF   Red Blood Cell - Urine: 0 /HPF   Bacteria: Negative /HPF   Cast: 6 /LPF   Epithelial Cells: 0 /HPF    Cultures:     Respiratory Viral Panel with COVID-19 by CARMELA (05.03.25 @ 11:53)    Rapid RVP Result: [facility]   SARS-CoV-2: [facility]: This Respiratory Panel uses polymerase chain reaction (PCR) to detect for  adenovirus; coronavirus (HKU1, NL63, 229E, OC43); human metapneumovirus  (hMPV); human enterovirus/rhinovirus (Entero/RV); influenza A; influenza  A/H1; influenza A/H3; influenza A/H1-2009; influenza B; parainfluenza  viruses 1, 2, 3, 4; respiratory syncytial virus; Mycoplasma pneumoniae;  Chlamydophila pneumoniae; and SARS-CoV-2.        RADIOLOGY & ADDITIONAL STUDIES:    < from: Xray Chest 1 View- PORTABLE-Urgent (Xray Chest 1 View- PORTABLE-Urgent .) (05.03.25 @ 10:36) >  Right IJ central line tip terminates in the SVC.  Elevated right hemidiaphragm. Patchy opacities at the left lung base   along the diaphragm. Otherwise, clear lungs.  There is no pneumothorax or pleural effusion.  Partially visualized cervical spine hardware.  IMPRESSION:  Tubes and lines as above.  Patchy opacities along the left lung base, possibly atelectasis, however   superimposed infection cannot be entirely excluded.      < from: NM MUGA Scan (05.02.25 @ 11:23) >  IMPRESSION:  Normal gated blood pool study.  Normal resting left ventricular ejection fraction of 63% % and normal   right and left ventricular wall motion.

## 2025-05-03 NOTE — PROGRESS NOTE ADULT - NS ATTEND AMEND GEN_ALL_CORE FT
Primary: Chitty    70 yo male with ASXL1-mutated and CAD93-bzdjahr AML. 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 Admitted for induction 7+3    Heme:   Discussed induction w/ 7+3, side effects reviewed and informed consent obtained  Transfuse for Hgb <7, plts <10k  Montior TLS labs BID  MUGA  TLC placement    ID:  Start neutropenic PPx    MSK:  Has chronic pain from prior surgeries - pain control Primary: Paulie    69M with ASXL1-mutated and GOW26-fixxryp AML. 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 Admitted for induction 7+3 - day 2.    Heme:   - Continue 7+3 (5/2/25 - ). Complicated by rigors, chills, and tachycardia 2 hours into cytarabine infusion - likely infusion reaction.  - Trend CBC with differential daily. Goal Hgb > 7 and plt > 10  (> 15 if febrile, > 50 if bleeding).   - Trend DIC labs daily. Continue supportive transfusions as needed to maintain fibrinogen > 100.   - Trend TLS labs twice daily.   - Continue allopurinol. S/p rasburicase; consider additional doses if uric acid > 8.  - Continue IVF. Monitor daily weights and I/O's. Diurese PRN.     ID: Febrile once on 5/2/25 but likely in the setting of cytarabine reaction  - Prophylaxis: levaquin, amoxicillin, posaconazole, valacyclovir     MSK: Has chronic pain from prior surgeries   - Pain control: oxycodone Primary: Paulie    69M with ASXL1-mutated and MRW35-fvqwwzj AML. 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 Admitted for induction 7+3 - day 2.    Heme:   - Continue 7+3 (5/2/25 - ). Complicated by rigors, chills, and tachycardia 2 hours into day 1 infusion - likely infusion reaction.  - Trend CBC with differential daily. Goal Hgb > 7 and plt > 10  (> 15 if febrile, > 50 if bleeding).   - Trend DIC labs daily. Continue supportive transfusions as needed to maintain fibrinogen > 100.   - Trend TLS labs twice daily.   - Continue allopurinol. S/p rasburicase; consider additional doses if uric acid > 8.  - Continue IVF. Monitor daily weights and I/O's. Diurese PRN.     ID: Febrile once on 5/2/25 but likely in the setting of infusion reaction  - Prophylaxis: levaquin, amoxicillin, posaconazole, valacyclovir     MSK: Has chronic pain from prior surgeries   - Pain control: oxycodone

## 2025-05-03 NOTE — PROGRESS NOTE ADULT - PROBLEM SELECTOR PLAN 8
DVT ppx: OOB and ambulation as carey   PT consult DVT ppx: OOB and ambulation as carey   PT consulted: no skilled PT need, PT signed off     5/3 Miralax x1 now, BID PRN constipation

## 2025-05-03 NOTE — ADVANCED PRACTICE NURSE CONSULT - ASSESSMENT
Pt alert and O x 4. Labs reviewed by Dr Laurent and team on rounds, Cr 1.31, BUN 28 okay to treat as per team. PT EF 63% MUGA 5/2/25. Right TLC inserted 05/02/25 in IR, patent, positive blood return obtained and flushing well, site intact, no s/s of bleeding, redness or swelling. Chemotherapy teaching done, pt and wife verbalized understanding. Pt premedicated with 8mg IVP zofran q8hrs, 650mg PO Tylenol x1, and 50mg IVP Benadryl x1. 2 certified RN verification done. Okay to continue D2 as ordered as per team, see provider to RN. At 1646 Dauorubicin 60 mg/m2= 136 mg administered IVP over 10 min, administered via NS free flow back up line of NS, blood return checked Q 2-3 min during administration.  pt tolerated chemotherapy well. At 17:05 pt receiving Cytarabine 100 mg/m2= 227 mg IV at 23 cc/hr as a 24 hr continuous infusion via Right TLC, connected to lowest port of NS back up line via alaris pump. Pt tolerating well. Primary RN aware of care plan. Nursing care is ongoing.

## 2025-05-03 NOTE — PROGRESS NOTE ADULT - PROBLEM SELECTOR PLAN 2
not neutropenic but ANC 1.02,  afebrile  Will start ppx abxs   pan cx CXR if fever Intermittent  neutropenic ANC 1.42 on 5/3,  febrile last night(5/2)  Febrile once on 5/2 but likely in the setting of infusion reaction, will Continue  ppx abxs with Amox, levaquin, valtrex and posaconazole  FU pan cx, UA (-) UTI, full RVP(-) on 5/3   5/2 CXR Patchy opacities along the left lung base, possibly atelectasis, however superimposed infection cannot be entirely excluded.  added incentive spirometer

## 2025-05-03 NOTE — PHYSICAL THERAPY INITIAL EVALUATION ADULT - PERTINENT HX OF CURRENT PROBLEM, REHAB EVAL
as per chart review: newly diagnosed ASXL1, DDX41 mutated AML, admitted for induction chemo with dauno 60 mg/m2+ cytarabine. PMH  BRCA2 carrier ([id number] 9610C>T), HTN, HLD, Gout, GERD, BPH, spinal stenosis (s/p multiple procedures 2018, 2020 laminectomies / fusions), hip surgeries (Oct 2024, Jan 2025), residual hand weakness related to surgeries, umbilical hernia repair (2014/15) referred here from Dr Atkinson at [facility] in [place] for new ASXL1-mutated and SOA12-jjhvwdc AML. TESTS: 5/3 CXR: Tubes and lines as above. Patchy opacities along the left lung base, possibly atelectasis, however   superimposed infection cannot be entirely excluded.

## 2025-05-03 NOTE — PROGRESS NOTE ADULT - PROBLEM SELECTOR PLAN 1
new ASXL1-mutated and OIL04-yuqxylk AML.  admitted for induction chemo with dauno 60 mg/m2+ cytarabine.   Hepatitis screen/HIV(-) as outpatient  MUGA normal EF, IR TLC    IVF, antiemetics, mouth care   monitor weight, I & O, diuresis PRN   Monitor CBC with diff QD, TLS labs BID, Coags & T & C TIW transfuse for hb<7, PLT <10  5/2 hyperuricemia: Rasburicase 3 mg ivx1, allopurinol 300 mg qd. Hypokalemia: KCL 20 meq po x1, hypomagnesemia: MG 1 g ivpb x1 new ASXL1-mutated and WSG86-lckbgzf AML.  admitted for induction chemo with dauno 60 mg/m2+ cytarabine. Chemo started on 5/2, Complicated by rigors, chills, fever, SOB  and tachycardia 2 hours into day 1 infusion - likely infusion. recevied pepcid/benadryl/solumedrol/tylenol iv. will make up D1 Cytarabine on day8    will proceed with day2 chemo with pre mdes Tylenol and Benadryl, hydrocortisone iv PRN reaction   Hepatitis screen/HIV(-) as outpatient  MUGA normal EF, IR TLC    IVF, antiemetics, mouth care   monitor weight, I & O, diuresis PRN   Monitor CBC with diff QD, TLS labs BID, Coags & T & C TIW transfuse for hb<7, PLT <10  5/2 hyperuricemia: Rasburicase 3 mg ivx1, allopurinol 300 mg qd.

## 2025-05-04 LAB
ALBUMIN SERPL ELPH-MCNC: 3.6 G/DL — SIGNIFICANT CHANGE UP (ref 3.3–5)
ALBUMIN SERPL ELPH-MCNC: 3.6 G/DL — SIGNIFICANT CHANGE UP (ref 3.3–5)
ALP SERPL-CCNC: 87 U/L — SIGNIFICANT CHANGE UP (ref 40–120)
ALP SERPL-CCNC: 88 U/L — SIGNIFICANT CHANGE UP (ref 40–120)
ALT FLD-CCNC: 33 U/L — SIGNIFICANT CHANGE UP (ref 10–45)
ALT FLD-CCNC: 35 U/L — SIGNIFICANT CHANGE UP (ref 10–45)
ANION GAP SERPL CALC-SCNC: 12 MMOL/L — SIGNIFICANT CHANGE UP (ref 5–17)
ANION GAP SERPL CALC-SCNC: 14 MMOL/L — SIGNIFICANT CHANGE UP (ref 5–17)
AST SERPL-CCNC: 25 U/L — SIGNIFICANT CHANGE UP (ref 10–40)
AST SERPL-CCNC: 26 U/L — SIGNIFICANT CHANGE UP (ref 10–40)
BASOPHILS # BLD AUTO: 0 K/UL — SIGNIFICANT CHANGE UP (ref 0–0.2)
BASOPHILS NFR BLD AUTO: 0 % — SIGNIFICANT CHANGE UP (ref 0–2)
BILIRUB SERPL-MCNC: 0.2 MG/DL — SIGNIFICANT CHANGE UP (ref 0.2–1.2)
BILIRUB SERPL-MCNC: 0.3 MG/DL — SIGNIFICANT CHANGE UP (ref 0.2–1.2)
BLD GP AB SCN SERPL QL: NEGATIVE — SIGNIFICANT CHANGE UP
BUN SERPL-MCNC: 31 MG/DL — HIGH (ref 7–23)
BUN SERPL-MCNC: 32 MG/DL — HIGH (ref 7–23)
CALCIUM SERPL-MCNC: 8.6 MG/DL — SIGNIFICANT CHANGE UP (ref 8.4–10.5)
CALCIUM SERPL-MCNC: 8.7 MG/DL — SIGNIFICANT CHANGE UP (ref 8.4–10.5)
CHLORIDE SERPL-SCNC: 108 MMOL/L — SIGNIFICANT CHANGE UP (ref 96–108)
CHLORIDE SERPL-SCNC: 109 MMOL/L — HIGH (ref 96–108)
CO2 SERPL-SCNC: 21 MMOL/L — LOW (ref 22–31)
CO2 SERPL-SCNC: 21 MMOL/L — LOW (ref 22–31)
CREAT SERPL-MCNC: 1.32 MG/DL — HIGH (ref 0.5–1.3)
CREAT SERPL-MCNC: 1.39 MG/DL — HIGH (ref 0.5–1.3)
CULTURE RESULTS: NO GROWTH — SIGNIFICANT CHANGE UP
EGFR: 55 ML/MIN/1.73M2 — LOW
EGFR: 55 ML/MIN/1.73M2 — LOW
EGFR: 59 ML/MIN/1.73M2 — LOW
EGFR: 59 ML/MIN/1.73M2 — LOW
EOSINOPHIL # BLD AUTO: 0 K/UL — SIGNIFICANT CHANGE UP (ref 0–0.5)
EOSINOPHIL NFR BLD AUTO: 0 % — SIGNIFICANT CHANGE UP (ref 0–6)
GLUCOSE SERPL-MCNC: 131 MG/DL — HIGH (ref 70–99)
GLUCOSE SERPL-MCNC: 132 MG/DL — HIGH (ref 70–99)
HCT VFR BLD CALC: 24.1 % — LOW (ref 39–50)
HGB BLD-MCNC: 7.9 G/DL — LOW (ref 13–17)
IMM GRANULOCYTES NFR BLD AUTO: 0.4 % — SIGNIFICANT CHANGE UP (ref 0–0.9)
LDH SERPL L TO P-CCNC: 212 U/L — SIGNIFICANT CHANGE UP (ref 50–242)
LDH SERPL L TO P-CCNC: 219 U/L — SIGNIFICANT CHANGE UP (ref 50–242)
LYMPHOCYTES # BLD AUTO: 1.09 K/UL — SIGNIFICANT CHANGE UP (ref 1–3.3)
LYMPHOCYTES # BLD AUTO: 47.8 % — HIGH (ref 13–44)
MAGNESIUM SERPL-MCNC: 1.9 MG/DL — SIGNIFICANT CHANGE UP (ref 1.6–2.6)
MAGNESIUM SERPL-MCNC: 2.1 MG/DL — SIGNIFICANT CHANGE UP (ref 1.6–2.6)
MCHC RBC-ENTMCNC: 32.8 G/DL — SIGNIFICANT CHANGE UP (ref 32–36)
MCHC RBC-ENTMCNC: 33.6 PG — SIGNIFICANT CHANGE UP (ref 27–34)
MCV RBC AUTO: 102.6 FL — HIGH (ref 80–100)
MONOCYTES # BLD AUTO: 0.04 K/UL — SIGNIFICANT CHANGE UP (ref 0–0.9)
MONOCYTES NFR BLD AUTO: 1.8 % — LOW (ref 2–14)
NEUTROPHILS # BLD AUTO: 1.14 K/UL — LOW (ref 1.8–7.4)
NEUTROPHILS NFR BLD AUTO: 50 % — SIGNIFICANT CHANGE UP (ref 43–77)
NRBC BLD AUTO-RTO: 0 /100 WBCS — SIGNIFICANT CHANGE UP (ref 0–0)
PHOSPHATE SERPL-MCNC: 2.9 MG/DL — SIGNIFICANT CHANGE UP (ref 2.5–4.5)
PHOSPHATE SERPL-MCNC: 3.8 MG/DL — SIGNIFICANT CHANGE UP (ref 2.5–4.5)
PLATELET # BLD AUTO: 57 K/UL — LOW (ref 150–400)
POTASSIUM SERPL-MCNC: 3.8 MMOL/L — SIGNIFICANT CHANGE UP (ref 3.5–5.3)
POTASSIUM SERPL-MCNC: 3.9 MMOL/L — SIGNIFICANT CHANGE UP (ref 3.5–5.3)
POTASSIUM SERPL-SCNC: 3.8 MMOL/L — SIGNIFICANT CHANGE UP (ref 3.5–5.3)
POTASSIUM SERPL-SCNC: 3.9 MMOL/L — SIGNIFICANT CHANGE UP (ref 3.5–5.3)
PROT SERPL-MCNC: 5.3 G/DL — LOW (ref 6–8.3)
PROT SERPL-MCNC: 5.4 G/DL — LOW (ref 6–8.3)
RBC # BLD: 2.35 M/UL — LOW (ref 4.2–5.8)
RBC # FLD: 15 % — HIGH (ref 10.3–14.5)
RH IG SCN BLD-IMP: POSITIVE — SIGNIFICANT CHANGE UP
SODIUM SERPL-SCNC: 142 MMOL/L — SIGNIFICANT CHANGE UP (ref 135–145)
SODIUM SERPL-SCNC: 143 MMOL/L — SIGNIFICANT CHANGE UP (ref 135–145)
SPECIMEN SOURCE: SIGNIFICANT CHANGE UP
URATE SERPL-MCNC: 1.3 MG/DL — LOW (ref 3.4–8.8)
URATE SERPL-MCNC: 1.5 MG/DL — LOW (ref 3.4–8.8)
WBC # BLD: 2.28 K/UL — LOW (ref 3.8–10.5)
WBC # FLD AUTO: 2.28 K/UL — LOW (ref 3.8–10.5)

## 2025-05-04 PROCEDURE — 99233 SBSQ HOSP IP/OBS HIGH 50: CPT

## 2025-05-04 RX ORDER — ACETAMINOPHEN 500 MG/5ML
650 LIQUID (ML) ORAL ONCE
Refills: 0 | Status: COMPLETED | OUTPATIENT
Start: 2025-05-04 | End: 2025-05-04

## 2025-05-04 RX ORDER — DIPHENHYDRAMINE HCL 12.5MG/5ML
50 ELIXIR ORAL ONCE
Refills: 0 | Status: COMPLETED | OUTPATIENT
Start: 2025-05-04 | End: 2025-05-04

## 2025-05-04 RX ADMIN — POSACONAZOLE 300 MILLIGRAM(S): 100 TABLET, DELAYED RELEASE ORAL at 17:13

## 2025-05-04 RX ADMIN — Medication 15 MILLILITER(S): at 11:09

## 2025-05-04 RX ADMIN — Medication 50 MILLIGRAM(S): at 11:10

## 2025-05-04 RX ADMIN — DAUNORUBICIN HYDROCHLORIDE 163.2 MILLIGRAM(S): 5 INJECTION, SOLUTION INTRAVENOUS at 17:39

## 2025-05-04 RX ADMIN — AMOXICILLIN 500 MILLIGRAM(S): 500 CAPSULE ORAL at 05:25

## 2025-05-04 RX ADMIN — TAMSULOSIN HYDROCHLORIDE 0.4 MILLIGRAM(S): 0.4 CAPSULE ORAL at 21:51

## 2025-05-04 RX ADMIN — Medication 650 MILLIGRAM(S): at 11:39

## 2025-05-04 RX ADMIN — Medication 2000 UNIT(S): at 11:10

## 2025-05-04 RX ADMIN — Medication 8 MILLIGRAM(S): at 21:51

## 2025-05-04 RX ADMIN — DILTIAZEM HYDROCHLORIDE 240 MILLIGRAM(S): 240 TABLET, EXTENDED RELEASE ORAL at 05:25

## 2025-05-04 RX ADMIN — AMOXICILLIN 500 MILLIGRAM(S): 500 CAPSULE ORAL at 17:11

## 2025-05-04 RX ADMIN — Medication 100 MILLILITER(S): at 05:26

## 2025-05-04 RX ADMIN — ATORVASTATIN CALCIUM 80 MILLIGRAM(S): 80 TABLET, FILM COATED ORAL at 21:51

## 2025-05-04 RX ADMIN — Medication 8 MILLIGRAM(S): at 13:03

## 2025-05-04 RX ADMIN — Medication 227 MILLIGRAM(S): at 17:50

## 2025-05-04 RX ADMIN — Medication 650 MILLIGRAM(S): at 17:02

## 2025-05-04 RX ADMIN — Medication 300 MILLIGRAM(S): at 11:09

## 2025-05-04 RX ADMIN — Medication 500 MILLIGRAM(S): at 17:11

## 2025-05-04 RX ADMIN — Medication 500 MILLIGRAM(S): at 05:25

## 2025-05-04 RX ADMIN — Medication 650 MILLIGRAM(S): at 11:09

## 2025-05-04 RX ADMIN — Medication 8 MILLIGRAM(S): at 05:26

## 2025-05-04 RX ADMIN — Medication 15 MILLILITER(S): at 05:26

## 2025-05-04 RX ADMIN — Medication 1 APPLICATION(S): at 05:26

## 2025-05-04 RX ADMIN — Medication 15 MILLILITER(S): at 17:10

## 2025-05-04 RX ADMIN — Medication 40 MILLIGRAM(S): at 05:25

## 2025-05-04 RX ADMIN — Medication 50 MILLIGRAM(S): at 17:03

## 2025-05-04 NOTE — PROGRESS NOTE ADULT - PROBLEM SELECTOR PLAN 2
Intermittent  neutropenic ANC 1.42 on 5/3,  febrile last night(5/2)  Febrile once on 5/2 but likely in the setting of infusion reaction, will Continue  ppx abxs with Amox, levaquin, valtrex and posaconazole  FU pan cx, UA (-) UTI, full RVP(-) on 5/3   5/2 CXR Patchy opacities along the left lung base, possibly atelectasis, however superimposed infection cannot be entirely excluded.  added incentive spirometer

## 2025-05-04 NOTE — PROGRESS NOTE ADULT - ASSESSMENT
68 yo male with newly diagnosed ASXL1, DDX41 mutated AML, admitted for induction chemo with 7+3:  dauno 60 mg/m2+ cytarabine. PMH  BRCA2 carrier ([id number]>T), HTN, HLD, Gout, GERD, BPH, spinal stenosis (s/p multiple procedures 2018, 2020 laminectomies / fusions), hip surgeries (Oct 2024, Jan 2025), residual hand weakness related to surgeries, umbilical hernia repair (2014/15) referred here from Dr Atkinson at [facility] in [place] for new ASXL1-mutated and MLE53-qdiqpta AML. Chemo started on 5/2, Complicated by rigors, chills, fever, SOB  and tachycardia 2 hours into day 1 infusion - likely infusion reaction. Pt has pancytopenia d/t chemo and disease

## 2025-05-04 NOTE — ADVANCED PRACTICE NURSE CONSULT - ASSESSMENT
Pt alert and O x 4. Labs reviewed by Dr Laurent and team on rounds, Cr 1.32, BUN 32,  okay to treat as per team. PT EF 63% MUGA 5/2/25. Right TLC inserted 05/02/25 in IR, patent, positive blood return obtained and flushing well, site intact, no s/s of bleeding, redness or swelling. Chemotherapy teaching done,  verbalized understanding. Pt premedicated with 8mg IVP zofran q8hrs, 650mg PO Tylenol x1, and 50mg IVP Benadryl x1. 2 certified RN verification done. At 17:39  Dauorubicin 60 mg/m2= 136 mg administered IVP over 10 min, administered via NS free flow back up line of NS, blood return checked Q 2-3 min during administration.  pt tolerated chemotherapy well. At 17:50 pt receiving Cytarabine 100 mg/m2= 227 mg IV at 23 cc/hr as a 24 hr continuous infusion via Right TLC, connected to lowest port of NS back up line via alaris pump. Pt tolerating well. Primary RN aware of care plan. Nursing care is ongoing.

## 2025-05-04 NOTE — PROGRESS NOTE ADULT - SUBJECTIVE AND OBJECTIVE BOX
Diagnosis: ASXL1-mutated and GLS90-uhwdgmv AML    Protocol/Chemo Regimen: 7+3:  Daunorubicin 60 mg/m2+ Cytarabine     Day: 3     Over night event:  rigors, chills, fever, SOB, and tachycardia 2 hours into day 1 infusion - likely infusion reaction.    Pt endorsed: feel better now. chronic cervical/low back pain mild now. BM yesterday morning, slight constipated. dry throat     Review of Systems: Patient denied nausea, vomiting, mouth pain,  chest pain, cough, dyspnea, abdominal pain,  diarrhea, rectal pain,  rash, fatigue, headache, bleeding      Pain scale:       mild                                 Location: cervical/low back     Diet:  regular     Allergies: No Known Allergies    Intolerances:     Shrimp- Nasal congestion (Other)        ANTIMICROBIALS  amoxicillin 500 milliGRAM(s) Oral two times a day  levoFLOXacin  Tablet 500 milliGRAM(s) Oral every 24 hours  posaconazole DR Tablet   Oral   posaconazole DR Tablet 300 milliGRAM(s) Oral every 24 hours  valACYclovir 500 milliGRAM(s) Oral two times a day      HEME/ONC MEDICATIONS  cytarabine IVPB (eMAR) 227 milliGRAM(s) IV Intermittent daily  DAUNOrubicin Injectable (eMAR) 136 milliGRAM(s) IV Push Chemo every 24 hours      STANDING MEDICATIONS  acetaminophen     Tablet .. 650 milliGRAM(s) Oral daily  allopurinol 300 milliGRAM(s) Oral daily  atorvastatin 80 milliGRAM(s) Oral at bedtime  Biotene Dry Mouth Oral Rinse 15 milliLiter(s) Swish and Spit four times a day  chlorhexidine 4% Liquid 1 Application(s) Topical <User Schedule>  cholecalciferol 2000 Unit(s) Oral daily  diltiazem    milliGRAM(s) Oral daily  diphenhydrAMINE Injectable 50 milliGRAM(s) IV Push daily  hydrochlorothiazide 50 milliGRAM(s) Oral daily  ondansetron Injectable 8 milliGRAM(s) IV Push every 8 hours  pantoprazole    Tablet 40 milliGRAM(s) Oral before breakfast  sodium chloride 0.9%. 1000 milliLiter(s) IV Continuous <Continuous>  tamsulosin 0.4 milliGRAM(s) Oral at bedtime      PRN MEDICATIONS  acetaminophen     Tablet .. 650 milliGRAM(s) Oral every 6 hours PRN  albuterol    90 MICROgram(s) HFA Inhaler 2 Puff(s) Inhalation every 6 hours PRN  FIRST- Mouthwash  BLM 5 milliLiter(s) Swish and Spit four times a day PRN  hydrocortisone sodium succinate Injectable 100 milliGRAM(s) IV Push every 8 hours PRN  metoclopramide Injectable 10 milliGRAM(s) IV Push every 6 hours PRN  oxyCODONE    IR 2.5 milliGRAM(s) Oral every 4 hours PRN  oxyCODONE    IR 5 milliGRAM(s) Oral every 4 hours PRN  polyethylene glycol 3350 17 Gram(s) Oral two times a day PRN  sodium chloride 0.9% lock flush 10 milliLiter(s) IV Push every 1 hour PRN        Vital Signs Last 24 Hrs  T(C): 36.9 (04 May 2025 05:56), Max: 36.9 (04 May 2025 05:56)  T(F): 98.5 (04 May 2025 05:56), Max: 98.5 (04 May 2025 05:56)  HR: 66 (04 May 2025 05:56) (66 - 88)  BP: 121/73 (04 May 2025 05:56) (100/65 - 133/74)  BP(mean): --  RR: 18 (04 May 2025 05:56) (17 - 19)  SpO2: 96% (04 May 2025 05:56) (94% - 96%)    Parameters below as of 04 May 2025 05:56  Patient On (Oxygen Delivery Method): room air          PHYSICAL EXAM  General: adult in NAD  HEENT: clear oropharynx, anicteric sclera  Neck: supple  CV: normal S1/S2 RRR  Lungs: positive air movement b/l ant lungs,clear to auscultation, no wheezes, no rales  Abdomen: soft, + BS,  non-tender non-distended  Ext: no edema  Skin: no rash  Neuro: alert and oriented X 3, no focal deficits  Central Line:  TLC CDI       RECENT CULTURES:  05-02 @ 20:00  Blood Blood-Peripheral  --  --  --    No growth at 24 hours  --        LABS:                        8.8    1.93  )-----------( 64       ( 03 May 2025 07:25 )             26.2         Mean Cell Volume : 101.2 fl  Mean Cell Hemoglobin : 34.0 pg  Mean Cell Hemoglobin Concentration : 33.6 g/dL  Auto Neutrophil # : x  Auto Lymphocyte # : x  Auto Monocyte # : x  Auto Eosinophil # : x  Auto Basophil # : x  Auto Neutrophil % : x  Auto Lymphocyte % : x  Auto Monocyte % : x  Auto Eosinophil % : x  Auto Basophil % : x      05-03    141  |  105  |  30[H]  ----------------------------<  137[H]  3.9   |  22  |  1.31[H]    Ca    8.7      03 May 2025 16:58  Phos  2.8     05-03  Mg     1.6     05-03    TPro  5.5[L]  /  Alb  3.7  /  TBili  0.4  /  DBili  x   /  AST  29  /  ALT  29  /  AlkPhos  91  05-03      Mg 1.6  Phos 2.8      PT/INR - ( 02 May 2025 20:18 )   PT: 12.9 sec;   INR: 1.13 ratio         PTT - ( 02 May 2025 20:18 )  PTT:22.5 sec      Uric Acid 1.0        RADIOLOGY & ADDITIONAL STUDIES:      < from: Xray Chest 1 View- PORTABLE-Urgent (Xray Chest 1 View- PORTABLE-Urgent .) (05.03.25 @ 10:36) >  IMPRESSION:    Tubes and lines as above.  Patchy opacities along the left lung base, possibly atelectasis, however   superimposed infection cannot be entirely excluded.    --- End of Report ---    < end of copied text >     Diagnosis: ASXL1-mutated and YWA92-pemfrij AML    Protocol/Chemo Regimen: 7+3:  Daunorubicin 60 mg/m2+ Cytarabine     Day: 3    Pt endorsed: no complaints, tolerated chemotherapy well last night with premeds    Review of Systems: Patient denied nausea, vomiting, mouth pain,  chest pain, cough, dyspnea, abdominal pain,  diarrhea, rectal pain,  rash, fatigue, headache, bleeding      Pain scale:       mild                                 Location: cervical/low back     Diet:  regular     Allergies: No Known Allergies    Intolerances:     Shrimp- Nasal congestion (Other)        ANTIMICROBIALS  amoxicillin 500 milliGRAM(s) Oral two times a day  levoFLOXacin  Tablet 500 milliGRAM(s) Oral every 24 hours  posaconazole DR Tablet   Oral   posaconazole DR Tablet 300 milliGRAM(s) Oral every 24 hours  valACYclovir 500 milliGRAM(s) Oral two times a day      HEME/ONC MEDICATIONS  cytarabine IVPB (eMAR) 227 milliGRAM(s) IV Intermittent daily  DAUNOrubicin Injectable (eMAR) 136 milliGRAM(s) IV Push Chemo every 24 hours      STANDING MEDICATIONS  acetaminophen     Tablet .. 650 milliGRAM(s) Oral daily  allopurinol 300 milliGRAM(s) Oral daily  atorvastatin 80 milliGRAM(s) Oral at bedtime  Biotene Dry Mouth Oral Rinse 15 milliLiter(s) Swish and Spit four times a day  chlorhexidine 4% Liquid 1 Application(s) Topical <User Schedule>  cholecalciferol 2000 Unit(s) Oral daily  diltiazem    milliGRAM(s) Oral daily  diphenhydrAMINE Injectable 50 milliGRAM(s) IV Push daily  hydrochlorothiazide 50 milliGRAM(s) Oral daily  ondansetron Injectable 8 milliGRAM(s) IV Push every 8 hours  pantoprazole    Tablet 40 milliGRAM(s) Oral before breakfast  sodium chloride 0.9%. 1000 milliLiter(s) IV Continuous <Continuous>  tamsulosin 0.4 milliGRAM(s) Oral at bedtime      PRN MEDICATIONS  acetaminophen     Tablet .. 650 milliGRAM(s) Oral every 6 hours PRN  albuterol    90 MICROgram(s) HFA Inhaler 2 Puff(s) Inhalation every 6 hours PRN  FIRST- Mouthwash  BLM 5 milliLiter(s) Swish and Spit four times a day PRN  hydrocortisone sodium succinate Injectable 100 milliGRAM(s) IV Push every 8 hours PRN  metoclopramide Injectable 10 milliGRAM(s) IV Push every 6 hours PRN  oxyCODONE    IR 2.5 milliGRAM(s) Oral every 4 hours PRN  oxyCODONE    IR 5 milliGRAM(s) Oral every 4 hours PRN  polyethylene glycol 3350 17 Gram(s) Oral two times a day PRN  sodium chloride 0.9% lock flush 10 milliLiter(s) IV Push every 1 hour PRN        Vital Signs Last 24 Hrs  T(C): 36.9 (04 May 2025 05:56), Max: 36.9 (04 May 2025 05:56)  T(F): 98.5 (04 May 2025 05:56), Max: 98.5 (04 May 2025 05:56)  HR: 66 (04 May 2025 05:56) (66 - 88)  BP: 121/73 (04 May 2025 05:56) (100/65 - 133/74)  BP(mean): --  RR: 18 (04 May 2025 05:56) (17 - 19)  SpO2: 96% (04 May 2025 05:56) (94% - 96%)    Parameters below as of 04 May 2025 05:56  Patient On (Oxygen Delivery Method): room air          PHYSICAL EXAM  General: adult in NAD  HEENT: clear oropharynx, anicteric sclera  Neck: supple  CV: normal S1/S2 RRR  Lungs: positive air movement b/l ant lungs,clear to auscultation, no wheezes, no rales  Abdomen: soft, + BS,  non-tender non-distended  Ext: no edema  Skin: no rash  Neuro: alert and oriented X 3  Central Line:  TLC CDI       RECENT CULTURES:  05-02 @ 20:00  Blood Blood-Peripheral  --  --  --    No growth at 24 hours  --        LABS:    Blood Cultures:                           7.9    2.28  )-----------( 57       ( 04 May 2025 07:12 )             24.1         Mean Cell Volume : 102.6 fl  Mean Cell Hemoglobin : 33.6 pg  Mean Cell Hemoglobin Concentration : 32.8 g/dL  Auto Neutrophil # : x  Auto Lymphocyte # : x  Auto Monocyte # : x  Auto Eosinophil # : x  Auto Basophil # : x  Auto Neutrophil % : x  Auto Lymphocyte % : x  Auto Monocyte % : x  Auto Eosinophil % : x  Auto Basophil % : x      05-04    142  |  109[H]  |  32[H]  ----------------------------<  132[H]  3.8   |  21[L]  |  1.32[H]    Ca    8.6      04 May 2025 06:59  Phos  2.9     05-04  Mg     2.1     05-04    TPro  5.3[L]  /  Alb  3.6  /  TBili  0.2  /  DBili  x   /  AST  25  /  ALT  35  /  AlkPhos  88  05-04      PT/INR - ( 02 May 2025 20:18 )   PT: 12.9 sec;   INR: 1.13 ratio         PTT - ( 02 May 2025 20:18 )  PTT:22.5 sec      Uric Acid 1.3        RADIOLOGY & ADDITIONAL STUDIES:      < from: Xray Chest 1 View- PORTABLE-Urgent (Xray Chest 1 View- PORTABLE-Urgent .) (05.03.25 @ 10:36) >  IMPRESSION:    Tubes and lines as above.  Patchy opacities along the left lung base, possibly atelectasis, however   superimposed infection cannot be entirely excluded.    --- End of Report ---    < end of copied text >

## 2025-05-04 NOTE — PROGRESS NOTE ADULT - PROBLEM SELECTOR PLAN 1
new ASXL1-mutated and RTL41-zpvpkys AML.  admitted for induction chemo with dauno 60 mg/m2+ cytarabine. Chemo started on 5/2, Complicated by rigors, chills, fever, SOB  and tachycardia 2 hours into day 1 infusion - likely infusion. recevied pepcid/benadryl/solumedrol/tylenol iv. will make up D1 Cytarabine on day8    will proceed with day2 chemo with pre mdes Tylenol and Benadryl, hydrocortisone iv PRN reaction   Hepatitis screen/HIV(-) as outpatient  MUGA normal EF, IR TLC    IVF, antiemetics, mouth care   monitor weight, I & O, diuresis PRN   Monitor CBC with diff QD, TLS labs BID, Coags & T & C TIW transfuse for hb<7, PLT <10  5/2 hyperuricemia: Rasburicase 3 mg ivx1, allopurinol 300 mg qd.

## 2025-05-04 NOTE — PROGRESS NOTE ADULT - NS ATTEND AMEND GEN_ALL_CORE FT
Primary: Paulie    69M with ASXL1-mutated and FLL60-fxjuipx AML. 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 Admitted for induction 7+3 - day 2.    Heme:   - Continue 7+3 (5/2/25 - ). Complicated by rigors, chills, and tachycardia 2 hours into day 1 infusion - likely infusion reaction.  - Trend CBC with differential daily. Goal Hgb > 7 and plt > 10  (> 15 if febrile, > 50 if bleeding).   - Trend DIC labs daily. Continue supportive transfusions as needed to maintain fibrinogen > 100.   - Trend TLS labs twice daily.   - Continue allopurinol. S/p rasburicase; consider additional doses if uric acid > 8.  - Continue IVF. Monitor daily weights and I/O's. Diurese PRN.     ID: Febrile once on 5/2/25 but likely in the setting of infusion reaction  - Prophylaxis: levaquin, amoxicillin, posaconazole, valacyclovir     MSK: Has chronic pain from prior surgeries   - Pain control: oxycodone Primary: Paulie    69M with ASXL1-mutated and OTT45-sbcnhek AML. 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 Admitted for induction 7+3 - day 3.    Heme:   - Continue 7+3 (5/2/25 - ). Complicated by rigors, chills, and tachycardia 2 hours into day 1 infusion - likely infusion reaction.  - Trend CBC with differential daily. Goal Hgb > 7 and plt > 10  (> 15 if febrile, > 50 if bleeding).   - Trend DIC labs daily. Continue supportive transfusions as needed to maintain fibrinogen > 100.   - Trend TLS labs twice daily.   - Continue allopurinol. S/p rasburicase; consider additional doses if uric acid > 8.  - Continue IVF. Monitor daily weights and I/O's. Diurese PRN.     ID: Febrile once on 5/2/25 but likely in the setting of infusion reaction  - Prophylaxis: levaquin, amoxicillin, posaconazole, valacyclovir     MSK: Has chronic pain from prior surgeries   - Pain control: oxycodone

## 2025-05-04 NOTE — PROGRESS NOTE ADULT - PROBLEM SELECTOR PLAN 8
DVT ppx: OOB and ambulation as carey   PT consulted: no skilled PT need, PT signed off     5/3 Miralax x1 now, BID PRN constipation

## 2025-05-05 LAB
ACANTHOCYTES BLD QL SMEAR: SLIGHT — SIGNIFICANT CHANGE UP
ALBUMIN SERPL ELPH-MCNC: 3.5 G/DL — SIGNIFICANT CHANGE UP (ref 3.3–5)
ALBUMIN SERPL ELPH-MCNC: 3.6 G/DL — SIGNIFICANT CHANGE UP (ref 3.3–5)
ALP SERPL-CCNC: 79 U/L — SIGNIFICANT CHANGE UP (ref 40–120)
ALP SERPL-CCNC: 87 U/L — SIGNIFICANT CHANGE UP (ref 40–120)
ALT FLD-CCNC: 29 U/L — SIGNIFICANT CHANGE UP (ref 10–45)
ALT FLD-CCNC: 32 U/L — SIGNIFICANT CHANGE UP (ref 10–45)
ANION GAP SERPL CALC-SCNC: 12 MMOL/L — SIGNIFICANT CHANGE UP (ref 5–17)
ANION GAP SERPL CALC-SCNC: 14 MMOL/L — SIGNIFICANT CHANGE UP (ref 5–17)
ANISOCYTOSIS BLD QL: SLIGHT — SIGNIFICANT CHANGE UP
APTT BLD: 24.3 SEC — LOW (ref 26.1–36.8)
AST SERPL-CCNC: 18 U/L — SIGNIFICANT CHANGE UP (ref 10–40)
AST SERPL-CCNC: 20 U/L — SIGNIFICANT CHANGE UP (ref 10–40)
BASOPHILS # BLD AUTO: 0 K/UL — SIGNIFICANT CHANGE UP (ref 0–0.2)
BASOPHILS NFR BLD AUTO: 0 % — SIGNIFICANT CHANGE UP (ref 0–2)
BILIRUB SERPL-MCNC: 0.2 MG/DL — SIGNIFICANT CHANGE UP (ref 0.2–1.2)
BILIRUB SERPL-MCNC: 0.3 MG/DL — SIGNIFICANT CHANGE UP (ref 0.2–1.2)
BUN SERPL-MCNC: 23 MG/DL — SIGNIFICANT CHANGE UP (ref 7–23)
BUN SERPL-MCNC: 29 MG/DL — HIGH (ref 7–23)
CALCIUM SERPL-MCNC: 8.8 MG/DL — SIGNIFICANT CHANGE UP (ref 8.4–10.5)
CALCIUM SERPL-MCNC: 8.9 MG/DL — SIGNIFICANT CHANGE UP (ref 8.4–10.5)
CHLORIDE SERPL-SCNC: 106 MMOL/L — SIGNIFICANT CHANGE UP (ref 96–108)
CHLORIDE SERPL-SCNC: 109 MMOL/L — HIGH (ref 96–108)
CO2 SERPL-SCNC: 21 MMOL/L — LOW (ref 22–31)
CO2 SERPL-SCNC: 21 MMOL/L — LOW (ref 22–31)
CREAT SERPL-MCNC: 1.35 MG/DL — HIGH (ref 0.5–1.3)
CREAT SERPL-MCNC: 1.36 MG/DL — HIGH (ref 0.5–1.3)
D DIMER BLD IA.RAPID-MCNC: 419 NG/ML DDU — HIGH
EGFR: 57 ML/MIN/1.73M2 — LOW
EOSINOPHIL # BLD AUTO: 0 K/UL — SIGNIFICANT CHANGE UP (ref 0–0.5)
EOSINOPHIL NFR BLD AUTO: 0 % — SIGNIFICANT CHANGE UP (ref 0–6)
FIBRINOGEN PPP-MCNC: 348 MG/DL — SIGNIFICANT CHANGE UP (ref 200–445)
GLUCOSE SERPL-MCNC: 101 MG/DL — HIGH (ref 70–99)
GLUCOSE SERPL-MCNC: 131 MG/DL — HIGH (ref 70–99)
HCT VFR BLD CALC: 25 % — LOW (ref 39–50)
HGB BLD-MCNC: 8.1 G/DL — LOW (ref 13–17)
INR BLD: 1.11 RATIO — SIGNIFICANT CHANGE UP (ref 0.85–1.16)
LDH SERPL L TO P-CCNC: 200 U/L — SIGNIFICANT CHANGE UP (ref 50–242)
LDH SERPL L TO P-CCNC: 215 U/L — SIGNIFICANT CHANGE UP (ref 50–242)
LYMPHOCYTES # BLD AUTO: 1.22 K/UL — SIGNIFICANT CHANGE UP (ref 1–3.3)
LYMPHOCYTES # BLD AUTO: 51.3 % — HIGH (ref 13–44)
MACROCYTES BLD QL: SLIGHT — SIGNIFICANT CHANGE UP
MAGNESIUM SERPL-MCNC: 1.7 MG/DL — SIGNIFICANT CHANGE UP (ref 1.6–2.6)
MAGNESIUM SERPL-MCNC: 1.8 MG/DL — SIGNIFICANT CHANGE UP (ref 1.6–2.6)
MANUAL SMEAR VERIFICATION: SIGNIFICANT CHANGE UP
MCHC RBC-ENTMCNC: 32.4 G/DL — SIGNIFICANT CHANGE UP (ref 32–36)
MCHC RBC-ENTMCNC: 33.6 PG — SIGNIFICANT CHANGE UP (ref 27–34)
MCV RBC AUTO: 103.7 FL — HIGH (ref 80–100)
MONOCYTES # BLD AUTO: 0 K/UL — SIGNIFICANT CHANGE UP (ref 0–0.9)
MONOCYTES NFR BLD AUTO: 0 % — LOW (ref 2–14)
NEUTROPHILS # BLD AUTO: 1.16 K/UL — LOW (ref 1.8–7.4)
NEUTROPHILS NFR BLD AUTO: 47 % — SIGNIFICANT CHANGE UP (ref 43–77)
NEUTS BAND # BLD: 1.7 % — SIGNIFICANT CHANGE UP (ref 0–8)
NEUTS BAND NFR BLD: 1.7 % — SIGNIFICANT CHANGE UP (ref 0–8)
OVALOCYTES BLD QL SMEAR: SLIGHT — SIGNIFICANT CHANGE UP
PHOSPHATE SERPL-MCNC: 3.7 MG/DL — SIGNIFICANT CHANGE UP (ref 2.5–4.5)
PHOSPHATE SERPL-MCNC: 3.9 MG/DL — SIGNIFICANT CHANGE UP (ref 2.5–4.5)
PLAT MORPH BLD: NORMAL — SIGNIFICANT CHANGE UP
PLATELET # BLD AUTO: 49 K/UL — LOW (ref 150–400)
POIKILOCYTOSIS BLD QL AUTO: SLIGHT — SIGNIFICANT CHANGE UP
POTASSIUM SERPL-MCNC: 3.7 MMOL/L — SIGNIFICANT CHANGE UP (ref 3.5–5.3)
POTASSIUM SERPL-MCNC: 3.9 MMOL/L — SIGNIFICANT CHANGE UP (ref 3.5–5.3)
POTASSIUM SERPL-SCNC: 3.7 MMOL/L — SIGNIFICANT CHANGE UP (ref 3.5–5.3)
POTASSIUM SERPL-SCNC: 3.9 MMOL/L — SIGNIFICANT CHANGE UP (ref 3.5–5.3)
PROT SERPL-MCNC: 5.3 G/DL — LOW (ref 6–8.3)
PROT SERPL-MCNC: 5.7 G/DL — LOW (ref 6–8.3)
PROTHROM AB SERPL-ACNC: 12.6 SEC — SIGNIFICANT CHANGE UP (ref 9.9–13.4)
RBC # BLD: 2.41 M/UL — LOW (ref 4.2–5.8)
RBC # FLD: 15.2 % — HIGH (ref 10.3–14.5)
RBC BLD AUTO: ABNORMAL
SODIUM SERPL-SCNC: 141 MMOL/L — SIGNIFICANT CHANGE UP (ref 135–145)
SODIUM SERPL-SCNC: 142 MMOL/L — SIGNIFICANT CHANGE UP (ref 135–145)
URATE SERPL-MCNC: 2.1 MG/DL — LOW (ref 3.4–8.8)
URATE SERPL-MCNC: 2.6 MG/DL — LOW (ref 3.4–8.8)
WBC # BLD: 2.38 K/UL — LOW (ref 3.8–10.5)
WBC # FLD AUTO: 2.38 K/UL — LOW (ref 3.8–10.5)

## 2025-05-05 PROCEDURE — 99233 SBSQ HOSP IP/OBS HIGH 50: CPT

## 2025-05-05 RX ORDER — FUROSEMIDE 10 MG/ML
40 INJECTION INTRAMUSCULAR; INTRAVENOUS ONCE
Refills: 0 | Status: COMPLETED | OUTPATIENT
Start: 2025-05-05 | End: 2025-05-05

## 2025-05-05 RX ADMIN — Medication 8 MILLIGRAM(S): at 18:05

## 2025-05-05 RX ADMIN — Medication 15 MILLILITER(S): at 00:19

## 2025-05-05 RX ADMIN — Medication 2000 UNIT(S): at 12:08

## 2025-05-05 RX ADMIN — DILTIAZEM HYDROCHLORIDE 240 MILLIGRAM(S): 240 TABLET, EXTENDED RELEASE ORAL at 05:39

## 2025-05-05 RX ADMIN — Medication 227 MILLIGRAM(S): at 18:12

## 2025-05-05 RX ADMIN — OXYCODONE HYDROCHLORIDE 2.5 MILLIGRAM(S): 30 TABLET ORAL at 22:05

## 2025-05-05 RX ADMIN — AMOXICILLIN 500 MILLIGRAM(S): 500 CAPSULE ORAL at 05:39

## 2025-05-05 RX ADMIN — Medication 8 MILLIGRAM(S): at 05:39

## 2025-05-05 RX ADMIN — Medication 8 MILLIGRAM(S): at 22:05

## 2025-05-05 RX ADMIN — OXYCODONE HYDROCHLORIDE 5 MILLIGRAM(S): 30 TABLET ORAL at 02:29

## 2025-05-05 RX ADMIN — OXYCODONE HYDROCHLORIDE 5 MILLIGRAM(S): 30 TABLET ORAL at 03:10

## 2025-05-05 RX ADMIN — OXYCODONE HYDROCHLORIDE 2.5 MILLIGRAM(S): 30 TABLET ORAL at 18:35

## 2025-05-05 RX ADMIN — Medication 300 MILLIGRAM(S): at 12:08

## 2025-05-05 RX ADMIN — Medication 500 MILLIGRAM(S): at 17:49

## 2025-05-05 RX ADMIN — FUROSEMIDE 40 MILLIGRAM(S): 10 INJECTION INTRAMUSCULAR; INTRAVENOUS at 15:55

## 2025-05-05 RX ADMIN — Medication 15 MILLILITER(S): at 12:08

## 2025-05-05 RX ADMIN — Medication 15 MILLILITER(S): at 05:40

## 2025-05-05 RX ADMIN — Medication 500 MILLIGRAM(S): at 05:40

## 2025-05-05 RX ADMIN — TAMSULOSIN HYDROCHLORIDE 0.4 MILLIGRAM(S): 0.4 CAPSULE ORAL at 22:05

## 2025-05-05 RX ADMIN — Medication 15 MILLILITER(S): at 23:02

## 2025-05-05 RX ADMIN — Medication 40 MILLIGRAM(S): at 05:40

## 2025-05-05 RX ADMIN — Medication 2 PUFF(S): at 18:20

## 2025-05-05 RX ADMIN — Medication 1 APPLICATION(S): at 12:10

## 2025-05-05 RX ADMIN — OXYCODONE HYDROCHLORIDE 2.5 MILLIGRAM(S): 30 TABLET ORAL at 23:00

## 2025-05-05 RX ADMIN — OXYCODONE HYDROCHLORIDE 2.5 MILLIGRAM(S): 30 TABLET ORAL at 13:41

## 2025-05-05 RX ADMIN — POSACONAZOLE 300 MILLIGRAM(S): 100 TABLET, DELAYED RELEASE ORAL at 17:48

## 2025-05-05 RX ADMIN — Medication 15 MILLILITER(S): at 17:49

## 2025-05-05 RX ADMIN — AMOXICILLIN 500 MILLIGRAM(S): 500 CAPSULE ORAL at 17:48

## 2025-05-05 NOTE — PROGRESS NOTE ADULT - PROBLEM SELECTOR PLAN 1
new ASXL1-mutated and ZEX25-pmodbdn AML.  admitted for induction chemo with dauno 60 mg/m2+ cytarabine. Chemo started on 5/2, Complicated by rigors, chills, fever, SOB  and tachycardia 2 hours into day 1 infusion - likely infusion. recevied pepcid/benadryl/solumedrol/tylenol iv. will make up D1 Cytarabine on day8    will proceed with day2 chemo with pre mdes Tylenol and Benadryl, hydrocortisone iv PRN reaction   Hepatitis screen/HIV(-) as outpatient  MUGA normal EF, IR TLC    IVF, antiemetics, mouth care   monitor weight, I & O, diuresis PRN   Monitor CBC with diff QD, TLS labs BID, Coags & T & C TIW transfuse for hb<7, PLT <10  5/2 hyperuricemia: Rasburicase 3 mg ivx1, allopurinol 300 mg qd. new ASXL1-mutated and FHN68-sodwlrr AML.  admitted for induction chemo with dauno 60 mg/m2+ cytarabine. Chemo started on 5/2, Complicated by rigors, chills, fever, SOB  and tachycardia 2 hours into day 1 infusion - likely infusion. recevied pepcid/benadryl/solumedrol/tylenol iv. will make up D1 Cytarabine on day8    will proceed with day2 chemo with pre mdes Tylenol and Benadryl, hydrocortisone iv PRN reaction   Hepatitis screen/HIV(-) as outpatient  MUGA normal EF, IR TLC    IVF, antiemetics, mouth care. monitor weight, I & O, diuresis PRN   Monitor CBC with diff QD, TLS labs BID, Coags & T & C TIW transfuse for hb<7, PLT <10  5/5 HLA sent; lasix 40mg IV x2 for weight gain

## 2025-05-05 NOTE — PROGRESS NOTE ADULT - ASSESSMENT
70 yo male with newly diagnosed ASXL1, DDX41 mutated AML, admitted for induction chemo with 7+3:  dauno 60 mg/m2+ cytarabine. PMH  BRCA2 carrier ([id number] 9610C>T), HTN, HLD, Gout, GERD, BPH, spinal stenosis (s/p multiple procedures 2018, 2020 laminectomies / fusions), hip surgeries (Oct 2024, Jan 2025), residual hand weakness related to surgeries, umbilical hernia repair (2014/15) referred here from Dr Atkinson at [facility] in [place] for new ASXL1-mutated and EYG26-ijyppyo AML. Chemo started on 5/2, Complicated by rigors, chills, fever, SOB  and tachycardia 2 hours into day 1 infusion - likely infusion reaction. Pt has pancytopenia d/t chemo and disease

## 2025-05-05 NOTE — PROGRESS NOTE ADULT - NS ATTEND AMEND GEN_ALL_CORE FT
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 Admitted for induction 7+3 - day 3.    Heme:   - Continue 7+3 (5/2/25 - ). Complicated by rigors, chills, and tachycardia 2 hours into day 1 infusion - likely infusion reaction.  - Trend CBC with differential daily. Goal Hgb > 7 and plt > 10  (> 15 if febrile, > 50 if bleeding).   - Trend DIC labs daily. Continue supportive transfusions as needed to maintain fibrinogen > 100.   - Trend TLS labs twice daily.   - Continue allopurinol. S/p rasburicase; consider additional doses if uric acid > 8.  - Continue IVF. Monitor daily weights and I/O's. Diurese PRN.     ID: Febrile once on 5/2/25 but likely in the setting of infusion reaction  - Prophylaxis: levaquin, amoxicillin, posaconazole, valacyclovir     MSK: Has chronic pain from prior surgeries   - Pain control: oxycodone Primary: Paulie    69M with ASXL1-mutated and JMC17-powydfb AML. 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 Admitted for induction 7+3 - day 4.    Heme:   - Continue 7+3 (5/2/25 - ). Complicated by rigors, chills, and tachycardia 2 hours into day 1 infusion - likely infusion reaction.  - Trend CBC with differential daily. Goal Hgb > 7 and plt > 10  (> 15 if febrile, > 50 if bleeding).   - Trend DIC labs daily. Continue supportive transfusions as needed to maintain fibrinogen > 100.   - Trend TLS labs twice daily.   - Continue allopurinol. S/p rasburicase; consider additional doses if uric acid > 8.  - Continue IVF. Monitor daily weights and I/O's. Diurese PRN.     ID: Febrile once on 5/2/25 but likely in the setting of infusion reaction  - Prophylaxis: levaquin, amoxicillin, posaconazole, valacyclovir     MSK: Has chronic pain from prior surgeries   - Pain control: oxycodone

## 2025-05-05 NOTE — PHARMACOTHERAPY INTERVENTION NOTE - COMMENTS
Clinical Pharmacy Specialist- Hematology/Oncology- Progress Note    Pt is a 70 y/o male w/ PMH of HTN, HLD, GERD, BPH, Gout, spinal stenosis (s/p multiple surgeries) and newly diagnosed AML (ASXL1-mutated and DDX41) admitted for management with 7+3 Induction therapy    Antimicrobial Course:  - Posaconazole- 5/2  - Levaquin/Amox- 5/2  - Valtrex- 5/2  MRSA nasal swab    Last Neutropenic (ANC<1000): 5/2; ANC= 0.59  Last Febrile: 02-May-2025 20:48; T= 101.9  Days Non-Neutropenic: 0  Days afebrile: 2    Chemotherapy Course  -Current Regimen: 7+3 Induction  History:  (5/2/25)- 7+3 Induction   -Daunorubicin 60mg/m2 IVP D1-3   -Cytarabine 100mg/m2 IV cont inf D1-7  -Day: 4 (5/5)  BmBx:  Access: R TLC (accessed 5/5/25)    History/Relevant clinical information used in assessment:  - Ht= 5’11”; IBW= 75.3kg; Actual BW= 107.3kg; Adj BW= 88kg  - 5/2- IRR? rigors, chills, and tachycardia 2 hours into day 1; rasburicase 3mg x 1  - 5/4 -Crcl= 67ml/min ; Scr=1.32; Wt= 88kg; Adjusted BW used since pt BMI>30    Assessment/Plan/Recommendation:  Onc:  - Daunorubicin 60mg/m2 IVP D1-3   - Cytarabine 100mg/m2 IV cont inf D1-7- ends fri 5/9@~6p  - allopurinol- monitor for d/c ~5/12- need for gout?  ID:  - 5/2 CXR- opacities  Cards:  - Recommend to HOLD atorvastatin 80mg d/t DDI with posa (Cat X)    Additional Monitoring Needed?   -Yes- Continue to monitor renal function & daily counts for abx escalation/de-escalation   -Discharge Planning:  --> New meds:  --> Meds sent for auth:  --> Delivered meds:    Case discussed with attending/primary team    Liza Jolley PharmD, BCPS  Clinical Pharmacy Specialist | Hematology/Oncology  [facility]  Email: [email or web address] or available on [employer]   Clinical Pharmacy Specialist- Hematology/Oncology- Progress Note    Pt is a 68 y/o male w/ PMH of HTN, HLD, GERD, BPH, Gout, spinal stenosis (s/p multiple surgeries) and newly diagnosed AML (ASXL1-mutated and DDX41) admitted for management with 7+3 Induction therapy    Antimicrobial Course:  - Posaconazole- 5/2  - Levaquin/Amox- 5/2  - Valtrex- 5/2  MRSA nasal swab    Last Neutropenic (ANC<1000): 5/2; ANC= 0.59  Last Febrile: 02-May-2025 20:48; T= 101.9  Days Non-Neutropenic: 0  Days afebrile: 2    Chemotherapy Course  -Current Regimen: 7+3 Induction  History:  (5/2/25)- 7+3 Induction   -Daunorubicin 60mg/m2 IVP D1-3   -Cytarabine 100mg/m2 IV cont inf D1-7  -Day: 4 (5/5)  BmBx:  Access: R TLC (accessed 5/5/25)    History/Relevant clinical information used in assessment:  - Ht= 5’11”; IBW= 75.3kg; Actual BW= 107.3kg; Adj BW= 88kg  - 5/2- IRR? rigors, chills, and tachycardia 2 hours into day 1; rasburicase 3mg x 1  - 5/4 -Crcl= 67ml/min ; Scr=1.32; Wt= 88kg; Adjusted BW used since pt BMI>30    Assessment/Plan/Recommendation:  Onc:  - Daunorubicin 60mg/m2 IVP D1-3   - Cytarabine 100mg/m2 IV cont inf D1-7- ends fri 5/9@~6p  - allopurinol- monitor for d/c ~5/12- need to keep for gout    Counseled patient on new chemotherapy regimen- "7+3" Daunorubicin + Cytarabine  Discussed common side effects : low counts, fatigue, alopecia, nausea/ vomiting, mouth sores  Explained measures we take to mitigate these side effects: antiemetics, labwork to monitor counts, antifungal, antibiotics, antivirals, baseline ECHO, mouthcare (biotene, soft sponges, analgesic mouth rinses, etc)  Emetogenicity risk:   -Cytarabine- Minimal to Low (10-30%)  -Daunorubicin- Moderate (30-90%)    Patient expresses understanding of all indications, common side effects, and any special instructions. All medication related questions have been answered at this time.  -Provided Medication Course Calendar  -Provided printed teaching material from [employer] &  [employer] Plan     ID:  - 5/2 CXR- opacities  Cards:  - Recommend to HOLD atorvastatin 80mg d/t DDI with posa (Cat X)- held    Additional Monitoring Needed?   -Yes- Continue to monitor renal function & daily counts for abx escalation/de-escalation   -Discharge Planning:  --> New meds:  --> Meds sent for auth:  --> Delivered meds:    Case discussed with attending/primary team    Liza Jolley, PharmD, BCPS  Clinical Pharmacy Specialist | Hematology/Oncology  [facility]  Email: [email or web address] or available on [employer]

## 2025-05-05 NOTE — ADVANCED PRACTICE NURSE CONSULT - ASSESSMENT
Pt alert and O x 4. Labs reviewed by Dr Laurent and team on rounds, Cr 1.32, BUN 32,  okay to treat as per team. PT EF 63% MUGA 5/2/25. Right TLC inserted 05/02/25 in IR, patent, positive blood return obtained and flushing well, site intact, no s/s of bleeding, redness or swelling. Chemotherapy teaching done,  verbalized understanding. Pt premedicated with 8mg IVP zofran q8hrs,  2 certified RN verification done. At 18:12, pt receiving Cytarabine 100 mg/m2= 227 mg IV at 23 cc/hr as a 24 hr continuous infusion via Right TLC, connected to lowest port of NS back up line via alaris pump. Pt tolerating well. Primary RN aware of care plan. Nursing care is ongoing.

## 2025-05-05 NOTE — PROGRESS NOTE ADULT - SUBJECTIVE AND OBJECTIVE BOX
Diagnosis: ASXL1-mutated and [id number] AML    Protocol/Chemo Regimen: 7+3:  Daunorubicin 60 mg/m2+ Cytarabine     Day: 4    Pt endorsed: no complaints, tolerated chemotherapy well last night with premeds    Review of Systems: Patient denied nausea, vomiting, mouth pain,  chest pain, cough, dyspnea, abdominal pain,  diarrhea, rectal pain,  rash, fatigue, headache, bleeding      Pain scale:       mild                                 Location: cervical/low back     Diet:  regular     Allergies: No Known Allergies    Intolerances:     Shrimp- Nasal congestion (Other)          ANTIMICROBIALS  amoxicillin 500 milliGRAM(s) Oral two times a day  levoFLOXacin  Tablet 500 milliGRAM(s) Oral every 24 hours  posaconazole DR Tablet   Oral   posaconazole DR Tablet 300 milliGRAM(s) Oral every 24 hours  valACYclovir 500 milliGRAM(s) Oral two times a day      HEME/ONC MEDICATIONS  cytarabine IVPB (eMAR) 227 milliGRAM(s) IV Intermittent daily      STANDING MEDICATIONS  allopurinol 300 milliGRAM(s) Oral daily  atorvastatin 80 milliGRAM(s) Oral at bedtime  Biotene Dry Mouth Oral Rinse 15 milliLiter(s) Swish and Spit four times a day  chlorhexidine 4% Liquid 1 Application(s) Topical <User Schedule>  cholecalciferol 2000 Unit(s) Oral daily  diltiazem    milliGRAM(s) Oral daily  hydrochlorothiazide 50 milliGRAM(s) Oral daily  ondansetron Injectable 8 milliGRAM(s) IV Push every 8 hours  pantoprazole    Tablet 40 milliGRAM(s) Oral before breakfast  sodium chloride 0.9%. 1000 milliLiter(s) IV Continuous <Continuous>  tamsulosin 0.4 milliGRAM(s) Oral at bedtime      PRN MEDICATIONS  acetaminophen     Tablet .. 650 milliGRAM(s) Oral every 6 hours PRN  albuterol    90 MICROgram(s) HFA Inhaler 2 Puff(s) Inhalation every 6 hours PRN  FIRST- Mouthwash  BLM 5 milliLiter(s) Swish and Spit four times a day PRN  hydrocortisone sodium succinate Injectable 100 milliGRAM(s) IV Push every 8 hours PRN  metoclopramide Injectable 10 milliGRAM(s) IV Push every 6 hours PRN  oxyCODONE    IR 2.5 milliGRAM(s) Oral every 4 hours PRN  oxyCODONE    IR 5 milliGRAM(s) Oral every 4 hours PRN  polyethylene glycol 3350 17 Gram(s) Oral two times a day PRN  sodium chloride 0.9% lock flush 10 milliLiter(s) IV Push every 1 hour PRN        Vital Signs Last 24 Hrs  T(C): 36.7 (05 May 2025 05:55), Max: 36.8 (04 May 2025 17:15)  T(F): 98 (05 May 2025 05:55), Max: 98.3 (04 May 2025 17:15)  HR: 79 (05 May 2025 05:55) (69 - 103)  BP: 115/72 (05 May 2025 05:55) (94/54 - 117/69)  BP(mean): --  RR: 18 (05 May 2025 05:55) (17 - 18)  SpO2: 97% (05 May 2025 05:55) (95% - 98%)    Parameters below as of 05 May 2025 05:55  Patient On (Oxygen Delivery Method): nasal cannula  O2 Flow (L/min): 2    PHYSICAL EXAM  General: adult in NAD  HEENT: clear oropharynx, anicteric sclera  Neck: supple  CV: normal S1/S2 RRR  Lungs: positive air movement b/l ant lungs,clear to auscultation, no wheezes, no rales  Abdomen: soft, + BS,  non-tender non-distended  Ext: no edema  Skin: no rash  Neuro: alert and oriented X 3  Central Line:  TLC CDI       RECENT CULTURES:  05-03 @ 10:07  Clean Catch Clean Catch (Midstream)  --  --  --    No growth  --  05-02 @ 20:00  Blood Blood-Peripheral  --  --  --    No growth at 48 Hours  --        LABS:                        7.9    2.28  )-----------( 57       ( 04 May 2025 07:12 )             24.1         Mean Cell Volume : 102.6 fl  Mean Cell Hemoglobin : 33.6 pg  Mean Cell Hemoglobin Concentration : 32.8 g/dL  Auto Neutrophil # : x  Auto Lymphocyte # : x  Auto Monocyte # : x  Auto Eosinophil # : x  Auto Basophil # : x  Auto Neutrophil % : x  Auto Lymphocyte % : x  Auto Monocyte % : x  Auto Eosinophil % : x  Auto Basophil % : x      05-04    143  |  108  |  31[H]  ----------------------------<  131[H]  3.9   |  21[L]  |  1.39[H]    Ca    8.7      04 May 2025 18:52  Phos  3.8     05-04  Mg     1.9     05-04    TPro  5.4[L]  /  Alb  3.6  /  TBili  0.3  /  DBili  x   /  AST  26  /  ALT  33  /  AlkPhos  87  05-04      Mg 1.9  Phos 3.8            Uric Acid 1.5        RADIOLOGY & ADDITIONAL STUDIES:      < from: Xray Chest 1 View- PORTABLE-Urgent (Xray Chest 1 View- PORTABLE-Urgent .) (05.03.25 @ 10:36) >  IMPRESSION:    Tubes and lines as above.  Patchy opacities along the left lung base, possibly atelectasis, however   superimposed infection cannot be entirely excluded.    --- End of Report ---    < end of copied text >         Diagnosis: ASXL1-mutated and TIU23-quvmprn AML    Protocol/Chemo Regimen: 7+3:  Daunorubicin 60 mg/m2+ Cytarabine     Day: 4    Pt endorsed: no complaints    Review of Systems: Patient denied nausea, vomiting, mouth pain,  chest pain, cough, dyspnea, abdominal pain,  diarrhea, rectal pain,  rash, fatigue, headache, bleeding      Pain scale:       mild                                 Location: cervical/low back     Diet:  regular     Allergies: No Known Allergies    Intolerances:     Shrimp- Nasal congestion (Other)          ANTIMICROBIALS  amoxicillin 500 milliGRAM(s) Oral two times a day  levoFLOXacin  Tablet 500 milliGRAM(s) Oral every 24 hours  posaconazole DR Tablet   Oral   posaconazole DR Tablet 300 milliGRAM(s) Oral every 24 hours  valACYclovir 500 milliGRAM(s) Oral two times a day      HEME/ONC MEDICATIONS  cytarabine IVPB (eMAR) 227 milliGRAM(s) IV Intermittent daily      STANDING MEDICATIONS  allopurinol 300 milliGRAM(s) Oral daily  atorvastatin 80 milliGRAM(s) Oral at bedtime  Biotene Dry Mouth Oral Rinse 15 milliLiter(s) Swish and Spit four times a day  chlorhexidine 4% Liquid 1 Application(s) Topical <User Schedule>  cholecalciferol 2000 Unit(s) Oral daily  diltiazem    milliGRAM(s) Oral daily  hydrochlorothiazide 50 milliGRAM(s) Oral daily  ondansetron Injectable 8 milliGRAM(s) IV Push every 8 hours  pantoprazole    Tablet 40 milliGRAM(s) Oral before breakfast  sodium chloride 0.9%. 1000 milliLiter(s) IV Continuous <Continuous>  tamsulosin 0.4 milliGRAM(s) Oral at bedtime      PRN MEDICATIONS  acetaminophen     Tablet .. 650 milliGRAM(s) Oral every 6 hours PRN  albuterol    90 MICROgram(s) HFA Inhaler 2 Puff(s) Inhalation every 6 hours PRN  FIRST- Mouthwash  BLM 5 milliLiter(s) Swish and Spit four times a day PRN  hydrocortisone sodium succinate Injectable 100 milliGRAM(s) IV Push every 8 hours PRN  metoclopramide Injectable 10 milliGRAM(s) IV Push every 6 hours PRN  oxyCODONE    IR 2.5 milliGRAM(s) Oral every 4 hours PRN  oxyCODONE    IR 5 milliGRAM(s) Oral every 4 hours PRN  polyethylene glycol 3350 17 Gram(s) Oral two times a day PRN  sodium chloride 0.9% lock flush 10 milliLiter(s) IV Push every 1 hour PRN        Vital Signs Last 24 Hrs  T(C): 36.7 (05 May 2025 05:55), Max: 36.8 (04 May 2025 17:15)  T(F): 98 (05 May 2025 05:55), Max: 98.3 (04 May 2025 17:15)  HR: 79 (05 May 2025 05:55) (69 - 103)  BP: 115/72 (05 May 2025 05:55) (94/54 - 117/69)  BP(mean): --  RR: 18 (05 May 2025 05:55) (17 - 18)  SpO2: 97% (05 May 2025 05:55) (95% - 98%)    Parameters below as of 05 May 2025 05:55  Patient On (Oxygen Delivery Method): nasal cannula  O2 Flow (L/min): 2    PHYSICAL EXAM  General: adult in NAD  HEENT: clear oropharynx, anicteric sclera  Neck: supple  CV: normal S1/S2 RRR  Lungs: positive air movement b/l ant lungs,clear to auscultation, no wheezes, no rales  Abdomen: soft, + BS,  non-tender non-distended  Ext: no edema  Skin: no rash  Neuro: alert and oriented X 3  Central Line:  TLC CDI       RECENT CULTURES:  05-03 @ 10:07  Clean Catch Clean Catch (Midstream)  --  --  --    No growth  --  05-02 @ 20:00  Blood Blood-Peripheral  --  --  --    No growth at 48 Hours  --      LABS:    Blood Cultures:                           8.1    2.38  )-----------( 49       ( 05 May 2025 07:08 )             25.0         Mean Cell Volume : 103.7 fl  Mean Cell Hemoglobin : 33.6 pg  Mean Cell Hemoglobin Concentration : 32.4 g/dL  Auto Neutrophil # : x  Auto Lymphocyte # : x  Auto Monocyte # : x  Auto Eosinophil # : x  Auto Basophil # : x  Auto Neutrophil % : x  Auto Lymphocyte % : x  Auto Monocyte % : x  Auto Eosinophil % : x  Auto Basophil % : x      05-05    142  |  109[H]  |  29[H]  ----------------------------<  101[H]  3.9   |  21[L]  |  1.36[H]    Ca    8.8      05 May 2025 07:08  Phos  3.7     05-05  Mg     1.8     05-05    TPro  5.3[L]  /  Alb  3.5  /  TBili  0.3  /  DBili  x   /  AST  18  /  ALT  32  /  AlkPhos  79  05-05      PT/INR - ( 05 May 2025 07:09 )   PT: 12.6 sec;   INR: 1.11 ratio         PTT - ( 05 May 2025 07:09 )  PTT:24.3 sec      Uric Acid 2.1        RADIOLOGY & ADDITIONAL STUDIES:      < from: Xray Chest 1 View- PORTABLE-Urgent (Xray Chest 1 View- PORTABLE-Urgent .) (05.03.25 @ 10:36) >  IMPRESSION:    Tubes and lines as above.  Patchy opacities along the left lung base, possibly atelectasis, however   superimposed infection cannot be entirely excluded.    --- End of Report ---    < end of copied text >

## 2025-05-06 LAB
ALBUMIN SERPL ELPH-MCNC: 3.3 G/DL — SIGNIFICANT CHANGE UP (ref 3.3–5)
ALBUMIN SERPL ELPH-MCNC: 3.6 G/DL — SIGNIFICANT CHANGE UP (ref 3.3–5)
ALP SERPL-CCNC: 81 U/L — SIGNIFICANT CHANGE UP (ref 40–120)
ALP SERPL-CCNC: 85 U/L — SIGNIFICANT CHANGE UP (ref 40–120)
ALT FLD-CCNC: 24 U/L — SIGNIFICANT CHANGE UP (ref 10–45)
ALT FLD-CCNC: 24 U/L — SIGNIFICANT CHANGE UP (ref 10–45)
ANION GAP SERPL CALC-SCNC: 11 MMOL/L — SIGNIFICANT CHANGE UP (ref 5–17)
ANION GAP SERPL CALC-SCNC: 12 MMOL/L — SIGNIFICANT CHANGE UP (ref 5–17)
AST SERPL-CCNC: 13 U/L — SIGNIFICANT CHANGE UP (ref 10–40)
AST SERPL-CCNC: 17 U/L — SIGNIFICANT CHANGE UP (ref 10–40)
BASOPHILS # BLD AUTO: 0 K/UL — SIGNIFICANT CHANGE UP (ref 0–0.2)
BASOPHILS NFR BLD AUTO: 0 % — SIGNIFICANT CHANGE UP (ref 0–2)
BILIRUB SERPL-MCNC: 0.3 MG/DL — SIGNIFICANT CHANGE UP (ref 0.2–1.2)
BILIRUB SERPL-MCNC: 0.3 MG/DL — SIGNIFICANT CHANGE UP (ref 0.2–1.2)
BUN SERPL-MCNC: 18 MG/DL — SIGNIFICANT CHANGE UP (ref 7–23)
BUN SERPL-MCNC: 21 MG/DL — SIGNIFICANT CHANGE UP (ref 7–23)
CALCIUM SERPL-MCNC: 8.4 MG/DL — SIGNIFICANT CHANGE UP (ref 8.4–10.5)
CALCIUM SERPL-MCNC: 8.7 MG/DL — SIGNIFICANT CHANGE UP (ref 8.4–10.5)
CHLORIDE SERPL-SCNC: 105 MMOL/L — SIGNIFICANT CHANGE UP (ref 96–108)
CHLORIDE SERPL-SCNC: 105 MMOL/L — SIGNIFICANT CHANGE UP (ref 96–108)
CO2 SERPL-SCNC: 23 MMOL/L — SIGNIFICANT CHANGE UP (ref 22–31)
CO2 SERPL-SCNC: 23 MMOL/L — SIGNIFICANT CHANGE UP (ref 22–31)
CREAT SERPL-MCNC: 1.21 MG/DL — SIGNIFICANT CHANGE UP (ref 0.5–1.3)
CREAT SERPL-MCNC: 1.32 MG/DL — HIGH (ref 0.5–1.3)
EGFR: 59 ML/MIN/1.73M2 — LOW
EGFR: 59 ML/MIN/1.73M2 — LOW
EGFR: 65 ML/MIN/1.73M2 — SIGNIFICANT CHANGE UP
EGFR: 65 ML/MIN/1.73M2 — SIGNIFICANT CHANGE UP
EOSINOPHIL # BLD AUTO: 0 K/UL — SIGNIFICANT CHANGE UP (ref 0–0.5)
EOSINOPHIL NFR BLD AUTO: 0 % — SIGNIFICANT CHANGE UP (ref 0–6)
GLUCOSE SERPL-MCNC: 113 MG/DL — HIGH (ref 70–99)
GLUCOSE SERPL-MCNC: 130 MG/DL — HIGH (ref 70–99)
HCT VFR BLD CALC: 24 % — LOW (ref 39–50)
HGB BLD-MCNC: 8 G/DL — LOW (ref 13–17)
LDH SERPL L TO P-CCNC: 182 U/L — SIGNIFICANT CHANGE UP (ref 50–242)
LDH SERPL L TO P-CCNC: 201 U/L — SIGNIFICANT CHANGE UP (ref 50–242)
LYMPHOCYTES # BLD AUTO: 1.28 K/UL — SIGNIFICANT CHANGE UP (ref 1–3.3)
LYMPHOCYTES # BLD AUTO: 59.6 % — HIGH (ref 13–44)
MAGNESIUM SERPL-MCNC: 1.6 MG/DL — SIGNIFICANT CHANGE UP (ref 1.6–2.6)
MAGNESIUM SERPL-MCNC: 1.6 MG/DL — SIGNIFICANT CHANGE UP (ref 1.6–2.6)
MANUAL SMEAR VERIFICATION: SIGNIFICANT CHANGE UP
MCHC RBC-ENTMCNC: 33.3 G/DL — SIGNIFICANT CHANGE UP (ref 32–36)
MCHC RBC-ENTMCNC: 33.6 PG — SIGNIFICANT CHANGE UP (ref 27–34)
MCV RBC AUTO: 100.8 FL — HIGH (ref 80–100)
MONOCYTES # BLD AUTO: 0 K/UL — SIGNIFICANT CHANGE UP (ref 0–0.9)
MONOCYTES NFR BLD AUTO: 0 % — LOW (ref 2–14)
NEUTROPHILS # BLD AUTO: 0.86 K/UL — LOW (ref 1.8–7.4)
NEUTROPHILS NFR BLD AUTO: 39.5 % — LOW (ref 43–77)
NEUTS BAND # BLD: 0.9 % — SIGNIFICANT CHANGE UP (ref 0–8)
NEUTS BAND NFR BLD: 0.9 % — SIGNIFICANT CHANGE UP (ref 0–8)
PHOSPHATE SERPL-MCNC: 3.1 MG/DL — SIGNIFICANT CHANGE UP (ref 2.5–4.5)
PHOSPHATE SERPL-MCNC: 3.9 MG/DL — SIGNIFICANT CHANGE UP (ref 2.5–4.5)
PLAT MORPH BLD: NORMAL — SIGNIFICANT CHANGE UP
PLATELET # BLD AUTO: 49 K/UL — LOW (ref 150–400)
POTASSIUM SERPL-MCNC: 3.7 MMOL/L — SIGNIFICANT CHANGE UP (ref 3.5–5.3)
POTASSIUM SERPL-MCNC: 3.8 MMOL/L — SIGNIFICANT CHANGE UP (ref 3.5–5.3)
POTASSIUM SERPL-SCNC: 3.7 MMOL/L — SIGNIFICANT CHANGE UP (ref 3.5–5.3)
POTASSIUM SERPL-SCNC: 3.8 MMOL/L — SIGNIFICANT CHANGE UP (ref 3.5–5.3)
PROT SERPL-MCNC: 5.1 G/DL — LOW (ref 6–8.3)
PROT SERPL-MCNC: 5.5 G/DL — LOW (ref 6–8.3)
RBC # BLD: 2.38 M/UL — LOW (ref 4.2–5.8)
RBC # FLD: 15 % — HIGH (ref 10.3–14.5)
RBC BLD AUTO: SIGNIFICANT CHANGE UP
SODIUM SERPL-SCNC: 139 MMOL/L — SIGNIFICANT CHANGE UP (ref 135–145)
SODIUM SERPL-SCNC: 140 MMOL/L — SIGNIFICANT CHANGE UP (ref 135–145)
URATE SERPL-MCNC: 3.1 MG/DL — LOW (ref 3.4–8.8)
URATE SERPL-MCNC: 3.3 MG/DL — LOW (ref 3.4–8.8)
WBC # BLD: 2.14 K/UL — LOW (ref 3.8–10.5)
WBC # FLD AUTO: 2.14 K/UL — LOW (ref 3.8–10.5)

## 2025-05-06 PROCEDURE — 99233 SBSQ HOSP IP/OBS HIGH 50: CPT

## 2025-05-06 RX ADMIN — DILTIAZEM HYDROCHLORIDE 240 MILLIGRAM(S): 240 TABLET, EXTENDED RELEASE ORAL at 05:46

## 2025-05-06 RX ADMIN — AMOXICILLIN 500 MILLIGRAM(S): 500 CAPSULE ORAL at 17:43

## 2025-05-06 RX ADMIN — Medication 8 MILLIGRAM(S): at 05:44

## 2025-05-06 RX ADMIN — Medication 15 MILLILITER(S): at 12:52

## 2025-05-06 RX ADMIN — Medication 40 MILLIGRAM(S): at 05:44

## 2025-05-06 RX ADMIN — AMOXICILLIN 500 MILLIGRAM(S): 500 CAPSULE ORAL at 05:44

## 2025-05-06 RX ADMIN — Medication 650 MILLIGRAM(S): at 19:55

## 2025-05-06 RX ADMIN — Medication 650 MILLIGRAM(S): at 19:15

## 2025-05-06 RX ADMIN — Medication 15 MILLILITER(S): at 23:08

## 2025-05-06 RX ADMIN — TAMSULOSIN HYDROCHLORIDE 0.4 MILLIGRAM(S): 0.4 CAPSULE ORAL at 22:03

## 2025-05-06 RX ADMIN — OXYCODONE HYDROCHLORIDE 2.5 MILLIGRAM(S): 30 TABLET ORAL at 22:03

## 2025-05-06 RX ADMIN — Medication 1 APPLICATION(S): at 07:54

## 2025-05-06 RX ADMIN — Medication 15 MILLILITER(S): at 05:45

## 2025-05-06 RX ADMIN — Medication 227 MILLIGRAM(S): at 17:57

## 2025-05-06 RX ADMIN — Medication 2 PUFF(S): at 17:45

## 2025-05-06 RX ADMIN — Medication 100 MILLILITER(S): at 17:44

## 2025-05-06 RX ADMIN — OXYCODONE HYDROCHLORIDE 2.5 MILLIGRAM(S): 30 TABLET ORAL at 23:00

## 2025-05-06 RX ADMIN — Medication 2000 UNIT(S): at 12:52

## 2025-05-06 RX ADMIN — Medication 500 MILLIGRAM(S): at 05:44

## 2025-05-06 RX ADMIN — Medication 15 MILLILITER(S): at 17:43

## 2025-05-06 RX ADMIN — Medication 500 MILLIGRAM(S): at 17:43

## 2025-05-06 RX ADMIN — Medication 8 MILLIGRAM(S): at 22:02

## 2025-05-06 RX ADMIN — Medication 300 MILLIGRAM(S): at 12:52

## 2025-05-06 RX ADMIN — Medication 8 MILLIGRAM(S): at 13:33

## 2025-05-06 RX ADMIN — POSACONAZOLE 300 MILLIGRAM(S): 100 TABLET, DELAYED RELEASE ORAL at 17:43

## 2025-05-06 NOTE — PROGRESS NOTE ADULT - NS ATTEND AMEND GEN_ALL_CORE FT
Primary: Paulie    69M with ASXL1-mutated and [id number]-eankcow AML. 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 Admitted for induction 7+3 - day 4.    Heme:   - Continue 7+3 (5/2/25 - ). Complicated by rigors, chills, and tachycardia 2 hours into day 1 infusion - likely infusion reaction.  - Trend CBC with differential daily. Goal Hgb > 7 and plt > 10  (> 15 if febrile, > 50 if bleeding).   - Trend DIC labs daily. Continue supportive transfusions as needed to maintain fibrinogen > 100.   - Trend TLS labs twice daily.   - Continue allopurinol. S/p rasburicase; consider additional doses if uric acid > 8.  - Continue IVF. Monitor daily weights and I/O's. Diurese PRN.     ID: Febrile once on 5/2/25 but likely in the setting of infusion reaction  - Prophylaxis: levaquin, amoxicillin, posaconazole, valacyclovir     MSK: Has chronic pain from prior surgeries   - Pain control: oxycodone Primary: Paulie    69M with ASXL1-mutated and XTK57-tcimsex AML. 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 Admitted for induction 7+3 - day 5.    Heme:   - Continue 7+3 (5/2/25 - ). Complicated by rigors, chills, and tachycardia 2 hours into day 1 infusion - likely infusion reaction.  - Trend CBC with differential daily. Goal Hgb > 7 and plt > 10  (> 15 if febrile, > 50 if bleeding).   - Trend DIC labs daily. Continue supportive transfusions as needed to maintain fibrinogen > 100.   - Trend TLS labs twice daily.   - Continue allopurinol. S/p rasburicase; consider additional doses if uric acid > 8.  - Continue IVF. Monitor daily weights and I/O's. Diurese PRN.     ID: Febrile once on 5/2/25 but likely in the setting of infusion reaction  - Prophylaxis: levaquin, amoxicillin, posaconazole, valacyclovir     MSK: Has chronic pain from prior surgeries   - Pain control: oxycodone

## 2025-05-06 NOTE — PROGRESS NOTE ADULT - ASSESSMENT
68 yo male with newly diagnosed ASXL1, DDX41 mutated AML, admitted for induction chemo with 7+3:  dauno 60 mg/m2+ cytarabine. 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 Chemo started on 5/2, Complicated by rigors, chills, fever, SOB  and tachycardia 2 hours into day 1 infusion - likely infusion reaction. Pt has pancytopenia d/t chemo and disease

## 2025-05-06 NOTE — PROGRESS NOTE ADULT - SUBJECTIVE AND OBJECTIVE BOX
Diagnosis: ASXL1-mutated and XYK60-uvsbsjo AML    Protocol/Chemo Regimen: 7+3:  Daunorubicin 60 mg/m2+ Cytarabine     Day: 5    Pt endorsed: no complaints    Review of Systems: Patient denied nausea, vomiting, mouth pain,  chest pain, cough, dyspnea, abdominal pain,  diarrhea, rectal pain,  rash, fatigue, headache, bleeding      Pain scale:       mild                                 Location: cervical/low back     Diet:  regular     Allergies: No Known Allergies    Intolerances:     Shrimp- Nasal congestion (Other)            ANTIMICROBIALS  amoxicillin 500 milliGRAM(s) Oral two times a day  levoFLOXacin  Tablet 500 milliGRAM(s) Oral every 24 hours  posaconazole DR Tablet   Oral   posaconazole DR Tablet 300 milliGRAM(s) Oral every 24 hours  valACYclovir 500 milliGRAM(s) Oral two times a day      HEME/ONC MEDICATIONS  cytarabine IVPB (eMAR) 227 milliGRAM(s) IV Intermittent daily      STANDING MEDICATIONS  allopurinol 300 milliGRAM(s) Oral daily  Biotene Dry Mouth Oral Rinse 15 milliLiter(s) Swish and Spit four times a day  chlorhexidine 4% Liquid 1 Application(s) Topical <User Schedule>  cholecalciferol 2000 Unit(s) Oral daily  diltiazem    milliGRAM(s) Oral daily  hydrochlorothiazide 50 milliGRAM(s) Oral daily  ondansetron Injectable 8 milliGRAM(s) IV Push every 8 hours  pantoprazole    Tablet 40 milliGRAM(s) Oral before breakfast  sodium chloride 0.9%. 1000 milliLiter(s) IV Continuous <Continuous>  tamsulosin 0.4 milliGRAM(s) Oral at bedtime      PRN MEDICATIONS  acetaminophen     Tablet .. 650 milliGRAM(s) Oral every 6 hours PRN  albuterol    90 MICROgram(s) HFA Inhaler 2 Puff(s) Inhalation every 6 hours PRN  FIRST- Mouthwash  BLM 5 milliLiter(s) Swish and Spit four times a day PRN  hydrocortisone sodium succinate Injectable 100 milliGRAM(s) IV Push every 8 hours PRN  metoclopramide Injectable 10 milliGRAM(s) IV Push every 6 hours PRN  oxyCODONE    IR 2.5 milliGRAM(s) Oral every 4 hours PRN  oxyCODONE    IR 5 milliGRAM(s) Oral every 4 hours PRN  polyethylene glycol 3350 17 Gram(s) Oral two times a day PRN  sodium chloride 0.9% lock flush 10 milliLiter(s) IV Push every 1 hour PRN        Vital Signs Last 24 Hrs  T(C): 36.5 (06 May 2025 05:43), Max: 36.8 (05 May 2025 09:20)  T(F): 97.7 (06 May 2025 05:43), Max: 98.3 (05 May 2025 09:20)  HR: 73 (06 May 2025 05:43) (73 - 106)  BP: 127/73 (06 May 2025 05:43) (121/73 - 134/66)  BP(mean): --  RR: 18 (06 May 2025 05:43) (18 - 18)  SpO2: 96% (06 May 2025 05:43) (94% - 98%)    Parameters below as of 06 May 2025 05:43  Patient On (Oxygen Delivery Method): room air      PHYSICAL EXAM  General: adult in NAD  HEENT: clear oropharynx, anicteric sclera  Neck: supple  CV: normal S1/S2 RRR  Lungs: positive air movement b/l ant lungs,clear to auscultation, no wheezes, no rales  Abdomen: soft, + BS,  non-tender non-distended  Ext: no edema  Skin: no rash  Neuro: alert and oriented X 3  Central Line:  TLC CDI       RECENT CULTURES:  05-03 @ 10:07  Clean Catch Clean Catch (Midstream)  --  --  --    No growth  --  05-02 @ 20:00  Blood Blood-Peripheral  --  --  --    No growth at 72 Hours  --        LABS:                        8.0    2.14  )-----------( 49       ( 06 May 2025 06:43 )             24.0         Mean Cell Volume : 100.8 fl  Mean Cell Hemoglobin : 33.6 pg  Mean Cell Hemoglobin Concentration : 33.3 g/dL  Auto Neutrophil # : x  Auto Lymphocyte # : x  Auto Monocyte # : x  Auto Eosinophil # : x  Auto Basophil # : x  Auto Neutrophil % : x  Auto Lymphocyte % : x  Auto Monocyte % : x  Auto Eosinophil % : x  Auto Basophil % : x      05-06    139  |  105  |  21  ----------------------------<  113[H]  3.8   |  23  |  1.21    Ca    8.4      06 May 2025 06:42  Phos  3.9     05-06  Mg     1.6     05-06    TPro  5.1[L]  /  Alb  3.3  /  TBili  0.3  /  DBili  x   /  AST  17  /  ALT  24  /  AlkPhos  81  05-06      Mg 1.6  Phos 3.9  Mg 1.7  Phos 3.9      PT/INR - ( 05 May 2025 07:09 )   PT: 12.6 sec;   INR: 1.11 ratio         PTT - ( 05 May 2025 07:09 )  PTT:24.3 sec      Uric Acid 3.1      Uric Acid 2.6        RADIOLOGY & ADDITIONAL STUDIES:    < from: Xray Chest 1 View- PORTABLE-Urgent (Xray Chest 1 View- PORTABLE-Urgent .) (05.03.25 @ 10:36) >  IMPRESSION:    Tubes and lines as above.  Patchy opacities along the left lung base, possibly atelectasis, however   superimposed infection cannot be entirely excluded.    --- End of Report ---    < end of copied text >       Diagnosis: ASXL1-mutated and UDM64-kgyefor AML    Protocol/Chemo Regimen: 7+3:  Daunorubicin 60 mg/m2+ Cytarabine     Day: 5    Pt endorsed: +fatigue    Review of Systems: Patient denied nausea, vomiting, mouth pain,  chest pain, cough, dyspnea, abdominal pain,  diarrhea, rectal pain,  rash, fatigue, headache, bleeding      Pain scale:       mild                                 Location: cervical/low back     Diet:  regular     Allergies: No Known Allergies    Intolerances:     Shrimp- Nasal congestion (Other)            ANTIMICROBIALS  amoxicillin 500 milliGRAM(s) Oral two times a day  levoFLOXacin  Tablet 500 milliGRAM(s) Oral every 24 hours  posaconazole DR Tablet   Oral   posaconazole DR Tablet 300 milliGRAM(s) Oral every 24 hours  valACYclovir 500 milliGRAM(s) Oral two times a day      HEME/ONC MEDICATIONS  cytarabine IVPB (eMAR) 227 milliGRAM(s) IV Intermittent daily      STANDING MEDICATIONS  allopurinol 300 milliGRAM(s) Oral daily  Biotene Dry Mouth Oral Rinse 15 milliLiter(s) Swish and Spit four times a day  chlorhexidine 4% Liquid 1 Application(s) Topical <User Schedule>  cholecalciferol 2000 Unit(s) Oral daily  diltiazem    milliGRAM(s) Oral daily  hydrochlorothiazide 50 milliGRAM(s) Oral daily  ondansetron Injectable 8 milliGRAM(s) IV Push every 8 hours  pantoprazole    Tablet 40 milliGRAM(s) Oral before breakfast  sodium chloride 0.9%. 1000 milliLiter(s) IV Continuous <Continuous>  tamsulosin 0.4 milliGRAM(s) Oral at bedtime      PRN MEDICATIONS  acetaminophen     Tablet .. 650 milliGRAM(s) Oral every 6 hours PRN  albuterol    90 MICROgram(s) HFA Inhaler 2 Puff(s) Inhalation every 6 hours PRN  FIRST- Mouthwash  BLM 5 milliLiter(s) Swish and Spit four times a day PRN  hydrocortisone sodium succinate Injectable 100 milliGRAM(s) IV Push every 8 hours PRN  metoclopramide Injectable 10 milliGRAM(s) IV Push every 6 hours PRN  oxyCODONE    IR 2.5 milliGRAM(s) Oral every 4 hours PRN  oxyCODONE    IR 5 milliGRAM(s) Oral every 4 hours PRN  polyethylene glycol 3350 17 Gram(s) Oral two times a day PRN  sodium chloride 0.9% lock flush 10 milliLiter(s) IV Push every 1 hour PRN        Vital Signs Last 24 Hrs  T(C): 36.5 (06 May 2025 05:43), Max: 36.8 (05 May 2025 09:20)  T(F): 97.7 (06 May 2025 05:43), Max: 98.3 (05 May 2025 09:20)  HR: 73 (06 May 2025 05:43) (73 - 106)  BP: 127/73 (06 May 2025 05:43) (121/73 - 134/66)  BP(mean): --  RR: 18 (06 May 2025 05:43) (18 - 18)  SpO2: 96% (06 May 2025 05:43) (94% - 98%)    Parameters below as of 06 May 2025 05:43  Patient On (Oxygen Delivery Method): room air      PHYSICAL EXAM  General: adult in NAD  HEENT: clear oropharynx, anicteric sclera  Neck: supple  CV: normal S1/S2 RRR  Lungs: positive air movement b/l ant lungs,clear to auscultation, no wheezes, no rales  Abdomen: soft, + BS,  non-tender non-distended  Ext: no edema  Skin: no rash  Neuro: alert and oriented X 3  Central Line:  TLC CDI       RECENT CULTURES:  05-03 @ 10:07  Clean Catch Clean Catch (Midstream)  --  --  --    No growth  --  05-02 @ 20:00  Blood Blood-Peripheral  --  --  --    No growth at 72 Hours  --        LABS:                        8.0    2.14  )-----------( 49       ( 06 May 2025 06:43 )             24.0         Mean Cell Volume : 100.8 fl  Mean Cell Hemoglobin : 33.6 pg  Mean Cell Hemoglobin Concentration : 33.3 g/dL  Auto Neutrophil # : x  Auto Lymphocyte # : x  Auto Monocyte # : x  Auto Eosinophil # : x  Auto Basophil # : x  Auto Neutrophil % : x  Auto Lymphocyte % : x  Auto Monocyte % : x  Auto Eosinophil % : x  Auto Basophil % : x      05-06    139  |  105  |  21  ----------------------------<  113[H]  3.8   |  23  |  1.21    Ca    8.4      06 May 2025 06:42  Phos  3.9     05-06  Mg     1.6     05-06    TPro  5.1[L]  /  Alb  3.3  /  TBili  0.3  /  DBili  x   /  AST  17  /  ALT  24  /  AlkPhos  81  05-06      PT/INR - ( 05 May 2025 07:09 )   PT: 12.6 sec;   INR: 1.11 ratio         PTT - ( 05 May 2025 07:09 )  PTT:24.3 sec      Uric Acid 3.1      RADIOLOGY & ADDITIONAL STUDIES:    < from: Xray Chest 1 View- PORTABLE-Urgent (Xray Chest 1 View- PORTABLE-Urgent .) (05.03.25 @ 10:36) >  IMPRESSION:    Tubes and lines as above.  Patchy opacities along the left lung base, possibly atelectasis, however   superimposed infection cannot be entirely excluded.    --- End of Report ---    < end of copied text >

## 2025-05-06 NOTE — PHARMACOTHERAPY INTERVENTION NOTE - COMMENTS
Clinical Pharmacy Specialist- Hematology/Oncology- Progress Note    Pt is a 70 y/o male w/ PMH of HTN, HLD, GERD, BPH, Gout, spinal stenosis (s/p multiple surgeries) and newly diagnosed AML (ASXL1-mutated and DDX41) admitted for management with 7+3 Induction therapy    Antimicrobial Course:  - Posaconazole- 5/2  - Levaquin/Amox- 5/2  - Valtrex- 5/2  MRSA nasal swab    Last Neutropenic (ANC<1000): 5/6; ANC= 0.86  Last Febrile: 02-May-2025 20:48; T= 101.9  Days Non-Neutropenic: 0  Days afebrile: 3    Chemotherapy Course  -Current Regimen: 7+3 Induction  History:  (5/2/25)- 7+3 Induction   -Daunorubicin 60mg/m2 IVP D1-3   -Cytarabine 100mg/m2 IV cont inf D1-7  -Day: 5 (5/6)  BmBx:  Access: R TLC (accessed 5/5/25)    History/Relevant clinical information used in assessment:  - Ht= 5’11”; IBW= 75.3kg; Actual BW= 107.3kg; Adj BW= 88kg  - 5/2- IRR? rigors, chills, and tachycardia 2 hours into day 1; rasburicase 3mg x 1  - 5/4 -Crcl= 67ml/min ; Scr=1.32; Wt= 88kg; Adjusted BW used since pt BMI>30    Assessment/Plan/Recommendation:  Onc:  - Daunorubicin 60mg/m2 IVP D1-3   - Cytarabine 100mg/m2 IV cont inf D1-7- IRR on D1, started at 6p, stopped at 7:30p (received 1.5 hrs), currently ends fri 5/9@~6p, so need to make up D1 bag new orders needed for friday x1 day only  - allopurinol- monitor for d/c ~5/12- need to keep for gout  ID:  - 5/2 CXR- opacities  Cards:  - atorvastatin 80mg held d/t DDI with posa (Cat X)    Additional Monitoring Needed?   -Yes- Continue to monitor renal function & daily counts for abx escalation/de-escalation   -Discharge Planning:  --> New meds:  --> Meds sent for auth:  --> Delivered meds:    Case discussed with attending/primary team    Liza Jolley, PharmD, BCPS  Clinical Pharmacy Specialist | Hematology/Oncology  [facility]  Email: [email or web address] or available on [employer] Clinical Pharmacy Specialist- Hematology/Oncology- Progress Note    Pt is a 70 y/o male w/ PMH of HTN, HLD, GERD, BPH, Gout, spinal stenosis (s/p multiple surgeries) and newly diagnosed AML (ASXL1-mutated and DDX41) admitted for management with 7+3 Induction therapy    Antimicrobial Course:  - Posaconazole- 5/2  - Levaquin/Amox- 5/2  - Valtrex- 5/2  MRSA nasal swab    Last Neutropenic (ANC<1000): 5/6; ANC= 0.86  Last Febrile: 02-May-2025 20:48; T= 101.9  Days Non-Neutropenic: 0  Days afebrile: 3    Chemotherapy Course  -Current Regimen: 7+3 Induction  History:  (5/2/25)- 7+3 Induction   -Daunorubicin 60mg/m2 IVP D1-3   -Cytarabine 100mg/m2 IV cont inf D1-7  -Day: 5 (5/6)  BmBx:  Access: R TLC (accessed 5/5/25)    History/Relevant clinical information used in assessment:  - Ht= 5’11”; IBW= 75.3kg; Actual BW= 107.3kg; Adj BW= 88kg  - 5/2- IRR? rigors, chills, and tachycardia 2 hours into day 1; rasburicase 3mg x 1  - 5/4 -Crcl= 67ml/min ; Scr=1.32; Wt= 88kg; Adjusted BW used since pt BMI>30    Assessment/Plan/Recommendation:  Onc:  - Daunorubicin 60mg/m2 IVP D1-3   - Cytarabine 100mg/m2 IV cont inf D1-7- IRR on D1, started at 6p, stopped at 7:30p (received 1.5 hrs), currently ends fri 5/9@~6p, so will make up D1 bag new orders needed for friday x1 day only  - allopurinol- monitor for d/c ~5/12- need to keep for gout  ID:  - 5/2 CXR- opacities  Cards:  - atorvastatin 80mg held d/t DDI with posa (Cat X)    Additional Monitoring Needed?   -Yes- Continue to monitor renal function & daily counts for abx escalation/de-escalation   -Discharge Planning:  --> New meds:  --> Meds sent for auth:  --> Delivered meds:    Case discussed with attending/primary team    Liza Jolley, PharmD, BCPS  Clinical Pharmacy Specialist | Hematology/Oncology  [facility]  Email: [email or web address] or available on [employer]

## 2025-05-06 NOTE — ADVANCED PRACTICE NURSE CONSULT - ASSESSMENT
Pt alert and O x 4. Labs reviewed by Dr Goldberg and team on rounds,. PT EF 63% MUGA 5/2/25. Right TLC inserted 05/02/25 in IR, patent, positive blood return obtained and flushing well, site intact, no s/s of bleeding, redness or swelling. Chemotherapy teaching done,  verbalized understanding. Pt premedicated with 8mg IVP zofran q8hrs,  2 certified RN verification done. At 1757, pt receiving Cytarabine 100 mg/m2= 227 mg IV at 23 cc/hr as a 24 hr continuous infusion y-sited to lowest port of primary NS line connected to white lumen administered via alaris pump. Pt tolerating well. Primary RN aware of care plan. Nursing care is ongoing.    Pt alert and O x 4. Labs reviewed by Dr Goldberg and team on rounds,. PT EF 63% MUGA 5/2/25. Right TLC inserted 05/02/25 in IR, patent, positive blood return obtained and flushing well, site intact, no s/s of bleeding, redness or swelling. CXR confirm placement in SVC. Chemotherapy teaching done,  verbalized understanding. Pt premedicated with 8mg IVP zofran q8hrs,  2 certified RN verification done. At 1757, pt receiving Cytarabine 100 mg/m2= 227 mg IV at 23 cc/hr as a 24 hr continuous infusion y-sited to lowest port of primary NS line connected to white lumen administered via alaris pump. Pt tolerating well. Primary RN aware of care plan. Nursing care is ongoing.

## 2025-05-06 NOTE — PROGRESS NOTE ADULT - PROBLEM SELECTOR PLAN 1
new ASXL1-mutated and GYF67-rdtwimp AML.  admitted for induction chemo with dauno 60 mg/m2+ cytarabine. Chemo started on 5/2, Complicated by rigors, chills, fever, SOB  and tachycardia 2 hours into day 1 infusion - likely infusion. recevied pepcid/benadryl/solumedrol/tylenol iv. will make up D1 Cytarabine on day8    will proceed with day2 chemo with pre mdes Tylenol and Benadryl, hydrocortisone iv PRN reaction   Hepatitis screen/HIV(-) as outpatient  MUGA normal EF, IR TLC    IVF, antiemetics, mouth care. monitor weight, I & O, diuresis PRN   Monitor CBC with diff QD, TLS labs BID, Coags & T & C TIW transfuse for hb<7, PLT <10  5/5 HLA sent; lasix 40mg IV x2 for weight gain new ASXL1-mutated and BLP49-uvimijm AML.  admitted for induction chemo with dauno 60 mg/m2+ cytarabine. Chemo started on 5/2, Complicated by rigors, chills, fever, SOB  and tachycardia 2 hours into day 1 infusion - likely infusion. recevied pepcid/benadryl/solumedrol/tylenol iv. will make up D1 Cytarabine on day8    will proceed with day2 chemo with pre mdes Tylenol and Benadryl, hydrocortisone iv PRN reaction   Hepatitis screen/HIV(-) as outpatient  MUGA normal EF, IR TLC    IVF, antiemetics, mouth care. monitor weight, I & O, diuresis PRN   Monitor CBC with diff QD, TLS labs BID, Coags & T & C TIW transfuse for hb<7, PLT <10  5/5 HLA sent

## 2025-05-07 LAB
ALBUMIN SERPL ELPH-MCNC: 3.4 G/DL — SIGNIFICANT CHANGE UP (ref 3.3–5)
ALBUMIN SERPL ELPH-MCNC: 3.7 G/DL — SIGNIFICANT CHANGE UP (ref 3.3–5)
ALP SERPL-CCNC: 78 U/L — SIGNIFICANT CHANGE UP (ref 40–120)
ALP SERPL-CCNC: 86 U/L — SIGNIFICANT CHANGE UP (ref 40–120)
ALT FLD-CCNC: 20 U/L — SIGNIFICANT CHANGE UP (ref 10–45)
ALT FLD-CCNC: 21 U/L — SIGNIFICANT CHANGE UP (ref 10–45)
ANION GAP SERPL CALC-SCNC: 12 MMOL/L — SIGNIFICANT CHANGE UP (ref 5–17)
ANION GAP SERPL CALC-SCNC: 12 MMOL/L — SIGNIFICANT CHANGE UP (ref 5–17)
APTT BLD: 26 SEC — LOW (ref 26.1–36.8)
AST SERPL-CCNC: 12 U/L — SIGNIFICANT CHANGE UP (ref 10–40)
AST SERPL-CCNC: 13 U/L — SIGNIFICANT CHANGE UP (ref 10–40)
BASOPHILS # BLD AUTO: 0 K/UL — SIGNIFICANT CHANGE UP (ref 0–0.2)
BASOPHILS NFR BLD AUTO: 0 % — SIGNIFICANT CHANGE UP (ref 0–2)
BILIRUB SERPL-MCNC: 0.2 MG/DL — SIGNIFICANT CHANGE UP (ref 0.2–1.2)
BILIRUB SERPL-MCNC: 0.3 MG/DL — SIGNIFICANT CHANGE UP (ref 0.2–1.2)
BLD GP AB SCN SERPL QL: NEGATIVE — SIGNIFICANT CHANGE UP
BUN SERPL-MCNC: 18 MG/DL — SIGNIFICANT CHANGE UP (ref 7–23)
BUN SERPL-MCNC: 18 MG/DL — SIGNIFICANT CHANGE UP (ref 7–23)
CALCIUM SERPL-MCNC: 8.5 MG/DL — SIGNIFICANT CHANGE UP (ref 8.4–10.5)
CALCIUM SERPL-MCNC: 8.8 MG/DL — SIGNIFICANT CHANGE UP (ref 8.4–10.5)
CHLORIDE SERPL-SCNC: 105 MMOL/L — SIGNIFICANT CHANGE UP (ref 96–108)
CHLORIDE SERPL-SCNC: 107 MMOL/L — SIGNIFICANT CHANGE UP (ref 96–108)
CO2 SERPL-SCNC: 22 MMOL/L — SIGNIFICANT CHANGE UP (ref 22–31)
CO2 SERPL-SCNC: 23 MMOL/L — SIGNIFICANT CHANGE UP (ref 22–31)
CREAT SERPL-MCNC: 1.16 MG/DL — SIGNIFICANT CHANGE UP (ref 0.5–1.3)
CREAT SERPL-MCNC: 1.18 MG/DL — SIGNIFICANT CHANGE UP (ref 0.5–1.3)
D DIMER BLD IA.RAPID-MCNC: 388 NG/ML DDU — HIGH
EGFR: 67 ML/MIN/1.73M2 — SIGNIFICANT CHANGE UP
EGFR: 67 ML/MIN/1.73M2 — SIGNIFICANT CHANGE UP
EGFR: 69 ML/MIN/1.73M2 — SIGNIFICANT CHANGE UP
EGFR: 69 ML/MIN/1.73M2 — SIGNIFICANT CHANGE UP
EOSINOPHIL # BLD AUTO: 0 K/UL — SIGNIFICANT CHANGE UP (ref 0–0.5)
EOSINOPHIL NFR BLD AUTO: 0 % — SIGNIFICANT CHANGE UP (ref 0–6)
FIBRINOGEN PPP-MCNC: 402 MG/DL — SIGNIFICANT CHANGE UP (ref 200–445)
G6PD RBC-CCNC: 16.7 U/G HGB — SIGNIFICANT CHANGE UP (ref 7–20.5)
G6PD RBC-CCNC: 16.9 U/G HGB — SIGNIFICANT CHANGE UP (ref 7–20.5)
GLUCOSE SERPL-MCNC: 109 MG/DL — HIGH (ref 70–99)
GLUCOSE SERPL-MCNC: 122 MG/DL — HIGH (ref 70–99)
HCT VFR BLD CALC: 24.2 % — LOW (ref 39–50)
HGB BLD-MCNC: 8.2 G/DL — LOW (ref 13–17)
INR BLD: 1.06 RATIO — SIGNIFICANT CHANGE UP (ref 0.85–1.16)
LDH SERPL L TO P-CCNC: 182 U/L — SIGNIFICANT CHANGE UP (ref 50–242)
LDH SERPL L TO P-CCNC: 200 U/L — SIGNIFICANT CHANGE UP (ref 50–242)
LYMPHOCYTES # BLD AUTO: 1.18 K/UL — SIGNIFICANT CHANGE UP (ref 1–3.3)
LYMPHOCYTES # BLD AUTO: 66.2 % — HIGH (ref 13–44)
MAGNESIUM SERPL-MCNC: 1.6 MG/DL — SIGNIFICANT CHANGE UP (ref 1.6–2.6)
MAGNESIUM SERPL-MCNC: 1.6 MG/DL — SIGNIFICANT CHANGE UP (ref 1.6–2.6)
MANUAL SMEAR VERIFICATION: SIGNIFICANT CHANGE UP
MCHC RBC-ENTMCNC: 33.9 G/DL — SIGNIFICANT CHANGE UP (ref 32–36)
MCHC RBC-ENTMCNC: 34.2 PG — HIGH (ref 27–34)
MCV RBC AUTO: 100.8 FL — HIGH (ref 80–100)
MONOCYTES # BLD AUTO: 0 K/UL — SIGNIFICANT CHANGE UP (ref 0–0.9)
MONOCYTES NFR BLD AUTO: 0 % — LOW (ref 2–14)
NEUTROPHILS # BLD AUTO: 0.6 K/UL — LOW (ref 1.8–7.4)
NEUTROPHILS NFR BLD AUTO: 33.8 % — LOW (ref 43–77)
PHOSPHATE SERPL-MCNC: 2.8 MG/DL — SIGNIFICANT CHANGE UP (ref 2.5–4.5)
PHOSPHATE SERPL-MCNC: 3.1 MG/DL — SIGNIFICANT CHANGE UP (ref 2.5–4.5)
PLAT MORPH BLD: NORMAL — SIGNIFICANT CHANGE UP
PLATELET # BLD AUTO: 48 K/UL — LOW (ref 150–400)
POTASSIUM SERPL-MCNC: 3.8 MMOL/L — SIGNIFICANT CHANGE UP (ref 3.5–5.3)
POTASSIUM SERPL-MCNC: 3.8 MMOL/L — SIGNIFICANT CHANGE UP (ref 3.5–5.3)
POTASSIUM SERPL-SCNC: 3.8 MMOL/L — SIGNIFICANT CHANGE UP (ref 3.5–5.3)
POTASSIUM SERPL-SCNC: 3.8 MMOL/L — SIGNIFICANT CHANGE UP (ref 3.5–5.3)
PROT SERPL-MCNC: 5.3 G/DL — LOW (ref 6–8.3)
PROT SERPL-MCNC: 5.6 G/DL — LOW (ref 6–8.3)
PROTHROM AB SERPL-ACNC: 12.1 SEC — SIGNIFICANT CHANGE UP (ref 9.9–13.4)
RBC # BLD: 2.4 M/UL — LOW (ref 4.2–5.8)
RBC # FLD: 14.6 % — HIGH (ref 10.3–14.5)
RBC BLD AUTO: SIGNIFICANT CHANGE UP
RH IG SCN BLD-IMP: POSITIVE — SIGNIFICANT CHANGE UP
SODIUM SERPL-SCNC: 140 MMOL/L — SIGNIFICANT CHANGE UP (ref 135–145)
SODIUM SERPL-SCNC: 141 MMOL/L — SIGNIFICANT CHANGE UP (ref 135–145)
URATE SERPL-MCNC: 3.4 MG/DL — SIGNIFICANT CHANGE UP (ref 3.4–8.8)
URATE SERPL-MCNC: 3.5 MG/DL — SIGNIFICANT CHANGE UP (ref 3.4–8.8)
WBC # BLD: 1.78 K/UL — LOW (ref 3.8–10.5)
WBC # FLD AUTO: 1.78 K/UL — LOW (ref 3.8–10.5)

## 2025-05-07 PROCEDURE — 99233 SBSQ HOSP IP/OBS HIGH 50: CPT

## 2025-05-07 RX ORDER — OXYCODONE HYDROCHLORIDE 30 MG/1
5 TABLET ORAL EVERY 4 HOURS
Refills: 0 | Status: DISCONTINUED | OUTPATIENT
Start: 2025-05-07 | End: 2025-05-14

## 2025-05-07 RX ORDER — OXYCODONE HYDROCHLORIDE 30 MG/1
2.5 TABLET ORAL EVERY 4 HOURS
Refills: 0 | Status: DISCONTINUED | OUTPATIENT
Start: 2025-05-07 | End: 2025-05-14

## 2025-05-07 RX ADMIN — Medication 8 MILLIGRAM(S): at 14:12

## 2025-05-07 RX ADMIN — Medication 227 MILLIGRAM(S): at 17:50

## 2025-05-07 RX ADMIN — DILTIAZEM HYDROCHLORIDE 240 MILLIGRAM(S): 240 TABLET, EXTENDED RELEASE ORAL at 05:39

## 2025-05-07 RX ADMIN — Medication 40 MILLIGRAM(S): at 05:39

## 2025-05-07 RX ADMIN — Medication 15 MILLILITER(S): at 05:38

## 2025-05-07 RX ADMIN — POSACONAZOLE 300 MILLIGRAM(S): 100 TABLET, DELAYED RELEASE ORAL at 17:28

## 2025-05-07 RX ADMIN — OXYCODONE HYDROCHLORIDE 2.5 MILLIGRAM(S): 30 TABLET ORAL at 18:47

## 2025-05-07 RX ADMIN — Medication 8 MILLIGRAM(S): at 05:39

## 2025-05-07 RX ADMIN — Medication 300 MILLIGRAM(S): at 12:08

## 2025-05-07 RX ADMIN — OXYCODONE HYDROCHLORIDE 5 MILLIGRAM(S): 30 TABLET ORAL at 23:17

## 2025-05-07 RX ADMIN — Medication 8 MILLIGRAM(S): at 22:47

## 2025-05-07 RX ADMIN — Medication 2000 UNIT(S): at 12:08

## 2025-05-07 RX ADMIN — TAMSULOSIN HYDROCHLORIDE 0.4 MILLIGRAM(S): 0.4 CAPSULE ORAL at 22:45

## 2025-05-07 RX ADMIN — Medication 100 MILLILITER(S): at 17:29

## 2025-05-07 RX ADMIN — Medication 500 MILLIGRAM(S): at 05:39

## 2025-05-07 RX ADMIN — Medication 15 MILLILITER(S): at 12:08

## 2025-05-07 RX ADMIN — AMOXICILLIN 500 MILLIGRAM(S): 500 CAPSULE ORAL at 17:29

## 2025-05-07 RX ADMIN — Medication 15 MILLILITER(S): at 17:29

## 2025-05-07 RX ADMIN — AMOXICILLIN 500 MILLIGRAM(S): 500 CAPSULE ORAL at 05:39

## 2025-05-07 RX ADMIN — OXYCODONE HYDROCHLORIDE 2.5 MILLIGRAM(S): 30 TABLET ORAL at 19:45

## 2025-05-07 RX ADMIN — Medication 500 MILLIGRAM(S): at 17:29

## 2025-05-07 RX ADMIN — Medication 1 APPLICATION(S): at 07:59

## 2025-05-07 NOTE — ADVANCED PRACTICE NURSE CONSULT - ASSESSMENT
Pt alert and  O x 4. Labs reviewed by Dr Goldberg and team on rounds,. PT EF 63% MUGA 5/2/25. Right TLC inserted 05/02/25 in IR, patent, positive blood return obtained and flushing well, site intact, no s/s of bleeding, redness or swelling. CXR confirm placement in SVC. Chemotherapy teaching done,  verbalized understanding. Pt premedicated with 8mg IVP zofran q8hrs,  2 certified RN verification done. At 1750, pt receiving Cytarabine 100 mg/m2= 227 mg IV at 23 cc/hr as a 24 hr continuous infusion y-sited to lowest port of primary NS line connected to white lumen administered via alaris pump. Pt tolerating well. Primary RN aware of care plan. Nursing care is ongoing.

## 2025-05-07 NOTE — PHARMACOTHERAPY INTERVENTION NOTE - COMMENTS
Clinical Pharmacy Specialist- Hematology/Oncology- Progress Note    Pt is a 70 y/o male w/ PMH of HTN, HLD, GERD, BPH, Gout, spinal stenosis (s/p multiple surgeries) and newly diagnosed AML (ASXL1-mutated and DDX41) admitted for management with 7+3 Induction therapy    Antimicrobial Course:  - Posaconazole- 5/2  - Levaquin/Amox- 5/2  - Valtrex- 5/2  MRSA nasal swab    Last Neutropenic (ANC<1000): 5/7; ANC= 0.66  Last Febrile: 02-May-2025 20:48; T= 101.9  Days Non-Neutropenic: 0  Days afebrile: 4    Chemotherapy Course  -Current Regimen: 7+3 Induction  History:  (5/2/25)- 7+3 Induction   -Daunorubicin 60mg/m2 IVP D1-3   -Cytarabine 100mg/m2 IV cont inf D1-7  -Day: 6 (5/7)  BmBx:  Access: R TLC (accessed 5/5/25)    History/Relevant clinical information used in assessment:  - Ht= 5’11”; IBW= 75.3kg; Actual BW= 107.3kg; Adj BW= 88kg  - 5/2- IRR? rigors, chills, and tachycardia 2 hours into day 1; rasburicase 3mg x 1  - 5/4 -Crcl= 67ml/min ; Scr=1.32; Wt= 88kg; Adjusted BW used since pt BMI>30    Assessment/Plan/Recommendation:  Onc:  - Daunorubicin 60mg/m2 IVP D1-3   - Cytarabine 100mg/m2 IV cont inf D1-7- IRR on D1, started at 6p, stopped at 7:30p (received 1.5 hrs), currently ends fri 5/9@~6p, so will make up D1 bag new orders needed for friday x1 day only  - allopurinol- monitor for d/c ~5/12- need to keep for gout  ID:  - 5/2 CXR- opacities  Cards:  - atorvastatin 80mg held d/t DDI with posa (Cat X)    Additional Monitoring Needed?   -Yes- Continue to monitor renal function & daily counts for abx escalation/de-escalation   -Discharge Planning:  --> New meds:  --> Meds sent for auth:  --> Delivered meds:    Case discussed with attending/primary team    Liza Jolley, PharmD, BCPS  Clinical Pharmacy Specialist | Hematology/Oncology  [facility]  Email: [email or web address] or available on [employer]

## 2025-05-07 NOTE — PROGRESS NOTE ADULT - ASSESSMENT
68 yo male with newly diagnosed ASXL1, DDX41 mutated AML, admitted for induction chemo with 7+3:  dauno 60 mg/m2+ cytarabine. 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 Chemo started on 5/2, Complicated by rigors, chills, fever, SOB  and tachycardia 2 hours into day 1 infusion - likely infusion reaction. Pt has pancytopenia d/t chemo and disease

## 2025-05-07 NOTE — PROGRESS NOTE ADULT - SUBJECTIVE AND OBJECTIVE BOX
Diagnosis: ASXL1-mutated and QJF98-vorzzey AML    Protocol/Chemo Regimen: 7+3:  Daunorubicin 60 mg/m2+ Cytarabine     Day: 6    Pt endorsed: +fatigue    Review of Systems: Patient denied nausea, vomiting, mouth pain,  chest pain, cough, dyspnea, abdominal pain,  diarrhea, rectal pain,  rash, fatigue, headache, bleeding      Pain scale:       mild                                 Location: cervical/low back     Diet:  regular     Allergies: No Known Allergies    Intolerances:     Shrimp- Nasal congestion (Other)        ANTIMICROBIALS  amoxicillin 500 milliGRAM(s) Oral two times a day  levoFLOXacin  Tablet 500 milliGRAM(s) Oral every 24 hours  posaconazole DR Tablet 300 milliGRAM(s) Oral every 24 hours  posaconazole DR Tablet   Oral   valACYclovir 500 milliGRAM(s) Oral two times a day      HEME/ONC MEDICATIONS  cytarabine IVPB (eMAR) 227 milliGRAM(s) IV Intermittent daily      STANDING MEDICATIONS  allopurinol 300 milliGRAM(s) Oral daily  Biotene Dry Mouth Oral Rinse 15 milliLiter(s) Swish and Spit four times a day  chlorhexidine 4% Liquid 1 Application(s) Topical <User Schedule>  cholecalciferol 2000 Unit(s) Oral daily  diltiazem    milliGRAM(s) Oral daily  hydrochlorothiazide 50 milliGRAM(s) Oral daily  ondansetron Injectable 8 milliGRAM(s) IV Push every 8 hours  pantoprazole    Tablet 40 milliGRAM(s) Oral before breakfast  sodium chloride 0.9%. 1000 milliLiter(s) IV Continuous <Continuous>  tamsulosin 0.4 milliGRAM(s) Oral at bedtime      PRN MEDICATIONS  acetaminophen     Tablet .. 650 milliGRAM(s) Oral every 6 hours PRN  albuterol    90 MICROgram(s) HFA Inhaler 2 Puff(s) Inhalation every 6 hours PRN  FIRST- Mouthwash  BLM 5 milliLiter(s) Swish and Spit four times a day PRN  hydrocortisone sodium succinate Injectable 100 milliGRAM(s) IV Push every 8 hours PRN  metoclopramide Injectable 10 milliGRAM(s) IV Push every 6 hours PRN  oxyCODONE    IR 2.5 milliGRAM(s) Oral every 4 hours PRN  oxyCODONE    IR 5 milliGRAM(s) Oral every 4 hours PRN  polyethylene glycol 3350 17 Gram(s) Oral two times a day PRN  sodium chloride 0.9% lock flush 10 milliLiter(s) IV Push every 1 hour PRN        Vital Signs Last 24 Hrs  T(C): 36.6 (07 May 2025 05:38), Max: 36.8 (06 May 2025 21:38)  T(F): 97.9 (07 May 2025 05:38), Max: 98.2 (06 May 2025 21:38)  HR: 73 (07 May 2025 05:38) (70 - 96)  BP: 118/64 (07 May 2025 05:38) (111/63 - 144/71)  BP(mean): --  RR: 18 (07 May 2025 05:38) (17 - 18)  SpO2: 97% (07 May 2025 05:38) (95% - 97%)    Parameters below as of 07 May 2025 05:38  Patient On (Oxygen Delivery Method): room air      PHYSICAL EXAM  General: adult in NAD  HEENT: clear oropharynx, anicteric sclera  Neck: supple  CV: normal S1/S2 RRR  Lungs: positive air movement b/l ant lungs,clear to auscultation, no wheezes, no rales  Abdomen: soft, + BS,  non-tender non-distended  Ext: no edema  Skin: no rash  Neuro: alert and oriented X 3  Central Line:  TLC CDI       RECENT CULTURES:  05-03 @ 10:07  Clean Catch Clean Catch (Midstream)  --  --  --    No growth  --  05-02 @ 20:00  Blood Blood-Peripheral  --  --  --    No growth at 4 days  --        LABS:                        8.2    1.78  )-----------( 48       ( 07 May 2025 06:41 )             24.2         Mean Cell Volume : 100.8 fl  Mean Cell Hemoglobin : 34.2 pg  Mean Cell Hemoglobin Concentration : 33.9 g/dL  Auto Neutrophil # : x  Auto Lymphocyte # : x  Auto Monocyte # : x  Auto Eosinophil # : x  Auto Basophil # : x  Auto Neutrophil % : x  Auto Lymphocyte % : x  Auto Monocyte % : x  Auto Eosinophil % : x  Auto Basophil % : x      05-07    140  |  105  |  18  ----------------------------<  122[H]  3.8   |  23  |  1.16    Ca    8.5      07 May 2025 06:43  Phos  3.1     05-07  Mg     1.6     05-07    TPro  5.3[L]  /  Alb  3.4  /  TBili  0.2  /  DBili  x   /  AST  12  /  ALT  21  /  AlkPhos  78  05-07      Mg 1.6  Phos 3.1  Mg 1.6  Phos 3.1      PT/INR - ( 07 May 2025 06:41 )   PT: 12.1 sec;   INR: 1.06 ratio         PTT - ( 07 May 2025 06:41 )  PTT:26.0 sec      Uric Acid 3.5      Uric Acid 3.3        RADIOLOGY & ADDITIONAL STUDIES:      < from: Xray Chest 1 View- PORTABLE-Urgent (Xray Chest 1 View- PORTABLE-Urgent .) (05.03.25 @ 10:36) >  IMPRESSION:    Tubes and lines as above.  Patchy opacities along the left lung base, possibly atelectasis, however   superimposed infection cannot be entirely excluded.    --- End of Report ---    < end of copied text >         Diagnosis: ASXL1-mutated and GDC57-twygvwc AML    Protocol/Chemo Regimen: 7+3:  Daunorubicin 60 mg/m2+ Cytarabine     Day: 6    Pt endorsed: +fatigue    Review of Systems: Patient denied nausea, vomiting, mouth pain,  chest pain, cough, dyspnea, abdominal pain,  diarrhea, rectal pain,  rash, fatigue, headache, bleeding      Pain scale:       mild                                 Location: cervical/low back     Diet:  regular     Allergies: No Known Allergies    Intolerances:     Shrimp- Nasal congestion (Other)        ANTIMICROBIALS  amoxicillin 500 milliGRAM(s) Oral two times a day  levoFLOXacin  Tablet 500 milliGRAM(s) Oral every 24 hours  posaconazole DR Tablet 300 milliGRAM(s) Oral every 24 hours  posaconazole DR Tablet   Oral   valACYclovir 500 milliGRAM(s) Oral two times a day      HEME/ONC MEDICATIONS  cytarabine IVPB (eMAR) 227 milliGRAM(s) IV Intermittent daily      STANDING MEDICATIONS  allopurinol 300 milliGRAM(s) Oral daily  Biotene Dry Mouth Oral Rinse 15 milliLiter(s) Swish and Spit four times a day  chlorhexidine 4% Liquid 1 Application(s) Topical <User Schedule>  cholecalciferol 2000 Unit(s) Oral daily  diltiazem    milliGRAM(s) Oral daily  hydrochlorothiazide 50 milliGRAM(s) Oral daily  ondansetron Injectable 8 milliGRAM(s) IV Push every 8 hours  pantoprazole    Tablet 40 milliGRAM(s) Oral before breakfast  sodium chloride 0.9%. 1000 milliLiter(s) IV Continuous <Continuous>  tamsulosin 0.4 milliGRAM(s) Oral at bedtime      PRN MEDICATIONS  acetaminophen     Tablet .. 650 milliGRAM(s) Oral every 6 hours PRN  albuterol    90 MICROgram(s) HFA Inhaler 2 Puff(s) Inhalation every 6 hours PRN  FIRST- Mouthwash  BLM 5 milliLiter(s) Swish and Spit four times a day PRN  hydrocortisone sodium succinate Injectable 100 milliGRAM(s) IV Push every 8 hours PRN  metoclopramide Injectable 10 milliGRAM(s) IV Push every 6 hours PRN  oxyCODONE    IR 2.5 milliGRAM(s) Oral every 4 hours PRN  oxyCODONE    IR 5 milliGRAM(s) Oral every 4 hours PRN  polyethylene glycol 3350 17 Gram(s) Oral two times a day PRN  sodium chloride 0.9% lock flush 10 milliLiter(s) IV Push every 1 hour PRN        Vital Signs Last 24 Hrs  T(C): 36.6 (07 May 2025 05:38), Max: 36.8 (06 May 2025 21:38)  T(F): 97.9 (07 May 2025 05:38), Max: 98.2 (06 May 2025 21:38)  HR: 73 (07 May 2025 05:38) (70 - 96)  BP: 118/64 (07 May 2025 05:38) (111/63 - 144/71)  BP(mean): --  RR: 18 (07 May 2025 05:38) (17 - 18)  SpO2: 97% (07 May 2025 05:38) (95% - 97%)    Parameters below as of 07 May 2025 05:38  Patient On (Oxygen Delivery Method): room air      PHYSICAL EXAM  General: adult in NAD  HEENT: clear oropharynx, anicteric sclera  Neck: supple  CV: normal S1/S2 RRR  Lungs: positive air movement b/l ant lungs,clear to auscultation, no wheezes, no rales  Abdomen: soft, + BS,  non-tender non-distended  Ext: no edema  Skin: no rash  Neuro: alert and oriented X 3  Central Line:  TLC CDI       RECENT CULTURES:  05-03 @ 10:07  Clean Catch Clean Catch (Midstream)  --  --  --    No growth  --  05-02 @ 20:00  Blood Blood-Peripheral  --  --  --    No growth at 4 days  --        LABS:                        8.2    1.78  )-----------( 48       ( 07 May 2025 06:41 )             24.2         Mean Cell Volume : 100.8 fl  Mean Cell Hemoglobin : 34.2 pg  Mean Cell Hemoglobin Concentration : 33.9 g/dL  Auto Neutrophil # : x  Auto Lymphocyte # : x  Auto Monocyte # : x  Auto Eosinophil # : x  Auto Basophil # : x  Auto Neutrophil % : x  Auto Lymphocyte % : x  Auto Monocyte % : x  Auto Eosinophil % : x  Auto Basophil % : x      05-07    140  |  105  |  18  ----------------------------<  122[H]  3.8   |  23  |  1.16    Ca    8.5      07 May 2025 06:43  Phos  3.1     05-07  Mg     1.6     05-07    TPro  5.3[L]  /  Alb  3.4  /  TBili  0.2  /  DBili  x   /  AST  12  /  ALT  21  /  AlkPhos  78  05-07      PT/INR - ( 07 May 2025 06:41 )   PT: 12.1 sec;   INR: 1.06 ratio         PTT - ( 07 May 2025 06:41 )  PTT:26.0 sec      Uric Acid 3.5    RADIOLOGY & ADDITIONAL STUDIES:      < from: Xray Chest 1 View- PORTABLE-Urgent (Xray Chest 1 View- PORTABLE-Urgent .) (05.03.25 @ 10:36) >  IMPRESSION:    Tubes and lines as above.  Patchy opacities along the left lung base, possibly atelectasis, however   superimposed infection cannot be entirely excluded.    --- End of Report ---    < end of copied text >

## 2025-05-07 NOTE — PROGRESS NOTE ADULT - PROBLEM SELECTOR PLAN 1
new ASXL1-mutated and QFF15-pmcyyfv AML.  admitted for induction chemo with dauno 60 mg/m2+ cytarabine. Chemo started on 5/2, Complicated by rigors, chills, fever, SOB  and tachycardia 2 hours into day 1 infusion - likely infusion. recevied pepcid/benadryl/solumedrol/tylenol iv. will make up D1 Cytarabine on day8    will proceed with day2 chemo with pre mdes Tylenol and Benadryl, hydrocortisone iv PRN reaction   Hepatitis screen/HIV(-) as outpatient  MUGA normal EF, IR TLC    IVF, antiemetics, mouth care. monitor weight, I & O, diuresis PRN   Monitor CBC with diff QD, TLS labs BID, Coags & T & C TIW transfuse for hb<7, PLT <10  5/5 HLA sent

## 2025-05-07 NOTE — PROGRESS NOTE ADULT - NS ATTEND AMEND GEN_ALL_CORE FT
Primary: Paulie    69M with ASXL1-mutated and VOL89-uyixvbb AML. 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 Admitted for induction 7+3 - day 5.    Heme:   - Continue 7+3 (5/2/25 - ). Complicated by rigors, chills, and tachycardia 2 hours into day 1 infusion - likely infusion reaction.  - Trend CBC with differential daily. Goal Hgb > 7 and plt > 10  (> 15 if febrile, > 50 if bleeding).   - Trend DIC labs daily. Continue supportive transfusions as needed to maintain fibrinogen > 100.   - Trend TLS labs twice daily.   - Continue allopurinol. S/p rasburicase; consider additional doses if uric acid > 8.  - Continue IVF. Monitor daily weights and I/O's. Diurese PRN.     ID: Febrile once on 5/2/25 but likely in the setting of infusion reaction  - Prophylaxis: levaquin, amoxicillin, posaconazole, valacyclovir     MSK: Has chronic pain from prior surgeries   - Pain control: oxycodone Primary: Paulie    69M with ASXL1-mutated and SDX63-poreaeh AML. 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 Admitted for induction 7+3 - day 6.    Heme:   - Continue 7+3 (5/2/25 - ). Complicated by rigors, chills, and tachycardia 2 hours into day 1 infusion - likely infusion reaction.  - Trend CBC with differential daily. Goal Hgb > 7 and plt > 10  (> 15 if febrile, > 50 if bleeding).   - Trend DIC labs daily. Continue supportive transfusions as needed to maintain fibrinogen > 100.   - Trend TLS labs twice daily.   - Continue allopurinol. S/p rasburicase; consider additional doses if uric acid > 8.  - Continue IVF. Monitor daily weights and I/O's. Diurese PRN.     ID: Febrile once on 5/2/25 but likely in the setting of infusion reaction  - Prophylaxis: levaquin, amoxicillin, posaconazole, valacyclovir     MSK: Has chronic pain from prior surgeries   - Pain control: oxycodone

## 2025-05-07 NOTE — PROGRESS NOTE ADULT - PROBLEM SELECTOR PLAN 2
Intermittent  neutropenic ANC 1.42 on 5/3,  febrile last night(5/2)  Febrile once on 5/2 but likely in the setting of infusion reaction, will Continue  ppx abxs with Amox, levaquin, valtrex and posaconazole  FU pan cx, UA (-) UTI, full RVP(-) on 5/3   5/2 CXR Patchy opacities along the left lung base, possibly atelectasis, however superimposed infection cannot be entirely excluded.  added incentive spirometer neutropenic, afebrile  Febrile once on 5/2 but likely in the setting of infusion reaction, will Continue  ppx abxs with Amox, levaquin, valtrex and posaconazole  FU pan cx, UA (-) UTI, full RVP(-) on 5/3   5/2 CXR Patchy opacities along the left lung base, possibly atelectasis, however superimposed infection cannot be entirely excluded.  added incentive spirometer

## 2025-05-08 LAB
ALBUMIN SERPL ELPH-MCNC: 3.6 G/DL — SIGNIFICANT CHANGE UP (ref 3.3–5)
ALBUMIN SERPL ELPH-MCNC: 3.8 G/DL — SIGNIFICANT CHANGE UP (ref 3.3–5)
ALP SERPL-CCNC: 83 U/L — SIGNIFICANT CHANGE UP (ref 40–120)
ALP SERPL-CCNC: 88 U/L — SIGNIFICANT CHANGE UP (ref 40–120)
ALT FLD-CCNC: 17 U/L — SIGNIFICANT CHANGE UP (ref 10–45)
ALT FLD-CCNC: 17 U/L — SIGNIFICANT CHANGE UP (ref 10–45)
ANION GAP SERPL CALC-SCNC: 11 MMOL/L — SIGNIFICANT CHANGE UP (ref 5–17)
ANION GAP SERPL CALC-SCNC: 11 MMOL/L — SIGNIFICANT CHANGE UP (ref 5–17)
AST SERPL-CCNC: 12 U/L — SIGNIFICANT CHANGE UP (ref 10–40)
AST SERPL-CCNC: 13 U/L — SIGNIFICANT CHANGE UP (ref 10–40)
BASOPHILS # BLD AUTO: 0.01 K/UL — SIGNIFICANT CHANGE UP (ref 0–0.2)
BASOPHILS NFR BLD AUTO: 0.8 % — SIGNIFICANT CHANGE UP (ref 0–2)
BILIRUB SERPL-MCNC: 0.3 MG/DL — SIGNIFICANT CHANGE UP (ref 0.2–1.2)
BILIRUB SERPL-MCNC: 0.3 MG/DL — SIGNIFICANT CHANGE UP (ref 0.2–1.2)
BUN SERPL-MCNC: 18 MG/DL — SIGNIFICANT CHANGE UP (ref 7–23)
BUN SERPL-MCNC: 19 MG/DL — SIGNIFICANT CHANGE UP (ref 7–23)
CALCIUM SERPL-MCNC: 8.6 MG/DL — SIGNIFICANT CHANGE UP (ref 8.4–10.5)
CALCIUM SERPL-MCNC: 8.7 MG/DL — SIGNIFICANT CHANGE UP (ref 8.4–10.5)
CHLORIDE SERPL-SCNC: 104 MMOL/L — SIGNIFICANT CHANGE UP (ref 96–108)
CHLORIDE SERPL-SCNC: 106 MMOL/L — SIGNIFICANT CHANGE UP (ref 96–108)
CO2 SERPL-SCNC: 21 MMOL/L — LOW (ref 22–31)
CO2 SERPL-SCNC: 24 MMOL/L — SIGNIFICANT CHANGE UP (ref 22–31)
CREAT SERPL-MCNC: 1.17 MG/DL — SIGNIFICANT CHANGE UP (ref 0.5–1.3)
CREAT SERPL-MCNC: 1.21 MG/DL — SIGNIFICANT CHANGE UP (ref 0.5–1.3)
CULTURE RESULTS: SIGNIFICANT CHANGE UP
CULTURE RESULTS: SIGNIFICANT CHANGE UP
EGFR: 65 ML/MIN/1.73M2 — SIGNIFICANT CHANGE UP
EGFR: 65 ML/MIN/1.73M2 — SIGNIFICANT CHANGE UP
EGFR: 68 ML/MIN/1.73M2 — SIGNIFICANT CHANGE UP
EGFR: 68 ML/MIN/1.73M2 — SIGNIFICANT CHANGE UP
EOSINOPHIL # BLD AUTO: 0 K/UL — SIGNIFICANT CHANGE UP (ref 0–0.5)
EOSINOPHIL NFR BLD AUTO: 0 % — SIGNIFICANT CHANGE UP (ref 0–6)
GLUCOSE SERPL-MCNC: 118 MG/DL — HIGH (ref 70–99)
GLUCOSE SERPL-MCNC: 120 MG/DL — HIGH (ref 70–99)
HCT VFR BLD CALC: 23.4 % — LOW (ref 39–50)
HGB BLD-MCNC: 8 G/DL — LOW (ref 13–17)
IMM GRANULOCYTES NFR BLD AUTO: 5.5 % — HIGH (ref 0–0.9)
LDH SERPL L TO P-CCNC: 193 U/L — SIGNIFICANT CHANGE UP (ref 50–242)
LDH SERPL L TO P-CCNC: 212 U/L — SIGNIFICANT CHANGE UP (ref 50–242)
LYMPHOCYTES # BLD AUTO: 1.12 K/UL — SIGNIFICANT CHANGE UP (ref 1–3.3)
LYMPHOCYTES # BLD AUTO: 87.5 % — HIGH (ref 13–44)
MAGNESIUM SERPL-MCNC: 1.5 MG/DL — LOW (ref 1.6–2.6)
MAGNESIUM SERPL-MCNC: 2.1 MG/DL — SIGNIFICANT CHANGE UP (ref 1.6–2.6)
MCHC RBC-ENTMCNC: 34 PG — SIGNIFICANT CHANGE UP (ref 27–34)
MCHC RBC-ENTMCNC: 34.2 G/DL — SIGNIFICANT CHANGE UP (ref 32–36)
MCV RBC AUTO: 99.6 FL — SIGNIFICANT CHANGE UP (ref 80–100)
MONOCYTES # BLD AUTO: 0.01 K/UL — SIGNIFICANT CHANGE UP (ref 0–0.9)
MONOCYTES NFR BLD AUTO: 0.8 % — LOW (ref 2–14)
NEUTROPHILS # BLD AUTO: 0.07 K/UL — LOW (ref 1.8–7.4)
NEUTROPHILS NFR BLD AUTO: 5.4 % — LOW (ref 43–77)
NRBC BLD AUTO-RTO: 0 /100 WBCS — SIGNIFICANT CHANGE UP (ref 0–0)
PHOSPHATE SERPL-MCNC: 3.1 MG/DL — SIGNIFICANT CHANGE UP (ref 2.5–4.5)
PHOSPHATE SERPL-MCNC: 3.6 MG/DL — SIGNIFICANT CHANGE UP (ref 2.5–4.5)
PLATELET # BLD AUTO: 46 K/UL — LOW (ref 150–400)
POTASSIUM SERPL-MCNC: 3.8 MMOL/L — SIGNIFICANT CHANGE UP (ref 3.5–5.3)
POTASSIUM SERPL-MCNC: 3.9 MMOL/L — SIGNIFICANT CHANGE UP (ref 3.5–5.3)
POTASSIUM SERPL-SCNC: 3.8 MMOL/L — SIGNIFICANT CHANGE UP (ref 3.5–5.3)
POTASSIUM SERPL-SCNC: 3.9 MMOL/L — SIGNIFICANT CHANGE UP (ref 3.5–5.3)
PROT SERPL-MCNC: 5.3 G/DL — LOW (ref 6–8.3)
PROT SERPL-MCNC: 5.8 G/DL — LOW (ref 6–8.3)
RBC # BLD: 2.35 M/UL — LOW (ref 4.2–5.8)
RBC # FLD: 14.5 % — SIGNIFICANT CHANGE UP (ref 10.3–14.5)
SODIUM SERPL-SCNC: 138 MMOL/L — SIGNIFICANT CHANGE UP (ref 135–145)
SODIUM SERPL-SCNC: 139 MMOL/L — SIGNIFICANT CHANGE UP (ref 135–145)
SPECIMEN SOURCE: SIGNIFICANT CHANGE UP
SPECIMEN SOURCE: SIGNIFICANT CHANGE UP
URATE SERPL-MCNC: 3.9 MG/DL — SIGNIFICANT CHANGE UP (ref 3.4–8.8)
URATE SERPL-MCNC: 4.2 MG/DL — SIGNIFICANT CHANGE UP (ref 3.4–8.8)
WBC # BLD: 1.28 K/UL — LOW (ref 3.8–10.5)
WBC # FLD AUTO: 1.28 K/UL — LOW (ref 3.8–10.5)

## 2025-05-08 PROCEDURE — 99233 SBSQ HOSP IP/OBS HIGH 50: CPT

## 2025-05-08 RX ORDER — MAGNESIUM SULFATE 500 MG/ML
2 SYRINGE (ML) INJECTION ONCE
Refills: 0 | Status: COMPLETED | OUTPATIENT
Start: 2025-05-08 | End: 2025-05-08

## 2025-05-08 RX ORDER — MELATONIN 5 MG
3 TABLET ORAL AT BEDTIME
Refills: 0 | Status: DISCONTINUED | OUTPATIENT
Start: 2025-05-08 | End: 2025-05-09

## 2025-05-08 RX ADMIN — TAMSULOSIN HYDROCHLORIDE 0.4 MILLIGRAM(S): 0.4 CAPSULE ORAL at 21:09

## 2025-05-08 RX ADMIN — Medication 8 MILLIGRAM(S): at 06:03

## 2025-05-08 RX ADMIN — Medication 25 GRAM(S): at 09:26

## 2025-05-08 RX ADMIN — Medication 500 MILLIGRAM(S): at 17:08

## 2025-05-08 RX ADMIN — OXYCODONE HYDROCHLORIDE 5 MILLIGRAM(S): 30 TABLET ORAL at 00:12

## 2025-05-08 RX ADMIN — Medication 8 MILLIGRAM(S): at 13:24

## 2025-05-08 RX ADMIN — Medication 300 MILLIGRAM(S): at 12:10

## 2025-05-08 RX ADMIN — AMOXICILLIN 500 MILLIGRAM(S): 500 CAPSULE ORAL at 06:03

## 2025-05-08 RX ADMIN — Medication 15 MILLILITER(S): at 12:10

## 2025-05-08 RX ADMIN — Medication 500 MILLIGRAM(S): at 06:03

## 2025-05-08 RX ADMIN — Medication 3 MILLIGRAM(S): at 21:09

## 2025-05-08 RX ADMIN — DILTIAZEM HYDROCHLORIDE 240 MILLIGRAM(S): 240 TABLET, EXTENDED RELEASE ORAL at 06:04

## 2025-05-08 RX ADMIN — AMOXICILLIN 500 MILLIGRAM(S): 500 CAPSULE ORAL at 17:08

## 2025-05-08 RX ADMIN — Medication 40 MILLIGRAM(S): at 06:04

## 2025-05-08 RX ADMIN — Medication 15 MILLILITER(S): at 06:03

## 2025-05-08 RX ADMIN — Medication 1 APPLICATION(S): at 06:12

## 2025-05-08 RX ADMIN — Medication 15 MILLILITER(S): at 17:08

## 2025-05-08 RX ADMIN — Medication 15 MILLILITER(S): at 00:09

## 2025-05-08 RX ADMIN — Medication 15 MILLILITER(S): at 23:32

## 2025-05-08 RX ADMIN — Medication 2000 UNIT(S): at 12:10

## 2025-05-08 RX ADMIN — POSACONAZOLE 300 MILLIGRAM(S): 100 TABLET, DELAYED RELEASE ORAL at 17:08

## 2025-05-08 RX ADMIN — Medication 227 MILLIGRAM(S): at 18:25

## 2025-05-08 NOTE — PROGRESS NOTE ADULT - PROBLEM SELECTOR PLAN 2
neutropenic, afebrile  Febrile once on 5/2 but likely in the setting of infusion reaction, will Continue  ppx abxs with Amox, levaquin, valtrex and posaconazole  FU pan cx, UA (-) UTI, full RVP(-) on 5/3   5/2 CXR Patchy opacities along the left lung base, possibly atelectasis, however superimposed infection cannot be entirely excluded.  added incentive spirometer

## 2025-05-08 NOTE — PHARMACOTHERAPY INTERVENTION NOTE - COMMENTS
Clinical Pharmacy Specialist- Hematology/Oncology- Progress Note    Pt is a 70 y/o male w/ PMH of HTN, HLD, GERD, BPH, Gout, spinal stenosis (s/p multiple surgeries) and newly diagnosed AML (ASXL1-mutated and DDX41) admitted for management with 7+3 Induction therapy    Antimicrobial Course:  - Posaconazole- 5/2  - Levaquin/Amox- 5/2  - Valtrex- 5/2  MRSA nasal swab    Last Neutropenic (ANC<1000): 5/8; ANC= 0.07  Last Febrile: 02-May-2025 20:48; T= 101.9  Days Non-Neutropenic: 0  Days afebrile: 5    Chemotherapy Course  -Current Regimen: 7+3 Induction  History:  (5/2/25)- 7+3 Induction   -Daunorubicin 60mg/m2 IVP D1-3   -Cytarabine 100mg/m2 IV cont inf D1-7  -Day: 7 (5/8)  BmBx:  Access: R TLC (accessed 5/5/25)    History/Relevant clinical information used in assessment:  - Ht= 5’11”; IBW= 75.3kg; Actual BW= 107.3kg; Adj BW= 88kg  - 5/2- IRR? rigors, chills, and tachycardia 2 hours into day 1; rasburicase 3mg x 1  - 5/4 -Crcl= 67ml/min ; Scr=1.32; Wt= 88kg; Adjusted BW used since pt BMI>30    Assessment/Plan/Recommendation:  Onc:  - Daunorubicin 60mg/m2 IVP D1-3   - Cytarabine 100mg/m2 IV cont inf D1-7- IRR on D1, started at 6p, stopped at 7:30p (received 1.5 hrs), currently ends fri 5/9@~6p, so will make up D1 bag new orders written for 23hr infusion  - allopurinol- monitor for d/c ~5/12- need to keep for gout  ID:  - 5/2 CXR- opacities  Cards:  - atorvastatin 80mg held d/t DDI with posa (Cat X)    Additional Monitoring Needed?   -Yes- Continue to monitor renal function & daily counts for abx escalation/de-escalation   -Discharge Planning:  --> New meds:  --> Meds sent for auth:  --> Delivered meds:    Case discussed with attending/primary team    Liza Jolley, PharmD, BCPS  Clinical Pharmacy Specialist | Hematology/Oncology  [facility]  Email: [email or web address] or available on [employer] No indicators present

## 2025-05-08 NOTE — PROGRESS NOTE ADULT - ASSESSMENT
68 yo male with newly diagnosed ASXL1, DDX41 mutated AML, admitted for induction chemo with 7+3:  dauno 60 mg/m2+ cytarabine. 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 Chemo started on 5/2, Complicated by rigors, chills, fever, SOB  and tachycardia 2 hours into day 1 infusion - likely infusion reaction. Pt has pancytopenia d/t chemo and disease

## 2025-05-08 NOTE — PROGRESS NOTE ADULT - PROBLEM SELECTOR PLAN 1
new ASXL1-mutated and XSC72-epgskno AML.  admitted for induction chemo with dauno 60 mg/m2+ cytarabine. Chemo started on 5/2, Complicated by rigors, chills, fever, SOB  and tachycardia 2 hours into day 1 infusion - likely infusion. recevied pepcid/benadryl/solumedrol/tylenol iv. will make up D1 Cytarabine on day8    will proceed with day2 chemo with pre mdes Tylenol and Benadryl, hydrocortisone iv PRN reaction   Hepatitis screen/HIV(-) as outpatient  MUGA normal EF, IR TLC    IVF, antiemetics, mouth care. monitor weight, I & O, diuresis PRN   Monitor CBC with diff QD, TLS labs BID, Coags & T & C TIW transfuse for hb<7, PLT <10  5/5 HLA sent new ASXL1-mutated and TQR52-taujsdo AML.  admitted for induction chemo with dauno 60 mg/m2+ cytarabine. Chemo started on 5/2, Complicated by rigors, chills, fever, SOB  and tachycardia 2 hours into day 1 infusion - likely infusion. recevied pepcid/benadryl/solumedrol/tylenol iv. will make up D1 Cytarabine on day8    will proceed with day2 chemo with pre mdes Tylenol and Benadryl, hydrocortisone iv PRN reaction   Hepatitis screen/HIV(-) as outpatient  MUGA normal EF, IR TLC    IVF, antiemetics, mouth care. monitor weight, I & O, diuresis PRN   Monitor CBC with diff QD, TLS labs BID, Coags & T & C TIW transfuse for hb<7, PLT <10  5/5 HLA sent  5/8: Mg 2gm IV given for hypomagnesemia

## 2025-05-08 NOTE — PROGRESS NOTE ADULT - PROBLEM SELECTOR PLAN 8
DVT ppx: OOB and ambulation as carey   PT consulted: no skilled PT need, PT signed off     5/3 Miralax x1 now, BID PRN constipation DVT ppx: OOB and ambulation as carey   PT consulted: no skilled PT need, PT signed off     5/3 Miralax x1 now, BID PRN constipation  5/8: melatonin 3mg at bedtime added for insomnia

## 2025-05-08 NOTE — PROGRESS NOTE ADULT - SUBJECTIVE AND OBJECTIVE BOX
Diagnosis: ASXL1-mutated and TGH92-ugkprhr AML    Protocol/Chemo Regimen: 7+3:  Daunorubicin 60 mg/m2+ Cytarabine     Day: 7    Pt endorsed:     Review of Systems: Patient denied nausea, vomiting, mouth pain,  chest pain, cough, dyspnea, abdominal pain,  diarrhea, rectal pain,  rash, fatigue, headache, bleeding      Pain scale:       mild                                 Location: cervical/low back     Diet:  regular     Allergies    No Known Allergies    Intolerances    Shrimp- Nasal congestion (Other)      ANTIMICROBIALS  amoxicillin 500 milliGRAM(s) Oral two times a day  levoFLOXacin  Tablet 500 milliGRAM(s) Oral every 24 hours  posaconazole DR Tablet 300 milliGRAM(s) Oral every 24 hours  posaconazole DR Tablet   Oral   valACYclovir 500 milliGRAM(s) Oral two times a day      HEME/ONC MEDICATIONS  cytarabine IVPB (eMAR) 227 milliGRAM(s) IV Intermittent daily      STANDING MEDICATIONS  allopurinol 300 milliGRAM(s) Oral daily  Biotene Dry Mouth Oral Rinse 15 milliLiter(s) Swish and Spit four times a day  chlorhexidine 4% Liquid 1 Application(s) Topical <User Schedule>  cholecalciferol 2000 Unit(s) Oral daily  diltiazem    milliGRAM(s) Oral daily  hydrochlorothiazide 50 milliGRAM(s) Oral daily  magnesium sulfate  IVPB 2 Gram(s) IV Intermittent once  ondansetron Injectable 8 milliGRAM(s) IV Push every 8 hours  pantoprazole    Tablet 40 milliGRAM(s) Oral before breakfast  sodium chloride 0.9%. 1000 milliLiter(s) IV Continuous <Continuous>  tamsulosin 0.4 milliGRAM(s) Oral at bedtime      PRN MEDICATIONS  acetaminophen     Tablet .. 650 milliGRAM(s) Oral every 6 hours PRN  albuterol    90 MICROgram(s) HFA Inhaler 2 Puff(s) Inhalation every 6 hours PRN  FIRST- Mouthwash  BLM 5 milliLiter(s) Swish and Spit four times a day PRN  hydrocortisone sodium succinate Injectable 100 milliGRAM(s) IV Push every 8 hours PRN  metoclopramide Injectable 10 milliGRAM(s) IV Push every 6 hours PRN  oxyCODONE    IR 2.5 milliGRAM(s) Oral every 4 hours PRN  oxyCODONE    IR 5 milliGRAM(s) Oral every 4 hours PRN  polyethylene glycol 3350 17 Gram(s) Oral two times a day PRN  sodium chloride 0.9% lock flush 10 milliLiter(s) IV Push every 1 hour PRN        Vital Signs Last 24 Hrs  T(C): 36.5 (08 May 2025 05:32), Max: 36.9 (08 May 2025 00:56)  T(F): 97.7 (08 May 2025 05:32), Max: 98.4 (08 May 2025 00:56)  HR: 84 (08 May 2025 06:00) (56 - 98)  BP: 146/80 (08 May 2025 06:00) (113/74 - 146/80)  BP(mean): --  RR: 18 (08 May 2025 06:00) (17 - 18)  SpO2: 95% (08 May 2025 06:00) (94% - 98%)    Parameters below as of 08 May 2025 06:00  Patient On (Oxygen Delivery Method): room air        PHYSICAL EXAM  General: NAD  HEENT: PERRLA, EOMOI, clear oropharynx, anicteric sclera, pink conjunctiva  Neck: supple  CV: (+) S1/S2 RRR  Lungs: clear to auscultation, no wheezes or rales  Abdomen: soft, non-tender, non-distended (+) BS  Ext: no clubbing, cyanosis or edema  Skin: no rashes and no petechiae  Neuro: alert and oriented X 3, no focal deficits  Central Line: TLC c/d/i     LABS:                        8.0    1.28  )-----------( 46       ( 08 May 2025 06:38 )             23.4         Mean Cell Volume : 99.6 fl  Mean Cell Hemoglobin : 34.0 pg  Mean Cell Hemoglobin Concentration : 34.2 g/dL  Auto Neutrophil # : x  Auto Lymphocyte # : x  Auto Monocyte # : x  Auto Eosinophil # : x  Auto Basophil # : x  Auto Neutrophil % : x  Auto Lymphocyte % : x  Auto Monocyte % : x  Auto Eosinophil % : x  Auto Basophil % : x      05-08    138  |  106  |  19  ----------------------------<  118[H]  3.8   |  21[L]  |  1.21    Ca    8.6      08 May 2025 06:38  Phos  3.1     05-08  Mg     1.5     05-08    TPro  5.3[L]  /  Alb  3.6  /  TBili  0.3  /  DBili  x   /  AST  13  /  ALT  17  /  AlkPhos  83  05-08      Mg 1.5  Phos 3.1  Mg 1.6  Phos 2.8      PT/INR - ( 07 May 2025 06:41 )   PT: 12.1 sec;   INR: 1.06 ratio    PTT - ( 07 May 2025 06:41 )  PTT:26.0 sec      Uric Acid 4.2      Uric Acid 3.4        RADIOLOGY & ADDITIONAL STUDIES:         Diagnosis: ASXL1-mutated and RQX34-rsplomd AML    Protocol/Chemo Regimen: 7+3:  Daunorubicin 60 mg/m2+ Cytarabine     Day: 7    Pt endorsed: overall feeling well, trouble sleeping     Review of Systems: Patient denied nausea, vomiting, mouth pain,  chest pain, cough, dyspnea, abdominal pain,  diarrhea, rectal pain,  rash, fatigue, headache, bleeding      Pain scale:       mild                                 Location: cervical/low back     Diet:  regular     Allergies    No Known Allergies    Intolerances    Shrimp- Nasal congestion (Other)      ANTIMICROBIALS  amoxicillin 500 milliGRAM(s) Oral two times a day  levoFLOXacin  Tablet 500 milliGRAM(s) Oral every 24 hours  posaconazole DR Tablet 300 milliGRAM(s) Oral every 24 hours  posaconazole DR Tablet   Oral   valACYclovir 500 milliGRAM(s) Oral two times a day      HEME/ONC MEDICATIONS  cytarabine IVPB (eMAR) 227 milliGRAM(s) IV Intermittent daily      STANDING MEDICATIONS  allopurinol 300 milliGRAM(s) Oral daily  Biotene Dry Mouth Oral Rinse 15 milliLiter(s) Swish and Spit four times a day  chlorhexidine 4% Liquid 1 Application(s) Topical <User Schedule>  cholecalciferol 2000 Unit(s) Oral daily  diltiazem    milliGRAM(s) Oral daily  hydrochlorothiazide 50 milliGRAM(s) Oral daily  magnesium sulfate  IVPB 2 Gram(s) IV Intermittent once  ondansetron Injectable 8 milliGRAM(s) IV Push every 8 hours  pantoprazole    Tablet 40 milliGRAM(s) Oral before breakfast  sodium chloride 0.9%. 1000 milliLiter(s) IV Continuous <Continuous>  tamsulosin 0.4 milliGRAM(s) Oral at bedtime      PRN MEDICATIONS  acetaminophen     Tablet .. 650 milliGRAM(s) Oral every 6 hours PRN  albuterol    90 MICROgram(s) HFA Inhaler 2 Puff(s) Inhalation every 6 hours PRN  FIRST- Mouthwash  BLM 5 milliLiter(s) Swish and Spit four times a day PRN  hydrocortisone sodium succinate Injectable 100 milliGRAM(s) IV Push every 8 hours PRN  metoclopramide Injectable 10 milliGRAM(s) IV Push every 6 hours PRN  oxyCODONE    IR 2.5 milliGRAM(s) Oral every 4 hours PRN  oxyCODONE    IR 5 milliGRAM(s) Oral every 4 hours PRN  polyethylene glycol 3350 17 Gram(s) Oral two times a day PRN  sodium chloride 0.9% lock flush 10 milliLiter(s) IV Push every 1 hour PRN        Vital Signs Last 24 Hrs  T(C): 36.5 (08 May 2025 05:32), Max: 36.9 (08 May 2025 00:56)  T(F): 97.7 (08 May 2025 05:32), Max: 98.4 (08 May 2025 00:56)  HR: 84 (08 May 2025 06:00) (56 - 98)  BP: 146/80 (08 May 2025 06:00) (113/74 - 146/80)  BP(mean): --  RR: 18 (08 May 2025 06:00) (17 - 18)  SpO2: 95% (08 May 2025 06:00) (94% - 98%)    Parameters below as of 08 May 2025 06:00  Patient On (Oxygen Delivery Method): room air        PHYSICAL EXAM  General: NAD  HEENT: PERRLA, EOMOI, clear oropharynx, anicteric sclera, pink conjunctiva  Neck: supple  CV: (+) S1/S2 RRR  Lungs: clear to auscultation, no wheezes or rales  Abdomen: soft, non-tender, non-distended (+) BS  Ext: no clubbing, cyanosis or edema  Skin: no rashes and no petechiae  Neuro: alert and oriented X 3, no focal deficits  Central Line: TLC c/d/i     LABS:                        8.0    1.28  )-----------( 46       ( 08 May 2025 06:38 )             23.4         Mean Cell Volume : 99.6 fl  Mean Cell Hemoglobin : 34.0 pg  Mean Cell Hemoglobin Concentration : 34.2 g/dL  Auto Neutrophil # : x  Auto Lymphocyte # : x  Auto Monocyte # : x  Auto Eosinophil # : x  Auto Basophil # : x  Auto Neutrophil % : x  Auto Lymphocyte % : x  Auto Monocyte % : x  Auto Eosinophil % : x  Auto Basophil % : x      05-08    138  |  106  |  19  ----------------------------<  118[H]  3.8   |  21[L]  |  1.21    Ca    8.6      08 May 2025 06:38  Phos  3.1     05-08  Mg     1.5     05-08    TPro  5.3[L]  /  Alb  3.6  /  TBili  0.3  /  DBili  x   /  AST  13  /  ALT  17  /  AlkPhos  83  05-08      Mg 1.5  Phos 3.1  Mg 1.6  Phos 2.8      PT/INR - ( 07 May 2025 06:41 )   PT: 12.1 sec;   INR: 1.06 ratio    PTT - ( 07 May 2025 06:41 )  PTT:26.0 sec      Uric Acid 4.2      Uric Acid 3.4

## 2025-05-08 NOTE — PROGRESS NOTE ADULT - NS ATTEND AMEND GEN_ALL_CORE FT
Primary: Paulie    69M with ASXL1-mutated and AED58-jmxiimy AML. 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 Admitted for induction 7+3 - day 6.    Heme:   - Continue 7+3 (5/2/25 - ). Complicated by rigors, chills, and tachycardia 2 hours into day 1 infusion - likely infusion reaction.  - Trend CBC with differential daily. Goal Hgb > 7 and plt > 10  (> 15 if febrile, > 50 if bleeding).   - Trend DIC labs daily. Continue supportive transfusions as needed to maintain fibrinogen > 100.   - Trend TLS labs twice daily.   - Continue allopurinol. S/p rasburicase; consider additional doses if uric acid > 8.  - Continue IVF. Monitor daily weights and I/O's. Diurese PRN.     ID: Febrile once on 5/2/25 but likely in the setting of infusion reaction  - Prophylaxis: levaquin, amoxicillin, posaconazole, valacyclovir     MSK: Has chronic pain from prior surgeries   - Pain control: oxycodone Primary: Paulie    69M with ASXL1-mutated and RYS82-kcnyzgt AML. 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 Admitted for induction 7+3 - day 7.    Heme:   - Continue 7+3 (5/2/25 - ). Complicated by rigors, chills, and tachycardia 2 hours into day 1 infusion - likely infusion reaction.  - Trend CBC with differential daily. Goal Hgb > 7 and plt > 10  (> 15 if febrile, > 50 if bleeding).   - Trend DIC labs daily. Continue supportive transfusions as needed to maintain fibrinogen > 100.   - Trend TLS labs twice daily.   - Continue allopurinol. S/p rasburicase; consider additional doses if uric acid > 8.  - Continue IVF. Monitor daily weights and I/O's. Diurese PRN.     ID: Febrile once on 5/2/25 but likely in the setting of infusion reaction  - Prophylaxis: levaquin, amoxicillin, posaconazole, valacyclovir     MSK: Has chronic pain from prior surgeries   - Pain control: oxycodone

## 2025-05-08 NOTE — ADVANCED PRACTICE NURSE CONSULT - ASSESSMENT
Pt alert and  O x 4. Labs reviewed by Dr Goldberg and team on rounds. PT EF 63% MUGA 5/2/25. Right TLC inserted 05/02/25 in IR, patent, positive blood return obtained and flushing well, site intact, no s/s of bleeding, redness or swelling. CXR confirm placement in SVC. Chemotherapy teaching done,  verbalized understanding. Pt premedicated with 8mg IVP zofran q8hrs,  2 certified RN verification done. At 1825, pt receiving Cytarabine 100 mg/m2= 227 mg IV at 23 cc/hr as a 24 hr continuous infusion y-sited to lowest port of primary NS line connected to blue lumen administered via alaris pump. Pt tolerating well. Primary RN aware of care plan. Nursing care is ongoing.

## 2025-05-09 LAB
ALBUMIN SERPL ELPH-MCNC: 3.4 G/DL — SIGNIFICANT CHANGE UP (ref 3.3–5)
ALBUMIN SERPL ELPH-MCNC: 3.5 G/DL — SIGNIFICANT CHANGE UP (ref 3.3–5)
ALP SERPL-CCNC: 76 U/L — SIGNIFICANT CHANGE UP (ref 40–120)
ALP SERPL-CCNC: 78 U/L — SIGNIFICANT CHANGE UP (ref 40–120)
ALT FLD-CCNC: 14 U/L — SIGNIFICANT CHANGE UP (ref 10–45)
ALT FLD-CCNC: 15 U/L — SIGNIFICANT CHANGE UP (ref 10–45)
ANION GAP SERPL CALC-SCNC: 12 MMOL/L — SIGNIFICANT CHANGE UP (ref 5–17)
ANION GAP SERPL CALC-SCNC: 12 MMOL/L — SIGNIFICANT CHANGE UP (ref 5–17)
APTT BLD: 26.5 SEC — SIGNIFICANT CHANGE UP (ref 26.1–36.8)
AST SERPL-CCNC: 12 U/L — SIGNIFICANT CHANGE UP (ref 10–40)
AST SERPL-CCNC: 8 U/L — LOW (ref 10–40)
BASOPHILS # BLD AUTO: 0 K/UL — SIGNIFICANT CHANGE UP (ref 0–0.2)
BASOPHILS NFR BLD AUTO: 0 % — SIGNIFICANT CHANGE UP (ref 0–2)
BILIRUB SERPL-MCNC: 0.3 MG/DL — SIGNIFICANT CHANGE UP (ref 0.2–1.2)
BILIRUB SERPL-MCNC: 0.4 MG/DL — SIGNIFICANT CHANGE UP (ref 0.2–1.2)
BLD GP AB SCN SERPL QL: NEGATIVE — SIGNIFICANT CHANGE UP
BUN SERPL-MCNC: 16 MG/DL — SIGNIFICANT CHANGE UP (ref 7–23)
BUN SERPL-MCNC: 17 MG/DL — SIGNIFICANT CHANGE UP (ref 7–23)
CALCIUM SERPL-MCNC: 8.4 MG/DL — SIGNIFICANT CHANGE UP (ref 8.4–10.5)
CALCIUM SERPL-MCNC: 8.5 MG/DL — SIGNIFICANT CHANGE UP (ref 8.4–10.5)
CHLORIDE SERPL-SCNC: 104 MMOL/L — SIGNIFICANT CHANGE UP (ref 96–108)
CHLORIDE SERPL-SCNC: 107 MMOL/L — SIGNIFICANT CHANGE UP (ref 96–108)
CO2 SERPL-SCNC: 22 MMOL/L — SIGNIFICANT CHANGE UP (ref 22–31)
CO2 SERPL-SCNC: 23 MMOL/L — SIGNIFICANT CHANGE UP (ref 22–31)
CREAT SERPL-MCNC: 1.09 MG/DL — SIGNIFICANT CHANGE UP (ref 0.5–1.3)
CREAT SERPL-MCNC: 1.16 MG/DL — SIGNIFICANT CHANGE UP (ref 0.5–1.3)
D DIMER BLD IA.RAPID-MCNC: 410 NG/ML DDU — HIGH
EGFR: 69 ML/MIN/1.73M2 — SIGNIFICANT CHANGE UP
EGFR: 69 ML/MIN/1.73M2 — SIGNIFICANT CHANGE UP
EGFR: 74 ML/MIN/1.73M2 — SIGNIFICANT CHANGE UP
EGFR: 74 ML/MIN/1.73M2 — SIGNIFICANT CHANGE UP
EOSINOPHIL # BLD AUTO: 0 K/UL — SIGNIFICANT CHANGE UP (ref 0–0.5)
EOSINOPHIL NFR BLD AUTO: 0 % — SIGNIFICANT CHANGE UP (ref 0–6)
FIBRINOGEN PPP-MCNC: 424 MG/DL — SIGNIFICANT CHANGE UP (ref 200–445)
GIANT PLATELETS BLD QL SMEAR: PRESENT — SIGNIFICANT CHANGE UP
GLUCOSE SERPL-MCNC: 116 MG/DL — HIGH (ref 70–99)
GLUCOSE SERPL-MCNC: 133 MG/DL — HIGH (ref 70–99)
HCT VFR BLD CALC: 22.9 % — LOW (ref 39–50)
HGB BLD-MCNC: 7.7 G/DL — LOW (ref 13–17)
INR BLD: 1.09 RATIO — SIGNIFICANT CHANGE UP (ref 0.85–1.16)
LDH SERPL L TO P-CCNC: 171 U/L — SIGNIFICANT CHANGE UP (ref 50–242)
LDH SERPL L TO P-CCNC: 172 U/L — SIGNIFICANT CHANGE UP (ref 50–242)
LYMPHOCYTES # BLD AUTO: 0.88 K/UL — LOW (ref 1–3.3)
LYMPHOCYTES # BLD AUTO: 95.6 % — HIGH (ref 13–44)
MAGNESIUM SERPL-MCNC: 1.8 MG/DL — SIGNIFICANT CHANGE UP (ref 1.6–2.6)
MAGNESIUM SERPL-MCNC: 1.8 MG/DL — SIGNIFICANT CHANGE UP (ref 1.6–2.6)
MANUAL SMEAR VERIFICATION: SIGNIFICANT CHANGE UP
MCHC RBC-ENTMCNC: 33.5 PG — SIGNIFICANT CHANGE UP (ref 27–34)
MCHC RBC-ENTMCNC: 33.6 G/DL — SIGNIFICANT CHANGE UP (ref 32–36)
MCV RBC AUTO: 99.6 FL — SIGNIFICANT CHANGE UP (ref 80–100)
MONOCYTES # BLD AUTO: 0 K/UL — SIGNIFICANT CHANGE UP (ref 0–0.9)
MONOCYTES NFR BLD AUTO: 0 % — LOW (ref 2–14)
NEUTROPHILS # BLD AUTO: 0.04 K/UL — LOW (ref 1.8–7.4)
NEUTROPHILS NFR BLD AUTO: 4.4 % — LOW (ref 43–77)
PHOSPHATE SERPL-MCNC: 2.9 MG/DL — SIGNIFICANT CHANGE UP (ref 2.5–4.5)
PHOSPHATE SERPL-MCNC: 3 MG/DL — SIGNIFICANT CHANGE UP (ref 2.5–4.5)
PLAT MORPH BLD: NORMAL — SIGNIFICANT CHANGE UP
PLATELET # BLD AUTO: 34 K/UL — LOW (ref 150–400)
POTASSIUM SERPL-MCNC: 3.8 MMOL/L — SIGNIFICANT CHANGE UP (ref 3.5–5.3)
POTASSIUM SERPL-MCNC: 3.9 MMOL/L — SIGNIFICANT CHANGE UP (ref 3.5–5.3)
POTASSIUM SERPL-SCNC: 3.8 MMOL/L — SIGNIFICANT CHANGE UP (ref 3.5–5.3)
POTASSIUM SERPL-SCNC: 3.9 MMOL/L — SIGNIFICANT CHANGE UP (ref 3.5–5.3)
PROT SERPL-MCNC: 5.3 G/DL — LOW (ref 6–8.3)
PROT SERPL-MCNC: 5.3 G/DL — LOW (ref 6–8.3)
PROTHROM AB SERPL-ACNC: 12.4 SEC — SIGNIFICANT CHANGE UP (ref 9.9–13.4)
RBC # BLD: 2.3 M/UL — LOW (ref 4.2–5.8)
RBC # FLD: 14 % — SIGNIFICANT CHANGE UP (ref 10.3–14.5)
RBC BLD AUTO: SIGNIFICANT CHANGE UP
RH IG SCN BLD-IMP: POSITIVE — SIGNIFICANT CHANGE UP
SODIUM SERPL-SCNC: 139 MMOL/L — SIGNIFICANT CHANGE UP (ref 135–145)
SODIUM SERPL-SCNC: 141 MMOL/L — SIGNIFICANT CHANGE UP (ref 135–145)
URATE SERPL-MCNC: 4 MG/DL — SIGNIFICANT CHANGE UP (ref 3.4–8.8)
WBC # BLD: 0.92 K/UL — CRITICAL LOW (ref 3.8–10.5)
WBC # FLD AUTO: 0.92 K/UL — CRITICAL LOW (ref 3.8–10.5)

## 2025-05-09 PROCEDURE — 99233 SBSQ HOSP IP/OBS HIGH 50: CPT

## 2025-05-09 RX ORDER — MAGNESIUM, ALUMINUM HYDROXIDE 200-200 MG
30 TABLET,CHEWABLE ORAL ONCE
Refills: 0 | Status: COMPLETED | OUTPATIENT
Start: 2025-05-09 | End: 2025-05-09

## 2025-05-09 RX ORDER — MELATONIN 5 MG
10 TABLET ORAL AT BEDTIME
Refills: 0 | Status: DISCONTINUED | OUTPATIENT
Start: 2025-05-09 | End: 2025-05-10

## 2025-05-09 RX ORDER — CYTARABINE 20 MG/ML
227 VIAL (ML) INJECTION ONCE
Refills: 0 | Status: COMPLETED | OUTPATIENT
Start: 2025-05-09 | End: 2025-05-09

## 2025-05-09 RX ADMIN — OXYCODONE HYDROCHLORIDE 5 MILLIGRAM(S): 30 TABLET ORAL at 22:45

## 2025-05-09 RX ADMIN — AMOXICILLIN 500 MILLIGRAM(S): 500 CAPSULE ORAL at 06:03

## 2025-05-09 RX ADMIN — DILTIAZEM HYDROCHLORIDE 240 MILLIGRAM(S): 240 TABLET, EXTENDED RELEASE ORAL at 06:04

## 2025-05-09 RX ADMIN — OXYCODONE HYDROCHLORIDE 5 MILLIGRAM(S): 30 TABLET ORAL at 06:08

## 2025-05-09 RX ADMIN — Medication 500 MILLIGRAM(S): at 17:45

## 2025-05-09 RX ADMIN — Medication 15 MILLILITER(S): at 23:29

## 2025-05-09 RX ADMIN — OXYCODONE HYDROCHLORIDE 5 MILLIGRAM(S): 30 TABLET ORAL at 18:15

## 2025-05-09 RX ADMIN — Medication 10 MILLIGRAM(S): at 21:37

## 2025-05-09 RX ADMIN — Medication 15 MILLILITER(S): at 17:44

## 2025-05-09 RX ADMIN — Medication 2000 UNIT(S): at 11:03

## 2025-05-09 RX ADMIN — Medication 300 MILLIGRAM(S): at 11:03

## 2025-05-09 RX ADMIN — Medication 2 PUFF(S): at 14:19

## 2025-05-09 RX ADMIN — OXYCODONE HYDROCHLORIDE 5 MILLIGRAM(S): 30 TABLET ORAL at 17:45

## 2025-05-09 RX ADMIN — AMOXICILLIN 500 MILLIGRAM(S): 500 CAPSULE ORAL at 17:44

## 2025-05-09 RX ADMIN — Medication 500 MILLIGRAM(S): at 06:04

## 2025-05-09 RX ADMIN — Medication 15 MILLILITER(S): at 11:03

## 2025-05-09 RX ADMIN — OXYCODONE HYDROCHLORIDE 5 MILLIGRAM(S): 30 TABLET ORAL at 06:43

## 2025-05-09 RX ADMIN — Medication 227 MILLIGRAM(S): at 18:30

## 2025-05-09 RX ADMIN — Medication 40 MILLIGRAM(S): at 06:04

## 2025-05-09 RX ADMIN — Medication 15 MILLILITER(S): at 06:04

## 2025-05-09 RX ADMIN — POSACONAZOLE 300 MILLIGRAM(S): 100 TABLET, DELAYED RELEASE ORAL at 17:45

## 2025-05-09 RX ADMIN — Medication 30 MILLILITER(S): at 23:29

## 2025-05-09 RX ADMIN — OXYCODONE HYDROCHLORIDE 5 MILLIGRAM(S): 30 TABLET ORAL at 21:48

## 2025-05-09 RX ADMIN — TAMSULOSIN HYDROCHLORIDE 0.4 MILLIGRAM(S): 0.4 CAPSULE ORAL at 21:37

## 2025-05-09 RX ADMIN — DIPHENHYDRAMINE HYDROCHLORIDE AND LIDOCAINE HYDROCHLORIDE AND ALUMINUM HYDROXIDE AND MAGNESIUM HYDRO 5 MILLILITER(S): KIT at 17:50

## 2025-05-09 RX ADMIN — Medication 1 APPLICATION(S): at 06:04

## 2025-05-09 NOTE — DIETITIAN INITIAL EVALUATION ADULT - PROBLEM SELECTOR PLAN 1
new ASXL1-mutated and BIF78-csgcyhy AML.  admitted for induction chemo with dauno 60 mg/m2+ cytarabine.   Hepatitis screen/HIV(-) as outpatient  MUGA normal EF, IR TLC    IVF, antiemetics, mouth care   monitor weight, I & O, diuresis PRN   Monitor CBC with diff QD, TLS labs BID, Coags & T & C TIW transfuse for hb<7, PLT <10  5/2 hyperuricemia: Rasburicase 3 mg ivx1, allopurinol 300 mg qd. Hypokalemia: KCL 20 meq po x1, hypomagnesemia: MG 1 g ivpb x1

## 2025-05-09 NOTE — PROGRESS NOTE ADULT - PROBLEM SELECTOR PLAN 1
new ASXL1-mutated and YUL68-lhocmgw AML.  admitted for induction chemo with dauno 60 mg/m2+ cytarabine. Chemo started on 5/2, Complicated by rigors, chills, fever, SOB  and tachycardia 2 hours into day 1 infusion - likely infusion. recevied pepcid/benadryl/solumedrol/tylenol iv. will make up D1 Cytarabine on day8    will proceed with day2 chemo with pre mdes Tylenol and Benadryl, hydrocortisone iv PRN reaction   Hepatitis screen/HIV(-) as outpatient  MUGA normal EF, IR TLC    IVF, antiemetics, mouth care. monitor weight, I & O, diuresis PRN   Monitor CBC with diff QD, TLS labs BID, Coags & T & C TIW transfuse for hb<7, PLT <10  5/5 HLA sent new ASXL1-mutated and XLE24-tpslcqk AML.  admitted for induction chemo with dauno 60 mg/m2+ cytarabine. Chemo started on 5/2, Complicated by rigors, chills, fever, SOB  and tachycardia 2 hours into day 1 infusion - likely infusion. recevied pepcid/benadryl/solumedrol/tylenol iv. will make up D1 Cytarabine on day8    will proceed with day2 chemo with pre mdes Tylenol and Benadryl, hydrocortisone iv PRN reaction   Hepatitis screen/HIV(-) as outpatient  MUGA normal EF, IR TLC    IVF, antiemetics, mouth care. monitor weight, I & O, diuresis PRN   Monitor CBC with diff QD, TLS labs BID, Coags & T & C TIW transfuse for hb<7, PLT <10  5/5 HLA sent  day 14 BM bx on 5/16

## 2025-05-09 NOTE — DISCHARGE NOTE PROVIDER - NSDCMRMEDTOKEN_GEN_ALL_CORE_FT
acetaminophen 325 mg oral tablet: 2 tab(s) orally every 6 hours, As needed, Temp greater or equal to 38C (100.4F), Moderate Pain (4 - 6)  allopurinol 100 mg oral tablet: orally once a day  atorvastatin 80 mg oral tablet: 1 tab(s) orally once a day  cholecalciferol oral tablet: 2000 unit(s) orally once a day  DilTIAZem (Eqv-Cardizem CD) 240 mg/24 hours oral capsule, extended release: 1 cap(s) orally once a day  hydroCHLOROthiazide 50 mg oral tablet: 1 tab(s) orally once a day  omeprazole 40 mg oral delayed release capsule: 1 cap(s) orally once a day   allopurinol 300 mg oral tablet: 1 tab(s) orally once a day  atorvastatin 80 mg oral tablet: 1 tab(s) orally once a day  calcium carbonate 500 mg (200 mg elemental calcium) oral tablet, chewable: 1 tab(s) orally 4 times a day As needed Dyspepsia  cholecalciferol oral tablet: 2000 unit(s) orally once a day  DilTIAZem (Eqv-Cardizem CD) 240 mg/24 hours oral capsule, extended release: 1 cap(s) orally once a day  melatonin 5 mg oral tablet: 1 tab(s) orally once a day (at bedtime)  midodrine 10 mg oral tablet: 1 tab(s) orally every 8 hours  omeprazole 40 mg oral delayed release capsule: 1 cap(s) orally once a day  oxyCODONE 10 mg oral tablet: 1 tab(s) orally every 4 hours As needed mod-severe pain  oxyCODONE 5 mg oral tablet: 1 tab(s) orally every 4 hours as needed for  mild pain MDD: 6 tabs  predniSONE 20 mg oral tablet: 1 tab(s) orally once a day  senna leaf extract oral tablet: 2 tab(s) orally 2 times a day As needed Constipation  simethicone 80 mg oral tablet, chewable: 1 tab(s) orally 4 times a day As needed Gas  tamsulosin 0.4 mg oral capsule: 1 cap(s) orally once a day (at bedtime)  valACYclovir 500 mg oral tablet: 1 tab(s) orally 2 times a day   allopurinol 300 mg oral tablet: 1 tab(s) orally once a day  atorvastatin 80 mg oral tablet: 1 tab(s) orally once a day  calcium carbonate 500 mg (200 mg elemental calcium) oral tablet, chewable: 1 tab(s) orally 4 times a day as needed for Dyspepsia also called &quot;tums&quot;, over the counter  cholecalciferol oral tablet: 2000 unit(s) orally once a day  DilTIAZem (Eqv-Cardizem CD) 240 mg/24 hours oral capsule, extended release: 1 cap(s) orally once a day  melatonin 5 mg oral tablet: 1 tab(s) orally once a day (at bedtime) as needed for  insomnia over the counter  midodrine 10 mg oral tablet: 1 tab(s) orally 3 times a day please assess and titrate off with outpatient provider  omeprazole 40 mg oral delayed release capsule: 1 cap(s) orally once a day  oxyCODONE 10 mg oral tablet: 1 tab(s) orally every 4 hours As needed mod-severe pain  oxyCODONE 5 mg oral tablet: 1 tab(s) orally every 4 hours as needed for  mild pain MDD: 6 tabs  predniSONE 20 mg oral tablet: 1 tab(s) orally once a day  simethicone 80 mg oral tablet, chewable: 1 tab(s) orally 4 times a day as needed for Gas  valACYclovir 500 mg oral tablet: 1 tab(s) orally 2 times a day   allopurinol 300 mg oral tablet: 1 tab(s) orally once a day  atorvastatin 80 mg oral tablet: 1 tab(s) orally once a day  calcium carbonate 500 mg (200 mg elemental calcium) oral tablet, chewable: 1 tab(s) orally 4 times a day as needed for Dyspepsia also called &quot;tums&quot;, over the counter  cholecalciferol oral tablet: 2000 unit(s) orally once a day  DilTIAZem (Eqv-Cardizem CD) 240 mg/24 hours oral capsule, extended release: 1 cap(s) orally once a day  melatonin 5 mg oral tablet: 1 tab(s) orally once a day (at bedtime) as needed for  insomnia over the counter  midodrine 10 mg oral tablet: 1 tab(s) orally 3 times a day please assess and titrate off with outpatient provider  omeprazole 40 mg oral delayed release capsule: 1 cap(s) orally once a day  oxyCODONE 10 mg oral tablet: 1 tab(s) orally every 4 hours As needed mod-severe pain  oxyCODONE 5 mg oral tablet: 1 tab(s) orally every 4 hours as needed for  moderate pain MDD: 6 tabs  predniSONE 20 mg oral tablet: 1 tab(s) orally once a day  simethicone 80 mg oral tablet, chewable: 1 tab(s) orally 4 times a day as needed for Gas  valACYclovir 500 mg oral tablet: 1 tab(s) orally 2 times a day   allopurinol 300 mg oral tablet: 1 tab(s) orally once a day for gout  atorvastatin 80 mg oral tablet: 1 tab(s) orally once a day  calcium carbonate 500 mg (200 mg elemental calcium) oral tablet, chewable: 1 tab(s) orally 4 times a day as needed for Dyspepsia also called &quot;tums&quot;, over the counter  cholecalciferol oral tablet: 2000 unit(s) orally once a day  DilTIAZem (Eqv-Cardizem CD) 120 mg/24 hours oral capsule, extended release: 1 cap(s) orally once a day  melatonin 5 mg oral tablet: 1 tab(s) orally once a day (at bedtime) as needed for  insomnia over the counter  midodrine 10 mg oral tablet: 1 tab(s) orally 3 times a day please assess and titrate off with outpatient provider  omeprazole 40 mg oral delayed release capsule: 1 cap(s) orally once a day  oxyCODONE 10 mg oral tablet: 1 tab(s) orally every 4 hours As needed mod-severe pain  oxyCODONE 5 mg oral tablet: 1 tab(s) orally every 4 hours as needed for  moderate pain MDD: 6 tabs  predniSONE 20 mg oral tablet: 1 tab(s) orally once a day next dose on 5/31 then stop  simethicone 80 mg oral tablet, chewable: 1 tab(s) orally 4 times a day as needed for Gas  valACYclovir 500 mg oral tablet: 1 tab(s) orally 2 times a day

## 2025-05-09 NOTE — DIETITIAN INITIAL EVALUATION ADULT - ORAL INTAKE PTA/DIET HISTORY
Pt reports having a  appetite and PO intake PTA; consuming >% of most meals. Follows regular diet. Pt denies any known food allergies or intolerances. Pt denies any micronutrient supplementation at home. Denies any difficulty chewing/swallowing at this time.  Pt reports having a good appetite and PO intake PTA; consuming >75% of most meals. Follows regular diet. Pt denies any known food allergies; reports shrimp intolerance, reports nasal congestion when consumed. Pt denies any micronutrient supplementation at home. Denies any difficulty chewing/swallowing at this time.

## 2025-05-09 NOTE — DISCHARGE NOTE PROVIDER - HOSPITAL COURSE
70 yo male with newly diagnosed ASXL1, DDX41 mutated AML, admitted for induction chemo with 7+3:  dauno 60 mg/m2+ cytarabine. 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 Chemo started on 5/2, Complicated by rigors, chills, fever, SOB  and tachycardia 2 hours into day 1 infusion - likely infusion reaction 68 yo male with newly diagnosed ASXL1, DDX41 mutated AML, admitted for induction chemo with 7+3:  dauno 60 mg/m2+ cytarabine. PMH  BRCA2 carrier ([id number] 9610C>T), HTN, HLD, Gout, GERD, BPH, spinal stenosis (s/p multiple procedures 2018, 2020 laminectomies / fusions), hip surgeries (Oct 2024, Jan 2025), residual hand weakness related to surgeries, umbilical hernia repair (2014/15) referred here from Dr Atkinson at [facility] in [place] for new ASXL1-mutated and [id number]-igstwwd AML. Chemo started on 5/2, Complicated by rigors, chills, fever, SOB  and tachycardia 2 hours into day 1 infusion - likely infusion reaction, during the hospital course patient was supported with PRBC and platelet transfusions, repleted electrolytes, maintained strict intake and output, diuresed as needed.    70 yo male with newly diagnosed ASXL1, DDX41 mutated AML, admitted for induction chemo with 7+3:  dauno 60 mg/m2+ cytarabine. PMH  BRCA2 carrier ([id number] 9610C>T), HTN, HLD, Gout, GERD, BPH, spinal stenosis (s/p multiple procedures 2018, 2020 laminectomies / fusions), hip surgeries (Oct 2024, Jan 2025), residual hand weakness related to surgeries, umbilical hernia repair (2014/15) referred here from Dr Atkinson at [facility] in [place] for new ASXL1-mutated and [id number]-xytnuly AML. Chemo started on 5/2, Complicated by rigors, chills, fever, SOB  and tachycardia 2 hours into day 1 infusion - likely infusion reaction, during the hospital course patient was supported with PRBC and platelet transfusions, repleted electrolytes, maintained strict intake and output, diuresed as needed. Day 15 bone marrow biopsy c/w Subcortical sampling, Virtually absent trilineage hematopoiesis. 68 yo male with newly diagnosed ASXL1, DDX41 mutated AML, admitted for induction chemo with 7+3:  dauno 60 mg/m2+ cytarabine. PMH  BRCA2 carrier ([id number] 9610C>T), HTN, HLD, Gout, GERD, BPH, spinal stenosis (s/p multiple procedures 2018, 2020 laminectomies / fusions), hip surgeries (Oct 2024, Jan 2025), residual hand weakness related to surgeries, umbilical hernia repair (2014/15) referred here from Dr Atkinson at [facility] in [place] for new ASXL1-mutated and [id number]-ctuuayo AML. Chemo started on 5/2, Complicated by rigors, chills, fever, SOB  and tachycardia 2 hours into day 1 infusion - likely infusion reaction. During the hospital course patient was supported with PRBC and platelet transfusions, repleted electrolytes, maintained strict intake and output, diuresed as needed.   Day 15 bone marrow biopsy c/w Subcortical sampling, Virtually absent trilineage hematopoiesis. BM biopsy to be performed prior to discharge on _____.    Patient is stable and ready for DIMAS.        68 yo male with newly diagnosed ASXL1, DDX41 mutated AML, admitted for induction chemo with 7+3:  dauno 60 mg/m2+ cytarabine. PMH  BRCA2 carrier ([id number] 9610C>T), HTN, HLD, Gout, GERD, BPH, spinal stenosis (s/p multiple procedures 2018, 2020 laminectomies / fusions), hip surgeries (Oct 2024, Jan 2025), residual hand weakness related to surgeries, umbilical hernia repair (2014/15) referred here from Dr Atkinson at [facility] in [place] for new ASXL1-mutated and [id number]-rpfkbwr AML. Chemo started on 5/2, Complicated by rigors, chills, fever, SOB  and tachycardia 2 hours into day 1 infusion - likely infusion reaction. During the hospital course patient was supported with PRBC and platelet transfusions, repleted electrolytes, maintained strict intake and output, diuresed as needed.   Day 15 bone marrow biopsy c/w Subcortical sampling, Virtually absent trilineage hematopoiesis. BM biopsy to be performed prior to discharge on 5/29.    Patient is stable and ready for dc  home with home PT       68 yo male with newly diagnosed ASXL1, DDX41 mutated AML, admitted for induction chemo with 7+3:  dauno 60 mg/m2+ cytarabine. PMH  BRCA2 carrier ([id number] 9610C>T), HTN, HLD, Gout, GERD, BPH, spinal stenosis (s/p multiple procedures 2018, 2020 laminectomies / fusions), hip surgeries (Oct 2024, Jan 2025), residual hand weakness related to surgeries, umbilical hernia repair (2014/15) referred here from Dr Atkinson at [facility] in [place] for new ASXL1-mutated and [id number]-iyklnqg AML. Chemo started on 5/2, Complicated by rigors, chills, fever, SOB  and tachycardia 2 hours into day 1 infusion - likely infusion reaction. During the hospital course patient was supported with PRBC and platelet transfusions, repleted electrolytes, maintained strict intake and output, diuresed as needed.   Day 15 bone marrow biopsy c/w Subcortical sampling, Virtually absent trilineage hematopoiesis. BM biopsy to be performed prior to discharge on 5/29.  Patient is stable and ready for dc  home with home PT       70 yo male with newly diagnosed ASXL1, DDX41 mutated AML, admitted for induction chemo with 7+3:  dauno 60 mg/m2+ cytarabine. PMH  BRCA2 carrier ([id number] 9610C>T), HTN, HLD, Gout, GERD, BPH, spinal stenosis (s/p multiple procedures 2018, 2020 laminectomies / fusions), hip surgeries (Oct 2024, Jan 2025), residual hand weakness related to surgeries, umbilical hernia repair (2014/15) referred here from Dr Atkinson at [facility] in [place] for new ASXL1-mutated and VFV09-esifcnn AML. Chemo started on 5/2, Complicated by rigors, chills, fever, SOB  and tachycardia 2 hours into day 1 infusion - likely infusion reaction. During the hospital course patient was supported with PRBC and platelet transfusions, repleted electrolytes, maintained strict intake and output, diuresed as needed.   Hospital course c/b neutropenic fever,   5/13- Added Flagyl 500 mg IV Q 8 hrs due to GI discomfort/pain  5/13- CT C/A/P -negative for infxn  5/20  blood cx 5/19 showed 1/4 MRSE  urine cx 5/17 VRE but 5/18 negative and pt asymptomatic  - Levaquin/Amox- 5/2- 5/11  - Cefepime- 5/11- 5/19  Flagyl (abd discomfort)- 5/13- 5/19  - Meropenem- 5/19  - Valtrex-Posaconazole  5/2 5/25 Discontinue vancomycin  5/26- D/C Meropenem, Posaconazole.  Pt  was treated for mucositis/pain, acute on chronic cervical/low back pain, orthostatic hypotension required Midodrine and reduced Diltiazem CD to 120 mg po qd at discharge. Pt had gout flare up, s/p Colchicine and Prednisone with relief, now on Allopurinol.   Day 15 bone marrow biopsy c/w Subcortical sampling, Virtually absent trilineage hematopoiesis. BM biopsy on  5/29, fu result as outpatient.   Patient is stable and ready for dc  home with home PT

## 2025-05-09 NOTE — PROGRESS NOTE ADULT - ASSESSMENT
70 yo male with newly diagnosed ASXL1, DDX41 mutated AML, admitted for induction chemo with 7+3:  dauno 60 mg/m2+ cytarabine. PMH  BRCA2 carrier ([id number] 9610C>T), HTN, HLD, Gout, GERD, BPH, spinal stenosis (s/p multiple procedures 2018, 2020 laminectomies / fusions), hip surgeries (Oct 2024, Jan 2025), residual hand weakness related to surgeries, umbilical hernia repair (2014/15) referred here from Dr Atkinson at [facility] in [place] for new ASXL1-mutated and [id number]-qzcbywy AML. Chemo started on 5/2, Complicated by rigors, chills, fever, SOB  and tachycardia 2 hours into day 1 infusion - likely infusion reaction. Pt has pancytopenia d/t chemo and disease

## 2025-05-09 NOTE — ADVANCED PRACTICE NURSE CONSULT - REASON FOR CONSULT
Chemo Notes :Day 1/1 Cytarabine due to previous reaction, pt receiving the complete dose. AML consent on file Chemo Notes: Pt to receive 1x dose Cytarabine today for 23 hrs to complete day 1 dose which was held due to previous reaction, see chemo order. AML consent on file

## 2025-05-09 NOTE — DIETITIAN INITIAL EVALUATION ADULT - PERTINENT LABORATORY DATA
05-09    141  |  107  |  16  ----------------------------<  116[H]  3.9   |  22  |  1.09    Ca    8.5      09 May 2025 06:49  Phos  3.0     05-09  Mg     1.8     05-09    TPro  5.3[L]  /  Alb  3.4  /  TBili  0.4  /  DBili  x   /  AST  12  /  ALT  15  /  AlkPhos  78  05-09

## 2025-05-09 NOTE — DIETITIAN INITIAL EVALUATION ADULT - PERSON TAUGHT/METHOD
Emphasized the importance of adequate kcal and protein intake, provided recommendations to optimize nutritional intake in case of decreased appetite, recommended small frequent meals by consuming nutrient-dense snacks between meals, to start with protein, and sips of supplement throughout the day./verbal instruction/teach back - (Patient repeats in own words)/patient instructed Emphasized the importance of adequate kcal and protein intake, provided recommendations to optimize nutritional intake in case of decreased appetite, recommended small frequent meals by consuming nutrient-dense snacks between meals./verbal instruction/teach back - (Patient repeats in own words)/patient instructed

## 2025-05-09 NOTE — ADVANCED PRACTICE NURSE CONSULT - APN SPECIALTY LIST
Chemotherapy
BIB mother s/p mechanical fall on trampoline causing the pt to twist his ankle. Pt is c/o pain to the lateral right ankle, unable to walk because of the pain. Incident occurred approximately 30 minutes PTA.

## 2025-05-09 NOTE — DIETITIAN INITIAL EVALUATION ADULT - ADD RECOMMEND
1. Continue current diet order:   2. Recommend   3. Monitor and encourage PO intake. Encourage use of daily menus. Honor dietary preferences as expressed as able.  1. Continue current diet order: regular diet   2. Pt is not amenable to receiving oral nutritional supplements at this time  3. Monitor and encourage PO intake. Encourage use of daily menus. Honor dietary preferences as expressed as able.

## 2025-05-09 NOTE — PROGRESS NOTE ADULT - PROBLEM SELECTOR PLAN 8
DVT ppx: OOB and ambulation as carey   PT consulted: no skilled PT need, PT signed off     5/3 Miralax x1 now, BID PRN constipation  5/8: melatonin 3mg at bedtime added for insomnia

## 2025-05-09 NOTE — DISCHARGE NOTE PROVIDER - CARE PROVIDER_API CALL
Addended by: REZA DOBBS on: 1/28/2020 11:55 AM     Modules accepted: Orders    
[facility]  [address] [place] [id number]  Phone: [phone]  Fax: [phone]  Follow Up Time:

## 2025-05-09 NOTE — PROGRESS NOTE ADULT - NS ATTEND AMEND GEN_ALL_CORE FT
Primary: Paulie    69M with ASXL1-mutated and [id number]-[id number] AML. 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 Admitted for induction 7+3 - day 7.    Heme:   - Continue 7+3 (5/2/25 - ). Complicated by rigors, chills, and tachycardia 2 hours into day 1 infusion - likely infusion reaction.  - Trend CBC with differential daily. Goal Hgb > 7 and plt > 10  (> 15 if febrile, > 50 if bleeding).   - Trend DIC labs daily. Continue supportive transfusions as needed to maintain fibrinogen > 100.   - Trend TLS labs twice daily.   - Continue allopurinol. S/p rasburicase; consider additional doses if uric acid > 8.  - Continue IVF. Monitor daily weights and I/O's. Diurese PRN.     ID: Febrile once on 5/2/25 but likely in the setting of infusion reaction  - Prophylaxis: levaquin, amoxicillin, posaconazole, valacyclovir     MSK: Has chronic pain from prior surgeries   - Pain control: oxycodone Primary: Paulie    69M with ASXL1-mutated and XTU09-axugbrf AML. 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 Admitted for induction 7+3 - day 8.    Heme:   - Continue 7+3 (5/2/25 - ). Complicated by rigors, chills, and tachycardia 2 hours into day 1 infusion - likely infusion reaction.  - Trend CBC with differential daily. Goal Hgb > 7 and plt > 10  (> 15 if febrile, > 50 if bleeding).   - Trend DIC labs daily. Continue supportive transfusions as needed to maintain fibrinogen > 100.   - Trend TLS labs twice daily.   - Continue allopurinol. S/p rasburicase; consider additional doses if uric acid > 8.  - Continue IVF. Monitor daily weights and I/O's. Diurese PRN.     ID: Febrile once on 5/2/25 but likely in the setting of infusion reaction  - Prophylaxis: levaquin, amoxicillin, posaconazole, valacyclovir     MSK: Has chronic pain from prior surgeries   - Pain control: oxycodone

## 2025-05-09 NOTE — PHARMACOTHERAPY INTERVENTION NOTE - COMMENTS
Clinical Pharmacy Specialist- Hematology/Oncology- Progress Note    Pt is a 70 y/o male w/ PMH of HTN, HLD, GERD, BPH, Gout, spinal stenosis (s/p multiple surgeries) and newly diagnosed AML (ASXL1-mutated and DDX41) admitted for management with 7+3 Induction therapy    Antimicrobial Course:  - Posaconazole- 5/2  - Levaquin/Amox- 5/2  - Valtrex- 5/2  MRSA nasal swab    Last Neutropenic (ANC<1000): 5/9; ANC= 0.04  Last Febrile: 02-May-2025 20:48; T= 101.9  Days Non-Neutropenic: 0  Days afebrile: 6    Chemotherapy Course  -Current Regimen: 7+3 Induction  History:  (5/2/25)- 7+3 Induction   -Daunorubicin 60mg/m2 IVP D1-3   -Cytarabine 100mg/m2 IV cont inf D1-7  -Day: 8 (5/9)  BmBx:  Access: R TLC (accessed 5/5/25)    History/Relevant clinical information used in assessment:  - Ht= 5’11”; IBW= 75.3kg; Actual BW= 107.3kg; Adj BW= 88kg  - 5/2- IRR? rigors, chills, and tachycardia 2 hours into day 1; rasburicase 3mg x 1  - 5/4 -Crcl= 67ml/min ; Scr=1.32; Wt= 88kg; Adjusted BW used since pt BMI>30    Assessment/Plan/Recommendation:  Onc:  - Daunorubicin 60mg/m2 IVP D1-3   - Cytarabine 100mg/m2 IV cont inf D1-7- IRR on D1, started at 6p, stopped at 7:30p (received 1.5 hrs), currently ends fri 5/9@~6p, so will make up D1 bag today, to run over 23 hrs (to account for ~1hr given)  - allopurinol- monitor for d/c ~5/12- need to keep for gout  ID:  - 5/2 CXR- opacities  Cards:  - atorvastatin 80mg held d/t DDI with posa (Cat X)    Additional Monitoring Needed?   -Yes- Continue to monitor renal function & daily counts for abx escalation/de-escalation   -Discharge Planning:  --> New meds:  --> Meds sent for auth:  --> Delivered meds:    Case discussed with attending/primary team    Liza Jolley, PharmD, BCPS  Clinical Pharmacy Specialist | Hematology/Oncology  [facility]  Email: [email or web address] or available on [email or web address]   Clinical Pharmacy Specialist- Hematology/Oncology- Progress Note    Pt is a 70 y/o male w/ PMH of HTN, HLD, GERD, BPH, Gout, spinal stenosis (s/p multiple surgeries) and newly diagnosed AML (ASXL1-mutated and DDX41) admitted for management with 7+3 Induction therapy    Antimicrobial Course:  - Posaconazole- 5/2  - Levaquin/Amox- 5/2  - Valtrex- 5/2  MRSA nasal swab    Last Neutropenic (ANC<1000): 5/9; ANC= 0.04  Last Febrile: 02-May-2025 20:48; T= 101.9  Days Non-Neutropenic: 0  Days afebrile: 6    Chemotherapy Course  -Current Regimen: 7+3 Induction  History:  (5/2/25)- 7+3 Induction   -Daunorubicin 60mg/m2 IVP D1-3   -Cytarabine 100mg/m2 IV cont inf D1-7  -Day: 8 (5/9)  BmBx:  Access: R TLC (accessed 5/5/25)    History/Relevant clinical information used in assessment:  - Ht= 5’11”; IBW= 75.3kg; Actual BW= 107.3kg; Adj BW= 88kg  - 5/2- IRR? rigors, chills, and tachycardia 2 hours into day 1; rasburicase 3mg x 1  - 5/4 -Crcl= 67ml/min ; Scr=1.32; Wt= 88kg; Adjusted BW used since pt BMI>30  - 5/9- increase melatonin 3mg to 10mh qhs- c/o insomnia    Assessment/Plan/Recommendation:  Onc:  - Daunorubicin 60mg/m2 IVP D1-3   - Cytarabine 100mg/m2 IV cont inf D1-7- IRR on D1, started at 6p, stopped at 7:30p (received 1.5 hrs), currently ends fri 5/9@~6p, so will make up D1 bag today, to run over 23 hrs (to account for ~1hr given)  - allopurinol- monitor for d/c ~5/12- need to keep for gout  ID:  - 5/2 CXR- opacities  Cards:  - atorvastatin 80mg held d/t DDI with posa (Cat X)    Additional Monitoring Needed?   -Yes- Continue to monitor renal function & daily counts for abx escalation/de-escalation   -Discharge Planning:  --> New meds:  --> Meds sent for auth:  --> Delivered meds:    Case discussed with attending/primary team    Liza Jolley, PharmD, BCPS  Clinical Pharmacy Specialist | Hematology/Oncology  [facility]  Email: [email or web address] or available on [email or web address]

## 2025-05-09 NOTE — DISCHARGE NOTE PROVIDER - NSDCCPCAREPLAN_GEN_ALL_CORE_FT
PRINCIPAL DISCHARGE DIAGNOSIS  Diagnosis: AML (acute myeloid leukemia)  Assessment and Plan of Treatment: Notify provider  or report to ER for fever greater or equal to 100.4, persistent nausea,  uncontoled vomiting, watery diarrhea, or bleeding.        SECONDARY DISCHARGE DIAGNOSES  Diagnosis: Medication management  Assessment and Plan of Treatment:      PRINCIPAL DISCHARGE DIAGNOSIS  Diagnosis: AML (acute myeloid leukemia)  Assessment and Plan of Treatment: Notify provider  or report to ER for fever greater or equal to 100.4, persistent nausea,  uncontoled vomiting, watery diarrhea, or bleeding.        SECONDARY DISCHARGE DIAGNOSES  Diagnosis: Medication management  Assessment and Plan of Treatment: medications sent to [facility] at [place]: Valacyclovir, allopurinol, simethicone, midodrine, prednisone, oxycodone

## 2025-05-09 NOTE — PROGRESS NOTE ADULT - SUBJECTIVE AND OBJECTIVE BOX
Diagnosis: ASXL1-mutated and VSJ27-sxuqers AML    Protocol/Chemo Regimen: 7+3:  Daunorubicin 60 mg/m2+ Cytarabine     Day: 7    Pt endorsed: overall feeling well, trouble sleeping     Review of Systems: Patient denied nausea, vomiting, mouth pain,  chest pain, cough, dyspnea, abdominal pain,  diarrhea, rectal pain,  rash, fatigue, headache, bleeding      Pain scale:       mild                                 Location: cervical/low back     Diet:  regular     Allergies    No Known Allergies    Intolerances    Shrimp- Nasal congestion (Other)      ANTIMICROBIALS  amoxicillin 500 milliGRAM(s) Oral two times a day  levoFLOXacin  Tablet 500 milliGRAM(s) Oral every 24 hours  posaconazole DR Tablet 300 milliGRAM(s) Oral every 24 hours  posaconazole DR Tablet   Oral   valACYclovir 500 milliGRAM(s) Oral two times a day      HEME/ONC MEDICATIONS  cytarabine IVPB (eMAR) 227 milliGRAM(s) IV Intermittent daily      STANDING MEDICATIONS  allopurinol 300 milliGRAM(s) Oral daily  Biotene Dry Mouth Oral Rinse 15 milliLiter(s) Swish and Spit four times a day  chlorhexidine 4% Liquid 1 Application(s) Topical <User Schedule>  cholecalciferol 2000 Unit(s) Oral daily  diltiazem    milliGRAM(s) Oral daily  hydrochlorothiazide 50 milliGRAM(s) Oral daily  magnesium sulfate  IVPB 2 Gram(s) IV Intermittent once  ondansetron Injectable 8 milliGRAM(s) IV Push every 8 hours  pantoprazole    Tablet 40 milliGRAM(s) Oral before breakfast  sodium chloride 0.9%. 1000 milliLiter(s) IV Continuous <Continuous>  tamsulosin 0.4 milliGRAM(s) Oral at bedtime      PRN MEDICATIONS  acetaminophen     Tablet .. 650 milliGRAM(s) Oral every 6 hours PRN  albuterol    90 MICROgram(s) HFA Inhaler 2 Puff(s) Inhalation every 6 hours PRN  FIRST- Mouthwash  BLM 5 milliLiter(s) Swish and Spit four times a day PRN  hydrocortisone sodium succinate Injectable 100 milliGRAM(s) IV Push every 8 hours PRN  metoclopramide Injectable 10 milliGRAM(s) IV Push every 6 hours PRN  oxyCODONE    IR 2.5 milliGRAM(s) Oral every 4 hours PRN  oxyCODONE    IR 5 milliGRAM(s) Oral every 4 hours PRN  polyethylene glycol 3350 17 Gram(s) Oral two times a day PRN  sodium chloride 0.9% lock flush 10 milliLiter(s) IV Push every 1 hour PRN        Vital Signs Last 24 Hrs  T(C): 36.3 (09 May 2025 05:40), Max: 37.1 (09 May 2025 00:54)  T(F): 97.3 (09 May 2025 05:40), Max: 98.7 (09 May 2025 00:54)  HR: 87 (09 May 2025 05:40) (75 - 100)  BP: 120/71 (09 May 2025 05:40) (104/65 - 128/65)  BP(mean): --  RR: 18 (09 May 2025 05:40) (17 - 18)  SpO2: 95% (09 May 2025 05:40) (94% - 98%)    Parameters below as of 09 May 2025 05:40  Patient On (Oxygen Delivery Method): room air            PHYSICAL EXAM  General: NAD  HEENT:  clear oropharynx, anicteric sclera  CV: (+) S1/S2 RRR  Lungs: clear to auscultation, no wheezes or rales  Abdomen: soft, non-tender, non-distended (+) BS  Ext: no  edema  Skin: no rash  Neuro: alert and oriented X 3  Central Line: TLC c/d/i       LABS:                          7.7    0.92  )-----------( 34       ( 09 May 2025 06:49 )             22.9         Mean Cell Volume : 99.6 fl  Mean Cell Hemoglobin : 33.5 pg  Mean Cell Hemoglobin Concentration : 33.6 g/dL  Auto Neutrophil # : x  Auto Lymphocyte # : x  Auto Monocyte # : x  Auto Eosinophil # : x  Auto Basophil # : x  Auto Neutrophil % : x  Auto Lymphocyte % : x  Auto Monocyte % : x  Auto Eosinophil % : x  Auto Basophil % : x      05-09    141  |  107  |  16  ----------------------------<  116[H]  3.9   |  22  |  1.09    Ca    8.5      09 May 2025 06:49  Phos  3.0     05-09  Mg     1.8     05-09    TPro  5.3[L]  /  Alb  3.4  /  TBili  0.4  /  DBili  x   /  AST  12  /  ALT  15  /  AlkPhos  78  05-09            PT/INR - ( 09 May 2025 06:49 )   PT: 12.4 sec;   INR: 1.09 ratio         PTT - ( 09 May 2025 06:49 )  PTT:26.5 sec      Uric Acid --                     Diagnosis: ASXL1-mutated and RRW20-nowfjdc AML    Protocol/Chemo Regimen: 7+3:  Daunorubicin 60 mg/m2+ Cytarabine     Day: 8    Pt endorsed: overall feeling well, trouble sleeping     Review of Systems: Patient denied nausea, vomiting, mouth pain,  chest pain, cough, dyspnea, abdominal pain,  diarrhea, rectal pain,  rash, fatigue, headache, bleeding      Pain scale:       mild                                 Location: cervical/low back     Diet:  regular     Allergies    No Known Allergies    Intolerances    Shrimp- Nasal congestion (Other)      ANTIMICROBIALS  amoxicillin 500 milliGRAM(s) Oral two times a day  levoFLOXacin  Tablet 500 milliGRAM(s) Oral every 24 hours  posaconazole DR Tablet 300 milliGRAM(s) Oral every 24 hours  posaconazole DR Tablet   Oral   valACYclovir 500 milliGRAM(s) Oral two times a day      HEME/ONC MEDICATIONS  cytarabine IVPB (eMAR) 227 milliGRAM(s) IV Intermittent daily      STANDING MEDICATIONS  allopurinol 300 milliGRAM(s) Oral daily  Biotene Dry Mouth Oral Rinse 15 milliLiter(s) Swish and Spit four times a day  chlorhexidine 4% Liquid 1 Application(s) Topical <User Schedule>  cholecalciferol 2000 Unit(s) Oral daily  diltiazem    milliGRAM(s) Oral daily  hydrochlorothiazide 50 milliGRAM(s) Oral daily  magnesium sulfate  IVPB 2 Gram(s) IV Intermittent once  ondansetron Injectable 8 milliGRAM(s) IV Push every 8 hours  pantoprazole    Tablet 40 milliGRAM(s) Oral before breakfast  sodium chloride 0.9%. 1000 milliLiter(s) IV Continuous <Continuous>  tamsulosin 0.4 milliGRAM(s) Oral at bedtime      PRN MEDICATIONS  acetaminophen     Tablet .. 650 milliGRAM(s) Oral every 6 hours PRN  albuterol    90 MICROgram(s) HFA Inhaler 2 Puff(s) Inhalation every 6 hours PRN  FIRST- Mouthwash  BLM 5 milliLiter(s) Swish and Spit four times a day PRN  hydrocortisone sodium succinate Injectable 100 milliGRAM(s) IV Push every 8 hours PRN  metoclopramide Injectable 10 milliGRAM(s) IV Push every 6 hours PRN  oxyCODONE    IR 2.5 milliGRAM(s) Oral every 4 hours PRN  oxyCODONE    IR 5 milliGRAM(s) Oral every 4 hours PRN  polyethylene glycol 3350 17 Gram(s) Oral two times a day PRN  sodium chloride 0.9% lock flush 10 milliLiter(s) IV Push every 1 hour PRN        Vital Signs Last 24 Hrs  T(C): 36.3 (09 May 2025 05:40), Max: 37.1 (09 May 2025 00:54)  T(F): 97.3 (09 May 2025 05:40), Max: 98.7 (09 May 2025 00:54)  HR: 87 (09 May 2025 05:40) (75 - 100)  BP: 120/71 (09 May 2025 05:40) (104/65 - 128/65)  BP(mean): --  RR: 18 (09 May 2025 05:40) (17 - 18)  SpO2: 95% (09 May 2025 05:40) (94% - 98%)    Parameters below as of 09 May 2025 05:40  Patient On (Oxygen Delivery Method): room air            PHYSICAL EXAM  General: NAD  HEENT:  clear oropharynx, anicteric sclera  CV: (+) S1/S2 RRR  Lungs: clear to auscultation, no wheezes or rales  Abdomen: soft, non-tender, non-distended (+) BS  Ext: no  edema  Skin: no rash  Neuro: alert and oriented X 3  Central Line: TLC c/d/i       LABS:                          7.7    0.92  )-----------( 34       ( 09 May 2025 06:49 )             22.9         Mean Cell Volume : 99.6 fl  Mean Cell Hemoglobin : 33.5 pg  Mean Cell Hemoglobin Concentration : 33.6 g/dL  Auto Neutrophil # : x  Auto Lymphocyte # : x  Auto Monocyte # : x  Auto Eosinophil # : x  Auto Basophil # : x  Auto Neutrophil % : x  Auto Lymphocyte % : x  Auto Monocyte % : x  Auto Eosinophil % : x  Auto Basophil % : x      05-09    141  |  107  |  16  ----------------------------<  116[H]  3.9   |  22  |  1.09    Ca    8.5      09 May 2025 06:49  Phos  3.0     05-09  Mg     1.8     05-09    TPro  5.3[L]  /  Alb  3.4  /  TBili  0.4  /  DBili  x   /  AST  12  /  ALT  15  /  AlkPhos  78  05-09            PT/INR - ( 09 May 2025 06:49 )   PT: 12.4 sec;   INR: 1.09 ratio         PTT - ( 09 May 2025 06:49 )  PTT:26.5 sec      Uric Acid --

## 2025-05-09 NOTE — DISCHARGE NOTE PROVIDER - DETAILS OF MALNUTRITION DIAGNOSIS/DIAGNOSES
This patient has been assessed with a concern for Malnutrition and was treated during this hospitalization for the following Nutrition diagnosis/diagnoses:     -  05/22/2025: Severe protein-calorie malnutrition

## 2025-05-09 NOTE — ADVANCED PRACTICE NURSE CONSULT - RECOMMEDATIONS
Primary RN aware
Primary RN aware
Will endorse to night RN/charge RN pt chemo hung @1830 5/9/25, pt chemo to come down after 23 hrs @1730 5/10/25 as per chemo order, primary RN aware. 
Primary RN aware

## 2025-05-09 NOTE — ADVANCED PRACTICE NURSE CONSULT - ASSESSMENT
Pt alert and  O x 4. Labs reviewed by Dr Goldberg and team on rounds. PT EF 63% MUGA 5/2/25. Right TLC inserted 05/02/25 in IR, patent, positive blood return obtained and flushing well, site intact, no s/s of bleeding, redness or swelling. CXR confirm placement in SVC. Chemotherapy teaching done,  verbalized understanding. Pt premedicated with 8mg IVP zofran q8hrs,  2 certified RN verification done. At 1830, pt receiving Cytarabine 100 mg/m2= 227 mg IV at 23 cc/hr as a 23 hr continuous infusion y-sited to lowest port of primary NS line connected to blue lumen administered via alaris pump. Pt tolerating well. Primary RN aware of care plan. Nursing care is ongoing.

## 2025-05-09 NOTE — DIETITIAN INITIAL EVALUATION ADULT - PHYSCIAL ASSESSMENT
Weight Hx Per:  - Source: patient   - UBW:   - Reported weight changes:    Weight Hx Per [facility]:     Current Admission Weights:  - Dosing weight: 236.4 pounds/107.3 kg (05/02)  - Daily weight:     Weight Change:  -     **  Will continue to monitor weight trends as available/able.     IBW: 172 pounds    %IBW: 137% Weight Hx Per:  - Source: patient   - UBW: 236 pounds   - Reported weight changes: no changes reported    Weight Hx Per [facility] HIE: no available weights to assess.     Current Admission Weights:  - Dosing weight: 236.4 pounds/107.3 kg (05/02)    Weight Change:  - none noted     **  Will continue to monitor weight trends as available/able.     IBW: 172 pounds    %IBW: 137%

## 2025-05-09 NOTE — DIETITIAN INITIAL EVALUATION ADULT - REASON INDICATOR FOR ASSESSMENT
Nutrition consult warranted for:  length of stay on 7monti   Information obtained from: electronic medical record and patient  Chart reviewed, events noted.

## 2025-05-09 NOTE — DISCHARGE NOTE PROVIDER - NSDCFUSCHEDAPPT_GEN_ALL_CORE_FT
[place] [facility]  [facility]  Scheduled Appointment: 06/03/2025    [place] [facility]  [facility]  Scheduled Appointment: 06/03/2025

## 2025-05-09 NOTE — DISCHARGE NOTE PROVIDER - CARE PROVIDERS DIRECT ADDRESSES
[email or web address]

## 2025-05-09 NOTE — DIETITIAN INITIAL EVALUATION ADULT - ENERGY INTAKE
Patient was already scheduled 1/25/23. - Pt reports appetite/PO intake; consuming % of most meals.  Adequate (%) - Pt reports good appetite/PO intake; consuming >75% of most meals.

## 2025-05-10 LAB
ALBUMIN SERPL ELPH-MCNC: 3.5 G/DL — SIGNIFICANT CHANGE UP (ref 3.3–5)
ALBUMIN SERPL ELPH-MCNC: 3.6 G/DL — SIGNIFICANT CHANGE UP (ref 3.3–5)
ALP SERPL-CCNC: 78 U/L — SIGNIFICANT CHANGE UP (ref 40–120)
ALP SERPL-CCNC: 80 U/L — SIGNIFICANT CHANGE UP (ref 40–120)
ALT FLD-CCNC: 13 U/L — SIGNIFICANT CHANGE UP (ref 10–45)
ALT FLD-CCNC: 13 U/L — SIGNIFICANT CHANGE UP (ref 10–45)
ANION GAP SERPL CALC-SCNC: 12 MMOL/L — SIGNIFICANT CHANGE UP (ref 5–17)
ANION GAP SERPL CALC-SCNC: 13 MMOL/L — SIGNIFICANT CHANGE UP (ref 5–17)
AST SERPL-CCNC: 13 U/L — SIGNIFICANT CHANGE UP (ref 10–40)
AST SERPL-CCNC: 13 U/L — SIGNIFICANT CHANGE UP (ref 10–40)
BASOPHILS # BLD AUTO: 0 K/UL — SIGNIFICANT CHANGE UP (ref 0–0.2)
BASOPHILS NFR BLD AUTO: 0 % — SIGNIFICANT CHANGE UP (ref 0–2)
BILIRUB SERPL-MCNC: 0.4 MG/DL — SIGNIFICANT CHANGE UP (ref 0.2–1.2)
BILIRUB SERPL-MCNC: 0.5 MG/DL — SIGNIFICANT CHANGE UP (ref 0.2–1.2)
BUN SERPL-MCNC: 12 MG/DL — SIGNIFICANT CHANGE UP (ref 7–23)
BUN SERPL-MCNC: 15 MG/DL — SIGNIFICANT CHANGE UP (ref 7–23)
CALCIUM SERPL-MCNC: 8.5 MG/DL — SIGNIFICANT CHANGE UP (ref 8.4–10.5)
CALCIUM SERPL-MCNC: 8.7 MG/DL — SIGNIFICANT CHANGE UP (ref 8.4–10.5)
CHLORIDE SERPL-SCNC: 102 MMOL/L — SIGNIFICANT CHANGE UP (ref 96–108)
CHLORIDE SERPL-SCNC: 105 MMOL/L — SIGNIFICANT CHANGE UP (ref 96–108)
CO2 SERPL-SCNC: 22 MMOL/L — SIGNIFICANT CHANGE UP (ref 22–31)
CO2 SERPL-SCNC: 22 MMOL/L — SIGNIFICANT CHANGE UP (ref 22–31)
CREAT SERPL-MCNC: 1.05 MG/DL — SIGNIFICANT CHANGE UP (ref 0.5–1.3)
CREAT SERPL-MCNC: 1.09 MG/DL — SIGNIFICANT CHANGE UP (ref 0.5–1.3)
EGFR: 74 ML/MIN/1.73M2 — SIGNIFICANT CHANGE UP
EGFR: 74 ML/MIN/1.73M2 — SIGNIFICANT CHANGE UP
EGFR: 77 ML/MIN/1.73M2 — SIGNIFICANT CHANGE UP
EGFR: 77 ML/MIN/1.73M2 — SIGNIFICANT CHANGE UP
EOSINOPHIL # BLD AUTO: 0 K/UL — SIGNIFICANT CHANGE UP (ref 0–0.5)
EOSINOPHIL NFR BLD AUTO: 0 % — SIGNIFICANT CHANGE UP (ref 0–6)
GLUCOSE SERPL-MCNC: 109 MG/DL — HIGH (ref 70–99)
GLUCOSE SERPL-MCNC: 118 MG/DL — HIGH (ref 70–99)
HCT VFR BLD CALC: 22.5 % — LOW (ref 39–50)
HGB BLD-MCNC: 7.5 G/DL — LOW (ref 13–17)
LDH SERPL L TO P-CCNC: 183 U/L — SIGNIFICANT CHANGE UP (ref 50–242)
LDH SERPL L TO P-CCNC: 184 U/L — SIGNIFICANT CHANGE UP (ref 50–242)
LYMPHOCYTES # BLD AUTO: 0.87 K/UL — LOW (ref 1–3.3)
LYMPHOCYTES # BLD AUTO: 100 % — HIGH (ref 13–44)
MAGNESIUM SERPL-MCNC: 1.7 MG/DL — SIGNIFICANT CHANGE UP (ref 1.6–2.6)
MAGNESIUM SERPL-MCNC: 1.8 MG/DL — SIGNIFICANT CHANGE UP (ref 1.6–2.6)
MANUAL SMEAR VERIFICATION: SIGNIFICANT CHANGE UP
MCHC RBC-ENTMCNC: 33 PG — SIGNIFICANT CHANGE UP (ref 27–34)
MCHC RBC-ENTMCNC: 33.3 G/DL — SIGNIFICANT CHANGE UP (ref 32–36)
MCV RBC AUTO: 99.1 FL — SIGNIFICANT CHANGE UP (ref 80–100)
MONOCYTES # BLD AUTO: 0 K/UL — SIGNIFICANT CHANGE UP (ref 0–0.9)
MONOCYTES NFR BLD AUTO: 0 % — LOW (ref 2–14)
NEUTROPHILS # BLD AUTO: 0 K/UL — LOW (ref 1.8–7.4)
NEUTROPHILS NFR BLD AUTO: 0 % — LOW (ref 43–77)
PHOSPHATE SERPL-MCNC: 3.2 MG/DL — SIGNIFICANT CHANGE UP (ref 2.5–4.5)
PHOSPHATE SERPL-MCNC: 3.3 MG/DL — SIGNIFICANT CHANGE UP (ref 2.5–4.5)
PLAT MORPH BLD: NORMAL — SIGNIFICANT CHANGE UP
PLATELET # BLD AUTO: 27 K/UL — LOW (ref 150–400)
POTASSIUM SERPL-MCNC: 3.5 MMOL/L — SIGNIFICANT CHANGE UP (ref 3.5–5.3)
POTASSIUM SERPL-MCNC: 3.7 MMOL/L — SIGNIFICANT CHANGE UP (ref 3.5–5.3)
POTASSIUM SERPL-SCNC: 3.5 MMOL/L — SIGNIFICANT CHANGE UP (ref 3.5–5.3)
POTASSIUM SERPL-SCNC: 3.7 MMOL/L — SIGNIFICANT CHANGE UP (ref 3.5–5.3)
PROT SERPL-MCNC: 5.5 G/DL — LOW (ref 6–8.3)
PROT SERPL-MCNC: 5.6 G/DL — LOW (ref 6–8.3)
RBC # BLD: 2.27 M/UL — LOW (ref 4.2–5.8)
RBC # FLD: 13.8 % — SIGNIFICANT CHANGE UP (ref 10.3–14.5)
RBC BLD AUTO: SIGNIFICANT CHANGE UP
SODIUM SERPL-SCNC: 136 MMOL/L — SIGNIFICANT CHANGE UP (ref 135–145)
SODIUM SERPL-SCNC: 140 MMOL/L — SIGNIFICANT CHANGE UP (ref 135–145)
URATE SERPL-MCNC: 4.2 MG/DL — SIGNIFICANT CHANGE UP (ref 3.4–8.8)
URATE SERPL-MCNC: 4.4 MG/DL — SIGNIFICANT CHANGE UP (ref 3.4–8.8)
WBC # BLD: 0.87 K/UL — CRITICAL LOW (ref 3.8–10.5)
WBC # FLD AUTO: 0.87 K/UL — CRITICAL LOW (ref 3.8–10.5)

## 2025-05-10 PROCEDURE — 99233 SBSQ HOSP IP/OBS HIGH 50: CPT

## 2025-05-10 RX ORDER — MAGNESIUM, ALUMINUM HYDROXIDE 200-200 MG
30 TABLET,CHEWABLE ORAL ONCE
Refills: 0 | Status: COMPLETED | OUTPATIENT
Start: 2025-05-10 | End: 2025-05-10

## 2025-05-10 RX ORDER — MAGNESIUM, ALUMINUM HYDROXIDE 200-200 MG
30 TABLET,CHEWABLE ORAL EVERY 6 HOURS
Refills: 0 | Status: DISCONTINUED | OUTPATIENT
Start: 2025-05-10 | End: 2025-05-29

## 2025-05-10 RX ORDER — ONDANSETRON HCL/PF 4 MG/2 ML
8 VIAL (ML) INJECTION EVERY 8 HOURS
Refills: 0 | Status: DISCONTINUED | OUTPATIENT
Start: 2025-05-10 | End: 2025-05-30

## 2025-05-10 RX ORDER — CALCIUM CARBONATE 750 MG/1
1 TABLET ORAL
Refills: 0 | Status: DISCONTINUED | OUTPATIENT
Start: 2025-05-10 | End: 2025-05-30

## 2025-05-10 RX ORDER — SIMETHICONE 80 MG
80 TABLET,CHEWABLE ORAL EVERY 6 HOURS
Refills: 0 | Status: DISCONTINUED | OUTPATIENT
Start: 2025-05-10 | End: 2025-05-13

## 2025-05-10 RX ORDER — MELATONIN 5 MG
5 TABLET ORAL AT BEDTIME
Refills: 0 | Status: DISCONTINUED | OUTPATIENT
Start: 2025-05-10 | End: 2025-05-30

## 2025-05-10 RX ADMIN — Medication 1 APPLICATION(S): at 11:50

## 2025-05-10 RX ADMIN — POSACONAZOLE 300 MILLIGRAM(S): 100 TABLET, DELAYED RELEASE ORAL at 17:56

## 2025-05-10 RX ADMIN — Medication 2000 UNIT(S): at 11:48

## 2025-05-10 RX ADMIN — DIPHENHYDRAMINE HYDROCHLORIDE AND LIDOCAINE HYDROCHLORIDE AND ALUMINUM HYDROXIDE AND MAGNESIUM HYDRO 5 MILLILITER(S): KIT at 13:44

## 2025-05-10 RX ADMIN — Medication 300 MILLIGRAM(S): at 11:49

## 2025-05-10 RX ADMIN — CALCIUM CARBONATE 1 TABLET(S): 750 TABLET ORAL at 08:24

## 2025-05-10 RX ADMIN — Medication 40 MILLIGRAM(S): at 05:59

## 2025-05-10 RX ADMIN — AMOXICILLIN 500 MILLIGRAM(S): 500 CAPSULE ORAL at 17:55

## 2025-05-10 RX ADMIN — Medication 15 MILLILITER(S): at 06:00

## 2025-05-10 RX ADMIN — OXYCODONE HYDROCHLORIDE 5 MILLIGRAM(S): 30 TABLET ORAL at 16:44

## 2025-05-10 RX ADMIN — DILTIAZEM HYDROCHLORIDE 240 MILLIGRAM(S): 240 TABLET, EXTENDED RELEASE ORAL at 06:00

## 2025-05-10 RX ADMIN — Medication 15 MILLILITER(S): at 17:59

## 2025-05-10 RX ADMIN — Medication 80 MILLIGRAM(S): at 08:24

## 2025-05-10 RX ADMIN — Medication 30 MILLILITER(S): at 06:35

## 2025-05-10 RX ADMIN — Medication 80 MILLIGRAM(S): at 13:44

## 2025-05-10 RX ADMIN — Medication 650 MILLIGRAM(S): at 18:30

## 2025-05-10 RX ADMIN — CALCIUM CARBONATE 1 TABLET(S): 750 TABLET ORAL at 13:44

## 2025-05-10 RX ADMIN — OXYCODONE HYDROCHLORIDE 5 MILLIGRAM(S): 30 TABLET ORAL at 17:30

## 2025-05-10 RX ADMIN — Medication 5 MILLIGRAM(S): at 21:50

## 2025-05-10 RX ADMIN — Medication 500 MILLIGRAM(S): at 05:59

## 2025-05-10 RX ADMIN — Medication 650 MILLIGRAM(S): at 17:55

## 2025-05-10 RX ADMIN — Medication 100 MILLILITER(S): at 06:00

## 2025-05-10 RX ADMIN — Medication 500 MILLIGRAM(S): at 17:56

## 2025-05-10 RX ADMIN — TAMSULOSIN HYDROCHLORIDE 0.4 MILLIGRAM(S): 0.4 CAPSULE ORAL at 21:50

## 2025-05-10 RX ADMIN — Medication 15 MILLILITER(S): at 11:49

## 2025-05-10 RX ADMIN — AMOXICILLIN 500 MILLIGRAM(S): 500 CAPSULE ORAL at 06:00

## 2025-05-10 NOTE — PROGRESS NOTE ADULT - ASSESSMENT
68 yo male with newly diagnosed ASXL1, DDX41 mutated AML, admitted for induction chemo with 7+3:  dauno 60 mg/m2+ cytarabine. 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 Chemo started on 5/2, Complicated by rigors, chills, fever, SOB  and tachycardia 2 hours into day 1 infusion - likely infusion reaction. Pt has pancytopenia d/t chemo and disease

## 2025-05-10 NOTE — PROGRESS NOTE ADULT - PROBLEM SELECTOR PLAN 1
new ASXL1-mutated and MFS42-unthlpg AML.  admitted for induction chemo with dauno 60 mg/m2+ cytarabine. Chemo started on 5/2, Complicated by rigors, chills, fever, SOB  and tachycardia 2 hours into day 1 infusion - likely infusion. recevied pepcid/benadryl/solumedrol/tylenol iv. will make up D1 Cytarabine on day8    will proceed with day2 chemo with pre mdes Tylenol and Benadryl, hydrocortisone iv PRN reaction   Hepatitis screen/HIV(-) as outpatient  MUGA normal EF, IR TLC    IVF, antiemetics, mouth care. monitor weight, I & O, diuresis PRN   Monitor CBC with diff QD, TLS labs BID, Coags & T & C TIW transfuse for hb<7, PLT <10  5/5 HLA sent  day 14 BM bx on 5/16

## 2025-05-10 NOTE — PROGRESS NOTE ADULT - NS ATTEND AMEND GEN_ALL_CORE FT
Primary: Paulie    69M with ASXL1-mutated and ACB09-bwnismc AML. 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 Admitted for induction 7+3 - day 8.    Heme:   - Continue 7+3 (5/2/25 - ). Complicated by rigors, chills, and tachycardia 2 hours into day 1 infusion - likely infusion reaction.  - Trend CBC with differential daily. Goal Hgb > 7 and plt > 10  (> 15 if febrile, > 50 if bleeding).   - Trend DIC labs daily. Continue supportive transfusions as needed to maintain fibrinogen > 100.   - Trend TLS labs twice daily.   - Continue allopurinol. S/p rasburicase; consider additional doses if uric acid > 8.  - Continue IVF. Monitor daily weights and I/O's. Diurese PRN.     ID: Febrile once on 5/2/25 but likely in the setting of infusion reaction  - Prophylaxis: levaquin, amoxicillin, posaconazole, valacyclovir     MSK: Has chronic pain from prior surgeries   - Pain control: oxycodone Primary: Paulie    69M with ASXL1-mutated and JGV64-hqdmfim AML. 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 Admitted for induction 7+3 - day 9.    Heme:   - Continue 7+3 (5/2/25 - ). Complicated by rigors, chills, and tachycardia 2 hours into day 1 infusion - likely infusion reaction.  - Trend CBC with differential daily. Goal Hgb > 7 and plt > 10  (> 15 if febrile, > 50 if bleeding).   - Trend DIC labs daily. Continue supportive transfusions as needed to maintain fibrinogen > 100.   - Trend TLS labs twice daily.   - Continue allopurinol. S/p rasburicase; consider additional doses if uric acid > 8.  - Continue IVF. Monitor daily weights and I/O's. Diurese PRN.     ID: Febrile once on 5/2/25 but likely in the setting of infusion reaction  - Prophylaxis: levaquin, amoxicillin, posaconazole, valacyclovir     MSK: Has chronic pain from prior surgeries   - Pain control: oxycodone

## 2025-05-10 NOTE — PROGRESS NOTE ADULT - SUBJECTIVE AND OBJECTIVE BOX
Diagnosis: ASXL1-mutated and ZPL43-cjnjawj AML    Protocol/Chemo Regimen: 7+3:  Daunorubicin 60 mg/m2+ Cytarabine     Day: 9    Pt endorsed: overall feeling well, trouble sleeping     Review of Systems: Patient denied nausea, vomiting, mouth pain,  chest pain, cough, dyspnea, abdominal pain,  diarrhea, rectal pain,  rash, fatigue, headache, bleeding      Pain scale:       mild                                 Location: cervical/low back     Diet:  regular     Allergies    No Known Allergies    Intolerances    Shrimp- Nasal congestion (Other)        ANTIMICROBIALS  amoxicillin 500 milliGRAM(s) Oral two times a day  levoFLOXacin  Tablet 500 milliGRAM(s) Oral every 24 hours  posaconazole DR Tablet   Oral   posaconazole DR Tablet 300 milliGRAM(s) Oral every 24 hours  valACYclovir 500 milliGRAM(s) Oral two times a day      STANDING MEDICATIONS  allopurinol 300 milliGRAM(s) Oral daily  Biotene Dry Mouth Oral Rinse 15 milliLiter(s) Swish and Spit four times a day  chlorhexidine 4% Liquid 1 Application(s) Topical <User Schedule>  cholecalciferol 2000 Unit(s) Oral daily  diltiazem    milliGRAM(s) Oral daily  hydrochlorothiazide 50 milliGRAM(s) Oral daily  melatonin 10 milliGRAM(s) Oral at bedtime  pantoprazole    Tablet 40 milliGRAM(s) Oral before breakfast  sodium chloride 0.9%. 1000 milliLiter(s) IV Continuous <Continuous>  tamsulosin 0.4 milliGRAM(s) Oral at bedtime      PRN MEDICATIONS  acetaminophen     Tablet .. 650 milliGRAM(s) Oral every 6 hours PRN  albuterol    90 MICROgram(s) HFA Inhaler 2 Puff(s) Inhalation every 6 hours PRN  FIRST- Mouthwash  BLM 5 milliLiter(s) Swish and Spit four times a day PRN  hydrocortisone sodium succinate Injectable 100 milliGRAM(s) IV Push every 8 hours PRN  metoclopramide Injectable 10 milliGRAM(s) IV Push every 6 hours PRN  oxyCODONE    IR 2.5 milliGRAM(s) Oral every 4 hours PRN  oxyCODONE    IR 5 milliGRAM(s) Oral every 4 hours PRN  polyethylene glycol 3350 17 Gram(s) Oral two times a day PRN  sodium chloride 0.9% lock flush 10 milliLiter(s) IV Push every 1 hour PRN        Vital Signs Last 24 Hrs  T(C): 37.2 (10 May 2025 05:31), Max: 37.2 (10 May 2025 05:31)  T(F): 99 (10 May 2025 05:31), Max: 99 (10 May 2025 05:31)  HR: 84 (10 May 2025 05:31) (83 - 98)  BP: 114/66 (10 May 2025 05:31) (114/66 - 137/75)  BP(mean): --  RR: 18 (10 May 2025 05:31) (17 - 18)  SpO2: 95% (10 May 2025 05:31) (94% - 98%)    Parameters below as of 10 May 2025 05:31  Patient On (Oxygen Delivery Method): room air        PHYSICAL EXAM  General: NAD  HEENT:  clear oropharynx, anicteric sclera  CV: (+) S1/S2 RRR  Lungs: clear to auscultation, no wheezes or rales  Abdomen: soft, non-tender, non-distended (+) BS  Ext: no  edema  Skin: no rash  Neuro: alert and oriented X 3  Central Line: TLC c/d/i       RECENT CULTURES:  05-03 @ 10:07  Clean Catch Clean Catch (Midstream)  --  --  --    No growth  --        LABS:                        7.7    0.92  )-----------( 34       ( 09 May 2025 06:49 )             22.9         Mean Cell Volume : 99.6 fl  Mean Cell Hemoglobin : 33.5 pg  Mean Cell Hemoglobin Concentration : 33.6 g/dL  Auto Neutrophil # : x  Auto Lymphocyte # : x  Auto Monocyte # : x  Auto Eosinophil # : x  Auto Basophil # : x  Auto Neutrophil % : x  Auto Lymphocyte % : x  Auto Monocyte % : x  Auto Eosinophil % : x  Auto Basophil % : x      05-10    140  |  105  |  15  ----------------------------<  118[H]  3.7   |  22  |  1.09    Ca    8.5      10 May 2025 06:58  Phos  3.3     05-10  Mg     1.8     05-10    TPro  5.5[L]  /  Alb  3.5  /  TBili  0.4  /  DBili  x   /  AST  13  /  ALT  13  /  AlkPhos  80  05-10      Mg 1.8  Phos 3.3  Mg 1.8  Phos 2.9      PT/INR - ( 09 May 2025 06:49 )   PT: 12.4 sec;   INR: 1.09 ratio         PTT - ( 09 May 2025 06:49 )  PTT:26.5 sec      Uric Acid 4.4      Uric Acid 4.0        RADIOLOGY & ADDITIONAL STUDIES:         Diagnosis: ASXL1-mutated and RIC03-tbzbftw AML    Protocol/Chemo Regimen: 7+3:  Daunorubicin 60 mg/m2+ Cytarabine     Day: 9    Pt endorsed: +abdominal discomfort +reflux     Review of Systems: Patient denied nausea, vomiting, mouth pain,  chest pain, cough, dyspnea, abdominal pain,  diarrhea, rectal pain,  rash, fatigue, headache, bleeding      Pain scale:       mild                                 Location: cervical/low back     Diet:  regular     Allergies    No Known Allergies    Intolerances    Shrimp- Nasal congestion (Other)        ANTIMICROBIALS  amoxicillin 500 milliGRAM(s) Oral two times a day  levoFLOXacin  Tablet 500 milliGRAM(s) Oral every 24 hours  posaconazole DR Tablet   Oral   posaconazole DR Tablet 300 milliGRAM(s) Oral every 24 hours  valACYclovir 500 milliGRAM(s) Oral two times a day      STANDING MEDICATIONS  allopurinol 300 milliGRAM(s) Oral daily  Biotene Dry Mouth Oral Rinse 15 milliLiter(s) Swish and Spit four times a day  chlorhexidine 4% Liquid 1 Application(s) Topical <User Schedule>  cholecalciferol 2000 Unit(s) Oral daily  diltiazem    milliGRAM(s) Oral daily  hydrochlorothiazide 50 milliGRAM(s) Oral daily  melatonin 10 milliGRAM(s) Oral at bedtime  pantoprazole    Tablet 40 milliGRAM(s) Oral before breakfast  sodium chloride 0.9%. 1000 milliLiter(s) IV Continuous <Continuous>  tamsulosin 0.4 milliGRAM(s) Oral at bedtime      PRN MEDICATIONS  acetaminophen     Tablet .. 650 milliGRAM(s) Oral every 6 hours PRN  albuterol    90 MICROgram(s) HFA Inhaler 2 Puff(s) Inhalation every 6 hours PRN  FIRST- Mouthwash  BLM 5 milliLiter(s) Swish and Spit four times a day PRN  hydrocortisone sodium succinate Injectable 100 milliGRAM(s) IV Push every 8 hours PRN  metoclopramide Injectable 10 milliGRAM(s) IV Push every 6 hours PRN  oxyCODONE    IR 2.5 milliGRAM(s) Oral every 4 hours PRN  oxyCODONE    IR 5 milliGRAM(s) Oral every 4 hours PRN  polyethylene glycol 3350 17 Gram(s) Oral two times a day PRN  sodium chloride 0.9% lock flush 10 milliLiter(s) IV Push every 1 hour PRN        Vital Signs Last 24 Hrs  T(C): 37.2 (10 May 2025 05:31), Max: 37.2 (10 May 2025 05:31)  T(F): 99 (10 May 2025 05:31), Max: 99 (10 May 2025 05:31)  HR: 84 (10 May 2025 05:31) (83 - 98)  BP: 114/66 (10 May 2025 05:31) (114/66 - 137/75)  BP(mean): --  RR: 18 (10 May 2025 05:31) (17 - 18)  SpO2: 95% (10 May 2025 05:31) (94% - 98%)    Parameters below as of 10 May 2025 05:31  Patient On (Oxygen Delivery Method): room air        PHYSICAL EXAM  General: NAD  HEENT:  clear oropharynx, anicteric sclera  CV: (+) S1/S2 RRR  Lungs: clear to auscultation, no wheezes or rales  Abdomen: soft, non-tender, non-distended (+) BS  Ext: no  edema  Skin: no rash  Neuro: alert and oriented X 3  Central Line: TLC c/d/i       RECENT CULTURES:  05-03 @ 10:07  Clean Catch Clean Catch (Midstream)  --  --  --    No growth  --      LABS:                            7.5    0.87  )-----------( 27       ( 10 May 2025 06:58 )             22.5         Mean Cell Volume : 99.1 fl  Mean Cell Hemoglobin : 33.0 pg  Mean Cell Hemoglobin Concentration : 33.3 g/dL  Auto Neutrophil # : x  Auto Lymphocyte # : x  Auto Monocyte # : x  Auto Eosinophil # : x  Auto Basophil # : x  Auto Neutrophil % : x  Auto Lymphocyte % : x  Auto Monocyte % : x  Auto Eosinophil % : x  Auto Basophil % : x      05-10    140  |  105  |  15  ----------------------------<  118[H]  3.7   |  22  |  1.09    Ca    8.5      10 May 2025 06:58  Phos  3.3     05-10  Mg     1.8     05-10    TPro  5.5[L]  /  Alb  3.5  /  TBili  0.4  /  DBili  x   /  AST  13  /  ALT  13  /  AlkPhos  80  05-10      PT/INR - ( 09 May 2025 06:49 )   PT: 12.4 sec;   INR: 1.09 ratio         PTT - ( 09 May 2025 06:49 )  PTT:26.5 sec      Uric Acid 4.4          RADIOLOGY & ADDITIONAL STUDIES:

## 2025-05-11 LAB
ALBUMIN SERPL ELPH-MCNC: 3.4 G/DL — SIGNIFICANT CHANGE UP (ref 3.3–5)
ALBUMIN SERPL ELPH-MCNC: 3.5 G/DL — SIGNIFICANT CHANGE UP (ref 3.3–5)
ALP SERPL-CCNC: 75 U/L — SIGNIFICANT CHANGE UP (ref 40–120)
ALP SERPL-CCNC: 77 U/L — SIGNIFICANT CHANGE UP (ref 40–120)
ALT FLD-CCNC: 12 U/L — SIGNIFICANT CHANGE UP (ref 10–45)
ALT FLD-CCNC: 13 U/L — SIGNIFICANT CHANGE UP (ref 10–45)
ANION GAP SERPL CALC-SCNC: 13 MMOL/L — SIGNIFICANT CHANGE UP (ref 5–17)
ANION GAP SERPL CALC-SCNC: 13 MMOL/L — SIGNIFICANT CHANGE UP (ref 5–17)
AST SERPL-CCNC: 10 U/L — SIGNIFICANT CHANGE UP (ref 10–40)
AST SERPL-CCNC: 11 U/L — SIGNIFICANT CHANGE UP (ref 10–40)
BASOPHILS # BLD AUTO: 0 K/UL — SIGNIFICANT CHANGE UP (ref 0–0.2)
BASOPHILS NFR BLD AUTO: 0 % — SIGNIFICANT CHANGE UP (ref 0–2)
BILIRUB SERPL-MCNC: 0.4 MG/DL — SIGNIFICANT CHANGE UP (ref 0.2–1.2)
BILIRUB SERPL-MCNC: 0.5 MG/DL — SIGNIFICANT CHANGE UP (ref 0.2–1.2)
BLD GP AB SCN SERPL QL: NEGATIVE — SIGNIFICANT CHANGE UP
BUN SERPL-MCNC: 13 MG/DL — SIGNIFICANT CHANGE UP (ref 7–23)
BUN SERPL-MCNC: 13 MG/DL — SIGNIFICANT CHANGE UP (ref 7–23)
CALCIUM SERPL-MCNC: 8.5 MG/DL — SIGNIFICANT CHANGE UP (ref 8.4–10.5)
CALCIUM SERPL-MCNC: 8.6 MG/DL — SIGNIFICANT CHANGE UP (ref 8.4–10.5)
CHLORIDE SERPL-SCNC: 102 MMOL/L — SIGNIFICANT CHANGE UP (ref 96–108)
CHLORIDE SERPL-SCNC: 104 MMOL/L — SIGNIFICANT CHANGE UP (ref 96–108)
CO2 SERPL-SCNC: 23 MMOL/L — SIGNIFICANT CHANGE UP (ref 22–31)
CO2 SERPL-SCNC: 23 MMOL/L — SIGNIFICANT CHANGE UP (ref 22–31)
CREAT SERPL-MCNC: 1.05 MG/DL — SIGNIFICANT CHANGE UP (ref 0.5–1.3)
CREAT SERPL-MCNC: 1.1 MG/DL — SIGNIFICANT CHANGE UP (ref 0.5–1.3)
EGFR: 73 ML/MIN/1.73M2 — SIGNIFICANT CHANGE UP
EGFR: 73 ML/MIN/1.73M2 — SIGNIFICANT CHANGE UP
EGFR: 77 ML/MIN/1.73M2 — SIGNIFICANT CHANGE UP
EGFR: 77 ML/MIN/1.73M2 — SIGNIFICANT CHANGE UP
EOSINOPHIL # BLD AUTO: 0 K/UL — SIGNIFICANT CHANGE UP (ref 0–0.5)
EOSINOPHIL NFR BLD AUTO: 0 % — SIGNIFICANT CHANGE UP (ref 0–6)
GLUCOSE SERPL-MCNC: 115 MG/DL — HIGH (ref 70–99)
GLUCOSE SERPL-MCNC: 121 MG/DL — HIGH (ref 70–99)
HCT VFR BLD CALC: 20.8 % — CRITICAL LOW (ref 39–50)
HGB BLD-MCNC: 7.1 G/DL — LOW (ref 13–17)
LDH SERPL L TO P-CCNC: 163 U/L — SIGNIFICANT CHANGE UP (ref 50–242)
LDH SERPL L TO P-CCNC: 172 U/L — SIGNIFICANT CHANGE UP (ref 50–242)
LYMPHOCYTES # BLD AUTO: 0.61 K/UL — LOW (ref 1–3.3)
LYMPHOCYTES # BLD AUTO: 100 % — HIGH (ref 13–44)
MAGNESIUM SERPL-MCNC: 1.7 MG/DL — SIGNIFICANT CHANGE UP (ref 1.6–2.6)
MANUAL SMEAR VERIFICATION: SIGNIFICANT CHANGE UP
MCHC RBC-ENTMCNC: 33.3 PG — SIGNIFICANT CHANGE UP (ref 27–34)
MCHC RBC-ENTMCNC: 34.1 G/DL — SIGNIFICANT CHANGE UP (ref 32–36)
MCV RBC AUTO: 97.7 FL — SIGNIFICANT CHANGE UP (ref 80–100)
MONOCYTES # BLD AUTO: 0 K/UL — SIGNIFICANT CHANGE UP (ref 0–0.9)
MONOCYTES NFR BLD AUTO: 0 % — LOW (ref 2–14)
NEUTROPHILS # BLD AUTO: 0 K/UL — LOW (ref 1.8–7.4)
NEUTROPHILS NFR BLD AUTO: 0 % — LOW (ref 43–77)
NRBC # BLD: 0 /100 WBCS — SIGNIFICANT CHANGE UP (ref 0–0)
NRBC BLD-RTO: 0 /100 WBCS — SIGNIFICANT CHANGE UP (ref 0–0)
PHOSPHATE SERPL-MCNC: 3.5 MG/DL — SIGNIFICANT CHANGE UP (ref 2.5–4.5)
PLAT MORPH BLD: NORMAL — SIGNIFICANT CHANGE UP
PLATELET # BLD AUTO: 20 K/UL — CRITICAL LOW (ref 150–400)
POTASSIUM SERPL-MCNC: 3.5 MMOL/L — SIGNIFICANT CHANGE UP (ref 3.5–5.3)
POTASSIUM SERPL-MCNC: 3.6 MMOL/L — SIGNIFICANT CHANGE UP (ref 3.5–5.3)
POTASSIUM SERPL-SCNC: 3.5 MMOL/L — SIGNIFICANT CHANGE UP (ref 3.5–5.3)
POTASSIUM SERPL-SCNC: 3.6 MMOL/L — SIGNIFICANT CHANGE UP (ref 3.5–5.3)
PROT SERPL-MCNC: 5.2 G/DL — LOW (ref 6–8.3)
PROT SERPL-MCNC: 5.6 G/DL — LOW (ref 6–8.3)
RBC # BLD: 2.13 M/UL — LOW (ref 4.2–5.8)
RBC # FLD: 13.4 % — SIGNIFICANT CHANGE UP (ref 10.3–14.5)
RBC BLD AUTO: SIGNIFICANT CHANGE UP
RH IG SCN BLD-IMP: POSITIVE — SIGNIFICANT CHANGE UP
SODIUM SERPL-SCNC: 138 MMOL/L — SIGNIFICANT CHANGE UP (ref 135–145)
SODIUM SERPL-SCNC: 140 MMOL/L — SIGNIFICANT CHANGE UP (ref 135–145)
URATE SERPL-MCNC: 4 MG/DL — SIGNIFICANT CHANGE UP (ref 3.4–8.8)
WBC # BLD: 0.61 K/UL — CRITICAL LOW (ref 3.8–10.5)
WBC # FLD AUTO: 0.61 K/UL — CRITICAL LOW (ref 3.8–10.5)

## 2025-05-11 PROCEDURE — 99233 SBSQ HOSP IP/OBS HIGH 50: CPT

## 2025-05-11 PROCEDURE — 71045 X-RAY EXAM CHEST 1 VIEW: CPT | Mod: 26

## 2025-05-11 RX ORDER — LIDOCAINE HYDROCHLORIDE 20 MG/ML
1 JELLY TOPICAL EVERY 24 HOURS
Refills: 0 | Status: DISCONTINUED | OUTPATIENT
Start: 2025-05-11 | End: 2025-05-30

## 2025-05-11 RX ORDER — HYDROMORPHONE/SOD CHLOR,ISO/PF 2 MG/10 ML
0.5 SYRINGE (ML) INJECTION ONCE
Refills: 0 | Status: DISCONTINUED | OUTPATIENT
Start: 2025-05-11 | End: 2025-05-11

## 2025-05-11 RX ORDER — CEFEPIME 2 G/20ML
2000 INJECTION, POWDER, FOR SOLUTION INTRAVENOUS ONCE
Refills: 0 | Status: COMPLETED | OUTPATIENT
Start: 2025-05-11 | End: 2025-05-11

## 2025-05-11 RX ORDER — CYCLOBENZAPRINE HYDROCHLORIDE 15 MG/1
10 CAPSULE, EXTENDED RELEASE ORAL THREE TIMES A DAY
Refills: 0 | Status: DISCONTINUED | OUTPATIENT
Start: 2025-05-11 | End: 2025-05-30

## 2025-05-11 RX ORDER — CEFEPIME 2 G/20ML
INJECTION, POWDER, FOR SOLUTION INTRAVENOUS
Refills: 0 | Status: DISCONTINUED | OUTPATIENT
Start: 2025-05-11 | End: 2025-05-19

## 2025-05-11 RX ORDER — CYCLOBENZAPRINE HYDROCHLORIDE 15 MG/1
10 CAPSULE, EXTENDED RELEASE ORAL ONCE
Refills: 0 | Status: COMPLETED | OUTPATIENT
Start: 2025-05-11 | End: 2025-05-11

## 2025-05-11 RX ORDER — CEFEPIME 2 G/20ML
2000 INJECTION, POWDER, FOR SOLUTION INTRAVENOUS EVERY 8 HOURS
Refills: 0 | Status: DISCONTINUED | OUTPATIENT
Start: 2025-05-12 | End: 2025-05-19

## 2025-05-11 RX ORDER — DIPHENHYDRAMINE HYDROCHLORIDE AND LIDOCAINE HYDROCHLORIDE AND ALUMINUM HYDROXIDE AND MAGNESIUM HYDRO
10 KIT
Refills: 0 | Status: DISCONTINUED | OUTPATIENT
Start: 2025-05-11 | End: 2025-05-13

## 2025-05-11 RX ORDER — HYDROMORPHONE/SOD CHLOR,ISO/PF 2 MG/10 ML
0.5 SYRINGE (ML) INJECTION EVERY 4 HOURS
Refills: 0 | Status: DISCONTINUED | OUTPATIENT
Start: 2025-05-11 | End: 2025-05-11

## 2025-05-11 RX ADMIN — DIPHENHYDRAMINE HYDROCHLORIDE AND LIDOCAINE HYDROCHLORIDE AND ALUMINUM HYDROXIDE AND MAGNESIUM HYDRO 10 MILLILITER(S): KIT at 13:06

## 2025-05-11 RX ADMIN — OXYCODONE HYDROCHLORIDE 5 MILLIGRAM(S): 30 TABLET ORAL at 21:59

## 2025-05-11 RX ADMIN — Medication 650 MILLIGRAM(S): at 21:58

## 2025-05-11 RX ADMIN — OXYCODONE HYDROCHLORIDE 5 MILLIGRAM(S): 30 TABLET ORAL at 05:58

## 2025-05-11 RX ADMIN — Medication 650 MILLIGRAM(S): at 06:33

## 2025-05-11 RX ADMIN — DIPHENHYDRAMINE HYDROCHLORIDE AND LIDOCAINE HYDROCHLORIDE AND ALUMINUM HYDROXIDE AND MAGNESIUM HYDRO 5 MILLILITER(S): KIT at 09:08

## 2025-05-11 RX ADMIN — Medication 0.5 MILLIGRAM(S): at 12:30

## 2025-05-11 RX ADMIN — CYCLOBENZAPRINE HYDROCHLORIDE 10 MILLIGRAM(S): 15 CAPSULE, EXTENDED RELEASE ORAL at 13:02

## 2025-05-11 RX ADMIN — CEFEPIME 100 MILLIGRAM(S): 2 INJECTION, POWDER, FOR SOLUTION INTRAVENOUS at 23:55

## 2025-05-11 RX ADMIN — Medication 0.5 MILLIGRAM(S): at 18:30

## 2025-05-11 RX ADMIN — Medication 2000 UNIT(S): at 11:46

## 2025-05-11 RX ADMIN — LIDOCAINE HYDROCHLORIDE 1 PATCH: 20 JELLY TOPICAL at 17:59

## 2025-05-11 RX ADMIN — DILTIAZEM HYDROCHLORIDE 240 MILLIGRAM(S): 240 TABLET, EXTENDED RELEASE ORAL at 05:59

## 2025-05-11 RX ADMIN — Medication 0.5 MILLIGRAM(S): at 11:42

## 2025-05-11 RX ADMIN — AMOXICILLIN 500 MILLIGRAM(S): 500 CAPSULE ORAL at 05:59

## 2025-05-11 RX ADMIN — AMOXICILLIN 500 MILLIGRAM(S): 500 CAPSULE ORAL at 17:59

## 2025-05-11 RX ADMIN — Medication 500 MILLIGRAM(S): at 05:58

## 2025-05-11 RX ADMIN — Medication 15 MILLILITER(S): at 23:54

## 2025-05-11 RX ADMIN — OXYCODONE HYDROCHLORIDE 5 MILLIGRAM(S): 30 TABLET ORAL at 16:30

## 2025-05-11 RX ADMIN — Medication 15 MILLILITER(S): at 17:59

## 2025-05-11 RX ADMIN — Medication 500 MILLIGRAM(S): at 18:00

## 2025-05-11 RX ADMIN — TAMSULOSIN HYDROCHLORIDE 0.4 MILLIGRAM(S): 0.4 CAPSULE ORAL at 22:07

## 2025-05-11 RX ADMIN — Medication 650 MILLIGRAM(S): at 06:03

## 2025-05-11 RX ADMIN — Medication 5 MILLIGRAM(S): at 22:06

## 2025-05-11 RX ADMIN — Medication 650 MILLIGRAM(S): at 22:28

## 2025-05-11 RX ADMIN — Medication 40 MILLIGRAM(S): at 05:58

## 2025-05-11 RX ADMIN — OXYCODONE HYDROCHLORIDE 5 MILLIGRAM(S): 30 TABLET ORAL at 15:43

## 2025-05-11 RX ADMIN — Medication 15 MILLILITER(S): at 05:58

## 2025-05-11 RX ADMIN — OXYCODONE HYDROCHLORIDE 5 MILLIGRAM(S): 30 TABLET ORAL at 22:29

## 2025-05-11 RX ADMIN — Medication 0.5 MILLIGRAM(S): at 17:55

## 2025-05-11 RX ADMIN — Medication 15 MILLILITER(S): at 11:46

## 2025-05-11 RX ADMIN — Medication 300 MILLIGRAM(S): at 11:47

## 2025-05-11 RX ADMIN — Medication 1 APPLICATION(S): at 06:03

## 2025-05-11 RX ADMIN — POSACONAZOLE 300 MILLIGRAM(S): 100 TABLET, DELAYED RELEASE ORAL at 18:00

## 2025-05-11 RX ADMIN — OXYCODONE HYDROCHLORIDE 5 MILLIGRAM(S): 30 TABLET ORAL at 06:28

## 2025-05-11 RX ADMIN — LIDOCAINE HYDROCHLORIDE 1 PATCH: 20 JELLY TOPICAL at 19:41

## 2025-05-11 NOTE — PROGRESS NOTE ADULT - NS ATTEND AMEND GEN_ALL_CORE FT
Primary: Paulie    69M with ASXL1-mutated and DSD59-avimpkt AML. 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 Admitted for induction 7+3 - day 9.    Heme:   - Continue 7+3 (5/2/25 - ). Complicated by rigors, chills, and tachycardia 2 hours into day 1 infusion - likely infusion reaction.  - Trend CBC with differential daily. Goal Hgb > 7 and plt > 10  (> 15 if febrile, > 50 if bleeding).   - Trend DIC labs daily. Continue supportive transfusions as needed to maintain fibrinogen > 100.   - Trend TLS labs twice daily.   - Continue allopurinol. S/p rasburicase; consider additional doses if uric acid > 8.  - Continue IVF. Monitor daily weights and I/O's. Diurese PRN.     ID: Febrile once on 5/2/25 but likely in the setting of infusion reaction  - Prophylaxis: levaquin, amoxicillin, posaconazole, valacyclovir     MSK: Has chronic pain from prior surgeries   - Pain control: oxycodone Primary: Paulie    69M with ASXL1-mutated and SKV07-mlzorjt AML. 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 Admitted for induction 7+3 - day 10.    Heme:   - Continue 7+3 (5/2/25 - ). Complicated by rigors, chills, and tachycardia 2 hours into day 1 infusion - likely infusion reaction.  - Trend CBC with differential daily. Goal Hgb > 7 and plt > 10  (> 15 if febrile, > 50 if bleeding).   - Trend DIC labs daily. Continue supportive transfusions as needed to maintain fibrinogen > 100.   - Trend TLS labs twice daily.   - Continue allopurinol. S/p rasburicase; consider additional doses if uric acid > 8.  - Continue IVF. Monitor daily weights and I/O's. Diurese PRN.     ID: Febrile once on 5/2/25 but likely in the setting of infusion reaction  - Prophylaxis: levaquin, amoxicillin, posaconazole, valacyclovir     MSK: Has chronic pain from prior surgeries   - Pain control: oxycodone

## 2025-05-11 NOTE — PROGRESS NOTE ADULT - SUBJECTIVE AND OBJECTIVE BOX
Diagnosis: ASXL1-mutated and TRL16-ljsvhdj AML    Protocol/Chemo Regimen: 7+3:  Daunorubicin 60 mg/m2+ Cytarabine     Day: 10    Pt endorsed: +abdominal discomfort +reflux     Review of Systems: Patient denied nausea, vomiting, mouth pain,  chest pain, cough, dyspnea, abdominal pain,  diarrhea, rectal pain,  rash, fatigue, headache, bleeding      Pain scale:       mild                                 Location: cervical/low back     Diet:  regular     Allergies    No Known Allergies    Intolerances    Shrimp- Nasal congestion (Other)          ANTIMICROBIALS  amoxicillin 500 milliGRAM(s) Oral two times a day  levoFLOXacin  Tablet 500 milliGRAM(s) Oral every 24 hours  posaconazole DR Tablet   Oral   posaconazole DR Tablet 300 milliGRAM(s) Oral every 24 hours  valACYclovir 500 milliGRAM(s) Oral two times a day      HEME/ONC MEDICATIONS      STANDING MEDICATIONS  allopurinol 300 milliGRAM(s) Oral daily  Biotene Dry Mouth Oral Rinse 15 milliLiter(s) Swish and Spit four times a day  chlorhexidine 4% Liquid 1 Application(s) Topical <User Schedule>  cholecalciferol 2000 Unit(s) Oral daily  diltiazem    milliGRAM(s) Oral daily  hydrochlorothiazide 50 milliGRAM(s) Oral daily  melatonin 5 milliGRAM(s) Oral at bedtime  pantoprazole    Tablet 40 milliGRAM(s) Oral before breakfast  sodium chloride 0.9%. 1000 milliLiter(s) IV Continuous <Continuous>  tamsulosin 0.4 milliGRAM(s) Oral at bedtime      PRN MEDICATIONS  acetaminophen     Tablet .. 650 milliGRAM(s) Oral every 6 hours PRN  albuterol    90 MICROgram(s) HFA Inhaler 2 Puff(s) Inhalation every 6 hours PRN  aluminum hydroxide/magnesium hydroxide/simethicone Suspension 30 milliLiter(s) Oral every 6 hours PRN  calcium carbonate    500 mG (Tums) Chewable 1 Tablet(s) Chew four times a day PRN  FIRST- Mouthwash  BLM 5 milliLiter(s) Swish and Spit four times a day PRN  hydrocortisone sodium succinate Injectable 100 milliGRAM(s) IV Push every 8 hours PRN  metoclopramide Injectable 10 milliGRAM(s) IV Push every 6 hours PRN  ondansetron Injectable 8 milliGRAM(s) IV Push every 8 hours PRN  oxyCODONE    IR 5 milliGRAM(s) Oral every 4 hours PRN  oxyCODONE    IR 2.5 milliGRAM(s) Oral every 4 hours PRN  polyethylene glycol 3350 17 Gram(s) Oral two times a day PRN  simethicone 80 milliGRAM(s) Chew every 6 hours PRN  sodium chloride 0.9% lock flush 10 milliLiter(s) IV Push every 1 hour PRN        Vital Signs Last 24 Hrs  T(C): 37.8 (11 May 2025 05:48), Max: 37.8 (11 May 2025 05:48)  T(F): 100.1 (11 May 2025 05:48), Max: 100.1 (11 May 2025 05:48)  HR: 90 (11 May 2025 05:48) (82 - 93)  BP: 125/77 (11 May 2025 05:48) (112/67 - 143/81)  BP(mean): --  RR: 18 (11 May 2025 05:48) (17 - 18)  SpO2: 93% (11 May 2025 05:48) (93% - 97%)    Parameters below as of 11 May 2025 05:48  Patient On (Oxygen Delivery Method): room air      PHYSICAL EXAM  General: NAD  HEENT:  clear oropharynx, anicteric sclera  CV: (+) S1/S2 RRR  Lungs: clear to auscultation, no wheezes or rales  Abdomen: soft, non-tender, non-distended (+) BS  Ext: no  edema  Skin: no rash  Neuro: alert and oriented X 3  Central Line: TLC c/d/i       RECENT CULTURES:        LABS:                        7.5    0.87  )-----------( 27       ( 10 May 2025 06:58 )             22.5         Mean Cell Volume : 99.1 fl  Mean Cell Hemoglobin : 33.0 pg  Mean Cell Hemoglobin Concentration : 33.3 g/dL  Auto Neutrophil # : x  Auto Lymphocyte # : x  Auto Monocyte # : x  Auto Eosinophil # : x  Auto Basophil # : x  Auto Neutrophil % : x  Auto Lymphocyte % : x  Auto Monocyte % : x  Auto Eosinophil % : x  Auto Basophil % : x      05-10    136  |  102  |  12  ----------------------------<  109[H]  3.5   |  22  |  1.05    Ca    8.7      10 May 2025 18:49  Phos  3.2     05-10  Mg     1.7     05-10    TPro  5.6[L]  /  Alb  3.6  /  TBili  0.5  /  DBili  x   /  AST  13  /  ALT  13  /  AlkPhos  78  05-10      Mg 1.7  Phos 3.2            Uric Acid 4.2        RADIOLOGY & ADDITIONAL STUDIES:         Diagnosis: ASXL1-mutated and KNH18-mhjqjpu AML    Protocol/Chemo Regimen: 7+3:  Daunorubicin 60 mg/m2+ Cytarabine     Day: 10    Pt endorsed: + chronic neck pain worse today, stiff. +fatigue     Review of Systems: Patient denied nausea, vomiting, mouth pain,  chest pain, cough, dyspnea, abdominal pain,  diarrhea, rectal pain,  rash, fatigue, headache, bleeding      Pain scale:    8/10                                 Location: neck     Diet:  regular     Allergies    No Known Allergies    Intolerances    Shrimp- Nasal congestion (Other)          ANTIMICROBIALS  amoxicillin 500 milliGRAM(s) Oral two times a day  levoFLOXacin  Tablet 500 milliGRAM(s) Oral every 24 hours  posaconazole DR Tablet   Oral   posaconazole DR Tablet 300 milliGRAM(s) Oral every 24 hours  valACYclovir 500 milliGRAM(s) Oral two times a day      HEME/ONC MEDICATIONS      STANDING MEDICATIONS  allopurinol 300 milliGRAM(s) Oral daily  Biotene Dry Mouth Oral Rinse 15 milliLiter(s) Swish and Spit four times a day  chlorhexidine 4% Liquid 1 Application(s) Topical <User Schedule>  cholecalciferol 2000 Unit(s) Oral daily  diltiazem    milliGRAM(s) Oral daily  hydrochlorothiazide 50 milliGRAM(s) Oral daily  melatonin 5 milliGRAM(s) Oral at bedtime  pantoprazole    Tablet 40 milliGRAM(s) Oral before breakfast  sodium chloride 0.9%. 1000 milliLiter(s) IV Continuous <Continuous>  tamsulosin 0.4 milliGRAM(s) Oral at bedtime      PRN MEDICATIONS  acetaminophen     Tablet .. 650 milliGRAM(s) Oral every 6 hours PRN  albuterol    90 MICROgram(s) HFA Inhaler 2 Puff(s) Inhalation every 6 hours PRN  aluminum hydroxide/magnesium hydroxide/simethicone Suspension 30 milliLiter(s) Oral every 6 hours PRN  calcium carbonate    500 mG (Tums) Chewable 1 Tablet(s) Chew four times a day PRN  FIRST- Mouthwash  BLM 5 milliLiter(s) Swish and Spit four times a day PRN  hydrocortisone sodium succinate Injectable 100 milliGRAM(s) IV Push every 8 hours PRN  metoclopramide Injectable 10 milliGRAM(s) IV Push every 6 hours PRN  ondansetron Injectable 8 milliGRAM(s) IV Push every 8 hours PRN  oxyCODONE    IR 5 milliGRAM(s) Oral every 4 hours PRN  oxyCODONE    IR 2.5 milliGRAM(s) Oral every 4 hours PRN  polyethylene glycol 3350 17 Gram(s) Oral two times a day PRN  simethicone 80 milliGRAM(s) Chew every 6 hours PRN  sodium chloride 0.9% lock flush 10 milliLiter(s) IV Push every 1 hour PRN        Vital Signs Last 24 Hrs  T(C): 37.8 (11 May 2025 05:48), Max: 37.8 (11 May 2025 05:48)  T(F): 100.1 (11 May 2025 05:48), Max: 100.1 (11 May 2025 05:48)  HR: 90 (11 May 2025 05:48) (82 - 93)  BP: 125/77 (11 May 2025 05:48) (112/67 - 143/81)  BP(mean): --  RR: 18 (11 May 2025 05:48) (17 - 18)  SpO2: 93% (11 May 2025 05:48) (93% - 97%)    Parameters below as of 11 May 2025 05:48  Patient On (Oxygen Delivery Method): room air      PHYSICAL EXAM  General: NAD  HEENT:  clear oropharynx, anicteric sclera  CV: (+) S1/S2 RRR  Lungs: clear to auscultation, no wheezes or rales  Abdomen: soft, non-tender, non-distended (+) BS  Ext: no  edema  Skin: no rash  Neuro: alert and oriented X 3  Central Line: TLC c/d/i       LABS:    Blood Cultures:                           7.1    0.61  )-----------( 20       ( 11 May 2025 07:06 )             20.8         Mean Cell Volume : 97.7 fl  Mean Cell Hemoglobin : 33.3 pg  Mean Cell Hemoglobin Concentration : 34.1 g/dL  Auto Neutrophil # : x  Auto Lymphocyte # : x  Auto Monocyte # : x  Auto Eosinophil # : x  Auto Basophil # : x  Auto Neutrophil % : x  Auto Lymphocyte % : x  Auto Monocyte % : x  Auto Eosinophil % : x  Auto Basophil % : x      05-11    140  |  104  |  13  ----------------------------<  121[H]  3.6   |  23  |  1.05    Ca    8.5      11 May 2025 07:06  Phos  3.2     05-10  Mg     1.7     05-10    TPro  5.2[L]  /  Alb  3.4  /  TBili  0.4  /  DBili  x   /  AST  11  /  ALT  12  /  AlkPhos  75  05-11        Uric Acid --            RADIOLOGY & ADDITIONAL STUDIES:    < from: Xray Chest 1 View- PORTABLE-Urgent (Xray Chest 1 View- PORTABLE-Urgent .) (05.03.25 @ 10:36) >  IMPRESSION:    Tubes and lines as above.  Patchy opacities along the left lung base, possibly atelectasis, however   superimposed infection cannot be entirely excluded.    --- End of Report -    < end of copied text >

## 2025-05-11 NOTE — PROGRESS NOTE ADULT - PROBLEM SELECTOR PLAN 1
new ASXL1-mutated and LMY94-mdsqoxt AML.  admitted for induction chemo with dauno 60 mg/m2+ cytarabine. Chemo started on 5/2, Complicated by rigors, chills, fever, SOB  and tachycardia 2 hours into day 1 infusion - likely infusion. recevied pepcid/benadryl/solumedrol/tylenol iv. will make up D1 Cytarabine on day8    will proceed with day2 chemo with pre mdes Tylenol and Benadryl, hydrocortisone iv PRN reaction   Hepatitis screen/HIV(-) as outpatient  MUGA normal EF, IR TLC    IVF, antiemetics, mouth care. monitor weight, I & O, diuresis PRN   Monitor CBC with diff QD, TLS labs BID, Coags & T & C TIW transfuse for hb<7, PLT <10  5/5 HLA sent  day 14 BM bx on 5/16

## 2025-05-11 NOTE — PROGRESS NOTE ADULT - ASSESSMENT
70 yo male with newly diagnosed ASXL1, DDX41 mutated AML, admitted for induction chemo with 7+3:  dauno 60 mg/m2+ cytarabine. 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 Chemo started on 5/2, Complicated by rigors, chills, fever, SOB  and tachycardia 2 hours into day 1 infusion - likely infusion reaction. Pt has pancytopenia d/t chemo and disease

## 2025-05-11 NOTE — CHART NOTE - NSCHARTNOTEFT_GEN_A_CORE
Medicine PA Fever Note    Patient is a 69y old  Male who presents with a chief complaint of Chemotherapy (11 May 2025 07:35)      Event Summary:   Notified by RN patient with fever, Temp- .   Patient seen and examined patient at bedside.  Patient is alert, denies HA, visual changes, CP, SOB, cough, N/V, dysuria, or abd pain.    >VITAL SIGNS:  T(C): 38.7 (05-11-25 @ 21:20), Max: 38.7 (05-11-25 @ 21:20)  T(F): 101.6 (05-11-25 @ 21:20), Max: 101.6 (05-11-25 @ 21:20)  HR: 111 (05-11-25 @ 21:20) (82 - 111)  BP: 103/70 (05-11-25 @ 21:20) (103/70 - 135/80)  RR: 18 (05-11-25 @ 21:20) (17 - 18)  SpO2: 92% (05-11-25 @ 21:20) (92% - 97%)      >Review Of Systems:   CONSTITUTIONAL:  No fever.   No chills  EYES: No visual changes.    ENT:  No  throat pain.  No neck stiffness  RESPIRATORY: No cough, wheezing, hemoptysis; No shortness of breath  CARDIOVASCULAR: No chest pain or palpitations  GASTROINTESTINAL: No abdominal or epigastric pain.  No diarrhea.    GENITOURINARY: No dysuria, frequency or hematuria  NEUROLOGICAL: No numbness or weakness  SKIN: No itching, burning, rashes, or lesions       >PHYSICAL EXAM:  Constitutional: AOx3. NAD.  Neuro:  Grossly intact   Mouth:   Cardiovascular: +S1 S2. RRR.  No murmurs.  No LE edema.  Respiratory:  Even, unlabored.  Clear lungs bilaterally. No wheezing, rhonchi, or crackles.  Gastrointestinal: +BS X4 active. Soft. Nontender. Nondistended   Extremities/Vascular: +2 pulses bilaterally.   Skin:  +Feverish to touch     >MEDICATIONS:    MEDICATIONS  (STANDING):  allopurinol 300 milliGRAM(s) Oral daily  Biotene Dry Mouth Oral Rinse 15 milliLiter(s) Swish and Spit four times a day  cefepime   IVPB      chlorhexidine 4% Liquid 1 Application(s) Topical <User Schedule>  cholecalciferol 2000 Unit(s) Oral daily  diltiazem    milliGRAM(s) Oral daily  hydrochlorothiazide 50 milliGRAM(s) Oral daily  lidocaine   4% Patch 1 Patch Transdermal every 24 hours  melatonin 5 milliGRAM(s) Oral at bedtime  pantoprazole    Tablet 40 milliGRAM(s) Oral before breakfast  posaconazole DR Tablet   Oral   posaconazole DR Tablet 300 milliGRAM(s) Oral every 24 hours  sodium chloride 0.9%. 1000 milliLiter(s) (100 mL/Hr) IV Continuous <Continuous>  tamsulosin 0.4 milliGRAM(s) Oral at bedtime  valACYclovir 500 milliGRAM(s) Oral two times a day      >LABORATORY:                          7.1    0.61  )-----------( 20       ( 11 May 2025 07:06 )             20.8       05-11    138  |  102  |  13  ----------------------------<  115[H]  3.5   |  23  |  1.10    Ca    8.6      11 May 2025 18:35  Phos  3.5     05-11  Mg     1.7     05-11    TPro  5.6[L]  /  Alb  3.5  /  TBili  0.5  /  DBili  x   /  AST  10  /  ALT  13  /  AlkPhos  77  05-11          >MICROBIOLOGY:     Urine Culture:   Blood Culture:    >RADIOLOGY:    CXR:     >ASSESSMENT/PLAN:   HPI:  70 yo male with newly diagnosed ASXL1, DDX41 mutated AML, admitted for induction chemo with dauno 60 mg/m2+ cytarabine. PMH  BRCA2 carrier ([id number] 9610C>T), HTN, HLD, Gout, GERD, BPH, spinal stenosis (s/p multiple procedures 2018, 2020 laminectomies / fusions), hip surgeries (Oct 2024, Jan 2025), residual hand weakness related to surgeries, umbilical hernia repair (2014/15) referred here from Dr Atkinson at [facility] in [place] for new ASXL1-mutated and VBE07-aqmjblo AML.   5/2 Admit for Induction 7+3 Dauno 60mg/m2 + Cytarabine 100mg/m2     Pt endorsed chronic cervical and lower back pain, taking Ibuprofen PRN basis.  (02 May 2025 07:47)      1) Fever - Recurrent   - Tylenol / Cooling measures prn for Pyrexia  - BC x2,  UA/UC  - CXR  - c/w Current Abx/ Antifungal/ IVF regimen   - Monitor VS  - ID on board, f/u with team  - F/U Primary  care team in      Sidra Ram PA-C  Department of Medicine  [facility] [address] Medicine PA Fever Note    Patient is a 69y old M admitted for newly diagnosed AML.       Event Summary:   Notified by RN patient with a febrile episode with Oamh439.3, , /70 sating well on RA. Patient seen and examined patient at bedside.  Patient is alert, admits to mild HA, denies dizziness, visual changes, CP, SOB, cough, N/V, dysuria, or abd pain.    >VITAL SIGNS:  T(C): 38.7 (05-11-25 @ 21:20), Max: 38.7 (05-11-25 @ 21:20)  T(F): 101.6 (05-11-25 @ 21:20), Max: 101.6 (05-11-25 @ 21:20)  HR: 111 (05-11-25 @ 21:20) (82 - 111)  BP: 103/70 (05-11-25 @ 21:20) (103/70 - 135/80)  RR: 18 (05-11-25 @ 21:20) (17 - 18)  SpO2: 92% (05-11-25 @ 21:20) (92% - 97%)      >Review Of Systems:   CONSTITUTIONAL: Febrile.   No chills  EYES: No visual changes.    ENT:  No  throat pain.  No neck stiffness  RESPIRATORY: No cough, wheezing, hemoptysis; No shortness of breath  CARDIOVASCULAR: No chest pain or palpitations  GASTROINTESTINAL: No abdominal or epigastric pain.  No diarrhea.    GENITOURINARY: No dysuria, frequency or hematuria  NEUROLOGICAL: No numbness or weakness  SKIN: No itching, burning, rashes, or lesions       >PHYSICAL EXAM:  Constitutional: AOx3. NAD.  Neuro:  Grossly intact   Cardiovascular: +S1 S2. RRR.  No murmurs.  No LE edema.  Respiratory:  Even, unlabored.  Clear lungs bilaterally. No wheezing, rhonchi, or crackles.  Gastrointestinal: +BS X4 active. Soft. Nontender. Nondistended   Extremities/Vascular: +2 pulses bilaterally.   Skin:  +Feverish to touch     >MEDICATIONS:    MEDICATIONS  (STANDING):  allopurinol 300 milliGRAM(s) Oral daily  Biotene Dry Mouth Oral Rinse 15 milliLiter(s) Swish and Spit four times a day  cefepime   IVPB      chlorhexidine 4% Liquid 1 Application(s) Topical <User Schedule>  cholecalciferol 2000 Unit(s) Oral daily  diltiazem    milliGRAM(s) Oral daily  hydrochlorothiazide 50 milliGRAM(s) Oral daily  lidocaine   4% Patch 1 Patch Transdermal every 24 hours  melatonin 5 milliGRAM(s) Oral at bedtime  pantoprazole    Tablet 40 milliGRAM(s) Oral before breakfast  posaconazole DR Tablet   Oral   posaconazole DR Tablet 300 milliGRAM(s) Oral every 24 hours  sodium chloride 0.9%. 1000 milliLiter(s) (100 mL/Hr) IV Continuous <Continuous>  tamsulosin 0.4 milliGRAM(s) Oral at bedtime  valACYclovir 500 milliGRAM(s) Oral two times a day      >LABORATORY:                          7.1    0.61  )-----------( 20       ( 11 May 2025 07:06 )             20.8       05-11    138  |  102  |  13  ----------------------------<  115[H]  3.5   |  23  |  1.10    Ca    8.6      11 May 2025 18:35  Phos  3.5     05-11  Mg     1.7     05-11    TPro  5.6[L]  /  Alb  3.5  /  TBili  0.5  /  DBili  x   /  AST  10  /  ALT  13  /  AlkPhos  77  05-11          >MICROBIOLOGY:     Urine Culture: Culture - Urine (05.03.25 @ 10:07)    Specimen Source: Clean Catch Clean Catch (Midstream)    Culture Results: No growth    Blood Culture: Culture - Blood (05.02.25 @ 20:00)    Specimen Source: Blood Blood-Peripheral    Culture Results: No growth at 5 days      >RADIOLOGY:    CXR: < from: Xray Chest 1 View- PORTABLE-Urgent (Xray Chest 1 View- PORTABLE-Urgent .) (05.03.25 @ 10:36) >    IMPRESSION:    Tubes and lines as above.  Patchy opacities along the left lung base, possibly atelectasis, however   superimposed infection cannot be entirely excluded.    < end of copied text >        >ASSESSMENT/PLAN:   HPI:  68 yo male with newly diagnosed ASXL1, DDX41 mutated AML, admitted for induction chemo with dauno 60 mg/m2+ cytarabine. PMH  BRCA2 carrier ([id number] 9610C>T), HTN, HLD, Gout, GERD, BPH, spinal stenosis (s/p multiple procedures 2018, 2020 laminectomies / fusions), hip surgeries (Oct 2024, Jan 2025), residual hand weakness related to surgeries, umbilical hernia repair (2014/15) referred here from Dr Atkinson at [facility] in [place] for new ASXL1-mutated and VZR80-ankhpoi AML.   5/2 Admit for Induction 7+3 Dauno 60mg/m2 + Cytarabine 100mg/m2 Pt endorsed chronic cervical and lower back pain, taking Ibuprofen PRN basis.     pt now with a febrile episode with Pasp200.3, , /70 sating well on RA. Patient seen and examined patient at bedside.  Patient is alert, admits to mild HA, denies dizziness, visual changes, CP, SOB, cough, N/V, dysuria, or abd pain.    1) Fever - Neutropenic fever   - Tylenol / Cooling measures prn for Pyrexia  - BC x2,  UA/UC  - CXR ordered as urgent   - PPX Abx changed to Cefepime IV 2gram q8 hrs   - Monitor VS  - consider ID consult in am   - will d/w primary in am and attending to follow    Sidra Ram PA-C  Department of Medicine  Spect[facility]ink [id number]

## 2025-05-12 LAB
ADD ON TEST-SPECIMEN IN LAB: SIGNIFICANT CHANGE UP
ALBUMIN SERPL ELPH-MCNC: 3.2 G/DL — LOW (ref 3.3–5)
ALBUMIN SERPL ELPH-MCNC: 3.4 G/DL — SIGNIFICANT CHANGE UP (ref 3.3–5)
ALP SERPL-CCNC: 68 U/L — SIGNIFICANT CHANGE UP (ref 40–120)
ALP SERPL-CCNC: 69 U/L — SIGNIFICANT CHANGE UP (ref 40–120)
ALT FLD-CCNC: 10 U/L — SIGNIFICANT CHANGE UP (ref 10–45)
ALT FLD-CCNC: 10 U/L — SIGNIFICANT CHANGE UP (ref 10–45)
ANION GAP SERPL CALC-SCNC: 13 MMOL/L — SIGNIFICANT CHANGE UP (ref 5–17)
ANION GAP SERPL CALC-SCNC: 14 MMOL/L — SIGNIFICANT CHANGE UP (ref 5–17)
APPEARANCE UR: CLEAR — SIGNIFICANT CHANGE UP
APTT BLD: 26.6 SEC — SIGNIFICANT CHANGE UP (ref 26.1–36.8)
AST SERPL-CCNC: 11 U/L — SIGNIFICANT CHANGE UP (ref 10–40)
AST SERPL-CCNC: 9 U/L — LOW (ref 10–40)
BASOPHILS # BLD AUTO: 0 K/UL — SIGNIFICANT CHANGE UP (ref 0–0.2)
BASOPHILS NFR BLD AUTO: 0 % — SIGNIFICANT CHANGE UP (ref 0–2)
BILIRUB SERPL-MCNC: 0.5 MG/DL — SIGNIFICANT CHANGE UP (ref 0.2–1.2)
BILIRUB SERPL-MCNC: 0.5 MG/DL — SIGNIFICANT CHANGE UP (ref 0.2–1.2)
BILIRUB UR-MCNC: NEGATIVE — SIGNIFICANT CHANGE UP
BUN SERPL-MCNC: 13 MG/DL — SIGNIFICANT CHANGE UP (ref 7–23)
BUN SERPL-MCNC: 14 MG/DL — SIGNIFICANT CHANGE UP (ref 7–23)
CALCIUM SERPL-MCNC: 8.4 MG/DL — SIGNIFICANT CHANGE UP (ref 8.4–10.5)
CALCIUM SERPL-MCNC: 8.5 MG/DL — SIGNIFICANT CHANGE UP (ref 8.4–10.5)
CHLORIDE SERPL-SCNC: 101 MMOL/L — SIGNIFICANT CHANGE UP (ref 96–108)
CHLORIDE SERPL-SCNC: 102 MMOL/L — SIGNIFICANT CHANGE UP (ref 96–108)
CO2 SERPL-SCNC: 22 MMOL/L — SIGNIFICANT CHANGE UP (ref 22–31)
CO2 SERPL-SCNC: 22 MMOL/L — SIGNIFICANT CHANGE UP (ref 22–31)
COLOR SPEC: YELLOW — SIGNIFICANT CHANGE UP
CREAT SERPL-MCNC: 1.13 MG/DL — SIGNIFICANT CHANGE UP (ref 0.5–1.3)
CREAT SERPL-MCNC: 1.23 MG/DL — SIGNIFICANT CHANGE UP (ref 0.5–1.3)
CULTURE RESULTS: SIGNIFICANT CHANGE UP
D DIMER BLD IA.RAPID-MCNC: 332 NG/ML DDU — HIGH
DIFF PNL FLD: NEGATIVE — SIGNIFICANT CHANGE UP
EGFR: 64 ML/MIN/1.73M2 — SIGNIFICANT CHANGE UP
EGFR: 64 ML/MIN/1.73M2 — SIGNIFICANT CHANGE UP
EGFR: 71 ML/MIN/1.73M2 — SIGNIFICANT CHANGE UP
EGFR: 71 ML/MIN/1.73M2 — SIGNIFICANT CHANGE UP
EOSINOPHIL # BLD AUTO: 0 K/UL — SIGNIFICANT CHANGE UP (ref 0–0.5)
EOSINOPHIL NFR BLD AUTO: 0 % — SIGNIFICANT CHANGE UP (ref 0–6)
FIBRINOGEN PPP-MCNC: 650 MG/DL — HIGH (ref 200–445)
GLUCOSE SERPL-MCNC: 118 MG/DL — HIGH (ref 70–99)
GLUCOSE SERPL-MCNC: 131 MG/DL — HIGH (ref 70–99)
GLUCOSE UR QL: NEGATIVE MG/DL — SIGNIFICANT CHANGE UP
HCT VFR BLD CALC: 19.2 % — CRITICAL LOW (ref 39–50)
HGB BLD-MCNC: 6.6 G/DL — CRITICAL LOW (ref 13–17)
INR BLD: 1.18 RATIO — HIGH (ref 0.85–1.16)
KETONES UR-MCNC: NEGATIVE MG/DL — SIGNIFICANT CHANGE UP
LDH SERPL L TO P-CCNC: 159 U/L — SIGNIFICANT CHANGE UP (ref 50–242)
LDH SERPL L TO P-CCNC: 160 U/L — SIGNIFICANT CHANGE UP (ref 50–242)
LEUKOCYTE ESTERASE UR-ACNC: NEGATIVE — SIGNIFICANT CHANGE UP
LYMPHOCYTES # BLD AUTO: 0.78 K/UL — LOW (ref 1–3.3)
LYMPHOCYTES # BLD AUTO: 100 % — HIGH (ref 13–44)
MAGNESIUM SERPL-MCNC: 1.6 MG/DL — SIGNIFICANT CHANGE UP (ref 1.6–2.6)
MAGNESIUM SERPL-MCNC: 1.7 MG/DL — SIGNIFICANT CHANGE UP (ref 1.6–2.6)
MANUAL SMEAR VERIFICATION: SIGNIFICANT CHANGE UP
MCHC RBC-ENTMCNC: 33.3 PG — SIGNIFICANT CHANGE UP (ref 27–34)
MCHC RBC-ENTMCNC: 34.4 G/DL — SIGNIFICANT CHANGE UP (ref 32–36)
MCV RBC AUTO: 97 FL — SIGNIFICANT CHANGE UP (ref 80–100)
MONOCYTES # BLD AUTO: 0 K/UL — SIGNIFICANT CHANGE UP (ref 0–0.9)
MONOCYTES NFR BLD AUTO: 0 % — LOW (ref 2–14)
NEUTROPHILS # BLD AUTO: 0 K/UL — LOW (ref 1.8–7.4)
NEUTROPHILS NFR BLD AUTO: 0 % — LOW (ref 43–77)
NITRITE UR-MCNC: NEGATIVE — SIGNIFICANT CHANGE UP
PH UR: 7.5 — SIGNIFICANT CHANGE UP (ref 5–8)
PHOSPHATE SERPL-MCNC: 2.8 MG/DL — SIGNIFICANT CHANGE UP (ref 2.5–4.5)
PHOSPHATE SERPL-MCNC: 3.7 MG/DL — SIGNIFICANT CHANGE UP (ref 2.5–4.5)
PLAT MORPH BLD: NORMAL — SIGNIFICANT CHANGE UP
PLATELET # BLD AUTO: 9 K/UL — CRITICAL LOW (ref 150–400)
POTASSIUM SERPL-MCNC: 3.3 MMOL/L — LOW (ref 3.5–5.3)
POTASSIUM SERPL-MCNC: 3.4 MMOL/L — LOW (ref 3.5–5.3)
POTASSIUM SERPL-SCNC: 3.3 MMOL/L — LOW (ref 3.5–5.3)
POTASSIUM SERPL-SCNC: 3.4 MMOL/L — LOW (ref 3.5–5.3)
PROT SERPL-MCNC: 5.3 G/DL — LOW (ref 6–8.3)
PROT SERPL-MCNC: 5.4 G/DL — LOW (ref 6–8.3)
PROT UR-MCNC: NEGATIVE MG/DL — SIGNIFICANT CHANGE UP
PROTHROM AB SERPL-ACNC: 13.4 SEC — SIGNIFICANT CHANGE UP (ref 9.9–13.4)
RBC # BLD: 1.98 M/UL — LOW (ref 4.2–5.8)
RBC # FLD: 13.3 % — SIGNIFICANT CHANGE UP (ref 10.3–14.5)
RBC BLD AUTO: SIGNIFICANT CHANGE UP
SODIUM SERPL-SCNC: 136 MMOL/L — SIGNIFICANT CHANGE UP (ref 135–145)
SODIUM SERPL-SCNC: 138 MMOL/L — SIGNIFICANT CHANGE UP (ref 135–145)
SP GR SPEC: 1.01 — SIGNIFICANT CHANGE UP (ref 1–1.03)
SPECIMEN SOURCE: SIGNIFICANT CHANGE UP
URATE SERPL-MCNC: 4.2 MG/DL — SIGNIFICANT CHANGE UP (ref 3.4–8.8)
UROBILINOGEN FLD QL: 0.2 MG/DL — SIGNIFICANT CHANGE UP (ref 0.2–1)
WBC # BLD: 0.78 K/UL — CRITICAL LOW (ref 3.8–10.5)
WBC # FLD AUTO: 0.78 K/UL — CRITICAL LOW (ref 3.8–10.5)

## 2025-05-12 PROCEDURE — 99233 SBSQ HOSP IP/OBS HIGH 50: CPT

## 2025-05-12 RX ORDER — ACETAMINOPHEN 500 MG/5ML
1000 LIQUID (ML) ORAL ONCE
Refills: 0 | Status: COMPLETED | OUTPATIENT
Start: 2025-05-12 | End: 2025-05-12

## 2025-05-12 RX ORDER — POTASSIUM PHOSPHATE, MONOBASIC POTASSIUM PHOSPHATE, DIBASIC INJECTION, 236; 224 MG/ML; MG/ML
30 SOLUTION, CONCENTRATE INTRAVENOUS ONCE
Refills: 0 | Status: COMPLETED | OUTPATIENT
Start: 2025-05-12 | End: 2025-05-12

## 2025-05-12 RX ADMIN — Medication 50 MILLIEQUIVALENT(S): at 12:21

## 2025-05-12 RX ADMIN — LIDOCAINE HYDROCHLORIDE 1 PATCH: 20 JELLY TOPICAL at 17:18

## 2025-05-12 RX ADMIN — OXYCODONE HYDROCHLORIDE 5 MILLIGRAM(S): 30 TABLET ORAL at 09:35

## 2025-05-12 RX ADMIN — Medication 15 MILLILITER(S): at 23:45

## 2025-05-12 RX ADMIN — POTASSIUM PHOSPHATE, MONOBASIC POTASSIUM PHOSPHATE, DIBASIC INJECTION, 83.33 MILLIMOLE(S): 236; 224 SOLUTION, CONCENTRATE INTRAVENOUS at 22:24

## 2025-05-12 RX ADMIN — DILTIAZEM HYDROCHLORIDE 240 MILLIGRAM(S): 240 TABLET, EXTENDED RELEASE ORAL at 05:52

## 2025-05-12 RX ADMIN — TAMSULOSIN HYDROCHLORIDE 0.4 MILLIGRAM(S): 0.4 CAPSULE ORAL at 22:14

## 2025-05-12 RX ADMIN — DIPHENHYDRAMINE HYDROCHLORIDE AND LIDOCAINE HYDROCHLORIDE AND ALUMINUM HYDROXIDE AND MAGNESIUM HYDRO 10 MILLILITER(S): KIT at 22:13

## 2025-05-12 RX ADMIN — Medication 500 MILLIGRAM(S): at 17:19

## 2025-05-12 RX ADMIN — Medication 1000 MILLIGRAM(S): at 23:00

## 2025-05-12 RX ADMIN — Medication 15 MILLILITER(S): at 17:19

## 2025-05-12 RX ADMIN — Medication 15 MILLILITER(S): at 05:52

## 2025-05-12 RX ADMIN — Medication 300 MILLIGRAM(S): at 11:48

## 2025-05-12 RX ADMIN — Medication 5 MILLIGRAM(S): at 22:16

## 2025-05-12 RX ADMIN — POSACONAZOLE 300 MILLIGRAM(S): 100 TABLET, DELAYED RELEASE ORAL at 17:19

## 2025-05-12 RX ADMIN — Medication 2000 UNIT(S): at 11:48

## 2025-05-12 RX ADMIN — OXYCODONE HYDROCHLORIDE 5 MILLIGRAM(S): 30 TABLET ORAL at 23:20

## 2025-05-12 RX ADMIN — Medication 40 MILLIGRAM(S): at 05:51

## 2025-05-12 RX ADMIN — CEFEPIME 100 MILLIGRAM(S): 2 INJECTION, POWDER, FOR SOLUTION INTRAVENOUS at 22:15

## 2025-05-12 RX ADMIN — OXYCODONE HYDROCHLORIDE 5 MILLIGRAM(S): 30 TABLET ORAL at 09:05

## 2025-05-12 RX ADMIN — Medication 400 MILLIGRAM(S): at 22:11

## 2025-05-12 RX ADMIN — CEFEPIME 100 MILLIGRAM(S): 2 INJECTION, POWDER, FOR SOLUTION INTRAVENOUS at 14:12

## 2025-05-12 RX ADMIN — Medication 100 MILLILITER(S): at 14:12

## 2025-05-12 RX ADMIN — Medication 1 APPLICATION(S): at 05:39

## 2025-05-12 RX ADMIN — LIDOCAINE HYDROCHLORIDE 1 PATCH: 20 JELLY TOPICAL at 05:55

## 2025-05-12 RX ADMIN — OXYCODONE HYDROCHLORIDE 5 MILLIGRAM(S): 30 TABLET ORAL at 22:20

## 2025-05-12 RX ADMIN — Medication 50 MILLIEQUIVALENT(S): at 14:12

## 2025-05-12 RX ADMIN — DIPHENHYDRAMINE HYDROCHLORIDE AND LIDOCAINE HYDROCHLORIDE AND ALUMINUM HYDROXIDE AND MAGNESIUM HYDRO 10 MILLILITER(S): KIT at 10:25

## 2025-05-12 RX ADMIN — Medication 400 MILLIGRAM(S): at 15:23

## 2025-05-12 RX ADMIN — LIDOCAINE HYDROCHLORIDE 1 PATCH: 20 JELLY TOPICAL at 19:45

## 2025-05-12 RX ADMIN — CEFEPIME 100 MILLIGRAM(S): 2 INJECTION, POWDER, FOR SOLUTION INTRAVENOUS at 05:52

## 2025-05-12 RX ADMIN — Medication 500 MILLIGRAM(S): at 05:51

## 2025-05-12 RX ADMIN — Medication 50 MILLIEQUIVALENT(S): at 09:59

## 2025-05-12 RX ADMIN — Medication 15 MILLILITER(S): at 11:47

## 2025-05-12 NOTE — PROGRESS NOTE ADULT - PROBLEM SELECTOR PLAN 1
new ASXL1-mutated and HWZ50-ahnheue AML.  admitted for induction chemo with dauno 60 mg/m2+ cytarabine. Chemo started on 5/2, Complicated by rigors, chills, fever, SOB  and tachycardia 2 hours into day 1 infusion - likely infusion. recevied pepcid/benadryl/solumedrol/tylenol iv. will make up D1 Cytarabine on day8    will proceed with day2 chemo with pre mdes Tylenol and Benadryl, hydrocortisone iv PRN reaction   Hepatitis screen/HIV(-) as outpatient  MUGA normal EF, IR TLC    IVF, antiemetics, mouth care. monitor weight, I & O, diuresis PRN   Monitor CBC with diff QD, TLS labs BID, Coags & T & C TIW transfuse for hb<7, PLT <10  5/5 HLA sent  day 14 BM bx on 5/16 new ASXL1-mutated and SWC87-rlelocs AML.  admitted for induction chemo with dauno 60 mg/m2+ cytarabine. Chemo started on 5/2, Complicated by rigors, chills, fever, SOB  and tachycardia 2 hours into day 1 infusion - likely infusion. recevied pepcid/benadryl/solumedrol/tylenol iv. will make up D1 Cytarabine on day8    will proceed with day2 chemo with pre mdes Tylenol and Benadryl, hydrocortisone iv PRN reaction   Hepatitis screen/HIV(-) as outpatient  MUGA normal EF, IR TLC    IVF, antiemetics, mouth care. monitor weight, I & O, diuresis PRN   Monitor CBC with diff QD, TLS labs BID, Coags & T & C TIW transfuse for hb<7, PLT <10  5/5 HLA sent  day 14 BM bx on 5/16 5/12- Transfuse one unit PRBC and one unit platelets today.   5/12- Hypokalemia - Replete K+. new ASXL1-mutated and SCT63-jfpocth AML.  admitted for induction chemo with dauno 60 mg/m2+ cytarabine. Chemo started on 5/2, Complicated by rigors, chills, fever, SOB  and tachycardia 2 hours into day 1 infusion - likely infusion. recevied pepcid/benadryl/solumedrol/tylenol iv. will make up D1 Cytarabine on day8    will proceed with day2 chemo with pre mdes Tylenol and Benadryl, hydrocortisone iv PRN reaction   Hepatitis screen/HIV(-) as outpatient  IVF, antiemetics, mouth care. monitor weight, I & O, diuresis PRN   Monitor CBC with diff QD, TLS labs BID, Coags & T & C TIW transfuse for hb<7, PLT <10  5/5 HLA sent  day 14 BM bx on 5/16 5/12- Transfuse one unit PRBC and one unit platelets today.   5/12- Hypokalemia - Replete K+.

## 2025-05-12 NOTE — PROGRESS NOTE ADULT - SUBJECTIVE AND OBJECTIVE BOX
Diagnosis:    Protocol/Chemo Regimen:    Day:     Pt endorsed:    Review of Systems:     Pain scale:     Diet:     Allergies    No Known Allergies    Intolerances        ANTIMICROBIALS  cefepime   IVPB 2000 milliGRAM(s) IV Intermittent every 8 hours  cefepime   IVPB      posaconazole DR Tablet   Oral   posaconazole DR Tablet 300 milliGRAM(s) Oral every 24 hours  valACYclovir 500 milliGRAM(s) Oral two times a day      HEME/ONC MEDICATIONS      STANDING MEDICATIONS  allopurinol 300 milliGRAM(s) Oral daily  Biotene Dry Mouth Oral Rinse 15 milliLiter(s) Swish and Spit four times a day  chlorhexidine 4% Liquid 1 Application(s) Topical <User Schedule>  cholecalciferol 2000 Unit(s) Oral daily  diltiazem    milliGRAM(s) Oral daily  hydrochlorothiazide 50 milliGRAM(s) Oral daily  lidocaine   4% Patch 1 Patch Transdermal every 24 hours  melatonin 5 milliGRAM(s) Oral at bedtime  pantoprazole    Tablet 40 milliGRAM(s) Oral before breakfast  sodium chloride 0.9%. 1000 milliLiter(s) IV Continuous <Continuous>  tamsulosin 0.4 milliGRAM(s) Oral at bedtime      PRN MEDICATIONS  acetaminophen     Tablet .. 650 milliGRAM(s) Oral every 6 hours PRN  albuterol    90 MICROgram(s) HFA Inhaler 2 Puff(s) Inhalation every 6 hours PRN  aluminum hydroxide/magnesium hydroxide/simethicone Suspension 30 milliLiter(s) Oral every 6 hours PRN  calcium carbonate    500 mG (Tums) Chewable 1 Tablet(s) Chew four times a day PRN  cyclobenzaprine 10 milliGRAM(s) Oral three times a day PRN  FIRST- Mouthwash  BLM 10 milliLiter(s) Swish and Spit four times a day PRN  hydrocortisone sodium succinate Injectable 100 milliGRAM(s) IV Push every 8 hours PRN  metoclopramide Injectable 10 milliGRAM(s) IV Push every 6 hours PRN  ondansetron Injectable 8 milliGRAM(s) IV Push every 8 hours PRN  oxyCODONE    IR 2.5 milliGRAM(s) Oral every 4 hours PRN  oxyCODONE    IR 5 milliGRAM(s) Oral every 4 hours PRN  polyethylene glycol 3350 17 Gram(s) Oral two times a day PRN  simethicone 80 milliGRAM(s) Chew every 6 hours PRN  sodium chloride 0.9% lock flush 10 milliLiter(s) IV Push every 1 hour PRN        Vital Signs Last 24 Hrs  T(C): 37.3 (12 May 2025 05:40), Max: 38.7 (11 May 2025 21:20)  T(F): 99.1 (12 May 2025 05:40), Max: 101.6 (11 May 2025 21:20)  HR: 97 (12 May 2025 05:40) (82 - 111)  BP: 116/73 (12 May 2025 05:40) (103/70 - 135/80)  BP(mean): --  RR: 18 (12 May 2025 05:40) (17 - 18)  SpO2: 94% (12 May 2025 05:40) (92% - 95%)    Parameters below as of 12 May 2025 05:40  Patient On (Oxygen Delivery Method): room air        PHYSICAL EXAM  General: NAD  HEENT: PERRLA, EOMOI, clear oropharynx, anicteric sclera, pink conjunctiva  Neck: supple  CV: (+) S1/S2 RRR  Lungs: clear to auscultation, no wheezes or rales  Abdomen: soft, non-tender, non-distended (+) BS  Ext: no clubbing, cyanosis or edema  Skin: no rashes and no petechiae  Neuro: alert and oriented X 3, no focal deficits  Central Line:     RECENT CULTURES:        LABS:                        7.1    0.61  )-----------( 20       ( 11 May 2025 07:06 )             20.8         Mean Cell Volume : 97.7 fl  Mean Cell Hemoglobin : 33.3 pg  Mean Cell Hemoglobin Concentration : 34.1 g/dL  Auto Neutrophil # : x  Auto Lymphocyte # : x  Auto Monocyte # : x  Auto Eosinophil # : x  Auto Basophil # : x  Auto Neutrophil % : x  Auto Lymphocyte % : x  Auto Monocyte % : x  Auto Eosinophil % : x  Auto Basophil % : x      05-11    138  |  102  |  13  ----------------------------<  115[H]  3.5   |  23  |  1.10    Ca    8.6      11 May 2025 18:35  Phos  3.5     05-11  Mg     1.7     05-11    TPro  5.6[L]  /  Alb  3.5  /  TBili  0.5  /  DBili  x   /  AST  10  /  ALT  13  /  AlkPhos  77  05-11      Mg 1.7  Phos 3.5            Uric Acid 4.0      Uric Acid --        RADIOLOGY & ADDITIONAL STUDIES:         Diagnosis: ASXL1-mutated and SWT06-soywffd AML    Protocol/Chemo Regimen: 7+3:  Daunorubicin 60 mg/m2+ Cytarabine     Day: 11    Pt endorsed:   Fever O/N  +generalized weakness, fatigue  mouth pain, discomfort    Review of Systems:   Denies any nausea, vomiting, diarrhea, chest pain, SOB, abdominnal pain    Pain scale: unable to rate                             Diet:  regular     Allergies: No Known Allergies    Intolerances: Shrimp- Nasal congestion (Other)    ANTIMICROBIALS  cefepime   IVPB 2000 milliGRAM(s) IV Intermittent every 8 hours  cefepime   IVPB      posaconazole DR Tablet   Oral   posaconazole DR Tablet 300 milliGRAM(s) Oral every 24 hours  valACYclovir 500 milliGRAM(s) Oral two times a day    STANDING MEDICATIONS  allopurinol 300 milliGRAM(s) Oral daily  Biotene Dry Mouth Oral Rinse 15 milliLiter(s) Swish and Spit four times a day  chlorhexidine 4% Liquid 1 Application(s) Topical <User Schedule>  cholecalciferol 2000 Unit(s) Oral daily  diltiazem    milliGRAM(s) Oral daily  hydrochlorothiazide 50 milliGRAM(s) Oral daily  lidocaine   4% Patch 1 Patch Transdermal every 24 hours  melatonin 5 milliGRAM(s) Oral at bedtime  pantoprazole    Tablet 40 milliGRAM(s) Oral before breakfast  sodium chloride 0.9%. 1000 milliLiter(s) IV Continuous <Continuous>  tamsulosin 0.4 milliGRAM(s) Oral at bedtime    PRN MEDICATIONS  acetaminophen     Tablet .. 650 milliGRAM(s) Oral every 6 hours PRN  albuterol    90 MICROgram(s) HFA Inhaler 2 Puff(s) Inhalation every 6 hours PRN  aluminum hydroxide/magnesium hydroxide/simethicone Suspension 30 milliLiter(s) Oral every 6 hours PRN  calcium carbonate    500 mG (Tums) Chewable 1 Tablet(s) Chew four times a day PRN  cyclobenzaprine 10 milliGRAM(s) Oral three times a day PRN  FIRST- Mouthwash  BLM 10 milliLiter(s) Swish and Spit four times a day PRN  hydrocortisone sodium succinate Injectable 100 milliGRAM(s) IV Push every 8 hours PRN  metoclopramide Injectable 10 milliGRAM(s) IV Push every 6 hours PRN  ondansetron Injectable 8 milliGRAM(s) IV Push every 8 hours PRN  oxyCODONE    IR 2.5 milliGRAM(s) Oral every 4 hours PRN  oxyCODONE    IR 5 milliGRAM(s) Oral every 4 hours PRN  polyethylene glycol 3350 17 Gram(s) Oral two times a day PRN  simethicone 80 milliGRAM(s) Chew every 6 hours PRN  sodium chloride 0.9% lock flush 10 milliLiter(s) IV Push every 1 hour PRN    Vital Signs Last 24 Hrs  T(C): 37.3 (12 May 2025 05:40), Max: 38.7 (11 May 2025 21:20)  T(F): 99.1 (12 May 2025 05:40), Max: 101.6 (11 May 2025 21:20)  HR: 97 (12 May 2025 05:40) (82 - 111)  BP: 116/73 (12 May 2025 05:40) (103/70 - 135/80)  BP(mean): --  RR: 18 (12 May 2025 05:40) (17 - 18)  SpO2: 94% (12 May 2025 05:40) (92% - 95%)    Parameters below as of 12 May 2025 05:40  Patient On (Oxygen Delivery Method): room air    PHYSICAL EXAM  General: NAD  HEENT:  clear oropharynx, anicteric sclera  CV: (+) S1/S2 RRR  Lungs: clear to auscultation, no wheezes or rales  Abdomen: soft, non-tender, non-distended (+) BS  Ext: B/L LE trace edema  Skin: no rash  Neuro: alert and oriented X 3  Central Line: TLC c/d/i     RECENT CULTURES:  Culture - Blood (05.02.25 @ 20:00)    Specimen Source: Blood Blood-Peripheral   Culture Results:   No growth at 5 days    5/12- BCX - pending      LABS:                        6.6    0.78  )-----------( 9        ( 12 May 2025 07:06 )             19.2   Mean Cell Volume : 97.0 fl  Mean Cell Hemoglobin : 33.3 pg  Mean Cell Hemoglobin Concentration : 34.4 g/dL  Auto Neutrophil # : x  Auto Lymphocyte # : x  Auto Monocyte # : x  Auto Eosinophil # : x  Auto Basophil # : x  Auto Neutrophil % : x  Auto Lymphocyte % : x  Auto Monocyte % : x  Auto Eosinophil % : x  Auto Basophil % : x      05-12    138  |  102  |  13  ----------------------------<  118[H]  3.3[L]   |  22  |  1.13    Ca    8.4      12 May 2025 07:10  Phos  3.7     05-12  Mg     1.6     05-12    TPro  5.3[L]  /  Alb  3.4  /  TBili  0.5  /  DBili  x   /  AST  9[L]  /  ALT  10  /  AlkPhos  69  05-12  Mg 1.6  Phos 3.7  Mg 1.7  Phos 3.5    PT/INR - ( 12 May 2025 07:10 )   PT: 13.4 sec;   INR: 1.18 ratio    PTT - ( 12 May 2025 07:10 )  PTT:26.6 sec    Uric Acid --      Uric Acid 4.0    RADIOLOGY & ADDITIONAL STUDIES:  Xray Chest 1 View- PORTABLE-Urgent (Xray Chest 1 View- PORTABLE-Urgent .) (05.03.25 @ 10:36) >  Tubes and lines as above.  Patchy opacities along the left lung base, possibly atelectasis, however   superimposed infection cannot be entirely excluded.

## 2025-05-12 NOTE — PROGRESS NOTE ADULT - NS ATTEND AMEND GEN_ALL_CORE FT
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 Admitted for induction 7+3 - day 10.    Heme:   - Continue 7+3 (5/2/25 - ). Complicated by rigors, chills, and tachycardia 2 hours into day 1 infusion - likely infusion reaction.  - Trend CBC with differential daily. Goal Hgb > 7 and plt > 10  (> 15 if febrile, > 50 if bleeding).   - Trend DIC labs daily. Continue supportive transfusions as needed to maintain fibrinogen > 100.   - Trend TLS labs twice daily.   - Continue allopurinol. S/p rasburicase; consider additional doses if uric acid > 8.  - Continue IVF. Monitor daily weights and I/O's. Diurese PRN.     ID: Febrile once on 5/2/25 but likely in the setting of infusion reaction  - Prophylaxis: levaquin, amoxicillin, posaconazole, valacyclovir     MSK: Has chronic pain from prior surgeries   - Pain control: oxycodone Primary: Paulie    69M with ASXL1-mutated and WHR28-msdwtpw AML. 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 Admitted for induction 7+3 - day 11.    Heme:   - Continue 7+3 (5/2/25 - ). Complicated by rigors, chills, and tachycardia 2 hours into day 1 infusion - likely infusion reaction.  - Trend CBC with differential daily. Goal Hgb > 7 and plt > 10  (> 15 if febrile, > 50 if bleeding).   - Trend DIC labs daily. Continue supportive transfusions as needed to maintain fibrinogen > 100.   - Trend TLS labs twice daily.   - Continue allopurinol. S/p rasburicase; consider additional doses if uric acid > 8.  - Continue IVF. Monitor daily weights and I/O's. Diurese PRN.   -BMBx day 15    ID: Neutropenic and febrile overnight 5/11-12.   - Cefepime (5/11-). Cultures pending. CXR suboptimal. No respiratory symptoms.   - Prophylaxis: posaconazole, valacyclovir     MSK: Has chronic pain from prior surgeries   - Pain control: oxycodone

## 2025-05-12 NOTE — PHARMACOTHERAPY INTERVENTION NOTE - COMMENTS
Clinical Pharmacy Specialist- Hematology/Oncology- Progress Note    Pt is a 70 y/o male w/ PMH of HTN, HLD, GERD, BPH, Gout, spinal stenosis (s/p multiple surgeries) and newly diagnosed AML (ASXL1-mutated and DDX41) admitted for management with 7+3 Induction therapy    Antimicrobial Course:  - Posaconazole- 5/2  - Levaquin/Amox- 5/2- 5/11  --> Cefepime- 5/11  - Valtrex- 5/2  MRSA nasal swab    Last Neutropenic (ANC<1000): 5/12; ANC= 0  Last Febrile: 11-May-2025 21:20; T= 101.6  Days Non-Neutropenic: 0  Days afebrile: 0    Chemotherapy Course  -Current Regimen: 7+3 Induction  History:  (5/2/25)- 7+3 Induction   -Daunorubicin 60mg/m2 IVP D1-3   -Cytarabine 100mg/m2 IV cont inf D1-7  -Day: 11 (5/12)  BmBx:  Access: R TLC (accessed 5/5/25)    History/Relevant clinical information used in assessment:  - Ht= 5’11”; IBW= 75.3kg; Actual BW= 107.3kg; Adj BW= 88kg  - 5/2- IRR? rigors, chills, and tachycardia 2 hours into day 1; rasburicase 3mg x 1  - 5/4 -Crcl= 67ml/min ; Scr=1.32; Wt= 88kg; Adjusted BW used since pt BMI>30  - 5/9- increase melatonin 3mg to 10mh qhs- c/o insomnia    Assessment/Plan/Recommendation:  Onc:  - Daunorubicin 60mg/m2 IVP D1-3   - Cytarabine 100mg/m2 IV cont inf D1-7- IRR on D1, started at 6p, stopped at 7:30p (received 1.5 hrs),  made up D1 bag on 5/9 to run over 23 hrs (to account for ~1hr given)  - allopurinol- monitor for d/c ~5/12- need to keep for gout  ID:  - 5/11- febrile- on cefepime, BlCx pending  Cards:  - atorvastatin 80mg held d/t DDI with posa (Cat X)    Additional Monitoring Needed?   -Yes- Continue to monitor renal function & daily counts for abx escalation/de-escalation   -Discharge Planning:  --> New meds:  --> Meds sent for auth:  --> Delivered meds:    Case discussed with attending/primary team    Liza Jolley, PharmD, BCPS  Clinical Pharmacy Specialist | Hematology/Oncology  [facility]  Email: [email or web address] or available on [email or web address]

## 2025-05-12 NOTE — PROGRESS NOTE ADULT - ASSESSMENT
68 yo male with newly diagnosed ASXL1, DDX41 mutated AML, admitted for induction chemo with 7+3:  dauno 60 mg/m2+ cytarabine. PMH  BRCA2 carrier ([id number]>T), HTN, HLD, Gout, GERD, BPH, spinal stenosis (s/p multiple procedures 2018, 2020 laminectomies / fusions), hip surgeries (Oct 2024, Jan 2025), residual hand weakness related to surgeries, umbilical hernia repair (2014/15) referred here from Dr Atkinson at [facility] in [place] for new ASXL1-mutated and PZI56-mellqat AML. Chemo started on 5/2, Complicated by rigors, chills, fever, SOB  and tachycardia 2 hours into day 1 infusion - likely infusion reaction. Pt has pancytopenia d/t chemo and disease

## 2025-05-12 NOTE — PROGRESS NOTE ADULT - PROBLEM SELECTOR PLAN 2
neutropenic, afebrile  Febrile once on 5/2 but likely in the setting of infusion reaction, will Continue  ppx abxs with Amox, levaquin, valtrex and posaconazole  FU pan cx, UA (-) UTI, full RVP(-) on 5/3   5/2 CXR Patchy opacities along the left lung base, possibly atelectasis, however superimposed infection cannot be entirely excluded.  added incentive spirometer neutropenic, Febrile   FU pan cx, UA (-) UTI, full RVP(-) on 5/3   5/2 CXR Patchy opacities along the left lung base, possibly atelectasis, however superimposed infection cannot be entirely excluded.  added incentive spirometer  5/12- Amoxicillin, Levaquin changed to Cefepime, continue posaconazole and Valtrex.

## 2025-05-13 LAB
ALBUMIN SERPL ELPH-MCNC: 3.5 G/DL — SIGNIFICANT CHANGE UP (ref 3.3–5)
ALBUMIN SERPL ELPH-MCNC: 3.5 G/DL — SIGNIFICANT CHANGE UP (ref 3.3–5)
ALP SERPL-CCNC: 70 U/L — SIGNIFICANT CHANGE UP (ref 40–120)
ALP SERPL-CCNC: 74 U/L — SIGNIFICANT CHANGE UP (ref 40–120)
ALT FLD-CCNC: 11 U/L — SIGNIFICANT CHANGE UP (ref 10–45)
ALT FLD-CCNC: 13 U/L — SIGNIFICANT CHANGE UP (ref 10–45)
ANION GAP SERPL CALC-SCNC: 15 MMOL/L — SIGNIFICANT CHANGE UP (ref 5–17)
ANION GAP SERPL CALC-SCNC: 16 MMOL/L — SIGNIFICANT CHANGE UP (ref 5–17)
AST SERPL-CCNC: 11 U/L — SIGNIFICANT CHANGE UP (ref 10–40)
AST SERPL-CCNC: 12 U/L — SIGNIFICANT CHANGE UP (ref 10–40)
BASOPHILS # BLD AUTO: 0 K/UL — SIGNIFICANT CHANGE UP (ref 0–0.2)
BASOPHILS NFR BLD AUTO: 0 % — SIGNIFICANT CHANGE UP (ref 0–2)
BILIRUB SERPL-MCNC: 0.6 MG/DL — SIGNIFICANT CHANGE UP (ref 0.2–1.2)
BILIRUB SERPL-MCNC: 0.9 MG/DL — SIGNIFICANT CHANGE UP (ref 0.2–1.2)
BUN SERPL-MCNC: 13 MG/DL — SIGNIFICANT CHANGE UP (ref 7–23)
BUN SERPL-MCNC: 14 MG/DL — SIGNIFICANT CHANGE UP (ref 7–23)
CALCIUM SERPL-MCNC: 8.5 MG/DL — SIGNIFICANT CHANGE UP (ref 8.4–10.5)
CALCIUM SERPL-MCNC: 8.7 MG/DL — SIGNIFICANT CHANGE UP (ref 8.4–10.5)
CHLORIDE SERPL-SCNC: 101 MMOL/L — SIGNIFICANT CHANGE UP (ref 96–108)
CHLORIDE SERPL-SCNC: 102 MMOL/L — SIGNIFICANT CHANGE UP (ref 96–108)
CO2 SERPL-SCNC: 20 MMOL/L — LOW (ref 22–31)
CO2 SERPL-SCNC: 20 MMOL/L — LOW (ref 22–31)
CREAT SERPL-MCNC: 1.22 MG/DL — SIGNIFICANT CHANGE UP (ref 0.5–1.3)
CREAT SERPL-MCNC: 1.26 MG/DL — SIGNIFICANT CHANGE UP (ref 0.5–1.3)
EGFR: 62 ML/MIN/1.73M2 — SIGNIFICANT CHANGE UP
EGFR: 62 ML/MIN/1.73M2 — SIGNIFICANT CHANGE UP
EGFR: 65 ML/MIN/1.73M2 — SIGNIFICANT CHANGE UP
EGFR: 65 ML/MIN/1.73M2 — SIGNIFICANT CHANGE UP
EOSINOPHIL # BLD AUTO: 0 K/UL — SIGNIFICANT CHANGE UP (ref 0–0.5)
EOSINOPHIL NFR BLD AUTO: 0 % — SIGNIFICANT CHANGE UP (ref 0–6)
FLUAV AG NPH QL: SIGNIFICANT CHANGE UP
FLUBV AG NPH QL: SIGNIFICANT CHANGE UP
GLUCOSE SERPL-MCNC: 120 MG/DL — HIGH (ref 70–99)
GLUCOSE SERPL-MCNC: 122 MG/DL — HIGH (ref 70–99)
HCT VFR BLD CALC: 21.3 % — LOW (ref 39–50)
HGB BLD-MCNC: 7.6 G/DL — LOW (ref 13–17)
LDH SERPL L TO P-CCNC: 178 U/L — SIGNIFICANT CHANGE UP (ref 50–242)
LDH SERPL L TO P-CCNC: 179 U/L — SIGNIFICANT CHANGE UP (ref 50–242)
LYMPHOCYTES # BLD AUTO: 0.7 K/UL — LOW (ref 1–3.3)
LYMPHOCYTES # BLD AUTO: 100 % — HIGH (ref 13–44)
MAGNESIUM SERPL-MCNC: 1.7 MG/DL — SIGNIFICANT CHANGE UP (ref 1.6–2.6)
MAGNESIUM SERPL-MCNC: 1.8 MG/DL — SIGNIFICANT CHANGE UP (ref 1.6–2.6)
MANUAL SMEAR VERIFICATION: SIGNIFICANT CHANGE UP
MCHC RBC-ENTMCNC: 33.5 PG — SIGNIFICANT CHANGE UP (ref 27–34)
MCHC RBC-ENTMCNC: 35.7 G/DL — SIGNIFICANT CHANGE UP (ref 32–36)
MCV RBC AUTO: 93.8 FL — SIGNIFICANT CHANGE UP (ref 80–100)
MONOCYTES # BLD AUTO: 0 K/UL — SIGNIFICANT CHANGE UP (ref 0–0.9)
MONOCYTES NFR BLD AUTO: 0 % — LOW (ref 2–14)
NEUTROPHILS # BLD AUTO: 0 K/UL — LOW (ref 1.8–7.4)
NEUTROPHILS NFR BLD AUTO: 0 % — LOW (ref 43–77)
PHOSPHATE SERPL-MCNC: 3.2 MG/DL — SIGNIFICANT CHANGE UP (ref 2.5–4.5)
PHOSPHATE SERPL-MCNC: 5 MG/DL — HIGH (ref 2.5–4.5)
PLAT MORPH BLD: NORMAL — SIGNIFICANT CHANGE UP
PLATELET # BLD AUTO: 24 K/UL — LOW (ref 150–400)
POTASSIUM SERPL-MCNC: 3.3 MMOL/L — LOW (ref 3.5–5.3)
POTASSIUM SERPL-MCNC: 3.7 MMOL/L — SIGNIFICANT CHANGE UP (ref 3.5–5.3)
POTASSIUM SERPL-SCNC: 3.3 MMOL/L — LOW (ref 3.5–5.3)
POTASSIUM SERPL-SCNC: 3.7 MMOL/L — SIGNIFICANT CHANGE UP (ref 3.5–5.3)
PROT SERPL-MCNC: 5.5 G/DL — LOW (ref 6–8.3)
PROT SERPL-MCNC: 5.6 G/DL — LOW (ref 6–8.3)
RBC # BLD: 2.27 M/UL — LOW (ref 4.2–5.8)
RBC # FLD: 14.6 % — HIGH (ref 10.3–14.5)
RBC BLD AUTO: SIGNIFICANT CHANGE UP
RSV RNA NPH QL NAA+NON-PROBE: SIGNIFICANT CHANGE UP
SARS-COV-2 RNA SPEC QL NAA+PROBE: SIGNIFICANT CHANGE UP
SODIUM SERPL-SCNC: 136 MMOL/L — SIGNIFICANT CHANGE UP (ref 135–145)
SODIUM SERPL-SCNC: 138 MMOL/L — SIGNIFICANT CHANGE UP (ref 135–145)
SOURCE RESPIRATORY: SIGNIFICANT CHANGE UP
URATE SERPL-MCNC: 4.2 MG/DL — SIGNIFICANT CHANGE UP (ref 3.4–8.8)
URATE SERPL-MCNC: 4.3 MG/DL — SIGNIFICANT CHANGE UP (ref 3.4–8.8)
WBC # BLD: 0.7 K/UL — CRITICAL LOW (ref 3.8–10.5)
WBC # FLD AUTO: 0.7 K/UL — CRITICAL LOW (ref 3.8–10.5)

## 2025-05-13 PROCEDURE — 99233 SBSQ HOSP IP/OBS HIGH 50: CPT

## 2025-05-13 RX ORDER — SIMETHICONE 80 MG
80 TABLET,CHEWABLE ORAL EVERY 6 HOURS
Refills: 0 | Status: DISCONTINUED | OUTPATIENT
Start: 2025-05-13 | End: 2025-05-22

## 2025-05-13 RX ORDER — METRONIDAZOLE 250 MG
500 TABLET ORAL EVERY 8 HOURS
Refills: 0 | Status: DISCONTINUED | OUTPATIENT
Start: 2025-05-13 | End: 2025-05-19

## 2025-05-13 RX ORDER — DIPHENHYDRAMINE HYDROCHLORIDE AND LIDOCAINE HYDROCHLORIDE AND ALUMINUM HYDROXIDE AND MAGNESIUM HYDRO
10 KIT
Refills: 0 | Status: DISCONTINUED | OUTPATIENT
Start: 2025-05-13 | End: 2025-05-14

## 2025-05-13 RX ADMIN — OXYCODONE HYDROCHLORIDE 5 MILLIGRAM(S): 30 TABLET ORAL at 06:25

## 2025-05-13 RX ADMIN — Medication 667 MILLIGRAM(S): at 22:00

## 2025-05-13 RX ADMIN — Medication 1 APPLICATION(S): at 08:05

## 2025-05-13 RX ADMIN — Medication 15 MILLILITER(S): at 06:23

## 2025-05-13 RX ADMIN — DILTIAZEM HYDROCHLORIDE 240 MILLIGRAM(S): 240 TABLET, EXTENDED RELEASE ORAL at 06:25

## 2025-05-13 RX ADMIN — OXYCODONE HYDROCHLORIDE 5 MILLIGRAM(S): 30 TABLET ORAL at 19:00

## 2025-05-13 RX ADMIN — Medication 100 MILLILITER(S): at 06:26

## 2025-05-13 RX ADMIN — TAMSULOSIN HYDROCHLORIDE 0.4 MILLIGRAM(S): 0.4 CAPSULE ORAL at 22:00

## 2025-05-13 RX ADMIN — OXYCODONE HYDROCHLORIDE 5 MILLIGRAM(S): 30 TABLET ORAL at 06:55

## 2025-05-13 RX ADMIN — POSACONAZOLE 300 MILLIGRAM(S): 100 TABLET, DELAYED RELEASE ORAL at 17:13

## 2025-05-13 RX ADMIN — Medication 80 MILLIGRAM(S): at 13:15

## 2025-05-13 RX ADMIN — Medication 50 MILLIEQUIVALENT(S): at 20:25

## 2025-05-13 RX ADMIN — CEFEPIME 100 MILLIGRAM(S): 2 INJECTION, POWDER, FOR SOLUTION INTRAVENOUS at 13:16

## 2025-05-13 RX ADMIN — Medication 2000 UNIT(S): at 11:45

## 2025-05-13 RX ADMIN — Medication 100 MILLIGRAM(S): at 22:42

## 2025-05-13 RX ADMIN — Medication 650 MILLIGRAM(S): at 20:15

## 2025-05-13 RX ADMIN — Medication 650 MILLIGRAM(S): at 13:16

## 2025-05-13 RX ADMIN — Medication 40 MILLIGRAM(S): at 06:25

## 2025-05-13 RX ADMIN — OXYCODONE HYDROCHLORIDE 2.5 MILLIGRAM(S): 30 TABLET ORAL at 14:46

## 2025-05-13 RX ADMIN — CEFEPIME 100 MILLIGRAM(S): 2 INJECTION, POWDER, FOR SOLUTION INTRAVENOUS at 22:00

## 2025-05-13 RX ADMIN — Medication 15 MILLILITER(S): at 17:14

## 2025-05-13 RX ADMIN — OXYCODONE HYDROCHLORIDE 2.5 MILLIGRAM(S): 30 TABLET ORAL at 21:33

## 2025-05-13 RX ADMIN — Medication 15 MILLILITER(S): at 11:45

## 2025-05-13 RX ADMIN — DIPHENHYDRAMINE HYDROCHLORIDE AND LIDOCAINE HYDROCHLORIDE AND ALUMINUM HYDROXIDE AND MAGNESIUM HYDRO 10 MILLILITER(S): KIT at 17:14

## 2025-05-13 RX ADMIN — Medication 5 MILLIGRAM(S): at 22:01

## 2025-05-13 RX ADMIN — Medication 100 MILLIGRAM(S): at 13:15

## 2025-05-13 RX ADMIN — OXYCODONE HYDROCHLORIDE 2.5 MILLIGRAM(S): 30 TABLET ORAL at 20:33

## 2025-05-13 RX ADMIN — Medication 80 MILLIGRAM(S): at 17:14

## 2025-05-13 RX ADMIN — CEFEPIME 100 MILLIGRAM(S): 2 INJECTION, POWDER, FOR SOLUTION INTRAVENOUS at 06:26

## 2025-05-13 RX ADMIN — OXYCODONE HYDROCHLORIDE 5 MILLIGRAM(S): 30 TABLET ORAL at 20:15

## 2025-05-13 RX ADMIN — OXYCODONE HYDROCHLORIDE 2.5 MILLIGRAM(S): 30 TABLET ORAL at 15:46

## 2025-05-13 RX ADMIN — Medication 667 MILLIGRAM(S): at 17:15

## 2025-05-13 RX ADMIN — Medication 500 MILLIGRAM(S): at 06:25

## 2025-05-13 RX ADMIN — Medication 650 MILLIGRAM(S): at 19:01

## 2025-05-13 RX ADMIN — Medication 500 MILLIGRAM(S): at 17:14

## 2025-05-13 RX ADMIN — LIDOCAINE HYDROCHLORIDE 1 PATCH: 20 JELLY TOPICAL at 06:33

## 2025-05-13 RX ADMIN — Medication 667 MILLIGRAM(S): at 11:45

## 2025-05-13 RX ADMIN — Medication 300 MILLIGRAM(S): at 11:45

## 2025-05-13 RX ADMIN — Medication 50 MILLIEQUIVALENT(S): at 22:41

## 2025-05-13 NOTE — PROGRESS NOTE ADULT - PROBLEM SELECTOR PLAN 2
neutropenic, Febrile   FU pan cx, UA (-) UTI, full RVP(-) on 5/3   5/2 CXR Patchy opacities along the left lung base, possibly atelectasis, however superimposed infection cannot be entirely excluded.  added incentive spirometer  5/12- Amoxicillin, Levaquin changed to Cefepime, continue posaconazole and Valtrex. neutropenic, Febrile   FU pan cx, UA (-) UTI, full RVP(-) on 5/3   5/2 CXR Patchy opacities along the left lung base, possibly atelectasis, however superimposed infection cannot be entirely excluded.  5/11- BCX (-)  5/12- Amoxicillin, Levaquin changed to Cefepime, continue posaconazole and Valtrex.  5/13- FLU/COVID/RSV (-)  5/13- Added Flagyl 500 mg IV Q 8 hrs due to GI discomfort/pain  5/13- CT C/A/P -

## 2025-05-13 NOTE — PROGRESS NOTE ADULT - PROBLEM SELECTOR PLAN 8
DVT ppx: OOB and ambulation as carey   PT consulted: no skilled PT need, PT signed off     5/3 Miralax x1 now, BID PRN constipation  5/8: melatonin 3mg at bedtime added for insomnia DVT ppx: OOB and ambulation as carey   PT consulted: no skilled PT need, PT signed off

## 2025-05-13 NOTE — PROGRESS NOTE ADULT - NS ATTEND AMEND GEN_ALL_CORE FT
Primary: Paulie    69M with ASXL1-mutated and EAQ92-vutmktw AML. 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 Admitted for induction 7+3 - day 11.    Heme:   - Continue 7+3 (5/2/25 - ). Complicated by rigors, chills, and tachycardia 2 hours into day 1 infusion - likely infusion reaction.  - Trend CBC with differential daily. Goal Hgb > 7 and plt > 10  (> 15 if febrile, > 50 if bleeding).   - Trend DIC labs daily. Continue supportive transfusions as needed to maintain fibrinogen > 100.   - Trend TLS labs twice daily.   - Continue allopurinol. S/p rasburicase; consider additional doses if uric acid > 8.  - Continue IVF. Monitor daily weights and I/O's. Diurese PRN.   -BMBx day 15    ID: Neutropenic and febrile overnight 5/11-12.   - Cefepime (5/11-). Cultures pending. CXR suboptimal. No respiratory symptoms.   - Prophylaxis: posaconazole, valacyclovir     MSK: Has chronic pain from prior surgeries   - Pain control: oxycodone Primary: Paulie    69M with ASXL1-mutated and WXA34-jlhddvf AML. 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 Admitted for induction 7+3 - day 12.    Heme:   - Continue 7+3 (5/2/25 - ). Complicated by rigors, chills, and tachycardia 2 hours into day 1 infusion - likely infusion reaction.  - Trend CBC with differential daily. Goal Hgb > 7 and plt > 10  (> 15 if febrile, > 50 if bleeding).   - Trend DIC labs daily. Continue supportive transfusions as needed to maintain fibrinogen > 100.   - Trend TLS labs twice daily.   - Continue allopurinol. S/p rasburicase; consider additional doses if uric acid > 8.  - Continue IVF. Monitor daily weights and I/O's. Diurese PRN.   -BMBx day 15    ID: Neutropenic and febrile overnight 5/11-12. Persistent still.   - Cefepime (5/11-). Cultures pending. CXR suboptimal. No respiratory symptoms, however with mild GI symptoms (cramps, loose stool). Checking CT C/A/P with only oral contrast.   - Prophylaxis: posaconazole, valacyclovir     MSK: Has chronic pain from prior surgeries   - Pain control: oxycodone

## 2025-05-13 NOTE — PHARMACOTHERAPY INTERVENTION NOTE - COMMENTS
Clinical Pharmacy Specialist- Hematology/Oncology- Progress Note    Pt is a 68 y/o male w/ PMH of HTN, HLD, GERD, BPH, Gout, spinal stenosis (s/p multiple surgeries) and newly diagnosed AML (ASXL1-mutated and DDX41) admitted for management with 7+3 Induction therapy    Antimicrobial Course:  - Posaconazole- 5/2  - Levaquin/Amox- 5/2- 5/11  --> Cefepime- 5/11  - Valtrex- 5/2  MRSA nasal swab    Last Neutropenic (ANC<1000): 5/13; ANC= 0  Last Febrile: 12-May-2025 22:59; T= 100.8  Days Non-Neutropenic: 0  Days afebrile: 0    Chemotherapy Course  -Current Regimen: 7+3 Induction  History:  (5/2/25)- 7+3 Induction   -Daunorubicin 60mg/m2 IVP D1-3   -Cytarabine 100mg/m2 IV cont inf D1-7  -Day: 12 (5/13)  BmBx:  Access: R TLC (accessed 5/5/25)    History/Relevant clinical information used in assessment:  - Ht= 5’11”; IBW= 75.3kg; Actual BW= 107.3kg; Adj BW= 88kg  - 5/2- IRR? rigors, chills, and tachycardia 2 hours into day 1; rasburicase 3mg x 1  - 5/4 -Crcl= 67ml/min ; Scr=1.32; Wt= 88kg; Adjusted BW used since pt BMI>30  - 5/9- increase melatonin 3mg to 10mh qhs- c/o insomnia    Assessment/Plan/Recommendation:  Onc:  - Daunorubicin 60mg/m2 IVP D1-3   - Cytarabine 100mg/m2 IV cont inf D1-7- IRR on D1, started at 6p, stopped at 7:30p (received 1.5 hrs),  made up D1 bag on 5/9 to run over 23 hrs (to account for ~1hr given)  - allopurinol- monitor for d/c ~5/12- need to keep for gout  ID:  - 5/13, still febrile since 5/11- - on cefepime, BlCx pending  Cards:  - atorvastatin 80mg held d/t DDI with posa (Cat X)    Additional Monitoring Needed?   -Yes- Continue to monitor renal function & daily counts for abx escalation/de-escalation   -Discharge Planning:  --> New meds:  --> Meds sent for auth:  --> Delivered meds:    Case discussed with attending/primary team    Liza Jolley, PharmD, BCPS  Clinical Pharmacy Specialist | Hematology/Oncology  [facility]  Email: [email or web address] or available on [employer] Clinical Pharmacy Specialist- Hematology/Oncology- Progress Note    Pt is a 68 y/o male w/ PMH of HTN, HLD, GERD, BPH, Gout, spinal stenosis (s/p multiple surgeries) and newly diagnosed AML (ASXL1-mutated and DDX41) admitted for management with 7+3 Induction therapy    Antimicrobial Course:  - Posaconazole- 5/2  - Levaquin/Amox- 5/2- 5/11  --> Cefepime- 5/11  - Flagyl (abd discomfort)- 5/13  - Valtrex- 5/2  MRSA nasal swab    Last Neutropenic (ANC<1000): 5/13; ANC= 0  Last Febrile: 12-May-2025 22:59; T= 100.8  Days Non-Neutropenic: 0  Days afebrile: 0    Chemotherapy Course  -Current Regimen: 7+3 Induction  History:  (5/2/25)- 7+3 Induction   -Daunorubicin 60mg/m2 IVP D1-3   -Cytarabine 100mg/m2 IV cont inf D1-7  -Day: 12 (5/13)  BmBx:  Access: R TLC (accessed 5/5/25)    History/Relevant clinical information used in assessment:  - Ht= 5’11”; IBW= 75.3kg; Actual BW= 107.3kg; Adj BW= 88kg  - 5/2- IRR? rigors, chills, and tachycardia 2 hours into day 1; rasburicase 3mg x 1  - 5/4 -Crcl= 67ml/min ; Scr=1.32; Wt= 88kg; Adjusted BW used since pt BMI>30  - 5/9- increase melatonin 3mg to 10mh qhs- c/o insomnia  - 5/13- mouth pain- has ulceration     Assessment/Plan/Recommendation:  Onc:  - Daunorubicin 60mg/m2 IVP D1-3   - Cytarabine 100mg/m2 IV cont inf D1-7- IRR on D1, started at 6p, stopped at 7:30p (received 1.5 hrs),  made up D1 bag on 5/9 to run over 23 hrs (to account for ~1hr given)  - allopurinol- monitor for d/c ~5/12- need to keep for gout  ID:  - 5/13, still febrile since 5/11- - on cefepime, BlCx pending; abd discomfort, will obtain CT CAP & added flagyl for anaerobic coverage  Cards:  - atorvastatin 80mg held d/t DDI with posa (Cat X)    Additional Monitoring Needed?   -Yes- Continue to monitor renal function & daily counts for abx escalation/de-escalation   -Discharge Planning:  --> New meds:  --> Meds sent for auth:  --> Delivered meds:    Case discussed with attending/primary team    Liza Jolley, PharmD, BCPS  Clinical Pharmacy Specialist | Hematology/Oncology  [facility]  Email: [email or web address] on [email or web address]

## 2025-05-13 NOTE — PROGRESS NOTE ADULT - SUBJECTIVE AND OBJECTIVE BOX
Diagnosis:    Protocol/Chemo Regimen:    Day:     Pt endorsed:    Review of Systems:     Pain scale:     Diet:     Allergies    No Known Allergies    Intolerances        ANTIMICROBIALS  cefepime   IVPB 2000 milliGRAM(s) IV Intermittent every 8 hours  cefepime   IVPB      posaconazole DR Tablet 300 milliGRAM(s) Oral every 24 hours  posaconazole DR Tablet   Oral   valACYclovir 500 milliGRAM(s) Oral two times a day      HEME/ONC MEDICATIONS      STANDING MEDICATIONS  allopurinol 300 milliGRAM(s) Oral daily  Biotene Dry Mouth Oral Rinse 15 milliLiter(s) Swish and Spit four times a day  chlorhexidine 4% Liquid 1 Application(s) Topical <User Schedule>  cholecalciferol 2000 Unit(s) Oral daily  diltiazem    milliGRAM(s) Oral daily  hydrochlorothiazide 50 milliGRAM(s) Oral daily  lidocaine   4% Patch 1 Patch Transdermal every 24 hours  melatonin 5 milliGRAM(s) Oral at bedtime  pantoprazole    Tablet 40 milliGRAM(s) Oral before breakfast  sodium chloride 0.9%. 1000 milliLiter(s) IV Continuous <Continuous>  tamsulosin 0.4 milliGRAM(s) Oral at bedtime      PRN MEDICATIONS  acetaminophen     Tablet .. 650 milliGRAM(s) Oral every 6 hours PRN  albuterol    90 MICROgram(s) HFA Inhaler 2 Puff(s) Inhalation every 6 hours PRN  aluminum hydroxide/magnesium hydroxide/simethicone Suspension 30 milliLiter(s) Oral every 6 hours PRN  calcium carbonate    500 mG (Tums) Chewable 1 Tablet(s) Chew four times a day PRN  cyclobenzaprine 10 milliGRAM(s) Oral three times a day PRN  FIRST- Mouthwash  BLM 10 milliLiter(s) Swish and Spit four times a day PRN  hydrocortisone sodium succinate Injectable 100 milliGRAM(s) IV Push every 8 hours PRN  metoclopramide Injectable 10 milliGRAM(s) IV Push every 6 hours PRN  ondansetron Injectable 8 milliGRAM(s) IV Push every 8 hours PRN  oxyCODONE    IR 2.5 milliGRAM(s) Oral every 4 hours PRN  oxyCODONE    IR 5 milliGRAM(s) Oral every 4 hours PRN  polyethylene glycol 3350 17 Gram(s) Oral two times a day PRN  simethicone 80 milliGRAM(s) Chew every 6 hours PRN  sodium chloride 0.9% lock flush 10 milliLiter(s) IV Push every 1 hour PRN        Vital Signs Last 24 Hrs  T(C): 36.6 (13 May 2025 05:28), Max: 39.4 (12 May 2025 21:41)  T(F): 97.8 (13 May 2025 05:28), Max: 103 (12 May 2025 21:41)  HR: 73 (13 May 2025 05:28) (72 - 102)  BP: 127/68 (13 May 2025 05:28) (105/59 - 148/79)  BP(mean): --  RR: 20 (13 May 2025 05:28) (18 - 20)  SpO2: 97% (13 May 2025 05:28) (94% - 97%)    Parameters below as of 13 May 2025 05:28  Patient On (Oxygen Delivery Method): room air        PHYSICAL EXAM  General: NAD  HEENT: PERRLA, EOMOI, clear oropharynx, anicteric sclera, pink conjunctiva  Neck: supple  CV: (+) S1/S2 RRR  Lungs: clear to auscultation, no wheezes or rales  Abdomen: soft, non-tender, non-distended (+) BS  Ext: no clubbing, cyanosis or edema  Skin: no rashes and no petechiae  Neuro: alert and oriented X 3, no focal deficits  Central Line:     RECENT CULTURES:  05-11 @ 23:54  Clean Catch Clean Catch (Midstream)  --  --  --    <10,000 CFU/mL Normal Urogenital Madelyn  --  05-11 @ 23:40  Blood Blood-Peripheral  --  --  --    No growth at 24 hours  --        LABS:                        6.6    0.78  )-----------( 9        ( 12 May 2025 07:06 )             19.2         Mean Cell Volume : 97.0 fl  Mean Cell Hemoglobin : 33.3 pg  Mean Cell Hemoglobin Concentration : 34.4 g/dL  Auto Neutrophil # : x  Auto Lymphocyte # : x  Auto Monocyte # : x  Auto Eosinophil # : x  Auto Basophil # : x  Auto Neutrophil % : x  Auto Lymphocyte % : x  Auto Monocyte % : x  Auto Eosinophil % : x  Auto Basophil % : x      05-12    136  |  101  |  14  ----------------------------<  131[H]  3.4[L]   |  22  |  1.23    Ca    8.5      12 May 2025 18:21  Phos  2.8     05-12  Mg     1.7     05-12    TPro  5.4[L]  /  Alb  3.2[L]  /  TBili  0.5  /  DBili  x   /  AST  11  /  ALT  10  /  AlkPhos  68  05-12      Mg 1.7  Phos 2.8      PT/INR - ( 12 May 2025 07:10 )   PT: 13.4 sec;   INR: 1.18 ratio         PTT - ( 12 May 2025 07:10 )  PTT:26.6 sec      Uric Acid 4.2        RADIOLOGY & ADDITIONAL STUDIES:         Diagnosis:    Protocol/Chemo Regimen:    Day:     Pt endorsed:    Review of Systems:     Pain scale:     Diet:     Allergies    No Known Allergies    Intolerances        ANTIMICROBIALS  cefepime   IVPB 2000 milliGRAM(s) IV Intermittent every 8 hours  cefepime   IVPB      posaconazole DR Tablet 300 milliGRAM(s) Oral every 24 hours  posaconazole DR Tablet   Oral   valACYclovir 500 milliGRAM(s) Oral two times a day      HEME/ONC MEDICATIONS      STANDING MEDICATIONS  allopurinol 300 milliGRAM(s) Oral daily  Biotene Dry Mouth Oral Rinse 15 milliLiter(s) Swish and Spit four times a day  chlorhexidine 4% Liquid 1 Application(s) Topical <User Schedule>  cholecalciferol 2000 Unit(s) Oral daily  diltiazem    milliGRAM(s) Oral daily  hydrochlorothiazide 50 milliGRAM(s) Oral daily  lidocaine   4% Patch 1 Patch Transdermal every 24 hours  melatonin 5 milliGRAM(s) Oral at bedtime  pantoprazole    Tablet 40 milliGRAM(s) Oral before breakfast  sodium chloride 0.9%. 1000 milliLiter(s) IV Continuous <Continuous>  tamsulosin 0.4 milliGRAM(s) Oral at bedtime      PRN MEDICATIONS  acetaminophen     Tablet .. 650 milliGRAM(s) Oral every 6 hours PRN  albuterol    90 MICROgram(s) HFA Inhaler 2 Puff(s) Inhalation every 6 hours PRN  aluminum hydroxide/magnesium hydroxide/simethicone Suspension 30 milliLiter(s) Oral every 6 hours PRN  calcium carbonate    500 mG (Tums) Chewable 1 Tablet(s) Chew four times a day PRN  cyclobenzaprine 10 milliGRAM(s) Oral three times a day PRN  FIRST- Mouthwash  BLM 10 milliLiter(s) Swish and Spit four times a day PRN  hydrocortisone sodium succinate Injectable 100 milliGRAM(s) IV Push every 8 hours PRN  metoclopramide Injectable 10 milliGRAM(s) IV Push every 6 hours PRN  ondansetron Injectable 8 milliGRAM(s) IV Push every 8 hours PRN  oxyCODONE    IR 2.5 milliGRAM(s) Oral every 4 hours PRN  oxyCODONE    IR 5 milliGRAM(s) Oral every 4 hours PRN  polyethylene glycol 3350 17 Gram(s) Oral two times a day PRN  simethicone 80 milliGRAM(s) Chew every 6 hours PRN  sodium chloride 0.9% lock flush 10 milliLiter(s) IV Push every 1 hour PRN        Vital Signs Last 24 Hrs  T(C): 36.6 (13 May 2025 05:28), Max: 39.4 (12 May 2025 21:41)  T(F): 97.8 (13 May 2025 05:28), Max: 103 (12 May 2025 21:41)  HR: 73 (13 May 2025 05:28) (72 - 102)  BP: 127/68 (13 May 2025 05:28) (105/59 - 148/79)  BP(mean): --  RR: 20 (13 May 2025 05:28) (18 - 20)  SpO2: 97% (13 May 2025 05:28) (94% - 97%)    Parameters below as of 13 May 2025 05:28  Patient On (Oxygen Delivery Method): room air        PHYSICAL EXAM  General: NAD  HEENT: PERRLA, EOMOI, clear oropharynx, anicteric sclera, pink conjunctiva  Neck: supple  CV: (+) S1/S2 RRR  Lungs: clear to auscultation, no wheezes or rales  Abdomen: soft, non-tender, non-distended (+) BS  Ext: no clubbing, cyanosis or edema  Skin: no rashes and no petechiae  Neuro: alert and oriented X 3, no focal deficits  Central Line:     RECENT CULTURES:  05-11 @ 23:54  Clean Catch Clean Catch (Midstream)  --  --  --    <10,000 CFU/mL Normal Urogenital Madelyn  --  05-11 @ 23:40  Blood Blood-Peripheral  --  --  --    No growth at 24 hours  --        LABS:                             LABS:                          7.6    0.70  )-----------( 24       ( 13 May 2025 07:06 )             21.3     Mean Cell Volume : 93.8 fl  Mean Cell Hemoglobin : 33.5 pg  Mean Cell Hemoglobin Concentration : 35.7 g/dL  Auto Neutrophil # : x  Auto Lymphocyte # : x  Auto Monocyte # : x  Auto Eosinophil # : x  Auto Basophil # : x  Auto Neutrophil % : x  Auto Lymphocyte % : x  Auto Monocyte % : x  Auto Eosinophil % : x  Auto Basophil % : x      05-13    138  |  102  |  14  ----------------------------<  122[H]  3.7   |  20[L]  |  1.22    Ca    8.5      13 May 2025 07:06  Phos  5.0     05-13  Mg     1.8     05-13    TPro  5.6[L]  /  Alb  3.5  /  TBili  0.9  /  DBili  x   /  AST  11  /  ALT  13  /  AlkPhos  74  05-13      Mg 1.8  Phos 5.0  Mg 1.7  Phos 2.8      PT/INR - ( 12 May 2025 07:10 )   PT: 13.4 sec;   INR: 1.18 ratio         PTT - ( 12 May 2025 07:10 )  PTT:26.6 sec      Uric Acid 4.3      Uric Acid 4.2                      RADIOLOGY & ADDITIONAL STUDIES:         Diagnosis: ASXL1-mutated and [id number]-[id number] AML    Protocol/Chemo Regimen: 7+3:  Daunorubicin 60 mg/m2+ Cytarabine     Day: 11    Pt endorsed:   Fever O/N  +generalized weakness, fatigue  +mouth/ gum pain, discomfort  + abdominal pain, discomfort    Review of Systems:   Denies any nausea, vomiting, diarrhea, chest pain, SOB, abdominal pain    Pain scale: unable to rate                             Diet:  regular     Allergies: No Known Allergies    Intolerances: Shrimp- Nasal congestion (Other)    ANTIMICROBIALS  cefepime   IVPB 2000 milliGRAM(s) IV Intermittent every 8 hours  cefepime   IVPB      posaconazole DR Tablet 300 milliGRAM(s) Oral every 24 hours  posaconazole DR Tablet   Oral   valACYclovir 500 milliGRAM(s) Oral two times a day    STANDING MEDICATIONS  allopurinol 300 milliGRAM(s) Oral daily  Biotene Dry Mouth Oral Rinse 15 milliLiter(s) Swish and Spit four times a day  chlorhexidine 4% Liquid 1 Application(s) Topical <User Schedule>  cholecalciferol 2000 Unit(s) Oral daily  diltiazem    milliGRAM(s) Oral daily  hydrochlorothiazide 50 milliGRAM(s) Oral daily  lidocaine   4% Patch 1 Patch Transdermal every 24 hours  melatonin 5 milliGRAM(s) Oral at bedtime  pantoprazole    Tablet 40 milliGRAM(s) Oral before breakfast  sodium chloride 0.9%. 1000 milliLiter(s) IV Continuous <Continuous>  tamsulosin 0.4 milliGRAM(s) Oral at bedtime    PRN MEDICATIONS  acetaminophen     Tablet .. 650 milliGRAM(s) Oral every 6 hours PRN  albuterol    90 MICROgram(s) HFA Inhaler 2 Puff(s) Inhalation every 6 hours PRN  aluminum hydroxide/magnesium hydroxide/simethicone Suspension 30 milliLiter(s) Oral every 6 hours PRN  calcium carbonate    500 mG (Tums) Chewable 1 Tablet(s) Chew four times a day PRN  cyclobenzaprine 10 milliGRAM(s) Oral three times a day PRN  FIRST- Mouthwash  BLM 10 milliLiter(s) Swish and Spit four times a day PRN  hydrocortisone sodium succinate Injectable 100 milliGRAM(s) IV Push every 8 hours PRN  metoclopramide Injectable 10 milliGRAM(s) IV Push every 6 hours PRN  ondansetron Injectable 8 milliGRAM(s) IV Push every 8 hours PRN  oxyCODONE    IR 2.5 milliGRAM(s) Oral every 4 hours PRN  oxyCODONE    IR 5 milliGRAM(s) Oral every 4 hours PRN  polyethylene glycol 3350 17 Gram(s) Oral two times a day PRN  simethicone 80 milliGRAM(s) Chew every 6 hours PRN  sodium chloride 0.9% lock flush 10 milliLiter(s) IV Push every 1 hour PRN    Vital Signs Last 24 Hrs  T(C): 36.6 (13 May 2025 05:28), Max: 39.4 (12 May 2025 21:41)  T(F): 97.8 (13 May 2025 05:28), Max: 103 (12 May 2025 21:41)  HR: 73 (13 May 2025 05:28) (72 - 102)  BP: 127/68 (13 May 2025 05:28) (105/59 - 148/79)  BP(mean): --  RR: 20 (13 May 2025 05:28) (18 - 20)  SpO2: 97% (13 May 2025 05:28) (94% - 97%)    Parameters below as of 13 May 2025 05:28  Patient On (Oxygen Delivery Method): room air    PHYSICAL EXAM  General: NAD  HEENT:  clear oropharynx, anicteric sclera  CV: (+) S1/S2 RRR  Lungs: clear to auscultation, no wheezes or rales  Abdomen: soft, non-tender, non-distended (+) BS  Ext: B/L LE trace edema  Skin: no rash  Neuro: alert and oriented X 3  Central Line: TLC c/d/i     RECENT CULTURES:  05-11 @ 23:54  Clean Catch Clean Catch (Midstream)  <10,000 CFU/mL Normal Urogenital Madelyn    05-11 @ 23:40  Blood Blood-Peripheral  No growth at 24 hours    LABS:                            7.6    0.70  )-----------( 24       ( 13 May 2025 07:06 )             21.3     Mean Cell Volume : 93.8 fl  Mean Cell Hemoglobin : 33.5 pg  Mean Cell Hemoglobin Concentration : 35.7 g/dL  Auto Neutrophil # : x  Auto Lymphocyte # : x  Auto Monocyte # : x  Auto Eosinophil # : x  Auto Basophil # : x  Auto Neutrophil % : x  Auto Lymphocyte % : x  Auto Monocyte % : x  Auto Eosinophil % : x  Auto Basophil % : x      05-13    138  |  102  |  14  ----------------------------<  122[H]  3.7   |  20[L]  |  1.22    Ca    8.5      13 May 2025 07:06  Phos  5.0     05-13  Mg     1.8     05-13    TPro  5.6[L]  /  Alb  3.5  /  TBili  0.9  /  DBili  x   /  AST  11  /  ALT  13  /  AlkPhos  74  05-13    Mg 1.8  Phos 5.0  Mg 1.7  Phos 2.8  PT/INR - ( 12 May 2025 07:10 )   PT: 13.4 sec;   INR: 1.18 ratio    PTT - ( 12 May 2025 07:10 )  PTT:26.6 sec      Uric Acid 4.3      Uric Acid 4.2    RADIOLOGY & ADDITIONAL STUDIES:  Xray Chest 1 View- PORTABLE-Urgent (Xray Chest 1 View- PORTABLE-Urgent .) (05.11.25 @ 22:27) >  Low lung volumes, with clear lungs.

## 2025-05-14 LAB
ALBUMIN SERPL ELPH-MCNC: 3.3 G/DL — SIGNIFICANT CHANGE UP (ref 3.3–5)
ALP SERPL-CCNC: 67 U/L — SIGNIFICANT CHANGE UP (ref 40–120)
ALT FLD-CCNC: 9 U/L — LOW (ref 10–45)
ANION GAP SERPL CALC-SCNC: 11 MMOL/L — SIGNIFICANT CHANGE UP (ref 5–17)
ANISOCYTOSIS BLD QL: SLIGHT — SIGNIFICANT CHANGE UP
APPEARANCE UR: CLEAR — SIGNIFICANT CHANGE UP
APTT BLD: 25.2 SEC — LOW (ref 26.1–36.8)
AST SERPL-CCNC: 8 U/L — LOW (ref 10–40)
BACTERIA # UR AUTO: NEGATIVE /HPF — SIGNIFICANT CHANGE UP
BASOPHILS # BLD AUTO: 0 K/UL — SIGNIFICANT CHANGE UP (ref 0–0.2)
BASOPHILS # BLD AUTO: 0 K/UL — SIGNIFICANT CHANGE UP (ref 0–0.2)
BASOPHILS NFR BLD AUTO: 0 % — SIGNIFICANT CHANGE UP (ref 0–2)
BASOPHILS NFR BLD AUTO: 0 % — SIGNIFICANT CHANGE UP (ref 0–2)
BILIRUB SERPL-MCNC: 0.5 MG/DL — SIGNIFICANT CHANGE UP (ref 0.2–1.2)
BILIRUB UR-MCNC: NEGATIVE — SIGNIFICANT CHANGE UP
BLD GP AB SCN SERPL QL: NEGATIVE — SIGNIFICANT CHANGE UP
BUN SERPL-MCNC: 14 MG/DL — SIGNIFICANT CHANGE UP (ref 7–23)
CALCIUM SERPL-MCNC: 8.4 MG/DL — SIGNIFICANT CHANGE UP (ref 8.4–10.5)
CAST: 3 /LPF — SIGNIFICANT CHANGE UP (ref 0–4)
CHLORIDE SERPL-SCNC: 101 MMOL/L — SIGNIFICANT CHANGE UP (ref 96–108)
CLOSURE TME COLL+EPINEP BLD: 20 K/UL — CRITICAL LOW (ref 150–400)
CO2 SERPL-SCNC: 21 MMOL/L — LOW (ref 22–31)
COLOR SPEC: YELLOW — SIGNIFICANT CHANGE UP
CREAT SERPL-MCNC: 1.27 MG/DL — SIGNIFICANT CHANGE UP (ref 0.5–1.3)
D DIMER BLD IA.RAPID-MCNC: 562 NG/ML DDU — HIGH
DACRYOCYTES BLD QL SMEAR: SLIGHT — SIGNIFICANT CHANGE UP
DIFF PNL FLD: ABNORMAL
EGFR: 62 ML/MIN/1.73M2 — SIGNIFICANT CHANGE UP
EGFR: 62 ML/MIN/1.73M2 — SIGNIFICANT CHANGE UP
EOSINOPHIL # BLD AUTO: 0 K/UL — SIGNIFICANT CHANGE UP (ref 0–0.5)
EOSINOPHIL # BLD AUTO: 0 K/UL — SIGNIFICANT CHANGE UP (ref 0–0.5)
EOSINOPHIL NFR BLD AUTO: 0 % — SIGNIFICANT CHANGE UP (ref 0–6)
EOSINOPHIL NFR BLD AUTO: 0 % — SIGNIFICANT CHANGE UP (ref 0–6)
FIBRINOGEN PPP-MCNC: 668 MG/DL — HIGH (ref 200–445)
GLUCOSE SERPL-MCNC: 124 MG/DL — HIGH (ref 70–99)
GLUCOSE UR QL: NEGATIVE MG/DL — SIGNIFICANT CHANGE UP
HCT VFR BLD CALC: 18.6 % — CRITICAL LOW (ref 39–50)
HCT VFR BLD CALC: 21 % — CRITICAL LOW (ref 39–50)
HGB BLD-MCNC: 6.6 G/DL — CRITICAL LOW (ref 13–17)
HGB BLD-MCNC: 7.8 G/DL — LOW (ref 13–17)
INR BLD: 1.17 RATIO — HIGH (ref 0.85–1.16)
KETONES UR QL: NEGATIVE MG/DL — SIGNIFICANT CHANGE UP
LDH SERPL L TO P-CCNC: 173 U/L — SIGNIFICANT CHANGE UP (ref 50–242)
LEUKOCYTE ESTERASE UR-ACNC: NEGATIVE — SIGNIFICANT CHANGE UP
LYMPHOCYTES # BLD AUTO: 0.51 K/UL — LOW (ref 1–3.3)
LYMPHOCYTES # BLD AUTO: 0.55 K/UL — LOW (ref 1–3.3)
LYMPHOCYTES # BLD AUTO: 100 % — HIGH (ref 13–44)
LYMPHOCYTES # BLD AUTO: 98.1 % — HIGH (ref 13–44)
MACROCYTES BLD QL: SLIGHT — SIGNIFICANT CHANGE UP
MAGNESIUM SERPL-MCNC: 1.8 MG/DL — SIGNIFICANT CHANGE UP (ref 1.6–2.6)
MANUAL SMEAR VERIFICATION: SIGNIFICANT CHANGE UP
MCHC RBC-ENTMCNC: 33.2 PG — SIGNIFICANT CHANGE UP (ref 27–34)
MCHC RBC-ENTMCNC: 33.8 PG — SIGNIFICANT CHANGE UP (ref 27–34)
MCHC RBC-ENTMCNC: 35.5 G/DL — SIGNIFICANT CHANGE UP (ref 32–36)
MCHC RBC-ENTMCNC: 37 G/DL — HIGH (ref 32–36)
MCV RBC AUTO: 90.9 FL — SIGNIFICANT CHANGE UP (ref 80–100)
MCV RBC AUTO: 93.5 FL — SIGNIFICANT CHANGE UP (ref 80–100)
MONOCYTES # BLD AUTO: 0 K/UL — SIGNIFICANT CHANGE UP (ref 0–0.9)
MONOCYTES # BLD AUTO: 0 K/UL — SIGNIFICANT CHANGE UP (ref 0–0.9)
MONOCYTES NFR BLD AUTO: 0 % — LOW (ref 2–14)
MONOCYTES NFR BLD AUTO: 0 % — LOW (ref 2–14)
NEUTROPHILS # BLD AUTO: 0 K/UL — LOW (ref 1.8–7.4)
NEUTROPHILS # BLD AUTO: 0.01 K/UL — LOW (ref 1.8–7.4)
NEUTROPHILS NFR BLD AUTO: 0 % — LOW (ref 43–77)
NEUTROPHILS NFR BLD AUTO: 1.9 % — LOW (ref 43–77)
NITRITE UR-MCNC: NEGATIVE — SIGNIFICANT CHANGE UP
NRBC BLD AUTO-RTO: 0 /100 WBCS — SIGNIFICANT CHANGE UP (ref 0–0)
OVALOCYTES BLD QL SMEAR: SLIGHT — SIGNIFICANT CHANGE UP
PH UR: 7 — SIGNIFICANT CHANGE UP (ref 5–8)
PHOSPHATE SERPL-MCNC: 3.3 MG/DL — SIGNIFICANT CHANGE UP (ref 2.5–4.5)
PLAT MORPH BLD: NORMAL — SIGNIFICANT CHANGE UP
PLATELET # BLD AUTO: 15 K/UL — CRITICAL LOW (ref 150–400)
PLATELET # BLD AUTO: 9 K/UL — CRITICAL LOW (ref 150–400)
POIKILOCYTOSIS BLD QL AUTO: SLIGHT — SIGNIFICANT CHANGE UP
POTASSIUM SERPL-MCNC: 3.4 MMOL/L — LOW (ref 3.5–5.3)
POTASSIUM SERPL-SCNC: 3.4 MMOL/L — LOW (ref 3.5–5.3)
PROT SERPL-MCNC: 5.3 G/DL — LOW (ref 6–8.3)
PROT UR-MCNC: NEGATIVE MG/DL — SIGNIFICANT CHANGE UP
PROTHROM AB SERPL-ACNC: 13.3 SEC — SIGNIFICANT CHANGE UP (ref 9.9–13.4)
RBC # BLD: 1.99 M/UL — LOW (ref 4.2–5.8)
RBC # BLD: 2.31 M/UL — LOW (ref 4.2–5.8)
RBC # FLD: 13.3 % — SIGNIFICANT CHANGE UP (ref 10.3–14.5)
RBC # FLD: 13.7 % — SIGNIFICANT CHANGE UP (ref 10.3–14.5)
RBC BLD AUTO: ABNORMAL
RBC CASTS # UR COMP ASSIST: 53 /HPF — HIGH (ref 0–4)
RH IG SCN BLD-IMP: POSITIVE — SIGNIFICANT CHANGE UP
SODIUM SERPL-SCNC: 133 MMOL/L — LOW (ref 135–145)
SP GR SPEC: 1.01 — SIGNIFICANT CHANGE UP (ref 1–1.03)
SQUAMOUS # UR AUTO: 2 /HPF — SIGNIFICANT CHANGE UP (ref 0–5)
URATE SERPL-MCNC: 4.2 MG/DL — SIGNIFICANT CHANGE UP (ref 3.4–8.8)
UROBILINOGEN FLD QL: 0.2 MG/DL — SIGNIFICANT CHANGE UP (ref 0.2–1)
WBC # BLD: 0.52 K/UL — CRITICAL LOW (ref 3.8–10.5)
WBC # BLD: 0.55 K/UL — CRITICAL LOW (ref 3.8–10.5)
WBC # FLD AUTO: 0.52 K/UL — CRITICAL LOW (ref 3.8–10.5)
WBC # FLD AUTO: 0.55 K/UL — CRITICAL LOW (ref 3.8–10.5)
WBC UR QL: 0 /HPF — SIGNIFICANT CHANGE UP (ref 0–5)

## 2025-05-14 PROCEDURE — 99233 SBSQ HOSP IP/OBS HIGH 50: CPT

## 2025-05-14 PROCEDURE — 74176 CT ABD & PELVIS W/O CONTRAST: CPT | Mod: 26

## 2025-05-14 PROCEDURE — 71250 CT THORAX DX C-: CPT | Mod: 26

## 2025-05-14 RX ORDER — HYDROMORPHONE/SOD CHLOR,ISO/PF 2 MG/10 ML
0.25 SYRINGE (ML) INJECTION EVERY 12 HOURS
Refills: 0 | Status: DISCONTINUED | OUTPATIENT
Start: 2025-05-14 | End: 2025-05-16

## 2025-05-14 RX ORDER — HYDROMORPHONE/SOD CHLOR,ISO/PF 2 MG/10 ML
0.25 SYRINGE (ML) INJECTION
Refills: 0 | Status: DISCONTINUED | OUTPATIENT
Start: 2025-05-14 | End: 2025-05-15

## 2025-05-14 RX ORDER — LIDOCAINE HYDROCHLORIDE 20 MG/ML
1 JELLY TOPICAL EVERY 8 HOURS
Refills: 0 | Status: DISCONTINUED | OUTPATIENT
Start: 2025-05-14 | End: 2025-05-28

## 2025-05-14 RX ORDER — HYDROMORPHONE/SOD CHLOR,ISO/PF 2 MG/10 ML
0.2 SYRINGE (ML) INJECTION EVERY 6 HOURS
Refills: 0 | Status: DISCONTINUED | OUTPATIENT
Start: 2025-05-14 | End: 2025-05-14

## 2025-05-14 RX ORDER — DIPHENHYDRAMINE HYDROCHLORIDE AND LIDOCAINE HYDROCHLORIDE AND ALUMINUM HYDROXIDE AND MAGNESIUM HYDRO
10 KIT
Refills: 0 | Status: DISCONTINUED | OUTPATIENT
Start: 2025-05-14 | End: 2025-05-28

## 2025-05-14 RX ADMIN — Medication 80 MILLIGRAM(S): at 05:44

## 2025-05-14 RX ADMIN — OXYCODONE HYDROCHLORIDE 5 MILLIGRAM(S): 30 TABLET ORAL at 10:10

## 2025-05-14 RX ADMIN — DIPHENHYDRAMINE HYDROCHLORIDE AND LIDOCAINE HYDROCHLORIDE AND ALUMINUM HYDROXIDE AND MAGNESIUM HYDRO 10 MILLILITER(S): KIT at 00:10

## 2025-05-14 RX ADMIN — LIDOCAINE HYDROCHLORIDE 1 APPLICATION(S): 20 JELLY TOPICAL at 21:56

## 2025-05-14 RX ADMIN — OXYCODONE HYDROCHLORIDE 2.5 MILLIGRAM(S): 30 TABLET ORAL at 02:35

## 2025-05-14 RX ADMIN — OXYCODONE HYDROCHLORIDE 5 MILLIGRAM(S): 30 TABLET ORAL at 04:44

## 2025-05-14 RX ADMIN — Medication 650 MILLIGRAM(S): at 03:19

## 2025-05-14 RX ADMIN — Medication 50 MILLILITER(S): at 10:11

## 2025-05-14 RX ADMIN — Medication 0.25 MILLIGRAM(S): at 20:36

## 2025-05-14 RX ADMIN — Medication 40 MILLIGRAM(S): at 05:44

## 2025-05-14 RX ADMIN — CEFEPIME 100 MILLIGRAM(S): 2 INJECTION, POWDER, FOR SOLUTION INTRAVENOUS at 21:57

## 2025-05-14 RX ADMIN — Medication 5 MILLIGRAM(S): at 21:55

## 2025-05-14 RX ADMIN — Medication 100 MILLIGRAM(S): at 21:55

## 2025-05-14 RX ADMIN — CEFEPIME 100 MILLIGRAM(S): 2 INJECTION, POWDER, FOR SOLUTION INTRAVENOUS at 05:43

## 2025-05-14 RX ADMIN — Medication 100 MILLIGRAM(S): at 06:29

## 2025-05-14 RX ADMIN — Medication 15 MILLILITER(S): at 23:48

## 2025-05-14 RX ADMIN — Medication 667 MILLIGRAM(S): at 11:35

## 2025-05-14 RX ADMIN — Medication 40 MILLIGRAM(S): at 18:13

## 2025-05-14 RX ADMIN — Medication 80 MILLIGRAM(S): at 17:34

## 2025-05-14 RX ADMIN — Medication 1 APPLICATION(S): at 07:56

## 2025-05-14 RX ADMIN — Medication 15 MILLILITER(S): at 05:45

## 2025-05-14 RX ADMIN — OXYCODONE HYDROCHLORIDE 5 MILLIGRAM(S): 30 TABLET ORAL at 03:44

## 2025-05-14 RX ADMIN — LIDOCAINE HYDROCHLORIDE 1 PATCH: 20 JELLY TOPICAL at 20:05

## 2025-05-14 RX ADMIN — DIPHENHYDRAMINE HYDROCHLORIDE AND LIDOCAINE HYDROCHLORIDE AND ALUMINUM HYDROXIDE AND MAGNESIUM HYDRO 10 MILLILITER(S): KIT at 23:47

## 2025-05-14 RX ADMIN — Medication 15 MILLILITER(S): at 00:10

## 2025-05-14 RX ADMIN — Medication 300 MILLIGRAM(S): at 11:32

## 2025-05-14 RX ADMIN — Medication 80 MILLIGRAM(S): at 00:10

## 2025-05-14 RX ADMIN — Medication 650 MILLIGRAM(S): at 13:52

## 2025-05-14 RX ADMIN — Medication 667 MILLIGRAM(S): at 17:33

## 2025-05-14 RX ADMIN — Medication 15 MILLILITER(S): at 17:34

## 2025-05-14 RX ADMIN — POSACONAZOLE 300 MILLIGRAM(S): 100 TABLET, DELAYED RELEASE ORAL at 17:34

## 2025-05-14 RX ADMIN — DILTIAZEM HYDROCHLORIDE 240 MILLIGRAM(S): 240 TABLET, EXTENDED RELEASE ORAL at 05:44

## 2025-05-14 RX ADMIN — OXYCODONE HYDROCHLORIDE 5 MILLIGRAM(S): 30 TABLET ORAL at 10:40

## 2025-05-14 RX ADMIN — OXYCODONE HYDROCHLORIDE 2.5 MILLIGRAM(S): 30 TABLET ORAL at 03:40

## 2025-05-14 RX ADMIN — Medication 80 MILLIGRAM(S): at 23:48

## 2025-05-14 RX ADMIN — Medication 50 MILLIEQUIVALENT(S): at 11:36

## 2025-05-14 RX ADMIN — Medication 15 MILLILITER(S): at 11:32

## 2025-05-14 RX ADMIN — DIPHENHYDRAMINE HYDROCHLORIDE AND LIDOCAINE HYDROCHLORIDE AND ALUMINUM HYDROXIDE AND MAGNESIUM HYDRO 10 MILLILITER(S): KIT at 13:24

## 2025-05-14 RX ADMIN — Medication 650 MILLIGRAM(S): at 04:18

## 2025-05-14 RX ADMIN — Medication 2000 UNIT(S): at 11:32

## 2025-05-14 RX ADMIN — LIDOCAINE HYDROCHLORIDE 1 APPLICATION(S): 20 JELLY TOPICAL at 14:48

## 2025-05-14 RX ADMIN — LIDOCAINE HYDROCHLORIDE 1 PATCH: 20 JELLY TOPICAL at 17:36

## 2025-05-14 RX ADMIN — DIPHENHYDRAMINE HYDROCHLORIDE AND LIDOCAINE HYDROCHLORIDE AND ALUMINUM HYDROXIDE AND MAGNESIUM HYDRO 10 MILLILITER(S): KIT at 05:43

## 2025-05-14 RX ADMIN — Medication 50 MILLIEQUIVALENT(S): at 13:31

## 2025-05-14 RX ADMIN — Medication 0.25 MILLIGRAM(S): at 17:32

## 2025-05-14 RX ADMIN — Medication 650 MILLIGRAM(S): at 13:22

## 2025-05-14 RX ADMIN — Medication 500 MILLIGRAM(S): at 05:43

## 2025-05-14 RX ADMIN — TAMSULOSIN HYDROCHLORIDE 0.4 MILLIGRAM(S): 0.4 CAPSULE ORAL at 21:56

## 2025-05-14 RX ADMIN — Medication 650 MILLIGRAM(S): at 18:55

## 2025-05-14 RX ADMIN — Medication 80 MILLIGRAM(S): at 11:33

## 2025-05-14 RX ADMIN — Medication 650 MILLIGRAM(S): at 19:55

## 2025-05-14 RX ADMIN — Medication 667 MILLIGRAM(S): at 07:56

## 2025-05-14 RX ADMIN — Medication 500 MILLIGRAM(S): at 17:34

## 2025-05-14 RX ADMIN — Medication 100 MILLIGRAM(S): at 14:02

## 2025-05-14 RX ADMIN — CEFEPIME 100 MILLIGRAM(S): 2 INJECTION, POWDER, FOR SOLUTION INTRAVENOUS at 13:14

## 2025-05-14 RX ADMIN — DIPHENHYDRAMINE HYDROCHLORIDE AND LIDOCAINE HYDROCHLORIDE AND ALUMINUM HYDROXIDE AND MAGNESIUM HYDRO 10 MILLILITER(S): KIT at 17:34

## 2025-05-14 RX ADMIN — Medication 0.25 MILLIGRAM(S): at 21:05

## 2025-05-14 RX ADMIN — CALCIUM CARBONATE 1 TABLET(S): 750 TABLET ORAL at 13:30

## 2025-05-14 NOTE — PROVIDER CONTACT NOTE (CRITICAL VALUE NOTIFICATION) - ACTION/TREATMENT ORDERED:
MADISYN Aguirre made aware. Awaiting orders. Plan of care ongoing. MADISYN Aguirre made aware. No new orders at this time. Plan of care ongoing.

## 2025-05-14 NOTE — PROGRESS NOTE ADULT - SUBJECTIVE AND OBJECTIVE BOX
Diagnosis:    Protocol/Chemo Regimen:    Day:     Pt endorsed:    Review of Systems:     Pain scale:     Diet:     Allergies    No Known Allergies    Intolerances        ANTIMICROBIALS  cefepime   IVPB 2000 milliGRAM(s) IV Intermittent every 8 hours  cefepime   IVPB      metroNIDAZOLE  IVPB 500 milliGRAM(s) IV Intermittent every 8 hours  posaconazole DR Tablet   Oral   posaconazole DR Tablet 300 milliGRAM(s) Oral every 24 hours  valACYclovir 500 milliGRAM(s) Oral two times a day      HEME/ONC MEDICATIONS      STANDING MEDICATIONS  allopurinol 300 milliGRAM(s) Oral daily  Biotene Dry Mouth Oral Rinse 15 milliLiter(s) Swish and Spit four times a day  calcium acetate 667 milliGRAM(s) Oral four times a day with meals  chlorhexidine 4% Liquid 1 Application(s) Topical <User Schedule>  cholecalciferol 2000 Unit(s) Oral daily  diltiazem    milliGRAM(s) Oral daily  FIRST- Mouthwash  BLM 10 milliLiter(s) Swish and Spit four times a day  hydrochlorothiazide 50 milliGRAM(s) Oral daily  lidocaine   4% Patch 1 Patch Transdermal every 24 hours  melatonin 5 milliGRAM(s) Oral at bedtime  pantoprazole    Tablet 40 milliGRAM(s) Oral before breakfast  simethicone 80 milliGRAM(s) Chew every 6 hours  sodium chloride 0.9%. 1000 milliLiter(s) IV Continuous <Continuous>  tamsulosin 0.4 milliGRAM(s) Oral at bedtime      PRN MEDICATIONS  acetaminophen     Tablet .. 650 milliGRAM(s) Oral every 6 hours PRN  albuterol    90 MICROgram(s) HFA Inhaler 2 Puff(s) Inhalation every 6 hours PRN  aluminum hydroxide/magnesium hydroxide/simethicone Suspension 30 milliLiter(s) Oral every 6 hours PRN  calcium carbonate    500 mG (Tums) Chewable 1 Tablet(s) Chew four times a day PRN  cyclobenzaprine 10 milliGRAM(s) Oral three times a day PRN  hydrocortisone sodium succinate Injectable 100 milliGRAM(s) IV Push every 8 hours PRN  metoclopramide Injectable 10 milliGRAM(s) IV Push every 6 hours PRN  ondansetron Injectable 8 milliGRAM(s) IV Push every 8 hours PRN  oxyCODONE    IR 2.5 milliGRAM(s) Oral every 4 hours PRN  oxyCODONE    IR 5 milliGRAM(s) Oral every 4 hours PRN  polyethylene glycol 3350 17 Gram(s) Oral two times a day PRN  sodium chloride 0.9% lock flush 10 milliLiter(s) IV Push every 1 hour PRN        Vital Signs Last 24 Hrs  T(C): 36.7 (14 May 2025 05:40), Max: 38.2 (13 May 2025 13:32)  T(F): 98.1 (14 May 2025 05:40), Max: 100.8 (13 May 2025 13:32)  HR: 79 (14 May 2025 05:40) (79 - 110)  BP: 111/68 (14 May 2025 05:40) (111/67 - 133/74)  BP(mean): --  RR: 17 (14 May 2025 05:40) (17 - 20)  SpO2: 94% (14 May 2025 05:40) (93% - 97%)    Parameters below as of 14 May 2025 05:40  Patient On (Oxygen Delivery Method): room air        PHYSICAL EXAM  General: NAD  HEENT: PERRLA, EOMOI, clear oropharynx, anicteric sclera, pink conjunctiva  Neck: supple  CV: (+) S1/S2 RRR  Lungs: clear to auscultation, no wheezes or rales  Abdomen: soft, non-tender, non-distended (+) BS  Ext: no clubbing, cyanosis or edema  Skin: no rashes and no petechiae  Neuro: alert and oriented X 3, no focal deficits  Central Line:     RECENT CULTURES:  05-11 @ 23:54  Clean Catch Clean Catch (Midstream)  --  --  --    <10,000 CFU/mL Normal Urogenital Madelyn  --  05-11 @ 23:40  Blood Blood-Peripheral  --  --  --    No growth at 48 Hours  --        LABS:                        7.6    0.70  )-----------( 24       ( 13 May 2025 07:06 )             21.3         Mean Cell Volume : 93.8 fl  Mean Cell Hemoglobin : 33.5 pg  Mean Cell Hemoglobin Concentration : 35.7 g/dL  Auto Neutrophil # : x  Auto Lymphocyte # : x  Auto Monocyte # : x  Auto Eosinophil # : x  Auto Basophil # : x  Auto Neutrophil % : x  Auto Lymphocyte % : x  Auto Monocyte % : x  Auto Eosinophil % : x  Auto Basophil % : x      05-13    136  |  101  |  13  ----------------------------<  120[H]  3.3[L]   |  20[L]  |  1.26    Ca    8.7      13 May 2025 17:25  Phos  3.2     05-13  Mg     1.7     05-13    TPro  5.5[L]  /  Alb  3.5  /  TBili  0.6  /  DBili  x   /  AST  12  /  ALT  11  /  AlkPhos  70  05-13      Mg 1.7  Phos 3.2            Uric Acid 4.2        RADIOLOGY & ADDITIONAL STUDIES:         Diagnosis: ASXL1-mutated and NEV92-vqtqwcu AML    Protocol/Chemo Regimen: 7+3:  Daunorubicin 60 mg/m2+ Cytarabine     Day: 13    Pt endorsed:   Fever O/N  +generalized weakness, fatigue  +mouth/ gum pain, discomfort  + abdominal pain, discomfort    Review of Systems:   Denies any nausea, vomiting, diarrhea, chest pain, SOB, abdominal pain    Pain scale: unable to rate                             Diet: puree    Allergies: No Known Allergies    Intolerances: Shrimp- Nasal congestion (Other)    ANTIMICROBIALS  cefepime   IVPB 2000 milliGRAM(s) IV Intermittent every 8 hours  cefepime   IVPB      metroNIDAZOLE  IVPB 500 milliGRAM(s) IV Intermittent every 8 hours  posaconazole DR Tablet   Oral   posaconazole DR Tablet 300 milliGRAM(s) Oral every 24 hours  valACYclovir 500 milliGRAM(s) Oral two times a day    STANDING MEDICATIONS  allopurinol 300 milliGRAM(s) Oral daily  Biotene Dry Mouth Oral Rinse 15 milliLiter(s) Swish and Spit four times a day  calcium acetate 667 milliGRAM(s) Oral four times a day with meals  chlorhexidine 4% Liquid 1 Application(s) Topical <User Schedule>  cholecalciferol 2000 Unit(s) Oral daily  diltiazem    milliGRAM(s) Oral daily  FIRST- Mouthwash  BLM 10 milliLiter(s) Swish and Spit four times a day  hydrochlorothiazide 50 milliGRAM(s) Oral daily  lidocaine   4% Patch 1 Patch Transdermal every 24 hours  melatonin 5 milliGRAM(s) Oral at bedtime  pantoprazole    Tablet 40 milliGRAM(s) Oral before breakfast  simethicone 80 milliGRAM(s) Chew every 6 hours  sodium chloride 0.9%. 1000 milliLiter(s) IV Continuous <Continuous>  tamsulosin 0.4 milliGRAM(s) Oral at bedtime    PRN MEDICATIONS  acetaminophen     Tablet .. 650 milliGRAM(s) Oral every 6 hours PRN  albuterol    90 MICROgram(s) HFA Inhaler 2 Puff(s) Inhalation every 6 hours PRN  aluminum hydroxide/magnesium hydroxide/simethicone Suspension 30 milliLiter(s) Oral every 6 hours PRN  calcium carbonate    500 mG (Tums) Chewable 1 Tablet(s) Chew four times a day PRN  cyclobenzaprine 10 milliGRAM(s) Oral three times a day PRN  hydrocortisone sodium succinate Injectable 100 milliGRAM(s) IV Push every 8 hours PRN  metoclopramide Injectable 10 milliGRAM(s) IV Push every 6 hours PRN  ondansetron Injectable 8 milliGRAM(s) IV Push every 8 hours PRN  oxyCODONE    IR 2.5 milliGRAM(s) Oral every 4 hours PRN  oxyCODONE    IR 5 milliGRAM(s) Oral every 4 hours PRN  polyethylene glycol 3350 17 Gram(s) Oral two times a day PRN  sodium chloride 0.9% lock flush 10 milliLiter(s) IV Push every 1 hour PRN    Vital Signs Last 24 Hrs  T(C): 36.7 (14 May 2025 05:40), Max: 38.2 (13 May 2025 13:32)  T(F): 98.1 (14 May 2025 05:40), Max: 100.8 (13 May 2025 13:32)  HR: 79 (14 May 2025 05:40) (79 - 110)  BP: 111/68 (14 May 2025 05:40) (111/67 - 133/74)  BP(mean): --  RR: 17 (14 May 2025 05:40) (17 - 20)  SpO2: 94% (14 May 2025 05:40) (93% - 97%)    Parameters below as of 14 May 2025 05:40  Patient On (Oxygen Delivery Method): room air    PHYSICAL EXAM  HEENT:  ulceration + in left lateral aspect of tongue  CV: (+) S1/S2 RRR  Lungs: clear to auscultation, no wheezes or rales  Abdomen: soft, non-tender, non-distended (+) BS  Ext: B/L LE trace edema  Skin: no rash  Neuro: alert and oriented X 3  Central Line: TLC c/d/i     RECENT CULTURES:  05-11 @ 23:54  Clean Catch Clean Catch (Midstream)  <10,000 CFU/mL Normal Urogenital Madelyn    05-11 @ 23:40  Blood Blood-Peripheral  No growth at 48 Hours    LABS:                        6.6    0.55  )-----------( 15       ( 14 May 2025 06:49 )             18.6     Mean Cell Volume : 93.5 fl  Mean Cell Hemoglobin : 33.2 pg  Mean Cell Hemoglobin Concentration : 35.5 g/dL  Auto Neutrophil # : x  Auto Lymphocyte # : x  Auto Monocyte # : x  Auto Eosinophil # : x  Auto Basophil # : x  Auto Neutrophil % : x  Auto Lymphocyte % : x  Auto Monocyte % : x  Auto Eosinophil % : x  Auto Basophil % : x    05-14    133[L]  |  101  |  14  ----------------------------<  124[H]  3.4[L]   |  21[L]  |  1.27    Ca    8.4      14 May 2025 06:50  Phos  3.3     05-14  Mg     1.8     05-14    TPro  5.3[L]  /  Alb  3.3  /  TBili  0.5  /  DBili  x   /  AST  8[L]  /  ALT  9[L]  /  AlkPhos  67  05-14    Mg 1.8  Phos 3.3  Mg 1.7  Phos 3.2    PT/INR - ( 14 May 2025 06:53 )   PT: 13.3 sec;   INR: 1.17 ratio    PTT - ( 14 May 2025 06:53 )  PTT:25.2 sec      Uric Acid 4.2      Uric Acid 4.2    RADIOLOGY & ADDITIONAL STUDIES:  Xray Chest 1 View- PORTABLE-Urgent (Xray Chest 1 View- PORTABLE-Urgent .) (05.11.25 @ 22:27) >  Low lung volumes, with clear lungs.

## 2025-05-14 NOTE — PROGRESS NOTE ADULT - PROBLEM SELECTOR PROBLEM 6
Patient Education     Bronchitis, Viral (Adult)    You have a viral bronchitis. Bronchitis is inflammation and swelling of the lining of the lungs. This is often caused by an infection. Symptoms include a dry, hacking cough that is worse at night. The cough may bring up yellow-green mucus. You may also feel short of breath or wheeze. Other symptoms may include tiredness, chest discomfort, and chills.  Bronchitis that is caused by a virus is not treated with antibiotics. Instead, medicines may be given to help relieve symptoms. Symptoms can last up to 2 weeks, although the cough may last much longer.  This illness is contagious during the first few days and is spread through the air by coughing and sneezing, or by direct contact (touching the sick person and then touching your own eyes, nose, or mouth).  Most viral illnesses resolve within 10 to 14 days with rest and simple home remedies, although they may sometimes last for several weeks.  Home care  · If symptoms are severe, rest at home for the first 2 to 3 days. When you go back to your usual activities, don't let yourself get too tired.  · Do not smoke. Also avoid being exposed to secondhand smoke.  · You may use over-the-counter medicine to control fever or pain, unless another pain medicine was prescribed. (Note: If you have chronic liver or kidney disease or have ever had a stomach ulcer or gastrointestinal bleeding, talk with your healthcare provider before using these medicines. Also talk to your provider if you are taking medicine to prevent blood clots.) Aspirin should never be given to anyone younger than 18 years of age who is ill with a viral infection or fever. It may cause severe liver or brain damage.  · Your appetite may be poor, so a light diet is fine. Avoid dehydration by drinking 6 to 8 glasses of fluids per day (such as water, soft drinks, sports drinks, juices, tea, or soup). Extra fluids will help loosen secretions in the nose and  lungs.  · Over-the-counter cough, cold, and sore-throat medicines will not shorten the length of the illness, but they may help to reduce symptoms. (Note: Do not use decongestants if you have high blood pressure.)  Follow-up care  Follow up with your healthcare provider, or as advised.  If you had an X-ray or ECG (electrocardiogram), a specialist will review it. You will be notified of any new findings that may affect your care.  Note: If you are age 65 or older, or if you have a chronic lung disease or condition that affects your immune system, or you smoke, talk to your healthcare provider about having pneumococcal vaccinations and a yearly influenza vaccination (flu shot).  When to seek medical advice   Call your healthcare provider right away if any of these occur:  · Fever of 100.4°F (38°C) or higher  · Coughing up increased amounts of colored sputum  · Weakness, drowsiness, headache, facial pain, ear pain, or a stiff neck  Call 911, or get immediate medical care  Contact emergency services right away if any of these occur:  · Coughing up blood  · Worsening weakness, drowsiness, headache, or stiff neck  · Trouble breathing, wheezing, or pain with breathing  © 5059-6713 [employer]. [address] [place] [address], [place] [address]. All rights reserved. This information is not intended as a substitute for professional medical care. Always follow your healthcare professional's instructions.            Back pain

## 2025-05-14 NOTE — PROGRESS NOTE ADULT - PROBLEM SELECTOR PLAN 1
new ASXL1-mutated and [id number]-iklwkou AML.  admitted for induction chemo with dauno 60 mg/m2+ cytarabine. Chemo started on 5/2, Complicated by rigors, chills, fever, SOB  and tachycardia 2 hours into day 1 infusion - likely infusion. recevied pepcid/benadryl/solumedrol/tylenol iv. will make up D1 Cytarabine on day8    will proceed with day2 chemo with pre mdes Tylenol and Benadryl, hydrocortisone iv PRN reaction   Hepatitis screen/HIV(-) as outpatient  IVF, antiemetics, mouth care. monitor weight, I & O, diuresis PRN   Monitor CBC with diff QD, TLS labs BID, Coags & T & C TIW transfuse for hb<7, PLT <10  5/5 HLA sent  day 14 BM bx on 5/16 5/13- Hyperphosphatemia - started phoslo new ASXL1-mutated and [id number] AML.  admitted for induction chemo with dauno 60 mg/m2+ cytarabine. Chemo started on 5/2, Complicated by rigors, chills, fever, SOB  and tachycardia 2 hours into day 1 infusion - likely infusion. recevied pepcid/benadryl/solumedrol/tylenol iv. will make up D1 Cytarabine on day8    will proceed with day2 chemo with pre mdes Tylenol and Benadryl, hydrocortisone iv PRN reaction   Hepatitis screen/HIV(-) as outpatient  IVF, antiemetics, mouth care. monitor weight, I & O, diuresis PRN   Monitor CBC with diff QD, TLS labs BID, Coags & T & C TIW transfuse for hb<7, PLT <10  5/5 HLA sent  day 14 BM bx on 5/16 5/14- Transfuse one unit PRBC and transfuse

## 2025-05-14 NOTE — PHARMACOTHERAPY INTERVENTION NOTE - COMMENTS
Clinical Pharmacy Specialist- Hematology/Oncology- Progress Note    Pt is a 70 y/o male w/ PMH of HTN, HLD, GERD, BPH, Gout, spinal stenosis (s/p multiple surgeries) and newly diagnosed AML (ASXL1-mutated and DDX41) admitted for management with 7+3 Induction therapy    Antimicrobial Course:  - Posaconazole- 5/2  - Levaquin/Amox- 5/2- 5/11  --> Cefepime- 5/11  - Flagyl (abd discomfort)- 5/13  - Valtrex- 5/2  MRSA nasal swab    Last Neutropenic (ANC<1000): 5/14; ANC= 0  Last Febrile: 14-May-2025 03:05; T= 100.4  Days Non-Neutropenic: 0  Days afebrile: 0    Chemotherapy Course  -Current Regimen: 7+3 Induction  History:  (5/2/25)- 7+3 Induction   -Daunorubicin 60mg/m2 IVP D1-3   -Cytarabine 100mg/m2 IV cont inf D1-7  -Day: 13 (5/14)  BmBx:  Access: R TLC (accessed 5/5/25)    History/Relevant clinical information used in assessment:  - Ht= 5’11”; IBW= 75.3kg; Actual BW= 107.3kg; Adj BW= 88kg  - 5/2- IRR? rigors, chills, and tachycardia 2 hours into day 1; rasburicase 3mg x 1  - 5/4 -Crcl= 67ml/min ; Scr=1.32; Wt= 88kg; Adjusted BW used since pt BMI>30  - 5/9- increase melatonin 3mg to 10mh qhs- c/o insomnia  - 5/13- mouth pain- has ulceration     Assessment/Plan/Recommendation:  Onc:  - Daunorubicin 60mg/m2 IVP D1-3   - Cytarabine 100mg/m2 IV cont inf D1-7- IRR on D1, started at 6p, stopped at 7:30p (received 1.5 hrs),  made up D1 bag on 5/9 to run over 23 hrs (to account for ~1hr given)  - allopurinol- monitor for d/c ~5/12- need to keep for gout  ID:  - 5/13, still febrile since 5/11- - on cefepime, BlCx pending; abd discomfort-->CT CAP & added flagyl for anaerobic coverage  Cards:  - atorvastatin 80mg held d/t DDI with posa (Cat X)    Additional Monitoring Needed?   -Yes- Continue to monitor renal function & daily counts for abx escalation/de-escalation   -Discharge Planning:  --> New meds:  --> Meds sent for auth:  --> Delivered meds:    Case discussed with attending/primary team    Liza Jolley, PharmD, BCPS  Clinical Pharmacy Specialist | Hematology/Oncology  [facility]  Email: [email or web address] or available on [employer] Clinical Pharmacy Specialist- Hematology/Oncology- Progress Note    Pt is a 68 y/o male w/ PMH of HTN, HLD, GERD, BPH, Gout, spinal stenosis (s/p multiple surgeries) and newly diagnosed AML (ASXL1-mutated and DDX41) admitted for management with 7+3 Induction therapy    Antimicrobial Course:  - Posaconazole- 5/2  - Levaquin/Amox- 5/2- 5/11  --> Cefepime- 5/11  - Flagyl (abd discomfort)- 5/13  - Valtrex- 5/2  MRSA nasal swab    Last Neutropenic (ANC<1000): 5/14; ANC= 0  Last Febrile: 14-May-2025 03:05; T= 100.4  Days Non-Neutropenic: 0  Days afebrile: 0    Chemotherapy Course  -Current Regimen: 7+3 Induction  History:  (5/2/25)- 7+3 Induction   -Daunorubicin 60mg/m2 IVP D1-3   -Cytarabine 100mg/m2 IV cont inf D1-7  -Day: 13 (5/14)  BmBx:  Access: R TLC (accessed 5/5/25)    History/Relevant clinical information used in assessment:  - Ht= 5’11”; IBW= 75.3kg; Actual BW= 107.3kg; Adj BW= 88kg  - 5/2- IRR? rigors, chills, and tachycardia 2 hours into day 1; rasburicase 3mg x 1  - 5/4 -Crcl= 67ml/min ; Scr=1.32; Wt= 88kg; Adjusted BW used since pt BMI>30  - 5/9- increase melatonin 3mg to 10mh qhs- c/o insomnia  - 5/13- mouth pain- has ulceration   - 5/14- mouth pain - cannot swallow now    Assessment/Plan/Recommendation:  Onc:  - Daunorubicin 60mg/m2 IVP D1-3   - Cytarabine 100mg/m2 IV cont inf D1-7- IRR on D1, started at 6p, stopped at 7:30p (received 1.5 hrs),  made up D1 bag on 5/9 to run over 23 hrs (to account for ~1hr given)  - allopurinol- monitor for d/c ~5/12- need to keep for gout  ID:  - 5/13, still febrile since 5/11- - on cefepime, BlCx pending; abd discomfort-->CT CAP & added flagyl for anaerobic coverage  Cards:  - atorvastatin 80mg held d/t DDI with posa (Cat X)  Pain (mouth):  - 5/14- cannot swallow- change magic mouthwash to swish & swallow?    Additional Monitoring Needed?   -Yes- Continue to monitor renal function & daily counts for abx escalation/de-escalation   -Discharge Planning:  --> New meds:  --> Meds sent for auth:  --> Delivered meds:    Case discussed with attending/primary team    Liza Jolley, PharmD, BCPS  Clinical Pharmacy Specialist | Hematology/Oncology  [facility]  Email: [email or web address] or available on [employer] Clinical Pharmacy Specialist- Hematology/Oncology- Progress Note    Pt is a 70 y/o male w/ PMH of HTN, HLD, GERD, BPH, Gout, spinal stenosis (s/p multiple surgeries) and newly diagnosed AML (ASXL1-mutated and DDX41) admitted for management with 7+3 Induction therapy    Antimicrobial Course:  - Posaconazole- 5/2  - Levaquin/Amox- 5/2- 5/11  --> Cefepime- 5/11  - Flagyl (abd discomfort)- 5/13  - Valtrex- 5/2  MRSA nasal swab    Last Neutropenic (ANC<1000): 5/14; ANC= 0  Last Febrile: 14-May-2025 03:05; T= 100.4  Days Non-Neutropenic: 0  Days afebrile: 0    Chemotherapy Course  -Current Regimen: 7+3 Induction  History:  (5/2/25)- 7+3 Induction   -Daunorubicin 60mg/m2 IVP D1-3   -Cytarabine 100mg/m2 IV cont inf D1-7  -Day: 13 (5/14)  BmBx:  Access: R TLC (accessed 5/5/25)    History/Relevant clinical information used in assessment:  - Ht= 5’11”; IBW= 75.3kg; Actual BW= 107.3kg; Adj BW= 88kg  - 5/2- IRR? rigors, chills, and tachycardia 2 hours into day 1; rasburicase 3mg x 1  - 5/4 -Crcl= 67ml/min ; Scr=1.32; Wt= 88kg; Adjusted BW used since pt BMI>30  - 5/9- increase melatonin 3mg to 10mh qhs- c/o insomnia  - 5/13- mouth pain- has ulceration   - 5/14- mouth pain - cannot swallow now    Assessment/Plan/Recommendation:  Onc:  - Daunorubicin 60mg/m2 IVP D1-3   - Cytarabine 100mg/m2 IV cont inf D1-7- IRR on D1, started at 6p, stopped at 7:30p (received 1.5 hrs),  made up D1 bag on 5/9 to run over 23 hrs (to account for ~1hr given)  - allopurinol- monitor for d/c ~5/12- need to keep for gout  ID:  - 5/13, still febrile since 5/11- - on cefepime, BlCx pending; abd discomfort-->CT CAP & added flagyl for anaerobic coverage  Cards:  - atorvastatin 80mg held d/t DDI with posa (Cat X)  Pain (mouth):  ·	magic mouthwash to swish & spit- change to swallow- 5/14  ·	hydromorphone 0.2mg IVP q6hr prn- 5/14  ·	oxycodone 2.5-5mg q4hr prn  ·	lidocaine gel 2% q8hr to tongue 5/14    Additional Monitoring Needed?   -Yes- Continue to monitor renal function & daily counts for abx escalation/de-escalation   -Discharge Planning:  --> New meds:  --> Meds sent for auth:  --> Delivered meds:    Case discussed with attending/primary team    Liza Jolley, PharmD, BCPS  Clinical Pharmacy Specialist | Hematology/Oncology  [facility]  Email: [email or web address] or available on [employer]

## 2025-05-14 NOTE — PROGRESS NOTE ADULT - NS ATTEND AMEND GEN_ALL_CORE FT
Primary: Paulie    69M with ASXL1-mutated and QMV45-crscskf AML. 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 Admitted for induction 7+3 - day 12.    Heme:   - Continue 7+3 (5/2/25 - ). Complicated by rigors, chills, and tachycardia 2 hours into day 1 infusion - likely infusion reaction.  - Trend CBC with differential daily. Goal Hgb > 7 and plt > 10  (> 15 if febrile, > 50 if bleeding).   - Trend DIC labs daily. Continue supportive transfusions as needed to maintain fibrinogen > 100.   - Trend TLS labs twice daily.   - Continue allopurinol. S/p rasburicase; consider additional doses if uric acid > 8.  - Continue IVF. Monitor daily weights and I/O's. Diurese PRN.   -BMBx day 15    ID: Neutropenic and febrile overnight 5/11-12. Persistent still.   - Cefepime (5/11-). Cultures pending. CXR suboptimal. No respiratory symptoms, however with mild GI symptoms (cramps, loose stool). Checking CT C/A/P with only oral contrast.   - Prophylaxis: posaconazole, valacyclovir     MSK: Has chronic pain from prior surgeries   - Pain control: oxycodone Primary: Paulie    69M with ASXL1-mutated and [id number]-wcpajeh AML. 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 Admitted for induction 7+3 - day 13.    Heme:   - Continue 7+3 (5/2/25 - ). Complicated by rigors, chills, and tachycardia 2 hours into day 1 infusion - likely infusion reaction.  - Trend CBC with differential daily. Goal Hgb > 7 and plt > 10  (> 15 if febrile, > 50 if bleeding).   - Trend DIC labs daily. Continue supportive transfusions as needed to maintain fibrinogen > 100.   - Trend TLS labs twice daily.   - Continue allopurinol. S/p rasburicase; consider additional doses if uric acid > 8.  - Continue IVF. Monitor daily weights and I/O's. Diurese PRN.   -BMBx day 15    ID: Neutropenic and febrile overnight 5/11-12. Persistent now with ongoing GI symptoms.   - Cefepime (5/11-). Cultures pending. CXR suboptimal. No respiratory symptoms, however with mild GI symptoms (cramps, loose stool). Checking CT C/A/P with only oral contrast today and will send GI PCR.   - Prophylaxis: posaconazole, valacyclovir     MSK: Has chronic pain from prior surgeries   - Pain control: oxycodone

## 2025-05-14 NOTE — PROGRESS NOTE ADULT - ASSESSMENT
68 yo male with newly diagnosed ASXL1, DDX41 mutated AML, admitted for induction chemo with 7+3:  dauno 60 mg/m2+ cytarabine. 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 Chemo started on 5/2, Complicated by rigors, chills, fever, SOB  and tachycardia 2 hours into day 1 infusion - likely infusion reaction. Pt has pancytopenia d/t chemo and disease

## 2025-05-14 NOTE — PROGRESS NOTE ADULT - PROBLEM SELECTOR PLAN 2
neutropenic, Febrile   FU pan cx, UA (-) UTI, full RVP(-) on 5/3   5/2 CXR Patchy opacities along the left lung base, possibly atelectasis, however superimposed infection cannot be entirely excluded.  5/11- BCX (-)  5/12- Amoxicillin, Levaquin changed to Cefepime, continue posaconazole and Valtrex.  5/13- FLU/COVID/RSV (-)  5/13- Added Flagyl 500 mg IV Q 8 hrs due to GI discomfort/pain  5/13- CT C/A/P -

## 2025-05-15 LAB
ALBUMIN SERPL ELPH-MCNC: 3.4 G/DL — SIGNIFICANT CHANGE UP (ref 3.3–5)
ALBUMIN SERPL ELPH-MCNC: 3.5 G/DL — SIGNIFICANT CHANGE UP (ref 3.3–5)
ALP SERPL-CCNC: 68 U/L — SIGNIFICANT CHANGE UP (ref 40–120)
ALP SERPL-CCNC: 70 U/L — SIGNIFICANT CHANGE UP (ref 40–120)
ALT FLD-CCNC: 10 U/L — SIGNIFICANT CHANGE UP (ref 10–45)
ALT FLD-CCNC: 12 U/L — SIGNIFICANT CHANGE UP (ref 10–45)
ANION GAP SERPL CALC-SCNC: 13 MMOL/L — SIGNIFICANT CHANGE UP (ref 5–17)
ANION GAP SERPL CALC-SCNC: 15 MMOL/L — SIGNIFICANT CHANGE UP (ref 5–17)
AST SERPL-CCNC: 11 U/L — SIGNIFICANT CHANGE UP (ref 10–40)
AST SERPL-CCNC: 11 U/L — SIGNIFICANT CHANGE UP (ref 10–40)
BILIRUB SERPL-MCNC: 0.8 MG/DL — SIGNIFICANT CHANGE UP (ref 0.2–1.2)
BILIRUB SERPL-MCNC: 1.3 MG/DL — HIGH (ref 0.2–1.2)
BUN SERPL-MCNC: 14 MG/DL — SIGNIFICANT CHANGE UP (ref 7–23)
BUN SERPL-MCNC: 16 MG/DL — SIGNIFICANT CHANGE UP (ref 7–23)
CALCIUM SERPL-MCNC: 8.7 MG/DL — SIGNIFICANT CHANGE UP (ref 8.4–10.5)
CALCIUM SERPL-MCNC: 8.7 MG/DL — SIGNIFICANT CHANGE UP (ref 8.4–10.5)
CHLORIDE SERPL-SCNC: 100 MMOL/L — SIGNIFICANT CHANGE UP (ref 96–108)
CHLORIDE SERPL-SCNC: 101 MMOL/L — SIGNIFICANT CHANGE UP (ref 96–108)
CO2 SERPL-SCNC: 21 MMOL/L — LOW (ref 22–31)
CO2 SERPL-SCNC: 21 MMOL/L — LOW (ref 22–31)
CREAT SERPL-MCNC: 1.18 MG/DL — SIGNIFICANT CHANGE UP (ref 0.5–1.3)
CREAT SERPL-MCNC: 1.24 MG/DL — SIGNIFICANT CHANGE UP (ref 0.5–1.3)
CULTURE RESULTS: NO GROWTH — SIGNIFICANT CHANGE UP
EGFR: 63 ML/MIN/1.73M2 — SIGNIFICANT CHANGE UP
EGFR: 63 ML/MIN/1.73M2 — SIGNIFICANT CHANGE UP
EGFR: 67 ML/MIN/1.73M2 — SIGNIFICANT CHANGE UP
EGFR: 67 ML/MIN/1.73M2 — SIGNIFICANT CHANGE UP
GI PCR PANEL: SIGNIFICANT CHANGE UP
GLUCOSE SERPL-MCNC: 115 MG/DL — HIGH (ref 70–99)
GLUCOSE SERPL-MCNC: 132 MG/DL — HIGH (ref 70–99)
HCT VFR BLD CALC: 22.1 % — LOW (ref 39–50)
HCT VFR BLD CALC: 24.4 % — LOW (ref 39–50)
HGB BLD-MCNC: 7.8 G/DL — LOW (ref 13–17)
HGB BLD-MCNC: 8.6 G/DL — LOW (ref 13–17)
HSV 1/2 SOURCE: SIGNIFICANT CHANGE UP
HSV1 DNA BLD QL NAA+PROBE: SIGNIFICANT CHANGE UP
HSV2 DNA BLD QL NAA+PROBE: SIGNIFICANT CHANGE UP
LDH SERPL L TO P-CCNC: 189 U/L — SIGNIFICANT CHANGE UP (ref 50–242)
LDH SERPL L TO P-CCNC: 200 U/L — SIGNIFICANT CHANGE UP (ref 50–242)
MAGNESIUM SERPL-MCNC: 1.6 MG/DL — SIGNIFICANT CHANGE UP (ref 1.6–2.6)
MAGNESIUM SERPL-MCNC: 1.7 MG/DL — SIGNIFICANT CHANGE UP (ref 1.6–2.6)
MCHC RBC-ENTMCNC: 31.3 PG — SIGNIFICANT CHANGE UP (ref 27–34)
MCHC RBC-ENTMCNC: 32.1 PG — SIGNIFICANT CHANGE UP (ref 27–34)
MCHC RBC-ENTMCNC: 35.2 G/DL — SIGNIFICANT CHANGE UP (ref 32–36)
MCHC RBC-ENTMCNC: 35.3 G/DL — SIGNIFICANT CHANGE UP (ref 32–36)
MCV RBC AUTO: 88.7 FL — SIGNIFICANT CHANGE UP (ref 80–100)
MCV RBC AUTO: 90.9 FL — SIGNIFICANT CHANGE UP (ref 80–100)
MRSA PCR RESULT.: SIGNIFICANT CHANGE UP
NRBC BLD AUTO-RTO: 0 /100 WBCS — SIGNIFICANT CHANGE UP (ref 0–0)
NRBC BLD AUTO-RTO: 0 /100 WBCS — SIGNIFICANT CHANGE UP (ref 0–0)
PHOSPHATE SERPL-MCNC: 2.6 MG/DL — SIGNIFICANT CHANGE UP (ref 2.5–4.5)
PHOSPHATE SERPL-MCNC: 3.1 MG/DL — SIGNIFICANT CHANGE UP (ref 2.5–4.5)
PLATELET # BLD AUTO: 41 K/UL — LOW (ref 150–400)
PLATELET # BLD AUTO: 42 K/UL — LOW (ref 150–400)
POTASSIUM SERPL-MCNC: 3.1 MMOL/L — LOW (ref 3.5–5.3)
POTASSIUM SERPL-MCNC: 3.7 MMOL/L — SIGNIFICANT CHANGE UP (ref 3.5–5.3)
POTASSIUM SERPL-SCNC: 3.1 MMOL/L — LOW (ref 3.5–5.3)
POTASSIUM SERPL-SCNC: 3.7 MMOL/L — SIGNIFICANT CHANGE UP (ref 3.5–5.3)
PROT SERPL-MCNC: 5.6 G/DL — LOW (ref 6–8.3)
PROT SERPL-MCNC: 5.7 G/DL — LOW (ref 6–8.3)
RBC # BLD: 2.43 M/UL — LOW (ref 4.2–5.8)
RBC # BLD: 2.75 M/UL — LOW (ref 4.2–5.8)
RBC # FLD: 13.6 % — SIGNIFICANT CHANGE UP (ref 10.3–14.5)
RBC # FLD: 14.7 % — HIGH (ref 10.3–14.5)
S AUREUS DNA NOSE QL NAA+PROBE: SIGNIFICANT CHANGE UP
SODIUM SERPL-SCNC: 135 MMOL/L — SIGNIFICANT CHANGE UP (ref 135–145)
SODIUM SERPL-SCNC: 136 MMOL/L — SIGNIFICANT CHANGE UP (ref 135–145)
SPECIMEN SOURCE: SIGNIFICANT CHANGE UP
URATE SERPL-MCNC: 4.2 MG/DL — SIGNIFICANT CHANGE UP (ref 3.4–8.8)
URATE SERPL-MCNC: 4.2 MG/DL — SIGNIFICANT CHANGE UP (ref 3.4–8.8)
WBC # BLD: 0.36 K/UL — CRITICAL LOW (ref 3.8–10.5)
WBC # BLD: 0.38 K/UL — CRITICAL LOW (ref 3.8–10.5)
WBC # FLD AUTO: 0.36 K/UL — CRITICAL LOW (ref 3.8–10.5)
WBC # FLD AUTO: 0.38 K/UL — CRITICAL LOW (ref 3.8–10.5)

## 2025-05-15 PROCEDURE — 99233 SBSQ HOSP IP/OBS HIGH 50: CPT

## 2025-05-15 RX ORDER — DEXAMETHASONE 0.5 MG/1
5 TABLET ORAL
Refills: 0 | Status: DISCONTINUED | OUTPATIENT
Start: 2025-05-15 | End: 2025-05-15

## 2025-05-15 RX ORDER — OXYCODONE HYDROCHLORIDE 30 MG/1
5 TABLET ORAL ONCE
Refills: 0 | Status: DISCONTINUED | OUTPATIENT
Start: 2025-05-15 | End: 2025-05-15

## 2025-05-15 RX ORDER — HYDROMORPHONE/SOD CHLOR,ISO/PF 2 MG/10 ML
0.5 SYRINGE (ML) INJECTION
Refills: 0 | Status: DISCONTINUED | OUTPATIENT
Start: 2025-05-15 | End: 2025-05-20

## 2025-05-15 RX ORDER — DEXAMETHASONE 0.5 MG/1
0.5 TABLET ORAL
Refills: 0 | Status: DISCONTINUED | OUTPATIENT
Start: 2025-05-15 | End: 2025-05-28

## 2025-05-15 RX ORDER — HYDROMORPHONE/SOD CHLOR,ISO/PF 2 MG/10 ML
0.5 SYRINGE (ML) INJECTION ONCE
Refills: 0 | Status: DISCONTINUED | OUTPATIENT
Start: 2025-05-15 | End: 2025-05-15

## 2025-05-15 RX ADMIN — Medication 650 MILLIGRAM(S): at 10:05

## 2025-05-15 RX ADMIN — LIDOCAINE HYDROCHLORIDE 1 APPLICATION(S): 20 JELLY TOPICAL at 05:58

## 2025-05-15 RX ADMIN — DIPHENHYDRAMINE HYDROCHLORIDE AND LIDOCAINE HYDROCHLORIDE AND ALUMINUM HYDROXIDE AND MAGNESIUM HYDRO 10 MILLILITER(S): KIT at 13:37

## 2025-05-15 RX ADMIN — Medication 0.25 MILLIGRAM(S): at 18:03

## 2025-05-15 RX ADMIN — DILTIAZEM HYDROCHLORIDE 240 MILLIGRAM(S): 240 TABLET, EXTENDED RELEASE ORAL at 05:47

## 2025-05-15 RX ADMIN — Medication 300 MILLIGRAM(S): at 13:37

## 2025-05-15 RX ADMIN — Medication 100 MILLIGRAM(S): at 22:10

## 2025-05-15 RX ADMIN — Medication 0.25 MILLIGRAM(S): at 09:44

## 2025-05-15 RX ADMIN — TAMSULOSIN HYDROCHLORIDE 0.4 MILLIGRAM(S): 0.4 CAPSULE ORAL at 22:11

## 2025-05-15 RX ADMIN — Medication 50 MILLIEQUIVALENT(S): at 08:45

## 2025-05-15 RX ADMIN — Medication 0.25 MILLIGRAM(S): at 14:30

## 2025-05-15 RX ADMIN — Medication 40 MILLIEQUIVALENT(S): at 08:46

## 2025-05-15 RX ADMIN — Medication 0.5 MILLIGRAM(S): at 10:17

## 2025-05-15 RX ADMIN — LIDOCAINE HYDROCHLORIDE 1 APPLICATION(S): 20 JELLY TOPICAL at 13:56

## 2025-05-15 RX ADMIN — Medication 1 APPLICATION(S): at 08:46

## 2025-05-15 RX ADMIN — Medication 40 MILLIGRAM(S): at 18:01

## 2025-05-15 RX ADMIN — POSACONAZOLE 300 MILLIGRAM(S): 100 TABLET, DELAYED RELEASE ORAL at 18:00

## 2025-05-15 RX ADMIN — Medication 15 MILLILITER(S): at 13:36

## 2025-05-15 RX ADMIN — OXYCODONE HYDROCHLORIDE 5 MILLIGRAM(S): 30 TABLET ORAL at 17:36

## 2025-05-15 RX ADMIN — Medication 0.25 MILLIGRAM(S): at 08:44

## 2025-05-15 RX ADMIN — LIDOCAINE HYDROCHLORIDE 1 APPLICATION(S): 20 JELLY TOPICAL at 22:24

## 2025-05-15 RX ADMIN — Medication 100 MILLIGRAM(S): at 05:48

## 2025-05-15 RX ADMIN — Medication 80 MILLIGRAM(S): at 13:37

## 2025-05-15 RX ADMIN — Medication 0.5 MILLIGRAM(S): at 11:17

## 2025-05-15 RX ADMIN — OXYCODONE HYDROCHLORIDE 5 MILLIGRAM(S): 30 TABLET ORAL at 16:36

## 2025-05-15 RX ADMIN — DIPHENHYDRAMINE HYDROCHLORIDE AND LIDOCAINE HYDROCHLORIDE AND ALUMINUM HYDROXIDE AND MAGNESIUM HYDRO 10 MILLILITER(S): KIT at 18:00

## 2025-05-15 RX ADMIN — CEFEPIME 100 MILLIGRAM(S): 2 INJECTION, POWDER, FOR SOLUTION INTRAVENOUS at 13:38

## 2025-05-15 RX ADMIN — Medication 650 MILLIGRAM(S): at 18:43

## 2025-05-15 RX ADMIN — Medication 80 MILLIGRAM(S): at 18:01

## 2025-05-15 RX ADMIN — DEXAMETHASONE 0.5 MILLIGRAM(S): 0.5 TABLET ORAL at 18:22

## 2025-05-15 RX ADMIN — Medication 15 MILLILITER(S): at 18:00

## 2025-05-15 RX ADMIN — Medication 0.25 MILLIGRAM(S): at 13:36

## 2025-05-15 RX ADMIN — Medication 2000 UNIT(S): at 13:37

## 2025-05-15 RX ADMIN — Medication 5 MILLIGRAM(S): at 22:11

## 2025-05-15 RX ADMIN — Medication 650 MILLIGRAM(S): at 19:18

## 2025-05-15 RX ADMIN — CEFEPIME 100 MILLIGRAM(S): 2 INJECTION, POWDER, FOR SOLUTION INTRAVENOUS at 05:54

## 2025-05-15 RX ADMIN — Medication 500 MILLIGRAM(S): at 05:47

## 2025-05-15 RX ADMIN — Medication 15 MILLILITER(S): at 05:58

## 2025-05-15 RX ADMIN — Medication 80 MILLIGRAM(S): at 06:06

## 2025-05-15 RX ADMIN — Medication 0.25 MILLIGRAM(S): at 19:00

## 2025-05-15 RX ADMIN — Medication 40 MILLIGRAM(S): at 06:03

## 2025-05-15 RX ADMIN — Medication 650 MILLIGRAM(S): at 10:25

## 2025-05-15 RX ADMIN — CEFEPIME 100 MILLIGRAM(S): 2 INJECTION, POWDER, FOR SOLUTION INTRAVENOUS at 22:10

## 2025-05-15 RX ADMIN — Medication 50 MILLIEQUIVALENT(S): at 10:58

## 2025-05-15 RX ADMIN — DIPHENHYDRAMINE HYDROCHLORIDE AND LIDOCAINE HYDROCHLORIDE AND ALUMINUM HYDROXIDE AND MAGNESIUM HYDRO 10 MILLILITER(S): KIT at 05:59

## 2025-05-15 RX ADMIN — LIDOCAINE HYDROCHLORIDE 1 PATCH: 20 JELLY TOPICAL at 05:00

## 2025-05-15 RX ADMIN — Medication 500 MILLIGRAM(S): at 18:00

## 2025-05-15 RX ADMIN — Medication 100 MILLIGRAM(S): at 13:38

## 2025-05-15 NOTE — PHARMACOTHERAPY INTERVENTION NOTE - COMMENTS
Clinical Pharmacy Specialist- Hematology/Oncology- Progress Note    Pt is a 70 y/o male w/ PMH of HTN, HLD, GERD, BPH, Gout, spinal stenosis (s/p multiple surgeries) and newly diagnosed AML (ASXL1-mutated and DDX41) admitted for management with 7+3 Induction therapy    Antimicrobial Course:  - Posaconazole- 5/2  - Levaquin/Amox- 5/2- 5/11  --> Cefepime- 5/11  - Flagyl (abd discomfort)- 5/13  - Valtrex- 5/2  MRSA nasal swab    Last Neutropenic (ANC<1000): 5/15; ANC=   Last Febrile: 14-May-2025 20:32; T= 100.8  Days Non-Neutropenic: 0  Days afebrile: 0    Chemotherapy Course  -Current Regimen: 7+3 Induction  History:  (5/2/25)- 7+3 Induction   -Daunorubicin 60mg/m2 IVP D1-3   -Cytarabine 100mg/m2 IV cont inf D1-7  -Day: 14 (5/15)  BmBx:  Access: R TLC (accessed 5/5/25)    History/Relevant clinical information used in assessment:  - Ht= 5’11”; IBW= 75.3kg; Actual BW= 107.3kg; Adj BW= 88kg  - 5/2- IRR? rigors, chills, and tachycardia 2 hours into day 1; rasburicase 3mg x 1  - 5/4 -Crcl= 67ml/min ; Scr=1.32; Wt= 88kg; Adjusted BW used since pt BMI>30  - 5/9- increase melatonin 3mg to 10mh qhs- c/o insomnia  - 5/13- mouth pain- has ulceration   - 5/14- mouth pain - cannot swallow now    Assessment/Plan/Recommendation:  Onc:  - Daunorubicin 60mg/m2 IVP D1-3   - Cytarabine 100mg/m2 IV cont inf D1-7- IRR on D1, started at 6p, stopped at 7:30p (received 1.5 hrs),  made up D1 bag on 5/9 to run over 23 hrs (to account for ~1hr given)  - allopurinol- monitor for d/c ~5/12- need to keep for gout  ID:  - 5/14, still febrile since 5/11- - on cefepime, BlCx pending; abd discomfort-->CT CAP & added flagyl for anaerobic coverage  Cards:  - atorvastatin 80mg held d/t DDI with posa (Cat X)  Pain (mouth):  ·	magic mouthwash to swish & spit- change to swallow- 5/14  ·	hydromorphone 0.2mg IVP q6hr prn- 5/14  ·	oxycodone 2.5-5mg q4hr prn  ·	lidocaine gel 2% q8hr to tongue 5/14    Additional Monitoring Needed?   -Yes- Continue to monitor renal function & daily counts for abx escalation/de-escalation   -Discharge Planning:  --> New meds:  --> Meds sent for auth:  --> Delivered meds:    Case discussed with attending/primary team    Liza Jolley, PharmD, BCPS  Clinical Pharmacy Specialist | Hematology/Oncology  [facility]  Email: [email or web address] or available on [employer] Clinical Pharmacy Specialist- Hematology/Oncology- Progress Note    Pt is a 70 y/o male w/ PMH of HTN, HLD, GERD, BPH, Gout, spinal stenosis (s/p multiple surgeries) and newly diagnosed AML (ASXL1-mutated and DDX41) admitted for management with 7+3 Induction therapy    Antimicrobial Course:  - Posaconazole- 5/2  - Levaquin/Amox- 5/2- 5/11  --> Cefepime- 5/11  - Flagyl (abd discomfort)- 5/13  - Valtrex- 5/2  MRSA nasal swab    Last Neutropenic (ANC<1000): 5/15; ANC=   Last Febrile: 14-May-2025 20:32; T= 100.8  Days Non-Neutropenic: 0  Days afebrile: 0    Chemotherapy Course  -Current Regimen: 7+3 Induction  History:  (5/2/25)- 7+3 Induction   -Daunorubicin 60mg/m2 IVP D1-3   -Cytarabine 100mg/m2 IV cont inf D1-7  -Day: 14 (5/15)  BmBx:  Access: R TLC (accessed 5/5/25)    History/Relevant clinical information used in assessment:  - Ht= 5’11”; IBW= 75.3kg; Actual BW= 107.3kg; Adj BW= 88kg  - 5/2- IRR? rigors, chills, and tachycardia 2 hours into day 1; rasburicase 3mg x 1  - 5/4 -Crcl= 67ml/min ; Scr=1.32; Wt= 88kg; Adjusted BW used since pt BMI>30  - 5/9- increase melatonin 3mg to 10mh qhs- c/o insomnia  - 5/13- mouth pain- has ulceration   - 5/14- mouth pain - cannot swallow now    Assessment/Plan/Recommendation:  Onc:  - Daunorubicin 60mg/m2 IVP D1-3   - Cytarabine 100mg/m2 IV cont inf D1-7- IRR on D1, started at 6p, stopped at 7:30p (received 1.5 hrs),  made up D1 bag on 5/9 to run over 23 hrs (to account for ~1hr given)  - allopurinol- monitor for d/c ~5/12- need to keep for gout  ID:  - 5/14, still febrile since 5/11- - on cefepime, BlCx pending; abd discomfort-->CT CAP - no infection  - 5/15- bloody diarrhea x 3 on reglan prn, but has not needed, monitor for d/c to not aggravate w/ prokinetic agent- amicar?  Cards:  - atorvastatin 80mg held d/t DDI with posa (Cat X)  Pain (mouth):  ·	magic mouthwash to swish & spit- change to swallow- 5/14  ·	hydromorphone 0.2mg IVP q6hr prn- 5/14  ·	oxycodone 2.5-5mg q4hr prn  ·	lidocaine gel 2% q8hr to tongue 5/14    Additional Monitoring Needed?   -Yes- Continue to monitor renal function & daily counts for abx escalation/de-escalation   -Discharge Planning:  --> New meds:  --> Meds sent for auth:  --> Delivered meds:    Case discussed with attending/primary team    Liza Jolley, PharmD, BCPS  Clinical Pharmacy Specialist | Hematology/Oncology  [facility]  Email: [email or web address] or available on [employer] Clinical Pharmacy Specialist- Hematology/Oncology- Progress Note    Pt is a 68 y/o male w/ PMH of HTN, HLD, GERD, BPH, Gout, spinal stenosis (s/p multiple surgeries) and newly diagnosed AML (ASXL1-mutated and DDX41) admitted for management with 7+3 Induction therapy    Antimicrobial Course:  - Posaconazole- 5/2  - Levaquin/Amox- 5/2- 5/11  --> Cefepime- 5/11  - Flagyl (abd discomfort)- 5/13  - Valtrex- 5/2  MRSA nasal swab    Last Neutropenic (ANC<1000): 5/15; ANC= 0  Last Febrile: 14-May-2025 20:32; T= 100.8  Days Non-Neutropenic: 0  Days afebrile: 0    Chemotherapy Course  -Current Regimen: 7+3 Induction  History:  (5/2/25)- 7+3 Induction   -Daunorubicin 60mg/m2 IVP D1-3   -Cytarabine 100mg/m2 IV cont inf D1-7  -Day: 14 (5/15)  BmBx:  Access: R TLC (accessed 5/5/25)    History/Relevant clinical information used in assessment:  - Ht= 5’11”; IBW= 75.3kg; Actual BW= 107.3kg; Adj BW= 88kg  - 5/2- IRR? rigors, chills, and tachycardia 2 hours into day 1; rasburicase 3mg x 1  - 5/4 -Crcl= 67ml/min ; Scr=1.32; Wt= 88kg; Adjusted BW used since pt BMI>30  - 5/9- increase melatonin 3mg to 10mh qhs- c/o insomnia  - 5/13- mouth pain- has ulceration   - 5/14- mouth pain - cannot swallow now    Assessment/Plan/Recommendation:  Onc:  - Daunorubicin 60mg/m2 IVP D1-3   - Cytarabine 100mg/m2 IV cont inf D1-7- IRR on D1, started at 6p, stopped at 7:30p (received 1.5 hrs),  made up D1 bag on 5/9 to run over 23 hrs (to account for ~1hr given)  - allopurinol- monitor for d/c ~5/12- need to keep for gout  ID:  - 5/14, still febrile since 5/11- - on cefepime, BlCx pending; abd discomfort-->CT CAP - no infection  - 5/15- bloody diarrhea x 3 on reglan prn, but has not needed, monitor for d/c to not aggravate w/ prokinetic agent- amicar?  Cards:  - atorvastatin 80mg held d/t DDI with posa (Cat X)  Pain (mouth):  ·	magic mouthwash to swish & spit- change to swallow- 5/14  ·	hydromorphone 0.2mg IVP q6hr prn- 5/14  ·	oxycodone 2.5-5mg q4hr prn  ·	lidocaine gel 2% q8hr to tongue 5/14  ·	recommend dexamethasone soln 0.5 mg/5 mL swish & spit - 5/15    Additional Monitoring Needed?   -Yes- Continue to monitor renal function & daily counts for abx escalation/de-escalation   -Discharge Planning:  --> New meds:  --> Meds sent for auth:  --> Delivered meds:    Case discussed with attending/primary team    Liza Jolley, PharmD, BCPS  Clinical Pharmacy Specialist | Hematology/Oncology  [facility]  Email: [email or web address] or available on [employer] Clinical Pharmacy Specialist- Hematology/Oncology- Progress Note    Pt is a 70 y/o male w/ PMH of HTN, HLD, GERD, BPH, Gout, spinal stenosis (s/p multiple surgeries) and newly diagnosed AML (ASXL1-mutated and DDX41) admitted for management with 7+3 Induction therapy    Antimicrobial Course:  - Posaconazole- 5/2  - Levaquin/Amox- 5/2- 5/11  --> Cefepime- 5/11  - Flagyl (abd discomfort)- 5/13  - Valtrex- 5/2  MRSA nasal swab    Last Neutropenic (ANC<1000): 5/15; ANC= 0  Last Febrile: 14-May-2025 20:32; T= 100.8  Days Non-Neutropenic: 0  Days afebrile: 0    Chemotherapy Course  -Current Regimen: 7+3 Induction  History:  (5/2/25)- 7+3 Induction   -Daunorubicin 60mg/m2 IVP D1-3   -Cytarabine 100mg/m2 IV cont inf D1-7  -Day: 14 (5/15)  BmBx:  Access: R TLC (accessed 5/5/25)    History/Relevant clinical information used in assessment:  - Ht= 5’11”; IBW= 75.3kg; Actual BW= 107.3kg; Adj BW= 88kg  - 5/2- IRR? rigors, chills, and tachycardia 2 hours into day 1; rasburicase 3mg x 1  - 5/4 -Crcl= 67ml/min ; Scr=1.32; Wt= 88kg; Adjusted BW used since pt BMI>30  - 5/9- increase melatonin 3mg to 10mh qhs- c/o insomnia  - 5/13- mouth pain- has ulceration   - 5/14- mouth pain - cannot swallow now    Assessment/Plan/Recommendation:  Onc:  - Daunorubicin 60mg/m2 IVP D1-3   - Cytarabine 100mg/m2 IV cont inf D1-7- IRR on D1, started at 6p, stopped at 7:30p (received 1.5 hrs),  made up D1 bag on 5/9 to run over 23 hrs (to account for ~1hr given)  - allopurinol- monitor for d/c ~5/12- need to keep for gout  ID:  - 5/14, still febrile since 5/11- - on cefepime, BlCx pending; abd discomfort-->CT CAP - no infection  - 5/15- bloody diarrhea x 3 on reglan prn, but has not needed, monitor for d/c to not aggravate w/ prokinetic agent- amicar?  Cards:  - atorvastatin 80mg held d/t DDI with posa (Cat X)  Pain (mouth):  ·	magic mouthwash to swish & swallow- 5/14  ·	hydromorphone 0.2mg IVP q6hr prn- 5/14  ·	oxycodone 2.5-5mg q4hr prn  ·	lidocaine gel 2% q8hr to tongue 5/14  ·	recommend dexamethasone soln 0.5 mg/5 mL swish & spit - 5/15    Additional Monitoring Needed?   -Yes- Continue to monitor renal function & daily counts for abx escalation/de-escalation   -Discharge Planning:  --> New meds:  --> Meds sent for auth:  --> Delivered meds:    Case discussed with attending/primary team    Liza Jolley, PharmD, BCPS  Clinical Pharmacy Specialist | Hematology/Oncology  [facility]  Email: [email or web address] or available on [employer] Clinical Pharmacy Specialist- Hematology/Oncology- Progress Note    Pt is a 68 y/o male w/ PMH of HTN, HLD, GERD, BPH, Gout, spinal stenosis (s/p multiple surgeries) and newly diagnosed AML (ASXL1-mutated and DDX41) admitted for management with 7+3 Induction therapy    Antimicrobial Course:  - Posaconazole- 5/2  - Levaquin/Amox- 5/2- 5/11  --> Cefepime- 5/11  - Flagyl (abd discomfort)- 5/13  - Valtrex- 5/2  MRSA nasal swab    Last Neutropenic (ANC<1000): 5/15; ANC= 0  Last Febrile: 14-May-2025 20:32; T= 100.8  Days Non-Neutropenic: 0  Days afebrile: 0    Chemotherapy Course  -Current Regimen: 7+3 Induction  History:  (5/2/25)- 7+3 Induction   -Daunorubicin 60mg/m2 IVP D1-3   -Cytarabine 100mg/m2 IV cont inf D1-7  -Day: 14 (5/15)  BmBx:  Access: R TLC (accessed 5/5/25)    History/Relevant clinical information used in assessment:  - Ht= 5’11”; IBW= 75.3kg; Actual BW= 107.3kg; Adj BW= 88kg  - 5/2- IRR? rigors, chills, and tachycardia 2 hours into day 1; rasburicase 3mg x 1  - 5/4 -Crcl= 67ml/min ; Scr=1.32; Wt= 88kg; Adjusted BW used since pt BMI>30  - 5/9- increase melatonin 3mg to 10mh qhs- c/o insomnia  - 5/13- mouth pain- has ulceration   - 5/14- mouth pain - cannot swallow now    Assessment/Plan/Recommendation:  Onc:  - Daunorubicin 60mg/m2 IVP D1-3   - Cytarabine 100mg/m2 IV cont inf D1-7- IRR on D1, started at 6p, stopped at 7:30p (received 1.5 hrs),  made up D1 bag on 5/9 to run over 23 hrs (to account for ~1hr given)  - allopurinol- monitor for d/c ~5/12- need to keep for gout  ID:  - 5/15, still febrile since 5/11- - on cefepime, BlCx pending; abd discomfort-->CT CAP - no infection, keep flagy until..?  - 5/15- bloody diarrhea x 3 on reglan prn, but has not needed, monitor for d/c to not aggravate w/ prokinetic agent- amicar?  - 5/15- will swab mouth for poss HSV  Cards:  - atorvastatin 80mg held d/t DDI with posa (Cat X)  Pain (mouth):  ·	magic mouthwash to swish & swallow- 5/14  ·	hydromorphone 0.2mg IVP q6hr prn- 5/14  ·	oxycodone 2.5-5mg q4hr prn  ·	lidocaine gel 2% q8hr to tongue 5/14  ·	recommend dexamethasone soln 0.5 mg/5 mL swish & spit - 5/15    Additional Monitoring Needed?   -Yes- Continue to monitor renal function & daily counts for abx escalation/de-escalation   -Discharge Planning:  --> New meds:  --> Meds sent for auth:  --> Delivered meds:    Case discussed with attending/primary team    Liza Jolley, PharmD, BCPS  Clinical Pharmacy Specialist | Hematology/Oncology  [facility]  Email: [email or web address] or available on [email or web address] Clinical Pharmacy Specialist- Hematology/Oncology- Progress Note    Pt is a 70 y/o male w/ PMH of HTN, HLD, GERD, BPH, Gout, spinal stenosis (s/p multiple surgeries) and newly diagnosed AML (ASXL1-mutated and DDX41) admitted for management with 7+3 Induction therapy    Antimicrobial Course:  - Posaconazole- 5/2  - Levaquin/Amox- 5/2- 5/11  --> Cefepime- 5/11  - Flagyl (abd discomfort)- 5/13  - Valtrex- 5/2  MRSA nasal swab    Last Neutropenic (ANC<1000): 5/15; ANC= 0  Last Febrile: 14-May-2025 20:32; T= 100.8  Days Non-Neutropenic: 0  Days afebrile: 0    Chemotherapy Course  -Current Regimen: 7+3 Induction  History:  (5/2/25)- 7+3 Induction   -Daunorubicin 60mg/m2 IVP D1-3   -Cytarabine 100mg/m2 IV cont inf D1-7  -Day: 14 (5/15)  BmBx:  Access: R TLC (accessed 5/5/25)    History/Relevant clinical information used in assessment:  - Ht= 5’11”; IBW= 75.3kg; Actual BW= 107.3kg; Adj BW= 88kg  - 5/2- IRR? rigors, chills, and tachycardia 2 hours into day 1; rasburicase 3mg x 1  - 5/4 -Crcl= 67ml/min ; Scr=1.32; Wt= 88kg; Adjusted BW used since pt BMI>30  - 5/9- increase melatonin 3mg to 10mh qhs- c/o insomnia  - 5/13- mouth pain- has ulceration   - 5/14- mouth pain - cannot swallow now    Assessment/Plan/Recommendation:  Onc:  - Daunorubicin 60mg/m2 IVP D1-3   - Cytarabine 100mg/m2 IV cont inf D1-7- IRR on D1, started at 6p, stopped at 7:30p (received 1.5 hrs),  made up D1 bag on 5/9 to run over 23 hrs (to account for ~1hr given)  - allopurinol- monitor for d/c ~5/12- need to keep for gout  ID:  - 5/15, still febrile since 5/11- - on cefepime, BlCx pending; abd discomfort-->CT CAP - no infection, keep flagy until..?  - 5/15- bloody diarrhea x 3 on reglan prn, but has not needed, monitor for d/c to not aggravate w/ prokinetic agent- amicar?  - 5/15- will swab mouth for poss HSV  Cards:  - atorvastatin 80mg held d/t DDI with posa (Cat X)  Pain (mouth):  ·	magic mouthwash to swish & swallow- 5/14  ·	hydromorphone 0.2mg IVP q6hr prn- 5/14  ·	oxycodone 2.5-5mg q4hr prn  ·	lidocaine gel 2% q8hr to tongue 5/14  ·	recommend dexamethasone soln 0.5 mg/5 mL BID swish & spit - 5/15    Additional Monitoring Needed?   -Yes- Continue to monitor renal function & daily counts for abx escalation/de-escalation   -Discharge Planning:  --> New meds:  --> Meds sent for auth:  --> Delivered meds:    Case discussed with attending/primary team    Liza Jolley, PharmD, BCPS  Clinical Pharmacy Specialist | Hematology/Oncology  [facility]  Email: [email or web address] or available on [employer]

## 2025-05-15 NOTE — PROGRESS NOTE ADULT - SUBJECTIVE AND OBJECTIVE BOX
Diagnosis: ASXL1-mutated and [id number]-huupzrt AML    Protocol/Chemo Regimen: 7+3:  Daunorubicin 60 mg/m2+ Cytarabine     Day: 14    Pt endorsed:   Fever O/N  +generalized weakness, fatigue  +mouth/ gum pain, discomfort  + abdominal pain, discomfort    Review of Systems:   Denies any nausea, vomiting, diarrhea, chest pain, SOB, abdominal pain    Pain scale: unable to rate                             Diet: puree    Allergies: No Known Allergies    Intolerances: Shrimp- Nasal congestion (Other)          ANTIMICROBIALS  cefepime   IVPB 2000 milliGRAM(s) IV Intermittent every 8 hours  cefepime   IVPB      metroNIDAZOLE  IVPB 500 milliGRAM(s) IV Intermittent every 8 hours  posaconazole DR Tablet   Oral   posaconazole DR Tablet 300 milliGRAM(s) Oral every 24 hours  valACYclovir 500 milliGRAM(s) Oral two times a day      HEME/ONC MEDICATIONS      STANDING MEDICATIONS  allopurinol 300 milliGRAM(s) Oral daily  Biotene Dry Mouth Oral Rinse 15 milliLiter(s) Swish and Spit four times a day  chlorhexidine 4% Liquid 1 Application(s) Topical <User Schedule>  cholecalciferol 2000 Unit(s) Oral daily  diltiazem    milliGRAM(s) Oral daily  FIRST- Mouthwash  BLM 10 milliLiter(s) Swish and Swallow four times a day  hydrochlorothiazide 50 milliGRAM(s) Oral daily  HYDROmorphone  Injectable 0.25 milliGRAM(s) IV Push <User Schedule>  lidocaine   4% Patch 1 Patch Transdermal every 24 hours  lidocaine 2% Gel 1 Application(s) Topical every 8 hours  melatonin 5 milliGRAM(s) Oral at bedtime  pantoprazole  Injectable 40 milliGRAM(s) IV Push every 12 hours  simethicone 80 milliGRAM(s) Chew every 6 hours  sodium chloride 0.9%. 1000 milliLiter(s) IV Continuous <Continuous>  tamsulosin 0.4 milliGRAM(s) Oral at bedtime      PRN MEDICATIONS  acetaminophen     Tablet .. 650 milliGRAM(s) Oral every 6 hours PRN  albuterol    90 MICROgram(s) HFA Inhaler 2 Puff(s) Inhalation every 6 hours PRN  aluminum hydroxide/magnesium hydroxide/simethicone Suspension 30 milliLiter(s) Oral every 6 hours PRN  calcium carbonate    500 mG (Tums) Chewable 1 Tablet(s) Chew four times a day PRN  cyclobenzaprine 10 milliGRAM(s) Oral three times a day PRN  hydrocortisone sodium succinate Injectable 100 milliGRAM(s) IV Push every 8 hours PRN  HYDROmorphone  Injectable 0.25 milliGRAM(s) IV Push every 12 hours PRN  metoclopramide Injectable 10 milliGRAM(s) IV Push every 6 hours PRN  ondansetron Injectable 8 milliGRAM(s) IV Push every 8 hours PRN  polyethylene glycol 3350 17 Gram(s) Oral two times a day PRN  sodium chloride 0.9% lock flush 10 milliLiter(s) IV Push every 1 hour PRN        Vital Signs Last 24 Hrs  T(C): 36.9 (15 May 2025 05:00), Max: 38.2 (14 May 2025 20:32)  T(F): 98.5 (15 May 2025 05:00), Max: 100.8 (14 May 2025 20:32)  HR: 88 (15 May 2025 05:00) (82 - 99)  BP: 112/67 (15 May 2025 05:00) (104/67 - 125/76)  BP(mean): --  RR: 20 (15 May 2025 05:00) (16 - 20)  SpO2: 96% (15 May 2025 05:00) (93% - 97%)    Parameters below as of 15 May 2025 05:00  Patient On (Oxygen Delivery Method): room air      PHYSICAL EXAM  HEENT:  ulceration + in left lateral aspect of tongue  CV: (+) S1/S2 RRR  Lungs: clear to auscultation, no wheezes or rales  Abdomen: soft, non-tender, non-distended (+) BS  Ext: B/L LE trace edema  Skin: no rash  Neuro: alert and oriented X 3  Central Line: TLC c/d/i       RECENT CULTURES:  05-14 @ 06:25  Clean Catch Clean Catch (Midstream)  --  --  --    No growth  --  05-11 @ 23:54  Clean Catch Clean Catch (Midstream)  --  --  --    <10,000 CFU/mL Normal Urogenital Madelyn  --  05-11 @ 23:40  Blood Blood-Peripheral  --  --  --    No growth at 72 Hours  --        LABS:                        7.8    0.52  )-----------( 9        ( 14 May 2025 18:33 )             21.0         Mean Cell Volume : 90.9 fl  Mean Cell Hemoglobin : 33.8 pg  Mean Cell Hemoglobin Concentration : 37.0 g/dL  Auto Neutrophil # : x  Auto Lymphocyte # : x  Auto Monocyte # : x  Auto Eosinophil # : x  Auto Basophil # : x  Auto Neutrophil % : x  Auto Lymphocyte % : x  Auto Monocyte % : x  Auto Eosinophil % : x  Auto Basophil % : x      05-14    133[L]  |  101  |  14  ----------------------------<  124[H]  3.4[L]   |  21[L]  |  1.27    Ca    8.4      14 May 2025 06:50  Phos  3.3     05-14  Mg     1.8     05-14    TPro  5.3[L]  /  Alb  3.3  /  TBili  0.5  /  DBili  x   /  AST  8[L]  /  ALT  9[L]  /  AlkPhos  67  05-14          PT/INR - ( 14 May 2025 06:53 )   PT: 13.3 sec;   INR: 1.17 ratio         PTT - ( 14 May 2025 06:53 )  PTT:25.2 sec        RADIOLOGY & ADDITIONAL STUDIES:    Xray Chest 1 View- PORTABLE-Urgent (Xray Chest 1 View- PORTABLE-Urgent .) (05.11.25 @ 22:27) >  Low lung volumes, with clear lungs.       Diagnosis: ASXL1-mutated and JJU33-agqzcux AML    Protocol/Chemo Regimen: 7+3:  Daunorubicin 60 mg/m2+ Cytarabine     Day: 14    Pt endorsed:   +generalized weakness, fatigue  +mouth/ gum pain, discomfort  +multiple BM w/ bright red blood    Review of Systems:   Denies any nausea, vomiting, diarrhea, chest pain, SOB, abdominal pain, hemorrhoids    Pain scale: unable to rate                             Diet: puree    Allergies: No Known Allergies    Intolerances: Shrimp- Nasal congestion (Other)          ANTIMICROBIALS  cefepime   IVPB 2000 milliGRAM(s) IV Intermittent every 8 hours  cefepime   IVPB      metroNIDAZOLE  IVPB 500 milliGRAM(s) IV Intermittent every 8 hours  posaconazole DR Tablet   Oral   posaconazole DR Tablet 300 milliGRAM(s) Oral every 24 hours  valACYclovir 500 milliGRAM(s) Oral two times a day      HEME/ONC MEDICATIONS      STANDING MEDICATIONS  allopurinol 300 milliGRAM(s) Oral daily  Biotene Dry Mouth Oral Rinse 15 milliLiter(s) Swish and Spit four times a day  chlorhexidine 4% Liquid 1 Application(s) Topical <User Schedule>  cholecalciferol 2000 Unit(s) Oral daily  diltiazem    milliGRAM(s) Oral daily  FIRST- Mouthwash  BLM 10 milliLiter(s) Swish and Swallow four times a day  hydrochlorothiazide 50 milliGRAM(s) Oral daily  HYDROmorphone  Injectable 0.25 milliGRAM(s) IV Push <User Schedule>  lidocaine   4% Patch 1 Patch Transdermal every 24 hours  lidocaine 2% Gel 1 Application(s) Topical every 8 hours  melatonin 5 milliGRAM(s) Oral at bedtime  pantoprazole  Injectable 40 milliGRAM(s) IV Push every 12 hours  simethicone 80 milliGRAM(s) Chew every 6 hours  sodium chloride 0.9%. 1000 milliLiter(s) IV Continuous <Continuous>  tamsulosin 0.4 milliGRAM(s) Oral at bedtime      PRN MEDICATIONS  acetaminophen     Tablet .. 650 milliGRAM(s) Oral every 6 hours PRN  albuterol    90 MICROgram(s) HFA Inhaler 2 Puff(s) Inhalation every 6 hours PRN  aluminum hydroxide/magnesium hydroxide/simethicone Suspension 30 milliLiter(s) Oral every 6 hours PRN  calcium carbonate    500 mG (Tums) Chewable 1 Tablet(s) Chew four times a day PRN  cyclobenzaprine 10 milliGRAM(s) Oral three times a day PRN  hydrocortisone sodium succinate Injectable 100 milliGRAM(s) IV Push every 8 hours PRN  HYDROmorphone  Injectable 0.25 milliGRAM(s) IV Push every 12 hours PRN  metoclopramide Injectable 10 milliGRAM(s) IV Push every 6 hours PRN  ondansetron Injectable 8 milliGRAM(s) IV Push every 8 hours PRN  polyethylene glycol 3350 17 Gram(s) Oral two times a day PRN  sodium chloride 0.9% lock flush 10 milliLiter(s) IV Push every 1 hour PRN        Vital Signs Last 24 Hrs  T(C): 36.9 (15 May 2025 05:00), Max: 38.2 (14 May 2025 20:32)  T(F): 98.5 (15 May 2025 05:00), Max: 100.8 (14 May 2025 20:32)  HR: 88 (15 May 2025 05:00) (82 - 99)  BP: 112/67 (15 May 2025 05:00) (104/67 - 125/76)  BP(mean): --  RR: 20 (15 May 2025 05:00) (16 - 20)  SpO2: 96% (15 May 2025 05:00) (93% - 97%)    Parameters below as of 15 May 2025 05:00  Patient On (Oxygen Delivery Method): room air      PHYSICAL EXAM  HEENT:  ulceration + in left lateral aspect of tongue  CV: (+) S1/S2 RRR  Lungs: clear to auscultation, no wheezes or rales  Abdomen: soft, non-tender, non-distended (+) BS  Ext: B/L LE trace edema  Skin: no rash  Neuro: alert and oriented X 3  Central Line: TLC c/d/i       RECENT CULTURES:  05-14 @ 06:25  Clean Catch Clean Catch (Midstream)  --  --  --    No growth  --  05-11 @ 23:54  Clean Catch Clean Catch (Midstream)  --  --  --    <10,000 CFU/mL Normal Urogenital Madelyn  --  05-11 @ 23:40  Blood Blood-Peripheral  --  --  --    No growth at 72 Hours  --      LABS:    Blood Cultures:                           7.8    0.36  )-----------( 41       ( 15 May 2025 06:50 )             22.1         Mean Cell Volume : 90.9 fl  Mean Cell Hemoglobin : 32.1 pg  Mean Cell Hemoglobin Concentration : 35.3 g/dL  Auto Neutrophil # : x  Auto Lymphocyte # : x  Auto Monocyte # : x  Auto Eosinophil # : x  Auto Basophil # : x  Auto Neutrophil % : x  Auto Lymphocyte % : x  Auto Monocyte % : x  Auto Eosinophil % : x  Auto Basophil % : x      05-15    136  |  100  |  14  ----------------------------<  132[H]  3.1[L]   |  21[L]  |  1.18    Ca    8.7      15 May 2025 06:49  Phos  3.1     05-15  Mg     1.6     05-15    TPro  5.6[L]  /  Alb  3.5  /  TBili  0.8  /  DBili  x   /  AST  11  /  ALT  10  /  AlkPhos  68  05-15      PT/INR - ( 14 May 2025 06:53 )   PT: 13.3 sec;   INR: 1.17 ratio         PTT - ( 14 May 2025 06:53 )  PTT:25.2 sec      Uric Acid 4.2        RADIOLOGY & ADDITIONAL STUDIES:    Xray Chest 1 View- PORTABLE-Urgent (Xray Chest 1 View- PORTABLE-Urgent .) (05.11.25 @ 22:27) >  Low lung volumes, with clear lungs.

## 2025-05-15 NOTE — PROGRESS NOTE ADULT - NS ATTEND AMEND GEN_ALL_CORE FT
Primary: Paulie    69M with ASXL1-mutated and PMG20-sqevnav AML. 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 Admitted for induction 7+3 - day 13.    Heme:   - Continue 7+3 (5/2/25 - ). Complicated by rigors, chills, and tachycardia 2 hours into day 1 infusion - likely infusion reaction.  - Trend CBC with differential daily. Goal Hgb > 7 and plt > 10  (> 15 if febrile, > 50 if bleeding).   - Trend DIC labs daily. Continue supportive transfusions as needed to maintain fibrinogen > 100.   - Trend TLS labs twice daily.   - Continue allopurinol. S/p rasburicase; consider additional doses if uric acid > 8.  - Continue IVF. Monitor daily weights and I/O's. Diurese PRN.   -BMBx day 15    ID: Neutropenic and febrile overnight 5/11-12. Persistent now with ongoing GI symptoms.   - Cefepime (5/11-). Cultures pending. CXR suboptimal. No respiratory symptoms, however with mild GI symptoms (cramps, loose stool). Checking CT C/A/P with only oral contrast today and will send GI PCR.   - Prophylaxis: posaconazole, valacyclovir     MSK: Has chronic pain from prior surgeries   - Pain control: oxycodone Primary: Paulie    69M with ASXL1-mutated and FAG35-sqgxxmg AML. 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 Admitted for induction 7+3 - day 14.    Heme:   - Continue 7+3 (5/2/25 - ). Complicated by rigors, chills, and tachycardia 2 hours into day 1 infusion - likely infusion reaction.  - Trend CBC with differential daily. Goal Hgb > 7 and plt > 10  (> 15 if febrile, > 50 if bleeding).   - Trend DIC labs intermittently, as this has not been issue since admission. Continue supportive transfusions as needed to maintain fibrinogen > 100.   - Trend TLS labs daily at this point - very low risk now.   - Discontinued allopurinol today day 14.  S/p rasburicase; uric acid and LDH stable.   - Continue IVF. Monitor daily weights and I/O's. Diurese PRN.   -BMBx day 15    ID: Neutropenic and febrile overnight initially on 5/11-12, and persistent since then with ongoing GI symptoms. Possibly mucositis related. Cultures have been negative.   - Cefepime (5/11- ).    - CT scan from 5/14 showing no acute CT findings suspicious for infection. Mentioned GGOs in the lungs bilaterally, but stable from a previous scan.   - Prophylaxis: posaconazole, valacyclovir   - Mouth rinses, dexamethasone swish and spit for oral mucositis.     MSK: Has chronic pain from prior surgeries   - Pain control: oxycodone

## 2025-05-15 NOTE — PROGRESS NOTE ADULT - PROBLEM SELECTOR PLAN 2
neutropenic, Febrile   FU pan cx, UA (-) UTI, full RVP(-) on 5/3   5/2 CXR Patchy opacities along the left lung base, possibly atelectasis, however superimposed infection cannot be entirely excluded.  5/11- BCX (-)  5/12- Amoxicillin, Levaquin changed to Cefepime, continue posaconazole and Valtrex.  5/13- FLU/COVID/RSV (-)  5/13- Added Flagyl 500 mg IV Q 8 hrs due to GI discomfort/pain  5/13- CT C/A/P - neutropenic, Febrile   FU pan cx, UA (-) UTI, full RVP(-) on 5/3   5/2 CXR Patchy opacities along the left lung base, possibly atelectasis, however superimposed infection cannot be entirely excluded.  5/11- BCX (-)  5/12- Amoxicillin, Levaquin changed to Cefepime, continue posaconazole and Valtrex.  5/13- FLU/COVID/RSV (-)  5/13- Added Flagyl 500 mg IV Q 8 hrs due to GI discomfort/pain  5/13- CT C/A/P -negative for infxn  5/15 HSV + MRSA PCR sent f/u neutropenic, Febrile   continue posa, valtrex, flagyl, cefepime (5/11- )   5/2 CXR Patchy opacities along the left lung base, possibly atelectasis, however superimposed infection cannot be entirely excluded.  5/13- FLU/COVID/RSV (-)  5/13- Added Flagyl 500 mg IV Q 8 hrs due to GI discomfort/pain  5/13- CT C/A/P -negative for infxn  5/14 BCX sent f/u  5/15 HSV + MRSA PCR sent f/u

## 2025-05-15 NOTE — PROGRESS NOTE ADULT - PROBLEM SELECTOR PLAN 1
new ASXL1-mutated and [id number]-lsjyiko AML.  admitted for induction chemo with dauno 60 mg/m2+ cytarabine. Chemo started on 5/2, Complicated by rigors, chills, fever, SOB  and tachycardia 2 hours into day 1 infusion - likely infusion. recevied pepcid/benadryl/solumedrol/tylenol iv. will make up D1 Cytarabine on day8    will proceed with day2 chemo with pre mdes Tylenol and Benadryl, hydrocortisone iv PRN reaction   Hepatitis screen/HIV(-) as outpatient  IVF, antiemetics, mouth care. monitor weight, I & O, diuresis PRN   Monitor CBC with diff QD, TLS labs BID, Coags & T & C TIW transfuse for hb<7, PLT <10  5/5 HLA sent  day 14 BM bx on 5/16 5/14- Transfuse one unit PRBC and transfuse new ASXL1-mutated and ZJD15-xezkfda AML.  admitted for induction chemo with dauno 60 mg/m2+ cytarabine. Chemo started on 5/2, Complicated by rigors, chills, fever, SOB  and tachycardia 2 hours into day 1 infusion - likely infusion. recevied pepcid/benadryl/solumedrol/tylenol iv. will make up D1 Cytarabine on day8    will proceed with day2 chemo with pre mdes Tylenol and Benadryl, hydrocortisone iv PRN reaction   Hepatitis screen/HIV(-) as outpatient  IVF, antiemetics, mouth care. monitor weight, I & O, diuresis PRN   Monitor CBC with diff QD, TLS labs BID, Coags & T & C TIW transfuse for hb<7, PLT <10  5/5 HLA sent  day 14 BM bx on 5/16  5/145 new ASXL1-mutated and VFG78-owtuvam AML.  admitted for induction chemo with dauno 60 mg/m2+ cytarabine. Chemo started on 5/2, Complicated by rigors, chills, fever, SOB  and tachycardia 2 hours into day 1 infusion - likely infusion. recevied pepcid/benadryl/solumedrol/tylenol iv. will make up D1 Cytarabine on day8    will proceed with day2 chemo with pre mdes Tylenol and Benadryl, hydrocortisone iv PRN reaction   Hepatitis screen/HIV(-) as outpatient  IVF, antiemetics, mouth care. monitor weight, I & O, diuresis PRN   Monitor CBC with diff QD, TLS labs BID, Coags & T & C TIW transfuse for hb<7, PLT <10  5/5 HLA sent  day 14 BM bx on 5/16  5/15 KCl repletion for hypokalemia

## 2025-05-15 NOTE — PROGRESS NOTE ADULT - ASSESSMENT
68 yo male with newly diagnosed ASXL1, DDX41 mutated AML, admitted for induction chemo with 7+3:  dauno 60 mg/m2+ cytarabine. 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 Chemo started on 5/2, Complicated by rigors, chills, fever, SOB  and tachycardia 2 hours into day 1 infusion - likely infusion reaction. Pt has pancytopenia d/t chemo and disease

## 2025-05-15 NOTE — PROGRESS NOTE ADULT - PROBLEM SELECTOR PROBLEM 5
Lipitor      Last Written Prescription Date:  6/5/23  Last Fill Quantity: 60,   # refills: 2  Last Office Visit: 10/17/23  Future Office visit:       Routing refill request to provider for review/approval because:         BPH (benign prostatic hyperplasia)

## 2025-05-16 DIAGNOSIS — Z71.89 OTHER SPECIFIED COUNSELING: ICD-10-CM

## 2025-05-16 DIAGNOSIS — Z51.5 ENCOUNTER FOR PALLIATIVE CARE: ICD-10-CM

## 2025-05-16 DIAGNOSIS — K12.30 ORAL MUCOSITIS (ULCERATIVE), UNSPECIFIED: ICD-10-CM

## 2025-05-16 LAB
ALBUMIN SERPL ELPH-MCNC: 2.9 G/DL — LOW (ref 3.3–5)
ALBUMIN SERPL ELPH-MCNC: 3.6 G/DL — SIGNIFICANT CHANGE UP (ref 3.3–5)
ALP SERPL-CCNC: 58 U/L — SIGNIFICANT CHANGE UP (ref 40–120)
ALP SERPL-CCNC: 69 U/L — SIGNIFICANT CHANGE UP (ref 40–120)
ALT FLD-CCNC: 13 U/L — SIGNIFICANT CHANGE UP (ref 10–45)
ALT FLD-CCNC: 9 U/L — LOW (ref 10–45)
ANION GAP SERPL CALC-SCNC: 12 MMOL/L — SIGNIFICANT CHANGE UP (ref 5–17)
ANION GAP SERPL CALC-SCNC: 15 MMOL/L — SIGNIFICANT CHANGE UP (ref 5–17)
APTT BLD: 25.4 SEC — LOW (ref 26.1–36.8)
AST SERPL-CCNC: 13 U/L — SIGNIFICANT CHANGE UP (ref 10–40)
AST SERPL-CCNC: 8 U/L — LOW (ref 10–40)
BILIRUB SERPL-MCNC: 0.7 MG/DL — SIGNIFICANT CHANGE UP (ref 0.2–1.2)
BILIRUB SERPL-MCNC: 1 MG/DL — SIGNIFICANT CHANGE UP (ref 0.2–1.2)
BLD GP AB SCN SERPL QL: NEGATIVE — SIGNIFICANT CHANGE UP
BUN SERPL-MCNC: 14 MG/DL — SIGNIFICANT CHANGE UP (ref 7–23)
BUN SERPL-MCNC: 15 MG/DL — SIGNIFICANT CHANGE UP (ref 7–23)
CALCIUM SERPL-MCNC: 7.3 MG/DL — LOW (ref 8.4–10.5)
CALCIUM SERPL-MCNC: 8.7 MG/DL — SIGNIFICANT CHANGE UP (ref 8.4–10.5)
CHLORIDE SERPL-SCNC: 102 MMOL/L — SIGNIFICANT CHANGE UP (ref 96–108)
CHLORIDE SERPL-SCNC: 107 MMOL/L — SIGNIFICANT CHANGE UP (ref 96–108)
CO2 SERPL-SCNC: 18 MMOL/L — LOW (ref 22–31)
CO2 SERPL-SCNC: 20 MMOL/L — LOW (ref 22–31)
CREAT SERPL-MCNC: 1.05 MG/DL — SIGNIFICANT CHANGE UP (ref 0.5–1.3)
CREAT SERPL-MCNC: 1.15 MG/DL — SIGNIFICANT CHANGE UP (ref 0.5–1.3)
D DIMER BLD IA.RAPID-MCNC: 528 NG/ML DDU — HIGH
EGFR: 69 ML/MIN/1.73M2 — SIGNIFICANT CHANGE UP
EGFR: 69 ML/MIN/1.73M2 — SIGNIFICANT CHANGE UP
EGFR: 77 ML/MIN/1.73M2 — SIGNIFICANT CHANGE UP
EGFR: 77 ML/MIN/1.73M2 — SIGNIFICANT CHANGE UP
FIBRINOGEN PPP-MCNC: 615 MG/DL — HIGH (ref 200–445)
GLUCOSE SERPL-MCNC: 110 MG/DL — HIGH (ref 70–99)
GLUCOSE SERPL-MCNC: 120 MG/DL — HIGH (ref 70–99)
HCT VFR BLD CALC: 22.1 % — LOW (ref 39–50)
HCT VFR BLD CALC: 26.8 % — LOW (ref 39–50)
HGB BLD-MCNC: 7.8 G/DL — LOW (ref 13–17)
HGB BLD-MCNC: 9.4 G/DL — LOW (ref 13–17)
INR BLD: 1.26 RATIO — HIGH (ref 0.85–1.16)
LDH SERPL L TO P-CCNC: 164 U/L — SIGNIFICANT CHANGE UP (ref 50–242)
LDH SERPL L TO P-CCNC: 212 U/L — SIGNIFICANT CHANGE UP (ref 50–242)
MAGNESIUM SERPL-MCNC: 1.5 MG/DL — LOW (ref 1.6–2.6)
MAGNESIUM SERPL-MCNC: 2.4 MG/DL — SIGNIFICANT CHANGE UP (ref 1.6–2.6)
MCHC RBC-ENTMCNC: 31 PG — SIGNIFICANT CHANGE UP (ref 27–34)
MCHC RBC-ENTMCNC: 31.7 PG — SIGNIFICANT CHANGE UP (ref 27–34)
MCHC RBC-ENTMCNC: 35.1 G/DL — SIGNIFICANT CHANGE UP (ref 32–36)
MCHC RBC-ENTMCNC: 35.3 G/DL — SIGNIFICANT CHANGE UP (ref 32–36)
MCV RBC AUTO: 88.4 FL — SIGNIFICANT CHANGE UP (ref 80–100)
MCV RBC AUTO: 89.8 FL — SIGNIFICANT CHANGE UP (ref 80–100)
NRBC BLD AUTO-RTO: 0 /100 WBCS — SIGNIFICANT CHANGE UP (ref 0–0)
NRBC BLD AUTO-RTO: 0 /100 WBCS — SIGNIFICANT CHANGE UP (ref 0–0)
PHOSPHATE SERPL-MCNC: 2 MG/DL — LOW (ref 2.5–4.5)
PHOSPHATE SERPL-MCNC: 2.6 MG/DL — SIGNIFICANT CHANGE UP (ref 2.5–4.5)
PLATELET # BLD AUTO: 47 K/UL — LOW (ref 150–400)
PLATELET # BLD AUTO: 51 K/UL — LOW (ref 150–400)
POTASSIUM SERPL-MCNC: 2.9 MMOL/L — CRITICAL LOW (ref 3.5–5.3)
POTASSIUM SERPL-MCNC: 4.5 MMOL/L — SIGNIFICANT CHANGE UP (ref 3.5–5.3)
POTASSIUM SERPL-SCNC: 2.9 MMOL/L — CRITICAL LOW (ref 3.5–5.3)
POTASSIUM SERPL-SCNC: 4.5 MMOL/L — SIGNIFICANT CHANGE UP (ref 3.5–5.3)
PROT SERPL-MCNC: 4.7 G/DL — LOW (ref 6–8.3)
PROT SERPL-MCNC: 5.8 G/DL — LOW (ref 6–8.3)
PROTHROM AB SERPL-ACNC: 14.5 SEC — HIGH (ref 9.9–13.4)
RBC # BLD: 2.46 M/UL — LOW (ref 4.2–5.8)
RBC # BLD: 3.03 M/UL — LOW (ref 4.2–5.8)
RBC # FLD: 14.6 % — HIGH (ref 10.3–14.5)
RBC # FLD: 15.1 % — HIGH (ref 10.3–14.5)
RH IG SCN BLD-IMP: POSITIVE — SIGNIFICANT CHANGE UP
SODIUM SERPL-SCNC: 134 MMOL/L — LOW (ref 135–145)
SODIUM SERPL-SCNC: 140 MMOL/L — SIGNIFICANT CHANGE UP (ref 135–145)
URATE SERPL-MCNC: 3.8 MG/DL — SIGNIFICANT CHANGE UP (ref 3.4–8.8)
URATE SERPL-MCNC: 4 MG/DL — SIGNIFICANT CHANGE UP (ref 3.4–8.8)
WBC # BLD: 0.38 K/UL — CRITICAL LOW (ref 3.8–10.5)
WBC # BLD: 0.43 K/UL — CRITICAL LOW (ref 3.8–10.5)
WBC # FLD AUTO: 0.38 K/UL — CRITICAL LOW (ref 3.8–10.5)
WBC # FLD AUTO: 0.43 K/UL — CRITICAL LOW (ref 3.8–10.5)

## 2025-05-16 PROCEDURE — 88342 IMHCHEM/IMCYTCHM 1ST ANTB: CPT | Mod: 26,59

## 2025-05-16 PROCEDURE — 88189 FLOWCYTOMETRY/READ 16 & >: CPT

## 2025-05-16 PROCEDURE — 88305 TISSUE EXAM BY PATHOLOGIST: CPT | Mod: 26

## 2025-05-16 PROCEDURE — 88313 SPECIAL STAINS GROUP 2: CPT | Mod: 26

## 2025-05-16 PROCEDURE — 99233 SBSQ HOSP IP/OBS HIGH 50: CPT

## 2025-05-16 PROCEDURE — 99223 1ST HOSP IP/OBS HIGH 75: CPT

## 2025-05-16 PROCEDURE — 88360 TUMOR IMMUNOHISTOCHEM/MANUAL: CPT | Mod: 26

## 2025-05-16 PROCEDURE — 85097 BONE MARROW INTERPRETATION: CPT

## 2025-05-16 RX ORDER — MAGNESIUM SULFATE 500 MG/ML
2 SYRINGE (ML) INJECTION ONCE
Refills: 0 | Status: COMPLETED | OUTPATIENT
Start: 2025-05-16 | End: 2025-05-16

## 2025-05-16 RX ORDER — HYDROMORPHONE/SOD CHLOR,ISO/PF 2 MG/10 ML
0.5 SYRINGE (ML) INJECTION
Refills: 0 | Status: DISCONTINUED | OUTPATIENT
Start: 2025-05-16 | End: 2025-05-22

## 2025-05-16 RX ORDER — SODIUM PHOSPHATE,DIBASIC DIHYD
15 POWDER (GRAM) MISCELLANEOUS ONCE
Refills: 0 | Status: COMPLETED | OUTPATIENT
Start: 2025-05-16 | End: 2025-05-17

## 2025-05-16 RX ADMIN — Medication 50 MILLIEQUIVALENT(S): at 12:55

## 2025-05-16 RX ADMIN — LIDOCAINE HYDROCHLORIDE 1 APPLICATION(S): 20 JELLY TOPICAL at 05:44

## 2025-05-16 RX ADMIN — LIDOCAINE HYDROCHLORIDE 1 APPLICATION(S): 20 JELLY TOPICAL at 14:58

## 2025-05-16 RX ADMIN — Medication 100 MILLIGRAM(S): at 14:58

## 2025-05-16 RX ADMIN — Medication 0.5 MILLIGRAM(S): at 12:25

## 2025-05-16 RX ADMIN — Medication 0.5 MILLIGRAM(S): at 13:16

## 2025-05-16 RX ADMIN — Medication 500 MILLIGRAM(S): at 05:31

## 2025-05-16 RX ADMIN — Medication 40 MILLIGRAM(S): at 05:32

## 2025-05-16 RX ADMIN — DILTIAZEM HYDROCHLORIDE 240 MILLIGRAM(S): 240 TABLET, EXTENDED RELEASE ORAL at 05:31

## 2025-05-16 RX ADMIN — Medication 650 MILLIGRAM(S): at 21:20

## 2025-05-16 RX ADMIN — Medication 0.25 MILLIGRAM(S): at 02:50

## 2025-05-16 RX ADMIN — Medication 0.5 MILLIGRAM(S): at 09:07

## 2025-05-16 RX ADMIN — TAMSULOSIN HYDROCHLORIDE 0.4 MILLIGRAM(S): 0.4 CAPSULE ORAL at 21:21

## 2025-05-16 RX ADMIN — Medication 40 MILLIEQUIVALENT(S): at 13:55

## 2025-05-16 RX ADMIN — Medication 50 MILLIEQUIVALENT(S): at 08:07

## 2025-05-16 RX ADMIN — Medication 25 GRAM(S): at 16:06

## 2025-05-16 RX ADMIN — Medication 40 MILLIGRAM(S): at 18:11

## 2025-05-16 RX ADMIN — POSACONAZOLE 300 MILLIGRAM(S): 100 TABLET, DELAYED RELEASE ORAL at 18:11

## 2025-05-16 RX ADMIN — LIDOCAINE HYDROCHLORIDE 1 APPLICATION(S): 20 JELLY TOPICAL at 23:00

## 2025-05-16 RX ADMIN — Medication 80 MILLIGRAM(S): at 05:31

## 2025-05-16 RX ADMIN — Medication 40 MILLIEQUIVALENT(S): at 09:52

## 2025-05-16 RX ADMIN — Medication 2000 UNIT(S): at 12:26

## 2025-05-16 RX ADMIN — CEFEPIME 100 MILLIGRAM(S): 2 INJECTION, POWDER, FOR SOLUTION INTRAVENOUS at 14:57

## 2025-05-16 RX ADMIN — Medication 15 MILLILITER(S): at 23:47

## 2025-05-16 RX ADMIN — DEXAMETHASONE 0.5 MILLIGRAM(S): 0.5 TABLET ORAL at 05:31

## 2025-05-16 RX ADMIN — DIPHENHYDRAMINE HYDROCHLORIDE AND LIDOCAINE HYDROCHLORIDE AND ALUMINUM HYDROXIDE AND MAGNESIUM HYDRO 10 MILLILITER(S): KIT at 12:26

## 2025-05-16 RX ADMIN — Medication 80 MILLIGRAM(S): at 23:53

## 2025-05-16 RX ADMIN — Medication 100 MILLIGRAM(S): at 05:31

## 2025-05-16 RX ADMIN — DEXAMETHASONE 0.5 MILLIGRAM(S): 0.5 TABLET ORAL at 18:37

## 2025-05-16 RX ADMIN — Medication 50 MILLILITER(S): at 18:12

## 2025-05-16 RX ADMIN — Medication 650 MILLIGRAM(S): at 22:20

## 2025-05-16 RX ADMIN — Medication 0.5 MILLIGRAM(S): at 18:57

## 2025-05-16 RX ADMIN — Medication 80 MILLIGRAM(S): at 12:27

## 2025-05-16 RX ADMIN — Medication 15 MILLILITER(S): at 05:31

## 2025-05-16 RX ADMIN — DIPHENHYDRAMINE HYDROCHLORIDE AND LIDOCAINE HYDROCHLORIDE AND ALUMINUM HYDROXIDE AND MAGNESIUM HYDRO 10 MILLILITER(S): KIT at 23:47

## 2025-05-16 RX ADMIN — Medication 100 MILLIGRAM(S): at 12:27

## 2025-05-16 RX ADMIN — DIPHENHYDRAMINE HYDROCHLORIDE AND LIDOCAINE HYDROCHLORIDE AND ALUMINUM HYDROXIDE AND MAGNESIUM HYDRO 10 MILLILITER(S): KIT at 18:11

## 2025-05-16 RX ADMIN — Medication 50 MILLIEQUIVALENT(S): at 10:38

## 2025-05-16 RX ADMIN — Medication 80 MILLIGRAM(S): at 18:11

## 2025-05-16 RX ADMIN — Medication 15 MILLILITER(S): at 12:26

## 2025-05-16 RX ADMIN — Medication 15 MILLILITER(S): at 18:10

## 2025-05-16 RX ADMIN — Medication 0.5 MILLIGRAM(S): at 14:55

## 2025-05-16 RX ADMIN — CEFEPIME 100 MILLIGRAM(S): 2 INJECTION, POWDER, FOR SOLUTION INTRAVENOUS at 05:30

## 2025-05-16 RX ADMIN — Medication 0.5 MILLIGRAM(S): at 18:12

## 2025-05-16 RX ADMIN — Medication 500 MILLIGRAM(S): at 18:11

## 2025-05-16 RX ADMIN — Medication 100 MILLIGRAM(S): at 22:59

## 2025-05-16 RX ADMIN — Medication 5 MILLIGRAM(S): at 23:53

## 2025-05-16 RX ADMIN — Medication 1 APPLICATION(S): at 05:44

## 2025-05-16 RX ADMIN — CEFEPIME 100 MILLIGRAM(S): 2 INJECTION, POWDER, FOR SOLUTION INTRAVENOUS at 21:21

## 2025-05-16 RX ADMIN — Medication 0.5 MILLIGRAM(S): at 21:26

## 2025-05-16 RX ADMIN — Medication 0.5 MILLIGRAM(S): at 13:55

## 2025-05-16 RX ADMIN — DIPHENHYDRAMINE HYDROCHLORIDE AND LIDOCAINE HYDROCHLORIDE AND ALUMINUM HYDROXIDE AND MAGNESIUM HYDRO 10 MILLILITER(S): KIT at 05:44

## 2025-05-16 RX ADMIN — Medication 0.5 MILLIGRAM(S): at 23:48

## 2025-05-16 RX ADMIN — Medication 0.5 MILLIGRAM(S): at 20:26

## 2025-05-16 RX ADMIN — Medication 0.25 MILLIGRAM(S): at 02:34

## 2025-05-16 RX ADMIN — Medication 0.5 MILLIGRAM(S): at 08:07

## 2025-05-16 NOTE — CONSULT NOTE ADULT - SUBJECTIVE AND OBJECTIVE BOX
HPI:  69M with HTN, HLD, Gout, GERD, BPH, spinal stenosis (s/p multiple procedures 2018, 2020 laminectomies / fusions), hip surgeries (Oct 2024, Jan 2025), residual hand weakness related to surgeries, umbilical hernia repair (2014/15) referred here from Dr Atkinson at [facility] in [place] for new ASXL1-mutated and KKO08-edsizuz AML, admitted for induction chemo with dauno 60 mg/m2+ cytarabine. Hospital course c/b neutropenic fevers, pancytopenia and mucositis pain, Geriatrics and Palliative Medicine Team is consulted for assistance with pain control    Patient seen this morning, awake, alert in no acute distress. He reports a burning pain in his mouth for the past 7 days. He gets relief from IV dilaudid with effects lasting only about 1.5 hours. He admits to having chronic neck and back pain at baseline, due to previous surgeries. He does not follow anyone for this and is not recently on any opiates. Some time was spent on exploring his psychosocial needs today. He reports to be  but stays in contact with his ex wife, [place]. They have 2 children together, 21 and 18 yo who lives with his ex wife. He also speaks with his sister in [place] and niece who lives out of town. Prior to shelter, pt worked in fine . He admits to having a hard time adjusting to his illness and often feeling unwell. He denies any spiritual care needs. He josé best by watching sports on TV - he is a Mets and Knicks fan. Discussed holistic RN referral to which he agreed. Discussed SW referral to which he will further contemplate before agreeing.     PERTINENT PM/SXH:   HTN (hypertension)    High cholesterol    HTN (hypertension)    Hyperlipidemia    GERD (gastroesophageal reflux disease)    Spinal stenosis    BPH (benign prostatic hypertrophy)    Cluster headache    HTN (hypertension)    HLD (hyperlipidemia)    COVID-19      H/O laminectomy    Fusion of spine      FAMILY HISTORY:  Family history of prostate cancer    Family history of stroke    Family history of oral cancer (Sibling)  tongue ca    Family history of breast cancer (Sibling)    Family history of stroke (Father)      Family Hx substance abuse [ ]yes [ ]no  ITEMS NOT CHECKED ARE NOT PRESENT    SOCIAL HISTORY:   Significant other/partner[ ]  Children[x ]  Sabianist/Spirituality:  Substance hx:  [ ]   Tobacco hx:  [ ]   Alcohol hx: [ ]   Home Opioid hx:  [ ] I-Stop Reference No:  Living Situation: [x ]Home  [ ]Long term care  [ ]Rehab [ ]Other    ADVANCE DIRECTIVES:    DNR/MOLST  [ ]  Living Will  [ ]   DECISION MAKER(s):  [ ] Health Care Proxy(s)  [x ] Surrogate(s)  [ ] Guardian           Name(s): Phone Number(s): adult child and ex wife     BASELINE (I)ADL(s) (prior to admission):  Gay: [ x]Total  [ ] Moderate [ ]Dependent    Allergies    No Known Allergies    Intolerances    MEDICATIONS  (STANDING):  Biotene Dry Mouth Oral Rinse 15 milliLiter(s) Swish and Spit four times a day  cefepime   IVPB 2000 milliGRAM(s) IV Intermittent every 8 hours  cefepime   IVPB      chlorhexidine 4% Liquid 1 Application(s) Topical <User Schedule>  cholecalciferol 2000 Unit(s) Oral daily  dexAMETHasone    Solution 0.5 milliGRAM(s) Oral two times a day  diltiazem    milliGRAM(s) Oral daily  FIRST- Mouthwash  BLM 10 milliLiter(s) Swish and Swallow four times a day  hydrochlorothiazide 50 milliGRAM(s) Oral daily  HYDROmorphone  Injectable 0.5 milliGRAM(s) IV Push <User Schedule>  lidocaine   4% Patch 1 Patch Transdermal every 24 hours  lidocaine 2% Gel 1 Application(s) Topical every 8 hours  magnesium sulfate  IVPB 2 Gram(s) IV Intermittent once  melatonin 5 milliGRAM(s) Oral at bedtime  metroNIDAZOLE  IVPB 500 milliGRAM(s) IV Intermittent every 8 hours  pantoprazole  Injectable 40 milliGRAM(s) IV Push every 12 hours  posaconazole DR Tablet 300 milliGRAM(s) Oral every 24 hours  posaconazole DR Tablet   Oral   potassium chloride    Tablet ER 40 milliEquivalent(s) Oral every 4 hours  potassium chloride  20 mEq/100 mL IVPB 20 milliEquivalent(s) IV Intermittent every 2 hours  simethicone 80 milliGRAM(s) Chew every 6 hours  sodium chloride 0.9%. 1000 milliLiter(s) (50 mL/Hr) IV Continuous <Continuous>  tamsulosin 0.4 milliGRAM(s) Oral at bedtime  valACYclovir 500 milliGRAM(s) Oral two times a day    MEDICATIONS  (PRN):  acetaminophen     Tablet .. 650 milliGRAM(s) Oral every 6 hours PRN Temp greater or equal to 38C (100.4F), Mild Pain (1 - 3)  albuterol    90 MICROgram(s) HFA Inhaler 2 Puff(s) Inhalation every 6 hours PRN Shortness of Breath and/or Wheezing  aluminum hydroxide/magnesium hydroxide/simethicone Suspension 30 milliLiter(s) Oral every 6 hours PRN Dyspepsia  calcium carbonate    500 mG (Tums) Chewable 1 Tablet(s) Chew four times a day PRN Dyspepsia  cyclobenzaprine 10 milliGRAM(s) Oral three times a day PRN Muscle Spasm  hydrocortisone sodium succinate Injectable 100 milliGRAM(s) IV Push every 8 hours PRN chemotherapy reaction  HYDROmorphone  Injectable 0.5 milliGRAM(s) IV Push every 3 hours PRN Moderate Pain (4 - 6)  metoclopramide Injectable 10 milliGRAM(s) IV Push every 6 hours PRN nausea/vomiting  ondansetron Injectable 8 milliGRAM(s) IV Push every 8 hours PRN Nausea and/or Vomiting  polyethylene glycol 3350 17 Gram(s) Oral two times a day PRN Constipation  sodium chloride 0.9% lock flush 10 milliLiter(s) IV Push every 1 hour PRN Pre/post blood products, medications, blood draw, and to maintain line patency    PRESENT SYMPTOMS: [ ]Unable to self-report  [ ] CPOT [ ] PAINADs [ ] RDOS  Source if other than patient:  [ ]Family   [ ]Team     Pain: [ x]yes [ ]no  QOL impact - discomfort over the course of the day, unable to chew  Location -  mouth, neck and back            Aggravating factors - eating, lying still   Quality - burning   Radiation - none  Timing- constant   Severity (0-10 scale): 8  Minimal acceptable level (0-10 scale):     Dyspnea:                           [ ]Mild [ ]Moderate [ ]Severe  Anxiety:                             [ ]Mild [ ]Moderate [ ]Severe  Fatigue:                             [ ]Mild [ ]Moderate [ ]Severe  Nausea:                             [ ]Mild [ ]Moderate [ ]Severe  Loss of appetite:              [ ]Mild [ ]Moderate [ ]Severe  Constipation:                    [ ]Mild [ ]Moderate [ ]Severe    PCSSQ[Palliative Care Spiritual Screening Question]   Severity (0-10):  Score of 4 or > indicate consideration of Chaplaincy referral.  Chaplaincy Referral: [ ] yes [x ] refused [ ] following [ ] Deferred     Caregiver [place]? : [ ] yes [ ] no [ ] Deferred [ x] Declined             Social work referral [ ] Patient & Family Centered Care Referral [ ]     Anticipatory Grief present?:  [x ] yes [ ] no  [ ] Deferred                  Social work referral [ ] Chaplaincy Referral[ ]      Other Symptoms:  [ x]All other review of systems negative     Palliative Performance Status Version 2:     60    %    http://[facility].org/files/news/palliative_performance_scale_[email or web address].pdf    PHYSICAL EXAM:  Vital Signs Last 24 Hrs  T(C): 37.2 (16 May 2025 09:15), Max: 38.6 (15 May 2025 18:35)  T(F): 99 (16 May 2025 09:15), Max: 101.5 (15 May 2025 18:35)  HR: 75 (16 May 2025 09:15) (75 - 109)  BP: 108/70 (16 May 2025 09:15) (102/64 - 145/80)  BP(mean): --  RR: 18 (16 May 2025 09:15) (16 - 18)  SpO2: 93% (16 May 2025 09:15) (93% - 97%)    Parameters below as of 16 May 2025 09:15  Patient On (Oxygen Delivery Method): room air     I&O's Summary    15 May 2025 07:01  -  16 May 2025 07:00  --------------------------------------------------------  IN: 2496 mL / OUT: 1700 mL / NET: 796 mL    16 May 2025 07:01  -  16 May 2025 11:16  --------------------------------------------------------  IN: 240 mL / OUT: 650 mL / NET: -410 mL      GENERAL: [ ]Cachexia    [x ]Alert  [ x]Oriented x 3  [ ]Lethargic  [ ]Unarousable  [ x]Verbal  [ ]Non-Verbal  Behavioral:   [ ] Anxiety  [ ] Delirium [ ] Agitation [ ] Other  HEENT:  [ ]Normal   [ ]Dry mouth   [ ]ET Tube/Trach  [ ]Oral lesions [x] ulcer noted on L lateral tongue  PULMONARY:   [x ]Clear [ ]Tachypnea  [ ]Audible excessive secretions   [ ]Rhonchi        [ ]Right [ ]Left [ ]Bilateral  [ ]Crackles        [ ]Right [ ]Left [ ]Bilateral  [ ]Wheezing     [ ]Right [ ]Left [ ]Bilateral  [ ]Diminished breath sounds [ ]right [ ]left [ ]bilateral  CARDIOVASCULAR:    [ x]Regular [ ]Irregular [ ]Tachy  [ ]Lui [ ]Murmur [ ]Other  GASTROINTESTINAL:  [x ]Soft  [ ]Distended   [ ]+BS  [x ]Non tender [ ]Tender  [ ]Other [ ]PEG [ ]OGT/ NGT  Last BM:  GENITOURINARY:  [x ]Normal [ ] Incontinent   [ ]Oliguria/Anuria   [ ]Villalobos  MUSCULOSKELETAL:   [x ]Normal   [ ]Weakness  [ ]Bed/Wheelchair bound [ ]Edema  NEUROLOGIC:   [ x]No focal deficits  [ ]Cognitive impairment  [ ]Dysphagia [ ]Dysarthria [ ]Paresis [ ]Other   SKIN:   [ ]Normal  [ ]Rash  [ x]Other tattoos on BUE, bruising throughout  [ ]Pressure ulcer(s)       Present on admission [ ]y [ ]n    CRITICAL CARE:  [ ] Shock Present  [ ]Septic [ ]Cardiogenic [ ]Neurologic [ ]Hypovolemic  [ ]  Vasopressors [ ]  Inotropes   [ ]Respiratory failure present [ ]Mechanical ventilation [ ]Non-invasive ventilatory support [ ]High flow    [ ]Acute  [ ]Chronic [ ]Hypoxic  [ ]Hypercarbic [ ]Other  [ ]Other organ failure     LABS:                        7.8    0.38  )-----------( 51       ( 16 May 2025 06:23 )             22.1   05-16    140  |  107  |  14  ----------------------------<  110[H]  2.9[LL]   |  18[L]  |  1.05    Ca    7.3[L]      16 May 2025 06:24  Phos  2.6     05-16  Mg     1.5     05-16    TPro  4.7[L]  /  Alb  2.9[L]  /  TBili  0.7  /  DBili  x   /  AST  8[L]  /  ALT  9[L]  /  AlkPhos  58  05-16  PT/INR - ( 16 May 2025 06:24 )   PT: 14.5 sec;   INR: 1.26 ratio         PTT - ( 16 May 2025 06:24 )  PTT:25.4 sec    Urinalysis Basic - ( 16 May 2025 06:24 )    Color: x / Appearance: x / SG: x / pH: x  Gluc: 110 mg/dL / Ketone: x  / Bili: x / Urobili: x   Blood: x / Protein: x / Nitrite: x   Leuk Esterase: x / RBC: x / WBC x   Sq Epi: x / Non Sq Epi: x / Bacteria: x      RADIOLOGY & ADDITIONAL STUDIES:    < from: CT Abdomen and Pelvis w/ Oral Cont (05.14.25 @ 16:26) >  IMPRESSION:  No acute CTfindings suspicious for infection.        --- End of Report ---    < end of copied text >      PROTEIN CALORIE MALNUTRITION PRESENT: [ ]mild [ ]moderate [ ]severe [ ]underweight [ ]morbid obesity  https://www.andeal.org/vault/2440/web/files/ONC/Table_Clinical%20Characteristics%20to%20Document%20Malnutrition-White%20JV%20et%20al%202012.pdf    Height (cm): 181 (05-02-25 @ 08:51), 180.5 (04-28-25 @ 15:00)  Weight (kg): 107.3 (05-02-25 @ 11:55), 108.5 (04-28-25 @ 15:00)  BMI (kg/m2): 32.8 (05-02-25 @ 11:55), 33.1 (05-02-25 @ 08:51), 33.3 (04-28-25 @ 15:00)    [ ]PPSV2 < or = to 30% [ ]significant weight loss  [ ]poor nutritional intake  [ ]anasarca[ ]Artificial Nutrition      Other REFERRALS:  [ ]Hospice  [ ]Child Life  [ ]Social Work  [ ]Case management [ ]Holistic Therapy

## 2025-05-16 NOTE — PROGRESS NOTE ADULT - PROBLEM SELECTOR PLAN 2
neutropenic, Febrile   continue posa, valtrex, flagyl, cefepime (5/11- )   5/2 CXR Patchy opacities along the left lung base, possibly atelectasis, however superimposed infection cannot be entirely excluded.  5/13- FLU/COVID/RSV (-)  5/13- Added Flagyl 500 mg IV Q 8 hrs due to GI discomfort/pain  5/13- CT C/A/P -negative for infxn  5/14 BCX sent f/u  5/15 HSV + MRSA PCR sent f/u

## 2025-05-16 NOTE — CONSULT NOTE ADULT - PROBLEM SELECTOR RECOMMENDATION 4
- Pt would want his ex wife [place] and his children to be his healthcare surrogates in event he loses capacity  - Code status FULL

## 2025-05-16 NOTE — CHART NOTE - NSCHARTNOTEFT_GEN_A_CORE
This report was requested by: Paloma Mark | Reference #: [id number]    Practitioner Count: 4  Pharmacy Count: 2  Current Opioid Prescriptions: 1  Current Benzodiazepine Prescriptions: 0  Current Stimulant Prescriptions: 0      Patient Demographic Information (PDI)       PDI	First Name	Last Name	Birth Date	Gender	Street Address	[facility] Code  A	[place]	1955	Male	[address]	[facility]	[address]  B	[place]ley	[place]	1955	Male	[address]	[facility]	[id number]    Prescription Information      PDI Filter:    PDI	My Rx	Current Rx	Drug Type	Rx Written	Rx Dispensed	Drug	Quantity	Days Supply	Prescriber Name	Prescriber LORENZO #	Payment Method	Dispenser  A	N	Y	O	04/30/2025	04/30/2025	tramadol hcl 50 mg tablet	60	20	Claude Rizzo)	[id number]	Medicare	[id number]  A	N	N	O	03/13/2025	03/14/2025	oxycodone-acetaminophen 5-325 mg tablet	14	2	Dax Frye S	[id number]	Medicare	[id number]  A	N	N	O	02/21/2025	02/23/2025	oxycodone hcl (ir) 5 mg tablet	20	5	Jocelyn Vasquez ([place])	[id number]	Medicare	[id number]  A	N	N	O	02/19/2025	02/19/2025	oxycodone hcl (ir) 5 mg tablet	20	5	JavierKonrad	[id number]	Medicare	[id number]  A	N	N	O	02/13/2025	02/13/2025	oxycodone hcl (ir) 5 mg tablet	20	5	JavierKonrad	[id number]	Medicare	[id number]  A	N	N	O	10/31/2024	10/31/2024	oxycodone hcl (ir) 5 mg tablet	20	6	JavierKonrad	[id number]	Medicare	[id number]  A	N	N	O	10/31/2024	10/31/2024	tramadol hcl 50 mg tablet	30	8	Javier, Konrad	[id number]	Medicare	[id number]  A	N	N	O	10/24/2024	10/24/2024	oxycodone hcl (ir) 5 mg tablet	28	7	Lamar Solano (NP)	[id number]	Medicare	[id number]  A	N	N	O	09/25/2024	09/28/2024	tramadol hcl 50 mg tablet	60	30	Thang Ervin MD	[id number]	Medicare	[id number]  A	N	N	O	09/17/2024	09/17/2024	oxycodone-acetaminophen 5-325 mg tablet	14	3	Dax Frye DDS	[id number]	Medicare	[id number]  B	N	N	O	02/04/2025	02/04/2025	oxycodone hcl (ir) 5 mg tablet	30	5	[facility]	[id number]	Insurance	[facility]

## 2025-05-16 NOTE — CONSULT NOTE ADULT - PROBLEM SELECTOR RECOMMENDATION 3
Chronic back and neck pain with previous surgeries, no opiates used at home  - IV dilaudid regimen as above  - Once mucositis pain resolves, consider adding oxycodone 5 mg q6 hours prn for pain control

## 2025-05-16 NOTE — CONSULT NOTE ADULT - CONVERSATION DETAILS
Pt does not have HCP but would want his ex wife [place] and his children to be making decisions together in event he is unable to communicate with the doctors

## 2025-05-16 NOTE — PROGRESS NOTE ADULT - ASSESSMENT
70 yo male with newly diagnosed ASXL1, DDX41 mutated AML, admitted for induction chemo with 7+3:  dauno 60 mg/m2+ cytarabine. PMH  BRCA2 carrier ([id number] 9610C>T), HTN, HLD, Gout, GERD, BPH, spinal stenosis (s/p multiple procedures 2018, 2020 laminectomies / fusions), hip surgeries (Oct 2024, Jan 2025), residual hand weakness related to surgeries, umbilical hernia repair (2014/15) referred here from Dr Atkinson at [facility] in [place] for new ASXL1-mutated and HWL63-wklzxfb AML. Chemo started on 5/2, Complicated by rigors, chills, fever, SOB  and tachycardia 2 hours into day 1 infusion - likely infusion reaction. Pt has pancytopenia d/t chemo and disease

## 2025-05-16 NOTE — CONSULT NOTE ADULT - PROBLEM SELECTOR RECOMMENDATION 9
Newly diagnosed, admission for induction chemo which was started on 5/2  - day 14 Bmbx to be done today  - further management as per heme/onc team

## 2025-05-16 NOTE — PROGRESS NOTE ADULT - NS ATTEND AMEND GEN_ALL_CORE FT
Primary: Paulie    69M with ASXL1-mutated and ZIL13-uhulrha AML. 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 Admitted for induction 7+3 - day 14.    Heme:   - Continue 7+3 (5/2/25 - ). Complicated by rigors, chills, and tachycardia 2 hours into day 1 infusion - likely infusion reaction.  - Trend CBC with differential daily. Goal Hgb > 7 and plt > 10  (> 15 if febrile, > 50 if bleeding).   - Trend DIC labs intermittently, as this has not been issue since admission. Continue supportive transfusions as needed to maintain fibrinogen > 100.   - Trend TLS labs daily at this point - very low risk now.   - Discontinued allopurinol today day 14.  S/p rasburicase; uric acid and LDH stable.   - Continue IVF. Monitor daily weights and I/O's. Diurese PRN.   -BMBx day 15    ID: Neutropenic and febrile overnight initially on 5/11-12, and persistent since then with ongoing GI symptoms. Possibly mucositis related. Cultures have been negative.   - Cefepime (5/11- ).    - CT scan from 5/14 showing no acute CT findings suspicious for infection. Mentioned GGOs in the lungs bilaterally, but stable from a previous scan.   - Prophylaxis: posaconazole, valacyclovir   - Mouth rinses, dexamethasone swish and spit for oral mucositis.     MSK: Has chronic pain from prior surgeries   - Pain control: oxycodone Primary: Paulie    69M with ASXL1-mutated and HHV27-uhleojf AML. 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 Admitted for induction 7+3 - day 15.    Heme:   - Completed 7+3. Complicated by rigors, chills, and tachycardia 2 hours into day 1 infusion - likely infusion reaction.  - Trend CBC with differential daily. Goal Hgb > 7 and plt > 10  (> 15 if febrile, > 50 if bleeding).   - Trend DIC labs intermittently, as this has not been issue since admission. Continue supportive transfusions as needed to maintain fibrinogen > 100.   - Trend TLS labs daily at this point - very low risk now.   - Allopurinol continued, but for previous diagnosis of gout decreased dose to 100 mg on 5/16.  S/p rasburicase; uric acid and LDH stable.   - Continue IVF. Monitor daily weights and I/O's. Diurese PRN.   - BMBx today.    ID: Neutropenic and febrile overnight initially on 5/11-12, and persistent since then with ongoing GI symptoms. Possibly mucositis related. Cultures have been negative.   - Cefepime (5/11- ).    - CT scan from 5/14 showing no acute CT findings suspicious for infection. Mentioned GGOs in the lungs bilaterally, but stable from a previous scan.   - Prophylaxis: posaconazole, valacyclovir   - Mouth rinses, dexamethasone swish and spit for oral mucositis.     MSK: Has chronic pain from prior surgeries   - Pain control: oxycodone

## 2025-05-16 NOTE — CONSULT NOTE ADULT - ASSESSMENT
69M with HTN, HLD, Gout, GERD, BPH, spinal stenosis (s/p multiple procedures 2018, 2020 laminectomies / fusions), hip surgeries (Oct 2024, Jan 2025), residual hand weakness related to surgeries, umbilical hernia repair (2014/15) referred here from Dr Atkinson at [facility] in [place] for new ASXL1-mutated and CSC73-agpheka AML, admitted for induction chemo with dauno 60 mg/m2+ cytarabine. Hospital course c/b neutropenic fevers, pancytopenia and mucositis pain, Geriatrics and Palliative Medicine Team is consulted for assistance with pain control

## 2025-05-16 NOTE — CHART NOTE - NSCHARTNOTEFT_GEN_A_CORE
Hematology/Oncology Procedure Note    Bone Marrow Aspiration/Biopsy    Indication: post chemotherapy, day 15     Bone marrow aspiration and biopsy procedure description, risks, and benefits were discussed in detail with the patient.  All questions were answered.  Informed consent was obtained and time-out performed.      The area of the right posterior iliac crest was prepped and draped using sterile technique. Local anesthetic with  2% Lidocaine.    Bone marrow aspiration and biopsy  was performed using sterile technique  by myself with Jayne Yoon PA-C assist and supervision. Specimens were obtained.    The procedure was well tolerated and no local bleeding or other complications were observed.  Pressure was applied to the procedure site and a wound dressing was placed.  The patient and nursing staff were advised that the patient is to lie flat for 30 minutes post procedure. Tylenol may be used if no contraindications for pain at the procedure site. Hematology/Oncology Procedure Note    Bone Marrow Aspiration/Biopsy    Indication: post chemotherapy, day 15     Bone marrow aspiration and biopsy procedure description, risks, and benefits were discussed in detail with the patient.  All questions were answered.  Informed consent was obtained and time-out performed.      The area of the right posterior iliac crest was prepped and draped using sterile technique. Local anesthetic with  2% Lidocaine.    Bone marrow aspiration and biopsy  was performed using sterile technique  by myself with Misha Yoon PA-C assist and supervision. Specimens were obtained.    The procedure was well tolerated and no local bleeding or other complications were observed.  Pressure was applied to the procedure site and a wound dressing was placed.  The patient and nursing staff were advised that the patient is to lie flat for 30 minutes post procedure. Tylenol may be used if no contraindications for pain at the procedure site.

## 2025-05-16 NOTE — CHART NOTE - NSCHARTNOTEFT_GEN_A_CORE
Medicine PA Fever Note    Notified by RN patient with temperature 100.5F. Pt. seen and examined at bedside with no acute complaints. Patient is alert and in NAD. Denies HA, CP, SOB, cough, N/V, or abd pain.    VITAL SIGNS:  T(C): 38.1 (05-16-25 @ 21:06), Max: 38.1 (05-16-25 @ 21:06)  HR: 102 (05-16-25 @ 21:06) (75 - 102)  BP: 110/73 (05-16-25 @ 21:06) (102/64 - 133/84)  RR: 18 (05-16-25 @ 21:06) (16 - 18)  SpO2: 97% (05-16-25 @ 21:06) (93% - 98%)  Wt(kg): --      LABORATORY:                          9.4    0.43  )-----------( 47       ( 16 May 2025 15:33 )             26.8       05-16    134[L]  |  102  |  15  ----------------------------<  120[H]  4.5   |  20[L]  |  1.15    Ca    8.7      16 May 2025 18:55  Phos  2.0     05-16  Mg     2.4     05-16    TPro  5.8[L]  /  Alb  3.6  /  TBili  1.0  /  DBili  x   /  AST  13  /  ALT  13  /  AlkPhos  69  05-16      MICROBIOLOGY:   Blood Blood-Peripheral  05-14 @ 09:15   No growth at 48 Hours  --  --      Blood Blood-Peripheral  05-14 @ 06:50   No growth at 48 Hours  --  --      Clean Catch Clean Catch (Midstream)  05-14 @ 06:25   No growth  --  --      RADIOLOGY:  PROCEDURE:  CT of the Chest, Abdomen and Pelvis was performed.  Sagittal and coronal reformats were performed.    IMPRESSION:  No acute CT findings suspicious for infection.        PHYSICAL EXAM:    Constitutional: AOx3. NAD.    Respiratory: clear lungs bilaterally. No wheezing, rhonchi, or crackles.    Cardiovascular: S1 S2. No murmurs, rubs or gallops.    Gastrointestinal: BS X4 active. soft. nontender.    Extremities/Vascular: +2 pulses bilaterally. No BLE edema.      ASSESSMENT/PLAN:   68 yo male with newly diagnosed ASXL1, DDX41 mutated AML, admitted for induction chemo with 7+3:  dauno 60 mg/m2+ cytarabine. 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 Chemo started on 5/2, Complicated by rigors, chills, fever, SOB  and tachycardia 2 hours into day 1 infusion - likely infusion reaction. Pt has pancytopenia d/t chemo and disease. Pt now presenting with Fever 100.5F    # Neutropenic Fever  - Tylenol and cooling measures prn for pyrexia  - BC x2, UA/UC ordered  - CT chest reviewed [facility], 5/13 - no acute findings  - C/W cefepime and flagyl  - F/U primary team in AM    Follow up with Attending in AM    Lynette Blackwell PA-C   Department of Medicine

## 2025-05-16 NOTE — CONSULT NOTE ADULT - PROBLEM SELECTOR RECOMMENDATION 5
- Geriatrics and Palliative Medicine Team will continue to follow for pain control     Paloma Mark MD  [facility] Team Consults  Please call if we can be of assistance, [phone]

## 2025-05-16 NOTE — PROGRESS NOTE ADULT - SUBJECTIVE AND OBJECTIVE BOX
Diagnosis: ASXL1-mutated and [id number] AML    Protocol/Chemo Regimen: 7+3:  Daunorubicin 60 mg/m2+ Cytarabine     Day: 15    Pt endorsed:   +generalized weakness, fatigue  +mouth/ gum pain, discomfort  +multiple BM w/ bright red blood    Review of Systems:   Denies any nausea, vomiting, diarrhea, chest pain, SOB, abdominal pain, hemorrhoids    Pain scale: unable to rate                             Diet: puree    Allergies: No Known Allergies    Intolerances: Shrimp- Nasal congestion (Other)              ANTIMICROBIALS  cefepime   IVPB 2000 milliGRAM(s) IV Intermittent every 8 hours  cefepime   IVPB      metroNIDAZOLE  IVPB 500 milliGRAM(s) IV Intermittent every 8 hours  posaconazole DR Tablet 300 milliGRAM(s) Oral every 24 hours  posaconazole DR Tablet   Oral   valACYclovir 500 milliGRAM(s) Oral two times a day      HEME/ONC MEDICATIONS      STANDING MEDICATIONS  Biotene Dry Mouth Oral Rinse 15 milliLiter(s) Swish and Spit four times a day  chlorhexidine 4% Liquid 1 Application(s) Topical <User Schedule>  cholecalciferol 2000 Unit(s) Oral daily  dexAMETHasone    Solution 0.5 milliGRAM(s) Oral two times a day  diltiazem    milliGRAM(s) Oral daily  FIRST- Mouthwash  BLM 10 milliLiter(s) Swish and Swallow four times a day  hydrochlorothiazide 50 milliGRAM(s) Oral daily  HYDROmorphone  Injectable 0.5 milliGRAM(s) IV Push <User Schedule>  lidocaine   4% Patch 1 Patch Transdermal every 24 hours  lidocaine 2% Gel 1 Application(s) Topical every 8 hours  melatonin 5 milliGRAM(s) Oral at bedtime  pantoprazole  Injectable 40 milliGRAM(s) IV Push every 12 hours  simethicone 80 milliGRAM(s) Chew every 6 hours  sodium chloride 0.9%. 1000 milliLiter(s) IV Continuous <Continuous>  tamsulosin 0.4 milliGRAM(s) Oral at bedtime      PRN MEDICATIONS  acetaminophen     Tablet .. 650 milliGRAM(s) Oral every 6 hours PRN  albuterol    90 MICROgram(s) HFA Inhaler 2 Puff(s) Inhalation every 6 hours PRN  aluminum hydroxide/magnesium hydroxide/simethicone Suspension 30 milliLiter(s) Oral every 6 hours PRN  calcium carbonate    500 mG (Tums) Chewable 1 Tablet(s) Chew four times a day PRN  cyclobenzaprine 10 milliGRAM(s) Oral three times a day PRN  hydrocortisone sodium succinate Injectable 100 milliGRAM(s) IV Push every 8 hours PRN  HYDROmorphone  Injectable 0.25 milliGRAM(s) IV Push every 12 hours PRN  metoclopramide Injectable 10 milliGRAM(s) IV Push every 6 hours PRN  ondansetron Injectable 8 milliGRAM(s) IV Push every 8 hours PRN  polyethylene glycol 3350 17 Gram(s) Oral two times a day PRN  sodium chloride 0.9% lock flush 10 milliLiter(s) IV Push every 1 hour PRN        Vital Signs Last 24 Hrs  T(C): 37.8 (16 May 2025 05:55), Max: 38.6 (15 May 2025 18:35)  T(F): 100 (16 May 2025 05:55), Max: 101.5 (15 May 2025 18:35)  HR: 85 (16 May 2025 05:55) (79 - 109)  BP: 118/74 (16 May 2025 05:55) (102/64 - 145/80)  BP(mean): --  RR: 18 (16 May 2025 05:55) (16 - 18)  SpO2: 96% (16 May 2025 05:55) (94% - 97%)    Parameters below as of 16 May 2025 05:55  Patient On (Oxygen Delivery Method): room air      PHYSICAL EXAM  HEENT:  ulceration + in left lateral aspect of tongue  CV: (+) S1/S2 RRR  Lungs: clear to auscultation, no wheezes or rales  Abdomen: soft, non-tender, non-distended (+) BS  Ext: B/L LE trace edema  Skin: no rash  Neuro: alert and oriented X 3  Central Line: TLC c/d/i       RECENT CULTURES:  05-14 @ 09:15  Blood Blood-Peripheral  --  --  --    No growth at 24 hours  --  05-14 @ 06:50  Blood Blood-Peripheral  --  --  --    No growth at 24 hours  --  05-14 @ 06:25  Clean Catch Clean Catch (Midstream)  --  --  --    No growth  --  05-11 @ 23:54  Clean Catch Clean Catch (Midstream)  --  --  --    <10,000 CFU/mL Normal Urogenital Madelyn  --  05-11 @ 23:40  Blood Blood-Peripheral  --  --  --    No growth at 4 days  --        LABS:                        8.6    0.38  )-----------( 42       ( 15 May 2025 20:24 )             24.4         Mean Cell Volume : 88.7 fl  Mean Cell Hemoglobin : 31.3 pg  Mean Cell Hemoglobin Concentration : 35.2 g/dL  Auto Neutrophil # : x  Auto Lymphocyte # : x  Auto Monocyte # : x  Auto Eosinophil # : x  Auto Basophil # : x  Auto Neutrophil % : x  Auto Lymphocyte % : x  Auto Monocyte % : x  Auto Eosinophil % : x  Auto Basophil % : x      05-16    140  |  107  |  14  ----------------------------<  110[H]  2.9[LL]   |  18[L]  |  1.05    Ca    7.3[L]      16 May 2025 06:24  Phos  2.6     05-16  Mg     1.5     05-16    TPro  4.7[L]  /  Alb  2.9[L]  /  TBili  0.7  /  DBili  x   /  AST  8[L]  /  ALT  9[L]  /  AlkPhos  58  05-16      Mg 1.5  Phos 2.6  Mg 1.7  Phos 2.6      PT/INR - ( 16 May 2025 06:24 )   PT: 14.5 sec;   INR: 1.26 ratio         PTT - ( 16 May 2025 06:24 )  PTT:25.4 sec      Uric Acid 3.8      Uric Acid 4.2        RADIOLOGY & ADDITIONAL STUDIES:      Xray Chest 1 View- PORTABLE-Urgent (Xray Chest 1 View- PORTABLE-Urgent .) (05.11.25 @ 22:27) >  Low lung volumes, with clear lungs.     Diagnosis: ASXL1-mutated and [id number] AML    Protocol/Chemo Regimen: 7+3:  Daunorubicin 60 mg/m2+ Cytarabine     Day: 15    Pt endorsed:   +generalized weakness, fatigue  +mouth/ gum pain  +bloody BM x2 O/N- becoming less frequent    Review of Systems:   Denies any nausea, vomiting, diarrhea, chest pain, SOB, abdominal pain, hemorrhoids    Pain scale: unable to rate                             Diet: puree    Allergies: No Known Allergies    Intolerances: Shrimp- Nasal congestion (Other)          ANTIMICROBIALS  cefepime   IVPB 2000 milliGRAM(s) IV Intermittent every 8 hours  cefepime   IVPB      metroNIDAZOLE  IVPB 500 milliGRAM(s) IV Intermittent every 8 hours  posaconazole DR Tablet 300 milliGRAM(s) Oral every 24 hours  posaconazole DR Tablet   Oral   valACYclovir 500 milliGRAM(s) Oral two times a day      HEME/ONC MEDICATIONS      STANDING MEDICATIONS  Biotene Dry Mouth Oral Rinse 15 milliLiter(s) Swish and Spit four times a day  chlorhexidine 4% Liquid 1 Application(s) Topical <User Schedule>  cholecalciferol 2000 Unit(s) Oral daily  dexAMETHasone    Solution 0.5 milliGRAM(s) Oral two times a day  diltiazem    milliGRAM(s) Oral daily  FIRST- Mouthwash  BLM 10 milliLiter(s) Swish and Swallow four times a day  hydrochlorothiazide 50 milliGRAM(s) Oral daily  HYDROmorphone  Injectable 0.5 milliGRAM(s) IV Push <User Schedule>  lidocaine   4% Patch 1 Patch Transdermal every 24 hours  lidocaine 2% Gel 1 Application(s) Topical every 8 hours  melatonin 5 milliGRAM(s) Oral at bedtime  pantoprazole  Injectable 40 milliGRAM(s) IV Push every 12 hours  simethicone 80 milliGRAM(s) Chew every 6 hours  sodium chloride 0.9%. 1000 milliLiter(s) IV Continuous <Continuous>  tamsulosin 0.4 milliGRAM(s) Oral at bedtime      PRN MEDICATIONS  acetaminophen     Tablet .. 650 milliGRAM(s) Oral every 6 hours PRN  albuterol    90 MICROgram(s) HFA Inhaler 2 Puff(s) Inhalation every 6 hours PRN  aluminum hydroxide/magnesium hydroxide/simethicone Suspension 30 milliLiter(s) Oral every 6 hours PRN  calcium carbonate    500 mG (Tums) Chewable 1 Tablet(s) Chew four times a day PRN  cyclobenzaprine 10 milliGRAM(s) Oral three times a day PRN  hydrocortisone sodium succinate Injectable 100 milliGRAM(s) IV Push every 8 hours PRN  HYDROmorphone  Injectable 0.25 milliGRAM(s) IV Push every 12 hours PRN  metoclopramide Injectable 10 milliGRAM(s) IV Push every 6 hours PRN  ondansetron Injectable 8 milliGRAM(s) IV Push every 8 hours PRN  polyethylene glycol 3350 17 Gram(s) Oral two times a day PRN  sodium chloride 0.9% lock flush 10 milliLiter(s) IV Push every 1 hour PRN        Vital Signs Last 24 Hrs  T(C): 37.8 (16 May 2025 05:55), Max: 38.6 (15 May 2025 18:35)  T(F): 100 (16 May 2025 05:55), Max: 101.5 (15 May 2025 18:35)  HR: 85 (16 May 2025 05:55) (79 - 109)  BP: 118/74 (16 May 2025 05:55) (102/64 - 145/80)  BP(mean): --  RR: 18 (16 May 2025 05:55) (16 - 18)  SpO2: 96% (16 May 2025 05:55) (94% - 97%)    Parameters below as of 16 May 2025 05:55  Patient On (Oxygen Delivery Method): room air      PHYSICAL EXAM  HEENT:  ulceration + in left lateral aspect of tongue  CV: (+) S1/S2 RRR  Lungs: clear to auscultation, no wheezes or rales  Abdomen: soft, non-tender, non-distended (+) BS  Ext: B/L LE trace edema  Skin: no rash  Neuro: alert and oriented X 3  Central Line: TLC c/d/i       RECENT CULTURES:  05-14 @ 09:15  Blood Blood-Peripheral  --  --  --    No growth at 24 hours  --  05-14 @ 06:50  Blood Blood-Peripheral  --  --  --    No growth at 24 hours  --  05-14 @ 06:25  Clean Catch Clean Catch (Midstream)  --  --  --    No growth  --  05-11 @ 23:54  Clean Catch Clean Catch (Midstream)  --  --  --    <10,000 CFU/mL Normal Urogenital Madelyn  --  05-11 @ 23:40  Blood Blood-Peripheral  --  --  --    No growth at 4 days  --        LABS:                          7.8    0.38  )-----------( 51       ( 16 May 2025 06:23 )             22.1         Mean Cell Volume : 89.8 fl  Mean Cell Hemoglobin : 31.7 pg  Mean Cell Hemoglobin Concentration : 35.3 g/dL  Auto Neutrophil # : x  Auto Lymphocyte # : x  Auto Monocyte # : x  Auto Eosinophil # : x  Auto Basophil # : x  Auto Neutrophil % : x  Auto Lymphocyte % : x  Auto Monocyte % : x  Auto Eosinophil % : x  Auto Basophil % : x      05-16    140  |  107  |  14  ----------------------------<  110[H]  2.9[LL]   |  18[L]  |  1.05    Ca    7.3[L]      16 May 2025 06:24  Phos  2.6     05-16  Mg     1.5     05-16    TPro  4.7[L]  /  Alb  2.9[L]  /  TBili  0.7  /  DBili  x   /  AST  8[L]  /  ALT  9[L]  /  AlkPhos  58  05-16      PT/INR - ( 16 May 2025 06:24 )   PT: 14.5 sec;   INR: 1.26 ratio         PTT - ( 16 May 2025 06:24 )  PTT:25.4 sec      Uric Acid 3.8        RADIOLOGY & ADDITIONAL STUDIES:      Xray Chest 1 View- PORTABLE-Urgent (Xray Chest 1 View- PORTABLE-Urgent .) (05.11.25 @ 22:27) >  Low lung volumes, with clear lungs.     Diagnosis: ASXL1-mutated and QFB02-nunhwqq AML    Protocol/Chemo Regimen: 7+3:  Daunorubicin 60 mg/m2+ Cytarabine     Day: 15    Pt endorsed:   +generalized weakness, fatigue  +mouth/ gum pain  +bloody BM x2 O/N- becoming less frequent, more light pink now instead of bright red    Review of Systems:   Denies any nausea, vomiting, diarrhea, chest pain, SOB, abdominal pain, hemorrhoids    Pain scale: unable to rate                             Diet: puree    Allergies: No Known Allergies    Intolerances: Shrimp- Nasal congestion (Other)          ANTIMICROBIALS  cefepime   IVPB 2000 milliGRAM(s) IV Intermittent every 8 hours  cefepime   IVPB      metroNIDAZOLE  IVPB 500 milliGRAM(s) IV Intermittent every 8 hours  posaconazole DR Tablet 300 milliGRAM(s) Oral every 24 hours  posaconazole DR Tablet   Oral   valACYclovir 500 milliGRAM(s) Oral two times a day      HEME/ONC MEDICATIONS      STANDING MEDICATIONS  Biotene Dry Mouth Oral Rinse 15 milliLiter(s) Swish and Spit four times a day  chlorhexidine 4% Liquid 1 Application(s) Topical <User Schedule>  cholecalciferol 2000 Unit(s) Oral daily  dexAMETHasone    Solution 0.5 milliGRAM(s) Oral two times a day  diltiazem    milliGRAM(s) Oral daily  FIRST- Mouthwash  BLM 10 milliLiter(s) Swish and Swallow four times a day  hydrochlorothiazide 50 milliGRAM(s) Oral daily  HYDROmorphone  Injectable 0.5 milliGRAM(s) IV Push <User Schedule>  lidocaine   4% Patch 1 Patch Transdermal every 24 hours  lidocaine 2% Gel 1 Application(s) Topical every 8 hours  melatonin 5 milliGRAM(s) Oral at bedtime  pantoprazole  Injectable 40 milliGRAM(s) IV Push every 12 hours  simethicone 80 milliGRAM(s) Chew every 6 hours  sodium chloride 0.9%. 1000 milliLiter(s) IV Continuous <Continuous>  tamsulosin 0.4 milliGRAM(s) Oral at bedtime      PRN MEDICATIONS  acetaminophen     Tablet .. 650 milliGRAM(s) Oral every 6 hours PRN  albuterol    90 MICROgram(s) HFA Inhaler 2 Puff(s) Inhalation every 6 hours PRN  aluminum hydroxide/magnesium hydroxide/simethicone Suspension 30 milliLiter(s) Oral every 6 hours PRN  calcium carbonate    500 mG (Tums) Chewable 1 Tablet(s) Chew four times a day PRN  cyclobenzaprine 10 milliGRAM(s) Oral three times a day PRN  hydrocortisone sodium succinate Injectable 100 milliGRAM(s) IV Push every 8 hours PRN  HYDROmorphone  Injectable 0.25 milliGRAM(s) IV Push every 12 hours PRN  metoclopramide Injectable 10 milliGRAM(s) IV Push every 6 hours PRN  ondansetron Injectable 8 milliGRAM(s) IV Push every 8 hours PRN  polyethylene glycol 3350 17 Gram(s) Oral two times a day PRN  sodium chloride 0.9% lock flush 10 milliLiter(s) IV Push every 1 hour PRN        Vital Signs Last 24 Hrs  T(C): 37.8 (16 May 2025 05:55), Max: 38.6 (15 May 2025 18:35)  T(F): 100 (16 May 2025 05:55), Max: 101.5 (15 May 2025 18:35)  HR: 85 (16 May 2025 05:55) (79 - 109)  BP: 118/74 (16 May 2025 05:55) (102/64 - 145/80)  BP(mean): --  RR: 18 (16 May 2025 05:55) (16 - 18)  SpO2: 96% (16 May 2025 05:55) (94% - 97%)    Parameters below as of 16 May 2025 05:55  Patient On (Oxygen Delivery Method): room air      PHYSICAL EXAM  HEENT:  ulceration + in left lateral aspect of tongue  CV: (+) S1/S2 RRR  Lungs: clear to auscultation, no wheezes or rales  Abdomen: soft, non-tender, non-distended (+) BS  Ext: B/L LE trace edema  Skin: no rash  Neuro: alert and oriented X 3  Central Line: TLC c/d/i       RECENT CULTURES:  05-14 @ 09:15  Blood Blood-Peripheral  --  --  --    No growth at 24 hours  --  05-14 @ 06:50  Blood Blood-Peripheral  --  --  --    No growth at 24 hours  --  05-14 @ 06:25  Clean Catch Clean Catch (Midstream)  --  --  --    No growth  --  05-11 @ 23:54  Clean Catch Clean Catch (Midstream)  --  --  --    <10,000 CFU/mL Normal Urogenital Madelyn  --  05-11 @ 23:40  Blood Blood-Peripheral  --  --  --    No growth at 4 days  --        LABS:                          7.8    0.38  )-----------( 51       ( 16 May 2025 06:23 )             22.1         Mean Cell Volume : 89.8 fl  Mean Cell Hemoglobin : 31.7 pg  Mean Cell Hemoglobin Concentration : 35.3 g/dL  Auto Neutrophil # : x  Auto Lymphocyte # : x  Auto Monocyte # : x  Auto Eosinophil # : x  Auto Basophil # : x  Auto Neutrophil % : x  Auto Lymphocyte % : x  Auto Monocyte % : x  Auto Eosinophil % : x  Auto Basophil % : x      05-16    140  |  107  |  14  ----------------------------<  110[H]  2.9[LL]   |  18[L]  |  1.05    Ca    7.3[L]      16 May 2025 06:24  Phos  2.6     05-16  Mg     1.5     05-16    TPro  4.7[L]  /  Alb  2.9[L]  /  TBili  0.7  /  DBili  x   /  AST  8[L]  /  ALT  9[L]  /  AlkPhos  58  05-16      PT/INR - ( 16 May 2025 06:24 )   PT: 14.5 sec;   INR: 1.26 ratio         PTT - ( 16 May 2025 06:24 )  PTT:25.4 sec      Uric Acid 3.8        RADIOLOGY & ADDITIONAL STUDIES:      Xray Chest 1 View- PORTABLE-Urgent (Xray Chest 1 View- PORTABLE-Urgent .) (05.11.25 @ 22:27) >  Low lung volumes, with clear lungs.

## 2025-05-16 NOTE — PROGRESS NOTE ADULT - PROBLEM SELECTOR PLAN 1
new ASXL1-mutated and [id number]-grklkrj AML.  admitted for induction chemo with dauno 60 mg/m2+ cytarabine. Chemo started on 5/2, Complicated by rigors, chills, fever, SOB  and tachycardia 2 hours into day 1 infusion - likely infusion. recevied pepcid/benadryl/solumedrol/tylenol iv. will make up D1 Cytarabine on day8    will proceed with day2 chemo with pre mdes Tylenol and Benadryl, hydrocortisone iv PRN reaction   Hepatitis screen/HIV(-) as outpatient  IVF, antiemetics, mouth care. monitor weight, I & O, diuresis PRN   Monitor CBC with diff QD, TLS labs BID, Coags & T & C TIW transfuse for hb<7, PLT <10  5/5 HLA sent  day 14 BM bx on 5/16  5/15 KCl repletion for hypokalemia new ASXL1-mutated and [id number]-[id number] AML.  admitted for induction chemo with dauno 60 mg/m2+ cytarabine. Chemo started on 5/2, Complicated by rigors, chills, fever, SOB  and tachycardia 2 hours into day 1 infusion - likely infusion. recevied pepcid/benadryl/solumedrol/tylenol iv. will make up D1 Cytarabine on day8    will proceed with day2 chemo with pre mdes Tylenol and Benadryl, hydrocortisone iv PRN reaction   Hepatitis screen/HIV(-) as outpatient  IVF, antiemetics, mouth care. monitor weight, I & O, diuresis PRN   Monitor CBC with diff QD, TLS labs BID, Coags & T & C TIW transfuse for hb<7, PLT <10  5/5 HLA sent  day 14 BM bx on 5/16 5/16 KCl repletion for hypokalemia; mg sulfate x1 for hypomagnesemia new ASXL1-mutated and [id number] AML.  admitted for induction chemo with dauno 60 mg/m2+ cytarabine. Chemo started on 5/2, Complicated by rigors, chills, fever, SOB  and tachycardia 2 hours into day 1 infusion - likely infusion. recevied pepcid/benadryl/solumedrol/tylenol iv. will make up D1 Cytarabine on day8    will proceed with day2 chemo with pre mdes Tylenol and Benadryl, hydrocortisone iv PRN reaction   Hepatitis screen/HIV(-) as outpatient  IVF, antiemetics, mouth care. monitor weight, I & O, diuresis PRN   Monitor CBC with diff QD, TLS labs BID, Coags & T & C TIW transfuse for hb<7, PLT <10  5/5 HLA sent  5/16 BMbx done, f/u  5/16 KCl repletion for hypokalemia; mg sulfate x1 for hypomagnesemia new ASXL1-mutated and VVS58-wynqpmm AML.  admitted for induction chemo with dauno 60 mg/m2+ cytarabine. Chemo started on 5/2, Complicated by rigors, chills, fever, SOB  and tachycardia 2 hours into day 1 infusion - likely infusion. recevied pepcid/benadryl/solumedrol/tylenol iv. will make up D1 Cytarabine on day8    will proceed with day2 chemo with pre mdes Tylenol and Benadryl, hydrocortisone iv PRN reaction   Hepatitis screen/HIV(-) as outpatient  IVF, antiemetics, mouth care. monitor weight, I & O, diuresis PRN   Monitor CBC with diff QD, TLS labs BID, Coags & T & C TIW transfuse for hb<7, PLT <10  5/5 HLA sent  5/16 BMbx done, f/u  5/16 1u pRBC for goal >8 d/t GIB; 1u plts for goal >50K (GIB)  KCl repletion for hypokalemia; mg sulfate x1 for hypomagnesemia

## 2025-05-16 NOTE — PHARMACOTHERAPY INTERVENTION NOTE - COMMENTS
Clinical Pharmacy Specialist- Hematology/Oncology- Progress Note    Pt is a 68 y/o male w/ PMH of HTN, HLD, GERD, BPH, Gout, spinal stenosis (s/p multiple surgeries) and newly diagnosed AML (ASXL1-mutated and DDX41) admitted for management with 7+3 Induction therapy    Antimicrobial Course:  - Posaconazole- 5/2  - Levaquin/Amox- 5/2- 5/11  --> Cefepime- 5/11  - Flagyl (abd discomfort)- 5/13  - Valtrex- 5/2  MRSA nasal swab    Last Neutropenic (ANC<1000): 5/16; ANC= 0  Last Febrile: 15-May-2025 18:35; T= 101.5  Days Non-Neutropenic: 0  Days afebrile: 0    Chemotherapy Course  -Current Regimen: 7+3 Induction  History:  (5/2/25)- 7+3 Induction   -Daunorubicin 60mg/m2 IVP D1-3   -Cytarabine 100mg/m2 IV cont inf D1-7  -Day: 15 (5/16)  BmBx:  Access: R TLC (accessed 5/5/25)    History/Relevant clinical information used in assessment:  - Ht= 5’11”; IBW= 75.3kg; Actual BW= 107.3kg; Adj BW= 88kg  - 5/2- IRR? rigors, chills, and tachycardia 2 hours into day 1; rasburicase 3mg x 1  - 5/4 -Crcl= 67ml/min ; Scr=1.32; Wt= 88kg; Adjusted BW used since pt BMI>30  - 5/9- increase melatonin 3mg to 10mh qhs- c/o insomnia  - 5/13- mouth pain- has ulceration   - 5/14- mouth pain - cannot swallow now    Assessment/Plan/Recommendation:  Onc:  - Daunorubicin 60mg/m2 IVP D1-3   - Cytarabine 100mg/m2 IV cont inf D1-7- IRR on D1, started at 6p, stopped at 7:30p (received 1.5 hrs),  made up D1 bag on 5/9 to run over 23 hrs (to account for ~1hr given)  - allopurinol- monitor for d/c ~5/12- need to keep for gout  ID:  - 5/16, still febrile since 5/11- on cefepime, BlCx NGTD + abd discomfort-->CT CAP 5/15 - no infection, keep flagy until..?  - 5/15- bloody diarrhea x 3 on reglan prn, but has not needed, monitor for d/c to not aggravate w/ prokinetic agent- amicar?  - 5/15- HSV neg  Cards:  - atorvastatin 80mg held d/t DDI with posa (Cat X)  Pain (mouth):  ·	magic mouthwash to swish & swallow- 5/14  ·	hydromorphone 0.2mg IVP q6hr prn- 5/14  ·	oxycodone 2.5-5mg q4hr prn  ·	lidocaine gel 2% q8hr to tongue 5/14  ·	dexamethasone soln 0.5 mg/5 mL BID swish & spit - 5/15    Additional Monitoring Needed?   -Yes- Continue to monitor renal function & daily counts for abx escalation/de-escalation   -Discharge Planning:  --> New meds:  --> Meds sent for auth:  --> Delivered meds:    Case discussed with attending/primary team    Liza Jolley, PharmD, BCPS  Clinical Pharmacy Specialist | Hematology/Oncology  [facility]  Email: [email or web address] or available on [email or web address]     Clinical Pharmacy Specialist- Hematology/Oncology- Progress Note    Pt is a 68 y/o male w/ PMH of HTN, HLD, GERD, BPH, Gout, spinal stenosis (s/p multiple surgeries) and newly diagnosed AML (ASXL1-mutated and DDX41) admitted for management with 7+3 Induction therapy    Antimicrobial Course:  - Posaconazole- 5/2  - Levaquin/Amox- 5/2- 5/11  --> Cefepime- 5/11  - Flagyl (abd discomfort)- 5/13  - Valtrex- 5/2  MRSA nasal swab    Last Neutropenic (ANC<1000): 5/16; ANC= 0  Last Febrile: 15-May-2025 18:35; T= 101.5  Days Non-Neutropenic: 0  Days afebrile: 0    Chemotherapy Course  -Current Regimen: 7+3 Induction  History:  (5/2/25)- 7+3 Induction   -Daunorubicin 60mg/m2 IVP D1-3   -Cytarabine 100mg/m2 IV cont inf D1-7  -Day: 15 (5/16)  BmBx:  Access: R TLC (accessed 5/5/25)    History/Relevant clinical information used in assessment:  - Ht= 5’11”; IBW= 75.3kg; Actual BW= 107.3kg; Adj BW= 88kg  - 5/2- IRR? rigors, chills, and tachycardia 2 hours into day 1; rasburicase 3mg x 1  - 5/4 -Crcl= 67ml/min ; Scr=1.32; Wt= 88kg; Adjusted BW used since pt BMI>30  - 5/9- increase melatonin 3mg to 10mh qhs- c/o insomnia  - 5/13- mouth pain- has ulceration   - 5/14- mouth pain - cannot swallow now  - 5/16- bloody BM's decreased to 2x'd/day from 4x's/day yest    Assessment/Plan/Recommendation:  Onc:  - Daunorubicin 60mg/m2 IVP D1-3   - Cytarabine 100mg/m2 IV cont inf D1-7- IRR on D1, started at 6p, stopped at 7:30p (received 1.5 hrs),  made up D1 bag on 5/9 to run over 23 hrs (to account for ~1hr given)  - allopurinol 100mg (300mg d/c'd 5/15)-keeping for gout  ID:  - 5/16, still febrile since 5/11- on cefepime, BlCx NGTD + abd discomfort-->CT CAP 5/15 - no infection (but groundglass opacities stable from previous?), keep flagy until..?- discussed if spikes, keep gianna  - 5/15- bloody diarrhea x 3 on reglan prn, but has not needed, monitor for d/c to not aggravate w/ prokinetic agent- amicar?  - 5/15- HSV neg  Cards:  - atorvastatin 80mg held d/t DDI with posa (Cat X)  Pain (mouth):  ·	magic mouthwash to swish & swallow- 5/14  ·	hydromorphone 0.2mg IVP q6hr prn- 5/14--> 0.5mg q3hr prn- 5/16  ·	hydromorphone 0.5mg IVP tid- 5/15  ·	oxycodone 2.5-5mg q4hr prn- 5/2- 5/16  ·	lidocaine gel 2% q8hr to tongue 5/14  ·	dexamethasone soln 0.5 mg/5 mL BID swish & spit - 5/15    Additional Monitoring Needed?   -Yes- Continue to monitor renal function & daily counts for abx escalation/de-escalation   -Discharge Planning:  --> New meds:  --> Meds sent for auth:  --> Delivered meds:    Case discussed with attending/primary team    Liza Jolley, PharmD, BCPS  Clinical Pharmacy Specialist | Hematology/Oncology  [facility]  Email: [email or web address] or available on [email or web address]     Clinical Pharmacy Specialist- Hematology/Oncology- Progress Note    Pt is a 70 y/o male w/ PMH of HTN, HLD, GERD, BPH, Gout, spinal stenosis (s/p multiple surgeries) and newly diagnosed AML (ASXL1-mutated and DDX41) admitted for management with 7+3 Induction therapy    Antimicrobial Course:  - Posaconazole- 5/2  - Levaquin/Amox- 5/2- 5/11  --> Cefepime- 5/11  - Flagyl (abd discomfort)- 5/13  - Valtrex- 5/2  MRSA nasal swab    Last Neutropenic (ANC<1000): 5/16; ANC= 0  Last Febrile: 15-May-2025 18:35; T= 101.5  Days Non-Neutropenic: 0  Days afebrile: 0    Chemotherapy Course  -Current Regimen: 7+3 Induction  History:  (5/2/25)- 7+3 Induction   -Daunorubicin 60mg/m2 IVP D1-3   -Cytarabine 100mg/m2 IV cont inf D1-7  -Day: 15 (5/16)  BmBx:  Access: R TLC (accessed 5/5/25)    History/Relevant clinical information used in assessment:  - Ht= 5’11”; IBW= 75.3kg; Actual BW= 107.3kg; Adj BW= 88kg  - 5/2- IRR? rigors, chills, and tachycardia 2 hours into day 1; rasburicase 3mg x 1  - 5/4 -Crcl= 67ml/min ; Scr=1.32; Wt= 88kg; Adjusted BW used since pt BMI>30  - 5/9- increase melatonin 3mg to 10mh qhs- c/o insomnia  - 5/13- mouth pain- has ulceration   - 5/14- mouth pain - cannot swallow now  - 5/16- bloody BM's decreased to 2x'd/day from 4x's/day yest    Assessment/Plan/Recommendation:  Onc:  - Daunorubicin 60mg/m2 IVP D1-3   - Cytarabine 100mg/m2 IV cont inf D1-7- IRR on D1, started at 6p, stopped at 7:30p (received 1.5 hrs),  made up D1 bag on 5/9 to run over 23 hrs (to account for ~1hr given)  - allopurinol 100mg (300mg d/c'd 5/15)-keeping for gout  ID:  - 5/16, still febrile since 5/11- on cefepime, BlCx NGTD + abd discomfort-->CT CAP 5/15 - no infection (but groundglass opacities stable from previous?), keep flagy until..?- discussed if spikes, start gianna (would d/c flagyl since gianna covers anaerobes   - 5/15- bloody diarrhea x 3 on reglan prn, but has not needed, monitor for d/c to not aggravate w/ prokinetic agent- amicar?  - 5/15- HSV neg  Cards:  - atorvastatin 80mg held d/t DDI with posa (Cat X)  Pain (mouth):  ·	magic mouthwash to swish & swallow- 5/14  ·	hydromorphone 0.2mg IVP q6hr prn- 5/14--> 0.5mg q3hr prn- 5/16  ·	hydromorphone 0.5mg IVP tid- 5/15  ·	oxycodone 2.5-5mg q4hr prn- 5/2- 5/16  ·	lidocaine gel 2% q8hr to tongue 5/14  ·	dexamethasone soln 0.5 mg/5 mL BID swish & spit - 5/15    Additional Monitoring Needed?   -Yes- Continue to monitor renal function & daily counts for abx escalation/de-escalation   -Discharge Planning:  --> New meds:  --> Meds sent for auth:  --> Delivered meds:    Case discussed with attending/primary team    Liza Jolley, PharmD, BCPS  Clinical Pharmacy Specialist | Hematology/Oncology  [facility]  Email: [email or web address] or available [email or web address]

## 2025-05-16 NOTE — CONSULT NOTE ADULT - PROBLEM SELECTOR RECOMMENDATION 2
2/2 chemotherapy   - Agree with IV Dilaudid 0.5 mg TID prior to meals  - D/c IV dilaudid 0.25 q12 hours prn, start IV Dilaudid 0.5 mg q3 hours prn mod-severe pain   - Holistic RN referral for non-opiate interventions   - agree with topical therapies including FIRST mouthwash, lido gel, dex solution and biotene

## 2025-05-17 LAB
ALBUMIN SERPL ELPH-MCNC: 3.2 G/DL — LOW (ref 3.3–5)
ALBUMIN SERPL ELPH-MCNC: 3.6 G/DL — SIGNIFICANT CHANGE UP (ref 3.3–5)
ALP SERPL-CCNC: 67 U/L — SIGNIFICANT CHANGE UP (ref 40–120)
ALP SERPL-CCNC: 74 U/L — SIGNIFICANT CHANGE UP (ref 40–120)
ALT FLD-CCNC: 12 U/L — SIGNIFICANT CHANGE UP (ref 10–45)
ALT FLD-CCNC: 15 U/L — SIGNIFICANT CHANGE UP (ref 10–45)
ANION GAP SERPL CALC-SCNC: 12 MMOL/L — SIGNIFICANT CHANGE UP (ref 5–17)
ANION GAP SERPL CALC-SCNC: 15 MMOL/L — SIGNIFICANT CHANGE UP (ref 5–17)
APPEARANCE UR: CLEAR — SIGNIFICANT CHANGE UP
AST SERPL-CCNC: 14 U/L — SIGNIFICANT CHANGE UP (ref 10–40)
AST SERPL-CCNC: 14 U/L — SIGNIFICANT CHANGE UP (ref 10–40)
BILIRUB SERPL-MCNC: 0.5 MG/DL — SIGNIFICANT CHANGE UP (ref 0.2–1.2)
BILIRUB SERPL-MCNC: 0.7 MG/DL — SIGNIFICANT CHANGE UP (ref 0.2–1.2)
BILIRUB UR-MCNC: NEGATIVE — SIGNIFICANT CHANGE UP
BUN SERPL-MCNC: 16 MG/DL — SIGNIFICANT CHANGE UP (ref 7–23)
BUN SERPL-MCNC: 17 MG/DL — SIGNIFICANT CHANGE UP (ref 7–23)
CALCIUM SERPL-MCNC: 8.4 MG/DL — SIGNIFICANT CHANGE UP (ref 8.4–10.5)
CALCIUM SERPL-MCNC: 8.7 MG/DL — SIGNIFICANT CHANGE UP (ref 8.4–10.5)
CHLORIDE SERPL-SCNC: 103 MMOL/L — SIGNIFICANT CHANGE UP (ref 96–108)
CHLORIDE SERPL-SCNC: 98 MMOL/L — SIGNIFICANT CHANGE UP (ref 96–108)
CO2 SERPL-SCNC: 21 MMOL/L — LOW (ref 22–31)
CO2 SERPL-SCNC: 22 MMOL/L — SIGNIFICANT CHANGE UP (ref 22–31)
COLOR SPEC: YELLOW — SIGNIFICANT CHANGE UP
CREAT SERPL-MCNC: 1.1 MG/DL — SIGNIFICANT CHANGE UP (ref 0.5–1.3)
CREAT SERPL-MCNC: 1.14 MG/DL — SIGNIFICANT CHANGE UP (ref 0.5–1.3)
CULTURE RESULTS: SIGNIFICANT CHANGE UP
CULTURE RESULTS: SIGNIFICANT CHANGE UP
DIFF PNL FLD: NEGATIVE — SIGNIFICANT CHANGE UP
EGFR: 70 ML/MIN/1.73M2 — SIGNIFICANT CHANGE UP
EGFR: 70 ML/MIN/1.73M2 — SIGNIFICANT CHANGE UP
EGFR: 73 ML/MIN/1.73M2 — SIGNIFICANT CHANGE UP
EGFR: 73 ML/MIN/1.73M2 — SIGNIFICANT CHANGE UP
GLUCOSE SERPL-MCNC: 108 MG/DL — HIGH (ref 70–99)
GLUCOSE SERPL-MCNC: 136 MG/DL — HIGH (ref 70–99)
GLUCOSE UR QL: NEGATIVE MG/DL — SIGNIFICANT CHANGE UP
HCT VFR BLD CALC: 23.9 % — LOW (ref 39–50)
HCT VFR BLD CALC: 23.9 % — LOW (ref 39–50)
HCT VFR BLD CALC: 24 % — LOW (ref 39–50)
HCT VFR BLD CALC: 24.6 % — LOW (ref 39–50)
HGB BLD-MCNC: 8.4 G/DL — LOW (ref 13–17)
HGB BLD-MCNC: 8.6 G/DL — LOW (ref 13–17)
KETONES UR QL: NEGATIVE MG/DL — SIGNIFICANT CHANGE UP
LDH SERPL L TO P-CCNC: 199 U/L — SIGNIFICANT CHANGE UP (ref 50–242)
LEUKOCYTE ESTERASE UR-ACNC: NEGATIVE — SIGNIFICANT CHANGE UP
MAGNESIUM SERPL-MCNC: 1.9 MG/DL — SIGNIFICANT CHANGE UP (ref 1.6–2.6)
MAGNESIUM SERPL-MCNC: 2.1 MG/DL — SIGNIFICANT CHANGE UP (ref 1.6–2.6)
MCHC RBC-ENTMCNC: 31 PG — SIGNIFICANT CHANGE UP (ref 27–34)
MCHC RBC-ENTMCNC: 31.2 PG — SIGNIFICANT CHANGE UP (ref 27–34)
MCHC RBC-ENTMCNC: 31.6 PG — SIGNIFICANT CHANGE UP (ref 27–34)
MCHC RBC-ENTMCNC: 31.9 PG — SIGNIFICANT CHANGE UP (ref 27–34)
MCHC RBC-ENTMCNC: 35 G/DL — SIGNIFICANT CHANGE UP (ref 32–36)
MCHC RBC-ENTMCNC: 35.1 G/DL — SIGNIFICANT CHANGE UP (ref 32–36)
MCHC RBC-ENTMCNC: 35.8 G/DL — SIGNIFICANT CHANGE UP (ref 32–36)
MCHC RBC-ENTMCNC: 36 G/DL — SIGNIFICANT CHANGE UP (ref 32–36)
MCV RBC AUTO: 88.2 FL — SIGNIFICANT CHANGE UP (ref 80–100)
MCV RBC AUTO: 88.5 FL — SIGNIFICANT CHANGE UP (ref 80–100)
MCV RBC AUTO: 88.8 FL — SIGNIFICANT CHANGE UP (ref 80–100)
MCV RBC AUTO: 88.8 FL — SIGNIFICANT CHANGE UP (ref 80–100)
NITRITE UR-MCNC: NEGATIVE — SIGNIFICANT CHANGE UP
NRBC BLD AUTO-RTO: 0 /100 WBCS — SIGNIFICANT CHANGE UP (ref 0–0)
PH UR: 6.5 — SIGNIFICANT CHANGE UP (ref 5–8)
PHOSPHATE SERPL-MCNC: 4.4 MG/DL — SIGNIFICANT CHANGE UP (ref 2.5–4.5)
PLATELET # BLD AUTO: 49 K/UL — LOW (ref 150–400)
PLATELET # BLD AUTO: 52 K/UL — LOW (ref 150–400)
PLATELET # BLD AUTO: 59 K/UL — LOW (ref 150–400)
PLATELET # BLD AUTO: 65 K/UL — LOW (ref 150–400)
POTASSIUM SERPL-MCNC: 3.9 MMOL/L — SIGNIFICANT CHANGE UP (ref 3.5–5.3)
POTASSIUM SERPL-MCNC: 4 MMOL/L — SIGNIFICANT CHANGE UP (ref 3.5–5.3)
POTASSIUM SERPL-SCNC: 3.9 MMOL/L — SIGNIFICANT CHANGE UP (ref 3.5–5.3)
POTASSIUM SERPL-SCNC: 4 MMOL/L — SIGNIFICANT CHANGE UP (ref 3.5–5.3)
PROT SERPL-MCNC: 5.5 G/DL — LOW (ref 6–8.3)
PROT SERPL-MCNC: 5.8 G/DL — LOW (ref 6–8.3)
PROT UR-MCNC: NEGATIVE MG/DL — SIGNIFICANT CHANGE UP
RBC # BLD: 2.69 M/UL — LOW (ref 4.2–5.8)
RBC # BLD: 2.7 M/UL — LOW (ref 4.2–5.8)
RBC # BLD: 2.72 M/UL — LOW (ref 4.2–5.8)
RBC # BLD: 2.77 M/UL — LOW (ref 4.2–5.8)
RBC # FLD: 14.5 % — SIGNIFICANT CHANGE UP (ref 10.3–14.5)
RBC # FLD: 14.6 % — HIGH (ref 10.3–14.5)
RBC # FLD: 15 % — HIGH (ref 10.3–14.5)
RBC # FLD: 15 % — HIGH (ref 10.3–14.5)
SODIUM SERPL-SCNC: 135 MMOL/L — SIGNIFICANT CHANGE UP (ref 135–145)
SODIUM SERPL-SCNC: 136 MMOL/L — SIGNIFICANT CHANGE UP (ref 135–145)
SP GR SPEC: 1.02 — SIGNIFICANT CHANGE UP (ref 1–1.03)
SPECIMEN SOURCE: SIGNIFICANT CHANGE UP
SPECIMEN SOURCE: SIGNIFICANT CHANGE UP
URATE SERPL-MCNC: 3.9 MG/DL — SIGNIFICANT CHANGE UP (ref 3.4–8.8)
URATE SERPL-MCNC: 4.1 MG/DL — SIGNIFICANT CHANGE UP (ref 3.4–8.8)
UROBILINOGEN FLD QL: 0.2 MG/DL — SIGNIFICANT CHANGE UP (ref 0.2–1)
WBC # BLD: 0.35 K/UL — CRITICAL LOW (ref 3.8–10.5)
WBC # BLD: 0.4 K/UL — CRITICAL LOW (ref 3.8–10.5)
WBC # BLD: 0.41 K/UL — CRITICAL LOW (ref 3.8–10.5)
WBC # BLD: 0.45 K/UL — CRITICAL LOW (ref 3.8–10.5)
WBC # FLD AUTO: 0.35 K/UL — CRITICAL LOW (ref 3.8–10.5)
WBC # FLD AUTO: 0.4 K/UL — CRITICAL LOW (ref 3.8–10.5)
WBC # FLD AUTO: 0.41 K/UL — CRITICAL LOW (ref 3.8–10.5)
WBC # FLD AUTO: 0.45 K/UL — CRITICAL LOW (ref 3.8–10.5)

## 2025-05-17 PROCEDURE — 99233 SBSQ HOSP IP/OBS HIGH 50: CPT

## 2025-05-17 RX ORDER — HYDROMORPHONE/SOD CHLOR,ISO/PF 2 MG/10 ML
0.7 SYRINGE (ML) INJECTION
Refills: 0 | Status: DISCONTINUED | OUTPATIENT
Start: 2025-05-17 | End: 2025-05-22

## 2025-05-17 RX ORDER — HYDROMORPHONE/SOD CHLOR,ISO/PF 2 MG/10 ML
0.7 SYRINGE (ML) INJECTION
Refills: 0 | Status: DISCONTINUED | OUTPATIENT
Start: 2025-05-17 | End: 2025-05-17

## 2025-05-17 RX ADMIN — LIDOCAINE HYDROCHLORIDE 1 APPLICATION(S): 20 JELLY TOPICAL at 15:05

## 2025-05-17 RX ADMIN — CEFEPIME 100 MILLIGRAM(S): 2 INJECTION, POWDER, FOR SOLUTION INTRAVENOUS at 21:23

## 2025-05-17 RX ADMIN — Medication 100 MILLIGRAM(S): at 21:23

## 2025-05-17 RX ADMIN — Medication 80 MILLIGRAM(S): at 11:55

## 2025-05-17 RX ADMIN — Medication 0.5 MILLIGRAM(S): at 17:59

## 2025-05-17 RX ADMIN — POSACONAZOLE 300 MILLIGRAM(S): 100 TABLET, DELAYED RELEASE ORAL at 18:00

## 2025-05-17 RX ADMIN — LIDOCAINE HYDROCHLORIDE 1 APPLICATION(S): 20 JELLY TOPICAL at 21:28

## 2025-05-17 RX ADMIN — Medication 650 MILLIGRAM(S): at 19:05

## 2025-05-17 RX ADMIN — Medication 15 MILLILITER(S): at 05:17

## 2025-05-17 RX ADMIN — Medication 40 MILLIGRAM(S): at 05:17

## 2025-05-17 RX ADMIN — Medication 80 MILLIGRAM(S): at 17:59

## 2025-05-17 RX ADMIN — Medication 650 MILLIGRAM(S): at 20:05

## 2025-05-17 RX ADMIN — TAMSULOSIN HYDROCHLORIDE 0.4 MILLIGRAM(S): 0.4 CAPSULE ORAL at 21:23

## 2025-05-17 RX ADMIN — Medication 2000 UNIT(S): at 11:55

## 2025-05-17 RX ADMIN — Medication 100 MILLIGRAM(S): at 05:17

## 2025-05-17 RX ADMIN — DEXAMETHASONE 0.5 MILLIGRAM(S): 0.5 TABLET ORAL at 06:19

## 2025-05-17 RX ADMIN — DILTIAZEM HYDROCHLORIDE 240 MILLIGRAM(S): 240 TABLET, EXTENDED RELEASE ORAL at 05:17

## 2025-05-17 RX ADMIN — Medication 0.7 MILLIGRAM(S): at 15:36

## 2025-05-17 RX ADMIN — Medication 0.5 MILLIGRAM(S): at 05:15

## 2025-05-17 RX ADMIN — Medication 80 MILLIGRAM(S): at 05:17

## 2025-05-17 RX ADMIN — Medication 1 APPLICATION(S): at 08:31

## 2025-05-17 RX ADMIN — Medication 0.5 MILLIGRAM(S): at 12:28

## 2025-05-17 RX ADMIN — LIDOCAINE HYDROCHLORIDE 1 APPLICATION(S): 20 JELLY TOPICAL at 06:20

## 2025-05-17 RX ADMIN — Medication 15 MILLILITER(S): at 11:55

## 2025-05-17 RX ADMIN — Medication 40 MILLIGRAM(S): at 18:00

## 2025-05-17 RX ADMIN — Medication 100 MILLIGRAM(S): at 14:52

## 2025-05-17 RX ADMIN — Medication 0.5 MILLIGRAM(S): at 00:48

## 2025-05-17 RX ADMIN — Medication 650 MILLIGRAM(S): at 11:35

## 2025-05-17 RX ADMIN — Medication 0.5 MILLIGRAM(S): at 23:51

## 2025-05-17 RX ADMIN — Medication 500 MILLIGRAM(S): at 18:00

## 2025-05-17 RX ADMIN — Medication 0.5 MILLIGRAM(S): at 06:15

## 2025-05-17 RX ADMIN — Medication 650 MILLIGRAM(S): at 10:35

## 2025-05-17 RX ADMIN — Medication 15 MILLILITER(S): at 18:00

## 2025-05-17 RX ADMIN — DIPHENHYDRAMINE HYDROCHLORIDE AND LIDOCAINE HYDROCHLORIDE AND ALUMINUM HYDROXIDE AND MAGNESIUM HYDRO 10 MILLILITER(S): KIT at 05:16

## 2025-05-17 RX ADMIN — Medication 500 MILLIGRAM(S): at 05:17

## 2025-05-17 RX ADMIN — Medication 0.7 MILLIGRAM(S): at 21:22

## 2025-05-17 RX ADMIN — Medication 0.7 MILLIGRAM(S): at 22:22

## 2025-05-17 RX ADMIN — DIPHENHYDRAMINE HYDROCHLORIDE AND LIDOCAINE HYDROCHLORIDE AND ALUMINUM HYDROXIDE AND MAGNESIUM HYDRO 10 MILLILITER(S): KIT at 18:00

## 2025-05-17 RX ADMIN — CEFEPIME 100 MILLIGRAM(S): 2 INJECTION, POWDER, FOR SOLUTION INTRAVENOUS at 14:55

## 2025-05-17 RX ADMIN — Medication 0.5 MILLIGRAM(S): at 18:28

## 2025-05-17 RX ADMIN — DIPHENHYDRAMINE HYDROCHLORIDE AND LIDOCAINE HYDROCHLORIDE AND ALUMINUM HYDROXIDE AND MAGNESIUM HYDRO 10 MILLILITER(S): KIT at 11:56

## 2025-05-17 RX ADMIN — Medication 63.75 MILLIMOLE(S): at 00:40

## 2025-05-17 RX ADMIN — Medication 0.5 MILLIGRAM(S): at 08:35

## 2025-05-17 RX ADMIN — Medication 0.5 MILLIGRAM(S): at 12:02

## 2025-05-17 RX ADMIN — Medication 0.7 MILLIGRAM(S): at 15:21

## 2025-05-17 RX ADMIN — Medication 500 MILLILITER(S): at 12:45

## 2025-05-17 RX ADMIN — Medication 0.5 MILLIGRAM(S): at 08:31

## 2025-05-17 RX ADMIN — CEFEPIME 100 MILLIGRAM(S): 2 INJECTION, POWDER, FOR SOLUTION INTRAVENOUS at 05:16

## 2025-05-17 RX ADMIN — Medication 100 MILLIGRAM(S): at 11:55

## 2025-05-17 RX ADMIN — DEXAMETHASONE 0.5 MILLIGRAM(S): 0.5 TABLET ORAL at 17:59

## 2025-05-17 NOTE — PROGRESS NOTE ADULT - PROBLEM SELECTOR PLAN 8
DVT ppx: OOB and ambulation as carey   PT consulted: no skilled PT need, PT signed off on Allopurinol 100 mg po qd  increase to 300 mg po qd for TLS prevention

## 2025-05-17 NOTE — PROGRESS NOTE ADULT - PROBLEM SELECTOR PLAN 3
Continue Diltiazem  mg qd and HCTZ 50 mg QD improving  pain and palliative following, pain medication adjusted  diet advanced as per pt request  cont mouth care

## 2025-05-17 NOTE — PROGRESS NOTE ADULT - PROBLEM SELECTOR PLAN 1
Newly diagnosed, admission for induction chemo which was started on 5/2  - day 14 Bmbx 5/16  - further management as per heme/onc team.

## 2025-05-17 NOTE — PROGRESS NOTE ADULT - PROBLEM SELECTOR PLAN 2
Secondary to chemotherapy. Still pain not well controlled.  - Continue IV Dilaudid 0.5 mg TID prior to meals  - Advise IV Dilaudid 0.5 mg q3 hours prn for moderate pain. Hold for oversedation, SBP< 90 lethargy and RR less than 12  -  Advise IV Dilaudid 0.7 mg q3 hours prn for severe pain.  Hold for oversedation, SBP< 90 lethargy and RR less than 12  - Holistic RN referral for non-opiate interventions   - agree with topical therapies including FIRST mouthwash, lido gel, dex solution and biotene.  - Bowel regimen while on opioids.  Monitor for constipation.  - Narcan PRN

## 2025-05-17 NOTE — PROGRESS NOTE ADULT - NS ATTEND AMEND GEN_ALL_CORE FT
Primary: Paulie    69M with ASXL1-mutated and [id number] AML. 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 Admitted for induction 7+3 - day 15.    Heme:   - Completed 7+3. Complicated by rigors, chills, and tachycardia 2 hours into day 1 infusion - likely infusion reaction.  - Trend CBC with differential daily. Goal Hgb > 7 and plt > 10  (> 15 if febrile, > 50 if bleeding).   - Trend DIC labs intermittently, as this has not been issue since admission. Continue supportive transfusions as needed to maintain fibrinogen > 100.   - Trend TLS labs daily at this point - very low risk now.   - Allopurinol continued, but for previous diagnosis of gout decreased dose to 100 mg on 5/16.  S/p rasburicase; uric acid and LDH stable.   - Continue IVF. Monitor daily weights and I/O's. Diurese PRN.   - BMBx today.    ID: Neutropenic and febrile overnight initially on 5/11-12, and persistent since then with ongoing GI symptoms. Possibly mucositis related. Cultures have been negative.   - Cefepime (5/11- ).    - CT scan from 5/14 showing no acute CT findings suspicious for infection. Mentioned GGOs in the lungs bilaterally, but stable from a previous scan.   - Prophylaxis: posaconazole, valacyclovir   - Mouth rinses, dexamethasone swish and spit for oral mucositis.     MSK: Has chronic pain from prior surgeries   - Pain control: oxycodone Primary: Nolanty    69M with ASXL1-mutated and [id number]-tuectdy AML. 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 Admitted for induction 7+3 - day 15.    Heme:   - Completed 7+3. Complicated by rigors, chills, and tachycardia 2 hours into day 1 infusion - likely infusion reaction.  - Trend CBC with differential daily. Goal Hgb > 7 and plt > 10  (> 15 if febrile, > 50 if bleeding).   - Trend DIC labs intermittently, as this has not been issue since admission. Continue supportive transfusions as needed to maintain fibrinogen > 100.   - Trend TLS labs daily at this point - very low risk now.   - Allopurinol continued, but for previous diagnosis of gout decreased dose to 100 mg on 5/16.  S/p rasburicase; uric acid and LDH stable.   - Continue IVF. Monitor daily weights and I/O's. Diurese PRN.   - BMBx done day 15 (5/16) - PENDING    ID: Neutropenic and febrile overnight initially on 5/11-12, and persistent since then with ongoing GI symptoms. Likely mucositis related. Cultures have all been negative, MRSA swab negative, GI PCR negative.   - Cefepime (5/11- ).    - CT scan from 5/14 showing no acute CT findings suspicious for infection. Mentioned GGOs in the lungs bilaterally, but stable from a previous scan.   - Prophylaxis: posaconazole, valacyclovir   - Mouth rinses, dexamethasone swish and spit for oral mucositis.   - Currently fevers are low grade and intermittent, HD stable - will hold off but if spiking high fever and with signs of sepsis, will change to meropenem.     MSK: Has chronic pain from prior surgeries   - Pain control: oxycodone Primary: Paulie    69M with ASXL1-mutated and RCF10-foszewb AML. 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 Admitted for induction 7+3 - day 15.    Heme:   - Completed 7+3. Complicated by rigors, chills, and tachycardia 2 hours into day 1 infusion - likely infusion reaction.  - Trend CBC with differential daily. Goal Hgb > 7 and plt > 50 given likely GI bleeding.   - Trend DIC labs intermittently, as this has not been issue since admission. Continue supportive transfusions as needed to maintain fibrinogen > 100.   - Trend TLS labs daily at this point - very low risk now.   - Allopurinol continued, but for previous diagnosis of gout decreased dose to 100 mg on 5/16.  S/p rasburicase; uric acid and LDH stable.   - Continue IVF. Monitor daily weights and I/O's. Diurese PRN.   - BMBx done day 15 (5/16) - PENDING    GI:  - Bloody BM 5/14-16.   - Increased platelet goal, hemoglobin now stabilizing. No acute intervention given pancytopenia but will monitor.     ID: Neutropenic and febrile overnight initially on 5/11-12, and persistent since then with ongoing GI symptoms. Likely mucositis related. Cultures have all been negative, MRSA swab negative, GI PCR negative.   - Cefepime (5/11- ).    - CT scan from 5/14 showing no acute CT findings suspicious for infection. Mentioned GGOs in the lungs bilaterally, but stable from a previous scan.   - Prophylaxis: posaconazole, valacyclovir   - Mouth rinses, dexamethasone swish and spit for oral mucositis.   - Currently fevers are low grade and intermittent, HD stable - will hold off but if spiking high fever and with signs of sepsis, will change to meropenem.     MSK: Has chronic pain from prior surgeries   - Pain control: oxycodone

## 2025-05-17 NOTE — PROGRESS NOTE ADULT - ASSESSMENT
68 yo male with newly diagnosed ASXL1, DDX41 mutated AML, admitted for induction chemo with 7+3:  dauno 60 mg/m2+ cytarabine. 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 in [place] for new ASXL1-mutated and [id number]-vykmvpi AML. Chemo started on 5/2, Complicated by rigors, chills, fever, SOB  and tachycardia 2 hours into day 1 infusion - likely infusion reaction. Pt has pancytopenia d/t chemo and disease

## 2025-05-17 NOTE — PROGRESS NOTE ADULT - ASSESSMENT
69M with HTN, HLD, Gout, GERD, BPH, spinal stenosis (s/p multiple procedures 2018, 2020 laminectomies / fusions), hip surgeries (Oct 2024, Jan 2025), residual hand weakness related to surgeries, umbilical hernia repair (2014/15) referred here from Dr Atkinson at [facility] in [place] for new ASXL1-mutated and EPO75-lurbzou AML, admitted for induction chemo with dauno 60 mg/m2+ cytarabine. Hospital course c/b neutropenic fevers, pancytopenia and mucositis pain, Geriatrics and Palliative Medicine Team is consulted for assistance with pain control

## 2025-05-17 NOTE — PROGRESS NOTE ADULT - PROBLEM SELECTOR PROBLEM 4
Quality 226: Preventive Care And Screening: Tobacco Use: Screening And Cessation Intervention: Patient screened for tobacco use and is an ex/non-smoker Detail Level: Detailed Quality 265: Biopsy Follow-Up: Biopsy results reviewed, communicated, tracked, and documented Quality 111:Pneumonia Vaccination Status For Older Adults: Pneumococcal Vaccination Administered Quality 130: Documentation Of Current Medications In The Medical Record: Current Medications Documented Advance care planning Quality 128: Preventive Care And Screening: Body Mass Index (Bmi) Screening And Follow-Up Plan: BMI is documented above normal parameters and a follow-up plan is documented Quality 431: Preventive Care And Screening: Unhealthy Alcohol Use - Screening: Patient not identified as an unhealthy alcohol user when screened for unhealthy alcohol use using a systematic screening method Quality 110: Preventive Care And Screening: Influenza Immunization: Influenza Immunization previously received during influenza season

## 2025-05-17 NOTE — PROGRESS NOTE ADULT - PROBLEM SELECTOR PLAN 3
Chronic back and neck pain with previous surgeries, no opiates used at home  - IV Dilaudid regimen as above  - Once mucositis pain resolves, consider adding oxycodone 5 mg q6 hours prn for pain control. no

## 2025-05-17 NOTE — PROGRESS NOTE ADULT - PROBLEM SELECTOR PLAN 5
Geriatrics and Palliative Medicine Team will continue to follow for pain control   Case reviewed with primary team    Linh Olguin MD   Geriatrics and Palliative Care Attending   [facility]       In the event of newly developing, evolving, or worsening symptoms, please contact the Palliative Medicine team via pager (if the patient is at [facility] #[phone] or if the patient is at [facility] #[phone]) The Geriatric and Palliative Medicine service has coverage 24 hours a day/ 7 days a week to provide medical recommendations regarding symptom management needs via telephone.

## 2025-05-17 NOTE — PROGRESS NOTE ADULT - SUBJECTIVE AND OBJECTIVE BOX
Diagnosis: ASXL1-mutated and BHK48-vyntccj AML    Protocol/Chemo Regimen: 7+3:  Daunorubicin 60 mg/m2+ Cytarabine     Day: 16    Pt endorsed:     Review of Systems:   Denies any nausea, vomiting, diarrhea, chest pain, SOB, abdominal pain, hemorrhoids    Pain scale: unable to rate                             Diet: puree  Allergies    No Known Allergies    Intolerances      ANTIMICROBIALS  cefepime   IVPB 2000 milliGRAM(s) IV Intermittent every 8 hours  cefepime   IVPB      metroNIDAZOLE  IVPB 500 milliGRAM(s) IV Intermittent every 8 hours  posaconazole DR Tablet 300 milliGRAM(s) Oral every 24 hours  posaconazole DR Tablet   Oral   valACYclovir 500 milliGRAM(s) Oral two times a day      HEME/ONC MEDICATIONS      STANDING MEDICATIONS  allopurinol 100 milliGRAM(s) Oral daily  Biotene Dry Mouth Oral Rinse 15 milliLiter(s) Swish and Spit four times a day  chlorhexidine 4% Liquid 1 Application(s) Topical <User Schedule>  cholecalciferol 2000 Unit(s) Oral daily  dexAMETHasone    Solution 0.5 milliGRAM(s) Oral two times a day  diltiazem    milliGRAM(s) Oral daily  FIRST- Mouthwash  BLM 10 milliLiter(s) Swish and Swallow four times a day  hydrochlorothiazide 50 milliGRAM(s) Oral daily  HYDROmorphone  Injectable 0.5 milliGRAM(s) IV Push <User Schedule>  lidocaine   4% Patch 1 Patch Transdermal every 24 hours  lidocaine 2% Gel 1 Application(s) Topical every 8 hours  melatonin 5 milliGRAM(s) Oral at bedtime  pantoprazole  Injectable 40 milliGRAM(s) IV Push every 12 hours  simethicone 80 milliGRAM(s) Chew every 6 hours  sodium chloride 0.9%. 1000 milliLiter(s) IV Continuous <Continuous>  tamsulosin 0.4 milliGRAM(s) Oral at bedtime      PRN MEDICATIONS  acetaminophen     Tablet .. 650 milliGRAM(s) Oral every 6 hours PRN  albuterol    90 MICROgram(s) HFA Inhaler 2 Puff(s) Inhalation every 6 hours PRN  aluminum hydroxide/magnesium hydroxide/simethicone Suspension 30 milliLiter(s) Oral every 6 hours PRN  calcium carbonate    500 mG (Tums) Chewable 1 Tablet(s) Chew four times a day PRN  cyclobenzaprine 10 milliGRAM(s) Oral three times a day PRN  hydrocortisone sodium succinate Injectable 100 milliGRAM(s) IV Push every 8 hours PRN  HYDROmorphone  Injectable 0.5 milliGRAM(s) IV Push every 3 hours PRN  metoclopramide Injectable 10 milliGRAM(s) IV Push every 6 hours PRN  ondansetron Injectable 8 milliGRAM(s) IV Push every 8 hours PRN  polyethylene glycol 3350 17 Gram(s) Oral two times a day PRN  sodium chloride 0.9% lock flush 10 milliLiter(s) IV Push every 1 hour PRN        Vital Signs Last 24 Hrs  T(C): 36.8 (17 May 2025 05:03), Max: 38.1 (16 May 2025 21:06)  T(F): 98.2 (17 May 2025 05:03), Max: 100.6 (16 May 2025 21:06)  HR: 74 (17 May 2025 05:03) (71 - 102)  BP: 102/63 (17 May 2025 05:03) (102/63 - 133/84)  BP(mean): --  RR: 18 (17 May 2025 05:03) (16 - 18)  SpO2: 95% (17 May 2025 05:03) (93% - 98%)    Parameters below as of 17 May 2025 05:03  Patient On (Oxygen Delivery Method): room air        PHYSICAL EXAM  General: NAD  HEENT: PERRLA, EOMOI, clear oropharynx, anicteric sclera, pink conjunctiva  Neck: supple  CV: (+) S1/S2 RRR  Lungs: clear to auscultation, no wheezes or rales  Abdomen: soft, non-tender, non-distended (+) BS  Ext: no clubbing, cyanosis or edema  Skin: no rashes and no petechiae  Neuro: alert and oriented X 3, no focal deficits  Central Line: TLC c/d/i     RECENT CULTURES:  05-14 @ 09:15  Blood Blood-Peripheral  No growth at 48 Hours  --  05-14 @ 06:50  Blood Blood-Peripheral  No growth at 48 Hours  --  05-14 @ 06:25  Clean Catch Clean Catch (Midstream)  No growth  --  LABS:                        8.4    0.41  )-----------( 52       ( 17 May 2025 06:33 )             23.9         Mean Cell Volume : 88.8 fl  Mean Cell Hemoglobin : 31.2 pg  Mean Cell Hemoglobin Concentration : 35.1 g/dL  Auto Neutrophil # : x  Auto Lymphocyte # : x  Auto Monocyte # : x  Auto Eosinophil # : x  Auto Basophil # : x  Auto Neutrophil % : x  Auto Lymphocyte % : x  Auto Monocyte % : x  Auto Eosinophil % : x  Auto Basophil % : x      05-17    136  |  103  |  17  ----------------------------<  136[H]  4.0   |  21[L]  |  1.10    Ca    8.4      17 May 2025 06:33  Phos  4.4     05-17  Mg     2.1     05-17    TPro  5.5[L]  /  Alb  3.2[L]  /  TBili  0.5  /  DBili  x   /  AST  14  /  ALT  12  /  AlkPhos  67  05-17      Mg 2.1  Phos 4.4  Mg 2.4  Phos 2.0      PT/INR - ( 16 May 2025 06:24 )   PT: 14.5 sec;   INR: 1.26 ratio         PTT - ( 16 May 2025 06:24 )  PTT:25.4 sec      Uric Acid 4.1      Uric Acid 4.0        RADIOLOGY & ADDITIONAL STUDIES:         Diagnosis: ASXL1-mutated and ALY91-unszqah AML    Protocol/Chemo Regimen: 7+3:  Daunorubicin 60 mg/m2+ Cytarabine     Day: 16    Pt endorsed: dizziness with ambulation denies rectal bleeding, requested advancing diet     Review of Systems:   Denies any nausea, vomiting, diarrhea, chest pain, SOB, abdominal pain, hemorrhoids    Pain scale: 0                       Diet: regular   Allergies    No Known Allergies    Intolerances      ANTIMICROBIALS  cefepime   IVPB 2000 milliGRAM(s) IV Intermittent every 8 hours  cefepime   IVPB      metroNIDAZOLE  IVPB 500 milliGRAM(s) IV Intermittent every 8 hours  posaconazole DR Tablet 300 milliGRAM(s) Oral every 24 hours  posaconazole DR Tablet   Oral   valACYclovir 500 milliGRAM(s) Oral two times a day      HEME/ONC MEDICATIONS      STANDING MEDICATIONS  allopurinol 100 milliGRAM(s) Oral daily  Biotene Dry Mouth Oral Rinse 15 milliLiter(s) Swish and Spit four times a day  chlorhexidine 4% Liquid 1 Application(s) Topical <User Schedule>  cholecalciferol 2000 Unit(s) Oral daily  dexAMETHasone    Solution 0.5 milliGRAM(s) Oral two times a day  diltiazem    milliGRAM(s) Oral daily  FIRST- Mouthwash  BLM 10 milliLiter(s) Swish and Swallow four times a day  hydrochlorothiazide 50 milliGRAM(s) Oral daily  HYDROmorphone  Injectable 0.5 milliGRAM(s) IV Push <User Schedule>  lidocaine   4% Patch 1 Patch Transdermal every 24 hours  lidocaine 2% Gel 1 Application(s) Topical every 8 hours  melatonin 5 milliGRAM(s) Oral at bedtime  pantoprazole  Injectable 40 milliGRAM(s) IV Push every 12 hours  simethicone 80 milliGRAM(s) Chew every 6 hours  sodium chloride 0.9%. 1000 milliLiter(s) IV Continuous <Continuous>  tamsulosin 0.4 milliGRAM(s) Oral at bedtime      PRN MEDICATIONS  acetaminophen     Tablet .. 650 milliGRAM(s) Oral every 6 hours PRN  albuterol    90 MICROgram(s) HFA Inhaler 2 Puff(s) Inhalation every 6 hours PRN  aluminum hydroxide/magnesium hydroxide/simethicone Suspension 30 milliLiter(s) Oral every 6 hours PRN  calcium carbonate    500 mG (Tums) Chewable 1 Tablet(s) Chew four times a day PRN  cyclobenzaprine 10 milliGRAM(s) Oral three times a day PRN  hydrocortisone sodium succinate Injectable 100 milliGRAM(s) IV Push every 8 hours PRN  HYDROmorphone  Injectable 0.5 milliGRAM(s) IV Push every 3 hours PRN  metoclopramide Injectable 10 milliGRAM(s) IV Push every 6 hours PRN  ondansetron Injectable 8 milliGRAM(s) IV Push every 8 hours PRN  polyethylene glycol 3350 17 Gram(s) Oral two times a day PRN  sodium chloride 0.9% lock flush 10 milliLiter(s) IV Push every 1 hour PRN        Vital Signs Last 24 Hrs  T(C): 36.8 (17 May 2025 05:03), Max: 38.1 (16 May 2025 21:06)  T(F): 98.2 (17 May 2025 05:03), Max: 100.6 (16 May 2025 21:06)  HR: 74 (17 May 2025 05:03) (71 - 102)  BP: 102/63 (17 May 2025 05:03) (102/63 - 133/84)  BP(mean): --  RR: 18 (17 May 2025 05:03) (16 - 18)  SpO2: 95% (17 May 2025 05:03) (93% - 98%)    Parameters below as of 17 May 2025 05:03  Patient On (Oxygen Delivery Method): room air        PHYSICAL EXAM  General: NAD  HEENT: PERRLA, EOMOI, clear oropharynx, anicteric sclera, pink conjunctiva  Neck: supple  CV: (+) S1/S2 RRR  Lungs: clear to auscultation, no wheezes or rales  Abdomen: soft, non-tender, non-distended (+) BS  Ext: no clubbing, cyanosis or edema  Skin: no rashes and no petechiae  Neuro: alert and oriented X 3, no focal deficits  Central Line: TLC c/d/i     RECENT CULTURES:  05-14 @ 09:15  Blood Blood-Peripheral  No growth at 48 Hours  --  05-14 @ 06:50  Blood Blood-Peripheral  No growth at 48 Hours  --  05-14 @ 06:25  Clean Catch Clean Catch (Midstream)  No growth  --  LABS:                        8.4    0.41  )-----------( 52       ( 17 May 2025 06:33 )             23.9         Mean Cell Volume : 88.8 fl  Mean Cell Hemoglobin : 31.2 pg  Mean Cell Hemoglobin Concentration : 35.1 g/dL  Auto Neutrophil # : x  Auto Lymphocyte # : x  Auto Monocyte # : x  Auto Eosinophil # : x  Auto Basophil # : x  Auto Neutrophil % : x  Auto Lymphocyte % : x  Auto Monocyte % : x  Auto Eosinophil % : x  Auto Basophil % : x      05-17    136  |  103  |  17  ----------------------------<  136[H]  4.0   |  21[L]  |  1.10    Ca    8.4      17 May 2025 06:33  Phos  4.4     05-17  Mg     2.1     05-17    TPro  5.5[L]  /  Alb  3.2[L]  /  TBili  0.5  /  DBili  x   /  AST  14  /  ALT  12  /  AlkPhos  67  05-17      Mg 2.1  Phos 4.4  Mg 2.4  Phos 2.0      PT/INR - ( 16 May 2025 06:24 )   PT: 14.5 sec;   INR: 1.26 ratio         PTT - ( 16 May 2025 06:24 )  PTT:25.4 sec      Uric Acid 4.1      Uric Acid 4.0

## 2025-05-17 NOTE — PROGRESS NOTE ADULT - PROBLEM SELECTOR PLAN 4
Pt would want his ex wife [place] and his children to be his healthcare surrogates in event he loses capacity  - Code status FULL.

## 2025-05-17 NOTE — PROGRESS NOTE ADULT - PROBLEM SELECTOR PLAN 1
new ASXL1-mutated and ERO59-hwnqnam AML.  admitted for induction chemo with dauno 60 mg/m2+ cytarabine. Chemo started on 5/2, Complicated by rigors, chills, fever, SOB  and tachycardia 2 hours into day 1 infusion - likely infusion. recevied pepcid/benadryl/solumedrol/tylenol iv. will make up D1 Cytarabine on day8    will proceed with day2 chemo with pre mdes Tylenol and Benadryl, hydrocortisone iv PRN reaction   Hepatitis screen/HIV(-) as outpatient  IVF, antiemetics, mouth care. monitor weight, I & O, diuresis PRN   Monitor CBC with diff QD, TLS labs BID, Coags & T & C TIW transfuse for hb<7, PLT <10  5/5 HLA sent  5/16 BMbx done, f/u  5/16 1u pRBC for goal >8 d/t GIB; 1u plts for goal >50K (GIB)  KCl repletion for hypokalemia; mg sulfate x1 for hypomagnesemia new ASXL1-mutated and VAX85-ibgefyr AML.  admitted for induction chemo with dauno 60 mg/m2+ cytarabine. Chemo started on 5/2, Complicated by rigors, chills, fever, SOB  and tachycardia 2 hours into day 1 infusion - likely infusion. recevied pepcid/benadryl/solumedrol/tylenol iv. will make up D1 Cytarabine on day8    will proceed with day2 chemo with pre mdes Tylenol and Benadryl, hydrocortisone iv PRN reaction   Hepatitis screen/HIV(-) as outpatient  IVF, antiemetics, mouth care. monitor weight, I & O, diuresis PRN   Monitor CBC with diff QD, TLS labs BID, Coags & T & C TIW transfuse for hb<7, PLT <10  5/5 HLA sent  5/16 BMbx done, f/u  5/17: 1u plts given for plt goal >50 due to GIB cbc q8 changed q12 as GIB resolving, 500cc IV NS given for orthostasis, inc standing IVF to 75cc/hr

## 2025-05-17 NOTE — PROGRESS NOTE ADULT - SUBJECTIVE AND OBJECTIVE BOX
SUBJECTIVE AND OBJECTIVE:  Indication for Geriatrics and Palliative Care Services/INTERVAL HPI: pain management     OVERNIGHT EVENTS:  The patient was seen and examined at bedside, report that the current dose of IV Dilaudid provides only partial pain relief, lasting less than two hours.  Required IV Dilaudid 0.4 mg x4 within 24hrs period 8am-8am     DNR on chart:  Allergies    No Known Allergies    Intolerances    MEDICATIONS  (STANDING):  allopurinol 100 milliGRAM(s) Oral daily  Biotene Dry Mouth Oral Rinse 15 milliLiter(s) Swish and Spit four times a day  cefepime   IVPB 2000 milliGRAM(s) IV Intermittent every 8 hours  cefepime   IVPB      chlorhexidine 4% Liquid 1 Application(s) Topical <User Schedule>  cholecalciferol 2000 Unit(s) Oral daily  dexAMETHasone    Solution 0.5 milliGRAM(s) Oral two times a day  diltiazem    milliGRAM(s) Oral daily  FIRST- Mouthwash  BLM 10 milliLiter(s) Swish and Swallow four times a day  hydrochlorothiazide 50 milliGRAM(s) Oral daily  HYDROmorphone  Injectable 0.5 milliGRAM(s) IV Push <User Schedule>  lidocaine   4% Patch 1 Patch Transdermal every 24 hours  lidocaine 2% Gel 1 Application(s) Topical every 8 hours  melatonin 5 milliGRAM(s) Oral at bedtime  metroNIDAZOLE  IVPB 500 milliGRAM(s) IV Intermittent every 8 hours  pantoprazole  Injectable 40 milliGRAM(s) IV Push every 12 hours  posaconazole DR Tablet 300 milliGRAM(s) Oral every 24 hours  posaconazole DR Tablet   Oral   simethicone 80 milliGRAM(s) Chew every 6 hours  sodium chloride 0.9% Bolus 500 milliLiter(s) IV Bolus once  sodium chloride 0.9%. 1000 milliLiter(s) (50 mL/Hr) IV Continuous <Continuous>  tamsulosin 0.4 milliGRAM(s) Oral at bedtime  valACYclovir 500 milliGRAM(s) Oral two times a day    MEDICATIONS  (PRN):  acetaminophen     Tablet .. 650 milliGRAM(s) Oral every 6 hours PRN Temp greater or equal to 38C (100.4F), Mild Pain (1 - 3)  albuterol    90 MICROgram(s) HFA Inhaler 2 Puff(s) Inhalation every 6 hours PRN Shortness of Breath and/or Wheezing  aluminum hydroxide/magnesium hydroxide/simethicone Suspension 30 milliLiter(s) Oral every 6 hours PRN Dyspepsia  calcium carbonate    500 mG (Tums) Chewable 1 Tablet(s) Chew four times a day PRN Dyspepsia  cyclobenzaprine 10 milliGRAM(s) Oral three times a day PRN Muscle Spasm  hydrocortisone sodium succinate Injectable 100 milliGRAM(s) IV Push every 8 hours PRN chemotherapy reaction  HYDROmorphone  Injectable 0.7 milliGRAM(s) IV Push every 3 hours PRN Severe Pain (7 - 10)  HYDROmorphone  Injectable 0.5 milliGRAM(s) IV Push every 3 hours PRN Moderate Pain (4 - 6)  metoclopramide Injectable 10 milliGRAM(s) IV Push every 6 hours PRN nausea/vomiting  ondansetron Injectable 8 milliGRAM(s) IV Push every 8 hours PRN Nausea and/or Vomiting  polyethylene glycol 3350 17 Gram(s) Oral two times a day PRN Constipation  sodium chloride 0.9% lock flush 10 milliLiter(s) IV Push every 1 hour PRN Pre/post blood products, medications, blood draw, and to maintain line patency      ITEMS UNCHECKED ARE NOT PRESENT    PRESENT SYMPTOMS: [ ]Unable to self-report - see  CPOT, PAINADS, RDOS below  Source if other than patient:  [ ]Family   [ ]Team     Pain: [ x]yes [ ]no  QOL impact - discomfort over the course of the day, unable to chew  Location -  mouth, neck and back            Aggravating factors - eating, lying still   Quality - burning   Radiation - none  Timing- constant   Severity (0-10 scale): 8 to 9  Minimal acceptable level (0-10 scale):       Dyspnea:                           [ ]Mild [ ]Moderate [ ]Severe  Anxiety:                             [ ]Mild [ ]Moderate [ ]Severe  Fatigue:                             [ ]Mild [ ]Moderate [ ]Severe  Nausea:                             [ ]Mild [ ]Moderate [ ]Severe  Loss of appetite:              [ ]Mild [ ]Moderate [ ]Severe  Constipation:                    [ ]Mild [ ]Moderate [ ]Severe    PCSSQ[Palliative Care Spiritual Screening Question]   Severity (0-10):  Score of 4 or > indicate consideration of Chaplaincy referral.  Chaplaincy Referral: [ ] yes [x ] refused [ ] following    Caregiver [place]? : [ ] yes [ ] no  [ x] deferred:  Social work referral [ ] Patient & Family Centered Care Referral [ ]     Anticipatory Grief present?:  [ ] yes [ ] no  [x ] deferred: Social work referral [ ] Patient & Family Centered Care Referral [ ]      Other Symptoms:  [ x]All other review of systems negative     PHYSICAL EXAM:  Vital Signs Last 24 Hrs  T(C): 38 (17 May 2025 09:41), Max: 38.1 (16 May 2025 21:06)  T(F): 100.4 (17 May 2025 09:41), Max: 100.6 (16 May 2025 21:06)  HR: 86 (17 May 2025 09:41) (71 - 102)  BP: 121/69 (17 May 2025 09:41) (102/63 - 133/84)  BP(mean): --  RR: 18 (17 May 2025 09:41) (16 - 18)  SpO2: 96% (17 May 2025 09:41) (93% - 98%)    Parameters below as of 17 May 2025 09:41  Patient On (Oxygen Delivery Method): room air     I&O's Summary    16 May 2025 07:01  -  17 May 2025 07:00  --------------------------------------------------------  IN: 3905 mL / OUT: 1600 mL / NET: 2305 mL       GENERAL:   [ ]Cachexia    [x ]Alert  [ x]Oriented x 3  [ ]Lethargic  [ ]Unarousable  [ x]Verbal  [ ]Non-Verbal  Behavioral:   [ ] Anxiety  [ ] Delirium [ ] Agitation [ ] Other  HEENT:  [ ]Normal   [ ]Dry mouth   [ ]ET Tube/Trach  [ ]Oral lesions [x] ulcer noted on L lateral tongue  PULMONARY:   [x ]Clear [ ]Tachypnea  [ ]Audible excessive secretions   [ ]Rhonchi        [ ]Right [ ]Left [ ]Bilateral  [ ]Crackles        [ ]Right [ ]Left [ ]Bilateral  [ ]Wheezing     [ ]Right [ ]Left [ ]Bilateral  [ ]Diminished breath sounds [ ]right [ ]left [ ]bilateral  CARDIOVASCULAR:    [ x]Regular [ ]Irregular [ ]Tachy  [ ]Lui [ ]Murmur [ ]Other  GASTROINTESTINAL:  [x ]Soft  [ ]Distended   [ ]+BS  [x ]Non tender [ ]Tender  [ ]Other [ ]PEG [ ]OGT/ NGT  Last BM:  GENITOURINARY:  [x ]Normal [ ] Incontinent   [ ]Oliguria/Anuria   [ ]Villalobos  MUSCULOSKELETAL:   [x ]Normal   [ ]Weakness  [ ]Bed/Wheelchair bound [ ]Edema  NEUROLOGIC:   [ x]No focal deficits  [ ]Cognitive impairment  [ ]Dysphagia [ ]Dysarthria [ ]Paresis [ ]Other   SKIN:   [ ]Normal  [ ]Rash  [ x]Other tattoos on BUE, bruising throughout  [ ]Pressure ulcer(s)       Present on admission [ ]y [ ]n    CRITICAL CARE:  [ ] Shock Present  [ ]Septic [ ]Cardiogenic [ ]Neurologic [ ]Hypovolemic  [ ]  Vasopressors [ ]  Inotropes   [ ]Respiratory failure present [ ]Mechanical ventilation [ ]Non-invasive ventilatory support [ ]High flow    [ ]Acute  [ ]Chronic [ ]Hypoxic  [ ]Hypercarbic [ ]Other  [ ]Other organ failure       LABS:                        8.4    0.41  )-----------( 52       ( 17 May 2025 06:33 )             23.9   05-17    136  |  103  |  17  ----------------------------<  136[H]  4.0   |  21[L]  |  1.10    Ca    8.4      17 May 2025 06:33  Phos  4.4     05-17  Mg     2.1     05-17    TPro  5.5[L]  /  Alb  3.2[L]  /  TBili  0.5  /  DBili  x   /  AST  14  /  ALT  12  /  AlkPhos  67  05-17  PT/INR - ( 16 May 2025 06:24 )   PT: 14.5 sec;   INR: 1.26 ratio         PTT - ( 16 May 2025 06:24 )  PTT:25.4 sec    Urinalysis Basic - ( 17 May 2025 06:33 )    Color: x / Appearance: x / SG: x / pH: x  Gluc: 136 mg/dL / Ketone: x  / Bili: x / Urobili: x   Blood: x / Protein: x / Nitrite: x   Leuk Esterase: x / RBC: x / WBC x   Sq Epi: x / Non Sq Epi: x / Bacteria: x      RADIOLOGY & ADDITIONAL STUDIES:    Protein Calorie Malnutrition Present: [ ]mild [ ]moderate [ ]severe [ ]underweight [ ]morbid obesity  https://www.andeal.org/vault/2440/web/files/ONC/Table_Clinical%20Characteristics%20to%20Document%20Malnutrition-White%20JV%20et%20al%202012.pdf    Height (cm): 181 (05-02-25 @ 08:51), 180.5 (04-28-25 @ 15:00)  Weight (kg): 107.3 (05-02-25 @ 11:55), 108.5 (04-28-25 @ 15:00)  BMI (kg/m2): 32.8 (05-02-25 @ 11:55), 33.1 (05-02-25 @ 08:51), 33.3 (04-28-25 @ 15:00)    [ ]PPSV2 < or = 30%  [ ]significant weight loss [ ]poor nutritional intake [ ]anasarca[ ]Artificial Nutrition    Other REFERRALS:  [ ]Hospice  [ ]Child Life  [ ]Social Work  [ ]Case management [ ]Holistic Therapy     Palliative Performance Scale:  http://npcrc.org/files/news/palliative_performance_scale_ppsv2.pdf  (Ctrl +  left click to view)  Respiratory Distress Observation Tool:  https://homecareinformation.net/handouts/hen/Respiratory_Distress_Observation_Scale.pdf (Ctrl +  left click to view)  PAINAD Score:  http://geriatrictoolkit.[facility]/cog/painad.pdf (Ctrl +  left click to view)

## 2025-05-18 DIAGNOSIS — I95.1 ORTHOSTATIC HYPOTENSION: ICD-10-CM

## 2025-05-18 LAB
ALBUMIN SERPL ELPH-MCNC: 3.5 G/DL — SIGNIFICANT CHANGE UP (ref 3.3–5)
ALP SERPL-CCNC: 70 U/L — SIGNIFICANT CHANGE UP (ref 40–120)
ALT FLD-CCNC: 14 U/L — SIGNIFICANT CHANGE UP (ref 10–45)
ANION GAP SERPL CALC-SCNC: 12 MMOL/L — SIGNIFICANT CHANGE UP (ref 5–17)
APPEARANCE UR: CLEAR — SIGNIFICANT CHANGE UP
AST SERPL-CCNC: 14 U/L — SIGNIFICANT CHANGE UP (ref 10–40)
BILIRUB SERPL-MCNC: 0.5 MG/DL — SIGNIFICANT CHANGE UP (ref 0.2–1.2)
BILIRUB UR-MCNC: NEGATIVE — SIGNIFICANT CHANGE UP
BLD GP AB SCN SERPL QL: NEGATIVE — SIGNIFICANT CHANGE UP
BUN SERPL-MCNC: 16 MG/DL — SIGNIFICANT CHANGE UP (ref 7–23)
CALCIUM SERPL-MCNC: 8.4 MG/DL — SIGNIFICANT CHANGE UP (ref 8.4–10.5)
CHLORIDE SERPL-SCNC: 101 MMOL/L — SIGNIFICANT CHANGE UP (ref 96–108)
CO2 SERPL-SCNC: 22 MMOL/L — SIGNIFICANT CHANGE UP (ref 22–31)
COLOR SPEC: YELLOW — SIGNIFICANT CHANGE UP
CREAT SERPL-MCNC: 1.21 MG/DL — SIGNIFICANT CHANGE UP (ref 0.5–1.3)
DIFF PNL FLD: NEGATIVE — SIGNIFICANT CHANGE UP
EGFR: 65 ML/MIN/1.73M2 — SIGNIFICANT CHANGE UP
EGFR: 65 ML/MIN/1.73M2 — SIGNIFICANT CHANGE UP
GLUCOSE SERPL-MCNC: 113 MG/DL — HIGH (ref 70–99)
GLUCOSE UR QL: NEGATIVE MG/DL — SIGNIFICANT CHANGE UP
HCT VFR BLD CALC: 23.5 % — LOW (ref 39–50)
HCT VFR BLD CALC: 24.1 % — LOW (ref 39–50)
HGB BLD-MCNC: 8.3 G/DL — LOW (ref 13–17)
HGB BLD-MCNC: 8.5 G/DL — LOW (ref 13–17)
KETONES UR QL: NEGATIVE MG/DL — SIGNIFICANT CHANGE UP
LDH SERPL L TO P-CCNC: 195 U/L — SIGNIFICANT CHANGE UP (ref 50–242)
LEUKOCYTE ESTERASE UR-ACNC: NEGATIVE — SIGNIFICANT CHANGE UP
MAGNESIUM SERPL-MCNC: 1.9 MG/DL — SIGNIFICANT CHANGE UP (ref 1.6–2.6)
MCHC RBC-ENTMCNC: 31.3 PG — SIGNIFICANT CHANGE UP (ref 27–34)
MCHC RBC-ENTMCNC: 31.5 PG — SIGNIFICANT CHANGE UP (ref 27–34)
MCHC RBC-ENTMCNC: 35.3 G/DL — SIGNIFICANT CHANGE UP (ref 32–36)
MCHC RBC-ENTMCNC: 35.3 G/DL — SIGNIFICANT CHANGE UP (ref 32–36)
MCV RBC AUTO: 88.7 FL — SIGNIFICANT CHANGE UP (ref 80–100)
MCV RBC AUTO: 89.3 FL — SIGNIFICANT CHANGE UP (ref 80–100)
NITRITE UR-MCNC: NEGATIVE — SIGNIFICANT CHANGE UP
NRBC BLD AUTO-RTO: 0 /100 WBCS — SIGNIFICANT CHANGE UP (ref 0–0)
NRBC BLD AUTO-RTO: 0 /100 WBCS — SIGNIFICANT CHANGE UP (ref 0–0)
PH UR: 6.5 — SIGNIFICANT CHANGE UP (ref 5–8)
PHOSPHATE SERPL-MCNC: 3.8 MG/DL — SIGNIFICANT CHANGE UP (ref 2.5–4.5)
PLATELET # BLD AUTO: 46 K/UL — LOW (ref 150–400)
PLATELET # BLD AUTO: 60 K/UL — LOW (ref 150–400)
POTASSIUM SERPL-MCNC: 3.8 MMOL/L — SIGNIFICANT CHANGE UP (ref 3.5–5.3)
POTASSIUM SERPL-SCNC: 3.8 MMOL/L — SIGNIFICANT CHANGE UP (ref 3.5–5.3)
PROT SERPL-MCNC: 5.7 G/DL — LOW (ref 6–8.3)
PROT UR-MCNC: NEGATIVE MG/DL — SIGNIFICANT CHANGE UP
RBC # BLD: 2.65 M/UL — LOW (ref 4.2–5.8)
RBC # BLD: 2.7 M/UL — LOW (ref 4.2–5.8)
RBC # FLD: 13.7 % — SIGNIFICANT CHANGE UP (ref 10.3–14.5)
RBC # FLD: 14.2 % — SIGNIFICANT CHANGE UP (ref 10.3–14.5)
RH IG SCN BLD-IMP: POSITIVE — SIGNIFICANT CHANGE UP
SODIUM SERPL-SCNC: 135 MMOL/L — SIGNIFICANT CHANGE UP (ref 135–145)
SP GR SPEC: 1.01 — SIGNIFICANT CHANGE UP (ref 1–1.03)
URATE SERPL-MCNC: 4.1 MG/DL — SIGNIFICANT CHANGE UP (ref 3.4–8.8)
UROBILINOGEN FLD QL: 0.2 MG/DL — SIGNIFICANT CHANGE UP (ref 0.2–1)
WBC # BLD: 0.46 K/UL — CRITICAL LOW (ref 3.8–10.5)
WBC # BLD: 0.47 K/UL — CRITICAL LOW (ref 3.8–10.5)
WBC # FLD AUTO: 0.46 K/UL — CRITICAL LOW (ref 3.8–10.5)
WBC # FLD AUTO: 0.47 K/UL — CRITICAL LOW (ref 3.8–10.5)

## 2025-05-18 PROCEDURE — 71045 X-RAY EXAM CHEST 1 VIEW: CPT | Mod: 26

## 2025-05-18 PROCEDURE — 99233 SBSQ HOSP IP/OBS HIGH 50: CPT

## 2025-05-18 RX ORDER — MIDODRINE HYDROCHLORIDE 5 MG/1
10 TABLET ORAL EVERY 8 HOURS
Refills: 0 | Status: DISCONTINUED | OUTPATIENT
Start: 2025-05-18 | End: 2025-05-30

## 2025-05-18 RX ADMIN — Medication 0.7 MILLIGRAM(S): at 04:39

## 2025-05-18 RX ADMIN — Medication 100 MILLIGRAM(S): at 05:52

## 2025-05-18 RX ADMIN — TAMSULOSIN HYDROCHLORIDE 0.4 MILLIGRAM(S): 0.4 CAPSULE ORAL at 22:09

## 2025-05-18 RX ADMIN — Medication 250 MILLILITER(S): at 10:02

## 2025-05-18 RX ADMIN — Medication 650 MILLIGRAM(S): at 17:50

## 2025-05-18 RX ADMIN — Medication 0.7 MILLIGRAM(S): at 02:39

## 2025-05-18 RX ADMIN — Medication 0.7 MILLIGRAM(S): at 01:39

## 2025-05-18 RX ADMIN — Medication 75 MILLILITER(S): at 10:02

## 2025-05-18 RX ADMIN — Medication 0.7 MILLIGRAM(S): at 05:09

## 2025-05-18 RX ADMIN — LIDOCAINE HYDROCHLORIDE 1 APPLICATION(S): 20 JELLY TOPICAL at 05:49

## 2025-05-18 RX ADMIN — Medication 1 APPLICATION(S): at 08:35

## 2025-05-18 RX ADMIN — CEFEPIME 100 MILLIGRAM(S): 2 INJECTION, POWDER, FOR SOLUTION INTRAVENOUS at 21:17

## 2025-05-18 RX ADMIN — DEXAMETHASONE 0.5 MILLIGRAM(S): 0.5 TABLET ORAL at 17:26

## 2025-05-18 RX ADMIN — Medication 0.5 MILLIGRAM(S): at 11:09

## 2025-05-18 RX ADMIN — DILTIAZEM HYDROCHLORIDE 240 MILLIGRAM(S): 240 TABLET, EXTENDED RELEASE ORAL at 05:46

## 2025-05-18 RX ADMIN — Medication 15 MILLILITER(S): at 17:26

## 2025-05-18 RX ADMIN — Medication 2000 UNIT(S): at 11:09

## 2025-05-18 RX ADMIN — Medication 100 MILLIGRAM(S): at 22:10

## 2025-05-18 RX ADMIN — Medication 15 MILLILITER(S): at 05:49

## 2025-05-18 RX ADMIN — Medication 0.5 MILLIGRAM(S): at 08:35

## 2025-05-18 RX ADMIN — CEFEPIME 100 MILLIGRAM(S): 2 INJECTION, POWDER, FOR SOLUTION INTRAVENOUS at 05:46

## 2025-05-18 RX ADMIN — Medication 0.5 MILLIGRAM(S): at 00:51

## 2025-05-18 RX ADMIN — Medication 15 MILLILITER(S): at 00:21

## 2025-05-18 RX ADMIN — Medication 0.7 MILLIGRAM(S): at 19:58

## 2025-05-18 RX ADMIN — Medication 650 MILLIGRAM(S): at 18:56

## 2025-05-18 RX ADMIN — Medication 500 MILLIGRAM(S): at 05:46

## 2025-05-18 RX ADMIN — Medication 15 MILLILITER(S): at 11:08

## 2025-05-18 RX ADMIN — POSACONAZOLE 300 MILLIGRAM(S): 100 TABLET, DELAYED RELEASE ORAL at 17:25

## 2025-05-18 RX ADMIN — DIPHENHYDRAMINE HYDROCHLORIDE AND LIDOCAINE HYDROCHLORIDE AND ALUMINUM HYDROXIDE AND MAGNESIUM HYDRO 10 MILLILITER(S): KIT at 00:21

## 2025-05-18 RX ADMIN — DIPHENHYDRAMINE HYDROCHLORIDE AND LIDOCAINE HYDROCHLORIDE AND ALUMINUM HYDROXIDE AND MAGNESIUM HYDRO 10 MILLILITER(S): KIT at 17:25

## 2025-05-18 RX ADMIN — Medication 80 MILLIGRAM(S): at 17:25

## 2025-05-18 RX ADMIN — Medication 80 MILLIGRAM(S): at 05:46

## 2025-05-18 RX ADMIN — DEXAMETHASONE 0.5 MILLIGRAM(S): 0.5 TABLET ORAL at 06:46

## 2025-05-18 RX ADMIN — Medication 80 MILLIGRAM(S): at 00:21

## 2025-05-18 RX ADMIN — Medication 100 MILLIGRAM(S): at 14:54

## 2025-05-18 RX ADMIN — Medication 500 MILLIGRAM(S): at 17:25

## 2025-05-18 RX ADMIN — CEFEPIME 100 MILLIGRAM(S): 2 INJECTION, POWDER, FOR SOLUTION INTRAVENOUS at 13:48

## 2025-05-18 RX ADMIN — DIPHENHYDRAMINE HYDROCHLORIDE AND LIDOCAINE HYDROCHLORIDE AND ALUMINUM HYDROXIDE AND MAGNESIUM HYDRO 10 MILLILITER(S): KIT at 05:49

## 2025-05-18 RX ADMIN — Medication 0.5 MILLIGRAM(S): at 16:02

## 2025-05-18 RX ADMIN — Medication 5 MILLIGRAM(S): at 22:09

## 2025-05-18 RX ADMIN — Medication 40 MILLIGRAM(S): at 17:26

## 2025-05-18 RX ADMIN — Medication 0.7 MILLIGRAM(S): at 20:25

## 2025-05-18 RX ADMIN — Medication 100 MILLIGRAM(S): at 11:08

## 2025-05-18 RX ADMIN — Medication 5 MILLIGRAM(S): at 00:22

## 2025-05-18 RX ADMIN — DIPHENHYDRAMINE HYDROCHLORIDE AND LIDOCAINE HYDROCHLORIDE AND ALUMINUM HYDROXIDE AND MAGNESIUM HYDRO 10 MILLILITER(S): KIT at 11:08

## 2025-05-18 RX ADMIN — Medication 80 MILLIGRAM(S): at 11:09

## 2025-05-18 RX ADMIN — LIDOCAINE HYDROCHLORIDE 1 APPLICATION(S): 20 JELLY TOPICAL at 13:48

## 2025-05-18 RX ADMIN — Medication 40 MILLIGRAM(S): at 05:46

## 2025-05-18 RX ADMIN — LIDOCAINE HYDROCHLORIDE 1 APPLICATION(S): 20 JELLY TOPICAL at 22:21

## 2025-05-18 NOTE — PROGRESS NOTE ADULT - PROBLEM SELECTOR PLAN 1
new ASXL1-mutated and BUC69-ixzcwgf AML.  admitted for induction chemo with dauno 60 mg/m2+ cytarabine. Chemo started on 5/2, Complicated by rigors, chills, fever, SOB  and tachycardia 2 hours into day 1 infusion - likely infusion. recevied pepcid/benadryl/solumedrol/tylenol iv. will make up D1 Cytarabine on day8    will proceed with day2 chemo with pre mdes Tylenol and Benadryl, hydrocortisone iv PRN reaction   Hepatitis screen/HIV(-) as outpatient  IVF, antiemetics, mouth care. monitor weight, I & O, diuresis PRN   Monitor CBC with diff QD, TLS labs BID, Coags & T & C TIW transfuse for hb<7, PLT <10  5/5 HLA sent  5/16 BMbx done, f/u  5/17: 1u plts given for plt goal >50 due to GIB cbc q8 changed q12 as GIB resolving, 500cc IV NS given for orthostasis, inc standing IVF to 75cc/hr new ASXL1-mutated and RIK76-jqryvps AML.  admitted for induction chemo with dauno 60 mg/m2+ cytarabine. Chemo started on 5/2, Complicated by rigors, chills, fever, SOB  and tachycardia 2 hours into day 1 infusion - likely infusion. recevied pepcid/benadryl/solumedrol/tylenol iv. will make up D1 Cytarabine on day8    will proceed with day2 chemo with pre mdes Tylenol and Benadryl, hydrocortisone iv PRN reaction   Hepatitis screen/HIV(-) as outpatient  IVF, antiemetics, mouth care. monitor weight, I & O, diuresis PRN   Monitor CBC with diff QD, TLS labs BID, Coags & T & C TIW transfuse for hb<7, PLT <10  5/5 HLA sent  5/16 BMbx done, f/u  5/18- Monitor CBC Q12hrs

## 2025-05-18 NOTE — PROGRESS NOTE ADULT - PROBLEM SELECTOR PLAN 9
DVT ppx: OOB and ambulation as carey   PT consulted: no skilled PT need, PT signed off 5/18- Orthostatic hypotension,  cc bolus given.

## 2025-05-18 NOTE — CHART NOTE - NSCHARTNOTEFT_GEN_A_CORE
The patient was seen and examined this morning, report persistent mouth pain, which decreases from 10/10 to 2/10 with pain medication. However, the relief only lasts a few hours before the pain returns. The patient is tolerating a pureed diet well. I encouraged to use topical solutions as directed by the primary team and RN.  Required IV Dilaudid 0.7mg x4 PRN for severe and IV Dilaudid 0.5mg x1 PRN for moderate pain within 24hr PRN     Recommendations:   - Continue IV Dilaudid 0.5 mg TID prior to meals  - Continue IV Dilaudid 0.5 mg q3 hours prn for moderate pain. Hold for oversedation, SBP< 90 lethargy and RR less than 12  - Continue IV Dilaudid 0.7 mg q3 hours prn for severe pain.  Hold for oversedation, SBP< 90 lethargy and RR less than 12  - topical therapies including FIRST mouthwash, lido gel, dex solution and biotene.  - Bowel regimen while on opioids.  Monitor for constipation.  - Narcan PRN.    Linh Olguin MD   Geriatrics and Palliative Care Attending   [facility]     After 5pm and on weekends, please see the contact information below:    In the event of newly developing, evolving, or worsening symptoms, please contact the Palliative Medicine team via pager (if the patient is at [facility] #[phone] or if the patient is at [facility] #[phone]) The Geriatric and Palliative Medicine service has coverage 24 hours a day/ 7 days a week to provide medical recommendations regarding symptom management needs via telephone.

## 2025-05-18 NOTE — PROGRESS NOTE ADULT - SUBJECTIVE AND OBJECTIVE BOX
Diagnosis:    Protocol/Chemo Regimen:    Day:     Pt endorsed:    Review of Systems:     Pain scale:     Diet:     Allergies    No Known Allergies    Intolerances        ANTIMICROBIALS  cefepime   IVPB 2000 milliGRAM(s) IV Intermittent every 8 hours  cefepime   IVPB      metroNIDAZOLE  IVPB 500 milliGRAM(s) IV Intermittent every 8 hours  posaconazole DR Tablet 300 milliGRAM(s) Oral every 24 hours  posaconazole DR Tablet   Oral   valACYclovir 500 milliGRAM(s) Oral two times a day      HEME/ONC MEDICATIONS      STANDING MEDICATIONS  allopurinol 100 milliGRAM(s) Oral daily  Biotene Dry Mouth Oral Rinse 15 milliLiter(s) Swish and Spit four times a day  chlorhexidine 4% Liquid 1 Application(s) Topical <User Schedule>  cholecalciferol 2000 Unit(s) Oral daily  dexAMETHasone    Solution 0.5 milliGRAM(s) Oral two times a day  diltiazem    milliGRAM(s) Oral daily  FIRST- Mouthwash  BLM 10 milliLiter(s) Swish and Swallow four times a day  hydrochlorothiazide 50 milliGRAM(s) Oral daily  HYDROmorphone  Injectable 0.5 milliGRAM(s) IV Push <User Schedule>  lidocaine   4% Patch 1 Patch Transdermal every 24 hours  lidocaine 2% Gel 1 Application(s) Topical every 8 hours  melatonin 5 milliGRAM(s) Oral at bedtime  pantoprazole  Injectable 40 milliGRAM(s) IV Push every 12 hours  simethicone 80 milliGRAM(s) Chew every 6 hours  sodium chloride 0.9%. 1000 milliLiter(s) IV Continuous <Continuous>  tamsulosin 0.4 milliGRAM(s) Oral at bedtime      PRN MEDICATIONS  acetaminophen     Tablet .. 650 milliGRAM(s) Oral every 6 hours PRN  albuterol    90 MICROgram(s) HFA Inhaler 2 Puff(s) Inhalation every 6 hours PRN  aluminum hydroxide/magnesium hydroxide/simethicone Suspension 30 milliLiter(s) Oral every 6 hours PRN  calcium carbonate    500 mG (Tums) Chewable 1 Tablet(s) Chew four times a day PRN  cyclobenzaprine 10 milliGRAM(s) Oral three times a day PRN  hydrocortisone sodium succinate Injectable 100 milliGRAM(s) IV Push every 8 hours PRN  HYDROmorphone  Injectable 0.7 milliGRAM(s) IV Push every 3 hours PRN  HYDROmorphone  Injectable 0.5 milliGRAM(s) IV Push every 3 hours PRN  metoclopramide Injectable 10 milliGRAM(s) IV Push every 6 hours PRN  ondansetron Injectable 8 milliGRAM(s) IV Push every 8 hours PRN  polyethylene glycol 3350 17 Gram(s) Oral two times a day PRN  sodium chloride 0.9% lock flush 10 milliLiter(s) IV Push every 1 hour PRN        Vital Signs Last 24 Hrs  T(C): 37.4 (18 May 2025 05:44), Max: 38.4 (17 May 2025 20:05)  T(F): 99.3 (18 May 2025 05:44), Max: 101.1 (17 May 2025 20:05)  HR: 79 (18 May 2025 05:44) (79 - 98)  BP: 126/67 (18 May 2025 05:44) (103/71 - 127/73)  BP(mean): --  RR: 18 (18 May 2025 05:44) (18 - 19)  SpO2: 96% (18 May 2025 05:44) (96% - 97%)    Parameters below as of 18 May 2025 05:44  Patient On (Oxygen Delivery Method): room air        PHYSICAL EXAM  General: NAD  HEENT: PERRLA, EOMOI, clear oropharynx, anicteric sclera, pink conjunctiva  Neck: supple  CV: (+) S1/S2 RRR  Lungs: clear to auscultation, no wheezes or rales  Abdomen: soft, non-tender, non-distended (+) BS  Ext: no clubbing, cyanosis or edema  Skin: no rashes and no petechiae  Neuro: alert and oriented X 3, no focal deficits  Central Line:     RECENT CULTURES:  05-16 @ 22:50  Blood Blood-Peripheral  --  --  --    No growth at 24 hours  --  05-16 @ 22:41  Blood Blood-Peripheral  --  --  --    No growth at 24 hours  --  05-14 @ 09:15  Blood Blood-Peripheral  --  --  --    No growth at 72 Hours  --  05-14 @ 06:50  Blood Blood-Peripheral  --  --  --    No growth at 72 Hours  --  05-14 @ 06:25  Clean Catch Clean Catch (Midstream)  --  --  --    No growth  --  05-11 @ 23:54  Clean Catch Clean Catch (Midstream)  --  --  --    <10,000 CFU/mL Normal Urogenital Madelyn  --  05-11 @ 23:40  Blood Blood-Peripheral  --  --  --    No growth at 5 days  --        LABS:                        8.6    0.45  )-----------( 65       ( 17 May 2025 19:03 )             24.0         Mean Cell Volume : 88.2 fl  Mean Cell Hemoglobin : 31.6 pg  Mean Cell Hemoglobin Concentration : 35.8 g/dL  Auto Neutrophil # : x  Auto Lymphocyte # : x  Auto Monocyte # : x  Auto Eosinophil # : x  Auto Basophil # : x  Auto Neutrophil % : x  Auto Lymphocyte % : x  Auto Monocyte % : x  Auto Eosinophil % : x  Auto Basophil % : x      05-17    135  |  98  |  16  ----------------------------<  108[H]  3.9   |  22  |  1.14    Ca    8.7      17 May 2025 19:03  Phos  4.4     05-17  Mg     1.9     05-17    TPro  5.8[L]  /  Alb  3.6  /  TBili  0.7  /  DBili  x   /  AST  14  /  ALT  15  /  AlkPhos  74  05-17      Mg 1.9  Phos --          LDH --  Uric Acid 3.9        RADIOLOGY & ADDITIONAL STUDIES:           Diagnosis: ASXL1-mutated and LOQ02-adwtwxb AML    Protocol/Chemo Regimen: 7+3:  Daunorubicin 60 mg/m2+ Cytarabine     Day: 17    Pt endorsed:   +dizziness this am when getting OOB  +poor appetite  +mouth pain, discomfort  soft BM x1 this am.      Review of Systems:   Denies any nausea, vomiting, diarrhea, chest pain, SOB, abdominal pain, hemorrhoids    Pain scale: Denies                       Diet: regular     Allergies: No Known Allergies    ANTIMICROBIALS  cefepime   IVPB 2000 milliGRAM(s) IV Intermittent every 8 hours  cefepime   IVPB      metroNIDAZOLE  IVPB 500 milliGRAM(s) IV Intermittent every 8 hours  posaconazole DR Tablet 300 milliGRAM(s) Oral every 24 hours  posaconazole DR Tablet   Oral   valACYclovir 500 milliGRAM(s) Oral two times a day    STANDING MEDICATIONS  allopurinol 100 milliGRAM(s) Oral daily  Biotene Dry Mouth Oral Rinse 15 milliLiter(s) Swish and Spit four times a day  chlorhexidine 4% Liquid 1 Application(s) Topical <User Schedule>  cholecalciferol 2000 Unit(s) Oral daily  dexAMETHasone    Solution 0.5 milliGRAM(s) Oral two times a day  diltiazem    milliGRAM(s) Oral daily  FIRST- Mouthwash  BLM 10 milliLiter(s) Swish and Swallow four times a day  hydrochlorothiazide 50 milliGRAM(s) Oral daily  HYDROmorphone  Injectable 0.5 milliGRAM(s) IV Push <User Schedule>  lidocaine   4% Patch 1 Patch Transdermal every 24 hours  lidocaine 2% Gel 1 Application(s) Topical every 8 hours  melatonin 5 milliGRAM(s) Oral at bedtime  pantoprazole  Injectable 40 milliGRAM(s) IV Push every 12 hours  simethicone 80 milliGRAM(s) Chew every 6 hours  sodium chloride 0.9%. 1000 milliLiter(s) IV Continuous <Continuous>  tamsulosin 0.4 milliGRAM(s) Oral at bedtime    PRN MEDICATIONS  acetaminophen     Tablet .. 650 milliGRAM(s) Oral every 6 hours PRN  albuterol    90 MICROgram(s) HFA Inhaler 2 Puff(s) Inhalation every 6 hours PRN  aluminum hydroxide/magnesium hydroxide/simethicone Suspension 30 milliLiter(s) Oral every 6 hours PRN  calcium carbonate    500 mG (Tums) Chewable 1 Tablet(s) Chew four times a day PRN  cyclobenzaprine 10 milliGRAM(s) Oral three times a day PRN  hydrocortisone sodium succinate Injectable 100 milliGRAM(s) IV Push every 8 hours PRN  HYDROmorphone  Injectable 0.7 milliGRAM(s) IV Push every 3 hours PRN  HYDROmorphone  Injectable 0.5 milliGRAM(s) IV Push every 3 hours PRN  metoclopramide Injectable 10 milliGRAM(s) IV Push every 6 hours PRN  ondansetron Injectable 8 milliGRAM(s) IV Push every 8 hours PRN  polyethylene glycol 3350 17 Gram(s) Oral two times a day PRN  sodium chloride 0.9% lock flush 10 milliLiter(s) IV Push every 1 hour PRN    Vital Signs Last 24 Hrs  T(C): 37.4 (18 May 2025 05:44), Max: 38.4 (17 May 2025 20:05)  T(F): 99.3 (18 May 2025 05:44), Max: 101.1 (17 May 2025 20:05)  HR: 79 (18 May 2025 05:44) (79 - 98)  BP: 126/67 (18 May 2025 05:44) (103/71 - 127/73)  BP(mean): --  RR: 18 (18 May 2025 05:44) (18 - 19)  SpO2: 96% (18 May 2025 05:44) (96% - 97%)    Parameters below as of 18 May 2025 05:44  Patient On (Oxygen Delivery Method): room air    PHYSICAL EXAM    General: NAD  HEENT: ulcerations + in left lateral side of tongue  CV: (+) S1/S2 RRR  Lungs: clear to auscultation, no wheezes or rales  Abdomen: soft, non-tender, non-distended (+) BS  Ext: no clubbing, cyanosis or edema  Skin: no rashes and no petechiae  Neuro: alert/Ox3  IV: TLCL, CDI      RECENT CULTURES:  05-16 @ 22:50  Blood Blood-Peripheral  No growth at 24 hours    05-16 @ 22:41  Blood Blood-Peripheral  No growth at 24 hours    05-14 @ 09:15  Blood Blood-Peripheral  No growth at 72 Hours    05-14 @ 06:50  Blood Blood-Peripheral  No growth at 72 Hours    05-14 @ 06:25  Clean Catch Clean Catch (Midstream)  No growth    05-11 @ 23:54  Clean Catch Clean Catch (Midstream)  <10,000 CFU/mL Normal Urogenital Madelyn    05-11 @ 23:40  Blood Blood-Peripheral  No growth at 5 days    LABS:                       8.5    0.47  )-----------( 60       ( 18 May 2025 07:02 )             24.1     Mean Cell Volume : 89.3 fl  Mean Cell Hemoglobin : 31.5 pg  Mean Cell Hemoglobin Concentration : 35.3 g/dL  Auto Neutrophil # : x  Auto Lymphocyte # : x  Auto Monocyte # : x  Auto Eosinophil # : x  Auto Basophil # : x  Auto Neutrophil % : x  Auto Lymphocyte % : x  Auto Monocyte % : x  Auto Eosinophil % : x  Auto Basophil % : x    05-18    135  |  101  |  16  ----------------------------<  113[H]  3.8   |  22  |  1.21    Ca    8.4      18 May 2025 07:02  Phos  3.8     05-18  Mg     1.9     05-18    TPro  5.7[L]  /  Alb  3.5  /  TBili  0.5  /  DBili  x   /  AST  14  /  ALT  14  /  AlkPhos  70  05-18    Mg 1.9  Phos 3.8  Mg 1.9  Phos --      Uric Acid 4.1    LDH --  Uric Acid 3.9    RADIOLOGY & ADDITIONAL STUDIES:  CT Abdomen and Pelvis w/ Oral Cont (05.14.25 @ 16:26) >  No acute CTfindings suspicious for infection.

## 2025-05-18 NOTE — PROGRESS NOTE ADULT - ASSESSMENT
70 yo male with newly diagnosed ASXL1, DDX41 mutated AML, admitted for induction chemo with 7+3:  dauno 60 mg/m2+ cytarabine. 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 Chemo started on 5/2, Complicated by rigors, chills, fever, SOB  and tachycardia 2 hours into day 1 infusion - likely infusion reaction. Pt has pancytopenia d/t chemo and disease

## 2025-05-18 NOTE — PROGRESS NOTE ADULT - PROBLEM SELECTOR PLAN 3
improving  pain and palliative following, pain medication adjusted  diet advanced as per pt request  cont mouth care

## 2025-05-18 NOTE — PROGRESS NOTE ADULT - NS ATTEND AMEND GEN_ALL_CORE FT
Primary: Paulie    69M with ASXL1-mutated and SOP91-muloagd AML. 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 Admitted for induction 7+3 - day 15.    Heme:   - Completed 7+3. Complicated by rigors, chills, and tachycardia 2 hours into day 1 infusion - likely infusion reaction.  - Trend CBC with differential daily. Goal Hgb > 7 and plt > 50 given likely GI bleeding.   - Trend DIC labs intermittently, as this has not been issue since admission. Continue supportive transfusions as needed to maintain fibrinogen > 100.   - Trend TLS labs daily at this point - very low risk now.   - Allopurinol continued, but for previous diagnosis of gout decreased dose to 100 mg on 5/16.  S/p rasburicase; uric acid and LDH stable.   - Continue IVF. Monitor daily weights and I/O's. Diurese PRN.   - BMBx done day 15 (5/16) - PENDING    GI:  - Bloody BM 5/14-16.   - Increased platelet goal, hemoglobin now stabilizing. No acute intervention given pancytopenia but will monitor.     ID: Neutropenic and febrile overnight initially on 5/11-12, and persistent since then with ongoing GI symptoms. Likely mucositis related. Cultures have all been negative, MRSA swab negative, GI PCR negative.   - Cefepime (5/11- ).    - CT scan from 5/14 showing no acute CT findings suspicious for infection. Mentioned GGOs in the lungs bilaterally, but stable from a previous scan.   - Prophylaxis: posaconazole, valacyclovir   - Mouth rinses, dexamethasone swish and spit for oral mucositis.   - Currently fevers are low grade and intermittent, HD stable - will hold off but if spiking high fever and with signs of sepsis, will change to meropenem.     MSK: Has chronic pain from prior surgeries   - Pain control: oxycodone Primary: Paulie    69M with ASXL1-mutated and ODJ34-rpmmfxp AML. 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 Admitted for induction 7+3 - day 16.    Heme:   - Completed 7+3. Complicated by rigors, chills, and tachycardia 2 hours into day 1 infusion - likely infusion reaction.  - Trend CBC with differential daily. Goal Hgb > 7 and plt > 50 given likely GI bleeding.   - Trend DIC labs intermittently, as this has not been issue since admission. Continue supportive transfusions as needed to maintain fibrinogen > 100.   - Trend TLS labs daily at this point - very low risk now.   - Allopurinol continued, but for previous diagnosis of gout decreased dose to 100 mg on 5/16.  S/p rasburicase; uric acid and LDH stable.   - Continue IVF. Monitor daily weights and I/O's. Diurese PRN.   - BMBx done day 15 (5/16) - PENDING    GI:  - Bloody BM 5/14-16.   - Increased platelet goal, hemoglobin now stabilizing. No acute intervention given pancytopenia but will monitor.     ID: Neutropenic and febrile overnight initially on 5/11-12, and persistent since then with ongoing GI symptoms. Likely mucositis related. Cultures have all been negative, MRSA swab negative, GI PCR negative.   - Cefepime (5/11- ). Persistent fevers, but remaining low grade, cultures negative, HD stable - will hold off but if spiking high fever and with signs of sepsis, will change to meropenem.    - CT scan from 5/14 showing no acute CT findings suspicious for infection. Mentioned GGOs in the lungs bilaterally, but stable from a previous scan.   - Prophylaxis: posaconazole, valacyclovir   - Mouth rinses, dexamethasone swish and spit for oral mucositis.       MSK: Has chronic pain from prior surgeries   - Pain control: oxycodone Primary: Paulie    69M with ASXL1-mutated and FHB75-hcadsqm AML. 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 Admitted for induction 7+3 - day 16.    Heme:   - Completed 7+3. Complicated by rigors, chills, and tachycardia 2 hours into day 1 infusion - likely infusion reaction.  - Trend CBC with differential daily. Goal Hgb > 7 and plt > 50 given likely GI bleeding.   - Trend DIC labs intermittently, as this has not been issue since admission. Continue supportive transfusions as needed to maintain fibrinogen > 100.   - Trend TLS labs daily at this point - very low risk now.   - Allopurinol continued, but for previous diagnosis of gout decreased dose to 100 mg on 5/16.  S/p rasburicase; uric acid and LDH stable.   - Continue IVF. Monitor daily weights and I/O's. Diurese PRN.   - BMBx done day 15 (5/16) - PENDING    GI:  - Bloody BM 5/14-16.   - Increased platelet goal, hemoglobin now stabilizing. No acute intervention given pancytopenia but will monitor.     Card:  - Orthostatic hypotension, likely residual from GI bleed and decreased PO intake.   - Bolus 500 cc on 5/17 and 5/18, on NS 75 cc/hr.   - Encouraged PO intake with ensure.     ID: Neutropenic and febrile overnight initially on 5/11-12, and persistent since then with ongoing GI symptoms. Likely mucositis related. Cultures have all been negative, MRSA swab negative, GI PCR negative.   - Cefepime (5/11- ). Persistent fevers, but remaining low grade, cultures negative, HD stable - will hold off but if spiking high fever and with signs of sepsis, will change to meropenem.    - CT scan from 5/14 showing no acute CT findings suspicious for infection. Mentioned GGOs in the lungs bilaterally, but stable from a previous scan.   - Prophylaxis: posaconazole, valacyclovir   - Mouth rinses, dexamethasone swish and spit for oral mucositis.       MSK: Has chronic pain from prior surgeries   - Pain control: oxycodone

## 2025-05-19 LAB
ALBUMIN SERPL ELPH-MCNC: 3.2 G/DL — LOW (ref 3.3–5)
ALP SERPL-CCNC: 66 U/L — SIGNIFICANT CHANGE UP (ref 40–120)
ALT FLD-CCNC: 13 U/L — SIGNIFICANT CHANGE UP (ref 10–45)
ANION GAP SERPL CALC-SCNC: 12 MMOL/L — SIGNIFICANT CHANGE UP (ref 5–17)
ANISOCYTOSIS BLD QL: SLIGHT — SIGNIFICANT CHANGE UP
APTT BLD: 26.2 SEC — SIGNIFICANT CHANGE UP (ref 26.1–36.8)
AST SERPL-CCNC: 14 U/L — SIGNIFICANT CHANGE UP (ref 10–40)
BASOPHILS # BLD AUTO: 0 K/UL — SIGNIFICANT CHANGE UP (ref 0–0.2)
BASOPHILS NFR BLD AUTO: 0 % — SIGNIFICANT CHANGE UP (ref 0–2)
BILIRUB SERPL-MCNC: 0.5 MG/DL — SIGNIFICANT CHANGE UP (ref 0.2–1.2)
BUN SERPL-MCNC: 13 MG/DL — SIGNIFICANT CHANGE UP (ref 7–23)
CALCIUM SERPL-MCNC: 8.4 MG/DL — SIGNIFICANT CHANGE UP (ref 8.4–10.5)
CHLORIDE SERPL-SCNC: 101 MMOL/L — SIGNIFICANT CHANGE UP (ref 96–108)
CO2 SERPL-SCNC: 22 MMOL/L — SIGNIFICANT CHANGE UP (ref 22–31)
CREAT SERPL-MCNC: 1.18 MG/DL — SIGNIFICANT CHANGE UP (ref 0.5–1.3)
CULTURE RESULTS: NO GROWTH — SIGNIFICANT CHANGE UP
CULTURE RESULTS: SIGNIFICANT CHANGE UP
CULTURE RESULTS: SIGNIFICANT CHANGE UP
D DIMER BLD IA.RAPID-MCNC: 483 NG/ML DDU — HIGH
DACRYOCYTES BLD QL SMEAR: SLIGHT — SIGNIFICANT CHANGE UP
EGFR: 67 ML/MIN/1.73M2 — SIGNIFICANT CHANGE UP
EGFR: 67 ML/MIN/1.73M2 — SIGNIFICANT CHANGE UP
EOSINOPHIL # BLD AUTO: 0 K/UL — SIGNIFICANT CHANGE UP (ref 0–0.5)
EOSINOPHIL NFR BLD AUTO: 0 % — SIGNIFICANT CHANGE UP (ref 0–6)
FIBRINOGEN PPP-MCNC: 650 MG/DL — HIGH (ref 200–445)
GLUCOSE SERPL-MCNC: 122 MG/DL — HIGH (ref 70–99)
HCT VFR BLD CALC: 23.5 % — LOW (ref 39–50)
HCT VFR BLD CALC: 23.7 % — LOW (ref 39–50)
HGB BLD-MCNC: 8.1 G/DL — LOW (ref 13–17)
HGB BLD-MCNC: 8.3 G/DL — LOW (ref 13–17)
INR BLD: 1.24 RATIO — HIGH (ref 0.85–1.16)
LDH SERPL L TO P-CCNC: 192 U/L — SIGNIFICANT CHANGE UP (ref 50–242)
LYMPHOCYTES # BLD AUTO: 0.51 K/UL — LOW (ref 1–3.3)
LYMPHOCYTES # BLD AUTO: 100 % — HIGH (ref 13–44)
MAGNESIUM SERPL-MCNC: 1.7 MG/DL — SIGNIFICANT CHANGE UP (ref 1.6–2.6)
MANUAL SMEAR VERIFICATION: SIGNIFICANT CHANGE UP
MCHC RBC-ENTMCNC: 30.7 PG — SIGNIFICANT CHANGE UP (ref 27–34)
MCHC RBC-ENTMCNC: 30.7 PG — SIGNIFICANT CHANGE UP (ref 27–34)
MCHC RBC-ENTMCNC: 34.5 G/DL — SIGNIFICANT CHANGE UP (ref 32–36)
MCHC RBC-ENTMCNC: 35 G/DL — SIGNIFICANT CHANGE UP (ref 32–36)
MCV RBC AUTO: 87.8 FL — SIGNIFICANT CHANGE UP (ref 80–100)
MCV RBC AUTO: 89 FL — SIGNIFICANT CHANGE UP (ref 80–100)
MONOCYTES # BLD AUTO: 0 K/UL — SIGNIFICANT CHANGE UP (ref 0–0.9)
MONOCYTES NFR BLD AUTO: 0 % — LOW (ref 2–14)
NEUTROPHILS # BLD AUTO: 0 K/UL — LOW (ref 1.8–7.4)
NEUTROPHILS NFR BLD AUTO: 0 % — LOW (ref 43–77)
NRBC BLD AUTO-RTO: 0 /100 WBCS — SIGNIFICANT CHANGE UP (ref 0–0)
OVALOCYTES BLD QL SMEAR: SLIGHT — SIGNIFICANT CHANGE UP
PHOSPHATE SERPL-MCNC: 3.1 MG/DL — SIGNIFICANT CHANGE UP (ref 2.5–4.5)
PLAT MORPH BLD: NORMAL — SIGNIFICANT CHANGE UP
PLATELET # BLD AUTO: 32 K/UL — LOW (ref 150–400)
PLATELET # BLD AUTO: 47 K/UL — LOW (ref 150–400)
POIKILOCYTOSIS BLD QL AUTO: SLIGHT — SIGNIFICANT CHANGE UP
POTASSIUM SERPL-MCNC: 3.5 MMOL/L — SIGNIFICANT CHANGE UP (ref 3.5–5.3)
POTASSIUM SERPL-SCNC: 3.5 MMOL/L — SIGNIFICANT CHANGE UP (ref 3.5–5.3)
PROT SERPL-MCNC: 5.6 G/DL — LOW (ref 6–8.3)
PROTHROM AB SERPL-ACNC: 14.1 SEC — HIGH (ref 9.9–13.4)
RBC # BLD: 2.64 M/UL — LOW (ref 4.2–5.8)
RBC # BLD: 2.7 M/UL — LOW (ref 4.2–5.8)
RBC # FLD: 13.2 % — SIGNIFICANT CHANGE UP (ref 10.3–14.5)
RBC # FLD: 13.5 % — SIGNIFICANT CHANGE UP (ref 10.3–14.5)
RBC BLD AUTO: ABNORMAL
SODIUM SERPL-SCNC: 135 MMOL/L — SIGNIFICANT CHANGE UP (ref 135–145)
SPECIMEN SOURCE: SIGNIFICANT CHANGE UP
URATE SERPL-MCNC: 4.3 MG/DL — SIGNIFICANT CHANGE UP (ref 3.4–8.8)
WBC # BLD: 0.32 K/UL — CRITICAL LOW (ref 3.8–10.5)
WBC # BLD: 0.51 K/UL — CRITICAL LOW (ref 3.8–10.5)
WBC # FLD AUTO: 0.32 K/UL — CRITICAL LOW (ref 3.8–10.5)
WBC # FLD AUTO: 0.51 K/UL — CRITICAL LOW (ref 3.8–10.5)

## 2025-05-19 PROCEDURE — 99231 SBSQ HOSP IP/OBS SF/LOW 25: CPT

## 2025-05-19 PROCEDURE — 99233 SBSQ HOSP IP/OBS HIGH 50: CPT

## 2025-05-19 RX ORDER — MEROPENEM 1 G/30ML
1000 INJECTION INTRAVENOUS EVERY 8 HOURS
Refills: 0 | Status: DISCONTINUED | OUTPATIENT
Start: 2025-05-19 | End: 2025-05-26

## 2025-05-19 RX ORDER — ACETAMINOPHEN 500 MG/5ML
1000 LIQUID (ML) ORAL ONCE
Refills: 0 | Status: COMPLETED | OUTPATIENT
Start: 2025-05-19 | End: 2025-05-19

## 2025-05-19 RX ADMIN — DEXAMETHASONE 0.5 MILLIGRAM(S): 0.5 TABLET ORAL at 07:40

## 2025-05-19 RX ADMIN — MIDODRINE HYDROCHLORIDE 10 MILLIGRAM(S): 5 TABLET ORAL at 20:44

## 2025-05-19 RX ADMIN — DILTIAZEM HYDROCHLORIDE 240 MILLIGRAM(S): 240 TABLET, EXTENDED RELEASE ORAL at 06:19

## 2025-05-19 RX ADMIN — POSACONAZOLE 300 MILLIGRAM(S): 100 TABLET, DELAYED RELEASE ORAL at 17:11

## 2025-05-19 RX ADMIN — Medication 80 MILLIGRAM(S): at 11:39

## 2025-05-19 RX ADMIN — MIDODRINE HYDROCHLORIDE 10 MILLIGRAM(S): 5 TABLET ORAL at 08:38

## 2025-05-19 RX ADMIN — Medication 0.5 MILLIGRAM(S): at 09:53

## 2025-05-19 RX ADMIN — Medication 100 MILLIGRAM(S): at 06:18

## 2025-05-19 RX ADMIN — LIDOCAINE HYDROCHLORIDE 1 APPLICATION(S): 20 JELLY TOPICAL at 20:44

## 2025-05-19 RX ADMIN — Medication 0.5 MILLIGRAM(S): at 20:45

## 2025-05-19 RX ADMIN — Medication 80 MILLIGRAM(S): at 17:09

## 2025-05-19 RX ADMIN — Medication 650 MILLIGRAM(S): at 01:30

## 2025-05-19 RX ADMIN — Medication 0.5 MILLIGRAM(S): at 23:24

## 2025-05-19 RX ADMIN — Medication 15 MILLILITER(S): at 11:40

## 2025-05-19 RX ADMIN — Medication 0.7 MILLIGRAM(S): at 06:17

## 2025-05-19 RX ADMIN — Medication 0.5 MILLIGRAM(S): at 23:54

## 2025-05-19 RX ADMIN — Medication 0.7 MILLIGRAM(S): at 06:40

## 2025-05-19 RX ADMIN — TAMSULOSIN HYDROCHLORIDE 0.4 MILLIGRAM(S): 0.4 CAPSULE ORAL at 20:44

## 2025-05-19 RX ADMIN — Medication 0.5 MILLIGRAM(S): at 18:06

## 2025-05-19 RX ADMIN — MEROPENEM 100 MILLIGRAM(S): 1 INJECTION INTRAVENOUS at 13:07

## 2025-05-19 RX ADMIN — Medication 80 MILLIGRAM(S): at 23:24

## 2025-05-19 RX ADMIN — Medication 15 MILLILITER(S): at 00:28

## 2025-05-19 RX ADMIN — Medication 15 MILLILITER(S): at 06:20

## 2025-05-19 RX ADMIN — Medication 100 MILLIGRAM(S): at 11:41

## 2025-05-19 RX ADMIN — Medication 80 MILLIGRAM(S): at 00:28

## 2025-05-19 RX ADMIN — Medication 80 MILLIGRAM(S): at 06:19

## 2025-05-19 RX ADMIN — Medication 0.7 MILLIGRAM(S): at 00:29

## 2025-05-19 RX ADMIN — LIDOCAINE HYDROCHLORIDE 1 APPLICATION(S): 20 JELLY TOPICAL at 14:40

## 2025-05-19 RX ADMIN — Medication 15 MILLILITER(S): at 17:11

## 2025-05-19 RX ADMIN — Medication 0.5 MILLIGRAM(S): at 21:00

## 2025-05-19 RX ADMIN — Medication 500 MILLIGRAM(S): at 17:11

## 2025-05-19 RX ADMIN — Medication 40 MILLIGRAM(S): at 17:09

## 2025-05-19 RX ADMIN — Medication 400 MILLIGRAM(S): at 17:11

## 2025-05-19 RX ADMIN — DIPHENHYDRAMINE HYDROCHLORIDE AND LIDOCAINE HYDROCHLORIDE AND ALUMINUM HYDROXIDE AND MAGNESIUM HYDRO 10 MILLILITER(S): KIT at 23:24

## 2025-05-19 RX ADMIN — Medication 0.5 MILLIGRAM(S): at 11:53

## 2025-05-19 RX ADMIN — Medication 500 MILLIGRAM(S): at 06:19

## 2025-05-19 RX ADMIN — Medication 5 MILLIGRAM(S): at 20:44

## 2025-05-19 RX ADMIN — Medication 0.5 MILLIGRAM(S): at 17:06

## 2025-05-19 RX ADMIN — DEXAMETHASONE 0.5 MILLIGRAM(S): 0.5 TABLET ORAL at 18:29

## 2025-05-19 RX ADMIN — Medication 2000 UNIT(S): at 11:40

## 2025-05-19 RX ADMIN — Medication 0.5 MILLIGRAM(S): at 08:53

## 2025-05-19 RX ADMIN — Medication 650 MILLIGRAM(S): at 07:01

## 2025-05-19 RX ADMIN — Medication 650 MILLIGRAM(S): at 12:04

## 2025-05-19 RX ADMIN — Medication 1 APPLICATION(S): at 08:15

## 2025-05-19 RX ADMIN — DIPHENHYDRAMINE HYDROCHLORIDE AND LIDOCAINE HYDROCHLORIDE AND ALUMINUM HYDROXIDE AND MAGNESIUM HYDRO 10 MILLILITER(S): KIT at 17:11

## 2025-05-19 RX ADMIN — CEFEPIME 100 MILLIGRAM(S): 2 INJECTION, POWDER, FOR SOLUTION INTRAVENOUS at 06:21

## 2025-05-19 RX ADMIN — LIDOCAINE HYDROCHLORIDE 1 APPLICATION(S): 20 JELLY TOPICAL at 06:22

## 2025-05-19 RX ADMIN — Medication 0.5 MILLIGRAM(S): at 12:53

## 2025-05-19 RX ADMIN — DIPHENHYDRAMINE HYDROCHLORIDE AND LIDOCAINE HYDROCHLORIDE AND ALUMINUM HYDROXIDE AND MAGNESIUM HYDRO 10 MILLILITER(S): KIT at 00:28

## 2025-05-19 RX ADMIN — MEROPENEM 100 MILLIGRAM(S): 1 INJECTION INTRAVENOUS at 20:44

## 2025-05-19 RX ADMIN — Medication 15 MILLILITER(S): at 23:23

## 2025-05-19 RX ADMIN — DIPHENHYDRAMINE HYDROCHLORIDE AND LIDOCAINE HYDROCHLORIDE AND ALUMINUM HYDROXIDE AND MAGNESIUM HYDRO 10 MILLILITER(S): KIT at 11:40

## 2025-05-19 RX ADMIN — DIPHENHYDRAMINE HYDROCHLORIDE AND LIDOCAINE HYDROCHLORIDE AND ALUMINUM HYDROXIDE AND MAGNESIUM HYDRO 10 MILLILITER(S): KIT at 06:17

## 2025-05-19 RX ADMIN — Medication 650 MILLIGRAM(S): at 00:52

## 2025-05-19 RX ADMIN — Medication 40 MILLIGRAM(S): at 06:18

## 2025-05-19 NOTE — PROGRESS NOTE ADULT - SUBJECTIVE AND OBJECTIVE BOX
SUBJECTIVE AND OBJECTIVE:  Indication for Geriatrics and Palliative Care Services/INTERVAL HPI: pain management     OVERNIGHT EVENTS:  The patient was seen and examined at bedside, reports ongoing fevers, chills and pain in the mouth. He takes IV dilaudid prior to meals and states it helps him eat soft things like yogurt or jello. He is having loose stools. No complaints otherwise.    Required IV Dilaudid 0.7 mg prn x3 within 24hrs period 8am-8am     DNR on chart:  Allergies    No Known Allergies    Intolerances    MEDICATIONS  (STANDING):  allopurinol 100 milliGRAM(s) Oral daily  Biotene Dry Mouth Oral Rinse 15 milliLiter(s) Swish and Spit four times a day  cefepime   IVPB 2000 milliGRAM(s) IV Intermittent every 8 hours  cefepime   IVPB      chlorhexidine 4% Liquid 1 Application(s) Topical <User Schedule>  cholecalciferol 2000 Unit(s) Oral daily  dexAMETHasone    Solution 0.5 milliGRAM(s) Oral two times a day  diltiazem    milliGRAM(s) Oral daily  FIRST- Mouthwash  BLM 10 milliLiter(s) Swish and Swallow four times a day  hydrochlorothiazide 50 milliGRAM(s) Oral daily  HYDROmorphone  Injectable 0.5 milliGRAM(s) IV Push <User Schedule>  lidocaine   4% Patch 1 Patch Transdermal every 24 hours  lidocaine 2% Gel 1 Application(s) Topical every 8 hours  melatonin 5 milliGRAM(s) Oral at bedtime  metroNIDAZOLE  IVPB 500 milliGRAM(s) IV Intermittent every 8 hours  pantoprazole  Injectable 40 milliGRAM(s) IV Push every 12 hours  posaconazole DR Tablet 300 milliGRAM(s) Oral every 24 hours  posaconazole DR Tablet   Oral   simethicone 80 milliGRAM(s) Chew every 6 hours  sodium chloride 0.9% Bolus 500 milliLiter(s) IV Bolus once  sodium chloride 0.9%. 1000 milliLiter(s) (50 mL/Hr) IV Continuous <Continuous>  tamsulosin 0.4 milliGRAM(s) Oral at bedtime  valACYclovir 500 milliGRAM(s) Oral two times a day    MEDICATIONS  (PRN):  acetaminophen     Tablet .. 650 milliGRAM(s) Oral every 6 hours PRN Temp greater or equal to 38C (100.4F), Mild Pain (1 - 3)  albuterol    90 MICROgram(s) HFA Inhaler 2 Puff(s) Inhalation every 6 hours PRN Shortness of Breath and/or Wheezing  aluminum hydroxide/magnesium hydroxide/simethicone Suspension 30 milliLiter(s) Oral every 6 hours PRN Dyspepsia  calcium carbonate    500 mG (Tums) Chewable 1 Tablet(s) Chew four times a day PRN Dyspepsia  cyclobenzaprine 10 milliGRAM(s) Oral three times a day PRN Muscle Spasm  hydrocortisone sodium succinate Injectable 100 milliGRAM(s) IV Push every 8 hours PRN chemotherapy reaction  HYDROmorphone  Injectable 0.7 milliGRAM(s) IV Push every 3 hours PRN Severe Pain (7 - 10)  HYDROmorphone  Injectable 0.5 milliGRAM(s) IV Push every 3 hours PRN Moderate Pain (4 - 6)  metoclopramide Injectable 10 milliGRAM(s) IV Push every 6 hours PRN nausea/vomiting  ondansetron Injectable 8 milliGRAM(s) IV Push every 8 hours PRN Nausea and/or Vomiting  polyethylene glycol 3350 17 Gram(s) Oral two times a day PRN Constipation  sodium chloride 0.9% lock flush 10 milliLiter(s) IV Push every 1 hour PRN Pre/post blood products, medications, blood draw, and to maintain line patency      ITEMS UNCHECKED ARE NOT PRESENT    PRESENT SYMPTOMS: [ ]Unable to self-report - see  CPOT, PAINADS, RDOS below  Source if other than patient:  [ ]Family   [ ]Team     Pain: [ x]yes [ ]no  QOL impact - discomfort over the course of the day, unable to chew  Location -  mouth, neck and back            Aggravating factors - eating, lying still   Quality - burning   Radiation - none  Timing- constant   Severity (0-10 scale): 8 to 9  Minimal acceptable level (0-10 scale):       Dyspnea:                           [ ]Mild [ ]Moderate [ ]Severe  Anxiety:                             [ ]Mild [ ]Moderate [ ]Severe  Fatigue:                             [ ]Mild [ ]Moderate [ ]Severe  Nausea:                             [ ]Mild [ ]Moderate [ ]Severe  Loss of appetite:              [ ]Mild [ ]Moderate [ ]Severe  Constipation:                    [ ]Mild [ ]Moderate [ ]Severe    PCSSQ[Palliative Care Spiritual Screening Question]   Severity (0-10):  Score of 4 or > indicate consideration of Chaplaincy referral.  Chaplaincy Referral: [ ] yes [x ] refused [ ] following    Caregiver Orick? : [ ] yes [ ] no  [ x] deferred:  Social work referral [ ] Patient & Family Centered Care Referral [ ]     Anticipatory Grief present?:  [ ] yes [ ] no  [x ] deferred: Social work referral [ ] Patient & Family Centered Care Referral [ ]      Other Symptoms:  [ x]All other review of systems negative     PHYSICAL EXAM:  Vital Signs Last 24 Hrs  T(C): 38.1 (19 May 2025 11:49), Max: 38.5 (19 May 2025 05:37)  T(F): 100.6 (19 May 2025 11:49), Max: 101.3 (19 May 2025 05:37)  HR: 110 (19 May 2025 11:49) (79 - 110)  BP: 139/71 (19 May 2025 11:49) (107/79 - 139/71)  BP(mean): --  RR: 20 (19 May 2025 11:49) (18 - 20)  SpO2: 94% (19 May 2025 11:49) (93% - 100%)    Parameters below as of 19 May 2025 11:49  Patient On (Oxygen Delivery Method): room air      GENERAL:   [ ]Cachexia    [x ]Alert  [ x]Oriented x 3  [ ]Lethargic  [ ]Unarousable  [ x]Verbal  [ ]Non-Verbal  Behavioral:   [ ] Anxiety  [ ] Delirium [ ] Agitation [ ] Other  HEENT:  [ ]Normal   [ ]Dry mouth   [ ]ET Tube/Trach  [ ]Oral lesions [x] erythema and ulcerations noted on L lateral tongue  PULMONARY:   [x ]Clear [ ]Tachypnea  [ ]Audible excessive secretions   [ ]Rhonchi        [ ]Right [ ]Left [ ]Bilateral  [ ]Crackles        [ ]Right [ ]Left [ ]Bilateral  [ ]Wheezing     [ ]Right [ ]Left [ ]Bilateral  [ ]Diminished breath sounds [ ]right [ ]left [ ]bilateral  CARDIOVASCULAR:    [ x]Regular [ ]Irregular [ ]Tachy  [ ]Lui [ ]Murmur [ ]Other  GASTROINTESTINAL:  [x ]Soft  [ ]Distended   [ ]+BS  [x ]Non tender [ ]Tender  [ ]Other [ ]PEG [ ]OGT/ NGT  Last BM:  GENITOURINARY:  [x ]Normal [ ] Incontinent   [ ]Oliguria/Anuria   [ ]Villalobos  MUSCULOSKELETAL:   [x ]Normal   [ ]Weakness  [ ]Bed/Wheelchair bound [ ]Edema  NEUROLOGIC:   [ x]No focal deficits  [ ]Cognitive impairment  [ ]Dysphagia [ ]Dysarthria [ ]Paresis [ ]Other   SKIN:   [ ]Normal  [ ]Rash  [ x]Other tattoos on BUE, bruising throughout  [ ]Pressure ulcer(s)       Present on admission [ ]y [ ]n    CRITICAL CARE:  [ ] Shock Present  [ ]Septic [ ]Cardiogenic [ ]Neurologic [ ]Hypovolemic  [ ]  Vasopressors [ ]  Inotropes   [ ]Respiratory failure present [ ]Mechanical ventilation [ ]Non-invasive ventilatory support [ ]High flow    [ ]Acute  [ ]Chronic [ ]Hypoxic  [ ]Hypercarbic [ ]Other  [ ]Other organ failure       LABS:                                   8.1    0.51  )-----------( 32       ( 19 May 2025 07:06 )             23.5     05-19    135  |  101  |  13  ----------------------------<  122[H]  3.5   |  22  |  1.18    Ca    8.4      19 May 2025 07:06  Phos  3.1     05-19  Mg     1.7     05-19    TPro  5.6[L]  /  Alb  3.2[L]  /  TBili  0.5  /  DBili  x   /  AST  14  /  ALT  13  /  AlkPhos  66  05-19      RADIOLOGY & ADDITIONAL STUDIES: reviewed    Protein Calorie Malnutrition Present: [ ]mild [ ]moderate [ ]severe [ ]underweight [ ]morbid obesity  https://www.andeal.org/vault/2440/web/files/ONC/Table_Clinical%20Characteristics%20to%20Document%20Malnutrition-White%20JV%20et%20al%202012.pdf    Height (cm): 181 (05-02-25 @ 08:51), 180.5 (04-28-25 @ 15:00)  Weight (kg): 107.3 (05-02-25 @ 11:55), 108.5 (04-28-25 @ 15:00)  BMI (kg/m2): 32.8 (05-02-25 @ 11:55), 33.1 (05-02-25 @ 08:51), 33.3 (04-28-25 @ 15:00)    [ ]PPSV2 < or = 30%  [ ]significant weight loss [ ]poor nutritional intake [ ]anasarca[ ]Artificial Nutrition    Other REFERRALS:  [ ]Hospice  [ ]Child Life  [ ]Social Work  [ ]Case management [ ]Holistic Therapy     Palliative Performance Scale:  http://npcrc.org/files/news/palliative_performance_scale_ppsv2.pdf  (Ctrl +  left click to view)  Respiratory Distress Observation Tool:  https://homecareinformation.net/handouts/hen/Respiratory_Distress_Observation_Scale.pdf (Ctrl +  left click to view)  PAINAD Score:  http://geriatrictoolkit.[facility]/cog/painad.pdf (Ctrl +  left click to view)

## 2025-05-19 NOTE — PROGRESS NOTE ADULT - PROBLEM/PLAN-1
DISPLAY PLAN FREE TEXT PAST MEDICAL HISTORY:  Anemia     H/O shortness of breath     Ovarian cyst     Prediabetes     Vaginal delivery 2016

## 2025-05-19 NOTE — PROGRESS NOTE ADULT - PROBLEM SELECTOR PLAN 2
Secondary to chemotherapy. controlled on current regimen.  - Continue IV Dilaudid 0.5 mg TID prior to meals  - Continue IV Dilaudid 0.5 mg q3 hours prn for moderate pain. Hold for oversedation, SBP< 90 lethargy and RR less than 12  - Continue IV Dilaudid 0.7 mg q3 hours prn for severe pain.  Hold for oversedation, SBP< 90 lethargy and RR less than 12  - Holistic RN referral for non-opiate interventions   - agree with topical therapies including FIRST mouthwash, lido gel, dex solution and biotene.  - Bowel regimen while on opioids.  Monitor for constipation.  - Narcan PRN

## 2025-05-19 NOTE — PROGRESS NOTE ADULT - NS ATTEND AMEND GEN_ALL_CORE FT
Primary: Paulie    69M with ASXL1-mutated and [id number] AML. 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 Admitted for induction 7+3 - day 16.    Heme:   - Completed 7+3. Complicated by rigors, chills, and tachycardia 2 hours into day 1 infusion - likely infusion reaction.  - Trend CBC with differential daily. Goal Hgb > 7 and plt > 50 given likely GI bleeding.   - Trend DIC labs intermittently, as this has not been issue since admission. Continue supportive transfusions as needed to maintain fibrinogen > 100.   - Trend TLS labs daily at this point - very low risk now.   - Allopurinol continued, but for previous diagnosis of gout decreased dose to 100 mg on 5/16.  S/p rasburicase; uric acid and LDH stable.   - Continue IVF. Monitor daily weights and I/O's. Diurese PRN.   - BMBx done day 15 (5/16) - PENDING    GI:  - Bloody BM 5/14-16.   - Increased platelet goal, hemoglobin now stabilizing. No acute intervention given pancytopenia but will monitor.     Card:  - Orthostatic hypotension, likely residual from GI bleed and decreased PO intake.   - Bolus 500 cc on 5/17 and 5/18, on NS 75 cc/hr.   - Encouraged PO intake with ensure.     ID: Neutropenic and febrile overnight initially on 5/11-12, and persistent since then with ongoing GI symptoms. Likely mucositis related. Cultures have all been negative, MRSA swab negative, GI PCR negative.   - Cefepime (5/11- ). Persistent fevers, but remaining low grade, cultures negative, HD stable - will hold off but if spiking high fever and with signs of sepsis, will change to meropenem.    - CT scan from 5/14 showing no acute CT findings suspicious for infection. Mentioned GGOs in the lungs bilaterally, but stable from a previous scan.   - Prophylaxis: posaconazole, valacyclovir   - Mouth rinses, dexamethasone swish and spit for oral mucositis.       MSK: Has chronic pain from prior surgeries   - Pain control: oxycodone Primary: Paulie    69M with ASXL1-mutated and [id number]-[id number] AML. 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 Admitted for induction 7+3 - day 16.    Heme:   - Completed 7+3. Day 18. Complicated by rigors, chills, and tachycardia 2 hours into day 1 infusion - likely infusion reaction.  - Trend CBC with differential daily. Goal Hgb > 7 and plt > 30 given past likely GI bleeding.   - Trend DIC labs intermittently, as this has not been issue since admission. Continue supportive transfusions as needed to maintain fibrinogen > 100.   - Trend TLS labs daily at this point - very low risk now.   - Allopurinol continued, but for previous diagnosis of gout decreased dose to 100 mg on 5/16.  S/p rasburicase; uric acid and LDH stable.   - Continue IVF. Monitor daily weights and I/O's. Diurese PRN.   - BMBx done day 15 (5/16) - PENDING    GI:  - Bloody BM 5/14-16.   - Increased platelet goal, hemoglobin now stabilizing. No acute intervention given pancytopenia but will monitor.     Card:  - Orthostatic hypotension, likely residual from GI bleed and decreased PO intake. Poss autonomic component.  - Bolus 500 cc on 5/17 and 5/18, on NS 75 cc/hr. Now on midodrine.  - Encouraged PO intake with ensure.     ID: Neutropenic and febrile overnight initially on 5/11-12, and persistent since then with ongoing GI symptoms. Likely mucositis related. Cultures have all been negative, MRSA swab negative, GI PCR negative.   - Cefepime (5/11- ). Persistent fever lead to changing to meropenem on 5/19.      - CT scan from 5/14 showing no acute CT findings suspicious for infection. Mentioned GGOs in the lungs bilaterally, but stable from a previous scan.   - Prophylaxis: posaconazole, valacyclovir   - Mouth rinses, dexamethasone swish and spit for oral mucositis.     MSK: Has chronic pain from prior surgeries   - Pain control: oxycodone    TLC in place  OOB

## 2025-05-19 NOTE — PHARMACOTHERAPY INTERVENTION NOTE - COMMENTS
Clinical Pharmacy Specialist- Hematology/Oncology- Progress Note    Pt is a 68 y/o male w/ PMH of HTN, HLD, GERD, BPH, Gout, spinal stenosis (s/p multiple surgeries) and newly diagnosed AML (ASXL1-mutated and DDX41) admitted for management with 7+3 Induction therapy    Antimicrobial Course:  - Posaconazole- 5/2  - Levaquin/Amox- 5/2- 5/11  --> Cefepime- 5/11  - Flagyl (abd discomfort)- 5/13  - Valtrex- 5/2  MRSA nasal swab    Last Neutropenic (ANC<1000): 5/19; ANC= 0  Last Febrile: 19-May-2025 05:37; T= 101.3  Days Non-Neutropenic: 0  Days afebrile: 0    Chemotherapy Course  -Current Regimen: 7+3 Induction  History:  (5/2/25)- 7+3 Induction   -Daunorubicin 60mg/m2 IVP D1-3   -Cytarabine 100mg/m2 IV cont inf D1-7  -Day: 18 (5/19)  BmBx:  Access: R TLC (accessed 5/5/25)    History/Relevant clinical information used in assessment:  - Ht= 5’11”; IBW= 75.3kg; Actual BW= 107.3kg; Adj BW= 88kg  - 5/2- IRR? rigors, chills, and tachycardia 2 hours into day 1; rasburicase 3mg x 1  - 5/4 -Crcl= 67ml/min ; Scr=1.32; Wt= 88kg; Adjusted BW used since pt BMI>30  - 5/9- increase melatonin 3mg to 10mh qhs- c/o insomnia  - 5/13- mouth pain- has ulceration   - 5/14- mouth pain - cannot swallow now  - 5/16- bloody BM's decreased to 2x'd/day from 4x's/day yest    Assessment/Plan/Recommendation:  Onc:  - Daunorubicin 60mg/m2 IVP D1-3   - Cytarabine 100mg/m2 IV cont inf D1-7- IRR on D1, started at 6p, stopped at 7:30p (received 1.5 hrs),  made up D1 bag on 5/9 to run over 23 hrs (to account for ~1hr given)  - allopurinol 100mg (300mg d/c'd 5/15)-keeping for gout  ID:  - 5/19, still febrile since 5/11- on cefepime, BlCx NGTD + abd discomfort-->CT CAP 5/15 - no infection (but groundglass opacities stable from previous?), keep flagy until..?- discussed last week if spikes, start gianna (would d/c flagyl since gianna covers anaerobes   - 5/15- bloody diarrhea x 3 on reglan prn, but has not needed, monitor for d/c to not aggravate w/ prokinetic agent- amicar?  - 5/15- HSV neg  Cards:  - atorvastatin 80mg held d/t DDI with posa (Cat X)  Pain (mouth):  ·	magic mouthwash to swish & swallow- 5/14  ·	hydromorphone 0.2mg IVP q6hr prn- 5/14--> 0.5mg - 0.7mg q3hr prn- 5/16  ·	hydromorphone 0.5mg IVP tid- 5/15  ·	oxycodone 2.5-5mg q4hr prn- 5/2- 5/16  ·	lidocaine gel 2% q8hr to tongue 5/14  ·	dexamethasone soln 0.5 mg/5 mL BID swish & spit - 5/15    Additional Monitoring Needed?   -Yes- Continue to monitor renal function & daily counts for abx escalation/de-escalation   -Discharge Planning:  --> New meds:  --> Meds sent for auth:  --> Delivered meds:    Case discussed with attending/primary team    Liza Jolley, PharmD, BCPS  Clinical Pharmacy Specialist | Hematology/Oncology  [facility]  Email: [email or web address] or available on [employer] Clinical Pharmacy Specialist- Hematology/Oncology- Progress Note    Pt is a 70 y/o male w/ PMH of HTN, HLD, GERD, BPH, Gout, spinal stenosis (s/p multiple surgeries) and newly diagnosed AML (ASXL1-mutated and DDX41) admitted for management with 7+3 Induction therapy    Antimicrobial Course:  - Posaconazole- 5/2  - Levaquin/Amox- 5/2- 5/11  --> Cefepime- 5/11- 5/19  --> Meropenem- 5/19  - Flagyl (abd discomfort)- 5/13- 5/19  - Valtrex- 5/2  MRSA nasal swab    Last Neutropenic (ANC<1000): 5/19; ANC= 0  Last Febrile: 19-May-2025 05:37; T= 101.3  Days Non-Neutropenic: 0  Days afebrile: 0    Chemotherapy Course  -Current Regimen: 7+3 Induction  History:  (5/2/25)- 7+3 Induction   -Daunorubicin 60mg/m2 IVP D1-3   -Cytarabine 100mg/m2 IV cont inf D1-7  -Day: 18 (5/19)  BmBx:  Access: R TLC (accessed 5/5/25)    History/Relevant clinical information used in assessment:  - Ht= 5’11”; IBW= 75.3kg; Actual BW= 107.3kg; Adj BW= 88kg  - 5/2- IRR? rigors, chills, and tachycardia 2 hours into day 1; rasburicase 3mg x 1  - 5/4 -Crcl= 67ml/min ; Scr=1.32; Wt= 88kg; Adjusted BW used since pt BMI>30  - 5/9- increase melatonin 3mg to 10mh qhs- c/o insomnia  - 5/13- mouth pain- has ulceration   - 5/14- mouth pain - cannot swallow now  - 5/16- bloody BM's decreased to 2x'd/day from 4x's/day yest    Assessment/Plan/Recommendation:  Onc:  - Daunorubicin 60mg/m2 IVP D1-3   - Cytarabine 100mg/m2 IV cont inf D1-7- IRR on D1, started at 6p, stopped at 7:30p (received 1.5 hrs),  made up D1 bag on 5/9 to run over 23 hrs (to account for ~1hr given)  - allopurinol 100mg (300mg d/c'd 5/15)-keeping for gout  ID:  - 5/19, still febrile since 5/11- on cefepime, BlCx NGTD + abd discomfort-->CT CAP 5/15 - no infection (but groundglass opacities stable from previous?)- febrile- escalating to gianna- recommended to d/c flagyl  - 5/15- bloody diarrhea x 3 on reglan prn, but has not needed, monitor for d/c to not aggravate w/ prokinetic agent- amicar?  - 5/15- HSV neg  Cards:  - atorvastatin 80mg held d/t DDI with posa (Cat X)  Pain (mouth):  ·	magic mouthwash to swish & swallow- 5/14  ·	hydromorphone 0.2mg IVP q6hr prn- 5/14--> 0.5mg - 0.7mg q3hr prn- 5/16  ·	hydromorphone 0.5mg IVP tid- 5/15  ·	oxycodone 2.5-5mg q4hr prn- 5/2- 5/16  ·	lidocaine gel 2% q8hr to tongue 5/14  ·	dexamethasone soln 0.5 mg/5 mL BID swish & spit - 5/15    Additional Monitoring Needed?   -Yes- Continue to monitor renal function & daily counts for abx escalation/de-escalation   -Discharge Planning:  --> New meds:  --> Meds sent for auth:  --> Delivered meds:    Case discussed with attending/primary team    Liza Jolley, PharmD, BCPS  Clinical Pharmacy Specialist | Hematology/Oncology  [facility]  Email: [email or web address] or available on [employer] Clinical Pharmacy Specialist- Hematology/Oncology- Progress Note    Pt is a 70 y/o male w/ PMH of HTN, HLD, GERD, BPH, Gout, spinal stenosis (s/p multiple surgeries) and newly diagnosed AML (ASXL1-mutated and DDX41) admitted for management with 7+3 Induction therapy    Antimicrobial Course:  - Posaconazole- 5/2  - Levaquin/Amox- 5/2- 5/11  --> Cefepime- 5/11- 5/19  --> Meropenem- 5/19  - Flagyl (abd discomfort)- 5/13- 5/19  - Valtrex- 5/2  MRSA nasal swab    Last Neutropenic (ANC<1000): 5/19; ANC= 0  Last Febrile: 19-May-2025 05:37; T= 101.3  Days Non-Neutropenic: 0  Days afebrile: 0    Chemotherapy Course  -Current Regimen: 7+3 Induction  History:  (5/2/25)- 7+3 Induction   -Daunorubicin 60mg/m2 IVP D1-3   -Cytarabine 100mg/m2 IV cont inf D1-7  -Day: 18 (5/19)  BmBx:  Access: R TLC (accessed 5/5/25)    History/Relevant clinical information used in assessment:  - Ht= 5’11”; IBW= 75.3kg; Actual BW= 107.3kg; Adj BW= 88kg  - 5/2- IRR? rigors, chills, and tachycardia 2 hours into day 1; rasburicase 3mg x 1  - 5/4 -Crcl= 67ml/min ; Scr=1.32; Wt= 88kg; Adjusted BW used since pt BMI>30  - 5/9- increase melatonin 3mg to 10mh qhs- c/o insomnia  - 5/13- mouth pain- has ulceration   - 5/14- mouth pain - cannot swallow now  - 5/16- bloody BM's decreased to 2x'd/day from 4x's/day yest    Assessment/Plan/Recommendation:  Onc:  - Daunorubicin 60mg/m2 IVP D1-3   - Cytarabine 100mg/m2 IV cont inf D1-7- IRR on D1, started at 6p, stopped at 7:30p (received 1.5 hrs),  made up D1 bag on 5/9 to run over 23 hrs (to account for ~1hr given)  - allopurinol 100mg (300mg d/c'd 5/15)-keeping for gout  ID:  - 5/19, still febrile since 5/11- on cefepime, BlCx NGTD + abd discomfort-->CT CAP 5/15 - no infection (but groundglass opacities stable from previous?)- febrile- escalating to gianna- recommended to d/c flagyl  - 5/15- bloody diarrhea x 3 on reglan prn, but has not needed, monitor for d/c to not aggravate w/ prokinetic agent- amicar?  - 5/15- HSV neg  Cards:  - atorvastatin 80mg held d/t DDI with posa (Cat X)  Pain (mouth):  ·	magic mouthwash to swish & swallow- 5/14  ·	hydromorphone 0.2mg IVP q6hr prn- 5/14--> 0.5mg - 0.7mg q3hr prn- 5/16  ·	hydromorphone 0.5mg IVP tid- 5/15  ·	oxycodone 2.5-5mg q4hr prn- 5/2- 5/16  ·	lidocaine gel 2% q8hr to tongue 5/14  ·	dexamethasone soln 0.5 mg/5 mL BID swish & spit - 5/15  Orthostasis:  - 5/18- midodrine 10mg PO tid    Additional Monitoring Needed?   -Yes- Continue to monitor renal function & daily counts for abx escalation/de-escalation   -Discharge Planning:  --> New meds: midodrine  --> Meds sent for auth:  --> Delivered meds:    Case discussed with attending/primary team    Liza Jolley, PharmD, BCPS  Clinical Pharmacy Specialist | Hematology/Oncology  [facility]  Email: [email or web address] or available on [email or web address]

## 2025-05-19 NOTE — PROGRESS NOTE ADULT - PROBLEM SELECTOR PLAN 5
- Geriatrics and Palliative Medicine Team will continue to follow for pain management    Paloma Mark MD  [facility] Team Consults  Please call if we can be of assistance, [phone]

## 2025-05-19 NOTE — PROGRESS NOTE ADULT - PROBLEM SELECTOR PLAN 3
Chronic back and neck pain with previous surgeries, no opiates used at home  - IV Dilaudid regimen as above  - Once mucositis pain resolves and pt is off IV dilaudid regimen, consider adding oxycodone 5 mg q6 hours prn for pain control.

## 2025-05-19 NOTE — PROGRESS NOTE ADULT - SUBJECTIVE AND OBJECTIVE BOX
Diagnosis: ASXL1-mutated and JFY80-[id number] AML    Protocol/Chemo Regimen: 7+3:  Daunorubicin 60 mg/m2+ Cytarabine     Day:18    Pt endorsed:       Review of Systems:   Denies any nausea, vomiting, diarrhea, chest pain, SOB, abdominal pain, hemorrhoids    Pain scale: Denies                     Allergies    No Known Allergies    Intolerances        ANTIMICROBIALS  cefepime   IVPB 2000 milliGRAM(s) IV Intermittent every 8 hours  cefepime   IVPB      metroNIDAZOLE  IVPB 500 milliGRAM(s) IV Intermittent every 8 hours  posaconazole DR Tablet 300 milliGRAM(s) Oral every 24 hours  posaconazole DR Tablet   Oral   valACYclovir 500 milliGRAM(s) Oral two times a day      HEME/ONC MEDICATIONS      STANDING MEDICATIONS  allopurinol 100 milliGRAM(s) Oral daily  Biotene Dry Mouth Oral Rinse 15 milliLiter(s) Swish and Spit four times a day  chlorhexidine 4% Liquid 1 Application(s) Topical <User Schedule>  cholecalciferol 2000 Unit(s) Oral daily  dexAMETHasone    Solution 0.5 milliGRAM(s) Oral two times a day  diltiazem    milliGRAM(s) Oral daily  FIRST- Mouthwash  BLM 10 milliLiter(s) Swish and Swallow four times a day  hydrochlorothiazide 50 milliGRAM(s) Oral daily  HYDROmorphone  Injectable 0.5 milliGRAM(s) IV Push <User Schedule>  lidocaine   4% Patch 1 Patch Transdermal every 24 hours  lidocaine 2% Gel 1 Application(s) Topical every 8 hours  melatonin 5 milliGRAM(s) Oral at bedtime  midodrine. 10 milliGRAM(s) Oral every 8 hours  pantoprazole  Injectable 40 milliGRAM(s) IV Push every 12 hours  simethicone 80 milliGRAM(s) Chew every 6 hours  sodium chloride 0.9%. 1000 milliLiter(s) IV Continuous <Continuous>  tamsulosin 0.4 milliGRAM(s) Oral at bedtime      PRN MEDICATIONS  acetaminophen     Tablet .. 650 milliGRAM(s) Oral every 6 hours PRN  albuterol    90 MICROgram(s) HFA Inhaler 2 Puff(s) Inhalation every 6 hours PRN  aluminum hydroxide/magnesium hydroxide/simethicone Suspension 30 milliLiter(s) Oral every 6 hours PRN  calcium carbonate    500 mG (Tums) Chewable 1 Tablet(s) Chew four times a day PRN  cyclobenzaprine 10 milliGRAM(s) Oral three times a day PRN  hydrocortisone sodium succinate Injectable 100 milliGRAM(s) IV Push every 8 hours PRN  HYDROmorphone  Injectable 0.7 milliGRAM(s) IV Push every 3 hours PRN  HYDROmorphone  Injectable 0.5 milliGRAM(s) IV Push every 3 hours PRN  metoclopramide Injectable 10 milliGRAM(s) IV Push every 6 hours PRN  ondansetron Injectable 8 milliGRAM(s) IV Push every 8 hours PRN  polyethylene glycol 3350 17 Gram(s) Oral two times a day PRN  sodium chloride 0.9% lock flush 10 milliLiter(s) IV Push every 1 hour PRN        Vital Signs Last 24 Hrs  T(C): 38.5 (19 May 2025 05:37), Max: 38.5 (19 May 2025 05:37)  T(F): 101.3 (19 May 2025 05:37), Max: 101.3 (19 May 2025 05:37)  HR: 93 (19 May 2025 05:37) (88 - 98)  BP: 107/79 (19 May 2025 05:37) (107/79 - 131/77)  BP(mean): --  RR: 18 (19 May 2025 05:37) (18 - 18)  SpO2: 93% (19 May 2025 05:37) (93% - 100%)    Parameters below as of 19 May 2025 05:37  Patient On (Oxygen Delivery Method): room air        PHYSICAL EXAM  General: NAD  HEENT: PERRLA, EOMOI, clear oropharynx, anicteric sclera, pink conjunctiva  Neck: supple  CV: (+) S1/S2 RRR  Lungs: clear to auscultation, no wheezes or rales  Abdomen: soft, non-tender, non-distended (+) BS  Ext: no clubbing, cyanosis or edema  Skin: no rashes and no petechiae  Neuro: alert and oriented X 3, no focal deficits  Central Line: TLC c/d/i     RECENT CULTURES:  05-16 @ 22:50  Blood Blood-Peripheral  No growth at 48 Hours  --  05-16 @ 22:41  Blood Blood-Peripheral  No growth at 48 Hours                                 Diagnosis: ASXL1-mutated and MEH16-sqeksia AML    Protocol/Chemo Regimen: 7+3:  Daunorubicin 60 mg/m2+ Cytarabine     Day:18    Pt endorsed: fever and chills, mucositis controlled with pain medication, still with difficulty eating, no further episodes of blood in stool       Review of Systems:   Denies any nausea, vomiting, diarrhea, chest pain, SOB, abdominal pain, hemorrhoids    Pain scale: Denies                     Allergies    No Known Allergies    Intolerances        ANTIMICROBIALS  cefepime   IVPB 2000 milliGRAM(s) IV Intermittent every 8 hours  cefepime   IVPB      metroNIDAZOLE  IVPB 500 milliGRAM(s) IV Intermittent every 8 hours  posaconazole DR Tablet 300 milliGRAM(s) Oral every 24 hours  posaconazole DR Tablet   Oral   valACYclovir 500 milliGRAM(s) Oral two times a day      HEME/ONC MEDICATIONS      STANDING MEDICATIONS  allopurinol 100 milliGRAM(s) Oral daily  Biotene Dry Mouth Oral Rinse 15 milliLiter(s) Swish and Spit four times a day  chlorhexidine 4% Liquid 1 Application(s) Topical <User Schedule>  cholecalciferol 2000 Unit(s) Oral daily  dexAMETHasone    Solution 0.5 milliGRAM(s) Oral two times a day  diltiazem    milliGRAM(s) Oral daily  FIRST- Mouthwash  BLM 10 milliLiter(s) Swish and Swallow four times a day  hydrochlorothiazide 50 milliGRAM(s) Oral daily  HYDROmorphone  Injectable 0.5 milliGRAM(s) IV Push <User Schedule>  lidocaine   4% Patch 1 Patch Transdermal every 24 hours  lidocaine 2% Gel 1 Application(s) Topical every 8 hours  melatonin 5 milliGRAM(s) Oral at bedtime  midodrine. 10 milliGRAM(s) Oral every 8 hours  pantoprazole  Injectable 40 milliGRAM(s) IV Push every 12 hours  simethicone 80 milliGRAM(s) Chew every 6 hours  sodium chloride 0.9%. 1000 milliLiter(s) IV Continuous <Continuous>  tamsulosin 0.4 milliGRAM(s) Oral at bedtime      PRN MEDICATIONS  acetaminophen     Tablet .. 650 milliGRAM(s) Oral every 6 hours PRN  albuterol    90 MICROgram(s) HFA Inhaler 2 Puff(s) Inhalation every 6 hours PRN  aluminum hydroxide/magnesium hydroxide/simethicone Suspension 30 milliLiter(s) Oral every 6 hours PRN  calcium carbonate    500 mG (Tums) Chewable 1 Tablet(s) Chew four times a day PRN  cyclobenzaprine 10 milliGRAM(s) Oral three times a day PRN  hydrocortisone sodium succinate Injectable 100 milliGRAM(s) IV Push every 8 hours PRN  HYDROmorphone  Injectable 0.7 milliGRAM(s) IV Push every 3 hours PRN  HYDROmorphone  Injectable 0.5 milliGRAM(s) IV Push every 3 hours PRN  metoclopramide Injectable 10 milliGRAM(s) IV Push every 6 hours PRN  ondansetron Injectable 8 milliGRAM(s) IV Push every 8 hours PRN  polyethylene glycol 3350 17 Gram(s) Oral two times a day PRN  sodium chloride 0.9% lock flush 10 milliLiter(s) IV Push every 1 hour PRN        Vital Signs Last 24 Hrs  T(C): 38.5 (19 May 2025 05:37), Max: 38.5 (19 May 2025 05:37)  T(F): 101.3 (19 May 2025 05:37), Max: 101.3 (19 May 2025 05:37)  HR: 93 (19 May 2025 05:37) (88 - 98)  BP: 107/79 (19 May 2025 05:37) (107/79 - 131/77)  BP(mean): --  RR: 18 (19 May 2025 05:37) (18 - 18)  SpO2: 93% (19 May 2025 05:37) (93% - 100%)    Parameters below as of 19 May 2025 05:37  Patient On (Oxygen Delivery Method): room air        PHYSICAL EXAM  General: NAD  HEENT: PERRLA, EOMOI, clear oropharynx, anicteric sclera, pink conjunctiva  Neck: supple  CV: (+) S1/S2 RRR  Lungs: clear to auscultation, no wheezes or rales  Abdomen: soft, non-tender, non-distended (+) BS  Ext: no clubbing, cyanosis or edema  Skin: no rashes and no petechiae  Neuro: alert and oriented X 3, no focal deficits  Central Line: TLC c/d/i     RECENT CULTURES:  05-16 @ 22:50  Blood Blood-Peripheral  No growth at 48 Hours  --  05-16 @ 22:41  Blood Blood-Peripheral  No growth at 48 Hours    LABS:                        8.1    0.51  )-----------( 32       ( 19 May 2025 07:06 )             23.5         Mean Cell Volume : 89.0 fl  Mean Cell Hemoglobin : 30.7 pg  Mean Cell Hemoglobin Concentration : 34.5 g/dL  Auto Neutrophil # : x  Auto Lymphocyte # : x  Auto Monocyte # : x  Auto Eosinophil # : x  Auto Basophil # : x  Auto Neutrophil % : x  Auto Lymphocyte % : x  Auto Monocyte % : x  Auto Eosinophil % : x  Auto Basophil % : x      05-19    135  |  101  |  13  ----------------------------<  122[H]  3.5   |  22  |  1.18    Ca    8.4      19 May 2025 07:06  Phos  3.1     05-19  Mg     1.7     05-19    TPro  5.6[L]  /  Alb  3.2[L]  /  TBili  0.5  /  DBili  x   /  AST  14  /  ALT  13  /  AlkPhos  66  05-19      Mg 1.7  Phos 3.1      PT/INR - ( 19 May 2025 07:06 )   PT: 14.1 sec;   INR: 1.24 ratio    PTT - ( 19 May 2025 07:06 )  PTT:26.2 sec      Uric Acid 4.3

## 2025-05-19 NOTE — PROGRESS NOTE ADULT - PROBLEM SELECTOR PLAN 2
neutropenic, Febrile   continue posa, valtrex, flagyl, cefepime (5/11- )   5/2 CXR Patchy opacities along the left lung base, possibly atelectasis, however superimposed infection cannot be entirely excluded.  5/13- FLU/COVID/RSV (-)  5/13- Added Flagyl 500 mg IV Q 8 hrs due to GI discomfort/pain  5/13- CT C/A/P -negative for infxn  5/14 BCX sent f/u  5/15 HSV + MRSA PCR sent f/u neutropenic, Febrile   continue posa, valtrex, flagyl, cefepime (5/11-5/19 ), cont to be febrile Ucx from 5/17 (+) gram + cocci in pairs and chains, inc to meropenem (5/19-   5/2 CXR Patchy opacities along the left lung base, possibly atelectasis, however superimposed infection cannot be entirely excluded.  5/13- FLU/COVID/RSV (-)  5/13- Added Flagyl 500 mg IV Q 8 hrs due to GI discomfort/pain  5/13- CT C/A/P -negative for infxn  5/14 BCX sent f/u  5/15 HSV + MRSA PCR sent f/u  5/19: fu bld cx neutropenic, Febrile   continue posa, valtrex, flagyl (dc'd 5/19), cefepime (5/11-5/19 ), cont to be febrile Ucx from 5/17 (+) gram + cocci in pairs and chains, inc to meropenem (5/19-   5/2 CXR Patchy opacities along the left lung base, possibly atelectasis, however superimposed infection cannot be entirely excluded.  5/13- FLU/COVID/RSV (-)  5/13- Added Flagyl 500 mg IV Q 8 hrs due to GI discomfort/pain  5/13- CT C/A/P -negative for infxn  5/14 BCX sent f/u  5/15 HSV + MRSA PCR sent f/u  5/19: fu bld cx

## 2025-05-19 NOTE — PROGRESS NOTE ADULT - PROBLEM SELECTOR PLAN 1
Newly diagnosed, admission for induction chemo which was started on 5/2  - day 14 Bmbx 5/16, pending path   - further management as per heme/onc team.

## 2025-05-19 NOTE — PROGRESS NOTE ADULT - PROBLEM SELECTOR PLAN 1
new ASXL1-mutated and JYP60-yvwlbzo AML.  admitted for induction chemo with dauno 60 mg/m2+ cytarabine. Chemo started on 5/2, Complicated by rigors, chills, fever, SOB  and tachycardia 2 hours into day 1 infusion - likely infusion. received pepcid/benadryl/solumedrol/tylenol iv. will make up D1 Cytarabine on day8    will proceed with day2 chemo with pre mdes Tylenol and Benadryl, hydrocortisone iv PRN reaction   Hepatitis screen/HIV(-) as outpatient  IVF, antiemetics, mouth care. monitor weight, I & O, diuresis PRN   Monitor CBC with diff QD, TLS labs BID, Coags & T & C TIW transfuse for hb<7, PLT <10  5/5 HLA sent  5/16 BMbx done, f/u  5/18- Monitor CBC Q12hrs new ASXL1-mutated and MSA59-ptawqfn AML.  admitted for induction chemo with dauno 60 mg/m2+ cytarabine. Chemo started on 5/2, Complicated by rigors, chills, fever, SOB  and tachycardia 2 hours into day 1 infusion - likely infusion. received pepcid/benadryl/solumedrol/tylenol iv. will make up D1 Cytarabine on day8    will proceed with day2 chemo with pre mdes Tylenol and Benadryl, hydrocortisone iv PRN reaction   Hepatitis screen/HIV(-) as outpatient  IVF, antiemetics, mouth care. monitor weight, I & O, diuresis PRN   Monitor CBC with diff QD, TLS labs BID, Coags & T & C TIW transfuse for hb<7, PLT <10  5/5 HLA sent  5/16 BMbx done, f/u  5/19: no longer having blood in stool for several days, check cbc daily, plt goal decreased to >30

## 2025-05-19 NOTE — PROGRESS NOTE ADULT - ASSESSMENT
69M with HTN, HLD, Gout, GERD, BPH, spinal stenosis (s/p multiple procedures 2018, 2020 laminectomies / fusions), hip surgeries (Oct 2024, Jan 2025), residual hand weakness related to surgeries, umbilical hernia repair (2014/15) referred here from Dr Atkinson at [facility] in [place] for new ASXL1-mutated and TDT90-gcseifo AML, admitted for induction chemo with dauno 60 mg/m2+ cytarabine. Hospital course c/b neutropenic fevers, pancytopenia and mucositis pain, Geriatrics and Palliative Medicine Team is consulted for assistance with pain control

## 2025-05-19 NOTE — PROGRESS NOTE ADULT - ASSESSMENT
68 yo male with newly diagnosed ASXL1, DDX41 mutated AML, admitted for induction chemo with 7+3:  dauno 60 mg/m2+ cytarabine. PMH  BRCA2 carrier ([id number] 9610C>T), HTN, HLD, Gout, GERD, BPH, spinal stenosis (s/p multiple procedures 2018, 2020 laminectomies / fusions), hip surgeries (Oct 2024, Jan 2025), residual hand weakness related to surgeries, umbilical hernia repair (2014/15) referred here from Dr Atkinson at [facility] in [place] for new ASXL1-mutated and RUS93-tmtldkr AML. Chemo started on 5/2, Complicated by rigors, chills, fever, SOB  and tachycardia 2 hours into day 1 infusion - likely infusion reaction. Pt has pancytopenia d/t chemo and disease

## 2025-05-20 ENCOUNTER — NON-APPOINTMENT (OUTPATIENT)
Age: 70
End: 2025-05-20

## 2025-05-20 LAB
-  AMPICILLIN: SIGNIFICANT CHANGE UP
-  CIPROFLOXACIN: SIGNIFICANT CHANGE UP
-  COAGULASE NEGATIVE STAPHYLOCOCCUS, METHICILLIN RESISTANT: SIGNIFICANT CHANGE UP
-  DAPTOMYCIN: SIGNIFICANT CHANGE UP
-  LEVOFLOXACIN: SIGNIFICANT CHANGE UP
-  LINEZOLID: SIGNIFICANT CHANGE UP
-  NITROFURANTOIN: SIGNIFICANT CHANGE UP
-  TETRACYCLINE: SIGNIFICANT CHANGE UP
-  VANCOMYCIN: SIGNIFICANT CHANGE UP
ALBUMIN SERPL ELPH-MCNC: 3.4 G/DL — SIGNIFICANT CHANGE UP (ref 3.3–5)
ALP SERPL-CCNC: 89 U/L — SIGNIFICANT CHANGE UP (ref 40–120)
ALT FLD-CCNC: 16 U/L — SIGNIFICANT CHANGE UP (ref 10–45)
ANION GAP SERPL CALC-SCNC: 13 MMOL/L — SIGNIFICANT CHANGE UP (ref 5–17)
ANION GAP SERPL CALC-SCNC: 14 MMOL/L — SIGNIFICANT CHANGE UP (ref 5–17)
APPEARANCE UR: CLEAR — SIGNIFICANT CHANGE UP
AST SERPL-CCNC: 15 U/L — SIGNIFICANT CHANGE UP (ref 10–40)
BACTERIA # UR AUTO: NEGATIVE /HPF — SIGNIFICANT CHANGE UP
BILIRUB SERPL-MCNC: 0.6 MG/DL — SIGNIFICANT CHANGE UP (ref 0.2–1.2)
BILIRUB UR-MCNC: NEGATIVE — SIGNIFICANT CHANGE UP
BUN SERPL-MCNC: 12 MG/DL — SIGNIFICANT CHANGE UP (ref 7–23)
BUN SERPL-MCNC: 14 MG/DL — SIGNIFICANT CHANGE UP (ref 7–23)
CALCIUM SERPL-MCNC: 8.3 MG/DL — LOW (ref 8.4–10.5)
CALCIUM SERPL-MCNC: 8.6 MG/DL — SIGNIFICANT CHANGE UP (ref 8.4–10.5)
CAST: 3 /LPF — SIGNIFICANT CHANGE UP (ref 0–4)
CHLORIDE SERPL-SCNC: 101 MMOL/L — SIGNIFICANT CHANGE UP (ref 96–108)
CHLORIDE SERPL-SCNC: 99 MMOL/L — SIGNIFICANT CHANGE UP (ref 96–108)
CO2 SERPL-SCNC: 21 MMOL/L — LOW (ref 22–31)
CO2 SERPL-SCNC: 22 MMOL/L — SIGNIFICANT CHANGE UP (ref 22–31)
COLOR SPEC: YELLOW — SIGNIFICANT CHANGE UP
CREAT SERPL-MCNC: 1.18 MG/DL — SIGNIFICANT CHANGE UP (ref 0.5–1.3)
CREAT SERPL-MCNC: 1.27 MG/DL — SIGNIFICANT CHANGE UP (ref 0.5–1.3)
CULTURE RESULTS: ABNORMAL
DIFF PNL FLD: NEGATIVE — SIGNIFICANT CHANGE UP
EGFR: 62 ML/MIN/1.73M2 — SIGNIFICANT CHANGE UP
EGFR: 62 ML/MIN/1.73M2 — SIGNIFICANT CHANGE UP
EGFR: 67 ML/MIN/1.73M2 — SIGNIFICANT CHANGE UP
EGFR: 67 ML/MIN/1.73M2 — SIGNIFICANT CHANGE UP
GLUCOSE SERPL-MCNC: 148 MG/DL — HIGH (ref 70–99)
GLUCOSE SERPL-MCNC: 156 MG/DL — HIGH (ref 70–99)
GLUCOSE UR QL: NEGATIVE MG/DL — SIGNIFICANT CHANGE UP
GRAM STN FLD: ABNORMAL
HCT VFR BLD CALC: 23.4 % — LOW (ref 39–50)
HGB BLD-MCNC: 8.2 G/DL — LOW (ref 13–17)
KETONES UR QL: NEGATIVE MG/DL — SIGNIFICANT CHANGE UP
LDH SERPL L TO P-CCNC: 211 U/L — SIGNIFICANT CHANGE UP (ref 50–242)
LEUKOCYTE ESTERASE UR-ACNC: NEGATIVE — SIGNIFICANT CHANGE UP
MAGNESIUM SERPL-MCNC: 1.6 MG/DL — SIGNIFICANT CHANGE UP (ref 1.6–2.6)
MAGNESIUM SERPL-MCNC: 2.2 MG/DL — SIGNIFICANT CHANGE UP (ref 1.6–2.6)
MCHC RBC-ENTMCNC: 31.2 PG — SIGNIFICANT CHANGE UP (ref 27–34)
MCHC RBC-ENTMCNC: 35 G/DL — SIGNIFICANT CHANGE UP (ref 32–36)
MCV RBC AUTO: 89 FL — SIGNIFICANT CHANGE UP (ref 80–100)
METHOD TYPE: SIGNIFICANT CHANGE UP
METHOD TYPE: SIGNIFICANT CHANGE UP
NITRITE UR-MCNC: NEGATIVE — SIGNIFICANT CHANGE UP
NRBC BLD AUTO-RTO: 0 /100 WBCS — SIGNIFICANT CHANGE UP (ref 0–0)
ORGANISM # SPEC MICROSCOPIC CNT: ABNORMAL
ORGANISM # SPEC MICROSCOPIC CNT: ABNORMAL
PH UR: 6.5 — SIGNIFICANT CHANGE UP (ref 5–8)
PHOSPHATE SERPL-MCNC: 2.2 MG/DL — LOW (ref 2.5–4.5)
PHOSPHATE SERPL-MCNC: 2.8 MG/DL — SIGNIFICANT CHANGE UP (ref 2.5–4.5)
PLATELET # BLD AUTO: 32 K/UL — LOW (ref 150–400)
POTASSIUM SERPL-MCNC: 2.9 MMOL/L — CRITICAL LOW (ref 3.5–5.3)
POTASSIUM SERPL-MCNC: 3.5 MMOL/L — SIGNIFICANT CHANGE UP (ref 3.5–5.3)
POTASSIUM SERPL-SCNC: 2.9 MMOL/L — CRITICAL LOW (ref 3.5–5.3)
POTASSIUM SERPL-SCNC: 3.5 MMOL/L — SIGNIFICANT CHANGE UP (ref 3.5–5.3)
PROT SERPL-MCNC: 5.9 G/DL — LOW (ref 6–8.3)
PROT UR-MCNC: SIGNIFICANT CHANGE UP MG/DL
RBC # BLD: 2.63 M/UL — LOW (ref 4.2–5.8)
RBC # FLD: 13.3 % — SIGNIFICANT CHANGE UP (ref 10.3–14.5)
RBC CASTS # UR COMP ASSIST: 1 /HPF — SIGNIFICANT CHANGE UP (ref 0–4)
SODIUM SERPL-SCNC: 134 MMOL/L — LOW (ref 135–145)
SODIUM SERPL-SCNC: 136 MMOL/L — SIGNIFICANT CHANGE UP (ref 135–145)
SP GR SPEC: 1.01 — SIGNIFICANT CHANGE UP (ref 1–1.03)
SPECIMEN SOURCE: SIGNIFICANT CHANGE UP
SPECIMEN SOURCE: SIGNIFICANT CHANGE UP
SQUAMOUS # UR AUTO: 0 /HPF — SIGNIFICANT CHANGE UP (ref 0–5)
URATE SERPL-MCNC: 4.2 MG/DL — SIGNIFICANT CHANGE UP (ref 3.4–8.8)
UROBILINOGEN FLD QL: 0.2 MG/DL — SIGNIFICANT CHANGE UP (ref 0.2–1)
WBC # BLD: 0.46 K/UL — CRITICAL LOW (ref 3.8–10.5)
WBC # FLD AUTO: 0.46 K/UL — CRITICAL LOW (ref 3.8–10.5)
WBC UR QL: 0 /HPF — SIGNIFICANT CHANGE UP (ref 0–5)

## 2025-05-20 PROCEDURE — 99233 SBSQ HOSP IP/OBS HIGH 50: CPT

## 2025-05-20 PROCEDURE — G0545: CPT

## 2025-05-20 PROCEDURE — 99222 1ST HOSP IP/OBS MODERATE 55: CPT | Mod: GC

## 2025-05-20 RX ORDER — MAGNESIUM SULFATE 500 MG/ML
2 SYRINGE (ML) INJECTION ONCE
Refills: 0 | Status: COMPLETED | OUTPATIENT
Start: 2025-05-20 | End: 2025-05-20

## 2025-05-20 RX ORDER — HYDROMORPHONE/SOD CHLOR,ISO/PF 2 MG/10 ML
0.5 SYRINGE (ML) INJECTION
Refills: 0 | Status: DISCONTINUED | OUTPATIENT
Start: 2025-05-20 | End: 2025-05-27

## 2025-05-20 RX ORDER — VANCOMYCIN HCL IN 5 % DEXTROSE 1.5G/250ML
750 PLASTIC BAG, INJECTION (ML) INTRAVENOUS EVERY 12 HOURS
Refills: 0 | Status: DISCONTINUED | OUTPATIENT
Start: 2025-05-20 | End: 2025-05-21

## 2025-05-20 RX ORDER — ACETAMINOPHEN 500 MG/5ML
1000 LIQUID (ML) ORAL ONCE
Refills: 0 | Status: COMPLETED | OUTPATIENT
Start: 2025-05-20 | End: 2025-05-20

## 2025-05-20 RX ORDER — VANCOMYCIN HCL IN 5 % DEXTROSE 1.5G/250ML
750 PLASTIC BAG, INJECTION (ML) INTRAVENOUS ONCE
Refills: 0 | Status: COMPLETED | OUTPATIENT
Start: 2025-05-20 | End: 2025-05-20

## 2025-05-20 RX ORDER — POTASSIUM PHOSPHATE, MONOBASIC POTASSIUM PHOSPHATE, DIBASIC INJECTION, 236; 224 MG/ML; MG/ML
15 SOLUTION, CONCENTRATE INTRAVENOUS ONCE
Refills: 0 | Status: COMPLETED | OUTPATIENT
Start: 2025-05-20 | End: 2025-05-20

## 2025-05-20 RX ORDER — VANCOMYCIN HCL IN 5 % DEXTROSE 1.5G/250ML
PLASTIC BAG, INJECTION (ML) INTRAVENOUS
Refills: 0 | Status: DISCONTINUED | OUTPATIENT
Start: 2025-05-20 | End: 2025-05-21

## 2025-05-20 RX ADMIN — Medication 15 MILLILITER(S): at 11:45

## 2025-05-20 RX ADMIN — DEXAMETHASONE 0.5 MILLIGRAM(S): 0.5 TABLET ORAL at 19:08

## 2025-05-20 RX ADMIN — Medication 0.5 MILLIGRAM(S): at 08:27

## 2025-05-20 RX ADMIN — Medication 2000 UNIT(S): at 11:45

## 2025-05-20 RX ADMIN — MEROPENEM 100 MILLIGRAM(S): 1 INJECTION INTRAVENOUS at 13:14

## 2025-05-20 RX ADMIN — DIPHENHYDRAMINE HYDROCHLORIDE AND LIDOCAINE HYDROCHLORIDE AND ALUMINUM HYDROXIDE AND MAGNESIUM HYDRO 10 MILLILITER(S): KIT at 17:39

## 2025-05-20 RX ADMIN — POTASSIUM PHOSPHATE, MONOBASIC POTASSIUM PHOSPHATE, DIBASIC INJECTION, 62.5 MILLIMOLE(S): 236; 224 SOLUTION, CONCENTRATE INTRAVENOUS at 21:36

## 2025-05-20 RX ADMIN — Medication 250 MILLIGRAM(S): at 10:56

## 2025-05-20 RX ADMIN — Medication 50 MILLIEQUIVALENT(S): at 15:38

## 2025-05-20 RX ADMIN — Medication 0.5 MILLIGRAM(S): at 21:34

## 2025-05-20 RX ADMIN — DEXAMETHASONE 0.5 MILLIGRAM(S): 0.5 TABLET ORAL at 05:04

## 2025-05-20 RX ADMIN — Medication 5 MILLIGRAM(S): at 21:36

## 2025-05-20 RX ADMIN — Medication 50 MILLIEQUIVALENT(S): at 10:08

## 2025-05-20 RX ADMIN — Medication 25 GRAM(S): at 08:10

## 2025-05-20 RX ADMIN — Medication 500 MILLIGRAM(S): at 05:01

## 2025-05-20 RX ADMIN — LIDOCAINE HYDROCHLORIDE 1 APPLICATION(S): 20 JELLY TOPICAL at 05:02

## 2025-05-20 RX ADMIN — Medication 15 MILLILITER(S): at 23:08

## 2025-05-20 RX ADMIN — Medication 650 MILLIGRAM(S): at 08:28

## 2025-05-20 RX ADMIN — Medication 0.5 MILLIGRAM(S): at 17:42

## 2025-05-20 RX ADMIN — Medication 40 MILLIGRAM(S): at 05:03

## 2025-05-20 RX ADMIN — Medication 80 MILLIGRAM(S): at 05:01

## 2025-05-20 RX ADMIN — Medication 0.5 MILLIGRAM(S): at 12:42

## 2025-05-20 RX ADMIN — DIPHENHYDRAMINE HYDROCHLORIDE AND LIDOCAINE HYDROCHLORIDE AND ALUMINUM HYDROXIDE AND MAGNESIUM HYDRO 10 MILLILITER(S): KIT at 23:08

## 2025-05-20 RX ADMIN — LIDOCAINE HYDROCHLORIDE 1 APPLICATION(S): 20 JELLY TOPICAL at 13:15

## 2025-05-20 RX ADMIN — DIPHENHYDRAMINE HYDROCHLORIDE AND LIDOCAINE HYDROCHLORIDE AND ALUMINUM HYDROXIDE AND MAGNESIUM HYDRO 10 MILLILITER(S): KIT at 11:45

## 2025-05-20 RX ADMIN — MIDODRINE HYDROCHLORIDE 10 MILLIGRAM(S): 5 TABLET ORAL at 22:29

## 2025-05-20 RX ADMIN — POSACONAZOLE 300 MILLIGRAM(S): 100 TABLET, DELAYED RELEASE ORAL at 17:39

## 2025-05-20 RX ADMIN — Medication 0.5 MILLIGRAM(S): at 18:42

## 2025-05-20 RX ADMIN — DILTIAZEM HYDROCHLORIDE 240 MILLIGRAM(S): 240 TABLET, EXTENDED RELEASE ORAL at 05:02

## 2025-05-20 RX ADMIN — Medication 15 MILLILITER(S): at 05:01

## 2025-05-20 RX ADMIN — LIDOCAINE HYDROCHLORIDE 1 APPLICATION(S): 20 JELLY TOPICAL at 21:48

## 2025-05-20 RX ADMIN — Medication 500 MILLIGRAM(S): at 17:39

## 2025-05-20 RX ADMIN — MEROPENEM 100 MILLIGRAM(S): 1 INJECTION INTRAVENOUS at 21:36

## 2025-05-20 RX ADMIN — TAMSULOSIN HYDROCHLORIDE 0.4 MILLIGRAM(S): 0.4 CAPSULE ORAL at 21:37

## 2025-05-20 RX ADMIN — MEROPENEM 100 MILLIGRAM(S): 1 INJECTION INTRAVENOUS at 05:04

## 2025-05-20 RX ADMIN — Medication 0.5 MILLIGRAM(S): at 22:30

## 2025-05-20 RX ADMIN — Medication 0.5 MILLIGRAM(S): at 04:59

## 2025-05-20 RX ADMIN — Medication 1 APPLICATION(S): at 05:02

## 2025-05-20 RX ADMIN — Medication 80 MILLIGRAM(S): at 11:45

## 2025-05-20 RX ADMIN — Medication 15 MILLILITER(S): at 17:39

## 2025-05-20 RX ADMIN — Medication 400 MILLIGRAM(S): at 00:49

## 2025-05-20 RX ADMIN — Medication 650 MILLIGRAM(S): at 14:32

## 2025-05-20 RX ADMIN — Medication 250 MILLIGRAM(S): at 17:35

## 2025-05-20 RX ADMIN — MIDODRINE HYDROCHLORIDE 10 MILLIGRAM(S): 5 TABLET ORAL at 05:02

## 2025-05-20 RX ADMIN — DIPHENHYDRAMINE HYDROCHLORIDE AND LIDOCAINE HYDROCHLORIDE AND ALUMINUM HYDROXIDE AND MAGNESIUM HYDRO 10 MILLILITER(S): KIT at 05:01

## 2025-05-20 RX ADMIN — Medication 50 MILLIEQUIVALENT(S): at 12:24

## 2025-05-20 RX ADMIN — Medication 0.5 MILLIGRAM(S): at 13:42

## 2025-05-20 RX ADMIN — Medication 80 MILLIGRAM(S): at 17:40

## 2025-05-20 RX ADMIN — Medication 1000 MILLIGRAM(S): at 21:50

## 2025-05-20 RX ADMIN — Medication 40 MILLIGRAM(S): at 17:39

## 2025-05-20 RX ADMIN — Medication 400 MILLIGRAM(S): at 20:50

## 2025-05-20 RX ADMIN — Medication 80 MILLIGRAM(S): at 23:08

## 2025-05-20 RX ADMIN — Medication 0.5 MILLIGRAM(S): at 09:27

## 2025-05-20 RX ADMIN — Medication 100 MILLIGRAM(S): at 11:46

## 2025-05-20 NOTE — PHARMACOTHERAPY INTERVENTION NOTE - COMMENTS
Clinical Pharmacy Specialist- Hematology/Oncology- Progress Note    Pt is a 70 y/o male w/ PMH of HTN, HLD, GERD, BPH, Gout, spinal stenosis (s/p multiple surgeries) and newly diagnosed AML (ASXL1-mutated and DDX41) admitted for management with 7+3 Induction therapy    Antimicrobial Course:  - Posaconazole- 5/2  - Levaquin/Amox- 5/2- 5/11  --> Cefepime- 5/11- 5/19  --> Meropenem- 5/19  - Flagyl (abd discomfort)- 5/13- 5/19  - Valtrex- 5/2  MRSA nasal swab    Last Neutropenic (ANC<1000): 5/20; ANC= 0  Last Febrile: 20-May-2025 08:16; T= 100.6  Days Non-Neutropenic: 0  Days afebrile: 0    Chemotherapy Course  -Current Regimen: 7+3 Induction  History:  (5/2/25)- 7+3 Induction   -Daunorubicin 60mg/m2 IVP D1-3   -Cytarabine 100mg/m2 IV cont inf D1-7  -Day: 19 (5/20)  BmBx:  Access: R TLC (accessed 5/5/25)    History/Relevant clinical information used in assessment:  - Ht= 5’11”; IBW= 75.3kg; Actual BW= 107.3kg; Adj BW= 88kg  - 5/2- IRR? rigors, chills, and tachycardia 2 hours into day 1; rasburicase 3mg x 1  - 5/4 -Crcl= 67ml/min ; Scr=1.32; Wt= 88kg; Adjusted BW used since pt BMI>30  - 5/9- increase melatonin 3mg to 10mh qhs- c/o insomnia  - 5/13- mouth pain- has ulceration   - 5/14- mouth pain - cannot swallow now  - 5/16- bloody BM's decreased to 2x'd/day from 4x's/day yest    Assessment/Plan/Recommendation:  Onc:  - Daunorubicin 60mg/m2 IVP D1-3   - Cytarabine 100mg/m2 IV cont inf D1-7- IRR on D1, started at 6p, stopped at 7:30p (received 1.5 hrs),  made up D1 bag on 5/9 to run over 23 hrs (to account for ~1hr given)  - allopurinol 100mg (300mg d/c'd 5/15)-keeping for gout  ID:  - 5/15- bloody diarrhea x 3 on reglan prn, but has not needed, monitor for d/c to not aggravate w/ prokinetic agent- amicar?  - 5/15- HSV neg  - 5/19, still febrile since 5/11, Tmax= 103.3 5/19@5p- on cefepime, BlCx NGTD + abd discomfort-->CT CAP 5/15 - no infection (but groundglass opacities stable from previous?)- on meropenem- (falls off after 5/27)  - 5/19- BlCx- gpc clusters- if starting vanco, recommend vancomycin 750mg q12hr, this will yield AUC= 462 (goal 400-600); dose is lower d/t pt Scr= 1.18; vanco 1gm q12hr gives AUC >600- ID consult?  - next VT= 5/21 8pm    Cards:  - atorvastatin 80mg held d/t DDI with posa (Cat X)  Pain (mouth):  ·	magic mouthwash to swish & swallow- 5/14  ·	hydromorphone 0.2mg IVP q6hr prn- 5/14--> 0.5mg - 0.7mg q3hr prn- 5/16  ·	hydromorphone 0.5mg IVP tid- 5/15  ·	oxycodone 2.5-5mg q4hr prn- 5/2- 5/16  ·	lidocaine gel 2% q8hr to tongue 5/14  ·	dexamethasone soln 0.5 mg/5 mL BID swish & spit - 5/15  Orthostasis:  - 5/18- midodrine 10mg PO tid    Additional Monitoring Needed?   -Yes- Continue to monitor renal function & daily counts for abx escalation/de-escalation   -Discharge Planning:  --> New meds: midodrine  --> Meds sent for auth:  --> Delivered meds:    Case discussed with attending/primary team    Liza Jolley, PharmD, BCPS  Clinical Pharmacy Specialist | Hematology/Oncology  [facility]  Email: [email or web address] or available on [employer] Clinical Pharmacy Specialist- Hematology/Oncology- Progress Note    Pt is a 70 y/o male w/ PMH of HTN, HLD, GERD, BPH, Gout, spinal stenosis (s/p multiple surgeries) and newly diagnosed AML (ASXL1-mutated and DDX41) admitted for management with 7+3 Induction therapy    Antimicrobial Course:  - Posaconazole- 5/2  - Levaquin/Amox- 5/2- 5/11  --> Cefepime- 5/11- 5/19  --> Meropenem- 5/19  - Flagyl (abd discomfort)- 5/13- 5/19  - Valtrex- 5/2  MRSA nasal swab    Last Neutropenic (ANC<1000): 5/20; ANC= 0  Last Febrile: 20-May-2025 08:16; T= 100.6  Days Non-Neutropenic: 0  Days afebrile: 0    Chemotherapy Course  -Current Regimen: 7+3 Induction  History:  (5/2/25)- 7+3 Induction   -Daunorubicin 60mg/m2 IVP D1-3   -Cytarabine 100mg/m2 IV cont inf D1-7  -Day: 19 (5/20)  BmBx:  Access: R TLC (accessed 5/5/25)    History/Relevant clinical information used in assessment:  - Ht= 5’11”; IBW= 75.3kg; Actual BW= 107.3kg; Adj BW= 88kg  - 5/2- IRR? rigors, chills, and tachycardia 2 hours into day 1; rasburicase 3mg x 1  - 5/4 -Crcl= 67ml/min ; Scr=1.32; Wt= 88kg; Adjusted BW used since pt BMI>30  - 5/9- increase melatonin 3mg to 10mh qhs- c/o insomnia  - 5/13- mouth pain- has ulceration   - 5/14- mouth pain - cannot swallow now  - 5/16- bloody BM's decreased to 2x'd/day from 4x's/day yest    Assessment/Plan/Recommendation:  Onc:  - Daunorubicin 60mg/m2 IVP D1-3   - Cytarabine 100mg/m2 IV cont inf D1-7- IRR on D1, started at 6p, stopped at 7:30p (received 1.5 hrs),  made up D1 bag on 5/9 to run over 23 hrs (to account for ~1hr given)  - allopurinol 100mg (300mg d/c'd 5/15)-keeping for gout  ID:  - 5/15- bloody diarrhea x 3 on reglan prn, but has not needed, monitor for d/c to not aggravate w/ prokinetic agent- amicar?  - 5/15- HSV neg  - 5/19, still febrile since 5/11, Tmax= 103.3 5/19@5p- on cefepime, BlCx NGTD + abd discomfort-->CT CAP 5/15 - no infection (but groundglass opacities stable from previous?)- on meropenem- (falls off after 5/27)  - 5/19- BlCx- gpc clusters (h/o e. faecium in UCx) - starting vanco, recommend vancomycin 750mg q12hr, this will yield AUC= 462 (goal 400-600); dose is lower d/t pt Scr= 1.18; vanco 1gm q12hr gives AUC >600- ID consult?- will obtain  - next VT= 5/21 8pm    Cards:  - atorvastatin 80mg held d/t DDI with posa (Cat X)  Pain (mouth):  ·	magic mouthwash to swish & swallow- 5/14  ·	hydromorphone 0.2mg IVP q6hr prn- 5/14--> 0.5mg - 0.7mg q3hr prn- 5/16  ·	hydromorphone 0.5mg IVP tid- 5/15  ·	oxycodone 2.5-5mg q4hr prn- 5/2- 5/16  ·	lidocaine gel 2% q8hr to tongue 5/14  ·	dexamethasone soln 0.5 mg/5 mL BID swish & spit - 5/15  Orthostasis:  - 5/18- midodrine 10mg PO tid    Additional Monitoring Needed?   -Yes- Continue to monitor renal function & daily counts for abx escalation/de-escalation   -Discharge Planning:  --> New meds: midodrine  --> Meds sent for auth:  --> Delivered meds:    Case discussed with attending/primary team    Liza Jolley, PharmD, BCPS  Clinical Pharmacy Specialist | Hematology/Oncology  [facility]  Email: [email or web address] or available on [employer] Clinical Pharmacy Specialist- Hematology/Oncology- Progress Note    Pt is a 70 y/o male w/ PMH of HTN, HLD, GERD, BPH, Gout, spinal stenosis (s/p multiple surgeries) and newly diagnosed AML (ASXL1-mutated and DDX41) admitted for management with 7+3 Induction therapy    Antimicrobial Course:  - Posaconazole- 5/2  - Levaquin/Amox- 5/2- 5/11  --> Cefepime- 5/11- 5/19  --> Meropenem- 5/19  - Flagyl (abd discomfort)- 5/13- 5/19  - Valtrex- 5/2  MRSA nasal swab    Last Neutropenic (ANC<1000): 5/20; ANC= 0  Last Febrile: 20-May-2025 08:16; T= 100.6  Days Non-Neutropenic: 0  Days afebrile: 0    Chemotherapy Course  -Current Regimen: 7+3 Induction  History:  (5/2/25)- 7+3 Induction   -Daunorubicin 60mg/m2 IVP D1-3   -Cytarabine 100mg/m2 IV cont inf D1-7  -Day: 19 (5/20)  BmBx:  Access: R TLC (accessed 5/5/25)    History/Relevant clinical information used in assessment:  - Ht= 5’11”; IBW= 75.3kg; Actual BW= 107.3kg; Adj BW= 88kg  - 5/2- IRR? rigors, chills, and tachycardia 2 hours into day 1; rasburicase 3mg x 1  - 5/4 -Crcl= 67ml/min ; Scr=1.32; Wt= 88kg; Adjusted BW used since pt BMI>30  - 5/9- increase melatonin 3mg to 10mh qhs- c/o insomnia  - 5/13- mouth pain- has ulceration   - 5/14- mouth pain - cannot swallow now  - 5/16- bloody BM's decreased to 2x'd/day from 4x's/day yest    Assessment/Plan/Recommendation:  Onc:  - Daunorubicin 60mg/m2 IVP D1-3   - Cytarabine 100mg/m2 IV cont inf D1-7- IRR on D1, started at 6p, stopped at 7:30p (received 1.5 hrs),  made up D1 bag on 5/9 to run over 23 hrs (to account for ~1hr given)  - allopurinol 100mg (300mg d/c'd 5/15)-keeping for gout  ID:  - 5/15- bloody diarrhea x 3 on reglan prn, but has not needed, monitor for d/c to not aggravate w/ prokinetic agent- amicar?  - 5/15- HSV neg  - 5/19, still febrile since 5/11, Tmax= 103.3 5/19@5p- on cefepime, BlCx NGTD + abd discomfort-->CT CAP 5/15 - no infection (but groundglass opacities stable from previous?)- on meropenem- (falls off after 5/27)  - 5/19- BlCx- MRSA- if starting vanco, recommend vancomycin 750mg q12hr, this will yield AUC= 462 (goal 400-600); dose is lower d/t pt Scr= 1.18; vanco 1gm q12hr gives AUC >600- recommend ID consult- will obtain  - next VT= 5/21 8pm  - 5/17 UCx- 5/17- 10-49k VRE- dapto? await ID input  Cards:  - atorvastatin 80mg held d/t DDI with posa (Cat X)  Pain (mouth):  ·	magic mouthwash to swish & swallow- 5/14  ·	hydromorphone 0.2mg IVP q6hr prn- 5/14--> 0.5mg - 0.7mg q3hr prn- 5/16  ·	hydromorphone 0.5mg IVP tid- 5/15  ·	oxycodone 2.5-5mg q4hr prn- 5/2- 5/16  ·	lidocaine gel 2% q8hr to tongue 5/14  ·	dexamethasone soln 0.5 mg/5 mL BID swish & spit - 5/15  Orthostasis:  - 5/18- midodrine 10mg PO tid    Additional Monitoring Needed?   -Yes- Continue to monitor renal function & daily counts for abx escalation/de-escalation   -Discharge Planning:  --> New meds: midodrine  --> Meds sent for auth:  --> Delivered meds:    Case discussed with attending/primary team    Liza Jolley, PharmD, BCPS  Clinical Pharmacy Specialist | Hematology/Oncology  [facility]  Email: [email or web address] or available on [email or web address]

## 2025-05-20 NOTE — PROGRESS NOTE ADULT - PROBLEM SELECTOR PLAN 2
neutropenic, Febrile   continue posa, valtrex, flagyl (dc'd 5/19), cefepime (5/11-5/19 ), cont to be febrile Ucx from 5/17 (+) gram + cocci in pairs and chains, inc to meropenem (5/19-   5/2 CXR Patchy opacities along the left lung base, possibly atelectasis, however superimposed infection cannot be entirely excluded.  5/13- FLU/COVID/RSV (-)  5/13- Added Flagyl 500 mg IV Q 8 hrs due to GI discomfort/pain  5/13- CT C/A/P -negative for infxn  5/14 BCX sent f/u  5/15 HSV + MRSA PCR sent f/u  5/19: fu bld cx neutropenic, febrile   continue posa, valtrex, flagyl (dc'd 5/19), cefepime (5/11-5/19 ), cont to be febrile Ucx from 5/17 (+) gram + cocci in pairs and chains, inc to meropenem (5/19-   5/2 CXR Patchy opacities along the left lung base, possibly atelectasis, however superimposed infection cannot be entirely excluded.  5/13- FLU/COVID/RSV (-)  5/13- Added Flagyl 500 mg IV Q 8 hrs due to GI discomfort/pain  5/13- CT C/A/P -negative for infxn  5/20  blood cx 5/19 showed 1/4 MRSE  urine cx 5/17 VRE but 5/18 negative and pt asymptomatic  - Levaquin/Amox- 5/2- 5/11  - Cefepime- 5/11- 5/19  Flagyl (abd discomfort)- 5/13- 5/19  - Meropenem- 5/19  - Valtrex-Posaconazole  5/2 5/20 O2 sat 88% with fever, FU closely, O2 PRN

## 2025-05-20 NOTE — PROGRESS NOTE ADULT - PROBLEM SELECTOR PLAN 1
new ASXL1-mutated and JFT33-hqhdnru AML.  admitted for induction chemo with dauno 60 mg/m2+ cytarabine. Chemo started on 5/2, Complicated by rigors, chills, fever, SOB  and tachycardia 2 hours into day 1 infusion - likely infusion. received pepcid/benadryl/solumedrol/tylenol iv. will make up D1 Cytarabine on day8    will proceed with day2 chemo with pre mdes Tylenol and Benadryl, hydrocortisone iv PRN reaction   Hepatitis screen/HIV(-) as outpatient  IVF, antiemetics, mouth care. monitor weight, I & O, diuresis PRN   Monitor CBC with diff QD, TLS labs BID, Coags & T & C TIW transfuse for hb<7, PLT <10  5/5 HLA sent  5/16 BMbx done, f/u  5/19: no longer having blood in stool for several days, check cbc daily, plt goal decreased to >30 new ASXL1-mutated and QGB84-zexgsuq AML.  admitted for induction chemo with dauno 60 mg/m2+ cytarabine. Chemo started on 5/2, Complicated by rigors, chills, fever, SOB  and tachycardia 2 hours into day 1 infusion - likely infusion. received pepcid/benadryl/solumedrol/tylenol iv. will make up D1 Cytarabine on day8    will proceed with day2 chemo with pre mdes Tylenol and Benadryl, hydrocortisone iv PRN reaction   Hepatitis screen/HIV(-) as outpatient  IVF, antiemetics, mouth care. monitor weight, I & O, diuresis PRN   Monitor CBC with diff QD, TLS labs BID, Coags & T & C TIW transfuse for hb<7, PLT <30  5/5 HLA sent  5/16 BMbx done, f/u  5/19: no longer having blood in stool for several days, check cbc daily, plt goal decreased to >30  5/20 hypokalemia: KCL 20 meq IVPB x3, hypomagnesemia: Mg 2 g iVPB x1, Demerol PRN rigor

## 2025-05-20 NOTE — CONSULT NOTE ADULT - ASSESSMENT
70 yo male with newly diagnosed ASXL1, DDX41 mutated AML, admitted for induction chemo with dauno 60 mg/m2+ cytarabine. PMH  BRCA2 carrier ([id number] 9610C>T), HTN, HLD, Gout, GERD, BPH, spinal stenosis (s/p multiple procedures 2018, 2020 laminectomies / fusions), hip surgeries (Oct 2024, Jan 2025), residual hand weakness related to surgeries, umbilical hernia repair (2014/15) referred here from Dr Atkinson at [facility] in [place] for new ASXL1-mutated and TSK66-ylypmxs AML.   5/2 Admit for Induction 7+3 Dauno 60mg/m2 + Cytarabine 100mg/m2     Hospital course: Chemo started on 5/2, Complicated by rigors, chills, fever, SOB  and tachycardia 2 hours into day 1 infusion - likely infusion reaction. Afebrile 5/3-5/10, Neutropenic since 5/7, persistent daily fevers 5/11 onwards. Developed bloody BM 5/16, now resolved. Blood cultures with COnS. ID asked to evaluate.      5/13- FLU/COVID/RSV (-)  5/17 Ucx VRE, Ucx 5/18 NG  Bcx 5/19 1/2 CoNS      Antimicrobial Course:  - Levaquin/Amox- 5/2- 5/11  - Cefepime- 5/11- 5/19  - Meropenem- 5/19  - Flagyl (abd discomfort)- 5/13- 5/19  - Valtrex- 5/2  - Posaconazole 5/2      # Febrile Neutropenia  # AML on induction chemotherapy; associated pancytopenia  # Positive blood cultures  # Positive urine cultures    Patient to be seen.   68 yo male with newly diagnosed ASXL1, DDX41 mutated AML, admitted for induction chemo with dauno 60 mg/m2+ cytarabine. PMH  BRCA2 carrier ([id number] 9610C>T), HTN, HLD, Gout, GERD, BPH, spinal stenosis (s/p multiple procedures 2018, 2020 laminectomies / fusions), hip surgeries (Oct 2024, Jan 2025), residual hand weakness related to surgeries, umbilical hernia repair (2014/15) referred here from Dr Atkinson at [facility] in [place] for new ASXL1-mutated and QBM24-cfwccct AML.   5/2 Admit for Induction 7+3 Dauno 60mg/m2 + Cytarabine 100mg/m2     Hospital course: Chemo started on 5/2, Complicated by rigors, chills, fever, SOB  and tachycardia 2 hours into day 1 infusion - likely infusion reaction. Afebrile 5/3-5/10, Neutropenic since 5/7, persistent daily fevers 5/11 onwards. Developed bloody BM 5/15, no blood for last 3 days, has 2-3 watery stools. Blood cultures with CoNS. ID asked to evaluate.    5/13- FLU/COVID/RSV (-)  5/15 GI PCR neg  5/17 Ucx VRE, Ucx 5/18 NG  5/19 Bcx  1/2 CoNS      Antimicrobial Course:  - Levaquin/Amox- 5/2- 5/11  - Cefepime- 5/11- 5/19  - Meropenem- 5/19  - Flagyl (abd discomfort)- 5/13- 5/19  - Valtrex- 5/2  - Posaconazole 5/2      # Febrile Neutropenia  # AML on induction chemotherapy; associated pancytopenia  # Mucositis  # Positive blood cultures  # Positive urine cultures  # h/o lumbar fusion, b/l hip arthroplasty    - Positive blood cultures likely procurement contamination   - Will discuss vancomycin  -  68 yo male with newly diagnosed ASXL1, DDX41 mutated AML, admitted for induction chemo with dauno 60 mg/m2+ cytarabine. PMH  BRCA2 carrier ([id number] 9610C>T), HTN, HLD, Gout, GERD, BPH, spinal stenosis (s/p multiple procedures 2018, 2020 laminectomies / fusions), hip surgeries (Oct 2024, Jan 2025), residual hand weakness related to surgeries, umbilical hernia repair (2014/15) referred here from Dr Atkinson at [facility] in [place] for new ASXL1-mutated and UNS77-ihnujav AML.   5/2 Admit for Induction 7+3 Dauno 60mg/m2 + Cytarabine 100mg/m2     Hospital course: Chemo started on 5/2, Complicated by rigors, chills, fever, SOB  and tachycardia 2 hours into day 1 infusion - likely infusion reaction. Afebrile 5/3-5/10, Neutropenic since 5/7, persistent daily fevers 5/11 onwards. Developed bloody BM 5/15, no blood for last 3 days, has 2-3 watery stools. Blood cultures with CoNS. ID asked to evaluate.    5/13- FLU/COVID/RSV (-)  5/15 GI PCR neg  5/17 Ucx VRE, Ucx 5/18 NG  5/19 Bcx  1/2 CoNS      Antimicrobial Course:  - Levaquin/Amox- 5/2- 5/11  - Cefepime- 5/11- 5/19  - Meropenem- 5/19  - Flagyl (abd discomfort)- 5/13- 5/19  - Valtrex- 5/2  - Posaconazole 5/2      # Febrile Neutropenia  # AML on induction chemotherapy; associated pancytopenia  # Mucositis  # Positive blood cultures  # Positive urine cultures  # h/o lumbar fusion, b/l hip arthroplasty    - Positive blood cultures likely procurement contamination   - Continue Vancomycin 750 mg Q12h pending repeat blood cultures; check trough before 4th dose  - Continue Meropenem 1 gm Q8h  - Positive urine culture likely colonization, repeat cultures were negative, patient without symptoms; would not treat  - Continue Posa and valtrex ppx  - Continue to trend CBC, fever curve      Case d/w Attending and Primary team.     Cesar Perera MD, PGY-5  ID Fellow  [employer] Teams Preferred  After 5pm/weekends call [phone]   69 m with  HTN, HLD, Gout, GERD, BPH, BRACA carrier, spinal stenosis (s/p multiple procedures 2018, 2020 laminectomies / fusions), residual hand weakness related to surgeries, newly diagnosed ASXL1, DDX41 mutated AML, admitted 5/21 for induction chemo with dauno 60 mg/m2+ cytarabine and had fever rigors,  SOB  and tachycardia 2 hours into day 1 infusion - likely infusion reaction. Afebrile 5/3-5/10, Neutropenic since 5/7, persistent daily fevers since  5/11   Developed bloody BM 5/15, no blood for last 3 days, has 2-3 watery stools, GI PCR negative but still with fever and rigors  all blood cxs before negative and now blood cx 5/19 showed 1/4 MRSE  urine cx 5/17 VRE but 5/18 negative and pt asymptomatic    - Levaquin/Amox- 5/2- 5/11  - Cefepime- 5/11- 5/19  Flagyl (abd discomfort)- 5/13- 5/19  - Meropenem- 5/19  - Valtrex-Posaconazole  5/2    AML stated on daruno+cytararbine 5/2, had fever and likely infusion reaction but then neutropenic fever, rigors since 5/11, chest/abd CT 5/14 negative BM BX 5/16 showed rare myeloblast polpulation  1/4 MRSE in the blood could be a contaminant but pt has a RIJ line  urine cx 5/17 with VRE but repeat 5/18 negative without intervention    * follow the repeat blood cx 5/20, it was done before starting vanco  * c/w vanco for now  * c/w gianna  * c/w Posa and valtrex ppx  * monitor CBC/diff and CMP      The above assessment and plan was discussed with the primary team    Lidia Rodarte MD  contact on teams  After 5pm and on weekends call [phone]

## 2025-05-20 NOTE — PROGRESS NOTE ADULT - PROBLEM SELECTOR PLAN 9
5/18- Orthostatic hypotension,  cc bolus given. 5/18- Orthostatic hypotension,  cc bolus given.  Continue Midodrine 10 mg po q8h  Suzi YATES HCTZ

## 2025-05-20 NOTE — PROGRESS NOTE ADULT - PROBLEM SELECTOR PLAN 10
DVT ppx: OOB and ambulation as carey   PT consulted: no skilled PT need, PT signed off DVT ppx: OOB and ambulation as carey   PT consulted: no skilled PT need, PT signed off  5/20 Pt consult again, py presently weak from persistent fever, lightheadedness and vertigo

## 2025-05-20 NOTE — PROGRESS NOTE ADULT - NS ATTEND AMEND GEN_ALL_CORE FT
Primary: Paulie    69M with ASXL1-mutated and DES56-fwulmkr AML. 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 Admitted for induction 7+3 - day 16.    Heme:   - Completed 7+3. Day 18. Complicated by rigors, chills, and tachycardia 2 hours into day 1 infusion - likely infusion reaction.  - Trend CBC with differential daily. Goal Hgb > 7 and plt > 30 given past likely GI bleeding.   - Trend DIC labs intermittently, as this has not been issue since admission. Continue supportive transfusions as needed to maintain fibrinogen > 100.   - Trend TLS labs daily at this point - very low risk now.   - Allopurinol continued, but for previous diagnosis of gout decreased dose to 100 mg on 5/16.  S/p rasburicase; uric acid and LDH stable.   - Continue IVF. Monitor daily weights and I/O's. Diurese PRN.   - BMBx done day 15 (5/16) - PENDING    GI:  - Bloody BM 5/14-16.   - Increased platelet goal, hemoglobin now stabilizing. No acute intervention given pancytopenia but will monitor.     Card:  - Orthostatic hypotension, likely residual from GI bleed and decreased PO intake. Poss autonomic component.  - Bolus 500 cc on 5/17 and 5/18, on NS 75 cc/hr. Now on midodrine.  - Encouraged PO intake with ensure.     ID: Neutropenic and febrile overnight initially on 5/11-12, and persistent since then with ongoing GI symptoms. Likely mucositis related. Cultures have all been negative, MRSA swab negative, GI PCR negative.   - Cefepime (5/11- ). Persistent fever lead to changing to meropenem on 5/19.      - CT scan from 5/14 showing no acute CT findings suspicious for infection. Mentioned GGOs in the lungs bilaterally, but stable from a previous scan.   - Prophylaxis: posaconazole, valacyclovir   - Mouth rinses, dexamethasone swish and spit for oral mucositis.     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 Admitted for induction 7+3 - day 16.    Heme:   - Completed 7+3. Day 19. Complicated by rigors, chills, and tachycardia 2 hours into day 1 infusion - likely infusion reaction.  - Trend CBC with differential daily. Goal Hgb > 7 and plt > 30 given past likely GI bleeding.   - Trend DIC labs intermittently, as this has not been issue since admission. Continue supportive transfusions as needed to maintain fibrinogen > 100.   - Trend TLS labs daily at this point - very low risk now.   - Allopurinol continued, but for previous diagnosis of gout decreased dose to 100 mg on 5/16.  S/p rasburicase; uric acid and LDH stable.   - Continue IVF. Monitor daily weights and I/O's. Diurese PRN.   - BMBx done day 15 (5/16) - PENDING    GI:  - Bloody BM 5/14-16.  Plt goal > 30K now.     Card:  - Orthostatic hypotension, likely residual from GI bleed and decreased PO intake. Poss autonomic component.  - Bolus 500 cc on 5/17 and 5/18, on NS 75 cc/hr. Now on midodrine.  - Encouraged incr PO intake      ID:   -Neutropenic and febrile; fevers since 5/11, tewmp up to 103.3 today    with ongoing GI symptoms, diarrhea. Cults (-) until now - (+) for GPC in clusters  -Cefepime (5/11- ). Persistent fever lead to changing to meropenem on 5/19.      - CT scan from 5/14 showing no acute CT findings suspicious for infection. Mentioned GGOs in the lungs bilaterally, but stable from a previous scan.   - Prophylaxis: posaconazole, valacyclovir   - ID to see  - Mouth rinses, dexamethasone swish and spit for oral mucositis.   - demerol prn for rigors    MSK: Has chronic pain from prior surgeries   - Pain control: oxycodone    TLC in place  OOB

## 2025-05-20 NOTE — PROGRESS NOTE ADULT - PROBLEM SELECTOR PLAN 8
on Allopurinol 100 mg po qd  increase to 300 mg po qd for TLS prevention on Allopurinol 100 mg po qd  increase to 300 mg po qd for TLS prevention-->back to 100 mg QD

## 2025-05-20 NOTE — PROGRESS NOTE ADULT - SUBJECTIVE AND OBJECTIVE BOX
Diagnosis: ASXL1-mutated and VTA16-obpnykh AML    Protocol/Chemo Regimen: s/p 7+3:  Daunorubicin 60 mg/m2+ Cytarabine     Day:19    Pt endorsed:  exhausted,  lightheadedness,  vertigo yesterday.  fever and chills,  left tongue pain controlled with pain medication, still with difficulty eating and decreased appetite.  mixed diarrhea 3-4 x past 24 hrs.   Chronic neck and low back pain      Review of Systems: Patient denied nausea, vomiting,  chest pain, cough, dyspnea, abdominal pain, constipation, rectal pain,  rash, headache, bleeding    Pain scale:  6-7/10 left tongue, 8/10 neck and low back                      Allergies: No Known Allergies      ANTIMICROBIALS  meropenem  IVPB 1000 milliGRAM(s) IV Intermittent every 8 hours  posaconazole DR Tablet   Oral   posaconazole DR Tablet 300 milliGRAM(s) Oral every 24 hours  valACYclovir 500 milliGRAM(s) Oral two times a day  vancomycin  IVPB      vancomycin  IVPB 750 milliGRAM(s) IV Intermittent every 12 hours      STANDING MEDICATIONS  allopurinol 100 milliGRAM(s) Oral daily  Biotene Dry Mouth Oral Rinse 15 milliLiter(s) Swish and Spit four times a day  chlorhexidine 4% Liquid 1 Application(s) Topical <User Schedule>  cholecalciferol 2000 Unit(s) Oral daily  dexAMETHasone    Solution 0.5 milliGRAM(s) Oral two times a day  diltiazem    milliGRAM(s) Oral daily  FIRST- Mouthwash  BLM 10 milliLiter(s) Swish and Swallow four times a day  hydrochlorothiazide 50 milliGRAM(s) Oral daily  HYDROmorphone  Injectable 0.5 milliGRAM(s) IV Push <User Schedule>  lidocaine   4% Patch 1 Patch Transdermal every 24 hours  lidocaine 2% Gel 1 Application(s) Topical every 8 hours  melatonin 5 milliGRAM(s) Oral at bedtime  midodrine. 10 milliGRAM(s) Oral every 8 hours  pantoprazole  Injectable 40 milliGRAM(s) IV Push every 12 hours  simethicone 80 milliGRAM(s) Chew every 6 hours  sodium chloride 0.9%. 1000 milliLiter(s) IV Continuous <Continuous>  tamsulosin 0.4 milliGRAM(s) Oral at bedtime      PRN MEDICATIONS  acetaminophen     Tablet .. 650 milliGRAM(s) Oral every 6 hours PRN  albuterol    90 MICROgram(s) HFA Inhaler 2 Puff(s) Inhalation every 6 hours PRN  aluminum hydroxide/magnesium hydroxide/simethicone Suspension 30 milliLiter(s) Oral every 6 hours PRN  calcium carbonate    500 mG (Tums) Chewable 1 Tablet(s) Chew four times a day PRN  cyclobenzaprine 10 milliGRAM(s) Oral three times a day PRN  hydrocortisone sodium succinate Injectable 100 milliGRAM(s) IV Push every 8 hours PRN  HYDROmorphone  Injectable 0.5 milliGRAM(s) IV Push every 3 hours PRN  HYDROmorphone  Injectable 0.7 milliGRAM(s) IV Push every 3 hours PRN  meperidine     Injectable 12.5 milliGRAM(s) IV Push once PRN  metoclopramide Injectable 10 milliGRAM(s) IV Push every 6 hours PRN  ondansetron Injectable 8 milliGRAM(s) IV Push every 8 hours PRN  polyethylene glycol 3350 17 Gram(s) Oral two times a day PRN  sodium chloride 0.9% lock flush 10 milliLiter(s) IV Push every 1 hour PRN      Vital Signs Last 24 Hrs  T(C): 39.1 (20 May 2025 14:13), Max: 39.6 (19 May 2025 16:55)  T(F): 102.4 (20 May 2025 14:13), Max: 103.3 (19 May 2025 16:55)  HR: 107 (20 May 2025 14:13) (80 - 114)  BP: 139/75 (20 May 2025 14:13) (101/58 - 139/75)  BP(mean): --  RR: 16 (20 May 2025 14:13) (16 - 19)  SpO2: 88% (20 May 2025 14:13) (88% - 97%)    Parameters below as of 20 May 2025 14:13  Patient On (Oxygen Delivery Method): room air      PHYSICAL EXAM  General: NAD  HEENT:  clear oropharynx, anicteric sclera. no obvious lesion on left tongue, slight swelling   Neck: supple  CV: (+) S1/S2 RRR  Lungs: clear to auscultation, no wheezes or rales  Abdomen: soft, non-tender, non-distended (+) BS  Ext: no  edema  Skin: no rashes   Neuro: alert and oriented X 4, no focal deficits  Central Line: TLC c/d/i           LABS:                        8.2    0.46  )-----------( 32       ( 20 May 2025 06:33 )             23.4         Mean Cell Volume : 89.0 fl  Mean Cell Hemoglobin : 31.2 pg  Mean Cell Hemoglobin Concentration : 35.0 g/dL  Auto Neutrophil # : x  Auto Lymphocyte # : x  Auto Monocyte # : x  Auto Eosinophil # : x  Auto Basophil # : x  Auto Neutrophil % : x  Auto Lymphocyte % : x  Auto Monocyte % : x  Auto Eosinophil % : x  Auto Basophil % : x      05-20    136  |  101  |  14  ----------------------------<  148[H]  2.9[LL]   |  21[L]  |  1.18    Ca    8.6      20 May 2025 06:31  Phos  2.8     05-20  Mg     1.6     05-20    TPro  5.9[L]  /  Alb  3.4  /  TBili  0.6  /  DBili  x   /  AST  15  /  ALT  16  /  AlkPhos  89  05-20      PT/INR - ( 19 May 2025 07:06 )   PT: 14.1 sec;   INR: 1.24 ratio         PTT - ( 19 May 2025 07:06 )  PTT:26.2 sec      Uric Acid 4.2        RECENT CULTURES:    05-19 @ 10:16  Blood Blood-Catheter  No growth at 24 hours    05-19 @ 06:50  Blood Blood-Peripheral  Blood Culture PCR  Blood Culture PCR  PCR  Growth in aerobic bottle: Gram Positive Cocci in Clusters  Direct identification is available within approximately 3-5  hours either by Blood Panel Multiplexed PCR or Direct  MALDI-TOF. Details: [email or web address]    05-18 @ 20:03  Clean Catch Clean Catch (Midstream)  No growth    05-17 @ 04:35  Clean Catch Clean Catch (Midstream)  Enterococcus faecium (vancomycin resistant)  Enterococcus faecium (vancomycin resistant)  CYNTHIA  10,000 - 49,000 CFU/mL Enterococcus faecium (vancomycin resistant)    05-16 @ 22:50  Blood Blood-Peripheral  No growth at 72 Hours    05-16 @ 22:41  Blood Blood-Peripheral  No growth at 72 Hours      FluA/FluB/RSV/COVID PCR (05.13.25 @ 10:06)    SARS-CoV-2 Result: NotDetec   Influenza A Result: NotDetec   Influenza B Result: NotDetec   Resp Syn Virus Result: NotDetec   Source Respiratory: Nasopharyngeal        RADIOLOGY & ADDITIONAL STUDIES:    < from: Xray Chest 1 View- PORTABLE-Urgent (Xray Chest 1 View- PORTABLE-Urgent .) (05.18.25 @ 20:49) >    IMPRESSION:  Right IJ central venous catheter, in place.  No focal consolidation.        < from: CT Chest  Abdomen and Pelvis w/ Oral Cont (05.14.25 @ 16:26) >  IMPRESSION:  No acute CT findings suspicious for infection.

## 2025-05-20 NOTE — CONSULT NOTE ADULT - SUBJECTIVE AND OBJECTIVE BOX
Patient is a 69y old  Male who presents with a chief complaint of Chemotherapy (20 May 2025 07:24)    HPI:  68 yo male with newly diagnosed ASXL1, DDX41 mutated AML, admitted for induction chemo with dauno 60 mg/m2+ cytarabine. PMH  BRCA2 carrier ([id number] 9610C>T), HTN, HLD, Gout, GERD, BPH, spinal stenosis (s/p multiple procedures 2018, 2020 laminectomies / fusions), hip surgeries (Oct 2024, Jan 2025), residual hand weakness related to surgeries, umbilical hernia repair (2014/15) referred here from Dr Atkinson at [facility] in [place] for new ASXL1-mutated and YZZ28-quonuqk AML.   5/2 Admit for Induction 7+3 Dauno 60mg/m2 + Cytarabine 100mg/m2     Hospital course: Chemo started on 5/2, Complicated by rigors, chills, fever, SOB  and tachycardia 2 hours into day 1 infusion - likely infusion reaction. Afebrile 5/3-5/10, persistent daily fevers 5/11 onwards. Developed bloody BM 5/16, now resolved. Blood cultures with COnS. ID asked to evaluate.      5/13- FLU/COVID/RSV (-)  5/17 Ucx VRE, Ucx 5/18 NG  Bcx 5/19 1/2 CoNS      Antimicrobial Course:  - Levaquin/Amox- 5/2- 5/11  - Cefepime- 5/11- 5/19  - Meropenem- 5/19  - Flagyl (abd discomfort)- 5/13- 5/19  - Valtrex- 5/2  - Posaconazole 5/2      prior hospital charts reviewed [x  ]  primary team notes reviewed [x  ]  other consultant notes reviewed [x  ]    PAST MEDICAL & SURGICAL HISTORY:  HTN (hypertension)      High cholesterol      HTN (hypertension)      Hyperlipidemia      GERD (gastroesophageal reflux disease)      Spinal stenosis      BPH (benign prostatic hypertrophy)      Cluster headache      HTN (hypertension)      HLD (hyperlipidemia)      COVID-19      H/O laminectomy  2009, 2010 and 2013      Fusion of spine          Allergies  No Known Allergies    ANTIMICROBIALS (past 90 days)  MEDICATIONS  (STANDING):    meropenem  IVPB   100 mL/Hr IV Intermittent (05-20-25 @ 05:04)   100 mL/Hr IV Intermittent (05-19-25 @ 20:44)   100 mL/Hr IV Intermittent (05-19-25 @ 13:07)    vancomycin  IVPB   250 mL/Hr IV Intermittent (05-20-25 @ 10:56)    meropenem  IVPB 1000 every 8 hours  posaconazole DR Tablet    posaconazole DR Tablet 300 every 24 hours  valACYclovir 500 two times a day  vancomycin  IVPB    vancomycin  IVPB 750 every 12 hours    MEDICATIONS  (STANDING):  acetaminophen     Tablet .. 650 every 6 hours PRN  albuterol    90 MICROgram(s) HFA Inhaler 2 every 6 hours PRN  allopurinol 100 daily  aluminum hydroxide/magnesium hydroxide/simethicone Suspension 30 every 6 hours PRN  calcium carbonate    500 mG (Tums) Chewable 1 four times a day PRN  cyclobenzaprine 10 three times a day PRN  dexAMETHasone    Solution 0.5 two times a day  diltiazem    daily  hydrochlorothiazide 50 daily  hydrocortisone sodium succinate Injectable 100 every 8 hours PRN  HYDROmorphone  Injectable 0.5 <User Schedule>  HYDROmorphone  Injectable 0.5 every 3 hours PRN  HYDROmorphone  Injectable 0.7 every 3 hours PRN  melatonin 5 at bedtime  metoclopramide Injectable 10 every 6 hours PRN  midodrine. 10 every 8 hours  ondansetron Injectable 8 every 8 hours PRN  pantoprazole  Injectable 40 every 12 hours  polyethylene glycol 3350 17 two times a day PRN  simethicone 80 every 6 hours  tamsulosin 0.4 at bedtime    SOCIAL HISTORY:       FAMILY HISTORY:  Family history of prostate cancer    Family history of stroke    Family history of oral cancer (Sibling)  tongue ca    Family history of breast cancer (Sibling)    Family history of stroke (Father)      REVIEW OF SYSTEMS  [  ] ROS unobtainable because:    [  ] All other systems negative except as noted below:	    Constitutional:  [ ] fever [ ] chills  [ ] weight loss  [ ] weakness  Skin:  [ ] rash [ ] phlebitis	  Eyes: [ ] icterus [ ] pain  [ ] discharge	  ENMT: [ ] sore throat  [ ] thrush [ ] ulcers [ ] exudates  Respiratory: [ ] dyspnea [ ] hemoptysis [ ] cough [ ] sputum	  Cardiovascular:  [ ] chest pain [ ] palpitations [ ] edema	  Gastrointestinal:  [ ] nausea [ ] vomiting [ ] diarrhea [ ] constipation [ ] pain	  Genitourinary:  [ ] dysuria [ ] frequency [ ] hematuria [ ] discharge [ ] flank pain  [ ] incontinence  Musculoskeletal:  [ ] myalgias [ ] arthralgias [ ] arthritis  [ ] back pain  Neurological:  [ ] headache [ ] seizures  [ ] confusion/altered mental status  Psychiatric:  [ ] anxiety [ ] depression	  Hematology/Lymphatics:  [ ] lymphadenopathy  Endocrine:  [ ] adrenal [ ] thyroid  Allergic/Immunologic:	 [ ] transplant [ ] seasonal    Vital Signs Last 24 Hrs  T(F): 99.6 (05-20-25 @ 11:29), Max: 103.3 (05-19-25 @ 16:55)  Vital Signs Last 24 Hrs  HR: 88 (05-20-25 @ 08:42) (80 - 114)  BP: 106/63 (05-20-25 @ 08:42) (92/58 - 135/71)  RR: 18 (05-20-25 @ 08:42)  SpO2: 94% (05-20-25 @ 08:42) (93% - 97%)  Wt(kg): --    PHYSICAL EXAM:                              8.2    0.46  )-----------( 32       ( 20 May 2025 06:33 )             23.4   05-20    136  |  101  |  14  ----------------------------<  148[H]  2.9[LL]   |  21[L]  |  1.18    Ca    8.6      20 May 2025 06:31  Phos  2.8     05-20  Mg     1.6     05-20    TPro  5.9[L]  /  Alb  3.4  /  TBili  0.6  /  DBili  x   /  AST  15  /  ALT  16  /  AlkPhos  89  05-20    Urinalysis Basic - ( 20 May 2025 06:31 )    Color: x / Appearance: x / SG: x / pH: x  Gluc: 148 mg/dL / Ketone: x  / Bili: x / Urobili: x   Blood: x / Protein: x / Nitrite: x   Leuk Esterase: x / RBC: x / WBC x   Sq Epi: x / Non Sq Epi: x / Bacteria: x    MICROBIOLOGY:  Culture - Blood (collected 19 May 2025 06:50)  Source: Blood Blood-Peripheral  Gram Stain (20 May 2025 06:29):    Growth in aerobic bottle: Gram Positive Cocci in Clusters  Preliminary Report (20 May 2025 06:29):    Growth in aerobic bottle: Gram Positive Cocci in Clusters    Direct identification is available within approximately 3-5    hours either by Blood Panel Multiplexed PCR or Direct    MALDI-TOF. Details: [email or web address]  Organism: Blood Culture PCR (20 May 2025 10:17)  Organism: Blood Culture PCR (20 May 2025 10:17)      -  Coagulase negative Staphylococcus, Methicillin resistant: Detec      Method Type: PCR    Culture - Urine (collected 18 May 2025 20:03)  Source: Clean Catch Clean Catch (Midstream)  Final Report (19 May 2025 18:41):    No growth    Culture - Urine (collected 17 May 2025 04:35)  Source: Clean Catch Clean Catch (Midstream)  Final Report (20 May 2025 10:44):    10,000 - 49,000 CFU/mL Enterococcus faecium (vancomycin resistant)  Organism: Enterococcus faecium (vancomycin resistant) (20 May 2025 10:44)  Organism: Enterococcus faecium (vancomycin resistant) (20 May 2025 10:44)      -  Levofloxacin: R >4      -  Nitrofurantoin: I 64 Should not be used to treat pyelonephritis.      -  Daptomycin: SDD 4 The breakpoint for SDD (susceptible dose dependent)is based on a dosage regimen of 8-12 mg/kg administered every 24 h in adults and is intended for serious infections due to E. faecium. Consultation with an infectious diseases specialist is recommended.      -  Linezolid: S 2      -  Vancomycin: R >16      -  Ciprofloxacin: R >2      -  Ampicillin: R >8 Predicts results to ampicillin/sulbactam, amoxacillin-clavulanate and  piperacillin-tazobactam.      -  Tetracycline: R >8      Method Type: CYNTHIA    Culture - Blood (collected 16 May 2025 22:50)  Source: Blood Blood-Peripheral  Preliminary Report (20 May 2025 03:01):    No growth at 72 Hours    Culture - Blood (collected 16 May 2025 22:41)  Source: Blood Blood-Peripheral  Preliminary Report (20 May 2025 03:01):    No growth at 72 Hours                  RADIOLOGY:  imaging below personally reviewed and agree with findings    < from: CT Abdomen and Pelvis w/ Oral Cont (05.14.25 @ 16:26) >  ACC: [id number] EXAM:  CT ABDOMEN AND PELVIS OC   ORDERED BY: PETER JUAREZ     ACC: [id number] EXAM:  CT CHEST   ORDERED BY: PETER JUAREZ     PROCEDURE DATE:  05/14/2025          INTERPRETATION:  CLINICAL INFORMATION: Neutropenic fever, abdominal pain    COMPARISON: CT chest the 12/6/2023, CT abdomen pelvis 6/3/2021.    CONTRAST/COMPLICATIONS:  IV Contrast: NONE  Oral Contrast: Omnipaque 300  .    PROCEDURE:  CT of the Chest, Abdomen and Pelvis was performed.  Sagittal and coronal reformats were performed.    FINDINGS:  CHEST:  LUNGS AND LARGE AIRWAYS: Patent central airways. No lung consolidations.   Redemonstrated ill-defined and groundglass opacities, similar to prior.  PLEURA: No pleural effusion.  VESSELS: Aortic calcifications. Coronaryartery calcifications.  HEART: Heart size is normal. No pericardial effusion. Aortic valve   calcifications.  MEDIASTINUM AND LESLIE: No lymphadenopathy.  CHEST WALL AND LOWER NECK: Right internal jugular central venous catheter   tip in the SVC.    ABDOMEN AND PELVIS:  LIVER: Within normal limits.  BILE DUCTS: Normal caliber.  GALLBLADDER: Within normal limits.  SPLEEN: Within normal limits.  PANCREAS: Within normal limits.  ADRENALS: Within normal limits.  KIDNEYS/URETERS: No hydronephrosis. Punctate bilateral nonobstructing   renal calculi. Nonspecific perinephric stranding. Renal hypodensities,   possibly cysts.    BLADDER: Partially obscured by streak artifact.  REPRODUCTIVE ORGANS: Not visualized, obscured by streak artifact.    BOWEL: Nobowel obstruction. Appendix is normal.  Oral contrast throughout the small bowel up to the ileocecal valve.  Diverticulosis without evidence of diverticulitis.  PERITONEUM/RETROPERITONEUM: Within normal limits.  VESSELS: Atherosclerotic changes.  LYMPH NODES: No lymphadenopathy.  ABDOMINAL WALL: Small bilateral inguinal fat-containing hernias. Small   umbilical fat-containing hernia.  BONES: Degenerative changes. Posterior lumbar fixation. Bilateral hip   arthroplasty.    IMPRESSION:  No acute CTfindings suspicious for infection.    --- End of Report ---       Patient is a 69y old  Male who presents with a chief complaint of Chemotherapy (20 May 2025 07:24)    HPI:  70 yo male with newly diagnosed ASXL1, DDX41 mutated AML, admitted for induction chemo with dauno 60 mg/m2+ cytarabine. PMH  BRCA2 carrier ([id number] 9610C>T), HTN, HLD, Gout, GERD, BPH, spinal stenosis (s/p multiple procedures 2018, 2020 laminectomies / fusions), hip surgeries (Oct 2024, Jan 2025), residual hand weakness related to surgeries, umbilical hernia repair (2014/15) referred here from Dr Atkinson at [facility] in [place] for new ASXL1-mutated and GCO09-rytsqry AML.   5/2 Admit for Induction 7+3 Dauno 60mg/m2 + Cytarabine 100mg/m2     Hospital course: Chemo started on 5/2, Complicated by rigors, chills, fever, SOB  and tachycardia 2 hours into day 1 infusion - likely infusion reaction. Afebrile 5/3-5/10, persistent daily fevers 5/11 onwards. Developed bloody BM 5/16, now resolved. Blood cultures with COnS. ID asked to evaluate.      5/13- FLU/COVID/RSV (-)  5/15 GI PCR neg  5/17 Ucx VRE, Ucx 5/18 NG  5/19 Bcx  1/2 CoNS      Antimicrobial Course:  - Levaquin/Amox- 5/2- 5/11  - Cefepime- 5/11- 5/19  - Meropenem- 5/19  - Flagyl (abd discomfort)- 5/13- 5/19  - Valtrex- 5/2  - Posaconazole 5/2      prior hospital charts reviewed [x  ]  primary team notes reviewed [x  ]  other consultant notes reviewed [x  ]    PAST MEDICAL & SURGICAL HISTORY:  HTN (hypertension)      High cholesterol      HTN (hypertension)      Hyperlipidemia      GERD (gastroesophageal reflux disease)      Spinal stenosis      BPH (benign prostatic hypertrophy)      Cluster headache      HTN (hypertension)      HLD (hyperlipidemia)      COVID-19      H/O laminectomy  2009, 2010 and 2013      Fusion of spine          Allergies  No Known Allergies    ANTIMICROBIALS (past 90 days)  MEDICATIONS  (STANDING):    meropenem  IVPB   100 mL/Hr IV Intermittent (05-20-25 @ 05:04)   100 mL/Hr IV Intermittent (05-19-25 @ 20:44)   100 mL/Hr IV Intermittent (05-19-25 @ 13:07)    vancomycin  IVPB   250 mL/Hr IV Intermittent (05-20-25 @ 10:56)    meropenem  IVPB 1000 every 8 hours  posaconazole DR Tablet    posaconazole DR Tablet 300 every 24 hours  valACYclovir 500 two times a day  vancomycin  IVPB    vancomycin  IVPB 750 every 12 hours    MEDICATIONS  (STANDING):  acetaminophen     Tablet .. 650 every 6 hours PRN  albuterol    90 MICROgram(s) HFA Inhaler 2 every 6 hours PRN  allopurinol 100 daily  aluminum hydroxide/magnesium hydroxide/simethicone Suspension 30 every 6 hours PRN  calcium carbonate    500 mG (Tums) Chewable 1 four times a day PRN  cyclobenzaprine 10 three times a day PRN  dexAMETHasone    Solution 0.5 two times a day  diltiazem    daily  hydrochlorothiazide 50 daily  hydrocortisone sodium succinate Injectable 100 every 8 hours PRN  HYDROmorphone  Injectable 0.5 <User Schedule>  HYDROmorphone  Injectable 0.5 every 3 hours PRN  HYDROmorphone  Injectable 0.7 every 3 hours PRN  melatonin 5 at bedtime  metoclopramide Injectable 10 every 6 hours PRN  midodrine. 10 every 8 hours  ondansetron Injectable 8 every 8 hours PRN  pantoprazole  Injectable 40 every 12 hours  polyethylene glycol 3350 17 two times a day PRN  simethicone 80 every 6 hours  tamsulosin 0.4 at bedtime    SOCIAL HISTORY:       FAMILY HISTORY:  Family history of prostate cancer    Family history of stroke    Family history of oral cancer (Sibling)  tongue ca    Family history of breast cancer (Sibling)    Family history of stroke (Father)      REVIEW OF SYSTEMS  [  ] ROS unobtainable because:    [  ] All other systems negative except as noted below:	    Constitutional:  [ ] fever [ ] chills  [ ] weight loss  [ ] weakness  Skin:  [ ] rash [ ] phlebitis	  Eyes: [ ] icterus [ ] pain  [ ] discharge	  ENMT: [ ] sore throat  [ ] thrush [ ] ulcers [ ] exudates  Respiratory: [ ] dyspnea [ ] hemoptysis [ ] cough [ ] sputum	  Cardiovascular:  [ ] chest pain [ ] palpitations [ ] edema	  Gastrointestinal:  [ ] nausea [ ] vomiting [ ] diarrhea [ ] constipation [ ] pain	  Genitourinary:  [ ] dysuria [ ] frequency [ ] hematuria [ ] discharge [ ] flank pain  [ ] incontinence  Musculoskeletal:  [ ] myalgias [ ] arthralgias [ ] arthritis  [ ] back pain  Neurological:  [ ] headache [ ] seizures  [ ] confusion/altered mental status  Psychiatric:  [ ] anxiety [ ] depression	  Hematology/Lymphatics:  [ ] lymphadenopathy  Endocrine:  [ ] adrenal [ ] thyroid  Allergic/Immunologic:	 [ ] transplant [ ] seasonal    Vital Signs Last 24 Hrs  T(F): 99.6 (05-20-25 @ 11:29), Max: 103.3 (05-19-25 @ 16:55)  Vital Signs Last 24 Hrs  HR: 88 (05-20-25 @ 08:42) (80 - 114)  BP: 106/63 (05-20-25 @ 08:42) (92/58 - 135/71)  RR: 18 (05-20-25 @ 08:42)  SpO2: 94% (05-20-25 @ 08:42) (93% - 97%)  Wt(kg): --    PHYSICAL EXAM:                              8.2    0.46  )-----------( 32       ( 20 May 2025 06:33 )             23.4   05-20    136  |  101  |  14  ----------------------------<  148[H]  2.9[LL]   |  21[L]  |  1.18    Ca    8.6      20 May 2025 06:31  Phos  2.8     05-20  Mg     1.6     05-20    TPro  5.9[L]  /  Alb  3.4  /  TBili  0.6  /  DBili  x   /  AST  15  /  ALT  16  /  AlkPhos  89  05-20    Urinalysis Basic - ( 20 May 2025 06:31 )    Color: x / Appearance: x / SG: x / pH: x  Gluc: 148 mg/dL / Ketone: x  / Bili: x / Urobili: x   Blood: x / Protein: x / Nitrite: x   Leuk Esterase: x / RBC: x / WBC x   Sq Epi: x / Non Sq Epi: x / Bacteria: x    MICROBIOLOGY:  Culture - Blood (collected 19 May 2025 06:50)  Source: Blood Blood-Peripheral  Gram Stain (20 May 2025 06:29):    Growth in aerobic bottle: Gram Positive Cocci in Clusters  Preliminary Report (20 May 2025 06:29):    Growth in aerobic bottle: Gram Positive Cocci in Clusters    Direct identification is available within approximately 3-5    hours either by Blood Panel Multiplexed PCR or Direct    MALDI-TOF. Details: [email or web address]  Organism: Blood Culture PCR (20 May 2025 10:17)  Organism: Blood Culture PCR (20 May 2025 10:17)      -  Coagulase negative Staphylococcus, Methicillin resistant: Detec      Method Type: PCR    Culture - Urine (collected 18 May 2025 20:03)  Source: Clean Catch Clean Catch (Midstream)  Final Report (19 May 2025 18:41):    No growth    Culture - Urine (collected 17 May 2025 04:35)  Source: Clean Catch Clean Catch (Midstream)  Final Report (20 May 2025 10:44):    10,000 - 49,000 CFU/mL Enterococcus faecium (vancomycin resistant)  Organism: Enterococcus faecium (vancomycin resistant) (20 May 2025 10:44)  Organism: Enterococcus faecium (vancomycin resistant) (20 May 2025 10:44)      -  Levofloxacin: R >4      -  Nitrofurantoin: I 64 Should not be used to treat pyelonephritis.      -  Daptomycin: SDD 4 The breakpoint for SDD (susceptible dose dependent)is based on a dosage regimen of 8-12 mg/kg administered every 24 h in adults and is intended for serious infections due to E. faecium. Consultation with an infectious diseases specialist is recommended.      -  Linezolid: S 2      -  Vancomycin: R >16      -  Ciprofloxacin: R >2      -  Ampicillin: R >8 Predicts results to ampicillin/sulbactam, amoxacillin-clavulanate and  piperacillin-tazobactam.      -  Tetracycline: R >8      Method Type: CYTNHIA    Culture - Blood (collected 16 May 2025 22:50)  Source: Blood Blood-Peripheral  Preliminary Report (20 May 2025 03:01):    No growth at 72 Hours    Culture - Blood (collected 16 May 2025 22:41)  Source: Blood Blood-Peripheral  Preliminary Report (20 May 2025 03:01):    No growth at 72 Hours                  RADIOLOGY:  imaging below personally reviewed and agree with findings    < from: CT Abdomen and Pelvis w/ Oral Cont (05.14.25 @ 16:26) >  ACC: [id number] EXAM:  CT ABDOMEN AND PELVIS OC   ORDERED BY: PETER JUAREZ     ACC: [id number] EXAM:  CT CHEST   ORDERED BY: PETER LARRY     PROCEDURE DATE:  05/14/2025          INTERPRETATION:  CLINICAL INFORMATION: Neutropenic fever, abdominal pain    COMPARISON: CT chest the 12/6/2023, CT abdomen pelvis 6/3/2021.    CONTRAST/COMPLICATIONS:  IV Contrast: NONE  Oral Contrast: Omnipaque 300  .    PROCEDURE:  CT of the Chest, Abdomen and Pelvis was performed.  Sagittal and coronal reformats were performed.    FINDINGS:  CHEST:  LUNGS AND LARGE AIRWAYS: Patent central airways. No lung consolidations.   Redemonstrated ill-defined and groundglass opacities, similar to prior.  PLEURA: No pleural effusion.  VESSELS: Aortic calcifications. Coronaryartery calcifications.  HEART: Heart size is normal. No pericardial effusion. Aortic valve   calcifications.  MEDIASTINUM AND LESLIE: No lymphadenopathy.  CHEST WALL AND LOWER NECK: Right internal jugular central venous catheter   tip in the SVC.    ABDOMEN AND PELVIS:  LIVER: Within normal limits.  BILE DUCTS: Normal caliber.  GALLBLADDER: Within normal limits.  SPLEEN: Within normal limits.  PANCREAS: Within normal limits.  ADRENALS: Within normal limits.  KIDNEYS/URETERS: No hydronephrosis. Punctate bilateral nonobstructing   renal calculi. Nonspecific perinephric stranding. Renal hypodensities,   possibly cysts.    BLADDER: Partially obscured by streak artifact.  REPRODUCTIVE ORGANS: Not visualized, obscured by streak artifact.    BOWEL: Nobowel obstruction. Appendix is normal.  Oral contrast throughout the small bowel up to the ileocecal valve.  Diverticulosis without evidence of diverticulitis.  PERITONEUM/RETROPERITONEUM: Within normal limits.  VESSELS: Atherosclerotic changes.  LYMPH NODES: No lymphadenopathy.  ABDOMINAL WALL: Small bilateral inguinal fat-containing hernias. Small   umbilical fat-containing hernia.  BONES: Degenerative changes. Posterior lumbar fixation. Bilateral hip   arthroplasty.    IMPRESSION:  No acute CTfindings suspicious for infection.    --- End of Report ---       Patient is a 69y old  Male who presents with a chief complaint of Chemotherapy (20 May 2025 07:24)    HPI:  70 yo male with newly diagnosed ASXL1, DDX41 mutated AML, admitted for induction chemo with dauno 60 mg/m2+ cytarabine. PMH  BRCA2 carrier ([id number] 9610C>T), HTN, HLD, Gout, GERD, BPH, spinal stenosis (s/p multiple procedures 2018, 2020 laminectomies / fusions), hip surgeries (Oct 2024, Jan 2025), residual hand weakness related to surgeries, umbilical hernia repair (2014/15) referred here from Dr Atkinson at [facility] in [place] for new ASXL1-mutated and HTK35-fpskuqo AML.   5/2 Admit for Induction 7+3 Dauno 60mg/m2 + Cytarabine 100mg/m2     Hospital course: Chemo started on 5/2, Complicated by rigors, chills, fever, SOB  and tachycardia 2 hours into day 1 infusion - likely infusion reaction. Afebrile 5/3-5/10, persistent daily fevers 5/11 onwards. Developed bloody BM 5/16, now resolved. Blood cultures with CoNS. ID asked to evaluate.      5/13- FLU/COVID/RSV (-)  5/15 GI PCR neg  5/17 Ucx VRE, Ucx 5/18 NG  5/19 Bcx  1/2 CoNS      Antimicrobial Course:  - Levaquin/Amox- 5/2- 5/11  - Cefepime- 5/11- 5/19  - Meropenem- 5/19  - Flagyl (abd discomfort)- 5/13- 5/19  - Valtrex- 5/2  - Posaconazole 5/2      prior hospital charts reviewed [x  ]  primary team notes reviewed [x  ]  other consultant notes reviewed [x  ]    PAST MEDICAL & SURGICAL HISTORY:  HTN (hypertension)      High cholesterol      HTN (hypertension)      Hyperlipidemia      GERD (gastroesophageal reflux disease)      Spinal stenosis      BPH (benign prostatic hypertrophy)      Cluster headache      HTN (hypertension)      HLD (hyperlipidemia)      COVID-19      H/O laminectomy  2009, 2010 and 2013      Fusion of spine          Allergies  No Known Allergies    ANTIMICROBIALS (past 90 days)  MEDICATIONS  (STANDING):    meropenem  IVPB   100 mL/Hr IV Intermittent (05-20-25 @ 05:04)   100 mL/Hr IV Intermittent (05-19-25 @ 20:44)   100 mL/Hr IV Intermittent (05-19-25 @ 13:07)    vancomycin  IVPB   250 mL/Hr IV Intermittent (05-20-25 @ 10:56)    meropenem  IVPB 1000 every 8 hours  posaconazole DR Tablet    posaconazole DR Tablet 300 every 24 hours  valACYclovir 500 two times a day  vancomycin  IVPB    vancomycin  IVPB 750 every 12 hours    MEDICATIONS  (STANDING):  acetaminophen     Tablet .. 650 every 6 hours PRN  albuterol    90 MICROgram(s) HFA Inhaler 2 every 6 hours PRN  allopurinol 100 daily  aluminum hydroxide/magnesium hydroxide/simethicone Suspension 30 every 6 hours PRN  calcium carbonate    500 mG (Tums) Chewable 1 four times a day PRN  cyclobenzaprine 10 three times a day PRN  dexAMETHasone    Solution 0.5 two times a day  diltiazem    daily  hydrochlorothiazide 50 daily  hydrocortisone sodium succinate Injectable 100 every 8 hours PRN  HYDROmorphone  Injectable 0.5 <User Schedule>  HYDROmorphone  Injectable 0.5 every 3 hours PRN  HYDROmorphone  Injectable 0.7 every 3 hours PRN  melatonin 5 at bedtime  metoclopramide Injectable 10 every 6 hours PRN  midodrine. 10 every 8 hours  ondansetron Injectable 8 every 8 hours PRN  pantoprazole  Injectable 40 every 12 hours  polyethylene glycol 3350 17 two times a day PRN  simethicone 80 every 6 hours  tamsulosin 0.4 at bedtime    SOCIAL HISTORY: no h/o IVDU     FAMILY HISTORY:  Family history of prostate cancer    Family history of stroke    Family history of oral cancer (Sibling)  tongue ca    Family history of breast cancer (Sibling)    Family history of stroke (Father)      REVIEW OF SYSTEMS  [  ] ROS unobtainable because:    [ x ] All other systems negative except as noted below:	    Constitutional:  [ ] fever [ ] chills  [ ] weight loss  [x ] weakness  Skin:  [ ] rash [ ] phlebitis	  Eyes: [ ] icterus [ ] pain  [ ] discharge	  ENMT: [x ] sore throat  [ ] thrush [ ] ulcers [ ] exudates  Respiratory: [ ] dyspnea [ ] hemoptysis [ ] cough [ ] sputum	  Cardiovascular:  [ ] chest pain [ ] palpitations [ ] edema	  Gastrointestinal:  [ ] nausea [ ] vomiting [x ] diarrhea [ ] constipation [ ] pain	  Genitourinary:  [ ] dysuria [ ] frequency [ ] hematuria [ ] discharge [ ] flank pain  [ ] incontinence  Musculoskeletal:  [ ] myalgias [ ] arthralgias [ ] arthritis  [ ] back pain  Neurological:  [ ] headache [ ] seizures  [ ] confusion/altered mental status  Psychiatric:  [ ] anxiety [ ] depression	  Hematology/Lymphatics:  [ ] lymphadenopathy  Endocrine:  [ ] adrenal [ ] thyroid  Allergic/Immunologic:	 [ ] transplant [ ] seasonal    Vital Signs Last 24 Hrs  T(F): 99.6 (05-20-25 @ 11:29), Max: 103.3 (05-19-25 @ 16:55)  Vital Signs Last 24 Hrs  HR: 88 (05-20-25 @ 08:42) (80 - 114)  BP: 106/63 (05-20-25 @ 08:42) (92/58 - 135/71)  RR: 18 (05-20-25 @ 08:42)  SpO2: 94% (05-20-25 @ 08:42) (93% - 97%)  Wt(kg): --    PHYSICAL EXAM:    General: Patient in NAD  HEENT: NCAT, EOMI, PERRL, mucositis+ Rt neck TLC  CV: S1+S2, no m/r/g appreciated   Lungs: No respiratory distress, CTAB  Abd: Soft, nontender, no guarding, no rebound tenderness, + bowel sounds   : No suprapubic tenderness  Neuro: Alert and oriented to time, place and person. Moves all extremities against gravity.  Ext: No cyanosis, no edema  Skin: No rash, no phlebitis                              8.2    0.46  )-----------( 32       ( 20 May 2025 06:33 )             23.4   05-20    136  |  101  |  14  ----------------------------<  148[H]  2.9[LL]   |  21[L]  |  1.18    Ca    8.6      20 May 2025 06:31  Phos  2.8     05-20  Mg     1.6     05-20    TPro  5.9[L]  /  Alb  3.4  /  TBili  0.6  /  DBili  x   /  AST  15  /  ALT  16  /  AlkPhos  89  05-20    Urinalysis Basic - ( 20 May 2025 06:31 )    Color: x / Appearance: x / SG: x / pH: x  Gluc: 148 mg/dL / Ketone: x  / Bili: x / Urobili: x   Blood: x / Protein: x / Nitrite: x   Leuk Esterase: x / RBC: x / WBC x   Sq Epi: x / Non Sq Epi: x / Bacteria: x    MICROBIOLOGY:  Culture - Blood (collected 19 May 2025 06:50)  Source: Blood Blood-Peripheral  Gram Stain (20 May 2025 06:29):    Growth in aerobic bottle: Gram Positive Cocci in Clusters  Preliminary Report (20 May 2025 06:29):    Growth in aerobic bottle: Gram Positive Cocci in Clusters    Direct identification is available within approximately 3-5    hours either by Blood Panel Multiplexed PCR or Direct    MALDI-TOF. Details: [email or web address]  Organism: Blood Culture PCR (20 May 2025 10:17)  Organism: Blood Culture PCR (20 May 2025 10:17)      -  Coagulase negative Staphylococcus, Methicillin resistant: Detec      Method Type: PCR    Culture - Urine (collected 18 May 2025 20:03)  Source: Clean Catch Clean Catch (Midstream)  Final Report (19 May 2025 18:41):    No growth    Culture - Urine (collected 17 May 2025 04:35)  Source: Clean Catch Clean Catch (Midstream)  Final Report (20 May 2025 10:44):    10,000 - 49,000 CFU/mL Enterococcus faecium (vancomycin resistant)  Organism: Enterococcus faecium (vancomycin resistant) (20 May 2025 10:44)  Organism: Enterococcus faecium (vancomycin resistant) (20 May 2025 10:44)      -  Levofloxacin: R >4      -  Nitrofurantoin: I 64 Should not be used to treat pyelonephritis.      -  Daptomycin: SDD 4 The breakpoint for SDD (susceptible dose dependent)is based on a dosage regimen of 8-12 mg/kg administered every 24 h in adults and is intended for serious infections due to E. faecium. Consultation with an infectious diseases specialist is recommended.      -  Linezolid: S 2      -  Vancomycin: R >16      -  Ciprofloxacin: R >2      -  Ampicillin: R >8 Predicts results to ampicillin/sulbactam, amoxacillin-clavulanate and  piperacillin-tazobactam.      -  Tetracycline: R >8      Method Type: CYNTHIA    Culture - Blood (collected 16 May 2025 22:50)  Source: Blood Blood-Peripheral  Preliminary Report (20 May 2025 03:01):    No growth at 72 Hours    Culture - Blood (collected 16 May 2025 22:41)  Source: Blood Blood-Peripheral  Preliminary Report (20 May 2025 03:01):    No growth at 72 Hours                  RADIOLOGY:  imaging below personally reviewed and agree with findings    < from: CT Abdomen and Pelvis w/ Oral Cont (05.14.25 @ 16:26) >  ACC: [id number] EXAM:  CT ABDOMEN AND PELVIS OC   ORDERED BY: PETER JUAREZ     ACC: [id number] EXAM:  CT CHEST   ORDERED BY: PETER JUAREZ     PROCEDURE DATE:  05/14/2025          INTERPRETATION:  CLINICAL INFORMATION: Neutropenic fever, abdominal pain    COMPARISON: CT chest the 12/6/2023, CT abdomen pelvis 6/3/2021.    CONTRAST/COMPLICATIONS:  IV Contrast: NONE  Oral Contrast: Omnipaque 300  .    PROCEDURE:  CT of the Chest, Abdomen and Pelvis was performed.  Sagittal and coronal reformats were performed.    FINDINGS:  CHEST:  LUNGS AND LARGE AIRWAYS: Patent central airways. No lung consolidations.   Redemonstrated ill-defined and groundglass opacities, similar to prior.  PLEURA: No pleural effusion.  VESSELS: Aortic calcifications. Coronaryartery calcifications.  HEART: Heart size is normal. No pericardial effusion. Aortic valve   calcifications.  MEDIASTINUM AND LESLIE: No lymphadenopathy.  CHEST WALL AND LOWER NECK: Right internal jugular central venous catheter   tip in the SVC.    ABDOMEN AND PELVIS:  LIVER: Within normal limits.  BILE DUCTS: Normal caliber.  GALLBLADDER: Within normal limits.  SPLEEN: Within normal limits.  PANCREAS: Within normal limits.  ADRENALS: Within normal limits.  KIDNEYS/URETERS: No hydronephrosis. Punctate bilateral nonobstructing   renal calculi. Nonspecific perinephric stranding. Renal hypodensities,   possibly cysts.    BLADDER: Partially obscured by streak artifact.  REPRODUCTIVE ORGANS: Not visualized, obscured by streak artifact.    BOWEL: Nobowel obstruction. Appendix is normal.  Oral contrast throughout the small bowel up to the ileocecal valve.  Diverticulosis without evidence of diverticulitis.  PERITONEUM/RETROPERITONEUM: Within normal limits.  VESSELS: Atherosclerotic changes.  LYMPH NODES: No lymphadenopathy.  ABDOMINAL WALL: Small bilateral inguinal fat-containing hernias. Small   umbilical fat-containing hernia.  BONES: Degenerative changes. Posterior lumbar fixation. Bilateral hip   arthroplasty.    IMPRESSION:  No acute CTfindings suspicious for infection.    --- End of Report ---       Patient is a 69y old  Male who presents with a chief complaint of Chemotherapy (20 May 2025 07:24)    HPI:  70 yo male with newly diagnosed ASXL1, DDX41 mutated AML, admitted for induction chemo with dauno 60 mg/m2+ cytarabine. PMH  BRCA2 carrier ([id number] 9610C>T), HTN, HLD, Gout, GERD, BPH, spinal stenosis (s/p multiple procedures 2018, 2020 laminectomies / fusions), hip surgeries (Oct 2024, Jan 2025), residual hand weakness related to surgeries, umbilical hernia repair (2014/15) referred here from Dr Atkinson at [facility] in [place] for new ASXL1-mutated and YRR67-hwissxm AML.   5/2 Admit for Induction 7+3 Dauno 60mg/m2 + Cytarabine 100mg/m2     Hospital course: Chemo started on 5/2, Complicated by rigors, chills, fever, SOB  and tachycardia 2 hours into day 1 infusion - likely infusion reaction. Afebrile 5/3-5/10, persistent daily fevers 5/11 onwards. Developed bloody BM 5/16, now resolved. Blood cultures with CoNS. ID asked to evaluate.      5/13- FLU/COVID/RSV (-)  5/15 GI PCR neg  5/17 Ucx VRE, Ucx 5/18 NG  5/19 Bcx  1/2 CoNS      Antimicrobial Course:  - Levaquin/Amox- 5/2- 5/11  - Cefepime- 5/11- 5/19  - Meropenem- 5/19  - Flagyl (abd discomfort)- 5/13- 5/19  - Valtrex- 5/2  - Posaconazole 5/2      prior hospital charts reviewed [x  ]  primary team notes reviewed [x  ]  other consultant notes reviewed [x  ]    PAST MEDICAL & SURGICAL HISTORY:  HTN (hypertension)      High cholesterol      HTN (hypertension)      Hyperlipidemia      GERD (gastroesophageal reflux disease)      Spinal stenosis      BPH (benign prostatic hypertrophy)      Cluster headache      HTN (hypertension)      HLD (hyperlipidemia)      COVID-19      H/O laminectomy  2009, 2010 and 2013      Fusion of spine          Allergies  No Known Allergies    ANTIMICROBIALS (past 90 days)  MEDICATIONS  (STANDING):    meropenem  IVPB   100 mL/Hr IV Intermittent (05-20-25 @ 05:04)   100 mL/Hr IV Intermittent (05-19-25 @ 20:44)   100 mL/Hr IV Intermittent (05-19-25 @ 13:07)    vancomycin  IVPB   250 mL/Hr IV Intermittent (05-20-25 @ 10:56)    meropenem  IVPB 1000 every 8 hours  posaconazole DR Tablet    posaconazole DR Tablet 300 every 24 hours  valACYclovir 500 two times a day  vancomycin  IVPB    vancomycin  IVPB 750 every 12 hours    MEDICATIONS  (STANDING):  acetaminophen     Tablet .. 650 every 6 hours PRN  albuterol    90 MICROgram(s) HFA Inhaler 2 every 6 hours PRN  allopurinol 100 daily  aluminum hydroxide/magnesium hydroxide/simethicone Suspension 30 every 6 hours PRN  calcium carbonate    500 mG (Tums) Chewable 1 four times a day PRN  cyclobenzaprine 10 three times a day PRN  dexAMETHasone    Solution 0.5 two times a day  diltiazem    daily  hydrochlorothiazide 50 daily  hydrocortisone sodium succinate Injectable 100 every 8 hours PRN  HYDROmorphone  Injectable 0.5 <User Schedule>  HYDROmorphone  Injectable 0.5 every 3 hours PRN  HYDROmorphone  Injectable 0.7 every 3 hours PRN  melatonin 5 at bedtime  metoclopramide Injectable 10 every 6 hours PRN  midodrine. 10 every 8 hours  ondansetron Injectable 8 every 8 hours PRN  pantoprazole  Injectable 40 every 12 hours  polyethylene glycol 3350 17 two times a day PRN  simethicone 80 every 6 hours  tamsulosin 0.4 at bedtime    SOCIAL HISTORY:  former smoker, quit 2003 no alcohol or drug abuse  no recent travel    FAMILY HISTORY:  Family history of prostate cancer    Family history of stroke    Family history of oral cancer (Sibling)  tongue ca    Family history of breast cancer (Sibling)    Family history of stroke (Father)      REVIEW OF SYSTEMS  [  ] ROS unobtainable because:    [ x ] All other systems negative except as noted below:	    Constitutional:  [ ] fever [ ] chills  [ ] weight loss  [x ] weakness  Skin:  [ ] rash [ ] phlebitis	  Eyes: [ ] icterus [ ] pain  [ ] discharge	  ENMT: [x ] sore throat  [ ] thrush [ ] ulcers [ ] exudates  Respiratory: [ ] dyspnea [ ] hemoptysis [ ] cough [ ] sputum	  Cardiovascular:  [ ] chest pain [ ] palpitations [ ] edema	  Gastrointestinal:  [ ] nausea [ ] vomiting [x ] diarrhea [ ] constipation [ ] pain	  Genitourinary:  [ ] dysuria [ ] frequency [ ] hematuria [ ] discharge [ ] flank pain  [ ] incontinence  Musculoskeletal:  [ ] myalgias [ ] arthralgias [ ] arthritis  [ ] back pain  Neurological:  [ ] headache [ ] seizures  [ ] confusion/altered mental status  Psychiatric:  [ ] anxiety [ ] depression	  Hematology/Lymphatics:  [ ] lymphadenopathy  Endocrine:  [ ] adrenal [ ] thyroid  Allergic/Immunologic:	 [ ] transplant [ ] seasonal    Vital Signs Last 24 Hrs  T(F): 99.6 (05-20-25 @ 11:29), Max: 103.3 (05-19-25 @ 16:55)  Vital Signs Last 24 Hrs  HR: 88 (05-20-25 @ 08:42) (80 - 114)  BP: 106/63 (05-20-25 @ 08:42) (92/58 - 135/71)  RR: 18 (05-20-25 @ 08:42)  SpO2: 94% (05-20-25 @ 08:42) (93% - 97%)  Wt(kg): --    PHYSICAL EXAM:    General: actively shivering  HEENT: NCAT, EOMI, PERRL  CV: S1+S2, no m/r/g appreciated   Lungs: No respiratory distress, CTAB  Abd: Soft, nontender, no guarding, no rebound tenderness, + bowel sounds   : No suprapubic tenderness  Neuro: Alert and oriented to time, place and person. Moves all extremities against gravity.  Ext: No cyanosis, no edema  Skin: No rash,  vascular: RIJ TL cath                            8.2    0.46  )-----------( 32       ( 20 May 2025 06:33 )             23.4   05-20    136  |  101  |  14  ----------------------------<  148[H]  2.9[LL]   |  21[L]  |  1.18    Ca    8.6      20 May 2025 06:31  Phos  2.8     05-20  Mg     1.6     05-20    TPro  5.9[L]  /  Alb  3.4  /  TBili  0.6  /  DBili  x   /  AST  15  /  ALT  16  /  AlkPhos  89  05-20    Urinalysis Basic - ( 20 May 2025 06:31 )    Color: x / Appearance: x / SG: x / pH: x  Gluc: 148 mg/dL / Ketone: x  / Bili: x / Urobili: x   Blood: x / Protein: x / Nitrite: x   Leuk Esterase: x / RBC: x / WBC x   Sq Epi: x / Non Sq Epi: x / Bacteria: x    MICROBIOLOGY:  Culture - Blood (collected 19 May 2025 06:50)  Source: Blood Blood-Peripheral  Gram Stain (20 May 2025 06:29):    Growth in aerobic bottle: Gram Positive Cocci in Clusters  Preliminary Report (20 May 2025 06:29):    Growth in aerobic bottle: Gram Positive Cocci in Clusters    Direct identification is available within approximately 3-5    hours either by Blood Panel Multiplexed PCR or Direct    MALDI-TOF. Details: [email or web address]  Organism: Blood Culture PCR (20 May 2025 10:17)  Organism: Blood Culture PCR (20 May 2025 10:17)      -  Coagulase negative Staphylococcus, Methicillin resistant: Detec      Method Type: PCR    Culture - Urine (collected 18 May 2025 20:03)  Source: Clean Catch Clean Catch (Midstream)  Final Report (19 May 2025 18:41):    No growth    Culture - Urine (collected 17 May 2025 04:35)  Source: Clean Catch Clean Catch (Midstream)  Final Report (20 May 2025 10:44):    10,000 - 49,000 CFU/mL Enterococcus faecium (vancomycin resistant)  Organism: Enterococcus faecium (vancomycin resistant) (20 May 2025 10:44)  Organism: Enterococcus faecium (vancomycin resistant) (20 May 2025 10:44)      -  Levofloxacin: R >4      -  Nitrofurantoin: I 64 Should not be used to treat pyelonephritis.      -  Daptomycin: SDD 4 The breakpoint for SDD (susceptible dose dependent)is based on a dosage regimen of 8-12 mg/kg administered every 24 h in adults and is intended for serious infections due to E. faecium. Consultation with an infectious diseases specialist is recommended.      -  Linezolid: S 2      -  Vancomycin: R >16      -  Ciprofloxacin: R >2      -  Ampicillin: R >8 Predicts results to ampicillin/sulbactam, amoxacillin-clavulanate and  piperacillin-tazobactam.      -  Tetracycline: R >8      Method Type: CYNTHIA    Culture - Blood (collected 16 May 2025 22:50)  Source: Blood Blood-Peripheral  Preliminary Report (20 May 2025 03:01):    No growth at 72 Hours    Culture - Blood (collected 16 May 2025 22:41)  Source: Blood Blood-Peripheral  Preliminary Report (20 May 2025 03:01):    No growth at 72 Hours                  RADIOLOGY:  imaging below personally reviewed and agree with findings    < from: CT Abdomen and Pelvis w/ Oral Cont (05.14.25 @ 16:26) >  ACC: [id number] EXAM:  CT ABDOMEN AND PELVIS OC   ORDERED BY: PETER JUAREZ     ACC: [id number] EXAM:  CT CHEST   ORDERED BY: PETER JUAREZ     PROCEDURE DATE:  05/14/2025          INTERPRETATION:  CLINICAL INFORMATION: Neutropenic fever, abdominal pain    COMPARISON: CT chest the 12/6/2023, CT abdomen pelvis 6/3/2021.    CONTRAST/COMPLICATIONS:  IV Contrast: NONE  Oral Contrast: Omnipaque 300  .    PROCEDURE:  CT of the Chest, Abdomen and Pelvis was performed.  Sagittal and coronal reformats were performed.    FINDINGS:  CHEST:  LUNGS AND LARGE AIRWAYS: Patent central airways. No lung consolidations.   Redemonstrated ill-defined and groundglass opacities, similar to prior.  PLEURA: No pleural effusion.  VESSELS: Aortic calcifications. Coronaryartery calcifications.  HEART: Heart size is normal. No pericardial effusion. Aortic valve   calcifications.  MEDIASTINUM AND LESLIE: No lymphadenopathy.  CHEST WALL AND LOWER NECK: Right internal jugular central venous catheter   tip in the SVC.    ABDOMEN AND PELVIS:  LIVER: Within normal limits.  BILE DUCTS: Normal caliber.  GALLBLADDER: Within normal limits.  SPLEEN: Within normal limits.  PANCREAS: Within normal limits.  ADRENALS: Within normal limits.  KIDNEYS/URETERS: No hydronephrosis. Punctate bilateral nonobstructing   renal calculi. Nonspecific perinephric stranding. Renal hypodensities,   possibly cysts.    BLADDER: Partially obscured by streak artifact.  REPRODUCTIVE ORGANS: Not visualized, obscured by streak artifact.    BOWEL: Nobowel obstruction. Appendix is normal.  Oral contrast throughout the small bowel up to the ileocecal valve.  Diverticulosis without evidence of diverticulitis.  PERITONEUM/RETROPERITONEUM: Within normal limits.  VESSELS: Atherosclerotic changes.  LYMPH NODES: No lymphadenopathy.  ABDOMINAL WALL: Small bilateral inguinal fat-containing hernias. Small   umbilical fat-containing hernia.  BONES: Degenerative changes. Posterior lumbar fixation. Bilateral hip   arthroplasty.    IMPRESSION:  No acute CTfindings suspicious for infection.    --- End of Report ---

## 2025-05-20 NOTE — PROGRESS NOTE ADULT - ASSESSMENT
68 yo male with newly diagnosed ASXL1, DDX41 mutated AML, admitted for induction chemo with 7+3:  dauno 60 mg/m2+ cytarabine. PMH  BRCA2 carrier ([id number] 9610C>T), HTN, HLD, Gout, GERD, BPH, spinal stenosis (s/p multiple procedures 2018, 2020 laminectomies / fusions), hip surgeries (Oct 2024, Jan 2025), residual hand weakness related to surgeries, umbilical hernia repair (2014/15) referred here from Dr Atkinson at [facility] in [place] for new ASXL1-mutated and [id number] AML. Chemo started on 5/2, Complicated by rigors, chills, fever, SOB  and tachycardia 2 hours into day 1 infusion - likely infusion reaction. Pt has pancytopenia d/t chemo and disease   68 yo male with newly diagnosed ASXL1, DDX41 mutated AML, admitted for induction chemo with 7+3:  dauno 60 mg/m2+ cytarabine. PMH  BRCA2 carrier ([id number] 9610C>T), HTN, HLD, Gout, GERD, BPH, spinal stenosis (s/p multiple procedures 2018, 2020 laminectomies / fusions), hip surgeries (Oct 2024, Jan 2025), residual hand weakness related to surgeries, umbilical hernia repair (2014/15) referred here from Dr Atkinson at [facility] in [place] for new ASXL1-mutated and [id number]-[id number] AML. Chemo started on 5/2, Complicated by rigors, chills, fever, SOB  and tachycardia 2 hours into day 1 infusion - likely infusion reaction, neutropenic fever. Pt has pancytopenia d/t chemo and disease

## 2025-05-21 LAB
ALBUMIN SERPL ELPH-MCNC: 3 G/DL — LOW (ref 3.3–5)
ALP SERPL-CCNC: 76 U/L — SIGNIFICANT CHANGE UP (ref 40–120)
ALT FLD-CCNC: 17 U/L — SIGNIFICANT CHANGE UP (ref 10–45)
ANION GAP SERPL CALC-SCNC: 14 MMOL/L — SIGNIFICANT CHANGE UP (ref 5–17)
APTT BLD: 26.7 SEC — SIGNIFICANT CHANGE UP (ref 26.1–36.8)
AST SERPL-CCNC: 17 U/L — SIGNIFICANT CHANGE UP (ref 10–40)
BASOPHILS # BLD AUTO: 0 K/UL — SIGNIFICANT CHANGE UP (ref 0–0.2)
BASOPHILS NFR BLD AUTO: 0 % — SIGNIFICANT CHANGE UP (ref 0–2)
BILIRUB SERPL-MCNC: 0.4 MG/DL — SIGNIFICANT CHANGE UP (ref 0.2–1.2)
BLD GP AB SCN SERPL QL: NEGATIVE — SIGNIFICANT CHANGE UP
BUN SERPL-MCNC: 13 MG/DL — SIGNIFICANT CHANGE UP (ref 7–23)
CALCIUM SERPL-MCNC: 8.2 MG/DL — LOW (ref 8.4–10.5)
CHLORIDE SERPL-SCNC: 100 MMOL/L — SIGNIFICANT CHANGE UP (ref 96–108)
CO2 SERPL-SCNC: 22 MMOL/L — SIGNIFICANT CHANGE UP (ref 22–31)
CREAT SERPL-MCNC: 1.25 MG/DL — SIGNIFICANT CHANGE UP (ref 0.5–1.3)
D DIMER BLD IA.RAPID-MCNC: 487 NG/ML DDU — HIGH
EGFR: 63 ML/MIN/1.73M2 — SIGNIFICANT CHANGE UP
EGFR: 63 ML/MIN/1.73M2 — SIGNIFICANT CHANGE UP
EOSINOPHIL # BLD AUTO: 0 K/UL — SIGNIFICANT CHANGE UP (ref 0–0.5)
EOSINOPHIL NFR BLD AUTO: 0 % — SIGNIFICANT CHANGE UP (ref 0–6)
FIBRINOGEN PPP-MCNC: 800 MG/DL — HIGH (ref 200–445)
GLUCOSE SERPL-MCNC: 132 MG/DL — HIGH (ref 70–99)
HCT VFR BLD CALC: 20.7 % — CRITICAL LOW (ref 39–50)
HGB BLD-MCNC: 7.3 G/DL — LOW (ref 13–17)
INR BLD: 1.24 RATIO — HIGH (ref 0.85–1.16)
LDH SERPL L TO P-CCNC: 232 U/L — SIGNIFICANT CHANGE UP (ref 50–242)
LYMPHOCYTES # BLD AUTO: 0.54 K/UL — LOW (ref 1–3.3)
LYMPHOCYTES # BLD AUTO: 89.7 % — HIGH (ref 13–44)
MAGNESIUM SERPL-MCNC: 2.1 MG/DL — SIGNIFICANT CHANGE UP (ref 1.6–2.6)
MANUAL SMEAR VERIFICATION: SIGNIFICANT CHANGE UP
MCHC RBC-ENTMCNC: 31.2 PG — SIGNIFICANT CHANGE UP (ref 27–34)
MCHC RBC-ENTMCNC: 35.3 G/DL — SIGNIFICANT CHANGE UP (ref 32–36)
MCV RBC AUTO: 88.5 FL — SIGNIFICANT CHANGE UP (ref 80–100)
MONOCYTES # BLD AUTO: 0.04 K/UL — SIGNIFICANT CHANGE UP (ref 0–0.9)
MONOCYTES NFR BLD AUTO: 6.9 % — SIGNIFICANT CHANGE UP (ref 2–14)
NEUTROPHILS # BLD AUTO: 0.02 K/UL — LOW (ref 1.8–7.4)
NEUTROPHILS NFR BLD AUTO: 3.4 % — LOW (ref 43–77)
PHOSPHATE SERPL-MCNC: 2.9 MG/DL — SIGNIFICANT CHANGE UP (ref 2.5–4.5)
PLAT MORPH BLD: NORMAL — SIGNIFICANT CHANGE UP
PLATELET # BLD AUTO: 16 K/UL — CRITICAL LOW (ref 150–400)
POTASSIUM SERPL-MCNC: 3.3 MMOL/L — LOW (ref 3.5–5.3)
POTASSIUM SERPL-SCNC: 3.3 MMOL/L — LOW (ref 3.5–5.3)
PROT SERPL-MCNC: 5.3 G/DL — LOW (ref 6–8.3)
PROTHROM AB SERPL-ACNC: 14.2 SEC — HIGH (ref 9.9–13.4)
RBC # BLD: 2.34 M/UL — LOW (ref 4.2–5.8)
RBC # FLD: 13.2 % — SIGNIFICANT CHANGE UP (ref 10.3–14.5)
RBC BLD AUTO: SIGNIFICANT CHANGE UP
RH IG SCN BLD-IMP: POSITIVE — SIGNIFICANT CHANGE UP
SMUDGE CELLS # BLD: PRESENT — SIGNIFICANT CHANGE UP
SODIUM SERPL-SCNC: 136 MMOL/L — SIGNIFICANT CHANGE UP (ref 135–145)
URATE SERPL-MCNC: 3.8 MG/DL — SIGNIFICANT CHANGE UP (ref 3.4–8.8)
VANCOMYCIN TROUGH SERPL-MCNC: 7.8 UG/ML — LOW (ref 10–20)
WBC # BLD: 0.6 K/UL — CRITICAL LOW (ref 3.8–10.5)
WBC # FLD AUTO: 0.6 K/UL — CRITICAL LOW (ref 3.8–10.5)

## 2025-05-21 PROCEDURE — 99233 SBSQ HOSP IP/OBS HIGH 50: CPT

## 2025-05-21 PROCEDURE — G0545: CPT

## 2025-05-21 PROCEDURE — 99232 SBSQ HOSP IP/OBS MODERATE 35: CPT | Mod: GC

## 2025-05-21 RX ORDER — VANCOMYCIN HCL IN 5 % DEXTROSE 1.5G/250ML
1000 PLASTIC BAG, INJECTION (ML) INTRAVENOUS EVERY 12 HOURS
Refills: 0 | Status: DISCONTINUED | OUTPATIENT
Start: 2025-05-21 | End: 2025-05-25

## 2025-05-21 RX ORDER — FILGRASTIM 300 UG/.5ML
480 INJECTION, SOLUTION INTRAVENOUS; SUBCUTANEOUS DAILY
Refills: 0 | Status: DISCONTINUED | OUTPATIENT
Start: 2025-05-21 | End: 2025-05-24

## 2025-05-21 RX ADMIN — DILTIAZEM HYDROCHLORIDE 240 MILLIGRAM(S): 240 TABLET, EXTENDED RELEASE ORAL at 06:39

## 2025-05-21 RX ADMIN — Medication 0.5 MILLIGRAM(S): at 02:30

## 2025-05-21 RX ADMIN — Medication 50 MILLIEQUIVALENT(S): at 09:28

## 2025-05-21 RX ADMIN — Medication 250 MILLIGRAM(S): at 06:38

## 2025-05-21 RX ADMIN — DEXAMETHASONE 0.5 MILLIGRAM(S): 0.5 TABLET ORAL at 06:38

## 2025-05-21 RX ADMIN — Medication 40 MILLIGRAM(S): at 18:01

## 2025-05-21 RX ADMIN — POSACONAZOLE 300 MILLIGRAM(S): 100 TABLET, DELAYED RELEASE ORAL at 18:00

## 2025-05-21 RX ADMIN — DIPHENHYDRAMINE HYDROCHLORIDE AND LIDOCAINE HYDROCHLORIDE AND ALUMINUM HYDROXIDE AND MAGNESIUM HYDRO 10 MILLILITER(S): KIT at 23:19

## 2025-05-21 RX ADMIN — Medication 5 MILLIGRAM(S): at 22:20

## 2025-05-21 RX ADMIN — MIDODRINE HYDROCHLORIDE 10 MILLIGRAM(S): 5 TABLET ORAL at 13:31

## 2025-05-21 RX ADMIN — FILGRASTIM 480 MICROGRAM(S): 300 INJECTION, SOLUTION INTRAVENOUS; SUBCUTANEOUS at 12:14

## 2025-05-21 RX ADMIN — DIPHENHYDRAMINE HYDROCHLORIDE AND LIDOCAINE HYDROCHLORIDE AND ALUMINUM HYDROXIDE AND MAGNESIUM HYDRO 10 MILLILITER(S): KIT at 11:07

## 2025-05-21 RX ADMIN — Medication 15 MILLILITER(S): at 06:39

## 2025-05-21 RX ADMIN — MIDODRINE HYDROCHLORIDE 10 MILLIGRAM(S): 5 TABLET ORAL at 06:43

## 2025-05-21 RX ADMIN — Medication 80 MILLIGRAM(S): at 06:39

## 2025-05-21 RX ADMIN — Medication 650 MILLIGRAM(S): at 04:03

## 2025-05-21 RX ADMIN — Medication 0.7 MILLIGRAM(S): at 14:34

## 2025-05-21 RX ADMIN — MEROPENEM 100 MILLIGRAM(S): 1 INJECTION INTRAVENOUS at 13:15

## 2025-05-21 RX ADMIN — Medication 100 MILLIGRAM(S): at 11:08

## 2025-05-21 RX ADMIN — MEROPENEM 100 MILLIGRAM(S): 1 INJECTION INTRAVENOUS at 06:44

## 2025-05-21 RX ADMIN — LIDOCAINE HYDROCHLORIDE 1 APPLICATION(S): 20 JELLY TOPICAL at 06:40

## 2025-05-21 RX ADMIN — Medication 80 MILLIGRAM(S): at 11:08

## 2025-05-21 RX ADMIN — Medication 0.5 MILLIGRAM(S): at 18:10

## 2025-05-21 RX ADMIN — Medication 0.5 MILLIGRAM(S): at 08:06

## 2025-05-21 RX ADMIN — Medication 15 MILLILITER(S): at 23:19

## 2025-05-21 RX ADMIN — Medication 50 MILLIEQUIVALENT(S): at 11:07

## 2025-05-21 RX ADMIN — LIDOCAINE HYDROCHLORIDE 1 APPLICATION(S): 20 JELLY TOPICAL at 13:15

## 2025-05-21 RX ADMIN — DIPHENHYDRAMINE HYDROCHLORIDE AND LIDOCAINE HYDROCHLORIDE AND ALUMINUM HYDROXIDE AND MAGNESIUM HYDRO 10 MILLILITER(S): KIT at 06:38

## 2025-05-21 RX ADMIN — Medication 500 MILLIGRAM(S): at 18:00

## 2025-05-21 RX ADMIN — MIDODRINE HYDROCHLORIDE 10 MILLIGRAM(S): 5 TABLET ORAL at 22:24

## 2025-05-21 RX ADMIN — Medication 0.5 MILLIGRAM(S): at 22:32

## 2025-05-21 RX ADMIN — Medication 0.7 MILLIGRAM(S): at 15:02

## 2025-05-21 RX ADMIN — Medication 250 MILLIGRAM(S): at 18:17

## 2025-05-21 RX ADMIN — Medication 80 MILLIGRAM(S): at 23:20

## 2025-05-21 RX ADMIN — Medication 80 MILLIGRAM(S): at 18:00

## 2025-05-21 RX ADMIN — Medication 2000 UNIT(S): at 11:08

## 2025-05-21 RX ADMIN — Medication 40 MILLIGRAM(S): at 06:39

## 2025-05-21 RX ADMIN — TAMSULOSIN HYDROCHLORIDE 0.4 MILLIGRAM(S): 0.4 CAPSULE ORAL at 22:21

## 2025-05-21 RX ADMIN — Medication 650 MILLIGRAM(S): at 04:40

## 2025-05-21 RX ADMIN — Medication 0.5 MILLIGRAM(S): at 03:30

## 2025-05-21 RX ADMIN — DIPHENHYDRAMINE HYDROCHLORIDE AND LIDOCAINE HYDROCHLORIDE AND ALUMINUM HYDROXIDE AND MAGNESIUM HYDRO 10 MILLILITER(S): KIT at 18:01

## 2025-05-21 RX ADMIN — Medication 0.5 MILLIGRAM(S): at 11:07

## 2025-05-21 RX ADMIN — Medication 15 MILLILITER(S): at 11:07

## 2025-05-21 RX ADMIN — LIDOCAINE HYDROCHLORIDE 1 APPLICATION(S): 20 JELLY TOPICAL at 22:26

## 2025-05-21 RX ADMIN — DEXAMETHASONE 0.5 MILLIGRAM(S): 0.5 TABLET ORAL at 18:10

## 2025-05-21 RX ADMIN — Medication 650 MILLIGRAM(S): at 19:09

## 2025-05-21 RX ADMIN — Medication 1 APPLICATION(S): at 06:44

## 2025-05-21 RX ADMIN — Medication 500 MILLIGRAM(S): at 06:39

## 2025-05-21 RX ADMIN — Medication 0.5 MILLIGRAM(S): at 23:30

## 2025-05-21 RX ADMIN — MEROPENEM 100 MILLIGRAM(S): 1 INJECTION INTRAVENOUS at 22:21

## 2025-05-21 RX ADMIN — Medication 15 MILLILITER(S): at 18:00

## 2025-05-21 NOTE — PROGRESS NOTE ADULT - ASSESSMENT
68 yo male with newly diagnosed ASXL1, DDX41 mutated AML, admitted for induction chemo with 7+3:  dauno 60 mg/m2+ cytarabine. PMH  BRCA2 carrier ([id number] 9610C>T), HTN, HLD, Gout, GERD, BPH, spinal stenosis (s/p multiple procedures 2018, 2020 laminectomies / fusions), hip surgeries (Oct 2024, Jan 2025), residual hand weakness related to surgeries, umbilical hernia repair (2014/15) referred here from Dr Atkinson at [facility] in [place] for new ASXL1-mutated and [id number]-jqhlwrq AML. Chemo started on 5/2, Complicated by rigors, chills, fever, SOB  and tachycardia 2 hours into day 1 infusion - likely infusion reaction, neutropenic fever. Pt has pancytopenia d/t chemo and disease   68 yo male with newly diagnosed ASXL1, DDX41 mutated AML, admitted for induction chemo with 7+3:  dauno 60 mg/m2+ cytarabine. PMH  BRCA2 carrier ([id number] 9610C>T), HTN, HLD, Gout, GERD, BPH, spinal stenosis (s/p multiple procedures 2018, 2020 laminectomies / fusions), hip surgeries (Oct 2024, Jan 2025), residual hand weakness related to surgeries, umbilical hernia repair (2014/15) referred here from Dr Atkinson at [facility] in [place] for new ASXL1-mutated and [id number] AML. Chemo started on 5/2, Complicated by rigors, chills, fever, SOB  and tachycardia 2 hours into day 1 infusion - likely infusion reaction, neutropenic fever. Pt has pancytopenia d/t chemo and disease

## 2025-05-21 NOTE — PHYSICAL THERAPY INITIAL EVALUATION ADULT - ADDITIONAL COMMENTS
Pt lives alone in a private home with no steps to enter, was independent with ADLs and functional mobility PTA without an AD.

## 2025-05-21 NOTE — PROGRESS NOTE ADULT - PROBLEM SELECTOR PLAN 2
neutropenic, febrile   continue posa, valtrex, flagyl (dc'd 5/19), cefepime (5/11-5/19 ), cont to be febrile Ucx from 5/17 (+) gram + cocci in pairs and chains, inc to meropenem (5/19-   5/2 CXR Patchy opacities along the left lung base, possibly atelectasis, however superimposed infection cannot be entirely excluded.  5/13- FLU/COVID/RSV (-)  5/13- Added Flagyl 500 mg IV Q 8 hrs due to GI discomfort/pain  5/13- CT C/A/P -negative for infxn  5/20  blood cx 5/19 showed 1/4 MRSE  urine cx 5/17 VRE but 5/18 negative and pt asymptomatic  - Levaquin/Amox- 5/2- 5/11  - Cefepime- 5/11- 5/19  Flagyl (abd discomfort)- 5/13- 5/19  - Meropenem- 5/19  - Valtrex-Posaconazole  5/2 5/20 O2 sat 88% with fever, FU closely, O2 PRN

## 2025-05-21 NOTE — PROGRESS NOTE ADULT - PROBLEM SELECTOR PLAN 10
DVT ppx: OOB and ambulation as carey   PT consulted: no skilled PT need, PT signed off  5/20 Pt consult again, py presently weak from persistent fever, lightheadedness and vertigo

## 2025-05-21 NOTE — PROGRESS NOTE ADULT - PROBLEM SELECTOR PLAN 1
new ASXL1-mutated and [id number]-[id number] AML.  admitted for induction chemo with dauno 60 mg/m2+ cytarabine. Chemo started on 5/2, Complicated by rigors, chills, fever, SOB  and tachycardia 2 hours into day 1 infusion - likely infusion. received pepcid/benadryl/solumedrol/tylenol iv. will make up D1 Cytarabine on day8    will proceed with day2 chemo with pre mdes Tylenol and Benadryl, hydrocortisone iv PRN reaction   Hepatitis screen/HIV(-) as outpatient  IVF, antiemetics, mouth care. monitor weight, I & O, diuresis PRN   Monitor CBC with diff QD, TLS labs BID, Coags & T & C TIW transfuse for hb<7, PLT <30  5/5 HLA sent  5/16 BMbx done, f/u  5/19: no longer having blood in stool for several days, check cbc daily, plt goal decreased to >30  5/20 hypokalemia: KCL 20 meq IVPB x3, hypomagnesemia: Mg 2 g iVPB x1, Demerol PRN rigor

## 2025-05-21 NOTE — PHYSICAL THERAPY INITIAL EVALUATION ADULT - LEVEL OF INDEPENDENCE: GAIT, REHAB EVAL
2 steps to HOB, limited 2/2 orthostatic/contact guard contact guard/minimum assist (75% patients effort)

## 2025-05-21 NOTE — PROGRESS NOTE ADULT - SUBJECTIVE AND OBJECTIVE BOX
Follow Up:  Febrile neutropenia    Interval History/ROS: Tmax 100.5, feels much better overall, no new focal complaints    Allergies  No Known Allergies        ANTIMICROBIALS:  meropenem  IVPB 1000 every 8 hours  posaconazole DR Tablet 300 every 24 hours  posaconazole DR Tablet    valACYclovir 500 two times a day  vancomycin  IVPB    vancomycin  IVPB 750 every 12 hours      OTHER MEDS:  MEDICATIONS  (STANDING):  acetaminophen     Tablet .. 650 every 6 hours PRN  albuterol    90 MICROgram(s) HFA Inhaler 2 every 6 hours PRN  allopurinol 100 daily  aluminum hydroxide/magnesium hydroxide/simethicone Suspension 30 every 6 hours PRN  calcium carbonate    500 mG (Tums) Chewable 1 four times a day PRN  cyclobenzaprine 10 three times a day PRN  dexAMETHasone    Solution 0.5 two times a day  diltiazem    daily  filgrastim-sndz (ZARXIO) Injectable 480 daily  hydrocortisone sodium succinate Injectable 100 every 8 hours PRN  HYDROmorphone  Injectable 0.5 every 3 hours PRN  HYDROmorphone  Injectable 0.7 every 3 hours PRN  HYDROmorphone  Injectable 0.5 <User Schedule>  melatonin 5 at bedtime  meperidine     Injectable 12.5 once PRN  metoclopramide Injectable 10 every 6 hours PRN  midodrine. 10 every 8 hours  ondansetron Injectable 8 every 8 hours PRN  pantoprazole  Injectable 40 every 12 hours  polyethylene glycol 3350 17 two times a day PRN  simethicone 80 every 6 hours  tamsulosin 0.4 at bedtime      Vital Signs Last 24 Hrs  T(C): 38 (21 May 2025 13:28), Max: 39.4 (20 May 2025 20:30)  T(F): 100.4 (21 May 2025 13:28), Max: 102.9 (20 May 2025 20:30)  HR: 66 (21 May 2025 14:32) (66 - 116)  BP: 119/72 (21 May 2025 14:32) (97/56 - 138/68)  BP(mean): --  RR: 18 (21 May 2025 13:28) (18 - 18)  SpO2: 97% (21 May 2025 14:01) (94% - 97%)    Parameters below as of 21 May 2025 14:01  Patient On (Oxygen Delivery Method): room air        PHYSICAL EXAM:  General: actively shivering  HEENT: NCAT, EOMI, PERRL  CV: S1+S2, no m/r/g appreciated   Lungs: No respiratory distress, CTAB  Abd: Soft, nontender, no guarding, no rebound tenderness, + bowel sounds   : No suprapubic tenderness  Neuro: Alert and oriented to time, place and person. Moves all extremities against gravity.  Ext: No cyanosis, no edema  Skin: No rash,  vascular: RIJ TL cath                                7.3    0.60  )-----------( 16       ( 21 May 2025 07:59 )             20.7       05-21    136  |  100  |  13  ----------------------------<  132[H]  3.3[L]   |  22  |  1.25    Ca    8.2[L]      21 May 2025 07:59  Phos  2.9     05-21  Mg     2.1     05-21    TPro  5.3[L]  /  Alb  3.0[L]  /  TBili  0.4  /  DBili  x   /  AST  17  /  ALT  17  /  AlkPhos  76  05-21      Urinalysis Basic - ( 21 May 2025 07:59 )    Color: x / Appearance: x / SG: x / pH: x  Gluc: 132 mg/dL / Ketone: x  / Bili: x / Urobili: x   Blood: x / Protein: x / Nitrite: x   Leuk Esterase: x / RBC: x / WBC x   Sq Epi: x / Non Sq Epi: x / Bacteria: x        MICROBIOLOGY:  v    Culture - Blood (collected 19 May 2025 10:16)  Source: Blood Blood-Catheter  Preliminary Report (20 May 2025 15:02):    No growth at 24 hours    Culture - Blood (collected 19 May 2025 06:50)  Source: Blood Blood-Peripheral  Gram Stain (20 May 2025 06:29):    Growth in aerobic bottle: Gram Positive Cocci in Clusters  Preliminary Report (20 May 2025 06:29):    Growth in aerobic bottle: Gram Positive Cocci in Clusters    Direct identification is available within approximately 3-5    hours either by Blood Panel Multiplexed PCR or Direct    MALDI-TOF. Details: [email or web address]  Organism: Blood Culture PCR (20 May 2025 10:17)  Organism: Blood Culture PCR (20 May 2025 10:17)      Method Type: PCR      -  Coagulase negative Staphylococcus, Methicillin resistant: Detec    Culture - Urine (collected 18 May 2025 20:03)  Source: Clean Catch Clean Catch (Midstream)  Final Report (19 May 2025 18:41):    No growth    Culture - Urine (collected 17 May 2025 04:35)  Source: Clean Catch Clean Catch (Midstream)  Final Report (20 May 2025 10:44):    10,000 - 49,000 CFU/mL Enterococcus faecium (vancomycin resistant)  Organism: Enterococcus faecium (vancomycin resistant) (20 May 2025 10:44)  Organism: Enterococcus faecium (vancomycin resistant) (20 May 2025 10:44)      Method Type: CYNTHIA      -  Ampicillin: R >8 Predicts results to ampicillin/sulbactam, amoxacillin-clavulanate and  piperacillin-tazobactam.      -  Ciprofloxacin: R >2      -  Daptomycin: SDD 4 The breakpoint for SDD (susceptible dose dependent)is based on a dosage regimen of 8-12 mg/kg administered every 24 h in adults and is intended for serious infections due to E. faecium. Consultation with an infectious diseases specialist is recommended.      -  Levofloxacin: R >4      -  Linezolid: S 2      -  Nitrofurantoin: I 64 Should not be used to treat pyelonephritis.      -  Tetracycline: R >8      -  Vancomycin: R >16    Culture - Blood (collected 16 May 2025 22:50)  Source: Blood Blood-Peripheral  Preliminary Report (21 May 2025 03:01):    No growth at 4 days    Culture - Blood (collected 16 May 2025 22:41)  Source: Blood Blood-Peripheral  Preliminary Report (21 May 2025 03:01):    No growth at 4 days                    RADIOLOGY:   Follow Up:  Febrile neutropenia    Interval History/ROS: Tmax 100.5, feels much better overall, no new focal complaints    Allergies  No Known Allergies        ANTIMICROBIALS:  meropenem  IVPB 1000 every 8 hours  posaconazole DR Tablet 300 every 24 hours  posaconazole DR Tablet    valACYclovir 500 two times a day  vancomycin  IVPB    vancomycin  IVPB 750 every 12 hours      OTHER MEDS:  MEDICATIONS  (STANDING):  acetaminophen     Tablet .. 650 every 6 hours PRN  albuterol    90 MICROgram(s) HFA Inhaler 2 every 6 hours PRN  allopurinol 100 daily  aluminum hydroxide/magnesium hydroxide/simethicone Suspension 30 every 6 hours PRN  calcium carbonate    500 mG (Tums) Chewable 1 four times a day PRN  cyclobenzaprine 10 three times a day PRN  dexAMETHasone    Solution 0.5 two times a day  diltiazem    daily  filgrastim-sndz (ZARXIO) Injectable 480 daily  hydrocortisone sodium succinate Injectable 100 every 8 hours PRN  HYDROmorphone  Injectable 0.5 every 3 hours PRN  HYDROmorphone  Injectable 0.7 every 3 hours PRN  HYDROmorphone  Injectable 0.5 <User Schedule>  melatonin 5 at bedtime  meperidine     Injectable 12.5 once PRN  metoclopramide Injectable 10 every 6 hours PRN  midodrine. 10 every 8 hours  ondansetron Injectable 8 every 8 hours PRN  pantoprazole  Injectable 40 every 12 hours  polyethylene glycol 3350 17 two times a day PRN  simethicone 80 every 6 hours  tamsulosin 0.4 at bedtime      Vital Signs Last 24 Hrs  T(C): 38 (21 May 2025 13:28), Max: 39.4 (20 May 2025 20:30)  T(F): 100.4 (21 May 2025 13:28), Max: 102.9 (20 May 2025 20:30)  HR: 66 (21 May 2025 14:32) (66 - 116)  BP: 119/72 (21 May 2025 14:32) (97/56 - 138/68)  BP(mean): --  RR: 18 (21 May 2025 13:28) (18 - 18)  SpO2: 97% (21 May 2025 14:01) (94% - 97%)    Parameters below as of 21 May 2025 14:01  Patient On (Oxygen Delivery Method): room air        PHYSICAL EXAM:  General: actively shivering  HEENT: NCAT, EOMI, PERRL  CV: S1+S2, no m/r/g appreciated   Lungs: No respiratory distress, CTAB  Abd: Soft, nontender, no guarding, no rebound tenderness, + bowel sounds   : No suprapubic tenderness  Neuro: Alert and oriented to time, place and person. Moves all extremities against gravity.  Ext: No cyanosis, no edema  Skin: No rash,  vascular: RIJ TL cath                                7.3    0.60  )-----------( 16       ( 21 May 2025 07:59 )             20.7       05-21    136  |  100  |  13  ----------------------------<  132[H]  3.3[L]   |  22  |  1.25    Ca    8.2[L]      21 May 2025 07:59  Phos  2.9     05-21  Mg     2.1     05-21    TPro  5.3[L]  /  Alb  3.0[L]  /  TBili  0.4  /  DBili  x   /  AST  17  /  ALT  17  /  AlkPhos  76  05-21      Urinalysis Basic - ( 21 May 2025 07:59 )    Color: x / Appearance: x / SG: x / pH: x  Gluc: 132 mg/dL / Ketone: x  / Bili: x / Urobili: x   Blood: x / Protein: x / Nitrite: x   Leuk Esterase: x / RBC: x / WBC x   Sq Epi: x / Non Sq Epi: x / Bacteria: x        MICROBIOLOGY:  v    Culture - Blood (collected 19 May 2025 10:16)  Source: Blood Blood-Catheter  Preliminary Report (20 May 2025 15:02):    No growth at 24 hours    Culture - Blood (collected 19 May 2025 06:50)  Source: Blood Blood-Peripheral  Gram Stain (20 May 2025 06:29):    Growth in aerobic bottle: Gram Positive Cocci in Clusters  Preliminary Report (20 May 2025 06:29):    Growth in aerobic bottle: Gram Positive Cocci in Clusters    Direct identification is available within approximately 3-5    hours either by Blood Panel Multiplexed PCR or Direct    MALDI-TOF. Details: [email or web address]  Organism: Blood Culture PCR (20 May 2025 10:17)  Organism: Blood Culture PCR (20 May 2025 10:17)      Method Type: PCR      -  Coagulase negative Staphylococcus, Methicillin resistant: Detec    Culture - Urine (collected 18 May 2025 20:03)  Source: Clean Catch Clean Catch (Midstream)  Final Report (19 May 2025 18:41):    No growth    Culture - Urine (collected 17 May 2025 04:35)  Source: Clean Catch Clean Catch (Midstream)  Final Report (20 May 2025 10:44):    10,000 - 49,000 CFU/mL Enterococcus faecium (vancomycin resistant)  Organism: Enterococcus faecium (vancomycin resistant) (20 May 2025 10:44)  Organism: Enterococcus faecium (vancomycin resistant) (20 May 2025 10:44)      Method Type: CYNTHIA      -  Ampicillin: R >8 Predicts results to ampicillin/sulbactam, amoxacillin-clavulanate and  piperacillin-tazobactam.      -  Ciprofloxacin: R >2      -  Daptomycin: SDD 4 The breakpoint for SDD (susceptible dose dependent)is based on a dosage regimen of 8-12 mg/kg administered every 24 h in adults and is intended for serious infections due to E. faecium. Consultation with an infectious diseases specialist is recommended.      -  Levofloxacin: R >4      -  Linezolid: S 2      -  Nitrofurantoin: I 64 Should not be used to treat pyelonephritis.      -  Tetracycline: R >8      -  Vancomycin: R >16    Culture - Blood (collected 16 May 2025 22:50)  Source: Blood Blood-Peripheral  Preliminary Report (21 May 2025 03:01):    No growth at 4 days    Culture - Blood (collected 16 May 2025 22:41)  Source: Blood Blood-Peripheral  Preliminary Report (21 May 2025 03:01):    No growth at 4 days                    RADIOLOGY:    < from: Xray Chest 1 View- PORTABLE-Urgent (Xray Chest 1 View- PORTABLE-Urgent .) (05.18.25 @ 20:49) >  Right IJ central venous catheter, in place.  No focal consolidation.      < end of copied text >  < from: CT Abdomen and Pelvis w/ Oral Cont (05.14.25 @ 16:26) >  No acute CTfindings suspicious for infection.      < end of copied text >

## 2025-05-21 NOTE — PHYSICAL THERAPY INITIAL EVALUATION ADULT - GAIT DEVIATIONS NOTED, PT EVAL
decreased zoraida/decreased weight-shifting ability +1 ep LOB/decreased zoraida/decreased step length/decreased stride length/decreased weight-shifting ability

## 2025-05-21 NOTE — PROGRESS NOTE ADULT - PROBLEM SELECTOR PLAN 9
5/18- Orthostatic hypotension,  cc bolus given.  Continue Midodrine 10 mg po q8h  Suzi YATES HCTZ No

## 2025-05-21 NOTE — PROGRESS NOTE ADULT - ASSESSMENT
70 yo male with newly diagnosed ASXL1, DDX41 mutated AML, admitted for induction chemo with dauno 60 mg/m2+ cytarabine. 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   5/2 Admit for Induction 7+3 Dauno 60mg/m2 + Cytarabine 100mg/m2     Hospital course: Chemo started on 5/2, Complicated by rigors, chills, fever, SOB  and tachycardia 2 hours into day 1 infusion - likely infusion reaction. Afebrile 5/3-5/10, persistent daily fevers 5/11 onwards. Developed bloody BM 5/16, now resolved. Blood cultures with CoNS. ID asked to evaluate.      5/13- FLU/COVID/RSV (-)  5/15 GI PCR neg  5/17 Ucx VRE, Ucx 5/18 NG  5/19 Bcx  1/2 CoNS      Antimicrobial Course:  - Levaquin/Amox- 5/2- 5/11  - Cefepime- 5/11- 5/19  - Meropenem- 5/19  - Flagyl (abd discomfort)- 5/13- 5/19  - Valtrex- 5/2  - Posaconazole 5/2      # Febrile neutropenia  # AML on chemo  # Positive blood cultures  # Loose stools    - Fevers in the setting of neutropenia and AML on therapy; no focal source of infection  - Continue Vancomycin 750 mg Q12; check trough before 4th dose  - Continue Meropenem 1 gm Q8h  - Continue Valcyte and posaconazole ppx  - follow repeat blood cultures; if negative will limit Vancomycin  - continue to trend CBC/fever curves      Case d/w Attending and Primary team.     Cesar Perera MD, PGY-5  ID Fellow  [employer] Teams Preferred  After 5pm/weekends call [phone]

## 2025-05-21 NOTE — PHYSICAL THERAPY INITIAL EVALUATION ADULT - GENERAL OBSERVATIONS, REHAB EVAL
Rec'd semi supine in bed in NAD, VSS, A/Ox4, +IV, room air, agreeable to PT.
patient received supine in bed in NAD

## 2025-05-21 NOTE — PHYSICAL THERAPY INITIAL EVALUATION ADULT - NSPTDISCHREC_GEN_A_CORE
TBD pending further ambulation If pt d/c home would require home PT, assist with all mobility/ADLs, & DME: Pt will require a rolling walker due to dx of decreased strength and balance limiting safe functional mobility to help complete MRADLs, 3:1 commode 2* pt confined to single room without bathroom, & polyfly w/c with elevating leg rests to navigate community distances/Sub-acute Rehab

## 2025-05-21 NOTE — PHYSICAL THERAPY INITIAL EVALUATION ADULT - PERTINENT HX OF CURRENT PROBLEM, REHAB EVAL
68 y/o M with newly diagnosed ASXL1, DDX41 mutated AML, admitted for induction chemo with dauno 60 mg/m2+ cytarabine. PMH  BRCA2 carrier ([id number] 9610C>T), HTN, HLD, Gout, GERD, BPH, spinal stenosis (s/p multiple procedures 2018, 2020 laminectomies / fusions), hip surgeries (Oct 2024, Jan 2025), residual hand weakness related to surgeries, umbilical hernia repair (2014/15) referred here from Dr Atkinson at [facility] in [place] for new ASXL1-mutated and [id number] AML. TESTS: 5/3 CXR: Tubes and lines as above. Patchy opacities along the left lung base, possibly atelectasis, however   superimposed infection cannot be entirely excluded. 70 y/o M with newly diagnosed ASXL1, DDX41 mutated AML, admitted for induction chemo with dauno 60 mg/m2+ cytarabine. PMH  BRCA2 carrier ([id number] 9610C>T), HTN, HLD, Gout, GERD, BPH, spinal stenosis (s/p multiple procedures 2018, 2020 laminectomies / fusions), hip surgeries (Oct 2024, Jan 2025), residual hand weakness related to surgeries, umbilical hernia repair (2014/15) referred here from Dr Atkinson at [facility] in [place] for new ASXL1-mutated and FLE75-owzultf AML. Chemo Regimen: s/p 7+3:  Daunorubicin 60 mg/m2+ Cytarabine   TESTS: 5/3 CXR: Tubes and lines as above. Patchy opacities along the left lung base, possibly atelectasis, however superimposed infection cannot be entirely excluded. CXR: Right IJ central venous catheter, in place.No focal consolidation. CT Chest: (-)

## 2025-05-21 NOTE — PROGRESS NOTE ADULT - SUBJECTIVE AND OBJECTIVE BOX
Diagnosis: ASXL1-mutated and WZX14-voqwmsk AML    Protocol/Chemo Regimen: s/p 7+3:  Daunorubicin 60 mg/m2+ Cytarabine     Day: 20    Pt endorsed: exhausted, lightheadedness,  vertigo yesterday.  fever and chills,  left tongue pain controlled with pain medication, still with difficulty eating and decreased appetite.  mixed diarrhea 3-4 x past 24 hrs.   Chronic neck and low back pain    Review of Systems: Denies nausea, vomiting, diarrhea, chest pain, SOB     Pain scale:  6-7/10 left tongue, 8/10 neck and low back                      Allergies: No Known Allergies    ------------------           Diagnosis: ASXL1-mutated and BUX84-doeuquu AML    Protocol/Chemo Regimen: s/p 7+3:  Daunorubicin 60 mg/m2+ Cytarabine     Day: 20    Pt endorsed: mouth pain with poor PO intake; fever and chills; more formed stool this morning; dizziness     Review of Systems: Denies nausea, vomiting, chest pain, SOB     Pain scale:  6-7/10 left tongue, 8/10 neck and low back                      Allergies: No Known Allergies    ANTIMICROBIALS  meropenem  IVPB 1000 milliGRAM(s) IV Intermittent every 8 hours  posaconazole DR Tablet 300 milliGRAM(s) Oral every 24 hours  valACYclovir 500 milliGRAM(s) Oral two times a day      vancomycin  IVPB 750 milliGRAM(s) IV Intermittent every 12 hours    STANDING MEDICATIONS  allopurinol 100 milliGRAM(s) Oral daily  Biotene Dry Mouth Oral Rinse 15 milliLiter(s) Swish and Spit four times a day  chlorhexidine 4% Liquid 1 Application(s) Topical <User Schedule>  cholecalciferol 2000 Unit(s) Oral daily  dexAMETHasone    Solution 0.5 milliGRAM(s) Oral two times a day  diltiazem    milliGRAM(s) Oral daily  FIRST- Mouthwash  BLM 10 milliLiter(s) Swish and Swallow four times a day  HYDROmorphone  Injectable 0.5 milliGRAM(s) IV Push <User Schedule>  lidocaine   4% Patch 1 Patch Transdermal every 24 hours  lidocaine 2% Gel 1 Application(s) Topical every 8 hours  melatonin 5 milliGRAM(s) Oral at bedtime  midodrine. 10 milliGRAM(s) Oral every 8 hours  pantoprazole  Injectable 40 milliGRAM(s) IV Push every 12 hours  simethicone 80 milliGRAM(s) Chew every 6 hours  sodium chloride 0.9%. 1000 milliLiter(s) IV Continuous <Continuous>  tamsulosin 0.4 milliGRAM(s) Oral at bedtime    PRN MEDICATIONS  acetaminophen     Tablet .. 650 milliGRAM(s) Oral every 6 hours PRN  albuterol    90 MICROgram(s) HFA Inhaler 2 Puff(s) Inhalation every 6 hours PRN  aluminum hydroxide/magnesium hydroxide/simethicone Suspension 30 milliLiter(s) Oral every 6 hours PRN  calcium carbonate    500 mG (Tums) Chewable 1 Tablet(s) Chew four times a day PRN  cyclobenzaprine 10 milliGRAM(s) Oral three times a day PRN  hydrocortisone sodium succinate Injectable 100 milliGRAM(s) IV Push every 8 hours PRN  HYDROmorphone  Injectable 0.5 milliGRAM(s) IV Push every 3 hours PRN  HYDROmorphone  Injectable 0.7 milliGRAM(s) IV Push every 3 hours PRN  meperidine     Injectable 12.5 milliGRAM(s) IV Push once PRN  metoclopramide Injectable 10 milliGRAM(s) IV Push every 6 hours PRN  ondansetron Injectable 8 milliGRAM(s) IV Push every 8 hours PRN  polyethylene glycol 3350 17 Gram(s) Oral two times a day PRN  sodium chloride 0.9% lock flush 10 milliLiter(s) IV Push every 1 hour PRN    Vital Signs Last 24 Hrs  T(C): 37.3 (21 May 2025 09:00), Max: 39.4 (20 May 2025 20:30)  T(F): 99.1 (21 May 2025 09:00), Max: 102.9 (20 May 2025 20:30)  HR: 90 (21 May 2025 09:00) (76 - 116)  BP: 109/69 (21 May 2025 09:00) (97/56 - 139/75)  BP(mean): --  RR: 18 (21 May 2025 09:00) (16 - 18)  SpO2: 94% (21 May 2025 09:00) (88% - 97%)    Parameters below as of 21 May 2025 09:00  Patient On (Oxygen Delivery Method): room air    PHYSICAL EXAM  General: adult in NAD  HEENT: clear oropharynx, no erythema, no ulcers  Neck: supple  CV: normal S1, S2, RRR  Lungs: clear to auscultation, no wheezes, no rales  Abdomen: soft, nontender, nondistended, normal BS  Ext: no edema  Skin: no rash  Neuro: alert and oriented x 3  Central line: normal     LABS:                        7.3    0.60  )-----------( 16       ( 21 May 2025 07:59 )             20.7         Mean Cell Volume : 88.5 fl  Mean Cell Hemoglobin : 31.2 pg  Mean Cell Hemoglobin Concentration : 35.3 g/dL  Auto Neutrophil # : x  Auto Lymphocyte # : x  Auto Monocyte # : x  Auto Eosinophil # : x  Auto Basophil # : x  Auto Neutrophil % : x  Auto Lymphocyte % : x  Auto Monocyte % : x  Auto Eosinophil % : x  Auto Basophil % : x    05-21    136  |  100  |  13  ----------------------------<  132[H]  3.3[L]   |  22  |  1.25    Ca    8.2[L]      21 May 2025 07:59  Phos  2.9     05-21  Mg     2.1     05-21    TPro  5.3[L]  /  Alb  3.0[L]  /  TBili  0.4  /  DBili  x   /  AST  17  /  ALT  17  /  AlkPhos  76  05-21    Mg 2.1  Phos 2.9  Mg 2.2  Phos 2.2    PT/INR - ( 21 May 2025 07:59 )   PT: 14.2 sec;   INR: 1.24 ratio      PTT - ( 21 May 2025 07:59 )  PTT:26.7 sec    Uric Acid 3.8

## 2025-05-21 NOTE — PHYSICAL THERAPY INITIAL EVALUATION ADULT - PLANNED THERAPY INTERVENTIONS, PT EVAL
Patient is independent in tested functional activities and therefore discharged from hospital Physical Therapy services.
balance training/bed mobility training/gait training/strengthening/transfer training
20

## 2025-05-21 NOTE — PROGRESS NOTE ADULT - NS ATTEND AMEND GEN_ALL_CORE FT
Primary: Paulie    69M with ASXL1-mutated and [id number]-[id number] AML. 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 Admitted for induction 7+3 - day 16.    Heme:   - Completed 7+3. Day 19. Complicated by rigors, chills, and tachycardia 2 hours into day 1 infusion - likely infusion reaction.  - Trend CBC with differential daily. Goal Hgb > 7 and plt > 30 given past likely GI bleeding.   - Trend DIC labs intermittently, as this has not been issue since admission. Continue supportive transfusions as needed to maintain fibrinogen > 100.   - Trend TLS labs daily at this point - very low risk now.   - Allopurinol continued, but for previous diagnosis of gout decreased dose to 100 mg on 5/16.  S/p rasburicase; uric acid and LDH stable.   - Continue IVF. Monitor daily weights and I/O's. Diurese PRN.   - BMBx done day 15 (5/16) - PENDING    GI:  - Bloody BM 5/14-16.  Plt goal > 30K now.     Card:  - Orthostatic hypotension, likely residual from GI bleed and decreased PO intake. Poss autonomic component.  - Bolus 500 cc on 5/17 and 5/18, on NS 75 cc/hr. Now on midodrine.  - Encouraged incr PO intake      ID:   -Neutropenic and febrile; fevers since 5/11, tewmp up to 103.3 today    with ongoing GI symptoms, diarrhea. Cults (-) until now - (+) for GPC in clusters  -Cefepime (5/11- ). Persistent fever lead to changing to meropenem on 5/19.      - CT scan from 5/14 showing no acute CT findings suspicious for infection. Mentioned GGOs in the lungs bilaterally, but stable from a previous scan.   - Prophylaxis: posaconazole, valacyclovir   - ID to see  - Mouth rinses, dexamethasone swish and spit for oral mucositis.   - demerol prn for rigors    MSK: Has chronic pain from prior surgeries   - Pain control: oxycodone    TLC in place  OOB Primary: Paulie    69M with ASXL1-mutated and [id number]-rfkwlca AML. 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 Admitted for induction 7+3 - day 16.    Heme:   - Completed 7+3. Day 20. Complicated by rigors, chills, and tachycardia 2 hours into day 1 infusion - likely infusion reaction.  - Trend CBC with differential daily. Goal Hgb > 7 and plt > 30 given past GI bleeding.   - Trend DIC labs intermittently, as this has not been issue since admission. Continue transfusions as needed to maintain fibrinogen > 100.      - Allopurinol continued, but for previous diagnosis of gout decreased dose to 100 mg on 5/16.  S/p rasburicase; uric acid and LDH stable.   - Continue IVF. Monitor daily weights and I/O's. Diurese PRN.   - BMBx done day 15 (5/16) - subcortical, suboptimal specimen, no evidence of persistent leukemia. Flow 0.34% myeloblasts.    GI:  - Bloody BM 5/14-16.  Plt goal > 30K now.     Card:  - Orthostatic hypotension, likely residual from GI bleed and decreased PO intake. Poss autonomic component.  - Bolus 500 cc on 5/17 and 5/18, on NS 75 cc/hr. Now on midodrine. BP stable.  - Encouraged incr PO intake      ID:   -Neutropenic and febrile; fevers since 5/11     with ongoing GI symptoms, diarrhea. Cults (-) with recent CNS from blood likely contaminant in 1/4 bottles and VREC in urine    cleared in subsequent specimen.  -Cefepime (5/11-5/19 ). Persistent fever lead to changing to meropenem on 5/19.      - CT scan from 5/14 showing no acute CT findings suspicious for infection. Mentioned GGOs in the lungs bilaterally, but stable from a previous scan.   - Prophylaxis: posaconazole, valacyclovir   - Appreciate ID input - kept on vanco for now (fevers, mucositis)  - Mouth rinses, dexamethasone swish and spit for mucositis.   - demerol prn for rigors    MSK: Has chronic pain from prior surgeries   - Pain control: oxycodone    TLC in place  OOB Primary: Paulie    69M with ASXL1-mutated and [id number]-zrlkbhn AML. 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 Admitted for induction 7+3 - day 16.    Heme:   - Completed 7+3. Day 20. Complicated by rigors, chills, and tachycardia 2 hours into day 1 infusion - likely infusion reaction.  - Trend CBC with differential daily. Goal Hgb > 7 and plt > 30 given past GI bleeding.   - Trend DIC labs intermittently, as this has not been issue since admission. Continue transfusions as needed to maintain fibrinogen > 100.      - Allopurinol continued, but for previous diagnosis of gout decreased dose to 100 mg on 5/16.  S/p rasburicase; uric acid and LDH stable.   - Continue IVF. Monitor daily weights and I/O's. Diurese PRN.   - BMBx done day 15 (5/16) - subcortical, suboptimal specimen, no evidence of persistent leukemia. Flow 0.34% myeloblasts.  - in absence of overt evidence of persistent leukemia, and in setting of persistent fevers, start daily G-CSF.  GI:  - Bloody BM 5/14-16.  Plt goal > 30K now.     Card:  - Orthostatic hypotension, likely residual from GI bleed and decreased PO intake. Poss autonomic component.  - Bolus 500 cc on 5/17 and 5/18, on NS 75 cc/hr. Now on midodrine. BP stable.  - Encouraged incr PO intake      ID:   -Neutropenic and febrile; fevers since 5/11     with ongoing GI symptoms, diarrhea. Cults (-) with recent CNS from blood likely contaminant in 1/4 bottles and VREC in urine    cleared in subsequent specimen.  -Cefepime (5/11-5/19 ). Persistent fever lead to changing to meropenem on 5/19.      - CT scan from 5/14 showing no acute CT findings suspicious for infection. Mentioned GGOs in the lungs bilaterally, but stable from a previous scan.   - Prophylaxis: posaconazole, valacyclovir   - Appreciate ID input - kept on vanco for now (fevers, mucositis)  - Mouth rinses, dexamethasone swish and spit for mucositis.   - demerol prn for rigors    MSK: Has chronic pain from prior surgeries   - Pain control: oxycodone    TLC in place  OOB

## 2025-05-21 NOTE — PHARMACOTHERAPY INTERVENTION NOTE - COMMENTS
Clinical Pharmacy Specialist- Hematology/Oncology- Progress Note    Pt is a 70 y/o male w/ PMH of HTN, HLD, GERD, BPH, Gout, spinal stenosis (s/p multiple surgeries) and newly diagnosed AML (ASXL1-mutated and DDX41) admitted for management with 7+3 Induction therapy    Antimicrobial Course( ID Following, MD Rodarte):  - Posaconazole- 5/2  - Levaquin/Amox- 5/2- 5/11  --> Cefepime- 5/11- 5/19  --> Meropenem- 5/19  - Flagyl (abd discomfort)- 5/13- 5/19  - Valtrex- 5/2  - Vancomycin (MRSA?MRSE bacteremia)- 5/20  MRSA nasal swab    Last Neutropenic (ANC<1000): 5/21; ANC=   Last Febrile: 21-May-2025 04:00; T= 100.5  Days Non-Neutropenic: 0?  Days afebrile: 0    Chemotherapy Course  -Current Regimen: 7+3 Induction  History:  (5/2/25)- 7+3 Induction   -Daunorubicin 60mg/m2 IVP D1-3   -Cytarabine 100mg/m2 IV cont inf D1-7  -Day: 20 (5/21)  BmBx:  Access: R TLC (accessed 5/5/25)    Vancomycin Levels:   Date    Dose              Value                   Ref Range	                         Status   5/21    750mg q12hr                           Trough 10-20; AUC= 400-600	 Pending    History/Relevant clinical information used in assessment:  - Ht= 5’11”; IBW= 75.3kg; Actual BW= 107.3kg; Adj BW= 88kg  - 5/2- IRR? rigors, chills, and tachycardia 2 hours into day 1; rasburicase 3mg x 1  - 5/4 -Crcl= 67ml/min ; Scr=1.32; Wt= 88kg; Adjusted BW used since pt BMI>30  - 5/9- increase melatonin 3mg to 10mh qhs- c/o insomnia  - 5/13- mouth pain- has ulceration   - 5/14- mouth pain - cannot swallow now  - 5/16- bloody BM's decreased to 2x'd/day from 4x's/day yest    Assessment/Plan/Recommendation:  Onc:  - Daunorubicin 60mg/m2 IVP D1-3   - Cytarabine 100mg/m2 IV cont inf D1-7- IRR on D1, started at 6p, stopped at 7:30p (received 1.5 hrs),  made up D1 bag on 5/9 to run over 23 hrs (to account for ~1hr given)  - allopurinol 100mg (300mg d/c'd 5/15)-keeping for gout  ID:  - 5/15- bloody diarrhea x 3 on reglan prn, but has not needed, monitor for d/c to not aggravate w/ prokinetic agent- amicar?  - 5/15- HSV neg  - 5/19, still febrile since 5/11, Tmax= 102.9 5/20@8p- on cefepime, BlCx NGTD + abd discomfort-->CT CAP 5/15 - no infection (but groundglass opacities stable from previous?)- on meropenem- (falls off after 5/27)  - 5/19- BlCx- MRSA/MRSE?- on vancomycin 750mg q12hr, this will yield AUC= 462 (goal 400-600); dose is lower d/t pt Scr= 1.18; vanco 1gm q12hr gives AUC >600- per ID, likely contaminant, expect short course of vanco  - next VT= 5/21~6pm  - 5/17 UCx- 5/17- 10-49k VRE- per ID, do not need to treat  Cards:  - atorvastatin 80mg held d/t DDI with posa (Cat X)  Pain (mouth):  ·	magic mouthwash to swish & swallow- 5/14  ·	hydromorphone 0.2mg IVP q6hr prn- 5/14--> 0.5mg - 0.7mg q3hr prn- 5/16  ·	hydromorphone 0.5mg IVP tid- 5/15  ·	oxycodone 2.5-5mg q4hr prn- 5/2- 5/16  ·	lidocaine gel 2% q8hr to tongue 5/14  ·	dexamethasone soln 0.5 mg/5 mL BID swish & spit - 5/15  Orthostasis:  - 5/18- midodrine 10mg PO tid    Additional Monitoring Needed?   -Yes- Continue to monitor renal function & daily counts for abx escalation/de-escalation   -Discharge Planning:  --> New meds: midodrine  --> Meds sent for auth:  --> Delivered meds:    Case discussed with attending/primary team    Liza Jolley, PharmD, BCPS  Clinical Pharmacy Specialist | Hematology/Oncology  [facility]  Email: [email or web address] or available on [email or web address]   Clinical Pharmacy Specialist- Hematology/Oncology- Progress Note    Pt is a 70 y/o male w/ PMH of HTN, HLD, GERD, BPH, Gout, spinal stenosis (s/p multiple surgeries) and newly diagnosed AML (ASXL1-mutated and DDX41) admitted for management with 7+3 Induction therapy    Antimicrobial Course( ID Following, MD Rodarte):  - Posaconazole- 5/2  - Levaquin/Amox- 5/2- 5/11  --> Cefepime- 5/11- 5/19  --> Meropenem- 5/19  - Flagyl (abd discomfort)- 5/13- 5/19  - Valtrex- 5/2  - Vancomycin (MRSA?MRSE bacteremia)- 5/20  MRSA nasal swab    Last Neutropenic (ANC<1000): 5/21; ANC= 0.02  Last Febrile: 21-May-2025 04:00; T= 100.5  Days Non-Neutropenic: 0  Days afebrile: 0    Chemotherapy Course  -Current Regimen: 7+3 Induction  History:  (5/2/25)- 7+3 Induction   -Daunorubicin 60mg/m2 IVP D1-3   -Cytarabine 100mg/m2 IV cont inf D1-7  -Day: 20 (5/21)  BmBx: 5/16- favorable  Access: R TLC (accessed 5/5/25)    Vancomycin Levels:   Date    Dose              Value                   Ref Range	                         Status   5/21    750mg q12hr                           Trough 10-20; AUC= 400-600	 Pending    History/Relevant clinical information used in assessment:  - Ht= 5’11”; IBW= 75.3kg; Actual BW= 107.3kg; Adj BW= 88kg  - 5/2- IRR? rigors, chills, and tachycardia 2 hours into day 1; rasburicase 3mg x 1  - 5/4 -Crcl= 67ml/min ; Scr=1.32; Wt= 88kg; Adjusted BW used since pt BMI>30  - 5/9- increase melatonin 3mg to 10mh qhs- c/o insomnia  - 5/13- mouth pain- has ulceration   - 5/14- mouth pain - cannot swallow now  - 5/16- bloody BM's decreased to 2x'd/day from 4x's/day yest    Assessment/Plan/Recommendation:  Onc:  - Daunorubicin 60mg/m2 IVP D1-3   - Cytarabine 100mg/m2 IV cont inf D1-7- IRR on D1, started at 6p, stopped at 7:30p (received 1.5 hrs),  made up D1 bag on 5/9 to run over 23 hrs (to account for ~1hr given)  - BmBx 5/16- favorable  - 5/20- zarxio 480mcg daily  - allopurinol 100mg (300mg d/c'd 5/15)-keeping for gout  ID:  - 5/15- bloody diarrhea x 3 on reglan prn, but has not needed, monitor for d/c to not aggravate w/ prokinetic agent- amicar?  - 5/15- HSV neg  - 5/19, still febrile since 5/11, Tmax= 102.9 5/20@8p- on cefepime, BlCx NGTD + abd discomfort-->CT CAP 5/15 - no infection (but groundglass opacities stable from previous?)- on meropenem- (falls off after 5/27)  - 5/19- BlCx- MRSA/MRSE?- on vancomycin 750mg q12hr, this will yield AUC= 462 (goal 400-600); dose is lower d/t pt Scr= 1.18; vanco 1gm q12hr gives AUC >600- per ID, likely contaminant, expect short course of vanco  - next VT= 5/21~6pm  - 5/17 UCx- 5/17- 10-49k VRE- per ID, do not need to treat  Cards:  - atorvastatin 80mg held d/t DDI with posa (Cat X)  Pain (mouth):  ·	magic mouthwash to swish & swallow- 5/14  ·	hydromorphone 0.2mg IVP q6hr prn- 5/14--> 0.5mg - 0.7mg q3hr prn- 5/16  ·	hydromorphone 0.5mg IVP tid- 5/15  ·	oxycodone 2.5-5mg q4hr prn- 5/2- 5/16  ·	lidocaine gel 2% q8hr to tongue 5/14  ·	dexamethasone soln 0.5 mg/5 mL BID swish & spit - 5/15  Orthostasis:  - 5/18- midodrine 10mg PO tid    Additional Monitoring Needed?   -Yes- Continue to monitor renal function & daily counts for abx escalation/de-escalation   -Discharge Planning:  --> New meds: midodrine  --> Meds sent for auth:  --> Delivered meds:    Case discussed with attending/primary team    Liza Jolley, PharmD, BCPS  Clinical Pharmacy Specialist | Hematology/Oncology  [facility]  Email: [email or web address] or available on [email or web address]   Clinical Pharmacy Specialist- Hematology/Oncology- Progress Note    Pt is a 68 y/o male w/ PMH of HTN, HLD, GERD, BPH, Gout, spinal stenosis (s/p multiple surgeries) and newly diagnosed AML (ASXL1-mutated and DDX41) admitted for management with 7+3 Induction therapy    Antimicrobial Course( ID Following, MD Rodarte):  - Posaconazole- 5/2  - Levaquin/Amox- 5/2- 5/11  --> Cefepime- 5/11- 5/19  --> Meropenem- 5/19  - Flagyl (abd discomfort)- 5/13- 5/19  - Valtrex- 5/2  - Vancomycin (MRSA?MRSE bacteremia)- 5/20  MRSA nasal swab    Last Neutropenic (ANC<1000): 5/21; ANC= 0.02  Last Febrile: 21-May-2025 04:00; T= 100.5  Days Non-Neutropenic: 0  Days afebrile: 0    Chemotherapy Course  -Current Regimen: 7+3 Induction  History:  (5/2/25)- 7+3 Induction   -Daunorubicin 60mg/m2 IVP D1-3   -Cytarabine 100mg/m2 IV cont inf D1-7  -Day: 20 (5/21)  BmBx: 5/16- favorable  Access: R TLC (accessed 5/5/25)    Vancomycin Levels:   Date    Dose              Value                   Ref Range	                         Status   5/21    750mg q12hr                           Trough 10-20; AUC= 400-600	 Pending    History/Relevant clinical information used in assessment:  - Ht= 5’11”; IBW= 75.3kg; Actual BW= 107.3kg; Adj BW= 88kg  - 5/2- IRR? rigors, chills, and tachycardia 2 hours into day 1; rasburicase 3mg x 1  - 5/4 -Crcl= 67ml/min ; Scr=1.32; Wt= 88kg; Adjusted BW used since pt BMI>30  - 5/9- increase melatonin 3mg to 10mh qhs- c/o insomnia  - 5/13- mouth pain- has ulceration   - 5/14- mouth pain - cannot swallow now  - 5/16- bloody BM's decreased to 2x'd/day from 4x's/day yest    Assessment/Plan/Recommendation:  Onc:  - Daunorubicin 60mg/m2 IVP D1-3   - Cytarabine 100mg/m2 IV cont inf D1-7- IRR on D1, started at 6p, stopped at 7:30p (received 1.5 hrs),  made up D1 bag on 5/9 to run over 23 hrs (to account for ~1hr given)  - BmBx 5/16- favorable  - 5/20- zarxio 480mcg daily  - allopurinol 100mg (300mg d/c'd 5/15)-keeping for gout  ID:  - 5/15- bloody diarrhea x 3 on reglan prn, but has not needed, monitor for d/c to not aggravate w/ prokinetic agent- amicar?  - 5/15- HSV neg  - 5/19, still febrile since 5/11, Tmax= 102.9 5/20@8p- on cefepime, BlCx NGTD + abd discomfort-->CT CAP 5/15 - no infection (but groundglass opacities stable from previous?)- on meropenem- (falls off after 5/27)  - 5/19- BlCx- MRSA/MRSE?- on vancomycin 750mg q12hr, this will yield AUC= 462 (goal 400-600); dose is lower d/t pt Scr= 1.18; vanco 1gm q12hr gives AUC >600- per ID, likely contaminant, expect short course of vanco  - next VT= 5/21~6pm  - 5/17 UCx- 5/17- 10-49k VRE- per ID, do not need to treat  Cards:  - atorvastatin 80mg held d/t DDI with posa (Cat X)  Pain (mouth):  ·	magic mouthwash to swish & swallow- 5/14  ·	hydromorphone 0.2mg IVP q6hr prn- 5/14--> 0.5mg - 0.7mg q3hr prn- 5/16  ·	hydromorphone 0.5mg IVP tid- 5/15  ·	oxycodone 2.5-5mg q4hr prn- 5/2- 5/16  ·	lidocaine gel 2% q8hr to tongue 5/14  ·	dexamethasone soln 0.5 mg/5 mL BID swish & spit - 5/15  ·	meperidine 12.5mg IVP prn rigors  Orthostasis:  - 5/18- midodrine 10mg PO tid    Additional Monitoring Needed?   -Yes- Continue to monitor renal function & daily counts for abx escalation/de-escalation   -Discharge Planning:  --> New meds: midodrine  --> Meds sent for auth:  --> Delivered meds:    Case discussed with attending/primary team    Liza Jolley, PharmD, BCPS  Clinical Pharmacy Specialist | Hematology/Oncology  [facility]  Email: [email or web address] or available on [employer]

## 2025-05-21 NOTE — PHYSICAL THERAPY INITIAL EVALUATION ADULT - PREDICTED DURATION OF THERAPY (DAYS/WKS), PT EVAL
Patient is independent in tested functional activities and therefore discharged from hospital Physical Therapy services.
2 weeks

## 2025-05-21 NOTE — PROGRESS NOTE ADULT - ATTENDING COMMENTS
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 Afebrile 5/3-5/10, Neutropenic since 5/7, persistent daily fevers since  5/11   Developed bloody BM 5/15, no blood for last 3 days, has 2-3 watery stools, GI PCR negative but still with fever and rigors  all blood cxs before negative and now blood cx 5/19 showed 1/4 MRSE  urine cx 5/17 VRE but 5/18 negative and pt asymptomatic    - Levaquin/Amox- 5/2- 5/11  - Cefepime- 5/11- 5/19  Flagyl (abd discomfort)- 5/13- 5/19  - Meropenem- 5/19  - Valtrex-Posaconazole  5/2    AML stated on daruno+cytararbine 5/2, had fever and likely infusion reaction but then neutropenic fever, rigors since 5/11, chest/abd CT 5/14 negative BM BX 5/16 showed rare myeloblast polpulation  1/4 MRSE in the blood could be a contaminant but pt has a RIJ line  urine cx 5/17 with VRE but repeat 5/18 negative without intervention    * follow the repeat blood cx 5/20, it was done before starting vanco  * c/w vanco for now  * c/w gianna  * c/w Posa and valtrex ppx  * monitor CBC/diff and CMP      The above assessment and plan was discussed with the primary team    Lidia Rodarte MD  contact on teams  After 5pm and on weekends call [phone]

## 2025-05-21 NOTE — PHYSICAL THERAPY INITIAL EVALUATION ADULT - IMPAIRMENTS CONTRIBUTING TO GAIT DEVIATIONS, PT EVAL
impaired balance/decreased strength decreased endurance/impaired balance/impaired postural control/decreased strength

## 2025-05-22 DIAGNOSIS — R10.13 EPIGASTRIC PAIN: ICD-10-CM

## 2025-05-22 LAB
ADD ON TEST-SPECIMEN IN LAB: SIGNIFICANT CHANGE UP
ALBUMIN SERPL ELPH-MCNC: 3.3 G/DL — SIGNIFICANT CHANGE UP (ref 3.3–5)
ALP SERPL-CCNC: 88 U/L — SIGNIFICANT CHANGE UP (ref 40–120)
ALT FLD-CCNC: 37 U/L — SIGNIFICANT CHANGE UP (ref 10–45)
AMYLASE P1 CFR SERPL: 22 U/L — LOW (ref 25–125)
AMYLASE P1 CFR SERPL: 23 U/L — LOW (ref 25–125)
ANION GAP SERPL CALC-SCNC: 13 MMOL/L — SIGNIFICANT CHANGE UP (ref 5–17)
ANION GAP SERPL CALC-SCNC: 15 MMOL/L — SIGNIFICANT CHANGE UP (ref 5–17)
APPEARANCE UR: CLEAR — SIGNIFICANT CHANGE UP
AST SERPL-CCNC: 31 U/L — SIGNIFICANT CHANGE UP (ref 10–40)
BACTERIA # UR AUTO: NEGATIVE /HPF — SIGNIFICANT CHANGE UP
BASOPHILS # BLD AUTO: 0 K/UL — SIGNIFICANT CHANGE UP (ref 0–0.2)
BASOPHILS NFR BLD AUTO: 0 % — SIGNIFICANT CHANGE UP (ref 0–2)
BILIRUB SERPL-MCNC: 0.6 MG/DL — SIGNIFICANT CHANGE UP (ref 0.2–1.2)
BILIRUB UR-MCNC: NEGATIVE — SIGNIFICANT CHANGE UP
BLASTS # FLD: 15.6 % — HIGH (ref 0–0)
BLASTS NFR BLD: 15.6 % — HIGH (ref 0–0)
BUN SERPL-MCNC: 10 MG/DL — SIGNIFICANT CHANGE UP (ref 7–23)
BUN SERPL-MCNC: 10 MG/DL — SIGNIFICANT CHANGE UP (ref 7–23)
CALCIUM SERPL-MCNC: 8 MG/DL — LOW (ref 8.4–10.5)
CALCIUM SERPL-MCNC: 8.3 MG/DL — LOW (ref 8.4–10.5)
CAST: 1 /LPF — SIGNIFICANT CHANGE UP (ref 0–4)
CHLORIDE SERPL-SCNC: 101 MMOL/L — SIGNIFICANT CHANGE UP (ref 96–108)
CHLORIDE SERPL-SCNC: 101 MMOL/L — SIGNIFICANT CHANGE UP (ref 96–108)
CK MB CFR SERPL CALC: 3.5 NG/ML — SIGNIFICANT CHANGE UP (ref 0–6.7)
CK MB CFR SERPL CALC: 3.7 NG/ML — SIGNIFICANT CHANGE UP (ref 0–6.7)
CK SERPL-CCNC: 86 U/L — SIGNIFICANT CHANGE UP (ref 30–200)
CK SERPL-CCNC: 87 U/L — SIGNIFICANT CHANGE UP (ref 30–200)
CO2 SERPL-SCNC: 21 MMOL/L — LOW (ref 22–31)
CO2 SERPL-SCNC: 21 MMOL/L — LOW (ref 22–31)
COLOR SPEC: YELLOW — SIGNIFICANT CHANGE UP
CREAT SERPL-MCNC: 1.02 MG/DL — SIGNIFICANT CHANGE UP (ref 0.5–1.3)
CREAT SERPL-MCNC: 1.12 MG/DL — SIGNIFICANT CHANGE UP (ref 0.5–1.3)
CULTURE RESULTS: ABNORMAL
CULTURE RESULTS: NO GROWTH — SIGNIFICANT CHANGE UP
CULTURE RESULTS: SIGNIFICANT CHANGE UP
CULTURE RESULTS: SIGNIFICANT CHANGE UP
DIFF PNL FLD: ABNORMAL
EGFR: 72 ML/MIN/1.73M2 — SIGNIFICANT CHANGE UP
EGFR: 72 ML/MIN/1.73M2 — SIGNIFICANT CHANGE UP
EGFR: 80 ML/MIN/1.73M2 — SIGNIFICANT CHANGE UP
EGFR: 80 ML/MIN/1.73M2 — SIGNIFICANT CHANGE UP
EOSINOPHIL # BLD AUTO: 0 K/UL — SIGNIFICANT CHANGE UP (ref 0–0.5)
EOSINOPHIL NFR BLD AUTO: 0 % — SIGNIFICANT CHANGE UP (ref 0–6)
FLUAV AG NPH QL: SIGNIFICANT CHANGE UP
FLUBV AG NPH QL: SIGNIFICANT CHANGE UP
GIANT PLATELETS BLD QL SMEAR: PRESENT — SIGNIFICANT CHANGE UP
GLUCOSE SERPL-MCNC: 120 MG/DL — HIGH (ref 70–99)
GLUCOSE SERPL-MCNC: 121 MG/DL — HIGH (ref 70–99)
GLUCOSE UR QL: NEGATIVE MG/DL — SIGNIFICANT CHANGE UP
HCT VFR BLD CALC: 24.5 % — LOW (ref 39–50)
HGB BLD-MCNC: 8.6 G/DL — LOW (ref 13–17)
KETONES UR QL: NEGATIVE MG/DL — SIGNIFICANT CHANGE UP
LDH SERPL L TO P-CCNC: 338 U/L — HIGH (ref 50–242)
LEUKOCYTE ESTERASE UR-ACNC: NEGATIVE — SIGNIFICANT CHANGE UP
LIDOCAIN IGE QN: 22 U/L — SIGNIFICANT CHANGE UP (ref 7–60)
LIDOCAIN IGE QN: 23 U/L — SIGNIFICANT CHANGE UP (ref 7–60)
LYMPHOCYTES # BLD AUTO: 0.94 K/UL — LOW (ref 1–3.3)
LYMPHOCYTES # BLD AUTO: 59.4 % — HIGH (ref 13–44)
MAGNESIUM SERPL-MCNC: 1.8 MG/DL — SIGNIFICANT CHANGE UP (ref 1.6–2.6)
MAGNESIUM SERPL-MCNC: 1.9 MG/DL — SIGNIFICANT CHANGE UP (ref 1.6–2.6)
MANUAL SMEAR VERIFICATION: SIGNIFICANT CHANGE UP
MCHC RBC-ENTMCNC: 30.3 PG — SIGNIFICANT CHANGE UP (ref 27–34)
MCHC RBC-ENTMCNC: 35.1 G/DL — SIGNIFICANT CHANGE UP (ref 32–36)
MCV RBC AUTO: 86.3 FL — SIGNIFICANT CHANGE UP (ref 80–100)
METAMYELOCYTES # FLD: 1 % — HIGH (ref 0–0)
METAMYELOCYTES NFR BLD: 1 % — HIGH (ref 0–0)
MONOCYTES # BLD AUTO: 0.16 K/UL — SIGNIFICANT CHANGE UP (ref 0–0.9)
MONOCYTES NFR BLD AUTO: 10.4 % — SIGNIFICANT CHANGE UP (ref 2–14)
NEUTROPHILS # BLD AUTO: 0.15 K/UL — LOW (ref 1.8–7.4)
NEUTROPHILS NFR BLD AUTO: 7.3 % — LOW (ref 43–77)
NEUTS BAND # BLD: 2.1 % — SIGNIFICANT CHANGE UP (ref 0–8)
NEUTS BAND NFR BLD: 2.1 % — SIGNIFICANT CHANGE UP (ref 0–8)
NITRITE UR-MCNC: NEGATIVE — SIGNIFICANT CHANGE UP
NRBC # BLD: 2 /100 WBCS — HIGH (ref 0–0)
NRBC BLD-RTO: 2 /100 WBCS — HIGH (ref 0–0)
ORGANISM # SPEC MICROSCOPIC CNT: ABNORMAL
ORGANISM # SPEC MICROSCOPIC CNT: ABNORMAL
PH UR: 7.5 — SIGNIFICANT CHANGE UP (ref 5–8)
PHOSPHATE SERPL-MCNC: 1.6 MG/DL — LOW (ref 2.5–4.5)
PHOSPHATE SERPL-MCNC: 2.9 MG/DL — SIGNIFICANT CHANGE UP (ref 2.5–4.5)
PLAT MORPH BLD: ABNORMAL
PLATELET # BLD AUTO: 32 K/UL — LOW (ref 150–400)
POTASSIUM SERPL-MCNC: 3.5 MMOL/L — SIGNIFICANT CHANGE UP (ref 3.5–5.3)
POTASSIUM SERPL-MCNC: 3.7 MMOL/L — SIGNIFICANT CHANGE UP (ref 3.5–5.3)
POTASSIUM SERPL-SCNC: 3.5 MMOL/L — SIGNIFICANT CHANGE UP (ref 3.5–5.3)
POTASSIUM SERPL-SCNC: 3.7 MMOL/L — SIGNIFICANT CHANGE UP (ref 3.5–5.3)
PROMYELOCYTES # FLD: 4.2 % — HIGH (ref 0–0)
PROMYELOCYTES NFR BLD: 4.2 % — HIGH (ref 0–0)
PROT SERPL-MCNC: 5.7 G/DL — LOW (ref 6–8.3)
PROT UR-MCNC: SIGNIFICANT CHANGE UP MG/DL
RAPID RVP RESULT: SIGNIFICANT CHANGE UP
RBC # BLD: 2.84 M/UL — LOW (ref 4.2–5.8)
RBC # FLD: 13.2 % — SIGNIFICANT CHANGE UP (ref 10.3–14.5)
RBC BLD AUTO: SIGNIFICANT CHANGE UP
RBC CASTS # UR COMP ASSIST: 2 /HPF — SIGNIFICANT CHANGE UP (ref 0–4)
RSV RNA NPH QL NAA+NON-PROBE: SIGNIFICANT CHANGE UP
SARS-COV-2 RNA SPEC QL NAA+PROBE: SIGNIFICANT CHANGE UP
SARS-COV-2 RNA SPEC QL NAA+PROBE: SIGNIFICANT CHANGE UP
SODIUM SERPL-SCNC: 135 MMOL/L — SIGNIFICANT CHANGE UP (ref 135–145)
SODIUM SERPL-SCNC: 137 MMOL/L — SIGNIFICANT CHANGE UP (ref 135–145)
SOURCE RESPIRATORY: SIGNIFICANT CHANGE UP
SP GR SPEC: 1.01 — SIGNIFICANT CHANGE UP (ref 1–1.03)
SPECIMEN SOURCE: SIGNIFICANT CHANGE UP
SQUAMOUS # UR AUTO: 0 /HPF — SIGNIFICANT CHANGE UP (ref 0–5)
TROPONIN T, HIGH SENSITIVITY RESULT: 63 NG/L — HIGH (ref 0–51)
TROPONIN T, HIGH SENSITIVITY RESULT: 70 NG/L — HIGH (ref 0–51)
URATE SERPL-MCNC: 3.7 MG/DL — SIGNIFICANT CHANGE UP (ref 3.4–8.8)
UROBILINOGEN FLD QL: 0.2 MG/DL — SIGNIFICANT CHANGE UP (ref 0.2–1)
WBC # BLD: 1.58 K/UL — LOW (ref 3.8–10.5)
WBC # FLD AUTO: 1.58 K/UL — LOW (ref 3.8–10.5)
WBC UR QL: 0 /HPF — SIGNIFICANT CHANGE UP (ref 0–5)

## 2025-05-22 PROCEDURE — 99233 SBSQ HOSP IP/OBS HIGH 50: CPT

## 2025-05-22 PROCEDURE — 99232 SBSQ HOSP IP/OBS MODERATE 35: CPT

## 2025-05-22 PROCEDURE — G0545: CPT

## 2025-05-22 PROCEDURE — 71045 X-RAY EXAM CHEST 1 VIEW: CPT | Mod: 26

## 2025-05-22 RX ORDER — HYDROMORPHONE/SOD CHLOR,ISO/PF 2 MG/10 ML
0.7 SYRINGE (ML) INJECTION
Refills: 0 | Status: DISCONTINUED | OUTPATIENT
Start: 2025-05-22 | End: 2025-05-23

## 2025-05-22 RX ORDER — MAGNESIUM SULFATE 500 MG/ML
1 SYRINGE (ML) INJECTION ONCE
Refills: 0 | Status: COMPLETED | OUTPATIENT
Start: 2025-05-22 | End: 2025-05-22

## 2025-05-22 RX ORDER — SENNA 187 MG
2 TABLET ORAL
Refills: 0 | Status: DISCONTINUED | OUTPATIENT
Start: 2025-05-22 | End: 2025-05-30

## 2025-05-22 RX ORDER — SODIUM PHOSPHATE,DIBASIC DIHYD
30 POWDER (GRAM) MISCELLANEOUS ONCE
Refills: 0 | Status: COMPLETED | OUTPATIENT
Start: 2025-05-22 | End: 2025-05-22

## 2025-05-22 RX ORDER — SIMETHICONE 80 MG
80 TABLET,CHEWABLE ORAL
Refills: 0 | Status: DISCONTINUED | OUTPATIENT
Start: 2025-05-22 | End: 2025-05-29

## 2025-05-22 RX ORDER — HYDROMORPHONE/SOD CHLOR,ISO/PF 2 MG/10 ML
0.5 SYRINGE (ML) INJECTION
Refills: 0 | Status: DISCONTINUED | OUTPATIENT
Start: 2025-05-22 | End: 2025-05-23

## 2025-05-22 RX ADMIN — Medication 100 GRAM(S): at 08:56

## 2025-05-22 RX ADMIN — Medication 170 MILLIMOLE(S): at 11:05

## 2025-05-22 RX ADMIN — Medication 0.5 MILLIGRAM(S): at 07:30

## 2025-05-22 RX ADMIN — Medication 500 MILLIGRAM(S): at 05:59

## 2025-05-22 RX ADMIN — Medication 650 MILLIGRAM(S): at 13:00

## 2025-05-22 RX ADMIN — Medication 80 MILLIGRAM(S): at 21:33

## 2025-05-22 RX ADMIN — FILGRASTIM 480 MICROGRAM(S): 300 INJECTION, SOLUTION INTRAVENOUS; SUBCUTANEOUS at 12:13

## 2025-05-22 RX ADMIN — DEXAMETHASONE 0.5 MILLIGRAM(S): 0.5 TABLET ORAL at 05:55

## 2025-05-22 RX ADMIN — Medication 650 MILLIGRAM(S): at 22:43

## 2025-05-22 RX ADMIN — Medication 15 MILLILITER(S): at 12:12

## 2025-05-22 RX ADMIN — Medication 50 MILLIEQUIVALENT(S): at 18:39

## 2025-05-22 RX ADMIN — Medication 250 MILLIGRAM(S): at 05:57

## 2025-05-22 RX ADMIN — Medication 0.7 MILLIGRAM(S): at 23:35

## 2025-05-22 RX ADMIN — Medication 15 MILLILITER(S): at 17:11

## 2025-05-22 RX ADMIN — MEROPENEM 100 MILLIGRAM(S): 1 INJECTION INTRAVENOUS at 06:00

## 2025-05-22 RX ADMIN — Medication 40 MILLIGRAM(S): at 05:59

## 2025-05-22 RX ADMIN — Medication 0.5 MILLIGRAM(S): at 10:45

## 2025-05-22 RX ADMIN — Medication 40 MILLIGRAM(S): at 18:35

## 2025-05-22 RX ADMIN — Medication 15 MILLILITER(S): at 05:57

## 2025-05-22 RX ADMIN — Medication 2000 UNIT(S): at 12:12

## 2025-05-22 RX ADMIN — TAMSULOSIN HYDROCHLORIDE 0.4 MILLIGRAM(S): 0.4 CAPSULE ORAL at 21:27

## 2025-05-22 RX ADMIN — Medication 100 GRAM(S): at 20:23

## 2025-05-22 RX ADMIN — Medication 0.5 MILLIGRAM(S): at 03:45

## 2025-05-22 RX ADMIN — Medication 80 MILLIGRAM(S): at 12:12

## 2025-05-22 RX ADMIN — Medication 0.5 MILLIGRAM(S): at 09:59

## 2025-05-22 RX ADMIN — LIDOCAINE HYDROCHLORIDE 1 APPLICATION(S): 20 JELLY TOPICAL at 13:52

## 2025-05-22 RX ADMIN — Medication 0.7 MILLIGRAM(S): at 20:14

## 2025-05-22 RX ADMIN — Medication 5 MILLIGRAM(S): at 21:27

## 2025-05-22 RX ADMIN — Medication 20 MILLIEQUIVALENT(S): at 08:56

## 2025-05-22 RX ADMIN — Medication 100 MILLIGRAM(S): at 12:14

## 2025-05-22 RX ADMIN — Medication 80 MILLIGRAM(S): at 05:59

## 2025-05-22 RX ADMIN — LIDOCAINE HYDROCHLORIDE 1 APPLICATION(S): 20 JELLY TOPICAL at 06:00

## 2025-05-22 RX ADMIN — Medication 0.5 MILLIGRAM(S): at 06:36

## 2025-05-22 RX ADMIN — Medication 0.5 MILLIGRAM(S): at 04:45

## 2025-05-22 RX ADMIN — DIPHENHYDRAMINE HYDROCHLORIDE AND LIDOCAINE HYDROCHLORIDE AND ALUMINUM HYDROXIDE AND MAGNESIUM HYDRO 10 MILLILITER(S): KIT at 05:59

## 2025-05-22 RX ADMIN — Medication 0.5 MILLIGRAM(S): at 15:00

## 2025-05-22 RX ADMIN — DIPHENHYDRAMINE HYDROCHLORIDE AND LIDOCAINE HYDROCHLORIDE AND ALUMINUM HYDROXIDE AND MAGNESIUM HYDRO 10 MILLILITER(S): KIT at 23:35

## 2025-05-22 RX ADMIN — Medication 15 MILLILITER(S): at 23:35

## 2025-05-22 RX ADMIN — Medication 80 MILLIGRAM(S): at 18:34

## 2025-05-22 RX ADMIN — LIDOCAINE HYDROCHLORIDE 1 APPLICATION(S): 20 JELLY TOPICAL at 21:29

## 2025-05-22 RX ADMIN — DIPHENHYDRAMINE HYDROCHLORIDE AND LIDOCAINE HYDROCHLORIDE AND ALUMINUM HYDROXIDE AND MAGNESIUM HYDRO 10 MILLILITER(S): KIT at 12:13

## 2025-05-22 RX ADMIN — MEROPENEM 100 MILLIGRAM(S): 1 INJECTION INTRAVENOUS at 21:28

## 2025-05-22 RX ADMIN — POSACONAZOLE 300 MILLIGRAM(S): 100 TABLET, DELAYED RELEASE ORAL at 18:47

## 2025-05-22 RX ADMIN — Medication 250 MILLIGRAM(S): at 18:34

## 2025-05-22 RX ADMIN — DEXAMETHASONE 0.5 MILLIGRAM(S): 0.5 TABLET ORAL at 19:00

## 2025-05-22 RX ADMIN — DILTIAZEM HYDROCHLORIDE 240 MILLIGRAM(S): 240 TABLET, EXTENDED RELEASE ORAL at 05:59

## 2025-05-22 RX ADMIN — Medication 0.5 MILLIGRAM(S): at 17:11

## 2025-05-22 RX ADMIN — Medication 500 MILLIGRAM(S): at 18:35

## 2025-05-22 RX ADMIN — Medication 650 MILLIGRAM(S): at 21:27

## 2025-05-22 RX ADMIN — DIPHENHYDRAMINE HYDROCHLORIDE AND LIDOCAINE HYDROCHLORIDE AND ALUMINUM HYDROXIDE AND MAGNESIUM HYDRO 10 MILLILITER(S): KIT at 17:12

## 2025-05-22 RX ADMIN — Medication 0.5 MILLIGRAM(S): at 14:01

## 2025-05-22 RX ADMIN — Medication 0.7 MILLIGRAM(S): at 21:14

## 2025-05-22 RX ADMIN — Medication 1 APPLICATION(S): at 12:13

## 2025-05-22 RX ADMIN — MEROPENEM 100 MILLIGRAM(S): 1 INJECTION INTRAVENOUS at 13:54

## 2025-05-22 RX ADMIN — Medication 0.5 MILLIGRAM(S): at 18:00

## 2025-05-22 NOTE — PHARMACOTHERAPY INTERVENTION NOTE - COMMENTS
Clinical Pharmacy Specialist- Hematology/Oncology- Progress Note    Pt is a 70 y/o male w/ PMH of HTN, HLD, GERD, BPH, Gout, spinal stenosis (s/p multiple surgeries) and newly diagnosed AML (ASXL1-mutated and DDX41) admitted for management with 7+3 Induction therapy    Antimicrobial Course( ID Following, MD Rodarte):  - Posaconazole- 5/2  - Levaquin/Amox- 5/2- 5/11  --> Cefepime- 5/11- 5/19  --> Meropenem- 5/19  - Flagyl (abd discomfort)- 5/13- 5/19  - Valtrex- 5/2  - Vancomycin (MRSA?MRSE bacteremia)- 5/20  MRSA nasal swab    Last Neutropenic (ANC<1000): 5/22; ANC=  Last Febrile: 21-May-2025 18:50; T= 100.4  Days Non-Neutropenic: 0?  Days afebrile: 0    Chemotherapy Course  -Current Regimen: 7+3 Induction  History:  (5/2/25)- 7+3 Induction   -Daunorubicin 60mg/m2 IVP D1-3   -Cytarabine 100mg/m2 IV cont inf D1-7  -Day: 21 (5/22)  BmBx: 5/16- favorable  Access: R TLC (accessed 5/5/25)    Vancomycin Levels:   Date    Dose              Value    Ref Range	                             Status   5/21    750mg q12hr   7.8       Trough 10-20; AUC= 400-600   Final  5/23    1gm q12hr                 Trough 10-20; AUC= 400-600   Pending    History/Relevant clinical information used in assessment:  - Ht= 5’11”; IBW= 75.3kg; Actual BW= 107.3kg; Adj BW= 88kg  - 5/2- IRR? rigors, chills, and tachycardia 2 hours into day 1; rasburicase 3mg x 1  - 5/4 -Crcl= 67ml/min ; Scr=1.32; Wt= 88kg; Adjusted BW used since pt BMI>30  - 5/9- increase melatonin 3mg to 10mh qhs- c/o insomnia  - 5/13- mouth pain- has ulceration   - 5/14- mouth pain - cannot swallow now  - 5/16- bloody BM's decreased to 2x'd/day from 4x's/day yest    Assessment/Plan/Recommendation:  Onc:  - Daunorubicin 60mg/m2 IVP D1-3   - Cytarabine 100mg/m2 IV cont inf D1-7- IRR on D1, started at 6p, stopped at 7:30p (received 1.5 hrs),  made up D1 bag on 5/9 to run over 23 hrs (to account for ~1hr given)  - BmBx 5/16- favorable  - 5/20- zarxio 480mcg daily  - allopurinol 100mg (300mg d/c'd 5/15)-keeping for gout  ID:  - 5/15- bloody diarrhea x 3 on reglan prn, but has not needed, monitor for d/c to not aggravate w/ prokinetic agent- amicar?  - 5/15- HSV neg  - 5/19, still febrile since 5/11, Tmax= 102.9 5/20@8p- on cefepime, BlCx NGTD + abd discomfort-->CT CAP 5/15 - no infection (but groundglass opacities stable from previous?)- on meropenem- (falls off after 5/27)  - 5/19- BlCx- Meth resistant Staphylococcus haemolyticus- on vancomycin 1gm q12hr, this will yield AUC= 457; per ID, likely contaminant, expect short course of vanco  - next VT= 5/23~6p  - 5/17 UCx- 5/17- 10-49k VRE- per ID, do not need to treat  Cards:  - atorvastatin 80mg held d/t DDI with posa (Cat X)  Pain (mouth):  ·	magic mouthwash to swish & swallow- 5/14  ·	hydromorphone 0.2mg IVP q6hr prn- 5/14--> 0.5mg - 0.7mg q3hr prn- 5/16  ·	hydromorphone 0.5mg IVP tid- 5/15  ·	oxycodone 2.5-5mg q4hr prn- 5/2- 5/16  ·	lidocaine gel 2% q8hr to tongue 5/14  ·	dexamethasone soln 0.5 mg/5 mL BID swish & spit - 5/15  ·	meperidine 12.5mg IVP prn rigors  Orthostasis:  - 5/18- midodrine 10mg PO tid    Additional Monitoring Needed?   -Yes- Continue to monitor renal function & daily counts for abx escalation/de-escalation   -Discharge Planning:  --> New meds: midodrine  --> Meds sent for auth:  --> Delivered meds:    Case discussed with attending/primary team    Liza Jolley, PharmD, BCPS  Clinical Pharmacy Specialist | Hematology/Oncology  [facility]  Email: [email or web address] or available on I-[email or web address]   Clinical Pharmacy Specialist- Hematology/Oncology- Progress Note    Pt is a 70 y/o male w/ PMH of HTN, HLD, GERD, BPH, Gout, spinal stenosis (s/p multiple surgeries) and newly diagnosed AML (ASXL1-mutated and DDX41) admitted for management with 7+3 Induction therapy    Antimicrobial Course( ID Following, MD Rodarte):  - Posaconazole- 5/2  - Levaquin/Amox- 5/2- 5/11  --> Cefepime- 5/11- 5/19  --> Meropenem- 5/19  - Flagyl (abd discomfort)- 5/13- 5/19  - Valtrex- 5/2  - Vancomycin (MRSA?MRSE bacteremia)- 5/20  MRSA nasal swab    Last Neutropenic (ANC<1000): 5/22; ANC= 0.15  Last Febrile: 21-May-2025 18:50; T= 100.4  Days Non-Neutropenic: 0  Days afebrile: 0    Chemotherapy Course  -Current Regimen: 7+3 Induction  History:  (5/2/25)- 7+3 Induction   -Daunorubicin 60mg/m2 IVP D1-3   -Cytarabine 100mg/m2 IV cont inf D1-7  -Day: 21 (5/22)  BmBx: 5/16- favorable  Access: R TLC (accessed 5/5/25)    Vancomycin Levels:   Date    Dose              Value    Ref Range	                             Status   5/21    750mg q12hr   7.8       Trough 10-20; AUC= 400-600   Final  5/23    1gm q12hr                 Trough 10-20; AUC= 400-600   Pending    History/Relevant clinical information used in assessment:  - Ht= 5’11”; IBW= 75.3kg; Actual BW= 107.3kg; Adj BW= 88kg  - 5/2- IRR? rigors, chills, and tachycardia 2 hours into day 1; rasburicase 3mg x 1  - 5/4 -Crcl= 67ml/min ; Scr=1.32; Wt= 88kg; Adjusted BW used since pt BMI>30  - 5/9- increase melatonin 3mg to 10mh qhs- c/o insomnia  - 5/13- mouth pain- has ulceration   - 5/14- mouth pain - cannot swallow now  - 5/16- bloody BM's decreased to 2x'd/day from 4x's/day yest    Assessment/Plan/Recommendation:  Onc:  - Daunorubicin 60mg/m2 IVP D1-3   - Cytarabine 100mg/m2 IV cont inf D1-7- IRR on D1, started at 6p, stopped at 7:30p (received 1.5 hrs),  made up D1 bag on 5/9 to run over 23 hrs (to account for ~1hr given)  - BmBx 5/16- favorable  - 5/20- zarxio 480mcg daily  - allopurinol 100mg (300mg d/c'd 5/15)-keeping for gout  ID:  - 5/15- bloody diarrhea x 3 on reglan prn, but has not needed, monitor for d/c to not aggravate w/ prokinetic agent- amicar?  - 5/15- HSV neg  - 5/19, still febrile since 5/11, Tmax= 102.9 5/20@8p- on cefepime, BlCx NGTD + abd discomfort-->CT CAP 5/15 - no infection (but groundglass opacities stable from previous?)- on meropenem- (falls off after 5/27)  - 5/19- BlCx- Meth resistant Staphylococcus haemolyticus- on vancomycin 1gm q12hr, this will yield AUC= 457; per ID, likely contaminant, expect short course of vanco  - next VT= 5/23~6p  - 5/17 UCx- 5/17- 10-49k VRE- per ID, do not need to treat  Cards:  - atorvastatin 80mg held d/t DDI with posa (Cat X)  Pain (mouth):  ·	magic mouthwash to swish & swallow- 5/14  ·	hydromorphone 0.2mg IVP q6hr prn- 5/14--> 0.5mg - 0.7mg q3hr prn- 5/16  ·	hydromorphone 0.5mg IVP tid- 5/15  ·	oxycodone 2.5-5mg q4hr prn- 5/2- 5/16  ·	lidocaine gel 2% q8hr to tongue 5/14  ·	dexamethasone soln 0.5 mg/5 mL BID swish & spit - 5/15  ·	meperidine 12.5mg IVP prn rigors  Orthostasis:  - 5/18- midodrine 10mg PO tid    Additional Monitoring Needed?   -Yes- Continue to monitor renal function & daily counts for abx escalation/de-escalation   -Discharge Planning:  --> New meds: midodrine  --> Meds sent for auth:  --> Delivered meds:    Case discussed with attending/primary team    Liza Jolley, PharmD, BCPS  Clinical Pharmacy Specialist | Hematology/Oncology  [facility]  Email: [email or web address] or available on [email or web address]

## 2025-05-22 NOTE — PROGRESS NOTE ADULT - PROBLEM SELECTOR PLAN 1
new ASXL1-mutated and XWU64-aradnpo AML.  admitted for induction chemo with dauno 60 mg/m2+ cytarabine. Chemo started on 5/2, Complicated by rigors, chills, fever, SOB  and tachycardia 2 hours into day 1 infusion - likely infusion. received pepcid/benadryl/solumedrol/tylenol iv. will make up D1 Cytarabine on day8    will proceed with day2 chemo with pre mdes Tylenol and Benadryl, hydrocortisone iv PRN reaction   Hepatitis screen/HIV(-) as outpatient  IVF, antiemetics, mouth care. monitor weight, I & O, diuresis PRN   Monitor CBC with diff QD, TLS labs BID, Coags & T & C TIW transfuse for hb<7, PLT <30  5/5 HLA sent  5/16 BMbx done, f/u  5/19: no longer having blood in stool for several days, check cbc daily, plt goal decreased to >30  5/20 hypokalemia: KCL 20 meq IVPB x3, hypomagnesemia: Mg 2 g iVPB x1, Demerol PRN rigor new ASXL1-mutated and CWA45-sygazog AML.  admitted for induction chemo with dauno 60 mg/m2+ cytarabine. Chemo started on 5/2, Complicated by rigors, chills, fever, SOB  and tachycardia 2 hours into day 1 infusion - likely infusion. received pepcid/benadryl/solumedrol/tylenol iv. will make up D1 Cytarabine on day8    will proceed with day2 chemo with pre mdes Tylenol and Benadryl, hydrocortisone iv PRN reaction   Hepatitis screen/HIV(-) as outpatient  IVF, antiemetics, mouth care. monitor weight, I & O, diuresis PRN   Monitor CBC with diff QD, TLS labs BID, Coags & T & C TIW transfuse for hb<7, PLT <30  5/5 HLA sent  5/16 BMbx done, f/u  5/19: no longer having blood in stool for several days, check cbc daily, plt goal decreased to >30  5/22 hypokalemia: KCL 20 meq po x1, hypomagnesemia: Mg 1g iVPB x1, hypophosphatemia: sodium phos x1

## 2025-05-22 NOTE — PROGRESS NOTE ADULT - NS ATTEND AMEND GEN_ALL_CORE FT
Primary: Paulie    69M with ASXL1-mutated and ANK15-kawgdfx AML. 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 Admitted for induction 7+3 - day 16.    Heme:   - Completed 7+3. Day 20. Complicated by rigors, chills, and tachycardia 2 hours into day 1 infusion - likely infusion reaction.  - Trend CBC with differential daily. Goal Hgb > 7 and plt > 30 given past GI bleeding.   - Trend DIC labs intermittently, as this has not been issue since admission. Continue transfusions as needed to maintain fibrinogen > 100.      - Allopurinol continued, but for previous diagnosis of gout decreased dose to 100 mg on 5/16.  S/p rasburicase; uric acid and LDH stable.   - Continue IVF. Monitor daily weights and I/O's. Diurese PRN.   - BMBx done day 15 (5/16) - subcortical, suboptimal specimen, no evidence of persistent leukemia. Flow 0.34% myeloblasts.  - in absence of overt evidence of persistent leukemia, and in setting of persistent fevers, start daily G-CSF.  GI:  - Bloody BM 5/14-16.  Plt goal > 30K now.     Card:  - Orthostatic hypotension, likely residual from GI bleed and decreased PO intake. Poss autonomic component.  - Bolus 500 cc on 5/17 and 5/18, on NS 75 cc/hr. Now on midodrine. BP stable.  - Encouraged incr PO intake      ID:   -Neutropenic and febrile; fevers since 5/11     with ongoing GI symptoms, diarrhea. Cults (-) with recent CNS from blood likely contaminant in 1/4 bottles and VREC in urine    cleared in subsequent specimen.  -Cefepime (5/11-5/19 ). Persistent fever lead to changing to meropenem on 5/19.      - CT scan from 5/14 showing no acute CT findings suspicious for infection. Mentioned GGOs in the lungs bilaterally, but stable from a previous scan.   - Prophylaxis: posaconazole, valacyclovir   - Appreciate ID input - kept on vanco for now (fevers, mucositis)  - Mouth rinses, dexamethasone swish and spit for mucositis.   - demerol prn for rigors    MSK: Has chronic pain from prior surgeries   - Pain control: oxycodone    TLC in place  OOB Primary: Paulie    69M with ASXL1-mutated and [id number]-tedhfxh AML. 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 Admitted for induction 7+3 - day 16.    Heme:   - Completed 7+3. Day 20. Complicated by rigors, chills, and tachycardia 2 hours into day 1 infusion - likely infusion reaction.  - Trend CBC with differential daily. Goal Hgb > 7 and plt > 30 given past GI bleeding.   - Trend DIC labs intermittently, as this has not been issue since admission. Continue transfusions as needed to maintain fibrinogen > 100.      - Allopurinol continued, but for previous diagnosis of gout decreased dose to 100 mg on 5/16.  S/p rasburicase; uric acid and LDH stable.   - Continue IVF. Monitor daily weights and I/O's. Diurese PRN.   - BMBx done day 15 (5/16) - subcortical, suboptimal specimen, no evidence of persistent leukemia. Flow 0.34% myeloblasts.  - in absence of overt evidence of persistent leukemia, and in setting of persistent fevers, start daily G-CSF.  GI:  - Bloody BM 5/14-16.  Plt goal > 30K now.  - c/o severe midepigastric pain 5/22 AM, palpitations  - EKG w/o acute changes, troponin sl incr 80, to be repeated  - check amylase, lipase  - cont PPI 2x/day     Card:  - Orthostatic hypotension, likely residual from GI bleed and decreased PO intake. Poss autonomic component.  - Bolus 500 cc on 5/17 and 5/18, on NS 75 cc/hr. Now on midodrine. BP stable.  - Encouraged incr PO intake      ID:   - Neutropenic and febrile; fevers since 5/11     with ongoing GI symptoms, diarrhea. Cults (-) with recent CNS from blood likely contaminant in 1/4 bottles and VREC in urine    cleared in subsequent specimen.  - Cefepime (5/11-5/19 ). Persistent fever lead to changing to meropenem on 5/19.  Last temp 100.4 5/21.     - CT scan from 5/14 showing no acute CT findings suspicious for infection. Mentioned GGOs in the lungs bilaterally, but stable from a previous scan.   - Prophylaxis: posaconazole, valacyclovir   - Appreciate ID input - kept on vanco for now (fevers, mucositis)  - Mouth rinses, dexamethasone swish and spit for mucositis, which persists.  - demerol prn for rigors    MSK: Has chronic pain from prior surgeries   - Pain control: oxycodone    TLC in place  OOB

## 2025-05-22 NOTE — PROGRESS NOTE ADULT - PROBLEM SELECTOR PLAN 11
DVT ppx: OOB and ambulation as carey   PT consulted: no skilled PT need, PT signed off  5/20 Pt consult again, py presently weak from persistent fever, lightheadedness and vertigo DVT ppx: OOB and ambulation as carey PT consulted: no skilled PT need, PT signed off  5/20 Pt consult again, py presently weak from persistent fever, lightheadedness and vertigo, check room air and post walk O2 sat

## 2025-05-22 NOTE — PROGRESS NOTE ADULT - SUBJECTIVE AND OBJECTIVE BOX
ANTIMICROBIALS  meropenem  IVPB 1000 milliGRAM(s) IV Intermittent every 8 hours  posaconazole DR Tablet   Oral   posaconazole DR Tablet 300 milliGRAM(s) Oral every 24 hours  valACYclovir 500 milliGRAM(s) Oral two times a day  vancomycin  IVPB 1000 milliGRAM(s) IV Intermittent every 12 hours      HEME/ONC MEDICATIONS      STANDING MEDICATIONS  allopurinol 100 milliGRAM(s) Oral daily  Biotene Dry Mouth Oral Rinse 15 milliLiter(s) Swish and Spit four times a day  chlorhexidine 4% Liquid 1 Application(s) Topical <User Schedule>  cholecalciferol 2000 Unit(s) Oral daily  dexAMETHasone    Solution 0.5 milliGRAM(s) Oral two times a day  diltiazem    milliGRAM(s) Oral daily  filgrastim-sndz (ZARXIO) Injectable 480 MICROGram(s) SubCutaneous daily  FIRST- Mouthwash  BLM 10 milliLiter(s) Swish and Swallow four times a day  HYDROmorphone  Injectable 0.5 milliGRAM(s) IV Push <User Schedule>  lidocaine   4% Patch 1 Patch Transdermal every 24 hours  lidocaine 2% Gel 1 Application(s) Topical every 8 hours  melatonin 5 milliGRAM(s) Oral at bedtime  midodrine. 10 milliGRAM(s) Oral every 8 hours  pantoprazole  Injectable 40 milliGRAM(s) IV Push every 12 hours  simethicone 80 milliGRAM(s) Chew every 6 hours  sodium chloride 0.9%. 1000 milliLiter(s) IV Continuous <Continuous>  tamsulosin 0.4 milliGRAM(s) Oral at bedtime      PRN MEDICATIONS  acetaminophen     Tablet .. 650 milliGRAM(s) Oral every 6 hours PRN  albuterol    90 MICROgram(s) HFA Inhaler 2 Puff(s) Inhalation every 6 hours PRN  aluminum hydroxide/magnesium hydroxide/simethicone Suspension 30 milliLiter(s) Oral every 6 hours PRN  calcium carbonate    500 mG (Tums) Chewable 1 Tablet(s) Chew four times a day PRN  cyclobenzaprine 10 milliGRAM(s) Oral three times a day PRN  hydrocortisone sodium succinate Injectable 100 milliGRAM(s) IV Push every 8 hours PRN  HYDROmorphone  Injectable 0.5 milliGRAM(s) IV Push every 3 hours PRN  HYDROmorphone  Injectable 0.7 milliGRAM(s) IV Push every 3 hours PRN  meperidine     Injectable 12.5 milliGRAM(s) IV Push once PRN  metoclopramide Injectable 10 milliGRAM(s) IV Push every 6 hours PRN  ondansetron Injectable 8 milliGRAM(s) IV Push every 8 hours PRN  polyethylene glycol 3350 17 Gram(s) Oral two times a day PRN  sodium chloride 0.9% lock flush 10 milliLiter(s) IV Push every 1 hour PRN        Vital Signs Last 24 Hrs  T(C): 37.7 (22 May 2025 06:00), Max: 38 (21 May 2025 13:28)  T(F): 99.8 (22 May 2025 06:00), Max: 100.4 (21 May 2025 13:28)  HR: 91 (22 May 2025 06:00) (66 - 98)  BP: 104/60 (22 May 2025 00:35) (104/60 - 121/75)  BP(mean): --  RR: 19 (22 May 2025 06:00) (17 - 19)  SpO2: 96% (22 May 2025 06:00) (94% - 97%)    Parameters below as of 22 May 2025 06:00  Patient On (Oxygen Delivery Method): room air        PHYSICAL EXAM  General: NAD  HEENT: clear oropharynx, anicteric sclera, pink conjunctiva  Neck: supple  CV: (+) S1/S2 RRR  Lungs: positive air movement b/l ant lungs, clear to auscultation, no wheezes, no rales  Abdomen: soft, non-tender, non-distended  Ext: no clubbing cyanosis or edema  Skin: no rashes and no petechiae  Neuro: alert and oriented X 3, no focal deficits  Central Line:     RECENT CULTURES:  05-20 @ 19:00  Clean Catch Clean Catch (Midstream)  --  --  --    No growth  --  05-20 @ 10:06  Blood Blood-Peripheral  --  --  --    No growth at 24 hours  --  05-20 @ 09:58  Blood Blood-Peripheral  --  --  --    No growth at 24 hours  --  05-19 @ 10:16  Blood Blood-Catheter  --  --  --    No growth at 48 Hours  --  05-19 @ 06:50  Blood Blood-Peripheral  Blood Culture PCR  Blood Culture PCR  PCR    Growth in aerobic bottle: Staphylococcus haemolyticus  Isolation of Coagulase negative Staphylococcus from single blood culture  sets may represent  contamination. Contact the Microbiology Department at [phone] if  susceptibility testing is needed.  clinically indicated.  Direct identification is available within approximately 3-5  hours either by Blood Panel Multiplexed PCR or Direct  MALDI-TOF. Details: [email or web address]  --  05-18 @ 20:03  Clean Catch Clean Catch (Midstream)  --  --  --    No growth  --  05-17 @ 04:35  Clean Catch Clean Catch (Midstream)  Enterococcus faecium (vancomycin resistant)  Enterococcus faecium (vancomycin resistant)  CYNTHIA    10,000 - 49,000 CFU/mL Enterococcus faecium (vancomycin resistant)  --  05-16 @ 22:50  Blood Blood-Peripheral  --  --  --    No growth at 5 days  --  05-16 @ 22:41  Blood Blood-Peripheral  --  --  --    No growth at 5 days  --        LABS:                        8.6    1.58  )-----------( 32       ( 22 May 2025 07:01 )             24.5         Mean Cell Volume : 86.3 fl  Mean Cell Hemoglobin : 30.3 pg  Mean Cell Hemoglobin Concentration : 35.1 g/dL  Auto Neutrophil # : x  Auto Lymphocyte # : x  Auto Monocyte # : x  Auto Eosinophil # : x  Auto Basophil # : x  Auto Neutrophil % : x  Auto Lymphocyte % : x  Auto Monocyte % : x  Auto Eosinophil % : x  Auto Basophil % : x      05-22    135  |  101  |  10  ----------------------------<  120[H]  3.7   |  21[L]  |  1.02    Ca    8.3[L]      22 May 2025 07:00  Phos  1.6     05-22  Mg     1.8     05-22    TPro  5.7[L]  /  Alb  3.3  /  TBili  0.6  /  DBili  x   /  AST  31  /  ALT  37  /  AlkPhos  88  05-22      Mg 1.8  Phos 1.6      PT/INR - ( 21 May 2025 07:59 )   PT: 14.2 sec;   INR: 1.24 ratio         PTT - ( 21 May 2025 07:59 )  PTT:26.7 sec      Uric Acid 3.7        RADIOLOGY & ADDITIONAL STUDIES:         Diagnosis: ASXL1-mutated and BZG67-timbhqy AML    Protocol/Chemo Regimen: s/p 7+3:  Daunorubicin 60 mg/m2+ Cytarabine     Day: 21    Pt endorsed: sob on exertion, dizziness when standing up, with less appetite , abdominal intermittent cramps and  dry cough     Review of Systems: Denies nausea, vomiting, chest pain.     Pain scale:  6-7/10 left tongue, 8/10 neck and low back 5/10                     Allergies: No Known Allergies        ANTIMICROBIALS  meropenem  IVPB 1000 milliGRAM(s) IV Intermittent every 8 hours  posaconazole DR Tablet   Oral   posaconazole DR Tablet 300 milliGRAM(s) Oral every 24 hours  valACYclovir 500 milliGRAM(s) Oral two times a day  vancomycin  IVPB 1000 milliGRAM(s) IV Intermittent every 12 hours      HEME/ONC MEDICATIONS      STANDING MEDICATIONS  allopurinol 100 milliGRAM(s) Oral daily  Biotene Dry Mouth Oral Rinse 15 milliLiter(s) Swish and Spit four times a day  chlorhexidine 4% Liquid 1 Application(s) Topical <User Schedule>  cholecalciferol 2000 Unit(s) Oral daily  dexAMETHasone    Solution 0.5 milliGRAM(s) Oral two times a day  diltiazem    milliGRAM(s) Oral daily  filgrastim-sndz (ZARXIO) Injectable 480 MICROGram(s) SubCutaneous daily  FIRST- Mouthwash  BLM 10 milliLiter(s) Swish and Swallow four times a day  HYDROmorphone  Injectable 0.5 milliGRAM(s) IV Push <User Schedule>  lidocaine   4% Patch 1 Patch Transdermal every 24 hours  lidocaine 2% Gel 1 Application(s) Topical every 8 hours  melatonin 5 milliGRAM(s) Oral at bedtime  midodrine. 10 milliGRAM(s) Oral every 8 hours  pantoprazole  Injectable 40 milliGRAM(s) IV Push every 12 hours  simethicone 80 milliGRAM(s) Chew every 6 hours  sodium chloride 0.9%. 1000 milliLiter(s) IV Continuous <Continuous>  tamsulosin 0.4 milliGRAM(s) Oral at bedtime      PRN MEDICATIONS  acetaminophen     Tablet .. 650 milliGRAM(s) Oral every 6 hours PRN  albuterol    90 MICROgram(s) HFA Inhaler 2 Puff(s) Inhalation every 6 hours PRN  aluminum hydroxide/magnesium hydroxide/simethicone Suspension 30 milliLiter(s) Oral every 6 hours PRN  calcium carbonate    500 mG (Tums) Chewable 1 Tablet(s) Chew four times a day PRN  cyclobenzaprine 10 milliGRAM(s) Oral three times a day PRN  hydrocortisone sodium succinate Injectable 100 milliGRAM(s) IV Push every 8 hours PRN  HYDROmorphone  Injectable 0.5 milliGRAM(s) IV Push every 3 hours PRN  HYDROmorphone  Injectable 0.7 milliGRAM(s) IV Push every 3 hours PRN  meperidine     Injectable 12.5 milliGRAM(s) IV Push once PRN  metoclopramide Injectable 10 milliGRAM(s) IV Push every 6 hours PRN  ondansetron Injectable 8 milliGRAM(s) IV Push every 8 hours PRN  polyethylene glycol 3350 17 Gram(s) Oral two times a day PRN  sodium chloride 0.9% lock flush 10 milliLiter(s) IV Push every 1 hour PRN        Vital Signs Last 24 Hrs  T(C): 37.7 (22 May 2025 06:00), Max: 38 (21 May 2025 13:28)  T(F): 99.8 (22 May 2025 06:00), Max: 100.4 (21 May 2025 13:28)  HR: 91 (22 May 2025 06:00) (66 - 98)  BP: 104/60 (22 May 2025 00:35) (104/60 - 121/75)  BP(mean): --  RR: 19 (22 May 2025 06:00) (17 - 19)  SpO2: 96% (22 May 2025 06:00) (94% - 97%)    Parameters below as of 22 May 2025 06:00  Patient On (Oxygen Delivery Method): room air    PHYSICAL EXAM  General: NAD  HEENT: clear oropharynx, anicteric sclera,   Neck: supple  CV: (+) S1/S2 RRR  Lungs: positive air movement b/l ant lungs, clear to auscultation, no wheezes, no rales  Abdomen: soft, non-tender, distended  Ext: no edema  Skin: no rashes   Neuro: alert and oriented X 3, no focal deficits  Central Line:     RECENT CULTURES:  05-20 @ 19:00  Clean Catch Clean Catch (Midstream)  No growth    05-20 @ 10:06  Blood Blood-Peripheral  No growth at 24 hours    05-20 @ 09:58  Blood Blood-Peripheral  No growth at 24 hours    05-19 @ 10:16  Blood Blood-Catheter  No growth at 48 Hours    05-19 @ 06:50  Blood Blood-Peripheral  Blood Culture PCR  Blood Culture PCR  PCR    Growth in aerobic bottle: Staphylococcus haemolyticus  Isolation of Coagulase negative Staphylococcus from single blood culture  sets may represent  contamination. Contact the Microbiology Department at [phone] if  susceptibility testing is needed.  clinically indicated.  Direct identification is available within approximately 3-5  hours either by Blood Panel Multiplexed PCR or Direct  MALDI-TOF. Details: [email or web address]      05-18 @ 20:03  Clean Catch Clean Catch (Midstream)  No growth    05-17 @ 04:35  Clean Catch Clean Catch (Midstream)  Enterococcus faecium (vancomycin resistant)  Enterococcus faecium (vancomycin resistant)  CYNTHIA  10,000 - 49,000 CFU/mL Enterococcus faecium (vancomycin resistant)    05-16 @ 22:50  Blood Blood-Peripheral  No growth at 5 days    05-16 @ 22:41  Blood Blood-Peripheral  No growth at 5 days    LABS:                        8.6    1.58  )-----------( 32       ( 22 May 2025 07:01 )             24.5         Mean Cell Volume : 86.3 fl  Mean Cell Hemoglobin : 30.3 pg  Mean Cell Hemoglobin Concentration : 35.1 g/dL  Auto Neutrophil # : x  Auto Lymphocyte # : x  Auto Monocyte # : x  Auto Eosinophil # : x  Auto Basophil # : x  Auto Neutrophil % : x  Auto Lymphocyte % : x  Auto Monocyte % : x  Auto Eosinophil % : x  Auto Basophil % : x      05-22    135  |  101  |  10  ----------------------------<  120[H]  3.7   |  21[L]  |  1.02    Ca    8.3[L]      22 May 2025 07:00  Phos  1.6     05-22  Mg     1.8     05-22    TPro  5.7[L]  /  Alb  3.3  /  TBili  0.6  /  DBili  x   /  AST  31  /  ALT  37  /  AlkPhos  88  05-22    Mg 1.8  Phos 1.6    PT/INR - ( 21 May 2025 07:59 )   PT: 14.2 sec;   INR: 1.24 ratio    PTT - ( 21 May 2025 07:59 )  PTT:26.7 sec      Uric Acid 3.7    RADIOLOGY & ADDITIONAL STUDIES:  from: CT Abdomen and Pelvis w/ Oral Cont (05.14.25 @ 16:26)   IMPRESSION:  No acute CTfindings suspicious for infection.             Diagnosis: ASXL1-mutated and JQE17-lqiwrmv AML    Protocol/Chemo Regimen: s/p 7+3:  Daunorubicin 60 mg/m2+ Cytarabine     Day: 21  overnight event : ACP was called for CP / Abdominal pain with normal EKG , with Troponin mildly elevated   Pt endorsed: sob on exertion, dizziness when standing up, with less appetite , abdominal intermittent cramps and  dry cough     Review of Systems: Denies nausea, vomiting, chest pain.     Pain scale:  6-7/10 left tongue, 8/10 neck and low back 5/10                     Allergies: No Known Allergies        ANTIMICROBIALS  meropenem  IVPB 1000 milliGRAM(s) IV Intermittent every 8 hours  posaconazole DR Tablet   Oral   posaconazole DR Tablet 300 milliGRAM(s) Oral every 24 hours  valACYclovir 500 milliGRAM(s) Oral two times a day  vancomycin  IVPB 1000 milliGRAM(s) IV Intermittent every 12 hours      HEME/ONC MEDICATIONS      STANDING MEDICATIONS  allopurinol 100 milliGRAM(s) Oral daily  Biotene Dry Mouth Oral Rinse 15 milliLiter(s) Swish and Spit four times a day  chlorhexidine 4% Liquid 1 Application(s) Topical <User Schedule>  cholecalciferol 2000 Unit(s) Oral daily  dexAMETHasone    Solution 0.5 milliGRAM(s) Oral two times a day  diltiazem    milliGRAM(s) Oral daily  filgrastim-sndz (ZARXIO) Injectable 480 MICROGram(s) SubCutaneous daily  FIRST- Mouthwash  BLM 10 milliLiter(s) Swish and Swallow four times a day  HYDROmorphone  Injectable 0.5 milliGRAM(s) IV Push <User Schedule>  lidocaine   4% Patch 1 Patch Transdermal every 24 hours  lidocaine 2% Gel 1 Application(s) Topical every 8 hours  melatonin 5 milliGRAM(s) Oral at bedtime  midodrine. 10 milliGRAM(s) Oral every 8 hours  pantoprazole  Injectable 40 milliGRAM(s) IV Push every 12 hours  simethicone 80 milliGRAM(s) Chew every 6 hours  sodium chloride 0.9%. 1000 milliLiter(s) IV Continuous <Continuous>  tamsulosin 0.4 milliGRAM(s) Oral at bedtime      PRN MEDICATIONS  acetaminophen     Tablet .. 650 milliGRAM(s) Oral every 6 hours PRN  albuterol    90 MICROgram(s) HFA Inhaler 2 Puff(s) Inhalation every 6 hours PRN  aluminum hydroxide/magnesium hydroxide/simethicone Suspension 30 milliLiter(s) Oral every 6 hours PRN  calcium carbonate    500 mG (Tums) Chewable 1 Tablet(s) Chew four times a day PRN  cyclobenzaprine 10 milliGRAM(s) Oral three times a day PRN  hydrocortisone sodium succinate Injectable 100 milliGRAM(s) IV Push every 8 hours PRN  HYDROmorphone  Injectable 0.5 milliGRAM(s) IV Push every 3 hours PRN  HYDROmorphone  Injectable 0.7 milliGRAM(s) IV Push every 3 hours PRN  meperidine     Injectable 12.5 milliGRAM(s) IV Push once PRN  metoclopramide Injectable 10 milliGRAM(s) IV Push every 6 hours PRN  ondansetron Injectable 8 milliGRAM(s) IV Push every 8 hours PRN  polyethylene glycol 3350 17 Gram(s) Oral two times a day PRN  sodium chloride 0.9% lock flush 10 milliLiter(s) IV Push every 1 hour PRN        Vital Signs Last 24 Hrs  T(C): 37.7 (22 May 2025 06:00), Max: 38 (21 May 2025 13:28)  T(F): 99.8 (22 May 2025 06:00), Max: 100.4 (21 May 2025 13:28)  HR: 91 (22 May 2025 06:00) (66 - 98)  BP: 104/60 (22 May 2025 00:35) (104/60 - 121/75)  BP(mean): --  RR: 19 (22 May 2025 06:00) (17 - 19)  SpO2: 96% (22 May 2025 06:00) (94% - 97%)    Parameters below as of 22 May 2025 06:00  Patient On (Oxygen Delivery Method): room air    PHYSICAL EXAM  General: NAD  HEENT: clear oropharynx, anicteric sclera,   Neck: supple  CV: (+) S1/S2 RRR  Lungs: positive air movement b/l ant lungs, clear to auscultation, no wheezes, no rales  Abdomen: soft, non-tender, distended  Ext: no edema  Skin: no rashes   Neuro: alert and oriented X 3, no focal deficits  Central Line:     RECENT CULTURES:  05-20 @ 19:00  Clean Catch Clean Catch (Midstream)  No growth    05-20 @ 10:06  Blood Blood-Peripheral  No growth at 24 hours    05-20 @ 09:58  Blood Blood-Peripheral  No growth at 24 hours    05-19 @ 10:16  Blood Blood-Catheter  No growth at 48 Hours    05-19 @ 06:50  Blood Blood-Peripheral  Blood Culture PCR  Blood Culture PCR  PCR    Growth in aerobic bottle: Staphylococcus haemolyticus  Isolation of Coagulase negative Staphylococcus from single blood culture  sets may represent  contamination. Contact the Microbiology Department at [phone] if  susceptibility testing is needed.  clinically indicated.  Direct identification is available within approximately 3-5  hours either by Blood Panel Multiplexed PCR or Direct  MALDI-TOF. Details: [email or web address]      05-18 @ 20:03  Clean Catch Clean Catch (Midstream)  No growth    05-17 @ 04:35  Clean Catch Clean Catch (Midstream)  Enterococcus faecium (vancomycin resistant)  Enterococcus faecium (vancomycin resistant)  CYNTHIA  10,000 - 49,000 CFU/mL Enterococcus faecium (vancomycin resistant)    05-16 @ 22:50  Blood Blood-Peripheral  No growth at 5 days    05-16 @ 22:41  Blood Blood-Peripheral  No growth at 5 days    LABS:                        8.6    1.58  )-----------( 32       ( 22 May 2025 07:01 )             24.5         Mean Cell Volume : 86.3 fl  Mean Cell Hemoglobin : 30.3 pg  Mean Cell Hemoglobin Concentration : 35.1 g/dL  Auto Neutrophil # : x  Auto Lymphocyte # : x  Auto Monocyte # : x  Auto Eosinophil # : x  Auto Basophil # : x  Auto Neutrophil % : x  Auto Lymphocyte % : x  Auto Monocyte % : x  Auto Eosinophil % : x  Auto Basophil % : x      05-22    135  |  101  |  10  ----------------------------<  120[H]  3.7   |  21[L]  |  1.02    Ca    8.3[L]      22 May 2025 07:00  Phos  1.6     05-22  Mg     1.8     05-22    TPro  5.7[L]  /  Alb  3.3  /  TBili  0.6  /  DBili  x   /  AST  31  /  ALT  37  /  AlkPhos  88  05-22    Mg 1.8  Phos 1.6    PT/INR - ( 21 May 2025 07:59 )   PT: 14.2 sec;   INR: 1.24 ratio    PTT - ( 21 May 2025 07:59 )  PTT:26.7 sec      Uric Acid 3.7    RADIOLOGY & ADDITIONAL STUDIES:  from: CT Abdomen and Pelvis w/ Oral Cont (05.14.25 @ 16:26)   IMPRESSION:  No acute CTfindings suspicious for infection.             Diagnosis: ASXL1-mutated and OSZ50-mpytbrj AML    Protocol/Chemo Regimen: s/p 7+3:  Daunorubicin 60 mg/m2+ Cytarabine     Day: 21    overnight event : ACP was called O/N  for CP / Abdominal pain with normal EKG , with Troponin mildly elevated     Pt endorsed: sob on exertion; dizziness when standing up yesterday, with decreased  appetite ; c/o  lower CP/ abdominal cramps, associated with mild palpitation, resolved in the afternoon today ; mild constipated, last BM yesterday; new dry cough  since yesterday    Review of Systems: Patient denied nausea, vomiting, mouth pain,   dyspnea,  diarrhea, rectal pain,  rash, fatigue, headache, bleeding      Pain scale:  7/10 left tongue, 5 /10 neck and low back; abd 3/10, 0/10 now    Diet: regular Ensure plus HP BID                      Allergies: No Known Allergies      ANTIMICROBIALS  meropenem  IVPB 1000 milliGRAM(s) IV Intermittent every 8 hours  posaconazole DR Tablet   Oral   posaconazole DR Tablet 300 milliGRAM(s) Oral every 24 hours  valACYclovir 500 milliGRAM(s) Oral two times a day  vancomycin  IVPB 1000 milliGRAM(s) IV Intermittent every 12 hours        STANDING MEDICATIONS  allopurinol 100 milliGRAM(s) Oral daily  Biotene Dry Mouth Oral Rinse 15 milliLiter(s) Swish and Spit four times a day  chlorhexidine 4% Liquid 1 Application(s) Topical <User Schedule>  cholecalciferol 2000 Unit(s) Oral daily  dexAMETHasone    Solution 0.5 milliGRAM(s) Oral two times a day  diltiazem    milliGRAM(s) Oral daily  filgrastim-sndz (ZARXIO) Injectable 480 MICROGram(s) SubCutaneous daily  FIRST- Mouthwash  BLM 10 milliLiter(s) Swish and Swallow four times a day  HYDROmorphone  Injectable 0.5 milliGRAM(s) IV Push <User Schedule>  lidocaine   4% Patch 1 Patch Transdermal every 24 hours  lidocaine 2% Gel 1 Application(s) Topical every 8 hours  melatonin 5 milliGRAM(s) Oral at bedtime  midodrine. 10 milliGRAM(s) Oral every 8 hours  pantoprazole  Injectable 40 milliGRAM(s) IV Push every 12 hours  simethicone 80 milliGRAM(s) Chew every 6 hours  sodium chloride 0.9%. 1000 milliLiter(s) IV Continuous <Continuous>  tamsulosin 0.4 milliGRAM(s) Oral at bedtime      PRN MEDICATIONS  acetaminophen     Tablet .. 650 milliGRAM(s) Oral every 6 hours PRN  albuterol    90 MICROgram(s) HFA Inhaler 2 Puff(s) Inhalation every 6 hours PRN  aluminum hydroxide/magnesium hydroxide/simethicone Suspension 30 milliLiter(s) Oral every 6 hours PRN  calcium carbonate    500 mG (Tums) Chewable 1 Tablet(s) Chew four times a day PRN  cyclobenzaprine 10 milliGRAM(s) Oral three times a day PRN  hydrocortisone sodium succinate Injectable 100 milliGRAM(s) IV Push every 8 hours PRN  HYDROmorphone  Injectable 0.5 milliGRAM(s) IV Push every 3 hours PRN  HYDROmorphone  Injectable 0.7 milliGRAM(s) IV Push every 3 hours PRN  meperidine     Injectable 12.5 milliGRAM(s) IV Push once PRN  metoclopramide Injectable 10 milliGRAM(s) IV Push every 6 hours PRN  ondansetron Injectable 8 milliGRAM(s) IV Push every 8 hours PRN  polyethylene glycol 3350 17 Gram(s) Oral two times a day PRN  sodium chloride 0.9% lock flush 10 milliLiter(s) IV Push every 1 hour PRN        Vital Signs Last 24 Hrs  T(C): 37.7 (22 May 2025 06:00), Max: 38 (21 May 2025 13:28)  T(F): 99.8 (22 May 2025 06:00), Max: 100.4 (21 May 2025 13:28)  HR: 91 (22 May 2025 06:00) (66 - 98)  BP: 104/60 (22 May 2025 00:35) (104/60 - 121/75)  BP(mean): --  RR: 19 (22 May 2025 06:00) (17 - 19)  SpO2: 96% (22 May 2025 06:00) (94% - 97%)    Parameters below as of 22 May 2025 06:00  Patient On (Oxygen Delivery Method): room air    PHYSICAL EXAM  General: NAD  HEENT: clear oropharynx, anicteric sclera, left tongue with superficial healing ulcers   Neck: supple  CV: (+) S1/S2 RRR  Lungs: positive air movement b/l ant lungs, clear to auscultation, no wheezes, no rales  Abdomen: soft, +BS,  non-tender, distended  Ext: no edema  Skin: no rashes   Neuro: alert and oriented X 4, no focal deficits  Central Line:  TLC CDI       RECENT CULTURES:  05-20 @ 19:00  Clean Catch Clean Catch (Midstream)  No growth    05-20 @ 10:06  Blood Blood-Peripheral  No growth at 24 hours    05-20 @ 09:58  Blood Blood-Peripheral  No growth at 24 hours    05-19 @ 10:16  Blood Blood-Catheter  No growth at 48 Hours    05-19 @ 06:50  Blood Blood-Peripheral  Blood Culture PCR  Blood Culture PCR  PCR    Growth in aerobic bottle: Staphylococcus haemolyticus  Isolation of Coagulase negative Staphylococcus from single blood culture  sets may represent  contamination. Contact the Microbiology Department at [phone] if  susceptibility testing is needed.  clinically indicated.  Direct identification is available within approximately 3-5  hours either by Blood Panel Multiplexed PCR or Direct  MALDI-TOF. Details: [email or web address]      05-18 @ 20:03  Clean Catch Clean Catch (Midstream)  No growth    05-17 @ 04:35  Clean Catch Clean Catch (Midstream)  Enterococcus faecium (vancomycin resistant)  Enterococcus faecium (vancomycin resistant)  CYNTHIA  10,000 - 49,000 CFU/mL Enterococcus faecium (vancomycin resistant)      LABS:                        8.6    1.58  )-----------( 32       ( 22 May 2025 07:01 )             24.5         Mean Cell Volume : 86.3 fl  Mean Cell Hemoglobin : 30.3 pg  Mean Cell Hemoglobin Concentration : 35.1 g/dL  Auto Neutrophil # : x  Auto Lymphocyte # : x  Auto Monocyte # : x  Auto Eosinophil # : x  Auto Basophil # : x  Auto Neutrophil % : x  Auto Lymphocyte % : x  Auto Monocyte % : x  Auto Eosinophil % : x  Auto Basophil % : x      05-22    135  |  101  |  10  ----------------------------<  120[H]  3.7   |  21[L]  |  1.02    Ca    8.3[L]      22 May 2025 07:00  Phos  1.6     05-22  Mg     1.8     05-22    TPro  5.7[L]  /  Alb  3.3  /  TBili  0.6  /  DBili  x   /  AST  31  /  ALT  37  /  AlkPhos  88  05-22    PT/INR - ( 21 May 2025 07:59 )   PT: 14.2 sec;   INR: 1.24 ratio    PTT - ( 21 May 2025 07:59 )  PTT:26.7 sec      Uric Acid 3.7    Troponin T, High Sensitivity (05.22.25 @ 07:00)    Troponin T, High Sensitivity Result: 70: *  *      Creatine Kinase (05.22.25 @ 07:00)    Creatine Kinase: 86 U/L    CKMB Mass Assay (05.22.25 @ 07:00)    CKMB Units: 3.7 ng/mL          RADIOLOGY & ADDITIONAL STUDIES:      < from: Xray Chest 1 View- PORTABLE-Urgent (Xray Chest 1 View- PORTABLE-Urgent .) (05.18.25 @ 20:49) >  IMPRESSION:  Right IJ central venous catheter, in place.  No focal consolidation.      < from: CT Chest  Abdomen and Pelvis w/ Oral Cont (05.14.25 @ 16:26) >  IMPRESSION:  No acute CTfindings suspicious for infection.

## 2025-05-22 NOTE — PROGRESS NOTE ADULT - ASSESSMENT
70 yo male with newly diagnosed ASXL1, [id number] mutated AML, admitted for induction chemo with 7+3:  dauno 60 mg/m2+ cytarabine. 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 Chemo started on 5/2, Complicated by rigors, chills, fever, SOB  and tachycardia 2 hours into day 1 infusion - likely infusion reaction, neutropenic fever. Pt has pancytopenia d/t chemo and disease

## 2025-05-22 NOTE — CHART NOTE - NSCHARTNOTEFT_GEN_A_CORE
NUTRITION FOLLOW UP NOTE    PATIENT SEEN FOR: follow-up for nutrition service, consult for calorie count initiation     SOURCE: [x] Patient  [x] Current Medical Record  [x] RN  [] Family/support person at bedside  [] Patient unavailable/inappropriate  [] Other:    CHART REVIEWED/EVENTS NOTED.  [] No changes to nutrition care plan to note  [x] Nutrition Status:  - new ASXL1-mutated and YXU16-fxfiryz AML    DIET ORDER:   Diet, Regular:   Supplement Feeding Modality:  Oral  Ensure Clear Cans or Servings Per Day:  1       Frequency:  Daily  Ensure Enlive Cans or Servings Per Day:  1       Frequency:  Daily  Ensure Plus High Protein Cans or Servings Per Day:  1       Frequency:  Daily (25)      CURRENT DIET ORDER IS:  [] Appropriate:  [] Inadequate:  [x] Other: see below for recommendation     NUTRITION INTAKE/PROVISION:  [x] PO: Pt reports decreased PO intake </=50% for > 5 days due to lack of appetite and mucositis, did not eat anything from breakfast tray today. Pt is amenable to receive Ensure in vanilla and chocolate flavors. Reviewed menu ordering procedures and discussed softer food/snack items on the menu (chicken/tuna/egg salad scoop, yogurt, peanut butter, oatmeal, string cheese, soft fruits, cake, etc), encourage pt to order softer foods as needed to optimize PO intake in house. Also assisted pt to place lunch order today.   calorie count ordered for  to , RD hung calorie count sheet on wall and notified RN, will follow-up  upon completion.   [] Enteral Nutrition:  [] Parenteral Nutrition:    ANTHROPOMETRICS:  Drug Dosing Weight  Height (cm): 181 (02 May 2025 08:51)  Weight (kg): 107.3 (02 May 2025 11:55)  BMI (kg/m2): 32.8 (02 May 2025 11:55)  Weights:   Daily Weight in k.2 (-22), Weight in k.1 (-21), Weight in k.6 (-20), Weight in k.4 (-19), Weight in k.9 (-18), Weight in k.6 (-17), Weight in k.4 (05-16)   - Noted significant weight loss of 6.1kg/5.7% in 3 weeks since admission: might be multifactorial: fluid shift with treatment, decreased PO intake.     MEDICATIONS:  MEDICATIONS  (STANDING):  allopurinol 100 milliGRAM(s) Oral daily  Biotene Dry Mouth Oral Rinse 15 milliLiter(s) Swish and Spit four times a day  chlorhexidine 4% Liquid 1 Application(s) Topical <User Schedule>  cholecalciferol 2000 Unit(s) Oral daily  dexAMETHasone    Solution 0.5 milliGRAM(s) Oral two times a day  diltiazem    milliGRAM(s) Oral daily  filgrastim-sndz (ZARXIO) Injectable 480 MICROGram(s) SubCutaneous daily  FIRST- Mouthwash  BLM 10 milliLiter(s) Swish and Swallow four times a day  HYDROmorphone  Injectable 0.5 milliGRAM(s) IV Push <User Schedule>  lidocaine   4% Patch 1 Patch Transdermal every 24 hours  lidocaine 2% Gel 1 Application(s) Topical every 8 hours  melatonin 5 milliGRAM(s) Oral at bedtime  meropenem  IVPB 1000 milliGRAM(s) IV Intermittent every 8 hours  midodrine. 10 milliGRAM(s) Oral every 8 hours  pantoprazole  Injectable 40 milliGRAM(s) IV Push every 12 hours  posaconazole DR Tablet   Oral   posaconazole DR Tablet 300 milliGRAM(s) Oral every 24 hours  simethicone 80 milliGRAM(s) Chew every 6 hours  sodium chloride 0.9%. 1000 milliLiter(s) (75 mL/Hr) IV Continuous <Continuous>  sodium phosphate 30 milliMole(s)/500 mL IVPB 30 milliMole(s) IV Intermittent once  tamsulosin 0.4 milliGRAM(s) Oral at bedtime  valACYclovir 500 milliGRAM(s) Oral two times a day  vancomycin  IVPB 1000 milliGRAM(s) IV Intermittent every 12 hours    MEDICATIONS  (PRN):  acetaminophen     Tablet .. 650 milliGRAM(s) Oral every 6 hours PRN Temp greater or equal to 38C (100.4F), Mild Pain (1 - 3)  albuterol    90 MICROgram(s) HFA Inhaler 2 Puff(s) Inhalation every 6 hours PRN Shortness of Breath and/or Wheezing  aluminum hydroxide/magnesium hydroxide/simethicone Suspension 30 milliLiter(s) Oral every 6 hours PRN Dyspepsia  calcium carbonate    500 mG (Tums) Chewable 1 Tablet(s) Chew four times a day PRN Dyspepsia  cyclobenzaprine 10 milliGRAM(s) Oral three times a day PRN Muscle Spasm  hydrocortisone sodium succinate Injectable 100 milliGRAM(s) IV Push every 8 hours PRN chemotherapy reaction  HYDROmorphone  Injectable 0.7 milliGRAM(s) IV Push every 3 hours PRN Severe Pain (7 - 10)  HYDROmorphone  Injectable 0.5 milliGRAM(s) IV Push every 3 hours PRN Moderate Pain (4 - 6)  meperidine     Injectable 12.5 milliGRAM(s) IV Push once PRN shivering  metoclopramide Injectable 10 milliGRAM(s) IV Push every 6 hours PRN nausea/vomiting  ondansetron Injectable 8 milliGRAM(s) IV Push every 8 hours PRN Nausea and/or Vomiting  polyethylene glycol 3350 17 Gram(s) Oral two times a day PRN Constipation  sodium chloride 0.9% lock flush 10 milliLiter(s) IV Push every 1 hour PRN Pre/post blood products, medications, blood draw, and to maintain line patency      NUTRITIONALLY PERTINENT LABS:   Na135 mmol/L Glu 120 mg/dL[H] K+ 3.7 mmol/L Cr  1.02 mg/dL BUN 10 mg/dL  Phos 1.6 mg/dL[L]  Alb 3.3 g/dL ALT 37 U/L AST 31 U/L Alkaline Phosphatase 88 U/L    Finger Sticks:    NUTRITIONALLY PERTINENT MEDICATIONS/LABS:  [x] Reviewed  [x] Relevant notes on medications/labs:  - on IVF NaCl 0.9% @ 75ml/hr  - CV: Midodrine   - ordered for Phosphorus repletion today   - on Solu-cortef   - Micronutrient/Other supplementation: Vitamin D     EDEMA:  [x] Reviewed  [x] Relevant notes: +1 to left and right ankles per flowsheet     GI/ I&O:  [x] Reviewed  [x] Relevant notes: ordered for Protonix, Simethicone, TUMS, Zofran, Reglan, Miralax. Last BM  per flowsheet   [x] Other: mucositis: on Decadron solution, Biotene, FIRST.     SKIN:   [x] No pressure injuries documented, per nursing flowsheet  [] Pressure injury previously noted  [] Change in pressure injury documentation:  [] Other:    ESTIMATED NEEDS:  [] No change:  [x] Updated:  Energy:  3833-2309 kcal/day (30-35 kcal/kg)  Protein:   94- 109 g/day (1.2-1.4 g/kg)  Fluid:   ml/day or [x] defer to team  Based on: 78kg IBW    NUTRITION DIAGNOSIS:  [x] Prior Dx: Increased nutrient needs   [x] New Dx: acute severe malnutrition related to decreased ability to consume adequate protein-energy in setting of increased physiological demand for nutrients as evidenced by significant weight loss in < 1 month, reported PO intake </=50% for > 5 days.   Goal: Pt will meet >75% estimated nutrient needs as tolerated.     EDUCATION:  [x] Yes: Emphasized the importance of adequate kcal and protein intake; recommend to optimize nutritional intake in case of decreased appetite; recommended small frequent meals by ordering nutrient-dense snacks and leaving non-perishable food away from tray for later consumption during the day or between meals; to start with protein, and sips of supplement throughout the day; reviewed mucositis medical nutrition therapy   - RD provided Be Well Bag with booklet: Eating tips: Before, During and After Cancer Treatment   [] Not appropriate/warranted    NUTRITION CARE PLAN:  1. Diet: Continue regular diet free of therapeutic restriction (pt aware to order softer meals). RD remains available to adjust diet as needed.   2. Supplements: Recommend Ensure plus high protein 2x daily (700kcal, 40g proteins) in vanilla and chocolate flavor per request.   3. Multivitamin/mineral supplementation: per team   4. malnutrition alert placed in chart   5: Monitor and encourage PO intake. Encourage use of daily menus. Honor dietary preferences as expressed as able.   6. RD will follow-up upon calorie count completion.     [x] Achieved - Continue current nutrition intervention(s)  [] Current medical condition precludes nutrition intervention at this time.    MONITORING AND EVALUATION:   RD remains available upon request and will follow up per protocol:   Dianna Davidson MS, RDN, CDN   Available on [facility] TEAMS NUTRITION FOLLOW UP NOTE    PATIENT SEEN FOR: follow-up for nutrition service, consult for calorie count initiation     SOURCE: [x] Patient  [x] Current Medical Record  [x] RN  [] Family/support person at bedside  [] Patient unavailable/inappropriate  [] Other:    CHART REVIEWED/EVENTS NOTED.  [] No changes to nutrition care plan to note  [x] Nutrition Status:  - new ASXL1-mutated and ORV15-ugiptsx AML    DIET ORDER:   Diet, Regular:   Supplement Feeding Modality:  Oral  Ensure Clear Cans or Servings Per Day:  1       Frequency:  Daily  Ensure Enlive Cans or Servings Per Day:  1       Frequency:  Daily  Ensure Plus High Protein Cans or Servings Per Day:  1       Frequency:  Daily (25)      CURRENT DIET ORDER IS:  [] Appropriate:  [] Inadequate:  [x] Other: see below for recommendation     NUTRITION INTAKE/PROVISION:  [x] PO: Pt reports decreased PO intake </=50% for > 5 days due to lack of appetite and mucositis, did not eat anything from breakfast tray today. Pt is amenable to receive Ensure in vanilla and chocolate flavors. Reviewed menu ordering procedures and discussed softer food/snack items on the menu (chicken/tuna/egg salad scoop, yogurt, peanut butter, oatmeal, string cheese, soft fruits, cake, etc), encourage pt to order softer foods as needed to optimize PO intake in house. Also assisted pt to place lunch order today.   calorie count ordered for  to , RD hung calorie count sheet on wall and notified RN, will follow-up  upon completion.   - diet history: pt reports mild lactose intolerance, unable to tolerate regular milk (needs lactaid milk) but able to tolerate cheese, yogurt.   [] Enteral Nutrition:  [] Parenteral Nutrition:    ANTHROPOMETRICS:  Drug Dosing Weight  Height (cm): 181 (02 May 2025 08:51)  Weight (kg): 107.3 (02 May 2025 11:55)  BMI (kg/m2): 32.8 (02 May 2025 11:55)  Weights:   Daily Weight in k.2 (-22), Weight in k.1 (-21), Weight in k.6 (-20), Weight in k.4 (-19), Weight in k.9 (05-18), Weight in k.6 (05-17), Weight in k.4 (05-16)   - Noted significant weight loss of 6.1kg/5.7% in 3 weeks since admission: might be multifactorial: fluid shift with treatment, decreased PO intake.     MEDICATIONS:  MEDICATIONS  (STANDING):  allopurinol 100 milliGRAM(s) Oral daily  Biotene Dry Mouth Oral Rinse 15 milliLiter(s) Swish and Spit four times a day  chlorhexidine 4% Liquid 1 Application(s) Topical <User Schedule>  cholecalciferol 2000 Unit(s) Oral daily  dexAMETHasone    Solution 0.5 milliGRAM(s) Oral two times a day  diltiazem    milliGRAM(s) Oral daily  filgrastim-sndz (ZARXIO) Injectable 480 MICROGram(s) SubCutaneous daily  FIRST- Mouthwash  BLM 10 milliLiter(s) Swish and Swallow four times a day  HYDROmorphone  Injectable 0.5 milliGRAM(s) IV Push <User Schedule>  lidocaine   4% Patch 1 Patch Transdermal every 24 hours  lidocaine 2% Gel 1 Application(s) Topical every 8 hours  melatonin 5 milliGRAM(s) Oral at bedtime  meropenem  IVPB 1000 milliGRAM(s) IV Intermittent every 8 hours  midodrine. 10 milliGRAM(s) Oral every 8 hours  pantoprazole  Injectable 40 milliGRAM(s) IV Push every 12 hours  posaconazole DR Tablet   Oral   posaconazole DR Tablet 300 milliGRAM(s) Oral every 24 hours  simethicone 80 milliGRAM(s) Chew every 6 hours  sodium chloride 0.9%. 1000 milliLiter(s) (75 mL/Hr) IV Continuous <Continuous>  sodium phosphate 30 milliMole(s)/500 mL IVPB 30 milliMole(s) IV Intermittent once  tamsulosin 0.4 milliGRAM(s) Oral at bedtime  valACYclovir 500 milliGRAM(s) Oral two times a day  vancomycin  IVPB 1000 milliGRAM(s) IV Intermittent every 12 hours    MEDICATIONS  (PRN):  acetaminophen     Tablet .. 650 milliGRAM(s) Oral every 6 hours PRN Temp greater or equal to 38C (100.4F), Mild Pain (1 - 3)  albuterol    90 MICROgram(s) HFA Inhaler 2 Puff(s) Inhalation every 6 hours PRN Shortness of Breath and/or Wheezing  aluminum hydroxide/magnesium hydroxide/simethicone Suspension 30 milliLiter(s) Oral every 6 hours PRN Dyspepsia  calcium carbonate    500 mG (Tums) Chewable 1 Tablet(s) Chew four times a day PRN Dyspepsia  cyclobenzaprine 10 milliGRAM(s) Oral three times a day PRN Muscle Spasm  hydrocortisone sodium succinate Injectable 100 milliGRAM(s) IV Push every 8 hours PRN chemotherapy reaction  HYDROmorphone  Injectable 0.7 milliGRAM(s) IV Push every 3 hours PRN Severe Pain (7 - 10)  HYDROmorphone  Injectable 0.5 milliGRAM(s) IV Push every 3 hours PRN Moderate Pain (4 - 6)  meperidine     Injectable 12.5 milliGRAM(s) IV Push once PRN shivering  metoclopramide Injectable 10 milliGRAM(s) IV Push every 6 hours PRN nausea/vomiting  ondansetron Injectable 8 milliGRAM(s) IV Push every 8 hours PRN Nausea and/or Vomiting  polyethylene glycol 3350 17 Gram(s) Oral two times a day PRN Constipation  sodium chloride 0.9% lock flush 10 milliLiter(s) IV Push every 1 hour PRN Pre/post blood products, medications, blood draw, and to maintain line patency      NUTRITIONALLY PERTINENT LABS:   Na135 mmol/L Glu 120 mg/dL[H] K+ 3.7 mmol/L Cr  1.02 mg/dL BUN 10 mg/dL  Phos 1.6 mg/dL[L]  Alb 3.3 g/dL ALT 37 U/L AST 31 U/L Alkaline Phosphatase 88 U/L    Finger Sticks:    NUTRITIONALLY PERTINENT MEDICATIONS/LABS:  [x] Reviewed  [x] Relevant notes on medications/labs:  - on IVF NaCl 0.9% @ 75ml/hr  - CV: Midodrine   - ordered for Phosphorus repletion today   - on Solu-cortef   - Micronutrient/Other supplementation: Vitamin D     EDEMA:  [x] Reviewed  [x] Relevant notes: +1 to left and right ankles per flowsheet     GI/ I&O:  [x] Reviewed  [x] Relevant notes: ordered for Protonix, Simethicone, TUMS, Zofran, Reglan, Miralax. Last BM  per flowsheet   [x] Other: mucositis: on Decadron solution, Biotene, FIRST.     SKIN:   [x] No pressure injuries documented, per nursing flowsheet  [] Pressure injury previously noted  [] Change in pressure injury documentation:  [] Other:    ESTIMATED NEEDS:  [] No change:  [x] Updated:  Energy:  5290-6603 kcal/day (30-35 kcal/kg)  Protein:   94- 109 g/day (1.2-1.4 g/kg)  Fluid:   ml/day or [x] defer to team  Based on: 78kg IBW    NUTRITION DIAGNOSIS:  [x] Prior Dx: Increased nutrient needs   [x] New Dx: acute severe malnutrition related to decreased ability to consume adequate protein-energy in setting of increased physiological demand for nutrients as evidenced by significant weight loss in < 1 month, reported PO intake </=50% for > 5 days.   Goal: Pt will meet >75% estimated nutrient needs as tolerated.     EDUCATION:  [x] Yes: Emphasized the importance of adequate kcal and protein intake; recommend to optimize nutritional intake in case of decreased appetite; recommended small frequent meals by ordering nutrient-dense snacks and leaving non-perishable food away from tray for later consumption during the day or between meals; to start with protein, and sips of supplement throughout the day; reviewed mucositis medical nutrition therapy   - RD provided Be Well Bag with booklet: Eating tips: Before, During and After Cancer Treatment   [] Not appropriate/warranted    NUTRITION CARE PLAN:  1. Diet: Continue regular diet free of therapeutic restriction (pt aware to order softer meals). RD remains available to adjust diet as needed.   2. Supplements: Recommend Ensure plus high protein 2x daily (700kcal, 40g proteins) in vanilla and chocolate flavor per request.   3. Multivitamin/mineral supplementation: per team   4. malnutrition alert placed in chart   5: Monitor and encourage PO intake. Encourage use of daily menus. Honor dietary preferences as expressed as able.   6. RD will follow-up upon calorie count completion.     [x] Achieved - Continue current nutrition intervention(s)  [] Current medical condition precludes nutrition intervention at this time.    MONITORING AND EVALUATION:   RD remains available upon request and will follow up per protocol:   Dianna Davidson, MS, RDN, CDN   Available on [facility] TE[facility] NUTRITION FOLLOW UP NOTE    PATIENT SEEN FOR: follow-up for nutrition service, consult for calorie count initiation     SOURCE: [x] Patient  [x] Current Medical Record  [x] RN  [] Family/support person at bedside  [] Patient unavailable/inappropriate  [] Other:    CHART REVIEWED/EVENTS NOTED.  [] No changes to nutrition care plan to note  [x] Nutrition Status:  - new ASXL1-mutated and JFI41-akrjxfr AML    DIET ORDER:   Diet, Regular:   Supplement Feeding Modality:  Oral  Ensure Clear Cans or Servings Per Day:  1       Frequency:  Daily  Ensure Enlive Cans or Servings Per Day:  1       Frequency:  Daily  Ensure Plus High Protein Cans or Servings Per Day:  1       Frequency:  Daily (25)      CURRENT DIET ORDER IS:  [] Appropriate:  [] Inadequate:  [x] Other: see below for recommendation     NUTRITION INTAKE/PROVISION:  [x] PO: Pt reports decreased PO intake </=50% for > 5 days due to lack of appetite and mucositis, did not eat anything from breakfast tray today. Pt is amenable to receive Ensure in vanilla and chocolate flavors. Reviewed menu ordering procedures and discussed softer food/snack items on the menu (chicken/tuna/egg salad scoop, yogurt, peanut butter, oatmeal, string cheese, soft fruits, cake, etc), encourage pt to order softer foods as needed to optimize PO intake in house. Food preferences updated, will honor as able. Also assisted pt to place lunch order today.   calorie count ordered for  to , RD hung calorie count sheet on wall and notified RN, will follow-up  upon completion.   - diet history: pt reports mild lactose intolerance, unable to tolerate regular milk (needs lactaid milk) but able to tolerate cheese, yogurt.   [] Enteral Nutrition:  [] Parenteral Nutrition:    ANTHROPOMETRICS:  Drug Dosing Weight  Height (cm): 181 (02 May 2025 08:51)  Weight (kg): 107.3 (02 May 2025 11:55)  BMI (kg/m2): 32.8 (02 May 2025 11:55)  Weights:   Daily Weight in k.2 (-22), Weight in k.1 (-21), Weight in k.6 (-20), Weight in k.4 (05-19), Weight in k.9 (05-18), Weight in k.6 (05-17), Weight in k.4 (05-16)   - Noted significant weight loss of 6.1kg/5.7% in 3 weeks since admission: might be multifactorial: fluid shift with treatment, decreased PO intake.     MEDICATIONS:  MEDICATIONS  (STANDING):  allopurinol 100 milliGRAM(s) Oral daily  Biotene Dry Mouth Oral Rinse 15 milliLiter(s) Swish and Spit four times a day  chlorhexidine 4% Liquid 1 Application(s) Topical <User Schedule>  cholecalciferol 2000 Unit(s) Oral daily  dexAMETHasone    Solution 0.5 milliGRAM(s) Oral two times a day  diltiazem    milliGRAM(s) Oral daily  filgrastim-sndz (ZARXIO) Injectable 480 MICROGram(s) SubCutaneous daily  FIRST- Mouthwash  BLM 10 milliLiter(s) Swish and Swallow four times a day  HYDROmorphone  Injectable 0.5 milliGRAM(s) IV Push <User Schedule>  lidocaine   4% Patch 1 Patch Transdermal every 24 hours  lidocaine 2% Gel 1 Application(s) Topical every 8 hours  melatonin 5 milliGRAM(s) Oral at bedtime  meropenem  IVPB 1000 milliGRAM(s) IV Intermittent every 8 hours  midodrine. 10 milliGRAM(s) Oral every 8 hours  pantoprazole  Injectable 40 milliGRAM(s) IV Push every 12 hours  posaconazole DR Tablet   Oral   posaconazole DR Tablet 300 milliGRAM(s) Oral every 24 hours  simethicone 80 milliGRAM(s) Chew every 6 hours  sodium chloride 0.9%. 1000 milliLiter(s) (75 mL/Hr) IV Continuous <Continuous>  sodium phosphate 30 milliMole(s)/500 mL IVPB 30 milliMole(s) IV Intermittent once  tamsulosin 0.4 milliGRAM(s) Oral at bedtime  valACYclovir 500 milliGRAM(s) Oral two times a day  vancomycin  IVPB 1000 milliGRAM(s) IV Intermittent every 12 hours    MEDICATIONS  (PRN):  acetaminophen     Tablet .. 650 milliGRAM(s) Oral every 6 hours PRN Temp greater or equal to 38C (100.4F), Mild Pain (1 - 3)  albuterol    90 MICROgram(s) HFA Inhaler 2 Puff(s) Inhalation every 6 hours PRN Shortness of Breath and/or Wheezing  aluminum hydroxide/magnesium hydroxide/simethicone Suspension 30 milliLiter(s) Oral every 6 hours PRN Dyspepsia  calcium carbonate    500 mG (Tums) Chewable 1 Tablet(s) Chew four times a day PRN Dyspepsia  cyclobenzaprine 10 milliGRAM(s) Oral three times a day PRN Muscle Spasm  hydrocortisone sodium succinate Injectable 100 milliGRAM(s) IV Push every 8 hours PRN chemotherapy reaction  HYDROmorphone  Injectable 0.7 milliGRAM(s) IV Push every 3 hours PRN Severe Pain (7 - 10)  HYDROmorphone  Injectable 0.5 milliGRAM(s) IV Push every 3 hours PRN Moderate Pain (4 - 6)  meperidine     Injectable 12.5 milliGRAM(s) IV Push once PRN shivering  metoclopramide Injectable 10 milliGRAM(s) IV Push every 6 hours PRN nausea/vomiting  ondansetron Injectable 8 milliGRAM(s) IV Push every 8 hours PRN Nausea and/or Vomiting  polyethylene glycol 3350 17 Gram(s) Oral two times a day PRN Constipation  sodium chloride 0.9% lock flush 10 milliLiter(s) IV Push every 1 hour PRN Pre/post blood products, medications, blood draw, and to maintain line patency      NUTRITIONALLY PERTINENT LABS:   Na135 mmol/L Glu 120 mg/dL[H] K+ 3.7 mmol/L Cr  1.02 mg/dL BUN 10 mg/dL  Phos 1.6 mg/dL[L]  Alb 3.3 g/dL ALT 37 U/L AST 31 U/L Alkaline Phosphatase 88 U/L    Finger Sticks:    NUTRITIONALLY PERTINENT MEDICATIONS/LABS:  [x] Reviewed  [x] Relevant notes on medications/labs:  - on IVF NaCl 0.9% @ 75ml/hr  - CV: Midodrine   - ordered for Phosphorus repletion today   - on Solu-cortef   - Micronutrient/Other supplementation: Vitamin D     EDEMA:  [x] Reviewed  [x] Relevant notes: +1 to left and right ankles per flowsheet     GI/ I&O:  [x] Reviewed  [x] Relevant notes: ordered for Protonix, Simethicone, TUMS, Zofran, Reglan, Miralax. Last BM  per flowsheet   [x] Other: mucositis: on Decadron solution, Biotene, FIRST.     SKIN:   [x] No pressure injuries documented, per nursing flowsheet  [] Pressure injury previously noted  [] Change in pressure injury documentation:  [] Other:    ESTIMATED NEEDS:  [] No change:  [x] Updated:  Energy:  9125-7193 kcal/day (30-35 kcal/kg)  Protein:   94- 109 g/day (1.2-1.4 g/kg)  Fluid:   ml/day or [x] defer to team  Based on: 78kg IBW    NUTRITION DIAGNOSIS:  [x] Prior Dx: Increased nutrient needs   [x] New Dx: acute severe malnutrition related to decreased ability to consume adequate protein-energy in setting of increased physiological demand for nutrients as evidenced by significant weight loss in < 1 month, reported PO intake </=50% for > 5 days.   Goal: Pt will meet >75% estimated nutrient needs as tolerated.     EDUCATION:  [x] Yes: Emphasized the importance of adequate kcal and protein intake; recommend to optimize nutritional intake in case of decreased appetite; recommended small frequent meals by ordering nutrient-dense snacks and leaving non-perishable food away from tray for later consumption during the day or between meals; to start with protein, and sips of supplement throughout the day; reviewed mucositis medical nutrition therapy   - RD provided Be Well Bag with booklet: Eating tips: Before, During and After Cancer Treatment   [] Not appropriate/warranted    NUTRITION CARE PLAN:  1. Diet: Continue regular diet free of therapeutic restriction (pt aware to order softer meals). RD remains available to adjust diet as needed.   2. Supplements: Recommend Ensure plus high protein 2x daily (700kcal, 40g proteins) in vanilla and chocolate flavor per request.   3. Multivitamin/mineral supplementation: per team   4. malnutrition alert placed in chart   5: Monitor and encourage PO intake. Encourage use of daily menus. Honor dietary preferences as expressed as able.   6. RD will follow-up upon calorie count completion.     [x] Achieved - Continue current nutrition intervention(s)  [] Current medical condition precludes nutrition intervention at this time.    MONITORING AND EVALUATION:   RD remains available upon request and will follow up per protocol:   Dianna Davidson MS, RDN, CDN   Available on [facility] TEAMS

## 2025-05-22 NOTE — PROGRESS NOTE ADULT - NS ATTEND AMEND GEN_ALL_CORE FT
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 Admitted for induction 7+3 - day 16.    Heme:   - Completed 7+3. Day 20. Complicated by rigors, chills, and tachycardia 2 hours into day 1 infusion - likely infusion reaction.  - Trend CBC with differential daily. Goal Hgb > 7 and plt > 30 given past GI bleeding.   - Trend DIC labs intermittently, as this has not been issue since admission. Continue transfusions as needed to maintain fibrinogen > 100.      - Allopurinol continued, but for previous diagnosis of gout decreased dose to 100 mg on 5/16.  S/p rasburicase; uric acid and LDH stable.   - Continue IVF. Monitor daily weights and I/O's. Diurese PRN.   - BMBx done day 15 (5/16) - subcortical, suboptimal specimen, no evidence of persistent leukemia. Flow 0.34% myeloblasts.  - in absence of overt evidence of persistent leukemia, and in setting of persistent fevers, start daily G-CSF.  GI:  - Bloody BM 5/14-16.  Plt goal > 30K now.     Card:  - Orthostatic hypotension, likely residual from GI bleed and decreased PO intake. Poss autonomic component.  - Bolus 500 cc on 5/17 and 5/18, on NS 75 cc/hr. Now on midodrine. BP stable.  - Encouraged incr PO intake      ID:   -Neutropenic and febrile; fevers since 5/11     with ongoing GI symptoms, diarrhea. Cults (-) with recent CNS from blood likely contaminant in 1/4 bottles and VREC in urine    cleared in subsequent specimen.  -Cefepime (5/11-5/19 ). Persistent fever lead to changing to meropenem on 5/19.      - CT scan from 5/14 showing no acute CT findings suspicious for infection. Mentioned GGOs in the lungs bilaterally, but stable from a previous scan.   - Prophylaxis: posaconazole, valacyclovir   - Appreciate ID input - kept on vanco for now (fevers, mucositis)  - Mouth rinses, dexamethasone swish and spit for mucositis.   - demerol prn for rigors    MSK: Has chronic pain from prior surgeries   - Pain control: oxycodone    TLC in place  OOB actual 69M with ASXL1-mutated and CEU88-zdwekhn AML. 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 Admitted for induction 7+3 - day 16.    Heme:   - Completed 7+3. Day 20. Complicated by rigors, chills, and tachycardia 2 hours into day 1 infusion - likely infusion reaction.  - Trend CBC with differential daily. Goal Hgb > 7 and plt > 30 given past GI bleeding.   - Trend DIC labs intermittently, as this has not been issue since admission. Continue transfusions as needed to maintain fibrinogen > 100.      - Allopurinol continued, but for previous diagnosis of gout decreased dose to 100 mg on 5/16.  S/p rasburicase; uric acid and LDH stable.   - Continue IVF. Monitor daily weights and I/O's. Diurese PRN.   - BMBx done day 15 (5/16) - subcortical, suboptimal specimen, no evidence of persistent leukemia. Flow 0.34% myeloblasts.  - in absence of overt evidence of persistent leukemia, and in setting of persistent fevers, start daily G-CSF.  GI:  - Bloody BM 5/14-16.  Plt goal > 30K now.  - c/o severe midepigastric pain 5/22 AM, palpitations  - EKG w/o acute changes, troponin sl incr 80, to be repeated  - check amylase, lipase  - cont PPI 2x/day     Card:  - Orthostatic hypotension, likely residual from GI bleed and decreased PO intake. Poss autonomic component.  - Bolus 500 cc on 5/17 and 5/18, on NS 75 cc/hr. Now on midodrine. BP stable.  - Encouraged incr PO intake      ID:   - Neutropenic and febrile; fevers since 5/11     with ongoing GI symptoms, diarrhea. Cults (-) with recent CNS from blood likely contaminant in 1/4 bottles and VREC in urine    cleared in subsequent specimen.  - Cefepime (5/11-5/19 ). Persistent fever lead to changing to meropenem on 5/19.  Last temp 100.4 5/21.     - CT scan from 5/14 showing no acute CT findings suspicious for infection. Mentioned GGOs in the lungs bilaterally, but stable from a previous scan.   - Prophylaxis: posaconazole, valacyclovir   - Appreciate ID input - kept on vanco for now (fevers, mucositis)  - Mouth rinses, dexamethasone swish and spit for mucositis, which persists.  - demerol prn for rigors    MSK: Has chronic pain from prior surgeries   - Pain control: oxycodone    TLC in place  OOB.

## 2025-05-22 NOTE — PROGRESS NOTE ADULT - ASSESSMENT
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 Afebrile 5/3-5/10, Neutropenic since 5/7, persistent daily fevers since  5/11   Developed bloody BM 5/15, no blood for last 3 days, has 2-3 watery stools, GI PCR negative but still with fever and rigors  all blood cxs before negative and now blood cx 5/19 showed 1/4 MRSE  urine cx 5/17 VRE but 5/18 negative and pt asymptomatic    - Levaquin/Amox- 5/2- 5/11  - Cefepime- 5/11- 5/19  Flagyl (abd discomfort)- 5/13- 5/19  - Meropenem- 5/19  - Valtrex-Posaconazole  5/2    AML stated on daruno+cytararbine 5/2, had fever and likely infusion reaction but then neutropenic fever, rigors since 5/11, chest/abd CT 5/14 negative BM BX 5/16 showed rare myeloblast polpulation  1/4 MRSE in the blood could be a contaminant but pt has a RIJ line, repeat cx 5/20 before vanco negative for now but pt improved after adding vanco  urine cx 5/17 with VRE but repeat 5/18 negative without intervention    * follow the repeat blood cx 5/20, it was done before starting vanco, negative for now  * c/w vanco for now, started 5/20  * c/w gianna  * c/w Posa and valtrex ppx  * monitor CBC/diff and CMP      The above assessment and plan was discussed with the primary team    Lidai Rodarte MD  contact on teams  After 5pm and on weekends call [phone]

## 2025-05-22 NOTE — PROVIDER CONTACT NOTE (CRITICAL VALUE NOTIFICATION) - BACKGROUND
aml
AML
Pt dx AML
AML
pt dx AML
hx AML
AML
Pt admitted for acute myeloid leukemia
AML
AML
AML, lab taken during RRT

## 2025-05-22 NOTE — PROGRESS NOTE ADULT - PROBLEM SELECTOR PLAN 2
neutropenic, febrile   continue posa, valtrex, flagyl (dc'd 5/19), cefepime (5/11-5/19 ), cont to be febrile Ucx from 5/17 (+) gram + cocci in pairs and chains, inc to meropenem (5/19-   5/2 CXR Patchy opacities along the left lung base, possibly atelectasis, however superimposed infection cannot be entirely excluded.  5/13- FLU/COVID/RSV (-)  5/13- Added Flagyl 500 mg IV Q 8 hrs due to GI discomfort/pain  5/13- CT C/A/P -negative for infxn  5/20  blood cx 5/19 showed 1/4 MRSE  urine cx 5/17 VRE but 5/18 negative and pt asymptomatic  - Levaquin/Amox- 5/2- 5/11  - Cefepime- 5/11- 5/19  Flagyl (abd discomfort)- 5/13- 5/19  - Meropenem- 5/19  - Valtrex-Posaconazole  5/2 5/20 O2 sat 88% with fever, FU closely, O2 PRN neutropenic, febrile   continue posa, valtrex, flagyl (dc'd 5/19), cefepime (5/11-5/19 ), cont to be febrile Ucx from 5/17 (+) gram + cocci in pairs and chains, inc to meropenem (5/19-   5/2 CXR Patchy opacities along the left lung base, possibly atelectasis, however superimposed infection cannot be entirely excluded.  5/13- FLU/COVID/RSV (-)  5/13- Added Flagyl 500 mg IV Q 8 hrs due to GI discomfort/pain  5/13- CT C/A/P -negative for infxn  5/20  blood cx 5/19 showed 1/4 MRSE  urine cx 5/17 VRE but 5/18 negative and pt asymptomatic  5/22  dry cough , RVP panel pending   - Levaquin/Amox- 5/2- 5/11  - Cefepime- 5/11- 5/19  Flagyl (abd discomfort)- 5/13- 5/19  - Meropenem- 5/19  - Valtrex-Posaconazole  5/2 5/20 O2 sat 88% with fever, FU closely, O2 PRN

## 2025-05-22 NOTE — PROVIDER CONTACT NOTE (CRITICAL VALUE NOTIFICATION) - NAME OF MD/NP/PA/DO NOTIFIED:
MADISYN Aguirre
PAIGE Rincon NP
Madelaine Delong
SABINA Yoon
April Goodwin
SABINA Quiroga
Katerina URBANO NP
SABINA Coats
SABINA Quiroga
SABINA Oneill
SABINA Coats

## 2025-05-22 NOTE — PROVIDER CONTACT NOTE (CRITICAL VALUE NOTIFICATION) - TEST AND RESULT REPORTED:
HCT=20.8, PLT= 20
Troponi Highly sensitive  70
hb 6.6, hct 19.2, platelets 9
Hemoglobin 6.6, hematocrit 18.6,and platelet count 15
lactate 5.6
plt= 20
K= 2.9
platelets 16
WBC= 0.43
WBC= 0.52, HCT= 21.0, PLT=9
s.suni 2.9

## 2025-05-22 NOTE — DIETITIAN NUTRITION RISK NOTIFICATION - TREATMENT: THE FOLLOWING DIET HAS BEEN RECOMMENDED
Diet, Regular:   Supplement Feeding Modality:  Oral  Ensure Clear Cans or Servings Per Day:  1       Frequency:  Daily  Ensure Enlive Cans or Servings Per Day:  1       Frequency:  Daily  Ensure Plus High Protein Cans or Servings Per Day:  1       Frequency:  Daily (05-22-25 @ 10:25) [Active]

## 2025-05-22 NOTE — PROGRESS NOTE ADULT - PROBLEM SELECTOR PLAN 1
new ASXL1-mutated and MEW34-ghncnic AML.  admitted for induction chemo with dauno 60 mg/m2+ cytarabine. Chemo started on 5/2, Complicated by rigors, chills, fever, SOB  and tachycardia 2 hours into day 1 infusion - likely infusion. received pepcid/benadryl/solumedrol/tylenol iv. will make up D1 Cytarabine on day8    will proceed with day2 chemo with pre mdes Tylenol and Benadryl, hydrocortisone iv PRN reaction   Hepatitis screen/HIV(-) as outpatient  IVF, antiemetics, mouth care. monitor weight, I & O, diuresis PRN   Monitor CBC with diff QD, TLS labs BID, Coags & T & C TIW transfuse for hb<7, PLT <30  5/5 HLA sent  5/16 BMbx done, f/u  5/19: no longer having blood in stool for several days, check cbc daily, plt goal decreased to >30  5/20 hypokalemia: KCL 20 meq IVPB x3, hypomagnesemia: Mg 2 g iVPB x1, Demerol PRN rigor

## 2025-05-22 NOTE — PROVIDER CONTACT NOTE (CRITICAL VALUE NOTIFICATION) - ASSESSMENT
WBC= 0.52, HCT= 21.0, PLT=9
plt= 20
NAD
NO active bleeding
Pt A&Ox4, VS stable, no acute distress noted.
Pt is stable
No s/s of bleeding
no complaints
denies distress/chest pain/dizziness/SOB
A&O x4, febrile, BP stable, denies complaint
Pt A&O4. VS as charted. Denies chest pain, sob, and dizziness. No s/s of bleeding.

## 2025-05-22 NOTE — PROVIDER CONTACT NOTE (CRITICAL VALUE NOTIFICATION) - SITUATION
Hemoglobin 6.6, hematocrit 18.6,and platelet count 15
K = 2.9
plt= 20
hb 6.6, hct 19.2, platelets 9
Lactate 5.6
HCT=20.8, PLT= 20
WBC= 0.43
S.YARELY 2.9
WBC= 0.52, HCT= 21.0, PLT=9
Troponi Highly sensitive  70

## 2025-05-22 NOTE — CHART NOTE - NSCHARTNOTEFT_GEN_A_CORE
Medicine PA Note     JANELLE DEL CID  MRN-[id number]  Allergies    No Known Allergies    Intolerances      Notified by RN pt c/o chest pain and palpitations. Pt denies headache, chest pain, sob, n/v/d, weakness, dizziness.    Vital Signs Last 24 Hrs  T(C): 37.7 (05-22-25 @ 06:00), Max: 38 (05-21-25 @ 13:28)  T(F): 99.8 (05-22-25 @ 06:00), Max: 100.4 (05-21-25 @ 13:28)  HR: 91 (05-22-25 @ 06:00) (66 - 98)  BP: 104/60 (05-22-25 @ 00:35) (104/60 - 121/75)  BP(mean): --  RR: 19 (05-22-25 @ 06:00) (17 - 19)  SpO2: 96% (05-22-25 @ 06:00) (94% - 97%)                        7.3    0.60  )-----------( 16       ( 21 May 2025 07:59 )             20.7     05-21    136  |  100  |  13  ----------------------------<  132[H]  3.3[L]   |  22  |  1.25    Ca    8.2[L]      21 May 2025 07:59  Phos  2.9     05-21  Mg     2.1     05-21    TPro  5.3[L]  /  Alb  3.0[L]  /  TBili  0.4  /  DBili  x   /  AST  17  /  ALT  17  /  AlkPhos  76  05-21    PT/INR - ( 21 May 2025 07:59 )   PT: 14.2 sec;   INR: 1.24 ratio         PTT - ( 21 May 2025 07:59 )  PTT:26.7 sec      PHYSICAL EXAM:  GENERAL: NAD, well-developed  CHEST/LUNG: Clear to auscultation bilaterally; No wheezes, rhonchi or rales appreciated  HEART: Regular rate and rhythm; No murmurs, rubs, or gallops  ABDOMEN: Soft, Nontender, Nondistended; Bowel sounds present. No rebound, or guarding noted.  EXTREMITIES:  2+ Peripheral Pulses, No clubbing, cyanosis, or edema  NEUROLOGY: CN 2-12 grossly intact, Muscle strength 5/5 in all extremities and sensation intact in all extremities.      Assessment/Plan: HPI:  70 yo male with newly diagnosed ASXL1, DDX41 mutated AML, admitted for induction chemo with dauno 60 mg/m2+ cytarabine. 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   5/2 Admit for Induction 7+3 Dauno 60mg/m2 + Cytarabine 100mg/m2     Pt endorsed chronic cervical and lower back pain, taking Ibuprofen PRN basis.  (02 May 2025 07:47)    >VS hemodynamically stable aside from  >  > Will endorse to AM team, attending to follow    Orlin Mcgraw PA-C,   Department of Medicine Medicine PA Note     JANELLE DEL CID  MRN-[id number]  Allergies    No Known Allergies    Intolerances      Notified by RN pt c/o chest pain and palpitations. Pt seen and examined at bedside in NAD. Pt states chest pain started yesterday. He thought it would go away on its own but has not subsided. Pain is rated 6/10, radiating along the epigastric area/lower chest. Pt denies headache, sob, n/v/d, weakness, dizziness.    Vital Signs Last 24 Hrs  T(C): 37.7 (05-22-25 @ 06:00), Max: 38 (05-21-25 @ 13:28)  T(F): 99.8 (05-22-25 @ 06:00), Max: 100.4 (05-21-25 @ 13:28)  HR: 91 (05-22-25 @ 06:00) (66 - 98)  BP: 104/60 (05-22-25 @ 00:35) (104/60 - 121/75)  BP(mean): --  RR: 19 (05-22-25 @ 06:00) (17 - 19)  SpO2: 96% (05-22-25 @ 06:00) (94% - 97%)                        7.3    0.60  )-----------( 16       ( 21 May 2025 07:59 )             20.7     05-21    136  |  100  |  13  ----------------------------<  132[H]  3.3[L]   |  22  |  1.25    Ca    8.2[L]      21 May 2025 07:59  Phos  2.9     05-21  Mg     2.1     05-21    TPro  5.3[L]  /  Alb  3.0[L]  /  TBili  0.4  /  DBili  x   /  AST  17  /  ALT  17  /  AlkPhos  76  05-21    PT/INR - ( 21 May 2025 07:59 )   PT: 14.2 sec;   INR: 1.24 ratio         PTT - ( 21 May 2025 07:59 )  PTT:26.7 sec      PHYSICAL EXAM:  GENERAL: NAD, well-developed  CHEST/LUNG: Clear to auscultation bilaterally; No wheezes, rhonchi or rales appreciated  HEART: Regular rate and rhythm; No murmurs, rubs, or gallops  ABDOMEN: Soft, Nontender, Nondistended; Bowel sounds present. No rebound, or guarding noted.  EXTREMITIES:  2+ Peripheral Pulses, No clubbing, cyanosis, or edema  NEUROLOGY: CN 2-12 grossly intact, Muscle strength 5/5 in all extremities and sensation intact in all extremities.      Assessment/Plan: HPI:  68 yo male with newly diagnosed ASXL1, DDX41 mutated AML, admitted for induction chemo with dauno 60 mg/m2+ cytarabine. PMH  BRCA2 carrier ([id number] 9610C>T), HTN, HLD, Gout, GERD, BPH, spinal stenosis (s/p multiple procedures 2018, 2020 laminectomies / fusions), hip surgeries (Oct 2024, Jan 2025), residual hand weakness related to surgeries, umbilical hernia repair (2014/15) referred here from Dr Atkinson at [facility] in [place] for new ASXL1-mutated and EWH33-wlhjntt AML.   5/2 Admit for Induction 7+3 Dauno 60mg/m2 + Cytarabine 100mg/m2     Pt endorsed chronic cervical and lower back pain, taking Ibuprofen PRN basis.  (02 May 2025 07:47)    Now p/w chest pain and palpitations.     #Chest pain/palpitations 2/2 cardiac vs GI source   > VS hemodynamically stable   > EKG done showing NSR without any acute changes  > Cardiac enzymes ordered -- if abnormal, would consult cardiology   > Simethicone and IV protonix given in case pain from GI source  > Dilaudid 0.5 mg IVP q6h PRN dose given for pain   > Will endorse to AM team, attending to follow    Orlin Mcgraw PA-C,   Department of Medicine

## 2025-05-22 NOTE — PROGRESS NOTE ADULT - ASSESSMENT
68 yo male with newly diagnosed ASXL1, DDX41 mutated AML, admitted for induction chemo with 7+3:  dauno 60 mg/m2+ cytarabine. PMH  BRCA2 carrier ([id number] 9610C>T), HTN, HLD, Gout, GERD, BPH, spinal stenosis (s/p multiple procedures 2018, 2020 laminectomies / fusions), hip surgeries (Oct 2024, Jan 2025), residual hand weakness related to surgeries, umbilical hernia repair (2014/15) referred here from Dr Atkinson at [facility] in [place] for new ASXL1-mutated and BDJ41-xwanusj AML. Chemo started on 5/2, Complicated by rigors, chills, fever, SOB  and tachycardia 2 hours into day 1 infusion - likely infusion reaction, neutropenic fever. Pt has pancytopenia d/t chemo and disease

## 2025-05-22 NOTE — PROGRESS NOTE ADULT - PROBLEM SELECTOR PLAN 3
improving  pain and palliative following, pain medication adjusted  diet advanced as per pt request  cont mouth care improving slowly   pain and palliative following, pain medication adjusted  diet advanced as per pt request  cont mouth care

## 2025-05-22 NOTE — PROGRESS NOTE ADULT - PROBLEM SELECTOR PLAN 9
5/18- Orthostatic hypotension,  cc bolus given.  Continue Midodrine 10 mg po q8h  Suzi YATES HCTZ 5/18- Orthostatic hypotension,  cc bolus given.  Continue Midodrine 10 mg po q8h  Consider DC HCTZ  5/22 orthostatic HTN check once daily and monitor 5/18- Orthostatic hypotension,  cc bolus given.  Continue Midodrine 10 mg po q8h  off  HCTZ  5/22 orthostatic hypotension improved, check once daily

## 2025-05-22 NOTE — PROVIDER CONTACT NOTE (CRITICAL VALUE NOTIFICATION) - PERSON GIVING RESULT:
Anamika Randle [facility]
Kalyn,  Lab
George Turcios, lab
Kalyn Lab
Mani Barksdale
Anamika Randle
Loulou White
Mani Barksdale, lab
Piedad Moore/Darcy
Loulou White/ ar
ar Wallace

## 2025-05-22 NOTE — PROGRESS NOTE ADULT - SUBJECTIVE AND OBJECTIVE BOX
Follow Up:  neutropenic fever    Interval History/ROS: fever curve overall better, tmax: 100.4 has oral pain      Allergies  No Known Allergies        ANTIMICROBIALS:  meropenem  IVPB 1000 every 8 hours  posaconazole DR Tablet    posaconazole DR Tablet 300 every 24 hours  valACYclovir 500 two times a day  vancomycin  IVPB 1000 every 12 hours      OTHER MEDS:  acetaminophen     Tablet .. 650 milliGRAM(s) Oral every 6 hours PRN  albuterol    90 MICROgram(s) HFA Inhaler 2 Puff(s) Inhalation every 6 hours PRN  allopurinol 100 milliGRAM(s) Oral daily  aluminum hydroxide/magnesium hydroxide/simethicone Suspension 30 milliLiter(s) Oral every 6 hours PRN  Biotene Dry Mouth Oral Rinse 15 milliLiter(s) Swish and Spit four times a day  calcium carbonate    500 mG (Tums) Chewable 1 Tablet(s) Chew four times a day PRN  chlorhexidine 4% Liquid 1 Application(s) Topical <User Schedule>  cholecalciferol 2000 Unit(s) Oral daily  cyclobenzaprine 10 milliGRAM(s) Oral three times a day PRN  dexAMETHasone    Solution 0.5 milliGRAM(s) Oral two times a day  diltiazem    milliGRAM(s) Oral daily  filgrastim-sndz (ZARXIO) Injectable 480 MICROGram(s) SubCutaneous daily  FIRST- Mouthwash  BLM 10 milliLiter(s) Swish and Swallow four times a day  hydrocortisone sodium succinate Injectable 100 milliGRAM(s) IV Push every 8 hours PRN  HYDROmorphone  Injectable 0.5 milliGRAM(s) IV Push every 3 hours PRN  HYDROmorphone  Injectable 0.7 milliGRAM(s) IV Push every 3 hours PRN  HYDROmorphone  Injectable 0.5 milliGRAM(s) IV Push <User Schedule>  lidocaine   4% Patch 1 Patch Transdermal every 24 hours  lidocaine 2% Gel 1 Application(s) Topical every 8 hours  melatonin 5 milliGRAM(s) Oral at bedtime  meperidine     Injectable 12.5 milliGRAM(s) IV Push once PRN  metoclopramide Injectable 10 milliGRAM(s) IV Push every 6 hours PRN  midodrine. 10 milliGRAM(s) Oral every 8 hours  ondansetron Injectable 8 milliGRAM(s) IV Push every 8 hours PRN  pantoprazole  Injectable 40 milliGRAM(s) IV Push every 12 hours  polyethylene glycol 3350 17 Gram(s) Oral two times a day PRN  simethicone 80 milliGRAM(s) Chew every 6 hours  sodium chloride 0.9% lock flush 10 milliLiter(s) IV Push every 1 hour PRN  sodium chloride 0.9%. 1000 milliLiter(s) IV Continuous <Continuous>  tamsulosin 0.4 milliGRAM(s) Oral at bedtime      Vital Signs Last 24 Hrs  T(C): 38 (22 May 2025 12:54), Max: 38 (21 May 2025 18:50)  T(F): 100.4 (22 May 2025 12:54), Max: 100.4 (21 May 2025 18:50)  HR: 84 (22 May 2025 09:35) (66 - 91)  BP: 120/67 (22 May 2025 09:35) (104/60 - 121/75)  BP(mean): --  RR: 18 (22 May 2025 12:54) (17 - 19)  SpO2: 97% (22 May 2025 12:54) (94% - 97%)    Parameters below as of 22 May 2025 12:54  Patient On (Oxygen Delivery Method): nasal cannula  O2 Flow (L/min): 2      Physical Exam:  General:    NAD,  non toxic  Respiratory:    comfortable on RA  abd:     soft,    no tenderness  :   no  cisneros  Musculoskeletal:   no joint swelling  vascular: RIJ line  Skin:    no rash                        8.6    1.58  )-----------( 32       ( 22 May 2025 07:01 )             24.5       05-22    135  |  101  |  10  ----------------------------<  120[H]  3.7   |  21[L]  |  1.02    Ca    8.3[L]      22 May 2025 07:00  Phos  1.6     05-22  Mg     1.8     05-22    TPro  5.7[L]  /  Alb  3.3  /  TBili  0.6  /  DBili  x   /  AST  31  /  ALT  37  /  AlkPhos  88  05-22      Urinalysis Basic - ( 22 May 2025 07:00 )    Color: x / Appearance: x / SG: x / pH: x  Gluc: 120 mg/dL / Ketone: x  / Bili: x / Urobili: x   Blood: x / Protein: x / Nitrite: x   Leuk Esterase: x / RBC: x / WBC x   Sq Epi: x / Non Sq Epi: x / Bacteria: x        MICROBIOLOGY:  Vancomycin Level, Trough: 7.8 ug/mL (05-21-25 @ 18:03)  v  Clean Catch Clean Catch (Midstream)  05-20-25   No growth  --  --      Blood Blood-Peripheral  05-20-25   No growth at 24 hours  --  --      Blood Blood-Peripheral  05-20-25   No growth at 24 hours  --  --      Blood Blood-Catheter  05-19-25   No growth at 48 Hours  --  --      Blood Blood-Peripheral  05-19-25   Growth in aerobic bottle: Staphylococcus haemolyticus  Isolation of Coagulase negative Staphylococcus from single blood culture  sets may represent  contamination. Contact the Microbiology Department at [phone] if  susceptibility testing is needed.  clinically indicated.  Direct identification is available within approximately 3-5  hours either by Blood Panel Multiplexed PCR or Direct  MALDI-TOF. Details: [email or web address]  --  Blood Culture PCR      Clean Catch Clean Catch (Midstream)  05-18-25   No growth  --  --      Clean Catch Clean Catch (Midstream)  05-17-25   10,000 - 49,000 CFU/mL Enterococcus faecium (vancomycin resistant)  --  Enterococcus faecium (vancomycin resistant)      Blood Blood-Peripheral  05-16-25   No growth at 5 days  --  --      Blood Blood-Peripheral  05-16-25   No growth at 5 days  --  --      Blood Blood-Peripheral  05-14-25   No growth at 5 days  --  --      Blood Blood-Peripheral  05-14-25   No growth at 5 days  --  --      Clean Catch Clean Catch (Midstream)  05-14-25   No growth  --  --      Clean Catch Clean Catch (Midstream)  05-11-25   <10,000 CFU/mL Normal Urogenital Madelyn  --  --      Blood Blood-Peripheral  05-11-25   No growth at 5 days  --  --      Clean Catch Clean Catch (Midstream)  05-03-25   No growth  --  --      Blood Blood-Peripheral  05-02-25   No growth at 5 days  --  --                RADIOLOGY:  Images independently visualized and reviewed personally, findings as below  < from: Xray Chest 1 View- PORTABLE-Urgent (Xray Chest 1 View- PORTABLE-Urgent .) (05.18.25 @ 20:49) >  IMPRESSION:  Right IJ central venous catheter, in place.  No focal consolidation.      < end of copied text >  < from: CT Abdomen and Pelvis w/ Oral Cont (05.14.25 @ 16:26) >  No acute CTfindings suspicious for infection.    < end of copied text >

## 2025-05-22 NOTE — PROGRESS NOTE ADULT - PROBLEM SELECTOR PLAN 4
Continue Diltiazem  mg qd and HCTZ 50 mg QD Continue Diltiazem  mg qd   home  HCTZ 50 mg QD on hold d/t dizziness and mild orthostatic VS

## 2025-05-23 LAB
ADD ON TEST-SPECIMEN IN LAB: SIGNIFICANT CHANGE UP
ALBUMIN SERPL ELPH-MCNC: 3 G/DL — LOW (ref 3.3–5)
ALP SERPL-CCNC: 109 U/L — SIGNIFICANT CHANGE UP (ref 40–120)
ALT FLD-CCNC: 75 U/L — HIGH (ref 10–45)
ANION GAP SERPL CALC-SCNC: 13 MMOL/L — SIGNIFICANT CHANGE UP (ref 5–17)
APTT BLD: 25.2 SEC — LOW (ref 26.1–36.8)
AST SERPL-CCNC: 59 U/L — HIGH (ref 10–40)
AUER BODIES BLD QL SMEAR: PRESENT — SIGNIFICANT CHANGE UP
BASOPHILS # BLD AUTO: 0 K/UL — SIGNIFICANT CHANGE UP (ref 0–0.2)
BASOPHILS NFR BLD AUTO: 0 % — SIGNIFICANT CHANGE UP (ref 0–2)
BILIRUB SERPL-MCNC: 0.6 MG/DL — SIGNIFICANT CHANGE UP (ref 0.2–1.2)
BLASTS # FLD: 13.9 % — HIGH (ref 0–0)
BLASTS NFR BLD: 13.9 % — HIGH (ref 0–0)
BLD GP AB SCN SERPL QL: NEGATIVE — SIGNIFICANT CHANGE UP
BUN SERPL-MCNC: 9 MG/DL — SIGNIFICANT CHANGE UP (ref 7–23)
CALCIUM SERPL-MCNC: 7.9 MG/DL — LOW (ref 8.4–10.5)
CHLORIDE SERPL-SCNC: 103 MMOL/L — SIGNIFICANT CHANGE UP (ref 96–108)
CO2 SERPL-SCNC: 20 MMOL/L — LOW (ref 22–31)
CREAT SERPL-MCNC: 1.02 MG/DL — SIGNIFICANT CHANGE UP (ref 0.5–1.3)
D DIMER BLD IA.RAPID-MCNC: 2886 NG/ML DDU — HIGH
EGFR: 80 ML/MIN/1.73M2 — SIGNIFICANT CHANGE UP
EGFR: 80 ML/MIN/1.73M2 — SIGNIFICANT CHANGE UP
EOSINOPHIL # BLD AUTO: 0 K/UL — SIGNIFICANT CHANGE UP (ref 0–0.5)
EOSINOPHIL NFR BLD AUTO: 0 % — SIGNIFICANT CHANGE UP (ref 0–6)
FIBRINOGEN PPP-MCNC: 600 MG/DL — HIGH (ref 200–445)
GIANT PLATELETS BLD QL SMEAR: PRESENT — SIGNIFICANT CHANGE UP
GLUCOSE SERPL-MCNC: 112 MG/DL — HIGH (ref 70–99)
HCT VFR BLD CALC: 24.5 % — LOW (ref 39–50)
HGB BLD-MCNC: 8.4 G/DL — LOW (ref 13–17)
INR BLD: 1.2 RATIO — HIGH (ref 0.85–1.16)
LDH SERPL L TO P-CCNC: 782 U/L — HIGH (ref 50–242)
LYMPHOCYTES # BLD AUTO: 1.48 K/UL — SIGNIFICANT CHANGE UP (ref 1–3.3)
LYMPHOCYTES # BLD AUTO: 39.1 % — SIGNIFICANT CHANGE UP (ref 13–44)
MAGNESIUM SERPL-MCNC: 1.8 MG/DL — SIGNIFICANT CHANGE UP (ref 1.6–2.6)
MANUAL SMEAR VERIFICATION: SIGNIFICANT CHANGE UP
MCHC RBC-ENTMCNC: 30.2 PG — SIGNIFICANT CHANGE UP (ref 27–34)
MCHC RBC-ENTMCNC: 34.3 G/DL — SIGNIFICANT CHANGE UP (ref 32–36)
MCV RBC AUTO: 88.1 FL — SIGNIFICANT CHANGE UP (ref 80–100)
METAMYELOCYTES # FLD: 1.7 % — HIGH (ref 0–0)
METAMYELOCYTES NFR BLD: 1.7 % — HIGH (ref 0–0)
MONOCYTES # BLD AUTO: 0.56 K/UL — SIGNIFICANT CHANGE UP (ref 0–0.9)
MONOCYTES NFR BLD AUTO: 14.8 % — HIGH (ref 2–14)
MYELOCYTES NFR BLD: 4.4 % — HIGH (ref 0–0)
NEUTROPHILS # BLD AUTO: 0.92 K/UL — LOW (ref 1.8–7.4)
NEUTROPHILS NFR BLD AUTO: 15.7 % — LOW (ref 43–77)
NEUTS BAND # BLD: 8.7 % — HIGH (ref 0–8)
NEUTS BAND NFR BLD: 8.7 % — HIGH (ref 0–8)
NT-PROBNP SERPL-SCNC: 242 PG/ML — SIGNIFICANT CHANGE UP (ref 0–300)
PHOSPHATE SERPL-MCNC: 1.9 MG/DL — LOW (ref 2.5–4.5)
PLAT MORPH BLD: ABNORMAL
PLATELET # BLD AUTO: 26 K/UL — LOW (ref 150–400)
POTASSIUM SERPL-MCNC: 3.8 MMOL/L — SIGNIFICANT CHANGE UP (ref 3.5–5.3)
POTASSIUM SERPL-SCNC: 3.8 MMOL/L — SIGNIFICANT CHANGE UP (ref 3.5–5.3)
PROMYELOCYTES # FLD: 1.7 % — HIGH (ref 0–0)
PROMYELOCYTES NFR BLD: 1.7 % — HIGH (ref 0–0)
PROT SERPL-MCNC: 5.4 G/DL — LOW (ref 6–8.3)
PROTHROM AB SERPL-ACNC: 13.7 SEC — HIGH (ref 9.9–13.4)
RBC # BLD: 2.78 M/UL — LOW (ref 4.2–5.8)
RBC # FLD: 13.7 % — SIGNIFICANT CHANGE UP (ref 10.3–14.5)
RBC BLD AUTO: SIGNIFICANT CHANGE UP
RH IG SCN BLD-IMP: POSITIVE — SIGNIFICANT CHANGE UP
SODIUM SERPL-SCNC: 136 MMOL/L — SIGNIFICANT CHANGE UP (ref 135–145)
URATE SERPL-MCNC: 3.8 MG/DL — SIGNIFICANT CHANGE UP (ref 3.4–8.8)
VANCOMYCIN TROUGH SERPL-MCNC: 10.9 UG/ML — SIGNIFICANT CHANGE UP (ref 10–20)
WBC # BLD: 3.79 K/UL — LOW (ref 3.8–10.5)
WBC # FLD AUTO: 3.79 K/UL — LOW (ref 3.8–10.5)

## 2025-05-23 PROCEDURE — 99232 SBSQ HOSP IP/OBS MODERATE 35: CPT

## 2025-05-23 PROCEDURE — 99233 SBSQ HOSP IP/OBS HIGH 50: CPT

## 2025-05-23 PROCEDURE — G0545: CPT

## 2025-05-23 RX ORDER — MAGNESIUM SULFATE 500 MG/ML
1 SYRINGE (ML) INJECTION ONCE
Refills: 0 | Status: COMPLETED | OUTPATIENT
Start: 2025-05-23 | End: 2025-05-23

## 2025-05-23 RX ORDER — SODIUM PHOSPHATE,DIBASIC DIHYD
30 POWDER (GRAM) MISCELLANEOUS ONCE
Refills: 0 | Status: COMPLETED | OUTPATIENT
Start: 2025-05-23 | End: 2025-05-23

## 2025-05-23 RX ORDER — HYDROMORPHONE/SOD CHLOR,ISO/PF 2 MG/10 ML
0.7 SYRINGE (ML) INJECTION EVERY 4 HOURS
Refills: 0 | Status: DISCONTINUED | OUTPATIENT
Start: 2025-05-23 | End: 2025-05-27

## 2025-05-23 RX ORDER — HYDROMORPHONE/SOD CHLOR,ISO/PF 2 MG/10 ML
1 SYRINGE (ML) INJECTION EVERY 4 HOURS
Refills: 0 | Status: DISCONTINUED | OUTPATIENT
Start: 2025-05-23 | End: 2025-05-27

## 2025-05-23 RX ADMIN — POSACONAZOLE 300 MILLIGRAM(S): 100 TABLET, DELAYED RELEASE ORAL at 17:22

## 2025-05-23 RX ADMIN — Medication 1 MILLIGRAM(S): at 18:02

## 2025-05-23 RX ADMIN — Medication 100 GRAM(S): at 18:02

## 2025-05-23 RX ADMIN — Medication 80 MILLIGRAM(S): at 21:40

## 2025-05-23 RX ADMIN — Medication 1 MILLIGRAM(S): at 22:29

## 2025-05-23 RX ADMIN — Medication 80 MILLIGRAM(S): at 08:25

## 2025-05-23 RX ADMIN — Medication 500 MILLIGRAM(S): at 05:26

## 2025-05-23 RX ADMIN — Medication 0.7 MILLIGRAM(S): at 02:52

## 2025-05-23 RX ADMIN — DEXAMETHASONE 0.5 MILLIGRAM(S): 0.5 TABLET ORAL at 05:26

## 2025-05-23 RX ADMIN — Medication 250 MILLIGRAM(S): at 17:27

## 2025-05-23 RX ADMIN — MEROPENEM 100 MILLIGRAM(S): 1 INJECTION INTRAVENOUS at 05:27

## 2025-05-23 RX ADMIN — Medication 40 MILLIGRAM(S): at 17:22

## 2025-05-23 RX ADMIN — Medication 1 MILLIGRAM(S): at 14:11

## 2025-05-23 RX ADMIN — Medication 170 MILLIMOLE(S): at 21:37

## 2025-05-23 RX ADMIN — LIDOCAINE HYDROCHLORIDE 1 APPLICATION(S): 20 JELLY TOPICAL at 21:40

## 2025-05-23 RX ADMIN — MEROPENEM 100 MILLIGRAM(S): 1 INJECTION INTRAVENOUS at 21:37

## 2025-05-23 RX ADMIN — Medication 0.7 MILLIGRAM(S): at 03:52

## 2025-05-23 RX ADMIN — Medication 0.7 MILLIGRAM(S): at 00:35

## 2025-05-23 RX ADMIN — Medication 40 MILLIGRAM(S): at 05:26

## 2025-05-23 RX ADMIN — MEROPENEM 100 MILLIGRAM(S): 1 INJECTION INTRAVENOUS at 12:30

## 2025-05-23 RX ADMIN — Medication 100 MILLIGRAM(S): at 10:24

## 2025-05-23 RX ADMIN — Medication 500 MILLIGRAM(S): at 17:23

## 2025-05-23 RX ADMIN — Medication 1 MILLIGRAM(S): at 18:55

## 2025-05-23 RX ADMIN — Medication 0.5 MILLIGRAM(S): at 16:23

## 2025-05-23 RX ADMIN — TAMSULOSIN HYDROCHLORIDE 0.4 MILLIGRAM(S): 0.4 CAPSULE ORAL at 21:39

## 2025-05-23 RX ADMIN — Medication 0.5 MILLIGRAM(S): at 10:19

## 2025-05-23 RX ADMIN — Medication 0.5 MILLIGRAM(S): at 06:46

## 2025-05-23 RX ADMIN — LIDOCAINE HYDROCHLORIDE 1 APPLICATION(S): 20 JELLY TOPICAL at 05:26

## 2025-05-23 RX ADMIN — Medication 0.5 MILLIGRAM(S): at 14:11

## 2025-05-23 RX ADMIN — Medication 0.5 MILLIGRAM(S): at 17:56

## 2025-05-23 RX ADMIN — LIDOCAINE HYDROCHLORIDE 1 APPLICATION(S): 20 JELLY TOPICAL at 12:33

## 2025-05-23 RX ADMIN — DIPHENHYDRAMINE HYDROCHLORIDE AND LIDOCAINE HYDROCHLORIDE AND ALUMINUM HYDROXIDE AND MAGNESIUM HYDRO 10 MILLILITER(S): KIT at 10:22

## 2025-05-23 RX ADMIN — Medication 1 APPLICATION(S): at 08:25

## 2025-05-23 RX ADMIN — DILTIAZEM HYDROCHLORIDE 240 MILLIGRAM(S): 240 TABLET, EXTENDED RELEASE ORAL at 05:26

## 2025-05-23 RX ADMIN — Medication 1 MILLIGRAM(S): at 12:27

## 2025-05-23 RX ADMIN — MIDODRINE HYDROCHLORIDE 10 MILLIGRAM(S): 5 TABLET ORAL at 21:40

## 2025-05-23 RX ADMIN — Medication 80 MILLIGRAM(S): at 17:22

## 2025-05-23 RX ADMIN — Medication 2 TABLET(S): at 10:23

## 2025-05-23 RX ADMIN — Medication 15 MILLILITER(S): at 10:23

## 2025-05-23 RX ADMIN — Medication 5 MILLIGRAM(S): at 21:40

## 2025-05-23 RX ADMIN — Medication 20 MILLIEQUIVALENT(S): at 18:02

## 2025-05-23 RX ADMIN — DIPHENHYDRAMINE HYDROCHLORIDE AND LIDOCAINE HYDROCHLORIDE AND ALUMINUM HYDROXIDE AND MAGNESIUM HYDRO 10 MILLILITER(S): KIT at 05:25

## 2025-05-23 RX ADMIN — DIPHENHYDRAMINE HYDROCHLORIDE AND LIDOCAINE HYDROCHLORIDE AND ALUMINUM HYDROXIDE AND MAGNESIUM HYDRO 10 MILLILITER(S): KIT at 17:23

## 2025-05-23 RX ADMIN — FILGRASTIM 480 MICROGRAM(S): 300 INJECTION, SOLUTION INTRAVENOUS; SUBCUTANEOUS at 10:26

## 2025-05-23 RX ADMIN — Medication 80 MILLIGRAM(S): at 10:32

## 2025-05-23 RX ADMIN — Medication 2000 UNIT(S): at 10:23

## 2025-05-23 RX ADMIN — Medication 15 MILLILITER(S): at 17:23

## 2025-05-23 RX ADMIN — Medication 15 MILLILITER(S): at 05:28

## 2025-05-23 RX ADMIN — Medication 250 MILLIGRAM(S): at 05:27

## 2025-05-23 NOTE — PROGRESS NOTE ADULT - PROBLEM SELECTOR PLAN 3
improving slowly   pain and palliative following, pain medication adjusted  diet advanced as per pt request  cont mouth care

## 2025-05-23 NOTE — PROGRESS NOTE ADULT - SUBJECTIVE AND OBJECTIVE BOX
Follow Up:  neutropenic fever    Interval History/ROS: pt feels better, had fever last night but not today, still with oral sors and loose stools          Allergies  No Known Allergies        ANTIMICROBIALS:  meropenem  IVPB 1000 every 8 hours  posaconazole DR Tablet 300 every 24 hours  posaconazole DR Tablet    valACYclovir 500 two times a day  vancomycin  IVPB 1000 every 12 hours      OTHER MEDS:  acetaminophen     Tablet .. 650 milliGRAM(s) Oral every 6 hours PRN  albuterol    90 MICROgram(s) HFA Inhaler 2 Puff(s) Inhalation every 6 hours PRN  allopurinol 100 milliGRAM(s) Oral daily  aluminum hydroxide/magnesium hydroxide/simethicone Suspension 30 milliLiter(s) Oral every 6 hours PRN  Biotene Dry Mouth Oral Rinse 15 milliLiter(s) Swish and Spit four times a day  calcium carbonate    500 mG (Tums) Chewable 1 Tablet(s) Chew four times a day PRN  chlorhexidine 4% Liquid 1 Application(s) Topical <User Schedule>  cholecalciferol 2000 Unit(s) Oral daily  cyclobenzaprine 10 milliGRAM(s) Oral three times a day PRN  dexAMETHasone    Solution 0.5 milliGRAM(s) Oral two times a day  diltiazem    milliGRAM(s) Oral daily  filgrastim-sndz (ZARXIO) Injectable 480 MICROGram(s) SubCutaneous daily  FIRST- Mouthwash  BLM 10 milliLiter(s) Swish and Swallow four times a day  hydrocortisone sodium succinate Injectable 100 milliGRAM(s) IV Push every 8 hours PRN  HYDROmorphone  Injectable 0.5 milliGRAM(s) IV Push <User Schedule>  HYDROmorphone  Injectable 0.7 milliGRAM(s) IV Push every 4 hours PRN  HYDROmorphone  Injectable 1 milliGRAM(s) IV Push every 4 hours PRN  lidocaine   4% Patch 1 Patch Transdermal every 24 hours  lidocaine 2% Gel 1 Application(s) Topical every 8 hours  melatonin 5 milliGRAM(s) Oral at bedtime  meperidine     Injectable 12.5 milliGRAM(s) IV Push once PRN  metoclopramide Injectable 10 milliGRAM(s) IV Push every 6 hours PRN  midodrine. 10 milliGRAM(s) Oral every 8 hours  ondansetron Injectable 8 milliGRAM(s) IV Push every 8 hours PRN  pantoprazole  Injectable 40 milliGRAM(s) IV Push every 12 hours  polyethylene glycol 3350 17 Gram(s) Oral two times a day PRN  senna 2 Tablet(s) Oral two times a day PRN  simethicone 80 milliGRAM(s) Chew <User Schedule>  sodium chloride 0.9% lock flush 10 milliLiter(s) IV Push every 1 hour PRN  tamsulosin 0.4 milliGRAM(s) Oral at bedtime      Vital Signs Last 24 Hrs  T(C): 37.4 (23 May 2025 17:15), Max: 38.3 (22 May 2025 21:26)  T(F): 99.3 (23 May 2025 17:15), Max: 101 (22 May 2025 21:26)  HR: 85 (23 May 2025 17:15) (77 - 106)  BP: 128/71 (23 May 2025 17:15) (102/60 - 146/76)  BP(mean): --  RR: 18 (23 May 2025 17:15) (17 - 20)  SpO2: 94% (23 May 2025 17:15) (92% - 95%)    Parameters below as of 23 May 2025 17:15  Patient On (Oxygen Delivery Method): nasal cannula  O2 Flow (L/min): 2      Physical Exam:  General:    NAD,  non toxic  Respiratory:    comfortable on RA  abd:     soft,    no tenderness  :   no  cisneros  Musculoskeletal:   no joint swelling  vascular: RIJ line  Skin:    no rash                          8.4    3.79  )-----------( 26       ( 23 May 2025 06:45 )             24.5       05-23    136  |  103  |  9   ----------------------------<  112[H]  3.8   |  20[L]  |  1.02    Ca    7.9[L]      23 May 2025 06:45  Phos  1.9     05-23  Mg     1.8     05-23    TPro  5.4[L]  /  Alb  3.0[L]  /  TBili  0.6  /  DBili  x   /  AST  59[H]  /  ALT  75[H]  /  AlkPhos  109  05-23      Urinalysis Basic - ( 23 May 2025 06:45 )    Color: x / Appearance: x / SG: x / pH: x  Gluc: 112 mg/dL / Ketone: x  / Bili: x / Urobili: x   Blood: x / Protein: x / Nitrite: x   Leuk Esterase: x / RBC: x / WBC x   Sq Epi: x / Non Sq Epi: x / Bacteria: x        MICROBIOLOGY:  v  Clean Catch Clean Catch (Midstream)  05-20-25   No growth  --  --      Blood Blood-Peripheral  05-20-25   No growth at 72 Hours  --  --      Blood Blood-Peripheral  05-20-25   No growth at 72 Hours  --  --      Blood Blood-Catheter  05-19-25   No growth at 4 days  --  --      Blood Blood-Peripheral  05-19-25   Growth in aerobic bottle: Staphylococcus haemolyticus  Isolation of Coagulase negative Staphylococcus from single blood culture  sets may represent  contamination. Contact the Microbiology Department at [phone] if  susceptibility testing is needed.  clinically indicated.  Direct identification is available within approximately 3-5  hours either by Blood Panel Multiplexed PCR or Direct  MALDI-TOF. Details: [email or web address]  --  Blood Culture PCR      Clean Catch Clean Catch (Midstream)  05-18-25   No growth  --  --      Clean Catch Clean Catch (Midstream)  05-17-25   10,000 - 49,000 CFU/mL Enterococcus faecium (vancomycin resistant)  --  Enterococcus faecium (vancomycin resistant)      Blood Blood-Peripheral  05-16-25   No growth at 5 days  --  --      Blood Blood-Peripheral  05-16-25   No growth at 5 days  --  --      Blood Blood-Peripheral  05-14-25   No growth at 5 days  --  --      Blood Blood-Peripheral  05-14-25   No growth at 5 days  --  --      Clean Catch Clean Catch (Midstream)  05-14-25   No growth  --  --      Clean Catch Clean Catch (Midstream)  05-11-25   <10,000 CFU/mL Normal Urogenital Madelyn  --  --      Blood Blood-Peripheral  05-11-25   No growth at 5 days  --  --      Clean Catch Clean Catch (Midstream)  05-03-25   No growth  --  --      Blood Blood-Peripheral  05-02-25   No growth at 5 days  --  --          Rapid RVP Result: Roly (05-22 @ 17:02)        RADIOLOGY:  Images independently visualized and reviewed personally, findings as below  < from: Xray Chest 1 View- PORTABLE-Urgent (Xray Chest 1 View- PORTABLE-Urgent .) (05.18.25 @ 20:49) >    IMPRESSION:  Right IJ central venous catheter, in place.  No focal consolidation.      < end of copied text >  < from: CT Abdomen and Pelvis w/ Oral Cont (05.14.25 @ 16:26) >  No acute CTfindings suspicious for infection.      < end of copied text >  < from: TTE Echo Complete w/o Contrast w/ Doppler (06.04.21 @ 10:05) >  CONCLUSIONS:     1. Normal left and right ventricular size and systolic function.   2. Mild aortic stenosis.   3. No evidence of pulmonary hypertension, pulmonary artery systolic pressure is 27 mmHg.   4. No pericardial effusion.   5. No prior echo is available for comparison.    < end of copied text >

## 2025-05-23 NOTE — PROGRESS NOTE ADULT - PROBLEM SELECTOR PLAN 2
Secondary to chemotherapy. controlled on current regimen.  - Continue IV Dilaudid 0.5 mg TID prior to meals  - Increase IV Dilaudid to 0.7 mg q3 hours prn for moderate pain. Hold for oversedation, SBP< 90 lethargy and RR less than 12  - Increase IV Dilaudid to 1 mg q3 hours prn for severe pain.  Hold for oversedation, SBP< 90 lethargy and RR less than 12  - Holistic RN referral for non-opiate interventions   - agree with topical therapies including FIRST mouthwash, lido gel, dex solution and biotene.  - Bowel regimen while on opioids.  Monitor for constipation.  - Narcan PRN

## 2025-05-23 NOTE — PROGRESS NOTE ADULT - ASSESSMENT
70 yo male with newly diagnosed ASXL1, DDX41 mutated AML, admitted for induction chemo with 7+3:  dauno 60 mg/m2+ cytarabine. 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 Chemo started on 5/2, Complicated by rigors, chills, fever, SOB  and tachycardia 2 hours into day 1 infusion - likely infusion reaction, neutropenic fever. Pt has pancytopenia d/t chemo and disease

## 2025-05-23 NOTE — PROGRESS NOTE ADULT - PROBLEM SELECTOR PLAN 1
Newly diagnosed, admission for induction chemo which was started on 5/2  - day 14 Bmbx 5/16, poor sample, input from heme/onc team appreciated  - further management as per heme/onc team

## 2025-05-23 NOTE — PROGRESS NOTE ADULT - PROBLEM SELECTOR PLAN 9
5/18- Orthostatic hypotension,  cc bolus given.  Continue Midodrine 10 mg po q8h  off  HCTZ  5/22 orthostatic hypotension improved, check once daily

## 2025-05-23 NOTE — PROVIDER CONTACT NOTE (OTHER) - DATE AND TIME:
14-May-2025 03:07
15-May-2025 13:54
04-May-2025 18:47
19-May-2025 11:50
19-May-2025 16:57
20-May-2025 22:55
22-May-2025 06:02
23-May-2025 08:28
10-May-2025 08:10
11-May-2025 11:34
12-May-2025 14:55
18-May-2025 17:35
20-May-2025 14:15
22-May-2025 21:09
15-May-2025 18:38
11-May-2025 17:07

## 2025-05-23 NOTE — PROVIDER CONTACT NOTE (OTHER) - BACKGROUND
AML s/p Daunorubicin and Cytarabine
dx AML, s/p chemo
AML
AML, s/p Dauno/cytarabine
AML

## 2025-05-23 NOTE — PROVIDER CONTACT NOTE (OTHER) - NAME OF MD/NP/PA/DO NOTIFIED:
Bhavya Rincon NP
Cathy Wallace
Santino MUHAMMAD
Cathy Marino NP
Bhavya Rincon
MADISYN Marino
SABINA Mcgraw
SABINA Oneill
Santino MUHAMMAD
Cailin Wells NP
Olivia MUHAMMAD
SABINA Coats
Santino MUHAMMAD
SABINA Mcgraw
Santino MUHAMMAD
SABINA Wallace

## 2025-05-23 NOTE — PROGRESS NOTE ADULT - PROBLEM SELECTOR PLAN 2
neutropenic, febrile   continue posa, valtrex, flagyl (dc'd 5/19), cefepime (5/11-5/19 ), cont to be febrile Ucx from 5/17 (+) gram + cocci in pairs and chains, inc to meropenem (5/19-   5/2 CXR Patchy opacities along the left lung base, possibly atelectasis, however superimposed infection cannot be entirely excluded.  5/13- FLU/COVID/RSV (-)  5/13- Added Flagyl 500 mg IV Q 8 hrs due to GI discomfort/pain  5/13- CT C/A/P -negative for infxn  5/20  blood cx 5/19 showed 1/4 MRSE  urine cx 5/17 VRE but 5/18 negative and pt asymptomatic  5/22  dry cough , RVP panel pending   - Levaquin/Amox- 5/2- 5/11  - Cefepime- 5/11- 5/19  Flagyl (abd discomfort)- 5/13- 5/19  - Meropenem- 5/19  - Valtrex-Posaconazole  5/2 5/20 O2 sat 88% with fever, FU closely, O2 PRN

## 2025-05-23 NOTE — PROVIDER CONTACT NOTE (OTHER) - ASSESSMENT
A&Ox4; 2LNC at rest w/ O2 sat >93%. GUALLPA sitting at EOB eating breakfast. No new O2 requirements att.
No chills
Patient is alert, oriented x4.Patient complained of dizziness when patient tried to stand up. Placed patient back on bed. No complaints of dizziness after lying down.
Pt is AOx4, vital signs stable except febrile. Pt is asymptomatic. No distress noted at this time.
patient AOx4 febrile, tachy, all other vs within range of baseline. patient complains of chills.
Fever
No acute distress noted.
pt c/o neck pain, no relief given from oxycodone 5 mg given at 1543.
pt c/o gas pain and nausea, received maalox at 6 am
Pt tachy to 103, reassessed to be 90. Pt denies SOB, dizziness, pain, chest pain, palpitations. All other VSS. pt asymptomatic.
patient complains of neck pain and unable to move neck. Patient describing muscular pain
Chills
NAD, pt febrile  No SOB, chest pain, N/V/D  No signs of bleeding
Patient is alert, oriented x4.Patient denies N/V/D ,SOB, abdominal pain.
Chills
pt A&Ox4, c/o of mouth and back pain. pt has recent hx of fevers

## 2025-05-23 NOTE — PROGRESS NOTE ADULT - PROBLEM SELECTOR PLAN 10
5/22 Pt has intermittent abdominal pain / lower cp since yesterday, associated  mild palpitation, mild elevated trop 70, switched simethicone qid after meals , symptoms were evaluated with  decreased/resolved pain.  Senna PRN constipation 5/22 Pt has intermittent abdominal pain / lower cp since yesterday, associated  mild palpitation, mild elevated trop 70, switched simethicone qid after meals , symptoms were evaluated with  decreased/resolved pain.  Senna/Miralax  PRN constipation

## 2025-05-23 NOTE — PROGRESS NOTE ADULT - NS ATTEND AMEND GEN_ALL_CORE FT
69M with ASXL1-mutated and WLZ14-ajatqbq AML. 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 Admitted for induction 7+3 - day 16.    Heme:   - Completed 7+3. Day 20. Complicated by rigors, chills, and tachycardia 2 hours into day 1 infusion - likely infusion reaction.  - Trend CBC with differential daily. Goal Hgb > 7 and plt > 30 given past GI bleeding.   - Trend DIC labs intermittently, as this has not been issue since admission. Continue transfusions as needed to maintain fibrinogen > 100.      - Allopurinol continued, but for previous diagnosis of gout decreased dose to 100 mg on 5/16.  S/p rasburicase; uric acid and LDH stable.   - Continue IVF. Monitor daily weights and I/O's. Diurese PRN.   - BMBx done day 15 (5/16) - subcortical, suboptimal specimen, no evidence of persistent leukemia. Flow 0.34% myeloblasts.  - in absence of overt evidence of persistent leukemia, and in setting of persistent fevers, start daily G-CSF.  GI:  - Bloody BM 5/14-16.  Plt goal > 30K now.  - c/o severe midepigastric pain 5/22 AM, palpitations  - EKG w/o acute changes, troponin sl incr 80, to be repeated  - check amylase, lipase  - cont PPI 2x/day     Card:  - Orthostatic hypotension, likely residual from GI bleed and decreased PO intake. Poss autonomic component.  - Bolus 500 cc on 5/17 and 5/18, on NS 75 cc/hr. Now on midodrine. BP stable.  - Encouraged incr PO intake      ID:   - Neutropenic and febrile; fevers since 5/11     with ongoing GI symptoms, diarrhea. Cults (-) with recent CNS from blood likely contaminant in 1/4 bottles and VREC in urine    cleared in subsequent specimen.  - Cefepime (5/11-5/19 ). Persistent fever lead to changing to meropenem on 5/19.  Last temp 100.4 5/21.     - CT scan from 5/14 showing no acute CT findings suspicious for infection. Mentioned GGOs in the lungs bilaterally, but stable from a previous scan.   - Prophylaxis: posaconazole, valacyclovir   - Appreciate ID input - kept on vanco for now (fevers, mucositis)  - Mouth rinses, dexamethasone swish and spit for mucositis, which persists.  - demerol prn for rigors    MSK: Has chronic pain from prior surgeries   - Pain control: oxycodone    TLC in place  OOB. 69M with ASXL1-mutated and ABL32-gehwdjw AML. 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 Admitted for induction 7+3 - day 16.    Heme:   - Completed 7+3. Day 21. Complicated by rigors, chills, and tachycardia 2 hours into day 1 infusion - likely infusion reaction.  - Trend CBC with differential daily. Goal Hgb > 8 and plt > 20 given past GI bleeding.   - Trend DIC labs intermittently, as this has not been issue since admission. Continue transfusions as needed to maintain fibrinogen > 100.      - Allopurinol continued, but for previous diagnosis of gout decreased dose to 100 mg on 5/16.  S/p rasburicase; uric acid and LDH stable.   - Continue IVF. Monitor daily weights and I/O's. Diurese PRN.   - BMBx done day 15 (5/16) - subcortical, suboptimal specimen, no evidence of persistent leukemia. Flow 0.34% myeloblasts.  - in absence of overt evidence of persistent leukemia, and in setting of persistent fevers, started daily G-CSF 5/21  - ANC rising now, blasts ~12%. d/c G-CSF once ANC sustained > 1.0 and/or PB blasts increase (will check PB flow today).    GI:  - Bloody BM 5/14-16.  Plt goal > 30K now.  - c/o severe midepigastric pain 5/22 AM, resolved   - EKG w/o acute changes, troponin sl incr 70, then decr.  - amylase, lipase normal  - cont PPI 2x/day     Card:  - Orthostatic hypotension, likely residual from GI bleed and decreased PO intake. Poss autonomic component.  - Bolus 500 cc on 5/17 and 5/18.  Now on midodrine. BP stable.   - weight stable  - Encouraged incr PO intake.      ID:   - Neutropenic and febrile; fevers since 5/11, last temp 100.8 5/22 pm, Tmax 101     with ongoing GI symptoms, diarrhea. Cults (-) with recent CNS from blood likely contaminant in 1/4 bottles and VREC in urine    cleared in subsequent specimen.  - Cefepime (5/11-5/19 ). Persistent fever lead to changing to meropenem on 5/19.  Last temp 100.4 5/21.     - CT scan from 5/14 showing no acute CT findings suspicious for infection. Mentioned GGOs in the lungs bilaterally, but stable from a previous scan.   - Prophylaxis: posaconazole, valacyclovir   - Appreciate ID input - kept on vanco for now (fevers, mucositis)  - Mouth rinses, dexamethasone swish and spit for mucositis, which persists.  - demerol prn for rigors    MSK: Has chronic pain from prior surgeries   - Pain control: oxycodone    TLC in place  OOB.

## 2025-05-23 NOTE — PROGRESS NOTE ADULT - ASSESSMENT
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 Afebrile 5/3-5/10, Neutropenic since 5/7, persistent daily fevers since  5/11   Developed bloody BM 5/15, no blood for last 3 days, has 2-3 watery stools, GI PCR negative but still with fever and rigors  all blood cxs before negative and now blood cx 5/19 showed 1/4 MRSE  urine cx 5/17 VRE but 5/18 negative and pt asymptomatic    - Levaquin/Amox- 5/2- 5/11  - Cefepime- 5/11- 5/19  Flagyl (abd discomfort)- 5/13- 5/19  - Meropenem- 5/19  - Valtrex-Posaconazole  5/2    AML stated on daruno+cytararbine 5/2, had fever and likely infusion reaction but then neutropenic fever, rigors since 5/11, chest/abd CT 5/14 negative BM BX 5/16 showed rare myeloblast polpulation  1/4 MRSE in the blood could be a contaminant but pt has a RIJ line, repeat cx 5/20 before vanco negative, but pt improved after adding vanco, not sure if the fever/rigors were infection related  urine cx 5/17 with VRE but repeat 5/18 negative without intervention      * vanco started 5/20 but that MRSE likely contaminant as repeat blood cx before vanco is negative, would stop  * c/w gianna  * c/w Posa and valtrex ppx  * monitor CBC/diff and CMP      The above assessment and plan was discussed with the primary team    Lidia Rodarte MD  contact on teams  After 5pm and on weekends call [phone]

## 2025-05-23 NOTE — PROGRESS NOTE ADULT - SUBJECTIVE AND OBJECTIVE BOX
SUBJECTIVE AND OBJECTIVE:  Indication for Geriatrics and Palliative Care Services/INTERVAL HPI: pain management     OVERNIGHT EVENTS:  The patient was seen and examined at bedside, continues to have severe pain in his mouth. Reports IV pain medication to last only about 40 mins.     Required IV Dilaudid 0.7 mg prn x3, IV dilaudid 0.5 mg x2 within 24hrs period 8am-8am     DNR on chart:  Allergies    No Known Allergies    Intolerances    MEDICATIONS  (STANDING):  allopurinol 100 milliGRAM(s) Oral daily  Biotene Dry Mouth Oral Rinse 15 milliLiter(s) Swish and Spit four times a day  chlorhexidine 4% Liquid 1 Application(s) Topical <User Schedule>  cholecalciferol 2000 Unit(s) Oral daily  dexAMETHasone    Solution 0.5 milliGRAM(s) Oral two times a day  diltiazem    milliGRAM(s) Oral daily  filgrastim-sndz (ZARXIO) Injectable 480 MICROGram(s) SubCutaneous daily  FIRST- Mouthwash  BLM 10 milliLiter(s) Swish and Swallow four times a day  HYDROmorphone  Injectable 0.5 milliGRAM(s) IV Push <User Schedule>  lidocaine   4% Patch 1 Patch Transdermal every 24 hours  lidocaine 2% Gel 1 Application(s) Topical every 8 hours  melatonin 5 milliGRAM(s) Oral at bedtime  meropenem  IVPB 1000 milliGRAM(s) IV Intermittent every 8 hours  midodrine. 10 milliGRAM(s) Oral every 8 hours  pantoprazole  Injectable 40 milliGRAM(s) IV Push every 12 hours  posaconazole DR Tablet 300 milliGRAM(s) Oral every 24 hours  posaconazole DR Tablet   Oral   simethicone 80 milliGRAM(s) Chew <User Schedule>  tamsulosin 0.4 milliGRAM(s) Oral at bedtime  valACYclovir 500 milliGRAM(s) Oral two times a day  vancomycin  IVPB 1000 milliGRAM(s) IV Intermittent every 12 hours    MEDICATIONS  (PRN):  acetaminophen     Tablet .. 650 milliGRAM(s) Oral every 6 hours PRN Temp greater or equal to 38C (100.4F), Mild Pain (1 - 3)  albuterol    90 MICROgram(s) HFA Inhaler 2 Puff(s) Inhalation every 6 hours PRN Shortness of Breath and/or Wheezing  aluminum hydroxide/magnesium hydroxide/simethicone Suspension 30 milliLiter(s) Oral every 6 hours PRN Dyspepsia  calcium carbonate    500 mG (Tums) Chewable 1 Tablet(s) Chew four times a day PRN Dyspepsia  cyclobenzaprine 10 milliGRAM(s) Oral three times a day PRN Muscle Spasm  hydrocortisone sodium succinate Injectable 100 milliGRAM(s) IV Push every 8 hours PRN chemotherapy reaction  HYDROmorphone  Injectable 0.7 milliGRAM(s) IV Push every 4 hours PRN Moderate Pain (4 - 6)  HYDROmorphone  Injectable 1 milliGRAM(s) IV Push every 4 hours PRN Severe Pain (7 - 10)  meperidine     Injectable 12.5 milliGRAM(s) IV Push once PRN shivering  metoclopramide Injectable 10 milliGRAM(s) IV Push every 6 hours PRN nausea/vomiting  ondansetron Injectable 8 milliGRAM(s) IV Push every 8 hours PRN Nausea and/or Vomiting  polyethylene glycol 3350 17 Gram(s) Oral two times a day PRN Constipation  senna 2 Tablet(s) Oral two times a day PRN Constipation  sodium chloride 0.9% lock flush 10 milliLiter(s) IV Push every 1 hour PRN Pre/post blood products, medications, blood draw, and to maintain line patency      ITEMS UNCHECKED ARE NOT PRESENT    PRESENT SYMPTOMS: [ ]Unable to self-report - see  CPOT, PAINADS, RDOS below  Source if other than patient:  [ ]Family   [ ]Team     Pain: [ x]yes [ ]no  QOL impact - discomfort over the course of the day, unable to chew  Location -  mouth, neck and back            Aggravating factors - eating, lying still   Quality - burning   Radiation - none  Timing- constant   Severity (0-10 scale): 8 to 9  Minimal acceptable level (0-10 scale):       Dyspnea:                           [ ]Mild [ ]Moderate [ ]Severe  Anxiety:                             [ ]Mild [ ]Moderate [ ]Severe  Fatigue:                             [ ]Mild [ ]Moderate [ ]Severe  Nausea:                             [ ]Mild [ ]Moderate [ ]Severe  Loss of appetite:              [ ]Mild [ ]Moderate [ ]Severe  Constipation:                    [ ]Mild [ ]Moderate [ ]Severe    PCSSQ[Palliative Care Spiritual Screening Question]   Severity (0-10):  Score of 4 or > indicate consideration of Chaplaincy referral.  Chaplaincy Referral: [ ] yes [x ] refused [ ] following    Caregiver Dietrich? : [ ] yes [ ] no  [ x] deferred:  Social work referral [ ] Patient & Family Centered Care Referral [ ]     Anticipatory Grief present?:  [ ] yes [ ] no  [x ] deferred: Social work referral [ ] Patient & Family Centered Care Referral [ ]      Other Symptoms:  [ x]All other review of systems negative     PHYSICAL EXAM:  Vital Signs Last 24 Hrs  T(C): 37 (23 May 2025 13:01), Max: 38.3 (22 May 2025 21:26)  T(F): 98.6 (23 May 2025 13:01), Max: 101 (22 May 2025 21:26)  HR: 88 (23 May 2025 13:01) (77 - 106)  BP: 133/76 (23 May 2025 13:01) (102/60 - 146/76)  BP(mean): --  RR: 20 (23 May 2025 13:01) (17 - 20)  SpO2: 95% (23 May 2025 13:01) (90% - 95%)    Parameters below as of 23 May 2025 13:01  Patient On (Oxygen Delivery Method): nasal cannula  O2 Flow (L/min): 2      GENERAL:   [ ]Cachexia    [x ]Alert  [ x]Oriented x 3  [ ]Lethargic  [ ]Unarousable  [ x]Verbal  [ ]Non-Verbal  Behavioral:   [ ] Anxiety  [ ] Delirium [ ] Agitation [ ] Other  HEENT:  [ ]Normal   [ ]Dry mouth   [ ]ET Tube/Trach  [ ]Oral lesions [x] erythema and ulcerations noted on L lateral tongue  PULMONARY:   [x ]Clear [ ]Tachypnea  [ ]Audible excessive secretions   [ ]Rhonchi        [ ]Right [ ]Left [ ]Bilateral  [ ]Crackles        [ ]Right [ ]Left [ ]Bilateral  [ ]Wheezing     [ ]Right [ ]Left [ ]Bilateral  [ ]Diminished breath sounds [ ]right [ ]left [ ]bilateral  CARDIOVASCULAR:    [ x]Regular [ ]Irregular [ ]Tachy  [ ]Lui [ ]Murmur [ ]Other  GASTROINTESTINAL:  [x ]Soft  [ ]Distended   [ ]+BS  [x ]Non tender [ ]Tender  [ ]Other [ ]PEG [ ]OGT/ NGT  Last BM:  GENITOURINARY:  [x ]Normal [ ] Incontinent   [ ]Oliguria/Anuria   [ ]Villalobos  MUSCULOSKELETAL:   [x ]Normal   [ ]Weakness  [ ]Bed/Wheelchair bound [ ]Edema  NEUROLOGIC:   [ x]No focal deficits  [ ]Cognitive impairment  [ ]Dysphagia [ ]Dysarthria [ ]Paresis [ ]Other   SKIN:   [ ]Normal  [ ]Rash  [ x]Other tattoos on BUE, bruising throughout  [ ]Pressure ulcer(s)       Present on admission [ ]y [ ]n    CRITICAL CARE:  [ ] Shock Present  [ ]Septic [ ]Cardiogenic [ ]Neurologic [ ]Hypovolemic  [ ]  Vasopressors [ ]  Inotropes   [ ]Respiratory failure present [ ]Mechanical ventilation [ ]Non-invasive ventilatory support [ ]High flow    [ ]Acute  [ ]Chronic [ ]Hypoxic  [ ]Hypercarbic [ ]Other  [ ]Other organ failure       LABS:                                   8.4    3.79  )-----------( 26       ( 23 May 2025 06:45 )             24.5     05-23    136  |  103  |  9   ----------------------------<  112[H]  3.8   |  20[L]  |  1.02    Ca    7.9[L]      23 May 2025 06:45  Phos  1.9     05-23  Mg     1.8     05-23    TPro  5.4[L]  /  Alb  3.0[L]  /  TBili  0.6  /  DBili  x   /  AST  59[H]  /  ALT  75[H]  /  AlkPhos  109  05-23      RADIOLOGY & ADDITIONAL STUDIES: reviewed    Protein Calorie Malnutrition Present: [ ]mild [ ]moderate [ ]severe [ ]underweight [ ]morbid obesity  https://www.andeal.org/vault/2010/web/files/ONC/Table_Clinical%20Characteristics%20to%20Document%20Malnutrition-White%20JV%20et%20al%202012.pdf    Height (cm): 181 (05-02-25 @ 08:51), 180.5 (04-28-25 @ 15:00)  Weight (kg): 107.3 (05-02-25 @ 11:55), 108.5 (04-28-25 @ 15:00)  BMI (kg/m2): 32.8 (05-02-25 @ 11:55), 33.1 (05-02-25 @ 08:51), 33.3 (04-28-25 @ 15:00)    [ ]PPSV2 < or = 30%  [ ]significant weight loss [ ]poor nutritional intake [ ]anasarca[ ]Artificial Nutrition    Other REFERRALS:  [ ]Hospice  [ ]Child Life  [ ]Social Work  [ ]Case management [ ]Holistic Therapy     Palliative Performance Scale:  http://[facility].org/files/news/palliative_performance_scale_ppsv2.pdf  (Ctrl +  left click to view)  Respiratory Distress Observation Tool:  https://homecareinformation.net/handouts/hen/Respiratory_Distress_Observation_Scale.pdf (Ctrl +  left click to view)  PAINAD Score:  http://geriatrictoolkit.[place].[facility]/cog/painad.pdf (Ctrl +  left click to view)

## 2025-05-23 NOTE — PROVIDER CONTACT NOTE (OTHER) - SITUATION
Patient complaints of chest pain and palpitation.
Patient complains of neck pain 10/10
T 100.4, pt has chills/rigors
Temp 100.7
Pt has temperature of 103.3
Pt tachy to 103, reassessed to be 90
pt febrile with 9pm vitals
Post PRBC vitals obtained patient febrile temp 101.5, tachy heart rate 109
Temp 38.5, HR 98
Unable to do standing BP because patient complained of dizziness. Lying BP 99/62,sitting 99/64
Neck pain rated at 10/10
Pt has 102.4 fever and is 88% on room air
Pt ordered for Q8 CBC- due at 2pm. Pt s/p 1 unit of platelets, pending 1 unit of PRBC. Additional BID labs ordered for 5pm.
pt c/o gas pain and nausea, received maalox at 6 am
Patient has temperature of 100.6F post platelets with chills

## 2025-05-23 NOTE — PHARMACOTHERAPY INTERVENTION NOTE - COMMENTS
Clinical Pharmacy Specialist- Hematology/Oncology- Progress Note    Pt is a 70 y/o male w/ PMH of HTN, HLD, GERD, BPH, Gout, spinal stenosis (s/p multiple surgeries) and newly diagnosed AML (ASXL1-mutated and DDX41) admitted for management with 7+3 Induction therapy    Antimicrobial Course( ID Following, MD Rodarte):  - Posaconazole- 5/2  - Levaquin/Amox- 5/2- 5/11  --> Cefepime- 5/11- 5/19  --> Meropenem- 5/19  - Flagyl (abd discomfort)- 5/13- 5/19  - Valtrex- 5/2  - Vancomycin (MRSA?MRSE bacteremia)- 5/20  MRSA nasal swab    Last Neutropenic (ANC<1000): 5/23; ANC= 0.92  Last Febrile: 22-May-2025 22:43; T= 100.8  Days Non-Neutropenic: 0  Days afebrile: 0    Chemotherapy Course  -Current Regimen: 7+3 Induction  History:  (5/2/25)- 7+3 Induction   -Daunorubicin 60mg/m2 IVP D1-3   -Cytarabine 100mg/m2 IV cont inf D1-7  -Day: 22 (5/23)  BmBx: 5/16- favorable  Access: R TLC (accessed 5/5/25)    Vancomycin Levels:   Date    Dose              Value    Ref Range	                             Status   5/21    750mg q12hr   7.8       Trough 10-20; AUC= 400-600   Final  5/23    1gm q12hr                 Trough 10-20; AUC= 400-600   Pending    History/Relevant clinical information used in assessment:  - Ht= 5’11”; IBW= 75.3kg; Actual BW= 107.3kg; Adj BW= 88kg  - 5/2- IRR? rigors, chills, and tachycardia 2 hours into day 1; rasburicase 3mg x 1  - 5/4 -Crcl= 67ml/min ; Scr=1.32; Wt= 88kg; Adjusted BW used since pt BMI>30  - 5/9- increase melatonin 3mg to 10mh qhs- c/o insomnia  - 5/13- mouth pain- has ulceration   - 5/14- mouth pain - cannot swallow now  - 5/16- bloody BM's decreased to 2x'd/day from 4x's/day yest    Assessment/Plan/Recommendation:  Onc:  - Daunorubicin 60mg/m2 IVP D1-3   - Cytarabine 100mg/m2 IV cont inf D1-7- IRR on D1, started at 6p, stopped at 7:30p (received 1.5 hrs),  made up D1 bag on 5/9 to run over 23 hrs (to account for ~1hr given)  - BmBx 5/16- favorable  - 5/20- zarxio 480mcg daily  - allopurinol 100mg (300mg d/c'd 5/15)-keeping for gout  ID:  - 5/15- bloody diarrhea x 3 on reglan prn, but has not needed, monitor for d/c to not aggravate w/ prokinetic agent- amicar?  - 5/15- HSV neg  - 5/19, still febrile since 5/11, on cefepime, BlCx NGTD + abd discomfort-->CT CAP 5/15 - no infection (but groundglass opacities stable from previous?)- on meropenem- (falls off after 5/27)  - 5/19- BlCx- Meth resistant Staphylococcus haemolyticus- on vancomycin 1gm q12hr, this will yield AUC= 457; per ID, likely contaminant, expect short course of vanco  - next VT= 5/23~6p  - 5/17 UCx- 5/17- 10-49k VRE- per ID, do not need to treat  Cards:  - atorvastatin 80mg held d/t DDI with posa (Cat X)  Pain (mouth):  ·	magic mouthwash to swish & swallow- 5/14  ·	hydromorphone 0.2mg IVP q6hr prn- 5/14--> 0.5mg - 0.7mg q3hr prn- 5/16  ·	hydromorphone 0.5mg IVP tid- 5/15  ·	oxycodone 2.5-5mg q4hr prn- 5/2- 5/16  ·	lidocaine gel 2% q8hr to tongue 5/14  ·	dexamethasone soln 0.5 mg/5 mL BID swish & spit - 5/15  ·	meperidine 12.5mg IVP prn rigors  Orthostasis:  - 5/18- midodrine 10mg PO tid    Additional Monitoring Needed?   -Yes- Continue to monitor renal function & daily counts for abx escalation/de-escalation   -Discharge Planning:  --> New meds: midodrine  --> Meds sent for auth:  --> Delivered meds:    Case discussed with attending/primary team    Liza Jolley, PharmD, BCPS  Clinical Pharmacy Specialist | Hematology/Oncology  [facility]  Email: [email or web address] or available on [email or web address]   Clinical Pharmacy Specialist- Hematology/Oncology- Progress Note    Pt is a 68 y/o male w/ PMH of HTN, HLD, GERD, BPH, Gout, spinal stenosis (s/p multiple surgeries) and newly diagnosed AML (ASXL1-mutated and DDX41) admitted for management with 7+3 Induction therapy    Antimicrobial Course( ID Following, MD Rodarte):  - Posaconazole- 5/2  - Levaquin/Amox- 5/2- 5/11  --> Cefepime- 5/11- 5/19  --> Meropenem- 5/19  - Flagyl (abd discomfort)- 5/13- 5/19  - Valtrex- 5/2  - Vancomycin (MRSA?MRSE bacteremia)- 5/20  MRSA nasal swab    Last Neutropenic (ANC<1000): 5/23; ANC= 0.92  Last Febrile: 22-May-2025 22:43; T= 100.8  Days Non-Neutropenic: 0  Days afebrile: 0    Chemotherapy Course  -Current Regimen: 7+3 Induction  History:  (5/2/25)- 7+3 Induction   -Daunorubicin 60mg/m2 IVP D1-3   -Cytarabine 100mg/m2 IV cont inf D1-7  -Day: 22 (5/23)  BmBx: 5/16- favorable  Access: R TLC (accessed 5/5/25)    Vancomycin Levels:   Date    Dose              Value    Ref Range	                             Status   5/21    750mg q12hr   7.8       Trough 10-20; AUC= 400-600   Final  5/23    1gm q12hr                 Trough 10-20; AUC= 400-600   Pending    History/Relevant clinical information used in assessment:  - Ht= 5’11”; IBW= 75.3kg; Actual BW= 107.3kg; Adj BW= 88kg  - 5/2- IRR? rigors, chills, and tachycardia 2 hours into day 1; rasburicase 3mg x 1  - 5/4 -Crcl= 67ml/min ; Scr=1.32; Wt= 88kg; Adjusted BW used since pt BMI>30  - 5/9- increase melatonin 3mg to 10mh qhs- c/o insomnia  - 5/13- mouth pain- has ulceration   - 5/14- mouth pain - cannot swallow now  - 5/16- bloody BM's decreased to 2x'd/day from 4x's/day yest    Assessment/Plan/Recommendation:  Onc:  - Daunorubicin 60mg/m2 IVP D1-3   - Cytarabine 100mg/m2 IV cont inf D1-7- IRR on D1, started at 6p, stopped at 7:30p (received 1.5 hrs),  made up D1 bag on 5/9 to run over 23 hrs (to account for ~1hr given)  - BmBx 5/16- favorable  - 5/20- zarxio 480mcg daily  - allopurinol 100mg (300mg d/c'd 5/15)-keeping for gout  ID:  - 5/15- bloody diarrhea x 3 on reglan prn, but has not needed, monitor for d/c to not aggravate w/ prokinetic agent- amicar?  - 5/15- HSV neg  - 5/19, still febrile since 5/11, on cefepime, BlCx NGTD + abd discomfort-->CT CAP 5/15 - no infection (but groundglass opacities stable from previous?)- on meropenem- (falls off after 5/27)  - 5/19- BlCx- Meth resistant Staphylococcus haemolyticus- on vancomycin 1gm q12hr, this will yield AUC= 457; per ID, likely contaminant, expect short course of vanco  - next VT= 5/23~6p  - 5/17 UCx- 5/17- 10-49k VRE- per ID, do not need to treat  Cards:  - atorvastatin 80mg held d/t DDI with posa (Cat X)  Pain (mouth):  ·	magic mouthwash to swish & swallow- 5/14  ·	hydromorphone 0.2mg IVP q6hr prn- 5/14--> 0.5mg - 0.7mg q3hr prn- 5/16--> 0.7-1mg- 5/23  ·	hydromorphone 0.5mg IVP tid- 5/15  ·	oxycodone 2.5-5mg q4hr prn- 5/2- 5/16  ·	lidocaine gel 2% q8hr to tongue 5/14  ·	dexamethasone soln 0.5 mg/5 mL BID swish & spit - 5/15  ·	meperidine 12.5mg IVP prn rigors  Orthostasis:  - 5/18- midodrine 10mg PO tid    Additional Monitoring Needed?   -Yes- Continue to monitor renal function & daily counts for abx escalation/de-escalation   -Discharge Planning:  --> New meds: midodrine  --> Meds sent for auth:  --> Delivered meds:    Case discussed with attending/primary team    Liza Jolley, PharmD, BCPS  Clinical Pharmacy Specialist | Hematology/Oncology  [facility]  Email: [email or web address] or available on [employer]

## 2025-05-23 NOTE — PROGRESS NOTE ADULT - PROBLEM SELECTOR PLAN 1
new ASXL1-mutated and [id number] AML.  admitted for induction chemo with dauno 60 mg/m2+ cytarabine. Chemo started on 5/2, Complicated by rigors, chills, fever, SOB  and tachycardia 2 hours into day 1 infusion - likely infusion. received pepcid/benadryl/solumedrol/tylenol iv. will make up D1 Cytarabine on day8    will proceed with day2 chemo with pre mdes Tylenol and Benadryl, hydrocortisone iv PRN reaction   Hepatitis screen/HIV(-) as outpatient  IVF, antiemetics, mouth care. monitor weight, I & O, diuresis PRN   Monitor CBC with diff QD, TLS labs BID, Coags & T & C TIW transfuse for hb<7, PLT <30  5/5 HLA sent  5/16 BMbx done, f/u  5/19: no longer having blood in stool for several days, check cbc daily, plt goal decreased to >30  5/22 hypokalemia: KCL 20 meq po x1, hypomagnesemia: Mg 1g iVPB x1, hypophosphatemia: sodium phos x1 new ASXL1-mutated and [id number] AML.  admitted for induction chemo with dauno 60 mg/m2+ cytarabine. Chemo started on 5/2, Complicated by rigors, chills, fever, SOB  and tachycardia 2 hours into day 1 infusion - likely infusion. received pepcid/benadryl/solumedrol/tylenol iv. will make up D1 Cytarabine on day8    will proceed with day2 chemo with pre mdes Tylenol and Benadryl, hydrocortisone iv PRN reaction   Hepatitis screen/HIV(-) as outpatient  IVF, antiemetics, mouth care. monitor weight, I & O, diuresis PRN   Monitor CBC with diff QD, TLS labs BID, Coags & T & C TIW transfuse for hb<7, PLT <30  5/5 HLA sent  5/16 BMbx subcortical, suboptimal specimen, no evidence of persistent leukemia. Flow 0.34% myeloblasts.  5/23 PB flow sent, fu result  5/19: no longer having blood in stool for several days, check cbc daily, plt goal decreased to >30  5/23  hypokalemia: KCL 20 meq po x1, hypomagnesemia: Mg 1g iVPB x1, hypophosphatemia: sodium phos 30m Mmol IVPB x1

## 2025-05-23 NOTE — PROGRESS NOTE ADULT - PROBLEM SELECTOR PLAN 5
- Geriatrics and Palliative Medicine Team will continue to follow for pain management    Paloma Mark MD  [facility] Team Consults  Please call if we can be of assistance, [phone]

## 2025-05-23 NOTE — PROVIDER CONTACT NOTE (OTHER) - ACTION/TREATMENT ORDERED:
Tylenol given, fever workup ordered
pending order for Dilaudid 0.5 mg
provider notified as per PA not to be considered transfusion reaction PO Tylenol administered per eMAR. No new orders
tums, simethicone, zofran to be ordered
EKG, will come and assess patient.
Give 650mg PO tylenol, and 0.5mg of IV Dilaudid
650mg PO Tylenol, 2L of O2, monitor once fever breaks if he still needs to remain on oxygen
As per PA hold off on 2pm CBC- send with next set of labs when PRBC transfusion completed.
Tylenol 650mg PRN, fever w/u
pending order
1g of IV Tylenol
Orthostatic BP at 0600.
Provider aware. No interventions at this time. Continue plan of care.
Provider notified, PRN PO Tylenol administered as per provider order. Pending UA/UC and chest x-ray. Nursing care on-going.

## 2025-05-23 NOTE — PROVIDER CONTACT NOTE (OTHER) - REASON
Patient has temperature of 100.6F post platelets with chills
Pt has 102.4 fever and is 88% on room air
Q8 CBC clarification
Fever
T 100.4 with chills/rigors
unable to do standing BP
Neck pain rated at 10/10
PT has GUALLPA.
Pt has temperature of 103.3
Pt tachy to 103, reassessed to be 90
Temp 100.7
pt c/o gas pain and nausea, received maalox at 6 am
Patient complains of neck pain 10/10
febriel
pt febrile with 9pm vitals
chest pain and palpitation

## 2025-05-23 NOTE — PROGRESS NOTE ADULT - ASSESSMENT
69M with HTN, HLD, Gout, GERD, BPH, spinal stenosis (s/p multiple procedures 2018, 2020 laminectomies / fusions), hip surgeries (Oct 2024, Jan 2025), residual hand weakness related to surgeries, umbilical hernia repair (2014/15) referred here from Dr Atkinson at [facility] in [place] for new ASXL1-mutated and [id number] AML, admitted for induction chemo with dauno 60 mg/m2+ cytarabine. Hospital course c/b neutropenic fevers, pancytopenia and mucositis pain, Geriatrics and Palliative Medicine Team is consulted for assistance with pain control

## 2025-05-23 NOTE — PROGRESS NOTE ADULT - PROBLEM SELECTOR PLAN 11
DVT ppx: OOB and ambulation as carey PT consulted: no skilled PT need, PT signed off  5/20 Pt consult again, py presently weak from persistent fever, lightheadedness and vertigo, check room air and post walk O2 sat

## 2025-05-23 NOTE — PROGRESS NOTE ADULT - SUBJECTIVE AND OBJECTIVE BOX
ANTIMICROBIALS  meropenem  IVPB 1000 milliGRAM(s) IV Intermittent every 8 hours  posaconazole DR Tablet   Oral   posaconazole DR Tablet 300 milliGRAM(s) Oral every 24 hours  valACYclovir 500 milliGRAM(s) Oral two times a day  vancomycin  IVPB 1000 milliGRAM(s) IV Intermittent every 12 hours      HEME/ONC MEDICATIONS      STANDING MEDICATIONS  allopurinol 100 milliGRAM(s) Oral daily  Biotene Dry Mouth Oral Rinse 15 milliLiter(s) Swish and Spit four times a day  chlorhexidine 4% Liquid 1 Application(s) Topical <User Schedule>  cholecalciferol 2000 Unit(s) Oral daily  dexAMETHasone    Solution 0.5 milliGRAM(s) Oral two times a day  diltiazem    milliGRAM(s) Oral daily  filgrastim-sndz (ZARXIO) Injectable 480 MICROGram(s) SubCutaneous daily  FIRST- Mouthwash  BLM 10 milliLiter(s) Swish and Swallow four times a day  HYDROmorphone  Injectable 0.5 milliGRAM(s) IV Push <User Schedule>  lidocaine   4% Patch 1 Patch Transdermal every 24 hours  lidocaine 2% Gel 1 Application(s) Topical every 8 hours  melatonin 5 milliGRAM(s) Oral at bedtime  midodrine. 10 milliGRAM(s) Oral every 8 hours  pantoprazole  Injectable 40 milliGRAM(s) IV Push every 12 hours  simethicone 80 milliGRAM(s) Chew <User Schedule>  tamsulosin 0.4 milliGRAM(s) Oral at bedtime      PRN MEDICATIONS  acetaminophen     Tablet .. 650 milliGRAM(s) Oral every 6 hours PRN  albuterol    90 MICROgram(s) HFA Inhaler 2 Puff(s) Inhalation every 6 hours PRN  aluminum hydroxide/magnesium hydroxide/simethicone Suspension 30 milliLiter(s) Oral every 6 hours PRN  calcium carbonate    500 mG (Tums) Chewable 1 Tablet(s) Chew four times a day PRN  cyclobenzaprine 10 milliGRAM(s) Oral three times a day PRN  hydrocortisone sodium succinate Injectable 100 milliGRAM(s) IV Push every 8 hours PRN  HYDROmorphone  Injectable 0.7 milliGRAM(s) IV Push every 4 hours PRN  HYDROmorphone  Injectable 1 milliGRAM(s) IV Push every 4 hours PRN  meperidine     Injectable 12.5 milliGRAM(s) IV Push once PRN  metoclopramide Injectable 10 milliGRAM(s) IV Push every 6 hours PRN  ondansetron Injectable 8 milliGRAM(s) IV Push every 8 hours PRN  polyethylene glycol 3350 17 Gram(s) Oral two times a day PRN  senna 2 Tablet(s) Oral two times a day PRN  sodium chloride 0.9% lock flush 10 milliLiter(s) IV Push every 1 hour PRN        Vital Signs Last 24 Hrs  T(C): 37.2 (23 May 2025 08:17), Max: 38.3 (22 May 2025 21:26)  T(F): 98.9 (23 May 2025 08:17), Max: 101 (22 May 2025 21:26)  HR: 87 (23 May 2025 08:17) (77 - 106)  BP: 128/70 (23 May 2025 08:17) (102/60 - 146/76)  BP(mean): --  RR: 20 (23 May 2025 08:17) (17 - 20)  SpO2: 93% (23 May 2025 08:17) (90% - 97%)    Parameters below as of 23 May 2025 08:17  Patient On (Oxygen Delivery Method): nasal cannula  O2 Flow (L/min): 2            LABS:                        8.4    3.79  )-----------( 26       ( 23 May 2025 06:45 )             24.5         Mean Cell Volume : 88.1 fl  Mean Cell Hemoglobin : 30.2 pg  Mean Cell Hemoglobin Concentration : 34.3 g/dL  Auto Neutrophil # : x  Auto Lymphocyte # : x  Auto Monocyte # : x  Auto Eosinophil # : x  Auto Basophil # : x  Auto Neutrophil % : x  Auto Lymphocyte % : x  Auto Monocyte % : x  Auto Eosinophil % : x  Auto Basophil % : x      05-23    136  |  103  |  9   ----------------------------<  112[H]  3.8   |  20[L]  |  1.02    Ca    7.9[L]      23 May 2025 06:45  Phos  1.9     05-23  Mg     1.8     05-23    TPro  5.4[L]  /  Alb  3.0[L]  /  TBili  0.6  /  DBili  x   /  AST  59[H]  /  ALT  75[H]  /  AlkPhos  109  05-23      Mg 1.8  Phos 1.9  Mg 1.9  Phos 2.9      PT/INR - ( 23 May 2025 06:45 )   PT: 13.7 sec;   INR: 1.20 ratio         PTT - ( 23 May 2025 06:45 )  PTT:25.2 sec      Uric Acid 3.8        RECENT CULTURES:  05-20 @ 19:00  Clean Catch Clean Catch (Midstream)  --  --  --    No growth  --  05-20 @ 10:06  Blood Blood-Peripheral  --  --  --    No growth at 48 Hours  --  05-20 @ 09:58  Blood Blood-Peripheral  --  --  --    No growth at 48 Hours  --  05-19 @ 10:16  Blood Blood-Catheter  --  --  --    No growth at 72 Hours  --  05-19 @ 06:50  Blood Blood-Peripheral  Blood Culture PCR  Blood Culture PCR  PCR    Growth in aerobic bottle: Staphylococcus haemolyticus  Isolation of Coagulase negative Staphylococcus from single blood culture  sets may represent  contamination. Contact the Microbiology Department at [phone] if  susceptibility testing is needed.  clinically indicated.  Direct identification is available within approximately 3-5  hours either by Blood Panel Multiplexed PCR or Direct  MALDI-TOF. Details: [email or web address]  --  05-18 @ 20:03  Clean Catch Clean Catch (Midstream)  --  --  --    No growth  --  05-17 @ 04:35  Clean Catch Clean Catch (Midstream)  Enterococcus faecium (vancomycin resistant)  Enterococcus faecium (vancomycin resistant)  CYNTHIA    10,000 - 49,000 CFU/mL Enterococcus faecium (vancomycin resistant)  --  05-16 @ 22:50  Blood Blood-Peripheral  --  --  --    No growth at 5 days  --  05-16 @ 22:41  Blood Blood-Peripheral  --  --  --    No growth at 5 days  --      RADIOLOGY & ADDITIONAL STUDIES:         Diagnosis: ASXL1-mutated and [id number] AML    Protocol/Chemo Regimen: s/p 7+3:  Daunorubicin 60 mg/m2+ Cytarabine     Day: 22      Pt endorsed: sob on exertion; dizziness when standing up yesterday, with decreased  appetite ; c/o  lower CP/ abdominal cramps, associated with mild palpitation, resolved in the afternoon today ; mild constipated, last BM yesterday; new dry cough  since yesterday    Review of Systems: Patient denied nausea, vomiting, mouth pain,   dyspnea,  diarrhea, rectal pain,  rash, fatigue, headache, bleeding      Pain scale:  7/10 left tongue, 5 /10 neck and low back; abd 3/10, 0/10 now    Diet: regular Ensure plus HP BID                      Allergies: No Known Allergies          ANTIMICROBIALS  meropenem  IVPB 1000 milliGRAM(s) IV Intermittent every 8 hours  posaconazole DR Tablet   Oral   posaconazole DR Tablet 300 milliGRAM(s) Oral every 24 hours  valACYclovir 500 milliGRAM(s) Oral two times a day  vancomycin  IVPB 1000 milliGRAM(s) IV Intermittent every 12 hours      HEME/ONC MEDICATIONS      STANDING MEDICATIONS  allopurinol 100 milliGRAM(s) Oral daily  Biotene Dry Mouth Oral Rinse 15 milliLiter(s) Swish and Spit four times a day  chlorhexidine 4% Liquid 1 Application(s) Topical <User Schedule>  cholecalciferol 2000 Unit(s) Oral daily  dexAMETHasone    Solution 0.5 milliGRAM(s) Oral two times a day  diltiazem    milliGRAM(s) Oral daily  filgrastim-sndz (ZARXIO) Injectable 480 MICROGram(s) SubCutaneous daily  FIRST- Mouthwash  BLM 10 milliLiter(s) Swish and Swallow four times a day  HYDROmorphone  Injectable 0.5 milliGRAM(s) IV Push <User Schedule>  lidocaine   4% Patch 1 Patch Transdermal every 24 hours  lidocaine 2% Gel 1 Application(s) Topical every 8 hours  melatonin 5 milliGRAM(s) Oral at bedtime  midodrine. 10 milliGRAM(s) Oral every 8 hours  pantoprazole  Injectable 40 milliGRAM(s) IV Push every 12 hours  simethicone 80 milliGRAM(s) Chew <User Schedule>  tamsulosin 0.4 milliGRAM(s) Oral at bedtime      PRN MEDICATIONS  acetaminophen     Tablet .. 650 milliGRAM(s) Oral every 6 hours PRN  albuterol    90 MICROgram(s) HFA Inhaler 2 Puff(s) Inhalation every 6 hours PRN  aluminum hydroxide/magnesium hydroxide/simethicone Suspension 30 milliLiter(s) Oral every 6 hours PRN  calcium carbonate    500 mG (Tums) Chewable 1 Tablet(s) Chew four times a day PRN  cyclobenzaprine 10 milliGRAM(s) Oral three times a day PRN  hydrocortisone sodium succinate Injectable 100 milliGRAM(s) IV Push every 8 hours PRN  HYDROmorphone  Injectable 0.7 milliGRAM(s) IV Push every 4 hours PRN  HYDROmorphone  Injectable 1 milliGRAM(s) IV Push every 4 hours PRN  meperidine     Injectable 12.5 milliGRAM(s) IV Push once PRN  metoclopramide Injectable 10 milliGRAM(s) IV Push every 6 hours PRN  ondansetron Injectable 8 milliGRAM(s) IV Push every 8 hours PRN  polyethylene glycol 3350 17 Gram(s) Oral two times a day PRN  senna 2 Tablet(s) Oral two times a day PRN  sodium chloride 0.9% lock flush 10 milliLiter(s) IV Push every 1 hour PRN        Vital Signs Last 24 Hrs  T(C): 37.2 (23 May 2025 08:17), Max: 38.3 (22 May 2025 21:26)  T(F): 98.9 (23 May 2025 08:17), Max: 101 (22 May 2025 21:26)  HR: 87 (23 May 2025 08:17) (77 - 106)  BP: 128/70 (23 May 2025 08:17) (102/60 - 146/76)  BP(mean): --  RR: 20 (23 May 2025 08:17) (17 - 20)  SpO2: 93% (23 May 2025 08:17) (90% - 97%)    Parameters below as of 23 May 2025 08:17  Patient On (Oxygen Delivery Method): nasal cannula  O2 Flow (L/min): 2        PHYSICAL EXAM  General: NAD  HEENT: clear oropharynx, anicteric sclera, left tongue with superficial healing ulcers   Neck: supple  CV: (+) S1/S2 RRR  Lungs: positive air movement b/l ant lungs, clear to auscultation, no wheezes, no rales  Abdomen: soft, +BS,  non-tender, distended  Ext: no edema  Skin: no rashes   Neuro: alert and oriented X 4, no focal deficits  Central Line:  TLC CDI           LABS:                        8.4    3.79  )-----------( 26       ( 23 May 2025 06:45 )             24.5         Mean Cell Volume : 88.1 fl  Mean Cell Hemoglobin : 30.2 pg  Mean Cell Hemoglobin Concentration : 34.3 g/dL  Auto Neutrophil # : x  Auto Lymphocyte # : x  Auto Monocyte # : x  Auto Eosinophil # : x  Auto Basophil # : x  Auto Neutrophil % : x  Auto Lymphocyte % : x  Auto Monocyte % : x  Auto Eosinophil % : x  Auto Basophil % : x      05-23    136  |  103  |  9   ----------------------------<  112[H]  3.8   |  20[L]  |  1.02    Ca    7.9[L]      23 May 2025 06:45  Phos  1.9     05-23  Mg     1.8     05-23    TPro  5.4[L]  /  Alb  3.0[L]  /  TBili  0.6  /  DBili  x   /  AST  59[H]  /  ALT  75[H]  /  AlkPhos  109  05-23      Mg 1.8  Phos 1.9  Mg 1.9  Phos 2.9      PT/INR - ( 23 May 2025 06:45 )   PT: 13.7 sec;   INR: 1.20 ratio         PTT - ( 23 May 2025 06:45 )  PTT:25.2 sec      Uric Acid 3.8        RECENT CULTURES:  05-20 @ 19:00  Clean Catch Clean Catch (Midstream)  --  --  --    No growth  --  05-20 @ 10:06  Blood Blood-Peripheral  --  --  --    No growth at 48 Hours  --  05-20 @ 09:58  Blood Blood-Peripheral  --  --  --    No growth at 48 Hours  --  05-19 @ 10:16  Blood Blood-Catheter  --  --  --    No growth at 72 Hours  --  05-19 @ 06:50  Blood Blood-Peripheral  Blood Culture PCR  Blood Culture PCR  PCR    Growth in aerobic bottle: Staphylococcus haemolyticus  Isolation of Coagulase negative Staphylococcus from single blood culture  sets may represent  contamination. Contact the Microbiology Department at [phone] if  susceptibility testing is needed.  clinically indicated.  Direct identification is available within approximately 3-5  hours either by Blood Panel Multiplexed PCR or Direct  MALDI-TOF. Details: [email or web address]  --  05-18 @ 20:03  Clean Catch Clean Catch (Midstream)  --  --  --    No growth  --  05-17 @ 04:35  Clean Catch Clean Catch (Midstream)  Enterococcus faecium (vancomycin resistant)  Enterococcus faecium (vancomycin resistant)  CYNTHIA    10,000 - 49,000 CFU/mL Enterococcus faecium (vancomycin resistant)  --  --      RADIOLOGY & ADDITIONAL STUDIES:        < from: Xray Chest 1 View- PORTABLE-Urgent (Xray Chest 1 View- PORTABLE-Urgent .) (05.18.25 @ 20:49) >  IMPRESSION:  Right IJ central venous catheter, in place.  No focal consolidation.      < from: CT Chest  Abdomen and Pelvis w/ Oral Cont (05.14.25 @ 16:26) >  IMPRESSION:  No acute CTfindings suspicious for infection.           Diagnosis: ASXL1-mutated and FUD10-zvsoboi AML    Protocol/Chemo Regimen: s/p 7+3:  Daunorubicin 60 mg/m2+ Cytarabine     Day: 22      Pt endorsed: intermittent SOB and GUALLPA. pain left tongue and back; constipated, last BM on 5/22    Review of Systems: Patient denied nausea, vomiting, mouth pain,  chest pain, cough, abdominal pain, diarrhea, rectal pain,  rash, fatigue, headache, bleeding      Pain scale:  7/10 left tongue, 7 /10 neck and low back    Diet: regular Ensure plus HP BID                      Allergies: No Known Allergies      ANTIMICROBIALS  meropenem  IVPB 1000 milliGRAM(s) IV Intermittent every 8 hours  posaconazole DR Tablet   Oral   posaconazole DR Tablet 300 milliGRAM(s) Oral every 24 hours  valACYclovir 500 milliGRAM(s) Oral two times a day  vancomycin  IVPB 1000 milliGRAM(s) IV Intermittent every 12 hours      STANDING MEDICATIONS  allopurinol 100 milliGRAM(s) Oral daily  Biotene Dry Mouth Oral Rinse 15 milliLiter(s) Swish and Spit four times a day  chlorhexidine 4% Liquid 1 Application(s) Topical <User Schedule>  cholecalciferol 2000 Unit(s) Oral daily  dexAMETHasone    Solution 0.5 milliGRAM(s) Oral two times a day  diltiazem    milliGRAM(s) Oral daily  filgrastim-sndz (ZARXIO) Injectable 480 MICROGram(s) SubCutaneous daily  FIRST- Mouthwash  BLM 10 milliLiter(s) Swish and Swallow four times a day  HYDROmorphone  Injectable 0.5 milliGRAM(s) IV Push <User Schedule>  lidocaine   4% Patch 1 Patch Transdermal every 24 hours  lidocaine 2% Gel 1 Application(s) Topical every 8 hours  melatonin 5 milliGRAM(s) Oral at bedtime  midodrine. 10 milliGRAM(s) Oral every 8 hours  pantoprazole  Injectable 40 milliGRAM(s) IV Push every 12 hours  simethicone 80 milliGRAM(s) Chew <User Schedule>  tamsulosin 0.4 milliGRAM(s) Oral at bedtime      PRN MEDICATIONS  acetaminophen     Tablet .. 650 milliGRAM(s) Oral every 6 hours PRN  albuterol    90 MICROgram(s) HFA Inhaler 2 Puff(s) Inhalation every 6 hours PRN  aluminum hydroxide/magnesium hydroxide/simethicone Suspension 30 milliLiter(s) Oral every 6 hours PRN  calcium carbonate    500 mG (Tums) Chewable 1 Tablet(s) Chew four times a day PRN  cyclobenzaprine 10 milliGRAM(s) Oral three times a day PRN  hydrocortisone sodium succinate Injectable 100 milliGRAM(s) IV Push every 8 hours PRN  HYDROmorphone  Injectable 0.7 milliGRAM(s) IV Push every 4 hours PRN  HYDROmorphone  Injectable 1 milliGRAM(s) IV Push every 4 hours PRN  meperidine     Injectable 12.5 milliGRAM(s) IV Push once PRN  metoclopramide Injectable 10 milliGRAM(s) IV Push every 6 hours PRN  ondansetron Injectable 8 milliGRAM(s) IV Push every 8 hours PRN  polyethylene glycol 3350 17 Gram(s) Oral two times a day PRN  senna 2 Tablet(s) Oral two times a day PRN  sodium chloride 0.9% lock flush 10 milliLiter(s) IV Push every 1 hour PRN        Vital Signs Last 24 Hrs  T(C): 37.2 (23 May 2025 08:17), Max: 38.3 (22 May 2025 21:26)  T(F): 98.9 (23 May 2025 08:17), Max: 101 (22 May 2025 21:26)  HR: 87 (23 May 2025 08:17) (77 - 106)  BP: 128/70 (23 May 2025 08:17) (102/60 - 146/76)  BP(mean): --  RR: 20 (23 May 2025 08:17) (17 - 20)  SpO2: 93% (23 May 2025 08:17) (90% - 97%)    Parameters below as of 23 May 2025 08:17  Patient On (Oxygen Delivery Method): nasal cannula  O2 Flow (L/min): 2        PHYSICAL EXAM  General: NAD  HEENT: clear oropharynx, anicteric sclera, left tongue with superficial healing ulcers   Neck: supple  CV: (+) S1/S2 RRR  Lungs: positive air movement b/l ant lungs, clear to auscultation, no wheezes, no rales  Abdomen: soft, +BS,  non-tender, distended  Ext: no edema  Skin: no rashes   Neuro: alert and oriented X 4, no focal deficits  Central Line:  TLC CDI           LABS:                        8.4    3.79  )-----------( 26       ( 23 May 2025 06:45 )             24.5         Mean Cell Volume : 88.1 fl  Mean Cell Hemoglobin : 30.2 pg  Mean Cell Hemoglobin Concentration : 34.3 g/dL  Auto Neutrophil # : x  Auto Lymphocyte # : x  Auto Monocyte # : x  Auto Eosinophil # : x  Auto Basophil # : x  Auto Neutrophil % : x  Auto Lymphocyte % : x  Auto Monocyte % : x  Auto Eosinophil % : x  Auto Basophil % : x      05-23    136  |  103  |  9   ----------------------------<  112[H]  3.8   |  20[L]  |  1.02    Ca    7.9[L]      23 May 2025 06:45  Phos  1.9     05-23  Mg     1.8     05-23    TPro  5.4[L]  /  Alb  3.0[L]  /  TBili  0.6  /  DBili  x   /  AST  59[H]  /  ALT  75[H]  /  AlkPhos  109  05-23    Mg 1.9  Phos 2.9      PT/INR - ( 23 May 2025 06:45 )   PT: 13.7 sec;   INR: 1.20 ratio         PTT - ( 23 May 2025 06:45 )  PTT:25.2 sec      Uric Acid 3.8    Vancomycin Level, Trough -Pre 4th Dose, order if dosed q6/8/12h (05.23.25 @ 17:19)    Vancomycin Level, Trough: 10.9      RECENT CULTURES:    Respiratory Viral Panel with COVID-19 by CRAMELA (05.22.25 @ 17:02)    Rapid RVP Result: Doug   SARS-CoV-2: Roly    05-20 @ 19:00  Clean Catch Clean Catch (Midstream)  --  --  --    No growth  --  05-20 @ 10:06  Blood Blood-Peripheral  --  --  --    No growth at 48 Hours  --  05-20 @ 09:58  Blood Blood-Peripheral  --  --  --    No growth at 48 Hours  --  05-19 @ 10:16  Blood Blood-Catheter  --  --  --    No growth at 72 Hours  --  05-19 @ 06:50  Blood Blood-Peripheral  Blood Culture PCR  Blood Culture PCR  PCR    Growth in aerobic bottle: Staphylococcus haemolyticus  Isolation of Coagulase negative Staphylococcus from single blood culture  sets may represent  contamination. Contact the Microbiology Department at [phone] if  susceptibility testing is needed.  clinically indicated.  Direct identification is available within approximately 3-5  hours either by Blood Panel Multiplexed PCR or Direct  MALDI-TOF. Details: [email or web address]  --  05-18 @ 20:03  Clean Catch Clean Catch (Midstream)  --  --  --    No growth  --  05-17 @ 04:35  Clean Catch Clean Catch (Midstream)  Enterococcus faecium (vancomycin resistant)  Enterococcus faecium (vancomycin resistant)  CYNTHIA    10,000 - 49,000 CFU/mL Enterococcus faecium (vancomycin resistant)  --  --      RADIOLOGY & ADDITIONAL STUDIES:      < from: Xray Chest 1 View- PORTABLE-Urgent (Xray Chest 1 View- PORTABLE-Urgent .) (05.18.25 @ 20:49) >  IMPRESSION:  Right IJ central venous catheter, in place.  No focal consolidation.      < from: CT Chest  Abdomen and Pelvis w/ Oral Cont (05.14.25 @ 16:26) >  IMPRESSION:  No acute CTfindings suspicious for infection.

## 2025-05-23 NOTE — PROVIDER CONTACT NOTE (OTHER) - RECOMMENDATIONS
N/A
antoinette, judit melo
1g of IV Tylenol
PO Tylenol, fever workup
tylenol 1 gram ivpb
Dilaudid 0.5 mg to be given as per pt it provided relief
EKG
Give 650mg PO tylenol, and 0.5mg of IV Dilaudid
N/A
650mg PO Tylenol, 2L of O2, monitor once fever breaks if he still needs to remain on oxygen
Provider aware.

## 2025-05-24 LAB
ALBUMIN SERPL ELPH-MCNC: 3.2 G/DL — LOW (ref 3.3–5)
ALP SERPL-CCNC: 108 U/L — SIGNIFICANT CHANGE UP (ref 40–120)
ALT FLD-CCNC: 68 U/L — HIGH (ref 10–45)
ANION GAP SERPL CALC-SCNC: 14 MMOL/L — SIGNIFICANT CHANGE UP (ref 5–17)
AST SERPL-CCNC: 43 U/L — HIGH (ref 10–40)
BASOPHILS # BLD AUTO: 0 K/UL — SIGNIFICANT CHANGE UP (ref 0–0.2)
BASOPHILS NFR BLD AUTO: 0 % — SIGNIFICANT CHANGE UP (ref 0–2)
BILIRUB SERPL-MCNC: 0.6 MG/DL — SIGNIFICANT CHANGE UP (ref 0.2–1.2)
BLASTS # FLD: 6.3 % — HIGH (ref 0–0)
BLASTS NFR BLD: 6.3 % — HIGH (ref 0–0)
BUN SERPL-MCNC: 8 MG/DL — SIGNIFICANT CHANGE UP (ref 7–23)
CALCIUM SERPL-MCNC: 8.4 MG/DL — SIGNIFICANT CHANGE UP (ref 8.4–10.5)
CHLORIDE SERPL-SCNC: 102 MMOL/L — SIGNIFICANT CHANGE UP (ref 96–108)
CO2 SERPL-SCNC: 23 MMOL/L — SIGNIFICANT CHANGE UP (ref 22–31)
CREAT SERPL-MCNC: 1.05 MG/DL — SIGNIFICANT CHANGE UP (ref 0.5–1.3)
CULTURE RESULTS: NO GROWTH — SIGNIFICANT CHANGE UP
CULTURE RESULTS: SIGNIFICANT CHANGE UP
EGFR: 77 ML/MIN/1.73M2 — SIGNIFICANT CHANGE UP
EGFR: 77 ML/MIN/1.73M2 — SIGNIFICANT CHANGE UP
EOSINOPHIL # BLD AUTO: 0 K/UL — SIGNIFICANT CHANGE UP (ref 0–0.5)
EOSINOPHIL NFR BLD AUTO: 0 % — SIGNIFICANT CHANGE UP (ref 0–6)
GLUCOSE SERPL-MCNC: 112 MG/DL — HIGH (ref 70–99)
HCT VFR BLD CALC: 23 % — LOW (ref 39–50)
HGB BLD-MCNC: 8 G/DL — LOW (ref 13–17)
LDH SERPL L TO P-CCNC: 1292 U/L — HIGH (ref 50–242)
LYMPHOCYTES # BLD AUTO: 1.55 K/UL — SIGNIFICANT CHANGE UP (ref 1–3.3)
LYMPHOCYTES # BLD AUTO: 13.5 % — SIGNIFICANT CHANGE UP (ref 13–44)
MAGNESIUM SERPL-MCNC: 1.8 MG/DL — SIGNIFICANT CHANGE UP (ref 1.6–2.6)
MANUAL SMEAR VERIFICATION: SIGNIFICANT CHANGE UP
MCHC RBC-ENTMCNC: 30.4 PG — SIGNIFICANT CHANGE UP (ref 27–34)
MCHC RBC-ENTMCNC: 34.8 G/DL — SIGNIFICANT CHANGE UP (ref 32–36)
MCV RBC AUTO: 87.5 FL — SIGNIFICANT CHANGE UP (ref 80–100)
METAMYELOCYTES # FLD: 4.5 % — HIGH (ref 0–0)
METAMYELOCYTES NFR BLD: 4.5 % — HIGH (ref 0–0)
MONOCYTES # BLD AUTO: 1.76 K/UL — HIGH (ref 0–0.9)
MONOCYTES NFR BLD AUTO: 15.3 % — HIGH (ref 2–14)
MYELOCYTES NFR BLD: 3.6 % — HIGH (ref 0–0)
NEUTROPHILS # BLD AUTO: 5.5 K/UL — SIGNIFICANT CHANGE UP (ref 1.8–7.4)
NEUTROPHILS NFR BLD AUTO: 39.7 % — LOW (ref 43–77)
NEUTS BAND # BLD: 8.1 % — HIGH (ref 0–8)
NEUTS BAND NFR BLD: 8.1 % — HIGH (ref 0–8)
PHOSPHATE SERPL-MCNC: 2.4 MG/DL — LOW (ref 2.5–4.5)
PLAT MORPH BLD: NORMAL — SIGNIFICANT CHANGE UP
PLATELET # BLD AUTO: 33 K/UL — LOW (ref 150–400)
POTASSIUM SERPL-MCNC: 3.6 MMOL/L — SIGNIFICANT CHANGE UP (ref 3.5–5.3)
POTASSIUM SERPL-SCNC: 3.6 MMOL/L — SIGNIFICANT CHANGE UP (ref 3.5–5.3)
PROMYELOCYTES # FLD: 9 % — HIGH (ref 0–0)
PROMYELOCYTES NFR BLD: 9 % — HIGH (ref 0–0)
PROT SERPL-MCNC: 5.2 G/DL — LOW (ref 6–8.3)
RBC # BLD: 2.63 M/UL — LOW (ref 4.2–5.8)
RBC # FLD: 13.8 % — SIGNIFICANT CHANGE UP (ref 10.3–14.5)
RBC BLD AUTO: SIGNIFICANT CHANGE UP
SODIUM SERPL-SCNC: 139 MMOL/L — SIGNIFICANT CHANGE UP (ref 135–145)
SPECIMEN SOURCE: SIGNIFICANT CHANGE UP
SPECIMEN SOURCE: SIGNIFICANT CHANGE UP
URATE SERPL-MCNC: 4.5 MG/DL — SIGNIFICANT CHANGE UP (ref 3.4–8.8)
WBC # BLD: 11.5 K/UL — HIGH (ref 3.8–10.5)
WBC # FLD AUTO: 11.5 K/UL — HIGH (ref 3.8–10.5)

## 2025-05-24 PROCEDURE — 99233 SBSQ HOSP IP/OBS HIGH 50: CPT

## 2025-05-24 RX ORDER — SOD PHOS DI, MONO/K PHOS MONO 250 MG
1 TABLET ORAL
Refills: 0 | Status: COMPLETED | OUTPATIENT
Start: 2025-05-24 | End: 2025-05-25

## 2025-05-24 RX ADMIN — MIDODRINE HYDROCHLORIDE 10 MILLIGRAM(S): 5 TABLET ORAL at 13:57

## 2025-05-24 RX ADMIN — DIPHENHYDRAMINE HYDROCHLORIDE AND LIDOCAINE HYDROCHLORIDE AND ALUMINUM HYDROXIDE AND MAGNESIUM HYDRO 10 MILLILITER(S): KIT at 11:42

## 2025-05-24 RX ADMIN — Medication 15 MILLILITER(S): at 05:56

## 2025-05-24 RX ADMIN — DIPHENHYDRAMINE HYDROCHLORIDE AND LIDOCAINE HYDROCHLORIDE AND ALUMINUM HYDROXIDE AND MAGNESIUM HYDRO 5 MILLILITER(S): KIT at 00:16

## 2025-05-24 RX ADMIN — DIPHENHYDRAMINE HYDROCHLORIDE AND LIDOCAINE HYDROCHLORIDE AND ALUMINUM HYDROXIDE AND MAGNESIUM HYDRO 10 MILLILITER(S): KIT at 05:55

## 2025-05-24 RX ADMIN — Medication 1 MILLIGRAM(S): at 09:34

## 2025-05-24 RX ADMIN — DIPHENHYDRAMINE HYDROCHLORIDE AND LIDOCAINE HYDROCHLORIDE AND ALUMINUM HYDROXIDE AND MAGNESIUM HYDRO 10 MILLILITER(S): KIT at 18:31

## 2025-05-24 RX ADMIN — MEROPENEM 100 MILLIGRAM(S): 1 INJECTION INTRAVENOUS at 13:57

## 2025-05-24 RX ADMIN — Medication 100 MILLIGRAM(S): at 11:43

## 2025-05-24 RX ADMIN — Medication 15 MILLILITER(S): at 00:15

## 2025-05-24 RX ADMIN — LIDOCAINE HYDROCHLORIDE 1 APPLICATION(S): 20 JELLY TOPICAL at 21:39

## 2025-05-24 RX ADMIN — Medication 0.5 MILLIGRAM(S): at 07:39

## 2025-05-24 RX ADMIN — MEROPENEM 100 MILLIGRAM(S): 1 INJECTION INTRAVENOUS at 21:34

## 2025-05-24 RX ADMIN — MEROPENEM 100 MILLIGRAM(S): 1 INJECTION INTRAVENOUS at 05:54

## 2025-05-24 RX ADMIN — Medication 500 MILLIGRAM(S): at 18:31

## 2025-05-24 RX ADMIN — Medication 1 MILLIGRAM(S): at 03:37

## 2025-05-24 RX ADMIN — LIDOCAINE HYDROCHLORIDE 1 APPLICATION(S): 20 JELLY TOPICAL at 13:56

## 2025-05-24 RX ADMIN — Medication 1 TABLET(S): at 11:42

## 2025-05-24 RX ADMIN — Medication 2000 UNIT(S): at 11:42

## 2025-05-24 RX ADMIN — Medication 40 MILLIGRAM(S): at 05:56

## 2025-05-24 RX ADMIN — Medication 250 MILLIGRAM(S): at 18:31

## 2025-05-24 RX ADMIN — Medication 40 MILLIGRAM(S): at 18:31

## 2025-05-24 RX ADMIN — MIDODRINE HYDROCHLORIDE 10 MILLIGRAM(S): 5 TABLET ORAL at 21:35

## 2025-05-24 RX ADMIN — Medication 500 MILLIGRAM(S): at 05:56

## 2025-05-24 RX ADMIN — Medication 15 MILLILITER(S): at 18:31

## 2025-05-24 RX ADMIN — Medication 1 TABLET(S): at 21:35

## 2025-05-24 RX ADMIN — DEXAMETHASONE 0.5 MILLIGRAM(S): 0.5 TABLET ORAL at 18:30

## 2025-05-24 RX ADMIN — Medication 80 MILLIGRAM(S): at 18:30

## 2025-05-24 RX ADMIN — Medication 1 MILLIGRAM(S): at 21:33

## 2025-05-24 RX ADMIN — Medication 1 APPLICATION(S): at 05:56

## 2025-05-24 RX ADMIN — DILTIAZEM HYDROCHLORIDE 240 MILLIGRAM(S): 240 TABLET, EXTENDED RELEASE ORAL at 05:55

## 2025-05-24 RX ADMIN — Medication 0.5 MILLIGRAM(S): at 18:28

## 2025-05-24 RX ADMIN — Medication 0.5 MILLIGRAM(S): at 18:58

## 2025-05-24 RX ADMIN — Medication 0.5 MILLIGRAM(S): at 08:09

## 2025-05-24 RX ADMIN — Medication 1 TABLET(S): at 18:31

## 2025-05-24 RX ADMIN — Medication 80 MILLIGRAM(S): at 13:56

## 2025-05-24 RX ADMIN — LIDOCAINE HYDROCHLORIDE 1 APPLICATION(S): 20 JELLY TOPICAL at 05:54

## 2025-05-24 RX ADMIN — Medication 80 MILLIGRAM(S): at 09:26

## 2025-05-24 RX ADMIN — TAMSULOSIN HYDROCHLORIDE 0.4 MILLIGRAM(S): 0.4 CAPSULE ORAL at 21:35

## 2025-05-24 RX ADMIN — Medication 15 MILLILITER(S): at 11:42

## 2025-05-24 RX ADMIN — Medication 0.5 MILLIGRAM(S): at 13:56

## 2025-05-24 RX ADMIN — POSACONAZOLE 300 MILLIGRAM(S): 100 TABLET, DELAYED RELEASE ORAL at 18:31

## 2025-05-24 RX ADMIN — DEXAMETHASONE 0.5 MILLIGRAM(S): 0.5 TABLET ORAL at 05:55

## 2025-05-24 RX ADMIN — Medication 0.5 MILLIGRAM(S): at 14:26

## 2025-05-24 RX ADMIN — Medication 250 MILLIGRAM(S): at 05:54

## 2025-05-24 RX ADMIN — Medication 1 MILLIGRAM(S): at 22:33

## 2025-05-24 RX ADMIN — Medication 1 MILLIGRAM(S): at 10:04

## 2025-05-24 RX ADMIN — Medication 80 MILLIGRAM(S): at 21:34

## 2025-05-24 NOTE — PROGRESS NOTE ADULT - SUBJECTIVE AND OBJECTIVE BOX
Diagnosis:    Protocol/Chemo Regimen:    Day:     Pt endorsed:    Review of Systems:     Pain scale:     Diet:     Allergies    No Known Allergies    Intolerances        ANTIMICROBIALS  meropenem  IVPB 1000 milliGRAM(s) IV Intermittent every 8 hours  posaconazole DR Tablet 300 milliGRAM(s) Oral every 24 hours  posaconazole DR Tablet   Oral   valACYclovir 500 milliGRAM(s) Oral two times a day  vancomycin  IVPB 1000 milliGRAM(s) IV Intermittent every 12 hours      HEME/ONC MEDICATIONS      STANDING MEDICATIONS  allopurinol 100 milliGRAM(s) Oral daily  Biotene Dry Mouth Oral Rinse 15 milliLiter(s) Swish and Spit four times a day  chlorhexidine 4% Liquid 1 Application(s) Topical <User Schedule>  cholecalciferol 2000 Unit(s) Oral daily  dexAMETHasone    Solution 0.5 milliGRAM(s) Oral two times a day  diltiazem    milliGRAM(s) Oral daily  filgrastim-sndz (ZARXIO) Injectable 480 MICROGram(s) SubCutaneous daily  FIRST- Mouthwash  BLM 10 milliLiter(s) Swish and Swallow four times a day  HYDROmorphone  Injectable 0.5 milliGRAM(s) IV Push <User Schedule>  lidocaine   4% Patch 1 Patch Transdermal every 24 hours  lidocaine 2% Gel 1 Application(s) Topical every 8 hours  melatonin 5 milliGRAM(s) Oral at bedtime  midodrine. 10 milliGRAM(s) Oral every 8 hours  pantoprazole  Injectable 40 milliGRAM(s) IV Push every 12 hours  simethicone 80 milliGRAM(s) Chew <User Schedule>  tamsulosin 0.4 milliGRAM(s) Oral at bedtime      PRN MEDICATIONS  acetaminophen     Tablet .. 650 milliGRAM(s) Oral every 6 hours PRN  albuterol    90 MICROgram(s) HFA Inhaler 2 Puff(s) Inhalation every 6 hours PRN  aluminum hydroxide/magnesium hydroxide/simethicone Suspension 30 milliLiter(s) Oral every 6 hours PRN  calcium carbonate    500 mG (Tums) Chewable 1 Tablet(s) Chew four times a day PRN  cyclobenzaprine 10 milliGRAM(s) Oral three times a day PRN  hydrocortisone sodium succinate Injectable 100 milliGRAM(s) IV Push every 8 hours PRN  HYDROmorphone  Injectable 0.7 milliGRAM(s) IV Push every 4 hours PRN  HYDROmorphone  Injectable 1 milliGRAM(s) IV Push every 4 hours PRN  meperidine     Injectable 12.5 milliGRAM(s) IV Push once PRN  metoclopramide Injectable 10 milliGRAM(s) IV Push every 6 hours PRN  ondansetron Injectable 8 milliGRAM(s) IV Push every 8 hours PRN  polyethylene glycol 3350 17 Gram(s) Oral two times a day PRN  senna 2 Tablet(s) Oral two times a day PRN  sodium chloride 0.9% lock flush 10 milliLiter(s) IV Push every 1 hour PRN        Vital Signs Last 24 Hrs  T(C): 37.3 (24 May 2025 05:39), Max: 37.4 (23 May 2025 17:15)  T(F): 99.2 (24 May 2025 05:39), Max: 99.3 (23 May 2025 17:15)  HR: 80 (24 May 2025 05:39) (80 - 95)  BP: 115/67 (24 May 2025 05:39) (113/76 - 133/76)  BP(mean): --  RR: 18 (24 May 2025 05:39) (18 - 20)  SpO2: 94% (24 May 2025 05:39) (93% - 96%)    Parameters below as of 24 May 2025 05:39  Patient On (Oxygen Delivery Method): nasal cannula  O2 Flow (L/min): 2      PHYSICAL EXAM  General: NAD  HEENT: PERRLA, EOMOI, clear oropharynx, anicteric sclera, pink conjunctiva  Neck: supple  CV: (+) S1/S2 RRR  Lungs: clear to auscultation, no wheezes or rales  Abdomen: soft, non-tender, non-distended (+) BS  Ext: no clubbing, cyanosis or edema  Skin: no rashes and no petechiae  Neuro: alert and oriented X 3, no focal deficits  Central Line:     RECENT CULTURES:  05-22 @ 23:23  Clean Catch Clean Catch (Midstream)  --  --  --    No growth  --  05-22 @ 23:22  Blood Blood-Peripheral  --  --  --    No growth at 24 hours  --  05-20 @ 19:00  Clean Catch Clean Catch (Midstream)  --  --  --    No growth  --  05-20 @ 10:06  Blood Blood-Peripheral  --  --  --    No growth at 72 Hours  --  05-20 @ 09:58  Blood Blood-Peripheral  --  --  --    No growth at 72 Hours  --  05-19 @ 10:16  Blood Blood-Catheter  --  --  --    No growth at 4 days  --  05-19 @ 06:50  Blood Blood-Peripheral  Blood Culture PCR  Blood Culture PCR  PCR    Growth in aerobic bottle: Staphylococcus haemolyticus  Isolation of Coagulase negative Staphylococcus from single blood culture  sets may represent  contamination. Contact the Microbiology Department at [phone] if  susceptibility testing is needed.  clinically indicated.  Direct identification is available within approximately 3-5  hours either by Blood Panel Multiplexed PCR or Direct  MALDI-TOF. Details: [email or web address]  --  05-18 @ 20:03  Clean Catch Clean Catch (Midstream)  --  --  --    No growth  --        LABS:                        8.4    3.79  )-----------( 26       ( 23 May 2025 06:45 )             24.5         Mean Cell Volume : 88.1 fl  Mean Cell Hemoglobin : 30.2 pg  Mean Cell Hemoglobin Concentration : 34.3 g/dL  Auto Neutrophil # : x  Auto Lymphocyte # : x  Auto Monocyte # : x  Auto Eosinophil # : x  Auto Basophil # : x  Auto Neutrophil % : x  Auto Lymphocyte % : x  Auto Monocyte % : x  Auto Eosinophil % : x  Auto Basophil % : x      05-23    136  |  103  |  9   ----------------------------<  112[H]  3.8   |  20[L]  |  1.02    Ca    7.9[L]      23 May 2025 06:45  Phos  1.9     05-23  Mg     1.8     05-23    TPro  5.4[L]  /  Alb  3.0[L]  /  TBili  0.6  /  DBili  x   /  AST  59[H]  /  ALT  75[H]  /  AlkPhos  109  05-23          PT/INR - ( 23 May 2025 06:45 )   PT: 13.7 sec;   INR: 1.20 ratio         PTT - ( 23 May 2025 06:45 )  PTT:25.2 sec        RADIOLOGY & ADDITIONAL STUDIES:         Diagnosis: ASXL1-mutated and WZJ47-afiluwi AML    Protocol/Chemo Regimen: s/p 7+3:  Daunorubicin 60 mg/m2+ Cytarabine     Day: 23    Pt endorsed:   No O/N events.  +mouth pain, discomfort  +GUALLPA  +generalized weakness, fatigue    Review of Systems:   Denies any nausea, vomiting, chest pain, palpitation, SOB.    Pain scale:      Diet: regular Ensure plus HP BID                      Allergies: No Known Allergies    ANTIMICROBIALS  meropenem  IVPB 1000 milliGRAM(s) IV Intermittent every 8 hours  posaconazole DR Tablet 300 milliGRAM(s) Oral every 24 hours  posaconazole DR Tablet   Oral   valACYclovir 500 milliGRAM(s) Oral two times a day  vancomycin  IVPB 1000 milliGRAM(s) IV Intermittent every 12 hours    STANDING MEDICATIONS  allopurinol 100 milliGRAM(s) Oral daily  Biotene Dry Mouth Oral Rinse 15 milliLiter(s) Swish and Spit four times a day  chlorhexidine 4% Liquid 1 Application(s) Topical <User Schedule>  cholecalciferol 2000 Unit(s) Oral daily  dexAMETHasone    Solution 0.5 milliGRAM(s) Oral two times a day  diltiazem    milliGRAM(s) Oral daily  filgrastim-sndz (ZARXIO) Injectable 480 MICROGram(s) SubCutaneous daily  FIRST- Mouthwash  BLM 10 milliLiter(s) Swish and Swallow four times a day  HYDROmorphone  Injectable 0.5 milliGRAM(s) IV Push <User Schedule>  lidocaine   4% Patch 1 Patch Transdermal every 24 hours  lidocaine 2% Gel 1 Application(s) Topical every 8 hours  melatonin 5 milliGRAM(s) Oral at bedtime  midodrine. 10 milliGRAM(s) Oral every 8 hours  pantoprazole  Injectable 40 milliGRAM(s) IV Push every 12 hours  simethicone 80 milliGRAM(s) Chew <User Schedule>  tamsulosin 0.4 milliGRAM(s) Oral at bedtime    PRN MEDICATIONS  acetaminophen     Tablet .. 650 milliGRAM(s) Oral every 6 hours PRN  albuterol    90 MICROgram(s) HFA Inhaler 2 Puff(s) Inhalation every 6 hours PRN  aluminum hydroxide/magnesium hydroxide/simethicone Suspension 30 milliLiter(s) Oral every 6 hours PRN  calcium carbonate    500 mG (Tums) Chewable 1 Tablet(s) Chew four times a day PRN  cyclobenzaprine 10 milliGRAM(s) Oral three times a day PRN  hydrocortisone sodium succinate Injectable 100 milliGRAM(s) IV Push every 8 hours PRN  HYDROmorphone  Injectable 0.7 milliGRAM(s) IV Push every 4 hours PRN  HYDROmorphone  Injectable 1 milliGRAM(s) IV Push every 4 hours PRN  meperidine     Injectable 12.5 milliGRAM(s) IV Push once PRN  metoclopramide Injectable 10 milliGRAM(s) IV Push every 6 hours PRN  ondansetron Injectable 8 milliGRAM(s) IV Push every 8 hours PRN  polyethylene glycol 3350 17 Gram(s) Oral two times a day PRN  senna 2 Tablet(s) Oral two times a day PRN  sodium chloride 0.9% lock flush 10 milliLiter(s) IV Push every 1 hour PRN    Vital Signs Last 24 Hrs  T(C): 37.3 (24 May 2025 05:39), Max: 37.4 (23 May 2025 17:15)  T(F): 99.2 (24 May 2025 05:39), Max: 99.3 (23 May 2025 17:15)  HR: 80 (24 May 2025 05:39) (80 - 95)  BP: 115/67 (24 May 2025 05:39) (113/76 - 133/76)  BP(mean): --  RR: 18 (24 May 2025 05:39) (18 - 20)  SpO2: 94% (24 May 2025 05:39) (93% - 96%)    Parameters below as of 24 May 2025 05:39  Patient On (Oxygen Delivery Method): nasal cannula  O2 Flow (L/min): 2    PHYSICAL EXAM  General: NAD  HEENT: clear oropharynx, left tongue with superficial healing ulcers   Neck: supple  CV: (+) S1/S2 RRR  Lungs: positive air movement b/l ant lungs, clear to auscultation, no wheezes, no rales  Abdomen: soft, +BS,  non-tender, distended  Ext: no edema in BLE  Skin: no rashes   Neuro: alert and oriented X 4, no focal deficits  Central Line:  TLC CDI     RECENT CULTURES:  05-22 @ 23:23  Clean Catch Clean Catch (Midstream)  No growth    05-22 @ 23:22  Blood Blood-Peripheral  No growth at 24 hours    05-20 @ 19:00  Clean Catch Clean Catch (Midstream)  No growth    05-20 @ 10:06  Blood Blood-Peripheral  No growth at 72 Hours    05-20 @ 09:58  Blood Blood-Peripheral  No growth at 72 Hours    05-19 @ 10:16  Blood Blood-Catheter  No growth at 4 days  --  05-19 @ 06:50  Blood Blood-Peripheral  Blood Culture PCR  Blood Culture PCR  PCR    Growth in aerobic bottle: Staphylococcus haemolyticus  Isolation of Coagulase negative Staphylococcus from single blood culture  sets may represent  contamination. Contact the Microbiology Department at [phone] if  susceptibility testing is needed.  clinically indicated.  Direct identification is available within approximately 3-5  hours either by Blood Panel Multiplexed PCR or Direct  MALDI-TOF. Details: [email or web address]  --  05-18 @ 20:03  Clean Catch Clean Catch (Midstream)  No growth    LABS:                       8.0    11.50 )-----------( 33       ( 24 May 2025 07:12 )             23.0   Mean Cell Volume : 87.5 fl  Mean Cell Hemoglobin : 30.4 pg  Mean Cell Hemoglobin Concentration : 34.8 g/dL  Auto Neutrophil # : x  Auto Lymphocyte # : x  Auto Monocyte # : x  Auto Eosinophil # : x  Auto Basophil # : x  Auto Neutrophil % : x  Auto Lymphocyte % : x  Auto Monocyte % : x  Auto Eosinophil % : x  Auto Basophil % : x    05-24    139  |  102  |  8   ----------------------------<  112[H]  3.6   |  23  |  1.05    Ca    8.4      24 May 2025 07:12  Phos  2.4     05-24  Mg     1.8     05-24    TPro  5.2[L]  /  Alb  3.2[L]  /  TBili  0.6  /  DBili  x   /  AST  43[H]  /  ALT  68[H]  /  AlkPhos  108  05-24    Mg 1.8  Phos 2.4  PT/INR - ( 23 May 2025 06:45 )   PT: 13.7 sec;   INR: 1.20 ratio    PTT - ( 23 May 2025 06:45 )  PTT:25.2 sec  LDH 1292  Uric Acid 4.5    RADIOLOGY & ADDITIONAL STUDIES:  Xray Chest 1 View- PORTABLE-Urgent (Xray Chest 1 View- PORTABLE-Urgent .) (05.22.25 @ 22:44) >  No focal consolidation.

## 2025-05-24 NOTE — PROGRESS NOTE ADULT - ASSESSMENT
68 yo male with newly diagnosed ASXL1, DDX41 mutated AML, admitted for induction chemo with 7+3:  dauno 60 mg/m2+ cytarabine. 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 Chemo started on 5/2, Complicated by rigors, chills, fever, SOB  and tachycardia 2 hours into day 1 infusion - likely infusion reaction, neutropenic fever. Pt has pancytopenia d/t chemo and disease

## 2025-05-24 NOTE — PROGRESS NOTE ADULT - PROBLEM SELECTOR PLAN 10
5/22 Pt has intermittent abdominal pain / lower cp since yesterday, associated  mild palpitation, mild elevated trop 70, switched simethicone qid after meals , symptoms were evaluated with  decreased/resolved pain.  Senna/Miralax  PRN constipation

## 2025-05-24 NOTE — PROGRESS NOTE ADULT - NS ATTEND AMEND GEN_ALL_CORE FT
69M with ASXL1-mutated and FPW77-feedvyc AML. 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 Admitted for induction 7+3 - day 16.    Heme:   - Completed 7+3. Day 21. Complicated by rigors, chills, and tachycardia 2 hours into day 1 infusion - likely infusion reaction.  - Trend CBC with differential daily. Goal Hgb > 8 and plt > 20 given past GI bleeding.   - Trend DIC labs intermittently, as this has not been issue since admission. Continue transfusions as needed to maintain fibrinogen > 100.      - Allopurinol continued, but for previous diagnosis of gout decreased dose to 100 mg on 5/16.  S/p rasburicase; uric acid and LDH stable.   - Continue IVF. Monitor daily weights and I/O's. Diurese PRN.   - BMBx done day 15 (5/16) - subcortical, suboptimal specimen, no evidence of persistent leukemia. Flow 0.34% myeloblasts.  - in absence of overt evidence of persistent leukemia, and in setting of persistent fevers, started daily G-CSF 5/21  - ANC rising now, blasts ~12%. d/c G-CSF once ANC sustained > 1.0 and/or PB blasts increase (will check PB flow today).    GI:  - Bloody BM 5/14-16.  Plt goal > 30K now.  - c/o severe midepigastric pain 5/22 AM, resolved   - EKG w/o acute changes, troponin sl incr 70, then decr.  - amylase, lipase normal  - cont PPI 2x/day     Card:  - Orthostatic hypotension, likely residual from GI bleed and decreased PO intake. Poss autonomic component.  - Bolus 500 cc on 5/17 and 5/18.  Now on midodrine. BP stable.   - weight stable  - Encouraged incr PO intake.      ID:   - Neutropenic and febrile; fevers since 5/11, last temp 100.8 5/22 pm, Tmax 101     with ongoing GI symptoms, diarrhea. Cults (-) with recent CNS from blood likely contaminant in 1/4 bottles and VREC in urine    cleared in subsequent specimen.  - Cefepime (5/11-5/19 ). Persistent fever lead to changing to meropenem on 5/19.  Last temp 100.4 5/21.     - CT scan from 5/14 showing no acute CT findings suspicious for infection. Mentioned GGOs in the lungs bilaterally, but stable from a previous scan.   - Prophylaxis: posaconazole, valacyclovir   - Appreciate ID input - kept on vanco for now (fevers, mucositis)  - Mouth rinses, dexamethasone swish and spit for mucositis, which persists.  - demerol prn for rigors    MSK: Has chronic pain from prior surgeries   - Pain control: oxycodone    TLC in place  OOB. 69M with ASXL1-mutated and WHX40-hldxfsz AML. 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 Admitted for induction 7+3.    Heme:   - Completed 7+3. Day 23. Complicated by rigors, chills, and tachycardia 2 hours into day 1 infusion - likely infusion reaction.  - Trend CBC with differential daily. Goal Hgb > 8 and plt > 20 given past GI bleeding.   - Trend DIC labs intermittently, as this has not been issue since admission. Continue transfusions as needed to maintain fibrinogen > 100.      - Allopurinol continued, but for previous diagnosis of gout decreased dose to 100 mg on 5/16.  S/p rasburicase; uric acid and LDH stable.   - Continue IVF. Monitor daily weights and I/O's. Diurese PRN.   - BMBx done day 15 (5/16) - subcortical, suboptimal specimen, no evidence of persistent leukemia. Flow 0.34% myeloblasts.  - in absence of overt evidence of persistent leukemia, and in setting of persistent fevers, started daily G-CSF 5/21  - ANC rising now, blasts ~12%. d/c G-CSF once ANC sustained > 1.0 and/or PB blasts increase - d/c'd today 5/24    GI:  - Bloody BM 5/14-16.  Plt goal > 30K now.  - c/o severe midepigastric pain 5/22 AM, resolved   - EKG w/o acute changes, troponin sl incr 70, then decr.  - amylase, lipase normal  - cont PPI 2x/day     Card:  - Orthostatic hypotension, likely residual from GI bleed and decreased PO intake. Poss autonomic component.  - Bolus 500 cc on 5/17 and 5/18.  Now on midodrine. BP stable.   - weight stable  - Encouraged incr PO intake.      ID:   - Neutropenic and febrile; fevers since 5/11, last temp 100.8 5/22 pm, Tmax 101     with ongoing GI symptoms, diarrhea. Cults (-) with recent CNS from blood likely contaminant in 1/4 bottles and VREC in urine    cleared in subsequent specimen.  - Cefepime (5/11-5/19 ). Persistent fever lead to changing to meropenem on 5/19.  Last temp 100.4 5/21.     - CT scan from 5/14 showing no acute CT findings suspicious for infection. Mentioned GGOs in the lungs bilaterally, but stable from a previous scan.   - Prophylaxis: posaconazole, valacyclovir   - Appreciate ID input - kept on vanco for now (fevers, mucositis)  - Mouth rinses, dexamethasone swish and spit for mucositis, which persists.  - demerol prn for rigors    MSK: Has chronic pain from prior surgeries   - Pain control: oxycodone    TLC in place  OOB.

## 2025-05-24 NOTE — PROGRESS NOTE ADULT - PROBLEM SELECTOR PLAN 1
new ASXL1-mutated and HOJ99-jwbvxhr AML.  admitted for induction chemo with dauno 60 mg/m2+ cytarabine. Chemo started on 5/2, Complicated by rigors, chills, fever, SOB  and tachycardia 2 hours into day 1 infusion - likely infusion. received pepcid/benadryl/solumedrol/tylenol iv. will make up D1 Cytarabine on day8    will proceed with day2 chemo with pre mdes Tylenol and Benadryl, hydrocortisone iv PRN reaction   Hepatitis screen/HIV(-) as outpatient  IVF, antiemetics, mouth care. monitor weight, I & O, diuresis PRN   Monitor CBC with diff QD, TLS labs BID, Coags & T & C TIW transfuse for hb<7, PLT <30  5/5 HLA sent  5/16 BMbx subcortical, suboptimal specimen, no evidence of persistent leukemia. Flow 0.34% myeloblasts.  5/23 PB flow sent, fu result  5/19: no longer having blood in stool for several days, check cbc daily, plt goal decreased to >30  5/23  hypokalemia: KCL 20 meq po x1, hypomagnesemia: Mg 1g iVPB x1, hypophosphatemia: sodium phos 30m Mmol IVPB x1 new ASXL1-mutated and AHB25-caxxdor AML.  admitted for induction chemo with dauno 60 mg/m2+ cytarabine. Chemo started on 5/2, Complicated by rigors, chills, fever, SOB  and tachycardia 2 hours into day 1 infusion - likely infusion. received pepcid/benadryl/solumedrol/tylenol iv. will make up D1 Cytarabine on day8    will proceed with day2 chemo with pre mdes Tylenol and Benadryl, hydrocortisone iv PRN reaction   Hepatitis screen/HIV(-) as outpatient  IVF, antiemetics, mouth care. monitor weight, I & O, diuresis PRN   Monitor CBC with diff QD, TLS labs BID, Coags & T & C TIW transfuse for hb<7, PLT <30  5/5 HLA sent  5/16 BMbx subcortical, suboptimal specimen, no evidence of persistent leukemia. Flow 0.34% myeloblasts.  5/23 PB flow sent, fu result  5/19: no longer having blood in stool for several days, check cbc daily, plt goal decreased to >30  5/24- Hypophosphatemia - Replete phos.  5/24- WBC - 11.5, D/C Zarxio.

## 2025-05-25 LAB
ALBUMIN SERPL ELPH-MCNC: 3.2 G/DL — LOW (ref 3.3–5)
ALP SERPL-CCNC: 112 U/L — SIGNIFICANT CHANGE UP (ref 40–120)
ALT FLD-CCNC: 66 U/L — HIGH (ref 10–45)
ANION GAP SERPL CALC-SCNC: 13 MMOL/L — SIGNIFICANT CHANGE UP (ref 5–17)
AST SERPL-CCNC: 36 U/L — SIGNIFICANT CHANGE UP (ref 10–40)
BASOPHILS # BLD AUTO: 0 K/UL — SIGNIFICANT CHANGE UP (ref 0–0.2)
BASOPHILS NFR BLD AUTO: 0 % — SIGNIFICANT CHANGE UP (ref 0–2)
BILIRUB SERPL-MCNC: 0.6 MG/DL — SIGNIFICANT CHANGE UP (ref 0.2–1.2)
BLASTS # FLD: 1.8 % — HIGH (ref 0–0)
BLASTS NFR BLD: 1.8 % — HIGH (ref 0–0)
BUN SERPL-MCNC: 10 MG/DL — SIGNIFICANT CHANGE UP (ref 7–23)
CALCIUM SERPL-MCNC: 8.5 MG/DL — SIGNIFICANT CHANGE UP (ref 8.4–10.5)
CHLORIDE SERPL-SCNC: 103 MMOL/L — SIGNIFICANT CHANGE UP (ref 96–108)
CO2 SERPL-SCNC: 24 MMOL/L — SIGNIFICANT CHANGE UP (ref 22–31)
CREAT SERPL-MCNC: 1.06 MG/DL — SIGNIFICANT CHANGE UP (ref 0.5–1.3)
CULTURE RESULTS: SIGNIFICANT CHANGE UP
CULTURE RESULTS: SIGNIFICANT CHANGE UP
EGFR: 76 ML/MIN/1.73M2 — SIGNIFICANT CHANGE UP
EGFR: 76 ML/MIN/1.73M2 — SIGNIFICANT CHANGE UP
EOSINOPHIL # BLD AUTO: 0 K/UL — SIGNIFICANT CHANGE UP (ref 0–0.5)
EOSINOPHIL NFR BLD AUTO: 0 % — SIGNIFICANT CHANGE UP (ref 0–6)
GIANT PLATELETS BLD QL SMEAR: PRESENT — SIGNIFICANT CHANGE UP
GLUCOSE SERPL-MCNC: 115 MG/DL — HIGH (ref 70–99)
HCT VFR BLD CALC: 23.5 % — LOW (ref 39–50)
HGB BLD-MCNC: 8 G/DL — LOW (ref 13–17)
LDH SERPL L TO P-CCNC: 1249 U/L — HIGH (ref 50–242)
LYMPHOCYTES # BLD AUTO: 1.79 K/UL — SIGNIFICANT CHANGE UP (ref 1–3.3)
LYMPHOCYTES # BLD AUTO: 11.6 % — LOW (ref 13–44)
MAGNESIUM SERPL-MCNC: 2 MG/DL — SIGNIFICANT CHANGE UP (ref 1.6–2.6)
MANUAL SMEAR VERIFICATION: SIGNIFICANT CHANGE UP
MCHC RBC-ENTMCNC: 30.1 PG — SIGNIFICANT CHANGE UP (ref 27–34)
MCHC RBC-ENTMCNC: 34 G/DL — SIGNIFICANT CHANGE UP (ref 32–36)
MCV RBC AUTO: 88.3 FL — SIGNIFICANT CHANGE UP (ref 80–100)
METAMYELOCYTES # FLD: 5.4 % — HIGH (ref 0–0)
METAMYELOCYTES NFR BLD: 5.4 % — HIGH (ref 0–0)
MONOCYTES # BLD AUTO: 2.88 K/UL — HIGH (ref 0–0.9)
MONOCYTES NFR BLD AUTO: 18.7 % — HIGH (ref 2–14)
MYELOCYTES NFR BLD: 8 % — HIGH (ref 0–0)
NEUTROPHILS # BLD AUTO: 6.75 K/UL — SIGNIFICANT CHANGE UP (ref 1.8–7.4)
NEUTROPHILS NFR BLD AUTO: 38.4 % — LOW (ref 43–77)
NEUTS BAND # BLD: 5.4 % — SIGNIFICANT CHANGE UP (ref 0–8)
NEUTS BAND NFR BLD: 5.4 % — SIGNIFICANT CHANGE UP (ref 0–8)
PHOSPHATE SERPL-MCNC: 3.1 MG/DL — SIGNIFICANT CHANGE UP (ref 2.5–4.5)
PLAT MORPH BLD: ABNORMAL
PLATELET # BLD AUTO: 38 K/UL — LOW (ref 150–400)
POTASSIUM SERPL-MCNC: 3.5 MMOL/L — SIGNIFICANT CHANGE UP (ref 3.5–5.3)
POTASSIUM SERPL-SCNC: 3.5 MMOL/L — SIGNIFICANT CHANGE UP (ref 3.5–5.3)
PROMYELOCYTES # FLD: 10.7 % — HIGH (ref 0–0)
PROMYELOCYTES NFR BLD: 10.7 % — HIGH (ref 0–0)
PROT SERPL-MCNC: 5.2 G/DL — LOW (ref 6–8.3)
RBC # BLD: 2.66 M/UL — LOW (ref 4.2–5.8)
RBC # FLD: 14 % — SIGNIFICANT CHANGE UP (ref 10.3–14.5)
RBC BLD AUTO: SIGNIFICANT CHANGE UP
SODIUM SERPL-SCNC: 140 MMOL/L — SIGNIFICANT CHANGE UP (ref 135–145)
SPECIMEN SOURCE: SIGNIFICANT CHANGE UP
SPECIMEN SOURCE: SIGNIFICANT CHANGE UP
TOXIC GRANULES BLD QL SMEAR: PRESENT — SIGNIFICANT CHANGE UP
URATE SERPL-MCNC: 4.8 MG/DL — SIGNIFICANT CHANGE UP (ref 3.4–8.8)
WBC # BLD: 15.4 K/UL — HIGH (ref 3.8–10.5)
WBC # FLD AUTO: 15.4 K/UL — HIGH (ref 3.8–10.5)

## 2025-05-25 PROCEDURE — 99233 SBSQ HOSP IP/OBS HIGH 50: CPT

## 2025-05-25 RX ADMIN — TAMSULOSIN HYDROCHLORIDE 0.4 MILLIGRAM(S): 0.4 CAPSULE ORAL at 21:17

## 2025-05-25 RX ADMIN — Medication 0.5 MILLIGRAM(S): at 13:29

## 2025-05-25 RX ADMIN — Medication 1 TABLET(S): at 11:43

## 2025-05-25 RX ADMIN — Medication 80 MILLIGRAM(S): at 09:03

## 2025-05-25 RX ADMIN — MEROPENEM 100 MILLIGRAM(S): 1 INJECTION INTRAVENOUS at 05:50

## 2025-05-25 RX ADMIN — MEROPENEM 100 MILLIGRAM(S): 1 INJECTION INTRAVENOUS at 13:28

## 2025-05-25 RX ADMIN — Medication 15 MILLILITER(S): at 05:50

## 2025-05-25 RX ADMIN — Medication 0.5 MILLIGRAM(S): at 17:05

## 2025-05-25 RX ADMIN — Medication 0.7 MILLIGRAM(S): at 00:57

## 2025-05-25 RX ADMIN — Medication 80 MILLIGRAM(S): at 21:16

## 2025-05-25 RX ADMIN — DIPHENHYDRAMINE HYDROCHLORIDE AND LIDOCAINE HYDROCHLORIDE AND ALUMINUM HYDROXIDE AND MAGNESIUM HYDRO 10 MILLILITER(S): KIT at 05:57

## 2025-05-25 RX ADMIN — DEXAMETHASONE 0.5 MILLIGRAM(S): 0.5 TABLET ORAL at 06:00

## 2025-05-25 RX ADMIN — DIPHENHYDRAMINE HYDROCHLORIDE AND LIDOCAINE HYDROCHLORIDE AND ALUMINUM HYDROXIDE AND MAGNESIUM HYDRO 10 MILLILITER(S): KIT at 23:19

## 2025-05-25 RX ADMIN — Medication 15 MILLILITER(S): at 11:42

## 2025-05-25 RX ADMIN — Medication 15 MILLILITER(S): at 00:26

## 2025-05-25 RX ADMIN — Medication 250 MILLIGRAM(S): at 05:50

## 2025-05-25 RX ADMIN — Medication 500 MILLIGRAM(S): at 05:58

## 2025-05-25 RX ADMIN — Medication 5 MILLIGRAM(S): at 21:16

## 2025-05-25 RX ADMIN — Medication 0.7 MILLIGRAM(S): at 21:15

## 2025-05-25 RX ADMIN — Medication 15 MILLILITER(S): at 17:06

## 2025-05-25 RX ADMIN — Medication 1 TABLET(S): at 17:05

## 2025-05-25 RX ADMIN — DILTIAZEM HYDROCHLORIDE 240 MILLIGRAM(S): 240 TABLET, EXTENDED RELEASE ORAL at 06:00

## 2025-05-25 RX ADMIN — Medication 1 APPLICATION(S): at 09:02

## 2025-05-25 RX ADMIN — DIPHENHYDRAMINE HYDROCHLORIDE AND LIDOCAINE HYDROCHLORIDE AND ALUMINUM HYDROXIDE AND MAGNESIUM HYDRO 10 MILLILITER(S): KIT at 11:43

## 2025-05-25 RX ADMIN — Medication 0.7 MILLIGRAM(S): at 01:51

## 2025-05-25 RX ADMIN — Medication 15 MILLILITER(S): at 23:19

## 2025-05-25 RX ADMIN — Medication 0.5 MILLIGRAM(S): at 14:29

## 2025-05-25 RX ADMIN — Medication 0.5 MILLIGRAM(S): at 18:05

## 2025-05-25 RX ADMIN — Medication 80 MILLIGRAM(S): at 17:08

## 2025-05-25 RX ADMIN — Medication 0.5 MILLIGRAM(S): at 10:02

## 2025-05-25 RX ADMIN — Medication 80 MILLIGRAM(S): at 13:28

## 2025-05-25 RX ADMIN — Medication 2000 UNIT(S): at 11:43

## 2025-05-25 RX ADMIN — DIPHENHYDRAMINE HYDROCHLORIDE AND LIDOCAINE HYDROCHLORIDE AND ALUMINUM HYDROXIDE AND MAGNESIUM HYDRO 10 MILLILITER(S): KIT at 00:26

## 2025-05-25 RX ADMIN — Medication 40 MILLIGRAM(S): at 05:58

## 2025-05-25 RX ADMIN — MEROPENEM 100 MILLIGRAM(S): 1 INJECTION INTRAVENOUS at 21:16

## 2025-05-25 RX ADMIN — Medication 500 MILLIGRAM(S): at 17:06

## 2025-05-25 RX ADMIN — Medication 100 MILLIGRAM(S): at 11:43

## 2025-05-25 RX ADMIN — DIPHENHYDRAMINE HYDROCHLORIDE AND LIDOCAINE HYDROCHLORIDE AND ALUMINUM HYDROXIDE AND MAGNESIUM HYDRO 10 MILLILITER(S): KIT at 17:07

## 2025-05-25 RX ADMIN — POSACONAZOLE 300 MILLIGRAM(S): 100 TABLET, DELAYED RELEASE ORAL at 17:07

## 2025-05-25 RX ADMIN — LIDOCAINE HYDROCHLORIDE 1 APPLICATION(S): 20 JELLY TOPICAL at 13:29

## 2025-05-25 RX ADMIN — LIDOCAINE HYDROCHLORIDE 1 APPLICATION(S): 20 JELLY TOPICAL at 05:58

## 2025-05-25 RX ADMIN — LIDOCAINE HYDROCHLORIDE 1 APPLICATION(S): 20 JELLY TOPICAL at 21:17

## 2025-05-25 RX ADMIN — Medication 0.5 MILLIGRAM(S): at 09:02

## 2025-05-25 RX ADMIN — DEXAMETHASONE 0.5 MILLIGRAM(S): 0.5 TABLET ORAL at 18:19

## 2025-05-25 RX ADMIN — Medication 1 TABLET(S): at 09:03

## 2025-05-25 RX ADMIN — Medication 40 MILLIGRAM(S): at 17:07

## 2025-05-25 RX ADMIN — Medication 0.7 MILLIGRAM(S): at 22:13

## 2025-05-25 NOTE — PROGRESS NOTE ADULT - NS ATTEND AMEND GEN_ALL_CORE FT
69M with ASXL1-mutated and BKV80-yngnofh AML. 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 Admitted for induction 7+3.    Heme:   - Completed 7+3. Day 23. Complicated by rigors, chills, and tachycardia 2 hours into day 1 infusion - likely infusion reaction.  - Trend CBC with differential daily. Goal Hgb > 8 and plt > 20 given past GI bleeding.   - Trend DIC labs intermittently, as this has not been issue since admission. Continue transfusions as needed to maintain fibrinogen > 100.      - Allopurinol continued, but for previous diagnosis of gout decreased dose to 100 mg on 5/16.  S/p rasburicase; uric acid and LDH stable.   - Continue IVF. Monitor daily weights and I/O's. Diurese PRN.   - BMBx done day 15 (5/16) - subcortical, suboptimal specimen, no evidence of persistent leukemia. Flow 0.34% myeloblasts.  - in absence of overt evidence of persistent leukemia, and in setting of persistent fevers, started daily G-CSF 5/21  - ANC rising now, blasts ~12%. d/c G-CSF once ANC sustained > 1.0 and/or PB blasts increase - d/c'd today 5/24    GI:  - Bloody BM 5/14-16.  Plt goal > 30K now.  - c/o severe midepigastric pain 5/22 AM, resolved   - EKG w/o acute changes, troponin sl incr 70, then decr.  - amylase, lipase normal  - cont PPI 2x/day     Card:  - Orthostatic hypotension, likely residual from GI bleed and decreased PO intake. Poss autonomic component.  - Bolus 500 cc on 5/17 and 5/18.  Now on midodrine. BP stable.   - weight stable  - Encouraged incr PO intake.      ID:   - Neutropenic and febrile; fevers since 5/11, last temp 100.8 5/22 pm, Tmax 101     with ongoing GI symptoms, diarrhea. Cults (-) with recent CNS from blood likely contaminant in 1/4 bottles and VREC in urine    cleared in subsequent specimen.  - Cefepime (5/11-5/19 ). Persistent fever lead to changing to meropenem on 5/19.  Last temp 100.4 5/21.     - CT scan from 5/14 showing no acute CT findings suspicious for infection. Mentioned GGOs in the lungs bilaterally, but stable from a previous scan.   - Prophylaxis: posaconazole, valacyclovir   - Appreciate ID input - kept on vanco for now (fevers, mucositis)  - Mouth rinses, dexamethasone swish and spit for mucositis, which persists.  - demerol prn for rigors    MSK: Has chronic pain from prior surgeries   - Pain control: oxycodone    TLC in place  OOB. 69M with ASXL1-mutated and DUT27-wqpkche AML. 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 Admitted for induction 7+3.    Heme:   - Completed 7+3. Day 24. Complicated by rigors, chills, and tachycardia 2 hours into day 1 infusion - likely infusion reaction.  - Trend CBC with differential daily. Goal Hgb > 8 and plt > 20 given past GI bleeding.   - Trend DIC labs intermittently, as this has not been issue since admission. Continue transfusions as needed to maintain fibrinogen > 100.      - Allopurinol continued, but for previous diagnosis of gout decreased dose to 100 mg on 5/16.  S/p rasburicase; uric acid and LDH stable.   - Continue IVF. Monitor daily weights and I/O's. Diurese PRN.   - BMBx done day 15 (5/16) - subcortical, suboptimal specimen, no evidence of persistent leukemia. Flow 0.34% myeloblasts.  - in absence of overt evidence of persistent leukemia, and in setting of persistent fevers, started daily G-CSF 5/21  - ANC rising now, blasts ~12%. d/c G-CSF once ANC sustained > 1.0 and/or PB blasts increase - d/c'd 5/24    GI:  - Bloody BM 5/14-16.  Plt goal > 30K now.  - c/o severe midepigastric pain 5/22 AM, resolved   - EKG w/o acute changes, troponin sl incr 70, then decr.  - amylase, lipase normal  - cont PPI 2x/day     Card:  - Orthostatic hypotension, likely residual from GI bleed and decreased PO intake. Poss autonomic component.  - Bolus 500 cc on 5/17 and 5/18.  Now on midodrine. BP stable.   - weight stable  - Encouraged incr PO intake.      ID:   - Neutropenic and febrile; fevers since 5/11, last temp 100.8 5/22 pm, Tmax 101     with ongoing GI symptoms, diarrhea. Cults (-) with recent CNS from blood likely contaminant in 1/4 bottles and VREC in urine    cleared in subsequent specimen.  - Cefepime (5/11-5/19 ). Persistent fever lead to changing to meropenem on 5/19.  Last temp 100.4 5/21.     - CT scan from 5/14 showing no acute CT findings suspicious for infection. Mentioned GGOs in the lungs bilaterally, but stable from a previous scan.   - Prophylaxis: posaconazole, valacyclovir   - Appreciate ID input - kept on vanco for now (fevers, mucositis), will d/c vanco today  - Mouth rinses, dexamethasone swish and spit for mucositis, which persists.  - demerol prn for rigors    MSK: Has chronic pain from prior surgeries   - Pain control: oxycodone    TLC in place  OOB.

## 2025-05-25 NOTE — PROGRESS NOTE ADULT - SUBJECTIVE AND OBJECTIVE BOX
Diagnosis: ASXL1-mutated and [id number]-hhntluw AML    Protocol/Chemo Regimen: s/p 7+3:  Daunorubicin 60 mg/m2+ Cytarabine     Day: 24    Pt endorsed:   No O/N events.  +mouth pain, discomfort  +GUALLPA  +generalized weakness, fatigue    Review of Systems:   Denies any nausea, vomiting, chest pain, palpitation, SOB.    Pain scale:      Diet: regular Ensure plus HP BID                      Allergies: No Known Allergies    ------------------------               Diagnosis: ASXL1-mutated and SQL00-acamosl AML    Protocol/Chemo Regimen: s/p 7+3:  Daunorubicin 60 mg/m2+ Cytarabine     Day: 24    Pt endorsed: +mouth pain, discomfort, +GUALLPA, +generalized weakness, fatigue    Review of Systems: Denies any nausea, vomiting, chest pain, palpitation, SOB.    Pain scale:      Diet: regular Ensure plus HP BID                      Allergies: No Known Allergies    ANTIMICROBIALS  meropenem  IVPB 1000 milliGRAM(s) IV Intermittent every 8 hours  posaconazole DR Tablet 300 milliGRAM(s) Oral every 24 hours  valACYclovir 500 milliGRAM(s) Oral two times a day    STANDING MEDICATIONS  allopurinol 100 milliGRAM(s) Oral daily  Biotene Dry Mouth Oral Rinse 15 milliLiter(s) Swish and Spit four times a day  chlorhexidine 4% Liquid 1 Application(s) Topical <User Schedule>  cholecalciferol 2000 Unit(s) Oral daily  dexAMETHasone    Solution 0.5 milliGRAM(s) Oral two times a day  diltiazem    milliGRAM(s) Oral daily  FIRST- Mouthwash  BLM 10 milliLiter(s) Swish and Swallow four times a day  HYDROmorphone  Injectable 0.5 milliGRAM(s) IV Push <User Schedule>  lidocaine   4% Patch 1 Patch Transdermal every 24 hours  lidocaine 2% Gel 1 Application(s) Topical every 8 hours  melatonin 5 milliGRAM(s) Oral at bedtime  midodrine. 10 milliGRAM(s) Oral every 8 hours  pantoprazole  Injectable 40 milliGRAM(s) IV Push every 12 hours  potassium phosphate / sodium phosphate Tablet (K-PHOS No. 2) 1 Tablet(s) Oral four times a day with meals  simethicone 80 milliGRAM(s) Chew <User Schedule>  tamsulosin 0.4 milliGRAM(s) Oral at bedtime    PRN MEDICATIONS  acetaminophen     Tablet .. 650 milliGRAM(s) Oral every 6 hours PRN  albuterol    90 MICROgram(s) HFA Inhaler 2 Puff(s) Inhalation every 6 hours PRN  aluminum hydroxide/magnesium hydroxide/simethicone Suspension 30 milliLiter(s) Oral every 6 hours PRN  calcium carbonate    500 mG (Tums) Chewable 1 Tablet(s) Chew four times a day PRN  cyclobenzaprine 10 milliGRAM(s) Oral three times a day PRN  hydrocortisone sodium succinate Injectable 100 milliGRAM(s) IV Push every 8 hours PRN  HYDROmorphone  Injectable 0.7 milliGRAM(s) IV Push every 4 hours PRN  HYDROmorphone  Injectable 1 milliGRAM(s) IV Push every 4 hours PRN  meperidine     Injectable 12.5 milliGRAM(s) IV Push once PRN  metoclopramide Injectable 10 milliGRAM(s) IV Push every 6 hours PRN  ondansetron Injectable 8 milliGRAM(s) IV Push every 8 hours PRN  polyethylene glycol 3350 17 Gram(s) Oral two times a day PRN  senna 2 Tablet(s) Oral two times a day PRN  sodium chloride 0.9% lock flush 10 milliLiter(s) IV Push every 1 hour PRN    Vital Signs Last 24 Hrs  T(C): 36.6 (25 May 2025 09:43), Max: 37.1 (24 May 2025 17:00)  T(F): 97.9 (25 May 2025 09:43), Max: 98.7 (24 May 2025 17:00)  HR: 91 (25 May 2025 09:43) (67 - 91)  BP: 110/67 (25 May 2025 09:43) (109/68 - 127/74)  BP(mean): --  RR: 18 (25 May 2025 09:43) (17 - 18)  SpO2: 93% (25 May 2025 09:43) (92% - 95%)    Parameters below as of 25 May 2025 09:43  Patient On (Oxygen Delivery Method): room air    PHYSICAL EXAM  General: adult in NAD  HEENT: clear oropharynx, no erythema, no ulcers  Neck: supple  CV: normal S1, S2, RRR  Lungs: clear to auscultation, no wheezes, no rales  Abdomen: soft, nontender, nondistended, normal BS  Ext: no edema  Skin: no rash  Neuro: alert and oriented x 3  Central line: normal     LABS:                        8.0    15.40 )-----------( 38       ( 25 May 2025 06:47 )             23.5     Mean Cell Volume : 88.3 fl  Mean Cell Hemoglobin : 30.1 pg  Mean Cell Hemoglobin Concentration : 34.0 g/dL  Auto Neutrophil # : x  Auto Lymphocyte # : x  Auto Monocyte # : x  Auto Eosinophil # : x  Auto Basophil # : x  Auto Neutrophil % : x  Auto Lymphocyte % : x  Auto Monocyte % : x  Auto Eosinophil % : x  Auto Basophil % : x    05-25    140  |  103  |  10  ----------------------------<  115[H]  3.5   |  24  |  1.06    Ca    8.5      25 May 2025 06:46  Phos  3.1     05-25  Mg     2.0     05-25    TPro  5.2[L]  /  Alb  3.2[L]  /  TBili  0.6  /  DBili  x   /  AST  36  /  ALT  66[H]  /  AlkPhos  112  05-25    Mg 2.0  Phos 3.1    LDH 1249  Uric Acid 4.8             Diagnosis: ASXL1-mutated and HSX00-petgrfg AML    Protocol/Chemo Regimen: s/p 7+3:  Daunorubicin 60 mg/m2+ Cytarabine     Day: 24    Pt endorsed: +mouth pain, discomfort, +generalized weakness, fatigue    Review of Systems: Denies any nausea, vomiting, chest pain, palpitation, SOB.    Pain scale:      Diet: regular Ensure plus HP BID                      Allergies: No Known Allergies    ANTIMICROBIALS  meropenem  IVPB 1000 milliGRAM(s) IV Intermittent every 8 hours  posaconazole DR Tablet 300 milliGRAM(s) Oral every 24 hours  valACYclovir 500 milliGRAM(s) Oral two times a day    STANDING MEDICATIONS  allopurinol 100 milliGRAM(s) Oral daily  Biotene Dry Mouth Oral Rinse 15 milliLiter(s) Swish and Spit four times a day  chlorhexidine 4% Liquid 1 Application(s) Topical <User Schedule>  cholecalciferol 2000 Unit(s) Oral daily  dexAMETHasone    Solution 0.5 milliGRAM(s) Oral two times a day  diltiazem    milliGRAM(s) Oral daily  FIRST- Mouthwash  BLM 10 milliLiter(s) Swish and Swallow four times a day  HYDROmorphone  Injectable 0.5 milliGRAM(s) IV Push <User Schedule>  lidocaine   4% Patch 1 Patch Transdermal every 24 hours  lidocaine 2% Gel 1 Application(s) Topical every 8 hours  melatonin 5 milliGRAM(s) Oral at bedtime  midodrine. 10 milliGRAM(s) Oral every 8 hours  pantoprazole  Injectable 40 milliGRAM(s) IV Push every 12 hours  potassium phosphate / sodium phosphate Tablet (K-PHOS No. 2) 1 Tablet(s) Oral four times a day with meals  simethicone 80 milliGRAM(s) Chew <User Schedule>  tamsulosin 0.4 milliGRAM(s) Oral at bedtime    PRN MEDICATIONS  acetaminophen     Tablet .. 650 milliGRAM(s) Oral every 6 hours PRN  albuterol    90 MICROgram(s) HFA Inhaler 2 Puff(s) Inhalation every 6 hours PRN  aluminum hydroxide/magnesium hydroxide/simethicone Suspension 30 milliLiter(s) Oral every 6 hours PRN  calcium carbonate    500 mG (Tums) Chewable 1 Tablet(s) Chew four times a day PRN  cyclobenzaprine 10 milliGRAM(s) Oral three times a day PRN  hydrocortisone sodium succinate Injectable 100 milliGRAM(s) IV Push every 8 hours PRN  HYDROmorphone  Injectable 0.7 milliGRAM(s) IV Push every 4 hours PRN  HYDROmorphone  Injectable 1 milliGRAM(s) IV Push every 4 hours PRN  meperidine     Injectable 12.5 milliGRAM(s) IV Push once PRN  metoclopramide Injectable 10 milliGRAM(s) IV Push every 6 hours PRN  ondansetron Injectable 8 milliGRAM(s) IV Push every 8 hours PRN  polyethylene glycol 3350 17 Gram(s) Oral two times a day PRN  senna 2 Tablet(s) Oral two times a day PRN  sodium chloride 0.9% lock flush 10 milliLiter(s) IV Push every 1 hour PRN    Vital Signs Last 24 Hrs  T(C): 36.6 (25 May 2025 09:43), Max: 37.1 (24 May 2025 17:00)  T(F): 97.9 (25 May 2025 09:43), Max: 98.7 (24 May 2025 17:00)  HR: 91 (25 May 2025 09:43) (67 - 91)  BP: 110/67 (25 May 2025 09:43) (109/68 - 127/74)  BP(mean): --  RR: 18 (25 May 2025 09:43) (17 - 18)  SpO2: 93% (25 May 2025 09:43) (92% - 95%)    Parameters below as of 25 May 2025 09:43  Patient On (Oxygen Delivery Method): room air    PHYSICAL EXAM  General: adult in NAD  HEENT: clear oropharynx, no erythema, no ulcers  Neck: supple  CV: normal S1, S2, RRR  Lungs: clear to auscultation, no wheezes, no rales  Abdomen: soft, nontender, nondistended, normal BS  Ext: no edema  Skin: no rash  Neuro: alert and oriented x 3  Central line: normal     LABS:                        8.0    15.40 )-----------( 38       ( 25 May 2025 06:47 )             23.5     Mean Cell Volume : 88.3 fl  Mean Cell Hemoglobin : 30.1 pg  Mean Cell Hemoglobin Concentration : 34.0 g/dL  Auto Neutrophil # : x  Auto Lymphocyte # : x  Auto Monocyte # : x  Auto Eosinophil # : x  Auto Basophil # : x  Auto Neutrophil % : x  Auto Lymphocyte % : x  Auto Monocyte % : x  Auto Eosinophil % : x  Auto Basophil % : x    05-25    140  |  103  |  10  ----------------------------<  115[H]  3.5   |  24  |  1.06    Ca    8.5      25 May 2025 06:46  Phos  3.1     05-25  Mg     2.0     05-25    TPro  5.2[L]  /  Alb  3.2[L]  /  TBili  0.6  /  DBili  x   /  AST  36  /  ALT  66[H]  /  AlkPhos  112  05-25    Mg 2.0  Phos 3.1    LDH 1249  Uric Acid 4.8

## 2025-05-25 NOTE — PROGRESS NOTE ADULT - PROBLEM SELECTOR PLAN 2
neutropenic, febrile   continue posa, valtrex, flagyl (dc'd 5/19), cefepime (5/11-5/19 ), cont to be febrile Ucx from 5/17 (+) gram + cocci in pairs and chains, inc to meropenem (5/19-   5/2 CXR Patchy opacities along the left lung base, possibly atelectasis, however superimposed infection cannot be entirely excluded.  5/13- FLU/COVID/RSV (-)  5/13- Added Flagyl 500 mg IV Q 8 hrs due to GI discomfort/pain  5/13- CT C/A/P -negative for infxn  5/20  blood cx 5/19 showed 1/4 MRSE  urine cx 5/17 VRE but 5/18 negative and pt asymptomatic  5/22  dry cough , RVP panel pending   - Levaquin/Amox- 5/2- 5/11  - Cefepime- 5/11- 5/19  Flagyl (abd discomfort)- 5/13- 5/19  - Meropenem- 5/19  - Valtrex-Posaconazole  5/2 5/20 O2 sat 88% with fever, FU closely, O2 PRN neutropenic, febrile   continue posa, valtrex, flagyl (dc'd 5/19), cefepime (5/11-5/19 ), cont to be febrile Ucx from 5/17 (+) gram + cocci in pairs and chains, inc to meropenem (5/19-   5/2 CXR Patchy opacities along the left lung base, possibly atelectasis, however superimposed infection cannot be entirely excluded.  5/13- FLU/COVID/RSV (-)  5/13- Added Flagyl 500 mg IV Q 8 hrs due to GI discomfort/pain  5/13- CT C/A/P -negative for infxn  5/20  blood cx 5/19 showed 1/4 MRSE  urine cx 5/17 VRE but 5/18 negative and pt asymptomatic  5/22  dry cough , RVP panel pending   - Levaquin/Amox- 5/2- 5/11  - Cefepime- 5/11- 5/19  Flagyl (abd discomfort)- 5/13- 5/19  - Meropenem- 5/19  - Valtrex-Posaconazole  5/2 5/20 O2 sat 88% with fever, FU closely, O2 PRN  5/25 Discontinue vancomycin

## 2025-05-25 NOTE — PROGRESS NOTE ADULT - ASSESSMENT
70 yo male with newly diagnosed ASXL1, DDX41 mutated AML, admitted for induction chemo with 7+3:  dauno 60 mg/m2+ cytarabine. 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 Chemo started on 5/2, Complicated by rigors, chills, fever, SOB  and tachycardia 2 hours into day 1 infusion - likely infusion reaction, neutropenic fever. Pt has pancytopenia d/t chemo and disease

## 2025-05-25 NOTE — CHART NOTE - NSCHARTNOTEFT_GEN_A_CORE
Chart reviewed. Over the past 24 hours, patient required 1mg IV dilaudid x2, 0.7mg IV dilaudid x1.     Assessment:   69M with HTN, HLD, Gout, GERD, BPH, spinal stenosis (s/p multiple procedures 2018, 2020 laminectomies / fusions), hip surgeries (Oct 2024, Jan 2025), residual hand weakness related to surgeries, umbilical hernia repair (2014/15) referred here from Dr Atkinson at [facility] in [place] for new ASXL1-mutated and [id number]-iavhhwo AML, admitted for induction chemo with dauno 60 mg/m2+ cytarabine. Hospital course c/b neutropenic fevers, pancytopenia and mucositis pain, Geriatrics and Palliative Medicine Team is consulted for assistance with pain control    Plan:   > Continue IV Dilaudid 0.5 mg TID prior to meals  > continue IV Dilaudid to 0.7 mg q3 hours prn for moderate pain. Hold for oversedation, SBP< 90 lethargy and RR less than 12  > Continue IV Dilaudid to 1 mg q3 hours prn for severe pain.  Hold for oversedation, SBP< 90 lethargy and RR less than 12  > bowel regimen while on opioids   > narcan prn     In the event of newly developing, evolving, or worsening symptoms, please contact the Palliative Medicine team via pager (if the patient is at [facility] #[phone] or if the patient is at [facility] #[phone]) The Geriatric and Palliative Medicine service has coverage 24 hours a day/ 7 days a week to provide medical recommendations regarding symptom management needs via telephone.

## 2025-05-25 NOTE — PROGRESS NOTE ADULT - PROBLEM SELECTOR PLAN 1
new ASXL1-mutated and [id number]-bmnrffj AML.  admitted for induction chemo with dauno 60 mg/m2+ cytarabine. Chemo started on 5/2, Complicated by rigors, chills, fever, SOB  and tachycardia 2 hours into day 1 infusion - likely infusion. received pepcid/benadryl/solumedrol/tylenol iv. will make up D1 Cytarabine on day8    will proceed with day2 chemo with pre mdes Tylenol and Benadryl, hydrocortisone iv PRN reaction   Hepatitis screen/HIV(-) as outpatient  IVF, antiemetics, mouth care. monitor weight, I & O, diuresis PRN   Monitor CBC with diff QD, TLS labs BID, Coags & T & C TIW transfuse for hb<7, PLT <30  5/5 HLA sent  5/16 BMbx subcortical, suboptimal specimen, no evidence of persistent leukemia. Flow 0.34% myeloblasts.  5/23 PB flow sent, fu result  5/19: no longer having blood in stool for several days, check cbc daily, plt goal decreased to >30  5/24- Hypophosphatemia - Replete phos.  5/24- WBC - 11.5, D/C Zarxio. new ASXL1-mutated and [id number]-hwggfna AML.  admitted for induction chemo with dauno 60 mg/m2+ cytarabine. Chemo started on 5/2, Complicated by rigors, chills, fever, SOB  and tachycardia 2 hours into day 1 infusion - likely infusion. received pepcid/benadryl/solumedrol/tylenol iv. will make up D1 Cytarabine on day8    will proceed with day2 chemo with pre mdes Tylenol and Benadryl, hydrocortisone iv PRN reaction   Hepatitis screen/HIV(-) as outpatient  IVF, antiemetics, mouth care. monitor weight, I & O, diuresis PRN   Monitor CBC with diff QD, TLS labs BID, Coags & T & C TIW transfuse for hb<7, PLT <30  5/5 HLA sent  5/16 BMbx subcortical, suboptimal specimen, no evidence of persistent leukemia. Flow 0.34% myeloblasts.  5/23 PB flow sent, fu result  5/19: no longer having blood in stool for several days, check cbc daily, plt goal decreased to >30  5/24- WBC - 11.5, D/C Zarxio.

## 2025-05-26 DIAGNOSIS — R74.01 ELEVATION OF LEVELS OF LIVER TRANSAMINASE LEVELS: ICD-10-CM

## 2025-05-26 LAB
ADD ON TEST-SPECIMEN IN LAB: SIGNIFICANT CHANGE UP
ALBUMIN SERPL ELPH-MCNC: 3 G/DL — LOW (ref 3.3–5)
ALP SERPL-CCNC: 130 U/L — HIGH (ref 40–120)
ALT FLD-CCNC: 99 U/L — HIGH (ref 10–45)
ANION GAP SERPL CALC-SCNC: 11 MMOL/L — SIGNIFICANT CHANGE UP (ref 5–17)
APTT BLD: 26.5 SEC — SIGNIFICANT CHANGE UP (ref 26.1–36.8)
AST SERPL-CCNC: 57 U/L — HIGH (ref 10–40)
BASOPHILS # BLD AUTO: 0.37 K/UL — HIGH (ref 0–0.2)
BASOPHILS NFR BLD AUTO: 2.7 % — HIGH (ref 0–2)
BILIRUB SERPL-MCNC: 0.5 MG/DL — SIGNIFICANT CHANGE UP (ref 0.2–1.2)
BLASTS # FLD: 1.8 % — HIGH (ref 0–0)
BLASTS NFR BLD: 1.8 % — HIGH (ref 0–0)
BUN SERPL-MCNC: 12 MG/DL — SIGNIFICANT CHANGE UP (ref 7–23)
CALCIUM SERPL-MCNC: 8.3 MG/DL — LOW (ref 8.4–10.5)
CHLORIDE SERPL-SCNC: 103 MMOL/L — SIGNIFICANT CHANGE UP (ref 96–108)
CO2 SERPL-SCNC: 26 MMOL/L — SIGNIFICANT CHANGE UP (ref 22–31)
CREAT SERPL-MCNC: 1.05 MG/DL — SIGNIFICANT CHANGE UP (ref 0.5–1.3)
D DIMER BLD IA.RAPID-MCNC: 568 NG/ML DDU — HIGH
EGFR: 77 ML/MIN/1.73M2 — SIGNIFICANT CHANGE UP
EGFR: 77 ML/MIN/1.73M2 — SIGNIFICANT CHANGE UP
EOSINOPHIL # BLD AUTO: 0 K/UL — SIGNIFICANT CHANGE UP (ref 0–0.5)
EOSINOPHIL NFR BLD AUTO: 0 % — SIGNIFICANT CHANGE UP (ref 0–6)
FIBRINOGEN PPP-MCNC: 449 MG/DL — HIGH (ref 200–445)
GIANT PLATELETS BLD QL SMEAR: PRESENT — SIGNIFICANT CHANGE UP
GLUCOSE SERPL-MCNC: 119 MG/DL — HIGH (ref 70–99)
HCT VFR BLD CALC: 22.7 % — LOW (ref 39–50)
HGB BLD-MCNC: 7.8 G/DL — LOW (ref 13–17)
INR BLD: 1.18 RATIO — HIGH (ref 0.85–1.16)
LDH SERPL L TO P-CCNC: 883 U/L — HIGH (ref 50–242)
LYMPHOCYTES # BLD AUTO: 0.99 K/UL — LOW (ref 1–3.3)
LYMPHOCYTES # BLD AUTO: 7.2 % — LOW (ref 13–44)
MAGNESIUM SERPL-MCNC: 1.9 MG/DL — SIGNIFICANT CHANGE UP (ref 1.6–2.6)
MANUAL SMEAR VERIFICATION: SIGNIFICANT CHANGE UP
MCHC RBC-ENTMCNC: 30.7 PG — SIGNIFICANT CHANGE UP (ref 27–34)
MCHC RBC-ENTMCNC: 34.4 G/DL — SIGNIFICANT CHANGE UP (ref 32–36)
MCV RBC AUTO: 89.4 FL — SIGNIFICANT CHANGE UP (ref 80–100)
METAMYELOCYTES # FLD: 1.8 % — HIGH (ref 0–0)
METAMYELOCYTES NFR BLD: 1.8 % — HIGH (ref 0–0)
MONOCYTES # BLD AUTO: 4.2 K/UL — HIGH (ref 0–0.9)
MONOCYTES NFR BLD AUTO: 30.6 % — HIGH (ref 2–14)
MYELOCYTES NFR BLD: 8.1 % — HIGH (ref 0–0)
NEUTROPHILS # BLD AUTO: 4.59 K/UL — SIGNIFICANT CHANGE UP (ref 1.8–7.4)
NEUTROPHILS NFR BLD AUTO: 30.7 % — LOW (ref 43–77)
NEUTS BAND # BLD: 2.7 % — SIGNIFICANT CHANGE UP (ref 0–8)
NEUTS BAND NFR BLD: 2.7 % — SIGNIFICANT CHANGE UP (ref 0–8)
NT-PROBNP SERPL-SCNC: 115 PG/ML — SIGNIFICANT CHANGE UP (ref 0–300)
PHOSPHATE SERPL-MCNC: 2.8 MG/DL — SIGNIFICANT CHANGE UP (ref 2.5–4.5)
PLAT MORPH BLD: NORMAL — SIGNIFICANT CHANGE UP
PLATELET # BLD AUTO: 47 K/UL — LOW (ref 150–400)
POTASSIUM SERPL-MCNC: 3.7 MMOL/L — SIGNIFICANT CHANGE UP (ref 3.5–5.3)
POTASSIUM SERPL-SCNC: 3.7 MMOL/L — SIGNIFICANT CHANGE UP (ref 3.5–5.3)
PROMYELOCYTES # FLD: 14.4 % — HIGH (ref 0–0)
PROMYELOCYTES NFR BLD: 14.4 % — HIGH (ref 0–0)
PROT SERPL-MCNC: 5.1 G/DL — LOW (ref 6–8.3)
PROTHROM AB SERPL-ACNC: 13.4 SEC — SIGNIFICANT CHANGE UP (ref 9.9–13.4)
RBC # BLD: 2.54 M/UL — LOW (ref 4.2–5.8)
RBC # FLD: 14 % — SIGNIFICANT CHANGE UP (ref 10.3–14.5)
RBC BLD AUTO: SIGNIFICANT CHANGE UP
SODIUM SERPL-SCNC: 140 MMOL/L — SIGNIFICANT CHANGE UP (ref 135–145)
TOXIC GRANULES BLD QL SMEAR: PRESENT — SIGNIFICANT CHANGE UP
URATE SERPL-MCNC: 5.2 MG/DL — SIGNIFICANT CHANGE UP (ref 3.4–8.8)
WBC # BLD: 13.73 K/UL — HIGH (ref 3.8–10.5)
WBC # FLD AUTO: 13.73 K/UL — HIGH (ref 3.8–10.5)

## 2025-05-26 PROCEDURE — 99233 SBSQ HOSP IP/OBS HIGH 50: CPT

## 2025-05-26 RX ADMIN — Medication 500 MILLIGRAM(S): at 05:32

## 2025-05-26 RX ADMIN — Medication 15 MILLILITER(S): at 12:58

## 2025-05-26 RX ADMIN — Medication 0.7 MILLIGRAM(S): at 16:20

## 2025-05-26 RX ADMIN — Medication 15 MILLILITER(S): at 18:40

## 2025-05-26 RX ADMIN — Medication 1 MILLIGRAM(S): at 06:30

## 2025-05-26 RX ADMIN — Medication 2000 UNIT(S): at 12:55

## 2025-05-26 RX ADMIN — Medication 0.5 MILLIGRAM(S): at 18:20

## 2025-05-26 RX ADMIN — Medication 0.5 MILLIGRAM(S): at 10:00

## 2025-05-26 RX ADMIN — MIDODRINE HYDROCHLORIDE 10 MILLIGRAM(S): 5 TABLET ORAL at 13:04

## 2025-05-26 RX ADMIN — Medication 0.5 MILLIGRAM(S): at 09:45

## 2025-05-26 RX ADMIN — Medication 1 MILLIGRAM(S): at 00:03

## 2025-05-26 RX ADMIN — Medication 80 MILLIGRAM(S): at 21:13

## 2025-05-26 RX ADMIN — DILTIAZEM HYDROCHLORIDE 240 MILLIGRAM(S): 240 TABLET, EXTENDED RELEASE ORAL at 05:50

## 2025-05-26 RX ADMIN — Medication 1 MILLIGRAM(S): at 01:05

## 2025-05-26 RX ADMIN — Medication 5 MILLIGRAM(S): at 21:13

## 2025-05-26 RX ADMIN — DIPHENHYDRAMINE HYDROCHLORIDE AND LIDOCAINE HYDROCHLORIDE AND ALUMINUM HYDROXIDE AND MAGNESIUM HYDRO 10 MILLILITER(S): KIT at 05:31

## 2025-05-26 RX ADMIN — Medication 500 MILLIGRAM(S): at 18:42

## 2025-05-26 RX ADMIN — LIDOCAINE HYDROCHLORIDE 1 APPLICATION(S): 20 JELLY TOPICAL at 21:12

## 2025-05-26 RX ADMIN — Medication 0.5 MILLIGRAM(S): at 12:47

## 2025-05-26 RX ADMIN — DIPHENHYDRAMINE HYDROCHLORIDE AND LIDOCAINE HYDROCHLORIDE AND ALUMINUM HYDROXIDE AND MAGNESIUM HYDRO 10 MILLILITER(S): KIT at 23:17

## 2025-05-26 RX ADMIN — Medication 15 MILLILITER(S): at 05:33

## 2025-05-26 RX ADMIN — Medication 1 MILLIGRAM(S): at 23:14

## 2025-05-26 RX ADMIN — Medication 80 MILLIGRAM(S): at 09:27

## 2025-05-26 RX ADMIN — Medication 15 MILLILITER(S): at 23:17

## 2025-05-26 RX ADMIN — Medication 1 MILLIGRAM(S): at 05:31

## 2025-05-26 RX ADMIN — Medication 80 MILLIGRAM(S): at 18:46

## 2025-05-26 RX ADMIN — MEROPENEM 100 MILLIGRAM(S): 1 INJECTION INTRAVENOUS at 05:30

## 2025-05-26 RX ADMIN — DIPHENHYDRAMINE HYDROCHLORIDE AND LIDOCAINE HYDROCHLORIDE AND ALUMINUM HYDROXIDE AND MAGNESIUM HYDRO 10 MILLILITER(S): KIT at 18:28

## 2025-05-26 RX ADMIN — Medication 0.5 MILLIGRAM(S): at 19:20

## 2025-05-26 RX ADMIN — Medication 1 APPLICATION(S): at 13:07

## 2025-05-26 RX ADMIN — Medication 100 MILLIGRAM(S): at 12:58

## 2025-05-26 RX ADMIN — DEXAMETHASONE 0.5 MILLIGRAM(S): 0.5 TABLET ORAL at 18:44

## 2025-05-26 RX ADMIN — TAMSULOSIN HYDROCHLORIDE 0.4 MILLIGRAM(S): 0.4 CAPSULE ORAL at 21:13

## 2025-05-26 RX ADMIN — DIPHENHYDRAMINE HYDROCHLORIDE AND LIDOCAINE HYDROCHLORIDE AND ALUMINUM HYDROXIDE AND MAGNESIUM HYDRO 10 MILLILITER(S): KIT at 12:50

## 2025-05-26 RX ADMIN — Medication 80 MILLIGRAM(S): at 12:52

## 2025-05-26 RX ADMIN — LIDOCAINE HYDROCHLORIDE 1 APPLICATION(S): 20 JELLY TOPICAL at 05:32

## 2025-05-26 RX ADMIN — Medication 0.7 MILLIGRAM(S): at 16:04

## 2025-05-26 RX ADMIN — LIDOCAINE HYDROCHLORIDE 1 APPLICATION(S): 20 JELLY TOPICAL at 18:47

## 2025-05-26 RX ADMIN — Medication 40 MILLIGRAM(S): at 05:31

## 2025-05-26 RX ADMIN — Medication 40 MILLIGRAM(S): at 18:42

## 2025-05-26 RX ADMIN — DEXAMETHASONE 0.5 MILLIGRAM(S): 0.5 TABLET ORAL at 05:32

## 2025-05-26 NOTE — CHART NOTE - NSCHARTNOTEFT_GEN_A_CORE
Chart reviewed. Over the past 24 hours, patient required 1mg IV dilaudid x1, 0.7mg IV dilaudid x2.     Assessment:   69M with HTN, HLD, Gout, GERD, BPH, spinal stenosis (s/p multiple procedures 2018, 2020 laminectomies / fusions), hip surgeries (Oct 2024, Jan 2025), residual hand weakness related to surgeries, umbilical hernia repair (2014/15) referred here from Dr Atkinson at [facility] in [place] for new ASXL1-mutated and [id number]-kxxkeey AML, admitted for induction chemo with dauno 60 mg/m2+ cytarabine. Hospital course c/b neutropenic fevers, pancytopenia and mucositis pain, Geriatrics and Palliative Medicine Team is consulted for assistance with pain control    Plan:   > Continue IV Dilaudid 0.5 mg TID prior to meals  > continue IV Dilaudid to 0.7 mg q3 hours prn for moderate pain. Hold for oversedation, SBP< 90 lethargy and RR less than 12  > Continue IV Dilaudid to 1 mg q3 hours prn for severe pain.  Hold for oversedation, SBP< 90 lethargy and RR less than 12  > bowel regimen while on opioids   > narcan prn     In the event of newly developing, evolving, or worsening symptoms, please contact the Palliative Medicine team via pager (if the patient is at [facility] #[phone] or if the patient is at [facility] #[phone]) The Geriatric and Palliative Medicine service has coverage 24 hours a day/ 7 days a week to provide medical recommendations regarding symptom management needs via telephone.

## 2025-05-26 NOTE — PROGRESS NOTE ADULT - PROBLEM SELECTOR PLAN 1
new ASXL1-mutated and MWG02-eoqwces AML.  admitted for induction chemo with dauno 60 mg/m2+ cytarabine. Chemo started on 5/2, Complicated by rigors, chills, fever, SOB  and tachycardia 2 hours into day 1 infusion - likely infusion. received pepcid/benadryl/solumedrol/tylenol iv. will make up D1 Cytarabine on day8    will proceed with day2 chemo with pre mdes Tylenol and Benadryl, hydrocortisone iv PRN reaction   Hepatitis screen/HIV(-) as outpatient  IVF, antiemetics, mouth care. monitor weight, I & O, diuresis PRN   Monitor CBC with diff QD, TLS labs BID, Coags & T & C TIW transfuse for hb<7, PLT <30  5/5 HLA sent  5/16 BMbx subcortical, suboptimal specimen, no evidence of persistent leukemia. Flow 0.34% myeloblasts.  5/23 PB flow sent, fu result  5/19: no longer having blood in stool for several days, check cbc daily, plt goal decreased to >30  5/24- WBC - 11.5, D/C Zarxio.

## 2025-05-26 NOTE — PROGRESS NOTE ADULT - SUBJECTIVE AND OBJECTIVE BOX
Diagnosis:    Protocol/Chemo Regimen:    Day:     Pt endorsed:    Review of Systems:     Pain scale:     Diet:     Allergies    No Known Allergies    Intolerances        ANTIMICROBIALS  meropenem  IVPB 1000 milliGRAM(s) IV Intermittent every 8 hours  posaconazole DR Tablet   Oral   posaconazole DR Tablet 300 milliGRAM(s) Oral every 24 hours  valACYclovir 500 milliGRAM(s) Oral two times a day      HEME/ONC MEDICATIONS      STANDING MEDICATIONS  allopurinol 100 milliGRAM(s) Oral daily  Biotene Dry Mouth Oral Rinse 15 milliLiter(s) Swish and Spit four times a day  chlorhexidine 4% Liquid 1 Application(s) Topical <User Schedule>  cholecalciferol 2000 Unit(s) Oral daily  dexAMETHasone    Solution 0.5 milliGRAM(s) Oral two times a day  diltiazem    milliGRAM(s) Oral daily  FIRST- Mouthwash  BLM 10 milliLiter(s) Swish and Swallow four times a day  HYDROmorphone  Injectable 0.5 milliGRAM(s) IV Push <User Schedule>  lidocaine   4% Patch 1 Patch Transdermal every 24 hours  lidocaine 2% Gel 1 Application(s) Topical every 8 hours  melatonin 5 milliGRAM(s) Oral at bedtime  midodrine. 10 milliGRAM(s) Oral every 8 hours  pantoprazole  Injectable 40 milliGRAM(s) IV Push every 12 hours  simethicone 80 milliGRAM(s) Chew <User Schedule>  tamsulosin 0.4 milliGRAM(s) Oral at bedtime      PRN MEDICATIONS  acetaminophen     Tablet .. 650 milliGRAM(s) Oral every 6 hours PRN  albuterol    90 MICROgram(s) HFA Inhaler 2 Puff(s) Inhalation every 6 hours PRN  aluminum hydroxide/magnesium hydroxide/simethicone Suspension 30 milliLiter(s) Oral every 6 hours PRN  calcium carbonate    500 mG (Tums) Chewable 1 Tablet(s) Chew four times a day PRN  cyclobenzaprine 10 milliGRAM(s) Oral three times a day PRN  hydrocortisone sodium succinate Injectable 100 milliGRAM(s) IV Push every 8 hours PRN  HYDROmorphone  Injectable 0.7 milliGRAM(s) IV Push every 4 hours PRN  HYDROmorphone  Injectable 1 milliGRAM(s) IV Push every 4 hours PRN  meperidine     Injectable 12.5 milliGRAM(s) IV Push once PRN  metoclopramide Injectable 10 milliGRAM(s) IV Push every 6 hours PRN  ondansetron Injectable 8 milliGRAM(s) IV Push every 8 hours PRN  polyethylene glycol 3350 17 Gram(s) Oral two times a day PRN  senna 2 Tablet(s) Oral two times a day PRN  sodium chloride 0.9% lock flush 10 milliLiter(s) IV Push every 1 hour PRN        Vital Signs Last 24 Hrs  T(C): 36.7 (26 May 2025 05:45), Max: 36.7 (25 May 2025 17:20)  T(F): 98 (26 May 2025 05:45), Max: 98.1 (25 May 2025 17:20)  HR: 83 (26 May 2025 05:45) (69 - 91)  BP: 126/74 (26 May 2025 05:45) (110/67 - 133/72)  BP(mean): --  RR: 18 (26 May 2025 05:45) (17 - 18)  SpO2: 93% (26 May 2025 05:45) (93% - 97%)    Parameters below as of 26 May 2025 05:45  Patient On (Oxygen Delivery Method): room air        PHYSICAL EXAM  General: NAD  HEENT: PERRLA, EOMOI, clear oropharynx, anicteric sclera, pink conjunctiva  Neck: supple  CV: (+) S1/S2 RRR  Lungs: clear to auscultation, no wheezes or rales  Abdomen: soft, non-tender, non-distended (+) BS  Ext: no clubbing, cyanosis or edema  Skin: no rashes and no petechiae  Neuro: alert and oriented X 3, no focal deficits  Central Line:     RECENT CULTURES:  05-23 @ 06:45  Blood Blood-Catheter  --  --  --    No growth at 48 Hours  --  05-22 @ 23:23  Clean Catch Clean Catch (Midstream)  --  --  --    No growth  --  05-22 @ 23:22  Blood Blood-Peripheral  --  --  --    No growth at 72 Hours  --  05-20 @ 19:00  Clean Catch Clean Catch (Midstream)  --  --  --    No growth  --  05-20 @ 10:06  Blood Blood-Peripheral  --  --  --    No growth at 5 days  --  05-20 @ 09:58  Blood Blood-Peripheral  --  --  --    No growth at 5 days  --  05-19 @ 10:16  Blood Blood-Catheter  --  --  --    No growth at 5 days  --        LABS:                        7.8    13.73 )-----------( 47       ( 26 May 2025 06:53 )             22.7         Mean Cell Volume : 89.4 fl  Mean Cell Hemoglobin : 30.7 pg  Mean Cell Hemoglobin Concentration : 34.4 g/dL  Auto Neutrophil # : x  Auto Lymphocyte # : x  Auto Monocyte # : x  Auto Eosinophil # : x  Auto Basophil # : x  Auto Neutrophil % : x  Auto Lymphocyte % : x  Auto Monocyte % : x  Auto Eosinophil % : x  Auto Basophil % : x      05-26    140  |  103  |  12  ----------------------------<  119[H]  3.7   |  26  |  1.05    Ca    8.3[L]      26 May 2025 06:55  Phos  2.8     05-26  Mg     1.9     05-26    TPro  5.1[L]  /  Alb  3.0[L]  /  TBili  0.5  /  DBili  x   /  AST  57[H]  /  ALT  99[H]  /  AlkPhos  130[H]  05-26      Mg 1.9  Phos 2.8      PT/INR - ( 26 May 2025 06:53 )   PT: 13.4 sec;   INR: 1.18 ratio         PTT - ( 26 May 2025 06:53 )  PTT:26.5 sec      Uric Acid 5.2        RADIOLOGY & ADDITIONAL STUDIES:         Diagnosis: ASXL1-mutated and GEE61-[id number] AML    Protocol/Chemo Regimen: s/p 7+3:  Daunorubicin 60 mg/m2+ Cytarabine     Day: 25    Pt endorsed:   +mouth pain improved  feeling better today.    Review of Systems: Denies any nausea, vomiting, chest pain, palpitation, SOB.    Pain scale:  2-3/10    Diet: regular Ensure plus HP BID                      Allergies: No Known Allergies    ANTIMICROBIALS  meropenem  IVPB 1000 milliGRAM(s) IV Intermittent every 8 hours  posaconazole DR Tablet   Oral   posaconazole DR Tablet 300 milliGRAM(s) Oral every 24 hours  valACYclovir 500 milliGRAM(s) Oral two times a day    STANDING MEDICATIONS  allopurinol 100 milliGRAM(s) Oral daily  Biotene Dry Mouth Oral Rinse 15 milliLiter(s) Swish and Spit four times a day  chlorhexidine 4% Liquid 1 Application(s) Topical <User Schedule>  cholecalciferol 2000 Unit(s) Oral daily  dexAMETHasone    Solution 0.5 milliGRAM(s) Oral two times a day  diltiazem    milliGRAM(s) Oral daily  FIRST- Mouthwash  BLM 10 milliLiter(s) Swish and Swallow four times a day  HYDROmorphone  Injectable 0.5 milliGRAM(s) IV Push <User Schedule>  lidocaine   4% Patch 1 Patch Transdermal every 24 hours  lidocaine 2% Gel 1 Application(s) Topical every 8 hours  melatonin 5 milliGRAM(s) Oral at bedtime  midodrine. 10 milliGRAM(s) Oral every 8 hours  pantoprazole  Injectable 40 milliGRAM(s) IV Push every 12 hours  simethicone 80 milliGRAM(s) Chew <User Schedule>  tamsulosin 0.4 milliGRAM(s) Oral at bedtime    PRN MEDICATIONS  acetaminophen     Tablet .. 650 milliGRAM(s) Oral every 6 hours PRN  albuterol    90 MICROgram(s) HFA Inhaler 2 Puff(s) Inhalation every 6 hours PRN  aluminum hydroxide/magnesium hydroxide/simethicone Suspension 30 milliLiter(s) Oral every 6 hours PRN  calcium carbonate    500 mG (Tums) Chewable 1 Tablet(s) Chew four times a day PRN  cyclobenzaprine 10 milliGRAM(s) Oral three times a day PRN  hydrocortisone sodium succinate Injectable 100 milliGRAM(s) IV Push every 8 hours PRN  HYDROmorphone  Injectable 0.7 milliGRAM(s) IV Push every 4 hours PRN  HYDROmorphone  Injectable 1 milliGRAM(s) IV Push every 4 hours PRN  meperidine     Injectable 12.5 milliGRAM(s) IV Push once PRN  metoclopramide Injectable 10 milliGRAM(s) IV Push every 6 hours PRN  ondansetron Injectable 8 milliGRAM(s) IV Push every 8 hours PRN  polyethylene glycol 3350 17 Gram(s) Oral two times a day PRN  senna 2 Tablet(s) Oral two times a day PRN  sodium chloride 0.9% lock flush 10 milliLiter(s) IV Push every 1 hour PRN    Vital Signs Last 24 Hrs  T(C): 36.7 (26 May 2025 05:45), Max: 36.7 (25 May 2025 17:20)  T(F): 98 (26 May 2025 05:45), Max: 98.1 (25 May 2025 17:20)  HR: 83 (26 May 2025 05:45) (69 - 91)  BP: 126/74 (26 May 2025 05:45) (110/67 - 133/72)  BP(mean): --  RR: 18 (26 May 2025 05:45) (17 - 18)  SpO2: 93% (26 May 2025 05:45) (93% - 97%)    Parameters below as of 26 May 2025 05:45  Patient On (Oxygen Delivery Method): room air    PHYSICAL EXAM  General: adult in NAD  HEENT: clear oropharynx, no erythema, no ulcers  CV: normal S1, S2, RRR  Lungs: clear to auscultation, no wheezes, no rales  Abdomen: soft, nontender, nondistended, normal BS  Ext: no edema  Skin: no rash  Neuro: alert and oriented x 3  Central line: normal     RECENT CULTURES:  05-23 @ 06:45  Blood Blood-Catheter  No growth at 48 Hours    05-22 @ 23:23  Clean Catch Clean Catch (Midstream)  No growth    05-22 @ 23:22  Blood Blood-Peripheral  No growth at 72 Hours    05-20 @ 19:00  Clean Catch Clean Catch (Midstream)  No growth    05-20 @ 10:06  Blood Blood-Peripheral  No growth at 5 days    05-20 @ 09:58  Blood Blood-Peripheral  No growth at 5 days    05-19 @ 10:16  Blood Blood-Catheter  No growth at 5 days    LABS:                        7.8    13.73 )-----------( 47       ( 26 May 2025 06:53 )             22.7   Mean Cell Volume : 89.4 fl  Mean Cell Hemoglobin : 30.7 pg  Mean Cell Hemoglobin Concentration : 34.4 g/dL  Auto Neutrophil # : x  Auto Lymphocyte # : x  Auto Monocyte # : x  Auto Eosinophil # : x  Auto Basophil # : x  Auto Neutrophil % : x  Auto Lymphocyte % : x  Auto Monocyte % : x  Auto Eosinophil % : x  Auto Basophil % : x    05-26    140  |  103  |  12  ----------------------------<  119[H]  3.7   |  26  |  1.05    Ca    8.3[L]      26 May 2025 06:55  Phos  2.8     05-26  Mg     1.9     05-26    TPro  5.1[L]  /  Alb  3.0[L]  /  TBili  0.5  /  DBili  x   /  AST  57[H]  /  ALT  99[H]  /  AlkPhos  130[H]  05-26    Mg 1.9  Phos 2.8    PT/INR - ( 26 May 2025 06:53 )   PT: 13.4 sec;   INR: 1.18 ratio    PTT - ( 26 May 2025 06:53 )  PTT:26.5 sec      Uric Acid 5.2    RADIOLOGY & ADDITIONAL STUDIES:  Xray Chest 1 View- PORTABLE-Urgent (Xray Chest 1 View- PORTABLE-Urgent .) (05.22.25 @ 22:44) >  No focal consolidation.

## 2025-05-26 NOTE — PROGRESS NOTE ADULT - PROBLEM SELECTOR PLAN 2
neutropenic, febrile   continue posa, valtrex, flagyl (dc'd 5/19), cefepime (5/11-5/19 ), cont to be febrile Ucx from 5/17 (+) gram + cocci in pairs and chains, inc to meropenem (5/19-   5/2 CXR Patchy opacities along the left lung base, possibly atelectasis, however superimposed infection cannot be entirely excluded.  5/13- FLU/COVID/RSV (-)  5/13- Added Flagyl 500 mg IV Q 8 hrs due to GI discomfort/pain  5/13- CT C/A/P -negative for infxn  5/20  blood cx 5/19 showed 1/4 MRSE  urine cx 5/17 VRE but 5/18 negative and pt asymptomatic  5/22  dry cough , RVP panel pending   - Levaquin/Amox- 5/2- 5/11  - Cefepime- 5/11- 5/19  Flagyl (abd discomfort)- 5/13- 5/19  - Meropenem- 5/19  - Valtrex-Posaconazole  5/2 5/20 O2 sat 88% with fever, FU closely, O2 PRN  5/25 Discontinue vancomycin neutropenic, febrile   continue posa, valtrex, flagyl (dc'd 5/19), cefepime (5/11-5/19 ), cont to be febrile Ucx from 5/17 (+) gram + cocci in pairs and chains, inc to meropenem (5/19-   5/2 CXR Patchy opacities along the left lung base, possibly atelectasis, however superimposed infection cannot be entirely excluded.  5/13- FLU/COVID/RSV (-)  5/13- Added Flagyl 500 mg IV Q 8 hrs due to GI discomfort/pain  5/13- CT C/A/P -negative for infxn  5/20  blood cx 5/19 showed 1/4 MRSE  urine cx 5/17 VRE but 5/18 negative and pt asymptomatic  - Levaquin/Amox- 5/2- 5/11  - Cefepime- 5/11- 5/19  Flagyl (abd discomfort)- 5/13- 5/19  - Meropenem- 5/19  - Valtrex-Posaconazole  5/2 5/20 O2 sat 88% with fever, FU closely, O2 PRN  5/25 Discontinue vancomycin  5/26- D/C Meropenem, Posaconazole.

## 2025-05-26 NOTE — PROGRESS NOTE ADULT - PROBLEM SELECTOR PLAN 10
5/22 Pt has intermittent abdominal pain / lower cp since yesterday, associated  mild palpitation, mild elevated trop 70, switched simethicone qid after meals , symptoms were evaluated with  decreased/resolved pain.  Senna/Miralax  PRN constipation 5/26 - Grade 1 transaminitis, will monitor.

## 2025-05-26 NOTE — PROGRESS NOTE ADULT - ASSESSMENT
68 yo male with newly diagnosed ASXL1, DDX41 mutated AML, admitted for induction chemo with 7+3:  dauno 60 mg/m2+ cytarabine. 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 Chemo started on 5/2, Complicated by rigors, chills, fever, SOB  and tachycardia 2 hours into day 1 infusion - likely infusion reaction, neutropenic fever. Pt has pancytopenia d/t chemo and disease

## 2025-05-26 NOTE — PROGRESS NOTE ADULT - NS ATTEND AMEND GEN_ALL_CORE FT
69M with ASXL1-mutated and [id number]-[id number] AML. 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 Admitted for induction 7+3.    Heme:   - Completed 7+3. Day 24. Complicated by rigors, chills, and tachycardia 2 hours into day 1 infusion - likely infusion reaction.  - Trend CBC with differential daily. Goal Hgb > 8 and plt > 20 given past GI bleeding.   - Trend DIC labs intermittently, as this has not been issue since admission. Continue transfusions as needed to maintain fibrinogen > 100.      - Allopurinol continued, but for previous diagnosis of gout decreased dose to 100 mg on 5/16.  S/p rasburicase; uric acid and LDH stable.   - Continue IVF. Monitor daily weights and I/O's. Diurese PRN.   - BMBx done day 15 (5/16) - subcortical, suboptimal specimen, no evidence of persistent leukemia. Flow 0.34% myeloblasts.  - in absence of overt evidence of persistent leukemia, and in setting of persistent fevers, started daily G-CSF 5/21  - ANC rising now, blasts ~12%. d/c G-CSF once ANC sustained > 1.0 and/or PB blasts increase - d/c'd 5/24    GI:  - Bloody BM 5/14-16.  Plt goal > 30K now.  - c/o severe midepigastric pain 5/22 AM, resolved   - EKG w/o acute changes, troponin sl incr 70, then decr.  - amylase, lipase normal  - cont PPI 2x/day     Card:  - Orthostatic hypotension, likely residual from GI bleed and decreased PO intake. Poss autonomic component.  - Bolus 500 cc on 5/17 and 5/18.  Now on midodrine. BP stable.   - weight stable  - Encouraged incr PO intake.      ID:   - Neutropenic and febrile; fevers since 5/11, last temp 100.8 5/22 pm, Tmax 101     with ongoing GI symptoms, diarrhea. Cults (-) with recent CNS from blood likely contaminant in 1/4 bottles and VREC in urine    cleared in subsequent specimen.  - Cefepime (5/11-5/19 ). Persistent fever lead to changing to meropenem on 5/19.  Last temp 100.4 5/21.     - CT scan from 5/14 showing no acute CT findings suspicious for infection. Mentioned GGOs in the lungs bilaterally, but stable from a previous scan.   - Prophylaxis: posaconazole, valacyclovir   - Appreciate ID input - kept on vanco for now (fevers, mucositis), will d/c vanco today  - Mouth rinses, dexamethasone swish and spit for mucositis, which persists.  - demerol prn for rigors    MSK: Has chronic pain from prior surgeries   - Pain control: oxycodone    TLC in place  OOB. 69M with ASXL1-mutated and [id number] AML. 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 Admitted for induction 7+3.    Heme:   - Completed 7+3. Day 25. Complicated by rigors, chills, and tachycardia 2 hours into day 1 infusion - likely infusion reaction.  - Trend CBC with differential daily. Goal Hgb > 8 and plt > 20 given past GI bleeding.   - Trend DIC labs intermittently, as this has not been issue since admission. Continue transfusions as needed to maintain fibrinogen > 100.      - Allopurinol continued, but for previous diagnosis of gout decreased dose to 100 mg on 5/16.  S/p rasburicase; uric acid and LDH stable.   - Continue IVF. Monitor daily weights and I/O's. Diurese PRN.   - BMBx done day 15 (5/16) - subcortical, suboptimal specimen, no evidence of persistent leukemia. Flow 0.34% myeloblasts.  - in absence of overt evidence of persistent leukemia, and in setting of persistent fevers, started daily G-CSF 5/21  - ANC rising now, blasts ~12%. d/c G-CSF once ANC sustained > 1.0 and/or PB blasts increase - d/c'd 5/24    GI:  - Bloody BM 5/14-16.  Plt goal > 30K now.  - c/o severe midepigastric pain 5/22 AM, resolved   - EKG w/o acute changes, troponin sl incr 70, then decr.  - amylase, lipase normal  - cont PPI 2x/day     Card:  - Orthostatic hypotension, likely residual from GI bleed and decreased PO intake. Poss autonomic component.  - Bolus 500 cc on 5/17 and 5/18.  Now on midodrine. BP stable.   - weight stable  - Encouraged incr PO intake.      ID:   - Neutropenic and febrile; fevers since 5/11, last temp 100.8 5/22 pm, Tmax 101     with ongoing GI symptoms, diarrhea. Cults (-) with recent CNS from blood likely contaminant in 1/4 bottles and VREC in urine    cleared in subsequent specimen.  - Cefepime (5/11-5/19 ). Persistent fever lead to changing to meropenem on 5/19.  Last temp 100.4 5/21.   Will d/c gianna today (5/26)  - CT scan from 5/14 showing no acute CT findings suspicious for infection. Mentioned GGOs in the lungs bilaterally, but stable from a previous scan.   - Prophylaxis: valacyclovir   - Appreciate ID input - kept on vanco for now (fevers, mucositis), d/c vanco 5/25  - Mouth rinses, dexamethasone swish and spit for mucositis, which persists.  - demerol prn for rigors  - no longer neutropenic, hold posaconazole PPx    MSK: Has chronic pain from prior surgeries   - Pain control: oxycodone    TLC in place  OOB.

## 2025-05-26 NOTE — PROGRESS NOTE ADULT - PROBLEM SELECTOR PLAN 11
DVT ppx: OOB and ambulation as carey PT consulted: no skilled PT need, PT signed off  5/20 Pt consult again, py presently weak from persistent fever, lightheadedness and vertigo, check room air and post walk O2 sat DVT ppx: OOB and ambulation as carey PT consulted: no skilled PT need, PT signed off

## 2025-05-26 NOTE — PROGRESS NOTE ADULT - PROBLEM SELECTOR PLAN 3
improving slowly   pain and palliative following, pain medication adjusted  diet advanced as per pt request  cont mouth care Resolving

## 2025-05-27 LAB
ALBUMIN SERPL ELPH-MCNC: 3.2 G/DL — LOW (ref 3.3–5)
ALP SERPL-CCNC: 134 U/L — HIGH (ref 40–120)
ALT FLD-CCNC: 90 U/L — HIGH (ref 10–45)
ANION GAP SERPL CALC-SCNC: 13 MMOL/L — SIGNIFICANT CHANGE UP (ref 5–17)
ANISOCYTOSIS BLD QL: SLIGHT — SIGNIFICANT CHANGE UP
AST SERPL-CCNC: 37 U/L — SIGNIFICANT CHANGE UP (ref 10–40)
BASOPHILS # BLD AUTO: 0 K/UL — SIGNIFICANT CHANGE UP (ref 0–0.2)
BASOPHILS NFR BLD AUTO: 0 % — SIGNIFICANT CHANGE UP (ref 0–2)
BILIRUB SERPL-MCNC: 0.5 MG/DL — SIGNIFICANT CHANGE UP (ref 0.2–1.2)
BLASTS # FLD: 1.8 % — HIGH (ref 0–0)
BLASTS NFR BLD: 1.8 % — HIGH (ref 0–0)
BUN SERPL-MCNC: 12 MG/DL — SIGNIFICANT CHANGE UP (ref 7–23)
CALCIUM SERPL-MCNC: 8.3 MG/DL — LOW (ref 8.4–10.5)
CHLORIDE SERPL-SCNC: 103 MMOL/L — SIGNIFICANT CHANGE UP (ref 96–108)
CO2 SERPL-SCNC: 24 MMOL/L — SIGNIFICANT CHANGE UP (ref 22–31)
CREAT SERPL-MCNC: 1.05 MG/DL — SIGNIFICANT CHANGE UP (ref 0.5–1.3)
DACRYOCYTES BLD QL SMEAR: SLIGHT — SIGNIFICANT CHANGE UP
EGFR: 77 ML/MIN/1.73M2 — SIGNIFICANT CHANGE UP
EGFR: 77 ML/MIN/1.73M2 — SIGNIFICANT CHANGE UP
EOSINOPHIL # BLD AUTO: 0 K/UL — SIGNIFICANT CHANGE UP (ref 0–0.5)
EOSINOPHIL NFR BLD AUTO: 0 % — SIGNIFICANT CHANGE UP (ref 0–6)
FLOW CYTOMETRY FINAL REPORT: SIGNIFICANT CHANGE UP
GLUCOSE SERPL-MCNC: 125 MG/DL — HIGH (ref 70–99)
HCT VFR BLD CALC: 23.2 % — LOW (ref 39–50)
HGB BLD-MCNC: 7.9 G/DL — LOW (ref 13–17)
HYPOSEGMENTATION: PRESENT — SIGNIFICANT CHANGE UP
LDH SERPL L TO P-CCNC: 740 U/L — HIGH (ref 50–242)
LYMPHOCYTES # BLD AUTO: 0.94 K/UL — LOW (ref 1–3.3)
LYMPHOCYTES # BLD AUTO: 6.1 % — LOW (ref 13–44)
MAGNESIUM SERPL-MCNC: 2 MG/DL — SIGNIFICANT CHANGE UP (ref 1.6–2.6)
MANUAL SMEAR VERIFICATION: SIGNIFICANT CHANGE UP
MCHC RBC-ENTMCNC: 31 PG — SIGNIFICANT CHANGE UP (ref 27–34)
MCHC RBC-ENTMCNC: 34.1 G/DL — SIGNIFICANT CHANGE UP (ref 32–36)
MCV RBC AUTO: 91 FL — SIGNIFICANT CHANGE UP (ref 80–100)
METAMYELOCYTES # FLD: 5.3 % — HIGH (ref 0–0)
METAMYELOCYTES NFR BLD: 5.3 % — HIGH (ref 0–0)
MONOCYTES # BLD AUTO: 5.11 K/UL — HIGH (ref 0–0.9)
MONOCYTES NFR BLD AUTO: 33.3 % — HIGH (ref 2–14)
MYELOCYTES NFR BLD: 5.3 % — HIGH (ref 0–0)
NEUTROPHILS # BLD AUTO: 7 K/UL — SIGNIFICANT CHANGE UP (ref 1.8–7.4)
NEUTROPHILS NFR BLD AUTO: 36 % — LOW (ref 43–77)
NEUTS BAND # BLD: 9.6 % — HIGH (ref 0–8)
NEUTS BAND NFR BLD: 9.6 % — HIGH (ref 0–8)
NRBC # BLD: 2 /100 WBCS — HIGH (ref 0–0)
NRBC BLD-RTO: 2 /100 WBCS — HIGH (ref 0–0)
OVALOCYTES BLD QL SMEAR: SLIGHT — SIGNIFICANT CHANGE UP
PHOSPHATE SERPL-MCNC: 3.2 MG/DL — SIGNIFICANT CHANGE UP (ref 2.5–4.5)
PLAT MORPH BLD: NORMAL — SIGNIFICANT CHANGE UP
PLATELET # BLD AUTO: 79 K/UL — LOW (ref 150–400)
POIKILOCYTOSIS BLD QL AUTO: SLIGHT — SIGNIFICANT CHANGE UP
POTASSIUM SERPL-MCNC: 4 MMOL/L — SIGNIFICANT CHANGE UP (ref 3.5–5.3)
POTASSIUM SERPL-SCNC: 4 MMOL/L — SIGNIFICANT CHANGE UP (ref 3.5–5.3)
PROMYELOCYTES # FLD: 2.6 % — HIGH (ref 0–0)
PROMYELOCYTES NFR BLD: 2.6 % — HIGH (ref 0–0)
PROT SERPL-MCNC: 5.4 G/DL — LOW (ref 6–8.3)
RBC # BLD: 2.55 M/UL — LOW (ref 4.2–5.8)
RBC # FLD: 14.2 % — SIGNIFICANT CHANGE UP (ref 10.3–14.5)
RBC BLD AUTO: ABNORMAL
SODIUM SERPL-SCNC: 140 MMOL/L — SIGNIFICANT CHANGE UP (ref 135–145)
TOXIC GRANULES BLD QL SMEAR: PRESENT — SIGNIFICANT CHANGE UP
URATE SERPL-MCNC: 5.3 MG/DL — SIGNIFICANT CHANGE UP (ref 3.4–8.8)
WBC # BLD: 15.34 K/UL — HIGH (ref 3.8–10.5)
WBC # FLD AUTO: 15.34 K/UL — HIGH (ref 3.8–10.5)

## 2025-05-27 PROCEDURE — 99232 SBSQ HOSP IP/OBS MODERATE 35: CPT

## 2025-05-27 PROCEDURE — 99233 SBSQ HOSP IP/OBS HIGH 50: CPT

## 2025-05-27 RX ORDER — COLCHICINE 0.6 MG/1
0.6 TABLET, FILM COATED ORAL
Refills: 0 | Status: COMPLETED | OUTPATIENT
Start: 2025-05-27 | End: 2025-05-29

## 2025-05-27 RX ORDER — OXYCODONE HYDROCHLORIDE 30 MG/1
10 TABLET ORAL EVERY 4 HOURS
Refills: 0 | Status: DISCONTINUED | OUTPATIENT
Start: 2025-05-27 | End: 2025-05-30

## 2025-05-27 RX ADMIN — OXYCODONE HYDROCHLORIDE 10 MILLIGRAM(S): 30 TABLET ORAL at 16:44

## 2025-05-27 RX ADMIN — Medication 1 MILLIGRAM(S): at 03:44

## 2025-05-27 RX ADMIN — DEXAMETHASONE 0.5 MILLIGRAM(S): 0.5 TABLET ORAL at 05:57

## 2025-05-27 RX ADMIN — Medication 40 MILLIGRAM(S): at 05:55

## 2025-05-27 RX ADMIN — Medication 100 MILLIGRAM(S): at 12:29

## 2025-05-27 RX ADMIN — Medication 500 MILLIGRAM(S): at 05:56

## 2025-05-27 RX ADMIN — DIPHENHYDRAMINE HYDROCHLORIDE AND LIDOCAINE HYDROCHLORIDE AND ALUMINUM HYDROXIDE AND MAGNESIUM HYDRO 10 MILLILITER(S): KIT at 23:13

## 2025-05-27 RX ADMIN — TAMSULOSIN HYDROCHLORIDE 0.4 MILLIGRAM(S): 0.4 CAPSULE ORAL at 21:20

## 2025-05-27 RX ADMIN — Medication 1 APPLICATION(S): at 12:30

## 2025-05-27 RX ADMIN — COLCHICINE 0.6 MILLIGRAM(S): 0.6 TABLET, FILM COATED ORAL at 15:51

## 2025-05-27 RX ADMIN — Medication 15 MILLILITER(S): at 12:30

## 2025-05-27 RX ADMIN — Medication 2000 UNIT(S): at 12:29

## 2025-05-27 RX ADMIN — Medication 1 MILLIGRAM(S): at 04:40

## 2025-05-27 RX ADMIN — OXYCODONE HYDROCHLORIDE 10 MILLIGRAM(S): 30 TABLET ORAL at 21:36

## 2025-05-27 RX ADMIN — LIDOCAINE HYDROCHLORIDE 1 APPLICATION(S): 20 JELLY TOPICAL at 21:21

## 2025-05-27 RX ADMIN — Medication 40 MILLIGRAM(S): at 18:19

## 2025-05-27 RX ADMIN — Medication 80 MILLIGRAM(S): at 21:20

## 2025-05-27 RX ADMIN — DIPHENHYDRAMINE HYDROCHLORIDE AND LIDOCAINE HYDROCHLORIDE AND ALUMINUM HYDROXIDE AND MAGNESIUM HYDRO 10 MILLILITER(S): KIT at 12:40

## 2025-05-27 RX ADMIN — COLCHICINE 0.6 MILLIGRAM(S): 0.6 TABLET, FILM COATED ORAL at 21:36

## 2025-05-27 RX ADMIN — LIDOCAINE HYDROCHLORIDE 1 APPLICATION(S): 20 JELLY TOPICAL at 05:57

## 2025-05-27 RX ADMIN — Medication 1 MILLIGRAM(S): at 00:10

## 2025-05-27 RX ADMIN — Medication 15 MILLILITER(S): at 05:57

## 2025-05-27 RX ADMIN — LIDOCAINE HYDROCHLORIDE 1 APPLICATION(S): 20 JELLY TOPICAL at 15:51

## 2025-05-27 RX ADMIN — DEXAMETHASONE 0.5 MILLIGRAM(S): 0.5 TABLET ORAL at 18:18

## 2025-05-27 RX ADMIN — DIPHENHYDRAMINE HYDROCHLORIDE AND LIDOCAINE HYDROCHLORIDE AND ALUMINUM HYDROXIDE AND MAGNESIUM HYDRO 10 MILLILITER(S): KIT at 18:08

## 2025-05-27 RX ADMIN — MIDODRINE HYDROCHLORIDE 10 MILLIGRAM(S): 5 TABLET ORAL at 05:56

## 2025-05-27 RX ADMIN — OXYCODONE HYDROCHLORIDE 10 MILLIGRAM(S): 30 TABLET ORAL at 22:30

## 2025-05-27 RX ADMIN — DILTIAZEM HYDROCHLORIDE 240 MILLIGRAM(S): 240 TABLET, EXTENDED RELEASE ORAL at 05:56

## 2025-05-27 RX ADMIN — Medication 0.5 MILLIGRAM(S): at 08:18

## 2025-05-27 RX ADMIN — DIPHENHYDRAMINE HYDROCHLORIDE AND LIDOCAINE HYDROCHLORIDE AND ALUMINUM HYDROXIDE AND MAGNESIUM HYDRO 10 MILLILITER(S): KIT at 05:56

## 2025-05-27 RX ADMIN — Medication 5 MILLIGRAM(S): at 21:21

## 2025-05-27 RX ADMIN — Medication 15 MILLILITER(S): at 18:08

## 2025-05-27 RX ADMIN — Medication 500 MILLIGRAM(S): at 18:22

## 2025-05-27 RX ADMIN — Medication 15 MILLILITER(S): at 23:12

## 2025-05-27 NOTE — PROGRESS NOTE ADULT - TIME BILLING
Symptom assessment and management, supportive counseling, coordination of care
Symptom assessment and management, supportive counseling, coordination of care
Total Time Spent 60 minutes.    This includes chart review, patient assessment, discussion and collaboration with interdisciplinary team members, excluding ACP.

## 2025-05-27 NOTE — PHARMACOTHERAPY INTERVENTION NOTE - COMMENTS
Clinical Pharmacy Specialist- Hematology/Oncology- Progress Note    Pt is a 70 y/o male w/ PMH of HTN, HLD, GERD, BPH, Gout, spinal stenosis (s/p multiple surgeries) and newly diagnosed AML (ASXL1-mutated and DDX41) admitted for management with 7+3 Induction therapy    Antimicrobial Course( ID Following, MD Rodarte):  - Posaconazole- 5/2- 5/25  - Levaquin/Amox- 5/2- 5/11  --> Cefepime- 5/11- 5/19  --> Meropenem- 5/19- 5/26  - Flagyl (abd discomfort)- 5/13- 5/19  - Valtrex- 5/2  - Vancomycin (MRSA?MRSE bacteremia)- 5/20- 5/24  MRSA nasal swab    Last Neutropenic (ANC<1000): 5/23; ANC= 0.92  Last Febrile: 22-May-2025 22:43; T= 100.8  Days Non-Neutropenic: 4  Days afebrile: 4    Chemotherapy Course  -Current Regimen: 7+3 Induction  History:  (5/2/25)- 7+3 Induction   -Daunorubicin 60mg/m2 IVP D1-3   -Cytarabine 100mg/m2 IV cont inf D1-7  -Day: 26 (5/27)  BmBx: 5/16- favorable  Access: R TLC (accessed 5/5/25)    Vancomycin Levels:   Date    Dose              Value    Ref Range	                             Status   5/21    750mg q12hr   7.8       Trough 10-20; AUC= 400-600   Final  5/23    1gm q12hr      10.9     Trough 10-20; AUC= 400-600   Final    History/Relevant clinical information used in assessment:  - Ht= 5’11”; IBW= 75.3kg; Actual BW= 107.3kg; Adj BW= 88kg  - 5/2- IRR? rigors, chills, and tachycardia 2 hours into day 1; rasburicase 3mg x 1  - 5/4 -Crcl= 67ml/min ; Scr=1.32; Wt= 88kg; Adjusted BW used since pt BMI>30  - 5/9- increase melatonin 3mg to 10mh qhs- c/o insomnia  - 5/13- mouth pain- has ulceration   - 5/14- mouth pain - cannot swallow now  - 5/15- bloody diarrhea x 3 on reglan prn, but has not needed, monitor for d/c to not aggravate w/ prokinetic agent  - 5/15- HSV neg  - 5/16- bloody BM's decreased to 2x'd/day from 4x's/day yest  - 5/19- BlCx- Meth resistant Staphylococcus haemolyticus- on vancomycin 1gm q12hr, this will yield AUC= 457; per ID, likely contaminant, expect short course of vanco; - 5/19, still febrile since 5/11, on cefepime, BlCx NGTD + abd discomfort (added flagyl)-->CT CAP 5/15 - no infection (but groundglass opacities stable from previous?)  - 5/21- zarxio 480mcg daily 5/21- 5/23    Assessment/Plan/Recommendation:  Onc:  - Daunorubicin 60mg/m2 IVP D1-3   - Cytarabine 100mg/m2 IV cont inf D1-7- IRR on D1, started at 6p, stopped at 7:30p (received 1.5 hrs),  made up D1 bag on 5/9 to run over 23 hrs (to account for ~1hr given)  - BmBx 5/16- favorable  - allopurinol 100mg (300mg d/c'd 5/15)-keeping for gout  ID:  - 5/17 UCx- 5/17- 10-49k VRE- per ID, do not need to treat  - no longer neutropenic, on valtrex only  Cards:  - atorvastatin 80mg held d/t DDI with posa (Cat X)- can add back when posa is off  Pain (mouth):  - 5/27- if mucositis can descale regimen below- bamgic mouthwash swish & spit prn? lidocaine d/c or prn?  ·	magic mouthwash to swish & swallow- 5/14  ·	hydromorphone 0.2mg IVP q6hr prn- 5/14--> 0.5mg - 0.7mg q3hr prn- 5/16--> 0.7-1mg- 5/23  ·	hydromorphone 0.5mg IVP tid- 5/15  ·	oxycodone 2.5-5mg q4hr prn- 5/2- 5/16  ·	lidocaine gel 2% q8hr to tongue 5/14  ·	dexamethasone soln 0.5 mg/5 mL BID swish & spit - 5/15  ·	meperidine 12.5mg IVP prn rigors  Orthostasis:  - 5/18- midodrine 10mg PO tid    Additional Monitoring Needed?   -Yes- Continue to monitor renal function & daily counts for abx escalation/de-escalation   -Discharge Planning:  --> New meds: midodrine  --> Meds sent for auth:  --> Delivered meds:    Case discussed with attending/primary team    Liza Jolley, PharmD, BCPS  Clinical Pharmacy Specialist | Hematology/Oncology  [facility]  Email: [email or web address] or available on [employer] Clinical Pharmacy Specialist- Hematology/Oncology- Progress Note    Pt is a 70 y/o male w/ PMH of HTN, HLD, GERD, BPH, Gout, spinal stenosis (s/p multiple surgeries) and newly diagnosed AML (ASXL1-mutated and DDX41) admitted for management with 7+3 Induction therapy    Antimicrobial Course( ID Following, MD Rodarte):  - Posaconazole- 5/2- 5/25  - Levaquin/Amox- 5/2- 5/11  --> Cefepime- 5/11- 5/19  --> Meropenem- 5/19- 5/26  - Flagyl (abd discomfort)- 5/13- 5/19  - Valtrex- 5/2  - Vancomycin (MRSA?MRSE bacteremia)- 5/20- 5/24  MRSA nasal swab    Last Neutropenic (ANC<1000): 5/23; ANC= 0.92  Last Febrile: 22-May-2025 22:43; T= 100.8  Days Non-Neutropenic: 4  Days afebrile: 4    Chemotherapy Course  -Current Regimen: 7+3 Induction  History:  (5/2/25)- 7+3 Induction   -Daunorubicin 60mg/m2 IVP D1-3   -Cytarabine 100mg/m2 IV cont inf D1-7  -Day: 26 (5/27)  BmBx: 5/16- favorable  Access: R TLC (accessed 5/5/25)    Vancomycin Levels:   Date    Dose              Value    Ref Range	                             Status   5/21    750mg q12hr   7.8       Trough 10-20; AUC= 400-600   Final  5/23    1gm q12hr      10.9     Trough 10-20; AUC= 400-600   Final    History/Relevant clinical information used in assessment:  - Ht= 5’11”; IBW= 75.3kg; Actual BW= 107.3kg; Adj BW= 88kg  - 5/2- IRR? rigors, chills, and tachycardia 2 hours into day 1; rasburicase 3mg x 1  - 5/4 -Crcl= 67ml/min ; Scr=1.32; Wt= 88kg; Adjusted BW used since pt BMI>30  - 5/9- increase melatonin 3mg to 10mh qhs- c/o insomnia  - 5/13- mouth pain- has ulceration   - 5/14- mouth pain - cannot swallow now  - 5/15- bloody diarrhea x 3 on reglan prn, but has not needed, monitor for d/c to not aggravate w/ prokinetic agent  - 5/15- HSV neg  - 5/16- bloody BM's decreased to 2x'd/day from 4x's/day yest  - 5/19- BlCx- Meth resistant Staphylococcus haemolyticus- on vancomycin 1gm q12hr, this will yield AUC= 457; per ID, likely contaminant, expect short course of vanco; - 5/19, still febrile since 5/11, on cefepime, BlCx NGTD + abd discomfort (added flagyl)-->CT CAP 5/15 - no infection (but groundglass opacities stable from previous?)  - 5/21- zarxio 480mcg daily 5/21- 5/23    Assessment/Plan/Recommendation:  Onc:  - Daunorubicin 60mg/m2 IVP D1-3   - Cytarabine 100mg/m2 IV cont inf D1-7- IRR on D1, started at 6p, stopped at 7:30p (received 1.5 hrs),  made up D1 bag on 5/9 to run over 23 hrs (to account for ~1hr given)  - BmBx 5/16- favorable, next remission BmBx soon  - allopurinol 100mg (300mg d/c'd 5/15)-keeping for gout  - 5/27- pt endorses gout pain- colchicine 0.6mg BID (can cont up to 3 day) can cont up tp 1-2 days after symptoms resolve)  ID:  - 5/17 UCx- 5/17- 10-49k VRE- per ID, do not need to treat  - no longer neutropenic, on valtrex only  Cards:  - atorvastatin 80mg held d/t DDI with posa (Cat X)- can add back when posa is off  Pain (mouth):  ·	magic mouthwash to swish & swallow- 5/14- consider switch to swish & spit prn- 5/27  ·	hydromorphone 0.2mg IVP q6hr prn- 5/14--> 0.5mg - 0.7mg q3hr prn- 5/16--> 0.7-1mg- 5/23- 5/27  ·	hydromorphone 0.5mg IVP tid- 5/15- 5/27  ·	oxycodone 2.5-5mg q4hr prn- 5/2- 5/16--> 10mg q4hr prn- 5/27  ·	lidocaine gel 2% q8hr to tongue 5/14- consider d/c or prn- 5/27  ·	dexamethasone soln 0.5 mg/5 mL BID swish & spit - 5/15- consider d/c- 5/27  ·	meperidine 12.5mg IVP prn rigors  Orthostasis:  - 5/18- midodrine 10mg PO tid    Additional Monitoring Needed?   -Yes- Continue to monitor renal function & daily counts for abx escalation/de-escalation   -Discharge Planning:  --> New meds: midodrine  --> Meds sent for auth:  --> Delivered meds:    Case discussed with attending/primary team    Liza Jolley, PharmD, BCPS  Clinical Pharmacy Specialist | Hematology/Oncology  [facility]  Email: [email or web address] or available on [employer]

## 2025-05-27 NOTE — PROGRESS NOTE ADULT - PROBLEM SELECTOR PLAN 2
Secondary to chemotherapy. RESOLVING  - D/c IV Dilaudid 0.5 mg TID prior to meals  - D/c IV Dilaudid 0.7-1 mg q3 hours prn mod-severe pain.   - order oxycodone 10 mg q4 hours prn mod-severe pain  - can remove topical therapies including FIRST mouthwash, lido gel, dex solution and biotene once mucositis fully resolves  - Bowel regimen while on opioids.  Monitor for constipation.  - Narcan PRN

## 2025-05-27 NOTE — PROGRESS NOTE ADULT - ASSESSMENT
69M with HTN, HLD, Gout, GERD, BPH, spinal stenosis (s/p multiple procedures 2018, 2020 laminectomies / fusions), hip surgeries (Oct 2024, Jan 2025), residual hand weakness related to surgeries, umbilical hernia repair (2014/15) referred here from Dr Atkinson at [facility] in [place] for new ASXL1-mutated and QUF55-rlsqctp AML, admitted for induction chemo with dauno 60 mg/m2+ cytarabine. Hospital course c/b neutropenic fevers, pancytopenia and mucositis pain, Geriatrics and Palliative Medicine Team is consulted for assistance with pain control

## 2025-05-27 NOTE — PROGRESS NOTE ADULT - PROBLEM SELECTOR PLAN 11
DVT ppx: OOB and ambulation as carey PT consulted: no skilled PT need, PT signed off DVT ppx: OOB and ambulation  PT rec DIMAS at discharge

## 2025-05-27 NOTE — PROGRESS NOTE ADULT - PROBLEM SELECTOR PLAN 1
new ASXL1-mutated and OUS77-tamsirc AML.  admitted for induction chemo with dauno 60 mg/m2+ cytarabine. Chemo started on 5/2, Complicated by rigors, chills, fever, SOB  and tachycardia 2 hours into day 1 infusion - likely infusion. received pepcid/benadryl/solumedrol/tylenol iv. will make up D1 Cytarabine on day8    will proceed with day2 chemo with pre mdes Tylenol and Benadryl, hydrocortisone iv PRN reaction   Hepatitis screen/HIV(-) as outpatient  IVF, antiemetics, mouth care. monitor weight, I & O, diuresis PRN   Monitor CBC with diff QD, TLS labs BID, Coags & T & C TIW transfuse for hb<7, PLT <30  5/5 HLA sent  5/16 BMbx subcortical, suboptimal specimen, no evidence of persistent leukemia. Flow 0.34% myeloblasts.  5/23 PB flow sent, fu result  5/19: no longer having blood in stool for several days, check cbc daily, plt goal decreased to >30  5/24- WBC - 11.5, D/C Zarxio. new ASXL1-mutated and TEV16-bbumnde AML.  admitted for induction chemo with dauno 60 mg/m2+ cytarabine. Chemo started on 5/2, Complicated by rigors, chills, fever, SOB  and tachycardia 2 hours into day 1 infusion - likely infusion. received pepcid/benadryl/solumedrol/tylenol iv. will make up D1 Cytarabine on day8    will proceed with day2 chemo with pre mdes Tylenol and Benadryl, hydrocortisone iv PRN reaction   Hepatitis screen/HIV(-) as outpatient  IVF, antiemetics, mouth care. monitor weight, I & O, diuresis PRN   Monitor CBC with diff QD, TLS labs BID, Coags & T & C TIW transfuse for hb<7, PLT <20  5/16 BMbx subcortical, suboptimal specimen, no evidence of persistent leukemia. Flow 0.34% myeloblasts.  5/23 PB flow sent, fu result  5/19: no longer having blood in stool for several days, check cbc daily, plt goal decreased to >20  5/24- WBC - 11.5, D/C Zarxio new ASXL1-mutated and CJI17-aqgkudp AML.  admitted for induction chemo with dauno 60 mg/m2+ cytarabine. Chemo started on 5/2, Complicated by rigors, chills, fever, SOB  and tachycardia 2 hours into day 1 infusion - likely infusion. received pepcid/benadryl/solumedrol/tylenol iv. will make up D1 Cytarabine on day8    will proceed with day2 chemo with pre mdes Tylenol and Benadryl, hydrocortisone iv PRN reaction   Hepatitis screen/HIV(-) as outpatient  IVF, antiemetics, mouth care. monitor weight, I & O, diuresis PRN   Monitor CBC with diff QD, TLS labs BID, Coags & T & C TIW transfuse for hb<7, PLT <20  5/16 BMbx subcortical, suboptimal specimen, no evidence of persistent leukemia. Flow 0.34% myeloblasts.  5/23 PB flow sent, fu result  5/19: no longer having blood in stool for several days, check cbc daily, plt goal decreased to >20  5/24- WBC - 11.5, D/C Zarxio  5/27 BM bx to be done prior to discharge. Next cycle date will depend on these results. IV pain meds changed to Oxycodone 10mg q4 PRN

## 2025-05-27 NOTE — PROGRESS NOTE ADULT - SUBJECTIVE AND OBJECTIVE BOX
SUBJECTIVE AND OBJECTIVE:  Indication for Geriatrics and Palliative Care Services/INTERVAL HPI: pain management     OVERNIGHT EVENTS:  The patient was seen and examined at bedside, reports feeling better overall, noting his wbc is rising and his mouth is also feeling better. His appetite is improved and his stools are less loose. He is looking forward to leaving the hospital soon. We discussed transitioning from IV dilaudid prn to oxycodone prn detailed below, pt in agreement.     DNR on chart:  Allergies    No Known Allergies    Intolerances    MEDICATIONS  (STANDING):  allopurinol 100 milliGRAM(s) Oral daily  Biotene Dry Mouth Oral Rinse 15 milliLiter(s) Swish and Spit four times a day  chlorhexidine 4% Liquid 1 Application(s) Topical <User Schedule>  cholecalciferol 2000 Unit(s) Oral daily  dexAMETHasone    Solution 0.5 milliGRAM(s) Oral two times a day  diltiazem    milliGRAM(s) Oral daily  FIRST- Mouthwash  BLM 10 milliLiter(s) Swish and Swallow four times a day  lidocaine   4% Patch 1 Patch Transdermal every 24 hours  lidocaine 2% Gel 1 Application(s) Topical every 8 hours  melatonin 5 milliGRAM(s) Oral at bedtime  midodrine. 10 milliGRAM(s) Oral every 8 hours  pantoprazole  Injectable 40 milliGRAM(s) IV Push every 12 hours  simethicone 80 milliGRAM(s) Chew <User Schedule>  tamsulosin 0.4 milliGRAM(s) Oral at bedtime  valACYclovir 500 milliGRAM(s) Oral two times a day    MEDICATIONS  (PRN):  acetaminophen     Tablet .. 650 milliGRAM(s) Oral every 6 hours PRN Temp greater or equal to 38C (100.4F), Mild Pain (1 - 3)  albuterol    90 MICROgram(s) HFA Inhaler 2 Puff(s) Inhalation every 6 hours PRN Shortness of Breath and/or Wheezing  aluminum hydroxide/magnesium hydroxide/simethicone Suspension 30 milliLiter(s) Oral every 6 hours PRN Dyspepsia  calcium carbonate    500 mG (Tums) Chewable 1 Tablet(s) Chew four times a day PRN Dyspepsia  cyclobenzaprine 10 milliGRAM(s) Oral three times a day PRN Muscle Spasm  hydrocortisone sodium succinate Injectable 100 milliGRAM(s) IV Push every 8 hours PRN chemotherapy reaction  meperidine     Injectable 12.5 milliGRAM(s) IV Push once PRN shivering  metoclopramide Injectable 10 milliGRAM(s) IV Push every 6 hours PRN nausea/vomiting  ondansetron Injectable 8 milliGRAM(s) IV Push every 8 hours PRN Nausea and/or Vomiting  oxyCODONE    IR 10 milliGRAM(s) Oral every 4 hours PRN mod-severe pain  polyethylene glycol 3350 17 Gram(s) Oral two times a day PRN Constipation  senna 2 Tablet(s) Oral two times a day PRN Constipation  sodium chloride 0.9% lock flush 10 milliLiter(s) IV Push every 1 hour PRN Pre/post blood products, medications, blood draw, and to maintain line patency      ITEMS UNCHECKED ARE NOT PRESENT    PRESENT SYMPTOMS: [ ]Unable to self-report - see  CPOT, PAINADS, RDOS below  Source if other than patient:  [ ]Family   [ ]Team     Pain: [ x]yes [ ]no  QOL impact - discomfort over the course of the day, unable to chew  Location -  mouth, neck and back            Aggravating factors - eating, lying still   Quality - burning   Radiation - none  Timing- constant   Severity (0-10 scale): 8 to 9  Minimal acceptable level (0-10 scale):       Dyspnea:                           [ ]Mild [ ]Moderate [ ]Severe  Anxiety:                             [ ]Mild [ ]Moderate [ ]Severe  Fatigue:                             [ ]Mild [ ]Moderate [ ]Severe  Nausea:                             [ ]Mild [ ]Moderate [ ]Severe  Loss of appetite:              [ ]Mild [ ]Moderate [ ]Severe  Constipation:                    [ ]Mild [ ]Moderate [ ]Severe    PCSSQ[Palliative Care Spiritual Screening Question]   Severity (0-10):  Score of 4 or > indicate consideration of Chaplaincy referral.  Chaplaincy Referral: [ ] yes [x ] refused [ ] following    Caregiver [place]? : [ ] yes [ ] no  [ x] deferred:  Social work referral [ ] Patient & Family Centered Care Referral [ ]     Anticipatory Grief present?:  [ ] yes [ ] no  [x ] deferred: Social work referral [ ] Patient & Family Centered Care Referral [ ]      Other Symptoms:  [ x]All other review of systems negative     PHYSICAL EXAM:  Vital Signs Last 24 Hrs  T(C): 36.8 (27 May 2025 09:28), Max: 37 (26 May 2025 21:44)  T(F): 98.3 (27 May 2025 09:28), Max: 98.6 (26 May 2025 21:44)  HR: 86 (27 May 2025 11:04) (74 - 112)  BP: 110/72 (27 May 2025 09:28) (107/68 - 129/73)  BP(mean): --  RR: 18 (27 May 2025 09:28) (16 - 18)  SpO2: 93% (27 May 2025 09:28) (93% - 99%)    Parameters below as of 27 May 2025 09:28  Patient On (Oxygen Delivery Method): room air      GENERAL:   [ ]Cachexia    [x ]Alert  [ x]Oriented x 3  [ ]Lethargic  [ ]Unarousable  [ x]Verbal  [ ]Non-Verbal  Behavioral:   [ ] Anxiety  [ ] Delirium [ ] Agitation [ ] Other  HEENT:  [ ]Normal   [ ]Dry mouth   [ ]ET Tube/Trach  [ ]Oral lesions [x] minimal erythema on L lateral tongue, much improved compared to last week   PULMONARY:   [x ]Clear [ ]Tachypnea  [ ]Audible excessive secretions   [ ]Rhonchi        [ ]Right [ ]Left [ ]Bilateral  [ ]Crackles        [ ]Right [ ]Left [ ]Bilateral  [ ]Wheezing     [ ]Right [ ]Left [ ]Bilateral  [ ]Diminished breath sounds [ ]right [ ]left [ ]bilateral  CARDIOVASCULAR:    [ x]Regular [ ]Irregular [ ]Tachy  [ ]Lui [ ]Murmur [ ]Other  GASTROINTESTINAL:  [x ]Soft  [ ]Distended   [ ]+BS  [x ]Non tender [ ]Tender  [ ]Other [ ]PEG [ ]OGT/ NGT  Last BM:  GENITOURINARY:  [x ]Normal [ ] Incontinent   [ ]Oliguria/Anuria   [ ]Villalobos  MUSCULOSKELETAL:   [x ]Normal   [ ]Weakness  [ ]Bed/Wheelchair bound [ ]Edema  NEUROLOGIC:   [ x]No focal deficits  [ ]Cognitive impairment  [ ]Dysphagia [ ]Dysarthria [ ]Paresis [ ]Other   SKIN:   [ ]Normal  [ ]Rash  [ x]Other tattoos on BUE, bruising throughout  [ ]Pressure ulcer(s)       Present on admission [ ]y [ ]n    CRITICAL CARE:  [ ] Shock Present  [ ]Septic [ ]Cardiogenic [ ]Neurologic [ ]Hypovolemic  [ ]  Vasopressors [ ]  Inotropes   [ ]Respiratory failure present [ ]Mechanical ventilation [ ]Non-invasive ventilatory support [ ]High flow    [ ]Acute  [ ]Chronic [ ]Hypoxic  [ ]Hypercarbic [ ]Other  [ ]Other organ failure       LABS:                          7.9    15.34 )-----------( 79       ( 27 May 2025 07:13 )             23.2     05-27    140  |  103  |  12  ----------------------------<  125[H]  4.0   |  24  |  1.05    Ca    8.3[L]      27 May 2025 07:13  Phos  3.2     05-27  Mg     2.0     05-27    TPro  5.4[L]  /  Alb  3.2[L]  /  TBili  0.5  /  DBili  x   /  AST  37  /  ALT  90[H]  /  AlkPhos  134[H]  05-27    RADIOLOGY & ADDITIONAL STUDIES: reviewed    Protein Calorie Malnutrition Present: [ ]mild [ ]moderate [ ]severe [ ]underweight [ ]morbid obesity  https://www.andeal.org/vault/2440/web/files/ONC/Table_Clinical%20Characteristics%20to%20Document%20Malnutrition-White%20JV%20et%20al%202012.pdf    Height (cm): 181 (05-02-25 @ 08:51), 180.5 (04-28-25 @ 15:00)  Weight (kg): 107.3 (05-02-25 @ 11:55), 108.5 (04-28-25 @ 15:00)  BMI (kg/m2): 32.8 (05-02-25 @ 11:55), 33.1 (05-02-25 @ 08:51), 33.3 (04-28-25 @ 15:00)    [ ]PPSV2 < or = 30%  [ ]significant weight loss [ ]poor nutritional intake [ ]anasarca[ ]Artificial Nutrition    Other REFERRALS:  [ ]Hospice  [ ]Child Life  [ ]Social Work  [ ]Case management [ ]Holistic Therapy     Palliative Performance Scale:  http://[facility].org/files/news/palliative_performance_scale_ppsv2.pdf  (Ctrl +  left click to view)  Respiratory Distress Observation Tool:  https://homecareinformation.net/handouts/hen/Respiratory_Distress_Observation_Scale.pdf (Ctrl +  left click to view)  PAINAD Score:  http://geriatrictoolkit.[facility]/cog/painad.pdf (Ctrl +  left click to view)

## 2025-05-27 NOTE — PROGRESS NOTE ADULT - PROBLEM SELECTOR PLAN 9
5/18- Orthostatic hypotension,  cc bolus given.  Continue Midodrine 10 mg po q8h  off  HCTZ  5/22 orthostatic hypotension improved, check once daily 5/18- Orthostatic hypotension,  cc bolus given.  Continue Midodrine 10 mg po q8h  off HCTZ  5/22 orthostatic hypotension improved, check once daily

## 2025-05-27 NOTE — PROGRESS NOTE ADULT - PROBLEM SELECTOR PLAN 5
- As pt is no longer neutropenic with resolving mucositis pain, anticipate he won't need script for opiates upon discharge. Geriatrics and Palliative Medicine Team will follow peripherally at this time, please don't hesitate to call with questions    Paloma Mark MD  [facility] Team Consults  Please call if we can be of assistance, [phone]

## 2025-05-27 NOTE — PROGRESS NOTE ADULT - PROBLEM SELECTOR PLAN 3
Chronic back and neck pain with previous surgeries, no opiates used at home  - pt takes oxycodone 5 mg q6 hours prn at home. He is now on oxycodone 10 mg q4 hour prn

## 2025-05-27 NOTE — PROGRESS NOTE ADULT - ASSESSMENT
70 yo male with newly diagnosed ASXL1, DDX41 mutated AML, admitted for induction chemo with 7+3:  dauno 60 mg/m2+ cytarabine. 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 Chemo started on 5/2, Complicated by rigors, chills, fever, SOB  and tachycardia 2 hours into day 1 infusion - likely infusion reaction, neutropenic fever. Pt has pancytopenia d/t chemo and disease

## 2025-05-27 NOTE — PROGRESS NOTE ADULT - PROBLEM SELECTOR PLAN 2
neutropenic, febrile   continue posa, valtrex, flagyl (dc'd 5/19), cefepime (5/11-5/19 ), cont to be febrile Ucx from 5/17 (+) gram + cocci in pairs and chains, inc to meropenem (5/19-   5/2 CXR Patchy opacities along the left lung base, possibly atelectasis, however superimposed infection cannot be entirely excluded.  5/13- FLU/COVID/RSV (-)  5/13- Added Flagyl 500 mg IV Q 8 hrs due to GI discomfort/pain  5/13- CT C/A/P -negative for infxn  5/20  blood cx 5/19 showed 1/4 MRSE  urine cx 5/17 VRE but 5/18 negative and pt asymptomatic  - Levaquin/Amox- 5/2- 5/11  - Cefepime- 5/11- 5/19  Flagyl (abd discomfort)- 5/13- 5/19  - Meropenem- 5/19  - Valtrex-Posaconazole  5/2 5/20 O2 sat 88% with fever, FU closely, O2 PRN  5/25 Discontinue vancomycin  5/26- D/C Meropenem, Posaconazole. neutropenic, febrile   continue posa, valtrex, flagyl (dc'd 5/19), cefepime (5/11-5/19 ), cont to be febrile Ucx from 5/17 (+) gram + cocci in pairs and chains, inc to meropenem (5/19-   5/2 CXR Patchy opacities along the left lung base, possibly atelectasis, however superimposed infection cannot be entirely excluded.  5/13- FLU/COVID/RSV (-)  5/13- Added Flagyl 500 mg IV Q 8 hrs due to GI discomfort/pain  5/13- CT C/A/P -negative for infxn  5/20  blood cx 5/19 showed 1/4 MRSE  urine cx 5/17 VRE but 5/18 negative and pt asymptomatic  - Levaquin/Amox- 5/2- 5/11  - Cefepime- 5/11- 5/19  Flagyl (abd discomfort)- 5/13- 5/19  - Meropenem- 5/19  - Valtrex-Posaconazole  5/2 5/20 O2 sat 88% with fever, FU closely, O2 PRN  5/25 Discontinue vancomycin  5/26- D/C Meropenem, Posaconazole

## 2025-05-27 NOTE — CHART NOTE - NSCHARTNOTEFT_GEN_A_CORE
NUTRITION FOLLOW UP NOTE    PATIENT SEEN FOR: follow-up for nutrition service, consult for calorie count initiation     SOURCE: [x] Patient  [x] Current Medical Record  [x] RN  [] Family/support person at bedside  [] Patient unavailable/inappropriate  [] Other:    CHART REVIEWED/EVENTS NOTED.  [] No changes to nutrition care plan to note  [x] Nutrition Status:  - new ASXL1-mutated and IYX75-ozwjdip AML    DIET ORDER:   Diet, Regular:   Supplement Feeding Modality:  Oral  Ensure Plus High Protein Cans or Servings Per Day:  1       Frequency:  Two Times a day (25 @ 17:10) [Active]        CURRENT DIET ORDER IS:  [] Appropriate:  [] Inadequate:  [x] Other: see below for recommendation     NUTRITION INTAKE/PROVISION:  [x] PO: Pt reports decreased PO intake   - diet history: pt reports mild lactose intolerance, unable to tolerate regular milk (needs lactaid milk) but able to tolerate cheese, yogurt.   [] Enteral Nutrition:  [] Parenteral Nutrition:    ANTHROPOMETRICS:  Drug Dosing Weight  Height (cm): 181 (02 May 2025 08:51)  Weight (kg): 107.3 (02 May 2025 11:55)  BMI (kg/m2): 32.8 (02 May 2025 11:55)  Weights:   Daily Weight in kG: Daily     Daily Weight in k (27 May 2025 08:14), 101.2 (-22), Weight in k.1 (-21), Weight in k.6 (-20), Weight in k.4 (-19), Weight in k.9 (-18), Weight in k.6 (-17), Weight in k.4 (05-16)   - Noted significant weight loss of 6.1kg/5.7% in 3 weeks since admission: might be multifactorial: fluid shift with treatment, decreased PO intake.     MEDICATIONS:  MEDICATIONS  (STANDING):  allopurinol 100 milliGRAM(s) Oral daily  Biotene Dry Mouth Oral Rinse 15 milliLiter(s) Swish and Spit four times a day  chlorhexidine 4% Liquid 1 Application(s) Topical <User Schedule>  cholecalciferol 2000 Unit(s) Oral daily  dexAMETHasone    Solution 0.5 milliGRAM(s) Oral two times a day  diltiazem    milliGRAM(s) Oral daily  FIRST- Mouthwash  BLM 10 milliLiter(s) Swish and Swallow four times a day  lidocaine   4% Patch 1 Patch Transdermal every 24 hours  lidocaine 2% Gel 1 Application(s) Topical every 8 hours  melatonin 5 milliGRAM(s) Oral at bedtime  midodrine. 10 milliGRAM(s) Oral every 8 hours  pantoprazole  Injectable 40 milliGRAM(s) IV Push every 12 hours  simethicone 80 milliGRAM(s) Chew <User Schedule>  tamsulosin 0.4 milliGRAM(s) Oral at bedtime  valACYclovir 500 milliGRAM(s) Oral two times a day    MEDICATIONS  (PRN):  acetaminophen     Tablet .. 650 milliGRAM(s) Oral every 6 hours PRN Temp greater or equal to 38C (100.4F), Mild Pain (1 - 3)  albuterol    90 MICROgram(s) HFA Inhaler 2 Puff(s) Inhalation every 6 hours PRN Shortness of Breath and/or Wheezing  aluminum hydroxide/magnesium hydroxide/simethicone Suspension 30 milliLiter(s) Oral every 6 hours PRN Dyspepsia  calcium carbonate    500 mG (Tums) Chewable 1 Tablet(s) Chew four times a day PRN Dyspepsia  cyclobenzaprine 10 milliGRAM(s) Oral three times a day PRN Muscle Spasm  hydrocortisone sodium succinate Injectable 100 milliGRAM(s) IV Push every 8 hours PRN chemotherapy reaction  meperidine     Injectable 12.5 milliGRAM(s) IV Push once PRN shivering  metoclopramide Injectable 10 milliGRAM(s) IV Push every 6 hours PRN nausea/vomiting  ondansetron Injectable 8 milliGRAM(s) IV Push every 8 hours PRN Nausea and/or Vomiting  oxyCODONE    IR 10 milliGRAM(s) Oral every 4 hours PRN mod-severe pain  polyethylene glycol 3350 17 Gram(s) Oral two times a day PRN Constipation  senna 2 Tablet(s) Oral two times a day PRN Constipation  sodium chloride 0.9% lock flush 10 milliLiter(s) IV Push every 1 hour PRN Pre/post blood products, medications, blood draw, and to maintain line patency        NUTRITIONALLY PERTINENT LABS:   @ 07:13: Na 140, BUN 12, Cr 1.05, [H], K+ 4.0, Phos 3.2, Mg 2.0, Alk Phos 134[H], ALT/SGPT 90[H], AST/SGOT 37, HbA1c --    Finger Sticks:    NUTRITIONALLY PERTINENT MEDICATIONS/LABS:  [x] Reviewed  [x] Relevant notes on medications/labs:  - on IVF NaCl 0.9% @ 75ml/hr  - CV: Midodrine   - ordered for Phosphorus repletion today   - on Solu-cortef   - Micronutrient/Other supplementation: Vitamin D     EDEMA:  [x] Reviewed  [x] Relevant notes: +1 to left and right ankles per flowsheet     GI/ I&O:  [x] Reviewed  [x] Relevant notes: Last BM  per flowsheet   [x] Other: mucositis: on Decadron solution, Biotene, FIRST.     SKIN:   [x] No pressure injuries documented, per nursing flowsheet  [] Pressure injury previously noted  [] Change in pressure injury documentation:  [] Other:    ESTIMATED NEEDS:  [] No change:  [x] Updated:  Energy:  3262-7306 kcal/day (30-35 kcal/kg)  Protein:   94- 109 g/day (1.2-1.4 g/kg)  Fluid:   ml/day or [x] defer to team  Based on: 78 kg IBW    NUTRITION DIAGNOSIS:  [x] Prior Dx: 1. Acute severe malnutrition 2. Increased nutrient needs   [] New Dx:     EDUCATION:  [x] Yes: Emphasized the importance of adequate kcal and protein intake; recommend to optimize nutritional intake in case of decreased appetite; recommended small frequent meals by ordering nutrient-dense snacks and leaving non-perishable food away from tray for later consumption during the day or between meals; to start with protein, and sips of supplement throughout the day; reviewed mucositis medical nutrition therapy   [] Not appropriate/warranted    NUTRITION CARE PLAN:  1. Diet: Continue regular diet free of therapeutic restriction (pt aware to order softer meals). RD remains available to adjust diet as needed.   2. Supplements: Recommend Ensure plus high protein 2x daily (700kcal, 40g proteins) in vanilla and chocolate flavor per request.   3. Multivitamin/mineral supplementation: per team   4. Monitor and encourage PO intake. Encourage use of daily menus. Honor dietary preferences as expressed as able.   5.    [x] Achieved - Continue current nutrition intervention(s)  [] Current medical condition precludes nutrition intervention at this time.    MONITORING AND EVALUATION:   RD remains available upon request and will follow up per protocol:   Danni Muse RDN CDN (available on TEAMS)   Available on [facility] TEAMS NUTRITION FOLLOW UP NOTE    PATIENT SEEN FOR: follow-up for nutrition service/ calorie count assessment     SOURCE: [x] Patient  [x] Current Medical Record  [x] RN  [] Family/support person at bedside  [] Patient unavailable/inappropriate  [] Other:    CHART REVIEWED/EVENTS NOTED.  [] No changes to nutrition care plan to note  [x] Nutrition Status:  - new ASXL1-mutated and KZO57-xtzjgwv AML    DIET ORDER:   Diet, Regular:   Supplement Feeding Modality:  Oral  Ensure Plus High Protein Cans or Servings Per Day:  1       Frequency:  Two Times a day (25 @ 17:10) [Active]        CURRENT DIET ORDER IS:  [] Appropriate:  [] Inadequate:  [x] Other: see below for recommendation     NUTRITION INTAKE/PROVISION:  [x] PO: Pt reports appetite/PO intake is improving. Endorses drinking Ensure Plus HP shakes; is amenable to receiving mighty shakes with each meal.   - diet history: pt reports mild lactose intolerance, unable to tolerate regular milk (needs lactaid milk) but able to tolerate cheese, yogurt.   [] Enteral Nutrition:  [] Parenteral Nutrition:    Calorie Count -  Day 1 Energy needs: (~550 kcal, ~10 g protein )  Day 2 Energy needs: (~1050 kcal, ~10 g protein )  Day 3 Energy needs: (~650 kcal, ~20 g protein )  Results: Pt with poor average energy/protein intake (<50% of needs)     ANTHROPOMETRICS:  Drug Dosing Weight  Height (cm): 181 (02 May 2025 08:51)  Weight (kg): 107.3 (02 May 2025 11:55)  BMI (kg/m2): 32.8 (02 May 2025 11:55)  Weights:   Daily Weight in kG: Daily     Daily Weight in k (27 May 2025 08:14), 101.2 (-22), Weight in k.1 (-21), Weight in k.6 (-20), Weight in k.4 (-19), Weight in k.9 (-18), Weight in k.6 (-17), Weight in k.4 (05-16)   - Noted significant weight loss of 6.1kg/5.7% in 3 weeks since admission: might be multifactorial: fluid shift with treatment, decreased PO intake.     MEDICATIONS:  MEDICATIONS  (STANDING):  allopurinol 100 milliGRAM(s) Oral daily  Biotene Dry Mouth Oral Rinse 15 milliLiter(s) Swish and Spit four times a day  chlorhexidine 4% Liquid 1 Application(s) Topical <User Schedule>  cholecalciferol 2000 Unit(s) Oral daily  dexAMETHasone    Solution 0.5 milliGRAM(s) Oral two times a day  diltiazem    milliGRAM(s) Oral daily  FIRST- Mouthwash  BLM 10 milliLiter(s) Swish and Swallow four times a day  lidocaine   4% Patch 1 Patch Transdermal every 24 hours  lidocaine 2% Gel 1 Application(s) Topical every 8 hours  melatonin 5 milliGRAM(s) Oral at bedtime  midodrine. 10 milliGRAM(s) Oral every 8 hours  pantoprazole  Injectable 40 milliGRAM(s) IV Push every 12 hours  simethicone 80 milliGRAM(s) Chew <User Schedule>  tamsulosin 0.4 milliGRAM(s) Oral at bedtime  valACYclovir 500 milliGRAM(s) Oral two times a day    MEDICATIONS  (PRN):  acetaminophen     Tablet .. 650 milliGRAM(s) Oral every 6 hours PRN Temp greater or equal to 38C (100.4F), Mild Pain (1 - 3)  albuterol    90 MICROgram(s) HFA Inhaler 2 Puff(s) Inhalation every 6 hours PRN Shortness of Breath and/or Wheezing  aluminum hydroxide/magnesium hydroxide/simethicone Suspension 30 milliLiter(s) Oral every 6 hours PRN Dyspepsia  calcium carbonate    500 mG (Tums) Chewable 1 Tablet(s) Chew four times a day PRN Dyspepsia  cyclobenzaprine 10 milliGRAM(s) Oral three times a day PRN Muscle Spasm  hydrocortisone sodium succinate Injectable 100 milliGRAM(s) IV Push every 8 hours PRN chemotherapy reaction  meperidine     Injectable 12.5 milliGRAM(s) IV Push once PRN shivering  metoclopramide Injectable 10 milliGRAM(s) IV Push every 6 hours PRN nausea/vomiting  ondansetron Injectable 8 milliGRAM(s) IV Push every 8 hours PRN Nausea and/or Vomiting  oxyCODONE    IR 10 milliGRAM(s) Oral every 4 hours PRN mod-severe pain  polyethylene glycol 3350 17 Gram(s) Oral two times a day PRN Constipation  senna 2 Tablet(s) Oral two times a day PRN Constipation  sodium chloride 0.9% lock flush 10 milliLiter(s) IV Push every 1 hour PRN Pre/post blood products, medications, blood draw, and to maintain line patency        NUTRITIONALLY PERTINENT LABS:   @ 07:13: Na 140, BUN 12, Cr 1.05, [H], K+ 4.0, Phos 3.2, Mg 2.0, Alk Phos 134[H], ALT/SGPT 90[H], AST/SGOT 37, HbA1c --    Finger Sticks:    NUTRITIONALLY PERTINENT MEDICATIONS/LABS:  [x] Reviewed  [x] Relevant notes on medications/labs:  - on IVF NaCl 0.9% @ 75ml/hr  - CV: Midodrine   - ordered for Phosphorus repletion today   - on Solu-cortef   - Micronutrient/Other supplementation: Vitamin D     EDEMA:  [x] Reviewed  [x] Relevant notes: +1 to left and right ankles per flowsheet     GI/ I&O:  [x] Reviewed  [x] Relevant notes: Last BM  per flowsheet   [x] Other: mucositis: on Decadron solution, Biotene, FIRST.     SKIN:   [x] No pressure injuries documented, per nursing flowsheet  [] Pressure injury previously noted  [] Change in pressure injury documentation:  [] Other:    ESTIMATED NEEDS:  [] No change:  [x] Updated:  Energy:  2546-3279 kcal/day (30-35 kcal/kg)  Protein:   94- 109 g/day (1.2-1.4 g/kg)  Fluid:   ml/day or [x] defer to team  Based on: 78 kg IBW    NUTRITION DIAGNOSIS:  [x] Prior Dx: 1. Acute severe malnutrition 2. Increased nutrient needs   [] New Dx:     EDUCATION:  [x] Yes: Emphasized the importance of adequate kcal and protein intake; recommend to optimize nutritional intake in case of decreased appetite; recommended small frequent meals by ordering nutrient-dense snacks and leaving non-perishable food away from tray for later consumption during the day or between meals; to start with protein, and sips of supplement throughout the day; reviewed mucositis medical nutrition therapy   [] Not appropriate/warranted    NUTRITION CARE PLAN:  1. Diet: Continue regular diet free of therapeutic restriction (pt aware to order softer meals). RD remains available to adjust diet as needed.   2. Supplements:   - Recommend Ensure plus high protein 2x daily (700kcal, 40g proteins) in vanilla and chocolate flavor per request.   - RD to add Mighty Shake 3x/day (600 wilfred, 21 Gm protein)   3. Multivitamin/mineral supplementation: per team   4. Monitor and encourage PO intake. Encourage use of daily menus. Honor dietary preferences as expressed as able.   5. Calorie count failed.     [x] Achieved - Continue current nutrition intervention(s)  [] Current medical condition precludes nutrition intervention at this time.    MONITORING AND EVALUATION:   RD remains available upon request and will follow up per protocol:   Danni Muse RDN CDN (available on TEAMS)

## 2025-05-27 NOTE — PROGRESS NOTE ADULT - PROBLEM SELECTOR PLAN 8
on Allopurinol 100 mg po qd  increase to 300 mg po qd for TLS prevention-->back to 100 mg QD on Allopurinol 100 mg po qd  increase to 300 mg po qd for TLS prevention-->back to 100 mg QD  5/27 colchicine 0.6mg BID x 2 days added for increased gout pain

## 2025-05-27 NOTE — PROGRESS NOTE ADULT - PROBLEM SELECTOR PLAN 7
Added Oxycodone PRN for mucositis and back pain Palliative care following   5/27 IV pain meds changed to Oxycodone 10mg q4 PRN

## 2025-05-27 NOTE — PROGRESS NOTE ADULT - NS ATTEND AMEND GEN_ALL_CORE FT
69M with ASXL1-mutated and YSH21-xnukubq AML. 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 Admitted for induction 7+3.    Heme:   - Completed 7+3. Day 25. Complicated by rigors, chills, and tachycardia 2 hours into day 1 infusion - likely infusion reaction.  - Trend CBC with differential daily. Goal Hgb > 8 and plt > 20 given past GI bleeding.   - Trend DIC labs intermittently, as this has not been issue since admission. Continue transfusions as needed to maintain fibrinogen > 100.      - Allopurinol continued, but for previous diagnosis of gout decreased dose to 100 mg on 5/16.  S/p rasburicase; uric acid and LDH stable.   - Continue IVF. Monitor daily weights and I/O's. Diurese PRN.   - BMBx done day 15 (5/16) - subcortical, suboptimal specimen, no evidence of persistent leukemia. Flow 0.34% myeloblasts.  - in absence of overt evidence of persistent leukemia, and in setting of persistent fevers, started daily G-CSF 5/21  - ANC rising now, blasts ~12%. d/c G-CSF once ANC sustained > 1.0 and/or PB blasts increase - d/c'd 5/24    GI:  - Bloody BM 5/14-16.  Plt goal > 30K now.  - c/o severe midepigastric pain 5/22 AM, resolved   - EKG w/o acute changes, troponin sl incr 70, then decr.  - amylase, lipase normal  - cont PPI 2x/day     Card:  - Orthostatic hypotension, likely residual from GI bleed and decreased PO intake. Poss autonomic component.  - Bolus 500 cc on 5/17 and 5/18.  Now on midodrine. BP stable.   - weight stable  - Encouraged incr PO intake.      ID:   - Neutropenic and febrile; fevers since 5/11, last temp 100.8 5/22 pm, Tmax 101     with ongoing GI symptoms, diarrhea. Cults (-) with recent CNS from blood likely contaminant in 1/4 bottles and VREC in urine    cleared in subsequent specimen.  - Cefepime (5/11-5/19 ). Persistent fever lead to changing to meropenem on 5/19.  Last temp 100.4 5/21.   Will d/c gianna today (5/26)  - CT scan from 5/14 showing no acute CT findings suspicious for infection. Mentioned GGOs in the lungs bilaterally, but stable from a previous scan.   - Prophylaxis: valacyclovir   - Appreciate ID input - kept on vanco for now (fevers, mucositis), d/c vanco 5/25  - Mouth rinses, dexamethasone swish and spit for mucositis, which persists.  - demerol prn for rigors  - no longer neutropenic, hold posaconazole PPx    MSK: Has chronic pain from prior surgeries   - Pain control: oxycodone    TLC in place  OOB. 69M with ASXL1-mutated and YZF91-nmjpoyv AML. 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 Admitted for induction 7+3.    Heme:   - Completed 7+3. Day 25. Complicated by rigors, chills, and tachycardia 2 hours into day 1 infusion - likely infusion reaction.  - Trend CBC with differential daily. Goal Hgb > 8 and plt > 20 given past GI bleeding.   - Trend DIC labs intermittently, as this has not been issue since admission. Continue transfusions as needed to maintain fibrinogen > 100.      - Allopurinol continued, but for previous diagnosis of gout decreased dose to 100 mg on 5/16.  S/p rasburicase; uric acid and LDH stable.   - Continue IVF. Monitor daily weights and I/O's. Diurese PRN.   - BMBx done day 15 (5/16) - subcortical, suboptimal specimen, no evidence of persistent leukemia. Flow 0.34% myeloblasts.  - in absence of overt evidence of persistent leukemia, and in setting of persistent fevers, started daily G-CSF 5/21  - has had PB ANC recovery >>> d/c G-CSF (last dose 5/23)    GI:  - Bloody BM 5/14-16.  Plt now recovering.  - c/o severe midepigastric pain 5/22 AM, resolved   - EKG w/o acute changes, troponin sl incr 70, then decr.  - amylase, lipase normal  - cont PPI 2x/day     Card:  - Orthostatic hypotension, likely residual from GI bleed and decreased PO intake. Poss autonomic component.  - Bolus 500 cc on 5/17 and 5/18.  Now on midodrine. BP stable.   - weight stable  - Encouraged incr PO intake.      ID:   - Neutropenic and febrile; fevers since 5/11, last temp 100.8 5/22 pm, Tmax 101     with ongoing GI symptoms, diarrhea. Cults (-) with recent CNS from blood likely contaminant in 1/4 bottles and VREC in urine    cleared in subsequent specimen.  - Cefepime (5/11-5/19 ). Persistent fever lead to changing to meropenem on 5/19.  Last temp 100.4 5/21.  d/c gianna (5/26)  - CT scan from 5/14 showing no acute CT findings suspicious for infection. Mentioned GGOs in the lungs bilaterally, but stable from a previous scan.   - Prophylaxis: valacyclovir   - Appreciate ID input - kept on vanco for now (fevers, mucositis), d/c vanco 5/25  - Mouth rinses, dexamethasone swish and spit for mucositis, which has not fully resolved  - demerol prn for rigors  - no longer neutropenic, d/c posaconazole PPx  - Likely remission BMA/Bx pre d/c    MSK: Has chronic pain from prior surgeries   - Pain control: still getting dilaudid iv 0.5 mg iv 3x/d - switch to 2 mg po 3x/d and try to wean off    TLC in place  DIMAS being considered  OOB.

## 2025-05-27 NOTE — PROGRESS NOTE ADULT - SUBJECTIVE AND OBJECTIVE BOX
Diagnosis: ASXL1-mutated and [id number] AML    Protocol/Chemo Regimen: s/p 7+3:  Daunorubicin 60 mg/m2+ Cytarabine     Day: 26    Pt endorsed: ____________    Review of Systems: Denies any nausea, vomiting, chest pain, palpitation, SOB.    Pain scale:  2-3/10    Diet: regular Ensure plus HP BID                      Allergies: No Known Allergies    ANTIMICROBIALS  valACYclovir 500 milliGRAM(s) Oral two times a day    STANDING MEDICATIONS  allopurinol 100 milliGRAM(s) Oral daily  Biotene Dry Mouth Oral Rinse 15 milliLiter(s) Swish and Spit four times a day  chlorhexidine 4% Liquid 1 Application(s) Topical <User Schedule>  cholecalciferol 2000 Unit(s) Oral daily  dexAMETHasone    Solution 0.5 milliGRAM(s) Oral two times a day  diltiazem    milliGRAM(s) Oral daily  FIRST- Mouthwash  BLM 10 milliLiter(s) Swish and Swallow four times a day  HYDROmorphone  Injectable 0.5 milliGRAM(s) IV Push <User Schedule>  lidocaine   4% Patch 1 Patch Transdermal every 24 hours  lidocaine 2% Gel 1 Application(s) Topical every 8 hours  melatonin 5 milliGRAM(s) Oral at bedtime  midodrine. 10 milliGRAM(s) Oral every 8 hours  pantoprazole  Injectable 40 milliGRAM(s) IV Push every 12 hours  simethicone 80 milliGRAM(s) Chew <User Schedule>  tamsulosin 0.4 milliGRAM(s) Oral at bedtime      PRN MEDICATIONS  acetaminophen     Tablet .. 650 milliGRAM(s) Oral every 6 hours PRN  albuterol    90 MICROgram(s) HFA Inhaler 2 Puff(s) Inhalation every 6 hours PRN  aluminum hydroxide/magnesium hydroxide/simethicone Suspension 30 milliLiter(s) Oral every 6 hours PRN  calcium carbonate    500 mG (Tums) Chewable 1 Tablet(s) Chew four times a day PRN  cyclobenzaprine 10 milliGRAM(s) Oral three times a day PRN  hydrocortisone sodium succinate Injectable 100 milliGRAM(s) IV Push every 8 hours PRN  HYDROmorphone  Injectable 0.7 milliGRAM(s) IV Push every 4 hours PRN  HYDROmorphone  Injectable 1 milliGRAM(s) IV Push every 4 hours PRN  meperidine     Injectable 12.5 milliGRAM(s) IV Push once PRN  metoclopramide Injectable 10 milliGRAM(s) IV Push every 6 hours PRN  ondansetron Injectable 8 milliGRAM(s) IV Push every 8 hours PRN  polyethylene glycol 3350 17 Gram(s) Oral two times a day PRN  senna 2 Tablet(s) Oral two times a day PRN  sodium chloride 0.9% lock flush 10 milliLiter(s) IV Push every 1 hour PRN    Vital Signs Last 24 Hrs  T(C): 36.8 (27 May 2025 05:43), Max: 37 (26 May 2025 21:44)  T(F): 98.3 (27 May 2025 05:43), Max: 98.6 (26 May 2025 21:44)  HR: 74 (27 May 2025 05:43) (74 - 88)  BP: 107/68 (27 May 2025 05:43) (107/68 - 129/73)  BP(mean): --  RR: 16 (27 May 2025 05:43) (16 - 18)  SpO2: 99% (27 May 2025 05:43) (95% - 99%)    Parameters below as of 27 May 2025 05:43  Patient On (Oxygen Delivery Method): room air    PHYSICAL EXAM  General: adult in NAD  HEENT: clear oropharynx, no erythema, no ulcers  CV: normal S1, S2, RRR  Lungs: clear to auscultation, no wheezes, no rales  Abdomen: +BS, soft, nontender, nondistended  Ext: no edema  Skin: no rash  Neuro: alert and oriented x 4  Central line: normal     LABS:    Recent Cultures:     Culture - Blood (05.23.25 @ 06:45)    Specimen Source: Blood Blood-Catheter   Culture Results:   No growth at 72 Hours    Culture - Urine (05.22.25 @ 23:23)    Specimen Source: Clean Catch Clean Catch (Midstream)   Culture Results:   No growth    Culture - Blood (05.22.25 @ 23:22)    Specimen Source: Blood Blood-Peripheral   Culture Results:   No growth at 4 days                        7.9      15.34 )-----------( 79       ( 27 May 2025 07:13 )             23.2     Mean Cell Volume : 91.0 fl  Mean Cell Hemoglobin : 31.0 pg  Mean Cell Hemoglobin Concentration : 34.1 g/dL  Auto Neutrophil # : x  Auto Lymphocyte # : x  Auto Monocyte # : x  Auto Eosinophil # : x  Auto Basophil # : x  Auto Neutrophil % : x  Auto Lymphocyte % : x  Auto Monocyte % : x  Auto Eosinophil % : x  Auto Basophil % : x      05-27    140  |  103  |  12  ----------------------------<  125[H]  4.0   |  24  |  1.05    Ca    8.3[L]      27 May 2025 07:13  Phos  3.2     05-27  Mg     2.0     05-27    TPro  5.4[L]  /  Alb  3.2[L]  /  TBili  0.5  /  DBili  x   /  AST  37  /  ALT  90[H]  /  AlkPhos  134[H]  05-27    PT/INR - ( 26 May 2025 06:53 )   PT: 13.4 sec;   INR: 1.18 ratio      PTT - ( 26 May 2025 06:53 )  PTT:26.5 sec      Uric Acid 5.3    RADIOLOGY & ADDITIONAL STUDIES:    < from: Xray Chest 1 View- PORTABLE-Urgent (Xray Chest 1 View- PORTABLE-Urgent .) (05.22.25 @ 22:44) >  IMPRESSION:  No focal consolidation.     Diagnosis: ASXL1-mutated and HWO08-jvxihrs AML    Protocol/Chemo Regimen: s/p 7+3:  Daunorubicin 60 mg/m2+ Cytarabine     Day: 26    Pt endorsed: gout pain to b/l feet, otherwise no issues overnight     Review of Systems: Denies any nausea, vomiting, chest pain, palpitation, SOB.    Pain scale: ungraded    Diet: regular Ensure plus HP BID                      Allergies: No Known Allergies    ANTIMICROBIALS  valACYclovir 500 milliGRAM(s) Oral two times a day    STANDING MEDICATIONS  allopurinol 100 milliGRAM(s) Oral daily  Biotene Dry Mouth Oral Rinse 15 milliLiter(s) Swish and Spit four times a day  chlorhexidine 4% Liquid 1 Application(s) Topical <User Schedule>  cholecalciferol 2000 Unit(s) Oral daily  dexAMETHasone    Solution 0.5 milliGRAM(s) Oral two times a day  diltiazem    milliGRAM(s) Oral daily  FIRST- Mouthwash  BLM 10 milliLiter(s) Swish and Swallow four times a day  HYDROmorphone  Injectable 0.5 milliGRAM(s) IV Push <User Schedule>  lidocaine   4% Patch 1 Patch Transdermal every 24 hours  lidocaine 2% Gel 1 Application(s) Topical every 8 hours  melatonin 5 milliGRAM(s) Oral at bedtime  midodrine. 10 milliGRAM(s) Oral every 8 hours  pantoprazole  Injectable 40 milliGRAM(s) IV Push every 12 hours  simethicone 80 milliGRAM(s) Chew <User Schedule>  tamsulosin 0.4 milliGRAM(s) Oral at bedtime      PRN MEDICATIONS  acetaminophen     Tablet .. 650 milliGRAM(s) Oral every 6 hours PRN  albuterol    90 MICROgram(s) HFA Inhaler 2 Puff(s) Inhalation every 6 hours PRN  aluminum hydroxide/magnesium hydroxide/simethicone Suspension 30 milliLiter(s) Oral every 6 hours PRN  calcium carbonate    500 mG (Tums) Chewable 1 Tablet(s) Chew four times a day PRN  cyclobenzaprine 10 milliGRAM(s) Oral three times a day PRN  hydrocortisone sodium succinate Injectable 100 milliGRAM(s) IV Push every 8 hours PRN  HYDROmorphone  Injectable 0.7 milliGRAM(s) IV Push every 4 hours PRN  HYDROmorphone  Injectable 1 milliGRAM(s) IV Push every 4 hours PRN  meperidine     Injectable 12.5 milliGRAM(s) IV Push once PRN  metoclopramide Injectable 10 milliGRAM(s) IV Push every 6 hours PRN  ondansetron Injectable 8 milliGRAM(s) IV Push every 8 hours PRN  polyethylene glycol 3350 17 Gram(s) Oral two times a day PRN  senna 2 Tablet(s) Oral two times a day PRN  sodium chloride 0.9% lock flush 10 milliLiter(s) IV Push every 1 hour PRN    Vital Signs Last 24 Hrs  T(C): 36.8 (27 May 2025 05:43), Max: 37 (26 May 2025 21:44)  T(F): 98.3 (27 May 2025 05:43), Max: 98.6 (26 May 2025 21:44)  HR: 74 (27 May 2025 05:43) (74 - 88)  BP: 107/68 (27 May 2025 05:43) (107/68 - 129/73)  BP(mean): --  RR: 16 (27 May 2025 05:43) (16 - 18)  SpO2: 99% (27 May 2025 05:43) (95% - 99%)    Parameters below as of 27 May 2025 05:43  Patient On (Oxygen Delivery Method): room air    PHYSICAL EXAM  General: adult in NAD  HEENT: clear oropharynx, no erythema, no ulcers  CV: normal S1, S2, RRR  Lungs: clear to auscultation, no wheezes, no rales  Abdomen: +BS, soft, nontender, nondistended  Ext: no edema  Skin: no rash  Neuro: alert and oriented x 4  TLC CDI    LABS:    Recent Cultures:     Culture - Blood (05.23.25 @ 06:45)    Specimen Source: Blood Blood-Catheter   Culture Results:   No growth at 72 Hours    Culture - Urine (05.22.25 @ 23:23)    Specimen Source: Clean Catch Clean Catch (Midstream)   Culture Results:   No growth    Culture - Blood (05.22.25 @ 23:22)    Specimen Source: Blood Blood-Peripheral   Culture Results:   No growth at 4 days                        7.9      15.34 )-----------( 79       ( 27 May 2025 07:13 )             23.2     Mean Cell Volume : 91.0 fl  Mean Cell Hemoglobin : 31.0 pg  Mean Cell Hemoglobin Concentration : 34.1 g/dL  Auto Neutrophil # : x  Auto Lymphocyte # : x  Auto Monocyte # : x  Auto Eosinophil # : x  Auto Basophil # : x  Auto Neutrophil % : x  Auto Lymphocyte % : x  Auto Monocyte % : x  Auto Eosinophil % : x  Auto Basophil % : x    05-27    140  |  103  |  12  ----------------------------<  125[H]  4.0   |  24  |  1.05    Ca    8.3[L]      27 May 2025 07:13  Phos  3.2     05-27  Mg     2.0     05-27    TPro  5.4[L]  /  Alb  3.2[L]  /  TBili  0.5  /  DBili  x   /  AST  37  /  ALT  90[H]  /  AlkPhos  134[H]  05-27    PT/INR - ( 26 May 2025 06:53 )   PT: 13.4 sec;   INR: 1.18 ratio      PTT - ( 26 May 2025 06:53 )  PTT:26.5 sec      Uric Acid 5.3    RADIOLOGY & ADDITIONAL STUDIES:    < from: Xray Chest 1 View- PORTABLE-Urgent (Xray Chest 1 View- PORTABLE-Urgent .) (05.22.25 @ 22:44) >  IMPRESSION:  No focal consolidation.

## 2025-05-28 LAB
ALBUMIN SERPL ELPH-MCNC: 3.2 G/DL — LOW (ref 3.3–5)
ALP SERPL-CCNC: 182 U/L — HIGH (ref 40–120)
ALT FLD-CCNC: 132 U/L — HIGH (ref 10–45)
ANION GAP SERPL CALC-SCNC: 15 MMOL/L — SIGNIFICANT CHANGE UP (ref 5–17)
APTT BLD: 23.7 SEC — LOW (ref 26.1–36.8)
AST SERPL-CCNC: 78 U/L — HIGH (ref 10–40)
BASOPHILS # BLD AUTO: 0.59 K/UL — HIGH (ref 0–0.2)
BASOPHILS NFR BLD AUTO: 3.4 % — HIGH (ref 0–2)
BILIRUB SERPL-MCNC: 0.5 MG/DL — SIGNIFICANT CHANGE UP (ref 0.2–1.2)
BLD GP AB SCN SERPL QL: NEGATIVE — SIGNIFICANT CHANGE UP
BUN SERPL-MCNC: 13 MG/DL — SIGNIFICANT CHANGE UP (ref 7–23)
CALCIUM SERPL-MCNC: 8.6 MG/DL — SIGNIFICANT CHANGE UP (ref 8.4–10.5)
CHLORIDE SERPL-SCNC: 104 MMOL/L — SIGNIFICANT CHANGE UP (ref 96–108)
CO2 SERPL-SCNC: 22 MMOL/L — SIGNIFICANT CHANGE UP (ref 22–31)
CREAT SERPL-MCNC: 1.15 MG/DL — SIGNIFICANT CHANGE UP (ref 0.5–1.3)
CULTURE RESULTS: SIGNIFICANT CHANGE UP
CULTURE RESULTS: SIGNIFICANT CHANGE UP
D DIMER BLD IA.RAPID-MCNC: 342 NG/ML DDU — HIGH
EGFR: 69 ML/MIN/1.73M2 — SIGNIFICANT CHANGE UP
EGFR: 69 ML/MIN/1.73M2 — SIGNIFICANT CHANGE UP
EOSINOPHIL # BLD AUTO: 0 K/UL — SIGNIFICANT CHANGE UP (ref 0–0.5)
EOSINOPHIL NFR BLD AUTO: 0 % — SIGNIFICANT CHANGE UP (ref 0–6)
FIBRINOGEN PPP-MCNC: 476 MG/DL — HIGH (ref 200–445)
GLUCOSE SERPL-MCNC: 119 MG/DL — HIGH (ref 70–99)
HCT VFR BLD CALC: 24.3 % — LOW (ref 39–50)
HGB BLD-MCNC: 8 G/DL — LOW (ref 13–17)
INR BLD: 1.16 RATIO — SIGNIFICANT CHANGE UP (ref 0.85–1.16)
LDH SERPL L TO P-CCNC: 760 U/L — HIGH (ref 50–242)
LYMPHOCYTES # BLD AUTO: 1.78 K/UL — SIGNIFICANT CHANGE UP (ref 1–3.3)
LYMPHOCYTES # BLD AUTO: 10.3 % — LOW (ref 13–44)
MAGNESIUM SERPL-MCNC: 2 MG/DL — SIGNIFICANT CHANGE UP (ref 1.6–2.6)
MANUAL SMEAR VERIFICATION: SIGNIFICANT CHANGE UP
MCHC RBC-ENTMCNC: 29.9 PG — SIGNIFICANT CHANGE UP (ref 27–34)
MCHC RBC-ENTMCNC: 32.9 G/DL — SIGNIFICANT CHANGE UP (ref 32–36)
MCV RBC AUTO: 90.7 FL — SIGNIFICANT CHANGE UP (ref 80–100)
METAMYELOCYTES # FLD: 4.3 % — HIGH (ref 0–0)
METAMYELOCYTES NFR BLD: 4.3 % — HIGH (ref 0–0)
MONOCYTES # BLD AUTO: 6.2 K/UL — HIGH (ref 0–0.9)
MONOCYTES NFR BLD AUTO: 35.9 % — HIGH (ref 2–14)
MYELOCYTES NFR BLD: 3.4 % — HIGH (ref 0–0)
NEUTROPHILS # BLD AUTO: 7.38 K/UL — SIGNIFICANT CHANGE UP (ref 1.8–7.4)
NEUTROPHILS NFR BLD AUTO: 36.7 % — LOW (ref 43–77)
NEUTS BAND # BLD: 6 % — SIGNIFICANT CHANGE UP (ref 0–8)
NEUTS BAND NFR BLD: 6 % — SIGNIFICANT CHANGE UP (ref 0–8)
PHOSPHATE SERPL-MCNC: 3.5 MG/DL — SIGNIFICANT CHANGE UP (ref 2.5–4.5)
PLAT MORPH BLD: NORMAL — SIGNIFICANT CHANGE UP
PLATELET # BLD AUTO: 157 K/UL — SIGNIFICANT CHANGE UP (ref 150–400)
POTASSIUM SERPL-MCNC: 4 MMOL/L — SIGNIFICANT CHANGE UP (ref 3.5–5.3)
POTASSIUM SERPL-SCNC: 4 MMOL/L — SIGNIFICANT CHANGE UP (ref 3.5–5.3)
PROT SERPL-MCNC: 5.5 G/DL — LOW (ref 6–8.3)
PROTHROM AB SERPL-ACNC: 13.2 SEC — SIGNIFICANT CHANGE UP (ref 9.9–13.4)
RBC # BLD: 2.68 M/UL — LOW (ref 4.2–5.8)
RBC # FLD: 14.1 % — SIGNIFICANT CHANGE UP (ref 10.3–14.5)
RBC BLD AUTO: SIGNIFICANT CHANGE UP
RH IG SCN BLD-IMP: POSITIVE — SIGNIFICANT CHANGE UP
SODIUM SERPL-SCNC: 141 MMOL/L — SIGNIFICANT CHANGE UP (ref 135–145)
SPECIMEN SOURCE: SIGNIFICANT CHANGE UP
SPECIMEN SOURCE: SIGNIFICANT CHANGE UP
TOXIC GRANULES BLD QL SMEAR: PRESENT — SIGNIFICANT CHANGE UP
URATE SERPL-MCNC: 5.8 MG/DL — SIGNIFICANT CHANGE UP (ref 3.4–8.8)
WBC # BLD: 17.28 K/UL — HIGH (ref 3.8–10.5)
WBC # FLD AUTO: 17.28 K/UL — HIGH (ref 3.8–10.5)

## 2025-05-28 PROCEDURE — 99233 SBSQ HOSP IP/OBS HIGH 50: CPT

## 2025-05-28 RX ORDER — PREDNISONE 20 MG/1
40 TABLET ORAL ONCE
Refills: 0 | Status: COMPLETED | OUTPATIENT
Start: 2025-05-28 | End: 2025-05-28

## 2025-05-28 RX ORDER — PREDNISONE 20 MG/1
40 TABLET ORAL ONCE
Refills: 0 | Status: COMPLETED | OUTPATIENT
Start: 2025-05-29 | End: 2025-05-29

## 2025-05-28 RX ORDER — DIPHENHYDRAMINE HYDROCHLORIDE AND LIDOCAINE HYDROCHLORIDE AND ALUMINUM HYDROXIDE AND MAGNESIUM HYDRO
10 KIT EVERY 6 HOURS
Refills: 0 | Status: DISCONTINUED | OUTPATIENT
Start: 2025-05-28 | End: 2025-05-30

## 2025-05-28 RX ORDER — PREDNISONE 20 MG/1
20 TABLET ORAL DAILY
Refills: 0 | Status: DISCONTINUED | OUTPATIENT
Start: 2025-05-30 | End: 2025-05-30

## 2025-05-28 RX ADMIN — Medication 1 APPLICATION(S): at 10:27

## 2025-05-28 RX ADMIN — TAMSULOSIN HYDROCHLORIDE 0.4 MILLIGRAM(S): 0.4 CAPSULE ORAL at 22:49

## 2025-05-28 RX ADMIN — Medication 2000 UNIT(S): at 12:33

## 2025-05-28 RX ADMIN — Medication 500 MILLIGRAM(S): at 18:24

## 2025-05-28 RX ADMIN — OXYCODONE HYDROCHLORIDE 10 MILLIGRAM(S): 30 TABLET ORAL at 23:30

## 2025-05-28 RX ADMIN — Medication 500 MILLIGRAM(S): at 05:55

## 2025-05-28 RX ADMIN — Medication 80 MILLIGRAM(S): at 18:25

## 2025-05-28 RX ADMIN — Medication 8 MILLIGRAM(S): at 20:01

## 2025-05-28 RX ADMIN — PREDNISONE 40 MILLIGRAM(S): 20 TABLET ORAL at 14:02

## 2025-05-28 RX ADMIN — COLCHICINE 0.6 MILLIGRAM(S): 0.6 TABLET, FILM COATED ORAL at 18:24

## 2025-05-28 RX ADMIN — Medication 5 MILLIGRAM(S): at 22:49

## 2025-05-28 RX ADMIN — Medication 80 MILLIGRAM(S): at 22:49

## 2025-05-28 RX ADMIN — DEXAMETHASONE 0.5 MILLIGRAM(S): 0.5 TABLET ORAL at 06:59

## 2025-05-28 RX ADMIN — MIDODRINE HYDROCHLORIDE 10 MILLIGRAM(S): 5 TABLET ORAL at 12:33

## 2025-05-28 RX ADMIN — DIPHENHYDRAMINE HYDROCHLORIDE AND LIDOCAINE HYDROCHLORIDE AND ALUMINUM HYDROXIDE AND MAGNESIUM HYDRO 10 MILLILITER(S): KIT at 05:54

## 2025-05-28 RX ADMIN — Medication 100 MILLIGRAM(S): at 12:33

## 2025-05-28 RX ADMIN — Medication 40 MILLIGRAM(S): at 18:24

## 2025-05-28 RX ADMIN — Medication 15 MILLILITER(S): at 05:55

## 2025-05-28 RX ADMIN — DILTIAZEM HYDROCHLORIDE 240 MILLIGRAM(S): 240 TABLET, EXTENDED RELEASE ORAL at 05:55

## 2025-05-28 RX ADMIN — LIDOCAINE HYDROCHLORIDE 1 APPLICATION(S): 20 JELLY TOPICAL at 05:56

## 2025-05-28 RX ADMIN — COLCHICINE 0.6 MILLIGRAM(S): 0.6 TABLET, FILM COATED ORAL at 06:59

## 2025-05-28 RX ADMIN — Medication 15 MILLILITER(S): at 14:03

## 2025-05-28 RX ADMIN — Medication 40 MILLIGRAM(S): at 05:54

## 2025-05-28 RX ADMIN — OXYCODONE HYDROCHLORIDE 10 MILLIGRAM(S): 30 TABLET ORAL at 22:50

## 2025-05-28 NOTE — PHARMACOTHERAPY INTERVENTION NOTE - COMMENTS
Clinical Pharmacy Specialist- Hematology/Oncology- Progress Note    Pt is a 68 y/o male w/ PMH of HTN, HLD, GERD, BPH, Gout, spinal stenosis (s/p multiple surgeries) and newly diagnosed AML (ASXL1-mutated and DDX41) admitted for management with 7+3 Induction therapy    Antimicrobial Course( ID Following, MD Rodarte):  - Posaconazole- 5/2- 5/25  - Levaquin/Amox- 5/2- 5/11  --> Cefepime- 5/11- 5/19  --> Meropenem- 5/19- 5/26  - Flagyl (abd discomfort)- 5/13- 5/19  - Valtrex- 5/2  - Vancomycin (MRSA?MRSE bacteremia)- 5/20- 5/24  MRSA nasal swab    Last Neutropenic (ANC<1000): 5/23; ANC= 0.92  Last Febrile: 22-May-2025 22:43; T= 100.8  Days Non-Neutropenic: 5  Days afebrile: 5    Chemotherapy Course  -Current Regimen: 7+3 Induction  History:  (5/2/25)- 7+3 Induction   -Daunorubicin 60mg/m2 IVP D1-3   -Cytarabine 100mg/m2 IV cont inf D1-7  -Day: 27 (5/28)  BmBx: 5/16- favorable  Access: R TLC (accessed 5/5/25)    Vancomycin Levels:   Date    Dose              Value    Ref Range	                             Status   5/21    750mg q12hr   7.8       Trough 10-20; AUC= 400-600   Final  5/23    1gm q12hr      10.9     Trough 10-20; AUC= 400-600   Final    History/Relevant clinical information used in assessment:  - Ht= 5’11”; IBW= 75.3kg; Actual BW= 107.3kg; Adj BW= 88kg  - 5/2- IRR? rigors, chills, and tachycardia 2 hours into day 1; rasburicase 3mg x 1  - 5/4 -Crcl= 67ml/min ; Scr=1.32; Wt= 88kg; Adjusted BW used since pt BMI>30  - 5/9- increase melatonin 3mg to 10mh qhs- c/o insomnia  - 5/13- mouth pain- has ulceration   - 5/14- mouth pain - cannot swallow now  - 5/15- bloody diarrhea x 3 on reglan prn, but has not needed, monitor for d/c to not aggravate w/ prokinetic agent  - 5/15- HSV neg  - 5/16- bloody BM's decreased to 2x'd/day from 4x's/day yest  - 5/19- BlCx- Meth resistant Staphylococcus haemolyticus- on vancomycin 1gm q12hr, this will yield AUC= 457; per ID, likely contaminant, expect short course of vanco; - 5/19, still febrile since 5/11, on cefepime, BlCx NGTD + abd discomfort (added flagyl)-->CT CAP 5/15 - no infection (but groundglass opacities stable from previous?)  - 5/21- zarxio 480mcg daily 5/21- 5/23    Assessment/Plan/Recommendation:  Onc:  - Daunorubicin 60mg/m2 IVP D1-3   - Cytarabine 100mg/m2 IV cont inf D1-7- IRR on D1, started at 6p, stopped at 7:30p (received 1.5 hrs),  made up D1 bag on 5/9 to run over 23 hrs (to account for ~1hr given)  - BmBx 5/16- favorable, next remission BmBx soon  - allopurinol 100mg (300mg d/c'd 5/15)-keeping for gout  - 5/27- gout flare- colchicine 0.6mg BID x 2 days (5/27-5/29) -can cont until flare resolves, some experts cont 24-48 hrs after resolution; if symptoms do not resolve after 3 days, consider other anti-inflammatory agents  ID:  - 5/17 UCx- 5/17- 10-49k VRE- per ID, do not need to treat  - no longer neutropenic, on valtrex only  Cards:  - atorvastatin 80mg held d/t DDI with posa (Cat X)- can add back when posa is off  Pain (mouth):  ·	magic mouthwash to swish & swallow- 5/14- consider switch to swish & spit prn- 5/27  ·	hydromorphone 0.2mg IVP q6hr prn- 5/14--> 0.5mg - 0.7mg q3hr prn- 5/16--> 0.7-1mg- 5/23- 5/27  ·	hydromorphone 0.5mg IVP tid- 5/15- 5/27  ·	oxycodone 2.5-5mg q4hr prn- 5/2- 5/16--> 10mg q4hr prn- 5/27  ·	lidocaine gel 2% q8hr to tongue 5/14- consider d/c or prn- 5/27  ·	dexamethasone soln 0.5 mg/5 mL BID swish & spit - 5/15- consider d/c- 5/27  ·	meperidine 12.5mg IVP prn rigors  Orthostasis:  - 5/18- midodrine 10mg PO tid    Additional Monitoring Needed?   -Yes- Continue to monitor renal function & daily counts for abx escalation/de-escalation   -Discharge Planning:  --> New meds: midodrine  --> Meds sent for auth:  --> Delivered meds:    Case discussed with attending/primary team    Liza Jolley, PharmD, BCPS  Clinical Pharmacy Specialist | Hematology/Oncology  [facility]  Email: [email or web address] or available on [employer] Clinical Pharmacy Specialist- Hematology/Oncology- Progress Note    Pt is a 70 y/o male w/ PMH of HTN, HLD, GERD, BPH, Gout, spinal stenosis (s/p multiple surgeries) and newly diagnosed AML (ASXL1-mutated and DDX41) admitted for management with 7+3 Induction therapy    Antimicrobial Course( ID Following, MD Rodarte):  - Posaconazole- 5/2- 5/25  - Levaquin/Amox- 5/2- 5/11  --> Cefepime- 5/11- 5/19  --> Meropenem- 5/19- 5/26  - Flagyl (abd discomfort)- 5/13- 5/19  - Valtrex- 5/2  - Vancomycin (MRSA?MRSE bacteremia)- 5/20- 5/24  MRSA nasal swab    Last Neutropenic (ANC<1000): 5/23; ANC= 0.92  Last Febrile: 22-May-2025 22:43; T= 100.8  Days Non-Neutropenic: 5  Days afebrile: 5    Chemotherapy Course  -Current Regimen: 7+3 Induction  History:  (5/2/25)- 7+3 Induction   -Daunorubicin 60mg/m2 IVP D1-3   -Cytarabine 100mg/m2 IV cont inf D1-7  -Day: 27 (5/28)  BmBx: 5/16- favorable  Access: R TLC (accessed 5/5/25)    Vancomycin Levels:   Date    Dose              Value    Ref Range	                             Status   5/21    750mg q12hr   7.8       Trough 10-20; AUC= 400-600   Final  5/23    1gm q12hr      10.9     Trough 10-20; AUC= 400-600   Final    History/Relevant clinical information used in assessment:  - Ht= 5’11”; IBW= 75.3kg; Actual BW= 107.3kg; Adj BW= 88kg  - 5/2- IRR? rigors, chills, and tachycardia 2 hours into day 1; rasburicase 3mg x 1  - 5/4 -Crcl= 67ml/min ; Scr=1.32; Wt= 88kg; Adjusted BW used since pt BMI>30  - 5/9- increase melatonin 3mg to 10mh qhs- c/o insomnia  - 5/13- mouth pain- has ulceration   - 5/14- mouth pain - cannot swallow now  - 5/15- bloody diarrhea x 3 on reglan prn, but has not needed, monitor for d/c to not aggravate w/ prokinetic agent  - 5/15- HSV neg  - 5/16- bloody BM's decreased to 2x'd/day from 4x's/day yest  - 5/19- BlCx- Meth resistant Staphylococcus haemolyticus- on vancomycin 1gm q12hr, this will yield AUC= 457; per ID, likely contaminant, expect short course of vanco; - 5/19, still febrile since 5/11, on cefepime, BlCx NGTD + abd discomfort (added flagyl)-->CT CAP 5/15 - no infection (but groundglass opacities stable from previous?)  - 5/21- zarxio 480mcg daily 5/21- 5/23    Assessment/Plan/Recommendation:  Onc:  - Daunorubicin 60mg/m2 IVP D1-3   - Cytarabine 100mg/m2 IV cont inf D1-7- IRR on D1, started at 6p, stopped at 7:30p (received 1.5 hrs),  made up D1 bag on 5/9 to run over 23 hrs (to account for ~1hr given)  - BmBx 5/16- favorable, next remission BmBx soon  - allopurinol 100mg (300mg d/c'd 5/15)-keeping for gout  - 5/27- gout flare- colchicine 0.6mg BID x 2 days (5/27-5/29) -can cont until flare resolves, some experts cont 24-48 hrs after resolution; if symptoms do not resolve after 3 days, consider other anti-inflammatory agents  ID:  - 5/17 UCx- 5/17- 10-49k VRE- per ID, do not need to treat  - no longer neutropenic, on valtrex only  Cards:  - atorvastatin 80mg held d/t DDI with posa (Cat X)- can add back when posa is off  Pain (mouth):  ·	magic mouthwash to swish & swallow- 5/14- consider switch to swish & spit prn- 5/27- will change to prn  ·	hydromorphone 0.2mg IVP q6hr prn- 5/14--> 0.5mg - 0.7mg q3hr prn- 5/16--> 0.7-1mg- 5/23- 5/27  ·	hydromorphone 0.5mg IVP tid- 5/15- 5/27  ·	oxycodone 2.5-5mg q4hr prn- 5/2- 5/16--> 10mg q4hr prn- 5/27  ·	lidocaine gel 2% q8hr to tongue 5/14- consider d/c or prn- 5/27- will d/c  ·	dexamethasone soln 0.5 mg/5 mL BID swish & spit - 5/15- consider d/c- 5/27- will d/c  ·	meperidine 12.5mg IVP prn rigors  Orthostasis:  - 5/18- midodrine 10mg PO tid    Additional Monitoring Needed?   -Yes- Continue to monitor renal function & daily counts for abx escalation/de-escalation   -Discharge Planning:  --> New meds: midodrine  --> Meds sent for auth:  --> Delivered meds:    Case discussed with attending/primary team    Liza Jolley, PharmD, BCPS  Clinical Pharmacy Specialist | Hematology/Oncology  [facility]  Email: [email or web address] or available on [employer] Clinical Pharmacy Specialist- Hematology/Oncology- Progress Note    Pt is a 70 y/o male w/ PMH of HTN, HLD, GERD, BPH, Gout, spinal stenosis (s/p multiple surgeries) and newly diagnosed AML (ASXL1-mutated and DDX41) admitted for management with 7+3 Induction therapy    Antimicrobial Course( ID Following, MD Rodarte):  - Posaconazole- 5/2- 5/25  - Levaquin/Amox- 5/2- 5/11  --> Cefepime- 5/11- 5/19  --> Meropenem- 5/19- 5/26  - Flagyl (abd discomfort)- 5/13- 5/19  - Valtrex- 5/2  - Vancomycin (MRSA?MRSE bacteremia)- 5/20- 5/24  MRSA nasal swab    Last Neutropenic (ANC<1000): 5/23; ANC= 0.92  Last Febrile: 22-May-2025 22:43; T= 100.8  Days Non-Neutropenic: 5  Days afebrile: 5    Chemotherapy Course  -Current Regimen: 7+3 Induction  History:  (5/2/25)- 7+3 Induction   -Daunorubicin 60mg/m2 IVP D1-3   -Cytarabine 100mg/m2 IV cont inf D1-7  -Day: 27 (5/28)  BmBx: 5/16- favorable  Access: R TLC (accessed 5/5/25)    Vancomycin Levels:   Date    Dose              Value    Ref Range	                             Status   5/21    750mg q12hr   7.8       Trough 10-20; AUC= 400-600   Final  5/23    1gm q12hr      10.9     Trough 10-20; AUC= 400-600   Final    History/Relevant clinical information used in assessment:  - Ht= 5’11”; IBW= 75.3kg; Actual BW= 107.3kg; Adj BW= 88kg  - 5/2- IRR? rigors, chills, and tachycardia 2 hours into day 1; rasburicase 3mg x 1  - 5/4 -Crcl= 67ml/min ; Scr=1.32; Wt= 88kg; Adjusted BW used since pt BMI>30  - 5/9- increase melatonin 3mg to 10mh qhs- c/o insomnia  - 5/13- mouth pain- has ulceration   - 5/14- mouth pain - cannot swallow now  - 5/15- bloody diarrhea x 3 on reglan prn, but has not needed, monitor for d/c to not aggravate w/ prokinetic agent  - 5/15- HSV neg  - 5/16- bloody BM's decreased to 2x'd/day from 4x's/day yest  - 5/19- BlCx- Meth resistant Staphylococcus haemolyticus- on vancomycin 1gm q12hr, this will yield AUC= 457; per ID, likely contaminant, expect short course of vanco; - 5/19, still febrile since 5/11, on cefepime, BlCx NGTD + abd discomfort (added flagyl)-->CT CAP 5/15 - no infection (but groundglass opacities stable from previous?)  - 5/21- zarxio 480mcg daily 5/21- 5/23    Assessment/Plan/Recommendation:  Onc:  - Daunorubicin 60mg/m2 IVP D1-3   - Cytarabine 100mg/m2 IV cont inf D1-7- IRR on D1, started at 6p, stopped at 7:30p (received 1.5 hrs),  made up D1 bag on 5/9 to run over 23 hrs (to account for ~1hr given)  - BmBx 5/16- favorable, next remission BmBx soon  - allopurinol 100mg (300mg d/c'd 5/15)-keeping for gout  - 5/27- gout flare- colchicine 0.6mg BID x 2 days (5/27-5/29) -can cont until flare resolves, some experts cont 24-48 hrs after resolution; if symptoms do not resolve after 3 days, consider other anti-inflammatory agents- pt pain not relieved- will add prednisone 40mg daily x 2 days f/b 20mg x 2 days- 5/28  ID:  - 5/17 UCx- 5/17- 10-49k VRE- per ID, do not need to treat  - no longer neutropenic, on valtrex only  Cards:  - atorvastatin 80mg held d/t DDI with posa (Cat X)- can add back when posa is off  Pain (mouth):  ·	magic mouthwash to swish & swallow- 5/14- consider switch to swish & spit prn- 5/27- will change to prn  ·	hydromorphone 0.2mg IVP q6hr prn- 5/14--> 0.5mg - 0.7mg q3hr prn- 5/16--> 0.7-1mg- 5/23- 5/27  ·	hydromorphone 0.5mg IVP tid- 5/15- 5/27  ·	oxycodone 2.5-5mg q4hr prn- 5/2- 5/16--> 10mg q4hr prn- 5/27  ·	lidocaine gel 2% q8hr to tongue 5/14- consider d/c or prn- 5/27- will d/c  ·	dexamethasone soln 0.5 mg/5 mL BID swish & spit - 5/15- consider d/c- 5/27- will d/c  ·	meperidine 12.5mg IVP prn rigors  Orthostasis:  - 5/18- midodrine 10mg PO tid  Dispo:  - 5/28- d/c w/ home PT?    Additional Monitoring Needed?   -Yes- Continue to monitor renal function & daily counts for abx escalation/de-escalation   -Discharge Planning:  --> New meds: midodrine  --> Meds sent for auth:  --> Delivered meds:    Case discussed with attending/primary team    Liza Jolley, PharmD, BCPS  Clinical Pharmacy Specialist | Hematology/Oncology  [facility]  Email: [email or web address] or available on [employer]

## 2025-05-28 NOTE — PROGRESS NOTE ADULT - PROBLEM SELECTOR PLAN 4
Continue Diltiazem  mg qd   home  HCTZ 50 mg QD on hold d/t dizziness and mild orthostatic VS Continue Diltiazem  mg qd   home HCTZ 50 mg QD on hold d/t dizziness and mild orthostatic VS

## 2025-05-28 NOTE — PROGRESS NOTE ADULT - PROBLEM SELECTOR PLAN 8
on Allopurinol 100 mg po qd  increase to 300 mg po qd for TLS prevention-->back to 100 mg QD  5/27 colchicine 0.6mg BID x 2 days added for increased gout pain on Allopurinol 100 mg po qd  increase to 300 mg po qd for TLS prevention-->back to 100 mg QD  5/27 colchicine 0.6mg BID x 2 days added for increased gout pain  5/28 prednisone 40mg POx2 days then prednisone 20mg x2 days for ongoing gout pain

## 2025-05-28 NOTE — PROGRESS NOTE ADULT - SUBJECTIVE AND OBJECTIVE BOX
Diagnosis: ASXL1-mutated and IVX71-dgignij AML    Protocol/Chemo Regimen: s/p 7+3:  Daunorubicin 60 mg/m2+ Cytarabine     Day: 27    Pt endorsed: gout pain to b/l feet, otherwise no issues overnight     Review of Systems: Denies any nausea, vomiting, chest pain, palpitation, SOB.    Pain scale: ungraded    Diet: regular Ensure plus HP BID                      Allergies: No Known Allergies    ANTIMICROBIALS  valACYclovir 500 milliGRAM(s) Oral two times a day      STANDING MEDICATIONS  allopurinol 100 milliGRAM(s) Oral daily  Biotene Dry Mouth Oral Rinse 15 milliLiter(s) Swish and Spit four times a day  chlorhexidine 4% Liquid 1 Application(s) Topical <User Schedule>  cholecalciferol 2000 Unit(s) Oral daily  colchicine 0.6 milliGRAM(s) Oral two times a day  dexAMETHasone    Solution 0.5 milliGRAM(s) Oral two times a day  diltiazem    milliGRAM(s) Oral daily  FIRST- Mouthwash  BLM 10 milliLiter(s) Swish and Swallow four times a day  lidocaine   4% Patch 1 Patch Transdermal every 24 hours  lidocaine 2% Gel 1 Application(s) Topical every 8 hours  melatonin 5 milliGRAM(s) Oral at bedtime  midodrine. 10 milliGRAM(s) Oral every 8 hours  pantoprazole  Injectable 40 milliGRAM(s) IV Push every 12 hours  simethicone 80 milliGRAM(s) Chew <User Schedule>  tamsulosin 0.4 milliGRAM(s) Oral at bedtime    PRN MEDICATIONS  acetaminophen     Tablet .. 650 milliGRAM(s) Oral every 6 hours PRN  albuterol    90 MICROgram(s) HFA Inhaler 2 Puff(s) Inhalation every 6 hours PRN  aluminum hydroxide/magnesium hydroxide/simethicone Suspension 30 milliLiter(s) Oral every 6 hours PRN  calcium carbonate    500 mG (Tums) Chewable 1 Tablet(s) Chew four times a day PRN  cyclobenzaprine 10 milliGRAM(s) Oral three times a day PRN  hydrocortisone sodium succinate Injectable 100 milliGRAM(s) IV Push every 8 hours PRN  metoclopramide Injectable 10 milliGRAM(s) IV Push every 6 hours PRN  ondansetron Injectable 8 milliGRAM(s) IV Push every 8 hours PRN  oxyCODONE    IR 10 milliGRAM(s) Oral every 4 hours PRN  polyethylene glycol 3350 17 Gram(s) Oral two times a day PRN  senna 2 Tablet(s) Oral two times a day PRN  sodium chloride 0.9% lock flush 10 milliLiter(s) IV Push every 1 hour PRN    Vital Signs Last 24 Hrs  T(C): 36.9 (28 May 2025 05:51), Max: 37 (27 May 2025 13:24)  T(F): 98.4 (28 May 2025 05:51), Max: 98.6 (27 May 2025 13:24)  HR: 78 (28 May 2025 05:51) (78 - 112)  BP: 116/72 (28 May 2025 05:51) (110/72 - 137/79)  BP(mean): --  RR: 18 (28 May 2025 05:51) (18 - 18)  SpO2: 98% (28 May 2025 05:51) (93% - 98%)    Parameters below as of 28 May 2025 05:51  Patient On (Oxygen Delivery Method): room air    PHYSICAL EXAM  General: adult in NAD  HEENT: clear oropharynx, no erythema, no ulcers  CV: normal S1, S2, RRR  Lungs: clear to auscultation, no wheezes, no rales  Abdomen: +BS, soft, nontender, nondistended  Ext: no edema  Skin: no rash  Neuro: alert and oriented x 4  TLC CDI    LABS:    Recent Cultures:     Culture - Blood (05.23.25 @ 06:45)    Specimen Source: Blood Blood-Catheter   Culture Results:   No growth at 4 days    Culture - Urine (05.22.25 @ 23:23)    Specimen Source: Clean Catch Clean Catch (Midstream)   Culture Results:   No growth    Culture - Blood (05.22.25 @ 23:22)    Specimen Source: Blood Blood-Peripheral   Culture Results:   No growth at 5 days                          8.0    17.28 )-----------( 157      ( 28 May 2025 07:03 )             24.3     Mean Cell Volume : 90.7 fl  Mean Cell Hemoglobin : 29.9 pg  Mean Cell Hemoglobin Concentration : 32.9 g/dL  Auto Neutrophil # : x  Auto Lymphocyte # : x  Auto Monocyte # : x  Auto Eosinophil # : x  Auto Basophil # : x  Auto Neutrophil % : x  Auto Lymphocyte % : x  Auto Monocyte % : x  Auto Eosinophil % : x  Auto Basophil % : x    05-28    141  |  104  |  13  ----------------------------<  119[H]  4.0   |  22  |  1.15    Ca    8.6      28 May 2025 07:03  Phos  3.5     05-28  Mg     2.0     05-28    TPro  5.5[L]  /  Alb  3.2[L]  /  TBili  0.5  /  DBili  x   /  AST  78[H]  /  ALT  132[H]  /  AlkPhos  182[H]  05-28    PT/INR - ( 28 May 2025 07:03 )   PT: 13.2 sec;   INR: 1.16 ratio      PTT - ( 28 May 2025 07:03 )  PTT:23.7 sec      Uric Acid 5.8    RADIOLOGY & ADDITIONAL STUDIES:    < from: Xray Chest 1 View- PORTABLE-Urgent (Xray Chest 1 View- PORTABLE-Urgent .) (05.22.25 @ 22:44) >  IMPRESSION:  No focal consolidation.       Diagnosis: ASXL1-mutated and YNJ46-pdgyhpo AML    Protocol/Chemo Regimen: s/p 7+3:  Daunorubicin 60 mg/m2+ Cytarabine     Day: 27    Pt endorsed: gout pain to b/l feet, otherwise no issues overnight, not sure if colchicine is helping     Review of Systems: Denies any nausea, vomiting, chest pain, palpitation, SOB.    Pain scale: ungraded    Diet: regular Ensure plus HP BID                      Allergies: No Known Allergies    ANTIMICROBIALS  valACYclovir 500 milliGRAM(s) Oral two times a day      STANDING MEDICATIONS  allopurinol 100 milliGRAM(s) Oral daily  Biotene Dry Mouth Oral Rinse 15 milliLiter(s) Swish and Spit four times a day  chlorhexidine 4% Liquid 1 Application(s) Topical <User Schedule>  cholecalciferol 2000 Unit(s) Oral daily  colchicine 0.6 milliGRAM(s) Oral two times a day  dexAMETHasone    Solution 0.5 milliGRAM(s) Oral two times a day  diltiazem    milliGRAM(s) Oral daily  FIRST- Mouthwash  BLM 10 milliLiter(s) Swish and Swallow four times a day  lidocaine   4% Patch 1 Patch Transdermal every 24 hours  lidocaine 2% Gel 1 Application(s) Topical every 8 hours  melatonin 5 milliGRAM(s) Oral at bedtime  midodrine. 10 milliGRAM(s) Oral every 8 hours  pantoprazole  Injectable 40 milliGRAM(s) IV Push every 12 hours  simethicone 80 milliGRAM(s) Chew <User Schedule>  tamsulosin 0.4 milliGRAM(s) Oral at bedtime    PRN MEDICATIONS  acetaminophen     Tablet .. 650 milliGRAM(s) Oral every 6 hours PRN  albuterol    90 MICROgram(s) HFA Inhaler 2 Puff(s) Inhalation every 6 hours PRN  aluminum hydroxide/magnesium hydroxide/simethicone Suspension 30 milliLiter(s) Oral every 6 hours PRN  calcium carbonate    500 mG (Tums) Chewable 1 Tablet(s) Chew four times a day PRN  cyclobenzaprine 10 milliGRAM(s) Oral three times a day PRN  hydrocortisone sodium succinate Injectable 100 milliGRAM(s) IV Push every 8 hours PRN  metoclopramide Injectable 10 milliGRAM(s) IV Push every 6 hours PRN  ondansetron Injectable 8 milliGRAM(s) IV Push every 8 hours PRN  oxyCODONE    IR 10 milliGRAM(s) Oral every 4 hours PRN  polyethylene glycol 3350 17 Gram(s) Oral two times a day PRN  senna 2 Tablet(s) Oral two times a day PRN  sodium chloride 0.9% lock flush 10 milliLiter(s) IV Push every 1 hour PRN    Vital Signs Last 24 Hrs  T(C): 36.9 (28 May 2025 05:51), Max: 37 (27 May 2025 13:24)  T(F): 98.4 (28 May 2025 05:51), Max: 98.6 (27 May 2025 13:24)  HR: 78 (28 May 2025 05:51) (78 - 112)  BP: 116/72 (28 May 2025 05:51) (110/72 - 137/79)  BP(mean): --  RR: 18 (28 May 2025 05:51) (18 - 18)  SpO2: 98% (28 May 2025 05:51) (93% - 98%)    Parameters below as of 28 May 2025 05:51  Patient On (Oxygen Delivery Method): room air    PHYSICAL EXAM  General: adult in NAD  HEENT: clear oropharynx, no erythema, no ulcers  CV: normal S1, S2, RRR  Lungs: clear to auscultation, no wheezes, no rales  Abdomen: +BS, soft, nontender, nondistended  Ext: no edema  Skin: no rash  Neuro: alert and oriented x 4  TLC CDI    LABS:    Recent Cultures:     Culture - Blood (05.23.25 @ 06:45)    Specimen Source: Blood Blood-Catheter   Culture Results:   No growth at 4 days    Culture - Urine (05.22.25 @ 23:23)    Specimen Source: Clean Catch Clean Catch (Midstream)   Culture Results:   No growth    Culture - Blood (05.22.25 @ 23:22)    Specimen Source: Blood Blood-Peripheral   Culture Results:   No growth at 5 days                          8.0    17.28 )-----------( 157      ( 28 May 2025 07:03 )             24.3     Mean Cell Volume : 90.7 fl  Mean Cell Hemoglobin : 29.9 pg  Mean Cell Hemoglobin Concentration : 32.9 g/dL  Auto Neutrophil # : x  Auto Lymphocyte # : x  Auto Monocyte # : x  Auto Eosinophil # : x  Auto Basophil # : x  Auto Neutrophil % : x  Auto Lymphocyte % : x  Auto Monocyte % : x  Auto Eosinophil % : x  Auto Basophil % : x    05-28    141  |  104  |  13  ----------------------------<  119[H]  4.0   |  22  |  1.15    Ca    8.6      28 May 2025 07:03  Phos  3.5     05-28  Mg     2.0     05-28    TPro  5.5[L]  /  Alb  3.2[L]  /  TBili  0.5  /  DBili  x   /  AST  78[H]  /  ALT  132[H]  /  AlkPhos  182[H]  05-28    PT/INR - ( 28 May 2025 07:03 )   PT: 13.2 sec;   INR: 1.16 ratio      PTT - ( 28 May 2025 07:03 )  PTT:23.7 sec      Uric Acid 5.8    RADIOLOGY & ADDITIONAL STUDIES:    < from: Xray Chest 1 View- PORTABLE-Urgent (Xray Chest 1 View- PORTABLE-Urgent .) (05.22.25 @ 22:44) >  IMPRESSION:  No focal consolidation.

## 2025-05-28 NOTE — PROGRESS NOTE ADULT - ASSESSMENT
68 yo male with newly diagnosed ASXL1, DDX41 mutated AML, admitted for induction chemo with 7+3:  dauno 60 mg/m2+ cytarabine. 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 Chemo started on 5/2, Complicated by rigors, chills, fever, SOB  and tachycardia 2 hours into day 1 infusion - likely infusion reaction, neutropenic fever. Pt has pancytopenia d/t chemo and disease

## 2025-05-28 NOTE — PROGRESS NOTE ADULT - PROBLEM SELECTOR PLAN 9
5/18- Orthostatic hypotension,  cc bolus given.  Continue Midodrine 10 mg po q8h  off HCTZ  5/22 orthostatic hypotension improved, check once daily 5/18- Orthostatic hypotension,  cc bolus given.  Continue Midodrine 10 mg po q8h  off HCTZ  5/22 orthostatic hypotension improved  check once daily

## 2025-05-28 NOTE — PROGRESS NOTE ADULT - PROBLEM SELECTOR PLAN 2
neutropenic, febrile   continue posa, valtrex, flagyl (dc'd 5/19), cefepime (5/11-5/19 ), cont to be febrile Ucx from 5/17 (+) gram + cocci in pairs and chains, inc to meropenem (5/19-   5/2 CXR Patchy opacities along the left lung base, possibly atelectasis, however superimposed infection cannot be entirely excluded.  5/13- FLU/COVID/RSV (-)  5/13- Added Flagyl 500 mg IV Q 8 hrs due to GI discomfort/pain  5/13- CT C/A/P -negative for infxn  5/20  blood cx 5/19 showed 1/4 MRSE  urine cx 5/17 VRE but 5/18 negative and pt asymptomatic  - Levaquin/Amox- 5/2- 5/11  - Cefepime- 5/11- 5/19  Flagyl (abd discomfort)- 5/13- 5/19  - Meropenem- 5/19  - Valtrex-Posaconazole  5/2 5/20 O2 sat 88% with fever, FU closely, O2 PRN  5/25 Discontinue vancomycin  5/26- D/C Meropenem, Posaconazole

## 2025-05-28 NOTE — PHARMACOTHERAPY INTERVENTION NOTE - INTERVENTION TYPE RECOOMEND
Therapy Discontinuation Recommended - No indication
Therapy Recommended - Drug indicated but not ordered

## 2025-05-28 NOTE — PROGRESS NOTE ADULT - PROBLEM SELECTOR PLAN 1
new ASXL1-mutated and YBO30-bmxidsr AML.  admitted for induction chemo with dauno 60 mg/m2+ cytarabine. Chemo started on 5/2, Complicated by rigors, chills, fever, SOB  and tachycardia 2 hours into day 1 infusion - likely infusion. received pepcid/benadryl/solumedrol/tylenol iv. will make up D1 Cytarabine on day8    will proceed with day2 chemo with pre mdes Tylenol and Benadryl, hydrocortisone iv PRN reaction   Hepatitis screen/HIV(-) as outpatient  IVF, antiemetics, mouth care. monitor weight, I & O, diuresis PRN   Monitor CBC with diff QD, TLS labs BID, Coags & T & C TIW transfuse for hb<7, PLT <20  5/16 BMbx subcortical, suboptimal specimen, no evidence of persistent leukemia. Flow 0.34% myeloblasts.  5/23 PB flow sent, fu result  5/19: no longer having blood in stool for several days, check cbc daily, plt goal decreased to >20  5/24- WBC - 11.5, D/C Zarxio  5/27 BM bx to be done prior to discharge. Next cycle date will depend on these results. IV pain meds changed to Oxycodone 10mg q4 PRN new ASXL1-mutated and SEZ30-jgmwpsb AML.  admitted for induction chemo with dauno 60 mg/m2+ cytarabine. Chemo started on 5/2, Complicated by rigors, chills, fever, SOB  and tachycardia 2 hours into day 1 infusion - likely infusion. received pepcid/benadryl/solumedrol/tylenol iv. will make up D1 Cytarabine on day8    will proceed with day2 chemo with pre mdes Tylenol and Benadryl, hydrocortisone iv PRN reaction   Hepatitis screen/HIV(-) as outpatient  IVF, antiemetics, mouth care. monitor weight, I & O, diuresis PRN   Monitor CBC with diff QD, TLS labs BID, Coags & T & C TIW transfuse for hb<7, PLT <20  5/16 BMbx subcortical, suboptimal specimen, no evidence of persistent leukemia. Flow 0.34% myeloblasts.  5/23 PB flow sent, fu result  5/19: no longer having blood in stool for several days, check cbc daily, plt goal decreased to >20  5/24- WBC - 11.5, D/C Zarxio  BM bx to be done prior to discharge. Next cycle date will depend on these results. IV pain meds changed to PO

## 2025-05-28 NOTE — PROGRESS NOTE ADULT - NS ATTEND AMEND GEN_ALL_CORE FT
69M with ASXL1-mutated and HYQ10-dzbltqq AML. 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 Admitted for induction 7+3.    Heme:   - Completed 7+3. Day 25. Complicated by rigors, chills, and tachycardia 2 hours into day 1 infusion - likely infusion reaction.  - Trend CBC with differential daily. Goal Hgb > 8 and plt > 20 given past GI bleeding.   - Trend DIC labs intermittently, as this has not been issue since admission. Continue transfusions as needed to maintain fibrinogen > 100.      - Allopurinol continued, but for previous diagnosis of gout decreased dose to 100 mg on 5/16.  S/p rasburicase; uric acid and LDH stable.   - Continue IVF. Monitor daily weights and I/O's. Diurese PRN.   - BMBx done day 15 (5/16) - subcortical, suboptimal specimen, no evidence of persistent leukemia. Flow 0.34% myeloblasts.  - in absence of overt evidence of persistent leukemia, and in setting of persistent fevers, started daily G-CSF 5/21  - has had PB ANC recovery >>> d/c G-CSF (last dose 5/23)    GI:  - Bloody BM 5/14-16.  Plt now recovering.  - c/o severe midepigastric pain 5/22 AM, resolved   - EKG w/o acute changes, troponin sl incr 70, then decr.  - amylase, lipase normal  - cont PPI 2x/day     Card:  - Orthostatic hypotension, likely residual from GI bleed and decreased PO intake. Poss autonomic component.  - Bolus 500 cc on 5/17 and 5/18.  Now on midodrine. BP stable.   - weight stable  - Encouraged incr PO intake.      ID:   - Neutropenic and febrile; fevers since 5/11, last temp 100.8 5/22 pm, Tmax 101     with ongoing GI symptoms, diarrhea. Cults (-) with recent CNS from blood likely contaminant in 1/4 bottles and VREC in urine    cleared in subsequent specimen.  - Cefepime (5/11-5/19 ). Persistent fever lead to changing to meropenem on 5/19.  Last temp 100.4 5/21.  d/c gianna (5/26)  - CT scan from 5/14 showing no acute CT findings suspicious for infection. Mentioned GGOs in the lungs bilaterally, but stable from a previous scan.   - Prophylaxis: valacyclovir   - Appreciate ID input - kept on vanco for now (fevers, mucositis), d/c vanco 5/25  - Mouth rinses, dexamethasone swish and spit for mucositis, which has not fully resolved  - demerol prn for rigors  - no longer neutropenic, d/c posaconazole PPx  - Likely remission BMA/Bx pre d/c    MSK: Has chronic pain from prior surgeries   - Pain control: still getting dilaudid iv 0.5 mg iv 3x/d - switch to 2 mg po 3x/d and try to wean off    TLC in place  DIMAS being considered  OOB. 69M with ASXL1-mutated and GME99-vbjycpc AML. 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 Admitted for induction 7+3.    Heme:   - Completed 7+3. Day 25. Complicated by rigors, chills, and tachycardia 2 hours into day 1 infusion - likely infusion reaction.  - Trend CBC with differential daily. Goal Hgb > 8 and plt > 20 given past GI bleeding.   - Trend DIC labs intermittently, as this has not been issue since admission. Continue transfusions as needed to maintain fibrinogen > 100.      - Allopurinol continued, but for previous diagnosis of gout decreased dose to 100 mg on 5/16.  S/p rasburicase; uric acid and LDH stable.   - Continue IVF. Monitor daily weights and I/O's. Diurese PRN.   - BMBx done day 15 (5/16) - subcortical, suboptimal specimen, no evidence of persistent leukemia. Flow 0.34% myeloblasts.  - in absence of overt evidence of persistent leukemia, and in setting of persistent fevers, started daily G-CSF 5/21  - PB transiently increased to > 10%, now cleared, G-CSF was stopped after 5/23  - PB flow showed aberrant  myeloblast population  - has now had ANC, plt recovery    GI:  - Bloody BM 5/14-16.  Plt now recovering.  - c/o severe midepigastric pain 5/22 AM, resolved   - EKG w/o acute changes, troponin sl incr 70, then decr.  - amylase, lipase normal  - cont PPI 2x/day     Card:  - Orthostatic hypotension, likely residual from GI bleed and decreased PO intake. Poss autonomic component.  - Bolus 500 cc on 5/17 and 5/18.  Now on midodrine. BP stable.   - weight stable  - Encouraged incr PO intake.      ID:   - Neutropenic and febrile; fevers since 5/11, last temp 100.8 5/22 pm, Tmax 101     with ongoing GI symptoms, diarrhea. Cults (-) with recent CNS from blood likely contaminant in 1/4 bottles and VREC in urine    cleared in subsequent specimen.  - Cefepime (5/11-5/19 ). Persistent fever lead to changing to meropenem on 5/19.  Last temp 100.4 5/21.  d/c gianna (5/26)  - also now off posa  - ANC has recovered  - CT scan from 5/14 showing no acute CT findings suspicious for infection. Mentioned GGOs in the lungs bilaterally, but stable from a previous scan.   - Prophylaxis: valacyclovir   - Likely remission BMA/Bx pre d/c    MSK: Has chronic pain from prior surgeries   - Pain control:    stopped dilaudid iv 0.5 mg iv 3x/d - switched to prn oxycodone, so far has good pain control    TLC in place  DIMAS being considered  OOB.

## 2025-05-29 ENCOUNTER — RESULT REVIEW (OUTPATIENT)
Age: 70
End: 2025-05-29

## 2025-05-29 LAB
ALBUMIN SERPL ELPH-MCNC: 3.5 G/DL — SIGNIFICANT CHANGE UP (ref 3.3–5)
ALP SERPL-CCNC: 201 U/L — HIGH (ref 40–120)
ALT FLD-CCNC: 141 U/L — HIGH (ref 10–45)
ANION GAP SERPL CALC-SCNC: 14 MMOL/L — SIGNIFICANT CHANGE UP (ref 5–17)
AST SERPL-CCNC: 45 U/L — HIGH (ref 10–40)
BASOPHILS # BLD AUTO: 0 K/UL — SIGNIFICANT CHANGE UP (ref 0–0.2)
BASOPHILS NFR BLD AUTO: 0 % — SIGNIFICANT CHANGE UP (ref 0–2)
BILIRUB SERPL-MCNC: 0.4 MG/DL — SIGNIFICANT CHANGE UP (ref 0.2–1.2)
BUN SERPL-MCNC: 16 MG/DL — SIGNIFICANT CHANGE UP (ref 7–23)
CALCIUM SERPL-MCNC: 9 MG/DL — SIGNIFICANT CHANGE UP (ref 8.4–10.5)
CHLORIDE SERPL-SCNC: 104 MMOL/L — SIGNIFICANT CHANGE UP (ref 96–108)
CO2 SERPL-SCNC: 22 MMOL/L — SIGNIFICANT CHANGE UP (ref 22–31)
CREAT SERPL-MCNC: 1.12 MG/DL — SIGNIFICANT CHANGE UP (ref 0.5–1.3)
EGFR: 72 ML/MIN/1.73M2 — SIGNIFICANT CHANGE UP
EGFR: 72 ML/MIN/1.73M2 — SIGNIFICANT CHANGE UP
EOSINOPHIL # BLD AUTO: 0 K/UL — SIGNIFICANT CHANGE UP (ref 0–0.5)
EOSINOPHIL NFR BLD AUTO: 0 % — SIGNIFICANT CHANGE UP (ref 0–6)
GIANT PLATELETS BLD QL SMEAR: PRESENT — SIGNIFICANT CHANGE UP
GLUCOSE SERPL-MCNC: 146 MG/DL — HIGH (ref 70–99)
HCT VFR BLD CALC: 25.6 % — LOW (ref 39–50)
HGB BLD-MCNC: 8.7 G/DL — LOW (ref 13–17)
LDH SERPL L TO P-CCNC: 632 U/L — HIGH (ref 50–242)
LYMPHOCYTES # BLD AUTO: 1.36 K/UL — SIGNIFICANT CHANGE UP (ref 1–3.3)
LYMPHOCYTES # BLD AUTO: 7.9 % — LOW (ref 13–44)
MAGNESIUM SERPL-MCNC: 2.1 MG/DL — SIGNIFICANT CHANGE UP (ref 1.6–2.6)
MANUAL SMEAR VERIFICATION: SIGNIFICANT CHANGE UP
MCHC RBC-ENTMCNC: 31.1 PG — SIGNIFICANT CHANGE UP (ref 27–34)
MCHC RBC-ENTMCNC: 34 G/DL — SIGNIFICANT CHANGE UP (ref 32–36)
MCV RBC AUTO: 91.4 FL — SIGNIFICANT CHANGE UP (ref 80–100)
METAMYELOCYTES # FLD: 0.9 % — HIGH (ref 0–0)
METAMYELOCYTES NFR BLD: 0.9 % — HIGH (ref 0–0)
MONOCYTES # BLD AUTO: 4.85 K/UL — HIGH (ref 0–0.9)
MONOCYTES NFR BLD AUTO: 28.1 % — HIGH (ref 2–14)
MYELOCYTES NFR BLD: 2.6 % — HIGH (ref 0–0)
NEUTROPHILS # BLD AUTO: 10.29 K/UL — HIGH (ref 1.8–7.4)
NEUTROPHILS NFR BLD AUTO: 56.1 % — SIGNIFICANT CHANGE UP (ref 43–77)
NEUTS BAND # BLD: 3.5 % — SIGNIFICANT CHANGE UP (ref 0–8)
NEUTS BAND NFR BLD: 3.5 % — SIGNIFICANT CHANGE UP (ref 0–8)
PHOSPHATE SERPL-MCNC: 4 MG/DL — SIGNIFICANT CHANGE UP (ref 2.5–4.5)
PLAT MORPH BLD: ABNORMAL
PLATELET # BLD AUTO: 275 K/UL — SIGNIFICANT CHANGE UP (ref 150–400)
POTASSIUM SERPL-MCNC: 3.9 MMOL/L — SIGNIFICANT CHANGE UP (ref 3.5–5.3)
POTASSIUM SERPL-SCNC: 3.9 MMOL/L — SIGNIFICANT CHANGE UP (ref 3.5–5.3)
PROMYELOCYTES # FLD: 0.9 % — HIGH (ref 0–0)
PROMYELOCYTES NFR BLD: 0.9 % — HIGH (ref 0–0)
PROT SERPL-MCNC: 6.1 G/DL — SIGNIFICANT CHANGE UP (ref 6–8.3)
RBC # BLD: 2.8 M/UL — LOW (ref 4.2–5.8)
RBC # FLD: 14.2 % — SIGNIFICANT CHANGE UP (ref 10.3–14.5)
RBC BLD AUTO: SIGNIFICANT CHANGE UP
SODIUM SERPL-SCNC: 140 MMOL/L — SIGNIFICANT CHANGE UP (ref 135–145)
URATE SERPL-MCNC: 6.2 MG/DL — SIGNIFICANT CHANGE UP (ref 3.4–8.8)
WBC # BLD: 17.26 K/UL — HIGH (ref 3.8–10.5)
WBC # FLD AUTO: 17.26 K/UL — HIGH (ref 3.8–10.5)

## 2025-05-29 PROCEDURE — 88291 CYTO/MOLECULAR REPORT: CPT

## 2025-05-29 PROCEDURE — 99233 SBSQ HOSP IP/OBS HIGH 50: CPT

## 2025-05-29 PROCEDURE — 88313 SPECIAL STAINS GROUP 2: CPT | Mod: 26

## 2025-05-29 PROCEDURE — 88305 TISSUE EXAM BY PATHOLOGIST: CPT | Mod: 26

## 2025-05-29 PROCEDURE — 88341 IMHCHEM/IMCYTCHM EA ADD ANTB: CPT | Mod: 26,59

## 2025-05-29 PROCEDURE — 38222 DX BONE MARROW BX & ASPIR: CPT | Mod: AS

## 2025-05-29 PROCEDURE — 88189 FLOWCYTOMETRY/READ 16 & >: CPT | Mod: 59

## 2025-05-29 PROCEDURE — 85097 BONE MARROW INTERPRETATION: CPT

## 2025-05-29 PROCEDURE — 88360 TUMOR IMMUNOHISTOCHEM/MANUAL: CPT | Mod: 26

## 2025-05-29 PROCEDURE — 88342 IMHCHEM/IMCYTCHM 1ST ANTB: CPT | Mod: 26,59

## 2025-05-29 RX ORDER — MIDODRINE HYDROCHLORIDE 5 MG/1
1 TABLET ORAL
Qty: 0 | Refills: 0 | DISCHARGE
Start: 2025-05-29

## 2025-05-29 RX ORDER — OXYCODONE HYDROCHLORIDE 30 MG/1
1 TABLET ORAL
Qty: 42 | Refills: 0
Start: 2025-05-29 | End: 2025-06-04

## 2025-05-29 RX ORDER — PREDNISONE 20 MG/1
1 TABLET ORAL
Qty: 1 | Refills: 0
Start: 2025-05-29 | End: 2025-05-29

## 2025-05-29 RX ORDER — SENNA 187 MG
2 TABLET ORAL
Qty: 0 | Refills: 0 | DISCHARGE
Start: 2025-05-29

## 2025-05-29 RX ORDER — MIDODRINE HYDROCHLORIDE 5 MG/1
1 TABLET ORAL
Qty: 90 | Refills: 0
Start: 2025-05-29 | End: 2025-06-27

## 2025-05-29 RX ORDER — CALCIUM CARBONATE 750 MG/1
1 TABLET ORAL
Qty: 0 | Refills: 0 | DISCHARGE
Start: 2025-05-29

## 2025-05-29 RX ORDER — SIMETHICONE 80 MG
1 TABLET,CHEWABLE ORAL
Qty: 120 | Refills: 0
Start: 2025-05-29 | End: 2025-06-27

## 2025-05-29 RX ORDER — SIMETHICONE 80 MG
80 TABLET,CHEWABLE ORAL
Refills: 0 | Status: DISCONTINUED | OUTPATIENT
Start: 2025-05-29 | End: 2025-05-30

## 2025-05-29 RX ORDER — TAMSULOSIN HYDROCHLORIDE 0.4 MG/1
1 CAPSULE ORAL
Qty: 0 | Refills: 0 | DISCHARGE
Start: 2025-05-29

## 2025-05-29 RX ORDER — OXYCODONE HYDROCHLORIDE 30 MG/1
1 TABLET ORAL
Qty: 0 | Refills: 0 | DISCHARGE
Start: 2025-05-29

## 2025-05-29 RX ORDER — OXYCODONE HYDROCHLORIDE 30 MG/1
1 TABLET ORAL
Qty: 30 | Refills: 0
Start: 2025-05-29 | End: 2025-06-02

## 2025-05-29 RX ORDER — MELATONIN 5 MG
1 TABLET ORAL
Qty: 0 | Refills: 0 | DISCHARGE
Start: 2025-05-29

## 2025-05-29 RX ORDER — PREDNISONE 20 MG/1
1 TABLET ORAL
Qty: 0 | Refills: 0 | DISCHARGE
Start: 2025-05-29

## 2025-05-29 RX ORDER — SIMETHICONE 80 MG
1 TABLET,CHEWABLE ORAL
Qty: 0 | Refills: 0 | DISCHARGE
Start: 2025-05-29

## 2025-05-29 RX ADMIN — Medication 40 MILLIGRAM(S): at 22:27

## 2025-05-29 RX ADMIN — Medication 80 MILLIGRAM(S): at 09:11

## 2025-05-29 RX ADMIN — Medication 500 MILLIGRAM(S): at 06:35

## 2025-05-29 RX ADMIN — Medication 15 MILLILITER(S): at 23:51

## 2025-05-29 RX ADMIN — Medication 1 APPLICATION(S): at 09:12

## 2025-05-29 RX ADMIN — Medication 30 MILLILITER(S): at 04:47

## 2025-05-29 RX ADMIN — Medication 100 MILLIGRAM(S): at 11:13

## 2025-05-29 RX ADMIN — Medication 5 MILLIGRAM(S): at 22:27

## 2025-05-29 RX ADMIN — Medication 15 MILLILITER(S): at 06:33

## 2025-05-29 RX ADMIN — Medication 15 MILLILITER(S): at 18:32

## 2025-05-29 RX ADMIN — Medication 650 MILLIGRAM(S): at 05:15

## 2025-05-29 RX ADMIN — COLCHICINE 0.6 MILLIGRAM(S): 0.6 TABLET, FILM COATED ORAL at 06:34

## 2025-05-29 RX ADMIN — Medication 650 MILLIGRAM(S): at 04:47

## 2025-05-29 RX ADMIN — Medication 500 MILLIGRAM(S): at 18:32

## 2025-05-29 RX ADMIN — MIDODRINE HYDROCHLORIDE 10 MILLIGRAM(S): 5 TABLET ORAL at 14:22

## 2025-05-29 RX ADMIN — PREDNISONE 40 MILLIGRAM(S): 20 TABLET ORAL at 11:18

## 2025-05-29 RX ADMIN — TAMSULOSIN HYDROCHLORIDE 0.4 MILLIGRAM(S): 0.4 CAPSULE ORAL at 22:27

## 2025-05-29 RX ADMIN — DILTIAZEM HYDROCHLORIDE 240 MILLIGRAM(S): 240 TABLET, EXTENDED RELEASE ORAL at 06:34

## 2025-05-29 RX ADMIN — OXYCODONE HYDROCHLORIDE 10 MILLIGRAM(S): 30 TABLET ORAL at 09:20

## 2025-05-29 RX ADMIN — Medication 300 MILLIGRAM(S): at 11:31

## 2025-05-29 RX ADMIN — Medication 2000 UNIT(S): at 11:13

## 2025-05-29 RX ADMIN — OXYCODONE HYDROCHLORIDE 10 MILLIGRAM(S): 30 TABLET ORAL at 10:20

## 2025-05-29 RX ADMIN — Medication 30 MILLILITER(S): at 11:12

## 2025-05-29 RX ADMIN — Medication 40 MILLIGRAM(S): at 06:35

## 2025-05-29 RX ADMIN — Medication 15 MILLILITER(S): at 11:31

## 2025-05-29 NOTE — PROGRESS NOTE ADULT - PROBLEM SELECTOR PLAN 11
DVT ppx: OOB and ambulation  PT rec DIMAS at discharge DVT ppx: OOB and ambulation  plan dc home with home PT on 5/30

## 2025-05-29 NOTE — PHARMACOTHERAPY INTERVENTION NOTE - COMMENTS
Clinical Pharmacy Specialist- Hematology/Oncology- Progress Note    Pt is a 70 y/o male w/ PMH of HTN, HLD, GERD, BPH, Gout, spinal stenosis (s/p multiple surgeries) and newly diagnosed AML (ASXL1-mutated and DDX41) admitted for management with 7+3 Induction therapy    Antimicrobial Course( ID Following, MD Rodarte):  - Posaconazole- 5/2- 5/25  - Levaquin/Amox- 5/2- 5/11  --> Cefepime- 5/11- 5/19  --> Meropenem- 5/19- 5/26  - Flagyl (abd discomfort)- 5/13- 5/19  - Valtrex- 5/2  - Vancomycin (MRSA?MRSE bacteremia)- 5/20- 5/24  MRSA nasal swab    Last Neutropenic (ANC<1000): 5/23; ANC= 0.92  Last Febrile: 22-May-2025 22:43; T= 100.8  Days Non-Neutropenic: 6  Days afebrile: 6    Chemotherapy Course  -Current Regimen: 7+3 Induction  History:  (5/2/25)- 7+3 Induction   -Daunorubicin 60mg/m2 IVP D1-3   -Cytarabine 100mg/m2 IV cont inf D1-7  -Day: 28 (5/29)  BmBx: 5/16- favorable  Access: R TLC (accessed 5/5/25)    Vancomycin Levels:   Date    Dose              Value    Ref Range	                             Status   5/21    750mg q12hr   7.8       Trough 10-20; AUC= 400-600   Final  5/23    1gm q12hr      10.9     Trough 10-20; AUC= 400-600   Final    History/Relevant clinical information used in assessment:  - Ht= 5’11”; IBW= 75.3kg; Actual BW= 107.3kg; Adj BW= 88kg  - 5/2- IRR? rigors, chills, and tachycardia 2 hours into day 1; rasburicase 3mg x 1  - 5/4 -Crcl= 67ml/min ; Scr=1.32; Wt= 88kg; Adjusted BW used since pt BMI>30  - 5/9- increase melatonin 3mg to 10mh qhs- c/o insomnia  - 5/13- mouth pain- has ulceration   - 5/14- mouth pain - cannot swallow now  - 5/15- bloody diarrhea x 3 on reglan prn, but has not needed, monitor for d/c to not aggravate w/ prokinetic agent  - 5/15- HSV neg  - 5/16- bloody BM's decreased to 2x'd/day from 4x's/day yest  - 5/19- BlCx- Meth resistant Staphylococcus haemolyticus- on vancomycin 1gm q12hr, this will yield AUC= 457; per ID, likely contaminant, expect short course of vanco; - 5/19, still febrile since 5/11, on cefepime, BlCx NGTD + abd discomfort (added flagyl)-->CT CAP 5/15 - no infection (but groundglass opacities stable from previous?)  - 5/21- zarxio 480mcg daily 5/21- 5/23    Assessment/Plan/Recommendation:  Onc:  - Daunorubicin 60mg/m2 IVP D1-3   - Cytarabine 100mg/m2 IV cont inf D1-7- IRR on D1, started at 6p, stopped at 7:30p (received 1.5 hrs),  made up D1 bag on 5/9 to run over 23 hrs (to account for ~1hr given)  - BmBx 5/16- favorable, next remission BmBx soon  - allopurinol 100mg (300mg d/c'd 5/15)-keeping for gout  - 5/27- gout flare- colchicine 0.6mg BID x 2 days (5/27-5/29) -can cont until flare resolves, some experts cont 24-48 hrs after resolution; if symptoms do not resolve after 3 days, consider other anti-inflammatory agents- pt pain not relieved- will add prednisone 40mg daily x 2 days f/b 20mg x 2 days- 5/28  ID:  - 5/17 UCx- 5/17- 10-49k VRE- per ID, do not need to treat  - no longer neutropenic, on valtrex only  Cards:  - atorvastatin 80mg held d/t DDI with posa (Cat X)- can add back when posa is off  Pain (mouth):  ·	magic mouthwash to swish & swallow- 5/14--> swish & spit prn- 5/28  ·	hydromorphone 0.2mg IVP q6hr prn- 5/14--> 0.5mg - 0.7mg q3hr prn- 5/16--> 0.7-1mg- 5/23- 5/27  ·	hydromorphone 0.5mg IVP tid- 5/15- 5/27  ·	oxycodone 2.5-5mg q4hr prn- 5/2- 5/16--> 10mg q4hr prn- 5/27  ·	lidocaine gel 2% q8hr to tongue 5/14- 5/28  ·	dexamethasone soln 0.5 mg/5 mL BID swish & spit - 5/15- 5/28  ·	meperidine 12.5mg IVP prn rigors  Orthostasis:  - 5/18- midodrine 10mg PO tid  Dispo:  - 5/28- d/c w/ home PT?    Additional Monitoring Needed?   -Yes- Continue to monitor renal function & daily counts for abx escalation/de-escalation   -Discharge Planning:  --> New meds: midodrine  --> Meds sent for auth:  --> Delivered meds:    Case discussed with attending/primary team    Liza Jolley, PharmD, BCPS  Clinical Pharmacy Specialist | Hematology/Oncology  [facility]  Email: [email or web address] or available on [employer] Clinical Pharmacy Specialist- Hematology/Oncology- Progress Note    Pt is a 70 y/o male w/ PMH of HTN, HLD, GERD, BPH, Gout, spinal stenosis (s/p multiple surgeries) and newly diagnosed AML (ASXL1-mutated and DDX41) admitted for management with 7+3 Induction therapy    Antimicrobial Course( ID Following, MD Rodarte):  - Posaconazole- 5/2- 5/25  - Levaquin/Amox- 5/2- 5/11  --> Cefepime- 5/11- 5/19  --> Meropenem- 5/19- 5/26  - Flagyl (abd discomfort)- 5/13- 5/19  - Valtrex- 5/2  - Vancomycin (MRSA?MRSE bacteremia)- 5/20- 5/24  MRSA nasal swab    Last Neutropenic (ANC<1000): 5/23; ANC= 0.92  Last Febrile: 22-May-2025 22:43; T= 100.8  Days Non-Neutropenic: 6  Days afebrile: 6    Chemotherapy Course  -Current Regimen: 7+3 Induction  History:  (5/2/25)- 7+3 Induction   -Daunorubicin 60mg/m2 IVP D1-3   -Cytarabine 100mg/m2 IV cont inf D1-7  -Day: 28 (5/29)  BmBx: 5/16- favorable  Access: R TLC (accessed 5/5/25)    Vancomycin Levels:   Date    Dose              Value    Ref Range	                             Status   5/21    750mg q12hr   7.8       Trough 10-20; AUC= 400-600   Final  5/23    1gm q12hr      10.9     Trough 10-20; AUC= 400-600   Final    History/Relevant clinical information used in assessment:  - Ht= 5’11”; IBW= 75.3kg; Actual BW= 107.3kg; Adj BW= 88kg  - 5/2- IRR? rigors, chills, and tachycardia 2 hours into day 1; rasburicase 3mg x 1  - 5/4 -Crcl= 67ml/min ; Scr=1.32; Wt= 88kg; Adjusted BW used since pt BMI>30  - 5/9- increase melatonin 3mg to 10mh qhs- c/o insomnia  - 5/13- mouth pain- has ulceration   - 5/14- mouth pain - cannot swallow now  - 5/15- bloody diarrhea x 3 on reglan prn, but has not needed, monitor for d/c to not aggravate w/ prokinetic agent  - 5/15- HSV neg  - 5/16- bloody BM's decreased to 2x'd/day from 4x's/day yest  - 5/19- BlCx- Meth resistant Staphylococcus haemolyticus- on vancomycin 1gm q12hr, this will yield AUC= 457; per ID, likely contaminant, expect short course of vanco; - 5/19, still febrile since 5/11, on cefepime, BlCx NGTD + abd discomfort (added flagyl)-->CT CAP 5/15 - no infection (but groundglass opacities stable from previous?)  - 5/21- zarxio 480mcg daily 5/21- 5/23  - 5/29- pt had episodes of diarrhea - likely d/t colchicine    Assessment/Plan/Recommendation:  Onc:  - Daunorubicin 60mg/m2 IVP D1-3   - Cytarabine 100mg/m2 IV cont inf D1-7- IRR on D1, started at 6p, stopped at 7:30p (received 1.5 hrs),  made up D1 bag on 5/9 to run over 23 hrs (to account for ~1hr given)  - BmBx 5/16- favorable, remission BmBx today 5/29  - allopurinol 100mg (300mg d/c'd 5/15)-keeping for gout, will inc to 300mg daily given UA rising- 5/29  - 5/27- gout flare- colchicine 0.6mg BID x 2 days (5/27-5/29) -can cont until flare resolves, some experts cont 24-48 hrs after resolution; if symptoms do not resolve after 3 days, consider other anti-inflammatory agents- pt pain not relieved- added prednisone 40mg daily x 2 days (5/28-29) f/b 20mg x 2 days (5/30-6/1)  ID:  - 5/17 UCx- 5/17- 10-49k VRE- per ID, do not need to treat  - no longer neutropenic, on valtrex only  Cards:  - atorvastatin 80mg held d/t DDI with posa (Cat X)- can add back when posa is off  Pain (mouth):  ·	magic mouthwash to swish & swallow- 5/14--> swish & spit prn- 5/28  ·	hydromorphone 0.2mg IVP q6hr prn- 5/14--> 0.5mg - 0.7mg q3hr prn- 5/16--> 0.7-1mg- 5/23- 5/27  ·	hydromorphone 0.5mg IVP tid- 5/15- 5/27  ·	oxycodone 2.5-5mg q4hr prn- 5/2- 5/16--> 10mg q4hr prn- 5/27  ·	lidocaine gel 2% q8hr to tongue 5/14- 5/28  ·	dexamethasone soln 0.5 mg/5 mL BID swish & spit - 5/15- 5/28  ·	meperidine 12.5mg IVP prn rigors  Orthostasis:  - 5/18- midodrine 10mg PO tid  Dispo:  - 5/29- d/c home tmrw    Additional Monitoring Needed?   -Yes- Continue to monitor renal function & daily counts for abx escalation/de-escalation   -Discharge Planning:  --> New meds: midodrine, allopurinol 300mg (increased), prednisone  --> Meds sent for auth:  --> Delivered meds:    Case discussed with attending/primary team    Liza Jolley, PharmD, BCPS  Clinical Pharmacy Specialist | Hematology/Oncology  [facility]  Email: [email or web address] or available on [employer] not examined

## 2025-05-29 NOTE — PATIENT PROFILE ADULT - NS PRO AD ANY ON CHART
Hi team, I saw Madison today and she is not taking her amiodarone and has not since early last week. Her last scheduled dose is tomorrow.     Also, she is out of her losartan and per the patient her pharmacy will not refill? I advised to call her PCP as she does not yet have a follow-up appointment and needs her medications.  No

## 2025-05-29 NOTE — PROGRESS NOTE ADULT - PROBLEM SELECTOR PLAN 1
new ASXL1-mutated and ITO05-iytxkyw AML.  admitted for induction chemo with dauno 60 mg/m2+ cytarabine. Chemo started on 5/2, Complicated by rigors, chills, fever, SOB  and tachycardia 2 hours into day 1 infusion - likely infusion. received pepcid/benadryl/solumedrol/tylenol iv. will make up D1 Cytarabine on day8    will proceed with day2 chemo with pre mdes Tylenol and Benadryl, hydrocortisone iv PRN reaction   Hepatitis screen/HIV(-) as outpatient  IVF, antiemetics, mouth care. monitor weight, I & O, diuresis PRN   Monitor CBC with diff QD, TLS labs BID, Coags & T & C TIW transfuse for hb<7, PLT <20  5/16 BMbx subcortical, suboptimal specimen, no evidence of persistent leukemia. Flow 0.34% myeloblasts.  5/23 PB flow sent, fu result  5/19: no longer having blood in stool for several days, check cbc daily, plt goal decreased to >20  5/24- WBC - 11.5, D/C Zarxio  BM bx to be done prior to discharge. Next cycle date will depend on these results. IV pain meds changed to PO

## 2025-05-29 NOTE — CHART NOTE - NSCHARTNOTEFT_GEN_A_CORE
Hematology/Oncology Procedure Note    Bone Marrow Aspiration/Biopsy    Indication:    Bone marrow aspiration and biopsy procedure description, risks, and benefits were discussed in detail with the patient.  All questions were answered.  Informed consent was obtained and time-out performed.      The area of the left posterior iliac crest was prepped and draped using sterile technique. Local anesthetic with  2% Lidocaine.    Bone marrow aspiration and biopsy  was performed using sterile technique  by Madelaine Turcios NP with my assist and supervision. Specimens were obtained.    The procedure was well tolerated and no local bleeding or other complications were observed.  Pressure was applied to the procedure site and a wound dressing was placed.  The patient and nursing staff were advised that the patient is to lie flat for 30 minutes post procedure. Tylenol may be used if no contraindications for pain at the procedure site.

## 2025-05-29 NOTE — PROGRESS NOTE ADULT - PROBLEM SELECTOR PLAN 8
on Allopurinol 100 mg po qd  increase to 300 mg po qd for TLS prevention-->back to 100 mg QD  5/27 colchicine 0.6mg BID x 2 days added for increased gout pain  5/28 prednisone 40mg POx2 days then prednisone 20mg x2 days for ongoing gout pain on Allopurinol 100 mg po qd  increase to 300 mg po qd for TLS prevention-->back to 100 mg QD  5/27 colchicine 0.6mg BID x 2 days added for increased gout pain(completed on 5/29 am   5/28 prednisone 40mg POx2 days then prednisone 20mg x2 days for ongoing gout pain

## 2025-05-29 NOTE — PROGRESS NOTE ADULT - ASSESSMENT
70 yo male with newly diagnosed ASXL1, DDX41 mutated AML, admitted for induction chemo with 7+3:  dauno 60 mg/m2+ cytarabine. 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 in [place] for new ASXL1-mutated and XQS02-xwhindj AML. Chemo started on 5/2, Complicated by rigors, chills, fever, SOB  and tachycardia 2 hours into day 1 infusion - likely infusion reaction, neutropenic fever. Pt has pancytopenia d/t chemo and disease

## 2025-05-29 NOTE — PROGRESS NOTE ADULT - PROBLEM SELECTOR PLAN 9
5/18- Orthostatic hypotension,  cc bolus given.  Continue Midodrine 10 mg po q8h  off HCTZ  5/22 orthostatic hypotension improved  check once daily 5/18- Orthostatic hypotension,  cc bolus given.  Continue Midodrine 10 mg po q8h  off HCTZ  5/22 orthostatic hypotension improved  check once daily  5/29 orthostatic VS + on 5/28 and 5/29, but  asymptomatic, continue Midodrine

## 2025-05-29 NOTE — PROGRESS NOTE ADULT - SUBJECTIVE AND OBJECTIVE BOX
Diagnosis: ASXL1-mutated and QJB23-orwicjb AML    Protocol/Chemo Regimen: s/p 7+3:  Daunorubicin 60 mg/m2+ Cytarabine     Day: 28    Pt endorsed: gout pain to b/l feet, otherwise no issues overnight, not sure if colchicine is helping     Review of Systems: Denies any nausea, vomiting, chest pain, palpitation, SOB.    Pain scale: ungraded    Diet: regular Ensure plus HP BID                      Allergies: No Known Allergies      ANTIMICROBIALS  valACYclovir 500 milliGRAM(s) Oral two times a day      STANDING MEDICATIONS  allopurinol 100 milliGRAM(s) Oral daily  Biotene Dry Mouth Oral Rinse 15 milliLiter(s) Swish and Spit four times a day  chlorhexidine 4% Liquid 1 Application(s) Topical <User Schedule>  cholecalciferol 2000 Unit(s) Oral daily  diltiazem    milliGRAM(s) Oral daily  lidocaine   4% Patch 1 Patch Transdermal every 24 hours  melatonin 5 milliGRAM(s) Oral at bedtime  midodrine. 10 milliGRAM(s) Oral every 8 hours  pantoprazole  Injectable 40 milliGRAM(s) IV Push every 12 hours  predniSONE   Tablet 40 milliGRAM(s) Oral once  simethicone 80 milliGRAM(s) Chew <User Schedule>  tamsulosin 0.4 milliGRAM(s) Oral at bedtime      PRN MEDICATIONS  acetaminophen     Tablet .. 650 milliGRAM(s) Oral every 6 hours PRN  albuterol    90 MICROgram(s) HFA Inhaler 2 Puff(s) Inhalation every 6 hours PRN  aluminum hydroxide/magnesium hydroxide/simethicone Suspension 30 milliLiter(s) Oral every 6 hours PRN  calcium carbonate    500 mG (Tums) Chewable 1 Tablet(s) Chew four times a day PRN  cyclobenzaprine 10 milliGRAM(s) Oral three times a day PRN  FIRST- Mouthwash  BLM 10 milliLiter(s) Swish and Swallow every 6 hours PRN  hydrocortisone sodium succinate Injectable 100 milliGRAM(s) IV Push every 8 hours PRN  metoclopramide Injectable 10 milliGRAM(s) IV Push every 6 hours PRN  ondansetron Injectable 8 milliGRAM(s) IV Push every 8 hours PRN  oxyCODONE    IR 10 milliGRAM(s) Oral every 4 hours PRN  polyethylene glycol 3350 17 Gram(s) Oral two times a day PRN  senna 2 Tablet(s) Oral two times a day PRN  sodium chloride 0.9% lock flush 10 milliLiter(s) IV Push every 1 hour PRN        Vital Signs Last 24 Hrs  T(C): 36.7 (29 May 2025 05:11), Max: 36.7 (29 May 2025 05:11)  T(F): 98.1 (29 May 2025 05:11), Max: 98.1 (29 May 2025 05:11)  HR: 78 (29 May 2025 05:11) (78 - 100)  BP: 137/81 (29 May 2025 05:11) (110/70 - 137/81)  BP(mean): --  RR: 18 (29 May 2025 05:11) (18 - 18)  SpO2: 97% (29 May 2025 05:11) (93% - 97%)    Parameters below as of 29 May 2025 05:11  Patient On (Oxygen Delivery Method): room air        PHYSICAL EXAM  General: adult in NAD  HEENT: clear oropharynx, no erythema, no ulcers  CV: normal S1, S2, RRR  Lungs: clear to auscultation, no wheezes, no rales  Abdomen: +BS, soft, nontender, nondistended  Ext: no edema  Skin: no rash  Neuro: alert and oriented x 4  TLC CDI            LABS:                        8.7    17.26 )-----------( 275      ( 29 May 2025 07:01 )             25.6         Mean Cell Volume : 91.4 fl  Mean Cell Hemoglobin : 31.1 pg  Mean Cell Hemoglobin Concentration : 34.0 g/dL  Auto Neutrophil # : x  Auto Lymphocyte # : x  Auto Monocyte # : x  Auto Eosinophil # : x  Auto Basophil # : x  Auto Neutrophil % : x  Auto Lymphocyte % : x  Auto Monocyte % : x  Auto Eosinophil % : x  Auto Basophil % : x      05-29    140  |  104  |  16  ----------------------------<  146[H]  3.9   |  22  |  1.12    Ca    9.0      29 May 2025 07:01  Phos  4.0     05-29  Mg     2.1     05-29    TPro  6.1  /  Alb  3.5  /  TBili  0.4  /  DBili  x   /  AST  45[H]  /  ALT  141[H]  /  AlkPhos  201[H]  05-29      Mg 2.1  Phos 4.0      PT/INR - ( 28 May 2025 07:03 )   PT: 13.2 sec;   INR: 1.16 ratio         PTT - ( 28 May 2025 07:03 )  PTT:23.7 sec      Uric Acid 6.2        RECENT CULTURES:  05-23 @ 06:45  Blood Blood-Catheter  --  --  --    No growth at 5 days  --  05-22 @ 23:23  Clean Catch Clean Catch (Midstream)  --  --  --    No growth  --  05-22 @ 23:22  Blood Blood-Peripheral  --  --  --    No growth at 5 days  --      RADIOLOGY & ADDITIONAL STUDIES:        < from: Xray Chest 1 View- PORTABLE-Urgent (Xray Chest 1 View- PORTABLE-Urgent .) (05.22.25 @ 22:44) >  IMPRESSION:  No focal consolidation.       Diagnosis: ASXL1-mutated and NLT85-mkvduvo AML    Protocol/Chemo Regimen: s/p 7+3:  Daunorubicin 60 mg/m2+ Cytarabine     Day: 28    Pt endorsed:  watery diarrhea 2x this am, 3x yesterday; decreased appetite, not eating much; pain left tongue, neck and lower back    Review of Systems: Patient denied nausea, vomiting,  chest pain, cough, dyspnea, abdominal pain, constipation,  rectal pain,  rash, fatigue, headache, bleeding    Pain scale:  3/10 left tongue, 5/10 neck/low back pain    Diet: regular Ensure plus HP BID                      Allergies: No Known Allergies      ANTIMICROBIALS  valACYclovir 500 milliGRAM(s) Oral two times a day      STANDING MEDICATIONS  allopurinol 100 milliGRAM(s) Oral daily  Biotene Dry Mouth Oral Rinse 15 milliLiter(s) Swish and Spit four times a day  chlorhexidine 4% Liquid 1 Application(s) Topical <User Schedule>  cholecalciferol 2000 Unit(s) Oral daily  diltiazem    milliGRAM(s) Oral daily  lidocaine   4% Patch 1 Patch Transdermal every 24 hours  melatonin 5 milliGRAM(s) Oral at bedtime  midodrine. 10 milliGRAM(s) Oral every 8 hours  pantoprazole  Injectable 40 milliGRAM(s) IV Push every 12 hours  predniSONE   Tablet 40 milliGRAM(s) Oral once  simethicone 80 milliGRAM(s) Chew <User Schedule>  tamsulosin 0.4 milliGRAM(s) Oral at bedtime      PRN MEDICATIONS  acetaminophen     Tablet .. 650 milliGRAM(s) Oral every 6 hours PRN  albuterol    90 MICROgram(s) HFA Inhaler 2 Puff(s) Inhalation every 6 hours PRN  aluminum hydroxide/magnesium hydroxide/simethicone Suspension 30 milliLiter(s) Oral every 6 hours PRN  calcium carbonate    500 mG (Tums) Chewable 1 Tablet(s) Chew four times a day PRN  cyclobenzaprine 10 milliGRAM(s) Oral three times a day PRN  FIRST- Mouthwash  BLM 10 milliLiter(s) Swish and Swallow every 6 hours PRN  hydrocortisone sodium succinate Injectable 100 milliGRAM(s) IV Push every 8 hours PRN  metoclopramide Injectable 10 milliGRAM(s) IV Push every 6 hours PRN  ondansetron Injectable 8 milliGRAM(s) IV Push every 8 hours PRN  oxyCODONE    IR 10 milliGRAM(s) Oral every 4 hours PRN  polyethylene glycol 3350 17 Gram(s) Oral two times a day PRN  senna 2 Tablet(s) Oral two times a day PRN  sodium chloride 0.9% lock flush 10 milliLiter(s) IV Push every 1 hour PRN      Vital Signs Last 24 Hrs  T(C): 36.7 (29 May 2025 05:11), Max: 36.7 (29 May 2025 05:11)  T(F): 98.1 (29 May 2025 05:11), Max: 98.1 (29 May 2025 05:11)  HR: 78 (29 May 2025 05:11) (78 - 100)  BP: 137/81 (29 May 2025 05:11) (110/70 - 137/81)  BP(mean): --  RR: 18 (29 May 2025 05:11) (18 - 18)  SpO2: 97% (29 May 2025 05:11) (93% - 97%)    Parameters below as of 29 May 2025 05:11  Patient On (Oxygen Delivery Method): room air      PHYSICAL EXAM  General: adult in NAD  HEENT: clear oropharynx, no erythema, no ulcers  CV: normal S1, S2, RRR  Lungs: clear to auscultation, no wheezes, no rales  Abdomen: +BS, soft, nontender, nondistended  Ext: no edema  Skin: no rash  Neuro: alert and oriented x 4, no focal deficit   Central line: TLC CDI        LABS:                        8.7    17.26 )-----------( 275      ( 29 May 2025 07:01 )             25.6         Mean Cell Volume : 91.4 fl  Mean Cell Hemoglobin : 31.1 pg  Mean Cell Hemoglobin Concentration : 34.0 g/dL  Auto Neutrophil # : x  Auto Lymphocyte # : x  Auto Monocyte # : x  Auto Eosinophil # : x  Auto Basophil # : x  Auto Neutrophil % : x  Auto Lymphocyte % : x  Auto Monocyte % : x  Auto Eosinophil % : x  Auto Basophil % : x      05-29    140  |  104  |  16  ----------------------------<  146[H]  3.9   |  22  |  1.12    Ca    9.0      29 May 2025 07:01  Phos  4.0     05-29  Mg     2.1     05-29    TPro  6.1  /  Alb  3.5  /  TBili  0.4  /  DBili  x   /  AST  45[H]  /  ALT  141[H]  /  AlkPhos  201[H]  05-29      PT/INR - ( 28 May 2025 07:03 )   PT: 13.2 sec;   INR: 1.16 ratio         PTT - ( 28 May 2025 07:03 )  PTT:23.7 sec      Uric Acid 6.2          RADIOLOGY & ADDITIONAL STUDIES:      < from: Xray Chest 1 View- PORTABLE-Urgent (Xray Chest 1 View- PORTABLE-Urgent .) (05.22.25 @ 22:44) >  IMPRESSION:  No focal consolidation.

## 2025-05-29 NOTE — PROGRESS NOTE ADULT - NS ATTEND AMEND GEN_ALL_CORE FT
69M with ASXL1-mutated and LKA76-pqmmtge AML. 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 Admitted for induction 7+3.    Heme:   - Completed 7+3. Day 25. Complicated by rigors, chills, and tachycardia 2 hours into day 1 infusion - likely infusion reaction.  - Trend CBC with differential daily. Goal Hgb > 8 and plt > 20 given past GI bleeding.   - Trend DIC labs intermittently, as this has not been issue since admission. Continue transfusions as needed to maintain fibrinogen > 100.      - Allopurinol continued, but for previous diagnosis of gout decreased dose to 100 mg on 5/16.  S/p rasburicase; uric acid and LDH stable.   - Continue IVF. Monitor daily weights and I/O's. Diurese PRN.   - BMBx done day 15 (5/16) - subcortical, suboptimal specimen, no evidence of persistent leukemia. Flow 0.34% myeloblasts.  - in absence of overt evidence of persistent leukemia, and in setting of persistent fevers, started daily G-CSF 5/21  - PB transiently increased to > 10%, now cleared, G-CSF was stopped after 5/23  - PB flow showed aberrant  myeloblast population  - has now had ANC, plt recovery    GI:  - Bloody BM 5/14-16.  Plt now recovering.  - c/o severe midepigastric pain 5/22 AM, resolved   - EKG w/o acute changes, troponin sl incr 70, then decr.  - amylase, lipase normal  - cont PPI 2x/day     Card:  - Orthostatic hypotension, likely residual from GI bleed and decreased PO intake. Poss autonomic component.  - Bolus 500 cc on 5/17 and 5/18.  Now on midodrine. BP stable.   - weight stable  - Encouraged incr PO intake.      ID:   - Neutropenic and febrile; fevers since 5/11, last temp 100.8 5/22 pm, Tmax 101     with ongoing GI symptoms, diarrhea. Cults (-) with recent CNS from blood likely contaminant in 1/4 bottles and VREC in urine    cleared in subsequent specimen.  - Cefepime (5/11-5/19 ). Persistent fever lead to changing to meropenem on 5/19.  Last temp 100.4 5/21.  d/c gianna (5/26)  - also now off posa  - ANC has recovered  - CT scan from 5/14 showing no acute CT findings suspicious for infection. Mentioned GGOs in the lungs bilaterally, but stable from a previous scan.   - Prophylaxis: valacyclovir   - Likely remission BMA/Bx pre d/c    MSK: Has chronic pain from prior surgeries   - Pain control:    stopped dilaudid iv 0.5 mg iv 3x/d - switched to prn oxycodone, so far has good pain control    TLC in place  DIMAS being considered  OOB. 69M with ASXL1-mutated and UUC43-nisxedm AML. 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 Admitted for induction 7+3.    Heme:   - Completed 7+3. Day 25. Complicated by rigors, chills, and tachycardia 2 hours into day 1 infusion - likely infusion reaction.  - Trend CBC with differential daily. Goal Hgb > 8 and plt > 20 given past GI bleeding.   - Has ANC, plt count recovery   - Allopurinol continued, but for previous diagnosis of gout decreased dose to 100 mg on 5/16.  S/p rasburicase; uric acid and LDH stable.   - recurrent gout >>> received colchicine (diarrhea resulted), resolution of pain with brief course of steroids  - incr allopurinol to 300 mg/d  - Continue IVF. Monitor daily weights and I/O's. Diurese PRN.   - BMBx done day 15 (5/16) - subcortical, suboptimal specimen, no evidence of persistent leukemia. Flow 0.34% myeloblasts.  - in absence of overt evidence of persistent leukemia, and in setting of persistent fevers, started daily G-CSF 5/21  - PB transiently increased to > 10%, now cleared, G-CSF was stopped after 5/23  - PB flow showed aberrant  myeloblast population  - has now had ANC, plt recovery    GI:  - Bloody BM 5/14-16.  Plt have recovered.  - c/o severe midepigastric pain 5/22 AM, resolved   - EKG w/o acute changes, troponin sl incr 70, then decr.  - amylase, lipase normal      Card:  - Orthostatic hypotension, likely residual from GI bleed and decreased PO intake. Poss autonomic component.  - Bolus 500 cc on 5/17 and 5/18.  Now on midodrine. BP stable.   - weight stable  - Encouraged incr PO intake.      ID:   - Neutropenic and febrile; fevers since 5/11, last temp 100.8 5/22 pm, Tmax 101     with resolving GI symptoms, diarrhea. Cults (-) with recent CNS from blood likely contaminant in 1/4 bottles and VREC in urine    cleared in subsequent specimen.  - Cefepime (5/11-5/19 ). Persistent fever lead to changing to meropenem on 5/19.  Last temp 100.4 5/21.  d/c gianna (5/26)  - also now off posa  - ANC has recovered  - CT scan from 5/14 showing no acute CT findings suspicious for infection. Mentioned GGOs in the lungs bilaterally, but stable from a previous scan.   - Prophylaxis: valacyclovir   - Likely remission BMA/Bx pre d/c    MSK: Has chronic pain from prior surgeries   - Pain control:    stopped dilaudid iv 0.5 mg iv 3x/d - switched to prn oxycodone, so far has good pain control  - gout related toe pain resolving with brief course of prednisone, 40/40/20/20    TLC in place  DIMAS being considered  OOB.

## 2025-05-29 NOTE — PROGRESS NOTE ADULT - PROBLEM SELECTOR PLAN 2
neutropenic, febrile   continue posa, valtrex, flagyl (dc'd 5/19), cefepime (5/11-5/19 ), cont to be febrile Ucx from 5/17 (+) gram + cocci in pairs and chains, inc to meropenem (5/19-   5/2 CXR Patchy opacities along the left lung base, possibly atelectasis, however superimposed infection cannot be entirely excluded.  5/13- FLU/COVID/RSV (-)  5/13- Added Flagyl 500 mg IV Q 8 hrs due to GI discomfort/pain  5/13- CT C/A/P -negative for infxn  5/20  blood cx 5/19 showed 1/4 MRSE  urine cx 5/17 VRE but 5/18 negative and pt asymptomatic  - Levaquin/Amox- 5/2- 5/11  - Cefepime- 5/11- 5/19  Flagyl (abd discomfort)- 5/13- 5/19  - Meropenem- 5/19  - Valtrex-Posaconazole  5/2 5/20 O2 sat 88% with fever, FU closely, O2 PRN  5/25 Discontinue vancomycin  5/26- D/C Meropenem, Posaconazole Not neutropenic, afebrile   continue posa, valtrex, flagyl (dc'd 5/19), cefepime (5/11-5/19 ), cont to be febrile Ucx from 5/17 (+) gram + cocci in pairs and chains, inc to meropenem (5/19-   5/2 CXR Patchy opacities along the left lung base, possibly atelectasis, however superimposed infection cannot be entirely excluded.  5/13- FLU/COVID/RSV (-)  5/13- Added Flagyl 500 mg IV Q 8 hrs due to GI discomfort/pain  5/13- CT C/A/P -negative for infxn  5/20  blood cx 5/19 showed 1/4 MRSE  urine cx 5/17 VRE but 5/18 negative and pt asymptomatic  - Levaquin/Amox- 5/2- 5/11  - Cefepime- 5/11- 5/19  Flagyl (abd discomfort)- 5/13- 5/19  - Meropenem- 5/19  - Valtrex-Posaconazole  5/2 5/20 O2 sat 88% with fever, FU closely, O2 PRN  5/25 Discontinue vancomycin  5/26- D/C Meropenem, Posaconazole

## 2025-05-30 ENCOUNTER — TRANSCRIPTION ENCOUNTER (OUTPATIENT)
Age: 70
End: 2025-05-30

## 2025-05-30 VITALS
DIASTOLIC BLOOD PRESSURE: 77 MMHG | OXYGEN SATURATION: 95 % | TEMPERATURE: 98 F | RESPIRATION RATE: 18 BRPM | HEART RATE: 73 BPM | SYSTOLIC BLOOD PRESSURE: 117 MMHG

## 2025-05-30 LAB
ALBUMIN SERPL ELPH-MCNC: 3.5 G/DL — SIGNIFICANT CHANGE UP (ref 3.3–5)
ALP SERPL-CCNC: 192 U/L — HIGH (ref 40–120)
ALT FLD-CCNC: 129 U/L — HIGH (ref 10–45)
ANION GAP SERPL CALC-SCNC: 14 MMOL/L — SIGNIFICANT CHANGE UP (ref 5–17)
AST SERPL-CCNC: 40 U/L — SIGNIFICANT CHANGE UP (ref 10–40)
BASOPHILS # BLD AUTO: 0.11 K/UL — SIGNIFICANT CHANGE UP (ref 0–0.2)
BASOPHILS NFR BLD AUTO: 0.9 % — SIGNIFICANT CHANGE UP (ref 0–2)
BILIRUB SERPL-MCNC: 0.3 MG/DL — SIGNIFICANT CHANGE UP (ref 0.2–1.2)
BLASTS # FLD: 0.9 % — HIGH (ref 0–0)
BLASTS NFR BLD: 0.9 % — HIGH (ref 0–0)
BLD GP AB SCN SERPL QL: NEGATIVE — SIGNIFICANT CHANGE UP
BUN SERPL-MCNC: 18 MG/DL — SIGNIFICANT CHANGE UP (ref 7–23)
CALCIUM SERPL-MCNC: 8.9 MG/DL — SIGNIFICANT CHANGE UP (ref 8.4–10.5)
CHLORIDE SERPL-SCNC: 105 MMOL/L — SIGNIFICANT CHANGE UP (ref 96–108)
CO2 SERPL-SCNC: 22 MMOL/L — SIGNIFICANT CHANGE UP (ref 22–31)
CREAT SERPL-MCNC: 1.17 MG/DL — SIGNIFICANT CHANGE UP (ref 0.5–1.3)
D DIMER BLD IA.RAPID-MCNC: 499 NG/ML DDU — HIGH
EGFR: 68 ML/MIN/1.73M2 — SIGNIFICANT CHANGE UP
EGFR: 68 ML/MIN/1.73M2 — SIGNIFICANT CHANGE UP
EOSINOPHIL # BLD AUTO: 0 K/UL — SIGNIFICANT CHANGE UP (ref 0–0.5)
EOSINOPHIL NFR BLD AUTO: 0 % — SIGNIFICANT CHANGE UP (ref 0–6)
GIANT PLATELETS BLD QL SMEAR: PRESENT — SIGNIFICANT CHANGE UP
GLUCOSE SERPL-MCNC: 123 MG/DL — HIGH (ref 70–99)
HCT VFR BLD CALC: 25.8 % — LOW (ref 39–50)
HGB BLD-MCNC: 8.5 G/DL — LOW (ref 13–17)
INR BLD: 1.13 RATIO — SIGNIFICANT CHANGE UP (ref 0.85–1.16)
LDH SERPL L TO P-CCNC: 579 U/L — HIGH (ref 50–242)
LYMPHOCYTES # BLD AUTO: 16.8 % — SIGNIFICANT CHANGE UP (ref 13–44)
LYMPHOCYTES # BLD AUTO: 2 K/UL — SIGNIFICANT CHANGE UP (ref 1–3.3)
MAGNESIUM SERPL-MCNC: 2.3 MG/DL — SIGNIFICANT CHANGE UP (ref 1.6–2.6)
MANUAL SMEAR VERIFICATION: SIGNIFICANT CHANGE UP
MCHC RBC-ENTMCNC: 30.5 PG — SIGNIFICANT CHANGE UP (ref 27–34)
MCHC RBC-ENTMCNC: 32.9 G/DL — SIGNIFICANT CHANGE UP (ref 32–36)
MCV RBC AUTO: 92.5 FL — SIGNIFICANT CHANGE UP (ref 80–100)
METAMYELOCYTES # FLD: 0.9 % — HIGH (ref 0–0)
METAMYELOCYTES NFR BLD: 0.9 % — HIGH (ref 0–0)
MONOCYTES # BLD AUTO: 2.84 K/UL — HIGH (ref 0–0.9)
MONOCYTES NFR BLD AUTO: 23.9 % — HIGH (ref 2–14)
MYELOCYTES NFR BLD: 6.2 % — HIGH (ref 0–0)
NEUTROPHILS # BLD AUTO: 5.99 K/UL — SIGNIFICANT CHANGE UP (ref 1.8–7.4)
NEUTROPHILS NFR BLD AUTO: 46 % — SIGNIFICANT CHANGE UP (ref 43–77)
NEUTS BAND # BLD: 4.4 % — SIGNIFICANT CHANGE UP (ref 0–8)
NEUTS BAND NFR BLD: 4.4 % — SIGNIFICANT CHANGE UP (ref 0–8)
NRBC # BLD: 2 /100 WBCS — HIGH (ref 0–0)
NRBC BLD-RTO: 2 /100 WBCS — HIGH (ref 0–0)
PHOSPHATE SERPL-MCNC: 4.5 MG/DL — SIGNIFICANT CHANGE UP (ref 2.5–4.5)
PLAT MORPH BLD: NORMAL — SIGNIFICANT CHANGE UP
PLATELET # BLD AUTO: 457 K/UL — HIGH (ref 150–400)
POTASSIUM SERPL-MCNC: 4.1 MMOL/L — SIGNIFICANT CHANGE UP (ref 3.5–5.3)
POTASSIUM SERPL-SCNC: 4.1 MMOL/L — SIGNIFICANT CHANGE UP (ref 3.5–5.3)
PROT SERPL-MCNC: 5.9 G/DL — LOW (ref 6–8.3)
PROTHROM AB SERPL-ACNC: 13 SEC — SIGNIFICANT CHANGE UP (ref 9.9–13.4)
RBC # BLD: 2.79 M/UL — LOW (ref 4.2–5.8)
RBC # FLD: 14.2 % — SIGNIFICANT CHANGE UP (ref 10.3–14.5)
RBC BLD AUTO: SIGNIFICANT CHANGE UP
RH IG SCN BLD-IMP: POSITIVE — SIGNIFICANT CHANGE UP
SODIUM SERPL-SCNC: 141 MMOL/L — SIGNIFICANT CHANGE UP (ref 135–145)
TOXIC GRANULES BLD QL SMEAR: PRESENT — SIGNIFICANT CHANGE UP
URATE SERPL-MCNC: 5.9 MG/DL — SIGNIFICANT CHANGE UP (ref 3.4–8.8)
WBC # BLD: 11.89 K/UL — HIGH (ref 3.8–10.5)
WBC # FLD AUTO: 11.89 K/UL — HIGH (ref 3.8–10.5)

## 2025-05-30 PROCEDURE — 85014 HEMATOCRIT: CPT

## 2025-05-30 PROCEDURE — 88342 IMHCHEM/IMCYTCHM 1ST ANTB: CPT

## 2025-05-30 PROCEDURE — 88341 IMHCHEM/IMCYTCHM EA ADD ANTB: CPT

## 2025-05-30 PROCEDURE — 87086 URINE CULTURE/COLONY COUNT: CPT

## 2025-05-30 PROCEDURE — 36415 COLL VENOUS BLD VENIPUNCTURE: CPT

## 2025-05-30 PROCEDURE — 82330 ASSAY OF CALCIUM: CPT

## 2025-05-30 PROCEDURE — P9037: CPT

## 2025-05-30 PROCEDURE — 80048 BASIC METABOLIC PNL TOTAL CA: CPT

## 2025-05-30 PROCEDURE — 99238 HOSP IP/OBS DSCHRG MGMT 30/<: CPT

## 2025-05-30 PROCEDURE — 74176 CT ABD & PELVIS W/O CONTRAST: CPT

## 2025-05-30 PROCEDURE — P9073: CPT

## 2025-05-30 PROCEDURE — 87150 DNA/RNA AMPLIFIED PROBE: CPT

## 2025-05-30 PROCEDURE — 87040 BLOOD CULTURE FOR BACTERIA: CPT

## 2025-05-30 PROCEDURE — 88237 TISSUE CULTURE BONE MARROW: CPT

## 2025-05-30 PROCEDURE — 88264 CHROMOSOME ANALYSIS 20-25: CPT

## 2025-05-30 PROCEDURE — 83880 ASSAY OF NATRIURETIC PEPTIDE: CPT

## 2025-05-30 PROCEDURE — 71250 CT THORAX DX C-: CPT

## 2025-05-30 PROCEDURE — 80053 COMPREHEN METABOLIC PANEL: CPT

## 2025-05-30 PROCEDURE — 83605 ASSAY OF LACTIC ACID: CPT

## 2025-05-30 PROCEDURE — 81378 HLA I & II TYPING HR: CPT

## 2025-05-30 PROCEDURE — 82550 ASSAY OF CK (CPK): CPT

## 2025-05-30 PROCEDURE — 82947 ASSAY GLUCOSE BLOOD QUANT: CPT

## 2025-05-30 PROCEDURE — 85610 PROTHROMBIN TIME: CPT

## 2025-05-30 PROCEDURE — 84550 ASSAY OF BLOOD/URIC ACID: CPT

## 2025-05-30 PROCEDURE — 84132 ASSAY OF SERUM POTASSIUM: CPT

## 2025-05-30 PROCEDURE — 82955 ASSAY OF G6PD ENZYME: CPT

## 2025-05-30 PROCEDURE — P9040: CPT

## 2025-05-30 PROCEDURE — 85018 HEMOGLOBIN: CPT

## 2025-05-30 PROCEDURE — A9560: CPT

## 2025-05-30 PROCEDURE — 82610 CYSTATIN C: CPT

## 2025-05-30 PROCEDURE — 85384 FIBRINOGEN ACTIVITY: CPT

## 2025-05-30 PROCEDURE — 81001 URINALYSIS AUTO W/SCOPE: CPT

## 2025-05-30 PROCEDURE — 82150 ASSAY OF AMYLASE: CPT

## 2025-05-30 PROCEDURE — 88108 CYTOPATH CONCENTRATE TECH: CPT

## 2025-05-30 PROCEDURE — 84295 ASSAY OF SERUM SODIUM: CPT

## 2025-05-30 PROCEDURE — 82565 ASSAY OF CREATININE: CPT

## 2025-05-30 PROCEDURE — 86832 HLA CLASS I HIGH DEFIN QUAL: CPT

## 2025-05-30 PROCEDURE — 88313 SPECIAL STAINS GROUP 2: CPT

## 2025-05-30 PROCEDURE — 82553 CREATINE MB FRACTION: CPT

## 2025-05-30 PROCEDURE — 87205 SMEAR GRAM STAIN: CPT

## 2025-05-30 PROCEDURE — 0225U NFCT DS DNA&RNA 21 SARSCOV2: CPT

## 2025-05-30 PROCEDURE — 36556 INSERT NON-TUNNEL CV CATH: CPT

## 2025-05-30 PROCEDURE — 87637 SARSCOV2&INF A&B&RSV AMP PRB: CPT

## 2025-05-30 PROCEDURE — 86923 COMPATIBILITY TEST ELECTRIC: CPT

## 2025-05-30 PROCEDURE — 87077 CULTURE AEROBIC IDENTIFY: CPT

## 2025-05-30 PROCEDURE — 83615 LACTATE (LD) (LDH) ENZYME: CPT

## 2025-05-30 PROCEDURE — 86901 BLOOD TYPING SEROLOGIC RH(D): CPT

## 2025-05-30 PROCEDURE — 83690 ASSAY OF LIPASE: CPT

## 2025-05-30 PROCEDURE — 88360 TUMOR IMMUNOHISTOCHEM/MANUAL: CPT

## 2025-05-30 PROCEDURE — 85097 BONE MARROW INTERPRETATION: CPT

## 2025-05-30 PROCEDURE — 97116 GAIT TRAINING THERAPY: CPT

## 2025-05-30 PROCEDURE — 86850 RBC ANTIBODY SCREEN: CPT

## 2025-05-30 PROCEDURE — 87640 STAPH A DNA AMP PROBE: CPT

## 2025-05-30 PROCEDURE — P9100: CPT

## 2025-05-30 PROCEDURE — 71045 X-RAY EXAM CHEST 1 VIEW: CPT

## 2025-05-30 PROCEDURE — 85049 AUTOMATED PLATELET COUNT: CPT

## 2025-05-30 PROCEDURE — C1894: CPT

## 2025-05-30 PROCEDURE — 78472 GATED HEART PLANAR SINGLE: CPT | Mod: MC

## 2025-05-30 PROCEDURE — 81450 HL NEO GSAP 5-50DNA/DNA&RNA: CPT

## 2025-05-30 PROCEDURE — 84484 ASSAY OF TROPONIN QUANT: CPT

## 2025-05-30 PROCEDURE — 81451 HL NEO GSAP 5-50 RNA ALYS: CPT

## 2025-05-30 PROCEDURE — 76937 US GUIDE VASCULAR ACCESS: CPT

## 2025-05-30 PROCEDURE — 85025 COMPLETE CBC W/AUTO DIFF WBC: CPT

## 2025-05-30 PROCEDURE — C1751: CPT

## 2025-05-30 PROCEDURE — 81382 HLA II TYPING 1 LOC HR: CPT

## 2025-05-30 PROCEDURE — 97530 THERAPEUTIC ACTIVITIES: CPT

## 2025-05-30 PROCEDURE — 81003 URINALYSIS AUTO W/O SCOPE: CPT

## 2025-05-30 PROCEDURE — C1769: CPT

## 2025-05-30 PROCEDURE — 77001 FLUOROGUIDE FOR VEIN DEVICE: CPT

## 2025-05-30 PROCEDURE — C1887: CPT

## 2025-05-30 PROCEDURE — 88305 TISSUE EXAM BY PATHOLOGIST: CPT

## 2025-05-30 PROCEDURE — 87186 SC STD MICRODIL/AGAR DIL: CPT

## 2025-05-30 PROCEDURE — 36430 TRANSFUSION BLD/BLD COMPNT: CPT

## 2025-05-30 PROCEDURE — 88185 FLOWCYTOMETRY/TC ADD-ON: CPT

## 2025-05-30 PROCEDURE — 82435 ASSAY OF BLOOD CHLORIDE: CPT

## 2025-05-30 PROCEDURE — 80202 ASSAY OF VANCOMYCIN: CPT

## 2025-05-30 PROCEDURE — 83735 ASSAY OF MAGNESIUM: CPT

## 2025-05-30 PROCEDURE — 97161 PT EVAL LOW COMPLEX 20 MIN: CPT

## 2025-05-30 PROCEDURE — 88285 CHROMOSOME COUNT ADDITIONAL: CPT

## 2025-05-30 PROCEDURE — 84100 ASSAY OF PHOSPHORUS: CPT

## 2025-05-30 PROCEDURE — 97110 THERAPEUTIC EXERCISES: CPT

## 2025-05-30 PROCEDURE — 82803 BLOOD GASES ANY COMBINATION: CPT

## 2025-05-30 PROCEDURE — 85730 THROMBOPLASTIN TIME PARTIAL: CPT

## 2025-05-30 PROCEDURE — 87529 HSV DNA AMP PROBE: CPT

## 2025-05-30 PROCEDURE — 82962 GLUCOSE BLOOD TEST: CPT

## 2025-05-30 PROCEDURE — 88184 FLOWCYTOMETRY/ TC 1 MARKER: CPT

## 2025-05-30 PROCEDURE — 87641 MR-STAPH DNA AMP PROBE: CPT

## 2025-05-30 PROCEDURE — 0241U: CPT

## 2025-05-30 PROCEDURE — 86900 BLOOD TYPING SEROLOGIC ABO: CPT

## 2025-05-30 PROCEDURE — 85379 FIBRIN DEGRADATION QUANT: CPT

## 2025-05-30 PROCEDURE — 93005 ELECTROCARDIOGRAM TRACING: CPT

## 2025-05-30 PROCEDURE — 87507 IADNA-DNA/RNA PROBE TQ 12-25: CPT

## 2025-05-30 PROCEDURE — 94640 AIRWAY INHALATION TREATMENT: CPT

## 2025-05-30 PROCEDURE — 86833 HLA CLASS II HIGH DEFIN QUAL: CPT

## 2025-05-30 RX ORDER — DILTIAZEM HYDROCHLORIDE 240 MG/1
1 TABLET, EXTENDED RELEASE ORAL
Qty: 30 | Refills: 0
Start: 2025-05-30 | End: 2025-06-28

## 2025-05-30 RX ORDER — PREDNISONE 20 MG/1
1 TABLET ORAL
Qty: 1 | Refills: 0
Start: 2025-05-30 | End: 2025-05-30

## 2025-05-30 RX ADMIN — Medication 15 MILLILITER(S): at 11:01

## 2025-05-30 RX ADMIN — Medication 40 MILLIGRAM(S): at 05:43

## 2025-05-30 RX ADMIN — DILTIAZEM HYDROCHLORIDE 240 MILLIGRAM(S): 240 TABLET, EXTENDED RELEASE ORAL at 05:44

## 2025-05-30 RX ADMIN — Medication 2000 UNIT(S): at 11:01

## 2025-05-30 RX ADMIN — Medication 1 APPLICATION(S): at 09:33

## 2025-05-30 RX ADMIN — Medication 15 MILLILITER(S): at 05:44

## 2025-05-30 RX ADMIN — Medication 500 MILLIGRAM(S): at 05:44

## 2025-05-30 RX ADMIN — Medication 300 MILLIGRAM(S): at 11:00

## 2025-05-30 NOTE — PROGRESS NOTE ADULT - NS ATTEND OPT1 GEN_ALL_CORE
I attest my time as attending is greater than 50% of the total combined time spent on qualifying patient care activities by the PA/NP and attending.

## 2025-05-30 NOTE — PROGRESS NOTE ADULT - ASSESSMENT
70 yo male with newly diagnosed ASXL1, DDX41 mutated AML, admitted for induction chemo with 7+3:  dauno 60 mg/m2+ cytarabine. 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 Chemo started on 5/2, Complicated by rigors, chills, fever, SOB  and tachycardia 2 hours into day 1 infusion - likely infusion reaction, neutropenic fever. Pt has pancytopenia d/t chemo and disease General

## 2025-05-30 NOTE — PROGRESS NOTE ADULT - PROBLEM SELECTOR PLAN 8
on Allopurinol 100 mg po qd  increase to 300 mg po qd for TLS prevention-->back to 100 mg QD  5/27 colchicine 0.6mg BID x 2 days added for increased gout pain(completed on 5/29 am   5/28 prednisone 40mg POx2 days then prednisone 20mg x2 days for ongoing gout pain

## 2025-05-30 NOTE — PROGRESS NOTE ADULT - PROBLEM SELECTOR PLAN 1
new ASXL1-mutated and QCM91-igimtxu AML.  admitted for induction chemo with dauno 60 mg/m2+ cytarabine. Chemo started on 5/2, Complicated by rigors, chills, fever, SOB  and tachycardia 2 hours into day 1 infusion - likely infusion. received pepcid/benadryl/solumedrol/tylenol iv. will make up D1 Cytarabine on day8    will proceed with day2 chemo with pre mdes Tylenol and Benadryl, hydrocortisone iv PRN reaction   Hepatitis screen/HIV(-) as outpatient  IVF, antiemetics, mouth care. monitor weight, I & O, diuresis PRN   Monitor CBC with diff QD, TLS labs BID, Coags & T & C TIW transfuse for hb<7, PLT <20  5/16 BMbx subcortical, suboptimal specimen, no evidence of persistent leukemia. Flow 0.34% myeloblasts.  5/23 PB flow sent, fu result  5/19: no longer having blood in stool for several days, check cbc daily, plt goal decreased to >20  5/24- WBC - 11.5, D/C Zarxio  BM bx to be done prior to discharge. Next cycle date will depend on these results. IV pain meds changed to PO new ASXL1-mutated and USV44-dtbllkk AML.  admitted for induction chemo with dauno 60 mg/m2+ cytarabine. Chemo started on 5/2, Complicated by rigors, chills, fever, SOB  and tachycardia 2 hours into day 1 infusion - likely infusion. received pepcid/benadryl/solumedrol/tylenol iv. will make up D1 Cytarabine on day8    will proceed with day2 chemo with pre mdes Tylenol and Benadryl, hydrocortisone iv PRN reaction   Hepatitis screen/HIV(-) as outpatient  IVF, antiemetics, mouth care. monitor weight, I & O, diuresis PRN   Monitor CBC with diff QD, TLS labs BID, Coags & T & C TIW transfuse for hb<7, PLT <20  5/16 BMbx subcortical, suboptimal specimen, no evidence of persistent leukemia. Flow 0.34% myeloblasts.  5/23 PB flow sent, fu result  5/19: no longer having blood in stool for several days, check cbc daily, plt goal decreased to >20  5/24- WBC - 11.5, D/C Zarxio   s/p BM bx on 5/29, Next  chemo depends on these results. IV pain meds changed to PO

## 2025-05-30 NOTE — DISCHARGE NOTE NURSING/CASE MANAGEMENT/SOCIAL WORK - CASE MANAGER'S NAME
HF assessment reviewed. Observation notes reviewed. Blood pressures and HR are stable. Weights up 4-5 pounds. Increase furosemide to 80 mg in am and 40 mg in pm for 3 days. Check labs next week. Bmp, mg.      Thank you Tina Paez RN MPHA

## 2025-05-30 NOTE — PHARMACOTHERAPY INTERVENTION NOTE - REASON FOR NOTE
progress note

## 2025-05-30 NOTE — PROGRESS NOTE ADULT - NUTRITIONAL ASSESSMENT
This patient has been assessed with a concern for Malnutrition and has been determined to have a diagnosis/diagnoses of Severe protein-calorie malnutrition.    This patient is being managed with:   Diet Regular-  Supplement Feeding Modality:  Oral  Ensure Plus High Protein Cans or Servings Per Day:  1       Frequency:  Two Times a day  Entered: May 22 2025  5:08PM    The following pending diet order is being considered for treatment of Severe protein-calorie malnutrition:  Diet Regular-  Supplement Feeding Modality:  Oral  Ensure Plus High Protein Cans or Servings Per Day:  2       Frequency:  Daily  Entered: May 22 2025 11:06AM  

## 2025-05-30 NOTE — PHARMACOTHERAPY INTERVENTION NOTE - COMMENTS
Clinical Pharmacy Specialist- Hematology/Oncology- Progress Note    Pt is a 70 y/o male w/ PMH of HTN, HLD, GERD, BPH, Gout, spinal stenosis (s/p multiple surgeries) and newly diagnosed AML (ASXL1-mutated and DDX41) admitted for management with 7+3 Induction therapy    Antimicrobial Course( ID Following, MD Rodarte):  - Posaconazole- 5/2- 5/25  - Levaquin/Amox- 5/2- 5/11  --> Cefepime- 5/11- 5/19  --> Meropenem- 5/19- 5/26  - Flagyl (abd discomfort)- 5/13- 5/19  - Valtrex- 5/2  - Vancomycin (MRSA?MRSE bacteremia)- 5/20- 5/24  MRSA nasal swab    Last Neutropenic (ANC<1000): 5/23; ANC= 0.92  Last Febrile: 22-May-2025 22:43; T= 100.8  Days Non-Neutropenic: 7  Days afebrile: 7    Chemotherapy Course  -Current Regimen: 7+3 Induction  History:  (5/2/25)- 7+3 Induction   -Daunorubicin 60mg/m2 IVP D1-3   -Cytarabine 100mg/m2 IV cont inf D1-7  -Day: 29 (5/30)  BmBx: 5/29  Access: R TLC (accessed 5/5/25)    Vancomycin Levels:   Date    Dose              Value    Ref Range	                             Status   5/21    750mg q12hr   7.8       Trough 10-20; AUC= 400-600   Final  5/23    1gm q12hr      10.9     Trough 10-20; AUC= 400-600   Final    History/Relevant clinical information used in assessment:  - Ht= 5’11”; IBW= 75.3kg; Actual BW= 107.3kg; Adj BW= 88kg  - 5/2- IRR? rigors, chills, and tachycardia 2 hours into day 1; rasburicase 3mg x 1  - 5/4 -Crcl= 67ml/min ; Scr=1.32; Wt= 88kg; Adjusted BW used since pt BMI>30  - 5/9- increase melatonin 3mg to 10mh qhs- c/o insomnia  - 5/13- mouth pain- has ulceration   - 5/14- mouth pain - cannot swallow now  - 5/15- bloody diarrhea x 3 on reglan prn, but has not needed, monitor for d/c to not aggravate w/ prokinetic agent  - 5/15- HSV neg  - 5/16- bloody BM's decreased to 2x'd/day from 4x's/day yest  - 5/17 UCx- 5/17- 10-49k VRE- per ID, do not need to treat  - 5/19- BlCx- Meth resistant Staphylococcus haemolyticus- on vancomycin 1gm q12hr, this will yield AUC= 457; per ID, likely contaminant, expect short course of vanco; - 5/19, still febrile since 5/11, on cefepime, BlCx NGTD + abd discomfort (added flagyl)-->CT CAP 5/15 - no infection (but groundglass opacities stable from previous?)  - 5/21- zarxio 480mcg daily 5/21- 5/23  - 5/29- pt had episodes of diarrhea - likely d/t colchicine    Assessment/Plan/Recommendation:  Onc:  - Daunorubicin 60mg/m2 IVP D1-3   - Cytarabine 100mg/m2 IV cont inf D1-7- IRR on D1, started at 6p, stopped at 7:30p (received 1.5 hrs),  made up D1 bag on 5/9 to run over 23 hrs (to account for ~1hr given)  - BmBx 5/16- favorable, remission BmBx 5/29 pending  - allopurinol 100mg (300mg d/c'd 5/15)-keeping for gout-->300mg- 5/29- given UA rising- 5/29  - 5/27- gout flare- colchicine 0.6mg BID x 2 days (5/27-5/29) -can cont until flare resolves, some experts cont 24-48 hrs after resolution; if symptoms do not resolve after 3 days, consider other anti-inflammatory agents- pt pain not relieved- added prednisone 40mg daily x 2 days (5/28-29) f/b 20mg x 2 days (5/30-6/1)  ID:  - no longer neutropenic, on valtrex only  Cards:  - atorvastatin 80mg held d/t DDI with posa (Cat X)- can add back when posa is off  Pain (mouth):  ·	magic mouthwash to swish & swallow- 5/14--> swish & spit prn- 5/28  ·	hydromorphone 0.2mg IVP q6hr prn- 5/14--> 0.5mg - 0.7mg q3hr prn- 5/16--> 0.7-1mg- 5/23- 5/27  ·	hydromorphone 0.5mg IVP tid- 5/15- 5/27  ·	oxycodone 2.5-5mg q4hr prn- 5/2- 5/16--> 10mg q4hr prn- 5/27  ·	lidocaine gel 2% q8hr to tongue 5/14- 5/28  ·	dexamethasone soln 0.5 mg/5 mL BID swish & spit - 5/15- 5/28  ·	meperidine 12.5mg IVP prn rigors  Orthostasis:  - 5/18- midodrine 10mg PO tid  Dispo:  - 5/29- d/c home   - Meds sent for auth: midodrine, allopurinol 300mg (increased), prednisone, simethicone- sent to [facility] 5/29    Additional Monitoring Needed?   -Yes- Continue to monitor renal function & daily counts for abx escalation/de-escalation   -Discharge Planning:  --> New meds: midodrine, allopurinol 300mg (increased), prednisone, simethicone  --> Meds sent for auth: midodrine, allopurinol 300mg (increased), prednisone, simethicone- sent to [facility] 5/29  --> Delivered meds:    Case discussed with attending/primary team    Liza Jolley, PharmD, BCPS  Clinical Pharmacy Specialist | Hematology/Oncology  [facility]  Email: [email or web address] or available on Built In Clinical Pharmacy Specialist- Hematology/Oncology- Progress Note    Pt is a 70 y/o male w/ PMH of HTN, HLD, GERD, BPH, Gout, spinal stenosis (s/p multiple surgeries) and newly diagnosed AML (ASXL1-mutated and DDX41) admitted for management with 7+3 Induction therapy    Antimicrobial Course( ID Following, MD Rodarte):  - Posaconazole- 5/2- 5/25  - Levaquin/Amox- 5/2- 5/11  --> Cefepime- 5/11- 5/19  --> Meropenem- 5/19- 5/26  - Flagyl (abd discomfort)- 5/13- 5/19  - Valtrex- 5/2  - Vancomycin (MRSA?MRSE bacteremia)- 5/20- 5/24  MRSA nasal swab    Last Neutropenic (ANC<1000): 5/23; ANC= 0.92  Last Febrile: 22-May-2025 22:43; T= 100.8  Days Non-Neutropenic: 7  Days afebrile: 7    Chemotherapy Course  -Current Regimen: 7+3 Induction  History:  (5/2/25)- 7+3 Induction   -Daunorubicin 60mg/m2 IVP D1-3   -Cytarabine 100mg/m2 IV cont inf D1-7  -Day: 29 (5/30)  BmBx: 5/29  Access: R TLC (accessed 5/5/25)    Vancomycin Levels:   Date    Dose              Value    Ref Range	                             Status   5/21    750mg q12hr   7.8       Trough 10-20; AUC= 400-600   Final  5/23    1gm q12hr      10.9     Trough 10-20; AUC= 400-600   Final    History/Relevant clinical information used in assessment:  - Ht= 5’11”; IBW= 75.3kg; Actual BW= 107.3kg; Adj BW= 88kg  - 5/2- IRR? rigors, chills, and tachycardia 2 hours into day 1; rasburicase 3mg x 1  - 5/4 -Crcl= 67ml/min ; Scr=1.32; Wt= 88kg; Adjusted BW used since pt BMI>30  - 5/9- increase melatonin 3mg to 10mh qhs- c/o insomnia  - 5/13- mouth pain- has ulceration   - 5/14- mouth pain - cannot swallow now  - 5/15- bloody diarrhea x 3 on reglan prn, but has not needed, monitor for d/c to not aggravate w/ prokinetic agent  - 5/15- HSV neg  - 5/16- bloody BM's decreased to 2x'd/day from 4x's/day yest  - 5/17 UCx- 5/17- 10-49k VRE- per ID, do not need to treat  - 5/19- BlCx- Meth resistant Staphylococcus haemolyticus- on vancomycin 1gm q12hr, this will yield AUC= 457; per ID, likely contaminant, expect short course of vanco; - 5/19, still febrile since 5/11, on cefepime, BlCx NGTD + abd discomfort (added flagyl)-->CT CAP 5/15 - no infection (but groundglass opacities stable from previous?)  - 5/21- zarxio 480mcg daily 5/21- 5/23  - 5/29- pt had episodes of diarrhea - likely d/t colchicine    Assessment/Plan/Recommendation:  Onc:  - Daunorubicin 60mg/m2 IVP D1-3   - Cytarabine 100mg/m2 IV cont inf D1-7- IRR on D1, started at 6p, stopped at 7:30p (received 1.5 hrs),  made up D1 bag on 5/9 to run over 23 hrs (to account for ~1hr given)  - BmBx 5/16- favorable, remission BmBx 5/29 pending  - allopurinol 100mg (300mg d/c'd 5/15)-keeping for gout-->300mg- 5/29- given UA rising- 5/29  - 5/27- gout flare- colchicine 0.6mg BID x 2 days (5/27-5/29) -can cont until flare resolves, some experts cont 24-48 hrs after resolution; if symptoms do not resolve after 3 days, consider other anti-inflammatory agents- pt pain not relieved- added prednisone 40mg daily x 2 days (5/28-29) f/b 20mg x 2 days (5/30-6/1)  ID:  - no longer neutropenic, on valtrex only  Cards:  - atorvastatin 80mg held d/t DDI with posa (Cat X)- can add back when posa is off  Pain (mouth):  ·	magic mouthwash to swish & swallow- 5/14--> swish & spit prn- 5/28  ·	hydromorphone 0.2mg IVP q6hr prn- 5/14--> 0.5mg - 0.7mg q3hr prn- 5/16--> 0.7-1mg- 5/23- 5/27  ·	hydromorphone 0.5mg IVP tid- 5/15- 5/27  ·	oxycodone 2.5-5mg q4hr prn- 5/2- 5/16--> 10mg q4hr prn- 5/27  ·	lidocaine gel 2% q8hr to tongue 5/14- 5/28  ·	dexamethasone soln 0.5 mg/5 mL BID swish & spit - 5/15- 5/28  ·	meperidine 12.5mg IVP prn rigors  Orthostasis:  - 5/18- midodrine 10mg PO tid  - 5/30 will decrease diltiazem CD 240mg to 120mg  Dispo:  - 5/29- d/c home   - Meds sent for auth: midodrine, allopurinol 300mg (increased), prednisone, simethicone- sent to [facility] 5/29    Additional Monitoring Needed?   -Yes- Continue to monitor renal function & daily counts for abx escalation/de-escalation   -Discharge Planning:  --> New meds: midodrine, allopurinol 300mg (increased), prednisone, simethicone, diltiazem 240mg decrease to 120mg  --> Meds sent for auth: midodrine, allopurinol 300mg (increased), prednisone, simethicone- sent to [facility] 5/29  --> Delivered meds:    Case discussed with attending/primary team    Liza Jolley, PharmD, BCPS  Clinical Pharmacy Specialist | Hematology/Oncology  [facility]  Email: [email or web address] or available on [email or web address]

## 2025-05-30 NOTE — PROGRESS NOTE ADULT - SUBJECTIVE AND OBJECTIVE BOX
Diagnosis: ASXL1-mutated and FTF16-xcbbexi AML    Protocol/Chemo Regimen: s/p 7+3:  Daunorubicin 60 mg/m2+ Cytarabine     Day: 29    Pt endorsed:  watery diarrhea 2x this am, 3x yesterday; decreased appetite, not eating much; pain left tongue, neck and lower back    Review of Systems: Patient denied nausea, vomiting,  chest pain, cough, dyspnea, abdominal pain, constipation,  rectal pain,  rash, fatigue, headache, bleeding    Pain scale:  3/10 left tongue, 5/10 neck/low back pain    Diet: regular Ensure plus HP BID                      Allergies: No Known Allergies        ANTIMICROBIALS  valACYclovir 500 milliGRAM(s) Oral two times a day      HEME/ONC MEDICATIONS      STANDING MEDICATIONS  allopurinol 300 milliGRAM(s) Oral daily  Biotene Dry Mouth Oral Rinse 15 milliLiter(s) Swish and Spit four times a day  chlorhexidine 4% Liquid 1 Application(s) Topical <User Schedule>  cholecalciferol 2000 Unit(s) Oral daily  diltiazem    milliGRAM(s) Oral daily  lidocaine   4% Patch 1 Patch Transdermal every 24 hours  melatonin 5 milliGRAM(s) Oral at bedtime  midodrine. 10 milliGRAM(s) Oral every 8 hours  pantoprazole    Tablet 40 milliGRAM(s) Oral <User Schedule>  predniSONE   Tablet 20 milliGRAM(s) Oral daily  tamsulosin 0.4 milliGRAM(s) Oral at bedtime      PRN MEDICATIONS  acetaminophen     Tablet .. 650 milliGRAM(s) Oral every 6 hours PRN  albuterol    90 MICROgram(s) HFA Inhaler 2 Puff(s) Inhalation every 6 hours PRN  calcium carbonate    500 mG (Tums) Chewable 1 Tablet(s) Chew four times a day PRN  cyclobenzaprine 10 milliGRAM(s) Oral three times a day PRN  FIRST- Mouthwash  BLM 10 milliLiter(s) Swish and Swallow every 6 hours PRN  metoclopramide Injectable 10 milliGRAM(s) IV Push every 6 hours PRN  ondansetron Injectable 8 milliGRAM(s) IV Push every 8 hours PRN  oxyCODONE    IR 10 milliGRAM(s) Oral every 4 hours PRN  polyethylene glycol 3350 17 Gram(s) Oral two times a day PRN  senna 2 Tablet(s) Oral two times a day PRN  simethicone 80 milliGRAM(s) Chew four times a day PRN  sodium chloride 0.9% lock flush 10 milliLiter(s) IV Push every 1 hour PRN        Vital Signs Last 24 Hrs  T(C): 36.7 (30 May 2025 09:03), Max: 36.9 (30 May 2025 00:35)  T(F): 98 (30 May 2025 09:03), Max: 98.5 (30 May 2025 00:35)  HR: 89 (30 May 2025 09:03) (68 - 89)  BP: 118/74 (30 May 2025 09:03) (103/66 - 125/70)  BP(mean): --  RR: 18 (30 May 2025 09:03) (18 - 18)  SpO2: 94% (30 May 2025 09:03) (93% - 97%)    Parameters below as of 30 May 2025 09:03  Patient On (Oxygen Delivery Method): room air          PHYSICAL EXAM  General: adult in NAD  HEENT: clear oropharynx, no erythema, no ulcers  CV: normal S1, S2, RRR  Lungs: clear to auscultation, no wheezes, no rales  Abdomen: +BS, soft, nontender, nondistended  Ext: no edema  Skin: no rash  Neuro: alert and oriented x 4, no focal deficit   Central line: TLC CDI        LABS:                        8.5    11.89 )-----------( 457      ( 30 May 2025 07:08 )             25.8         Mean Cell Volume : 92.5 fl  Mean Cell Hemoglobin : 30.5 pg  Mean Cell Hemoglobin Concentration : 32.9 g/dL  Auto Neutrophil # : x  Auto Lymphocyte # : x  Auto Monocyte # : x  Auto Eosinophil # : x  Auto Basophil # : x  Auto Neutrophil % : x  Auto Lymphocyte % : x  Auto Monocyte % : x  Auto Eosinophil % : x  Auto Basophil % : x      05-30    141  |  105  |  18  ----------------------------<  123[H]  4.1   |  22  |  1.17    Ca    8.9      30 May 2025 07:16  Phos  4.5     05-30  Mg     2.3     05-30    TPro  5.9[L]  /  Alb  3.5  /  TBili  0.3  /  DBili  x   /  AST  40  /  ALT  129[H]  /  AlkPhos  192[H]  05-30      Mg 2.3  Phos 4.5      PT/INR - ( 30 May 2025 07:09 )   PT: 13.0 sec;   INR: 1.13 ratio               Uric Acid 5.9        RECENT CULTURES:      RADIOLOGY & ADDITIONAL STUDIES:      < from: Xray Chest 1 View- PORTABLE-Urgent (Xray Chest 1 View- PORTABLE-Urgent .) (05.22.25 @ 22:44) >  IMPRESSION:  No focal consolidation.             Diagnosis: ASXL1-mutated and RXT41-nepseig AML    Protocol/Chemo Regimen: s/p 7+3:  Daunorubicin 60 mg/m2+ Cytarabine     Day: 29    Pt endorsed:  loose BM last night and this am; pain left tongue, neck and lower back    Review of Systems: Patient denied nausea, vomiting,  chest pain, cough, dyspnea, abdominal pain, constipation,  rectal pain,  rash, fatigue, headache, bleeding    Pain scale:  3/10 left tongue, 5/10 neck/low back pain    Diet: regular Ensure plus HP BID                      Allergies: No Known Allergies        ANTIMICROBIALS  valACYclovir 500 milliGRAM(s) Oral two times a day      STANDING MEDICATIONS  allopurinol 300 milliGRAM(s) Oral daily  Biotene Dry Mouth Oral Rinse 15 milliLiter(s) Swish and Spit four times a day  chlorhexidine 4% Liquid 1 Application(s) Topical <User Schedule>  cholecalciferol 2000 Unit(s) Oral daily  diltiazem    milliGRAM(s) Oral daily  lidocaine   4% Patch 1 Patch Transdermal every 24 hours  melatonin 5 milliGRAM(s) Oral at bedtime  midodrine. 10 milliGRAM(s) Oral every 8 hours  pantoprazole    Tablet 40 milliGRAM(s) Oral <User Schedule>  predniSONE   Tablet 20 milliGRAM(s) Oral daily  tamsulosin 0.4 milliGRAM(s) Oral at bedtime      PRN MEDICATIONS  acetaminophen     Tablet .. 650 milliGRAM(s) Oral every 6 hours PRN  albuterol    90 MICROgram(s) HFA Inhaler 2 Puff(s) Inhalation every 6 hours PRN  calcium carbonate    500 mG (Tums) Chewable 1 Tablet(s) Chew four times a day PRN  cyclobenzaprine 10 milliGRAM(s) Oral three times a day PRN  FIRST- Mouthwash  BLM 10 milliLiter(s) Swish and Swallow every 6 hours PRN  metoclopramide Injectable 10 milliGRAM(s) IV Push every 6 hours PRN  ondansetron Injectable 8 milliGRAM(s) IV Push every 8 hours PRN  oxyCODONE    IR 10 milliGRAM(s) Oral every 4 hours PRN  polyethylene glycol 3350 17 Gram(s) Oral two times a day PRN  senna 2 Tablet(s) Oral two times a day PRN  simethicone 80 milliGRAM(s) Chew four times a day PRN  sodium chloride 0.9% lock flush 10 milliLiter(s) IV Push every 1 hour PRN        Vital Signs Last 24 Hrs  T(C): 36.7 (30 May 2025 09:03), Max: 36.9 (30 May 2025 00:35)  T(F): 98 (30 May 2025 09:03), Max: 98.5 (30 May 2025 00:35)  HR: 89 (30 May 2025 09:03) (68 - 89)  BP: 118/74 (30 May 2025 09:03) (103/66 - 125/70)  BP(mean): --  RR: 18 (30 May 2025 09:03) (18 - 18)  SpO2: 94% (30 May 2025 09:03) (93% - 97%)    Parameters below as of 30 May 2025 09:03  Patient On (Oxygen Delivery Method): room air      PHYSICAL EXAM  General: adult in NAD  HEENT: clear oropharynx, no erythema, no ulcers  CV: normal S1, S2, RRR  Lungs: clear to auscultation, no wheezes, no rales  Abdomen: +BS, soft, nontender, nondistended  Ext: no edema  Skin: no rash  Neuro: alert and oriented x 4, no focal deficit   Central line: TLC CDI        LABS:                        8.5    11.89 )-----------( 457      ( 30 May 2025 07:08 )             25.8         Mean Cell Volume : 92.5 fl  Mean Cell Hemoglobin : 30.5 pg  Mean Cell Hemoglobin Concentration : 32.9 g/dL  Auto Neutrophil # : x  Auto Lymphocyte # : x  Auto Monocyte # : x  Auto Eosinophil # : x  Auto Basophil # : x  Auto Neutrophil % : x  Auto Lymphocyte % : x  Auto Monocyte % : x  Auto Eosinophil % : x  Auto Basophil % : x      05-30    141  |  105  |  18  ----------------------------<  123[H]  4.1   |  22  |  1.17    Ca    8.9      30 May 2025 07:16  Phos  4.5     05-30  Mg     2.3     05-30    TPro  5.9[L]  /  Alb  3.5  /  TBili  0.3  /  DBili  x   /  AST  40  /  ALT  129[H]  /  AlkPhos  192[H]  05-30        PT/INR - ( 30 May 2025 07:09 )   PT: 13.0 sec;   INR: 1.13 ratio           Uric Acid 5.9        RADIOLOGY & ADDITIONAL STUDIES:      < from: Xray Chest 1 View- PORTABLE-Urgent (Xray Chest 1 View- PORTABLE-Urgent .) (05.22.25 @ 22:44) >  IMPRESSION:  No focal consolidation.

## 2025-05-30 NOTE — DISCHARGE NOTE NURSING/CASE MANAGEMENT/SOCIAL WORK - FINANCIAL ASSISTANCE
[facility] provides services at a reduced cost to those who are determined to be eligible through [facility]’s financial assistance program. Information regarding [facility]’s financial assistance program can be found by going to [email or web address] or by calling [phone].

## 2025-05-30 NOTE — PROGRESS NOTE ADULT - PROBLEM SELECTOR PROBLEM 9
Orthostatic hypotension Render Risk Assessment In Note?: no Detail Level: Simple Comment: 7.26.24\\n\\nNot thick but broad ak concerning for sccis, pt has used effudex before and is comfortable, will start using effudex for 4-6 wks and recheck 3-4 months \\nConsider bx if not gone

## 2025-05-30 NOTE — PROGRESS NOTE ADULT - PROBLEM SELECTOR PLAN 2
Not neutropenic, afebrile   continue posa, valtrex, flagyl (dc'd 5/19), cefepime (5/11-5/19 ), cont to be febrile Ucx from 5/17 (+) gram + cocci in pairs and chains, inc to meropenem (5/19-   5/2 CXR Patchy opacities along the left lung base, possibly atelectasis, however superimposed infection cannot be entirely excluded.  5/13- FLU/COVID/RSV (-)  5/13- Added Flagyl 500 mg IV Q 8 hrs due to GI discomfort/pain  5/13- CT C/A/P -negative for infxn  5/20  blood cx 5/19 showed 1/4 MRSE  urine cx 5/17 VRE but 5/18 negative and pt asymptomatic  - Levaquin/Amox- 5/2- 5/11  - Cefepime- 5/11- 5/19  Flagyl (abd discomfort)- 5/13- 5/19  - Meropenem- 5/19  - Valtrex-Posaconazole  5/2 5/20 O2 sat 88% with fever, FU closely, O2 PRN  5/25 Discontinue vancomycin  5/26- D/C Meropenem, Posaconazole

## 2025-05-30 NOTE — PROGRESS NOTE ADULT - NS ATTEST RISK PROBLEM GEN_ALL_CORE FT
Patient has AML and is being treated with inpatient chemotherapy, which carries a risk for infection, bleeding, and other life-threatening complications.
IV opioids prescription, monitoring and titration for complex malignancy related pain
Patient has AML and is being treated with inpatient chemotherapy, which carries a risk for infection, bleeding, and other life-threatening complications.

## 2025-05-30 NOTE — PROGRESS NOTE ADULT - NS ATTEST RISKLEV GEN_ALL_CORE
High

## 2025-05-30 NOTE — DISCHARGE NOTE NURSING/CASE MANAGEMENT/SOCIAL WORK - PATIENT PORTAL LINK FT
You can access the FollowMyHealth Patient Portal offered by [facility] by registering at the following website: [email or web address]. By joining [employer]’s FollowMyHealth portal, you will also be able to view your health information using other applications (apps) compatible with our system.
Current and Past Psychiatric Diagnoses

## 2025-05-30 NOTE — PROGRESS NOTE ADULT - PROBLEM SELECTOR PROBLEM 2
Acute mucositis
Infection
Acute mucositis
Infection
Acute mucositis
Infection
Acute mucositis
Infection

## 2025-05-30 NOTE — PROGRESS NOTE ADULT - PROBLEM SELECTOR PLAN 9
5/18- Orthostatic hypotension,  cc bolus given.  Continue Midodrine 10 mg po q8h  off HCTZ  5/22 orthostatic hypotension improved  check once daily  5/29 orthostatic VS + on 5/28 and 5/29, but  asymptomatic, continue Midodrine

## 2025-05-30 NOTE — PROGRESS NOTE ADULT - PROVIDER SPECIALTY LIST ADULT
Heme/Onc
Infectious Disease
Infectious Disease
Heme/Onc
Heme/Onc
Infectious Disease
Heme/Onc
Palliative Care
Heme/Onc
Heme/Onc
Palliative Care
Heme/Onc
Palliative Care
Heme/Onc
Heme/Onc
Palliative Care
Heme/Onc

## 2025-05-30 NOTE — PROGRESS NOTE ADULT - NS ATTEST RISK GEN_ALL_CORE
Risk Statement (NON-critical care)

## 2025-05-30 NOTE — PROGRESS NOTE ADULT - PROBLEM SELECTOR PLAN 4
Continue Diltiazem  mg qd   home HCTZ 50 mg QD on hold d/t dizziness and mild orthostatic VS on home  Diltiazem  mg qd---> decreased to 120 mg po qd at discharge   home HCTZ 50 mg QD on hold d/t dizziness and mild orthostatic VS

## 2025-05-30 NOTE — PROGRESS NOTE ADULT - NS ATTEND AMEND GEN_ALL_CORE FT
69M with ASXL1-mutated and HPD09-sxbwgnk AML. 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 Admitted for induction 7+3.    Heme:   - Completed 7+3. Day 25. Complicated by rigors, chills, and tachycardia 2 hours into day 1 infusion - likely infusion reaction.  - Trend CBC with differential daily. Goal Hgb > 8 and plt > 20 given past GI bleeding.   - Has ANC, plt count recovery   - Allopurinol continued, but for previous diagnosis of gout decreased dose to 100 mg on 5/16.  S/p rasburicase; uric acid and LDH stable.   - recurrent gout >>> received colchicine (diarrhea resulted), resolution of pain with brief course of steroids  - incr allopurinol to 300 mg/d  - Continue IVF. Monitor daily weights and I/O's. Diurese PRN.   - BMBx done day 15 (5/16) - subcortical, suboptimal specimen, no evidence of persistent leukemia. Flow 0.34% myeloblasts.  - in absence of overt evidence of persistent leukemia, and in setting of persistent fevers, started daily G-CSF 5/21  - PB transiently increased to > 10%, now cleared, G-CSF was stopped after 5/23  - PB flow showed aberrant  myeloblast population  - has now had ANC, plt recovery    GI:  - Bloody BM 5/14-16.  Plt have recovered.  - c/o severe midepigastric pain 5/22 AM, resolved   - EKG w/o acute changes, troponin sl incr 70, then decr.  - amylase, lipase normal      Card:  - Orthostatic hypotension, likely residual from GI bleed and decreased PO intake. Poss autonomic component.  - Bolus 500 cc on 5/17 and 5/18.  Now on midodrine. BP stable.   - weight stable  - Encouraged incr PO intake.      ID:   - Neutropenic and febrile; fevers since 5/11, last temp 100.8 5/22 pm, Tmax 101     with resolving GI symptoms, diarrhea. Cults (-) with recent CNS from blood likely contaminant in 1/4 bottles and VREC in urine    cleared in subsequent specimen.  - Cefepime (5/11-5/19 ). Persistent fever lead to changing to meropenem on 5/19.  Last temp 100.4 5/21.  d/c gianna (5/26)  - also now off posa  - ANC has recovered  - CT scan from 5/14 showing no acute CT findings suspicious for infection. Mentioned GGOs in the lungs bilaterally, but stable from a previous scan.   - Prophylaxis: valacyclovir   - Likely remission BMA/Bx pre d/c    MSK: Has chronic pain from prior surgeries   - Pain control:    stopped dilaudid iv 0.5 mg iv 3x/d - switched to prn oxycodone, so far has good pain control  - gout related toe pain resolving with brief course of prednisone, 40/40/20/20    TLC in place  DIMAS being considered  OOB. 69M with ASXL1-mutated and PHM09-ypchfna AML. 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 Admitted for induction 7+3.    Heme:   - Completed 7+3. Day 29.    - Trend CBC with differential daily. Goal Hgb > 8 and plt > 20 given past GI bleeding.   - Has had ANC, plt count recovery   - Allopurinol continued, but for previous diagnosis of gout decreased dose to 100 mg on 5/16.  S/p rasburicase; uric acid and LDH stable.   - recurrent gout >>> received colchicine (diarrhea resulted), resolution of pain with brief course of steroids  - incr allopurinol to 300 mg/d  - Continue IVF. Monitor daily weights and I/O's. Diurese PRN.   - BMBx done day 15 (5/16) - subcortical, suboptimal specimen, no evidence of persistent leukemia. Flow 0.34% myeloblasts.  - Remission marrow done on 5/29  - in absence of overt evidence of persistent leukemia, and in setting of persistent fevers, started daily G-CSF 5/21  - PB transiently increased to > 10%, now cleared, G-CSF was stopped after 5/23  - PB flow showed aberrant  myeloblast population, clearing now, LDH slowly decreasing       GI:  - Bloody BM 5/14-16. Resolved. Plt have recovered.   - c/o severe midepigastric pain 5/22 AM, resolved   - EKG w/o acute changes, troponin sl incr 70, then decr.  - amylase, lipase normal      Card:  - Orthostatic hypotension, likely residual from GI bleed and decreased PO intake. Poss autonomic component.  - Bolus 500 cc on 5/17 and 5/18.  Now on midodrine. BP stable.   - decr cardizem 240 >>> 120/d  - weight stable  - taking po well     ID:   - Neutropenic and febrile; fevers since 5/11, last temp 100.8 5/22 pm, Tmax 101     with resolving GI symptoms, diarrhea. Cults (-) with recent CNS from blood likely contaminant in 1/4 bottles and VREC in urine    cleared in subsequent specimen.  - Cefepime (5/11-5/19 ). Persistent fever lead to changing to meropenem on 5/19.  Last temp 100.4 5/21.  d/c gianna (5/26)  - also now off posa  - ANC has recovered  - CT scan from 5/14 showing no acute CT findings suspicious for infection. Mentioned GGOs in the lungs bilaterally, but stable from a previous scan.   - Prophylaxis: valacyclovir   - Likely remission BMA/Bx pre d/c    MSK: Has chronic pain from prior surgeries   - Pain control: stopped dilaudid iv 0.5 mg iv 3x/d - switched to prn oxycodone, so far has good pain control  - gout related toe pain resolving with brief course of prednisone, 40/40/20/20    TLC to be removed today  await BMA/Bx result   will  go home today, home PT  [facility] f/u with Dr. Apodaca on 6/3  OOB.

## 2025-05-30 NOTE — PROGRESS NOTE ADULT - REASON FOR ADMISSION
Chemotherapy

## 2025-05-30 NOTE — PROGRESS NOTE ADULT - PROBLEM/PLAN-5
DISPLAY PLAN FREE TEXT
stated
DISPLAY PLAN FREE TEXT

## 2025-06-02 ENCOUNTER — TRANSCRIPTION ENCOUNTER (OUTPATIENT)
Age: 70
End: 2025-06-02

## 2025-06-02 LAB — FLOW CYTOMETRY FINAL REPORT: SIGNIFICANT CHANGE UP

## 2025-06-03 ENCOUNTER — APPOINTMENT (OUTPATIENT)
Dept: HEMATOLOGY ONCOLOGY | Facility: CLINIC | Age: 70
End: 2025-06-03
Payer: MEDICARE

## 2025-06-03 ENCOUNTER — RESULT REVIEW (OUTPATIENT)
Age: 70
End: 2025-06-03

## 2025-06-03 VITALS
SYSTOLIC BLOOD PRESSURE: 109 MMHG | OXYGEN SATURATION: 98 % | TEMPERATURE: 98 F | RESPIRATION RATE: 18 BRPM | WEIGHT: 221.56 LBS | DIASTOLIC BLOOD PRESSURE: 74 MMHG | HEART RATE: 118 BPM | BODY MASS INDEX: 30.9 KG/M2

## 2025-06-03 LAB
ALBUMIN SERPL ELPH-MCNC: 3.8 G/DL
ALP BLD-CCNC: 169 U/L
ALT SERPL-CCNC: 149 U/L
ANION GAP SERPL CALC-SCNC: 14 MMOL/L
ANISOCYTOSIS BLD QL: SIGNIFICANT CHANGE UP
AST SERPL-CCNC: 44 U/L
BASOPHILS # BLD AUTO: 0 K/UL — SIGNIFICANT CHANGE UP (ref 0–0.2)
BASOPHILS NFR BLD AUTO: 0 % — SIGNIFICANT CHANGE UP (ref 0–2)
BILIRUB SERPL-MCNC: 0.4 MG/DL
BUN SERPL-MCNC: 16 MG/DL
CALCIUM SERPL-MCNC: 9.3 MG/DL
CHLORIDE SERPL-SCNC: 110 MMOL/L
CO2 SERPL-SCNC: 21 MMOL/L
CREAT SERPL-MCNC: 1.2 MG/DL
DACRYOCYTES BLD QL SMEAR: SLIGHT — SIGNIFICANT CHANGE UP
EGFRCR SERPLBLD CKD-EPI 2021: 66 ML/MIN/1.73M2
ELLIPTOCYTES BLD QL SMEAR: SLIGHT — SIGNIFICANT CHANGE UP
EOSINOPHIL # BLD AUTO: 0 K/UL — SIGNIFICANT CHANGE UP (ref 0–0.5)
EOSINOPHIL NFR BLD AUTO: 0 % — SIGNIFICANT CHANGE UP (ref 0–6)
GLUCOSE SERPL-MCNC: 140 MG/DL
HCT VFR BLD CALC: 30.3 % — LOW (ref 39–50)
HEMATOPATHOLOGY REPORT: SIGNIFICANT CHANGE UP
HGB BLD-MCNC: 10 G/DL — LOW (ref 13–17)
LDH SERPL-CCNC: 439 U/L
LYMPHOCYTES # BLD AUTO: 1.68 K/UL — SIGNIFICANT CHANGE UP (ref 1–3.3)
LYMPHOCYTES # BLD AUTO: 12 % — LOW (ref 13–44)
MCHC RBC-ENTMCNC: 32.3 PG — SIGNIFICANT CHANGE UP (ref 27–34)
MCHC RBC-ENTMCNC: 33 G/DL — SIGNIFICANT CHANGE UP (ref 32–36)
MCV RBC AUTO: 97.7 FL — SIGNIFICANT CHANGE UP (ref 80–100)
METAMYELOCYTES # FLD: 2 % — HIGH (ref 0–0)
METAMYELOCYTES NFR BLD: 2 % — HIGH (ref 0–0)
MONOCYTES # BLD AUTO: 2.81 K/UL — HIGH (ref 0–0.9)
MONOCYTES NFR BLD AUTO: 20 % — HIGH (ref 2–14)
MYELOCYTES NFR BLD: 4 % — HIGH (ref 0–0)
NEUTROPHILS # BLD AUTO: 8.7 K/UL — HIGH (ref 1.8–7.4)
NEUTROPHILS NFR BLD AUTO: 62 % — SIGNIFICANT CHANGE UP (ref 43–77)
NRBC # BLD: 2 /100 WBCS — HIGH (ref 0–0)
NRBC BLD AUTO-RTO: SIGNIFICANT CHANGE UP /100 WBCS (ref 0–0)
NRBC BLD-RTO: 2 /100 WBCS — HIGH (ref 0–0)
ONKOSIGHT MYELOID SEQUENCE: (no result)
PLAT MORPH BLD: NORMAL — SIGNIFICANT CHANGE UP
PLATELET # BLD AUTO: 669 K/UL — HIGH (ref 150–400)
POIKILOCYTOSIS BLD QL AUTO: SLIGHT — SIGNIFICANT CHANGE UP
POLYCHROMASIA BLD QL SMEAR: SLIGHT — SIGNIFICANT CHANGE UP
POTASSIUM SERPL-SCNC: 4.8 MMOL/L
PROT SERPL-MCNC: 5.5 G/DL
RBC # BLD: 3.1 M/UL — LOW (ref 4.2–5.8)
RBC # FLD: 19.9 % — HIGH (ref 10.3–14.5)
RBC BLD AUTO: ABNORMAL
SCHISTOCYTES BLD QL AUTO: SLIGHT — SIGNIFICANT CHANGE UP
SODIUM SERPL-SCNC: 145 MMOL/L
WBC # BLD: 14.04 K/UL — HIGH (ref 3.8–10.5)
WBC # FLD AUTO: 14.04 K/UL — HIGH (ref 3.8–10.5)

## 2025-06-03 PROCEDURE — 99215 OFFICE O/P EST HI 40 MIN: CPT

## 2025-06-03 PROCEDURE — G2211 COMPLEX E/M VISIT ADD ON: CPT

## 2025-06-04 NOTE — CHART NOTE - NSCHARTNOTEFT_GEN_A_CORE
Post-Discharge Medication Review: Completed	    Patient's preferred pharmacy was updated in OMR: [facility]  	  Patient contacted to offer medication counseling post-discharge. Medication reconciliation completed. Per patient, medications include:  1.	allopurinol 300 mg oral tablet 1 tab(s) orally once a day for gout  2.	atorvastatin 80 mg oral tablet 1 tab(s) orally once a day  3.	cholecalciferol oral tablet 2000 unit(s) orally once a day  4.	DilTIAZem (Eqv-Cardizem CD) 120 mg/24 hours oral capsule, extended release 1 cap(s) orally once a day  5.	omeprazole 40 mg oral delayed release capsule 1 cap(s) orally once a day  6.	oxyCODONE 5 mg oral tablet 1 tab(s) orally every 4 hours as needed for moderate pain MDD: 6 tabs  7.	valACYclovir 500 mg oral tablet 1 tab(s) orally 2 times a day    Medications removed from OMR (updated per discussion with patient):	  1.	calcium carbonate 500 mg (200 mg elemental calcium) oral tablet, chewable 1 tab(s) orally 4 times a day as needed for Dyspepsia also called "tums", over the counter  2.	melatonin 5 mg oral tablet 1 tab(s) orally once a day (at bedtime) as needed for insomnia over the counter  3.	midodrine 10 mg oral tablet 1 tab(s) orally 3 times a day please assess and titrate off with outpatient provider  4.	oxyCODONE 10 mg oral tablet 1 tab(s) orally every 4 hours As needed mod-severe pain  5.	predniSONE 20 mg oral tablet 1 tab(s) orally once a day next dose on 5/31 then stop  6.	simethicone 80 mg oral tablet, chewable 1 tab(s) orally 4 times a day as needed for Gas    Medication name, indication, administration, side effect, and monitoring reviewed for new medications during post discharge counseling visit with patient. Patient demonstrated understanding. Counseling offered for all medications.    Ronda Baptiste PharmD	  Clinical Pharmacy Specialist, Pharmacy Telehealth Team	  Can be reached via [employer] Teams or [phone]

## 2025-06-04 NOTE — CHART NOTE - NSCHARTNOTESELECT_GEN_ALL_CORE
Event Note
Event Note
ISTOP/Event Note
Palliative/Event Note
Palliative/Event Note
RRT/Event Note
Event Note
GaP team/Event Note
Nutrition Services
Nutrition Services
Palliative/Event Note
Procedure note: bone marrow asp/biopsy/Event Note
overnight event/Event Note
Post-Discharge Note

## 2025-06-04 NOTE — PROGRESS NOTE ADULT - SUBJECTIVE AND OBJECTIVE BOX
PRIMARY CARE PHYSICIAN: Balwinder Valencia MD    ORTHOPAEDIC POSTOPERATIVE VISIT    ASSESSMENT & PLAN    Impression: 35 y.o. female 7 weeks postop s/p Right shoulder arthroscopy, anterior stabilization, labral repair, capsulorrhaphy, remplissage, extensive intra-articular debridement and synovectomy 04/18/2025    Plan:   Overall Ruthann is doing well.  Her pain is well-controlled.  She will continue working with physical therapy through the postoperative protocol for above.  We discussed her postop precautions and she expressed understanding.  She will ice and rest the shoulder as she needs.  She will follow-up with us in 4 weeks.    I reviewed their postoperative timeline and plan with them. They understand the postoperative precautions and the treatment plan going forward.     Follow-Up: Patient will follow-up in 6 weeks, no x-rays    At the end of the visit, all questions were answered in full. The patient is in agreement with the plan and recommendations. They will call the office with any questions/concerns.      Note dictated with World Reviewer software. Completed without full typed error editing and sent to avoid delay.      SUBJECTIVE  CHIEF COMPLAINT: Post-op    HPI: Ruthann Nichoel is a 35 y.o. patient now 7 weeks postop status post Right shoulder arthroscopy, anterior stabilization, labral repair, capsulorrhaphy, remplissage, extensive intra-articular debridement and synovectomy 04/18/2025. Ruthann is doing well. Physical therapy is going well. No concerns.     REVIEW OF SYSTEMS  Constitutional: See HPI for pain assessment, No significant recent weight gain or loss, no fever or chills  Cardiovascular: No chest pain, shortness of breath  Respiratory: No difficulty breathing, no cough  Gastrointestinal: No nausea, vomiting, diarrhea, constipation  Musculoskeletal: Noted in HPI, positive for pain, restricted motion, stiffness  Integumentary: No rashes, easy bruising or skin lesions  Neurological: No  "headache, no numbness or tingling in extremities  Psychiatric: No mood changes or memory changes. No social issues  Heme/Lymph: No excessive swelling, easy bruising or excessive bleeding  ENT: No hearing changes, no nosebleeds  Eyes: No vision changes    CURRENT MEDICATIONS:   Current Medications[1]     OBJECTIVE    PHYSICAL EXAM      4/18/2025    11:00 AM 4/18/2025    12:51 PM 4/18/2025     1:05 PM 4/18/2025     1:20 PM 4/18/2025     1:21 PM 4/18/2025     1:55 PM 4/30/2025     9:15 AM   Vitals   Systolic 117 123 109 111 109 117    Diastolic 58 69 65 63 69 71    Heart Rate 63 87 96 81 81 67    Temp  36.8 °C (98.2 °F)  36.8 °C (98.2 °F) 36.8 °C (98.2 °F) 36.7 °C (98.1 °F)    Resp 20 16 16 16 16     Height       1.753 m (5' 9\")   Weight (lb)       202   BMI       29.83 kg/m2   BSA (m2)       2.11 m2   Visit Report       Report      There is no height or weight on file to calculate BMI.    General: Well-appearing, no acute distress    Skin intact bilateral upper and lower extremities  No erythema  No warmth    Detailed examination of the right shoulder demonstrates:  Surgical incisions well-healed  No erythema or warmth  No ecchymosis  Resolving swelling, minimal  Biceps contour normal  Shoulder range of motion: Forward flexion 120, ER 10  Upper extremity motor grossly intact  C5-T1 sensation intact bilaterally  2+ radial pulses bilaterally  Warm and well-perfused, brisk capillary refill    IMAGING:   No new imaging         [1]   Current Outpatient Medications   Medication Sig Dispense Refill    cyanocobalamin (Vitamin B-12) 1,000 mcg tablet Take 1 tablet (1,000 mcg) by mouth 4 times a week.      tirzepatide (Mounjaro) 15 mg/0.5 mL pen injector Inject 15 mg under the skin 1 (one) time per week. Compound with B12 1000 mcg weekly 2 mL 2     No current facility-administered medications for this visit.     " Patient is a 65y old  Male who presents with a chief complaint of SOB, fever, cough, body aches (09 Mar 2021 06:25)  Awake, alert, comfortable in bed in NAD. Remains on oxygen supp via optimizer. Still with significant [place]    INTERVAL HPI/OVERNIGHT EVENTS:      VITAL SIGNS:  T(F): 98.2 (03-09-21 @ 05:00)  HR: 96 (03-09-21 @ 05:00)  BP: 116/75 (03-09-21 @ 05:00)  RR: 18 (03-09-21 @ 08:22)  SpO2: 93% (03-09-21 @ 08:22)  Wt(kg): --  I&O's Detail          REVIEW OF SYSTEMS:    CONSTITUTIONAL:  No fevers, chills, sweats    HEENT:  Eyes:  No diplopia or blurred vision. ENT:  No earache, sore throat or runny nose.    CARDIOVASCULAR:  No pressure, squeezing, tightness, or heaviness about the chest; no palpitations.    RESPIRATORY:  Per HPI    GASTROINTESTINAL:  No abdominal pain, nausea, vomiting or diarrhea.    GENITOURINARY:  No dysuria, frequency or urgency.    NEUROLOGIC:  No paresthesias, fasciculations, seizures or weakness.    PSYCHIATRIC:  No disorder of thought or mood.      PHYSICAL EXAM:    Constitutional: Well developed and nourished  Eyes:Perrla  ENMT: normal  Neck:supple  Respiratory: good air entry  Cardiovascular: S1 S2 regular  Gastrointestinal: Soft, Non tender  Extremities: No edema  Vascular:normal  Neurological:Awake, alert,Ox3  Musculoskeletal:Normal      MEDICATIONS  (STANDING):  ascorbic acid 1000 milliGRAM(s) Oral daily  atorvastatin 80 milliGRAM(s) Oral at bedtime  chlorhexidine 2% Cloths 1 Application(s) Topical <User Schedule>  cholecalciferol 2000 Unit(s) Oral daily  diltiazem    milliGRAM(s) Oral daily  enoxaparin Injectable 40 milliGRAM(s) SubCutaneous daily  gabapentin 200 milliGRAM(s) Oral at bedtime  hydrochlorothiazide 50 milliGRAM(s) Oral daily  montelukast 10 milliGRAM(s) Oral at bedtime  pantoprazole    Tablet 40 milliGRAM(s) Oral before breakfast  polyethylene glycol 3350 17 Gram(s) Oral daily  zinc sulfate 220 milliGRAM(s) Oral daily    MEDICATIONS  (PRN):  acetaminophen   Tablet .. 650 milliGRAM(s) Oral every 6 hours PRN Temp greater or equal to 38C (100.4F), Moderate Pain (4 - 6)  guaiFENesin   Syrup  (Sugar-Free) 200 milliGRAM(s) Oral every 6 hours PRN Cough  ondansetron Injectable 4 milliGRAM(s) IV Push every 6 hours PRN Nausea and/or Vomiting  sodium chloride 0.65% Nasal 1 Spray(s) Both Nostrils three times a day PRN Nasal Congestion      Allergies    No Known Allergies    Intolerances    Shrimp- Nasal congestion (Other)      LABS:                        12.7   17.98 )-----------( 323      ( 09 Mar 2021 08:38 )             38.7     03-09    136  |  100  |  19<H>  ----------------------------<  109<H>  4.1   |  28  |  x     Ca    9.0      09 Mar 2021 08:38  Phos  3.5     03-08  Mg     2.3     03-09    TPro  x   /  Alb  2.5<L>  /  TBili  x   /  DBili  x   /  AST  x   /  ALT  x   /  AlkPhos  x   03-09              CAPILLARY BLOOD GLUCOSE        pro-bnp -- 03-07 @ 07:52     d-dimer 336  03-07 @ 07:52  pro-bnp -- 03-04 @ 08:03     d-dimer 515  03-04 @ 08:03  pro-bnp -- 03-03 @ 09:19     d-dimer 344  03-03 @ 09:19      RADIOLOGY & ADDITIONAL TESTS:    CXR:  < from: Xray Chest 1 View- PORTABLE-Urgent (Xray Chest 1 View- PORTABLE-Urgent .) (03.08.21 @ 11:24) >  FINDINGS/  IMPRESSION:  Bilateral airspace opacities overall worsening. Elevation right hemidiaphragm.    Heart size cannot be accurately assessed in this projection.    Overlying chin obscures lung apices.      < end of copied text >    Ct scan chest:    ekg;    echo:

## 2025-06-06 ENCOUNTER — APPOINTMENT (OUTPATIENT)
Dept: CARE COORDINATION | Facility: HOME HEALTH | Age: 70
End: 2025-06-06

## 2025-06-06 PROCEDURE — 99348 HOME/RES VST EST LOW MDM 30: CPT | Mod: 2W

## 2025-06-09 LAB — CHROM ANALY OVERALL INTERP SPEC-IMP: SIGNIFICANT CHANGE UP

## 2025-06-11 ENCOUNTER — TRANSCRIPTION ENCOUNTER (OUTPATIENT)
Age: 70
End: 2025-06-11

## 2025-06-11 ENCOUNTER — APPOINTMENT (OUTPATIENT)
Dept: HEMATOLOGY ONCOLOGY | Facility: CLINIC | Age: 70
End: 2025-06-11
Payer: MEDICARE

## 2025-06-11 ENCOUNTER — APPOINTMENT (OUTPATIENT)
Dept: INTERNAL MEDICINE | Facility: CLINIC | Age: 70
End: 2025-06-11

## 2025-06-11 PROCEDURE — G2211 COMPLEX E/M VISIT ADD ON: CPT | Mod: 2W

## 2025-06-11 PROCEDURE — 99213 OFFICE O/P EST LOW 20 MIN: CPT | Mod: 2W

## 2025-06-11 RX ORDER — COLCHICINE 0.6 MG/1
0.6 TABLET ORAL
Qty: 60 | Refills: 0 | Status: ACTIVE | COMMUNITY
Start: 2025-06-11 | End: 1900-01-01

## 2025-06-12 ENCOUNTER — INPATIENT (INPATIENT)
Facility: HOSPITAL | Age: 70
LOS: 6 days | Discharge: HOME HEALTH | DRG: 842 | End: 2025-06-19
Attending: INTERNAL MEDICINE | Admitting: STUDENT IN AN ORGANIZED HEALTH CARE EDUCATION/TRAINING PROGRAM
Payer: COMMERCIAL

## 2025-06-12 ENCOUNTER — TRANSCRIPTION ENCOUNTER (OUTPATIENT)
Age: 70
End: 2025-06-12

## 2025-06-12 VITALS
OXYGEN SATURATION: 98 % | TEMPERATURE: 99 F | SYSTOLIC BLOOD PRESSURE: 114 MMHG | WEIGHT: 211.2 LBS | DIASTOLIC BLOOD PRESSURE: 77 MMHG | HEIGHT: 70.87 IN | RESPIRATION RATE: 18 BRPM | HEART RATE: 112 BPM

## 2025-06-12 DIAGNOSIS — C92.00 ACUTE MYELOBLASTIC LEUKEMIA, NOT HAVING ACHIEVED REMISSION: ICD-10-CM

## 2025-06-12 DIAGNOSIS — M10.9 GOUT, UNSPECIFIED: ICD-10-CM

## 2025-06-12 DIAGNOSIS — I10 ESSENTIAL (PRIMARY) HYPERTENSION: ICD-10-CM

## 2025-06-12 DIAGNOSIS — C82.00 FOLLICULAR LYMPHOMA GRADE I, UNSPECIFIED SITE: ICD-10-CM

## 2025-06-12 DIAGNOSIS — Z29.9 ENCOUNTER FOR PROPHYLACTIC MEASURES, UNSPECIFIED: ICD-10-CM

## 2025-06-12 DIAGNOSIS — Z98.89 OTHER SPECIFIED POSTPROCEDURAL STATES: Chronic | ICD-10-CM

## 2025-06-12 DIAGNOSIS — M43.20 FUSION OF SPINE, SITE UNSPECIFIED: Chronic | ICD-10-CM

## 2025-06-12 DIAGNOSIS — B99.9 UNSPECIFIED INFECTIOUS DISEASE: ICD-10-CM

## 2025-06-12 LAB
ALBUMIN SERPL ELPH-MCNC: 3.7 G/DL — SIGNIFICANT CHANGE UP (ref 3.3–5)
ALP SERPL-CCNC: 111 U/L — SIGNIFICANT CHANGE UP (ref 40–120)
ALT FLD-CCNC: 29 U/L — SIGNIFICANT CHANGE UP (ref 10–45)
ANION GAP SERPL CALC-SCNC: 18 MMOL/L — HIGH (ref 5–17)
APPEARANCE UR: CLEAR — SIGNIFICANT CHANGE UP
AST SERPL-CCNC: 20 U/L — SIGNIFICANT CHANGE UP (ref 10–40)
B PERT IGG+IGM PNL SER: ABNORMAL
BACTERIA # UR AUTO: NEGATIVE /HPF — SIGNIFICANT CHANGE UP
BASOPHILS # BLD AUTO: 0 K/UL — SIGNIFICANT CHANGE UP (ref 0–0.2)
BASOPHILS NFR BLD AUTO: 0 % — SIGNIFICANT CHANGE UP (ref 0–2)
BILIRUB SERPL-MCNC: 1.1 MG/DL — SIGNIFICANT CHANGE UP (ref 0.2–1.2)
BILIRUB UR-MCNC: NEGATIVE — SIGNIFICANT CHANGE UP
BUN SERPL-MCNC: 22 MG/DL — SIGNIFICANT CHANGE UP (ref 7–23)
CALCIUM SERPL-MCNC: 9.6 MG/DL — SIGNIFICANT CHANGE UP (ref 8.4–10.5)
CAST: 34 /LPF — HIGH (ref 0–4)
CHLORIDE SERPL-SCNC: 101 MMOL/L — SIGNIFICANT CHANGE UP (ref 96–108)
CO2 SERPL-SCNC: 19 MMOL/L — LOW (ref 22–31)
COARSE GRAN CASTS #/AREA URNS AUTO: PRESENT
COLOR FLD: YELLOW
COLOR SPEC: SIGNIFICANT CHANGE UP
CREAT SERPL-MCNC: 1.38 MG/DL — HIGH (ref 0.5–1.3)
DIFF PNL FLD: NEGATIVE — SIGNIFICANT CHANGE UP
EGFR: 56 ML/MIN/1.73M2 — LOW
EGFR: 56 ML/MIN/1.73M2 — LOW
EOSINOPHIL # BLD AUTO: 0 K/UL — SIGNIFICANT CHANGE UP (ref 0–0.5)
EOSINOPHIL NFR BLD AUTO: 0 % — SIGNIFICANT CHANGE UP (ref 0–6)
FLUAV AG NPH QL: SIGNIFICANT CHANGE UP
FLUBV AG NPH QL: SIGNIFICANT CHANGE UP
FLUID INTAKE SUBSTANCE CLASS: SIGNIFICANT CHANGE UP
GLUCOSE SERPL-MCNC: 129 MG/DL — HIGH (ref 70–99)
GLUCOSE UR QL: NEGATIVE MG/DL — SIGNIFICANT CHANGE UP
HCT VFR BLD CALC: 31.3 % — LOW (ref 39–50)
HGB BLD-MCNC: 10.2 G/DL — LOW (ref 13–17)
HYALINE CASTS # UR AUTO: PRESENT
JOINT PATHOGENS PNL SNV NAA+NON-PROBE: SIGNIFICANT CHANGE UP
JOINT PCR SOURCE: SIGNIFICANT CHANGE UP
KETONES UR QL: ABNORMAL MG/DL
LDH SERPL L TO P-CCNC: 244 U/L — HIGH (ref 50–242)
LEUKOCYTE ESTERASE UR-ACNC: NEGATIVE — SIGNIFICANT CHANGE UP
LYMPHOCYTES # BLD AUTO: 1.05 K/UL — SIGNIFICANT CHANGE UP (ref 1–3.3)
LYMPHOCYTES # BLD AUTO: 5.2 % — LOW (ref 13–44)
LYMPHOCYTES # FLD: 1 % — SIGNIFICANT CHANGE UP
MAGNESIUM SERPL-MCNC: 1.8 MG/DL — SIGNIFICANT CHANGE UP (ref 1.6–2.6)
MANUAL SMEAR VERIFICATION: SIGNIFICANT CHANGE UP
MCHC RBC-ENTMCNC: 32.6 G/DL — SIGNIFICANT CHANGE UP (ref 32–36)
MCHC RBC-ENTMCNC: 32.6 PG — SIGNIFICANT CHANGE UP (ref 27–34)
MCV RBC AUTO: 100 FL — SIGNIFICANT CHANGE UP (ref 80–100)
MONOCYTES # BLD AUTO: 3.47 K/UL — HIGH (ref 0–0.9)
MONOCYTES NFR BLD AUTO: 17.2 % — HIGH (ref 2–14)
MONOS+MACROS # FLD: 3 % — SIGNIFICANT CHANGE UP
NEUTROPHILS # BLD AUTO: 15.66 K/UL — HIGH (ref 1.8–7.4)
NEUTROPHILS NFR BLD AUTO: 77.6 % — HIGH (ref 43–77)
NEUTROPHILS-BODY FLUID: 96 % — SIGNIFICANT CHANGE UP
NITRITE UR-MCNC: NEGATIVE — SIGNIFICANT CHANGE UP
PH UR: 5.5 — SIGNIFICANT CHANGE UP (ref 5–8)
PHOSPHATE SERPL-MCNC: 3.4 MG/DL — SIGNIFICANT CHANGE UP (ref 2.5–4.5)
PLAT MORPH BLD: NORMAL — SIGNIFICANT CHANGE UP
PLATELET # BLD AUTO: 227 K/UL — SIGNIFICANT CHANGE UP (ref 150–400)
POTASSIUM SERPL-MCNC: 3.7 MMOL/L — SIGNIFICANT CHANGE UP (ref 3.5–5.3)
POTASSIUM SERPL-SCNC: 3.7 MMOL/L — SIGNIFICANT CHANGE UP (ref 3.5–5.3)
PROT SERPL-MCNC: 6.9 G/DL — SIGNIFICANT CHANGE UP (ref 6–8.3)
PROT UR-MCNC: 30 MG/DL
RAPID RVP RESULT: SIGNIFICANT CHANGE UP
RBC # BLD: 3.13 M/UL — LOW (ref 4.2–5.8)
RBC # FLD: 21.4 % — HIGH (ref 10.3–14.5)
RBC BLD AUTO: SIGNIFICANT CHANGE UP
RBC CASTS # UR COMP ASSIST: 2 /HPF — SIGNIFICANT CHANGE UP (ref 0–4)
RCV VOL RI: 27 CELLS/UL — SIGNIFICANT CHANGE UP
REVIEW: SIGNIFICANT CHANGE UP
RSV RNA NPH QL NAA+NON-PROBE: SIGNIFICANT CHANGE UP
SARS-COV-2 RNA SPEC QL NAA+PROBE: SIGNIFICANT CHANGE UP
SARS-COV-2 RNA SPEC QL NAA+PROBE: SIGNIFICANT CHANGE UP
SODIUM SERPL-SCNC: 138 MMOL/L — SIGNIFICANT CHANGE UP (ref 135–145)
SOURCE RESPIRATORY: SIGNIFICANT CHANGE UP
SP GR SPEC: 1.02 — SIGNIFICANT CHANGE UP (ref 1–1.03)
SQUAMOUS # UR AUTO: 17 /HPF — HIGH (ref 0–5)
TOTAL NUCLEATED CELL COUNT, BODY FLUID: SIGNIFICANT CHANGE UP CELLS/UL
TUBE TYPE: SIGNIFICANT CHANGE UP
URATE SERPL-MCNC: 4.8 MG/DL — SIGNIFICANT CHANGE UP (ref 3.4–8.8)
UROBILINOGEN FLD QL: 1 MG/DL — SIGNIFICANT CHANGE UP (ref 0.2–1)
WBC # BLD: 20.18 K/UL — HIGH (ref 3.8–10.5)
WBC # FLD AUTO: 20.18 K/UL — HIGH (ref 3.8–10.5)
WBC UR QL: 20 /HPF — HIGH (ref 0–5)

## 2025-06-12 PROCEDURE — 99223 1ST HOSP IP/OBS HIGH 75: CPT

## 2025-06-12 PROCEDURE — 73620 X-RAY EXAM OF FOOT: CPT | Mod: 26,LT

## 2025-06-12 PROCEDURE — 71045 X-RAY EXAM CHEST 1 VIEW: CPT | Mod: 26

## 2025-06-12 PROCEDURE — 99221 1ST HOSP IP/OBS SF/LOW 40: CPT

## 2025-06-12 PROCEDURE — 99222 1ST HOSP IP/OBS MODERATE 55: CPT | Mod: 25

## 2025-06-12 PROCEDURE — 20605 DRAIN/INJ JOINT/BURSA W/O US: CPT | Mod: GC,LT

## 2025-06-12 RX ORDER — ATORVASTATIN CALCIUM 80 MG/1
80 TABLET, FILM COATED ORAL AT BEDTIME
Refills: 0 | Status: DISCONTINUED | OUTPATIENT
Start: 2025-06-12 | End: 2025-06-19

## 2025-06-12 RX ORDER — HYDROMORPHONE/SOD CHLOR,ISO/PF 2 MG/10 ML
0.7 SYRINGE (ML) INJECTION EVERY 4 HOURS
Refills: 0 | Status: DISCONTINUED | OUTPATIENT
Start: 2025-06-12 | End: 2025-06-17

## 2025-06-12 RX ORDER — SENNA 187 MG
2 TABLET ORAL AT BEDTIME
Refills: 0 | Status: DISCONTINUED | OUTPATIENT
Start: 2025-06-12 | End: 2025-06-17

## 2025-06-12 RX ORDER — ONDANSETRON HCL/PF 4 MG/2 ML
8 VIAL (ML) INJECTION EVERY 8 HOURS
Refills: 0 | Status: DISCONTINUED | OUTPATIENT
Start: 2025-06-12 | End: 2025-06-15

## 2025-06-12 RX ORDER — POLYETHYLENE GLYCOL 3350 17 G/17G
17 POWDER, FOR SOLUTION ORAL
Refills: 0 | Status: DISCONTINUED | OUTPATIENT
Start: 2025-06-12 | End: 2025-06-15

## 2025-06-12 RX ORDER — TAMSULOSIN HYDROCHLORIDE 0.4 MG/1
1 CAPSULE ORAL
Refills: 0 | DISCHARGE

## 2025-06-12 RX ORDER — HYDROMORPHONE/SOD CHLOR,ISO/PF 2 MG/10 ML
1 SYRINGE (ML) INJECTION EVERY 4 HOURS
Refills: 0 | Status: DISCONTINUED | OUTPATIENT
Start: 2025-06-12 | End: 2025-06-17

## 2025-06-12 RX ORDER — ONDANSETRON HCL/PF 4 MG/2 ML
4 VIAL (ML) INJECTION ONCE
Refills: 0 | Status: COMPLETED | OUTPATIENT
Start: 2025-06-12 | End: 2025-06-12

## 2025-06-12 RX ORDER — HYDROMORPHONE/SOD CHLOR,ISO/PF 2 MG/10 ML
0.5 SYRINGE (ML) INJECTION ONCE
Refills: 0 | Status: DISCONTINUED | OUTPATIENT
Start: 2025-06-12 | End: 2025-06-12

## 2025-06-12 RX ORDER — TAMSULOSIN HYDROCHLORIDE 0.4 MG/1
0.4 CAPSULE ORAL AT BEDTIME
Refills: 0 | Status: DISCONTINUED | OUTPATIENT
Start: 2025-06-12 | End: 2025-06-19

## 2025-06-12 RX ORDER — BISMUTH SUBSALICYLATE 262 MG/1
15 TABLET, CHEWABLE ORAL
Refills: 0 | Status: DISCONTINUED | OUTPATIENT
Start: 2025-06-12 | End: 2025-06-17

## 2025-06-12 RX ORDER — ACETAMINOPHEN 500 MG/5ML
650 LIQUID (ML) ORAL EVERY 6 HOURS
Refills: 0 | Status: DISCONTINUED | OUTPATIENT
Start: 2025-06-12 | End: 2025-06-19

## 2025-06-12 RX ORDER — DILTIAZEM HYDROCHLORIDE 240 MG/1
120 TABLET, EXTENDED RELEASE ORAL DAILY
Refills: 0 | Status: DISCONTINUED | OUTPATIENT
Start: 2025-06-12 | End: 2025-06-19

## 2025-06-12 RX ORDER — TRAMADOL HYDROCHLORIDE 50 MG/1
1 TABLET, FILM COATED ORAL
Refills: 0 | DISCHARGE

## 2025-06-12 RX ORDER — LACTULOSE 10 G/15ML
10 SOLUTION ORAL ONCE
Refills: 0 | Status: COMPLETED | OUTPATIENT
Start: 2025-06-12 | End: 2025-06-12

## 2025-06-12 RX ORDER — MAGNESIUM, ALUMINUM HYDROXIDE 200-200 MG
30 TABLET,CHEWABLE ORAL ONCE
Refills: 0 | Status: COMPLETED | OUTPATIENT
Start: 2025-06-12 | End: 2025-06-12

## 2025-06-12 RX ADMIN — Medication 500 MILLIGRAM(S): at 18:33

## 2025-06-12 RX ADMIN — ATORVASTATIN CALCIUM 80 MILLIGRAM(S): 80 TABLET, FILM COATED ORAL at 21:49

## 2025-06-12 RX ADMIN — Medication 1 MILLIGRAM(S): at 19:20

## 2025-06-12 RX ADMIN — Medication 1 MILLIGRAM(S): at 13:25

## 2025-06-12 RX ADMIN — Medication 1 MILLIGRAM(S): at 14:20

## 2025-06-12 RX ADMIN — Medication 0.7 MILLIGRAM(S): at 21:48

## 2025-06-12 RX ADMIN — Medication 50 MILLILITER(S): at 10:51

## 2025-06-12 RX ADMIN — Medication 30 MILLILITER(S): at 22:36

## 2025-06-12 RX ADMIN — POLYETHYLENE GLYCOL 3350 17 GRAM(S): 17 POWDER, FOR SOLUTION ORAL at 13:26

## 2025-06-12 RX ADMIN — Medication 0.7 MILLIGRAM(S): at 16:22

## 2025-06-12 RX ADMIN — Medication 0.7 MILLIGRAM(S): at 22:40

## 2025-06-12 RX ADMIN — Medication 15 MILLILITER(S): at 21:49

## 2025-06-12 RX ADMIN — Medication 0.7 MILLIGRAM(S): at 17:20

## 2025-06-12 RX ADMIN — Medication 4 MILLIGRAM(S): at 16:22

## 2025-06-12 RX ADMIN — Medication 1 MILLIGRAM(S): at 11:15

## 2025-06-12 RX ADMIN — Medication 0.5 MILLIGRAM(S): at 20:20

## 2025-06-12 RX ADMIN — Medication 1 MILLIGRAM(S): at 18:33

## 2025-06-12 RX ADMIN — TAMSULOSIN HYDROCHLORIDE 0.4 MILLIGRAM(S): 0.4 CAPSULE ORAL at 21:49

## 2025-06-12 RX ADMIN — Medication 15 MILLILITER(S): at 14:07

## 2025-06-12 RX ADMIN — BISMUTH SUBSALICYLATE 15 MILLILITER(S): 262 TABLET, CHEWABLE ORAL at 18:33

## 2025-06-12 RX ADMIN — Medication 1 MILLIGRAM(S): at 23:43

## 2025-06-12 RX ADMIN — Medication 1 MILLIGRAM(S): at 10:17

## 2025-06-12 RX ADMIN — Medication 0.5 MILLIGRAM(S): at 21:20

## 2025-06-12 RX ADMIN — Medication 75 MILLILITER(S): at 18:36

## 2025-06-12 RX ADMIN — LACTULOSE 10 GRAM(S): 10 SOLUTION ORAL at 15:55

## 2025-06-12 NOTE — H&P ADULT - PROBLEM SELECTOR PLAN 3
Palliative care consult for pain control   Bowel regimen   Rheum consulted f/u recs Palliative care consult for pain control   Bowel regimen   6/12 rheum consulted: per recs urgent CT ankles ordered to r/o septic arhthritis; spoke to IR however deferring procedure to ortho  Consider ortho consult for drainage pending CT result Palliative care consult for pain control   Bowel regimen   6/12 rheum consulted: per recs urgent CT ankles ordered to r/o septic arthritis  rheum performed US Palliative care consult for pain control   Bowel regimen   6/12 rheumatology tapped joint: synovial fluid cultures and testing sent f/u Palliative care consult for pain control   Bowel regimen   6/12 rheumatology tapped L-ankle joint: synovial fluid cultures and testing sent f/u

## 2025-06-12 NOTE — H&P ADULT - PROBLEM SELECTOR PLAN 2
Not neutropenic, febrile  6/12 BCX/UCX/CXR ordered upon admission f/u Not neutropenic, febrile  6/12 BCX/UCX/CXR, full RVP ordered f/u

## 2025-06-12 NOTE — DISCHARGE NOTE PROVIDER - HOSPITAL COURSE
68 yo male with MHx significant for BRCA2 carrier ([id number] 9610C>T), HTN, HLD, Gout, GERD, BPH, spinal stenosis (s/p multiple procedures 2018, 2020 laminectomies / fusions), hip surgeries (Oct 2024, Jan 2025), residual hand weakness related to surgeries, umbilical hernia repair (2014/15) and ASXL1-mutated and GLD18-kzzmbth AML. 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 Patient is s/p induction chemotherapy with daunorubicin/cytarabine with remission marrow on 5/29/25 showing Cellular bone marrow (50-60% cellularity ) with myeloid predominant trilineage hematopoiesis. Initially planned to start cycle 1 of HIDAC however presents with feverx3 days and acute on chronic gout flare causing inability to ambulate secondary to pain. 68 yo male with MHx significant for BRCA2 carrier ([id number] 9610C>T), HTN, HLD, Gout, GERD, BPH, spinal stenosis (s/p multiple procedures 2018, 2020 laminectomies / fusions), hip surgeries (Oct 2024, Jan 2025), residual hand weakness related to surgeries, umbilical hernia repair (2014/15) and ASXL1-mutated and DUC00-hvtwkao AML. 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 Patient is s/p induction chemotherapy with daunorubicin/cytarabine with remission marrow on 5/29/25 showing Cellular bone marrow (50-60% cellularity ) with myeloid predominant trilineage hematopoiesis. Initially planned to start cycle 1 of HIDAC however presents with fever x3 days and acute on chronic gout flare causing inability to ambulate secondary to pain. Rheumatology was consulted and performed L-ankle drain on 6/12 and solumedrol was started. Synovial fluid studies unremarkable.     Patient is now stable and ready for discharge to the [facility].    68 yo male with MHx significant for BRCA2 carrier ([id number] 9610C>T), HTN, HLD, Gout, GERD, BPH, spinal stenosis (s/p multiple procedures 2018, 2020 laminectomies / fusions), hip surgeries (Oct 2024, Jan 2025), residual hand weakness related to surgeries, umbilical hernia repair (2014/15) and ASXL1-mutated and SME04-xwonfng AML. 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 Patient is s/p induction chemotherapy with daunorubicin/cytarabine with remission marrow on 5/29/25 showing Cellular bone marrow (50-60% cellularity ) with myeloid predominant trilineage hematopoiesis. Initially planned to start cycle 1 of HIDAC however presents with fever x3 days and acute on chronic gout flare causing inability to ambulate secondary to pain. Infectious workup negative. Rheumatology was consulted and performed L-ankle drain on 6/12 and solumedrol was started. Synovial fluid studies unremarkable.     Patient is now stable and ready for discharge to the [facility].    70 yo male with MHx significant for BRCA2 carrier ([id number] 9610C>T), HTN, HLD, Gout, GERD, BPH, spinal stenosis (s/p multiple procedures 2018, 2020 laminectomies / fusions), hip surgeries (Oct 2024, Jan 2025), residual hand weakness related to surgeries, umbilical hernia repair (2014/15) and ASXL1-mutated and JEL13-brerhun AML. 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 Patient is s/p induction chemotherapy with daunorubicin/cytarabine with remission marrow on 5/29/25 showing Cellular bone marrow (50-60% cellularity ) with myeloid predominant trilineage hematopoiesis. Initially planned to start cycle 1 of HIDAC however presents with fever x3 days and acute on chronic gout flare causing inability to ambulate secondary to pain. Infectious workup negative. Rheumatology was consulted and performed L-ankle drain on 6/12 and solumedrol was started. Synovial fluid studies unremarkable. Rheumatology continued to follow with recommendations for Solumedrol taper.     Hospital course complicated by recurrent fever 6/17-6/18 possibly due to cytarabine, pending cultures 6/17 revealing for ________________.    Patient is now stable and ready for discharge to the [facility].    68 yo male with MHx significant for BRCA2 carrier ([id number] 9610C>T), HTN, HLD, Gout, GERD, BPH, spinal stenosis (s/p multiple procedures 2018, 2020 laminectomies / fusions), hip surgeries (Oct 2024, Jan 2025), residual hand weakness related to surgeries, umbilical hernia repair (2014/15) and ASXL1-mutated and XIO43-mzbzhzm AML. 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 Patient is s/p induction chemotherapy with daunorubicin/cytarabine with remission marrow on 5/29/25 showing Cellular bone marrow (50-60% cellularity ) with myeloid predominant trilineage hematopoiesis. Initially planned to start cycle 1 of HIDAC however presents with fever x3 days and acute on chronic gout flare causing inability to ambulate secondary to pain. Infectious workup negative. Rheumatology was consulted and performed L-ankle drain on 6/12 and solumedrol was started. Synovial fluid studies unremarkable.    Hospital course complicated by recurrent fever 6/17-6/18 possibly due to cytarabine, infectious workup negative.     Patient to complete solumedrol taper on 6/20.    Patient is now stable and ready for discharge to the [facility].

## 2025-06-12 NOTE — H&P ADULT - MUSCULOSKELETAL
+b/l ankle join swelling and warmth. +unable to ambulate/decreased ROM due to pain/joint swelling/joint erythema/joint warmth details…

## 2025-06-12 NOTE — PATIENT PROFILE ADULT - FALL HARM RISK - UNIVERSAL INTERVENTIONS
Bed in lowest position, wheels locked, appropriate side rails in place/Call bell, personal items and telephone in reach/Instruct patient to call for assistance before getting out of bed or chair/Non-slip footwear when patient is out of bed/[place] to call system/Physically safe environment - no spills, clutter or unnecessary equipment/Purposeful Proactive Rounding/Room/bathroom lighting operational, light cord in reach

## 2025-06-12 NOTE — DISCHARGE NOTE PROVIDER - NSDCFUADDINST_GEN_ALL_CORE_FT
Follow up with palliative care for pain management at:     Early Discharge Program  Location: [address] [place] [address], [place] [address] Entrance C  For more emergent issues/symptoms, please call-[facility]: [phone]  For the Nurse Navigator/non-emergent issues, please call-Krystal Mcgraw RN: [phone]  *Please bring your medications or a medication list with you to the first early discharge visit.*  Reminders:  a. Continue to check your temperature at home. If you don’t have a thermometer available, please ask one of the staff to give you a disposable thermometer on discharge.  b. You will be followed by the Early Discharge Program’s providers for a 1-2 week period, and then you will have a follow up with your primary heme-oncologist.  c. You may need to have lab work and possible transfusions at [facility] 2-3x per week, please alert staff/social work if you need help with transportation upon discharge.  d. Please bring your insurance cards and ID.  Follow up with palliative care for pain management at: _________    Early Discharge Program  Location: [address] [place] [address], [place] [address] Entrance C  For more emergent issues/symptoms, please call-[facility]: [phone]  For the Nurse Navigator/non-emergent issues, please call-Krystal Mcgraw RN: [phone]  *Please bring your medications or a medication list with you to the first early discharge visit.*  Reminders:  a. Continue to check your temperature at home. If you don’t have a thermometer available, please ask one of the staff to give you a disposable thermometer on discharge.  b. You will be followed by the Early Discharge Program’s providers for a 1-2 week period, and then you will have a follow up with your primary heme-oncologist.  c. You may need to have lab work and possible transfusions at [facility] 2-3x per week, please alert staff/social work if you need help with transportation upon discharge.  d. Please bring your insurance cards and ID.  Follow up with palliative care for pain management at: _________    Early Discharge Program:  First Appointments on:    Location: [address] [place] [address], [place] [address] Entrance C  For more emergent issues/symptoms, please call-[facility]: [phone]  For the Nurse Navigator/non-emergent issues, please call-Krystal Mcgraw RN: [phone]  *Please bring your medications or a medication list with you to the first early discharge visit.*  Reminders:  a. Continue to check your temperature at home. If you don’t have a thermometer available, please ask one of the staff to give you a disposable thermometer on discharge.  b. You will be followed by the Early Discharge Program’s providers for a 1-2 week period, and then you will have a follow up with your primary heme-oncologist.  c. You may need to have lab work and possible transfusions at [facility] 2-3x per week, please alert staff/social work if you need help with transportation upon discharge.  d. Please bring your insurance cards and ID.  Follow up with palliative care for pain management at: _________  Follow up at [facility] on:   6/20 (Friday) at 9:00 AM for possible transfusion and to see NP Carol;  6/22 (Sunday) at 8:00 AM for possible transfusion and to see NP Meghna   6/24 (Tuesday) at 2:00 PM for possible transfusion and to see NP Alice    Early Discharge Program:  First Appointments on:    Location: [address] [place] [address] C  For more emergent issues/symptoms, please call-[facility]: [phone]  For the Nurse Navigator/non-emergent issues, please call-Krystal Mcgraw RN: [phone]  *Please bring your medications or a medication list with you to the first early discharge visit.*  Reminders:  a. Continue to check your temperature at home. If you don’t have a thermometer available, please ask one of the staff to give you a disposable thermometer on discharge.  b. You will be followed by the Early Discharge Program’s providers for a 1-2 week period, and then you will have a follow up with your primary heme-oncologist.  c. You may need to have lab work and possible transfusions at [facility] 2-3x per week, please alert staff/social work if you need help with transportation upon discharge.  d. Please bring your insurance cards and ID.  Follow up with palliative care for pain management. A team member will reach out to schedule an appointment. If you are not contacted, palliative care can be reached at: [phone]    Follow up at [facility] on:   6/20 (Friday) at 9:00 AM for possible transfusion  6/22 (Sunday) at 8:00 AM for possible transfusion   6/24 (Tuesday) at 2:00 PM for possible transfusion    Early Discharge Program:  First Appointments on:    Location: [address] [place] [address], [address] C  For more emergent issues/symptoms, please call-[facility]: [phone]  For the Nurse Navigator/non-emergent issues, please call-Krystal Mcgraw RN: [phone]  *Please bring your medications or a medication list with you to the first early discharge visit.*  Reminders:  a. Continue to check your temperature at home. If you don’t have a thermometer available, please ask one of the staff to give you a disposable thermometer on discharge.  b. You will be followed by the Early Discharge Program’s providers for a 1-2 week period, and then you will have a follow up with your primary heme-oncologist.  c. You may need to have lab work and possible transfusions at [facility] 2-3x per week, please alert staff/social work if you need help with transportation upon discharge.  d. Please bring your insurance cards and ID.

## 2025-06-12 NOTE — H&P ADULT - NS ATTEND AMEND GEN_ALL_CORE FT
Primary: Paulie    Assessment: 68 yo male with ASXL1-mutated and RYL21-zjyxoeu AML here for planned HiDAC - temporarily on HOLD. Course complciated by acute gout flare and severe pain of right ankle  ?  Heme:   - Planned for HiDAC, however due to fevers, urinary symptoms and acute gout, will stabilize inpatient then resume AML-directed therapy.  - Continue daily oral B12 suppl    ID: Fevers x 3 days, dark urine, less urine output  - Not Neutropenic  - Infectious workup underway    MSK:  - Acute on chronic gout: Rheum consult appreciated

## 2025-06-12 NOTE — CONSULT NOTE ADULT - ATTENDING COMMENTS
to be completed I personally interviewed and examined the patient. Agree with rheumatology fellow's note with my edits as above.  Suspect gout flare though an underlying septic arthritis should be ruled out especially with his fevers, chills and significant leukocytosis though gout flare may also present in this way. Left ankle arthrocentesis was performed- see procedure note  once infection is ruled out, prednisone / vs Kineret may be considered for acute flare treatment.

## 2025-06-12 NOTE — H&P ADULT - HISTORY OF PRESENT ILLNESS
68 yo male with MHx significant for BRCA2 carrier ([id number] 9610C>T), HTN, HLD, Gout, GERD, BPH, spinal stenosis (s/p multiple procedures 2018, 2020 laminectomies / fusions), hip surgeries (Oct 2024, Jan 2025), residual hand weakness related to surgeries, umbilical hernia repair (2014/15) and ASXL1-mutated and ISG48-iqcrdad AML. 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 Patient is s/p induction chemotherapy with daunorubicin/cytarabine with remission marrow on 5/29/25 showing Cellular bone marrow (50-60% cellularity ) with myeloid predominant trilineage hematopoiesis. Patient was admitted on 5/2/25 for Induction with Daunorubicin 60 mg/m2 days 1-3 and cytarabine 100 mg/m2 days 1-7. His day 15 BM bx and aspirate performed on 5/16/25 showed no evidence of persistent leukemia with low 0.34% myeloblasts on flow, however specimen was subcortical, suboptimal. Patient initially due for cycle 1 hidac consolidation however presenting with fevers x3 days, weakness. 68 yo male with MHx significant for BRCA2 carrier ([id number] 9610C>T), HTN, HLD, Gout, GERD, BPH, spinal stenosis (s/p multiple procedures 2018, 2020 laminectomies / fusions), hip surgeries (Oct 2024, Jan 2025), residual hand weakness related to surgeries, umbilical hernia repair (2014/15) and ASXL1-mutated and SGY70-zsttrqr AML. 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 Patient is s/p induction chemotherapy with daunorubicin/cytarabine with remission marrow on 5/29/25 showing Cellular bone marrow (50-60% cellularity ) with myeloid predominant trilineage hematopoiesis. Patient was admitted on 5/2/25 for Induction with Daunorubicin 60 mg/m2 days 1-3 and cytarabine 100 mg/m2 days 1-7. His day 15 BM bx and aspirate performed on 5/16/25 showed no evidence of persistent leukemia with low 0.34% myeloblasts on flow, however specimen was subcortical, suboptimal. Patient initially due for cycle 1 hidac consolidation however presenting with fevers x3 days, and acute on chronic gout attack causing inability to walk secondary to pain. Pt reports 2 days ago he fell d/t pain when trying to ambulate to the bathroom.

## 2025-06-12 NOTE — CONSULT NOTE ADULT - ASSESSMENT
Patient who was admitted on 5/2/25 for Daunorubicin + cytarabine during which he had acute on chronic gout, neutropenic fever treated empirically with broad spectrum abx, diarrhea and GI bleeding. He presents again due to acute polyarthritis suspected to be underlying gout      #Polyarthritis  #Gout  - Workup significant for leukocytosis 20k  - Uric acid 4.8  - Had gout flare in prior hospitalization where his allopurinol was uptitrated to 300mg Daily; treated with colchicine (c/b diarrhea) and 4 day prednisone taper  - Tx: Allopurinol 300mg daily  -     Recommendations:  INCOMPLETE DO NOT FOLLOW   - Obtain infectious w/u, obtain UA + cultures/RVP   Patient who was admitted on 5/2/25 for Daunorubicin + cytarabine during which he had acute on chronic gout, neutropenic fever treated empirically with broad spectrum abx, diarrhea and GI bleeding. He presents again due to acute polyarthritis suspected to be underlying gout      #Polyarthritis, likely polyarticulat Gout flare  - Uric acid 4.8  - Had gout flare in prior hospitalization where his allopurinol was uptitrated to 300mg Daily; treated with colchicine (c/b diarrhea) and 4 day prednisone taper  - Tx: Allopurinol 300mg daily     # Significant for leukocytosis 20k  - AML, on  Daunorubicin + cytarabine       Recommendations:  INCOMPLETE DO NOT FOLLOW   - Obtain infectious w/u, obtain UA + cultures/RVP   Patient who was admitted on 5/2/25 for Daunorubicin + cytarabine during which he had acute on chronic gout, neutropenic fever treated empirically with broad spectrum abx, diarrhea and GI bleeding. He presents again due to acute polyarthritis suspected to be underlying gout      #Oligoarthritis  R/o septic arthritis  - On exam, significant swelling of left ankle with decrease ROM, tenosynovitis of lateral aspect left ankle/foot, septic arthritis should be definitively ruled with urgent IR arthrocentesis  - Uric acid 4.8  - Had gout flare in prior hospitalization where his allopurinol was uptitrated to 300mg Daily; treated with colchicine (c/b diarrhea) and 4 day prednisone taper  - Tx: Allopurinol 300mg daily     # Significant for leukocytosis 20k  - AML, on  Daunorubicin + cytarabine       Recommendations:  - Please obtain urgent IR consult for consideration of left ankle arthrocentesis to r/o septic arthritis  - Please obtain STAT CT ankle vs MRI ankle (whichever can be obtained quickest)  - Continue to performed infectious w/u -- will follow cultures  - For now, conservative measure for pain such as tylenol, cold pack.    d/w Dr. Qiana Yao MD, PGY-4  Rheumatology Fellow  Reachable on TEAMS 69M who was admitted on 5/2/25 for Daunorubicin + cytarabine during which he had acute on chronic gout, neutropenic fever treated empirically with broad spectrum abx, diarrhea and GI bleeding. He presents again due to acute oligoarthritis suspected to be underlying gout but underlying septic arthritis should be definiteively ruled out    #Oligoarthritis  R/o septic arthritis  - On exam, significant swelling of left ankle with decrease ROM, tenosynovitis of lateral aspect left ankle/foot, s/p arthrocentesis, will follow fluids/cultures  - Uric acid 4.8  - Had gout flare in prior hospitalization where his allopurinol was uptitrated to 300mg Daily; treated with colchicine (c/b diarrhea) and 4 day prednisone taper  - Tx: Allopurinol 300mg daily  - s/p diagnostic arthrocentesis 6/12 of left ankle 6/12     # Significant for leukocytosis 20k  - AML, on  Daunorubicin + cytarabine     Impression:  Patient with recent hospitalization for chemotherapy induction c/b acute on chronic gout, diarrhea from colchicine, GI bleed, neutropenic fever.  He presents now with significant synovitis particularly of his left ankle but also his b/l 1st MTPs  Suspect gout flare though an underlying septic arthritis should be ruled out especially with his fevers, significant leukocytosis though gout flare may also present in this way. He is s/p arthrocentesis of left ankle 6/12  Conservative measures for now while infectious workup is being completed    Recommendations:  - Follow fluid studies from 6/12/2025 arthrocentesis  - Continue to performed infectious w/u -- will follow cultures  - Obtain XR left foot  - For now, conservative measure for pain such as tylenol, cold pack.  - Further therapies pending above workup  - c/w Allopurinol 300mg PO daily    d/w Dr. Qiana Yao MD, PGY-4  Rheumatology Fellow  Reachable on TEAMS 69M who was admitted on 5/2/25 for Daunorubicin + cytarabine during which he had acute on chronic gout, neutropenic fever treated empirically with broad spectrum abx, diarrhea and GI bleeding. He presents again due to acute oligoarthritis suspected to be underlying gout but underlying septic arthritis should be definiteively ruled out    # Acute left ankle arthritis with tenosynovitis of left foot and arthritis of MTP joints  R/o septic arthritis  - On exam, significant swelling of left ankle with decrease ROM, tenosynovitis of lateral aspect left ankle/foot, s/p arthrocentesis, will follow fluids/cultures  - Uric acid 4.8  - Had gout flare in prior hospitalization where his allopurinol was uptitrated to 300mg Daily; treated with colchicine (c/b diarrhea) and 4 day prednisone taper  - Tx: Allopurinol 300mg daily  - bedside US showed ankle effusion and effusions of MTP joints - s/p diagnostic arthrocentesis 6/12 of left ankle 6/12- 5 cc synovial fluid was aspirated and sent for analysis     # Significant for leukocytosis 20k  - AML, on  Daunorubicin + cytarabine     Impression:  Patient with recent hospitalization for chemotherapy induction c/b acute on chronic gout, diarrhea from colchicine, GI bleed, neutropenic fever.  He presents now with significant synovitis particularly of his left ankle but also his b/l 1st MTPs  Suspect gout flare though an underlying septic arthritis should be ruled out especially with his fevers, significant leukocytosis though gout flare may also present in this way. He is s/p arthrocentesis of left ankle 6/12  Conservative measures for now while infectious workup is being completed    Recommendations:  - Follow fluid cellcount and crystals from 6/12/2025 arthrocentesis  - follow synovial fluid cultures  - Obtain XR left foot  - For now, conservative measure for pain such as tylenol, cold pack.  - Once infection is ruled out , may consider steroids / vs Kineret    - c/w Allopurinol 300mg PO daily    d/w Dr. Qiana Yao MD, PGY-4  Rheumatology Fellow  Reachable on TEAMS

## 2025-06-12 NOTE — H&P ADULT - PROBLEM SELECTOR PLAN 1
Holding cycle 1 HiDAC  Monitor daily CBC w/ diff, CMP, replete prn. Strict I/Os. Daily weights.   Allopurinol (gout) Holding cycle 1 HiDAC  Monitor daily CBC w/ diff, CMP, replete prn. Strict I/Os. Daily weights. Antiemetics  Allopurinol (gout)

## 2025-06-12 NOTE — DISCHARGE NOTE PROVIDER - NSDCMRMEDTOKEN_GEN_ALL_CORE_FT
allopurinol 300 mg oral tablet: 1 tab(s) orally once a day for gout  atorvastatin 80 mg oral tablet: 1 tab(s) orally once a day  cholecalciferol oral tablet: 2000 unit(s) orally once a day  DilTIAZem (Eqv-Cardizem CD) 120 mg/24 hours oral capsule, extended release: 1 cap(s) orally once a day  omeprazole 40 mg oral delayed release capsule: 1 cap(s) orally once a day  oxyCODONE 5 mg oral tablet: 1 tab(s) orally every 4 hours as needed for  moderate pain MDD: 6 tabs  valACYclovir 500 mg oral tablet: 1 tab(s) orally 2 times a day   allopurinol 300 mg oral tablet: 1 tab(s) orally once a day for gout  atorvastatin 80 mg oral tablet: 1 tab(s) orally once a day  cholecalciferol oral tablet: 2000 unit(s) orally once a day  DilTIAZem (Eqv-Cardizem CD) 120 mg/24 hours oral capsule, extended release: 1 cap(s) orally once a day  levoFLOXacin 500 mg oral tablet: 1 tab(s) orally every 24 hours Take on 6/19  omeprazole 40 mg oral delayed release capsule: 1 cap(s) orally once a day  oxyCODONE 5 mg oral tablet: 1 tab(s) orally every 4 hours as needed for  moderate pain MDD: 6 tabs  prednisoLONE acetate 1% ophthalmic suspension: 2 drop(s) to each affected eye every 6 hours Continue taking with last day being 6/19  tamsulosin 0.4 mg oral capsule: 1 cap(s) orally once a day (at bedtime)  valACYclovir 500 mg oral tablet: 1 tab(s) orally 2 times a day   allopurinol 300 mg oral tablet: 1 tab(s) orally once a day for gout  atorvastatin 80 mg oral tablet: 1 tab(s) orally once a day  cholecalciferol oral tablet: 2000 unit(s) orally once a day  DilTIAZem (Eqv-Cardizem CD) 120 mg/24 hours oral capsule, extended release: 1 cap(s) orally once a day  levoFLOXacin 500 mg oral tablet: 1 tab(s) orally every 24 hours Take on 6/19  omeprazole 40 mg oral delayed release capsule: 1 cap(s) orally once a day  polyethylene glycol 3350 oral powder for reconstitution: 17 gram(s) orally 2 times a day  senna leaf extract oral tablet: 2 tab(s) orally once a day (at bedtime)  tamsulosin 0.4 mg oral capsule: 1 cap(s) orally once a day (at bedtime)  valACYclovir 500 mg oral tablet: 1 tab(s) orally 2 times a day   allopurinol 300 mg oral tablet: 1 tab(s) orally once a day for gout  atorvastatin 80 mg oral tablet: 1 tab(s) orally once a day  cholecalciferol oral tablet: 2000 unit(s) orally once a day  DilTIAZem (Eqv-Cardizem CD) 120 mg/24 hours oral capsule, extended release: 1 cap(s) orally once a day  HYDROmorphone 4 mg oral tablet: 1 tab(s) orally every 4 hours as needed for mod-severe pain MDD: 24 mg  methylPREDNISolone 2 mg oral tablet: 5 tab(s) orally once Take 5 tablets on 6/20 then stop  omeprazole 40 mg oral delayed release capsule: 1 cap(s) orally once a day  polyethylene glycol 3350 oral powder for reconstitution: 17 gram(s) orally 2 times a day  senna leaf extract oral tablet: 2 tab(s) orally once a day (at bedtime)  tamsulosin 0.4 mg oral capsule: 1 cap(s) orally once a day (at bedtime)  valACYclovir 500 mg oral tablet: 1 tab(s) orally 2 times a day

## 2025-06-12 NOTE — SBIRT NOTE ADULT - NSSBIRTCONCERNEDDRINK_GEN_A_CORE
Good fetal movement.  No contractions, no vaginal bleeding, and no loss of fluid.  Cervical exam today to repeat from yesterday's ED visit. Remains 1/50/-2.  Growth measures 29 cm today, but u/s one week ago was 25%ile.  Will monitor for now.  If low again next week, consider repeat u/s.   
No

## 2025-06-12 NOTE — PATIENT PROFILE ADULT - FALL HARM RISK - HARM RISK INTERVENTIONS
Assistance with ambulation/Communicate Risk of Fall with Harm to all staff/Monitor for mental status changes/Monitor gait and stability/Reinforce activity limits and safety measures with patient and family/Tailored Fall Risk Interventions/Visual Cue: Yellow wristband and red socks/Bed in lowest position, wheels locked, appropriate side rails in place/Call bell, personal items and telephone in reach/Instruct patient to call for assistance before getting out of bed or chair/Non-slip footwear when patient is out of bed/[place] to call system/Physically safe environment - no spills, clutter or unnecessary equipment/Purposeful Proactive Rounding/Room/bathroom lighting operational, light cord in reach

## 2025-06-12 NOTE — DISCHARGE NOTE PROVIDER - CARE PROVIDER_API CALL
[facility]  [address] [place] [id number]  Phone: [phone]  Fax: [phone]  Follow Up Time:

## 2025-06-12 NOTE — DISCHARGE NOTE PROVIDER - NSDCFUADDAPPT_GEN_ALL_CORE_FT
Follow up with palliative care for pain management at:     Early Discharge Program  Location: [address] [place] [address], [place] [address] Entrance C  For more emergent issues/symptoms, please call-[facility]: [phone]  For the Nurse Navigator/non-emergent issues, please call-Krystal Mcgraw RN: [phone]  *Please bring your medications or a medication list with you to the first early discharge visit.*  Reminders:  a. Continue to check your temperature at home. If you don’t have a thermometer available, please ask one of the staff to give you a disposable thermometer on discharge.  b. You will be followed by the Early Discharge Program’s providers for a 1-2 week period, and then you will have a follow up with your primary heme-oncologist.  c. You may need to have lab work and possible transfusions at [facility] 2-3x per week, please alert staff/social work if you need help with transportation upon discharge.  d. Please bring your insurance cards and ID.  Follow up with palliative care for pain management at: _________    Early Discharge Program  Location: [address] [place] [address], [place] [address] Entrance C  For more emergent issues/symptoms, please call-[facility]: [phone]  For the Nurse Navigator/non-emergent issues, please call-Krystal Mcgraw RN: [phone]  *Please bring your medications or a medication list with you to the first early discharge visit.*  Reminders:  a. Continue to check your temperature at home. If you don’t have a thermometer available, please ask one of the staff to give you a disposable thermometer on discharge.  b. You will be followed by the Early Discharge Program’s providers for a 1-2 week period, and then you will have a follow up with your primary heme-oncologist.  c. You may need to have lab work and possible transfusions at [facility] 2-3x per week, please alert staff/social work if you need help with transportation upon discharge.  d. Please bring your insurance cards and ID.  Follow up with palliative care for pain management. A team member will reach out to schedule an appointment. If you are not contacted, palliative care can be reached at:     Follow up at [facility] on:   6/20 (Friday) at 9:00 AM for possible transfusion and to see NP Carol;  6/22 (Sunday) at 8:00 AM for possible transfusion and to see NP Meghna   6/24 (Tuesday) at 2:00 PM for possible transfusion and to see NP Alice    Early Discharge Program  Location: [address] [place] [address] C  For more emergent issues/symptoms, please call-[facility]: [phone]  For the Nurse Navigator/non-emergent issues, please call-Krystal Mcgraw RN: [phone]  *Please bring your medications or a medication list with you to the first early discharge visit.*  Reminders:  a. Continue to check your temperature at home. If you don’t have a thermometer available, please ask one of the staff to give you a disposable thermometer on discharge.  b. You will be followed by the Early Discharge Program’s providers for a 1-2 week period, and then you will have a follow up with your primary heme-oncologist.  c. You may need to have lab work and possible transfusions at [facility] 2-3x per week, please alert staff/social work if you need help with transportation upon discharge.  d. Please bring your insurance cards and ID.  Follow up with palliative care for pain management. A team member will reach out to schedule an appointment. If you are not contacted, palliative care can be reached at: [phone]    Follow up at [facility] on:   6/20 (Friday) at 9:00 AM for possible transfusion and to see NP Carol;  6/22 (Sunday) at 8:00 AM for possible transfusion and to see NP Meghna   6/24 (Tuesday) at 2:00 PM for possible transfusion and to see NP Alice    Early Discharge Program  Location: [address] [place] [address] C  For more emergent issues/symptoms, please call-[facility]: [phone]  For the Nurse Navigator/non-emergent issues, please call-Krystal Mcgraw RN: [phone]  *Please bring your medications or a medication list with you to the first early discharge visit.*  Reminders:  a. Continue to check your temperature at home. If you don’t have a thermometer available, please ask one of the staff to give you a disposable thermometer on discharge.  b. You will be followed by the Early Discharge Program’s providers for a 1-2 week period, and then you will have a follow up with your primary heme-oncologist.  c. You may need to have lab work and possible transfusions at [facility] 2-3x per week, please alert staff/social work if you need help with transportation upon discharge.  d. Please bring your insurance cards and ID.

## 2025-06-12 NOTE — CONSULT NOTE ADULT - SUBJECTIVE AND OBJECTIVE BOX
JANELLE DEL CID  [id number]    HPI:  70 yo male with MHx significant for BRCA2 carrier ([id number] 9610C>T), HTN, HLD, Gout, GERD, BPH, spinal stenosis (s/p multiple procedures 2018, 2020 laminectomies / fusions), hip surgeries (Oct 2024, Jan 2025), residual hand weakness related to surgeries, umbilical hernia repair (2014/15) and ASXL1-mutated and TJW03-ljycskl AML. 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 Patient is s/p induction chemotherapy with daunorubicin/cytarabine with remission marrow on 5/29/25 showing Cellular bone marrow (50-60% cellularity ) with myeloid predominant trilineage hematopoiesis. Patient was admitted on 5/2/25 for Induction with Daunorubicin 60 mg/m2 days 1-3 and cytarabine 100 mg/m2 days 1-7. His day 15 BM bx and aspirate performed on 5/16/25 showed no evidence of persistent leukemia with low 0.34% myeloblasts on flow, however specimen was subcortical, suboptimal. Patient initially due for cycle 1 hidac consolidation however presenting with fevers x3 days, weakness. (12 Jun 2025 09:57)    Rheumatology is consulted for evaluation of polyarthritis.      Rheum ROS    Denies fevers/chills/weight loss/night sweats/alopecia/sinus disease/asthma history/oral ulcers/dysphagia/vision changes/Raynauds/VTE/miscarraiges/sicca symptoms/urinary changes/edema/SOB/joint pain/swelling/jaw claudication/rash/photosensitivity/morning stiffness    Endorses fevers/chills/weight loss/night sweats/alopecia/sinus disease/asthma history/oral ulcers/dysphagia/vision changes/Raynauds/VTE/miscarraiges/sicca symptoms/urinary changes/edema/SOB/joint pain/swelling/jaw claudication/rash/photosensitivity/morning stiffness      MEDICATIONS  (STANDING):  Biotene Dry Mouth Oral Rinse 15 milliLiter(s) Swish and Spit three times a day  sodium chloride 0.9%. 1000 milliLiter(s) (50 mL/Hr) IV Continuous <Continuous>  valACYclovir 500 milliGRAM(s) Oral two times a day    MEDICATIONS  (PRN):  acetaminophen     Tablet .. 650 milliGRAM(s) Oral every 6 hours PRN Temp greater or equal to 38C (100.4F), Mild Pain (1 - 3)  HYDROmorphone  Injectable 1 milliGRAM(s) IV Push every 4 hours PRN Severe Pain (7 - 10)      Allergies    No Known Allergies    Intolerances        PERTINENT MEDICATION HISTORY:    SOCIAL HISTORY:  OCCUPATION:  TRAVEL HISTORY:    FAMILY HISTORY:  Family history of prostate cancer    Family history of stroke    Family history of oral cancer (Sibling)  tongue ca    Family history of breast cancer (Sibling)    Family history of stroke (Father)        Vital Signs Last 24 Hrs  T(C): 37.7 (12 Jun 2025 10:22), Max: 37.7 (12 Jun 2025 10:22)  T(F): 99.9 (12 Jun 2025 10:22), Max: 99.9 (12 Jun 2025 10:22)  HR: 112 (12 Jun 2025 09:32) (112 - 112)  BP: 114/77 (12 Jun 2025 09:32) (114/77 - 114/77)  BP(mean): --  RR: 18 (12 Jun 2025 09:32) (18 - 18)  SpO2: 98% (12 Jun 2025 09:32) (98% - 98%)    Parameters below as of 12 Jun 2025 09:32  Patient On (Oxygen Delivery Method): room air        Physical Exam:  General: No apparent distress  HEENT: EOMI, MMM  CVS: +S1/S2, RRR, no murmurs/rubs/gallops  Resp: CTA b/l. No crackles/wheezing  GI: Soft, NT/ND +BS  MSK: no swelling/warmth/erythema of the joints of the UE/LE  Neuro: AAOx3  Skin: no visible rashes    LABS:                        10.2   20.18 )-----------( 227      ( 12 Jun 2025 10:30 )             31.3     06-12    138  |  101  |  22  ----------------------------<  129[H]  3.7   |  19[L]  |  1.38[H]    Ca    9.6      12 Jun 2025 10:30  Phos  3.4     06-12  Mg     1.8     06-12    TPro  6.9  /  Alb  3.7  /  TBili  1.1  /  DBili  x   /  AST  20  /  ALT  29  /  AlkPhos  111  06-12      Urinalysis Basic - ( 12 Jun 2025 10:30 )    Color: x / Appearance: x / SG: x / pH: x  Gluc: 129 mg/dL / Ketone: x  / Bili: x / Urobili: x   Blood: x / Protein: x / Nitrite: x   Leuk Esterase: x / RBC: x / WBC x   Sq Epi: x / Non Sq Epi: x / Bacteria: x            Rheumatology Work Up    Creatine Kinase, Serum: 254 U/L *H* [30 - 200] (06-04-21 @ 01:50)  Creatine Kinase, Serum: 260 U/L *H* [30 - 200] (06-03-21 @ 19:31)  C-Reactive Protein, Serum: 48.7 mg/L *H* [0.0 - 4.0] (06-03-21 @ 16:12)  Creatine Kinase, Serum: 356 U/L *H* [30 - 200] (06-03-21 @ 16:12)            RADIOLOGY & ADDITIONAL STUDIES:     JANELLE DEL CID  [id number]    HPI:  68 yo male with MHx significant for BRCA2 carrier ([id number] 9610C>T), HTN, HLD, Gout, GERD, BPH, spinal stenosis (s/p multiple procedures 2018, 2020 laminectomies / fusions), hip surgeries (Oct 2024, Jan 2025), residual hand weakness related to surgeries, umbilical hernia repair (2014/15) and ASXL1-mutated and QKP48-vmhfvls AML. 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 Patient is s/p induction chemotherapy with daunorubicin/cytarabine with remission marrow on 5/29/25 showing Cellular bone marrow (50-60% cellularity ) with myeloid predominant trilineage hematopoiesis. Patient was admitted on 5/2/25 for Induction with Daunorubicin 60 mg/m2 days 1-3 and cytarabine 100 mg/m2 days 1-7. His day 15 BM bx and aspirate performed on 5/16/25 showed no evidence of persistent leukemia with low 0.34% myeloblasts on flow, however specimen was subcortical, suboptimal. Patient initially due for cycle 1 hidac consolidation however presenting with fevers x3 days, weakness. (12 Jun 2025 09:57)    Rheumatology is consulted for evaluation of acute arthritis.      Rheum ROS  As noted above      MEDICATIONS  (STANDING):  Biotene Dry Mouth Oral Rinse 15 milliLiter(s) Swish and Spit three times a day  sodium chloride 0.9%. 1000 milliLiter(s) (50 mL/Hr) IV Continuous <Continuous>  valACYclovir 500 milliGRAM(s) Oral two times a day    MEDICATIONS  (PRN):  acetaminophen     Tablet .. 650 milliGRAM(s) Oral every 6 hours PRN Temp greater or equal to 38C (100.4F), Mild Pain (1 - 3)  HYDROmorphone  Injectable 1 milliGRAM(s) IV Push every 4 hours PRN Severe Pain (7 - 10)      Allergies    No Known Allergies    Intolerances        PERTINENT MEDICATION HISTORY:    SOCIAL HISTORY:  OCCUPATION:  TRAVEL HISTORY:    FAMILY HISTORY:  Family history of prostate cancer    Family history of stroke    Family history of oral cancer (Sibling)  tongue ca    Family history of breast cancer (Sibling)    Family history of stroke (Father)        Vital Signs Last 24 Hrs  T(C): 37.7 (12 Jun 2025 10:22), Max: 37.7 (12 Jun 2025 10:22)  T(F): 99.9 (12 Jun 2025 10:22), Max: 99.9 (12 Jun 2025 10:22)  HR: 112 (12 Jun 2025 09:32) (112 - 112)  BP: 114/77 (12 Jun 2025 09:32) (114/77 - 114/77)  BP(mean): --  RR: 18 (12 Jun 2025 09:32) (18 - 18)  SpO2: 98% (12 Jun 2025 09:32) (98% - 98%)    Parameters below as of 12 Jun 2025 09:32  Patient On (Oxygen Delivery Method): room air        Physical Exam:  General: No apparent distress  HEENT: EOMI, MMM  CVS: +S1/S2, RRR, no murmurs/rubs/gallops  Resp: CTA b/l. No crackles/wheezing  GI: Soft, NT/ND +BS  MSK: no swelling/warmth/erythema of the joints of the UE/LE  Neuro: AAOx3  Skin: no visible rashes    LABS:                        10.2   20.18 )-----------( 227      ( 12 Jun 2025 10:30 )             31.3     06-12    138  |  101  |  22  ----------------------------<  129[H]  3.7   |  19[L]  |  1.38[H]    Ca    9.6      12 Jun 2025 10:30  Phos  3.4     06-12  Mg     1.8     06-12    TPro  6.9  /  Alb  3.7  /  TBili  1.1  /  DBili  x   /  AST  20  /  ALT  29  /  AlkPhos  111  06-12      Urinalysis Basic - ( 12 Jun 2025 10:30 )    Color: x / Appearance: x / SG: x / pH: x  Gluc: 129 mg/dL / Ketone: x  / Bili: x / Urobili: x   Blood: x / Protein: x / Nitrite: x   Leuk Esterase: x / RBC: x / WBC x   Sq Epi: x / Non Sq Epi: x / Bacteria: x            Rheumatology Work Up    Creatine Kinase, Serum: 254 U/L *H* [30 - 200] (06-04-21 @ 01:50)  Creatine Kinase, Serum: 260 U/L *H* [30 - 200] (06-03-21 @ 19:31)  C-Reactive Protein, Serum: 48.7 mg/L *H* [0.0 - 4.0] (06-03-21 @ 16:12)  Creatine Kinase, Serum: 356 U/L *H* [30 - 200] (06-03-21 @ 16:12)            RADIOLOGY & ADDITIONAL STUDIES:     JANELLE DEL CID  [id number]    HPI:  68 yo male with MHx significant for BRCA2 carrier ([id number] 9610C>T), HTN, HLD, Gout, GERD, BPH, spinal stenosis (s/p multiple procedures 2018, 2020 laminectomies / fusions), hip surgeries (Oct 2024, Jan 2025), residual hand weakness related to surgeries, umbilical hernia repair (2014/15) and ASXL1-mutated and DCO54-ffbaekv AML. 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 Patient is s/p induction chemotherapy with daunorubicin/cytarabine with remission marrow on 5/29/25 showing Cellular bone marrow (50-60% cellularity ) with myeloid predominant trilineage hematopoiesis. Patient was admitted on 5/2/25 for Induction with Daunorubicin 60 mg/m2 days 1-3 and cytarabine 100 mg/m2 days 1-7. His day 15 BM bx and aspirate performed on 5/16/25 showed no evidence of persistent leukemia with low 0.34% myeloblasts on flow, however specimen was subcortical, suboptimal. Patient initially due for cycle 1 hidac consolidation however presenting with fevers x3 days, weakness. (12 Jun 2025 09:57)    Rheumatology is consulted for evaluation of acute arthritis. He reports 4-5 days prior to arrival, he developed pain to his left ankle/foot. This was associated with developement of fever/chills as well as urinary retention. Due to pain in his ankle, he had episode of fall without LOC and did not sustain any injury subsequently. He otherwise reports improvement of his mucositis, no CP, SOB, n/v, eye symptoms.      Rheum ROS  As noted above      MEDICATIONS  (STANDING):  Biotene Dry Mouth Oral Rinse 15 milliLiter(s) Swish and Spit three times a day  sodium chloride 0.9%. 1000 milliLiter(s) (50 mL/Hr) IV Continuous <Continuous>  valACYclovir 500 milliGRAM(s) Oral two times a day    MEDICATIONS  (PRN):  acetaminophen     Tablet .. 650 milliGRAM(s) Oral every 6 hours PRN Temp greater or equal to 38C (100.4F), Mild Pain (1 - 3)  HYDROmorphone  Injectable 1 milliGRAM(s) IV Push every 4 hours PRN Severe Pain (7 - 10)      Allergies    No Known Allergies    Intolerances        PERTINENT MEDICATION HISTORY:    SOCIAL HISTORY:  OCCUPATION:  TRAVEL HISTORY:    FAMILY HISTORY:  Family history of prostate cancer    Family history of stroke    Family history of oral cancer (Sibling)  tongue ca    Family history of breast cancer (Sibling)    Family history of stroke (Father)        Vital Signs Last 24 Hrs  T(C): 37.7 (12 Jun 2025 10:22), Max: 37.7 (12 Jun 2025 10:22)  T(F): 99.9 (12 Jun 2025 10:22), Max: 99.9 (12 Jun 2025 10:22)  HR: 112 (12 Jun 2025 09:32) (112 - 112)  BP: 114/77 (12 Jun 2025 09:32) (114/77 - 114/77)  BP(mean): --  RR: 18 (12 Jun 2025 09:32) (18 - 18)  SpO2: 98% (12 Jun 2025 09:32) (98% - 98%)    Parameters below as of 12 Jun 2025 09:32  Patient On (Oxygen Delivery Method): room air        Physical Exam:  General: No apparent distress  HEENT: EOMI, MMM  CVS: +S1/S2, RRR, no murmurs/rubs/gallops  Resp: CTA b/l. No crackles/wheezing  GI: Soft, NT/ND +BS  MSK: no swelling/warmth/erythema of the joints of the UE/LE  Neuro: AAOx3  Skin: no visible rashes    LABS:                        10.2   20.18 )-----------( 227      ( 12 Jun 2025 10:30 )             31.3     06-12    138  |  101  |  22  ----------------------------<  129[H]  3.7   |  19[L]  |  1.38[H]    Ca    9.6      12 Jun 2025 10:30  Phos  3.4     06-12  Mg     1.8     06-12    TPro  6.9  /  Alb  3.7  /  TBili  1.1  /  DBili  x   /  AST  20  /  ALT  29  /  AlkPhos  111  06-12      Urinalysis Basic - ( 12 Jun 2025 10:30 )    Color: x / Appearance: x / SG: x / pH: x  Gluc: 129 mg/dL / Ketone: x  / Bili: x / Urobili: x   Blood: x / Protein: x / Nitrite: x   Leuk Esterase: x / RBC: x / WBC x   Sq Epi: x / Non Sq Epi: x / Bacteria: x            Rheumatology Work Up    Creatine Kinase, Serum: 254 U/L *H* [30 - 200] (06-04-21 @ 01:50)  Creatine Kinase, Serum: 260 U/L *H* [30 - 200] (06-03-21 @ 19:31)  C-Reactive Protein, Serum: 48.7 mg/L *H* [0.0 - 4.0] (06-03-21 @ 16:12)  Creatine Kinase, Serum: 356 U/L *H* [30 - 200] (06-03-21 @ 16:12)            RADIOLOGY & ADDITIONAL STUDIES:     JANELLE DEL CID  [id number]    HPI:  70 yo male with MHx significant for BRCA2 carrier ([id number] 9610C>T), HTN, HLD, Gout, GERD, BPH, spinal stenosis (s/p multiple procedures 2018, 2020 laminectomies / fusions), hip surgeries (Oct 2024, Jan 2025), residual hand weakness related to surgeries, umbilical hernia repair (2014/15) and ASXL1-mutated and TUP17-fxslcwd AML. 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 Patient is s/p induction chemotherapy with daunorubicin/cytarabine with remission marrow on 5/29/25 showing Cellular bone marrow (50-60% cellularity ) with myeloid predominant trilineage hematopoiesis. Patient was admitted on 5/2/25 for Induction with Daunorubicin 60 mg/m2 days 1-3 and cytarabine 100 mg/m2 days 1-7. His day 15 BM bx and aspirate performed on 5/16/25 showed no evidence of persistent leukemia with low 0.34% myeloblasts on flow, however specimen was subcortical, suboptimal. Patient initially due for cycle 1 hidac consolidation however presenting with fevers x3 days, weakness. (12 Jun 2025 09:57)    Rheumatology is consulted for evaluation of acute arthritis. He reports 4-5 days prior to arrival, he developed pain to his left ankle/foot. This was associated with development of fever/chills as well as urinary retention. Due to pain in his ankle, he had episode of fall without LOC and did not sustain any injury subsequently. He otherwise reports improvement of his mucositis, no CP, SOB, n/v, eye symptoms. Has been taking allopurinol 300mg daily as prescribed      Rheum ROS  As noted above      MEDICATIONS  (STANDING):  Biotene Dry Mouth Oral Rinse 15 milliLiter(s) Swish and Spit three times a day  sodium chloride 0.9%. 1000 milliLiter(s) (50 mL/Hr) IV Continuous <Continuous>  valACYclovir 500 milliGRAM(s) Oral two times a day    MEDICATIONS  (PRN):  acetaminophen     Tablet .. 650 milliGRAM(s) Oral every 6 hours PRN Temp greater or equal to 38C (100.4F), Mild Pain (1 - 3)  HYDROmorphone  Injectable 1 milliGRAM(s) IV Push every 4 hours PRN Severe Pain (7 - 10)      Allergies    No Known Allergies    Intolerances        PERTINENT MEDICATION HISTORY:    SOCIAL HISTORY:   OCCUPATION:  TRAVEL HISTORY:    FAMILY HISTORY:  Family history of prostate cancer    Family history of stroke    Family history of oral cancer (Sibling)  tongue ca    Family history of breast cancer (Sibling)    Family history of stroke (Father)        Vital Signs Last 24 Hrs  T(C): 37.7 (12 Jun 2025 10:22), Max: 37.7 (12 Jun 2025 10:22)  T(F): 99.9 (12 Jun 2025 10:22), Max: 99.9 (12 Jun 2025 10:22)  HR: 112 (12 Jun 2025 09:32) (112 - 112)  BP: 114/77 (12 Jun 2025 09:32) (114/77 - 114/77)  BP(mean): --  RR: 18 (12 Jun 2025 09:32) (18 - 18)  SpO2: 98% (12 Jun 2025 09:32) (98% - 98%)    Parameters below as of 12 Jun 2025 09:32  Patient On (Oxygen Delivery Method): room air        Physical Exam:  General: No apparent distress  HEENT: EOMI, MMM  CVS: +S1/S2, RRR, no murmurs/rubs/gallops  Resp: CTA b/l. No crackles/wheezing  GI: Soft, NT/ND +BS  MSK: Synovitis of left ankle with lateral tenosynovitis, warmth, swelling. Also to left 1st MTP and R 1st MTP  Neuro: AAOx3  Skin: no visible rashes    LABS:                        10.2   20.18 )-----------( 227      ( 12 Jun 2025 10:30 )             31.3     06-12    138  |  101  |  22  ----------------------------<  129[H]  3.7   |  19[L]  |  1.38[H]    Ca    9.6      12 Jun 2025 10:30  Phos  3.4     06-12  Mg     1.8     06-12    TPro  6.9  /  Alb  3.7  /  TBili  1.1  /  DBili  x   /  AST  20  /  ALT  29  /  AlkPhos  111  06-12      Urinalysis Basic - ( 12 Jun 2025 10:30 )    Color: x / Appearance: x / SG: x / pH: x  Gluc: 129 mg/dL / Ketone: x  / Bili: x / Urobili: x   Blood: x / Protein: x / Nitrite: x   Leuk Esterase: x / RBC: x / WBC x   Sq Epi: x / Non Sq Epi: x / Bacteria: x            Rheumatology Work Up    Creatine Kinase, Serum: 254 U/L *H* [30 - 200] (06-04-21 @ 01:50)  Creatine Kinase, Serum: 260 U/L *H* [30 - 200] (06-03-21 @ 19:31)  C-Reactive Protein, Serum: 48.7 mg/L *H* [0.0 - 4.0] (06-03-21 @ 16:12)  Creatine Kinase, Serum: 356 U/L *H* [30 - 200] (06-03-21 @ 16:12)            RADIOLOGY & ADDITIONAL STUDIES:

## 2025-06-12 NOTE — H&P ADULT - NEGATIVE MUSCULOSKELETAL SYMPTOMS
+b/l ankle swelling and redness. +chronic back pain/no stiffness/no neck pain/no arm pain L/no arm pain R

## 2025-06-12 NOTE — SBIRT NOTE ADULT - NSSBIRTSCREENAVAIL_GEN_A_CORE
OUTPATIENT CATHETERIZATION INSTRUCTIONS    You have been scheduled for a procedure in the catheterization lab on Monday, February 7, 2022.     Please report to the Cardiology Waiting Area on the Third floor of the hospital and check in at 11:00 AM.    You will then be taken to the SSCU (Short Stay Cardiac Unit) and prepared for your procedure. Please be aware that this is not the time of your procedure but the time you are to arrive. The procedures are scheduled on an hourly basis; however, emergency cases take precedence over all other cases.       You may not have anything to eat or drink after midnight the night before your test. You may take your regular morning medications with water. If there are any medications that you should not take you will be instructed to hold them that morning. If you are diabetic and on Metformin (Glucophage) do not take it the day before, the day of, and for 2 days after your procedure.      The procedure will take 1-2 hours to perform. After the procedure, you will return to SSCU on the third floor of the hospital. You will need to lie still (or keep your arm still) for the next 4 to 6 hours to minimize bleeding from the puncture site. Your family may remain in the room with you during this time.       You may be able to be discharged home that same afternoon if there is someone to drive you home and there were no complications. If you have one of the balloon, stent, or device procedures you may spend the night in the hospital. Your doctor will determine, based on your progress, the date and time of your discharge. The results of your procedure will be discussed with you before you are discharged. Any further testing or procedures will be scheduled for you either before you leave or you will be called with these appointments.       If you should have any questions, concerns, or need to change the date of your procedure, please call our office at [phone].    Special  Instructions:          THE ABOVE INSTRUCTIONS WERE GIVEN TO THE PATIENT VERBALLY AND THEY VERBALIZED UNDERSTANDING.  THEY DO NOT REQUIRE ANY SPECIAL NEEDS AND DO NOT HAVE ANY LEARNING BARRIERS.          Directions for Reporting to Cardiology Waiting Area in the Hospital  If you park in the Parking Garage:  Take elevators to the1st floor of the parking garage.  Continue past the gift shop, coffee shop, and piano.  Take a right and go to the gold elevators. (Elevator B)  Take the elevator to the 3rd floor.  Follow the arrow on the sign on the wall that says Cath Lab Registration/EP Lab Registration.  Follow the long hallway all the way around until you come to a big open area.  This is the registration area.  Check in at Reception Desk.    OR    If family is dropping you off:  Have them drop you off at the front of the Hospital under the green overhang.  Enter through the doors and take a right.  Take the E elevators to the 3rd floor Cardiology Waiting Area.  Check in at the Reception Desk in the waiting room.             Yes

## 2025-06-12 NOTE — H&P ADULT - ASSESSMENT
68 yo male with MHx significant for BRCA2 carrier (Q0137l 9610C>T), HTN, HLD, Gout, GERD, BPH, spinal stenosis (s/p multiple procedures 2018, 2020 laminectomies / fusions), hip surgeries (Oct 2024, Jan 2025), residual hand weakness related to surgeries, umbilical hernia repair (2014/15) and ASXL1-mutated and GDG85-zbnaqmc AML. 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 Patient is s/p induction chemotherapy with daunorubicin/cytarabine with remission marrow on 5/29/25 showing Cellular bone marrow (50-60% cellularity ) with myeloid predominant trilineage hematopoiesis. Initially scheduled to begin cycle 1 HiDAC however presents with 3 days of fevers and acute on chronic gout attack causing debility.

## 2025-06-12 NOTE — DISCHARGE NOTE PROVIDER - NSDCCPCAREPLAN_GEN_ALL_CORE_FT
PRINCIPAL DISCHARGE DIAGNOSIS  Diagnosis: AML (acute myeloid leukemia)  Assessment and Plan of Treatment: Notify provider or go to ER for fever greater than or equal to 100.4F, persistent nausea, vomiting, diarrhea, or bleeding      SECONDARY DISCHARGE DIAGNOSES  Diagnosis: New medication added  Assessment and Plan of Treatment: Please be sure to  your LEVAQUIN at [facility] located in the hospital lobby     PRINCIPAL DISCHARGE DIAGNOSIS  Diagnosis: AML (acute myeloid leukemia)  Assessment and Plan of Treatment: Notify provider or go to ER for fever greater than or equal to 100.4F, persistent nausea, vomiting, diarrhea, or bleeding      SECONDARY DISCHARGE DIAGNOSES  Diagnosis: New medication added  Assessment and Plan of Treatment: Please be sure to  at [facility] located in the hospital lobby you:  -METHYLPREDNISOLONE  -DILAUDID

## 2025-06-12 NOTE — DISCHARGE NOTE PROVIDER - NSDCFUSCHEDAPPT_GEN_ALL_CORE_FT
[facility]  Sebastien Serrano  Scheduled Appointment: 06/17/2025

## 2025-06-13 DIAGNOSIS — R74.01 ELEVATION OF LEVELS OF LIVER TRANSAMINASE LEVELS: ICD-10-CM

## 2025-06-13 DIAGNOSIS — Z51.5 ENCOUNTER FOR PALLIATIVE CARE: ICD-10-CM

## 2025-06-13 DIAGNOSIS — Z71.89 OTHER SPECIFIED COUNSELING: ICD-10-CM

## 2025-06-13 LAB
ALBUMIN SERPL ELPH-MCNC: 3.2 G/DL — LOW (ref 3.3–5)
ALP SERPL-CCNC: 160 U/L — HIGH (ref 40–120)
ALT FLD-CCNC: 57 U/L — HIGH (ref 10–45)
ANION GAP SERPL CALC-SCNC: 13 MMOL/L — SIGNIFICANT CHANGE UP (ref 5–17)
AST SERPL-CCNC: 63 U/L — HIGH (ref 10–40)
BASOPHILS # BLD AUTO: 0 K/UL — SIGNIFICANT CHANGE UP (ref 0–0.2)
BASOPHILS NFR BLD AUTO: 0 % — SIGNIFICANT CHANGE UP (ref 0–2)
BILIRUB SERPL-MCNC: 0.7 MG/DL — SIGNIFICANT CHANGE UP (ref 0.2–1.2)
BLD GP AB SCN SERPL QL: NEGATIVE — SIGNIFICANT CHANGE UP
BUN SERPL-MCNC: 15 MG/DL — SIGNIFICANT CHANGE UP (ref 7–23)
CALCIUM SERPL-MCNC: 9 MG/DL — SIGNIFICANT CHANGE UP (ref 8.4–10.5)
CHLORIDE SERPL-SCNC: 104 MMOL/L — SIGNIFICANT CHANGE UP (ref 96–108)
CO2 SERPL-SCNC: 22 MMOL/L — SIGNIFICANT CHANGE UP (ref 22–31)
CREAT SERPL-MCNC: 1.16 MG/DL — SIGNIFICANT CHANGE UP (ref 0.5–1.3)
CULTURE RESULTS: SIGNIFICANT CHANGE UP
EGFR: 69 ML/MIN/1.73M2 — SIGNIFICANT CHANGE UP
EGFR: 69 ML/MIN/1.73M2 — SIGNIFICANT CHANGE UP
EOSINOPHIL # BLD AUTO: 1.62 K/UL — HIGH (ref 0–0.5)
EOSINOPHIL NFR BLD AUTO: 10.3 % — HIGH (ref 0–6)
GLUCOSE SERPL-MCNC: 127 MG/DL — HIGH (ref 70–99)
GRAM STN FLD: SIGNIFICANT CHANGE UP
HCT VFR BLD CALC: 25.7 % — LOW (ref 39–50)
HGB BLD-MCNC: 8.3 G/DL — LOW (ref 13–17)
LDH SERPL L TO P-CCNC: 235 U/L — SIGNIFICANT CHANGE UP (ref 50–242)
LYMPHOCYTES # BLD AUTO: 0.55 K/UL — LOW (ref 1–3.3)
LYMPHOCYTES # BLD AUTO: 3.5 % — LOW (ref 13–44)
MAGNESIUM SERPL-MCNC: 1.9 MG/DL — SIGNIFICANT CHANGE UP (ref 1.6–2.6)
MANUAL SMEAR VERIFICATION: SIGNIFICANT CHANGE UP
MCHC RBC-ENTMCNC: 32.3 G/DL — SIGNIFICANT CHANGE UP (ref 32–36)
MCHC RBC-ENTMCNC: 32.3 PG — SIGNIFICANT CHANGE UP (ref 27–34)
MCV RBC AUTO: 100 FL — SIGNIFICANT CHANGE UP (ref 80–100)
MONOCYTES # BLD AUTO: 3.26 K/UL — HIGH (ref 0–0.9)
MONOCYTES NFR BLD AUTO: 20.7 % — HIGH (ref 2–14)
NEUTROPHILS # BLD AUTO: 10.32 K/UL — HIGH (ref 1.8–7.4)
NEUTROPHILS NFR BLD AUTO: 65.5 % — SIGNIFICANT CHANGE UP (ref 43–77)
PHOSPHATE SERPL-MCNC: 4.1 MG/DL — SIGNIFICANT CHANGE UP (ref 2.5–4.5)
PLAT MORPH BLD: NORMAL — SIGNIFICANT CHANGE UP
PLATELET # BLD AUTO: 196 K/UL — SIGNIFICANT CHANGE UP (ref 150–400)
POTASSIUM SERPL-MCNC: 3.8 MMOL/L — SIGNIFICANT CHANGE UP (ref 3.5–5.3)
POTASSIUM SERPL-SCNC: 3.8 MMOL/L — SIGNIFICANT CHANGE UP (ref 3.5–5.3)
PROT SERPL-MCNC: 5.8 G/DL — LOW (ref 6–8.3)
RBC # BLD: 2.57 M/UL — LOW (ref 4.2–5.8)
RBC # FLD: 21.2 % — HIGH (ref 10.3–14.5)
RBC BLD AUTO: SIGNIFICANT CHANGE UP
RH IG SCN BLD-IMP: POSITIVE — SIGNIFICANT CHANGE UP
SODIUM SERPL-SCNC: 139 MMOL/L — SIGNIFICANT CHANGE UP (ref 135–145)
SPECIMEN SOURCE: SIGNIFICANT CHANGE UP
SPECIMEN SOURCE: SIGNIFICANT CHANGE UP
SYNOVIAL CRYSTALS CLARITY: ABNORMAL
SYNOVIAL CRYSTALS COLOR: YELLOW
SYNOVIAL CRYSTALS ID: ABNORMAL
SYNOVIAL CRYSTALS TUBE: SIGNIFICANT CHANGE UP
WBC # BLD: 15.76 K/UL — HIGH (ref 3.8–10.5)
WBC # FLD AUTO: 15.76 K/UL — HIGH (ref 3.8–10.5)

## 2025-06-13 PROCEDURE — 99223 1ST HOSP IP/OBS HIGH 75: CPT

## 2025-06-13 PROCEDURE — 99233 SBSQ HOSP IP/OBS HIGH 50: CPT | Mod: GC

## 2025-06-13 PROCEDURE — 99233 SBSQ HOSP IP/OBS HIGH 50: CPT

## 2025-06-13 RX ORDER — ACETAMINOPHEN 500 MG/5ML
1000 LIQUID (ML) ORAL ONCE
Refills: 0 | Status: COMPLETED | OUTPATIENT
Start: 2025-06-13 | End: 2025-06-13

## 2025-06-13 RX ORDER — METHYLPREDNISOLONE ACETATE 80 MG/ML
40 INJECTION, SUSPENSION INTRA-ARTICULAR; INTRALESIONAL; INTRAMUSCULAR; SOFT TISSUE ONCE
Refills: 0 | Status: COMPLETED | OUTPATIENT
Start: 2025-06-13 | End: 2025-06-13

## 2025-06-13 RX ORDER — ACETAMINOPHEN 500 MG/5ML
650 LIQUID (ML) ORAL ONCE
Refills: 0 | Status: COMPLETED | OUTPATIENT
Start: 2025-06-13 | End: 2025-06-13

## 2025-06-13 RX ORDER — CEFEPIME 2 G/20ML
2000 INJECTION, POWDER, FOR SOLUTION INTRAVENOUS ONCE
Refills: 0 | Status: COMPLETED | OUTPATIENT
Start: 2025-06-13 | End: 2025-06-13

## 2025-06-13 RX ORDER — CEFEPIME 2 G/20ML
2000 INJECTION, POWDER, FOR SOLUTION INTRAVENOUS EVERY 8 HOURS
Refills: 0 | Status: DISCONTINUED | OUTPATIENT
Start: 2025-06-13 | End: 2025-06-16

## 2025-06-13 RX ORDER — CEFEPIME 2 G/20ML
INJECTION, POWDER, FOR SOLUTION INTRAVENOUS
Refills: 0 | Status: DISCONTINUED | OUTPATIENT
Start: 2025-06-13 | End: 2025-06-16

## 2025-06-13 RX ORDER — METHYLPREDNISOLONE ACETATE 80 MG/ML
40 INJECTION, SUSPENSION INTRA-ARTICULAR; INTRALESIONAL; INTRAMUSCULAR; SOFT TISSUE DAILY
Refills: 0 | Status: DISCONTINUED | OUTPATIENT
Start: 2025-06-14 | End: 2025-06-16

## 2025-06-13 RX ADMIN — Medication 15 MILLILITER(S): at 06:19

## 2025-06-13 RX ADMIN — Medication 1 MILLIGRAM(S): at 19:30

## 2025-06-13 RX ADMIN — Medication 0.7 MILLIGRAM(S): at 03:12

## 2025-06-13 RX ADMIN — Medication 1 MILLIGRAM(S): at 14:05

## 2025-06-13 RX ADMIN — Medication 0.7 MILLIGRAM(S): at 16:17

## 2025-06-13 RX ADMIN — Medication 500 MILLIGRAM(S): at 06:19

## 2025-06-13 RX ADMIN — Medication 650 MILLIGRAM(S): at 20:40

## 2025-06-13 RX ADMIN — CEFEPIME 100 MILLIGRAM(S): 2 INJECTION, POWDER, FOR SOLUTION INTRAVENOUS at 21:51

## 2025-06-13 RX ADMIN — Medication 1 MILLIGRAM(S): at 13:05

## 2025-06-13 RX ADMIN — Medication 1000 MILLIGRAM(S): at 15:20

## 2025-06-13 RX ADMIN — Medication 500 MILLIGRAM(S): at 17:25

## 2025-06-13 RX ADMIN — Medication 0.7 MILLIGRAM(S): at 10:55

## 2025-06-13 RX ADMIN — Medication 300 MILLIGRAM(S): at 06:20

## 2025-06-13 RX ADMIN — Medication 0.7 MILLIGRAM(S): at 11:55

## 2025-06-13 RX ADMIN — Medication 400 MILLIGRAM(S): at 13:40

## 2025-06-13 RX ADMIN — CEFEPIME 100 MILLIGRAM(S): 2 INJECTION, POWDER, FOR SOLUTION INTRAVENOUS at 10:08

## 2025-06-13 RX ADMIN — Medication 650 MILLIGRAM(S): at 09:23

## 2025-06-13 RX ADMIN — Medication 1 MILLIGRAM(S): at 23:17

## 2025-06-13 RX ADMIN — Medication 1 MILLIGRAM(S): at 07:46

## 2025-06-13 RX ADMIN — DILTIAZEM HYDROCHLORIDE 120 MILLIGRAM(S): 240 TABLET, EXTENDED RELEASE ORAL at 06:20

## 2025-06-13 RX ADMIN — ATORVASTATIN CALCIUM 80 MILLIGRAM(S): 80 TABLET, FILM COATED ORAL at 21:48

## 2025-06-13 RX ADMIN — Medication 0.7 MILLIGRAM(S): at 21:30

## 2025-06-13 RX ADMIN — Medication 15 MILLILITER(S): at 13:06

## 2025-06-13 RX ADMIN — Medication 650 MILLIGRAM(S): at 19:08

## 2025-06-13 RX ADMIN — Medication 0.7 MILLIGRAM(S): at 04:10

## 2025-06-13 RX ADMIN — BISMUTH SUBSALICYLATE 15 MILLILITER(S): 262 TABLET, CHEWABLE ORAL at 17:25

## 2025-06-13 RX ADMIN — Medication 1 MILLIGRAM(S): at 08:45

## 2025-06-13 RX ADMIN — Medication 1 MILLIGRAM(S): at 18:32

## 2025-06-13 RX ADMIN — Medication 40 MILLIGRAM(S): at 06:20

## 2025-06-13 RX ADMIN — Medication 1 MILLIGRAM(S): at 00:43

## 2025-06-13 RX ADMIN — Medication 15 MILLILITER(S): at 21:48

## 2025-06-13 RX ADMIN — BISMUTH SUBSALICYLATE 15 MILLILITER(S): 262 TABLET, CHEWABLE ORAL at 06:19

## 2025-06-13 RX ADMIN — Medication 650 MILLIGRAM(S): at 10:30

## 2025-06-13 RX ADMIN — Medication 0.7 MILLIGRAM(S): at 17:15

## 2025-06-13 RX ADMIN — TAMSULOSIN HYDROCHLORIDE 0.4 MILLIGRAM(S): 0.4 CAPSULE ORAL at 21:49

## 2025-06-13 RX ADMIN — Medication 75 MILLILITER(S): at 07:41

## 2025-06-13 RX ADMIN — METHYLPREDNISOLONE ACETATE 40 MILLIGRAM(S): 80 INJECTION, SUSPENSION INTRA-ARTICULAR; INTRALESIONAL; INTRAMUSCULAR; SOFT TISSUE at 15:34

## 2025-06-13 RX ADMIN — CEFEPIME 100 MILLIGRAM(S): 2 INJECTION, POWDER, FOR SOLUTION INTRAVENOUS at 14:14

## 2025-06-13 RX ADMIN — Medication 0.7 MILLIGRAM(S): at 20:35

## 2025-06-13 NOTE — PROGRESS NOTE ADULT - PROBLEM SELECTOR PLAN 3
Resolving Palliative care consult for pain control, IV Dilaudid PRN   Bowel regimen   Allopurinol QD  6/12 rheumatology tapped L-ankle joint: synovial fluid cultures and testing sent f/u.  6/13: Rheum f/u appreciated. Start 40mg IV solumedrol daily for now. Rheum will reassess for dose adjustment on Monday as needed.

## 2025-06-13 NOTE — CONSULT NOTE ADULT - SUBJECTIVE AND OBJECTIVE BOX
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 Patient is s/p induction chemotherapy with daunorubicin/cytarabine with remission marrow on 5/29/25 showing Cellular bone marrow (50-60% cellularity ) with myeloid predominant trilineage hematopoiesis. Patient was admitted on 5/2/25 for Induction with Daunorubicin 60 mg/m2 days 1-3 and cytarabine 100 mg/m2 days 1-7. His day 15 BM bx and aspirate performed on 5/16/25 showed no evidence of persistent leukemia with low 0.34% myeloblasts on flow, however specimen was subcortical, suboptimal. Patient initially due for cycle 1 hidac consolidation however presenting with fevers x3 days, and acute on chronic gout attack causing inability to walk secondary to pain. Pt reports 2 days ago he fell d/t pain when trying to ambulate to the bathroom. Geriatrics and Palliative Medicine Team is consulted to assist with pain control.    Patient is known to me from recent admission where we helped with mucositis pain. He is seen this morning, awake and alert, lying in bed. Reports ongoing ankle/foot pain at home limiting his ability to fully ambulate at home. He reports onset of pain in his R ankle this morning as well. He is aware that his synovial fluid analysis is showing crystals, likely gout attack. Rheumatology is consulted to give input on treatment. He is using IV dilaudid 0.7-1 mg prn frequently with pain relief. Advised that IV dilaudid prn will continue and hopefully pain will improve as gout is being treated. Pt in agreement and welcomes our continued following for support.     PERTINENT PM/SXH:   HTN (hypertension)    High cholesterol    HTN (hypertension)    Hyperlipidemia    GERD (gastroesophageal reflux disease)    Spinal stenosis    BPH (benign prostatic hypertrophy)    Cluster headache    HTN (hypertension)    HLD (hyperlipidemia)    COVID-19      H/O laminectomy    Fusion of spine      FAMILY HISTORY:  Family history of prostate cancer    Family history of stroke    Family history of oral cancer (Sibling)  tongue ca    Family history of breast cancer (Sibling)    Family history of stroke (Father)      Family Hx substance abuse [ ]yes [ ]no  ITEMS NOT CHECKED ARE NOT PRESENT    SOCIAL HISTORY:   Significant other/partner[ ]  Children[x ]  Yazidi/Spirituality:  Substance hx:  [ ]   Tobacco hx:  [ ]   Alcohol hx: [ ]   Home Opioid hx:  [ ] I-Stop Reference No:  Living Situation: [x ]Home  [ ]Long term care  [ ]Rehab [ ]Other    ADVANCE DIRECTIVES:    DNR/MOLST  [ ]  Living Will  [ ]   DECISION MAKER(s):  [ ] Health Care Proxy(s)  [x ] Surrogate(s)  [ ] Guardian           Name(s): Phone Number(s): adult child and ex wife    BASELINE (I)ADL(s) (prior to admission):  [place]: [ ]Total  [x ] Moderate [ ]Dependent    Allergies    No Known Allergies    Intolerances    MEDICATIONS  (STANDING):  allopurinol 300 milliGRAM(s) Oral daily  atorvastatin 80 milliGRAM(s) Oral at bedtime  Biotene Dry Mouth Oral Rinse 15 milliLiter(s) Swish and Spit three times a day  bismuth subsalicylate Liquid 15 milliLiter(s) Oral two times a day  cefepime   IVPB      cefepime   IVPB 2000 milliGRAM(s) IV Intermittent every 8 hours  diltiazem    milliGRAM(s) Oral daily  methylPREDNISolone sodium succinate Injectable 40 milliGRAM(s) IV Push once  pantoprazole    Tablet 40 milliGRAM(s) Oral before breakfast  polyethylene glycol 3350 17 Gram(s) Oral two times a day  sodium chloride 0.9%. 1000 milliLiter(s) (75 mL/Hr) IV Continuous <Continuous>  tamsulosin 0.4 milliGRAM(s) Oral at bedtime  valACYclovir 500 milliGRAM(s) Oral two times a day    MEDICATIONS  (PRN):  acetaminophen     Tablet .. 650 milliGRAM(s) Oral every 6 hours PRN Temp greater or equal to 38C (100.4F), Mild Pain (1 - 3)  HYDROmorphone  Injectable 1 milliGRAM(s) IV Push every 4 hours PRN Severe Pain (7 - 10)  HYDROmorphone  Injectable 0.7 milliGRAM(s) IV Push every 4 hours PRN Moderate Pain (4 - 6)  ondansetron Injectable 8 milliGRAM(s) IV Push every 8 hours PRN Nausea and/or Vomiting  senna 2 Tablet(s) Oral at bedtime PRN Constipation    PRESENT SYMPTOMS: [ ]Unable to self-report  [ ] CPOT [ ] PAINADs [ ] RDOS  Source if other than patient:  [ ]Family   [ ]Team     Pain: [x ]yes [ ]no  QOL impact - unable to ambulate  Location - bilat ankles                 Aggravating factors - movement  Quality - sharp  Radiation - none  Timing- constant  Severity (0-10 scale): 10  Minimal acceptable level (0-10 scale):     Dyspnea:                           [ ]Mild [ ]Moderate [ ]Severe  Anxiety:                             [ ]Mild [ ]Moderate [ ]Severe  Fatigue:                             [ ]Mild [ ]Moderate [ ]Severe  Nausea:                             [ ]Mild [ ]Moderate [ ]Severe  Loss of appetite:              [ ]Mild [ ]Moderate [ ]Severe  Constipation:                    [ ]Mild [ ]Moderate [ ]Severe    PCSSQ[Palliative Care Spiritual Screening Question]   Severity (0-10):  Score of 4 or > indicate consideration of Chaplaincy referral.  Chaplaincy Referral: [ ] yes [ ] refused [ ] following [x ] Deferred     Caregiver [place]? : [ ] yes [x ] no [ ] Deferred [ ] Declined             Social work referral [ ] Patient & Family Centered Care Referral [ ]     Anticipatory Grief present?:  [ ] yes [x ] no  [ ] Deferred                  Social work referral [ ] Chaplaincy Referral[ ]      Other Symptoms:  [x ]All other review of systems negative     Palliative Performance Status Version 2:     60    %    [email or web address]    PHYSICAL EXAM:  Vital Signs Last 24 Hrs  T(C): 39 (13 Jun 2025 13:04), Max: 39 (13 Jun 2025 13:04)  T(F): 102.2 (13 Jun 2025 13:04), Max: 102.2 (13 Jun 2025 13:04)  HR: 111 (13 Jun 2025 11:57) (102 - 116)  BP: 104/65 (13 Jun 2025 11:57) (102/66 - 133/75)  BP(mean): --  RR: 18 (13 Jun 2025 11:57) (16 - 18)  SpO2: 96% (13 Jun 2025 11:57) (94% - 97%)    Parameters below as of 13 Jun 2025 11:57  Patient On (Oxygen Delivery Method): room air     I&O's Summary    12 Jun 2025 07:01  -  13 Jun 2025 07:00  --------------------------------------------------------  IN: 2317 mL / OUT: 710 mL / NET: 1607 mL      GENERAL: [ ]Cachexia    [ x]Alert  [x ]Oriented x 3  [ ]Lethargic  [ ]Unarousable  [x ]Verbal  [ ]Non-Verbal  Behavioral:   [ ] Anxiety  [ ] Delirium [ ] Agitation [ ] Other  HEENT:  [ x]Normal   [ ]Dry mouth   [ ]ET Tube/Trach  [ ]Oral lesions  PULMONARY:   [x ]Clear [ ]Tachypnea  [ ]Audible excessive secretions   [ ]Rhonchi        [ ]Right [ ]Left [ ]Bilateral  [ ]Crackles        [ ]Right [ ]Left [ ]Bilateral  [ ]Wheezing     [ ]Right [ ]Left [ ]Bilateral  [ ]Diminished breath sounds [ ]right [ ]left [ ]bilateral  CARDIOVASCULAR:    [x ]Regular [ ]Irregular [ ]Tachy  [ ]Lui [ ]Murmur [ ]Other  GASTROINTESTINAL:  [ x]Soft  [ ]Distended   [ ]+BS  [ ]Non tender [ ]Tender  [ ]Other [ ]PEG [ ]OGT/ NGT  Last BM:  GENITOURINARY:  [ x]Normal [ ] Incontinent   [ ]Oliguria/Anuria   [ ][place]  MUSCULOSKELETAL:   [ ]Normal   [ ]Weakness  [ ]Bed/Wheelchair bound [ ]Edema [x] L ankle with moderate swelling, redness and warmth, R ankle with small area of erythema noted on medial mallelous  NEUROLOGIC:   [ x]No focal deficits  [ ]Cognitive impairment  [ ]Dysphagia [ ]Dysarthria [ ]Paresis [ ]Other   SKIN:   [ ]Normal  [ ]Rash  [ ]Other  [ ]Pressure ulcer(s)       Present on admission [ ]y [ ]n    CRITICAL CARE:  [ ] Shock Present  [ ]Septic [ ]Cardiogenic [ ]Neurologic [ ]Hypovolemic  [ ]  Vasopressors [ ]  Inotropes   [ ]Respiratory failure present [ ]Mechanical ventilation [ ]Non-invasive ventilatory support [ ]High flow    [ ]Acute  [ ]Chronic [ ]Hypoxic  [ ]Hypercarbic [ ]Other  [ ]Other organ failure     LABS:                        8.3    15.76 )-----------( 196      ( 13 Jun 2025 06:46 )             25.7   06-13    139  |  104  |  15  ----------------------------<  127[H]  3.8   |  22  |  1.16    Ca    9.0      13 Jun 2025 06:46  Phos  4.1     06-13  Mg     1.9     06-13    TPro  5.8[L]  /  Alb  3.2[L]  /  TBili  0.7  /  DBili  x   /  AST  63[H]  /  ALT  57[H]  /  AlkPhos  160[H]  06-13      Urinalysis Basic - ( 13 Jun 2025 06:46 )    Color: x / Appearance: x / SG: x / pH: x  Gluc: 127 mg/dL / Ketone: x  / Bili: x / Urobili: x   Blood: x / Protein: x / Nitrite: x   Leuk Esterase: x / RBC: x / WBC x   Sq Epi: x / Non Sq Epi: x / Bacteria: x      RADIOLOGY & ADDITIONAL STUDIES:    < from: Xray Foot AP + Lateral, Left (06.12.25 @ 22:30) >  IMPRESSION:  No fractures or dislocations.    Tarsometatarsal alignment maintained without evidence for a Lisfranc   injury.    Mild midfoot arthritic changes with dorsal margin spurring. Preserved   remaining visualized joint spaces and no joint margin erosions.    Plantar calcaneal enthesophyte. Unremarkable distal Achilles tendon   shadow.    Generalized osteopenia otherwise no discrete suspicious lytic or blastic   lesions.    --- End of Report ---    < end of copied text >  Crystals, Synovial Fluid (06.12.25 @ 18:00)   Synovial Crystals Clarity: Cloudy  Synovial Crystals Tube: Red Top Tube  Synovial Crystals Color: Yellow  Synovial Crystals Id: Crystals Present Intracellular MSU (monosodium urate) present.    PROTEIN CALORIE MALNUTRITION PRESENT: [ ]mild [ ]moderate [ ]severe [ ]underweight [ ]morbid obesity  https://www.andeal.org/vault/2440/web/files/ONC/Table_Clinical%20Characteristics%20to%20Document%20Malnutrition-White%20JV%20et%20al%202012.pdf    Height (cm): 180 (06-12-25 @ 09:32), 181 (05-02-25 @ 08:51), 180.5 (04-28-25 @ 15:00)  Weight (kg): 95.8 (06-12-25 @ 09:32), 100.5 (06-03-25 @ 16:00), 107.3 (05-02-25 @ 11:55)  BMI (kg/m2): 29.6 (06-12-25 @ 09:32), 30.7 (06-03-25 @ 16:00), 32.8 (05-02-25 @ 11:55)    [ ]PPSV2 < or = to 30% [ ]significant weight loss  [ ]poor nutritional intake  [ ]anasarca[ ]Artificial Nutrition      Other REFERRALS:  [ ]Hospice  [ ]Child Life  [ ]Social Work  [ ]Case management [ ]Holistic Therapy

## 2025-06-13 NOTE — PROGRESS NOTE ADULT - PROBLEM SELECTOR PLAN 2
Not neutropenic, afebrile   continue posa, valtrex, flagyl (dc'd 5/19), cefepime (5/11-5/19 ), cont to be febrile Ucx from 5/17 (+) gram + cocci in pairs and chains, inc to meropenem (5/19-   5/2 CXR Patchy opacities along the left lung base, possibly atelectasis, however superimposed infection cannot be entirely excluded.  5/13- FLU/COVID/RSV (-)  5/13- Added Flagyl 500 mg IV Q 8 hrs due to GI discomfort/pain  5/13- CT C/A/P -negative for infxn  5/20  blood cx 5/19 showed 1/4 MRSE  urine cx 5/17 VRE but 5/18 negative and pt asymptomatic  - Levaquin/Amox- 5/2- 5/11  - Cefepime- 5/11- 5/19  Flagyl (abd discomfort)- 5/13- 5/19  - Meropenem- 5/19  - Valtrex-Posaconazole  5/2 5/20 O2 sat 88% with fever, FU closely, O2 PRN  5/25 Discontinue vancomycin  5/26- D/C Meropenem, Posaconazole Not neutropenic, febrile  6/12 BCX/UCX/CXR, full RVP ordered f/u Not neutropenic, febrile  Valtrex 500mg BID.   6/12 BCX/UCX/CXR, full RVP ordered f/u.  6/13 Febrile 102F, Started Cefepime 2gq8. CXR neg. pending infectious workup results.

## 2025-06-13 NOTE — PROGRESS NOTE ADULT - SUBJECTIVE AND OBJECTIVE BOX
JANELLE DEL CID  [id number]    Interval history:  Patient now with fevers/chills  He reports pain is persistent to left ankle, now with worsening of right ankle pain  No other acute concerns  Fluid analysis showed elevated WBCs ~80,000; joint PCR and gram stain is negative      Rheum ROS  As noted above      MEDICATIONS  (STANDING):  allopurinol 300 milliGRAM(s) Oral daily  atorvastatin 80 milliGRAM(s) Oral at bedtime  Biotene Dry Mouth Oral Rinse 15 milliLiter(s) Swish and Spit three times a day  bismuth subsalicylate Liquid 15 milliLiter(s) Oral two times a day  cefepime   IVPB      cefepime   IVPB 2000 milliGRAM(s) IV Intermittent every 8 hours  diltiazem    milliGRAM(s) Oral daily  pantoprazole    Tablet 40 milliGRAM(s) Oral before breakfast  polyethylene glycol 3350 17 Gram(s) Oral two times a day  sodium chloride 0.9%. 1000 milliLiter(s) (75 mL/Hr) IV Continuous <Continuous>  tamsulosin 0.4 milliGRAM(s) Oral at bedtime  valACYclovir 500 milliGRAM(s) Oral two times a day    MEDICATIONS  (PRN):  acetaminophen     Tablet .. 650 milliGRAM(s) Oral every 6 hours PRN Temp greater or equal to 38C (100.4F), Mild Pain (1 - 3)  HYDROmorphone  Injectable 1 milliGRAM(s) IV Push every 4 hours PRN Severe Pain (7 - 10)  HYDROmorphone  Injectable 0.7 milliGRAM(s) IV Push every 4 hours PRN Moderate Pain (4 - 6)  ondansetron Injectable 8 milliGRAM(s) IV Push every 8 hours PRN Nausea and/or Vomiting  senna 2 Tablet(s) Oral at bedtime PRN Constipation      Objective:  Vital Signs Last 24 Hrs  T(C): 37.7 (12 Jun 2025 10:22), Max: 37.7 (12 Jun 2025 10:22)  T(F): 99.9 (12 Jun 2025 10:22), Max: 99.9 (12 Jun 2025 10:22)  HR: 112 (12 Jun 2025 09:32) (112 - 112)  BP: 114/77 (12 Jun 2025 09:32) (114/77 - 114/77)  BP(mean): --  RR: 18 (12 Jun 2025 09:32) (18 - 18)  SpO2: 98% (12 Jun 2025 09:32) (98% - 98%)    Parameters below as of 12 Jun 2025 09:32  Patient On (Oxygen Delivery Method): room air      Physical Exam:  General: Mildly uncomfortable appearing  HEENT: EOMI, MMM  Resp: No respiratory distress  MSK: Synovitis of left ankle with lateral tenosynovitis, warmth, swelling. ankle effusion now to left ankle Also to left 1st MTP and R 1st MTP,   Neuro: AAOx3  Skin: no visible rashes    LABS:                        10.2   20.18 )-----------( 227      ( 12 Jun 2025 10:30 )             31.3     06-12    138  |  101  |  22  ----------------------------<  129[H]  3.7   |  19[L]  |  1.38[H]    Ca    9.6      12 Jun 2025 10:30  Phos  3.4     06-12  Mg     1.8     06-12    TPro  6.9  /  Alb  3.7  /  TBili  1.1  /  DBili  x   /  AST  20  /  ALT  29  /  AlkPhos  111  06-12      Urinalysis Basic - ( 12 Jun 2025 10:30 )    Color: x / Appearance: x / SG: x / pH: x  Gluc: 129 mg/dL / Ketone: x  / Bili: x / Urobili: x   Blood: x / Protein: x / Nitrite: x   Leuk Esterase: x / RBC: x / WBC x   Sq Epi: x / Non Sq Epi: x / Bacteria: x            Rheumatology Work Up    Creatine Kinase, Serum: 254 U/L *H* [30 - 200] (06-04-21 @ 01:50)  Creatine Kinase, Serum: 260 U/L *H* [30 - 200] (06-03-21 @ 19:31)  C-Reactive Protein, Serum: 48.7 mg/L *H* [0.0 - 4.0] (06-03-21 @ 16:12)  Creatine Kinase, Serum: 356 U/L *H* [30 - 200] (06-03-21 @ 16:12)            RADIOLOGY & ADDITIONAL STUDIES:    < from: Xray Foot AP + Lateral, Left (06.12.25 @ 22:30) >  EXAM:  Frontal and lateral left foot from 6/12/2025 at 2230. Compared prior   study from 7/22/2020.    IMPRESSION:  No fractures or dislocations.    Tarsometatarsal alignment maintained without evidence for a Lisfranc   injury.    Mild midfoot arthritic changes with dorsal margin spurring. Preserved   remaining visualized joint spaces and no joint margin erosions.    Plantar calcaneal enthesophyte. Unremarkable distal Achilles tendon   shadow.    Generalized osteopenia otherwise no discrete suspicious lytic or blastic   lesions.    < end of copied text >

## 2025-06-13 NOTE — PROGRESS NOTE ADULT - PROBLEM SELECTOR PLAN 1
new ASXL1-mutated and [id number]-[id number] AML.  admitted for induction chemo with dauno 60 mg/m2+ cytarabine. Chemo started on 5/2, Complicated by rigors, chills, fever, SOB  and tachycardia 2 hours into day 1 infusion - likely infusion. received pepcid/benadryl/solumedrol/tylenol iv. will make up D1 Cytarabine on day8    will proceed with day2 chemo with pre mdes Tylenol and Benadryl, hydrocortisone iv PRN reaction   Hepatitis screen/HIV(-) as outpatient  IVF, antiemetics, mouth care. monitor weight, I & O, diuresis PRN   Monitor CBC with diff QD, TLS labs BID, Coags & T & C TIW transfuse for hb<7, PLT <20  5/16 BMbx subcortical, suboptimal specimen, no evidence of persistent leukemia. Flow 0.34% myeloblasts.  5/23 PB flow sent, fu result  5/19: no longer having blood in stool for several days, check cbc daily, plt goal decreased to >20  5/24- WBC - 11.5, D/C Zarxio   s/p BM bx on 5/29, Next  chemo depends on these results. IV pain meds changed to PO Holding cycle 1 HiDAC  Monitor daily CBC w/ diff, CMP, replete prn. Strict I/Os. Daily weights. Antiemetics  Allopurinol (gout) Holding cycle 1 HiDAC  Monitor daily CBC w/ diff, CMP, replete prn. Strict I/Os. Daily weights. Antiemetics  6/13: Continue to hold HiDAC due to persistent fevers and gout flare.

## 2025-06-13 NOTE — CONSULT NOTE ADULT - PROBLEM SELECTOR RECOMMENDATION 9
Synovial fluid analysis showing crystals and many wbcs, suspect gout flare? pt on abx empirically in setting of fevers  - rheum following, appreciate input on management options  - Continue IV dilaudid 0.7-1 mg q4 hours prn mod-severe pain (d/w RN to avoid dosing within 1 hour of each other)  - bowel regimen: miralax BID and senna prn Synovial fluid analysis showing crystals and many wbcs, suspect gout flare. pt on abx empirically in setting of fevers  - rheum following, appreciate input on management options  - Continue IV dilaudid 0.7-1 mg q4 hours prn mod-severe pain (d/w RN to avoid dosing within 1 hour of each other)  - bowel regimen: miralax BID and senna prn

## 2025-06-13 NOTE — PROGRESS NOTE ADULT - SUBJECTIVE AND OBJECTIVE BOX
Diagnosis: ASXL1-mutated and [id number]-rbslftg AML    Protocol/Chemo Regimen: s/p 7+3:  Daunorubicin 60 mg/m2+ Cytarabine     Day: 30    Pt endorsed:  loose BM last night and this am; pain left tongue, neck and lower back    Review of Systems: Patient denied nausea, vomiting,  chest pain, cough, dyspnea, abdominal pain, constipation,  rectal pain,  rash, fatigue, headache, bleeding    Pain scale:  3/10 left tongue, 5/10 neck/low back pain    Diet: regular Ensure plus HP BID                      Allergies: No Known Allergies     Diagnosis: ASXL1-mutated and [id number]-qnciyuy AML    Protocol/Chemo Regimen: s/p 7+3:  Daunorubicin 60 mg/m2+ Cytarabine     Day: 30    Pt endorsed: + fevers, chills, Pain in the foot     Review of Systems: Patient denied nausea, vomiting,  chest pain, cough, dyspnea, abdominal pain, constipation,  rectal pain,  rash, fatigue, headache, bleeding    Pain scale:  3/10 left tongue, 5/10 neck/low back pain    Diet: regular Ensure plus HP BID                      Allergies: No Known Allergies     Diagnosis: ASXL1-mutated and JEF00-ickkukd AML    Protocol/Chemo Regimen: s/p 7+3:  Daunorubicin 60 mg/m2+ Cytarabine. Cycle 1 HiDAC TBD    Day: 30    Pt endorsed: + fevers, chills, Pain in the foot     Review of Systems: Patient denied nausea, vomiting,  chest pain, cough, dyspnea, abdominal pain, constipation,  rectal pain,  rash, fatigue, headache, bleeding    Pain scale:  8/10 pain     Diet: regular Ensure plus HP BID                      Allergies: No Known Allergies    ANTIMICROBIALS  cefepime   IVPB      cefepime   IVPB 2000 milliGRAM(s) IV Intermittent every 8 hours  valACYclovir 500 milliGRAM(s) Oral two times a day      STANDING MEDICATIONS  allopurinol 300 milliGRAM(s) Oral daily  atorvastatin 80 milliGRAM(s) Oral at bedtime  Biotene Dry Mouth Oral Rinse 15 milliLiter(s) Swish and Spit three times a day  bismuth subsalicylate Liquid 15 milliLiter(s) Oral two times a day  diltiazem    milliGRAM(s) Oral daily  pantoprazole    Tablet 40 milliGRAM(s) Oral before breakfast  polyethylene glycol 3350 17 Gram(s) Oral two times a day  sodium chloride 0.9%. 1000 milliLiter(s) IV Continuous <Continuous>  tamsulosin 0.4 milliGRAM(s) Oral at bedtime      PRN MEDICATIONS  acetaminophen     Tablet .. 650 milliGRAM(s) Oral every 6 hours PRN  HYDROmorphone  Injectable 1 milliGRAM(s) IV Push every 4 hours PRN  HYDROmorphone  Injectable 0.7 milliGRAM(s) IV Push every 4 hours PRN  ondansetron Injectable 8 milliGRAM(s) IV Push every 8 hours PRN  senna 2 Tablet(s) Oral at bedtime PRN    Vital Signs Last 24 Hrs  T(C): 38.2 (13 Jun 2025 16:20), Max: 39 (13 Jun 2025 13:04)  T(F): 100.7 (13 Jun 2025 16:20), Max: 102.2 (13 Jun 2025 13:04)  HR: 111 (13 Jun 2025 11:57) (102 - 114)  BP: 104/65 (13 Jun 2025 11:57) (104/65 - 133/75)  BP(mean): --  RR: 18 (13 Jun 2025 11:57) (16 - 18)  SpO2: 96% (13 Jun 2025 11:57) (94% - 97%)    Parameters below as of 13 Jun 2025 11:57  Patient On (Oxygen Delivery Method): room air    PHYSICAL EXAM  General: adult in NAD  HEENT: clear oropharynx, no erythema, no ulcers  CV: normal S1, S2, RRR  Lungs: clear to auscultation, no wheezes, no rales  Abdomen: +BS, soft, nontender, nondistended  Ext: decreased ROM due to pain; joint swelling; joint erythema; joint warmth; +b/l ankle join swelling and warmth. +unable to ambulate  Skin: +erythema around b/l ankles and toes, warm to touch  No lymphadedenopathy  Neuro: alert and oriented x 4, no focal deficit   Central line:     LABS:                          8.3    15.76 )-----------( 196      ( 13 Jun 2025 06:46 )             25.7         Mean Cell Volume : 100.0 fl  Mean Cell Hemoglobin : 32.3 pg  Mean Cell Hemoglobin Concentration : 32.3 g/dL  Auto Neutrophil # : x  Auto Lymphocyte # : x  Auto Monocyte # : x  Auto Eosinophil # : x  Auto Basophil # : x  Auto Neutrophil % : x  Auto Lymphocyte % : x  Auto Monocyte % : x  Auto Eosinophil % : x  Auto Basophil % : x      06-13    139  |  104  |  15  ----------------------------<  127[H]  3.8   |  22  |  1.16    Ca    9.0      13 Jun 2025 06:46  Phos  4.1     06-13  Mg     1.9     06-13    TPro  5.8[L]  /  Alb  3.2[L]  /  TBili  0.7  /  DBili  x   /  AST  63[H]  /  ALT  57[H]  /  AlkPhos  160[H]  06-13      Mg 1.9  Phos 4.1    Uric Acid --

## 2025-06-13 NOTE — PROGRESS NOTE ADULT - PROBLEM SELECTOR PLAN 4
on home  Diltiazem  mg qd---> decreased to 120 mg po qd at discharge   home HCTZ 50 mg QD on hold d/t dizziness and mild orthostatic VS continue home cardizem Unknown Etiology  Avoid hepatoxic medications  Continue to trend

## 2025-06-13 NOTE — CONSULT NOTE ADULT - PROBLEM SELECTOR RECOMMENDATION 4
- Geriatrics and Palliative Medicine Team will continue to follow for support and pain control    Paloma Mark MD  [facility] Team Consults  Please call if we can be of assistance, [phone]

## 2025-06-13 NOTE — PROVIDER CONTACT NOTE (OTHER) - ASSESSMENT
NAD, Febrile, Tachycardic, chills
All other VSS, NAD, Afebrile, Pt c/o foot pain from gout
All other VSS,, NAD, Febrile, Chills

## 2025-06-13 NOTE — PHARMACOTHERAPY INTERVENTION NOTE - COMMENTS
*NOTE IS NOT UPDATED*      Clinical Pharmacy Specialist- Hematology/Oncology- Progress Note    Pt is a 68 y/o male w/ PMH of HTN, HLD, GERD, BPH, Gout, spinal stenosis (s/p multiple surgeries) and newly diagnosed AML (ASXL1-mutated and DDX41) admitted for management with 7+3 Induction therapy    Antimicrobial Course( ID Following, MD Rodarte):  - Posaconazole- 5/2- 5/25  - Levaquin/Amox- 5/2- 5/11  --> Cefepime- 5/11- 5/19  --> Meropenem- 5/19- 5/26  - Flagyl (abd discomfort)- 5/13- 5/19  - Valtrex- 5/2  - Vancomycin (MRSA?MRSE bacteremia)- 5/20- 5/24  MRSA nasal swab    Last Neutropenic (ANC<1000): 5/23; ANC= 0.92  Last Febrile: 22-May-2025 22:43; T= 100.8  Days Non-Neutropenic: 7  Days afebrile: 7    Chemotherapy Course  -Current Regimen: 7+3 Induction  History:  (5/2/25)- 7+3 Induction   -Daunorubicin 60mg/m2 IVP D1-3   -Cytarabine 100mg/m2 IV cont inf D1-7  -Day: 29 (5/30)  BmBx: 5/29  Access: R TLC (accessed 5/5/25)    Vancomycin Levels:   Date    Dose              Value    Ref Range	                             Status   5/21    750mg q12hr   7.8       Trough 10-20; AUC= 400-600   Final  5/23    1gm q12hr      10.9     Trough 10-20; AUC= 400-600   Final    History/Relevant clinical information used in assessment:  - Ht= 5’11”; IBW= 75.3kg; Actual BW= 107.3kg; Adj BW= 88kg  - 5/2- IRR? rigors, chills, and tachycardia 2 hours into day 1; rasburicase 3mg x 1  - 5/4 -Crcl= 67ml/min ; Scr=1.32; Wt= 88kg; Adjusted BW used since pt BMI>30  - 5/9- increase melatonin 3mg to 10mh qhs- c/o insomnia  - 5/13- mouth pain- has ulceration   - 5/14- mouth pain - cannot swallow now  - 5/15- bloody diarrhea x 3 on reglan prn, but has not needed, monitor for d/c to not aggravate w/ prokinetic agent  - 5/15- HSV neg  - 5/16- bloody BM's decreased to 2x'd/day from 4x's/day yest  - 5/17 UCx- 5/17- 10-49k VRE- per ID, do not need to treat  - 5/19- BlCx- Meth resistant Staphylococcus haemolyticus- on vancomycin 1gm q12hr, this will yield AUC= 457; per ID, likely contaminant, expect short course of vanco; - 5/19, still febrile since 5/11, on cefepime, BlCx NGTD + abd discomfort (added flagyl)-->CT CAP 5/15 - no infection (but groundglass opacities stable from previous?)  - 5/21- zarxio 480mcg daily 5/21- 5/23  - 5/29- pt had episodes of diarrhea - likely d/t colchicine    Assessment/Plan/Recommendation:  Onc:  - Daunorubicin 60mg/m2 IVP D1-3   - Cytarabine 100mg/m2 IV cont inf D1-7- IRR on D1, started at 6p, stopped at 7:30p (received 1.5 hrs),  made up D1 bag on 5/9 to run over 23 hrs (to account for ~1hr given)  - BmBx 5/16- favorable, remission BmBx 5/29 pending  - allopurinol 100mg (300mg d/c'd 5/15)-keeping for gout-->300mg- 5/29- given UA rising- 5/29  - 5/27- gout flare- colchicine 0.6mg BID x 2 days (5/27-5/29) -can cont until flare resolves, some experts cont 24-48 hrs after resolution; if symptoms do not resolve after 3 days, consider other anti-inflammatory agents- pt pain not relieved- added prednisone 40mg daily x 2 days (5/28-29) f/b 20mg x 2 days (5/30-6/1)  ID:  - no longer neutropenic, on valtrex only  Cards:  - atorvastatin 80mg held d/t DDI with posa (Cat X)- can add back when posa is off  Pain (mouth):  ·	magic mouthwash to swish & swallow- 5/14--> swish & spit prn- 5/28  ·	hydromorphone 0.2mg IVP q6hr prn- 5/14--> 0.5mg - 0.7mg q3hr prn- 5/16--> 0.7-1mg- 5/23- 5/27  ·	hydromorphone 0.5mg IVP tid- 5/15- 5/27  ·	oxycodone 2.5-5mg q4hr prn- 5/2- 5/16--> 10mg q4hr prn- 5/27  ·	lidocaine gel 2% q8hr to tongue 5/14- 5/28  ·	dexamethasone soln 0.5 mg/5 mL BID swish & spit - 5/15- 5/28  ·	meperidine 12.5mg IVP prn rigors  Orthostasis:  - 5/18- midodrine 10mg PO tid  - 5/30 will decrease diltiazem CD 240mg to 120mg  Dispo:  - 5/29- d/c home   - Meds sent for auth: midodrine, allopurinol 300mg (increased), prednisone, simethicone- sent to [facility] 5/29    Additional Monitoring Needed?   -Yes- Continue to monitor renal function & daily counts for abx escalation/de-escalation   -Discharge Planning:  --> New meds: midodrine, allopurinol 300mg (increased), prednisone, simethicone, diltiazem 240mg decrease to 120mg  --> Meds sent for auth: midodrine, allopurinol 300mg (increased), prednisone, simethicone- sent to [facility] 5/29  --> Delivered meds:    Case discussed with attending/primary team    Liza Jolley, PharmD, BCPS  Clinical Pharmacy Specialist | Hematology/Oncology  [facility]  Email: [email or web address] or available on [email or web address]   Clinical Pharmacy Specialist- Hematology/Oncology- Progress Note    Pt is a 70 y/o male w/ PMH of HTN, HLD, GERD, BPH, Gout, spinal stenosis (s/p multiple surgeries) and newly diagnosed AML (ASXL1-mutated and DDX41) s/p 7+3 Induction therapy, due for HIDAC consolidation, but course complicated by gout flare vs septic arthritis     Antimicrobial Course:  - Cefepime- 6/13  - Valtrex- 6/12  MRSA nasal swab    Last Neutropenic (ANC<1000): no occurrence  Last Febrile: 13-Jun-2025 13:04; T= 102.2  Days Non-Neutropenic: 2  Days afebrile: 0    Chemotherapy Course  -Current Regimen: 7+3 Induction  History:  (5/2/25)- 7+3 Induction   -Daunorubicin 60mg/m2 IVP D1-3   -Cytarabine 100mg/m2 IV cont inf D1-7  -Day: 43 (6/13)  BmBx:  Access: R TLC (accessed 5/5/25)    Vancomycin Levels:   Date    Dose              Value    Ref Range	                             Status   5/21    750mg q12hr   7.8       Trough 10-20; AUC= 400-600   Final  5/23    1gm q12hr      10.9     Trough 10-20; AUC= 400-600   Final    History/Relevant clinical information used in assessment:  - Ht= 5’11”; IBW= 75.3kg; Actual BW= 107.3kg; Adj BW= 88kg  - 5/2- IRR? rigors, chills, and tachycardia 2 hours into day 1; rasburicase 3mg x 1  - 5/4 -Crcl= 67ml/min ; Scr=1.32; Wt= 88kg; Adjusted BW used since pt BMI>30  - 5/9- increase melatonin 3mg to 10mh qhs- c/o insomnia  - 5/13- mouth pain- has ulceration   - 5/14-5/28- severe mucositis   - 5/15- bloody diarrhea x 3 on reglan prn, but has not needed, monitor for d/c to not aggravate w/ prokinetic agent  - 5/15- HSV neg  - 5/16- bloody BM's decreased to 2x'd/day from 4x's/day yest  - 5/17 UCx- 5/17- 10-49k VRE- per ID, do not need to treat  - 5/18- midodrine 10mg PO tid  - 5/19- BlCx- Meth resistant Staphylococcus haemolyticus- on vancomycin 1gm q12hr, this will yield AUC= 457; per ID, likely contaminant, expect short course of vanco; - 5/19, still febrile since 5/11, on cefepime, BlCx NGTD + abd discomfort (added flagyl)-->CT CAP 5/15 - no infection (but groundglass opacities stable from previous?)  - 5/21- zarxio 480mcg daily 5/21- 5/23  - 5/27- gout flare- colchicine 0.6mg BID x 2 days (5/27-5/29) -can cont until flare resolves, some experts cont 24-48 hrs after resolution; if symptoms do not resolve after 3 days, consider other anti-inflammatory agents- pt pain not relieved- added prednisone 40mg daily x 2 days (5/28-29) f/b 20mg x 2 days (5/30-6/1)  - 5/29- pt had episodes of diarrhea d/t colchicine  - 5/29- - allopurinol 100mg (300mg d/c'd 5/15)-keeping for gout-->300mg- 5/29- given UA rising- 5/29  - 5/30 will decrease diltiazem CD 240mg to 120mg  Meds sent for auth: midodrine, allopurinol 300mg (increased), prednisone, simethicone- sent to [facility] 5/29    Assessment/Plan/Recommendation:  Onc:  - 6/13- due for HIDAC 2gm/m2 D1,2,3 Consolidation soon, today is D43 post induction  ID:  - not neutropenic, on cefepime for fevers  Rheum:  - 6/12- r/o septic arthritis of L ankle- s/p arthrocentesis  - 6/12- allopurinol 300mg PO daily, may transition to steroids vs anakinra 100mg SC daily x ? days  - 6/13- c/o pain in both feet now  Cards:  - atorvastatin 80mg hold if on posa d/t DDI (Cat X)  Dispo:    Additional Monitoring Needed?   -Yes- Continue to monitor renal function & daily counts for abx escalation/de-escalation   -Discharge Planning:  --> New meds:   --> Meds sent for auth:   --> Delivered meds:    Case discussed with attending/primary team    Liza Jolley, PharmD, BCPS  Clinical Pharmacy Specialist | Hematology/Oncology  [facility]  Email: [email or web address] or available on [employer]

## 2025-06-13 NOTE — PROGRESS NOTE ADULT - ASSESSMENT
70 yo male with newly diagnosed ASXL1, DDX41 mutated AML, admitted for induction chemo with 7+3:  dauno 60 mg/m2+ cytarabine. PMH  BRCA2 carrier ([id number] 9610C>T), HTN, HLD, Gout, GERD, BPH, spinal stenosis (s/p multiple procedures 2018, 2020 laminectomies / fusions), hip surgeries (Oct 2024, Jan 2025), residual hand weakness related to surgeries, umbilical hernia repair (2014/15) referred here from Dr Atkinson at [facility] in [place] for new ASXL1-mutated and ETB20-nswpdxo AML. Chemo started on 5/2, Complicated by rigors, chills, fever, SOB  and tachycardia 2 hours into day 1 infusion - likely infusion reaction, neutropenic fever. Pt has pancytopenia d/t chemo and disease

## 2025-06-13 NOTE — CONSULT NOTE ADULT - PROBLEM SELECTOR RECOMMENDATION 2
Pt is planned for consolidation chemo which is on hold due to fever/gout flare  - management as per heme/onc

## 2025-06-13 NOTE — PROGRESS NOTE ADULT - ASSESSMENT
69M who was admitted on 5/2/25 for Daunorubicin + cytarabine during which he had acute on chronic gout, neutropenic fever treated empirically with broad spectrum abx, diarrhea and GI bleeding. He presents again due to acute oligoarthritis suspected to be underlying gout. Rheumatology consulted for further recommendations    # Acute left ankle arthritis with tenosynovitis of left foot and arthritis of MTP joints  # Oligoarticular Gout  - On exam, significant swelling of left ankle with decrease ROM, tenosynovitis of lateral aspect left ankle/foot, s/p arthrocentesis left ankle 6/12. Developed worsening RIGHT ankle effusion overnight 6/12-6/13  - Uric acid 4.8  - Had gout flare in prior hospitalization where his allopurinol was uptitrated to 300mg Daily; treated with colchicine (c/b diarrhea) and 4 day prednisone taper  - Tx: Allopurinol 300mg daily  - Bedside US showed ankle effusion and effusions of MTP joints - s/p diagnostic arthrocentesis 6/12 of left ankle 6/12- 5 cc synovial fluid was aspirated and sent for analysis  - Fluid studies: 70k WBCs, Joint PCR negative, Gram stain negative; culture NGTD. MSU crystals present  - XR left foot: midfoot arthritic changes, no erosive changes     # Significant for leukocytosis 20k on arrival  - AML, on  Daunorubicin + cytarabine     Impression:  Patient with recent hospitalization for chemotherapy induction c/b acute on chronic gout, diarrhea from colchicine, GI bleed, neutropenic fever.  He presents now with significant synovitis particularly of his left ankle but also his b/l 1st MTPs. Developed right ankle pain/effusion overnight 6/12-6/13  He is now s/p arthrocentesis which showed MSU crystals, significant inflammatory changes. Joint PCR and gram stain negative minimal suspicion for septic arthritis.  Will treat with course of steroids and assess response    Recommendations:  - Will start solumedrol 40mg IVP daily first dose now  - Continue to follow synovial fluid cultures  - Can continue other measure such as tylenol/cold packs as well   - c/w Allopurinol 300mg PO daily  - Rheumatology service to follow to assess response to therapy    d/w Dr. Qiana Yao MD, PGY-4  Rheumatology Fellow  Reachable on TEAMS 69M who was admitted on 5/2/25 for Daunorubicin + cytarabine during which he had acute on chronic gout, neutropenic fever treated empirically with broad spectrum abx, diarrhea and GI bleeding. He presents again due to acute oligoarthritis suspected to be underlying gout. Rheumatology consulted for further recommendations    # Acute left ankle arthritis with tenosynovitis of left foot and arthritis of MTP joints  # Oligoarticular Gout  - On exam, significant swelling of left ankle with decrease ROM, tenosynovitis of lateral aspect left ankle/foot, s/p arthrocentesis left ankle 6/12. Developed worsening RIGHT ankle effusion overnight 6/12-6/13  - Uric acid 4.8  - Had gout flare in prior hospitalization where his allopurinol was uptitrated to 300mg Daily; treated with colchicine (c/b diarrhea) and 4 day prednisone taper  - Tx: Allopurinol 300mg daily  - Bedside US showed ankle effusion and effusions of MTP joints - s/p diagnostic arthrocentesis 6/12 of left ankle 6/12- 5 cc synovial fluid was aspirated and sent for analysis  - Fluid studies: 70k WBCs, 96% N Joint PCR negative, Gram stain negative; culture NGTD. MSU crystals present  - XR left foot: midfoot arthritic changes, no erosive changes     # Significant for leukocytosis 20k on arrival  - AML, on  Daunorubicin + cytarabine     Impression:  Patient with recent hospitalization for chemotherapy induction c/b acute on chronic gout, diarrhea from colchicine, GI bleed, neutropenic fever.  He presents now with significant synovitis particularly of his left ankle but also his b/l 1st MTPs. Developed right ankle pain/effusion overnight 6/12-6/13  He is now s/p arthrocentesis which showed MSU crystals, significant inflammatory changes. Joint PCR and gram stain negative minimal suspicion for septic arthritis.  Will treat with course of steroids and assess response    Recommendations:  - Will start solumedrol 40mg IVP daily first dose now  - Continue to follow synovial fluid cultures  - Can continue other measure such as tylenol/cold packs as well   - c/w Allopurinol 300mg PO daily  - Rheumatology service to follow to assess response to therapy    d/w Dr. Qiana Yao MD, PGY-4  Rheumatology Fellow  Reachable on TEAMS

## 2025-06-13 NOTE — CONSULT NOTE ADULT - ASSESSMENT
69M with BRCA2 carrier ([id number] 9610C>T), HTN, HLD, Gout, GERD, BPH, spinal stenosis (s/p multiple procedures 2018, 2020 laminectomies / fusions), hip surgeries (Oct 2024, Jan 2025), residual hand weakness related to surgeries, umbilical hernia repair (2014/15) and ASXL1-mutated and UQO49-lyyavnb AML, s/p induction chemotherapy with day 15 BM bx and aspirate performed on 5/16/25 showed no evidence of persistent leukemia with low 0.34% myeloblasts on flow, however specimen was subcortical, suboptimal. Patient initially due for cycle 1 hidac consolidation however presenting with fevers x3 days, and acute on chronic gout attack causing inability to walk secondary to pain. Geriatrics and Palliative Medicine Team is consulted to assist with pain control.

## 2025-06-13 NOTE — PROGRESS NOTE ADULT - NS ATTEND AMEND GEN_ALL_CORE FT
69M with ASXL1-mutated and GMS78-mmewrak AML. 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 Admitted for induction 7+3.    Heme:   - Completed 7+3. Day 29.    - Trend CBC with differential daily. Goal Hgb > 8 and plt > 20 given past GI bleeding.   - Has had ANC, plt count recovery   - Allopurinol continued, but for previous diagnosis of gout decreased dose to 100 mg on 5/16.  S/p rasburicase; uric acid and LDH stable.   - recurrent gout >>> received colchicine (diarrhea resulted), resolution of pain with brief course of steroids  - incr allopurinol to 300 mg/d  - Continue IVF. Monitor daily weights and I/O's. Diurese PRN.   - BMBx done day 15 (5/16) - subcortical, suboptimal specimen, no evidence of persistent leukemia. Flow 0.34% myeloblasts.  - Remission marrow done on 5/29  - in absence of overt evidence of persistent leukemia, and in setting of persistent fevers, started daily G-CSF 5/21  - PB transiently increased to > 10%, now cleared, G-CSF was stopped after 5/23  - PB flow showed aberrant  myeloblast population, clearing now, LDH slowly decreasing       GI:  - Bloody BM 5/14-16. Resolved. Plt have recovered.   - c/o severe midepigastric pain 5/22 AM, resolved   - EKG w/o acute changes, troponin sl incr 70, then decr.  - amylase, lipase normal      Card:  - Orthostatic hypotension, likely residual from GI bleed and decreased PO intake. Poss autonomic component.  - Bolus 500 cc on 5/17 and 5/18.  Now on midodrine. BP stable.   - decr cardizem 240 >>> 120/d  - weight stable  - taking po well     ID:   - Neutropenic and febrile; fevers since 5/11, last temp 100.8 5/22 pm, Tmax 101     with resolving GI symptoms, diarrhea. Cults (-) with recent CNS from blood likely contaminant in 1/4 bottles and VREC in urine    cleared in subsequent specimen.  - Cefepime (5/11-5/19 ). Persistent fever lead to changing to meropenem on 5/19.  Last temp 100.4 5/21.  d/c gianna (5/26)  - also now off posa  - ANC has recovered  - CT scan from 5/14 showing no acute CT findings suspicious for infection. Mentioned GGOs in the lungs bilaterally, but stable from a previous scan.   - Prophylaxis: valacyclovir   - Likely remission BMA/Bx pre d/c    MSK: Has chronic pain from prior surgeries   - Pain control: stopped dilaudid iv 0.5 mg iv 3x/d - switched to prn oxycodone, so far has good pain control  - gout related toe pain resolving with brief course of prednisone, 40/40/20/20    TLC to be removed today  await BMA/Bx result   will  go home today, home PT  [facility] f/u with Dr. Apodaca on 6/3  OOB. Primary: Paulie    Assessment: 70 yo male with ASXL1-mutated and LLR19-ukkauhf AML here for planned HiDAC - temporarily on HOLD. Course complciated by acute gout flare and severe pain of right ankle  ?  Heme:   - Planned for HiDAC, however due to fevers, urinary symptoms and acute gout, will stabilize inpatient then resume AML-directed therapy.  - Continue daily oral B12 suppl    ID: Fevers x 3 days, dark urine, less urine output  - Not Neutropenic  - Infectious workup underway    MSK:  - Acute on chronic gout: Rheum consult appreciated Primary: Nolanty    Assessment: 68 yo male with ASXL1-mutated and SOD72-gicewhu AML here for planned HiDAC - temporarily on HOLD. Course complciated by acute gout flare and severe pain of right ankle  ?  Heme:   - Planned for HiDAC, however due to fevers, urinary symptoms and acute gout, will stabilize inpatient then resume AML-directed therapy.  - Continue daily oral B12 suppl    ID: Fevers x 3 days, dark urine, less urine output, UA clear, RVP neg  - Not Neutropenic  - Infectious workup underway, start cefepime (6/13/25 - )    MSK:  - Acute on chronic gout: Rheum consult appreciated, steroids ok

## 2025-06-14 LAB
ALBUMIN SERPL ELPH-MCNC: 3 G/DL — LOW (ref 3.3–5)
ALP SERPL-CCNC: 164 U/L — HIGH (ref 40–120)
ALT FLD-CCNC: 56 U/L — HIGH (ref 10–45)
ANION GAP SERPL CALC-SCNC: 13 MMOL/L — SIGNIFICANT CHANGE UP (ref 5–17)
AST SERPL-CCNC: 39 U/L — SIGNIFICANT CHANGE UP (ref 10–40)
BASOPHILS # BLD AUTO: 0 K/UL — SIGNIFICANT CHANGE UP (ref 0–0.2)
BASOPHILS NFR BLD AUTO: 0 % — SIGNIFICANT CHANGE UP (ref 0–2)
BILIRUB SERPL-MCNC: 0.6 MG/DL — SIGNIFICANT CHANGE UP (ref 0.2–1.2)
BUN SERPL-MCNC: 14 MG/DL — SIGNIFICANT CHANGE UP (ref 7–23)
CALCIUM SERPL-MCNC: 8.9 MG/DL — SIGNIFICANT CHANGE UP (ref 8.4–10.5)
CHLORIDE SERPL-SCNC: 104 MMOL/L — SIGNIFICANT CHANGE UP (ref 96–108)
CO2 SERPL-SCNC: 19 MMOL/L — LOW (ref 22–31)
CREAT SERPL-MCNC: 1.13 MG/DL — SIGNIFICANT CHANGE UP (ref 0.5–1.3)
EGFR: 71 ML/MIN/1.73M2 — SIGNIFICANT CHANGE UP
EGFR: 71 ML/MIN/1.73M2 — SIGNIFICANT CHANGE UP
EOSINOPHIL # BLD AUTO: 0.25 K/UL — SIGNIFICANT CHANGE UP (ref 0–0.5)
EOSINOPHIL NFR BLD AUTO: 0.9 % — SIGNIFICANT CHANGE UP (ref 0–6)
GLUCOSE SERPL-MCNC: 144 MG/DL — HIGH (ref 70–99)
HCT VFR BLD CALC: 24.7 % — LOW (ref 39–50)
HGB BLD-MCNC: 8 G/DL — LOW (ref 13–17)
LDH SERPL L TO P-CCNC: 234 U/L — SIGNIFICANT CHANGE UP (ref 50–242)
LYMPHOCYTES # BLD AUTO: 0.46 K/UL — LOW (ref 1–3.3)
LYMPHOCYTES # BLD AUTO: 1.7 % — LOW (ref 13–44)
MAGNESIUM SERPL-MCNC: 1.8 MG/DL — SIGNIFICANT CHANGE UP (ref 1.6–2.6)
MANUAL SMEAR VERIFICATION: SIGNIFICANT CHANGE UP
MCHC RBC-ENTMCNC: 32.3 PG — SIGNIFICANT CHANGE UP (ref 27–34)
MCHC RBC-ENTMCNC: 32.4 G/DL — SIGNIFICANT CHANGE UP (ref 32–36)
MCV RBC AUTO: 99.6 FL — SIGNIFICANT CHANGE UP (ref 80–100)
MONOCYTES # BLD AUTO: 1.17 K/UL — HIGH (ref 0–0.9)
MONOCYTES NFR BLD AUTO: 4.3 % — SIGNIFICANT CHANGE UP (ref 2–14)
NEUTROPHILS # BLD AUTO: 25.42 K/UL — HIGH (ref 1.8–7.4)
NEUTROPHILS NFR BLD AUTO: 93.1 % — HIGH (ref 43–77)
PHOSPHATE SERPL-MCNC: 2.9 MG/DL — SIGNIFICANT CHANGE UP (ref 2.5–4.5)
PLAT MORPH BLD: NORMAL — SIGNIFICANT CHANGE UP
PLATELET # BLD AUTO: 158 K/UL — SIGNIFICANT CHANGE UP (ref 150–400)
POTASSIUM SERPL-MCNC: 4.1 MMOL/L — SIGNIFICANT CHANGE UP (ref 3.5–5.3)
POTASSIUM SERPL-SCNC: 4.1 MMOL/L — SIGNIFICANT CHANGE UP (ref 3.5–5.3)
PROT SERPL-MCNC: 5.6 G/DL — LOW (ref 6–8.3)
RBC # BLD: 2.48 M/UL — LOW (ref 4.2–5.8)
RBC # FLD: 20.5 % — HIGH (ref 10.3–14.5)
RBC BLD AUTO: SIGNIFICANT CHANGE UP
SODIUM SERPL-SCNC: 136 MMOL/L — SIGNIFICANT CHANGE UP (ref 135–145)
WBC # BLD: 27.3 K/UL — HIGH (ref 3.8–10.5)
WBC # FLD AUTO: 27.3 K/UL — HIGH (ref 3.8–10.5)

## 2025-06-14 PROCEDURE — 99233 SBSQ HOSP IP/OBS HIGH 50: CPT

## 2025-06-14 RX ORDER — LACTULOSE 10 G/15ML
10 SOLUTION ORAL
Refills: 0 | Status: DISCONTINUED | OUTPATIENT
Start: 2025-06-14 | End: 2025-06-14

## 2025-06-14 RX ORDER — SIMETHICONE 80 MG
80 TABLET,CHEWABLE ORAL
Refills: 0 | Status: DISCONTINUED | OUTPATIENT
Start: 2025-06-14 | End: 2025-06-19

## 2025-06-14 RX ORDER — MAGNESIUM HYDROXIDE 400 MG/5ML
30 SUSPENSION ORAL ONCE
Refills: 0 | Status: COMPLETED | OUTPATIENT
Start: 2025-06-14 | End: 2025-06-14

## 2025-06-14 RX ADMIN — Medication 40 MILLIGRAM(S): at 06:21

## 2025-06-14 RX ADMIN — Medication 0.7 MILLIGRAM(S): at 22:41

## 2025-06-14 RX ADMIN — Medication 300 MILLIGRAM(S): at 05:31

## 2025-06-14 RX ADMIN — POLYETHYLENE GLYCOL 3350 17 GRAM(S): 17 POWDER, FOR SOLUTION ORAL at 17:26

## 2025-06-14 RX ADMIN — Medication 1 MILLIGRAM(S): at 09:00

## 2025-06-14 RX ADMIN — TAMSULOSIN HYDROCHLORIDE 0.4 MILLIGRAM(S): 0.4 CAPSULE ORAL at 21:41

## 2025-06-14 RX ADMIN — Medication 1 MILLIGRAM(S): at 21:19

## 2025-06-14 RX ADMIN — Medication 0.7 MILLIGRAM(S): at 01:11

## 2025-06-14 RX ADMIN — Medication 1 MILLIGRAM(S): at 04:30

## 2025-06-14 RX ADMIN — Medication 15 MILLILITER(S): at 21:41

## 2025-06-14 RX ADMIN — Medication 0.7 MILLIGRAM(S): at 23:41

## 2025-06-14 RX ADMIN — ATORVASTATIN CALCIUM 80 MILLIGRAM(S): 80 TABLET, FILM COATED ORAL at 21:41

## 2025-06-14 RX ADMIN — Medication 15 MILLILITER(S): at 14:33

## 2025-06-14 RX ADMIN — METHYLPREDNISOLONE ACETATE 40 MILLIGRAM(S): 80 INJECTION, SUSPENSION INTRA-ARTICULAR; INTRALESIONAL; INTRAMUSCULAR; SOFT TISSUE at 05:32

## 2025-06-14 RX ADMIN — Medication 1 MILLIGRAM(S): at 17:13

## 2025-06-14 RX ADMIN — CEFEPIME 100 MILLIGRAM(S): 2 INJECTION, POWDER, FOR SOLUTION INTRAVENOUS at 21:41

## 2025-06-14 RX ADMIN — BISMUTH SUBSALICYLATE 15 MILLILITER(S): 262 TABLET, CHEWABLE ORAL at 17:26

## 2025-06-14 RX ADMIN — CEFEPIME 100 MILLIGRAM(S): 2 INJECTION, POWDER, FOR SOLUTION INTRAVENOUS at 05:33

## 2025-06-14 RX ADMIN — Medication 1 MILLIGRAM(S): at 00:15

## 2025-06-14 RX ADMIN — Medication 500 MILLIGRAM(S): at 17:26

## 2025-06-14 RX ADMIN — Medication 15 MILLILITER(S): at 05:30

## 2025-06-14 RX ADMIN — Medication 500 MILLIGRAM(S): at 05:31

## 2025-06-14 RX ADMIN — CEFEPIME 100 MILLIGRAM(S): 2 INJECTION, POWDER, FOR SOLUTION INTRAVENOUS at 14:34

## 2025-06-14 RX ADMIN — Medication 1 MILLIGRAM(S): at 16:13

## 2025-06-14 RX ADMIN — Medication 1 MILLIGRAM(S): at 03:39

## 2025-06-14 RX ADMIN — Medication 1 MILLIGRAM(S): at 13:10

## 2025-06-14 RX ADMIN — Medication 0.7 MILLIGRAM(S): at 02:11

## 2025-06-14 RX ADMIN — Medication 1 MILLIGRAM(S): at 12:10

## 2025-06-14 RX ADMIN — Medication 1 MILLIGRAM(S): at 20:19

## 2025-06-14 RX ADMIN — DILTIAZEM HYDROCHLORIDE 120 MILLIGRAM(S): 240 TABLET, EXTENDED RELEASE ORAL at 05:31

## 2025-06-14 RX ADMIN — Medication 1 MILLIGRAM(S): at 08:00

## 2025-06-14 RX ADMIN — Medication 75 MILLILITER(S): at 18:24

## 2025-06-14 RX ADMIN — MAGNESIUM HYDROXIDE 30 MILLILITER(S): 400 SUSPENSION ORAL at 09:41

## 2025-06-14 NOTE — PROGRESS NOTE ADULT - NS ATTEND AMEND GEN_ALL_CORE FT
Primary: Nolanty    Assessment: 70 yo male with ASXL1-mutated and RZC79-hyrhtbh AML here for planned HiDAC - temporarily on HOLD. Course complciated by acute gout flare and severe pain of right ankle  ?  Heme:   - Planned for HiDAC, however due to fevers, urinary symptoms and acute gout, will stabilize inpatient then resume AML-directed therapy.  - Continue daily oral B12 suppl    ID: Fevers x 3 days, dark urine, less urine output, UA clear, RVP neg  - Not Neutropenic  - Infectious workup underway, start cefepime (6/13/25 - )    MSK:  - Acute on chronic gout: Rheum consult appreciated, steroids ok Primary: Paulie    Assessment: 68 yo male with ASXL1-mutated and JAK91-ystbfus AML here for planned HiDAC - temporarily on HOLD. Course complciated by acute gout flare and severe pain of right ankle. Today is Induction Day 44. Plan for HiDAC 6/15/25 if stable.    Heme:   - Planned for HiDAC, however due to fevers, urinary symptoms and acute gout, will stabilize inpatient then resume AML-directed therapy.  - Continue daily oral B12 suppl    ID: Fevers x 3 days, dark urine, less urine output, UA clear, RVP neg  - leukocytosis related to steroids  - Not Neutropenic  - Infectious workup underway, start cefepime (6/13/25 - )    MSK:  - Acute on chronic gout: Rheum consult appreciated, steroids ok  - Improvement from 10/10 to 5/10 pain today, now able to take steps.    Dispo:  - Discharge on day 4 of consolidation if stable, GCSF once between days 5-7, EDC

## 2025-06-14 NOTE — PROGRESS NOTE ADULT - PROBLEM SELECTOR PLAN 3
Palliative care consult for pain control, IV Dilaudid PRN   Bowel regimen   Allopurinol QD  6/12 rheumatology tapped L-ankle joint: synovial fluid cultures and testing sent f/u.  6/13: Rheum f/u appreciated. Start 40mg IV solumedrol daily for now. Rheum will reassess for dose adjustment on Monday as needed.

## 2025-06-14 NOTE — PROGRESS NOTE ADULT - PROBLEM SELECTOR PLAN 2
Not neutropenic, febrile  Valtrex 500mg BID. Cefepime  6/12 BCX/UCX/CXR, full RVP ordered f/u.  6/13 Febrile 102F, Started Cefepime 2gq8. CXR neg. pending infectious workup results.

## 2025-06-14 NOTE — PROGRESS NOTE ADULT - SUBJECTIVE AND OBJECTIVE BOX
Diagnosis: ASXL1-mutated and [id number]-cfnxqyl AML    Protocol/Chemo Regimen: s/p 7+3:  Daunorubicin 60 mg/m2+ Cytarabine. Cycle 1 HiDAC TBD    Day: 31    Pt endorsed: + fevers, chills, Pain in the foot     Review of Systems: Patient denied nausea, vomiting,  chest pain, cough, dyspnea, abdominal pain, constipation,  rectal pain,  rash, fatigue, headache, bleeding    Pain scale:  8/10 pain     Diet: regular Ensure plus HP BID                      Allergies: No Known Allergies        ANTIMICROBIALS  cefepime   IVPB      cefepime   IVPB 2000 milliGRAM(s) IV Intermittent every 8 hours  valACYclovir 500 milliGRAM(s) Oral two times a day      HEME/ONC MEDICATIONS      STANDING MEDICATIONS  allopurinol 300 milliGRAM(s) Oral daily  atorvastatin 80 milliGRAM(s) Oral at bedtime  Biotene Dry Mouth Oral Rinse 15 milliLiter(s) Swish and Spit three times a day  bismuth subsalicylate Liquid 15 milliLiter(s) Oral two times a day  diltiazem    milliGRAM(s) Oral daily  methylPREDNISolone sodium succinate Injectable 40 milliGRAM(s) IV Push daily  pantoprazole    Tablet 40 milliGRAM(s) Oral before breakfast  polyethylene glycol 3350 17 Gram(s) Oral two times a day  sodium chloride 0.9%. 1000 milliLiter(s) IV Continuous <Continuous>  tamsulosin 0.4 milliGRAM(s) Oral at bedtime      PRN MEDICATIONS  acetaminophen     Tablet .. 650 milliGRAM(s) Oral every 6 hours PRN  HYDROmorphone  Injectable 1 milliGRAM(s) IV Push every 4 hours PRN  HYDROmorphone  Injectable 0.7 milliGRAM(s) IV Push every 4 hours PRN  ondansetron Injectable 8 milliGRAM(s) IV Push every 8 hours PRN  senna 2 Tablet(s) Oral at bedtime PRN        Vital Signs Last 24 Hrs  T(C): 37.5 (14 Jun 2025 05:24), Max: 39 (13 Jun 2025 13:04)  T(F): 99.5 (14 Jun 2025 05:24), Max: 102.2 (13 Jun 2025 13:04)  HR: 93 (14 Jun 2025 05:24) (93 - 115)  BP: 105/63 (14 Jun 2025 05:24) (99/62 - 133/75)  BP(mean): --  RR: 18 (14 Jun 2025 05:24) (18 - 18)  SpO2: 95% (14 Jun 2025 05:24) (94% - 96%)    Parameters below as of 14 Jun 2025 05:24  Patient On (Oxygen Delivery Method): room air      PHYSICAL EXAM  General: adult in NAD  HEENT: clear oropharynx, no erythema, no ulcers  CV: normal S1, S2, RRR  Lungs: clear to auscultation, no wheezes, no rales  Abdomen: +BS, soft, nontender, nondistended  Ext: decreased ROM due to pain; joint swelling; joint erythema; joint warmth; +b/l ankle join swelling and warmth. +unable to ambulate  Skin: +erythema around b/l ankles and toes, warm to touch  No lymphadedenopathy  Neuro: alert and oriented x 4, no focal deficit   Central line:       RECENT CULTURES:  06-12 @ 18:00  Ankle Ankle  --  --  --    No growth  --  06-12 @ 12:57  Clean Catch Clean Catch (Midstream)  --  --  --    <10,000 CFU/mL Normal Urogenital Madelyn  --  06-12 @ 10:00  Blood Blood-Peripheral  --  --  --    No growth at 24 hours  --  06-12 @ 09:56  Blood Blood-Peripheral  --  --  --    No growth at 24 hours  --        LABS:                        8.3    15.76 )-----------( 196      ( 13 Jun 2025 06:46 )             25.7         Mean Cell Volume : 100.0 fl  Mean Cell Hemoglobin : 32.3 pg  Mean Cell Hemoglobin Concentration : 32.3 g/dL  Auto Neutrophil # : x  Auto Lymphocyte # : x  Auto Monocyte # : x  Auto Eosinophil # : x  Auto Basophil # : x  Auto Neutrophil % : x  Auto Lymphocyte % : x  Auto Monocyte % : x  Auto Eosinophil % : x  Auto Basophil % : x      06-13    139  |  104  |  15  ----------------------------<  127[H]  3.8   |  22  |  1.16    Ca    9.0      13 Jun 2025 06:46  Phos  4.1     06-13  Mg     1.9     06-13    TPro  5.8[L]  /  Alb  3.2[L]  /  TBili  0.7  /  DBili  x   /  AST  63[H]  /  ALT  57[H]  /  AlkPhos  160[H]  06-13                  RADIOLOGY & ADDITIONAL STUDIES:      < from: Xray Foot AP + Lateral, Left (06.12.25 @ 22:30) >  IMPRESSION:  No fractures or dislocations.    Tarsometatarsal alignment maintained without evidence for a Lisfranc   injury.    Mild midfoot arthritic changes with dorsal margin spurring. Preserved   remaining visualized joint spaces and no joint margin erosions.    Plantar calcaneal enthesophyte. Unremarkable distal Achilles tendon   shadow.    Generalized osteopenia otherwise no discrete suspicious lytic or blastic   lesions.    --- End of Report ---    < end of copied text >     Diagnosis: ASXL1-mutated and [id number]-bysfhmc AML    Protocol/Chemo Regimen: s/p 7+3:  Daunorubicin 60 mg/m2+ Cytarabine. Cycle 1 HiDAC TBD    Day: 31    Pt endorsed: left foot/ankle pain improving, able to move legs now. constipated x5 days    Review of Systems: Patient denied nausea, vomiting,  chest pain, cough, dyspnea, abdominal pain, constipation,  rectal pain,  rash, fatigue, headache, bleeding    Pain scale:  5/10 pain left ankle/foot    Diet: regular Ensure plus HP BID                      Allergies: No Known Allergies        ANTIMICROBIALS  cefepime   IVPB      cefepime   IVPB 2000 milliGRAM(s) IV Intermittent every 8 hours  valACYclovir 500 milliGRAM(s) Oral two times a day      HEME/ONC MEDICATIONS      STANDING MEDICATIONS  allopurinol 300 milliGRAM(s) Oral daily  atorvastatin 80 milliGRAM(s) Oral at bedtime  Biotene Dry Mouth Oral Rinse 15 milliLiter(s) Swish and Spit three times a day  bismuth subsalicylate Liquid 15 milliLiter(s) Oral two times a day  diltiazem    milliGRAM(s) Oral daily  methylPREDNISolone sodium succinate Injectable 40 milliGRAM(s) IV Push daily  pantoprazole    Tablet 40 milliGRAM(s) Oral before breakfast  polyethylene glycol 3350 17 Gram(s) Oral two times a day  sodium chloride 0.9%. 1000 milliLiter(s) IV Continuous <Continuous>  tamsulosin 0.4 milliGRAM(s) Oral at bedtime      PRN MEDICATIONS  acetaminophen     Tablet .. 650 milliGRAM(s) Oral every 6 hours PRN  HYDROmorphone  Injectable 1 milliGRAM(s) IV Push every 4 hours PRN  HYDROmorphone  Injectable 0.7 milliGRAM(s) IV Push every 4 hours PRN  ondansetron Injectable 8 milliGRAM(s) IV Push every 8 hours PRN  senna 2 Tablet(s) Oral at bedtime PRN        Vital Signs Last 24 Hrs  T(C): 37.5 (14 Jun 2025 05:24), Max: 39 (13 Jun 2025 13:04)  T(F): 99.5 (14 Jun 2025 05:24), Max: 102.2 (13 Jun 2025 13:04)  HR: 93 (14 Jun 2025 05:24) (93 - 115)  BP: 105/63 (14 Jun 2025 05:24) (99/62 - 133/75)  BP(mean): --  RR: 18 (14 Jun 2025 05:24) (18 - 18)  SpO2: 95% (14 Jun 2025 05:24) (94% - 96%)    Parameters below as of 14 Jun 2025 05:24  Patient On (Oxygen Delivery Method): room air      PHYSICAL EXAM  General: adult in NAD  HEENT: clear oropharynx, no erythema, no ulcers  CV: normal S1, S2, RRR  Lungs: clear to auscultation, no wheezes, no rales  Abdomen: +BS, soft, nontender, nondistended  Ext: decreased ROM due to pain; joint swelling; joint erythema; joint warmth; +b/l ankle join swelling and warmth. +unable to ambulate  Skin: +erythema around b/l ankles and toes, warm to touch  No lymphadedenopathy  Neuro: alert and oriented x 4, no focal deficit   Central line:       RECENT CULTURES:  06-12 @ 18:00  Ankle Ankle  --  --  --    No growth  --  06-12 @ 12:57  Clean Catch Clean Catch (Midstream)  --  --  --    <10,000 CFU/mL Normal Urogenital Madelyn  --  06-12 @ 10:00  Blood Blood-Peripheral  --  --  --    No growth at 24 hours  --  06-12 @ 09:56  Blood Blood-Peripheral  --  --  --    No growth at 24 hours  --        LABS:    Blood Cultures:                           8.0    27.30 )-----------( 158      ( 14 Jun 2025 07:14 )             24.7         Mean Cell Volume : 99.6 fl  Mean Cell Hemoglobin : 32.3 pg  Mean Cell Hemoglobin Concentration : 32.4 g/dL  Auto Neutrophil # : x  Auto Lymphocyte # : x  Auto Monocyte # : x  Auto Eosinophil # : x  Auto Basophil # : x  Auto Neutrophil % : x  Auto Lymphocyte % : x  Auto Monocyte % : x  Auto Eosinophil % : x  Auto Basophil % : x      06-14    136  |  104  |  14  ----------------------------<  144[H]  4.1   |  19[L]  |  1.13    Ca    8.9      14 Jun 2025 07:15  Phos  2.9     06-14  Mg     1.8     06-14    TPro  5.6[L]  /  Alb  3.0[L]  /  TBili  0.6  /  DBili  x   /  AST  39  /  ALT  56[H]  /  AlkPhos  164[H]  06-14      Mg 1.8  Phos 2.9            Uric Acid --          RADIOLOGY & ADDITIONAL STUDIES:      < from: Xray Foot AP + Lateral, Left (06.12.25 @ 22:30) >  IMPRESSION:  No fractures or dislocations.    Tarsometatarsal alignment maintained without evidence for a Lisfranc   injury.    Mild midfoot arthritic changes with dorsal margin spurring. Preserved   remaining visualized joint spaces and no joint margin erosions.    Plantar calcaneal enthesophyte. Unremarkable distal Achilles tendon   shadow.    Generalized osteopenia otherwise no discrete suspicious lytic or blastic   lesions.    --- End of Report ---    < end of copied text >     Diagnosis: ASXL1-mutated and [id number]-dxjwrhy AML    Protocol/Chemo Regimen: s/p 7+3:  Daunorubicin 60 mg/m2+ Cytarabine. Cycle 1 HiDAC TBD    Day: 43    Pt endorsed: left foot/ankle pain improving, able to move legs now. constipated x5 days    Review of Systems: Patient denied nausea, vomiting,  chest pain, cough, dyspnea, abdominal pain, constipation,  rectal pain,  rash, fatigue, headache, bleeding    Pain scale:  5/10 pain left ankle/foot    Diet: regular Ensure plus HP BID                      Allergies: No Known Allergies        ANTIMICROBIALS  cefepime   IVPB      cefepime   IVPB 2000 milliGRAM(s) IV Intermittent every 8 hours  valACYclovir 500 milliGRAM(s) Oral two times a day      HEME/ONC MEDICATIONS      STANDING MEDICATIONS  allopurinol 300 milliGRAM(s) Oral daily  atorvastatin 80 milliGRAM(s) Oral at bedtime  Biotene Dry Mouth Oral Rinse 15 milliLiter(s) Swish and Spit three times a day  bismuth subsalicylate Liquid 15 milliLiter(s) Oral two times a day  diltiazem    milliGRAM(s) Oral daily  methylPREDNISolone sodium succinate Injectable 40 milliGRAM(s) IV Push daily  pantoprazole    Tablet 40 milliGRAM(s) Oral before breakfast  polyethylene glycol 3350 17 Gram(s) Oral two times a day  sodium chloride 0.9%. 1000 milliLiter(s) IV Continuous <Continuous>  tamsulosin 0.4 milliGRAM(s) Oral at bedtime      PRN MEDICATIONS  acetaminophen     Tablet .. 650 milliGRAM(s) Oral every 6 hours PRN  HYDROmorphone  Injectable 1 milliGRAM(s) IV Push every 4 hours PRN  HYDROmorphone  Injectable 0.7 milliGRAM(s) IV Push every 4 hours PRN  ondansetron Injectable 8 milliGRAM(s) IV Push every 8 hours PRN  senna 2 Tablet(s) Oral at bedtime PRN        Vital Signs Last 24 Hrs  T(C): 37.5 (14 Jun 2025 05:24), Max: 39 (13 Jun 2025 13:04)  T(F): 99.5 (14 Jun 2025 05:24), Max: 102.2 (13 Jun 2025 13:04)  HR: 93 (14 Jun 2025 05:24) (93 - 115)  BP: 105/63 (14 Jun 2025 05:24) (99/62 - 133/75)  BP(mean): --  RR: 18 (14 Jun 2025 05:24) (18 - 18)  SpO2: 95% (14 Jun 2025 05:24) (94% - 96%)    Parameters below as of 14 Jun 2025 05:24  Patient On (Oxygen Delivery Method): room air      PHYSICAL EXAM  General: adult in NAD  HEENT: clear oropharynx, no erythema, no ulcers  CV: normal S1, S2, RRR  Lungs: clear to auscultation, no wheezes, no rales  Abdomen: +BS, soft, nontender, nondistended  Ext: decreased ROM due to pain; joint swelling; joint erythema; joint warmth; +b/l ankle join swelling and warmth. +unable to ambulate  Skin: +erythema around b/l ankles and toes, warm to touch  No lymphadedenopathy  Neuro: alert and oriented x 4, no focal deficit   PIV      RECENT CULTURES:  06-12 @ 18:00  Ankle Ankle  --  --  --    No growth  --  06-12 @ 12:57  Clean Catch Clean Catch (Midstream)  --  --  --    <10,000 CFU/mL Normal Urogenital Madelyn  --  06-12 @ 10:00  Blood Blood-Peripheral  --  --  --    No growth at 24 hours  --  06-12 @ 09:56  Blood Blood-Peripheral  --  --  --    No growth at 24 hours  --        LABS:    Blood Cultures:                           8.0    27.30 )-----------( 158      ( 14 Jun 2025 07:14 )             24.7         Mean Cell Volume : 99.6 fl  Mean Cell Hemoglobin : 32.3 pg  Mean Cell Hemoglobin Concentration : 32.4 g/dL  Auto Neutrophil # : x  Auto Lymphocyte # : x  Auto Monocyte # : x  Auto Eosinophil # : x  Auto Basophil # : x  Auto Neutrophil % : x  Auto Lymphocyte % : x  Auto Monocyte % : x  Auto Eosinophil % : x  Auto Basophil % : x      06-14    136  |  104  |  14  ----------------------------<  144[H]  4.1   |  19[L]  |  1.13    Ca    8.9      14 Jun 2025 07:15  Phos  2.9     06-14  Mg     1.8     06-14    TPro  5.6[L]  /  Alb  3.0[L]  /  TBili  0.6  /  DBili  x   /  AST  39  /  ALT  56[H]  /  AlkPhos  164[H]  06-14      Mg 1.8  Phos 2.9            Uric Acid --          RADIOLOGY & ADDITIONAL STUDIES:      < from: Xray Foot AP + Lateral, Left (06.12.25 @ 22:30) >  IMPRESSION:  No fractures or dislocations.    Tarsometatarsal alignment maintained without evidence for a Lisfranc   injury.    Mild midfoot arthritic changes with dorsal margin spurring. Preserved   remaining visualized joint spaces and no joint margin erosions.    Plantar calcaneal enthesophyte. Unremarkable distal Achilles tendon   shadow.    Generalized osteopenia otherwise no discrete suspicious lytic or blastic   lesions.    --- End of Report ---    < end of copied text >     Diagnosis: ASXL1-mutated and ZIJ44-zhfjwvm AML    Protocol/Chemo Regimen: s/p 7+3:  Daunorubicin 60 mg/m2+ Cytarabine. Cycle 1 HiDAC TBD    Day: 43    Pt endorsed: left foot/ankle pain improving, able to move legs now. +had bowel movement in afternoon    Review of Systems: Patient denied nausea, vomiting,  chest pain, cough, dyspnea, abdominal pain, constipation,  rectal pain,  rash, fatigue, headache, bleeding    Pain scale:  5/10 pain left ankle/foot    Diet: regular Ensure plus HP BID                      Allergies: No Known Allergies        ANTIMICROBIALS  cefepime   IVPB      cefepime   IVPB 2000 milliGRAM(s) IV Intermittent every 8 hours  valACYclovir 500 milliGRAM(s) Oral two times a day      HEME/ONC MEDICATIONS      STANDING MEDICATIONS  allopurinol 300 milliGRAM(s) Oral daily  atorvastatin 80 milliGRAM(s) Oral at bedtime  Biotene Dry Mouth Oral Rinse 15 milliLiter(s) Swish and Spit three times a day  bismuth subsalicylate Liquid 15 milliLiter(s) Oral two times a day  diltiazem    milliGRAM(s) Oral daily  methylPREDNISolone sodium succinate Injectable 40 milliGRAM(s) IV Push daily  pantoprazole    Tablet 40 milliGRAM(s) Oral before breakfast  polyethylene glycol 3350 17 Gram(s) Oral two times a day  sodium chloride 0.9%. 1000 milliLiter(s) IV Continuous <Continuous>  tamsulosin 0.4 milliGRAM(s) Oral at bedtime      PRN MEDICATIONS  acetaminophen     Tablet .. 650 milliGRAM(s) Oral every 6 hours PRN  HYDROmorphone  Injectable 1 milliGRAM(s) IV Push every 4 hours PRN  HYDROmorphone  Injectable 0.7 milliGRAM(s) IV Push every 4 hours PRN  ondansetron Injectable 8 milliGRAM(s) IV Push every 8 hours PRN  senna 2 Tablet(s) Oral at bedtime PRN        Vital Signs Last 24 Hrs  T(C): 37.5 (14 Jun 2025 05:24), Max: 39 (13 Jun 2025 13:04)  T(F): 99.5 (14 Jun 2025 05:24), Max: 102.2 (13 Jun 2025 13:04)  HR: 93 (14 Jun 2025 05:24) (93 - 115)  BP: 105/63 (14 Jun 2025 05:24) (99/62 - 133/75)  BP(mean): --  RR: 18 (14 Jun 2025 05:24) (18 - 18)  SpO2: 95% (14 Jun 2025 05:24) (94% - 96%)    Parameters below as of 14 Jun 2025 05:24  Patient On (Oxygen Delivery Method): room air      PHYSICAL EXAM  General: adult in NAD  HEENT: clear oropharynx, no erythema, no ulcers  CV: normal S1, S2, RRR  Lungs: clear to auscultation, no wheezes, no rales  Abdomen: +BS, soft, nontender, nondistended  Ext: decreased ROM due to pain; joint swelling; joint erythema; joint warmth; +b/l ankle join swelling and warmth. +unable to ambulate  Skin: +erythema around b/l ankles and toes, warm to touch  No lymphadedenopathy  Neuro: alert and oriented x 4, no focal deficit   PIV      RECENT CULTURES:  06-12 @ 18:00  Ankle Ankle  --  --  --    No growth  --  06-12 @ 12:57  Clean Catch Clean Catch (Midstream)  --  --  --    <10,000 CFU/mL Normal Urogenital Madelyn  --  06-12 @ 10:00  Blood Blood-Peripheral  --  --  --    No growth at 24 hours  --  06-12 @ 09:56  Blood Blood-Peripheral  --  --  --    No growth at 24 hours  --        LABS:    Blood Cultures:                           8.0    27.30 )-----------( 158      ( 14 Jun 2025 07:14 )             24.7         Mean Cell Volume : 99.6 fl  Mean Cell Hemoglobin : 32.3 pg  Mean Cell Hemoglobin Concentration : 32.4 g/dL  Auto Neutrophil # : x  Auto Lymphocyte # : x  Auto Monocyte # : x  Auto Eosinophil # : x  Auto Basophil # : x  Auto Neutrophil % : x  Auto Lymphocyte % : x  Auto Monocyte % : x  Auto Eosinophil % : x  Auto Basophil % : x      06-14    136  |  104  |  14  ----------------------------<  144[H]  4.1   |  19[L]  |  1.13    Ca    8.9      14 Jun 2025 07:15  Phos  2.9     06-14  Mg     1.8     06-14    TPro  5.6[L]  /  Alb  3.0[L]  /  TBili  0.6  /  DBili  x   /  AST  39  /  ALT  56[H]  /  AlkPhos  164[H]  06-14      Mg 1.8  Phos 2.9            Uric Acid --          RADIOLOGY & ADDITIONAL STUDIES:      < from: Xray Foot AP + Lateral, Left (06.12.25 @ 22:30) >  IMPRESSION:  No fractures or dislocations.    Tarsometatarsal alignment maintained without evidence for a Lisfranc   injury.    Mild midfoot arthritic changes with dorsal margin spurring. Preserved   remaining visualized joint spaces and no joint margin erosions.    Plantar calcaneal enthesophyte. Unremarkable distal Achilles tendon   shadow.    Generalized osteopenia otherwise no discrete suspicious lytic or blastic   lesions.    --- End of Report ---    < end of copied text >

## 2025-06-14 NOTE — PROGRESS NOTE ADULT - PROBLEM SELECTOR PLAN 1
Holding cycle 1 HiDAC  Monitor daily CBC w/ diff, CMP, replete prn. Strict I/Os. Daily weights. Antiemetics  6/13: Continue to hold HiDAC due to persistent fevers and gout flare. Holding cycle 1 HiDAC  Monitor daily CBC w/ diff, CMP, replete prn. Strict I/Os. Daily weights. Antiemetics  6/13: Continue to hold HiDAC due to fevers and gout flare.

## 2025-06-14 NOTE — PROGRESS NOTE ADULT - ASSESSMENT
70 yo male with newly diagnosed ASXL1, DDX41 mutated AML, admitted for induction chemo with 7+3:  dauno 60 mg/m2+ cytarabine. PMH  BRCA2 carrier ([id number]>T), HTN, HLD, Gout, GERD, BPH, spinal stenosis (s/p multiple procedures 2018, 2020 laminectomies / fusions), hip surgeries (Oct 2024, Jan 2025), residual hand weakness related to surgeries, umbilical hernia repair (2014/15) referred here from Dr Atkinson at [facility] in [place] for new ASXL1-mutated and JAK53-vdirjzx AML. Chemo started on 5/2, Complicated by rigors, chills, fever, SOB  and tachycardia 2 hours into day 1 infusion - likely infusion reaction, neutropenic fever. Pt has pancytopenia d/t chemo and disease

## 2025-06-15 LAB
ALBUMIN SERPL ELPH-MCNC: 2.9 G/DL — LOW (ref 3.3–5)
ALP SERPL-CCNC: 130 U/L — HIGH (ref 40–120)
ALT FLD-CCNC: 66 U/L — HIGH (ref 10–45)
ANION GAP SERPL CALC-SCNC: 12 MMOL/L — SIGNIFICANT CHANGE UP (ref 5–17)
ANISOCYTOSIS BLD QL: SIGNIFICANT CHANGE UP
AST SERPL-CCNC: 49 U/L — HIGH (ref 10–40)
BASOPHILS # BLD AUTO: 0.17 K/UL — SIGNIFICANT CHANGE UP (ref 0–0.2)
BASOPHILS NFR BLD AUTO: 0.9 % — SIGNIFICANT CHANGE UP (ref 0–2)
BILIRUB SERPL-MCNC: 0.3 MG/DL — SIGNIFICANT CHANGE UP (ref 0.2–1.2)
BUN SERPL-MCNC: 19 MG/DL — SIGNIFICANT CHANGE UP (ref 7–23)
CALCIUM SERPL-MCNC: 8.8 MG/DL — SIGNIFICANT CHANGE UP (ref 8.4–10.5)
CHLORIDE SERPL-SCNC: 105 MMOL/L — SIGNIFICANT CHANGE UP (ref 96–108)
CO2 SERPL-SCNC: 18 MMOL/L — LOW (ref 22–31)
CREAT SERPL-MCNC: 1.03 MG/DL — SIGNIFICANT CHANGE UP (ref 0.5–1.3)
EGFR: 79 ML/MIN/1.73M2 — SIGNIFICANT CHANGE UP
EGFR: 79 ML/MIN/1.73M2 — SIGNIFICANT CHANGE UP
ELLIPTOCYTES BLD QL SMEAR: SLIGHT — SIGNIFICANT CHANGE UP
EOSINOPHIL # BLD AUTO: 0.17 K/UL — SIGNIFICANT CHANGE UP (ref 0–0.5)
EOSINOPHIL NFR BLD AUTO: 0.9 % — SIGNIFICANT CHANGE UP (ref 0–6)
GIANT PLATELETS BLD QL SMEAR: PRESENT — SIGNIFICANT CHANGE UP
GLUCOSE SERPL-MCNC: 151 MG/DL — HIGH (ref 70–99)
HCT VFR BLD CALC: 24.8 % — LOW (ref 39–50)
HGB BLD-MCNC: 7.9 G/DL — LOW (ref 13–17)
LDH SERPL L TO P-CCNC: 231 U/L — SIGNIFICANT CHANGE UP (ref 50–242)
LYMPHOCYTES # BLD AUTO: 0.49 K/UL — LOW (ref 1–3.3)
LYMPHOCYTES # BLD AUTO: 2.6 % — LOW (ref 13–44)
MACROCYTES BLD QL: SIGNIFICANT CHANGE UP
MAGNESIUM SERPL-MCNC: 2.1 MG/DL — SIGNIFICANT CHANGE UP (ref 1.6–2.6)
MANUAL SMEAR VERIFICATION: SIGNIFICANT CHANGE UP
MCHC RBC-ENTMCNC: 31.9 G/DL — LOW (ref 32–36)
MCHC RBC-ENTMCNC: 32.1 PG — SIGNIFICANT CHANGE UP (ref 27–34)
MCV RBC AUTO: 100.8 FL — HIGH (ref 80–100)
MONOCYTES # BLD AUTO: 1.33 K/UL — HIGH (ref 0–0.9)
MONOCYTES NFR BLD AUTO: 7 % — SIGNIFICANT CHANGE UP (ref 2–14)
NEUTROPHILS # BLD AUTO: 16.77 K/UL — HIGH (ref 1.8–7.4)
NEUTROPHILS NFR BLD AUTO: 88.6 % — HIGH (ref 43–77)
PHOSPHATE SERPL-MCNC: 3.1 MG/DL — SIGNIFICANT CHANGE UP (ref 2.5–4.5)
PLAT MORPH BLD: ABNORMAL
PLATELET # BLD AUTO: 138 K/UL — LOW (ref 150–400)
POIKILOCYTOSIS BLD QL AUTO: SLIGHT — SIGNIFICANT CHANGE UP
POLYCHROMASIA BLD QL SMEAR: SLIGHT — SIGNIFICANT CHANGE UP
POTASSIUM SERPL-MCNC: 4.3 MMOL/L — SIGNIFICANT CHANGE UP (ref 3.5–5.3)
POTASSIUM SERPL-SCNC: 4.3 MMOL/L — SIGNIFICANT CHANGE UP (ref 3.5–5.3)
PROT SERPL-MCNC: 5.4 G/DL — LOW (ref 6–8.3)
RBC # BLD: 2.46 M/UL — LOW (ref 4.2–5.8)
RBC # FLD: 20.5 % — HIGH (ref 10.3–14.5)
RBC BLD AUTO: ABNORMAL
SCHISTOCYTES BLD QL AUTO: SLIGHT — SIGNIFICANT CHANGE UP
SODIUM SERPL-SCNC: 135 MMOL/L — SIGNIFICANT CHANGE UP (ref 135–145)
WBC # BLD: 18.93 K/UL — HIGH (ref 3.8–10.5)
WBC # FLD AUTO: 18.93 K/UL — HIGH (ref 3.8–10.5)

## 2025-06-15 PROCEDURE — 99233 SBSQ HOSP IP/OBS HIGH 50: CPT

## 2025-06-15 RX ORDER — CYTARABINE 20 MG/ML
4400 VIAL (ML) INJECTION EVERY 12 HOURS
Refills: 0 | Status: COMPLETED | OUTPATIENT
Start: 2025-06-15 | End: 2025-06-18

## 2025-06-15 RX ORDER — ONDANSETRON HCL/PF 4 MG/2 ML
8 VIAL (ML) INJECTION EVERY 8 HOURS
Refills: 0 | Status: COMPLETED | OUTPATIENT
Start: 2025-06-15 | End: 2025-06-19

## 2025-06-15 RX ORDER — FOSAPREPITANT 150 MG/5ML
150 INJECTION, POWDER, LYOPHILIZED, FOR SOLUTION INTRAVENOUS ONCE
Refills: 0 | Status: COMPLETED | OUTPATIENT
Start: 2025-06-15 | End: 2025-06-15

## 2025-06-15 RX ORDER — POLYETHYLENE GLYCOL 3350 17 G/17G
17 POWDER, FOR SOLUTION ORAL DAILY
Refills: 0 | Status: DISCONTINUED | OUTPATIENT
Start: 2025-06-15 | End: 2025-06-17

## 2025-06-15 RX ORDER — METOCLOPRAMIDE HCL 10 MG
10 TABLET ORAL EVERY 6 HOURS
Refills: 0 | Status: DISCONTINUED | OUTPATIENT
Start: 2025-06-15 | End: 2025-06-19

## 2025-06-15 RX ADMIN — Medication 15 MILLILITER(S): at 05:42

## 2025-06-15 RX ADMIN — Medication 15 MILLILITER(S): at 13:24

## 2025-06-15 RX ADMIN — Medication 500 MILLIGRAM(S): at 17:02

## 2025-06-15 RX ADMIN — Medication 2 DROP(S): at 17:03

## 2025-06-15 RX ADMIN — Medication 40 MILLIGRAM(S): at 05:43

## 2025-06-15 RX ADMIN — Medication 1 MILLIGRAM(S): at 19:44

## 2025-06-15 RX ADMIN — Medication 300 MILLIGRAM(S): at 05:44

## 2025-06-15 RX ADMIN — Medication 1 MILLIGRAM(S): at 10:14

## 2025-06-15 RX ADMIN — CEFEPIME 100 MILLIGRAM(S): 2 INJECTION, POWDER, FOR SOLUTION INTRAVENOUS at 13:24

## 2025-06-15 RX ADMIN — Medication 8 MILLIGRAM(S): at 16:02

## 2025-06-15 RX ADMIN — Medication 1 MILLIGRAM(S): at 05:48

## 2025-06-15 RX ADMIN — Medication 75 MILLILITER(S): at 10:14

## 2025-06-15 RX ADMIN — POLYETHYLENE GLYCOL 3350 17 GRAM(S): 17 POWDER, FOR SOLUTION ORAL at 13:24

## 2025-06-15 RX ADMIN — Medication 0.7 MILLIGRAM(S): at 12:50

## 2025-06-15 RX ADMIN — Medication 1 MILLIGRAM(S): at 04:48

## 2025-06-15 RX ADMIN — Medication 1 MILLIGRAM(S): at 20:44

## 2025-06-15 RX ADMIN — Medication 0.7 MILLIGRAM(S): at 17:07

## 2025-06-15 RX ADMIN — Medication 0.7 MILLIGRAM(S): at 02:15

## 2025-06-15 RX ADMIN — DILTIAZEM HYDROCHLORIDE 120 MILLIGRAM(S): 240 TABLET, EXTENDED RELEASE ORAL at 05:43

## 2025-06-15 RX ADMIN — TAMSULOSIN HYDROCHLORIDE 0.4 MILLIGRAM(S): 0.4 CAPSULE ORAL at 21:40

## 2025-06-15 RX ADMIN — Medication 1 MILLIGRAM(S): at 11:14

## 2025-06-15 RX ADMIN — BISMUTH SUBSALICYLATE 15 MILLILITER(S): 262 TABLET, CHEWABLE ORAL at 05:43

## 2025-06-15 RX ADMIN — Medication 1 MILLIGRAM(S): at 16:09

## 2025-06-15 RX ADMIN — Medication 1 MILLIGRAM(S): at 15:09

## 2025-06-15 RX ADMIN — ATORVASTATIN CALCIUM 80 MILLIGRAM(S): 80 TABLET, FILM COATED ORAL at 21:40

## 2025-06-15 RX ADMIN — CEFEPIME 100 MILLIGRAM(S): 2 INJECTION, POWDER, FOR SOLUTION INTRAVENOUS at 05:45

## 2025-06-15 RX ADMIN — Medication 0.7 MILLIGRAM(S): at 02:50

## 2025-06-15 RX ADMIN — Medication 1 MILLIGRAM(S): at 01:37

## 2025-06-15 RX ADMIN — Medication 0.7 MILLIGRAM(S): at 08:30

## 2025-06-15 RX ADMIN — Medication 15 MILLILITER(S): at 21:39

## 2025-06-15 RX ADMIN — Medication 500 MILLIGRAM(S): at 05:43

## 2025-06-15 RX ADMIN — Medication 4400 MILLIGRAM(S): at 16:07

## 2025-06-15 RX ADMIN — Medication 2 DROP(S): at 23:54

## 2025-06-15 RX ADMIN — CEFEPIME 100 MILLIGRAM(S): 2 INJECTION, POWDER, FOR SOLUTION INTRAVENOUS at 21:36

## 2025-06-15 RX ADMIN — Medication 0.7 MILLIGRAM(S): at 13:50

## 2025-06-15 RX ADMIN — Medication 8 MILLIGRAM(S): at 21:40

## 2025-06-15 RX ADMIN — Medication 0.7 MILLIGRAM(S): at 08:08

## 2025-06-15 RX ADMIN — METHYLPREDNISOLONE ACETATE 40 MILLIGRAM(S): 80 INJECTION, SUSPENSION INTRA-ARTICULAR; INTRALESIONAL; INTRAMUSCULAR; SOFT TISSUE at 05:44

## 2025-06-15 RX ADMIN — Medication 0.7 MILLIGRAM(S): at 22:39

## 2025-06-15 RX ADMIN — BISMUTH SUBSALICYLATE 15 MILLILITER(S): 262 TABLET, CHEWABLE ORAL at 17:02

## 2025-06-15 RX ADMIN — FOSAPREPITANT 300 MILLIGRAM(S): 150 INJECTION, POWDER, LYOPHILIZED, FOR SOLUTION INTRAVENOUS at 15:21

## 2025-06-15 RX ADMIN — Medication 0.7 MILLIGRAM(S): at 18:07

## 2025-06-15 RX ADMIN — Medication 0.7 MILLIGRAM(S): at 21:39

## 2025-06-15 RX ADMIN — Medication 1 MILLIGRAM(S): at 00:37

## 2025-06-15 NOTE — PROGRESS NOTE ADULT - PROBLEM SELECTOR PLAN 2
Not neutropenic, febrile  Valtrex 500mg BID. Cefepime  6/12 BCX/UCX/CXR, full RVP ordered f/u.  6/13 Febrile 102F, Started Cefepime 2gq8. CXR neg. pending infectious workup results. Not neutropenic, afebrile. HiDAC can cause fevers  Valtrex 500mg BID. Cefepime  6/12 BCX/UCX/CXR negative. Full RVP negative  6/13 Febrile 102F, Started Cefepime 2gq8. CXR neg

## 2025-06-15 NOTE — PHYSICAL THERAPY INITIAL EVALUATION ADULT - PERTINENT HX OF CURRENT PROBLEM, REHAB EVAL
70 yo male with MHx significant for BRCA2 carrier ([id number] 9610C>T), HTN, HLD, Gout, GERD, BPH, spinal stenosis (s/p multiple procedures 2018, 2020 laminectomies / fusions), hip surgeries (Oct 2024, Jan 2025), residual hand weakness related to surgeries, umbilical hernia repair (2014/15) and ASXL1-mutated and YQS28-auahsdn AML. 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 Patient is s/p induction chemotherapy with daunorubicin/cytarabine with remission marrow on 5/29/25 showing Cellular bone marrow (50-60% cellularity ) with myeloid predominant trilineage hematopoiesis. Patient was admitted on 5/2/25 for Induction with Daunorubicin 60 mg/m2 days 1-3 and cytarabine 100 mg/m2 days 1-7. His day 15 BM bx and aspirate performed on 5/16/25 showed no evidence of persistent leukemia with low 0.34% myeloblasts on flow, however specimen was subcortical, suboptimal. Patient initially due for cycle 1 hidac consolidation however presenting with fevers x3 days, and acute on chronic gout attack causing inability to walk secondary to pain. Pt reports 2 days ago he fell d/t pain when trying to ambulate to the bathroom. Xray L foot: No fractures or dislocations.

## 2025-06-15 NOTE — PROGRESS NOTE ADULT - SUBJECTIVE AND OBJECTIVE BOX
Diagnosis: ASXL1-mutated and FNM11-usiwsbh AML    Protocol/Chemo Regimen: s/p 7+3:  Daunorubicin 60 mg/m2+ Cytarabine. Cycle 1 HiDAC TBD    Day: 44    Pt endorsed: left foot/ankle pain improving, able to move legs now. +had bowel movement in afternoon    Review of Systems: Patient denied nausea, vomiting,  chest pain, cough, dyspnea, abdominal pain, constipation,  rectal pain,  rash, fatigue, headache, bleeding    Pain scale:  5/10 pain left ankle/foot    Diet: regular Ensure plus HP BID                      Allergies: No Known Allergies      ANTIMICROBIALS  cefepime   IVPB      cefepime   IVPB 2000 milliGRAM(s) IV Intermittent every 8 hours  valACYclovir 500 milliGRAM(s) Oral two times a day      STANDING MEDICATIONS  allopurinol 300 milliGRAM(s) Oral daily  atorvastatin 80 milliGRAM(s) Oral at bedtime  Biotene Dry Mouth Oral Rinse 15 milliLiter(s) Swish and Spit three times a day  bismuth subsalicylate Liquid 15 milliLiter(s) Oral two times a day  diltiazem    milliGRAM(s) Oral daily  methylPREDNISolone sodium succinate Injectable 40 milliGRAM(s) IV Push daily  pantoprazole    Tablet 40 milliGRAM(s) Oral before breakfast  polyethylene glycol 3350 17 Gram(s) Oral two times a day  sodium chloride 0.9%. 1000 milliLiter(s) IV Continuous <Continuous>  tamsulosin 0.4 milliGRAM(s) Oral at bedtime      PRN MEDICATIONS  acetaminophen     Tablet .. 650 milliGRAM(s) Oral every 6 hours PRN  HYDROmorphone  Injectable 1 milliGRAM(s) IV Push every 4 hours PRN  HYDROmorphone  Injectable 0.7 milliGRAM(s) IV Push every 4 hours PRN  ondansetron Injectable 8 milliGRAM(s) IV Push every 8 hours PRN  senna 2 Tablet(s) Oral at bedtime PRN  simethicone 80 milliGRAM(s) Chew two times a day PRN        Vital Signs Last 24 Hrs  T(C): 36.8 (15 Marco A 2025 05:29), Max: 37 (14 Jun 2025 17:25)  T(F): 98.3 (15 Marco A 2025 05:29), Max: 98.6 (14 Jun 2025 17:25)  HR: 74 (15 Marco A 2025 05:29) (73 - 95)  BP: 120/65 (15 Marco A 2025 05:29) (101/63 - 125/71)  BP(mean): --  RR: 16 (15 Marco A 2025 05:29) (16 - 18)  SpO2: 100% (15 Marco A 2025 05:29) (94% - 100%)    Parameters below as of 15 Marco A 2025 05:29  Patient On (Oxygen Delivery Method): room air        PHYSICAL EXAM  General: adult in NAD  HEENT: clear oropharynx, no erythema, no ulcers  CV: normal S1, S2, RRR  Lungs: clear to auscultation, no wheezes, no rales  Abdomen: +BS, soft, nontender, nondistended  Ext: decreased ROM due to pain; joint swelling; joint erythema; joint warmth; +b/l ankle join swelling and warmth. +unable to ambulate  Skin: +erythema around b/l ankles and toes, warm to touch  No lymphadedenopathy  Neuro: alert and oriented x 4, no focal deficit   PIV    RECENT CULTURES:  06-12 @ 18:00  Ankle Ankle  --  --  --    No growth  --  06-12 @ 12:57  Clean Catch Clean Catch (Midstream)  --  --  --    <10,000 CFU/mL Normal Urogenital Madelyn  --  06-12 @ 10:00  Blood Blood-Peripheral  --  --  --    No growth at 48 Hours  --  06-12 @ 09:56  Blood Blood-Peripheral  --  --  --    No growth at 48 Hours  --        LABS:                        7.9    18.93 )-----------( 138      ( 15 Marco A 2025 06:44 )             24.8         Mean Cell Volume : 100.8 fl  Mean Cell Hemoglobin : 32.1 pg  Mean Cell Hemoglobin Concentration : 31.9 g/dL  Auto Neutrophil # : x  Auto Lymphocyte # : x  Auto Monocyte # : x  Auto Eosinophil # : x  Auto Basophil # : x  Auto Neutrophil % : x  Auto Lymphocyte % : x  Auto Monocyte % : x  Auto Eosinophil % : x  Auto Basophil % : x      06-14    136  |  104  |  14  ----------------------------<  144[H]  4.1   |  19[L]  |  1.13    Ca    8.9      14 Jun 2025 07:15  Phos  2.9     06-14  Mg     1.8     06-14    TPro  5.6[L]  /  Alb  3.0[L]  /  TBili  0.6  /  DBili  x   /  AST  39  /  ALT  56[H]  /  AlkPhos  164[H]  06-14            RADIOLOGY & ADDITIONAL STUDIES:      < from: Xray Foot AP + Lateral, Left (06.12.25 @ 22:30) >  IMPRESSION:  No fractures or dislocations.    Tarsometatarsal alignment maintained without evidence for a Lisfranc   injury.    Mild midfoot arthritic changes with dorsal margin spurring. Preserved   remaining visualized joint spaces and no joint margin erosions.    Plantar calcaneal enthesophyte. Unremarkable distal Achilles tendon   shadow.    Generalized osteopenia otherwise no discrete suspicious lytic or blastic   lesions.    --- End of Report ---    < end of copied text >     Diagnosis: ASXL1-mutated and SNH80-bisazwy AML    Protocol/Chemo Regimen: s/p 7+3:  Daunorubicin 60 mg/m2+ Cytarabine. Cycle 1 HiDAC TBD    Day: 44    Pt endorsed: left foot/ankle pain improving, able to ambulate with PT. +chronic neck/back pain    Review of Systems: Patient denied nausea, vomiting,  chest pain, cough, dyspnea, abdominal pain, constipation,  rectal pain,  rash, fatigue, headache, bleeding    Pain scale: 4/10    Diet: regular Ensure plus HP BID                      Allergies: No Known Allergies      ANTIMICROBIALS  cefepime   IVPB      cefepime   IVPB 2000 milliGRAM(s) IV Intermittent every 8 hours  valACYclovir 500 milliGRAM(s) Oral two times a day      STANDING MEDICATIONS  allopurinol 300 milliGRAM(s) Oral daily  atorvastatin 80 milliGRAM(s) Oral at bedtime  Biotene Dry Mouth Oral Rinse 15 milliLiter(s) Swish and Spit three times a day  bismuth subsalicylate Liquid 15 milliLiter(s) Oral two times a day  diltiazem    milliGRAM(s) Oral daily  methylPREDNISolone sodium succinate Injectable 40 milliGRAM(s) IV Push daily  pantoprazole    Tablet 40 milliGRAM(s) Oral before breakfast  polyethylene glycol 3350 17 Gram(s) Oral two times a day  sodium chloride 0.9%. 1000 milliLiter(s) IV Continuous <Continuous>  tamsulosin 0.4 milliGRAM(s) Oral at bedtime      PRN MEDICATIONS  acetaminophen     Tablet .. 650 milliGRAM(s) Oral every 6 hours PRN  HYDROmorphone  Injectable 1 milliGRAM(s) IV Push every 4 hours PRN  HYDROmorphone  Injectable 0.7 milliGRAM(s) IV Push every 4 hours PRN  ondansetron Injectable 8 milliGRAM(s) IV Push every 8 hours PRN  senna 2 Tablet(s) Oral at bedtime PRN  simethicone 80 milliGRAM(s) Chew two times a day PRN        Vital Signs Last 24 Hrs  T(C): 36.8 (15 Marco A 2025 05:29), Max: 37 (14 Jun 2025 17:25)  T(F): 98.3 (15 Marco A 2025 05:29), Max: 98.6 (14 Jun 2025 17:25)  HR: 74 (15 Marco A 2025 05:29) (73 - 95)  BP: 120/65 (15 Marco A 2025 05:29) (101/63 - 125/71)  BP(mean): --  RR: 16 (15 Marco A 2025 05:29) (16 - 18)  SpO2: 100% (15 Marco A 2025 05:29) (94% - 100%)    Parameters below as of 15 Marco A 2025 05:29  Patient On (Oxygen Delivery Method): room air        PHYSICAL EXAM  General: adult in NAD  HEENT: clear oropharynx, no erythema, no ulcers  CV: normal S1, S2, RRR  Lungs: clear to auscultation, no wheezes, no rales  Abdomen: +BS, soft, nontender, nondistended  Ext: decreased ROM due to pain; joint swelling; joint erythema; joint warmth; +b/l ankle join swelling and warmth. +unable to ambulate  Skin: +erythema around b/l ankles and toes, warm to touch  No lymphadedenopathy  Neuro: alert and oriented x 4, no focal deficit   PIV    RECENT CULTURES:  06-12 @ 18:00  Ankle Ankle  --  --  --    No growth  --  06-12 @ 12:57  Clean Catch Clean Catch (Midstream)  --  --  --    <10,000 CFU/mL Normal Urogenital Madelyn  --  06-12 @ 10:00  Blood Blood-Peripheral  --  --  --    No growth at 48 Hours  --  06-12 @ 09:56  Blood Blood-Peripheral  --  --  --    No growth at 48 Hours  --        LABS:                        7.9    18.93 )-----------( 138      ( 15 Marco A 2025 06:44 )             24.8         Mean Cell Volume : 100.8 fl  Mean Cell Hemoglobin : 32.1 pg  Mean Cell Hemoglobin Concentration : 31.9 g/dL  Auto Neutrophil # : x  Auto Lymphocyte # : x  Auto Monocyte # : x  Auto Eosinophil # : x  Auto Basophil # : x  Auto Neutrophil % : x  Auto Lymphocyte % : x  Auto Monocyte % : x  Auto Eosinophil % : x  Auto Basophil % : x      06-14    136  |  104  |  14  ----------------------------<  144[H]  4.1   |  19[L]  |  1.13    Ca    8.9      14 Jun 2025 07:15  Phos  2.9     06-14  Mg     1.8     06-14    TPro  5.6[L]  /  Alb  3.0[L]  /  TBili  0.6  /  DBili  x   /  AST  39  /  ALT  56[H]  /  AlkPhos  164[H]  06-14            RADIOLOGY & ADDITIONAL STUDIES:      < from: Xray Foot AP + Lateral, Left (06.12.25 @ 22:30) >  IMPRESSION:  No fractures or dislocations.    Tarsometatarsal alignment maintained without evidence for a Lisfranc   injury.    Mild midfoot arthritic changes with dorsal margin spurring. Preserved   remaining visualized joint spaces and no joint margin erosions.    Plantar calcaneal enthesophyte. Unremarkable distal Achilles tendon   shadow.    Generalized osteopenia otherwise no discrete suspicious lytic or blastic   lesions.    --- End of Report ---    < end of copied text >     Diagnosis: ASXL1-mutated and XZX17-lefwvfj AML    Protocol/Chemo Regimen: Cycle 1 HiDAC 2gm/m2    Day: 1    Pt endorsed: left foot/ankle pain improving, able to ambulate with PT. +chronic neck/back pain    Review of Systems: Patient denied nausea, vomiting,  chest pain, cough, dyspnea, abdominal pain, constipation,  rectal pain,  rash, fatigue, headache, bleeding    Pain scale: 4/10    Diet: regular Ensure plus HP BID                      Allergies: No Known Allergies      ANTIMICROBIALS  cefepime   IVPB      cefepime   IVPB 2000 milliGRAM(s) IV Intermittent every 8 hours  valACYclovir 500 milliGRAM(s) Oral two times a day      STANDING MEDICATIONS  allopurinol 300 milliGRAM(s) Oral daily  atorvastatin 80 milliGRAM(s) Oral at bedtime  Biotene Dry Mouth Oral Rinse 15 milliLiter(s) Swish and Spit three times a day  bismuth subsalicylate Liquid 15 milliLiter(s) Oral two times a day  diltiazem    milliGRAM(s) Oral daily  methylPREDNISolone sodium succinate Injectable 40 milliGRAM(s) IV Push daily  pantoprazole    Tablet 40 milliGRAM(s) Oral before breakfast  polyethylene glycol 3350 17 Gram(s) Oral two times a day  sodium chloride 0.9%. 1000 milliLiter(s) IV Continuous <Continuous>  tamsulosin 0.4 milliGRAM(s) Oral at bedtime      PRN MEDICATIONS  acetaminophen     Tablet .. 650 milliGRAM(s) Oral every 6 hours PRN  HYDROmorphone  Injectable 1 milliGRAM(s) IV Push every 4 hours PRN  HYDROmorphone  Injectable 0.7 milliGRAM(s) IV Push every 4 hours PRN  ondansetron Injectable 8 milliGRAM(s) IV Push every 8 hours PRN  senna 2 Tablet(s) Oral at bedtime PRN  simethicone 80 milliGRAM(s) Chew two times a day PRN        Vital Signs Last 24 Hrs  T(C): 36.8 (15 Marco A 2025 05:29), Max: 37 (14 Jun 2025 17:25)  T(F): 98.3 (15 Marco A 2025 05:29), Max: 98.6 (14 Jun 2025 17:25)  HR: 74 (15 Marco A 2025 05:29) (73 - 95)  BP: 120/65 (15 Marco A 2025 05:29) (101/63 - 125/71)  BP(mean): --  RR: 16 (15 Marco A 2025 05:29) (16 - 18)  SpO2: 100% (15 Marco A 2025 05:29) (94% - 100%)    Parameters below as of 15 Marco A 2025 05:29  Patient On (Oxygen Delivery Method): room air        PHYSICAL EXAM  General: adult in NAD  HEENT: clear oropharynx, no erythema, no ulcers  CV: normal S1, S2, RRR  Lungs: clear to auscultation, no wheezes, no rales  Abdomen: +BS, soft, nontender, nondistended  Ext: decreased ROM due to pain; joint swelling; joint erythema; joint warmth; +b/l ankle join swelling and warmth. +unable to ambulate  Skin: +erythema around b/l ankles and toes, warm to touch  No lymphadedenopathy  Neuro: alert and oriented x 4  PIV    RECENT CULTURES:  06-12 @ 18:00  Ankle Ankle  --  --  --    No growth  --  06-12 @ 12:57  Clean Catch Clean Catch (Midstream)  --  --  --    <10,000 CFU/mL Normal Urogenital Madelyn  --  06-12 @ 10:00  Blood Blood-Peripheral  --  --  --    No growth at 48 Hours  --  06-12 @ 09:56  Blood Blood-Peripheral  --  --  --    No growth at 48 Hours  --        LABS:                        7.9    18.93 )-----------( 138      ( 15 Marco A 2025 06:44 )             24.8         Mean Cell Volume : 100.8 fl  Mean Cell Hemoglobin : 32.1 pg  Mean Cell Hemoglobin Concentration : 31.9 g/dL  Auto Neutrophil # : x  Auto Lymphocyte # : x  Auto Monocyte # : x  Auto Eosinophil # : x  Auto Basophil # : x  Auto Neutrophil % : x  Auto Lymphocyte % : x  Auto Monocyte % : x  Auto Eosinophil % : x  Auto Basophil % : x      06-14    136  |  104  |  14  ----------------------------<  144[H]  4.1   |  19[L]  |  1.13    Ca    8.9      14 Jun 2025 07:15  Phos  2.9     06-14  Mg     1.8     06-14    TPro  5.6[L]  /  Alb  3.0[L]  /  TBili  0.6  /  DBili  x   /  AST  39  /  ALT  56[H]  /  AlkPhos  164[H]  06-14            RADIOLOGY & ADDITIONAL STUDIES:      < from: Xray Foot AP + Lateral, Left (06.12.25 @ 22:30) >  IMPRESSION:  No fractures or dislocations.    Tarsometatarsal alignment maintained without evidence for a Lisfranc   injury.    Mild midfoot arthritic changes with dorsal margin spurring. Preserved   remaining visualized joint spaces and no joint margin erosions.    Plantar calcaneal enthesophyte. Unremarkable distal Achilles tendon   shadow.    Generalized osteopenia otherwise no discrete suspicious lytic or blastic   lesions.    --- End of Report ---    < end of copied text >

## 2025-06-15 NOTE — PHYSICAL THERAPY INITIAL EVALUATION ADULT - ADDITIONAL COMMENTS
As per the pt, he lives in a PH, -[facility], bedroom/bathroom on the main floor, stated he's IND with all mobillty. pt has RW and cane at home.

## 2025-06-15 NOTE — PHYSICAL THERAPY INITIAL EVALUATION ADULT - NSPTDISCHREC_GEN_A_CORE
home with assistance of family and home PT for safety assessment, fall prevention and to improve strength, balance and ADls/gait functions for safe return to PLOF. Pt has RW and cane at home./Home PT

## 2025-06-15 NOTE — PHYSICAL THERAPY INITIAL EVALUATION ADULT - PHYSICAL ASSIST/NONPHYSICAL ASSIST: SUPINE/SIT, REHAB EVAL
If you are a smoker, it is important for your health to stop smoking. Please be aware that second hand smoke is also harmful.
supervision

## 2025-06-15 NOTE — PROGRESS NOTE ADULT - ASSESSMENT
70 yo male with newly diagnosed ASXL1, DDX41 mutated AML, admitted for induction chemo with 7+3:  dauno 60 mg/m2+ cytarabine. 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 Chemo started on 5/2, Complicated by rigors, chills, fever, SOB  and tachycardia 2 hours into day 1 infusion - likely infusion reaction, neutropenic fever. 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 Initially scheduled to begin cycle 1 HiDAC however presents with 3 days of fevers and acute on chronic gout attack causing debility, s/p fall secondary to pain. Cycle 1 HiDAC started on 6/15. Pt has pancytopenia d/t chemo and disease

## 2025-06-15 NOTE — PHYSICAL THERAPY INITIAL EVALUATION ADULT - FUNCTIONAL LIMITATIONS, PT EVAL
[facility]- Outpatient Rehabilitation and Therapy [address], [place] [address] office: [phone] fax: [phone]      Physical Therapy: TREATMENT/PROGRESS NOTE   Patient: Brien Guajardo (24 y.o. male)   Treatment Date: 2024   :  1999 MRN: [id number]   Visit #: 2    Insurance: Payor: ESIS / Plan: ESIS / Product Type: *No Product type* /   Insurance ID: [id number] - (Worker's Comp)  Secondary Insurance (if applicable):    Treatment Diagnosis: Bilateral ankle pain   Medical Diagnosis:       Closed traumatic nondisplaced fracture of posterior malleolus of right tibia with routine healing, subsequent encounter [S82.391D]  Closed nondisplaced Maisonneuve fracture of right lower extremity with routine healing, subsequent encounter [S82.864D]  Closed nondisplaced oblique fracture of shaft of right fibula with routine healing, subsequent encounter [S82.434D]  Acute bilateral ankle pain [M25.571, M25.572]   Referring Physician: Héctor Hummel MD  PCP: No primary care provider on file.                             Plan of care signed (Y/N):     Date of Patient follow up with Physician:      Progress Report Due: 2/15/24    POC due: 4/15/24     Visit # Insurance Allowable Auth Needed    12- no end date []Yes    []No     Latex Allergy:  [x]NO      []YES   Preferred Language for Healthcare:   [x]English       []other:    Assessment Summary: Brien Guajardo is a 24 y.o. male presenting today to Outpatient PT with primary complaint of bilateral ankle pain which has been occurring since end of October after fall off ladder. Pt is noted to have reduced left ankle ROM. Strength is normal. Mild gait deviation.     SUBJECTIVE EXAMINATION     Patient Report/Comments: see eval    OBJECTIVE EXAMINATION     Observation:     Test measurements:      Test used Initial score 2024   Pain Summary VAS 4    Functional questionnaire LEFS 26%  
self-care

## 2025-06-15 NOTE — PROGRESS NOTE ADULT - NS ATTEND AMEND GEN_ALL_CORE FT
Primary: Paulie    Assessment: 68 yo male with ASXL1-mutated and OFG22-bbfvoum AML here for planned HiDAC - temporarily on HOLD. Course complciated by acute gout flare and severe pain of right ankle. Today is Induction Day 44. Plan for HiDAC 6/15/25 if stable.    Heme:   - Planned for HiDAC, however due to fevers, urinary symptoms and acute gout, will stabilize inpatient then resume AML-directed therapy.  - Continue daily oral B12 suppl    ID: Fevers x 3 days, dark urine, less urine output, UA clear, RVP neg  - leukocytosis related to steroids  - Not Neutropenic  - Infectious workup underway, start cefepime (6/13/25 - )    MSK:  - Acute on chronic gout: Rheum consult appreciated, steroids ok  - Improvement from 10/10 to 5/10 pain today, now able to take steps.    Dispo:  - Discharge on day 4 of consolidation if stable, GCSF once between days 5-7, EDC Primary: Chitty    Assessment: 69M HiDAC Cycle 1 Day 1 for ASXL1-mutated and SSG53-figqbqk AML. Course complicated by acute gout flare and severe pain of right ankle. Started HiDAC on Induction Day 45.    Heme:   - HiDAC 2g/m2 days 1,2,3; long-acting GCSF outpatient  - Continue allopurinol 300 mg once a day  - Continue daily oral B12 suppl    ID: Fevers x 3 days, dark urine, less urine output, UA clear, RVP neg  - leukocytosis related to steroids  - Not Neutropenic  - s/p cefepime (6/13/25 - 6/15/25)    MSK:  - Acute on chronic gout: Rheum consult appreciated, now on steroids  - Improvement, now ambulating  - acute on chronic pain: Dilaudid for now, will need chronic pain to determine if outpatient regimen should be adjusted.    Dispo:  - Discharge on day 4 of consolidation if stable, GCSF once between days 5-7, EDC

## 2025-06-15 NOTE — PROGRESS NOTE ADULT - PROBLEM SELECTOR PLAN 1
Holding cycle 1 HiDAC  Monitor daily CBC w/ diff, CMP, replete prn. Strict I/Os. Daily weights. Antiemetics  6/13: Continue to hold HiDAC due to fevers and gout flare. Holding cycle 1 HiDAC  Monitor daily CBC w/ diff, CMP, replete prn. Strict I/Os. Daily weights. Antiemetics  Monitor for neurotoxicity; cerebellar testing, finger to nose, nystagmus checks.   6/15 start cycle 1 HiDAC Holding cycle 1 HiDAC  Monitor daily CBC w/ diff, CMP, replete prn. Strict I/Os. Daily weights. Antiemetics  Monitor for neurotoxicity; cerebellar testing, finger to nose, nystagmus checks. Handwriting checks daily.  6/15 start cycle 1 HiDAC

## 2025-06-15 NOTE — ADVANCED PRACTICE NURSE CONSULT - ASSESSMENT
Pt A/Ox4, vital signs stable, afebrile. Labs reviewed on rounds by Dr Apodaca. L 20G PIV placed today, easily flushed, with positive blood return. Site intact, no redness, swelling, or pain. Pt recicved 150mg Emend IVPBx1 as premedication. Pt receiving Zofran 8mg IV q8hrs. 2 certified RN verification completed. Pt educated on chemotherapy, verbalized understanding. Completed pt nystagmus and writing check verification- negative. Day 1/3, at 1607, Pt received cytarabine 2000mg/m2= 4400mg IV over 3hrs connected to lowest port of normal saline via alaris pump. Call bell in reach, safety maintained. Continuing hourly rounds. Pt educated on safety and fall prevention. Primary RN aware.  Pt A/Ox4, vital signs stable, afebrile. Labs reviewed on rounds by Dr Apodaca. L 20G PIV placed today, easily flushed, with positive blood return. Site intact, no redness, swelling, or pain. Pt MUGA on 5/2/25 EF=63% Pt received 150mg Emend IVPBx1 as premedication. Pt receiving Zofran 8mg IV q8hrs. 2 certified RN verification completed. Pt educated on chemotherapy, verbalized understanding. Completed pt nystagmus and writing check verification- negative. Day 1/3, at 1607, Pt received cytarabine 2000mg/m2= 4400mg IV over 3hrs connected to lowest port of normal saline via alaris pump. Call bell in reach, safety maintained. Continuing hourly rounds. Pt educated on safety and fall prevention. Primary RN aware.

## 2025-06-16 LAB
ALBUMIN SERPL ELPH-MCNC: 3.4 G/DL — SIGNIFICANT CHANGE UP (ref 3.3–5)
ALP SERPL-CCNC: 137 U/L — HIGH (ref 40–120)
ALT FLD-CCNC: 119 U/L — HIGH (ref 10–45)
ANION GAP SERPL CALC-SCNC: 14 MMOL/L — SIGNIFICANT CHANGE UP (ref 5–17)
APTT BLD: 25.7 SEC — LOW (ref 26.1–36.8)
AST SERPL-CCNC: 72 U/L — HIGH (ref 10–40)
BASOPHILS # BLD AUTO: 0.03 K/UL — SIGNIFICANT CHANGE UP (ref 0–0.2)
BASOPHILS NFR BLD AUTO: 0.1 % — SIGNIFICANT CHANGE UP (ref 0–2)
BILIRUB SERPL-MCNC: 0.4 MG/DL — SIGNIFICANT CHANGE UP (ref 0.2–1.2)
BLD GP AB SCN SERPL QL: NEGATIVE — SIGNIFICANT CHANGE UP
BUN SERPL-MCNC: 22 MG/DL — SIGNIFICANT CHANGE UP (ref 7–23)
CALCIUM SERPL-MCNC: 9.2 MG/DL — SIGNIFICANT CHANGE UP (ref 8.4–10.5)
CHLORIDE SERPL-SCNC: 103 MMOL/L — SIGNIFICANT CHANGE UP (ref 96–108)
CO2 SERPL-SCNC: 20 MMOL/L — LOW (ref 22–31)
CREAT SERPL-MCNC: 0.92 MG/DL — SIGNIFICANT CHANGE UP (ref 0.5–1.3)
D DIMER BLD IA.RAPID-MCNC: 1866 NG/ML DDU — HIGH
EGFR: 91 ML/MIN/1.73M2 — SIGNIFICANT CHANGE UP
EGFR: 91 ML/MIN/1.73M2 — SIGNIFICANT CHANGE UP
EOSINOPHIL # BLD AUTO: 0.08 K/UL — SIGNIFICANT CHANGE UP (ref 0–0.5)
EOSINOPHIL NFR BLD AUTO: 0.4 % — SIGNIFICANT CHANGE UP (ref 0–6)
FIBRINOGEN PPP-MCNC: 632 MG/DL — HIGH (ref 200–445)
GLUCOSE SERPL-MCNC: 122 MG/DL — HIGH (ref 70–99)
HCT VFR BLD CALC: 28.4 % — LOW (ref 39–50)
HGB BLD-MCNC: 9.7 G/DL — LOW (ref 13–17)
IMM GRANULOCYTES NFR BLD AUTO: 0.8 % — SIGNIFICANT CHANGE UP (ref 0–0.9)
INR BLD: 1.13 RATIO — SIGNIFICANT CHANGE UP (ref 0.85–1.16)
LDH SERPL L TO P-CCNC: 261 U/L — HIGH (ref 50–242)
LYMPHOCYTES # BLD AUTO: 0.43 K/UL — LOW (ref 1–3.3)
LYMPHOCYTES # BLD AUTO: 2.1 % — LOW (ref 13–44)
MAGNESIUM SERPL-MCNC: 2.2 MG/DL — SIGNIFICANT CHANGE UP (ref 1.6–2.6)
MCHC RBC-ENTMCNC: 33.7 PG — SIGNIFICANT CHANGE UP (ref 27–34)
MCHC RBC-ENTMCNC: 34.2 G/DL — SIGNIFICANT CHANGE UP (ref 32–36)
MCV RBC AUTO: 98.6 FL — SIGNIFICANT CHANGE UP (ref 80–100)
MONOCYTES # BLD AUTO: 1.29 K/UL — HIGH (ref 0–0.9)
MONOCYTES NFR BLD AUTO: 6.4 % — SIGNIFICANT CHANGE UP (ref 2–14)
NEUTROPHILS # BLD AUTO: 18.31 K/UL — HIGH (ref 1.8–7.4)
NEUTROPHILS NFR BLD AUTO: 90.2 % — HIGH (ref 43–77)
NRBC BLD AUTO-RTO: 0 /100 WBCS — SIGNIFICANT CHANGE UP (ref 0–0)
PHOSPHATE SERPL-MCNC: 3.4 MG/DL — SIGNIFICANT CHANGE UP (ref 2.5–4.5)
PLATELET # BLD AUTO: 171 K/UL — SIGNIFICANT CHANGE UP (ref 150–400)
POTASSIUM SERPL-MCNC: 4.5 MMOL/L — SIGNIFICANT CHANGE UP (ref 3.5–5.3)
POTASSIUM SERPL-SCNC: 4.5 MMOL/L — SIGNIFICANT CHANGE UP (ref 3.5–5.3)
PROT SERPL-MCNC: 6 G/DL — SIGNIFICANT CHANGE UP (ref 6–8.3)
PROTHROM AB SERPL-ACNC: 12.9 SEC — SIGNIFICANT CHANGE UP (ref 9.9–13.4)
RBC # BLD: 2.88 M/UL — LOW (ref 4.2–5.8)
RBC # FLD: 20.6 % — HIGH (ref 10.3–14.5)
RH IG SCN BLD-IMP: POSITIVE — SIGNIFICANT CHANGE UP
SODIUM SERPL-SCNC: 137 MMOL/L — SIGNIFICANT CHANGE UP (ref 135–145)
WBC # BLD: 20.31 K/UL — HIGH (ref 3.8–10.5)
WBC # FLD AUTO: 20.31 K/UL — HIGH (ref 3.8–10.5)

## 2025-06-16 PROCEDURE — 99233 SBSQ HOSP IP/OBS HIGH 50: CPT

## 2025-06-16 PROCEDURE — 99233 SBSQ HOSP IP/OBS HIGH 50: CPT | Mod: GC

## 2025-06-16 RX ORDER — TAMSULOSIN HYDROCHLORIDE 0.4 MG/1
1 CAPSULE ORAL
Qty: 0 | Refills: 0 | DISCHARGE
Start: 2025-06-16

## 2025-06-16 RX ORDER — LEVOFLOXACIN 25 MG/ML
1 SOLUTION ORAL
Qty: 1 | Refills: 0
Start: 2025-06-16 | End: 2025-06-16

## 2025-06-16 RX ADMIN — METHYLPREDNISOLONE ACETATE 40 MILLIGRAM(S): 80 INJECTION, SUSPENSION INTRA-ARTICULAR; INTRALESIONAL; INTRAMUSCULAR; SOFT TISSUE at 05:52

## 2025-06-16 RX ADMIN — POLYETHYLENE GLYCOL 3350 17 GRAM(S): 17 POWDER, FOR SOLUTION ORAL at 11:55

## 2025-06-16 RX ADMIN — Medication 4400 MILLIGRAM(S): at 15:19

## 2025-06-16 RX ADMIN — Medication 1 MILLIGRAM(S): at 17:25

## 2025-06-16 RX ADMIN — Medication 1 MILLIGRAM(S): at 01:19

## 2025-06-16 RX ADMIN — Medication 15 MILLILITER(S): at 05:53

## 2025-06-16 RX ADMIN — Medication 0.7 MILLIGRAM(S): at 12:05

## 2025-06-16 RX ADMIN — ATORVASTATIN CALCIUM 80 MILLIGRAM(S): 80 TABLET, FILM COATED ORAL at 21:21

## 2025-06-16 RX ADMIN — Medication 1 MILLIGRAM(S): at 05:51

## 2025-06-16 RX ADMIN — Medication 2 DROP(S): at 05:54

## 2025-06-16 RX ADMIN — Medication 75 MILLILITER(S): at 17:11

## 2025-06-16 RX ADMIN — Medication 0.7 MILLIGRAM(S): at 08:00

## 2025-06-16 RX ADMIN — Medication 0.7 MILLIGRAM(S): at 19:45

## 2025-06-16 RX ADMIN — Medication 1 MILLIGRAM(S): at 12:05

## 2025-06-16 RX ADMIN — Medication 0.7 MILLIGRAM(S): at 20:40

## 2025-06-16 RX ADMIN — Medication 40 MILLIGRAM(S): at 05:52

## 2025-06-16 RX ADMIN — Medication 1 MILLIGRAM(S): at 17:08

## 2025-06-16 RX ADMIN — Medication 0.7 MILLIGRAM(S): at 07:32

## 2025-06-16 RX ADMIN — Medication 1 MILLIGRAM(S): at 06:31

## 2025-06-16 RX ADMIN — Medication 1 MILLIGRAM(S): at 22:30

## 2025-06-16 RX ADMIN — BISMUTH SUBSALICYLATE 15 MILLILITER(S): 262 TABLET, CHEWABLE ORAL at 05:53

## 2025-06-16 RX ADMIN — Medication 4400 MILLIGRAM(S): at 03:52

## 2025-06-16 RX ADMIN — Medication 15 MILLILITER(S): at 21:20

## 2025-06-16 RX ADMIN — Medication 500 MILLIGRAM(S): at 05:52

## 2025-06-16 RX ADMIN — Medication 15 MILLILITER(S): at 14:30

## 2025-06-16 RX ADMIN — TAMSULOSIN HYDROCHLORIDE 0.4 MILLIGRAM(S): 0.4 CAPSULE ORAL at 21:21

## 2025-06-16 RX ADMIN — Medication 1 MILLIGRAM(S): at 21:37

## 2025-06-16 RX ADMIN — Medication 300 MILLIGRAM(S): at 05:53

## 2025-06-16 RX ADMIN — Medication 2 DROP(S): at 11:55

## 2025-06-16 RX ADMIN — BISMUTH SUBSALICYLATE 15 MILLILITER(S): 262 TABLET, CHEWABLE ORAL at 17:10

## 2025-06-16 RX ADMIN — CEFEPIME 100 MILLIGRAM(S): 2 INJECTION, POWDER, FOR SOLUTION INTRAVENOUS at 05:51

## 2025-06-16 RX ADMIN — Medication 8 MILLIGRAM(S): at 14:25

## 2025-06-16 RX ADMIN — DILTIAZEM HYDROCHLORIDE 120 MILLIGRAM(S): 240 TABLET, EXTENDED RELEASE ORAL at 05:53

## 2025-06-16 RX ADMIN — Medication 1 MILLIGRAM(S): at 11:50

## 2025-06-16 RX ADMIN — Medication 2 DROP(S): at 23:27

## 2025-06-16 RX ADMIN — Medication 8 MILLIGRAM(S): at 21:20

## 2025-06-16 RX ADMIN — Medication 1 MILLIGRAM(S): at 00:19

## 2025-06-16 RX ADMIN — Medication 500 MILLIGRAM(S): at 17:08

## 2025-06-16 RX ADMIN — Medication 0.7 MILLIGRAM(S): at 14:26

## 2025-06-16 RX ADMIN — Medication 2 DROP(S): at 17:11

## 2025-06-16 RX ADMIN — Medication 8 MILLIGRAM(S): at 05:52

## 2025-06-16 NOTE — PROGRESS NOTE ADULT - SUBJECTIVE AND OBJECTIVE BOX
JANELLE DEL CID  [id number]    Interval history:  Patient reports 75% improvement of his ankle pain   Started HiDAC -- reports associated chills, oral pain, myalgias but otherwise no CP, SOB, nausea      Rheum ROS  As noted above      MEDICATIONS  (STANDING):  allopurinol 300 milliGRAM(s) Oral daily  atorvastatin 80 milliGRAM(s) Oral at bedtime  Biotene Dry Mouth Oral Rinse 15 milliLiter(s) Swish and Spit three times a day  bismuth subsalicylate Liquid 15 milliLiter(s) Oral two times a day  cytarabine IVPB (eMAR) 4400 milliGRAM(s) IV Intermittent every 12 hours  diltiazem    milliGRAM(s) Oral daily  levoFLOXacin  Tablet 500 milliGRAM(s) Oral every 24 hours  methylPREDNISolone sodium succinate Injectable 40 milliGRAM(s) IV Push daily  ondansetron Injectable 8 milliGRAM(s) IV Push every 8 hours  pantoprazole    Tablet 40 milliGRAM(s) Oral before breakfast  polyethylene glycol 3350 17 Gram(s) Oral daily  prednisoLONE acetate 1% Suspension 2 Drop(s) Both EYES every 6 hours  sodium chloride 0.9%. 1000 milliLiter(s) (75 mL/Hr) IV Continuous <Continuous>  tamsulosin 0.4 milliGRAM(s) Oral at bedtime  valACYclovir 500 milliGRAM(s) Oral two times a day    MEDICATIONS  (PRN):  acetaminophen     Tablet .. 650 milliGRAM(s) Oral every 6 hours PRN Temp greater or equal to 38C (100.4F), Mild Pain (1 - 3)  HYDROmorphone  Injectable 0.7 milliGRAM(s) IV Push every 4 hours PRN Moderate Pain (4 - 6)  HYDROmorphone  Injectable 1 milliGRAM(s) IV Push every 4 hours PRN Severe Pain (7 - 10)  metoclopramide Injectable 10 milliGRAM(s) IV Push every 6 hours PRN nausea/vomiting  senna 2 Tablet(s) Oral at bedtime PRN Constipation  simethicone 80 milliGRAM(s) Chew two times a day PRN Gas        Objective:  Vital Signs Last 24 Hrs  T(C): 36.6 (16 Jun 2025 13:08), Max: 36.6 (16 Jun 2025 13:08)  T(F): 97.9 (16 Jun 2025 13:08), Max: 97.9 (16 Jun 2025 13:08)  HR: 97 (16 Jun 2025 13:08) (73 - 97)  BP: 127/73 (16 Jun 2025 13:08) (120/71 - 135/75)  BP(mean): --  RR: 18 (16 Jun 2025 13:08) (17 - 18)  SpO2: 98% (16 Jun 2025 13:08) (93% - 99%)    Parameters below as of 16 Jun 2025 13:08  Patient On (Oxygen Delivery Method): room air      Physical Exam:  General: No acute distress  HEENT: EOMI, MMM  Resp: No respiratory distress  MSK: Markedly improved synovitis of left ankle, ROM improved  Neuro: AAOx3  Skin: no visible rashes    LABS:                        10.2   20.18 )-----------( 227      ( 12 Jun 2025 10:30 )             31.3     06-12    138  |  101  |  22  ----------------------------<  129[H]  3.7   |  19[L]  |  1.38[H]    Ca    9.6      12 Jun 2025 10:30  Phos  3.4     06-12  Mg     1.8     06-12    TPro  6.9  /  Alb  3.7  /  TBili  1.1  /  DBili  x   /  AST  20  /  ALT  29  /  AlkPhos  111  06-12      Urinalysis Basic - ( 12 Jun 2025 10:30 )    Color: x / Appearance: x / SG: x / pH: x  Gluc: 129 mg/dL / Ketone: x  / Bili: x / Urobili: x   Blood: x / Protein: x / Nitrite: x   Leuk Esterase: x / RBC: x / WBC x   Sq Epi: x / Non Sq Epi: x / Bacteria: x            Rheumatology Work Up    Creatine Kinase, Serum: 254 U/L *H* [30 - 200] (06-04-21 @ 01:50)  Creatine Kinase, Serum: 260 U/L *H* [30 - 200] (06-03-21 @ 19:31)  C-Reactive Protein, Serum: 48.7 mg/L *H* [0.0 - 4.0] (06-03-21 @ 16:12)  Creatine Kinase, Serum: 356 U/L *H* [30 - 200] (06-03-21 @ 16:12)            RADIOLOGY & ADDITIONAL STUDIES:    < from: Xray Foot AP + Lateral, Left (06.12.25 @ 22:30) >  EXAM:  Frontal and lateral left foot from 6/12/2025 at 2230. Compared prior   study from 7/22/2020.    IMPRESSION:  No fractures or dislocations.    Tarsometatarsal alignment maintained without evidence for a Lisfranc   injury.    Mild midfoot arthritic changes with dorsal margin spurring. Preserved   remaining visualized joint spaces and no joint margin erosions.    Plantar calcaneal enthesophyte. Unremarkable distal Achilles tendon   shadow.    Generalized osteopenia otherwise no discrete suspicious lytic or blastic   lesions.    < end of copied text >   JANELLE DEL CID  [id number]    Interval history:  Patient reports 75% improvement of his ankle pain   Started HiDAC -- reports associated chills, oral pain, myalgias but otherwise no CP, SOB, nausea      Rheum ROS  As noted above      MEDICATIONS  (STANDING):  allopurinol 300 milliGRAM(s) Oral daily  atorvastatin 80 milliGRAM(s) Oral at bedtime  Biotene Dry Mouth Oral Rinse 15 milliLiter(s) Swish and Spit three times a day  bismuth subsalicylate Liquid 15 milliLiter(s) Oral two times a day  cytarabine IVPB (eMAR) 4400 milliGRAM(s) IV Intermittent every 12 hours  diltiazem    milliGRAM(s) Oral daily  levoFLOXacin  Tablet 500 milliGRAM(s) Oral every 24 hours  methylPREDNISolone sodium succinate Injectable 40 milliGRAM(s) IV Push daily  ondansetron Injectable 8 milliGRAM(s) IV Push every 8 hours  pantoprazole    Tablet 40 milliGRAM(s) Oral before breakfast  polyethylene glycol 3350 17 Gram(s) Oral daily  prednisoLONE acetate 1% Suspension 2 Drop(s) Both EYES every 6 hours  sodium chloride 0.9%. 1000 milliLiter(s) (75 mL/Hr) IV Continuous <Continuous>  tamsulosin 0.4 milliGRAM(s) Oral at bedtime  valACYclovir 500 milliGRAM(s) Oral two times a day    MEDICATIONS  (PRN):  acetaminophen     Tablet .. 650 milliGRAM(s) Oral every 6 hours PRN Temp greater or equal to 38C (100.4F), Mild Pain (1 - 3)  HYDROmorphone  Injectable 0.7 milliGRAM(s) IV Push every 4 hours PRN Moderate Pain (4 - 6)  HYDROmorphone  Injectable 1 milliGRAM(s) IV Push every 4 hours PRN Severe Pain (7 - 10)  metoclopramide Injectable 10 milliGRAM(s) IV Push every 6 hours PRN nausea/vomiting  senna 2 Tablet(s) Oral at bedtime PRN Constipation  simethicone 80 milliGRAM(s) Chew two times a day PRN Gas        Objective:  Vital Signs Last 24 Hrs  T(C): 36.6 (16 Jun 2025 13:08), Max: 36.6 (16 Jun 2025 13:08)  T(F): 97.9 (16 Jun 2025 13:08), Max: 97.9 (16 Jun 2025 13:08)  HR: 97 (16 Jun 2025 13:08) (73 - 97)  BP: 127/73 (16 Jun 2025 13:08) (120/71 - 135/75)  BP(mean): --  RR: 18 (16 Jun 2025 13:08) (17 - 18)  SpO2: 98% (16 Jun 2025 13:08) (93% - 99%)    Parameters below as of 16 Jun 2025 13:08  Patient On (Oxygen Delivery Method): room air      Physical Exam:  General: No acute distress  HEENT: EOMI, MMM  Resp: No respiratory distress  MSK: Markedly improved synovitis of left ankle with decreased tenderness and improved ROM   Neuro: AAOx3  Skin: no visible rashes    LABS:                        10.2   20.18 )-----------( 227      ( 12 Jun 2025 10:30 )             31.3     06-12    138  |  101  |  22  ----------------------------<  129[H]  3.7   |  19[L]  |  1.38[H]    Ca    9.6      12 Jun 2025 10:30  Phos  3.4     06-12  Mg     1.8     06-12    TPro  6.9  /  Alb  3.7  /  TBili  1.1  /  DBili  x   /  AST  20  /  ALT  29  /  AlkPhos  111  06-12      Urinalysis Basic - ( 12 Jun 2025 10:30 )    Color: x / Appearance: x / SG: x / pH: x  Gluc: 129 mg/dL / Ketone: x  / Bili: x / Urobili: x   Blood: x / Protein: x / Nitrite: x   Leuk Esterase: x / RBC: x / WBC x   Sq Epi: x / Non Sq Epi: x / Bacteria: x            Rheumatology Work Up    Creatine Kinase, Serum: 254 U/L *H* [30 - 200] (06-04-21 @ 01:50)  Creatine Kinase, Serum: 260 U/L *H* [30 - 200] (06-03-21 @ 19:31)  C-Reactive Protein, Serum: 48.7 mg/L *H* [0.0 - 4.0] (06-03-21 @ 16:12)  Creatine Kinase, Serum: 356 U/L *H* [30 - 200] (06-03-21 @ 16:12)            RADIOLOGY & ADDITIONAL STUDIES:    < from: Xray Foot AP + Lateral, Left (06.12.25 @ 22:30) >  EXAM:  Frontal and lateral left foot from 6/12/2025 at 2230. Compared prior   study from 7/22/2020.    IMPRESSION:  No fractures or dislocations.    Tarsometatarsal alignment maintained without evidence for a Lisfranc   injury.    Mild midfoot arthritic changes with dorsal margin spurring. Preserved   remaining visualized joint spaces and no joint margin erosions.    Plantar calcaneal enthesophyte. Unremarkable distal Achilles tendon   shadow.    Generalized osteopenia otherwise no discrete suspicious lytic or blastic   lesions.    < end of copied text >

## 2025-06-16 NOTE — PROGRESS NOTE ADULT - ASSESSMENT
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 Initially scheduled to begin cycle 1 HiDAC however presents with 3 days of fevers and acute on chronic gout attack causing debility, s/p fall secondary to pain. Cycle 1 HiDAC started on 6/15. Pt has pancytopenia d/t chemo and disease

## 2025-06-16 NOTE — PROGRESS NOTE ADULT - SUBJECTIVE AND OBJECTIVE BOX
Diagnosis: ASXL1-mutated and UKU01-vhftlsw AML    Protocol/Chemo Regimen: Cycle 1 HiDAC 2gm/m2    Day: 2     Pt endorsed: left foot/ankle pain improving, able to ambulate with PT. +chronic neck/back pain    Review of Systems: Patient denied nausea, vomiting,  chest pain, cough, dyspnea, abdominal pain, constipation,  rectal pain,  rash, fatigue, headache, bleeding    Pain scale: 4/10    Diet: regular Ensure plus HP BID                      Allergies: No Known Allergies      ANTIMICROBIALS  cefepime   IVPB      cefepime   IVPB 2000 milliGRAM(s) IV Intermittent every 8 hours  valACYclovir 500 milliGRAM(s) Oral two times a day      HEME/ONC MEDICATIONS  cytarabine IVPB (eMAR) 4400 milliGRAM(s) IV Intermittent every 12 hours      STANDING MEDICATIONS  allopurinol 300 milliGRAM(s) Oral daily  atorvastatin 80 milliGRAM(s) Oral at bedtime  Biotene Dry Mouth Oral Rinse 15 milliLiter(s) Swish and Spit three times a day  bismuth subsalicylate Liquid 15 milliLiter(s) Oral two times a day  diltiazem    milliGRAM(s) Oral daily  methylPREDNISolone sodium succinate Injectable 40 milliGRAM(s) IV Push daily  ondansetron Injectable 8 milliGRAM(s) IV Push every 8 hours  pantoprazole    Tablet 40 milliGRAM(s) Oral before breakfast  polyethylene glycol 3350 17 Gram(s) Oral daily  prednisoLONE acetate 1% Suspension 2 Drop(s) Both EYES every 6 hours  sodium chloride 0.9%. 1000 milliLiter(s) IV Continuous <Continuous>  tamsulosin 0.4 milliGRAM(s) Oral at bedtime      PRN MEDICATIONS  acetaminophen     Tablet .. 650 milliGRAM(s) Oral every 6 hours PRN  HYDROmorphone  Injectable 0.7 milliGRAM(s) IV Push every 4 hours PRN  HYDROmorphone  Injectable 1 milliGRAM(s) IV Push every 4 hours PRN  metoclopramide Injectable 10 milliGRAM(s) IV Push every 6 hours PRN  senna 2 Tablet(s) Oral at bedtime PRN  simethicone 80 milliGRAM(s) Chew two times a day PRN        Vital Signs Last 24 Hrs  T(C): 36.4 (16 Jun 2025 05:30), Max: 36.4 (15 Marco A 2025 09:39)  T(F): 97.5 (16 Jun 2025 05:30), Max: 97.6 (15 Marco A 2025 09:39)  HR: 79 (16 Jun 2025 05:30) (67 - 102)  BP: 128/71 (16 Jun 2025 05:30) (111/67 - 135/75)  BP(mean): --  RR: 17 (16 Jun 2025 05:30) (17 - 17)  SpO2: 99% (16 Jun 2025 05:30) (93% - 99%)    Parameters below as of 16 Jun 2025 05:30  Patient On (Oxygen Delivery Method): room air        PHYSICAL EXAM  General: adult in NAD  HEENT: clear oropharynx, no erythema, no ulcers  CV: normal S1, S2, RRR  Lungs: clear to auscultation, no wheezes, no rales  Abdomen: +BS, soft, nontender, nondistended  Ext: decreased ROM due to pain; joint swelling; joint erythema; joint warmth; +b/l ankle join swelling and warmth. +unable to ambulate  Skin: +erythema around b/l ankles and toes, warm to touch  No lymphadedenopathy  Neuro: alert and oriented x 4  PIV      RECENT CULTURES:  06-12 @ 18:00  Ankle Ankle  --  --  --    No growth  --  06-12 @ 12:57  Clean Catch Clean Catch (Midstream)  --  --  --    <10,000 CFU/mL Normal Urogenital Madelyn  --  06-12 @ 10:00  Blood Blood-Peripheral  --  --  --    No growth at 72 Hours  --  06-12 @ 09:56  Blood Blood-Peripheral  --  --  --    No growth at 72 Hours  --        LABS:                        7.9    18.93 )-----------( 138      ( 15 Marco A 2025 06:44 )             24.8         Mean Cell Volume : 100.8 fl  Mean Cell Hemoglobin : 32.1 pg  Mean Cell Hemoglobin Concentration : 31.9 g/dL  Auto Neutrophil # : x  Auto Lymphocyte # : x  Auto Monocyte # : x  Auto Eosinophil # : x  Auto Basophil # : x  Auto Neutrophil % : x  Auto Lymphocyte % : x  Auto Monocyte % : x  Auto Eosinophil % : x  Auto Basophil % : x      06-15    135  |  105  |  19  ----------------------------<  151[H]  4.3   |  18[L]  |  1.03    Ca    8.8      15 Marco A 2025 06:44  Phos  3.1     06-15  Mg     2.1     06-15    TPro  5.4[L]  /  Alb  2.9[L]  /  TBili  0.3  /  DBili  x   /  AST  49[H]  /  ALT  66[H]  /  AlkPhos  130[H]  06-15                  RADIOLOGY & ADDITIONAL STUDIES:      < from: Xray Foot AP + Lateral, Left (06.12.25 @ 22:30) >  IMPRESSION:  No fractures or dislocations.    Tarsometatarsal alignment maintained without evidence for a Lisfranc   injury.    Mild midfoot arthritic changes with dorsal margin spurring. Preserved   remaining visualized joint spaces and no joint margin erosions.    Plantar calcaneal enthesophyte. Unremarkable distal Achilles tendon   shadow.    Generalized osteopenia otherwise no discrete suspicious lytic or blastic   lesions.    --- End of Report ---    < end of copied text >     Diagnosis: ASXL1-mutated and TWP17-hnvplan AML    Protocol/Chemo Regimen: Cycle 1 HiDAC 2gm/m2    Day: 2     Pt endorsed: left foot/ankle pain improving, able to ambulate with PT. +chronic neck/back pain    Review of Systems: Patient denied nausea, vomiting,  chest pain, cough, dyspnea, abdominal pain, constipation,  rectal pain,  rash, fatigue, headache, bleeding    Pain scale: 4/10    Diet: regular Ensure plus HP BID                      Allergies: No Known Allergies      ANTIMICROBIALS  cefepime   IVPB      cefepime   IVPB 2000 milliGRAM(s) IV Intermittent every 8 hours  valACYclovir 500 milliGRAM(s) Oral two times a day      HEME/ONC MEDICATIONS  cytarabine IVPB (eMAR) 4400 milliGRAM(s) IV Intermittent every 12 hours      STANDING MEDICATIONS  allopurinol 300 milliGRAM(s) Oral daily  atorvastatin 80 milliGRAM(s) Oral at bedtime  Biotene Dry Mouth Oral Rinse 15 milliLiter(s) Swish and Spit three times a day  bismuth subsalicylate Liquid 15 milliLiter(s) Oral two times a day  diltiazem    milliGRAM(s) Oral daily  methylPREDNISolone sodium succinate Injectable 40 milliGRAM(s) IV Push daily  ondansetron Injectable 8 milliGRAM(s) IV Push every 8 hours  pantoprazole    Tablet 40 milliGRAM(s) Oral before breakfast  polyethylene glycol 3350 17 Gram(s) Oral daily  prednisoLONE acetate 1% Suspension 2 Drop(s) Both EYES every 6 hours  sodium chloride 0.9%. 1000 milliLiter(s) IV Continuous <Continuous>  tamsulosin 0.4 milliGRAM(s) Oral at bedtime      PRN MEDICATIONS  acetaminophen     Tablet .. 650 milliGRAM(s) Oral every 6 hours PRN  HYDROmorphone  Injectable 0.7 milliGRAM(s) IV Push every 4 hours PRN  HYDROmorphone  Injectable 1 milliGRAM(s) IV Push every 4 hours PRN  metoclopramide Injectable 10 milliGRAM(s) IV Push every 6 hours PRN  senna 2 Tablet(s) Oral at bedtime PRN  simethicone 80 milliGRAM(s) Chew two times a day PRN        Vital Signs Last 24 Hrs  T(C): 36.4 (16 Jun 2025 05:30), Max: 36.4 (15 Marco A 2025 09:39)  T(F): 97.5 (16 Jun 2025 05:30), Max: 97.6 (15 Marco A 2025 09:39)  HR: 79 (16 Jun 2025 05:30) (67 - 102)  BP: 128/71 (16 Jun 2025 05:30) (111/67 - 135/75)  BP(mean): --  RR: 17 (16 Jun 2025 05:30) (17 - 17)  SpO2: 99% (16 Jun 2025 05:30) (93% - 99%)    Parameters below as of 16 Jun 2025 05:30  Patient On (Oxygen Delivery Method): room air        PHYSICAL EXAM  General: adult in NAD  HEENT: clear oropharynx, no erythema, no ulcers  CV: normal S1, S2, RRR  Lungs: clear to auscultation, no wheezes, no rales  Abdomen: +BS, soft, nontender, nondistended  Ext: decreased ROM due to pain; joint swelling; joint erythema; joint warmth; +b/l ankle join swelling and warmth. +unable to ambulate  Skin: +erythema around b/l ankles and toes, warm to touch  No lymphadedenopathy  Neuro: alert and oriented x 4  PIV      RECENT CULTURES:  06-12 @ 18:00  Ankle Ankle  --  --  --    No growth  --  06-12 @ 12:57  Clean Catch Clean Catch (Midstream)  --  --  --    <10,000 CFU/mL Normal Urogenital Madelyn  --  06-12 @ 10:00  Blood Blood-Peripheral  --  --  --    No growth at 72 Hours  --  06-12 @ 09:56  Blood Blood-Peripheral  --  --  --    No growth at 72 Hours  --      LABS:                          9.7    20.31 )-----------( 171      ( 16 Jun 2025 08:58 )             28.4         Mean Cell Volume : 98.6 fl  Mean Cell Hemoglobin : 33.7 pg  Mean Cell Hemoglobin Concentration : 34.2 g/dL  Auto Neutrophil # : x  Auto Lymphocyte # : x  Auto Monocyte # : x  Auto Eosinophil # : x  Auto Basophil # : x  Auto Neutrophil % : x  Auto Lymphocyte % : x  Auto Monocyte % : x  Auto Eosinophil % : x  Auto Basophil % : x      06-16    137  |  103  |  22  ----------------------------<  122[H]  4.5   |  20[L]  |  0.92    Ca    9.2      16 Jun 2025 08:58  Phos  3.4     06-16  Mg     2.2     06-16    TPro  6.0  /  Alb  3.4  /  TBili  0.4  /  DBili  x   /  AST  72[H]  /  ALT  119[H]  /  AlkPhos  137[H]  06-16    PT/INR - ( 16 Jun 2025 08:58 )   PT: 12.9 sec;   INR: 1.13 ratio         PTT - ( 16 Jun 2025 08:58 )  PTT:25.7 sec      Uric Acid --        RADIOLOGY & ADDITIONAL STUDIES:      < from: Xray Foot AP + Lateral, Left (06.12.25 @ 22:30) >  IMPRESSION:  No fractures or dislocations.    Tarsometatarsal alignment maintained without evidence for a Lisfranc   injury.    Mild midfoot arthritic changes with dorsal margin spurring. Preserved   remaining visualized joint spaces and no joint margin erosions.    Plantar calcaneal enthesophyte. Unremarkable distal Achilles tendon   shadow.    Generalized osteopenia otherwise no discrete suspicious lytic or blastic   lesions.    --- End of Report ---    < end of copied text >

## 2025-06-16 NOTE — PHARMACOTHERAPY INTERVENTION NOTE - COMMENTS
*NOTE IS NOT UPDATED*      Clinical Pharmacy Specialist- Hematology/Oncology- Progress Note    Pt is a 70 y/o male w/ PMH of HTN, HLD, GERD, BPH, Gout, spinal stenosis (s/p multiple surgeries) and newly diagnosed AML (ASXL1-mutated and DDX41) s/p 7+3 Induction therapy, due for HIDAC consolidation, but course complicated by gout flare vs septic arthritis     Antimicrobial Course:  - Cefepime- 6/13  - Valtrex- 6/12  MRSA nasal swab    Last Neutropenic (ANC<1000): no occurrence  Last Febrile: 13-Jun-2025 13:04; T= 102.2  Days Non-Neutropenic: 2  Days afebrile: 0    Chemotherapy Course  -Current Regimen: 7+3 Induction  History:  (5/2/25)- 7+3 Induction   -Daunorubicin 60mg/m2 IVP D1-3   -Cytarabine 100mg/m2 IV cont inf D1-7  -Day: 43 (6/13)  BmBx:  Access: R [facility] (accessed 5/5/25)    Vancomycin Levels:   Date    Dose              Value    Ref Range	                             Status   5/21    750mg q12hr   7.8       Trough 10-20; AUC= 400-600   Final  5/23    1gm q12hr      10.9     Trough 10-20; AUC= 400-600   Final    History/Relevant clinical information used in assessment:  - Ht= 5’11”; IBW= 75.3kg; Actual BW= 107.3kg; Adj BW= 88kg  - 5/2- IRR? rigors, chills, and tachycardia 2 hours into day 1; rasburicase 3mg x 1  - 5/4 -Crcl= 67ml/min ; Scr=1.32; Wt= 88kg; Adjusted BW used since pt BMI>30  - 5/9- increase melatonin 3mg to 10mh qhs- c/o insomnia  - 5/13- mouth pain- has ulceration   - 5/14-5/28- severe mucositis   - 5/15- bloody diarrhea x 3 on reglan prn, but has not needed, monitor for d/c to not aggravate w/ prokinetic agent  - 5/15- HSV neg  - 5/16- bloody BM's decreased to 2x'd/day from 4x's/day yest  - 5/17 UCx- 5/17- 10-49k VRE- per ID, do not need to treat  - 5/18- midodrine 10mg PO tid  - 5/19- BlCx- Meth resistant Staphylococcus haemolyticus- on vancomycin 1gm q12hr, this will yield AUC= 457; per ID, likely contaminant, expect short course of vanco; - 5/19, still febrile since 5/11, on cefepime, BlCx NGTD + abd discomfort (added flagyl)-->CT CAP 5/15 - no infection (but groundglass opacities stable from previous?)  - 5/21- zarxio 480mcg daily 5/21- 5/23  - 5/27- gout flare- colchicine 0.6mg BID x 2 days (5/27-5/29) -can cont until flare resolves, some experts cont 24-48 hrs after resolution; if symptoms do not resolve after 3 days, consider other anti-inflammatory agents- pt pain not relieved- added prednisone 40mg daily x 2 days (5/28-29) f/b 20mg x 2 days (5/30-6/1)  - 5/29- pt had episodes of diarrhea d/t colchicine  - 5/29- - allopurinol 100mg (300mg d/c'd 5/15)-keeping for gout-->300mg- 5/29- given UA rising- 5/29  - 5/30 will decrease diltiazem CD 240mg to 120mg  Meds sent for auth: midodrine, allopurinol 300mg (increased), prednisone, simethicone- sent to [facility] 5/29    Assessment/Plan/Recommendation:  Onc:  - 6/13- due for HIDAC 2gm/m2 D1,2,3 Consolidation soon, today is D43 post induction  ID:  - not neutropenic, on cefepime for fevers  Rheum:  - 6/12- r/o septic arthritis of L ankle- s/p arthrocentesis  - 6/12- allopurinol 300mg PO daily, may transition to steroids vs anakinra 100mg SC daily x ? days  - 6/13- c/o pain in both feet now  Cards:  - atorvastatin 80mg hold if on posa d/t DDI (Cat X)  Dispo:    Additional Monitoring Needed?   -Yes- Continue to monitor renal function & daily counts for abx escalation/de-escalation   -Discharge Planning:  --> New meds:   --> Meds sent for auth:   --> Delivered meds:    Case discussed with attending/primary team    Liza Jolley, PharmD, BCPS  Clinical Pharmacy Specialist | Hematology/Oncology  [facility]  Email: [email or web address] or available on [employer]   Clinical Pharmacy Specialist- Hematology/Oncology- Progress Note    Pt is a 68 y/o male w/ PMH of HTN, HLD, GERD, BPH, Gout, spinal stenosis (s/p multiple surgeries) and newly diagnosed AML (ASXL1-mutated and DDX41) s/p 7+3 Induction therapy, course complicated by gout flare vs septic arthritis, now receiving C1 HIDAC consolidation    Antimicrobial Course:  - Cefepime- 6/13- 6/16  --> Levaquin- 6/16  - Valtrex- 6/12  MRSA nasal swab    Last Neutropenic (ANC<1000): no occurrence  Last Febrile: 13-Jun-2025 13:04; T= 102.2  Days Non-Neutropenic: 3  Days afebrile: 3    Chemotherapy Course  -Current Regimen: HIDAC 2gm/m2 Consolidation  History:  (5/2/25)- 7+3 Induction   -Daunorubicin 60mg/m2 IVP D1-3   -Cytarabine 100mg/m2 IV cont inf D1-7  (6/15/25) C1 HIDAC 2gm/m2  -Day: 2 (6/16)  BmBx:  Access: R [facility] (accessed 5/5/25)    Vancomycin Levels:   Date    Dose              Value    Ref Range	                             Status   5/21    750mg q12hr   7.8       Trough 10-20; AUC= 400-600   Final  5/23    1gm q12hr      10.9     Trough 10-20; AUC= 400-600   Final    History/Relevant clinical information used in assessment:  - Ht= 5’11”; IBW= 75.3kg; Actual BW= 107.3kg; Adj BW= 88kg  - 5/2- IRR? rigors, chills, and tachycardia 2 hours into day 1; rasburicase 3mg x 1  - 5/4 -Crcl= 67ml/min ; Scr=1.32; Wt= 88kg; Adjusted BW used since pt BMI>30  - 5/9- increase melatonin 3mg to 10mh qhs- c/o insomnia  - 5/13- mouth pain- has ulceration   - 5/14-5/28- severe mucositis   - 5/15- bloody diarrhea x 3 on reglan prn, but has not needed, monitor for d/c to not aggravate w/ prokinetic agent  - 5/15- HSV neg  - 5/16- bloody BM's decreased to 2x'd/day from 4x's/day yest  - 5/17 UCx- 5/17- 10-49k VRE- per ID, do not need to treat  - 5/18- midodrine 10mg PO tid  - 5/19- BlCx- Meth resistant Staphylococcus haemolyticus- on vancomycin 1gm q12hr, this will yield AUC= 457; per ID, likely contaminant, expect short course of vanco; - 5/19, still febrile since 5/11, on cefepime, BlCx NGTD + abd discomfort (added flagyl)-->CT CAP 5/15 - no infection (but groundglass opacities stable from previous?)  - 5/21- zarxio 480mcg daily 5/21- 5/23  - 5/27- gout flare- colchicine 0.6mg BID x 2 days (5/27-5/29) -can cont until flare resolves, some experts cont 24-48 hrs after resolution; if symptoms do not resolve after 3 days, consider other anti-inflammatory agents- pt pain not relieved- added prednisone 40mg daily x 2 days (5/28-29) f/b 20mg x 2 days (5/30-6/1)  - 5/29- pt had episodes of diarrhea d/t colchicine  - 5/29- - allopurinol 100mg (300mg d/c'd 5/15)-keeping for gout-->300mg- 5/29- given UA rising- 5/29  - 5/30 will decrease diltiazem CD 240mg to 120mg  Meds sent for auth: midodrine, allopurinol 300mg (increased), prednisone, simethicone- sent to [facility] 5/29  - 6/13- c/o pain in both feet now    Assessment/Plan/Recommendation:  Onc:  Cytarabine schedule (pt on 1,2,3 schedule):  - 6/15- started ~ 3 pm (D1)  - 6/16- 4 am (D2)  - 6/16- 4 pm (D2)  - 6/17- 4 am (D3)  - 6/17- 4 pm (D3)  - 6/18- 4 am (D4) finishes wed ~7am   - need to   (tentative)  Counseled patient on new chemotherapy regimen- "HIDAC" or High Dose cytarabine.   Discussed common side effects -low counts, mouth sores, nausea, rash, neurotoxicity, keratoconjunctivitis.   Explained measures we take to mitigate these side effects: antiemetics, topical agents, mouth care (rinses & biotene), steroid eye drops, & handwriting/other neurological assessments.   Emetogenicity Risk: moderate- 30-90%  Patient expresses understanding of all indications, common side effects, and any special instructions. All medication related questions have been answered at this time.  -Printed teaching materials provided and reviewed from ChemoCare and  MedAction Plan  -Printed Chemo Course Calendar  -Instructed pt to continue prednisone eye drops until 6/19 then discard  ID:  - 6/13- not neutropenic, on cefepime for fevers- switched to levaquin through thurs 6/19 (for 7 days duration)  - 6/16- duration of steroids? need pcp ppx?  Rheum:  - 6/12- r/o septic arthritis of L ankle- s/p arthrocentesis  - 6/12- allopurinol 300mg PO daily  - 6/13- methylprednisone 40mg IVP daily  Cards:  - atorvastatin 80mg hold if on posa d/t DDI (Cat X)  Dispo:    Additional Monitoring Needed?   -Yes- Continue to monitor renal function & daily counts for abx escalation/de-escalation   -Discharge Planning:  --> New meds:   --> Meds sent for auth:   --> Delivered meds:    Case discussed with attending/primary team    Liza Jolley, PharmD, BCPS  Clinical Pharmacy Specialist | Hematology/Oncology  [facility]  Email: [email or web address] or available on [employer]   Clinical Pharmacy Specialist- Hematology/Oncology- Progress Note    Pt is a 70 y/o male w/ PMH of HTN, HLD, GERD, BPH, Gout, spinal stenosis (s/p multiple surgeries) and newly diagnosed AML (ASXL1-mutated and DDX41) s/p 7+3 Induction therapy, course complicated by gout flare vs septic arthritis, now receiving C1 HIDAC consolidation    Antimicrobial Course:  - Cefepime- 6/13- 6/16  --> Levaquin- 6/16  - Valtrex- 6/12  MRSA nasal swab    Last Neutropenic (ANC<1000): no occurrence  Last Febrile: 13-Jun-2025 13:04; T= 102.2  Days Non-Neutropenic: 3  Days afebrile: 3    Chemotherapy Course  -Current Regimen: HIDAC 2gm/m2 Consolidation  History:  (5/2/25)- 7+3 Induction   -Daunorubicin 60mg/m2 IVP D1-3   -Cytarabine 100mg/m2 IV cont inf D1-7  (6/15/25) C1 HIDAC 2gm/m2  -Day: 2 (6/16)  BmBx:  Access: R [facility] (accessed 5/5/25)    Vancomycin Levels:   Date    Dose              Value    Ref Range	                             Status   5/21    750mg q12hr   7.8       Trough 10-20; AUC= 400-600   Final  5/23    1gm q12hr      10.9     Trough 10-20; AUC= 400-600   Final    History/Relevant clinical information used in assessment:  - Ht= 5’11”; IBW= 75.3kg; Actual BW= 107.3kg; Adj BW= 88kg  - 5/2- IRR? rigors, chills, and tachycardia 2 hours into day 1; rasburicase 3mg x 1  - 5/4 -Crcl= 67ml/min ; Scr=1.32; Wt= 88kg; Adjusted BW used since pt BMI>30  - 5/9- increase melatonin 3mg to 10mh qhs- c/o insomnia  - 5/13- mouth pain- has ulceration   - 5/14-5/28- severe mucositis   - 5/15- bloody diarrhea x 3 on reglan prn, but has not needed, monitor for d/c to not aggravate w/ prokinetic agent  - 5/15- HSV neg  - 5/16- bloody BM's decreased to 2x'd/day from 4x's/day yest  - 5/17 UCx- 5/17- 10-49k VRE- per ID, do not need to treat  - 5/18- midodrine 10mg PO tid  - 5/19- BlCx- Meth resistant Staphylococcus haemolyticus- on vancomycin 1gm q12hr, this will yield AUC= 457; per ID, likely contaminant, expect short course of vanco; - 5/19, still febrile since 5/11, on cefepime, BlCx NGTD + abd discomfort (added flagyl)-->CT CAP 5/15 - no infection (but groundglass opacities stable from previous?)  - 5/21- zarxio 480mcg daily 5/21- 5/23  - 5/27- gout flare- colchicine 0.6mg BID x 2 days (5/27-5/29) -can cont until flare resolves, some experts cont 24-48 hrs after resolution; if symptoms do not resolve after 3 days, consider other anti-inflammatory agents- pt pain not relieved- added prednisone 40mg daily x 2 days (5/28-29) f/b 20mg x 2 days (5/30-6/1)  - 5/29- pt had episodes of diarrhea d/t colchicine  - 5/29- - allopurinol 100mg (300mg d/c'd 5/15)-keeping for gout-->300mg- 5/29- given UA rising- 5/29  - 5/30 will decrease diltiazem CD 240mg to 120mg  Meds sent for auth: midodrine, allopurinol 300mg (increased), prednisone, simethicone- sent to [facility] 5/29  - 6/13- c/o pain in both feet now    Assessment/Plan/Recommendation:  Onc:  Cytarabine schedule (pt on 1,2,3 schedule):  - 6/15- started ~ 3 pm (D1)  - 6/16- 4 am (D2)  - 6/16- 4 pm (D2)  - 6/17- 4 am (D3)  - 6/17- 4 pm (D3)  - 6/18- 4 am (D4) finishes wed ~7am     Counseled patient on new chemotherapy regimen- "HIDAC" or High Dose cytarabine.   Discussed common side effects -low counts, mouth sores, nausea, rash, neurotoxicity, keratoconjunctivitis.   Explained measures we take to mitigate these side effects: antiemetics, topical agents, mouth care (rinses & biotene), steroid eye drops, & handwriting/other neurological assessments.   Emetogenicity Risk: moderate- 30-90%  Patient expresses understanding of all indications, common side effects, and any special instructions. All medication related questions have been answered at this time.  -Printed teaching materials provided and reviewed from ChemoCare and  MedAction Plan  -Printed Chemo Course Calendar  -Instructed pt to continue prednisone eye drops until 6/19 then discard  ID:  - 6/13- not neutropenic, on cefepime for fevers- switched to levaquin through thurs 6/19 (for 7 days duration)  - 6/16- duration of steroids? need pcp ppx?  Rheum:  - 6/12- r/o septic arthritis of L ankle- s/p arthrocentesis  - 6/12- allopurinol 300mg PO daily  - 6/13- methylprednisone 40mg IVP daily  Cards:  - atorvastatin 80mg hold if on posa d/t DDI (Cat X)  Dispo:    Additional Monitoring Needed?   -Yes- Continue to monitor renal function & daily counts for abx escalation/de-escalation   -Discharge Planning:  --> New meds:   --> Meds sent for auth:   --> Delivered meds:    Case discussed with attending/primary team    Liza Jolley, PharmD, BCPS  Clinical Pharmacy Specialist | Hematology/Oncology  [facility]  Email: [email or web address] or available on [employer]   Clinical Pharmacy Specialist- Hematology/Oncology- Progress Note    Pt is a 68 y/o male w/ PMH of HTN, HLD, GERD, BPH, Gout, spinal stenosis (s/p multiple surgeries) and newly diagnosed AML (ASXL1-mutated and DDX41) s/p 7+3 Induction therapy, course complicated by gout flare vs septic arthritis, now receiving C1 HIDAC consolidation    Antimicrobial Course:  - Cefepime- 6/13- 6/16  --> Levaquin- 6/16  - Valtrex- 6/12  MRSA nasal swab    Last Neutropenic (ANC<1000): no occurrence  Last Febrile: 13-Jun-2025 13:04; T= 102.2  Days Non-Neutropenic: 3  Days afebrile: 3    Chemotherapy Course  -Current Regimen: HIDAC 2gm/m2 Consolidation  History:  (5/2/25)- 7+3 Induction   -Daunorubicin 60mg/m2 IVP D1-3   -Cytarabine 100mg/m2 IV cont inf D1-7  (6/15/25) C1 HIDAC 2gm/m2  -Day: 2 (6/16)  BmBx:  Access: R [facility] (accessed 5/5/25)    Vancomycin Levels:   Date    Dose              Value    Ref Range	                             Status   5/21    750mg q12hr   7.8       Trough 10-20; AUC= 400-600   Final  5/23    1gm q12hr      10.9     Trough 10-20; AUC= 400-600   Final    History/Relevant clinical information used in assessment:  - Ht= 5’11”; IBW= 75.3kg; Actual BW= 107.3kg; Adj BW= 88kg  - 5/2- IRR? rigors, chills, and tachycardia 2 hours into day 1; rasburicase 3mg x 1  - 5/4 -Crcl= 67ml/min ; Scr=1.32; Wt= 88kg; Adjusted BW used since pt BMI>30  - 5/9- increase melatonin 3mg to 10mh qhs- c/o insomnia  - 5/13- mouth pain- has ulceration   - 5/14-5/28- severe mucositis   - 5/15- bloody diarrhea x 3 on reglan prn, but has not needed, monitor for d/c to not aggravate w/ prokinetic agent  - 5/15- HSV neg  - 5/16- bloody BM's decreased to 2x'd/day from 4x's/day yest  - 5/17 UCx- 5/17- 10-49k VRE- per ID, do not need to treat  - 5/18- midodrine 10mg PO tid  - 5/19- BlCx- Meth resistant Staphylococcus haemolyticus- on vancomycin 1gm q12hr, this will yield AUC= 457; per ID, likely contaminant, expect short course of vanco; - 5/19, still febrile since 5/11, on cefepime, BlCx NGTD + abd discomfort (added flagyl)-->CT CAP 5/15 - no infection (but groundglass opacities stable from previous?)  - 5/21- zarxio 480mcg daily 5/21- 5/23  - 5/27- gout flare- colchicine 0.6mg BID x 2 days (5/27-5/29) -can cont until flare resolves, some experts cont 24-48 hrs after resolution; if symptoms do not resolve after 3 days, consider other anti-inflammatory agents- pt pain not relieved- added prednisone 40mg daily x 2 days (5/28-29) f/b 20mg x 2 days (5/30-6/1)  - 5/29- pt had episodes of diarrhea d/t colchicine  - 5/29- - allopurinol 100mg (300mg d/c'd 5/15)-keeping for gout-->300mg- 5/29- given UA rising- 5/29  - 5/30 will decrease diltiazem CD 240mg to 120mg  Meds sent for auth: midodrine, allopurinol 300mg (increased), prednisone, simethicone- sent to [facility] 5/29  - 6/13- c/o pain in both feet now  - 6/16- Confirmed home medications with patient  Home Medications:  ·	cholecalciferol oral tablet: 2000 unit(s) PO daily  ·	omeprazole 40 mg PO daily  ·	tamsulosin 0.4 mg PO daily  ·	diltiazem CD 120mg PO daily  ·	Allopurinol 300mg PO daily  ·	Valtrex 500mg PO BID  ·	atorvastatin 80mg PO qhs  ·	colchicine 0,6mg PO - 59 days supply filled 6/2025- pt does not want or take  ·	hctz, tramadol, midodrine, oxy- no longer taking    Assessment/Plan/Recommendation:  Onc:  Cytarabine schedule (pt on 1,2,3 schedule):  - 6/15- started ~ 3 pm (D1)  - 6/16- 4 am (D2)  - 6/16- 4 pm (D2)  - 6/17- 4 am (D3)  - 6/17- 4 pm (D3)  - 6/18- 4 am (D4) finishes wed ~7am     Counseled patient on new chemotherapy regimen- "HIDAC" or High Dose cytarabine.   Discussed common side effects -low counts, mouth sores, nausea, rash, neurotoxicity, keratoconjunctivitis.   Explained measures we take to mitigate these side effects: antiemetics, topical agents, mouth care (rinses & biotene), steroid eye drops, & handwriting/other neurological assessments.   Emetogenicity Risk: moderate- 30-90%  Patient expresses understanding of all indications, common side effects, and any special instructions. All medication related questions have been answered at this time.  -Printed teaching materials provided and reviewed from ChemoCare and  MedAction Plan  -Printed Chemo Course Calendar  -Instructed pt to continue prednisone eye drops until 6/19 then discard  ID:  - 6/13- not neutropenic, on cefepime for fevers- switched to levaquin through thurs 6/19 (for 7 days duration)  - 6/16- duration of steroids? need pcp ppx?  Rheum:  - 6/12- r/o septic arthritis of L ankle- s/p arthrocentesis  - 6/12- allopurinol 300mg PO daily  - 6/13- methylprednisone 40mg IVP daily  Cards:  - atorvastatin 80mg hold if on posa d/t DDI (Cat X)  Dispo:    Additional Monitoring Needed?   -Yes- Continue to monitor renal function & daily counts for abx escalation/de-escalation   -Discharge Planning:  --> New meds:   --> Meds sent for auth:   --> Delivered meds:    Case discussed with attending/primary team    Liza Jolley, PharmD, BCPS  Clinical Pharmacy Specialist | Hematology/Oncology  [facility]  Email: [email or web address] or available on [employer]

## 2025-06-16 NOTE — PROGRESS NOTE ADULT - NS ATTEND AMEND GEN_ALL_CORE FT
Primary: Chitty    Assessment: 69M HiDAC Cycle 1 Day 1 for ASXL1-mutated and [id number]-[id number] AML. Course complicated by acute gout flare and severe pain of right ankle. Started HiDAC on Induction Day 45.    Heme:   - HiDAC 2g/m2 days 1,2,3; long-acting GCSF outpatient  - Continue allopurinol 300 mg once a day  - Continue daily oral B12 suppl    ID: Fevers x 3 days, dark urine, less urine output, UA clear, RVP neg  - leukocytosis related to steroids  - Not Neutropenic  - s/p cefepime (6/13/25 - 6/15/25)    MSK:  - Acute on chronic gout: Rheum consult appreciated, now on steroids  - Improvement, now ambulating  - acute on chronic pain: Dilaudid for now, will need chronic pain to determine if outpatient regimen should be adjusted.    Dispo:  - Discharge on day 4 of consolidation if stable, GCSF once between days 5-7, EDC Primary: Chitty    Assessment: 69M HiDAC Cycle 1 Day 2 for ASXL1-mutated and [id number] AML. Course complicated by acute gout flare and severe pain of right ankle. Started HiDAC on Induction Day 45.    Heme:   - HiDAC 2g/m2 days 1,2,3; long-acting GCSF outpatient  - Continue allopurinol 300 mg once a day  - Continue daily oral B12 suppl    ID: Fevers x 3 days, dark urine, less urine output, UA clear, RVP neg  - leukocytosis related to steroids  - Not Neutropenic  - s/p cefepime (6/13/25 - 6/16/25), de-escalate to levaquin for 7d course    MSK:  - Acute on chronic gout: Rheum consult appreciated, now on steroids  - Improvement, now ambulating  - acute on chronic pain: Dilaudid for now, will need chronic pain to determine if outpatient regimen should be adjusted.    Dispo:  - Discharge on day 4 of consolidation if stable, GCSF once between days 5-7, EDC

## 2025-06-16 NOTE — PROGRESS NOTE ADULT - PROBLEM SELECTOR PLAN 4
[facility] hospitalist discharge summary     MRN: [id number], Terell Davis is a 54 year old male admitted for   Chief Complaint   Patient presents with   • Leg Swelling   .    Admission Dt/Tm     11/13/2019  4:45 PM  Discharge DT/TM  11/16/19    Hospital Course    Terell is a 54 year old male presenting with peripheral edema, abd pain and  ascites with anasarca.       Patient recently discharged home with . He was admitted 11/1 for lower ext edema and ascites. On 11/ 4 he had US guided paracentisis and over 9 liters of fluid drained at that time. He was sent home on 11/12 . Per pt on 11/13  he sat in his recliner and found he couldn't get up. He sat there until he was found by the hh care nurse on the same day.He was sent back to [facility] at that time, admitted for peripheral edema, abd pain  and ascites.     Pt had Us guided paracentisis on 11/14 and over 6 liters drained at that time.     Pt in no acute distress today, plan is for d/c to [facility] for PT/OT.     Further hospital course as below:             Physical Exam       PHYSICAL EXAM:  Vital Signs:    Visit Vitals  /68   Pulse 105   Temp 98.1 °F (36.7 °C) (Oral)   Resp 18   Ht 6' (1.829 m)   Wt (!) 178.3 kg (393 lb 1.3 oz)   SpO2 97%   BMI 53.31 kg/m²       Physical Exam  HENT:      Head: Normocephalic.      Mouth/Throat:      Mouth: Mucous membranes are moist.   Eyes:      Extraocular Movements: Extraocular movements intact.   Neck:      Comments: Trachea midline  Cardiovascular:      Rate and Rhythm: Normal rate and regular rhythm.   Pulmonary:      Effort: Pulmonary effort is normal.      Comments: Diminished to bilateral bases  Abdominal:      Palpations: Abdomen is soft.      Comments: obese   Musculoskeletal:      Right lower leg: Edema present.      Left lower leg: Edema present.      Comments: Generalized edema/anacarca   Skin:     Comments: Scalp rash   Neurological:      Mental Status: He is alert and oriented to person, place, and time. Mental  status is at baseline.   Psychiatric:         Behavior: Behavior normal.      Comments: Cooperative on exam         Labs       Lab Results  Recent Results (from the past 24 hour(s))   URINALYSIS & REFLEX MICRO WITH CULTURE IF INDICATED    Collection Time: 11/15/19  2:26 PM   Result Value Ref Range    SPECIMEN TYPE URINE CLEAN CATCH     COLOR ISACC (A) YELLOW    APPEARANCE HAZY     GLUCOSE(URINE) NEGATIVE NEGATIVE mg/dL    BILIRUBIN NEGATIVE NEGATIVE    KETONES NEGATIVE NEGATIVE mg/dL    SPECIFIC GRAVITY 1.020 1.005 - 1.030    BLOOD NEGATIVE NEGATIVE    pH 5.0 5.0 - 7.0 Units    PROTEIN(URINE) NEGATIVE NEGATIVE mg/dL    UROBILINOGEN 0.2 0.0 - 1.0 mg/dL    NITRITE NEGATIVE NEGATIVE    LEUKOCYTE ESTERASE NEGATIVE NEGATIVE        Test Results Pending At Discharge   Order Current Status    Basic Metabolic Panel In process    Anaerobic and Aerobic Culture and Smear Preliminary result          Imaging      Imaging  No results found for this or any previous visit.  11/01/19   CT ABDOMEN PELVIS W CONTRAST    Impression   1.  Moderate left-sided pleural effusion, with adjacent atelectasis.  2.  Findings compatible with liver cirrhosis and recanalization of the umbilical vein, as well as  upper abdominal/paraesophageal and varices.3.  Large abdominal and pelvic ascites, as well as diffuse subcutaneous edema.Electronically Signed by: JEFF SEPULVEDA M.D. Signed on: 11/1/2019 10:52 PM   11/13/19   US GUIDED PARACENTESIS     Impression  Ultrasound guided paracentesis with removal of 6.6 liters of fluid. Electronically Signed by: PILO QUEZADA M.D. Signed on: 11/15/2019 12:20 PM   No procedure found.  Encounter Date: 11/01/19   Electrocardiogram 12-Lead   Result Value    Ventricular Rate EKG/Min (BPM) 95    Atrial Rate (BPM) 95    IA-Interval (MSEC) 164    QRS-Interval (MSEC) 104    QT-Interval (MSEC) 428    QTc 538    P Axis (Degrees) 18    R Axis (Degrees) -165    T Axis (Degrees) -3    REPORT TEXT      Sinus  rhythm  with  fusion complexes  and  premature atrial complexes  Cannot rule out  Inferior infarct  , age undetermined  Possible  Anterolateral infarct  , age undetermined  Prolonged QT  Abnormal ECG  No previous ECGs available  Confirmed by ML DEAN MD ([id number]) on 11/2/2019 10:15:45 AM       XR CHEST PA OR AP 1 VIEW   Final Result   1.   Interval development of left basilar airspace opacity and small left pleural effusion, pneumonia should be clinically excluded.      Electronically Signed by: OFE BARRERA M.D.    Signed on: 11/15/2019 4:26 PM          US GUIDED PARACENTESIS   Final Result   Ultrasound guided paracentesis with removal of 6.6 liters of fluid.       Electronically Signed by: PILO QUEZADA M.D.    Signed on: 11/15/2019 12:20 PM              Discharge diagnosis     Discharge Diagnosis    Patient Active Problem List   Diagnosis   • Ascites   • Abdominal pain   • Vomiting   • Diarrhea        Plan    Abdominal pain, Cirrhosis with abd ascites with anasarca  Paracentesis removed 9.6 L on 11/4  Pt has abd pain and upper abd pressure  Will Check stool for cdiff if continued diarrhea--11/6 stool was neg for cdiff.   11/1 Abdominal CT reveals cirrhotic morphology and large abdominal and pelvic ascites  abd US check for ascites and IR paracentesis per GI recommendations completed and 6 liters of fluid removed.   Pt states no etoh for 6 months  Last admission GI  started Xifaxan for diarrhea  Gi followed closely throughout admission, appreciate their recommendations.       leukocytosis , possibly reactive to above  Check chest xr  Today.   Check u/a prior to d/c. Currently on cipro and doxy for below  Cont to montor, recheck in am he is currently on oral antibiotcs as below, to continue at discharge.      Severe hidradenitis suppurativa  Scalp folliculitis decalvans  Pt was on iv antibiotics during recent hospitalization at the beginning of November , he was discharged home on po cipro and po  doxy, on 11/12/2019.   Leukocytosis likely  2/2 to above, resolved  Pt to follow up with dermatology as o/p  Wound care consult      Functional weakness, inability to care for himself at home  PT/OT consulted  Pt will require ecf at d/c, d/w .             Discharge instructions  dicharge pt to ecf today, pt to follow up with pcp within 3 days post rehab and gi in one week or as directed. Pt to follow up with ID as directed from previous admission.       Discharge medications     Discharge Medications     Summary of your Discharge Medications      Take these Medications      Details   acetaminophen 325 MG tablet  Commonly known as:  TYLENOL   Take 2 tablets by mouth every 4 hours as needed for Pain.     ALPRAZolam 0.5 MG tablet  Commonly known as:  XANAX   Take 0.5 mg by mouth 2 times daily as needed.     ciprofloxacin 500 MG tablet  Commonly known as:  CIPRO   Take 1 tablet by mouth 2 times daily.     doxycycline hyclate 100 MG tablet  Commonly known as:  VIBRA-TABS   Take 1 tablet by mouth 2 times daily.     loperamide 2 MG capsule  Commonly known as:  IMODIUM   Take 1 capsule by mouth every 4 hours as needed for Diarrhea.     nystatin 287725 UNIT/GM powder  Commonly known as:  MYCOSTATIN   Apply topically 3 times daily.     oxyCODONE (IMM REL) 5 MG immediate release tablet  Commonly known as:  ROXICODONE   Take 1 tablet by mouth every 6 hours as needed for Pain.     rifAXIMin 550 MG Tab  Commonly known as:  XIFAXAN   Take 1 tablet by mouth every 12 hours.     thiamine 100 MG tablet  Commonly known as:  VITAMIN B1   Take 1 tablet by mouth daily. Do not start before November 13, 2019.     traMADol 50 MG tablet  Commonly known as:  ULTRAM   Take 50 mg by mouth every 6 hours as needed.              Primary Care Physician  Pb Ziegler MD      Time spent on diagnosing, evaluating, discussing and examining at bedside of multiple medical conditions, nutrition status, and skin integrity that I am monitoring,  managing, evaluating, supervising and treating, in addition to reviewing and adjusting  medications and interpreting diagnostic data, and direct communication with RN and consulting physicians, patients and family as well as PCP as well as discussing treatment options, meds and plan of care with patient and poa with more then half the time in counseling and coordination of care exceeded 40  min.    Abigail Ornelas NP BC  Heraclio Palmer MD [facility]  Internal Medicine Hospitalist  [facility]  24 H Hospitalist Service with [facility]  [email or web address]  [phone] Answering Service  [phone] Cell Phone  Accepting HIPPA Compliant Txt via [employer]   Unknown Etiology  Avoid hepatoxic medications  Continue to trend

## 2025-06-16 NOTE — PROGRESS NOTE ADULT - PROBLEM SELECTOR PLAN 2
Not neutropenic, afebrile. HiDAC can cause fevers  Valtrex 500mg BID. Cefepime  6/12 BCX/UCX/CXR negative. Full RVP negative  6/13 Febrile 102F, Started Cefepime 2gq8. CXR neg Not neutropenic, afebrile. HiDAC can cause fevers  Valtrex 500mg BID. Levaquin  6/12 BCX/UCX/CXR negative. Full RVP negative  6/13 Febrile 102F, Started Cefepime 2gq8. CXR neg  6/16 deescalate from cefepime to levaquin, complete a 7 day course thru 6/19.

## 2025-06-16 NOTE — PROGRESS NOTE ADULT - PROBLEM SELECTOR PLAN 1
Holding cycle 1 HiDAC  Monitor daily CBC w/ diff, CMP, replete prn. Strict I/Os. Daily weights. Antiemetics  Monitor for neurotoxicity; cerebellar testing, finger to nose, nystagmus checks. Handwriting checks daily.  6/15 start cycle 1 HiDAC Monitor daily CBC w/ diff, CMP, replete prn. Strict I/Os. Daily weights. Antiemetics  Monitor for neurotoxicity; cerebellar testing, finger to nose, nystagmus checks. Handwriting checks daily.  6/15 start cycle 1 HiDAC

## 2025-06-16 NOTE — ADVANCED PRACTICE NURSE CONSULT - ASSESSMENT
Pt A/Ox4, vital signs stable, afebrile. Labs reviewed on rounds by Dr Apodaca. L 20G PIV, easily flushed, with positive blood return. Site intact, no redness, swelling, or pain. Pt MUGA on 5/2/25 EF=63%  Pt receiving Zofran 8mg IV q8hrs. 2 certified RN verification completed. Pt educated on chemotherapy, verbalized understanding. Completed pt nystagmus and writing check verification- negative. Day 2/3, at 0400, Pt received cytarabine 2000mg/m2= 4400mg IV over 3hrs connected to lowest port of normal saline via alaris pump. Call bell in reach, safety maintained. Continuing hourly rounds. Pt educated on safety and fall prevention. Primary RN aware.

## 2025-06-16 NOTE — PROGRESS NOTE ADULT - ASSESSMENT
69M who was admitted on 5/2/25 for Daunorubicin + cytarabine during which he had acute on chronic gout, neutropenic fever treated empirically with broad spectrum abx, diarrhea and GI bleeding. He presents again due to acute oligoarthritis suspected to be underlying gout. Rheumatology consulted for further recommendations    # Acute left ankle arthritis with tenosynovitis of left foot and arthritis of MTP joints  # Oligoarticular Gout -- improving  - On exam, significant swelling of left ankle with decrease ROM, tenosynovitis of lateral aspect left ankle/foot, s/p arthrocentesis left ankle 6/12. Developed worsening RIGHT ankle effusion overnight 6/12-6/13. He was started on solumedrol 40mg 6/13 with improvement  - Uric acid 4.8  - Had gout flare in prior hospitalization where his allopurinol was uptitrated to 300mg Daily; treated with colchicine (c/b diarrhea) and 4 day prednisone taper  - Tx: Allopurinol 300mg daily  - Bedside US showed ankle effusion and effusions of MTP joints - s/p diagnostic arthrocentesis 6/12 of left ankle 6/12- 5 cc synovial fluid was aspirated and sent for analysis  - Fluid studies: 70k WBCs, 96% N Joint PCR negative, Gram stain negative; culture NGTD. MSU crystals present  - Synovial cultures no growth  - XR left foot: midfoot arthritic changes, no erosive changes     # Significant for leukocytosis 20k on arrival  - AML, on  Daunorubicin + cytarabine   - Started on HiDAC    Impression:  Patient with recent hospitalization for chemotherapy induction c/b acute on chronic gout, diarrhea from colchicine, GI bleed, neutropenic fever.  He presents now with significant synovitis particularly of his left ankle but also his b/l 1st MTPs. Developed right ankle pain/effusion overnight 6/12-6/13  He is now s/p arthrocentesis which showed MSU crystals, significant inflammatory changes. Joint PCR and gram stain negative minimal suspicion for septic arthritis.  Improved after treatment with solumedrol    Recommendations:  - Solumedrol taper as follows starting 6/17: 30mg x 2 days, 20mg x 2 days, 10mg x 2 days, then stop  - Can continue other measure such as tylenol/cold packs as well   - c/w Allopurinol 300mg PO daily    d/w Dr. Qiana Yao MD, PGY-4  Rheumatology Fellow  Reachable on TEAMS 69M who was admitted on 5/2/25 for Daunorubicin + cytarabine during which he had acute on chronic gout, neutropenic fever treated empirically with broad spectrum abx, diarrhea and GI bleeding. He presents again due to acute oligoarthritis suspected to be underlying gout. Rheumatology consulted for further recommendations    #  Oligoarticular Gout flare with acute left ankle arthritis with tenosynovitis of left foot and arthritis of MTP joints -- improving  - On exam, significant swelling of left ankle with decrease ROM, tenosynovitis of lateral aspect left ankle/foot, s/p arthrocentesis left ankle 6/12. Developed worsening RIGHT ankle effusion overnight 6/12-6/13. He was started on solumedrol 40mg 6/13 with improvement  - Uric acid 4.8  - Had gout flare in prior hospitalization where his allopurinol was uptitrated to 300mg Daily; treated with colchicine (c/b diarrhea) and 4 day prednisone taper  - Tx: Allopurinol 300mg daily  - Bedside US showed ankle effusion and effusions of MTP joints - s/p diagnostic arthrocentesis 6/12 of left ankle 6/12- 5 cc synovial fluid was aspirated and sent for analysis  - Fluid studies: 70k WBCs, 96% N Joint PCR negative, Gram stain negative; culture NGTD. MSU crystals present  - Synovial cultures no growth  - XR left foot: midfoot arthritic changes, no erosive changes     # Significant for leukocytosis 20k on arrival  - AML, on  Daunorubicin + cytarabine   - Started on HiDAC    Impression:  Patient with recent hospitalization for chemotherapy induction c/b acute on chronic gout, diarrhea from colchicine, GI bleed, neutropenic fever.  He presents now with significant synovitis particularly of his left ankle but also his b/l 1st MTPs. Developed right ankle pain/effusion overnight 6/12-6/13  He is now s/p arthrocentesis which showed MSU crystals, significant inflammatory changes. Joint PCR and gram stain negative minimal suspicion for septic arthritis.  Improved after treatment with solumedrol    Recommendations:  - Solumedrol taper as follows starting 6/17: 30mg x 2 days, 20mg x 2 days, 10mg x 2 days, then stop - d/w with heme/onc  - Can continue other measure such as tylenol/cold packs as well   - c/w Allopurinol 300mg PO daily    d/w Dr. Qiana Yao MD, PGY-4  Rheumatology Fellow  Reachable on TEAMS

## 2025-06-16 NOTE — ADVANCED PRACTICE NURSE CONSULT - ASSESSMENT
Pt A/Ox4, vital signs stable, afebrile. Labs reviewed on rounds by Dr Looney. L 20G PIV placed on 06/15/25, easily flushed, with positive blood return. Site intact, no redness, swelling, or pain. Pt MUGA on 5/2/25 EF=63% Pt received 150mg Emend IVPBx1 as premedication. Pt receiving Zofran 8mg IV q8hrs. 2 certified RN verification completed. Pt educated on chemotherapy, verbalized understanding. Completed pt nystagmus and writing check verification- negative. Day 2/3, at 15:19, Pt received cytarabine 2000mg/m2= 4400mg IV over 3hrs connected to lowest port of normal saline via alaris pump. Call bell in reach, safety maintained. Continuing hourly rounds. Pt educated on safety and fall prevention. Primary RN aware.

## 2025-06-17 ENCOUNTER — APPOINTMENT (OUTPATIENT)
Dept: HEMATOLOGY ONCOLOGY | Facility: CLINIC | Age: 70
End: 2025-06-17

## 2025-06-17 LAB
ALBUMIN SERPL ELPH-MCNC: 3.2 G/DL — LOW (ref 3.3–5)
ALP SERPL-CCNC: 113 U/L — SIGNIFICANT CHANGE UP (ref 40–120)
ALT FLD-CCNC: 84 U/L — HIGH (ref 10–45)
ANION GAP SERPL CALC-SCNC: 15 MMOL/L — SIGNIFICANT CHANGE UP (ref 5–17)
APPEARANCE UR: CLEAR — SIGNIFICANT CHANGE UP
AST SERPL-CCNC: 29 U/L — SIGNIFICANT CHANGE UP (ref 10–40)
BACTERIA # UR AUTO: NEGATIVE /HPF — SIGNIFICANT CHANGE UP
BASOPHILS # BLD AUTO: 0.01 K/UL — SIGNIFICANT CHANGE UP (ref 0–0.2)
BASOPHILS NFR BLD AUTO: 0.1 % — SIGNIFICANT CHANGE UP (ref 0–2)
BILIRUB SERPL-MCNC: 0.5 MG/DL — SIGNIFICANT CHANGE UP (ref 0.2–1.2)
BILIRUB UR-MCNC: NEGATIVE — SIGNIFICANT CHANGE UP
BUN SERPL-MCNC: 23 MG/DL — SIGNIFICANT CHANGE UP (ref 7–23)
CALCIUM SERPL-MCNC: 8.6 MG/DL — SIGNIFICANT CHANGE UP (ref 8.4–10.5)
CAST: 0 /LPF — SIGNIFICANT CHANGE UP (ref 0–4)
CHLORIDE SERPL-SCNC: 97 MMOL/L — SIGNIFICANT CHANGE UP (ref 96–108)
CO2 SERPL-SCNC: 20 MMOL/L — LOW (ref 22–31)
COLOR SPEC: YELLOW — SIGNIFICANT CHANGE UP
CREAT SERPL-MCNC: 1.04 MG/DL — SIGNIFICANT CHANGE UP (ref 0.5–1.3)
CULTURE RESULTS: SIGNIFICANT CHANGE UP
CULTURE RESULTS: SIGNIFICANT CHANGE UP
DIFF PNL FLD: NEGATIVE — SIGNIFICANT CHANGE UP
EGFR: 78 ML/MIN/1.73M2 — SIGNIFICANT CHANGE UP
EGFR: 78 ML/MIN/1.73M2 — SIGNIFICANT CHANGE UP
EOSINOPHIL # BLD AUTO: 0 K/UL — SIGNIFICANT CHANGE UP (ref 0–0.5)
EOSINOPHIL NFR BLD AUTO: 0 % — SIGNIFICANT CHANGE UP (ref 0–6)
GLUCOSE SERPL-MCNC: 105 MG/DL — HIGH (ref 70–99)
GLUCOSE UR QL: NEGATIVE MG/DL — SIGNIFICANT CHANGE UP
HCT VFR BLD CALC: 25 % — LOW (ref 39–50)
HGB BLD-MCNC: 8.6 G/DL — LOW (ref 13–17)
IMM GRANULOCYTES NFR BLD AUTO: 0.6 % — SIGNIFICANT CHANGE UP (ref 0–0.9)
KETONES UR QL: NEGATIVE MG/DL — SIGNIFICANT CHANGE UP
LDH SERPL L TO P-CCNC: 214 U/L — SIGNIFICANT CHANGE UP (ref 50–242)
LEUKOCYTE ESTERASE UR-ACNC: NEGATIVE — SIGNIFICANT CHANGE UP
LYMPHOCYTES # BLD AUTO: 0.45 K/UL — LOW (ref 1–3.3)
LYMPHOCYTES # BLD AUTO: 3.4 % — LOW (ref 13–44)
MAGNESIUM SERPL-MCNC: 1.9 MG/DL — SIGNIFICANT CHANGE UP (ref 1.6–2.6)
MCHC RBC-ENTMCNC: 33.5 PG — SIGNIFICANT CHANGE UP (ref 27–34)
MCHC RBC-ENTMCNC: 34.4 G/DL — SIGNIFICANT CHANGE UP (ref 32–36)
MCV RBC AUTO: 97.3 FL — SIGNIFICANT CHANGE UP (ref 80–100)
MONOCYTES # BLD AUTO: 0.22 K/UL — SIGNIFICANT CHANGE UP (ref 0–0.9)
MONOCYTES NFR BLD AUTO: 1.7 % — LOW (ref 2–14)
NEUTROPHILS # BLD AUTO: 12.39 K/UL — HIGH (ref 1.8–7.4)
NEUTROPHILS NFR BLD AUTO: 94.2 % — HIGH (ref 43–77)
NITRITE UR-MCNC: NEGATIVE — SIGNIFICANT CHANGE UP
NRBC BLD AUTO-RTO: 0 /100 WBCS — SIGNIFICANT CHANGE UP (ref 0–0)
PH UR: 6 — SIGNIFICANT CHANGE UP (ref 5–8)
PHOSPHATE SERPL-MCNC: 3.7 MG/DL — SIGNIFICANT CHANGE UP (ref 2.5–4.5)
PLATELET # BLD AUTO: 116 K/UL — LOW (ref 150–400)
POTASSIUM SERPL-MCNC: 3.8 MMOL/L — SIGNIFICANT CHANGE UP (ref 3.5–5.3)
POTASSIUM SERPL-SCNC: 3.8 MMOL/L — SIGNIFICANT CHANGE UP (ref 3.5–5.3)
PROT SERPL-MCNC: 5.5 G/DL — LOW (ref 6–8.3)
PROT UR-MCNC: NEGATIVE MG/DL — SIGNIFICANT CHANGE UP
RBC # BLD: 2.57 M/UL — LOW (ref 4.2–5.8)
RBC # FLD: 20.7 % — HIGH (ref 10.3–14.5)
RBC CASTS # UR COMP ASSIST: 0 /HPF — SIGNIFICANT CHANGE UP (ref 0–4)
SODIUM SERPL-SCNC: 132 MMOL/L — LOW (ref 135–145)
SP GR SPEC: 1.01 — SIGNIFICANT CHANGE UP (ref 1–1.03)
SPECIMEN SOURCE: SIGNIFICANT CHANGE UP
SPECIMEN SOURCE: SIGNIFICANT CHANGE UP
SQUAMOUS # UR AUTO: 0 /HPF — SIGNIFICANT CHANGE UP (ref 0–5)
UROBILINOGEN FLD QL: 0.2 MG/DL — SIGNIFICANT CHANGE UP (ref 0.2–1)
WBC # BLD: 13.15 K/UL — HIGH (ref 3.8–10.5)
WBC # FLD AUTO: 13.15 K/UL — HIGH (ref 3.8–10.5)
WBC UR QL: 1 /HPF — SIGNIFICANT CHANGE UP (ref 0–5)

## 2025-06-17 PROCEDURE — 99233 SBSQ HOSP IP/OBS HIGH 50: CPT

## 2025-06-17 PROCEDURE — 71045 X-RAY EXAM CHEST 1 VIEW: CPT | Mod: 26

## 2025-06-17 RX ORDER — HYDROMORPHONE/SOD CHLOR,ISO/PF 2 MG/10 ML
4 SYRINGE (ML) INJECTION EVERY 4 HOURS
Refills: 0 | Status: DISCONTINUED | OUTPATIENT
Start: 2025-06-17 | End: 2025-06-19

## 2025-06-17 RX ORDER — SENNA 187 MG
2 TABLET ORAL AT BEDTIME
Refills: 0 | Status: DISCONTINUED | OUTPATIENT
Start: 2025-06-17 | End: 2025-06-19

## 2025-06-17 RX ORDER — METHYLPREDNISOLONE ACETATE 80 MG/ML
30 INJECTION, SUSPENSION INTRA-ARTICULAR; INTRALESIONAL; INTRAMUSCULAR; SOFT TISSUE DAILY
Refills: 0 | Status: COMPLETED | OUTPATIENT
Start: 2025-06-17 | End: 2025-06-18

## 2025-06-17 RX ORDER — MAGNESIUM HYDROXIDE 400 MG/5ML
30 SUSPENSION ORAL ONCE
Refills: 0 | Status: COMPLETED | OUTPATIENT
Start: 2025-06-17 | End: 2025-06-17

## 2025-06-17 RX ORDER — LIDOCAINE HYDROCHLORIDE 20 MG/ML
2 JELLY TOPICAL ONCE
Refills: 0 | Status: COMPLETED | OUTPATIENT
Start: 2025-06-17 | End: 2025-06-17

## 2025-06-17 RX ORDER — POLYETHYLENE GLYCOL 3350 17 G/17G
17 POWDER, FOR SOLUTION ORAL
Refills: 0 | Status: DISCONTINUED | OUTPATIENT
Start: 2025-06-17 | End: 2025-06-19

## 2025-06-17 RX ADMIN — TAMSULOSIN HYDROCHLORIDE 0.4 MILLIGRAM(S): 0.4 CAPSULE ORAL at 22:25

## 2025-06-17 RX ADMIN — Medication 2 DROP(S): at 11:56

## 2025-06-17 RX ADMIN — Medication 8 MILLIGRAM(S): at 13:40

## 2025-06-17 RX ADMIN — METHYLPREDNISOLONE ACETATE 30 MILLIGRAM(S): 80 INJECTION, SUSPENSION INTRA-ARTICULAR; INTRALESIONAL; INTRAMUSCULAR; SOFT TISSUE at 12:08

## 2025-06-17 RX ADMIN — Medication 0.7 MILLIGRAM(S): at 06:26

## 2025-06-17 RX ADMIN — Medication 8 MILLIGRAM(S): at 06:27

## 2025-06-17 RX ADMIN — Medication 2 DROP(S): at 06:28

## 2025-06-17 RX ADMIN — Medication 2 DROP(S): at 17:39

## 2025-06-17 RX ADMIN — LIDOCAINE HYDROCHLORIDE 2 MILLILITER(S): 20 JELLY TOPICAL at 23:15

## 2025-06-17 RX ADMIN — Medication 15 MILLILITER(S): at 06:28

## 2025-06-17 RX ADMIN — Medication 0.7 MILLIGRAM(S): at 06:56

## 2025-06-17 RX ADMIN — Medication 2 DROP(S): at 23:18

## 2025-06-17 RX ADMIN — Medication 1 MILLIGRAM(S): at 02:40

## 2025-06-17 RX ADMIN — Medication 2 TABLET(S): at 22:25

## 2025-06-17 RX ADMIN — Medication 500 MILLIGRAM(S): at 17:39

## 2025-06-17 RX ADMIN — Medication 75 MILLILITER(S): at 20:29

## 2025-06-17 RX ADMIN — Medication 0.7 MILLIGRAM(S): at 00:03

## 2025-06-17 RX ADMIN — Medication 15 MILLILITER(S): at 13:40

## 2025-06-17 RX ADMIN — Medication 1 MILLIGRAM(S): at 09:14

## 2025-06-17 RX ADMIN — Medication 75 MILLILITER(S): at 17:56

## 2025-06-17 RX ADMIN — Medication 1 MILLIGRAM(S): at 09:30

## 2025-06-17 RX ADMIN — Medication 15 MILLILITER(S): at 22:26

## 2025-06-17 RX ADMIN — Medication 1 MILLIGRAM(S): at 01:48

## 2025-06-17 RX ADMIN — Medication 300 MILLIGRAM(S): at 06:27

## 2025-06-17 RX ADMIN — POLYETHYLENE GLYCOL 3350 17 GRAM(S): 17 POWDER, FOR SOLUTION ORAL at 17:39

## 2025-06-17 RX ADMIN — Medication 4400 MILLIGRAM(S): at 03:15

## 2025-06-17 RX ADMIN — MAGNESIUM HYDROXIDE 30 MILLILITER(S): 400 SUSPENSION ORAL at 15:24

## 2025-06-17 RX ADMIN — ATORVASTATIN CALCIUM 80 MILLIGRAM(S): 80 TABLET, FILM COATED ORAL at 22:26

## 2025-06-17 RX ADMIN — Medication 8 MILLIGRAM(S): at 22:26

## 2025-06-17 RX ADMIN — Medication 40 MILLIGRAM(S): at 06:26

## 2025-06-17 RX ADMIN — Medication 650 MILLIGRAM(S): at 17:38

## 2025-06-17 RX ADMIN — Medication 4400 MILLIGRAM(S): at 15:08

## 2025-06-17 RX ADMIN — Medication 500 MILLIGRAM(S): at 06:27

## 2025-06-17 RX ADMIN — Medication 0.7 MILLIGRAM(S): at 01:00

## 2025-06-17 RX ADMIN — DILTIAZEM HYDROCHLORIDE 120 MILLIGRAM(S): 240 TABLET, EXTENDED RELEASE ORAL at 06:27

## 2025-06-17 NOTE — ADVANCED PRACTICE NURSE CONSULT - ASSESSMENT
Patient is  A/Ox4, vital signs stable, afebrile.  Left 20G PIV, easily flushed, with positive blood return. Site intact, no redness, swelling, or pain. Patient is receiving Zofran 8mg IV q8hrs. 2 certified RN verification completed.  Educated patient about chemotherapy , verbalized understanding. Nystagmus and hand writing check verification- negative. Cytarabine 2000mg/m2= 4400mg IV over 3hrs started at 0315 via Left arm PIV, connected to lowest port of normal saline via alaris pump. Call bell within reach, Patient's safety maintained. Primary RN aware.

## 2025-06-17 NOTE — PROGRESS NOTE ADULT - PROBLEM SELECTOR PLAN 1
Synovial fluid analysis showing crystals and many wbcs, suspect gout flare. pt on abx empirically in setting of fevers  - rheum following, on steroids with clinical improvement  - stop IV dilaudid 0.7-1 mg q4 hours prn mod-severe pain, start PO dilaudid 4 mg q4 hours prn.  - bowel regimen: miralax BID senna QHS, agree with MOM x1 by primary team today

## 2025-06-17 NOTE — PROGRESS NOTE ADULT - SUBJECTIVE AND OBJECTIVE BOX
Diagnosis: ASXL1-mutated and SQP34-ykrsuty AML    Protocol/Chemo Regimen: Cycle 1 HiDAC 2gm/m2    Day: 3    Pt endorsed:     Review of Systems: Patient denied nausea, vomiting,  chest pain, cough, dyspnea, abdominal pain, constipation,  rectal pain,  rash, fatigue, headache, bleeding    Pain scale:     Diet: regular Ensure plus HP BID                      Allergies: No Known Allergies     Diagnosis: ASXL1-mutated and AOM34-cnzlvbs AML    Protocol/Chemo Regimen: Cycle 1 HiDAC 2gm/m2    Day: 3    Pt endorsed: c/o weakness, fatigue, fog, and generalized pain. States he "does not feel like himself"     Review of Systems: Patient denied nausea, vomiting,  chest pain, cough, dyspnea, abdominal pain, constipation,  rectal pain,  rash, fatigue, headache, bleeding    Pain scale:     Diet: regular Ensure plus HP BID                      Allergies: No Known Allergies    ANTIMICROBIALS  levoFLOXacin  Tablet 500 milliGRAM(s) Oral every 24 hours  valACYclovir 500 milliGRAM(s) Oral two times a day    HEME/ONC MEDICATIONS  cytarabine IVPB (eMAR) 4400 milliGRAM(s) IV Intermittent every 12 hours    STANDING MEDICATIONS  allopurinol 300 milliGRAM(s) Oral daily  atorvastatin 80 milliGRAM(s) Oral at bedtime  Biotene Dry Mouth Oral Rinse 15 milliLiter(s) Swish and Spit three times a day  diltiazem    milliGRAM(s) Oral daily  methylPREDNISolone sodium succinate Injectable 30 milliGRAM(s) IV Push daily  ondansetron Injectable 8 milliGRAM(s) IV Push every 8 hours  pantoprazole    Tablet 40 milliGRAM(s) Oral before breakfast  polyethylene glycol 3350 17 Gram(s) Oral two times a day  prednisoLONE acetate 1% Suspension 2 Drop(s) Both EYES every 6 hours  senna 2 Tablet(s) Oral at bedtime  sodium chloride 0.9%. 1000 milliLiter(s) IV Continuous <Continuous>  tamsulosin 0.4 milliGRAM(s) Oral at bedtime      PRN MEDICATIONS  acetaminophen     Tablet .. 650 milliGRAM(s) Oral every 6 hours PRN  HYDROmorphone   Tablet 4 milliGRAM(s) Oral every 4 hours PRN  metoclopramide Injectable 10 milliGRAM(s) IV Push every 6 hours PRN  simethicone 80 milliGRAM(s) Chew two times a day PRN        Vital Signs Last 24 Hrs  T(C): 37 (17 Jun 2025 13:36), Max: 37.2 (17 Jun 2025 09:23)  T(F): 98.6 (17 Jun 2025 13:36), Max: 98.9 (17 Jun 2025 09:23)  HR: 110 (17 Jun 2025 13:36) (95 - 110)  BP: 152/79 (17 Jun 2025 13:36) (122/78 - 157/90)  BP(mean): --  RR: 18 (17 Jun 2025 13:36) (16 - 18)  SpO2: 93% (17 Jun 2025 13:36) (93% - 95%)    Parameters below as of 17 Jun 2025 13:36  Patient On (Oxygen Delivery Method): room air      PHYSICAL EXAM  General: adult in NAD, AxOx3 but slow to answer, weak appearing.   HEENT: clear oropharynx, no erythema, no ulcers  CV: normal S1, S2, RRR  Lungs: clear to auscultation, no wheezes, no rales  Abdomen: +BS, soft, nontender, nondistended  Ext: decreased ROM due to pain; joint swelling; joint erythema; joint warmth; +b/l ankle join swelling and warmth. +unable to ambulate  Skin: +erythema around b/l ankles and toes, warm to touch  No lymphadedenopathy  PIV    LABS:                          8.6    13.15 )-----------( 116      ( 17 Jun 2025 10:20 )             25.0     Mean Cell Volume : 97.3 fl  Mean Cell Hemoglobin : 33.5 pg  Mean Cell Hemoglobin Concentration : 34.4 g/dL  Auto Neutrophil # : x  Auto Lymphocyte # : x  Auto Monocyte # : x  Auto Eosinophil # : x  Auto Basophil # : x  Auto Neutrophil % : x  Auto Lymphocyte % : x  Auto Monocyte % : x  Auto Eosinophil % : x  Auto Basophil % : x      06-17    132[L]  |  97  |  23  ----------------------------<  105[H]  3.8   |  20[L]  |  1.04    Ca    8.6      17 Jun 2025 10:20  Phos  3.7     06-17  Mg     1.9     06-17    TPro  5.5[L]  /  Alb  3.2[L]  /  TBili  0.5  /  DBili  x   /  AST  29  /  ALT  84[H]  /  AlkPhos  113  06-17      Mg 1.9  Phos 3.7      PT/INR - ( 16 Jun 2025 08:58 )   PT: 12.9 sec;   INR: 1.13 ratio         PTT - ( 16 Jun 2025 08:58 )  PTT:25.7 sec      Uric Acid --

## 2025-06-17 NOTE — PROGRESS NOTE ADULT - PROBLEM SELECTOR PLAN 2
Not neutropenic, afebrile. HiDAC can cause fevers  Valtrex 500mg BID. Levaquin  6/12 BCX/UCX/CXR negative. Full RVP negative  6/13 Febrile 102F, Started Cefepime 2gq8. CXR neg  6/16 deescalate from cefepime to levaquin, complete a 7 day course thru 6/19. Not neutropenic, afebrile. HiDAC can cause fevers  Valtrex 500mg BID. Levaquin  6/12 BCX/UCX/CXR negative. Full RVP negative  6/13 Febrile 102F, Started Cefepime 2gq8. CXR neg  6/16 deescalate from cefepime to levaquin, complete a 7 day course thru 6/19.  6/17: Febrile 100.4F. Pan-cultures sent, CXR

## 2025-06-17 NOTE — PROGRESS NOTE ADULT - PROBLEM SELECTOR PLAN 2
Pt is planned for consolidation chemo which has been started this admission  - management as per heme/onc

## 2025-06-17 NOTE — PROGRESS NOTE ADULT - PROBLEM SELECTOR PLAN 1
Monitor daily CBC w/ diff, CMP, replete prn. Strict I/Os. Daily weights. Antiemetics  Monitor for neurotoxicity; cerebellar testing, finger to nose, nystagmus checks. Handwriting checks daily.  6/15 start cycle 1 HiDAC Monitor daily CBC w/ diff, CMP, replete prn. Strict I/Os. Daily weights. Antiemetics  Monitor for neurotoxicity; cerebellar testing, finger to nose, nystagmus checks. Handwriting checks daily.  6/15 start cycle 1 HiDAC  6/16: Pt slightly altered and weak, slow to find answers to questions. Handwriting and finger to nose test normal.

## 2025-06-17 NOTE — PROGRESS NOTE ADULT - PROBLEM SELECTOR PLAN 3
.- Pt would want his ex wife [place] and his children to be his healthcare surrogates in event he loses capacity  - Code status FULL.

## 2025-06-17 NOTE — PROGRESS NOTE ADULT - ASSESSMENT
69M with BRCA2 carrier ([id number]>T), HTN, HLD, Gout, GERD, BPH, spinal stenosis (s/p multiple procedures 2018, 2020 laminectomies / fusions), hip surgeries (Oct 2024, Jan 2025), residual hand weakness related to surgeries, umbilical hernia repair (2014/15) and ASXL1-mutated and ILV76-sokplsl AML, s/p induction chemotherapy with day 15 BM bx and aspirate performed on 5/16/25 showed no evidence of persistent leukemia with low 0.34% myeloblasts on flow, however specimen was subcortical, suboptimal. Patient initially due for cycle 1 hidac consolidation however presenting with fevers x3 days, and acute on chronic gout attack causing inability to walk secondary to pain. Geriatrics and Palliative Medicine Team is consulted to assist with pain control.

## 2025-06-17 NOTE — PHARMACOTHERAPY INTERVENTION NOTE - COMMENTS
Clinical Pharmacy Specialist- Hematology/Oncology- Progress Note    Pt is a 68 y/o male w/ PMH of HTN, HLD, GERD, BPH, Gout, spinal stenosis (s/p multiple surgeries) and newly diagnosed AML (ASXL1-mutated and DDX41) s/p 7+3 Induction therapy, course complicated by gout flare vs septic arthritis, now receiving C1 HIDAC consolidation    Antimicrobial Course:  - Cefepime- 6/13- 6/16  --> Levaquin- 6/16  - Valtrex- 6/12  MRSA nasal swab    Last Neutropenic (ANC<1000): no occurrence  Last Febrile: 13-Jun-2025 13:04; T= 102.2  Days Non-Neutropenic: 4  Days afebrile: 4    Chemotherapy Course  -Current Regimen: HIDAC 2gm/m2 Consolidation  History:  (5/2/25)- 7+3 Induction   -Daunorubicin 60mg/m2 IVP D1-3   -Cytarabine 100mg/m2 IV cont inf D1-7  (6/15/25) C1 HIDAC 2gm/m2  -Day: 2 (6/16)  BmBx:  Access: R TLC (accessed 5/5/25)    Vancomycin Levels:   Date    Dose              Value    Ref Range	                             Status   5/21    750mg q12hr   7.8       Trough 10-20; AUC= 400-600   Final  5/23    1gm q12hr      10.9     Trough 10-20; AUC= 400-600   Final    History/Relevant clinical information used in assessment:  - Ht= 5’11”; IBW= 75.3kg; Actual BW= 107.3kg; Adj BW= 88kg  - 5/2- IRR? rigors, chills, and tachycardia 2 hours into day 1; rasburicase 3mg x 1  - 5/4 -Crcl= 67ml/min ; Scr=1.32; Wt= 88kg; Adjusted BW used since pt BMI>30  - 5/9- increase melatonin 3mg to 10mh qhs- c/o insomnia  - 5/13- mouth pain- has ulceration   - 5/14-5/28- severe mucositis   - 5/15- bloody diarrhea x 3 on reglan prn, but has not needed, monitor for d/c to not aggravate w/ prokinetic agent  - 5/15- HSV neg  - 5/16- bloody BM's decreased to 2x'd/day from 4x's/day yest  - 5/17 UCx- 5/17- 10-49k VRE- per ID, do not need to treat  - 5/18- midodrine 10mg PO tid  - 5/19- BlCx- Meth resistant Staphylococcus haemolyticus- on vancomycin 1gm q12hr, this will yield AUC= 457; per ID, likely contaminant, expect short course of vanco; - 5/19, still febrile since 5/11, on cefepime, BlCx NGTD + abd discomfort (added flagyl)-->CT CAP 5/15 - no infection (but groundglass opacities stable from previous?)  - 5/21- zarxio 480mcg daily 5/21- 5/23  - 5/27- gout flare- colchicine 0.6mg BID x 2 days (5/27-5/29) -can cont until flare resolves, some experts cont 24-48 hrs after resolution; if symptoms do not resolve after 3 days, consider other anti-inflammatory agents- pt pain not relieved- added prednisone 40mg daily x 2 days (5/28-29) f/b 20mg x 2 days (5/30-6/1)  - 5/29- pt had episodes of diarrhea d/t colchicine  - 5/29- - allopurinol 100mg (300mg d/c'd 5/15)-keeping for gout-->300mg- 5/29- given UA rising- 5/29  - 5/30 will decrease diltiazem CD 240mg to 120mg  Meds sent for auth: midodrine, allopurinol 300mg (increased), prednisone, simethicone- sent to [facility] 5/29  - 6/13- c/o pain in both feet now  - 6/16- Confirmed home medications with patient  Home Medications:  ·	cholecalciferol oral tablet: 2000 unit(s) PO daily  ·	omeprazole 40 mg PO daily  ·	tamsulosin 0.4 mg PO daily  ·	diltiazem CD 120mg PO daily  ·	Allopurinol 300mg PO daily  ·	Valtrex 500mg PO BID  ·	atorvastatin 80mg PO qhs  ·	colchicine 0,6mg PO - 59 days supply filled 6/2025- pt does not want or take  ·	hctz, tramadol, midodrine, oxy- no longer taking    Assessment/Plan/Recommendation:  Onc:  Cytarabine schedule (pt on 1,2,3 schedule):  - 6/15- started ~ 3 pm (D1)  - 6/16- 4 am (D2)  - 6/16- 4 pm (D2)  - 6/17- 4 am (D3)  - 6/17- 4 pm (D3)  - 6/18- 4 am (D4) finishes wed ~7am   ID:  - 6/13- not neutropenic, on cefepime for fevers- switched to levaquin through thurs 6/19 (for 7 days duration)  Rheum:  - 6/12- r/o septic arthritis of L ankle- s/p arthrocentesis  - 6/12- allopurinol 300mg PO daily  - 6/13- methylprednisone 40mg IVP daily- taper: 30mg daily x 2 days- 6/17-6/18--> 20mg dailyx 2 days- 6/19- 6/20 -->10mg daily x 2 days- 6/21- 6/22--> stop   Cards:  - atorvastatin 80mg hold if on posa d/t DDI (Cat X)  Dispo:  - to [facility]    Additional Monitoring Needed?   -Yes- Continue to monitor renal function & daily counts for abx escalation/de-escalation   -Discharge Planning:  --> New meds:   --> Meds sent for auth:   --> Delivered meds:    Case discussed with attending/primary team    Liza Jolley, PharmD, BCPS  Clinical Pharmacy Specialist | Hematology/Oncology  [facility]  Email: [email or web address] or available on [employer]

## 2025-06-17 NOTE — PROGRESS NOTE ADULT - PROBLEM SELECTOR PLAN 3
Palliative care consult for pain control, IV Dilaudid PRN   Bowel regimen   Allopurinol QD  6/12 rheumatology tapped L-ankle joint: synovial fluid cultures and testing sent f/u.  6/13: Rheum f/u appreciated. Start 40mg IV solumedrol daily for now. Rheum will reassess for dose adjustment on Monday as needed. Palliative care consult for pain control, IV Dilaudid PRN   Bowel regimen   Allopurinol QD  6/12 rheumatology tapped L-ankle joint: synovial fluid cultures and testing sent f/u.  6/13: Rheum f/u appreciated. Start 40mg IV solumedrol daily for now. Rheum will reassess for dose adjustment on Monday as needed.  6/17: Dilauded de-escalated from IV to 4mg PO q4 due to altered mental status. Milk of Magnesia for bowel regimen

## 2025-06-17 NOTE — ADVANCED PRACTICE NURSE CONSULT - ASSESSMENT
Pt A/Ox4, vital signs stable, afebrile. Labs reviewed on rounds by Dr Looney. Rt arm piv  20G  placed today, easily flushed, with positive blood return. Site intact, no redness, swelling, or pain. Pt MUGA on 5/2/25 EF=63% . Pt receiving Zofran 8mg IV q8hrs. 2 certified RN verification completed. Pt educated on chemotherapy, verbalized understanding. Completed pt nystagmus and writing check verification- negative. Day 3/3, at 15:08, Pt received cytarabine 2000mg/m2= 4400mg IV over 3hrs connected to lowest port of normal saline via alaris pump. Call bell in reach, safety maintained. Continuing hourly rounds. Pt educated on safety and fall prevention. Primary RN aware.

## 2025-06-17 NOTE — PROGRESS NOTE ADULT - PROBLEM SELECTOR PLAN 4
- Geriatrics and Palliative Medicine Team will continue to follow for support and pain control    Paloma Mark MD  [facility] Team Consults  Please call if we can be of assistance, [phone]

## 2025-06-17 NOTE — PROGRESS NOTE ADULT - SUBJECTIVE AND OBJECTIVE BOX
SUBJECTIVE AND OBJECTIVE  Indication for Geriatrics and Palliative Care Services/INTERVAL HPI: pain control     OVERNIGHT EVENTS: patient seen this morning, he is interactive but appears drowsy at times. Note frequent IV dilaudid PRN use throughout the day, though pt reporting symptoms of mouth pain 2/2 dehydration rather than foot/ankle pain. Noting his ankle swelling/redness has resolved, I advised to transition IV Dilaudid prn to PO today with increased interval to minimize opiate use. He has not a a BM in days, his bowel regimen has been escalated.     DNR on chart:  Allergies    No Known Allergies    Intolerances    MEDICATIONS  (STANDING):  allopurinol 300 milliGRAM(s) Oral daily  atorvastatin 80 milliGRAM(s) Oral at bedtime  Biotene Dry Mouth Oral Rinse 15 milliLiter(s) Swish and Spit three times a day  cytarabine IVPB (eMAR) 4400 milliGRAM(s) IV Intermittent every 12 hours  diltiazem    milliGRAM(s) Oral daily  levoFLOXacin  Tablet 500 milliGRAM(s) Oral every 24 hours  methylPREDNISolone sodium succinate Injectable 30 milliGRAM(s) IV Push daily  ondansetron Injectable 8 milliGRAM(s) IV Push every 8 hours  pantoprazole    Tablet 40 milliGRAM(s) Oral before breakfast  polyethylene glycol 3350 17 Gram(s) Oral two times a day  prednisoLONE acetate 1% Suspension 2 Drop(s) Both EYES every 6 hours  senna 2 Tablet(s) Oral at bedtime  sodium chloride 0.9%. 1000 milliLiter(s) (75 mL/Hr) IV Continuous <Continuous>  tamsulosin 0.4 milliGRAM(s) Oral at bedtime  valACYclovir 500 milliGRAM(s) Oral two times a day    MEDICATIONS  (PRN):  acetaminophen     Tablet .. 650 milliGRAM(s) Oral every 6 hours PRN Temp greater or equal to 38C (100.4F), Mild Pain (1 - 3)  HYDROmorphone   Tablet 4 milliGRAM(s) Oral every 4 hours PRN mod-severe pain  metoclopramide Injectable 10 milliGRAM(s) IV Push every 6 hours PRN nausea/vomiting  simethicone 80 milliGRAM(s) Chew two times a day PRN Gas      ITEMS UNCHECKED ARE NOT PRESENT    PRESENT SYMPTOMS: [ ]Unable to self-report - see [ ] CPOT [ ] PAINADS [ ] RDOS  Source if other than patient:  [ ]Family   [ ]Team     Pain:  [ x]yes [ ]no  QOL impact -   Location -  mouth                  Aggravating factors -  Quality -  Radiation -  Timing-  Severity (0-10 scale):  Minimal acceptable level (0-10 scale):     Dyspnea:                           [ ]Mild [ ]Moderate [ ]Severe  Anxiety:                             [ ]Mild [ ]Moderate [ ]Severe  Fatigue:                             [ ]Mild [ ]Moderate [ ]Severe  Nausea:                             [ ]Mild [ ]Moderate [ ]Severe  Loss of appetite:              [ ]Mild [ ]Moderate [ ]Severe  Constipation:                    [ ]Mild [ ]Moderate [ ]Severe    PCSSQ[Palliative Care Spiritual Screening Question]   Severity (0-10):  Score of 4 or > indicate consideration of Chaplaincy referral.  Chaplaincy Referral: [ ] yes [ ] refused [ ] following [x ] Deferred     Caregiver [place]? : [ ] yes [x ] no [ ] Deferred [ ] Declined             Social work referral [ ] Patient & Family Centered Care Referral [ ]     Anticipatory Grief present?:  [ ] yes [x ] no  [ ] Deferred                  Social work referral [ ] Chaplaincy Referral[ ]    Other Symptoms:  [x ]All other review of systems negative     Palliative Performance Status Version 2:     50    %      http://npcrc.org/files/news/palliative_performance_scale_ppsv2.pdf    PHYSICAL EXAM:  Vital Signs Last 24 Hrs  T(C): 37 (2025 13:36), Max: 37.2 (2025 09:23)  T(F): 98.6 (2025 13:36), Max: 98.9 (2025 09:23)  HR: 110 (2025 13:36) (95 - 110)  BP: 152/79 (2025 13:36) (122/78 - 157/90)  BP(mean): --  RR: 18 (2025 13:36) (16 - 18)  SpO2: 93% (2025 13:36) (93% - 95%)    Parameters below as of 2025 13:36  Patient On (Oxygen Delivery Method): room air     I&O's Summary    2025 07:01  -  2025 07:00  --------------------------------------------------------  IN: 3795 mL / OUT: 5350 mL / NET: -1555 mL    2025 07:01  -  2025 16:49  --------------------------------------------------------  IN: 300 mL / OUT: 2260 mL / NET: -1960 mL       GENERAL: [ ]Cachexia    [ ]Alert  [ ]Oriented x   [ x]Lethargic  [ ]Unarousable  [ x]Verbal  [ ]Non-Verbal  Behavioral:   [ ]Anxiety  [ x]Delirium [ ]Agitation [ ]Other  HEENT:  [ x]Normal   [ ]Dry mouth   [ ]ET Tube/Trach  [ ]Oral lesions  PULMONARY:   [x ]Clear [ ]Tachypnea  [ ]Audible excessive secretions   [ ]Rhonchi        [ ]Right [ ]Left [ ]Bilateral  [ ]Crackles        [ ]Right [ ]Left [ ]Bilateral  [ ]Wheezing     [ ]Right [ ]Left [ ]Bilateral  [ ]Diminished BS [ ] Right [ ]Left [ ]Bilateral  CARDIOVASCULAR:    [x ]Regular [ ]Irregular [ ]Tachy  [ ]Lui [ ]Murmur [ ]Other  GASTROINTESTINAL:  [ x]Soft  [ ]Distended   [ x]+BS  [x ]Non tender [ ]Tender  [ ]Other [ ]PEG [ ]OGT/ NGT   Last BM:   GENITOURINARY:  [x ]Normal [ ]Incontinent   [ ]Oliguria/Anuria   [ ][place]al[place]os  MUSCULOSKELETAL:   [ x]Normal   [ ]Weakness  [ ]Bed/Wheelchair bound [ ]Edema  NEUROLOGIC:   [ ]No focal deficits  [ x] Cognitive impairment  [ ] Dysphagia [ ]Dysarthria [ ] Paresis [ ]Other   SKIN:   [ ]Normal  [ ]Rash  [ ]Other  [ ]Pressure ulcer(s) [ ]y [ ]n present on admission    CRITICAL CARE:  [ ]Shock Present  [ ]Septic [ ]Cardiogenic [ ]Neurologic [ ]Hypovolemic  [ ]Vasopressors [ ]Inotropes  [ ]Respiratory failure present [ ]Mechanical Ventilation [ ]Non-invasive ventilatory support [ ]High-Flow   [ ]Acute  [ ]Chronic [ ]Hypoxic  [ ]Hypercarbic [ ]Other  [ ]Other organ failure     LABS:                        8.6    13.15 )-----------( 116      ( 2025 10:20 )             25.0       132[L]  |  97  |  23  ----------------------------<  105[H]  3.8   |  20[L]  |  1.04    Ca    8.6      2025 10:20  Phos  3.7       Mg     1.9         TPro  5.5[L]  /  Alb  3.2[L]  /  TBili  0.5  /  DBili  x   /  AST  29  /  ALT  84[H]  /  AlkPhos  113    PT/INR - ( 2025 08:58 )   PT: 12.9 sec;   INR: 1.13 ratio         PTT - ( 2025 08:58 )  PTT:25.7 sec    Urinalysis Basic - ( 2025 11:56 )    Color: Yellow / Appearance: Clear / S.012 / pH: x  Gluc: x / Ketone: x  / Bili: Negative / Urobili: 0.2 mg/dL   Blood: x / Protein: Negative mg/dL / Nitrite: Negative   Leuk Esterase: Negative / RBC: 0 /HPF / WBC 1 /HPF   Sq Epi: x / Non Sq Epi: 0 /HPF / Bacteria: Negative /HPF      RADIOLOGY & ADDITIONAL STUDIES: reviewed    Protein Calorie Malnutrition Present: [ ]mild [ ]moderate [ ]severe [ ]underweight [ ]morbid obesity  https://www.andeal.org/vault/6714/web/files/ONC/Table_Clinical%20Characteristics%20to%20Document%20Malnutrition-White%20JV%20et%20al%2020.pdf    Height (cm): 180 (25 @ 09:32), 181 (25 @ 08:51), 180.5 (25 @ 15:00)  Weight (kg): 100.7 (06-15-25 @ 08:31), 100.5 (25 @ 16:00), 107.3 (25 @ 11:55)  BMI (kg/m2): 31.1 (06-15-25 @ 08:31), 31 (25 @ 09:32), 30.7 (25 @ 16:00)    [ ]PPSV2 < or = 30%  [ ]significant weight loss [ ]poor nutritional intake [ ]anasarca[ ]Artificial Nutrition    Other REFERRALS:  [ ]Hospice  [ ]Child Life  [ ]Social Work  [ ]Case management [ ]Holistic Therapy

## 2025-06-17 NOTE — PROGRESS NOTE ADULT - NS ATTEND AMEND GEN_ALL_CORE FT
Primary: Chitty    Assessment: 69M HiDAC Cycle 1 Day 2 for ASXL1-mutated and RUB84-vyufagj AML. Course complicated by acute gout flare and severe pain of right ankle. Started HiDAC on Induction Day 45.    Heme:   - HiDAC 2g/m2 days 1,2,3; long-acting GCSF outpatient  - Continue allopurinol 300 mg once a day  - Continue daily oral B12 suppl    ID: Fevers x 3 days, dark urine, less urine output, UA clear, RVP neg  - leukocytosis related to steroids  - Not Neutropenic  - s/p cefepime (6/13/25 - 6/16/25), de-escalate to levaquin for 7d course    MSK:  - Acute on chronic gout: Rheum consult appreciated, now on steroids  - Improvement, now ambulating  - acute on chronic pain: Dilaudid for now, will need chronic pain to determine if outpatient regimen should be adjusted.    Dispo:  - Discharge on day 4 of consolidation if stable, GCSF once between days 5-7, [facility] Primary: Chitty    Assessment: 69M HiDAC Cycle 1 Day 3 for ASXL1-mutated and KIN77-vbosceu AML. Course complicated by acute gout flare and severe pain of right ankle. Started HiDAC on Induction Day 45.    Heme:   - HiDAC 2g/m2 days 1,2,3; long-acting GCSF outpatient  - Continue allopurinol 300 mg once a day  - Continue daily oral B12 suppl    ID: Fevers x 3 days, dark urine, less urine output, UA clear, RVP neg  - leukocytosis related to steroids  - Not Neutropenic  - s/p cefepime (6/13/25 - 6/16/25), de-escalate to levaquin for 7d course  - pt w/ AMS this morning - check Cx's    MSK:  - Acute on chronic gout: Rheum consult appreciated, now on steroids -starting taper 6/17  - Improvement, now ambulating  - acute on chronic pain: Dilaudid for now, will need chronic pain to determine if outpatient regimen should be adjusted.    Dispo:  - Discharge on day 4 of consolidation if stable, GCSF once between days 5-7, [facility]

## 2025-06-18 LAB
ALBUMIN SERPL ELPH-MCNC: 3.1 G/DL — LOW (ref 3.3–5)
ALP SERPL-CCNC: 110 U/L — SIGNIFICANT CHANGE UP (ref 40–120)
ALT FLD-CCNC: 66 U/L — HIGH (ref 10–45)
ANION GAP SERPL CALC-SCNC: 16 MMOL/L — SIGNIFICANT CHANGE UP (ref 5–17)
APPEARANCE UR: CLEAR — SIGNIFICANT CHANGE UP
AST SERPL-CCNC: 35 U/L — SIGNIFICANT CHANGE UP (ref 10–40)
BASOPHILS # BLD AUTO: 0.01 K/UL — SIGNIFICANT CHANGE UP (ref 0–0.2)
BASOPHILS NFR BLD AUTO: 0.1 % — SIGNIFICANT CHANGE UP (ref 0–2)
BILIRUB SERPL-MCNC: 1 MG/DL — SIGNIFICANT CHANGE UP (ref 0.2–1.2)
BILIRUB UR-MCNC: NEGATIVE — SIGNIFICANT CHANGE UP
BUN SERPL-MCNC: 22 MG/DL — SIGNIFICANT CHANGE UP (ref 7–23)
CALCIUM SERPL-MCNC: 8.6 MG/DL — SIGNIFICANT CHANGE UP (ref 8.4–10.5)
CHLORIDE SERPL-SCNC: 93 MMOL/L — LOW (ref 96–108)
CO2 SERPL-SCNC: 19 MMOL/L — LOW (ref 22–31)
COLOR SPEC: YELLOW — SIGNIFICANT CHANGE UP
CREAT ?TM UR-MCNC: 43 MG/DL — SIGNIFICANT CHANGE UP
CREAT SERPL-MCNC: 0.96 MG/DL — SIGNIFICANT CHANGE UP (ref 0.5–1.3)
CULTURE RESULTS: NO GROWTH — SIGNIFICANT CHANGE UP
DIFF PNL FLD: NEGATIVE — SIGNIFICANT CHANGE UP
EGFR: 86 ML/MIN/1.73M2 — SIGNIFICANT CHANGE UP
EGFR: 86 ML/MIN/1.73M2 — SIGNIFICANT CHANGE UP
EOSINOPHIL # BLD AUTO: 0 K/UL — SIGNIFICANT CHANGE UP (ref 0–0.5)
EOSINOPHIL NFR BLD AUTO: 0 % — SIGNIFICANT CHANGE UP (ref 0–6)
GLUCOSE SERPL-MCNC: 123 MG/DL — HIGH (ref 70–99)
GLUCOSE UR QL: NEGATIVE MG/DL — SIGNIFICANT CHANGE UP
HCT VFR BLD CALC: 24.2 % — LOW (ref 39–50)
HGB BLD-MCNC: 8.3 G/DL — LOW (ref 13–17)
IMM GRANULOCYTES # BLD AUTO: 0.04 K/UL — SIGNIFICANT CHANGE UP (ref 0–0.07)
IMM GRANULOCYTES NFR BLD AUTO: 0.4 % — SIGNIFICANT CHANGE UP (ref 0–0.9)
IMMATURE PLATELET FRACTION #: 2.5 K/UL — LOW (ref 3.9–12.5)
IMMATURE PLATELET FRACTION %: 2.6 % — SIGNIFICANT CHANGE UP (ref 1.6–7.1)
KETONES UR QL: NEGATIVE MG/DL — SIGNIFICANT CHANGE UP
LDH SERPL L TO P-CCNC: 415 U/L — HIGH (ref 50–242)
LEUKOCYTE ESTERASE UR-ACNC: NEGATIVE — SIGNIFICANT CHANGE UP
LYMPHOCYTES # BLD AUTO: 0.22 K/UL — LOW (ref 1–3.3)
LYMPHOCYTES NFR BLD AUTO: 2.4 % — LOW (ref 13–44)
MAGNESIUM SERPL-MCNC: 2.1 MG/DL — SIGNIFICANT CHANGE UP (ref 1.6–2.6)
MCHC RBC-ENTMCNC: 32.4 PG — SIGNIFICANT CHANGE UP (ref 27–34)
MCHC RBC-ENTMCNC: 34.3 G/DL — SIGNIFICANT CHANGE UP (ref 32–36)
MCV RBC AUTO: 94.5 FL — SIGNIFICANT CHANGE UP (ref 80–100)
MONOCYTES # BLD AUTO: 0.01 K/UL — SIGNIFICANT CHANGE UP (ref 0–0.9)
MONOCYTES NFR BLD AUTO: 0.1 % — LOW (ref 2–14)
NEUTROPHILS # BLD AUTO: 9.07 K/UL — HIGH (ref 1.8–7.4)
NEUTROPHILS NFR BLD AUTO: 97 % — HIGH (ref 43–77)
NITRITE UR-MCNC: NEGATIVE — SIGNIFICANT CHANGE UP
NRBC # BLD AUTO: 0 K/UL — SIGNIFICANT CHANGE UP (ref 0–0)
NRBC # FLD: 0 K/UL — SIGNIFICANT CHANGE UP (ref 0–0)
NRBC BLD AUTO-RTO: 0 /100 WBCS — SIGNIFICANT CHANGE UP (ref 0–0)
OSMOLALITY UR: 354 MOS/KG — SIGNIFICANT CHANGE UP (ref 300–900)
PH UR: 6.5 — SIGNIFICANT CHANGE UP (ref 5–8)
PHOSPHATE SERPL-MCNC: 3.5 MG/DL — SIGNIFICANT CHANGE UP (ref 2.5–4.5)
PLATELET # BLD AUTO: 95 K/UL — LOW (ref 150–400)
PMV BLD: 11 FL — SIGNIFICANT CHANGE UP (ref 7–13)
POTASSIUM SERPL-MCNC: 4 MMOL/L — SIGNIFICANT CHANGE UP (ref 3.5–5.3)
POTASSIUM SERPL-SCNC: 4 MMOL/L — SIGNIFICANT CHANGE UP (ref 3.5–5.3)
POTASSIUM UR-SCNC: 17 MMOL/L — SIGNIFICANT CHANGE UP
PROT ?TM UR-MCNC: 10 MG/DL — SIGNIFICANT CHANGE UP (ref 0–12)
PROT SERPL-MCNC: 5.7 G/DL — LOW (ref 6–8.3)
PROT UR-MCNC: NEGATIVE MG/DL — SIGNIFICANT CHANGE UP
PROT/CREAT UR-RTO: 0.2 RATIO — SIGNIFICANT CHANGE UP (ref 0–0.2)
RBC # BLD: 2.56 M/UL — LOW (ref 4.2–5.8)
RBC # FLD: 19.5 % — HIGH (ref 10.3–14.5)
SODIUM SERPL-SCNC: 128 MMOL/L — LOW (ref 135–145)
SODIUM UR-SCNC: 50 MMOL/L — SIGNIFICANT CHANGE UP
SP GR SPEC: 1.01 — SIGNIFICANT CHANGE UP (ref 1–1.03)
SPECIMEN SOURCE: SIGNIFICANT CHANGE UP
UROBILINOGEN FLD QL: 0.2 MG/DL — SIGNIFICANT CHANGE UP (ref 0.2–1)
WBC # BLD: 9.35 K/UL — SIGNIFICANT CHANGE UP (ref 3.8–10.5)
WBC # FLD AUTO: 9.35 K/UL — SIGNIFICANT CHANGE UP (ref 3.8–10.5)

## 2025-06-18 PROCEDURE — 99233 SBSQ HOSP IP/OBS HIGH 50: CPT

## 2025-06-18 PROCEDURE — 99233 SBSQ HOSP IP/OBS HIGH 50: CPT | Mod: GC

## 2025-06-18 RX ORDER — METHYLPREDNISOLONE ACETATE 80 MG/ML
20 INJECTION, SUSPENSION INTRA-ARTICULAR; INTRALESIONAL; INTRAMUSCULAR; SOFT TISSUE DAILY
Refills: 0 | Status: COMPLETED | OUTPATIENT
Start: 2025-06-19 | End: 2025-06-19

## 2025-06-18 RX ORDER — SENNA 187 MG
2 TABLET ORAL
Qty: 0 | Refills: 0 | DISCHARGE
Start: 2025-06-18

## 2025-06-18 RX ORDER — POLYETHYLENE GLYCOL 3350 17 G/17G
17 POWDER, FOR SOLUTION ORAL
Qty: 0 | Refills: 0 | DISCHARGE
Start: 2025-06-18

## 2025-06-18 RX ADMIN — Medication 4400 MILLIGRAM(S): at 02:48

## 2025-06-18 RX ADMIN — TAMSULOSIN HYDROCHLORIDE 0.4 MILLIGRAM(S): 0.4 CAPSULE ORAL at 22:40

## 2025-06-18 RX ADMIN — Medication 15 MILLILITER(S): at 06:29

## 2025-06-18 RX ADMIN — Medication 8 MILLIGRAM(S): at 13:22

## 2025-06-18 RX ADMIN — Medication 15 MILLILITER(S): at 22:40

## 2025-06-18 RX ADMIN — Medication 2 DROP(S): at 17:23

## 2025-06-18 RX ADMIN — Medication 500 MILLIGRAM(S): at 06:29

## 2025-06-18 RX ADMIN — ATORVASTATIN CALCIUM 80 MILLIGRAM(S): 80 TABLET, FILM COATED ORAL at 22:40

## 2025-06-18 RX ADMIN — Medication 2 DROP(S): at 13:22

## 2025-06-18 RX ADMIN — Medication 2 DROP(S): at 06:30

## 2025-06-18 RX ADMIN — Medication 650 MILLIGRAM(S): at 07:15

## 2025-06-18 RX ADMIN — Medication 4 MILLIGRAM(S): at 18:22

## 2025-06-18 RX ADMIN — METHYLPREDNISOLONE ACETATE 30 MILLIGRAM(S): 80 INJECTION, SUSPENSION INTRA-ARTICULAR; INTRALESIONAL; INTRAMUSCULAR; SOFT TISSUE at 06:28

## 2025-06-18 RX ADMIN — Medication 8 MILLIGRAM(S): at 22:40

## 2025-06-18 RX ADMIN — Medication 15 MILLILITER(S): at 13:21

## 2025-06-18 RX ADMIN — Medication 8 MILLIGRAM(S): at 06:28

## 2025-06-18 RX ADMIN — DILTIAZEM HYDROCHLORIDE 120 MILLIGRAM(S): 240 TABLET, EXTENDED RELEASE ORAL at 06:29

## 2025-06-18 RX ADMIN — Medication 300 MILLIGRAM(S): at 06:29

## 2025-06-18 RX ADMIN — Medication 40 MILLIGRAM(S): at 06:29

## 2025-06-18 RX ADMIN — Medication 500 MILLIGRAM(S): at 17:22

## 2025-06-18 RX ADMIN — Medication 650 MILLIGRAM(S): at 06:29

## 2025-06-18 RX ADMIN — Medication 4 MILLIGRAM(S): at 17:31

## 2025-06-18 NOTE — PHARMACOTHERAPY INTERVENTION NOTE - COMMENTS
*NOTE IS NOT UPDATED*      Clinical Pharmacy Specialist- Hematology/Oncology- Progress Note    Pt is a 68 y/o male w/ PMH of HTN, HLD, GERD, BPH, Gout, spinal stenosis (s/p multiple surgeries) and newly diagnosed AML (ASXL1-mutated and DDX41) s/p 7+3 Induction therapy, course complicated by gout flare vs septic arthritis, now receiving C1 HIDAC consolidation    Antimicrobial Course:  - Cefepime- 6/13- 6/16  --> Levaquin- 6/16  - Valtrex- 6/12  MRSA nasal swab    Last Neutropenic (ANC<1000): no occurrence  Last Febrile: 13-Jun-2025 13:04; T= 102.2  Days Non-Neutropenic: 4  Days afebrile: 4    Chemotherapy Course  -Current Regimen: HIDAC 2gm/m2 Consolidation  History:  (5/2/25)- 7+3 Induction   -Daunorubicin 60mg/m2 IVP D1-3   -Cytarabine 100mg/m2 IV cont inf D1-7  (6/15/25) C1 HIDAC 2gm/m2  -Day: 2 (6/16)  BmBx:  Access: R TLC (accessed 5/5/25)    Vancomycin Levels:   Date    Dose              Value    Ref Range	                             Status   5/21    750mg q12hr   7.8       Trough 10-20; AUC= 400-600   Final  5/23    1gm q12hr      10.9     Trough 10-20; AUC= 400-600   Final    History/Relevant clinical information used in assessment:  - Ht= 5’11”; IBW= 75.3kg; Actual BW= 107.3kg; Adj BW= 88kg  - 5/2- IRR? rigors, chills, and tachycardia 2 hours into day 1; rasburicase 3mg x 1  - 5/4 -Crcl= 67ml/min ; Scr=1.32; Wt= 88kg; Adjusted BW used since pt BMI>30  - 5/9- increase melatonin 3mg to 10mh qhs- c/o insomnia  - 5/13- mouth pain- has ulceration   - 5/14-5/28- severe mucositis   - 5/15- bloody diarrhea x 3 on reglan prn, but has not needed, monitor for d/c to not aggravate w/ prokinetic agent  - 5/15- HSV neg  - 5/16- bloody BM's decreased to 2x'd/day from 4x's/day yest  - 5/17 UCx- 5/17- 10-49k VRE- per ID, do not need to treat  - 5/18- midodrine 10mg PO tid  - 5/19- BlCx- Meth resistant Staphylococcus haemolyticus- on vancomycin 1gm q12hr, this will yield AUC= 457; per ID, likely contaminant, expect short course of vanco; - 5/19, still febrile since 5/11, on cefepime, BlCx NGTD + abd discomfort (added flagyl)-->CT CAP 5/15 - no infection (but groundglass opacities stable from previous?)  - 5/21- zarxio 480mcg daily 5/21- 5/23  - 5/27- gout flare- colchicine 0.6mg BID x 2 days (5/27-5/29) -can cont until flare resolves, some experts cont 24-48 hrs after resolution; if symptoms do not resolve after 3 days, consider other anti-inflammatory agents- pt pain not relieved- added prednisone 40mg daily x 2 days (5/28-29) f/b 20mg x 2 days (5/30-6/1)  - 5/29- pt had episodes of diarrhea d/t colchicine  - 5/29- - allopurinol 100mg (300mg d/c'd 5/15)-keeping for gout-->300mg- 5/29- given UA rising- 5/29  - 5/30 will decrease diltiazem CD 240mg to 120mg  Meds sent for auth: midodrine, allopurinol 300mg (increased), prednisone, simethicone- sent to [facility] 5/29  - 6/13- c/o pain in both feet now  - 6/16- Confirmed home medications with patient  Home Medications:  ·	cholecalciferol oral tablet: 2000 unit(s) PO daily  ·	omeprazole 40 mg PO daily  ·	tamsulosin 0.4 mg PO daily  ·	diltiazem CD 120mg PO daily  ·	Allopurinol 300mg PO daily  ·	Valtrex 500mg PO BID  ·	atorvastatin 80mg PO qhs  ·	colchicine 0,6mg PO - 59 days supply filled 6/2025- pt does not want or take  ·	hctz, tramadol, midodrine, oxy- no longer taking    Assessment/Plan/Recommendation:  Onc:  Cytarabine schedule (pt on 1,2,3 schedule):  - 6/15- started ~ 3 pm (D1)  - 6/16- 4 am (D2)  - 6/16- 4 pm (D2)  - 6/17- 4 am (D3)  - 6/17- 4 pm (D3)  - 6/18- 4 am (D4) finishes wed ~7am   ID:  - 6/13- not neutropenic, on cefepime for fevers- switched to levaquin through thurs 6/19 (for 7 days duration)  Rheum:  - 6/12- r/o septic arthritis of L ankle- s/p arthrocentesis  - 6/12- allopurinol 300mg PO daily  - 6/13- methylprednisone 40mg IVP daily- taper: 30mg daily x 2 days- 6/17-6/18--> 20mg dailyx 2 days- 6/19- 6/20 -->10mg daily x 2 days- 6/21- 6/22--> stop   Cards:  - atorvastatin 80mg hold if on posa d/t DDI (Cat X)  Dispo:  - to [facility]    Additional Monitoring Needed?   -Yes- Continue to monitor renal function & daily counts for abx escalation/de-escalation   -Discharge Planning:  --> New meds:   --> Meds sent for auth:   --> Delivered meds:    Case discussed with attending/primary team    Liza Jolley, PharmD, BCPS  Clinical Pharmacy Specialist | Hematology/Oncology  [facility]  Email: [email or web address] or available on [employer] Clinical Pharmacy Specialist- Hematology/Oncology- Progress Note    Pt is a 68 y/o male w/ PMH of HTN, HLD, GERD, BPH, Gout, spinal stenosis (s/p multiple surgeries) and newly diagnosed AML (ASXL1-mutated and DDX41) s/p 7+3 Induction therapy, course complicated by gout flare vs septic arthritis, now receiving C1 HIDAC consolidation    Antimicrobial Course:  - Cefepime- 6/13- 6/16  --> Levaquin- 6/16  - Valtrex- 6/12  MRSA nasal swab    Last Neutropenic (ANC<1000): no occurrence  Last Febrile: 18-Jun-2025 05:50; T= 100.8  Days Non-Neutropenic: 5  Days afebrile: 0    Chemotherapy Course  -Current Regimen: HIDAC 2gm/m2 Consolidation  History:  (5/2/25)- 7+3 Induction   -Daunorubicin 60mg/m2 IVP D1-3   -Cytarabine 100mg/m2 IV cont inf D1-7  (6/15/25) C1 HIDAC 2gm/m2  -Day: 4 (6/18)  BmBx:  Access: R TLC (accessed 5/5/25)    Vancomycin Levels:   Date    Dose              Value    Ref Range	                             Status   5/21    750mg q12hr   7.8       Trough 10-20; AUC= 400-600   Final  5/23    1gm q12hr      10.9     Trough 10-20; AUC= 400-600   Final    History/Relevant clinical information used in assessment:  - Ht= 5’11”; IBW= 75.3kg; Actual BW= 107.3kg; Adj BW= 88kg  - 5/2- IRR? rigors, chills, and tachycardia 2 hours into day 1; rasburicase 3mg x 1  - 5/4 -Crcl= 67ml/min ; Scr=1.32; Wt= 88kg; Adjusted BW used since pt BMI>30  - 5/9- increase melatonin 3mg to 10mh qhs- c/o insomnia  - 5/13- mouth pain- has ulceration   - 5/14-5/28- severe mucositis   - 5/15- bloody diarrhea x 3 on reglan prn, but has not needed, monitor for d/c to not aggravate w/ prokinetic agent  - 5/15- HSV neg  - 5/16- bloody BM's decreased to 2x'd/day from 4x's/day yest  - 5/17 UCx- 5/17- 10-49k VRE- per ID, do not need to treat  - 5/18- midodrine 10mg PO tid  - 5/19- BlCx- Meth resistant Staphylococcus haemolyticus- on vancomycin 1gm q12hr, this will yield AUC= 457; per ID, likely contaminant, expect short course of vanco; - 5/19, still febrile since 5/11, on cefepime, BlCx NGTD + abd discomfort (added flagyl)-->CT CAP 5/15 - no infection (but groundglass opacities stable from previous?)  - 5/21- zarxio 480mcg daily 5/21- 5/23  - 5/27- gout flare- colchicine 0.6mg BID x 2 days (5/27-5/29) -can cont until flare resolves, some experts cont 24-48 hrs after resolution; if symptoms do not resolve after 3 days, consider other anti-inflammatory agents- pt pain not relieved- added prednisone 40mg daily x 2 days (5/28-29) f/b 20mg x 2 days (5/30-6/1)  - 5/29- pt had episodes of diarrhea d/t colchicine  - 5/29- - allopurinol 100mg (300mg d/c'd 5/15)-keeping for gout-->300mg- 5/29- given UA rising- 5/29  - 5/30 will decrease diltiazem CD 240mg to 120mg  Meds sent for auth: midodrine, allopurinol 300mg (increased), prednisone, simethicone- sent to [facility] 5/29  - 6/13- c/o pain in both feet now  - 6/16- Confirmed home medications with patient  Home Medications:  ·	cholecalciferol oral tablet: 2000 unit(s) PO daily  ·	omeprazole 40 mg PO daily  ·	tamsulosin 0.4 mg PO daily  ·	diltiazem CD 120mg PO daily  ·	Allopurinol 300mg PO daily  ·	Valtrex 500mg PO BID  ·	atorvastatin 80mg PO qhs  ·	colchicine 0,6mg PO - 59 days supply filled 6/2025- pt does not want or take  ·	hctz, tramadol, midodrine, oxy- no longer taking    Assessment/Plan/Recommendation:  Onc:  Cytarabine schedule (pt on 1,2,3 schedule):  - 6/15- started ~ 3 pm (D1)  - 6/16- 4 am (D2)  - 6/16- 4 pm (D2)  - 6/17- 4 am (D3)  - 6/17- 4 pm (D3)  - 6/18- 4 am (D4) finished this morning~7am   ID:  - 6/13- not neutropenic, on cefepime for fevers- switched to levaquin through thurs 6/19 (for 7 days duration)  - 6/18- febrile  Rheum:  - 6/12- r/o septic arthritis of L ankle- s/p arthrocentesis  - 6/12- allopurinol 300mg PO daily  - 6/13- methylprednisone 40mg IVP daily- taper: 30mg daily x 2 days- 6/17-6/18--> 20mg daily x 2 days- 6/19- 6/20 -->10mg daily x 2 days- 6/21- 6/22--> stop - need to enter 6/19 doses if pt not leaving  Pain:  - hydromorphone 4mg PO q4hrs prn- 6/17  Cards:  - atorvastatin 80mg hold if on posa d/t DDI (Cat X)  Dispo:  - to [facility]    Additional Monitoring Needed?   -Yes- Continue to monitor renal function & daily counts for abx escalation/de-escalation   -Discharge Planning:  --> New meds:   --> Meds sent for auth:   --> Delivered meds:    Case discussed with attending/primary team    Liza Jolley, PharmD, BCPS  Clinical Pharmacy Specialist | Hematology/Oncology  [facility]  Email: [email or web address] or available on [employer] Clinical Pharmacy Specialist- Hematology/Oncology- Progress Note    Pt is a 68 y/o male w/ PMH of HTN, HLD, GERD, BPH, Gout, spinal stenosis (s/p multiple surgeries) and newly diagnosed AML (ASXL1-mutated and DDX41) s/p 7+3 Induction therapy, course complicated by gout flare vs septic arthritis, now receiving C1 HIDAC consolidation    Antimicrobial Course:  - Cefepime- 6/13- 6/16  --> Levaquin- 6/16  - Valtrex- 6/12  MRSA nasal swab    Last Neutropenic (ANC<1000): no occurrence  Last Febrile: 18-Jun-2025 05:50; T= 100.8  Days Non-Neutropenic: 5  Days afebrile: 0    Chemotherapy Course  -Current Regimen: HIDAC 2gm/m2 Consolidation  History:  (5/2/25)- 7+3 Induction   -Daunorubicin 60mg/m2 IVP D1-3   -Cytarabine 100mg/m2 IV cont inf D1-7  (6/15/25) C1 HIDAC 2gm/m2  -Day: 4 (6/18)  BmBx:  Access: R TLC (accessed 5/5/25)    Vancomycin Levels:   Date    Dose              Value    Ref Range	                             Status   5/21    750mg q12hr   7.8       Trough 10-20; AUC= 400-600   Final  5/23    1gm q12hr      10.9     Trough 10-20; AUC= 400-600   Final    History/Relevant clinical information used in assessment:  - Ht= 5’11”; IBW= 75.3kg; Actual BW= 107.3kg; Adj BW= 88kg  - 5/2- IRR? rigors, chills, and tachycardia 2 hours into day 1; rasburicase 3mg x 1  - 5/4 -Crcl= 67ml/min ; Scr=1.32; Wt= 88kg; Adjusted BW used since pt BMI>30  - 5/9- increase melatonin 3mg to 10mh qhs- c/o insomnia  - 5/13- mouth pain- has ulceration   - 5/14-5/28- severe mucositis   - 5/15- bloody diarrhea x 3 on reglan prn, but has not needed, monitor for d/c to not aggravate w/ prokinetic agent  - 5/15- HSV neg  - 5/16- bloody BM's decreased to 2x'd/day from 4x's/day yest  - 5/17 UCx- 5/17- 10-49k VRE- per ID, do not need to treat  - 5/18- midodrine 10mg PO tid  - 5/19- BlCx- Meth resistant Staphylococcus haemolyticus- on vancomycin 1gm q12hr, this will yield AUC= 457; per ID, likely contaminant, expect short course of vanco; - 5/19, still febrile since 5/11, on cefepime, BlCx NGTD + abd discomfort (added flagyl)-->CT CAP 5/15 - no infection (but groundglass opacities stable from previous?)  - 5/21- zarxio 480mcg daily 5/21- 5/23  - 5/27- gout flare- colchicine 0.6mg BID x 2 days (5/27-5/29) -can cont until flare resolves, some experts cont 24-48 hrs after resolution; if symptoms do not resolve after 3 days, consider other anti-inflammatory agents- pt pain not relieved- added prednisone 40mg daily x 2 days (5/28-29) f/b 20mg x 2 days (5/30-6/1)  - 5/29- pt had episodes of diarrhea d/t colchicine  - 5/29- - allopurinol 100mg (300mg d/c'd 5/15)-keeping for gout-->300mg- 5/29- given UA rising- 5/29  - 5/30 will decrease diltiazem CD 240mg to 120mg  Meds sent for auth: midodrine, allopurinol 300mg (increased), prednisone, simethicone- sent to [facility] 5/29  - 6/13- c/o pain in both feet now  - 6/16- Confirmed home medications with patient  Home Medications:  ·	cholecalciferol oral tablet: 2000 unit(s) PO daily  ·	omeprazole 40 mg PO daily  ·	tamsulosin 0.4 mg PO daily  ·	diltiazem CD 120mg PO daily  ·	Allopurinol 300mg PO daily  ·	Valtrex 500mg PO BID  ·	atorvastatin 80mg PO qhs  ·	colchicine 0,6mg PO - 59 days supply filled 6/2025- pt does not want or take  ·	hctz, tramadol, midodrine, oxy- no longer taking  - 6/17- a bit confused- 2/2 to opioids? cytarabine?    Assessment/Plan/Recommendation:  Onc:  Cytarabine schedule (pt on 1,2,3 schedule):  - 6/15- started ~ 3 pm (D1)  - 6/16- 4 am (D2)  - 6/16- 4 pm (D2)  - 6/17- 4 am (D3)  - 6/17- 4 pm (D3)  - 6/18- 4 am (D4) finished this morning~7am   ID:  - 6/13- not neutropenic, on cefepime for fevers- switched to levaquin through thurs 6/19 (for 7 days duration)  - 6/18- febrile  Rheum:  - 6/12- r/o septic arthritis of L ankle- s/p arthrocentesis  - 6/12- allopurinol 300mg PO daily  - 6/13- methylprednisone 40mg IVP daily- taper: 30mg daily x 2 days- 6/17-6/18--> 20mg daily x 2 days- 6/19- 6/20 -->10mg daily x 2 days- 6/21- 6/22--> stop - need to enter 6/19 doses if pt not leaving  Pain:  - hydromorphone 4mg PO q4hrs prn- 6/17  Cards:  - atorvastatin 80mg hold if on posa d/t DDI (Cat X)  Dispo:  - to [facility]    Additional Monitoring Needed?   -Yes- Continue to monitor renal function & daily counts for abx escalation/de-escalation   -Discharge Planning:  --> New meds:   --> Meds sent for auth:   --> Delivered meds:    Case discussed with attending/primary team    Liza Jolley, PharmD, BCPS  Clinical Pharmacy Specialist | Hematology/Oncology  [facility]  Email: [email or web address] or available on [employer]

## 2025-06-18 NOTE — PROGRESS NOTE ADULT - PROBLEM SELECTOR PLAN 2
Not neutropenic, afebrile. HiDAC can cause fevers  Valtrex 500mg BID. Levaquin  6/12 BCX/UCX/CXR negative. Full RVP negative  6/13 Febrile 102F, Started Cefepime 2gq8. CXR neg  6/16 deescalate from cefepime to levaquin, complete a 7 day course thru 6/19.  6/17: Febrile 100.4F. Pan-cultures sent, CXR  6/18: Persistently febrile overnight Not neutropenic, afebrile. HiDAC can cause fevers  Valtrex 500mg BID. Levaquin  6/12 BCX/UCX/CXR negative. Full RVP negative  6/13 Febrile 102F, Started Cefepime 2gq8. CXR neg  6/16 deescalate from cefepime to levaquin, complete a 7 day course thru 6/19.  6/17: Febrile 100.4F. Pan-cultures sent, CXR  6/18: Persistently febrile overnight.

## 2025-06-18 NOTE — PROGRESS NOTE ADULT - ATTENDING COMMENTS
I personally interviewed and examined the patient. Agree with rheumatology fellow's note with my edits as above. While has fever and chills and high cell count with neutrophilic predominance on synovial fluid analysis, but joint fluid by PCR- negative for infection. Severe gout flares exttended now to right ankle. Will start Steroids as per fellow's note above
I personally interviewed and examined the patient. Agree with rheumatology fellow's note with my edits as above. Patient with AML, on chemotx , now with mucositis. Gout flare resolved - agree with shortening steroid taper
I personally interviewed and examined the patient. Agree with rheumatology fellow's note with my edits as above.  Acute gout flare, improving after steroids were started 4 days ago. D/w hematologist - oncologist . Will continue with steroid taper as per rheumatology fellow note above.

## 2025-06-18 NOTE — ADVANCED PRACTICE NURSE CONSULT - ASSESSMENT
Chief Complaint   Patient presents with   • Derm Problem     new, check changing lesion on right anterior lower leg        HISTORY OF PRESENT ILLNESS:     The patient is a 61 year old female who goes by the name Zeny.  Patient is being seen at the consult request of Estela GIL for skin lesion of the right leg.     The patient presents for the following lesion:    Location:  The lesion is located on the following part of the body:   Right posterior lower leg   Duration:  The lesion was first noticed by the patient around the following time: 3 years ago, although noticed a change in shape recently (unsure of timeline)  Quality:     The lesion has changed shape recently but patient is unsure.   Severity:    The severity of the symptoms is mild   Modifying Factors: Previous treatments include nothing.      The patient uses sunscreen of at least SPF 30   Yes  The patient avoids the peak sun hours of 10 am to 4 pm Yes  The patient wears sun protective clothing/hat  Yes  The patient uses/used tanning beds    No      She is not interested in skin exam today and requested focal exam of lesion of concern.       REVIEW OF SYSTEMS:  Constitutional:  In general, the patient feels well:  Yes  Integument:  The patient denies any other signs or symptoms of skin disease:  Yes  Cardiovascular:   The patient has a pacemaker:    no    The patient has a defibrillator:    no  Hematologic:  The patient bleeds easily because of being on aspirin or an anticoagulant: no       ALLERGIES:   Allergen Reactions   • Cat Dander Other (See Comments)     ITCHY EYES. NASAL CONGESTION   • Dog Dander Other (See Comments)     ITCHY EYES AND NASAL CONGESTION   • Levaquin ARTHRALGIA and Other (See Comments)   • Thsc Amoxicillin RASH       Current Outpatient Prescriptions   Medication Sig   • Multiple Vitamin (MULTI-VITAMIN DAILY PO)    • Cholecalciferol (VITAMIN D PO)    • cephALEXin (KEFLEX) 500 MG capsule Take 4 capsules by mouth once for 1 dose  1 hr prior to dental procedure   • levothyroxine (SYNTHROID, LEVOTHROID) 125 MCG tablet Take 125mcg daily with a half tab on Sundays and Wednesdays   • Lutein-Zeaxanthin 20-0.8 MG Cap Take 8 mg by mouth daily.   • Calcium Acetate, Phos Binder, (CALCIUM ACETATE PO) Take  by mouth.   • fish oil-omega-3 fatty acids 1000 MG CAPS Take  by mouth.   • Glucosamine-Chondroitin (GLUCOSAMINE CHONDR COMPLEX PO) Take  by mouth.   • Polyethyl Glycol-Propyl Glycol (SYSTANE) 0.4-0.3 % SOLN Apply  to eye.     No current facility-administered medications for this visit.      PAST MEDICAL HISTORY:  The patient has a personal history of skin cancer  No        Hypothyroidism                                                Anemia, Iron Deficiency                                       Fibroids                                                        Comment: Uterine - hyperplasia of endometrium    Menorrhagia                                                     Comment: and metrorrhagia    Osteoarthritis                                                Family history of breast cancer                               History of TB (tuberculosis)                                    DERMATOLOGY PAST MEDICAL HISTORY:      none    FAMILY HISTORY:  The patient has a family history of melanoma:  No    PHYSICAL EXAMINATION:    Right posterior calf, 7 mm waxy thin light brown stuck on papule (seborrheic keratosis)    The patient was seen and examined by Millicent Rasmussen MD.  in the presence of my medical assistant  Michelle Ogden .  This office visit note was in part created by Michelle Ogden acting also as a scribe for Millicent Rasmussen MD.   Millicent Rasmussen MD    reviewed the note for accuracy and completeness before signing.        Assessment and Plan:    1.   Seborrheic keratosis    The diagnosis was discussed.  Since the lesion is asymptomatic, no therapy is required at this time.  We discussed removal for definitive diagnosis if the patient is concerned about  Pt A/Ox4, vital signs as charted. Rt arm piv  20G  placed today.  Easily flushed and has great blood return. Site intact, no redness, swelling, or pain. Pt receiving Zofran 8mg IV q8hrs. 2 certified RN verification completed. Pt educated on chemotherapy, verbalized understanding. Completed pt nystagmus and writing check verification- negative. Day 3/3, at 15:08, Pt received cytarabine 2000mg/m2= 4400mg IV over 3hrs connected to lowest port of normal saline via alaris pump. Call bell in reach, safety maintained.. Pt educated on safety and fall prevention.  skin cancer but she defers today.       Patient advised to monitor the lesion on a monthly basis and to call the office if they notice any changes in size, shape, color or symptoms in the lesion.     Return to clinic as needed.           On 7/26/2018, Michelle MARCIAL scribed the services personally performed by Millicent Rasmussen MD  The documentation recorded by the scribe accurately and completely reflects the service(s) I personally performed and the decisions made by me.

## 2025-06-18 NOTE — PROGRESS NOTE ADULT - PROBLEM SELECTOR PLAN 3
Palliative care consult for pain control, IV Dilaudid PRN   Bowel regimen   Allopurinol QD  6/12 rheumatology tapped L-ankle joint: synovial fluid cultures and testing sent f/u.  6/13: Rheum f/u appreciated. Start 40mg IV solumedrol daily for now. Rheum will reassess for dose adjustment on Monday as needed.  6/17: Dilauded de-escalated from IV to 4mg PO q4 due to altered mental status. Milk of Magnesia for bowel regimen Palliative care consult for pain control, IV Dilaudid PRN   Bowel regimen   Allopurinol QD  6/12 rheumatology tapped L-ankle joint: synovial fluid cultures and testing sent f/u.  6/13: Rheum f/u appreciated. Start 40mg IV solumedrol daily for now. Rheum will reassess for dose adjustment on Monday as needed.  6/17: Dilauded de-escalated from IV to 4mg PO q4 due to altered mental status. Milk of Magnesia for bowel regimen  6/18:  Per Rheum, starting 6/19, can quicken Solumedrol taper (IV while inpt, PO if discharged) as noted: 20mg x1 day, 10mg x 1 day, then STOP; If ankle pain returns, can resume baseline 10mg daily

## 2025-06-18 NOTE — PROGRESS NOTE ADULT - PROBLEM SELECTOR PLAN 1
Monitor daily CBC w/ diff, CMP, replete prn. Strict I/Os. Daily weights. Antiemetics  Monitor for neurotoxicity; cerebellar testing, finger to nose, nystagmus checks. Handwriting checks daily.  6/15 start cycle 1 HiDAC  6/16: Pt slightly altered and weak, slow to find answers to questions. Handwriting and finger to nose test normal. Monitor daily CBC w/ diff, CMP, replete prn. Strict I/Os. Daily weights. Antiemetics  Monitor for neurotoxicity; cerebellar testing, finger to nose, nystagmus checks. Handwriting checks daily.  6/15 start cycle 1 HiDAC  6/17: Pt slightly altered and weak, slow to find answers to questions. Handwriting and finger to nose test normal.  6/18: Improved from yesterday. Urine studies ordered for worsening hyponatremia. Fluids d/gee. Continue to trend

## 2025-06-18 NOTE — CHART NOTE - NSCHARTNOTEFT_GEN_A_CORE
Brief f/u from Geriatrics and Palliative Medicine Team, pt soundly asleep. Case d/w primary team and RN, mentation seems improved. He had a BM this morning. He did not use any dilaudid PRN in the past 24 hours. Advised to continue the current regimen for today. I will arrange for pt to follow up with supportive oncology in the outpatient setting upon discharge.    Paloma Mark MD  [facility] Team Consults  Please call if we can be of assistance, [phone]

## 2025-06-18 NOTE — PROGRESS NOTE ADULT - ASSESSMENT
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 Initially scheduled to begin cycle 1 HiDAC however presents with 3 days of fevers and acute on chronic gout attack causing debility, s/p fall secondary to pain. Cycle 1 HiDAC started on 6/15. Pt has pancytopenia d/t chemo and disease

## 2025-06-18 NOTE — PROGRESS NOTE ADULT - ASSESSMENT
69M who was admitted on 5/2/25 for Daunorubicin + cytarabine during which he had acute on chronic gout, neutropenic fever treated empirically with broad spectrum abx, diarrhea and GI bleeding. He presents again due to acute oligoarthritis suspected to be underlying gout. Rheumatology consulted for further recommendations    #  Oligoarticular Gout flare with acute left ankle arthritis with tenosynovitis of left foot and arthritis of MTP joints -- improving  - On exam, significant swelling of left ankle with decrease ROM, tenosynovitis of lateral aspect left ankle/foot, s/p arthrocentesis left ankle 6/12. Developed worsening RIGHT ankle effusion overnight 6/12-6/13. He was started on solumedrol 40mg 6/13 with marked improvement  - Uric acid 4.8  - Had gout flare in prior hospitalization where his allopurinol was uptitrated to 300mg Daily; treated with colchicine (c/b diarrhea) and 4 day prednisone taper  - Tx: Allopurinol 300mg daily  - Bedside US showed ankle effusion and effusions of MTP joints - s/p diagnostic arthrocentesis 6/12 of left ankle 6/12- 5 cc synovial fluid was aspirated and sent for analysis  - Fluid studies: 70k WBCs, 96% N Joint PCR negative, Gram stain negative; culture NGTD. MSU crystals present  - Synovial cultures no growth  - XR left foot: midfoot arthritic changes, no erosive changes     # Significant for leukocytosis 20k on arrival  - AML, on  Daunorubicin + cytarabine   - Started on HiDAC    Impression:  Patient with recent hospitalization for chemotherapy induction c/b acute on chronic gout, diarrhea from colchicine, GI bleed, neutropenic fever.  He presents now with significant synovitis particularly of his left ankle but also his b/l 1st MTPs. Developed right ankle pain/effusion overnight 6/12-6/13  He is now s/p arthrocentesis which showed MSU crystals, significant inflammatory changes. Joint PCR and gram stain negative minimal suspicion for septic arthritis.  Improved after treatment with solumedrol    Recommendations:  - Starting 6/19, can quicken Solumedrol taper (IV while inpt, PO if discharged) as noted: 20mg x1 day, 10mg x 1 day, then STOP; If ankle pain returns, can resume baseline 10mg daily  - Can continue other measure such as tylenol/cold packs  - c/w Allopurinol 300mg PO daily    Rheumatology service to sign off at this time, please do not hesitate to call back if there any any questions or updates    d/w Dr. Qiana Yao MD, PGY-4  Rheumatology Fellow  Reachable on TEAMS 69M who was admitted on 5/2/25 for Daunorubicin + cytarabine during which he had acute on chronic gout, neutropenic fever treated empirically with broad spectrum abx, diarrhea and GI bleeding. He presents again due to acute oligoarthritis suspected to be underlying gout. Rheumatology consulted for further recommendations    #  Oligoarticular Gout flare with acute left ankle arthritis with tenosynovitis of left foot and arthritis of MTP joints -- improving  - On exam, significant swelling of left ankle with decrease ROM, tenosynovitis of lateral aspect left ankle/foot, s/p arthrocentesis left ankle 6/12. Developed worsening RIGHT ankle effusion overnight 6/12-6/13. He was started on solumedrol 40mg 6/13 with marked improvement  - Uric acid 4.8  - Had gout flare in prior hospitalization where his allopurinol was uptitrated to 300mg Daily; treated with colchicine (c/b diarrhea) and 4 day prednisone taper  - Tx: Allopurinol 300mg daily  - Bedside US showed ankle effusion and effusions of MTP joints - s/p diagnostic arthrocentesis 6/12 of left ankle 6/12- 5 cc synovial fluid was aspirated and sent for analysis  - Fluid studies: 70k WBCs, 96% N Joint PCR negative, Gram stain negative; culture NGTD. MSU crystals present  - Synovial cultures no growth  - XR left foot: midfoot arthritic changes, no erosive changes     # Significant for leukocytosis 20k on arrival  - AML, on  Daunorubicin + cytarabine   - Started on HiDAC    Impression:  Patient with recent hospitalization for chemotherapy induction c/b acute on chronic gout, diarrhea from colchicine, GI bleed, neutropenic fever.  He presents now with significant synovitis particularly of his left ankle but also his b/l 1st MTPs. Developed right ankle pain/effusion overnight 6/12-6/13  He is now s/p arthrocentesis which showed MSU crystals, significant inflammatory changes. Joint PCR and gram stain negative minimal suspicion for septic arthritis.  Improved after treatment with solumedrol    Recommendations:  - Starting 6/19, can taper Solumedrol faster (IV while inpt, PO if discharged) as noted: 20mg x1 day, 10mg x 1 day, then STOP; If arthritis returns, can resume prednisone 10mg daily   - Can continue other measure such as tylenol/cold packs  - c/w Allopurinol 300mg PO daily    Rheumatology service to sign off at this time, please do not hesitate to call back if there any any questions or updates    d/w Dr. Qiana Yao MD, PGY-4  Rheumatology Fellow  Reachable on TEAMS

## 2025-06-18 NOTE — PROGRESS NOTE ADULT - NS ATTEND AMEND GEN_ALL_CORE FT
Primary: Chitty    Assessment: 69M HiDAC Cycle 1 Day 3 for ASXL1-mutated and SXL40-jgfsutv AML. Course complicated by acute gout flare and severe pain of right ankle. Started HiDAC on Induction Day 45.    Heme:   - HiDAC 2g/m2 days 1,2,3; long-acting GCSF outpatient  - Continue allopurinol 300 mg once a day  - Continue daily oral B12 suppl    ID: Fevers x 3 days, dark urine, less urine output, UA clear, RVP neg  - leukocytosis related to steroids  - Not Neutropenic  - s/p cefepime (6/13/25 - 6/16/25), de-escalate to levaquin for 7d course  - pt w/ AMS this morning - check Cx's    MSK:  - Acute on chronic gout: Rheum consult appreciated, now on steroids -starting taper 6/17  - Improvement, now ambulating  - acute on chronic pain: Dilaudid for now, will need chronic pain to determine if outpatient regimen should be adjusted.    Dispo:  - Discharge on day 4 of consolidation if stable, GCSF once between days 5-7, EDC Primary: Paulie    Assessment: 69M HiDAC Cycle 1 Day 4 for ASXL1-mutated and ULL36-yicatwy AML. Course complicated by acute gout flare and severe pain of right ankle. Started HiDAC on Induction Day 45.    Heme:   - HiDAC 2g/m2 days 1,2,3; long-acting GCSF outpatient  - Continue allopurinol 300 mg once a day  - Continue daily oral B12 suppl    ID: Fevers again  - leukocytosis related to steroids  - Not Neutropenic  - s/p cefepime (6/13/25 - 6/16/25), de-escalate to levaquin for 7d course  - Cx's pending, ? secondary to cytarabine    MSK:  - Acute on chronic gout: Rheum consult appreciated, now on steroids -starting taper 6/17  - Improvement, now ambulating  - acute on chronic pain: Dilaudid for now, will need chronic pain to determine if outpatient regimen should be adjusted.    Dispo:  - Discharge on hold given fevers, GCSF once between days 5-7, EDC

## 2025-06-18 NOTE — PROGRESS NOTE ADULT - SUBJECTIVE AND OBJECTIVE BOX
JANELLE DEL CID  [id number]    Interval history:  Ankle pain much improved  He reports feeling symptomatic from HiDAC, in particular oral pain/mucositis. Otherwise no CP, SOB, nausea, rashes or other concerns     Rheum ROS  As noted above      MEDICATIONS  (STANDING):  allopurinol 300 milliGRAM(s) Oral daily  atorvastatin 80 milliGRAM(s) Oral at bedtime  Biotene Dry Mouth Oral Rinse 15 milliLiter(s) Swish and Spit three times a day  bismuth subsalicylate Liquid 15 milliLiter(s) Oral two times a day  cytarabine IVPB (eMAR) 4400 milliGRAM(s) IV Intermittent every 12 hours  diltiazem    milliGRAM(s) Oral daily  levoFLOXacin  Tablet 500 milliGRAM(s) Oral every 24 hours  methylPREDNISolone sodium succinate Injectable 40 milliGRAM(s) IV Push daily  ondansetron Injectable 8 milliGRAM(s) IV Push every 8 hours  pantoprazole    Tablet 40 milliGRAM(s) Oral before breakfast  polyethylene glycol 3350 17 Gram(s) Oral daily  prednisoLONE acetate 1% Suspension 2 Drop(s) Both EYES every 6 hours  sodium chloride 0.9%. 1000 milliLiter(s) (75 mL/Hr) IV Continuous <Continuous>  tamsulosin 0.4 milliGRAM(s) Oral at bedtime  valACYclovir 500 milliGRAM(s) Oral two times a day    MEDICATIONS  (PRN):  acetaminophen     Tablet .. 650 milliGRAM(s) Oral every 6 hours PRN Temp greater or equal to 38C (100.4F), Mild Pain (1 - 3)  HYDROmorphone  Injectable 0.7 milliGRAM(s) IV Push every 4 hours PRN Moderate Pain (4 - 6)  HYDROmorphone  Injectable 1 milliGRAM(s) IV Push every 4 hours PRN Severe Pain (7 - 10)  metoclopramide Injectable 10 milliGRAM(s) IV Push every 6 hours PRN nausea/vomiting  senna 2 Tablet(s) Oral at bedtime PRN Constipation  simethicone 80 milliGRAM(s) Chew two times a day PRN Gas        Objective:  Vital Signs Last 24 Hrs  T(C): 36.7 (18 Jun 2025 13:00), Max: 38.2 (18 Jun 2025 05:50)  T(F): 98 (18 Jun 2025 13:00), Max: 100.8 (18 Jun 2025 05:50)  HR: 98 (18 Jun 2025 13:00) (90 - 107)  BP: 122/80 (18 Jun 2025 13:00) (122/80 - 157/83)  BP(mean): --  RR: 18 (18 Jun 2025 13:00) (18 - 18)  SpO2: 98% (18 Jun 2025 13:00) (87% - 98%)    Parameters below as of 18 Jun 2025 13:00  Patient On (Oxygen Delivery Method): room air          Physical Exam:  General: No acute distress  HEENT: EOMI, Mucositis  Resp: No respiratory distress  MSK: Improved synovitis of b/l ankle, no TTP, FROM  Neuro: AAOx3  Skin: no visible rashes      LABS:  cret                        8.3    9.35  )-----------( 95       ( 18 Jun 2025 08:55 )             24.2     06-18    128[L]  |  93[L]  |  22  ----------------------------<  123[H]  4.0   |  19[L]  |  0.96    Ca    8.6      18 Jun 2025 08:55  Phos  3.5     06-18  Mg     2.1     06-18    TPro  5.7[L]  /  Alb  3.1[L]  /  TBili  1.0  /  DBili  x   /  AST  35  /  ALT  66[H]  /  AlkPhos  110  06-18      Urinalysis Basic - ( 12 Jun 2025 10:30 )    Color: x / Appearance: x / SG: x / pH: x  Gluc: 129 mg/dL / Ketone: x  / Bili: x / Urobili: x   Blood: x / Protein: x / Nitrite: x   Leuk Esterase: x / RBC: x / WBC x   Sq Epi: x / Non Sq Epi: x / Bacteria: x            Rheumatology Work Up    Creatine Kinase, Serum: 254 U/L *H* [30 - 200] (06-04-21 @ 01:50)  Creatine Kinase, Serum: 260 U/L *H* [30 - 200] (06-03-21 @ 19:31)  C-Reactive Protein, Serum: 48.7 mg/L *H* [0.0 - 4.0] (06-03-21 @ 16:12)  Creatine Kinase, Serum: 356 U/L *H* [30 - 200] (06-03-21 @ 16:12)            RADIOLOGY & ADDITIONAL STUDIES:    < from: Xray Foot AP + Lateral, Left (06.12.25 @ 22:30) >  EXAM:  Frontal and lateral left foot from 6/12/2025 at 2230. Compared prior   study from 7/22/2020.    IMPRESSION:  No fractures or dislocations.    Tarsometatarsal alignment maintained without evidence for a Lisfranc   injury.    Mild midfoot arthritic changes with dorsal margin spurring. Preserved   remaining visualized joint spaces and no joint margin erosions.    Plantar calcaneal enthesophyte. Unremarkable distal Achilles tendon   shadow.    Generalized osteopenia otherwise no discrete suspicious lytic or blastic   lesions.    < end of copied text >

## 2025-06-18 NOTE — PROGRESS NOTE ADULT - SUBJECTIVE AND OBJECTIVE BOX
Diagnosis: ASXL1-mutated and LOZ36-gjexxer AML    Protocol/Chemo Regimen: Cycle 1 HiDAC 2gm/m2    Day: 4    Pt endorsed: c/o weakness, fatigue, fog, and generalized pain. States he "does not feel like himself"     Review of Systems: Patient denied nausea, vomiting,  chest pain, cough, dyspnea, abdominal pain, constipation,  rectal pain,  rash, fatigue, headache, bleeding    Pain scale:     Diet: regular Ensure plus HP BID                      Allergies: No Known Allergies   Diagnosis: ASXL1-mutated and YAY06-bbgyjua AML    Protocol/Chemo Regimen: Cycle 1 HiDAC 2gm/m2    Day: 4    Pt endorsed: Improved weakness and mental status. +Fatigue +Chills     Review of Systems: Patient denied nausea, vomiting,  chest pain, cough, dyspnea, abdominal pain, constipation,  rectal pain,  rash,, headache, bleeding    Pain scale:     Diet: regular Ensure plus HP BID                      Allergies: No Known Allergies    ANTIMICROBIALS  levoFLOXacin  Tablet 500 milliGRAM(s) Oral every 24 hours  valACYclovir 500 milliGRAM(s) Oral two times a day    STANDING MEDICATIONS  allopurinol 300 milliGRAM(s) Oral daily  atorvastatin 80 milliGRAM(s) Oral at bedtime  Biotene Dry Mouth Oral Rinse 15 milliLiter(s) Swish and Spit three times a day  diltiazem    milliGRAM(s) Oral daily  FIRST- Mouthwash  BLM 15 milliLiter(s) Swish and Spit two times a day  ondansetron Injectable 8 milliGRAM(s) IV Push every 8 hours  pantoprazole    Tablet 40 milliGRAM(s) Oral before breakfast  polyethylene glycol 3350 17 Gram(s) Oral two times a day  prednisoLONE acetate 1% Suspension 2 Drop(s) Both EYES every 6 hours  senna 2 Tablet(s) Oral at bedtime  sodium chloride 0.9%. 1000 milliLiter(s) IV Continuous <Continuous>  tamsulosin 0.4 milliGRAM(s) Oral at bedtime      PRN MEDICATIONS  acetaminophen     Tablet .. 650 milliGRAM(s) Oral every 6 hours PRN  HYDROmorphone   Tablet 4 milliGRAM(s) Oral every 4 hours PRN  metoclopramide Injectable 10 milliGRAM(s) IV Push every 6 hours PRN  simethicone 80 milliGRAM(s) Chew two times a day PRN        Vital Signs Last 24 Hrs  T(C): 36.7 (18 Jun 2025 13:00), Max: 38.2 (18 Jun 2025 05:50)  T(F): 98 (18 Jun 2025 13:00), Max: 100.8 (18 Jun 2025 05:50)  HR: 98 (18 Jun 2025 13:00) (90 - 107)  BP: 122/80 (18 Jun 2025 13:00) (122/80 - 157/83)  BP(mean): --  RR: 18 (18 Jun 2025 13:00) (18 - 18)  SpO2: 98% (18 Jun 2025 13:00) (87% - 98%)    Parameters below as of 18 Jun 2025 13:00  Patient On (Oxygen Delivery Method): room air        PHYSICAL EXAM  General: adult in NAD  HEENT: clear oropharynx, anicteric sclera, pink conjunctiva  Neck: supple  CV: normal S1/S2 RRR  Lungs: positive air movement b/l ant lungs,clear to auscultation, no wheezes, no rales  Abdomen: soft non-tender non-distended, no hepatosplenomegaly  Ext: no clubbing cyanosis or edema  Skin: no rashes and no petechiae  Neuro: alert and oriented X 4, no focal deficits  Central Line: normal    LABS:    Blood Cultures:                           8.3    9.35  )-----------( 95       ( 18 Jun 2025 08:55 )             24.2         Mean Cell Volume : 94.5 fl  Mean Cell Hemoglobin : 32.4 pg  Mean Cell Hemoglobin Concentration : 34.3 g/dL  Auto Neutrophil # : 9.07 K/uL  Auto Lymphocyte # : 0.22 K/uL  Auto Monocyte # : 0.01 K/uL  Auto Eosinophil # : 0.00 K/uL  Auto Basophil # : 0.01 K/uL  Auto Neutrophil % : 97.0 %  Auto Lymphocyte % : 2.4 %  Auto Monocyte % : 0.1 %  Auto Eosinophil % : 0.0 %  Auto Basophil % : 0.1 %      06-18    128[L]  |  93[L]  |  22  ----------------------------<  123[H]  4.0   |  19[L]  |  0.96    Ca    8.6      18 Jun 2025 08:55  Phos  3.5     06-18  Mg     2.1     06-18    TPro  5.7[L]  /  Alb  3.1[L]  /  TBili  1.0  /  DBili  x   /  AST  35  /  ALT  66[H]  /  AlkPhos  110  06-18      Mg 2.1  Phos 3.5            Uric Acid --        RADIOLOGY & ADDITIONAL STUDIES:         Diagnosis: ASXL1-mutated and DIN71-hpoeszu AML    Protocol/Chemo Regimen: Cycle 1 HiDAC 2gm/m2    Day: 4    Pt endorsed: Improved weakness and mental status. +Fatigue +Chills     Review of Systems: Patient denied nausea, vomiting,  chest pain, cough, dyspnea, abdominal pain, constipation,  rectal pain,  rash,, headache, bleeding    Pain scale:     Diet: regular Ensure plus HP BID                      Allergies: No Known Allergies    ANTIMICROBIALS  levoFLOXacin  Tablet 500 milliGRAM(s) Oral every 24 hours  valACYclovir 500 milliGRAM(s) Oral two times a day    STANDING MEDICATIONS  allopurinol 300 milliGRAM(s) Oral daily  atorvastatin 80 milliGRAM(s) Oral at bedtime  Biotene Dry Mouth Oral Rinse 15 milliLiter(s) Swish and Spit three times a day  diltiazem    milliGRAM(s) Oral daily  FIRST- Mouthwash  BLM 15 milliLiter(s) Swish and Spit two times a day  ondansetron Injectable 8 milliGRAM(s) IV Push every 8 hours  pantoprazole    Tablet 40 milliGRAM(s) Oral before breakfast  polyethylene glycol 3350 17 Gram(s) Oral two times a day  prednisoLONE acetate 1% Suspension 2 Drop(s) Both EYES every 6 hours  senna 2 Tablet(s) Oral at bedtime  sodium chloride 0.9%. 1000 milliLiter(s) IV Continuous <Continuous>  tamsulosin 0.4 milliGRAM(s) Oral at bedtime      PRN MEDICATIONS  acetaminophen     Tablet .. 650 milliGRAM(s) Oral every 6 hours PRN  HYDROmorphone   Tablet 4 milliGRAM(s) Oral every 4 hours PRN  metoclopramide Injectable 10 milliGRAM(s) IV Push every 6 hours PRN  simethicone 80 milliGRAM(s) Chew two times a day PRN        Vital Signs Last 24 Hrs  T(C): 36.7 (18 Jun 2025 13:00), Max: 38.2 (18 Jun 2025 05:50)  T(F): 98 (18 Jun 2025 13:00), Max: 100.8 (18 Jun 2025 05:50)  HR: 98 (18 Jun 2025 13:00) (90 - 107)  BP: 122/80 (18 Jun 2025 13:00) (122/80 - 157/83)  BP(mean): --  RR: 18 (18 Jun 2025 13:00) (18 - 18)  SpO2: 98% (18 Jun 2025 13:00) (87% - 98%)    Parameters below as of 18 Jun 2025 13:00  Patient On (Oxygen Delivery Method): room air        PHYSICAL EXAM  General: adult in NAD  HEENT: clear oropharynx, anicteric sclera, pink conjunctiva  Neck: supple  CV: normal S1/S2 RRR  Lungs: positive air movement b/l ant lungs,clear to auscultation, no wheezes, no rales  Abdomen: soft non-tender non-distended, no hepatosplenomegaly  Ext: no clubbing cyanosis or edema  Skin: no rashes and no petechiae  Neuro: alert and oriented X 4, no focal deficits  Central Line: normal    LABS:                        8.3    9.35  )-----------( 95       ( 18 Jun 2025 08:55 )             24.2         Mean Cell Volume : 94.5 fl  Mean Cell Hemoglobin : 32.4 pg  Mean Cell Hemoglobin Concentration : 34.3 g/dL  Auto Neutrophil # : 9.07 K/uL  Auto Lymphocyte # : 0.22 K/uL  Auto Monocyte # : 0.01 K/uL  Auto Eosinophil # : 0.00 K/uL  Auto Basophil # : 0.01 K/uL  Auto Neutrophil % : 97.0 %  Auto Lymphocyte % : 2.4 %  Auto Monocyte % : 0.1 %  Auto Eosinophil % : 0.0 %  Auto Basophil % : 0.1 %      06-18    128[L]  |  93[L]  |  22  ----------------------------<  123[H]  4.0   |  19[L]  |  0.96    Ca    8.6      18 Jun 2025 08:55  Phos  3.5     06-18  Mg     2.1     06-18    TPro  5.7[L]  /  Alb  3.1[L]  /  TBili  1.0  /  DBili  x   /  AST  35  /  ALT  66[H]  /  AlkPhos  110  06-18      Mg 2.1  Phos 3.5    Uric Acid --

## 2025-06-19 ENCOUNTER — TRANSCRIPTION ENCOUNTER (OUTPATIENT)
Age: 70
End: 2025-06-19

## 2025-06-19 ENCOUNTER — RX RENEWAL (OUTPATIENT)
Age: 70
End: 2025-06-19

## 2025-06-19 VITALS — TEMPERATURE: 98 F

## 2025-06-19 LAB
ALBUMIN SERPL ELPH-MCNC: 3.3 G/DL — SIGNIFICANT CHANGE UP (ref 3.3–5)
ALP SERPL-CCNC: 101 U/L — SIGNIFICANT CHANGE UP (ref 40–120)
ALT FLD-CCNC: 60 U/L — HIGH (ref 10–45)
ANION GAP SERPL CALC-SCNC: 16 MMOL/L — SIGNIFICANT CHANGE UP (ref 5–17)
APTT BLD: 24.9 SEC — LOW (ref 26.1–36.8)
AST SERPL-CCNC: 29 U/L — SIGNIFICANT CHANGE UP (ref 10–40)
BASOPHILS # BLD AUTO: 0.01 K/UL — SIGNIFICANT CHANGE UP (ref 0–0.2)
BASOPHILS NFR BLD AUTO: 0.1 % — SIGNIFICANT CHANGE UP (ref 0–2)
BILIRUB SERPL-MCNC: 0.7 MG/DL — SIGNIFICANT CHANGE UP (ref 0.2–1.2)
BLD GP AB SCN SERPL QL: NEGATIVE — SIGNIFICANT CHANGE UP
BUN SERPL-MCNC: 23 MG/DL — SIGNIFICANT CHANGE UP (ref 7–23)
CALCIUM SERPL-MCNC: 8.8 MG/DL — SIGNIFICANT CHANGE UP (ref 8.4–10.5)
CHLORIDE SERPL-SCNC: 98 MMOL/L — SIGNIFICANT CHANGE UP (ref 96–108)
CO2 SERPL-SCNC: 19 MMOL/L — LOW (ref 22–31)
CREAT SERPL-MCNC: 1.03 MG/DL — SIGNIFICANT CHANGE UP (ref 0.5–1.3)
D DIMER BLD IA.RAPID-MCNC: 1241 NG/ML DDU — HIGH
EGFR: 79 ML/MIN/1.73M2 — SIGNIFICANT CHANGE UP
EGFR: 79 ML/MIN/1.73M2 — SIGNIFICANT CHANGE UP
EOSINOPHIL # BLD AUTO: 0.01 K/UL — SIGNIFICANT CHANGE UP (ref 0–0.5)
EOSINOPHIL NFR BLD AUTO: 0.1 % — SIGNIFICANT CHANGE UP (ref 0–6)
FIBRINOGEN PPP-MCNC: 487 MG/DL — HIGH (ref 200–445)
GLUCOSE SERPL-MCNC: 146 MG/DL — HIGH (ref 70–99)
HCT VFR BLD CALC: 25.2 % — LOW (ref 39–50)
HGB BLD-MCNC: 8.5 G/DL — LOW (ref 13–17)
IMM GRANULOCYTES # BLD AUTO: 0.06 K/UL — SIGNIFICANT CHANGE UP (ref 0–0.07)
IMM GRANULOCYTES NFR BLD AUTO: 0.6 % — SIGNIFICANT CHANGE UP (ref 0–0.9)
IMMATURE PLATELET FRACTION #: 1.8 K/UL — LOW (ref 3.9–12.5)
IMMATURE PLATELET FRACTION %: 2.5 % — SIGNIFICANT CHANGE UP (ref 1.6–7.1)
INR BLD: 1.15 RATIO — SIGNIFICANT CHANGE UP (ref 0.85–1.16)
LDH SERPL L TO P-CCNC: 285 U/L — HIGH (ref 50–242)
LYMPHOCYTES # BLD AUTO: 0.17 K/UL — LOW (ref 1–3.3)
LYMPHOCYTES NFR BLD AUTO: 1.7 % — LOW (ref 13–44)
MAGNESIUM SERPL-MCNC: 2.3 MG/DL — SIGNIFICANT CHANGE UP (ref 1.6–2.6)
MCHC RBC-ENTMCNC: 32.6 PG — SIGNIFICANT CHANGE UP (ref 27–34)
MCHC RBC-ENTMCNC: 33.7 G/DL — SIGNIFICANT CHANGE UP (ref 32–36)
MCV RBC AUTO: 96.6 FL — SIGNIFICANT CHANGE UP (ref 80–100)
MONOCYTES # BLD AUTO: 0 K/UL — SIGNIFICANT CHANGE UP (ref 0–0.9)
MONOCYTES NFR BLD AUTO: 0 % — LOW (ref 2–14)
NEUTROPHILS # BLD AUTO: 9.56 K/UL — HIGH (ref 1.8–7.4)
NEUTROPHILS NFR BLD AUTO: 97.5 % — HIGH (ref 43–77)
NRBC # BLD AUTO: 0 K/UL — SIGNIFICANT CHANGE UP (ref 0–0)
NRBC # FLD: 0 K/UL — SIGNIFICANT CHANGE UP (ref 0–0)
NRBC BLD AUTO-RTO: 0 /100 WBCS — SIGNIFICANT CHANGE UP (ref 0–0)
PHOSPHATE SERPL-MCNC: 3.3 MG/DL — SIGNIFICANT CHANGE UP (ref 2.5–4.5)
PLATELET # BLD AUTO: 71 K/UL — LOW (ref 150–400)
PMV BLD: 11.5 FL — SIGNIFICANT CHANGE UP (ref 7–13)
POTASSIUM SERPL-MCNC: 3.6 MMOL/L — SIGNIFICANT CHANGE UP (ref 3.5–5.3)
POTASSIUM SERPL-SCNC: 3.6 MMOL/L — SIGNIFICANT CHANGE UP (ref 3.5–5.3)
PROT SERPL-MCNC: 5.6 G/DL — LOW (ref 6–8.3)
PROTHROM AB SERPL-ACNC: 13.2 SEC — SIGNIFICANT CHANGE UP (ref 9.9–13.4)
RBC # BLD: 2.61 M/UL — LOW (ref 4.2–5.8)
RBC # FLD: 19.5 % — HIGH (ref 10.3–14.5)
RH IG SCN BLD-IMP: POSITIVE — SIGNIFICANT CHANGE UP
SODIUM SERPL-SCNC: 133 MMOL/L — LOW (ref 135–145)
UUN UR-MCNC: 550 MG/DL — SIGNIFICANT CHANGE UP
WBC # BLD: 9.81 K/UL — SIGNIFICANT CHANGE UP (ref 3.8–10.5)
WBC # FLD AUTO: 9.81 K/UL — SIGNIFICANT CHANGE UP (ref 3.8–10.5)

## 2025-06-19 PROCEDURE — 84156 ASSAY OF PROTEIN URINE: CPT

## 2025-06-19 PROCEDURE — 85610 PROTHROMBIN TIME: CPT

## 2025-06-19 PROCEDURE — 87040 BLOOD CULTURE FOR BACTERIA: CPT

## 2025-06-19 PROCEDURE — 87999 UNLISTED MICROBIOLOGY PX: CPT

## 2025-06-19 PROCEDURE — 0225U NFCT DS DNA&RNA 21 SARSCOV2: CPT

## 2025-06-19 PROCEDURE — 71045 X-RAY EXAM CHEST 1 VIEW: CPT

## 2025-06-19 PROCEDURE — 81003 URINALYSIS AUTO W/O SCOPE: CPT

## 2025-06-19 PROCEDURE — 83735 ASSAY OF MAGNESIUM: CPT

## 2025-06-19 PROCEDURE — 86850 RBC ANTIBODY SCREEN: CPT

## 2025-06-19 PROCEDURE — 87086 URINE CULTURE/COLONY COUNT: CPT

## 2025-06-19 PROCEDURE — 85730 THROMBOPLASTIN TIME PARTIAL: CPT

## 2025-06-19 PROCEDURE — 81001 URINALYSIS AUTO W/SCOPE: CPT

## 2025-06-19 PROCEDURE — 84300 ASSAY OF URINE SODIUM: CPT

## 2025-06-19 PROCEDURE — 83615 LACTATE (LD) (LDH) ENZYME: CPT

## 2025-06-19 PROCEDURE — 87205 SMEAR GRAM STAIN: CPT

## 2025-06-19 PROCEDURE — 84100 ASSAY OF PHOSPHORUS: CPT

## 2025-06-19 PROCEDURE — 36415 COLL VENOUS BLD VENIPUNCTURE: CPT

## 2025-06-19 PROCEDURE — 87015 SPECIMEN INFECT AGNT CONCNTJ: CPT

## 2025-06-19 PROCEDURE — 85384 FIBRINOGEN ACTIVITY: CPT

## 2025-06-19 PROCEDURE — 99233 SBSQ HOSP IP/OBS HIGH 50: CPT

## 2025-06-19 PROCEDURE — 85025 COMPLETE CBC W/AUTO DIFF WBC: CPT

## 2025-06-19 PROCEDURE — 85379 FIBRIN DEGRADATION QUANT: CPT

## 2025-06-19 PROCEDURE — 87637 SARSCOV2&INF A&B&RSV AMP PRB: CPT

## 2025-06-19 PROCEDURE — 86900 BLOOD TYPING SEROLOGIC ABO: CPT

## 2025-06-19 PROCEDURE — 87070 CULTURE OTHR SPECIMN AEROBIC: CPT

## 2025-06-19 PROCEDURE — 86901 BLOOD TYPING SEROLOGIC RH(D): CPT

## 2025-06-19 PROCEDURE — 84540 ASSAY OF URINE/UREA-N: CPT

## 2025-06-19 PROCEDURE — 89060 EXAM SYNOVIAL FLUID CRYSTALS: CPT

## 2025-06-19 PROCEDURE — 97116 GAIT TRAINING THERAPY: CPT

## 2025-06-19 PROCEDURE — 87075 CULTR BACTERIA EXCEPT BLOOD: CPT

## 2025-06-19 PROCEDURE — 97162 PT EVAL MOD COMPLEX 30 MIN: CPT

## 2025-06-19 PROCEDURE — 89051 BODY FLUID CELL COUNT: CPT

## 2025-06-19 PROCEDURE — 97530 THERAPEUTIC ACTIVITIES: CPT

## 2025-06-19 PROCEDURE — 84550 ASSAY OF BLOOD/URIC ACID: CPT

## 2025-06-19 PROCEDURE — 82570 ASSAY OF URINE CREATININE: CPT

## 2025-06-19 PROCEDURE — 80053 COMPREHEN METABOLIC PANEL: CPT

## 2025-06-19 PROCEDURE — 73620 X-RAY EXAM OF FOOT: CPT

## 2025-06-19 PROCEDURE — 83935 ASSAY OF URINE OSMOLALITY: CPT

## 2025-06-19 PROCEDURE — 99239 HOSP IP/OBS DSCHRG MGMT >30: CPT

## 2025-06-19 PROCEDURE — 84133 ASSAY OF URINE POTASSIUM: CPT

## 2025-06-19 RX ORDER — LIDOCAINE HYDROCHLORIDE 20 MG/ML
10 JELLY TOPICAL THREE TIMES A DAY
Refills: 0 | Status: DISCONTINUED | OUTPATIENT
Start: 2025-06-19 | End: 2025-06-19

## 2025-06-19 RX ORDER — METHYLPREDNISOLONE ACETATE 80 MG/ML
10 INJECTION, SUSPENSION INTRA-ARTICULAR; INTRALESIONAL; INTRAMUSCULAR; SOFT TISSUE
Qty: 3 | Refills: 0
Start: 2025-06-19 | End: 2025-06-19

## 2025-06-19 RX ORDER — HYDROMORPHONE/SOD CHLOR,ISO/PF 2 MG/10 ML
1 SYRINGE (ML) INJECTION
Qty: 84 | Refills: 0
Start: 2025-06-19 | End: 2025-07-02

## 2025-06-19 RX ORDER — METHYLPREDNISOLONE ACETATE 80 MG/ML
5 INJECTION, SUSPENSION INTRA-ARTICULAR; INTRALESIONAL; INTRAMUSCULAR; SOFT TISSUE
Qty: 5 | Refills: 0
Start: 2025-06-19 | End: 2025-06-19

## 2025-06-19 RX ADMIN — Medication 2 DROP(S): at 11:35

## 2025-06-19 RX ADMIN — Medication 15 MILLILITER(S): at 06:19

## 2025-06-19 RX ADMIN — Medication 4 MILLIGRAM(S): at 07:58

## 2025-06-19 RX ADMIN — Medication 4 MILLIGRAM(S): at 02:26

## 2025-06-19 RX ADMIN — Medication 4 MILLIGRAM(S): at 06:43

## 2025-06-19 RX ADMIN — Medication 8 MILLIGRAM(S): at 06:17

## 2025-06-19 RX ADMIN — METHYLPREDNISOLONE ACETATE 20 MILLIGRAM(S): 80 INJECTION, SUSPENSION INTRA-ARTICULAR; INTRALESIONAL; INTRAMUSCULAR; SOFT TISSUE at 06:27

## 2025-06-19 RX ADMIN — Medication 4 MILLIGRAM(S): at 01:56

## 2025-06-19 RX ADMIN — Medication 2 DROP(S): at 06:24

## 2025-06-19 RX ADMIN — Medication 2 DROP(S): at 18:06

## 2025-06-19 RX ADMIN — Medication 500 MILLIGRAM(S): at 06:17

## 2025-06-19 RX ADMIN — Medication 4 MILLIGRAM(S): at 10:47

## 2025-06-19 RX ADMIN — Medication 15 MILLILITER(S): at 13:03

## 2025-06-19 RX ADMIN — Medication 300 MILLIGRAM(S): at 06:17

## 2025-06-19 RX ADMIN — DILTIAZEM HYDROCHLORIDE 120 MILLIGRAM(S): 240 TABLET, EXTENDED RELEASE ORAL at 06:17

## 2025-06-19 RX ADMIN — Medication 40 MILLIGRAM(S): at 06:18

## 2025-06-19 RX ADMIN — Medication 2 DROP(S): at 00:51

## 2025-06-19 RX ADMIN — Medication 4 MILLIGRAM(S): at 11:38

## 2025-06-19 RX ADMIN — Medication 1000 MILLILITER(S): at 16:11

## 2025-06-19 RX ADMIN — LIDOCAINE HYDROCHLORIDE 10 MILLILITER(S): 20 JELLY TOPICAL at 11:34

## 2025-06-19 RX ADMIN — Medication 500 MILLIGRAM(S): at 18:06

## 2025-06-19 NOTE — PROGRESS NOTE ADULT - CONVERSATION DETAILS
Patient shared that he has completed a HCP in Dr. Apodaca's office where he named his ex wife Brooklyn on the document. [place] on the phone agreed.

## 2025-06-19 NOTE — DISCHARGE NOTE NURSING/CASE MANAGEMENT/SOCIAL WORK - NSDCFUADDAPPT_GEN_ALL_CORE_FT
Follow up with palliative care for pain management at: _________    Early Discharge Program  Location: [address] [place] [address], [place] [address] Entrance C  For more emergent issues/symptoms, please call-[facility]: [phone]  For the Nurse Navigator/non-emergent issues, please call-Krystal Mcgraw RN: [phone]  *Please bring your medications or a medication list with you to the first early discharge visit.*  Reminders:  a. Continue to check your temperature at home. If you don’t have a thermometer available, please ask one of the staff to give you a disposable thermometer on discharge.  b. You will be followed by the Early Discharge Program’s providers for a 1-2 week period, and then you will have a follow up with your primary heme-oncologist.  c. You may need to have lab work and possible transfusions at [facility] 2-3x per week, please alert staff/social work if you need help with transportation upon discharge.  d. Please bring your insurance cards and ID.

## 2025-06-19 NOTE — PROGRESS NOTE ADULT - NSPROGADDITIONALINFOA_GEN_ALL_CORE
Palliative Care Recommendations for Discharge Preparation:    ***INSTRUCTIONS FOR MEDICAL TEAM***    1. Primary team to prescribe discharge medications to cover patient’s needs between the date of discharge and the date of appointment with either the patient's PMD, the primary oncologist, or a palliative care provider. For pain being treated as a part of cancer care, hospice or other end-of-life care, or pain being treated as part of palliative care prescribing opioids beyond the 7-day and less than 28 days post-discharge window IS ACCEPTABLE as per [place] Department of Health ( https://www.health.[place].gov/professionals/narcotic/laws_and_regulations/).    2. Ideally medications are to be prescribed to [facility] pharmacy, so the patient can leave the hospital with medications. Primary team to make sure that any prior authorizations are completed prior to discharge.    3. Patients with 50mg OME have 4x risk of OD and those with 100mg OME have 9x risk (prescribetoprevent.org). Please note instructions for Narcan prescribing below.     4. PLEASE PRESCRIBE THE FOLLOWING:     - Narcan Kit - Naloxone 4mg/0.1 ml nasal spray, Spray 0.1mL into one nostril. Repeat with second spray into other nostril after 2-3 minutes if no or minimal response. (http://prescribetoprevent.org/prescribers/palliative/).   Please be sure the patient's pharmacy has the medication, otherwise, be sure there is a pharmacy where the patient can get the Naloxone intranasal.

## 2025-06-19 NOTE — DISCHARGE NOTE NURSING/CASE MANAGEMENT/SOCIAL WORK - FINANCIAL ASSISTANCE
[facility] provides services at a reduced cost to those who are determined to be eligible through [facility]’s financial assistance program. Information regarding [facility]’s financial assistance program can be found by going to [email or web address] or by calling [phone].

## 2025-06-19 NOTE — PROGRESS NOTE ADULT - SUBJECTIVE AND OBJECTIVE BOX
SUBJECTIVE AND OBJECTIVE  Indication for Geriatrics and Palliative Care Services/INTERVAL HPI: pain control     OVERNIGHT EVENTS: patient seen this morning, awake and alert, mentation back to baseline self. His ex wife Brooklyn is on the phone. He took 3 doses of PO dilaudid 4 mg over the past 24 hours for pain and felt relief. He is worried about being discharged back to home and needing transportation support. I advised for him to f/u with supportive oncology in clinic and both him and Brooklyn are in agreement.     DNR on chart:  Allergies    No Known Allergies    Intolerances    MEDICATIONS  (STANDING):  allopurinol 300 milliGRAM(s) Oral daily  atorvastatin 80 milliGRAM(s) Oral at bedtime  Biotene Dry Mouth Oral Rinse 15 milliLiter(s) Swish and Spit three times a day  cytarabine IVPB (eMAR) 4400 milliGRAM(s) IV Intermittent every 12 hours  diltiazem    milliGRAM(s) Oral daily  levoFLOXacin  Tablet 500 milliGRAM(s) Oral every 24 hours  methylPREDNISolone sodium succinate Injectable 30 milliGRAM(s) IV Push daily  ondansetron Injectable 8 milliGRAM(s) IV Push every 8 hours  pantoprazole    Tablet 40 milliGRAM(s) Oral before breakfast  polyethylene glycol 3350 17 Gram(s) Oral two times a day  prednisoLONE acetate 1% Suspension 2 Drop(s) Both EYES every 6 hours  senna 2 Tablet(s) Oral at bedtime  sodium chloride 0.9%. 1000 milliLiter(s) (75 mL/Hr) IV Continuous <Continuous>  tamsulosin 0.4 milliGRAM(s) Oral at bedtime  valACYclovir 500 milliGRAM(s) Oral two times a day    MEDICATIONS  (PRN):  acetaminophen     Tablet .. 650 milliGRAM(s) Oral every 6 hours PRN Temp greater or equal to 38C (100.4F), Mild Pain (1 - 3)  HYDROmorphone   Tablet 4 milliGRAM(s) Oral every 4 hours PRN mod-severe pain  metoclopramide Injectable 10 milliGRAM(s) IV Push every 6 hours PRN nausea/vomiting  simethicone 80 milliGRAM(s) Chew two times a day PRN Gas      ITEMS UNCHECKED ARE NOT PRESENT    PRESENT SYMPTOMS: [ ]Unable to self-report - see [ ] CPOT [ ] PAINADS [ ] RDOS  Source if other than patient:  [ ]Family   [ ]Team     Pain:  [ x]yes [ ]no  QOL impact -   Location -  mouth                  Aggravating factors -  Quality -  Radiation -  Timing-  Severity (0-10 scale):  Minimal acceptable level (0-10 scale):     Dyspnea:                           [ ]Mild [ ]Moderate [ ]Severe  Anxiety:                             [ ]Mild [ ]Moderate [ ]Severe  Fatigue:                             [ ]Mild [ ]Moderate [ ]Severe  Nausea:                             [ ]Mild [ ]Moderate [ ]Severe  Loss of appetite:              [ ]Mild [ ]Moderate [ ]Severe  Constipation:                    [ ]Mild [ ]Moderate [ ]Severe    PCSSQ[Palliative Care Spiritual Screening Question]   Severity (0-10):  Score of 4 or > indicate consideration of Chaplaincy referral.  Chaplaincy Referral: [ ] yes [ ] refused [ ] following [x ] Deferred     Caregiver Idalou? : [ ] yes [x ] no [ ] Deferred [ ] Declined             Social work referral [ ] Patient & Family Centered Care Referral [ ]     Anticipatory Grief present?:  [ ] yes [x ] no  [ ] Deferred                  Social work referral [ ] Chaplaincy Referral[ ]    Other Symptoms:  [x ]All other review of systems negative     Palliative Performance Status Version 2:     50    %      http://npcrc.org/files/news/palliative_performance_scale_ppsv2.pdf    PHYSICAL EXAM:  Vital Signs Last 24 Hrs  T(C): 36.4 (19 Jun 2025 09:04), Max: 37.4 (18 Jun 2025 21:06)  T(F): 97.6 (19 Jun 2025 09:04), Max: 99.3 (18 Jun 2025 21:06)  HR: 96 (19 Jun 2025 09:04) (61 - 98)  BP: 112/66 (19 Jun 2025 09:04) (112/66 - 129/74)  BP(mean): --  RR: 18 (19 Jun 2025 09:04) (16 - 18)  SpO2: 96% (19 Jun 2025 09:04) (95% - 98%)    Parameters below as of 19 Jun 2025 09:04  Patient On (Oxygen Delivery Method): room air      GENERAL: [ ]Cachexia    [ ]Alert  [ ]Oriented x   [ x]Lethargic  [ ]Unarousable  [ x]Verbal  [ ]Non-Verbal  Behavioral:   [ ]Anxiety  [ x]Delirium [ ]Agitation [ ]Other  HEENT:  [ x]Normal   [ ]Dry mouth   [ ]ET Tube/Trach  [ ]Oral lesions  PULMONARY:   [x ]Clear [ ]Tachypnea  [ ]Audible excessive secretions   [ ]Rhonchi        [ ]Right [ ]Left [ ]Bilateral  [ ]Crackles        [ ]Right [ ]Left [ ]Bilateral  [ ]Wheezing     [ ]Right [ ]Left [ ]Bilateral  [ ]Diminished BS [ ] Right [ ]Left [ ]Bilateral  CARDIOVASCULAR:    [x ]Regular [ ]Irregular [ ]Tachy  [ ]Lui [ ]Murmur [ ]Other  GASTROINTESTINAL:  [ x]Soft  [ ]Distended   [ x]+BS  [x ]Non tender [ ]Tender  [ ]Other [ ]PEG [ ]OGT/ NGT   Last BM:   GENITOURINARY:  [x ]Normal [ ]Incontinent   [ ]Oliguria/Anuria   [ ]Villalobos  MUSCULOSKELETAL:   [ x]Normal   [ ]Weakness  [ ]Bed/Wheelchair bound [ ]Edema  NEUROLOGIC:   [ ]No focal deficits  [ x] Cognitive impairment  [ ] Dysphagia [ ]Dysarthria [ ] Paresis [ ]Other   SKIN:   [ ]Normal  [ ]Rash  [ ]Other  [ ]Pressure ulcer(s) [ ]y [ ]n present on admission    CRITICAL CARE:  [ ]Shock Present  [ ]Septic [ ]Cardiogenic [ ]Neurologic [ ]Hypovolemic  [ ]Vasopressors [ ]Inotropes  [ ]Respiratory failure present [ ]Mechanical Ventilation [ ]Non-invasive ventilatory support [ ]High-Flow   [ ]Acute  [ ]Chronic [ ]Hypoxic  [ ]Hypercarbic [ ]Other  [ ]Other organ failure     LABS:                                   8.5    9.81  )-----------( 71       ( 19 Jun 2025 09:38 )             25.2     06-19    133[L]  |  98  |  23  ----------------------------<  146[H]  3.6   |  19[L]  |  1.03    Ca    8.8      19 Jun 2025 09:51  Phos  3.3     06-19  Mg     2.3     06-19    TPro  5.6[L]  /  Alb  3.3  /  TBili  0.7  /  DBili  x   /  AST  29  /  ALT  60[H]  /  AlkPhos  101  06-19      RADIOLOGY & ADDITIONAL STUDIES: reviewed    Protein Calorie Malnutrition Present: [ ]mild [ ]moderate [ ]severe [ ]underweight [ ]morbid obesity  https://www.andeal.org/vault/2440/web/files/ONC/Table_Clinical%20Characteristics%20to%20Document%20Malnutrition-White%20JV%20et%20al%202012.pdf    Height (cm): 180 (06-12-25 @ 09:32), 181 (05-02-25 @ 08:51), 180.5 (04-28-25 @ 15:00)  Weight (kg): 100.7 (06-15-25 @ 08:31), 100.5 (06-03-25 @ 16:00), 107.3 (05-02-25 @ 11:55)  BMI (kg/m2): 31.1 (06-15-25 @ 08:31), 31 (06-12-25 @ 09:32), 30.7 (06-03-25 @ 16:00)    [ ]PPSV2 < or = 30%  [ ]significant weight loss [ ]poor nutritional intake [ ]anasarca[ ]Artificial Nutrition    Other REFERRALS:  [ ]Hospice  [ ]Child Life  [ ]Social Work  [ ]Case management [ ]Holistic Therapy

## 2025-06-19 NOTE — PROGRESS NOTE ADULT - PROBLEM SELECTOR PLAN 1
Monitor daily CBC w/ diff, CMP, replete prn. Strict I/Os. Daily weights. Antiemetics  Monitor for neurotoxicity; cerebellar testing, finger to nose, nystagmus checks. Handwriting checks daily.  6/15 start cycle 1 HiDAC  6/17: Pt slightly altered and weak, slow to find answers to questions. Handwriting and finger to nose test normal.  6/18: Improved from yesterday. Urine studies ordered for worsening hyponatremia. Fluids d/gee. Continue to trend Monitor daily CBC w/ diff, CMP, replete prn. Strict I/Os. Daily weights. Antiemetics  Monitor for neurotoxicity; cerebellar testing, finger to nose, nystagmus checks. Handwriting checks daily.  6/15 start cycle 1 HiDAC  6/17: Pt slightly altered and weak, slow to find answers to questions. Handwriting and finger to nose test normal.  6/10 discharge to [facility]

## 2025-06-19 NOTE — PROGRESS NOTE ADULT - NS ATTEND AMEND GEN_ALL_CORE FT
Primary: Paulie    Assessment: 69M HiDAC Cycle 1 Day 4 for ASXL1-mutated and MRB66-wylfpuj AML. Course complicated by acute gout flare and severe pain of right ankle. Started HiDAC on Induction Day 45.    Heme:   - HiDAC 2g/m2 days 1,2,3; long-acting GCSF outpatient  - Continue allopurinol 300 mg once a day  - Continue daily oral B12 suppl    ID: Fevers again  - leukocytosis related to steroids  - Not Neutropenic  - s/p cefepime (6/13/25 - 6/16/25), de-escalate to levaquin for 7d course  - Cx's pending, ? secondary to cytarabine    MSK:  - Acute on chronic gout: Rheum consult appreciated, now on steroids -starting taper 6/17  - Improvement, now ambulating  - acute on chronic pain: Dilaudid for now, will need chronic pain to determine if outpatient regimen should be adjusted.    Dispo:  - Discharge on hold given fevers, GCSF once between days 5-7, [facility] Primary: Paulie    Assessment: 69M HiDAC Cycle 1 Day 5 for ASXL1-mutated and OGN16-omwhxui AML. Course complicated by acute gout flare and severe pain of right ankle. Started HiDAC on Induction Day 45.    Heme:   - HiDAC 2g/m2 days 1,2,3; long-acting GCSF outpatient  - Continue allopurinol 300 mg once a day  - Continue daily oral B12 suppl    ID: Fevers again  - leukocytosis related to steroids  - Not Neutropenic  - s/p cefepime (6/13/25 - 6/16/25), de-escalate to levaquin for 7d course  - Cx's NGTD, ? secondary to cytarabine    MSK:  - Acute on chronic gout: Rheum consult appreciated, now on steroids -starting taper 6/17  - Improvement, now ambulating  - acute on chronic pain: Dilaudid for now, will need chronic pain to determine if outpatient regimen should be adjusted.    Dispo:  - Discharge on hold given fevers, now afebrile, plan for [facility]  - GCSF once between days 5-7

## 2025-06-19 NOTE — PHARMACOTHERAPY INTERVENTION NOTE - COMMENTS
*NOTE IS NOT UPDATED*      Clinical Pharmacy Specialist- Hematology/Oncology- Progress Note    Pt is a 70 y/o male w/ PMH of HTN, HLD, GERD, BPH, Gout, spinal stenosis (s/p multiple surgeries) and newly diagnosed AML (ASXL1-mutated and DDX41) s/p 7+3 Induction therapy, course complicated by gout flare vs septic arthritis, now receiving C1 HIDAC consolidation    Antimicrobial Course:  - Cefepime- 6/13- 6/16  --> Levaquin- 6/16  - Valtrex- 6/12  MRSA nasal swab    Last Neutropenic (ANC<1000): no occurrence  Last Febrile: 18-Jun-2025 05:50; T= 100.8  Days Non-Neutropenic: 5  Days afebrile: 0    Chemotherapy Course  -Current Regimen: HIDAC 2gm/m2 Consolidation  History:  (5/2/25)- 7+3 Induction   -Daunorubicin 60mg/m2 IVP D1-3   -Cytarabine 100mg/m2 IV cont inf D1-7  (6/15/25) C1 HIDAC 2gm/m2  -Day: 4 (6/18)  BmBx:  Access: R TLC (accessed 5/5/25)    Vancomycin Levels:   Date    Dose              Value    Ref Range	                             Status   5/21    750mg q12hr   7.8       Trough 10-20; AUC= 400-600   Final  5/23    1gm q12hr      10.9     Trough 10-20; AUC= 400-600   Final    History/Relevant clinical information used in assessment:  - Ht= 5’11”; IBW= 75.3kg; Actual BW= 107.3kg; Adj BW= 88kg  - 5/2- IRR? rigors, chills, and tachycardia 2 hours into day 1; rasburicase 3mg x 1  - 5/4 -Crcl= 67ml/min ; Scr=1.32; Wt= 88kg; Adjusted BW used since pt BMI>30  - 5/9- increase melatonin 3mg to 10mh qhs- c/o insomnia  - 5/13- mouth pain- has ulceration   - 5/14-5/28- severe mucositis   - 5/15- bloody diarrhea x 3 on reglan prn, but has not needed, monitor for d/c to not aggravate w/ prokinetic agent  - 5/15- HSV neg  - 5/16- bloody BM's decreased to 2x'd/day from 4x's/day yest  - 5/17 UCx- 5/17- 10-49k VRE- per ID, do not need to treat  - 5/18- midodrine 10mg PO tid  - 5/19- BlCx- Meth resistant Staphylococcus haemolyticus- on vancomycin 1gm q12hr, this will yield AUC= 457; per ID, likely contaminant, expect short course of vanco; - 5/19, still febrile since 5/11, on cefepime, BlCx NGTD + abd discomfort (added flagyl)-->CT CAP 5/15 - no infection (but groundglass opacities stable from previous?)  - 5/21- zarxio 480mcg daily 5/21- 5/23  - 5/27- gout flare- colchicine 0.6mg BID x 2 days (5/27-5/29) -can cont until flare resolves, some experts cont 24-48 hrs after resolution; if symptoms do not resolve after 3 days, consider other anti-inflammatory agents- pt pain not relieved- added prednisone 40mg daily x 2 days (5/28-29) f/b 20mg x 2 days (5/30-6/1)  - 5/29- pt had episodes of diarrhea d/t colchicine  - 5/29- - allopurinol 100mg (300mg d/c'd 5/15)-keeping for gout-->300mg- 5/29- given UA rising- 5/29  - 5/30 will decrease diltiazem CD 240mg to 120mg  Meds sent for auth: midodrine, allopurinol 300mg (increased), prednisone, simethicone- sent to [facility] 5/29  - 6/13- c/o pain in both feet now  - 6/16- Confirmed home medications with patient  Home Medications:  ·	cholecalciferol oral tablet: 2000 unit(s) PO daily  ·	omeprazole 40 mg PO daily  ·	tamsulosin 0.4 mg PO daily  ·	diltiazem CD 120mg PO daily  ·	Allopurinol 300mg PO daily  ·	Valtrex 500mg PO BID  ·	atorvastatin 80mg PO qhs  ·	colchicine 0,6mg PO - 59 days supply filled 6/2025- pt does not want or take  ·	hctz, tramadol, midodrine, oxy- no longer taking  - 6/17- a bit confused- 2/2 to opioids? cytarabine?    Assessment/Plan/Recommendation:  Onc:  Cytarabine schedule (pt on 1,2,3 schedule):  - 6/15- started ~ 3 pm (D1)  - 6/16- 4 am (D2)  - 6/16- 4 pm (D2)  - 6/17- 4 am (D3)  - 6/17- 4 pm (D3)  - 6/18- 4 am (D4) finished this morning~7am   ID:  - 6/13- not neutropenic, on cefepime for fevers- switched to levaquin through thurs 6/19 (for 7 days duration)  - 6/18- febrile  Rheum:  - 6/12- r/o septic arthritis of L ankle- s/p arthrocentesis  - 6/12- allopurinol 300mg PO daily  - 6/13- methylprednisone 40mg IVP daily- taper: 30mg daily x 2 days- 6/17-6/18--> 20mg daily x 2 days- 6/19- 6/20 -->10mg daily x 2 days- 6/21- 6/22--> stop - need to enter 6/19 doses if pt not leaving  Pain:  - hydromorphone 4mg PO q4hrs prn- 6/17  Cards:  - atorvastatin 80mg hold if on posa d/t DDI (Cat X)  Dispo:  - to [facility]    Additional Monitoring Needed?   -Yes- Continue to monitor renal function & daily counts for abx escalation/de-escalation   -Discharge Planning:  --> New meds:   --> Meds sent for auth:   --> Delivered meds:    Case discussed with attending/primary team    Liza Jolley, PharmD, BCPS  Clinical Pharmacy Specialist | Hematology/Oncology  [facility]  Email: [email or web address] or available on [employer]   Clinical Pharmacy Specialist- Hematology/Oncology- Progress Note    Pt is a 70 y/o male w/ PMH of HTN, HLD, GERD, BPH, Gout, spinal stenosis (s/p multiple surgeries) and newly diagnosed AML (ASXL1-mutated and DDX41) s/p 7+3 Induction therapy, course complicated by gout flare vs septic arthritis, now receiving C1 HIDAC consolidation    Antimicrobial Course:  - Cefepime- 6/13- 6/16  --> Levaquin- 6/16  - Valtrex- 6/12  MRSA nasal swab    Last Neutropenic (ANC<1000): no occurrence  Last Febrile: 18-Jun-2025 05:50; T= 100.8  Days Non-Neutropenic: 6  Days afebrile: 0    Chemotherapy Course  -Current Regimen: HIDAC 2gm/m2 Consolidation  History:  (5/2/25)- 7+3 Induction   -Daunorubicin 60mg/m2 IVP D1-3   -Cytarabine 100mg/m2 IV cont inf D1-7  (6/15/25) C1 HIDAC 2gm/m2  -Day: 5 (6/19)  BmBx:  Access: R TLC (accessed 5/5/25)    Vancomycin Levels:   Date    Dose              Value    Ref Range	                             Status   5/21    750mg q12hr   7.8       Trough 10-20; AUC= 400-600   Final  5/23    1gm q12hr      10.9     Trough 10-20; AUC= 400-600   Final    History/Relevant clinical information used in assessment:  - Ht= 5’11”; IBW= 75.3kg; Actual BW= 107.3kg; Adj BW= 88kg  - 5/2- IRR? rigors, chills, and tachycardia 2 hours into day 1; rasburicase 3mg x 1  - 5/4 -Crcl= 67ml/min ; Scr=1.32; Wt= 88kg; Adjusted BW used since pt BMI>30  - 5/9- increase melatonin 3mg to 10mh qhs- c/o insomnia  - 5/13- mouth pain- has ulceration   - 5/14-5/28- severe mucositis   - 5/15- bloody diarrhea x 3 on reglan prn, but has not needed, monitor for d/c to not aggravate w/ prokinetic agent  - 5/15- HSV neg  - 5/16- bloody BM's decreased to 2x'd/day from 4x's/day yest  - 5/17 UCx- 5/17- 10-49k VRE- per ID, do not need to treat  - 5/18- midodrine 10mg PO tid  - 5/19- BlCx- Meth resistant Staphylococcus haemolyticus- on vancomycin 1gm q12hr, this will yield AUC= 457; per ID, likely contaminant, expect short course of vanco; - 5/19, still febrile since 5/11, on cefepime, BlCx NGTD + abd discomfort (added flagyl)-->CT CAP 5/15 - no infection (but groundglass opacities stable from previous?)  - 5/21- zarxio 480mcg daily 5/21- 5/23  - 5/27- gout flare- colchicine 0.6mg BID x 2 days (5/27-5/29) -can cont until flare resolves, some experts cont 24-48 hrs after resolution; if symptoms do not resolve after 3 days, consider other anti-inflammatory agents- pt pain not relieved- added prednisone 40mg daily x 2 days (5/28-29) f/b 20mg x 2 days (5/30-6/1)  - 5/29- pt had episodes of diarrhea d/t colchicine  - 5/29- - allopurinol 100mg (300mg d/c'd 5/15)-keeping for gout-->300mg- 5/29- given UA rising- 5/29  - 5/30 will decrease diltiazem CD 240mg to 120mg  Meds sent for auth: midodrine, allopurinol 300mg (increased), prednisone, simethicone- sent to [facility] 5/29  - 6/13- c/o pain in both feet now  - 6/16- Confirmed home medications with patient  Home Medications:  ·	cholecalciferol oral tablet: 2000 unit(s) PO daily  ·	omeprazole 40 mg PO daily  ·	tamsulosin 0.4 mg PO daily  ·	diltiazem CD 120mg PO daily  ·	Allopurinol 300mg PO daily  ·	Valtrex 500mg PO BID  ·	atorvastatin 80mg PO qhs  ·	colchicine 0,6mg PO - 59 days supply filled 6/2025- pt does not want or take  ·	hctz, tramadol, midodrine, oxy- no longer taking  - 6/17- a bit confused- 2/2 to opioids? cytarabine?    Assessment/Plan/Recommendation:  Onc:  Cytarabine schedule (pt on 1,2,3 schedule):  - 6/15- started ~ 3 pm (D1)  - 6/16- 4 am (D2)  - 6/16- 4 pm (D2)  - 6/17- 4 am (D3)  - 6/17- 4 pm (D3)  - 6/18- 4 am (D4) finished ~7am   - Neulasta tmrw 6/20  ID:  - 6/13- not neutropenic, on cefepime for fevers- switched to levaquin through thurs 6/19 (for 7 days duration)- d/c if still on  Rheum:  - 6/12- r/o septic arthritis of L ankle- s/p arthrocentesis  - 6/12- allopurinol 300mg PO daily  - 6/13- methylprednisone 40mg IVP daily- taper: 30mg daily x 2 days- 6/17-6/18--> 20mg daily x 2 days- 6/19- 6/20 -->10mg daily x 2 days- 6/21- 6/22--> stop  Pain:  - hydromorphone 4mg PO q4hrs prn- 6/17  Cards:  - atorvastatin 80mg hold if on posa d/t DDI (Cat X)  Dispo:  - 6/19- to [facility]     Additional Monitoring Needed?   -Yes- Continue to monitor renal function & daily counts for abx escalation/de-escalation   -Discharge Planning:  --> New meds:   --> Meds sent for auth:   --> Delivered meds:    Case discussed with attending/primary team    Liza Jolley, PharmD, BCPS  Clinical Pharmacy Specialist | Hematology/Oncology  [facility]  Email: [email or web address] or available on [employer]   Clinical Pharmacy Specialist- Hematology/Oncology- Progress Note    Pt is a 68 y/o male w/ PMH of HTN, HLD, GERD, BPH, Gout, spinal stenosis (s/p multiple surgeries) and newly diagnosed AML (ASXL1-mutated and DDX41) s/p 7+3 Induction therapy, course complicated by gout flare vs septic arthritis, now receiving C1 HIDAC consolidation    Antimicrobial Course:  - Cefepime- 6/13- 6/16  --> Levaquin (to complete a 7-day course of abx per MD)- 6/16- 6/19  - Valtrex- 6/12  MRSA nasal swab    Last Neutropenic (ANC<1000): no occurrence  Last Febrile: 18-Jun-2025 05:50; T= 100.8  Days Non-Neutropenic: 6  Days afebrile: 0    Chemotherapy Course  -Current Regimen: HIDAC 2gm/m2 Consolidation  History:  (5/2/25)- 7+3 Induction   -Daunorubicin 60mg/m2 IVP D1-3   -Cytarabine 100mg/m2 IV cont inf D1-7  (6/15/25) C1 HIDAC 2gm/m2  -Day: 5 (6/19)  BmBx:  Access: R TLC (accessed 5/5/25)    Vancomycin Levels:   Date    Dose              Value    Ref Range	                             Status   5/21    750mg q12hr   7.8       Trough 10-20; AUC= 400-600   Final  5/23    1gm q12hr      10.9     Trough 10-20; AUC= 400-600   Final    History/Relevant clinical information used in assessment:  - Ht= 5’11”; IBW= 75.3kg; Actual BW= 107.3kg; Adj BW= 88kg  - 5/2- IRR? rigors, chills, and tachycardia 2 hours into day 1; rasburicase 3mg x 1  - 5/4 -Crcl= 67ml/min ; Scr=1.32; Wt= 88kg; Adjusted BW used since pt BMI>30  - 5/9- increase melatonin 3mg to 10mh qhs- c/o insomnia  - 5/13- mouth pain- has ulceration   - 5/14-5/28- severe mucositis   - 5/15- bloody diarrhea x 3 on reglan prn, but has not needed, monitor for d/c to not aggravate w/ prokinetic agent  - 5/15- HSV neg  - 5/16- bloody BM's decreased to 2x'd/day from 4x's/day yest  - 5/17 UCx- 5/17- 10-49k VRE- per ID, do not need to treat  - 5/18- midodrine 10mg PO tid  - 5/19- BlCx- Meth resistant Staphylococcus haemolyticus- on vancomycin 1gm q12hr, this will yield AUC= 457; per ID, likely contaminant, expect short course of vanco; - 5/19, still febrile since 5/11, on cefepime, BlCx NGTD + abd discomfort (added flagyl)-->CT CAP 5/15 - no infection (but groundglass opacities stable from previous?)  - 5/21- zarxio 480mcg daily 5/21- 5/23  - 5/27- gout flare- colchicine 0.6mg BID x 2 days (5/27-5/29) -can cont until flare resolves, some experts cont 24-48 hrs after resolution; if symptoms do not resolve after 3 days, consider other anti-inflammatory agents- pt pain not relieved- added prednisone 40mg daily x 2 days (5/28-29) f/b 20mg x 2 days (5/30-6/1)  - 5/29- pt had episodes of diarrhea d/t colchicine  - 5/29- - allopurinol 100mg (300mg d/c'd 5/15)-keeping for gout-->300mg- 5/29- given UA rising- 5/29  - 5/30 will decrease diltiazem CD 240mg to 120mg  Meds sent for auth: midodrine, allopurinol 300mg (increased), prednisone, simethicone- sent to [facility] 5/29  - 6/13- c/o pain in both feet now  - 6/16- Confirmed home medications with patient  Home Medications:  ·	cholecalciferol oral tablet: 2000 unit(s) PO daily  ·	omeprazole 40 mg PO daily  ·	tamsulosin 0.4 mg PO daily  ·	diltiazem CD 120mg PO daily  ·	Allopurinol 300mg PO daily  ·	Valtrex 500mg PO BID  ·	atorvastatin 80mg PO qhs  ·	colchicine 0,6mg PO - 59 days supply filled 6/2025- pt does not want or take  ·	hctz, tramadol, midodrine, oxy- no longer taking  - 6/17- a bit confused- 2/2 to opioids? cytarabine?    Assessment/Plan/Recommendation:  Onc:  Cytarabine schedule (pt on 1,2,3 schedule):  - 6/15- started ~ 3 pm (D1)  - 6/16- 4 am (D2)  - 6/16- 4 pm (D2)  - 6/17- 4 am (D3)  - 6/17- 4 pm (D3)  - 6/18- 4 am (D4) finished ~7am   - Neulasta tmrw 6/20  ID:  - 6/13- not neutropenic, on cefepime for fevers- switched to levaquin through thurs 6/19 (for 7 days duration)- today is last day  Rheum:  - 6/12- r/o septic arthritis of L ankle- s/p arthrocentesis  - 6/12- allopurinol 300mg PO daily  - 6/13- methylprednisone 40mg IVP daily- taper: 30mg daily x 2 days- 6/17-6/18--> 20mg daily x 1 day- 6/19 -->10mg daily x 1 day- 6/20 --> stop  Pain:  - hydromorphone 4mg PO q4hrs prn- 6/17  Cards:  - atorvastatin 80mg hold if on posa d/t DDI (Cat X)  Dispo:  - 6/19- to [facility]     Additional Monitoring Needed?   -Yes- Continue to monitor renal function & daily counts for abx escalation/de-escalation   -Discharge Planning:  --> New meds:   --> Meds sent for auth: methylpred 10mg x 1 dose- sent to [facility] 6/19  --> Delivered meds:    Case discussed with attending/primary team    Liza Jolley, PharmD, BCPS  Clinical Pharmacy Specialist | Hematology/Oncology  [facility]  Email: [email or web address] or available on [employer]

## 2025-06-19 NOTE — PROGRESS NOTE ADULT - PROBLEM SELECTOR PLAN 1
Synovial fluid analysis showing crystals and many wbcs, suspect gout flare. pt on abx empirically in setting of fevers  - rheum following, on steroids with clinical improvement  - Continue PO dilaudid 4 mg q4 hours prn upon discharge, please provide pt with 2 week supply along with narcan prn  - Pt is being arranged for outpatient f/u with us in 2 weeks   - bowel regimen: miralax BID senna QHS, agree with MOM x1 by primary team today

## 2025-06-19 NOTE — PROGRESS NOTE ADULT - PROBLEM SELECTOR PLAN 3
Palliative care consult for pain control, IV Dilaudid PRN   Bowel regimen   Allopurinol QD  6/12 rheumatology tapped L-ankle joint: synovial fluid cultures and testing sent f/u.  6/13: Rheum f/u appreciated. Start 40mg IV solumedrol daily for now. Rheum will reassess for dose adjustment on Monday as needed.  6/17: Dilauded de-escalated from IV to 4mg PO q4 due to altered mental status. Milk of Magnesia for bowel regimen  6/18:  Per Rheum, starting 6/19, can quicken Solumedrol taper (IV while inpt, PO if discharged) as noted: 20mg x1 day, 10mg x 1 day, then STOP; If ankle pain returns, can resume baseline 10mg daily

## 2025-06-19 NOTE — DISCHARGE NOTE NURSING/CASE MANAGEMENT/SOCIAL WORK - PATIENT PORTAL LINK FT
You can access the FollowMyHealth Patient Portal offered by [facility] by registering at the following website: [email or web address]. By joining [employer]’s FollowMyHealth portal, you will also be able to view your health information using other applications (apps) compatible with our system.

## 2025-06-19 NOTE — PROGRESS NOTE ADULT - PROBLEM SELECTOR PLAN 2
Not neutropenic, afebrile. HiDAC can cause fevers  Valtrex 500mg BID. Levaquin  6/12 BCX/UCX/CXR negative. Full RVP negative  6/13 Febrile 102F, Started Cefepime 2gq8. CXR neg  6/16 deescalate from cefepime to levaquin, complete a 7 day course thru 6/19.  6/17: Febrile 100.4F. Pan-cultures sent, CXR  6/18: Persistently febrile overnight. Not neutropenic, afebrile. HiDAC can cause fevers  Valtrex 500mg BID. Levaquin  6/12 BCX/UCX/CXR negative. Full RVP negative  6/13 Febrile 102F, Started Cefepime 2gq8. CXR neg  6/16 deescalate from cefepime to levaquin, complete a 7 day course thru 6/19.  6/17: Febrile 100.4F. Pan-cultures negative, CXR negative.

## 2025-06-19 NOTE — PROGRESS NOTE ADULT - NS ATTEST RISK PROBLEM GEN_ALL_CORE FT
Patient has AML and is being treated with inpatient chemotherapy, which carries a risk for infection, bleeding, and other life-threatening complications.

## 2025-06-19 NOTE — PROGRESS NOTE ADULT - SUBJECTIVE AND OBJECTIVE BOX
Diagnosis: ASXL1-mutated and LKE77-pdjlvfi AML    Protocol/Chemo Regimen: Cycle 1 HiDAC 2gm/m2    Day: 5    Pt endorsed: Improved weakness and mental status. +Fatigue +Chills     Review of Systems: Patient denied nausea, vomiting,  chest pain, cough, dyspnea, abdominal pain, constipation,  rectal pain,  rash,, headache, bleeding    Pain scale:     Diet: regular Ensure plus HP BID                      Allergies: No Known Allergies    ANTIMICROBIALS  levoFLOXacin  Tablet 500 milliGRAM(s) Oral every 24 hours  valACYclovir 500 milliGRAM(s) Oral two times a day      STANDING MEDICATIONS  allopurinol 300 milliGRAM(s) Oral daily  atorvastatin 80 milliGRAM(s) Oral at bedtime  Biotene Dry Mouth Oral Rinse 15 milliLiter(s) Swish and Spit three times a day  diltiazem    milliGRAM(s) Oral daily  FIRST- Mouthwash  BLM 15 milliLiter(s) Swish and Spit two times a day  pantoprazole    Tablet 40 milliGRAM(s) Oral before breakfast  polyethylene glycol 3350 17 Gram(s) Oral two times a day  prednisoLONE acetate 1% Suspension 2 Drop(s) Both EYES every 6 hours  senna 2 Tablet(s) Oral at bedtime  tamsulosin 0.4 milliGRAM(s) Oral at bedtime      PRN MEDICATIONS  acetaminophen     Tablet .. 650 milliGRAM(s) Oral every 6 hours PRN  HYDROmorphone   Tablet 4 milliGRAM(s) Oral every 4 hours PRN  metoclopramide Injectable 10 milliGRAM(s) IV Push every 6 hours PRN  simethicone 80 milliGRAM(s) Chew two times a day PRN        Vital Signs Last 24 Hrs  T(C): 36.4 (19 Jun 2025 05:43), Max: 37.4 (18 Jun 2025 21:06)  T(F): 97.5 (19 Jun 2025 05:43), Max: 99.3 (18 Jun 2025 21:06)  HR: 66 (19 Jun 2025 05:43) (61 - 98)  BP: 122/65 (19 Jun 2025 05:43) (112/69 - 129/74)  BP(mean): --  RR: 17 (19 Jun 2025 05:43) (16 - 18)  SpO2: 98% (19 Jun 2025 05:43) (94% - 98%)    Parameters below as of 19 Jun 2025 05:43  Patient On (Oxygen Delivery Method): room air        PHYSICAL EXAM  General: adult in NAD  HEENT: clear oropharynx, anicteric sclera, pink conjunctiva  Neck: supple  CV: normal S1/S2 RRR  Lungs: positive air movement b/l ant lungs,clear to auscultation, no wheezes, no rales  Abdomen: soft non-tender non-distended, no hepatosplenomegaly  Ext: no clubbing cyanosis or edema  Skin: no rashes and no petechiae  Neuro: alert and oriented X 4, no focal deficits  PIV      RECENT CULTURES:  06-17 @ 13:07  Blood Blood-Peripheral  --  --  --    No growth at 24 hours  --  06-17 @ 12:41  Blood Blood-Peripheral  --  --  --    No growth at 24 hours  --  06-17 @ 11:56  Clean Catch Clean Catch (Midstream)  --  --  --    No growth  --  06-12 @ 18:00  Ankle Ankle  --  --  --    No growth at 5 days  --  06-12 @ 12:57  Clean Catch Clean Catch (Midstream)  --  --  --    <10,000 CFU/mL Normal Urogenital Madelyn  --  06-12 @ 10:00  Blood Blood-Peripheral  --  --  --    No growth at 5 days  --  06-12 @ 09:56  Blood Blood-Peripheral  --  --  --    No growth at 5 days  --        LABS:                        8.3    9.35  )-----------( 95       ( 18 Jun 2025 08:55 )             24.2         Mean Cell Volume : 94.5 fl  Mean Cell Hemoglobin : 32.4 pg  Mean Cell Hemoglobin Concentration : 34.3 g/dL  Auto Neutrophil # : 9.07 K/uL  Auto Lymphocyte # : 0.22 K/uL  Auto Monocyte # : 0.01 K/uL  Auto Eosinophil # : 0.00 K/uL  Auto Basophil # : 0.01 K/uL  Auto Neutrophil % : 97.0 %  Auto Lymphocyte % : 2.4 %  Auto Monocyte % : 0.1 %  Auto Eosinophil % : 0.0 %  Auto Basophil % : 0.1 %      06-18    128[L]  |  93[L]  |  22  ----------------------------<  123[H]  4.0   |  19[L]  |  0.96    Ca    8.6      18 Jun 2025 08:55  Phos  3.5     06-18  Mg     2.1     06-18    TPro  5.7[L]  /  Alb  3.1[L]  /  TBili  1.0  /  DBili  x   /  AST  35  /  ALT  66[H]  /  AlkPhos  110  06-18      Mg 2.1  Phos 3.5            Uric Acid --        RADIOLOGY & ADDITIONAL STUDIES:         Diagnosis: ASXL1-mutated and QSF43-djnpspr AML    Protocol/Chemo Regimen: Cycle 1 HiDAC 2gm/m2    Day: 5    Pt endorsed: overall feeling better    Review of Systems: Patient denied nausea, vomiting,  chest pain, cough, dyspnea, abdominal pain, constipation,  rectal pain,  rash,, headache, bleeding    Pain scale:     Diet: regular Ensure plus HP BID                      Allergies: No Known Allergies    ANTIMICROBIALS  levoFLOXacin  Tablet 500 milliGRAM(s) Oral every 24 hours  valACYclovir 500 milliGRAM(s) Oral two times a day      STANDING MEDICATIONS  allopurinol 300 milliGRAM(s) Oral daily  atorvastatin 80 milliGRAM(s) Oral at bedtime  Biotene Dry Mouth Oral Rinse 15 milliLiter(s) Swish and Spit three times a day  diltiazem    milliGRAM(s) Oral daily  FIRST- Mouthwash  BLM 15 milliLiter(s) Swish and Spit two times a day  pantoprazole    Tablet 40 milliGRAM(s) Oral before breakfast  polyethylene glycol 3350 17 Gram(s) Oral two times a day  prednisoLONE acetate 1% Suspension 2 Drop(s) Both EYES every 6 hours  senna 2 Tablet(s) Oral at bedtime  tamsulosin 0.4 milliGRAM(s) Oral at bedtime      PRN MEDICATIONS  acetaminophen     Tablet .. 650 milliGRAM(s) Oral every 6 hours PRN  HYDROmorphone   Tablet 4 milliGRAM(s) Oral every 4 hours PRN  metoclopramide Injectable 10 milliGRAM(s) IV Push every 6 hours PRN  simethicone 80 milliGRAM(s) Chew two times a day PRN        Vital Signs Last 24 Hrs  T(C): 36.4 (19 Jun 2025 05:43), Max: 37.4 (18 Jun 2025 21:06)  T(F): 97.5 (19 Jun 2025 05:43), Max: 99.3 (18 Jun 2025 21:06)  HR: 66 (19 Jun 2025 05:43) (61 - 98)  BP: 122/65 (19 Jun 2025 05:43) (112/69 - 129/74)  BP(mean): --  RR: 17 (19 Jun 2025 05:43) (16 - 18)  SpO2: 98% (19 Jun 2025 05:43) (94% - 98%)    Parameters below as of 19 Jun 2025 05:43  Patient On (Oxygen Delivery Method): room air        PHYSICAL EXAM  General: adult in NAD  HEENT: clear oropharynx, anicteric sclera, pink conjunctiva  Neck: supple  CV: normal S1/S2 RRR  Lungs: positive air movement b/l ant lungs,clear to auscultation, no wheezes, no rales  Abdomen: soft non-tender non-distended, no hepatosplenomegaly  Ext: no clubbing cyanosis or edema  Skin: no rashes and no petechiae  Neuro: alert and oriented X 4, no focal deficits  PIV      RECENT CULTURES:  06-17 @ 13:07  Blood Blood-Peripheral  --  --  --    No growth at 24 hours  --  06-17 @ 12:41  Blood Blood-Peripheral  --  --  --    No growth at 24 hours  --  06-17 @ 11:56  Clean Catch Clean Catch (Midstream)  --  --  --    No growth  --  06-12 @ 18:00  Ankle Ankle  --  --  --    No growth at 5 days  --  06-12 @ 12:57  Clean Catch Clean Catch (Midstream)  --  --  --    <10,000 CFU/mL Normal Urogenital Madelyn  --  06-12 @ 10:00  Blood Blood-Peripheral  --  --  --    No growth at 5 days  --  06-12 @ 09:56  Blood Blood-Peripheral  --  --  --    No growth at 5 days  --      LABS:                          8.5    9.81  )-----------( 71       ( 19 Jun 2025 09:38 )             25.2         Mean Cell Volume : 96.6 fl  Mean Cell Hemoglobin : 32.6 pg  Mean Cell Hemoglobin Concentration : 33.7 g/dL  Auto Neutrophil # : 9.56 K/uL  Auto Lymphocyte # : 0.17 K/uL  Auto Monocyte # : 0.00 K/uL  Auto Eosinophil # : 0.01 K/uL  Auto Basophil # : 0.01 K/uL  Auto Neutrophil % : 97.5 %  Auto Lymphocyte % : 1.7 %  Auto Monocyte % : 0.0 %  Auto Eosinophil % : 0.1 %  Auto Basophil % : 0.1 %      06-19    133[L]  |  98  |  23  ----------------------------<  146[H]  3.6   |  19[L]  |  1.03    Ca    8.8      19 Jun 2025 09:51  Phos  3.3     06-19  Mg     2.3     06-19    TPro  5.6[L]  /  Alb  3.3  /  TBili  0.7  /  DBili  x   /  AST  29  /  ALT  60[H]  /  AlkPhos  101  06-19        PT/INR - ( 19 Jun 2025 09:45 )   PT: 13.2 sec;   INR: 1.15 ratio         PTT - ( 19 Jun 2025 09:45 )  PTT:24.9 sec      Uric Acid --            RADIOLOGY & ADDITIONAL STUDIES:         Diagnosis: ASXL1-mutated and HZF27-iourysb AML    Protocol/Chemo Regimen: Cycle 1 HiDAC 2gm/m2    Day: 5    Pt endorsed: overall feeling better    Review of Systems: Patient denied nausea, vomiting,  chest pain, cough, dyspnea, abdominal pain, constipation,  rectal pain,  rash,, headache, bleeding    Pain scale:     Diet: regular Ensure plus HP BID                      Allergies: No Known Allergies    ANTIMICROBIALS  levoFLOXacin  Tablet 500 milliGRAM(s) Oral every 24 hours  valACYclovir 500 milliGRAM(s) Oral two times a day      STANDING MEDICATIONS  allopurinol 300 milliGRAM(s) Oral daily  atorvastatin 80 milliGRAM(s) Oral at bedtime  Biotene Dry Mouth Oral Rinse 15 milliLiter(s) Swish and Spit three times a day  diltiazem    milliGRAM(s) Oral daily  FIRST- Mouthwash  BLM 15 milliLiter(s) Swish and Spit two times a day  pantoprazole    Tablet 40 milliGRAM(s) Oral before breakfast  polyethylene glycol 3350 17 Gram(s) Oral two times a day  prednisoLONE acetate 1% Suspension 2 Drop(s) Both EYES every 6 hours  senna 2 Tablet(s) Oral at bedtime  tamsulosin 0.4 milliGRAM(s) Oral at bedtime      PRN MEDICATIONS  acetaminophen     Tablet .. 650 milliGRAM(s) Oral every 6 hours PRN  HYDROmorphone   Tablet 4 milliGRAM(s) Oral every 4 hours PRN  metoclopramide Injectable 10 milliGRAM(s) IV Push every 6 hours PRN  simethicone 80 milliGRAM(s) Chew two times a day PRN        Vital Signs Last 24 Hrs  T(C): 36.4 (19 Jun 2025 05:43), Max: 37.4 (18 Jun 2025 21:06)  T(F): 97.5 (19 Jun 2025 05:43), Max: 99.3 (18 Jun 2025 21:06)  HR: 66 (19 Jun 2025 05:43) (61 - 98)  BP: 122/65 (19 Jun 2025 05:43) (112/69 - 129/74)  BP(mean): --  RR: 17 (19 Jun 2025 05:43) (16 - 18)  SpO2: 98% (19 Jun 2025 05:43) (94% - 98%)    Parameters below as of 19 Jun 2025 05:43  Patient On (Oxygen Delivery Method): room air        PHYSICAL EXAM  General: adult in NAD  HEENT: clear oropharynx, anicteric sclera, pink conjunctiva  Neck: supple  CV: normal S1/S2 RRR  Lungs: positive air movement b/l ant lungs,clear to auscultation, no wheezes, no rales  Abdomen: soft non-tender non-distended, no hepatosplenomegaly  Ext: no clubbing cyanosis or edema  Skin: no rashes and no petechiae  Neuro: alert and oriented X 4, no focal deficits  PIV      RECENT CULTURES:  06-17 @ 13:07  Blood Blood-Peripheral  --  --  --    No growth at 24 hours  --  06-17 @ 12:41  Blood Blood-Peripheral  --  --  --    No growth at 24 hours  --  06-17 @ 11:56  Clean Catch Clean Catch (Midstream)  --  --  --    No growth  --  06-12 @ 18:00  Ankle Ankle  --  --  --    No growth at 5 days  --  06-12 @ 12:57  Clean Catch Clean Catch (Midstream)  --  --  --    <10,000 CFU/mL Normal Urogenital Madelyn  --  06-12 @ 10:00  Blood Blood-Peripheral  --  --  --    No growth at 5 days  --  06-12 @ 09:56  Blood Blood-Peripheral  --  --  --    No growth at 5 days  --      LABS:                          8.5    9.81  )-----------( 71       ( 19 Jun 2025 09:38 )             25.2         Mean Cell Volume : 96.6 fl  Mean Cell Hemoglobin : 32.6 pg  Mean Cell Hemoglobin Concentration : 33.7 g/dL  Auto Neutrophil # : 9.56 K/uL  Auto Lymphocyte # : 0.17 K/uL  Auto Monocyte # : 0.00 K/uL  Auto Eosinophil # : 0.01 K/uL  Auto Basophil # : 0.01 K/uL  Auto Neutrophil % : 97.5 %  Auto Lymphocyte % : 1.7 %  Auto Monocyte % : 0.0 %  Auto Eosinophil % : 0.1 %  Auto Basophil % : 0.1 %      06-19    133[L]  |  98  |  23  ----------------------------<  146[H]  3.6   |  19[L]  |  1.03    Ca    8.8      19 Jun 2025 09:51  Phos  3.3     06-19  Mg     2.3     06-19    TPro  5.6[L]  /  Alb  3.3  /  TBili  0.7  /  DBili  x   /  AST  29  /  ALT  60[H]  /  AlkPhos  101  06-19        PT/INR - ( 19 Jun 2025 09:45 )   PT: 13.2 sec;   INR: 1.15 ratio         PTT - ( 19 Jun 2025 09:45 )  PTT:24.9 sec      Uric Acid --

## 2025-06-19 NOTE — PROGRESS NOTE ADULT - ASSESSMENT
69M with BRCA2 carrier ([id number] 9610C>T), HTN, HLD, Gout, GERD, BPH, spinal stenosis (s/p multiple procedures 2018, 2020 laminectomies / fusions), hip surgeries (Oct 2024, Jan 2025), residual hand weakness related to surgeries, umbilical hernia repair (2014/15) and ASXL1-mutated and NWM66-ayzyfpd AML, s/p induction chemotherapy with day 15 BM bx and aspirate performed on 5/16/25 showed no evidence of persistent leukemia with low 0.34% myeloblasts on flow, however specimen was subcortical, suboptimal. Patient initially due for cycle 1 hidac consolidation however presenting with fevers x3 days, and acute on chronic gout attack causing inability to walk secondary to pain. Geriatrics and Palliative Medicine Team is consulted to assist with pain control.

## 2025-06-19 NOTE — PROGRESS NOTE ADULT - PROBLEM SELECTOR PLAN 4
- Pt to be discharged today. Please send pt home with 2 weeks supply of PO dilaudid 4 mg q4 hours prn. Pt is being arranged for outpatient f/u appt with us in 2 weeks.    An MD Jas  GAP Team Consults  Please call if we can be of assistance, [phone]

## 2025-06-19 NOTE — PROGRESS NOTE ADULT - PROVIDER SPECIALTY LIST ADULT
Rheumatology
Rheumatology
Heme/Onc
Rheumatology
Heme/Onc
Palliative Care
Heme/Onc
Palliative Care
Heme/Onc
Heme/Onc

## 2025-06-20 ENCOUNTER — TRANSCRIPTION ENCOUNTER (OUTPATIENT)
Age: 70
End: 2025-06-20

## 2025-06-20 ENCOUNTER — RESULT REVIEW (OUTPATIENT)
Age: 70
End: 2025-06-20

## 2025-06-20 ENCOUNTER — APPOINTMENT (OUTPATIENT)
Dept: HEMATOLOGY ONCOLOGY | Facility: CLINIC | Age: 70
End: 2025-06-20

## 2025-06-20 ENCOUNTER — APPOINTMENT (OUTPATIENT)
Dept: HEMATOLOGY ONCOLOGY | Facility: CLINIC | Age: 70
End: 2025-06-20
Payer: MEDICARE

## 2025-06-20 ENCOUNTER — APPOINTMENT (OUTPATIENT)
Dept: INFUSION THERAPY | Facility: HOSPITAL | Age: 70
End: 2025-06-20

## 2025-06-20 ENCOUNTER — NON-APPOINTMENT (OUTPATIENT)
Age: 70
End: 2025-06-20

## 2025-06-20 LAB
ALBUMIN SERPL ELPH-MCNC: 3.7 G/DL — SIGNIFICANT CHANGE UP (ref 3.3–5)
ALP SERPL-CCNC: 117 U/L — SIGNIFICANT CHANGE UP (ref 40–120)
ALT FLD-CCNC: 106 U/L — HIGH (ref 10–45)
ANION GAP SERPL CALC-SCNC: 14 MMOL/L — SIGNIFICANT CHANGE UP (ref 5–17)
ANISOCYTOSIS BLD QL: SLIGHT — SIGNIFICANT CHANGE UP
AST SERPL-CCNC: 65 U/L — HIGH (ref 10–40)
BASOPHILS # BLD AUTO: 0.01 K/UL — SIGNIFICANT CHANGE UP (ref 0–0.2)
BASOPHILS NFR BLD AUTO: 0.1 % — SIGNIFICANT CHANGE UP (ref 0–2)
BILIRUB SERPL-MCNC: 0.6 MG/DL — SIGNIFICANT CHANGE UP (ref 0.2–1.2)
BUN SERPL-MCNC: 24 MG/DL — HIGH (ref 7–23)
CALCIUM SERPL-MCNC: 9.2 MG/DL — SIGNIFICANT CHANGE UP (ref 8.4–10.5)
CHLORIDE SERPL-SCNC: 104 MMOL/L — SIGNIFICANT CHANGE UP (ref 96–108)
CO2 SERPL-SCNC: 21 MMOL/L — LOW (ref 22–31)
CREAT SERPL-MCNC: 0.91 MG/DL — SIGNIFICANT CHANGE UP (ref 0.5–1.3)
DACRYOCYTES BLD QL SMEAR: SLIGHT — SIGNIFICANT CHANGE UP
EGFR: 92 ML/MIN/1.73M2 — SIGNIFICANT CHANGE UP
EGFR: 92 ML/MIN/1.73M2 — SIGNIFICANT CHANGE UP
ELLIPTOCYTES BLD QL SMEAR: SLIGHT — SIGNIFICANT CHANGE UP
EOSINOPHIL # BLD AUTO: 0 K/UL — SIGNIFICANT CHANGE UP (ref 0–0.5)
EOSINOPHIL NFR BLD AUTO: 0 % — SIGNIFICANT CHANGE UP (ref 0–6)
GLUCOSE SERPL-MCNC: 100 MG/DL — HIGH (ref 70–99)
HCT VFR BLD CALC: 28.4 % — LOW (ref 39–50)
HGB BLD-MCNC: 9.5 G/DL — LOW (ref 13–17)
IMM GRANULOCYTES NFR BLD AUTO: 0.7 % — SIGNIFICANT CHANGE UP (ref 0–0.9)
LYMPHOCYTES # BLD AUTO: 0.69 K/UL — LOW (ref 1–3.3)
LYMPHOCYTES # BLD AUTO: 6.2 % — LOW (ref 13–44)
MCHC RBC-ENTMCNC: 32.5 PG — SIGNIFICANT CHANGE UP (ref 27–34)
MCHC RBC-ENTMCNC: 33.5 G/DL — SIGNIFICANT CHANGE UP (ref 32–36)
MCV RBC AUTO: 97.3 FL — SIGNIFICANT CHANGE UP (ref 80–100)
MONOCYTES # BLD AUTO: 0.01 K/UL — SIGNIFICANT CHANGE UP (ref 0–0.9)
MONOCYTES NFR BLD AUTO: 0.1 % — LOW (ref 2–14)
NEUTROPHILS # BLD AUTO: 10.38 K/UL — HIGH (ref 1.8–7.4)
NEUTROPHILS NFR BLD AUTO: 92.9 % — HIGH (ref 43–77)
NRBC BLD AUTO-RTO: 0 /100 WBCS — SIGNIFICANT CHANGE UP (ref 0–0)
PLAT MORPH BLD: NORMAL — SIGNIFICANT CHANGE UP
PLATELET # BLD AUTO: 56 K/UL — LOW (ref 150–400)
POIKILOCYTOSIS BLD QL AUTO: SLIGHT — SIGNIFICANT CHANGE UP
POTASSIUM SERPL-MCNC: 4.4 MMOL/L — SIGNIFICANT CHANGE UP (ref 3.5–5.3)
POTASSIUM SERPL-SCNC: 4.4 MMOL/L — SIGNIFICANT CHANGE UP (ref 3.5–5.3)
PROT SERPL-MCNC: 6.3 G/DL — SIGNIFICANT CHANGE UP (ref 6–8.3)
RBC # BLD: 2.92 M/UL — LOW (ref 4.2–5.8)
RBC # FLD: 19.9 % — HIGH (ref 10.3–14.5)
RBC BLD AUTO: ABNORMAL
SCHISTOCYTES BLD QL AUTO: SLIGHT — SIGNIFICANT CHANGE UP
SODIUM SERPL-SCNC: 140 MMOL/L — SIGNIFICANT CHANGE UP (ref 135–145)
WBC # BLD: 11.17 K/UL — HIGH (ref 3.8–10.5)
WBC # FLD AUTO: 11.17 K/UL — HIGH (ref 3.8–10.5)

## 2025-06-20 PROCEDURE — 99204 OFFICE O/P NEW MOD 45 MIN: CPT

## 2025-06-20 RX ORDER — DIPHENHYDRAMINE HYDROCHLORIDE AND LIDOCAINE HYDROCHLORIDE AND ALUMINUM HYDROXIDE AND MAGNESIUM HYDRO
KIT
Qty: 1 | Refills: 3 | Status: ACTIVE | COMMUNITY
Start: 2025-06-20 | End: 1900-01-01

## 2025-06-20 RX ORDER — ONDANSETRON 8 MG/1
8 TABLET ORAL
Qty: 90 | Refills: 2 | Status: ACTIVE | COMMUNITY
Start: 2025-06-20 | End: 1900-01-01

## 2025-06-22 LAB
ANISOCYTOSIS BLD QL: SLIGHT — SIGNIFICANT CHANGE UP
BASOPHILS # BLD AUTO: 0.02 K/UL — SIGNIFICANT CHANGE UP (ref 0–0.2)
BASOPHILS NFR BLD AUTO: 0.7 % — SIGNIFICANT CHANGE UP (ref 0–2)
CULTURE RESULTS: SIGNIFICANT CHANGE UP
CULTURE RESULTS: SIGNIFICANT CHANGE UP
DACRYOCYTES BLD QL SMEAR: SLIGHT — SIGNIFICANT CHANGE UP
ELLIPTOCYTES BLD QL SMEAR: SLIGHT — SIGNIFICANT CHANGE UP
EOSINOPHIL # BLD AUTO: 0.02 K/UL — SIGNIFICANT CHANGE UP (ref 0–0.5)
EOSINOPHIL NFR BLD AUTO: 0.7 % — SIGNIFICANT CHANGE UP (ref 0–6)
HCT VFR BLD CALC: 24.4 % — LOW (ref 39–50)
HGB BLD-MCNC: 8 G/DL — LOW (ref 13–17)
HYPOCHROMIA BLD QL: SLIGHT — SIGNIFICANT CHANGE UP
IMM GRANULOCYTES NFR BLD AUTO: 1.8 % — HIGH (ref 0–0.9)
LYMPHOCYTES # BLD AUTO: 0.6 K/UL — LOW (ref 1–3.3)
LYMPHOCYTES # BLD AUTO: 21.7 % — SIGNIFICANT CHANGE UP (ref 13–44)
MCHC RBC-ENTMCNC: 32.5 PG — SIGNIFICANT CHANGE UP (ref 27–34)
MCHC RBC-ENTMCNC: 32.8 G/DL — SIGNIFICANT CHANGE UP (ref 32–36)
MCV RBC AUTO: 99.2 FL — SIGNIFICANT CHANGE UP (ref 80–100)
MONOCYTES # BLD AUTO: 0.01 K/UL — SIGNIFICANT CHANGE UP (ref 0–0.9)
MONOCYTES NFR BLD AUTO: 0.4 % — LOW (ref 2–14)
NEUTROPHILS # BLD AUTO: 2.06 K/UL — SIGNIFICANT CHANGE UP (ref 1.8–7.4)
NEUTROPHILS NFR BLD AUTO: 74.7 % — SIGNIFICANT CHANGE UP (ref 43–77)
NRBC BLD AUTO-RTO: 0 /100 WBCS — SIGNIFICANT CHANGE UP (ref 0–0)
PLAT MORPH BLD: NORMAL — SIGNIFICANT CHANGE UP
PLATELET # BLD AUTO: 26 K/UL — LOW (ref 150–400)
POIKILOCYTOSIS BLD QL AUTO: SLIGHT — SIGNIFICANT CHANGE UP
RBC # BLD: 2.46 M/UL — LOW (ref 4.2–5.8)
RBC # FLD: 19.9 % — HIGH (ref 10.3–14.5)
RBC BLD AUTO: ABNORMAL
SCHISTOCYTES BLD QL AUTO: SLIGHT — SIGNIFICANT CHANGE UP
SPECIMEN SOURCE: SIGNIFICANT CHANGE UP
SPECIMEN SOURCE: SIGNIFICANT CHANGE UP
WBC # BLD: 2.76 K/UL — LOW (ref 3.8–10.5)
WBC # FLD AUTO: 2.76 K/UL — LOW (ref 3.8–10.5)

## 2025-06-24 ENCOUNTER — RESULT REVIEW (OUTPATIENT)
Age: 70
End: 2025-06-24

## 2025-06-24 ENCOUNTER — APPOINTMENT (OUTPATIENT)
Dept: INFUSION THERAPY | Facility: HOSPITAL | Age: 70
End: 2025-06-24

## 2025-06-24 ENCOUNTER — APPOINTMENT (OUTPATIENT)
Dept: HEMATOLOGY ONCOLOGY | Facility: CLINIC | Age: 70
End: 2025-06-24
Payer: MEDICARE

## 2025-06-24 ENCOUNTER — NON-APPOINTMENT (OUTPATIENT)
Age: 70
End: 2025-06-24

## 2025-06-24 LAB
ALBUMIN SERPL ELPH-MCNC: 4.2 G/DL — SIGNIFICANT CHANGE UP (ref 3.3–5)
ALP SERPL-CCNC: 120 U/L — SIGNIFICANT CHANGE UP (ref 40–120)
ALT FLD-CCNC: 57 U/L — HIGH (ref 10–45)
ANION GAP SERPL CALC-SCNC: 14 MMOL/L — SIGNIFICANT CHANGE UP (ref 5–17)
AST SERPL-CCNC: 23 U/L — SIGNIFICANT CHANGE UP (ref 10–40)
BILIRUB SERPL-MCNC: 0.5 MG/DL — SIGNIFICANT CHANGE UP (ref 0.2–1.2)
BUN SERPL-MCNC: 25 MG/DL — HIGH (ref 7–23)
CALCIUM SERPL-MCNC: 9.5 MG/DL — SIGNIFICANT CHANGE UP (ref 8.4–10.5)
CHLORIDE SERPL-SCNC: 105 MMOL/L — SIGNIFICANT CHANGE UP (ref 96–108)
CO2 SERPL-SCNC: 23 MMOL/L — SIGNIFICANT CHANGE UP (ref 22–31)
CREAT SERPL-MCNC: 0.92 MG/DL — SIGNIFICANT CHANGE UP (ref 0.5–1.3)
EGFR: 91 ML/MIN/1.73M2 — SIGNIFICANT CHANGE UP
EGFR: 91 ML/MIN/1.73M2 — SIGNIFICANT CHANGE UP
GLUCOSE SERPL-MCNC: 114 MG/DL — HIGH (ref 70–99)
HCT VFR BLD CALC: 24.9 % — LOW (ref 39–50)
HGB BLD-MCNC: 8.2 G/DL — LOW (ref 13–17)
MCHC RBC-ENTMCNC: 32 PG — SIGNIFICANT CHANGE UP (ref 27–34)
MCHC RBC-ENTMCNC: 32.9 G/DL — SIGNIFICANT CHANGE UP (ref 32–36)
MCV RBC AUTO: 97.3 FL — SIGNIFICANT CHANGE UP (ref 80–100)
NRBC BLD AUTO-RTO: 0 /100 WBCS — SIGNIFICANT CHANGE UP (ref 0–0)
PLATELET # BLD AUTO: 33 K/UL — LOW (ref 150–400)
POTASSIUM SERPL-MCNC: 5.1 MMOL/L — SIGNIFICANT CHANGE UP (ref 3.5–5.3)
POTASSIUM SERPL-SCNC: 5.1 MMOL/L — SIGNIFICANT CHANGE UP (ref 3.5–5.3)
PROT SERPL-MCNC: 6.6 G/DL — SIGNIFICANT CHANGE UP (ref 6–8.3)
RBC # BLD: 2.56 M/UL — LOW (ref 4.2–5.8)
RBC # FLD: 19.2 % — HIGH (ref 10.3–14.5)
SODIUM SERPL-SCNC: 141 MMOL/L — SIGNIFICANT CHANGE UP (ref 135–145)
WBC # BLD: 0.28 K/UL — CRITICAL LOW (ref 3.8–10.5)
WBC # FLD AUTO: 0.28 K/UL — CRITICAL LOW (ref 3.8–10.5)

## 2025-06-24 PROCEDURE — 99213 OFFICE O/P EST LOW 20 MIN: CPT

## 2025-06-24 RX ORDER — AMOXICILLIN 500 MG/1
500 CAPSULE ORAL TWICE DAILY
Qty: 60 | Refills: 3 | Status: ACTIVE | COMMUNITY
Start: 2025-06-24 | End: 1900-01-01

## 2025-06-24 RX ORDER — LEVOFLOXACIN 500 MG/1
500 TABLET, FILM COATED ORAL DAILY
Qty: 30 | Refills: 3 | Status: ACTIVE | COMMUNITY
Start: 2025-06-24 | End: 1900-01-01

## 2025-06-24 RX ORDER — POSACONAZOLE 100 MG/1
100 TABLET, DELAYED RELEASE ORAL
Qty: 90 | Refills: 3 | Status: ACTIVE | COMMUNITY
Start: 2025-06-24 | End: 1900-01-01

## 2025-06-25 ENCOUNTER — OUTPATIENT (OUTPATIENT)
Dept: OUTPATIENT SERVICES | Facility: HOSPITAL | Age: 70
LOS: 1 days | Discharge: HOME | End: 2025-06-25

## 2025-06-25 DIAGNOSIS — C92.90 MYELOID LEUKEMIA, UNSPECIFIED, NOT HAVING ACHIEVED REMISSION: ICD-10-CM

## 2025-06-25 DIAGNOSIS — Z98.89 OTHER SPECIFIED POSTPROCEDURAL STATES: Chronic | ICD-10-CM

## 2025-06-26 ENCOUNTER — RESULT REVIEW (OUTPATIENT)
Age: 70
End: 2025-06-26

## 2025-06-26 ENCOUNTER — APPOINTMENT (OUTPATIENT)
Dept: INFUSION THERAPY | Facility: HOSPITAL | Age: 70
End: 2025-06-26

## 2025-06-26 ENCOUNTER — APPOINTMENT (OUTPATIENT)
Dept: GERIATRICS | Facility: CLINIC | Age: 70
End: 2025-06-26

## 2025-06-26 ENCOUNTER — NON-APPOINTMENT (OUTPATIENT)
Age: 70
End: 2025-06-26

## 2025-06-26 ENCOUNTER — APPOINTMENT (OUTPATIENT)
Dept: HEMATOLOGY ONCOLOGY | Facility: CLINIC | Age: 70
End: 2025-06-26
Payer: MEDICARE

## 2025-06-26 ENCOUNTER — INPATIENT (INPATIENT)
Facility: HOSPITAL | Age: 70
LOS: 9 days | Discharge: HOME | DRG: 55 | End: 2025-07-06
Attending: INTERNAL MEDICINE | Admitting: INTERNAL MEDICINE
Payer: COMMERCIAL

## 2025-06-26 VITALS
SYSTOLIC BLOOD PRESSURE: 125 MMHG | RESPIRATION RATE: 20 BRPM | DIASTOLIC BLOOD PRESSURE: 86 MMHG | OXYGEN SATURATION: 99 % | TEMPERATURE: 103 F | HEART RATE: 107 BPM

## 2025-06-26 DIAGNOSIS — C70.1 MALIGNANT NEOPLASM OF SPINAL MENINGES: ICD-10-CM

## 2025-06-26 DIAGNOSIS — M43.20 FUSION OF SPINE, SITE UNSPECIFIED: Chronic | ICD-10-CM

## 2025-06-26 DIAGNOSIS — B99.9 UNSPECIFIED INFECTIOUS DISEASE: ICD-10-CM

## 2025-06-26 DIAGNOSIS — Z29.9 ENCOUNTER FOR PROPHYLACTIC MEASURES, UNSPECIFIED: ICD-10-CM

## 2025-06-26 DIAGNOSIS — C92.00 ACUTE MYELOBLASTIC LEUKEMIA, NOT HAVING ACHIEVED REMISSION: ICD-10-CM

## 2025-06-26 DIAGNOSIS — M10.9 GOUT, UNSPECIFIED: ICD-10-CM

## 2025-06-26 DIAGNOSIS — Z98.89 OTHER SPECIFIED POSTPROCEDURAL STATES: Chronic | ICD-10-CM

## 2025-06-26 DIAGNOSIS — R52 PAIN, UNSPECIFIED: ICD-10-CM

## 2025-06-26 DIAGNOSIS — I10 ESSENTIAL (PRIMARY) HYPERTENSION: ICD-10-CM

## 2025-06-26 LAB
ALBUMIN SERPL ELPH-MCNC: 3.9 G/DL — SIGNIFICANT CHANGE UP (ref 3.3–5)
ALP SERPL-CCNC: 111 U/L — SIGNIFICANT CHANGE UP (ref 40–120)
ALT FLD-CCNC: 42 U/L — SIGNIFICANT CHANGE UP (ref 10–45)
ANION GAP SERPL CALC-SCNC: 15 MMOL/L — SIGNIFICANT CHANGE UP (ref 5–17)
APPEARANCE UR: ABNORMAL
APPEARANCE UR: CLEAR — SIGNIFICANT CHANGE UP
AST SERPL-CCNC: 17 U/L — SIGNIFICANT CHANGE UP (ref 10–40)
BACTERIA # UR AUTO: NEGATIVE /HPF — SIGNIFICANT CHANGE UP
BILIRUB SERPL-MCNC: 0.6 MG/DL — SIGNIFICANT CHANGE UP (ref 0.2–1.2)
BILIRUB UR-MCNC: NEGATIVE — SIGNIFICANT CHANGE UP
BILIRUB UR-MCNC: NEGATIVE — SIGNIFICANT CHANGE UP
BLD GP AB SCN SERPL QL: NEGATIVE — SIGNIFICANT CHANGE UP
BUN SERPL-MCNC: 27 MG/DL — HIGH (ref 7–23)
CALCIUM SERPL-MCNC: 9.1 MG/DL — SIGNIFICANT CHANGE UP (ref 8.4–10.5)
CAST: 1 /LPF — SIGNIFICANT CHANGE UP (ref 0–4)
CHLORIDE SERPL-SCNC: 99 MMOL/L — SIGNIFICANT CHANGE UP (ref 96–108)
CO2 SERPL-SCNC: 22 MMOL/L — SIGNIFICANT CHANGE UP (ref 22–31)
COLOR SPEC: YELLOW — SIGNIFICANT CHANGE UP
COLOR SPEC: YELLOW — SIGNIFICANT CHANGE UP
CREAT SERPL-MCNC: 1.14 MG/DL — SIGNIFICANT CHANGE UP (ref 0.5–1.3)
CULTURE RESULTS: SIGNIFICANT CHANGE UP
DIFF PNL FLD: NEGATIVE — SIGNIFICANT CHANGE UP
DIFF PNL FLD: NEGATIVE — SIGNIFICANT CHANGE UP
EGFR: 70 ML/MIN/1.73M2 — SIGNIFICANT CHANGE UP
EGFR: 70 ML/MIN/1.73M2 — SIGNIFICANT CHANGE UP
GLUCOSE SERPL-MCNC: 105 MG/DL — HIGH (ref 70–99)
GLUCOSE UR QL: NEGATIVE MG/DL — SIGNIFICANT CHANGE UP
GLUCOSE UR QL: NEGATIVE MG/DL — SIGNIFICANT CHANGE UP
HCT VFR BLD CALC: 22.5 % — LOW (ref 39–50)
HGB BLD-MCNC: 7.6 G/DL — LOW (ref 13–17)
KETONES UR QL: ABNORMAL MG/DL
KETONES UR QL: NEGATIVE MG/DL — SIGNIFICANT CHANGE UP
LACTATE SERPL-SCNC: 1.8 MMOL/L — SIGNIFICANT CHANGE UP (ref 0.5–2)
LACTATE SERPL-SCNC: 2.6 MMOL/L — HIGH (ref 0.5–2)
LACTATE SERPL-SCNC: 2.9 MMOL/L — HIGH (ref 0.5–2)
LEUKOCYTE ESTERASE UR-ACNC: NEGATIVE — SIGNIFICANT CHANGE UP
LEUKOCYTE ESTERASE UR-ACNC: NEGATIVE — SIGNIFICANT CHANGE UP
MCHC RBC-ENTMCNC: 32.5 PG — SIGNIFICANT CHANGE UP (ref 27–34)
MCHC RBC-ENTMCNC: 33.8 G/DL — SIGNIFICANT CHANGE UP (ref 32–36)
MCV RBC AUTO: 96.2 FL — SIGNIFICANT CHANGE UP (ref 80–100)
NITRITE UR-MCNC: NEGATIVE — SIGNIFICANT CHANGE UP
NITRITE UR-MCNC: NEGATIVE — SIGNIFICANT CHANGE UP
NRBC BLD AUTO-RTO: 0 /100 WBCS — SIGNIFICANT CHANGE UP (ref 0–0)
PH UR: 6 — SIGNIFICANT CHANGE UP (ref 5–8)
PH UR: 6 — SIGNIFICANT CHANGE UP (ref 5–8)
PLATELET # BLD AUTO: 6 K/UL — CRITICAL LOW (ref 150–400)
POTASSIUM SERPL-MCNC: 3.5 MMOL/L — SIGNIFICANT CHANGE UP (ref 3.5–5.3)
POTASSIUM SERPL-SCNC: 3.5 MMOL/L — SIGNIFICANT CHANGE UP (ref 3.5–5.3)
PROT SERPL-MCNC: 6.3 G/DL — SIGNIFICANT CHANGE UP (ref 6–8.3)
PROT UR-MCNC: 30 MG/DL
PROT UR-MCNC: NEGATIVE MG/DL — SIGNIFICANT CHANGE UP
RBC # BLD: 2.34 M/UL — LOW (ref 4.2–5.8)
RBC # FLD: 18.5 % — HIGH (ref 10.3–14.5)
RBC CASTS # UR COMP ASSIST: 1 /HPF — SIGNIFICANT CHANGE UP (ref 0–4)
REVIEW: SIGNIFICANT CHANGE UP
RH IG SCN BLD-IMP: POSITIVE — SIGNIFICANT CHANGE UP
SODIUM SERPL-SCNC: 136 MMOL/L — SIGNIFICANT CHANGE UP (ref 135–145)
SP GR SPEC: 1.01 — SIGNIFICANT CHANGE UP (ref 1–1.03)
SP GR SPEC: 1.03 — SIGNIFICANT CHANGE UP (ref 1–1.03)
SPECIMEN SOURCE: SIGNIFICANT CHANGE UP
SQUAMOUS # UR AUTO: 2 /HPF — SIGNIFICANT CHANGE UP (ref 0–5)
UROBILINOGEN FLD QL: 0.2 MG/DL — SIGNIFICANT CHANGE UP (ref 0.2–1)
UROBILINOGEN FLD QL: 0.2 MG/DL — SIGNIFICANT CHANGE UP (ref 0.2–1)
WBC # BLD: 0.22 K/UL — CRITICAL LOW (ref 3.8–10.5)
WBC # FLD AUTO: 0.22 K/UL — CRITICAL LOW (ref 3.8–10.5)
WBC UR QL: 2 /HPF — SIGNIFICANT CHANGE UP (ref 0–5)

## 2025-06-26 PROCEDURE — 83605 ASSAY OF LACTIC ACID: CPT

## 2025-06-26 PROCEDURE — 71045 X-RAY EXAM CHEST 1 VIEW: CPT | Mod: 26

## 2025-06-26 PROCEDURE — 36415 COLL VENOUS BLD VENIPUNCTURE: CPT

## 2025-06-26 PROCEDURE — 86901 BLOOD TYPING SEROLOGIC RH(D): CPT

## 2025-06-26 PROCEDURE — 86900 BLOOD TYPING SEROLOGIC ABO: CPT

## 2025-06-26 PROCEDURE — 71045 X-RAY EXAM CHEST 1 VIEW: CPT

## 2025-06-26 PROCEDURE — 81001 URINALYSIS AUTO W/SCOPE: CPT

## 2025-06-26 PROCEDURE — 99214 OFFICE O/P EST MOD 30 MIN: CPT

## 2025-06-26 PROCEDURE — 86850 RBC ANTIBODY SCREEN: CPT

## 2025-06-26 RX ORDER — DILTIAZEM HYDROCHLORIDE 240 MG/1
120 TABLET, EXTENDED RELEASE ORAL DAILY
Refills: 0 | Status: DISCONTINUED | OUTPATIENT
Start: 2025-06-26 | End: 2025-07-06

## 2025-06-26 RX ORDER — VANCOMYCIN HCL IN 5 % DEXTROSE 1.5G/250ML
PLASTIC BAG, INJECTION (ML) INTRAVENOUS
Refills: 0 | Status: DISCONTINUED | OUTPATIENT
Start: 2025-06-26 | End: 2025-06-27

## 2025-06-26 RX ORDER — OXYCODONE HYDROCHLORIDE 30 MG/1
5 TABLET ORAL EVERY 4 HOURS
Refills: 0 | Status: DISCONTINUED | OUTPATIENT
Start: 2025-06-26 | End: 2025-06-26

## 2025-06-26 RX ORDER — POSACONAZOLE 100 MG/1
300 TABLET, DELAYED RELEASE ORAL EVERY 12 HOURS
Refills: 0 | Status: COMPLETED | OUTPATIENT
Start: 2025-06-26 | End: 2025-06-27

## 2025-06-26 RX ORDER — ATORVASTATIN CALCIUM 80 MG/1
80 TABLET, FILM COATED ORAL AT BEDTIME
Refills: 0 | Status: DISCONTINUED | OUTPATIENT
Start: 2025-06-26 | End: 2025-06-26

## 2025-06-26 RX ORDER — VANCOMYCIN HCL IN 5 % DEXTROSE 1.5G/250ML
1000 PLASTIC BAG, INJECTION (ML) INTRAVENOUS EVERY 8 HOURS
Refills: 0 | Status: DISCONTINUED | OUTPATIENT
Start: 2025-06-26 | End: 2025-06-27

## 2025-06-26 RX ORDER — POSACONAZOLE 100 MG/1
300 TABLET, DELAYED RELEASE ORAL EVERY 24 HOURS
Refills: 0 | Status: DISCONTINUED | OUTPATIENT
Start: 2025-06-28 | End: 2025-07-04

## 2025-06-26 RX ORDER — VANCOMYCIN HCL IN 5 % DEXTROSE 1.5G/250ML
1000 PLASTIC BAG, INJECTION (ML) INTRAVENOUS ONCE
Refills: 0 | Status: COMPLETED | OUTPATIENT
Start: 2025-06-26 | End: 2025-06-26

## 2025-06-26 RX ORDER — HYDROMORPHONE/SOD CHLOR,ISO/PF 2 MG/10 ML
4 SYRINGE (ML) INJECTION EVERY 4 HOURS
Refills: 0 | Status: DISCONTINUED | OUTPATIENT
Start: 2025-06-26 | End: 2025-06-30

## 2025-06-26 RX ORDER — HYDROMORPHONE/SOD CHLOR,ISO/PF 2 MG/10 ML
0.5 SYRINGE (ML) INJECTION ONCE
Refills: 0 | Status: DISCONTINUED | OUTPATIENT
Start: 2025-06-26 | End: 2025-06-26

## 2025-06-26 RX ORDER — TAMSULOSIN HYDROCHLORIDE 0.4 MG/1
0.4 CAPSULE ORAL AT BEDTIME
Refills: 0 | Status: DISCONTINUED | OUTPATIENT
Start: 2025-06-26 | End: 2025-07-06

## 2025-06-26 RX ORDER — CEFEPIME 2 G/20ML
2000 INJECTION, POWDER, FOR SOLUTION INTRAVENOUS EVERY 8 HOURS
Refills: 0 | Status: DISCONTINUED | OUTPATIENT
Start: 2025-06-26 | End: 2025-06-27

## 2025-06-26 RX ORDER — POSACONAZOLE 100 MG/1
TABLET, DELAYED RELEASE ORAL
Refills: 0 | Status: DISCONTINUED | OUTPATIENT
Start: 2025-06-26 | End: 2025-07-04

## 2025-06-26 RX ORDER — ACETAMINOPHEN 500 MG/5ML
650 LIQUID (ML) ORAL EVERY 6 HOURS
Refills: 0 | Status: DISCONTINUED | OUTPATIENT
Start: 2025-06-26 | End: 2025-07-06

## 2025-06-26 RX ORDER — ACETAMINOPHEN 500 MG/5ML
1000 LIQUID (ML) ORAL ONCE
Refills: 0 | Status: COMPLETED | OUTPATIENT
Start: 2025-06-26 | End: 2025-06-26

## 2025-06-26 RX ADMIN — Medication 250 MILLIGRAM(S): at 17:22

## 2025-06-26 RX ADMIN — Medication 400 MILLIGRAM(S): at 22:03

## 2025-06-26 RX ADMIN — POSACONAZOLE 300 MILLIGRAM(S): 100 TABLET, DELAYED RELEASE ORAL at 18:45

## 2025-06-26 RX ADMIN — Medication 500 MILLIGRAM(S): at 18:45

## 2025-06-26 RX ADMIN — CEFEPIME 100 MILLIGRAM(S): 2 INJECTION, POWDER, FOR SOLUTION INTRAVENOUS at 21:10

## 2025-06-26 RX ADMIN — Medication 0.5 MILLIGRAM(S): at 21:00

## 2025-06-26 RX ADMIN — Medication 100 MILLILITER(S): at 18:46

## 2025-06-26 RX ADMIN — Medication 250 MILLIGRAM(S): at 21:48

## 2025-06-26 RX ADMIN — Medication 1000 MILLIGRAM(S): at 22:49

## 2025-06-26 RX ADMIN — Medication 0.5 MILLIGRAM(S): at 20:00

## 2025-06-26 RX ADMIN — TAMSULOSIN HYDROCHLORIDE 0.4 MILLIGRAM(S): 0.4 CAPSULE ORAL at 21:10

## 2025-06-26 NOTE — PATIENT PROFILE ADULT - FALL HARM RISK - HARM RISK INTERVENTIONS

## 2025-06-26 NOTE — DISCHARGE NOTE PROVIDER - CARE PROVIDER_API CALL
[facility]  Hematology & Oncology  [address] [place] [id number]  Phone: [phone]  Fax: [phone]  Follow Up Time:

## 2025-06-26 NOTE — H&P ADULT - PROBLEM SELECTOR PLAN 1
s/p C1 condensed HIDAC (2g/m2) consolidation on 6/15/25  admit to [facility] from [facility] for neutropenic fever, hypotension  CBC w diff, CMP,   stirct I&Os daily weights mouth care admit to 7 [facility] from [facility] for neutropenic fever, hypotension  s/p C1 condensed HIDAC (2g/m2) consolidation on 6/15/25 - 6/17/25  s/p G-CSF as outpatient  CBC w diff, CMP, coags  Hgb goal >8; PLTs goal >20k or if bleeding  stirct I&Os daily weights mouth care admit to 7 [facility] from [facility] for neutropenic fever, hypotension  s/p C1 condensed HIDAC (2g/m2) consolidation on 6/15/25 - 6/17/25  s/p G-CSF Neulasta as outpatient 6/20  CBC w diff, CMP, coags  Hgb goal >8; PLTs goal >20k or if bleeding  stirct I&Os daily weights mouth care

## 2025-06-26 NOTE — H&P ADULT - ASSESSMENT
68 yo male with ASXL1, DDX41 mutated AML Day 12 s/p cycle 1 HIDAC (2g/m2) consolidation, presenting from [facility] for neutropenic fever, hypotension. [facility] 70 [facility] with ASXL1, DDX41 mutated AML in CR1,  Day 12 s/p cycle 1 condensed HIDAC (2g/m2) consolidation, presenting from [facility]'s EDC appointment with neutropenic fever, hypotension during pRBC transfusion.  IV Cefepime was started at [facility] prior to arrival.  Recent hospitalization complicated by gout flare requiring steroid taper treatment completed 6/20.

## 2025-06-26 NOTE — H&P ADULT - HISTORY OF PRESENT ILLNESS
70 yo male with ASXL1, DDX41 mutated AML Day 12 s/p cycle 1 HIDAC (2g/m2) consolidation, presenting from [facility] for neutropenic fever, hypotension. IV Cefepime was started at [facility]    Hx:  MHx significant for BRCA2 carrier ([id number] 9610C>T), HTN, HLD, Gout, GERD, BPH, spinal stenosis (s/p multiple procedures 2018, 2020 laminectomies / fusions), hip surgeries (Oct 2024, Jan 2025), residual hand weakness related to surgeries, umbilical hernia repair (2014/15) and ASXL1-mutated and IZP48-wjvooup AML. 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 Patient is s/p induction chemotherapy with daunorubicin/cytarabine with remission marrow on 5/29/25 showing Cellular bone marrow (50-60% cellularity ) with myeloid predominant trilineage hematopoiesis. Initially planned to start cycle 1 of HIDAC however presented with fever x3 days and acute on chronic gout flare causing inability to ambulate secondary to pain. Infectious workup negative. Rheumatology was consulted and performed L-ankle drain on 6/12 and solumedrol was started. Synovial fluid studies unremarkable. Patient finally able to start cycle 1 HIDAC consolidation on 6/15/25.    Hospital course complicated by recurrent fever 6/17-6/18 possibly due to cytarabine, infectious workup negative. Patient was discharged home on 6/19.    Patient to complete solumedrol taper for gout flare on 6/20 at home.   70 yo male with ASXL1, DDX41 mutated AML Day 12 s/p cycle 1 condensed HIDAC (2g/m2) consolidation, presenting from [facility] for neutropenic fever, hypotension. IV Cefepime was started at [facility]. Patient was at [facility], Early Discharge Clinic visit. receiving pRBC transfusion. About longterm through his pRBC transfusion he developed shivering. Transfusion odf pRBCs was then held  and pt was covered with warm blankets. Denied other symptoms than shivering, /63, HR 94, spO2 96% RA, T 97.5. Patient then developed back pain, however similar to chronic pain he has had in the past. Discussed with EDC team, proceeded with transfusion, shivering likely result of mounting fever that pt has been experiencing. Patient started IV  cefepime and was arranged for directl admission to [facility] after completion of pRBC transfusion. Shortly after resuming pRBC transfusion restarted, rigors resumed, pt reporting worsening pain in back and was tachypneic. Patient was satting mid 90s on RA, however NRB was applied due to tachypnea, -130s, /72 manually. Given his progression of symptoms EMS was called and pt was transported to [facility] 8th floor.      Hx:  MHx significant for BRCA2 carrier ([id number] 9610C>T), HTN, HLD, Gout, GERD, BPH, spinal stenosis (s/p multiple procedures 2018, 2020 laminectomies / fusions), hip surgeries (Oct 2024, Jan 2025), residual hand weakness related to surgeries, umbilical hernia repair (2014/15) and ASXL1-mutated and ADV19-ypkheam AML. 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 Patient is s/p induction chemotherapy with daunorubicin/cytarabine with remission marrow on 5/29/25 showing Cellular bone marrow (50-60% cellularity ) with myeloid predominant trilineage hematopoiesis. Initially planned to start cycle 1 of HIDAC however presented with fever x3 days and acute on chronic gout flare causing inability to ambulate secondary to pain. Infectious workup negative. Rheumatology was consulted and performed L-ankle drain on 6/12 and solumedrol was started. Synovial fluid studies unremarkable. Patient finally able to start cycle 1 HIDAC consolidation on 6/15/25. Hospital course complicated by recurrent fever 6/17-6/18 possibly due to cytarabine, infectious workup negative. Patient was discharged home on 6/19. Patient was to complete solumedrol taper for gout flare on 6/20 at home.

## 2025-06-26 NOTE — H&P ADULT - MUSCULOSKELETAL
details… +b/l ankle join swelling and warmth. +unable to ambulate/decreased ROM due to pain/joint swelling/joint erythema/joint warmth

## 2025-06-26 NOTE — DISCHARGE NOTE PROVIDER - CARE PROVIDERS DIRECT ADDRESSES
[email or web address]

## 2025-06-26 NOTE — DISCHARGE NOTE PROVIDER - HOSPITAL COURSE
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 IV Cefepime was started at [facility]. Patient was at [facility], Early Discharge Clinic visit. receiving pRBC transfusion. About [facility] through his pRBC transfusion he developed shivering. Transfusion odf pRBCs was then held  and pt was covered with warm blankets. Denied other symptoms than shivering, /63, HR 94, spO2 96% RA, T 97.5. Patient then developed back pain, however similar to chronic pain he has had in the past. Discussed with EDC team, proceeded with transfusion, shivering likely result of mounting fever that pt has been experiencing. Patient started IV  cefepime and was arranged for directl admission to [facility] after completion of pRBC transfusion. Shortly after resuming pRBC transfusion restarted, rigors resumed, pt reporting worsening pain in back and was tachypneic. Patient was satting mid 90s on RA, however NRB was applied due to tachypnea, -130s, /72 manually. Given his progression of symptoms EMS was called and pt was transported to [facility] 8th floor. 70 yo male with ASXL1, DDX41 mutated AML Day 12 s/p cycle 1 condensed HIDAC (2g/m2) consolidation, presenting from [facility] for neutropenic fever, hypotension. IV Cefepime was started at [facility]. Patient was at [facility], Early Discharge Clinic visit. receiving pRBC transfusion. About assisted through his pRBC transfusion he developed shivering. Transfusion odf pRBCs was then held  and pt was covered with warm blankets. Denied other symptoms than shivering, /63, HR 94, spO2 96% RA, T 97.5. Patient then developed back pain, however similar to chronic pain he has had in the past. Discussed with EDC team, proceeded with transfusion, shivering likely result of mounting fever that pt has been experiencing. Patient started IV  cefepime and was arranged for directl admission to [facility] after completion of pRBC transfusion. Shortly after resuming pRBC transfusion restarted, rigors resumed, pt reporting worsening pain in back and was tachypneic. Patient was satting mid 90s on RA, however NRB was applied due to tachypnea, -130s, /72 manually. Given his progression of symptoms EMS was called and pt was transported to [facility] 8th floor.6/26 BC (+) Pseudomonas aeruginosa:  surveillance cultures NGT. Pt completed cefepime  Stable for discharge home and follow up as an outpatient.     68 yo male with ASXL1, DDX41 mutated AML Day 12 s/p cycle 1 condensed HIDAC (2g/m2) consolidation, presenting from [facility] for neutropenic fever, hypotension. IV Cefepime was started at [facility]. Patient was at [facility], Early Discharge Clinic visit. receiving pRBC transfusion. About retirement through his pRBC transfusion he developed shivering. Transfusion odf pRBCs was then held  and pt was covered with warm blankets. Denied other symptoms than shivering, /63, HR 94, spO2 96% RA, T 97.5. Patient then developed back pain, however similar to chronic pain he has had in the past. Discussed with EDC team, proceeded with transfusion, shivering likely result of mounting fever that pt has been experiencing. Patient started IV  cefepime and was arranged for directl admission to [facility] after completion of pRBC transfusion. Shortly after resuming pRBC transfusion restarted, rigors resumed, pt reporting worsening pain in back and was tachypneic. Patient was satting mid 90s on RA, however NRB was applied due to tachypnea, -130s, /72 manually. Given his progression of symptoms EMS was called and pt was transported to [facility] 8th floor. 6/26 BC (+) Pseudomonas aeruginosa:  surveillance cultures NGT. Pt completed course of cefepime.      Stable for discharge home and follow up as an outpatient. 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 IV Cefepime was started at [facility]. Patient was at [facility], Early Discharge Clinic visit. receiving pRBC transfusion. About snf through his pRBC transfusion he developed shivering. Transfusion odf pRBCs was then held  and pt was covered with warm blankets. Denied other symptoms than shivering, /63, HR 94, spO2 96% RA, T 97.5. Patient then developed back pain, however similar to chronic pain he has had in the past. Discussed with EDC team, proceeded with transfusion, shivering likely result of mounting fever that pt has been experiencing. Patient started IV  cefepime and was arranged for directl admission to [facility] after completion of pRBC transfusion. Shortly after resuming pRBC transfusion restarted, rigors resumed, pt reporting worsening pain in back and was tachypneic. Patient was satting mid 90s on RA, however NRB was applied due to tachypnea, -130s, /72 manually. Given his progression of symptoms EMS was called and pt was transported to [facility] 8th floor. 6/26 BC (+) Pseudomonas aeruginosa:  surveillance cultures NGT. Pt completed course of cefepime.      Stable for discharge home and follow up as an outpatient. Patient platelets were optimized prior to discharge. Will follow up at [facility].     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 IV Cefepime was started at [facility] beds at the [facility]. Patient was at [facility], Early Discharge Clinic visit. receiving pRBC transfusion. About senior care through his pRBC transfusion he developed shivering. Transfusion odf pRBCs was then held  and pt was covered with warm blankets. Denied other symptoms than shivering, /63, HR 94, spO2 96% RA, T 97.5. Patient then developed back pain, however similar to chronic pain he has had in the past. Discussed with EDC team, proceeded with transfusion, shivering likely result of mounting fever that pt has been experiencing. Patient started IV  cefepime and was arranged for directl admission to [facility] after completion of pRBC transfusion. Shortly after resuming pRBC transfusion restarted, rigors resumed, pt reporting worsening pain in back and was tachypneic. Patient was satting mid 90s on RA, however NRB was applied due to tachypnea, -130s, /72 manually. Given his progression of symptoms EMS was called and pt was transported to [facility] 8th floor. 6/26 BC (+) Pseudomonas aeruginosa:  surveillance cultures NGT. Pt completed course of cefepime.      Stable for discharge home and follow up as an outpatient. Patient platelets were optimized prior to discharge. Will follow up at [facility] on 7/7.     68 yo male with ASXL1, DDX41 mutated AML Day 12 s/p cycle 1 condensed HIDAC (2g/m2) consolidation, presenting from [facility] for neutropenic fever, hypotension. IV Cefepime was started at [facility]. Patient was at [facility], Early Discharge Clinic visit. receiving pRBC transfusion. About jail through his pRBC transfusion he developed shivering. Transfusion odf pRBCs was then held  and pt was covered with warm blankets. Denied other symptoms than shivering, /63, HR 94, spO2 96% RA, T 97.5. Patient then developed back pain, however similar to chronic pain he has had in the past. Discussed with EDC team, proceeded with transfusion, shivering likely result of mounting fever that pt has been experiencing. Patient started IV  cefepime and was arranged for directl admission to [facility] after completion of pRBC transfusion. Shortly after resuming pRBC transfusion restarted, rigors resumed, pt reporting worsening pain in back and was tachypneic. Patient was satting mid 90s on RA, however NRB was applied due to tachypnea, -130s, /72 manually. Given his progression of symptoms EMS was called and pt was transported to [facility] 8th floor. 6/26 BC (+) Pseudomonas aeruginosa:  surveillance cultures NGT. Pt completed course of cefepime.      Stable for discharge home and follow up as an outpatient. Patient platelets were optimized prior to discharge. Will follow up at [facility] on 7/7.    1. sepsis on admission - due to pseudomonas bacetermia

## 2025-06-26 NOTE — DISCHARGE NOTE PROVIDER - NSDCFUADDINST_GEN_ALL_CORE_FT
To [facility] on Monday 7/7 at 8:20 to see Dr Apodaca To [facility] on Monday 7/7 at 8:00am for labwork and to see Dr Apodaca  [address] [place] [address]

## 2025-06-26 NOTE — DISCHARGE NOTE PROVIDER - NSDCFUSCHEDAPPT_GEN_ALL_CORE_FT
[facility]  [facility]  Scheduled Appointment: 07/07/2025    [facility]  [facility]  Scheduled Appointment: 07/07/2025    [place]  [facility]  [facility]  Scheduled Appointment: 07/07/2025    [facility]  [facility]  Scheduled Appointment: 07/09/2025    [facility]  [facility]  Scheduled Appointment: 07/09/2025    [facility]  [facility]  Scheduled Appointment: 07/11/2025    [facility]  [facility]  Scheduled Appointment: 07/11/2025    [facility]  [facility]  Scheduled Appointment: 07/15/2025     [facility]  [facility]  Scheduled Appointment: 07/11/2025    [facility]  [facility]  Scheduled Appointment: 07/11/2025    [facility]  [facility]  Scheduled Appointment: 07/14/2025    [facility]  [facility]  Scheduled Appointment: 07/14/2025    [facility]  [facility]  Scheduled Appointment: 07/15/2025    [facility]  [facility]  Scheduled Appointment: 07/16/2025    [facility]  [facility]  Scheduled Appointment: 07/16/2025    [facility]  [facility]  Scheduled Appointment: 07/18/2025    [facility]  [facility]  Scheduled Appointment: 07/18/2025

## 2025-06-26 NOTE — DISCHARGE NOTE PROVIDER - NSDCMRMEDTOKEN_GEN_ALL_CORE_FT
allopurinol 300 mg oral tablet: 1 tab(s) orally once a day for gout  atorvastatin 80 mg oral tablet: 1 tab(s) orally once a day  cholecalciferol oral tablet: 2000 unit(s) orally once a day  DilTIAZem (Eqv-Cardizem CD) 120 mg/24 hours oral capsule, extended release: 1 cap(s) orally once a day  omeprazole 40 mg oral delayed release capsule: 1 cap(s) orally once a day  tamsulosin 0.4 mg oral capsule: 1 cap(s) orally once a day (at bedtime)  valACYclovir 500 mg oral tablet: 1 tab(s) orally 2 times a day   allopurinol 300 mg oral tablet: 1 tab(s) orally once a day for gout  atorvastatin 80 mg oral tablet: 1 tab(s) orally once a day  cholecalciferol oral tablet: 2000 unit(s) orally once a day  DilTIAZem (Eqv-Cardizem CD) 120 mg/24 hours oral capsule, extended release: 1 cap(s) orally once a day  HYDROmorphone 2 mg oral tablet: 3 tab(s) orally every 4 hours As needed Moderate Pain (4 - 6)  omeprazole 40 mg oral delayed release capsule: 1 cap(s) orally once a day  posaconazole 100 mg oral delayed release tablet: 300 milligram(s) orally once a day  tamsulosin 0.4 mg oral capsule: 1 cap(s) orally once a day (at bedtime)  valACYclovir 500 mg oral tablet: 1 tab(s) orally 2 times a day   allopurinol 300 mg oral tablet: 1 tab(s) orally once a day for gout  amoxicillin 500 mg oral capsule: 1 cap(s) orally 2 times a day  DilTIAZem (Eqv-Cardizem CD) 120 mg/24 hours oral capsule, extended release: 1 cap(s) orally once a day  HYDROmorphone 2 mg oral tablet: 3 tab(s) orally every 4 hours As needed Moderate Pain (4 - 6)  levoFLOXacin 500 mg oral tablet: 1 tab(s) orally every 24 hours  omeprazole 40 mg oral delayed release capsule: 1 cap(s) orally once a day  tamsulosin 0.4 mg oral capsule: 1 cap(s) orally once a day (at bedtime)  valACYclovir 500 mg oral tablet: 1 tab(s) orally 2 times a day

## 2025-06-26 NOTE — DISCHARGE NOTE PROVIDER - NSDCCPCAREPLAN_GEN_ALL_CORE_FT
PRINCIPAL DISCHARGE DIAGNOSIS  Diagnosis: AML (acute myeloid leukemia)  Assessment and Plan of Treatment: Notify MD and report to ER for any temperature greater than or equal to 100.4 degrees, intractable nausea, vomiting, diarrhea, or uncontrolled bleeding.       PRINCIPAL DISCHARGE DIAGNOSIS  Diagnosis: AML (acute myeloid leukemia)  Assessment and Plan of Treatment: Notify MD and report to ER for any temperature greater than or equal to 100.4 degrees, intractable nausea, vomiting, diarrhea, or uncontrolled bleeding.        SECONDARY DISCHARGE DIAGNOSES  Diagnosis: Sepsis due to Pseudomonas  Assessment and Plan of Treatment: treated with  cefepime and resolved

## 2025-06-26 NOTE — H&P ADULT - PROBLEM SELECTOR PLAN 2
patient is neutropenic, febrile  cont cefepime, add IV Vanco  follow cultures patient is neutropenic, febrile  cont cefepime, add IV Vanco  follow cultures 6/26  cont ppx posaconazole, valtrex patient is neutropenic, febrile  cont cefepime, add IV Vanco  follow cultures 6/26  cont ppx posaconazole, valtrex  lactate 2.9, follow up repeat in AM

## 2025-06-27 ENCOUNTER — APPOINTMENT (OUTPATIENT)
Dept: INTERVENTIONAL RADIOLOGY/VASCULAR | Facility: HOSPITAL | Age: 70
End: 2025-06-27

## 2025-06-27 DIAGNOSIS — R11.2 NAUSEA WITH VOMITING, UNSPECIFIED: ICD-10-CM

## 2025-06-27 DIAGNOSIS — E86.0 DEHYDRATION: ICD-10-CM

## 2025-06-27 DIAGNOSIS — Z51.11 ENCOUNTER FOR ANTINEOPLASTIC CHEMOTHERAPY: ICD-10-CM

## 2025-06-27 LAB
ALBUMIN SERPL ELPH-MCNC: 3.1 G/DL — LOW (ref 3.3–5)
ALBUMIN SERPL ELPH-MCNC: 3.4 G/DL — SIGNIFICANT CHANGE UP (ref 3.3–5)
ALP SERPL-CCNC: 80 U/L — SIGNIFICANT CHANGE UP (ref 40–120)
ALP SERPL-CCNC: 89 U/L — SIGNIFICANT CHANGE UP (ref 40–120)
ALT FLD-CCNC: 23 U/L — SIGNIFICANT CHANGE UP (ref 10–45)
ALT FLD-CCNC: 24 U/L — SIGNIFICANT CHANGE UP (ref 10–45)
ANION GAP SERPL CALC-SCNC: 13 MMOL/L — SIGNIFICANT CHANGE UP (ref 5–17)
ANION GAP SERPL CALC-SCNC: 15 MMOL/L — SIGNIFICANT CHANGE UP (ref 5–17)
AST SERPL-CCNC: 12 U/L — SIGNIFICANT CHANGE UP (ref 10–40)
AST SERPL-CCNC: 14 U/L — SIGNIFICANT CHANGE UP (ref 10–40)
BASOPHILS # BLD AUTO: SIGNIFICANT CHANGE UP (ref 0–0.2)
BASOPHILS # BLD AUTO: SIGNIFICANT CHANGE UP (ref 0–0.2)
BASOPHILS NFR BLD AUTO: SIGNIFICANT CHANGE UP (ref 0–2)
BASOPHILS NFR BLD AUTO: SIGNIFICANT CHANGE UP (ref 0–2)
BILIRUB SERPL-MCNC: 0.6 MG/DL — SIGNIFICANT CHANGE UP (ref 0.2–1.2)
BILIRUB SERPL-MCNC: 0.6 MG/DL — SIGNIFICANT CHANGE UP (ref 0.2–1.2)
BLD GP AB SCN SERPL QL: NEGATIVE — SIGNIFICANT CHANGE UP
BUN SERPL-MCNC: 19 MG/DL — SIGNIFICANT CHANGE UP (ref 7–23)
BUN SERPL-MCNC: 20 MG/DL — SIGNIFICANT CHANGE UP (ref 7–23)
CALCIUM SERPL-MCNC: 7.9 MG/DL — LOW (ref 8.4–10.5)
CALCIUM SERPL-MCNC: 8.1 MG/DL — LOW (ref 8.4–10.5)
CHLORIDE SERPL-SCNC: 100 MMOL/L — SIGNIFICANT CHANGE UP (ref 96–108)
CHLORIDE SERPL-SCNC: 99 MMOL/L — SIGNIFICANT CHANGE UP (ref 96–108)
CO2 SERPL-SCNC: 18 MMOL/L — LOW (ref 22–31)
CO2 SERPL-SCNC: 18 MMOL/L — LOW (ref 22–31)
CREAT SERPL-MCNC: 1.11 MG/DL — SIGNIFICANT CHANGE UP (ref 0.5–1.3)
CREAT SERPL-MCNC: 1.12 MG/DL — SIGNIFICANT CHANGE UP (ref 0.5–1.3)
CULTURE RESULTS: SIGNIFICANT CHANGE UP
EGFR: 72 ML/MIN/1.73M2 — SIGNIFICANT CHANGE UP
EOSINOPHIL # BLD AUTO: SIGNIFICANT CHANGE UP (ref 0–0.5)
EOSINOPHIL # BLD AUTO: SIGNIFICANT CHANGE UP (ref 0–0.5)
EOSINOPHIL NFR BLD AUTO: SIGNIFICANT CHANGE UP (ref 0–6)
EOSINOPHIL NFR BLD AUTO: SIGNIFICANT CHANGE UP (ref 0–6)
GLUCOSE SERPL-MCNC: 113 MG/DL — HIGH (ref 70–99)
GLUCOSE SERPL-MCNC: 132 MG/DL — HIGH (ref 70–99)
GRAM STN FLD: ABNORMAL
HCT VFR BLD CALC: 19.5 % — CRITICAL LOW (ref 39–50)
HCT VFR BLD CALC: 22.3 % — LOW (ref 39–50)
HGB BLD-MCNC: 6.5 G/DL — CRITICAL LOW (ref 13–17)
HGB BLD-MCNC: 7.6 G/DL — LOW (ref 13–17)
IMM GRANULOCYTES # BLD AUTO: SIGNIFICANT CHANGE UP (ref 0–0.07)
IMM GRANULOCYTES # BLD AUTO: SIGNIFICANT CHANGE UP (ref 0–0.07)
IMM GRANULOCYTES NFR BLD AUTO: SIGNIFICANT CHANGE UP (ref 0–0.9)
IMM GRANULOCYTES NFR BLD AUTO: SIGNIFICANT CHANGE UP (ref 0–0.9)
IMMATURE PLATELET FRACTION #: 0.2 K/UL — LOW (ref 3.9–12.5)
IMMATURE PLATELET FRACTION #: 0.7 K/UL — LOW (ref 3.9–12.5)
IMMATURE PLATELET FRACTION %: 1.4 % — LOW (ref 1.6–7.1)
IMMATURE PLATELET FRACTION %: 3.1 % — SIGNIFICANT CHANGE UP (ref 1.6–7.1)
LYMPHOCYTES # BLD AUTO: SIGNIFICANT CHANGE UP (ref 1–3.3)
LYMPHOCYTES # BLD AUTO: SIGNIFICANT CHANGE UP (ref 1–3.3)
LYMPHOCYTES NFR BLD AUTO: SIGNIFICANT CHANGE UP (ref 13–44)
LYMPHOCYTES NFR BLD AUTO: SIGNIFICANT CHANGE UP (ref 13–44)
MAGNESIUM SERPL-MCNC: 1.4 MG/DL — LOW (ref 1.6–2.6)
MCHC RBC-ENTMCNC: 31.6 PG — SIGNIFICANT CHANGE UP (ref 27–34)
MCHC RBC-ENTMCNC: 31.9 PG — SIGNIFICANT CHANGE UP (ref 27–34)
MCHC RBC-ENTMCNC: 33.3 G/DL — SIGNIFICANT CHANGE UP (ref 32–36)
MCHC RBC-ENTMCNC: 34.1 G/DL — SIGNIFICANT CHANGE UP (ref 32–36)
MCV RBC AUTO: 93.7 FL — SIGNIFICANT CHANGE UP (ref 80–100)
MCV RBC AUTO: 94.7 FL — SIGNIFICANT CHANGE UP (ref 80–100)
METHOD TYPE: SIGNIFICANT CHANGE UP
MONOCYTES # BLD AUTO: SIGNIFICANT CHANGE UP (ref 0–0.9)
MONOCYTES # BLD AUTO: SIGNIFICANT CHANGE UP (ref 0–0.9)
MONOCYTES NFR BLD AUTO: SIGNIFICANT CHANGE UP (ref 2–14)
MONOCYTES NFR BLD AUTO: SIGNIFICANT CHANGE UP (ref 2–14)
NEUTROPHILS # BLD AUTO: SIGNIFICANT CHANGE UP (ref 1.8–7.4)
NEUTROPHILS # BLD AUTO: SIGNIFICANT CHANGE UP (ref 1.8–7.4)
NEUTROPHILS NFR BLD AUTO: SIGNIFICANT CHANGE UP (ref 43–77)
NEUTROPHILS NFR BLD AUTO: SIGNIFICANT CHANGE UP (ref 43–77)
NRBC # BLD AUTO: 0 K/UL — SIGNIFICANT CHANGE UP (ref 0–0)
NRBC # BLD AUTO: 0 K/UL — SIGNIFICANT CHANGE UP (ref 0–0)
NRBC # FLD: 0 K/UL — SIGNIFICANT CHANGE UP (ref 0–0)
NRBC # FLD: 0 K/UL — SIGNIFICANT CHANGE UP (ref 0–0)
NRBC BLD AUTO-RTO: 0 /100 WBCS — SIGNIFICANT CHANGE UP (ref 0–0)
NRBC BLD AUTO-RTO: 0 /100 WBCS — SIGNIFICANT CHANGE UP (ref 0–0)
P AERUGINOSA DNA BLD POS NAA+NON-PROBE: SIGNIFICANT CHANGE UP
PHOSPHATE SERPL-MCNC: 3.5 MG/DL — SIGNIFICANT CHANGE UP (ref 2.5–4.5)
PLATELET # BLD AUTO: 16 K/UL — CRITICAL LOW (ref 150–400)
PLATELET # BLD AUTO: 22 K/UL — LOW (ref 150–400)
PMV BLD: 11 FL — SIGNIFICANT CHANGE UP (ref 7–13)
PMV BLD: 13.4 FL — HIGH (ref 7–13)
POTASSIUM SERPL-MCNC: 3.7 MMOL/L — SIGNIFICANT CHANGE UP (ref 3.5–5.3)
POTASSIUM SERPL-MCNC: 3.8 MMOL/L — SIGNIFICANT CHANGE UP (ref 3.5–5.3)
POTASSIUM SERPL-SCNC: 3.7 MMOL/L — SIGNIFICANT CHANGE UP (ref 3.5–5.3)
POTASSIUM SERPL-SCNC: 3.8 MMOL/L — SIGNIFICANT CHANGE UP (ref 3.5–5.3)
PROT SERPL-MCNC: 5.3 G/DL — LOW (ref 6–8.3)
PROT SERPL-MCNC: 5.8 G/DL — LOW (ref 6–8.3)
RBC # BLD: 2.06 M/UL — LOW (ref 4.2–5.8)
RBC # BLD: 2.38 M/UL — LOW (ref 4.2–5.8)
RBC # FLD: 17.9 % — HIGH (ref 10.3–14.5)
RBC # FLD: 18.1 % — HIGH (ref 10.3–14.5)
RH IG SCN BLD-IMP: POSITIVE — SIGNIFICANT CHANGE UP
SODIUM SERPL-SCNC: 130 MMOL/L — LOW (ref 135–145)
SODIUM SERPL-SCNC: 133 MMOL/L — LOW (ref 135–145)
SPECIMEN SOURCE: SIGNIFICANT CHANGE UP
SPECIMEN SOURCE: SIGNIFICANT CHANGE UP
VANCOMYCIN TROUGH SERPL-MCNC: 22 UG/ML — HIGH (ref 10–20)
WBC # BLD: 0.17 K/UL — CRITICAL LOW (ref 3.8–10.5)
WBC # BLD: 0.2 K/UL — CRITICAL LOW (ref 3.8–10.5)
WBC # FLD AUTO: 0.17 K/UL — CRITICAL LOW (ref 3.8–10.5)
WBC # FLD AUTO: 0.2 K/UL — CRITICAL LOW (ref 3.8–10.5)

## 2025-06-27 PROCEDURE — 99233 SBSQ HOSP IP/OBS HIGH 50: CPT

## 2025-06-27 PROCEDURE — 70450 CT HEAD/BRAIN W/O DYE: CPT | Mod: 26

## 2025-06-27 RX ORDER — ACETAMINOPHEN 500 MG/5ML
1000 LIQUID (ML) ORAL ONCE
Refills: 0 | Status: COMPLETED | OUTPATIENT
Start: 2025-06-27 | End: 2025-06-27

## 2025-06-27 RX ORDER — MAGNESIUM OXIDE 400 MG
400 TABLET ORAL
Refills: 0 | Status: COMPLETED | OUTPATIENT
Start: 2025-06-27 | End: 2025-06-27

## 2025-06-27 RX ORDER — MEROPENEM 1 G/30ML
1000 INJECTION INTRAVENOUS EVERY 8 HOURS
Refills: 0 | Status: DISCONTINUED | OUTPATIENT
Start: 2025-06-27 | End: 2025-06-28

## 2025-06-27 RX ORDER — METOCLOPRAMIDE HCL 10 MG
10 TABLET ORAL EVERY 6 HOURS
Refills: 0 | Status: DISCONTINUED | OUTPATIENT
Start: 2025-06-27 | End: 2025-07-06

## 2025-06-27 RX ORDER — HYDROMORPHONE/SOD CHLOR,ISO/PF 2 MG/10 ML
0.5 SYRINGE (ML) INJECTION ONCE
Refills: 0 | Status: DISCONTINUED | OUTPATIENT
Start: 2025-06-27 | End: 2025-06-27

## 2025-06-27 RX ORDER — MEROPENEM 1 G/30ML
INJECTION INTRAVENOUS
Refills: 0 | Status: DISCONTINUED | OUTPATIENT
Start: 2025-06-27 | End: 2025-06-28

## 2025-06-27 RX ORDER — MEROPENEM 1 G/30ML
1000 INJECTION INTRAVENOUS ONCE
Refills: 0 | Status: COMPLETED | OUTPATIENT
Start: 2025-06-27 | End: 2025-06-27

## 2025-06-27 RX ADMIN — Medication 4 MILLIGRAM(S): at 21:34

## 2025-06-27 RX ADMIN — Medication 0.5 MILLIGRAM(S): at 12:00

## 2025-06-27 RX ADMIN — Medication 4 MILLIGRAM(S): at 09:25

## 2025-06-27 RX ADMIN — Medication 4 MILLIGRAM(S): at 08:29

## 2025-06-27 RX ADMIN — Medication 500 MILLIGRAM(S): at 05:16

## 2025-06-27 RX ADMIN — Medication 400 MILLIGRAM(S): at 18:30

## 2025-06-27 RX ADMIN — Medication 500 MILLIGRAM(S): at 18:30

## 2025-06-27 RX ADMIN — Medication 300 MILLIGRAM(S): at 11:32

## 2025-06-27 RX ADMIN — Medication 2000 UNIT(S): at 11:32

## 2025-06-27 RX ADMIN — MEROPENEM 100 MILLIGRAM(S): 1 INJECTION INTRAVENOUS at 21:26

## 2025-06-27 RX ADMIN — Medication 250 MILLIGRAM(S): at 05:15

## 2025-06-27 RX ADMIN — DILTIAZEM HYDROCHLORIDE 120 MILLIGRAM(S): 240 TABLET, EXTENDED RELEASE ORAL at 05:17

## 2025-06-27 RX ADMIN — CEFEPIME 100 MILLIGRAM(S): 2 INJECTION, POWDER, FOR SOLUTION INTRAVENOUS at 05:14

## 2025-06-27 RX ADMIN — POSACONAZOLE 300 MILLIGRAM(S): 100 TABLET, DELAYED RELEASE ORAL at 05:17

## 2025-06-27 RX ADMIN — Medication 400 MILLIGRAM(S): at 18:57

## 2025-06-27 RX ADMIN — Medication 0.5 MILLIGRAM(S): at 18:30

## 2025-06-27 RX ADMIN — Medication 250 MILLIGRAM(S): at 22:06

## 2025-06-27 RX ADMIN — Medication 250 MILLIGRAM(S): at 13:55

## 2025-06-27 RX ADMIN — Medication 4 MILLIGRAM(S): at 00:56

## 2025-06-27 RX ADMIN — Medication 400 MILLIGRAM(S): at 12:18

## 2025-06-27 RX ADMIN — MEROPENEM 100 MILLIGRAM(S): 1 INJECTION INTRAVENOUS at 12:18

## 2025-06-27 RX ADMIN — Medication 400 MILLIGRAM(S): at 05:38

## 2025-06-27 RX ADMIN — Medication 400 MILLIGRAM(S): at 12:51

## 2025-06-27 RX ADMIN — Medication 1000 MILLIGRAM(S): at 13:21

## 2025-06-27 RX ADMIN — Medication 40 MILLIGRAM(S): at 05:17

## 2025-06-27 RX ADMIN — Medication 10 MILLIGRAM(S): at 18:59

## 2025-06-27 RX ADMIN — Medication 4 MILLIGRAM(S): at 01:50

## 2025-06-27 RX ADMIN — Medication 0.5 MILLIGRAM(S): at 11:32

## 2025-06-27 RX ADMIN — Medication 4 MILLIGRAM(S): at 22:35

## 2025-06-27 RX ADMIN — POSACONAZOLE 300 MILLIGRAM(S): 100 TABLET, DELAYED RELEASE ORAL at 18:30

## 2025-06-27 RX ADMIN — TAMSULOSIN HYDROCHLORIDE 0.4 MILLIGRAM(S): 0.4 CAPSULE ORAL at 21:25

## 2025-06-27 RX ADMIN — Medication 15 MILLILITER(S): at 21:25

## 2025-06-27 RX ADMIN — Medication 1000 MILLIGRAM(S): at 19:27

## 2025-06-27 RX ADMIN — Medication 0.5 MILLIGRAM(S): at 19:00

## 2025-06-27 RX ADMIN — Medication 15 MILLILITER(S): at 13:54

## 2025-06-27 RX ADMIN — Medication 100 MILLILITER(S): at 08:30

## 2025-06-27 RX ADMIN — Medication 1000 MILLIGRAM(S): at 06:36

## 2025-06-27 NOTE — PROVIDER CONTACT NOTE (MEDICATION) - ASSESSMENT
pt complaining of soreness and pain all over his body 10/10 pain. pt is rigoring and tachycardic in the 130s

## 2025-06-27 NOTE — PHYSICAL THERAPY INITIAL EVALUATION ADULT - PERTINENT HX OF CURRENT PROBLEM, REHAB EVAL
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  Recent hospitalization complicated by gout flare requiring steroid taper treatment completed 6/20. pending CT head for recent fall. 68 yo male with ASXL1, DDX41 mutated AML in CR1,  Day 12 s/p cycle 1 condensed HIDAC (2g/m2) consolidation, presenting from [facility]'s EDC appointment with neutropenic fever, hypotension during pRBC transfusion.  Recent hospitalization complicated by gout flare requiring steroid taper treatment completed 6/20.   CTH:  Unremarkable noncontrast CT of the brain. No intracranial hemorrhage.

## 2025-06-27 NOTE — PROGRESS NOTE ADULT - PROBLEM SELECTOR PLAN 2
patient is neutropenic, febrile  cont cefepime, add IV Vanco  follow cultures 6/26  cont ppx posaconazole, valtrex  lactate 2.9, follow up repeat in AM patient is neutropenic, febrile  cont Meropenem , add IV Vanco  6/26 cultures BC + GNR , follow up surveillance cultures    cont ppx posaconazole, valtrex  6/26 CXR - clear lungs

## 2025-06-27 NOTE — PROGRESS NOTE ADULT - PROBLEM SELECTOR PLAN 1
admit to [facility] from [facility] for neutropenic fever, hypotension  s/p C1 condensed HIDAC (2g/m2) consolidation on 6/15/25 - 6/17/25  s/p G-CSF Neulasta as outpatient 6/20  CBC w diff, CMP, coags  Hgb goal >8; PLTs goal >20k or if bleeding  stirct I&Os daily weights mouth care admitted with neutropenic fever   s/p C1 condensed HIDAC (2g/m2) consolidation on 6/15/25 - 6/17/25  s/p G-CSF Neulasta as outpatient 6/20  CBC w diff, CMP, coags  Hgb goal >8; PLTs goal >20k or if bleeding  stirct I&Os daily weights mouth care  6/27 Anemia PRBC x 1

## 2025-06-27 NOTE — PROGRESS NOTE ADULT - NS ATTEND AMEND GEN_ALL_CORE FT
Primary: Chitty    Assessment: 69M HiDAC Cycle 1 Day 13 for ASXL1-mutated and GJA15-ktlybdc AML. Admitted with neutropenic fevers    Heme:   - s/p Neulasta outpatient 6/20  - Continue allopurinol 300 mg once a day  - Continue daily oral B12 suppl  - Transfuse for Hgb <7, plts <10k    ID: Febrile neutropenia  - Started cefepime and Vanco (6/26 - ), Clinically pt looks unwell, slightly hypotensive, will broaden Cefepime to Meropenem (6/27 - )  - Cx's (+) GNR    MSK:  - acute on chronic pain: Dilaudid for now, will consult palliative care for f/u    Dispo:  - Discharge once stable

## 2025-06-27 NOTE — PROGRESS NOTE ADULT - SUBJECTIVE AND OBJECTIVE BOX
Diagnosis:    Protocol/Chemo Regimen:    Day:     Pt endorsed:    Review of Systems:     Pain scale:     Diet:     Allergies    No Known Allergies    Intolerances        ANTIMICROBIALS  cefepime   IVPB 2000 milliGRAM(s) IV Intermittent every 8 hours  posaconazole DR Tablet 300 milliGRAM(s) Oral every 12 hours  posaconazole DR Tablet   Oral   valACYclovir 500 milliGRAM(s) Oral two times a day  vancomycin  IVPB      vancomycin  IVPB 1000 milliGRAM(s) IV Intermittent every 8 hours      HEME/ONC MEDICATIONS      STANDING MEDICATIONS  allopurinol 300 milliGRAM(s) Oral daily  cholecalciferol 2000 Unit(s) Oral daily  diltiazem    milliGRAM(s) Oral daily  pantoprazole    Tablet 40 milliGRAM(s) Oral before breakfast  sodium chloride 0.9%. 1000 milliLiter(s) IV Continuous <Continuous>  tamsulosin 0.4 milliGRAM(s) Oral at bedtime      PRN MEDICATIONS  acetaminophen     Tablet .. 650 milliGRAM(s) Oral every 6 hours PRN  HYDROmorphone   Tablet 4 milliGRAM(s) Oral every 4 hours PRN        Vital Signs Last 24 Hrs  T(C): 37.3 (27 Jun 2025 06:36), Max: 39.4 (26 Jun 2025 22:49)  T(F): 99.2 (27 Jun 2025 06:36), Max: 102.9 (26 Jun 2025 22:49)  HR: 121 (27 Jun 2025 05:13) (88 - 122)  BP: 109/62 (27 Jun 2025 05:13) (84/55 - 125/86)  BP(mean): --  RR: 18 (27 Jun 2025 05:13) (18 - 20)  SpO2: 95% (27 Jun 2025 05:13) (95% - 99%)    Parameters below as of 27 Jun 2025 05:13  Patient On (Oxygen Delivery Method): room air        PHYSICAL EXAM  General: NAD  HEENT: PERRLA, EOMOI, clear oropharynx, anicteric sclera, pink conjunctiva  CV: (+) S1/S2 RRR  Lungs: clear to auscultation, no wheezes or rales  Abdomen: soft, non-tender, non-distended (+) BS  Ext: no clubbing, cyanosis or edema  Skin: no rashes and no petechiae  Neuro: alert and oriented X 3, no focal deficits  Central Line:             LABS:                        7.6    0.20  )-----------( 22       ( 27 Jun 2025 02:31 )             22.3         Mean Cell Volume : 93.7 fl  Mean Cell Hemoglobin : 31.9 pg  Mean Cell Hemoglobin Concentration : 34.1 g/dL  Auto Neutrophil # : See note  Auto Lymphocyte # : See note  Auto Monocyte # : See note  Auto Eosinophil # : See note  Auto Basophil # : See note  Auto Neutrophil % : See note  Auto Lymphocyte % : See note  Auto Monocyte % : See note  Auto Eosinophil % : See note  Auto Basophil % : See note      06-26    136  |  99  |  27[H]  ----------------------------<  105[H]  3.5   |  22  |  1.14    Ca    9.1      26 Jun 2025 10:27    TPro  6.3  /  Alb  3.9  /  TBili  0.6  /  DBili  x   /  AST  17  /  ALT  42  /  AlkPhos  111  06-26        RECENT CULTURES:  06-26 @ 10:27  Blood Blood  --  --  --    Growth in aerobic bottle: Gram Negative Rods  Direct identification is available within approximately 3-5  hours either by Blood Panel Multiplexed PCR or Direct  MALDI-TOF. Details: [email or web address]  --          RADIOLOGY & ADDITIONAL STUDIES:         Diagnosis: MDS related AML, ASXL1, DDX41    Protocol/Chemo Regimen: s/p Cycle 1 HIDAC 2g/m2     Day: 13    Pt endorsed: SOB, generalized weakness     Review of Systems: Patient denies  nausea, vomiting, diarrhea, abdominal pain,  chest pain and headache.    Pain scale: denies now     Diet: DASH     Allergies    No Known Allergies    Intolerances        ANTIMICROBIALS  Meropenem    IVPB 1000 milliGRAM(s) IV Intermittent every 8 hours  posaconazole DR Tablet 300 milliGRAM(s) Oral every 12 hours  posaconazole DR Tablet   Oral   valACYclovir 500 milliGRAM(s) Oral two times a day  vancomycin  IVPB      vancomycin  IVPB 1000 milliGRAM(s) IV Intermittent every 8 hours      HEME/ONC MEDICATIONS      STANDING MEDICATIONS  allopurinol 300 milliGRAM(s) Oral daily  cholecalciferol 2000 Unit(s) Oral daily  diltiazem    milliGRAM(s) Oral daily  pantoprazole    Tablet 40 milliGRAM(s) Oral before breakfast  sodium chloride 0.9%. 1000 milliLiter(s) IV Continuous <Continuous>  tamsulosin 0.4 milliGRAM(s) Oral at bedtime      PRN MEDICATIONS  acetaminophen     Tablet .. 650 milliGRAM(s) Oral every 6 hours PRN  HYDROmorphone   Tablet 4 milliGRAM(s) Oral every 4 hours PRN        Vital Signs Last 24 Hrs  T(C): 37.3 (27 Jun 2025 06:36), Max: 39.4 (26 Jun 2025 22:49)  T(F): 99.2 (27 Jun 2025 06:36), Max: 102.9 (26 Jun 2025 22:49)  HR: 121 (27 Jun 2025 05:13) (88 - 122)  BP: 109/62 (27 Jun 2025 05:13) (84/55 - 125/86)  BP(mean): --  RR: 18 (27 Jun 2025 05:13) (18 - 20)  SpO2: 95% (27 Jun 2025 05:13) (95% - 99%)    Parameters below as of 27 Jun 2025 05:13  Patient On (Oxygen Delivery Method): room air        PHYSICAL EXAM  General: NAD  HEENT:  clear oropharynx, anicteric sclera  CV: (+) S1/S2 RRR  Lungs: clear to auscultation, no wheezes or rales  Abdomen: soft, non-tender, non-distended (+) BS  Ext: Trace  edema b/l LE   Skin: no rash  Neuro: alert and oriented X 3,   Central Line: PIVL CDI             LABS:                        7.6    0.20  )-----------( 22       ( 27 Jun 2025 02:31 )             22.3         Mean Cell Volume : 93.7 fl  Mean Cell Hemoglobin : 31.9 pg  Mean Cell Hemoglobin Concentration : 34.1 g/dL  Auto Neutrophil # : See note  Auto Lymphocyte # : See note  Auto Monocyte # : See note  Auto Eosinophil # : See note  Auto Basophil # : See note  Auto Neutrophil % : See note  Auto Lymphocyte % : See note  Auto Monocyte % : See note  Auto Eosinophil % : See note  Auto Basophil % : See note      06-26    136  |  99  |  27[H]  ----------------------------<  105[H]  3.5   |  22  |  1.14    Ca    9.1      26 Jun 2025 10:27    TPro  6.3  /  Alb  3.9  /  TBili  0.6  /  DBili  x   /  AST  17  /  ALT  42  /  AlkPhos  111  06-26        RECENT CULTURES:  06-26 @ 10:27  Blood Blood  --  --  --    Growth in aerobic bottle: Gram Negative Rods  Direct identification is available within approximately 3-5  hours either by Blood Panel Multiplexed PCR or Direct  MALDI-TOF. Details: [email or web address]  --          RADIOLOGY & ADDITIONAL STUDIES:   CT Head No Cont (06.27.25 @ 14:46) >  IMPRESSION: Unremarkable noncontrast CT of the brain. No intracranial   hemorrhage. No change since 2/25/2020.     Xray Chest 1 View- PORTABLE-Urgent (06.26.25 @ 18:39) >  IMPRESSION:  Clear lungs.

## 2025-06-27 NOTE — PROGRESS NOTE ADULT - PROBLEM SELECTOR PLAN 5
PO Diladid 4mg q 4 prn  re-consult Palliative Care if dosing needs adjustment PO Diladid 4mg q 4 prn  6/27 re-consulted  Palliative Care

## 2025-06-27 NOTE — PROGRESS NOTE ADULT - ASSESSMENT
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  IV Cefepime was started at [facility] prior to arrival.  Recent hospitalization complicated by gout flare requiring steroid taper treatment completed 6/20.  70 yo male with ASXL1, DDX41 mutated AML in CR1,  Day 12 s/p cycle 1 condensed HIDAC (2g/m2) consolidation, presenting [email or web address]s [facility]C appointment with neutropenic fever,. Hospital course complicated by Gram negative bacteriemia Recent hospitalization complicated by gout flare requiring steroid taper treatment completed 6/20. Pt is pancytopenic secondary to chemotherapy

## 2025-06-27 NOTE — PHARMACOTHERAPY INTERVENTION NOTE - COMMENTS
*NOTE IS NOT UPDATED*    Clinical Pharmacy Specialist- Hematology/Oncology- Progress Note    Pt is a 70 y/o male w/ PMH of HTN, HLD, GERD, BPH, Gout, spinal stenosis (s/p multiple surgeries) and newly diagnosed AML (ASXL1-mutated and DDX41) s/p 7+3 Induction therapy, course complicated by gout flare vs septic arthritis, now receiving C1 HIDAC consolidation    Antimicrobial Course:  - Cefepime- 6/13- 6/16  --> Levaquin (to complete a 7-day course of abx per MD)- 6/16- 6/19  - Valtrex- 6/12  MRSA nasal swab    Last Neutropenic (ANC<1000): no occurrence  Last Febrile: 18-Jun-2025 05:50; T= 100.8  Days Non-Neutropenic: 6  Days afebrile: 0    Chemotherapy Course  -Current Regimen: HIDAC 2gm/m2 Consolidation  History:  (5/2/25)- 7+3 Induction   -Daunorubicin 60mg/m2 IVP D1-3   -Cytarabine 100mg/m2 IV cont inf D1-7  (6/15/25) C1 HIDAC 2gm/m2  -Day: 5 (6/19)  BmBx:  Access: R TLC (accessed 5/5/25)    Vancomycin Levels:   Date    Dose              Value    Ref Range	                             Status   5/21    750mg q12hr   7.8       Trough 10-20; AUC= 400-600   Final  5/23    1gm q12hr      10.9     Trough 10-20; AUC= 400-600   Final    History/Relevant clinical information used in assessment:  - Ht= 5’11”; IBW= 75.3kg; Actual BW= 107.3kg; Adj BW= 88kg  - 5/2- IRR? rigors, chills, and tachycardia 2 hours into day 1; rasburicase 3mg x 1  - 5/4 -Crcl= 67ml/min ; Scr=1.32; Wt= 88kg; Adjusted BW used since pt BMI>30  - 5/9- increase melatonin 3mg to 10mh qhs- c/o insomnia  - 5/13- mouth pain- has ulceration   - 5/14-5/28- severe mucositis   - 5/15- bloody diarrhea x 3 on reglan prn, but has not needed, monitor for d/c to not aggravate w/ prokinetic agent  - 5/15- HSV neg  - 5/16- bloody BM's decreased to 2x'd/day from 4x's/day yest  - 5/17 UCx- 5/17- 10-49k VRE- per ID, do not need to treat  - 5/18- midodrine 10mg PO tid  - 5/19- BlCx- Meth resistant Staphylococcus haemolyticus- on vancomycin 1gm q12hr, this will yield AUC= 457; per ID, likely contaminant, expect short course of vanco; - 5/19, still febrile since 5/11, on cefepime, BlCx NGTD + abd discomfort (added flagyl)-->CT CAP 5/15 - no infection (but groundglass opacities stable from previous?)  - 5/21- zarxio 480mcg daily 5/21- 5/23  - 5/27- gout flare- colchicine 0.6mg BID x 2 days (5/27-5/29) -can cont until flare resolves, some experts cont 24-48 hrs after resolution; if symptoms do not resolve after 3 days, consider other anti-inflammatory agents- pt pain not relieved- added prednisone 40mg daily x 2 days (5/28-29) f/b 20mg x 2 days (5/30-6/1)  - 5/29- pt had episodes of diarrhea d/t colchicine  - 5/29- - allopurinol 100mg (300mg d/c'd 5/15)-keeping for gout-->300mg- 5/29- given UA rising- 5/29  - 5/30 will decrease diltiazem CD 240mg to 120mg  Meds sent for auth: midodrine, allopurinol 300mg (increased), prednisone, simethicone- sent to [facility] 5/29  - 6/13- c/o pain in both feet now  - 6/16- Confirmed home medications with patient  Home Medications:  ·	cholecalciferol oral tablet: 2000 unit(s) PO daily  ·	omeprazole 40 mg PO daily  ·	tamsulosin 0.4 mg PO daily  ·	diltiazem CD 120mg PO daily  ·	Allopurinol 300mg PO daily  ·	Valtrex 500mg PO BID  ·	atorvastatin 80mg PO qhs  ·	colchicine 0,6mg PO - 59 days supply filled 6/2025- pt does not want or take  ·	hctz, tramadol, midodrine, oxy- no longer taking  - 6/17- a bit confused- 2/2 to opioids? cytarabine?    Assessment/Plan/Recommendation:  Onc:  Cytarabine schedule (pt on 1,2,3 schedule):  - 6/15- started ~ 3 pm (D1)  - 6/16- 4 am (D2)  - 6/16- 4 pm (D2)  - 6/17- 4 am (D3)  - 6/17- 4 pm (D3)  - 6/18- 4 am (D4) finished ~7am   - Neulasta tmrw 6/20  ID:  - 6/13- not neutropenic, on cefepime for fevers- switched to levaquin through thurs 6/19 (for 7 days duration)- today is last day  Rheum:  - 6/12- r/o septic arthritis of L ankle- s/p arthrocentesis  - 6/12- allopurinol 300mg PO daily  - 6/13- methylprednisone 40mg IVP daily- taper: 30mg daily x 2 days- 6/17-6/18--> 20mg daily x 1 day- 6/19 -->10mg daily x 1 day- 6/20 --> stop  Pain:  - hydromorphone 4mg PO q4hrs prn- 6/17  Cards:  - atorvastatin 80mg hold if on posa d/t DDI (Cat X)  Dispo:  - 6/19- to [facility]     Additional Monitoring Needed?   -Yes- Continue to monitor renal function & daily counts for abx escalation/de-escalation   -Discharge Planning:  --> New meds:   --> Meds sent for auth: methylpred 10mg x 1 dose- sent to [facility] 6/19  --> Delivered meds:    Case discussed with attending/primary team    Liza Jolley, PharmD, BCPS  Clinical Pharmacy Specialist | Hematology/Oncology  [facility]  Email: [email or web address] or available on [email or web address]   Clinical Pharmacy Specialist- Hematology/Oncology- Progress Note    Pt is a 70 y/o male w/ PMH of HTN, HLD, GERD, BPH, Gout, spinal stenosis (s/p multiple surgeries) and newly diagnosed AML (ASXL1-mutated and DDX41) s/p 7+3 Induction therapy, course complicated by gout flare vs septic arthritis, andC1 HIDAC consolidation, now admitted for neutropenic fever    Antimicrobial Course:  - Cefepime (pseudomonas bacteremia- 6/26-6/27  --> Meropenem (pseudomonas; rigors w/ cefepime)- 6/27  - Valtrex- 6/12  - Vancomycin- 6/26  MRSA nasal swab    Last Neutropenic (ANC<1000): 6/27; ANC= 0  Last Febrile: 27-Jun-2025 12:20; T= 102.9  Days Non-Neutropenic: 0  Days afebrile: 0    Chemotherapy Course  -Current Regimen: HIDAC 2gm/m2 Consolidation  History:  (5/2/25)- 7+3 Induction   -Daunorubicin 60mg/m2 IVP D1-3   -Cytarabine 100mg/m2 IV cont inf D1-7  (6/15/25) C1 HIDAC 2gm/m2  -Day: 13 (6/27)  BmBx:  Access: R TLC (accessed 5/5/25)    Vancomycin Levels:   Date    Dose              Value    Ref Range	                             Status   5/21    750mg q12hr   7.8       Trough 10-20; AUC= 400-600   Final  5/23    1gm q12hr      10.9     Trough 10-20; AUC= 400-600   Final    History/Relevant clinical information used in assessment:  - Ht= 5’11”; IBW= 75.3kg; Actual BW= 107.3kg; Adj BW= 88kg  - 5/2- IRR? rigors, chills, and tachycardia 2 hours into day 1; rasburicase 3mg x 1  - 5/4 -Crcl= 67ml/min ; Scr=1.32; Wt= 88kg; Adjusted BW used since pt BMI>30  - 5/9- increase melatonin 3mg to 10mh qhs- c/o insomnia  - 5/13- mouth pain- has ulceration   - 5/14-5/28- severe mucositis   - 5/15- bloody diarrhea x 3 on reglan prn, but has not needed, monitor for d/c to not aggravate w/ prokinetic agent  - 5/15- HSV neg  - 5/16- bloody BM's decreased to 2x'd/day from 4x's/day yest  - 5/17 UCx- 5/17- 10-49k VRE- per ID, do not need to treat  - 5/18- midodrine 10mg PO tid  - 5/19- BlCx- Meth resistant Staphylococcus haemolyticus- on vancomycin 1gm q12hr, this will yield AUC= 457; per ID, likely contaminant, expect short course of vanco; - 5/19, still febrile since 5/11, on cefepime, BlCx NGTD + abd discomfort (added flagyl)-->CT CAP 5/15 - no infection (but groundglass opacities stable from previous?)  - 5/21- zarxio 480mcg daily 5/21- 5/23  - 5/27- gout flare- colchicine 0.6mg BID x 2 days (5/27-5/29) -can cont until flare resolves, some experts cont 24-48 hrs after resolution; if symptoms do not resolve after 3 days, consider other anti-inflammatory agents- pt pain not relieved- added prednisone 40mg daily x 2 days (5/28-29) f/b 20mg x 2 days (5/30-6/1)  - 5/29- pt had episodes of diarrhea d/t colchicine  - 5/29- - allopurinol 100mg (300mg d/c'd 5/15)-keeping for gout-->300mg- 5/29- given UA rising- 5/29  - 5/30 will decrease diltiazem CD 240mg to 120mg  Meds sent for auth: midodrine, allopurinol 300mg (increased), prednisone, simethicone- sent to [facility] 5/29  - 6/13- c/o pain in both feet now  - 6/16- Confirmed home medications with patient  Home Medications:  ·	cholecalciferol oral tablet: 2000 unit(s) PO daily  ·	omeprazole 40 mg PO daily  ·	tamsulosin 0.4 mg PO daily  ·	diltiazem CD 120mg PO daily  ·	Allopurinol 300mg PO daily  ·	Valtrex 500mg PO BID  ·	atorvastatin 80mg PO qhs  ·	colchicine 0,6mg PO - 59 days supply filled 6/2025- pt does not want or take  ·	hctz, tramadol, midodrine, oxy- no longer taking  - 6/12- r/o septic arthritis of L ankle- s/p arthrocentesis & allopurinol 300mg PO daily, - hydromorphone 4mg PO q4hrs prn- 6/17  - 6/13- methylprednisone 40mg IVP daily- taper: 30mg daily x 2 days- 6/17-6/18--> 20mg daily x 1 day- 6/19 -->10mg daily x 1 day- 6/20 --> stop  - 6/13- not neutropenic, on cefepime for fevers- switched to levaquin through thurs 6/19 (for 7 days duration)- today is last day  - 6/17- a bit confused- 2/2 to opioids? cytarabine?  Meds sent for auth: methylpred 10mg x 1 dose- sent to [facility] 6/19    Assessment/Plan/Recommendation:  Onc:  - on allopurinol 300mg PO daily- h/o gout & gout flare  ID:  - 6/26 BlCx +pseudomonas- pt febrile & rigoring- escalated to meropenem for FN  - 6/27- vancomycin 1gm q8hr- may need to decrease as this is predicted to yield AUC= 692 (goal 400-600); would recommend to decrease to 750mg-1gm q12hr (this will yield AUC= 436- 582)  Rheum:  - on allopurinol 300mg PO daily- h/o gout & gout flare  Cards:  - atorvastatin 80mg hold if on posa d/t DDI (Cat X)  Dispo:    Additional Monitoring Needed?   -Yes- Continue to monitor renal function & daily counts for abx escalation/de-escalation   -Discharge Planning:  --> New meds:   --> Meds sent for auth:   --> Delivered meds:    Case discussed with attending/primary team    Liza Jolley, PharmD, BCPS  Clinical Pharmacy Specialist | Hematology/Oncology  [facility]  Email: [email or web address] or available on [email or web address]   Clinical Pharmacy Specialist- Hematology/Oncology- Progress Note    Pt is a 68 y/o male w/ PMH of HTN, HLD, GERD, BPH, Gout, spinal stenosis (s/p multiple surgeries) and newly diagnosed AML (ASXL1-mutated and DDX41) s/p 7+3 Induction therapy, course complicated by gout flare vs septic arthritis, andC1 HIDAC consolidation, now admitted for neutropenic fever    Antimicrobial Course:  - Cefepime (pseudomonas bacteremia- 6/26-6/27  --> Meropenem (pseudomonas; rigors w/ cefepime)- 6/27  - Valtrex- 6/12  - Vancomycin- 6/26  MRSA nasal swab    Last Neutropenic (ANC<1000): 6/27; ANC= 0  Last Febrile: 27-Jun-2025 12:20; T= 102.9  Days Non-Neutropenic: 0  Days afebrile: 0    Chemotherapy Course  -Current Regimen: HIDAC 2gm/m2 Consolidation  History:  (5/2/25)- 7+3 Induction   -Daunorubicin 60mg/m2 IVP D1-3   -Cytarabine 100mg/m2 IV cont inf D1-7  (6/15/25) C1 HIDAC 2gm/m2  -Day: 13 (6/27)  BmBx:  Access: R TLC (accessed 5/5/25)    Vancomycin Levels:   Date    Dose              Value    Ref Range	                             Status   5/21    750mg q12hr   7.8       Trough 10-20; AUC= 400-600   Final  5/23    1gm q12hr      10.9     Trough 10-20; AUC= 400-600   Final    History/Relevant clinical information used in assessment:  - Ht= 5’11”; IBW= 75.3kg; Actual BW= 107.3kg; Adj BW= 88kg  - 5/2- IRR? rigors, chills, and tachycardia 2 hours into day 1; rasburicase 3mg x 1  - 5/4 -Crcl= 67ml/min ; Scr=1.32; Wt= 88kg; Adjusted BW used since pt BMI>30  - 5/9- increase melatonin 3mg to 10mh qhs- c/o insomnia  - 5/13- mouth pain- has ulceration   - 5/14-5/28- severe mucositis   - 5/15- bloody diarrhea x 3 on reglan prn, but has not needed, monitor for d/c to not aggravate w/ prokinetic agent  - 5/15- HSV neg  - 5/16- bloody BM's decreased to 2x'd/day from 4x's/day yest  - 5/17 UCx- 5/17- 10-49k VRE- per ID, do not need to treat  - 5/18- midodrine 10mg PO tid  - 5/19- BlCx- Meth resistant Staphylococcus haemolyticus- on vancomycin 1gm q12hr, this will yield AUC= 457; per ID, likely contaminant, expect short course of vanco; - 5/19, still febrile since 5/11, on cefepime, BlCx NGTD + abd discomfort (added flagyl)-->CT CAP 5/15 - no infection (but groundglass opacities stable from previous?)  - 5/21- zarxio 480mcg daily 5/21- 5/23  - 5/27- gout flare- colchicine 0.6mg BID x 2 days (5/27-5/29) -can cont until flare resolves, some experts cont 24-48 hrs after resolution; if symptoms do not resolve after 3 days, consider other anti-inflammatory agents- pt pain not relieved- added prednisone 40mg daily x 2 days (5/28-29) f/b 20mg x 2 days (5/30-6/1)  - 5/29- pt had episodes of diarrhea d/t colchicine  - 5/29- - allopurinol 100mg (300mg d/c'd 5/15)-keeping for gout-->300mg- 5/29- given UA rising- 5/29  - 5/30 will decrease diltiazem CD 240mg to 120mg  Meds sent for auth: midodrine, allopurinol 300mg (increased), prednisone, simethicone- sent to [facility] 5/29  - 6/13- c/o pain in both feet now  - 6/16- Confirmed home medications with patient  Home Medications:  ·	cholecalciferol oral tablet: 2000 unit(s) PO daily  ·	omeprazole 40 mg PO daily  ·	tamsulosin 0.4 mg PO daily  ·	diltiazem CD 120mg PO daily  ·	Allopurinol 300mg PO daily  ·	Valtrex 500mg PO BID  ·	atorvastatin 80mg PO qhs  ·	colchicine 0,6mg PO - 59 days supply filled 6/2025- pt does not want or take  ·	hctz, tramadol, midodrine, oxy- no longer taking  - 6/12- r/o septic arthritis of L ankle- s/p arthrocentesis & allopurinol 300mg PO daily, - hydromorphone 4mg PO q4hrs prn- 6/17  - 6/13- methylprednisone 40mg IVP daily- taper: 30mg daily x 2 days- 6/17-6/18--> 20mg daily x 1 day- 6/19 -->10mg daily x 1 day- 6/20 --> stop  - 6/13- not neutropenic, on cefepime for fevers- switched to levaquin through thurs 6/19 (for 7 days duration)- today is last day  - 6/17- a bit confused- 2/2 to opioids? cytarabine?  Meds sent for auth: methylpred 10mg x 1 dose- sent to [facility] 6/19    Assessment/Plan/Recommendation:  Onc:  - on allopurinol 300mg PO daily- h/o gout & gout flare  ID:  - 6/26 BlCx +pseudomonas- pt febrile & rigoring- escalated to meropenem for FN  - 6/27- vancomycin 1gm q8hr- may need to decrease as this is predicted to yield AUC= 692 (goal 400-600); pt was therapeutic on 1gm q12hr in the past; if VT >20, would HOLD pm dose and obtain vanco random tomorrow am 6/28. If VT <20, would give 750mg-1gm q12hr (this will yield AUC= 436- 582)  Rheum:  - on allopurinol 300mg PO daily- h/o gout & gout flare  Cards:  - atorvastatin 80mg hold if on posa d/t DDI (Cat X)  Dispo:    Additional Monitoring Needed?   -Yes- Continue to monitor renal function & daily counts for abx escalation/de-escalation   -Discharge Planning:  --> New meds:   --> Meds sent for auth:   --> Delivered meds:    Case discussed with attending/primary team    Liza Jolley, Raymond, BCPS  Clinical Pharmacy Specialist | Hematology/Oncology  [facility]  Email: [email or web address] or available on [email or web address]

## 2025-06-28 ENCOUNTER — APPOINTMENT (OUTPATIENT)
Dept: INFUSION THERAPY | Facility: HOSPITAL | Age: 70
End: 2025-06-28

## 2025-06-28 ENCOUNTER — APPOINTMENT (OUTPATIENT)
Dept: HEMATOLOGY ONCOLOGY | Facility: CLINIC | Age: 70
End: 2025-06-28

## 2025-06-28 LAB
-  AZTREONAM: SIGNIFICANT CHANGE UP
-  CEFEPIME: SIGNIFICANT CHANGE UP
-  CEFTAZIDIME: SIGNIFICANT CHANGE UP
-  CIPROFLOXACIN: SIGNIFICANT CHANGE UP
-  IMIPENEM: SIGNIFICANT CHANGE UP
-  LEVOFLOXACIN: SIGNIFICANT CHANGE UP
-  MEROPENEM: SIGNIFICANT CHANGE UP
-  PIPERACILLIN/TAZOBACTAM: SIGNIFICANT CHANGE UP
ALBUMIN SERPL ELPH-MCNC: 2.7 G/DL — LOW (ref 3.3–5)
ALP SERPL-CCNC: 74 U/L — SIGNIFICANT CHANGE UP (ref 40–120)
ALT FLD-CCNC: 19 U/L — SIGNIFICANT CHANGE UP (ref 10–45)
ANION GAP SERPL CALC-SCNC: 15 MMOL/L — SIGNIFICANT CHANGE UP (ref 5–17)
AST SERPL-CCNC: 11 U/L — SIGNIFICANT CHANGE UP (ref 10–40)
BASOPHILS # BLD AUTO: SIGNIFICANT CHANGE UP (ref 0–0.2)
BASOPHILS NFR BLD AUTO: SIGNIFICANT CHANGE UP (ref 0–2)
BILIRUB SERPL-MCNC: 1 MG/DL — SIGNIFICANT CHANGE UP (ref 0.2–1.2)
BUN SERPL-MCNC: 16 MG/DL — SIGNIFICANT CHANGE UP (ref 7–23)
CALCIUM SERPL-MCNC: 7.7 MG/DL — LOW (ref 8.4–10.5)
CHLORIDE SERPL-SCNC: 96 MMOL/L — SIGNIFICANT CHANGE UP (ref 96–108)
CO2 SERPL-SCNC: 16 MMOL/L — LOW (ref 22–31)
CREAT SERPL-MCNC: 0.92 MG/DL — SIGNIFICANT CHANGE UP (ref 0.5–1.3)
CULTURE RESULTS: ABNORMAL
CULTURE RESULTS: NO GROWTH — SIGNIFICANT CHANGE UP
EGFR: 91 ML/MIN/1.73M2 — SIGNIFICANT CHANGE UP
EGFR: 91 ML/MIN/1.73M2 — SIGNIFICANT CHANGE UP
EOSINOPHIL # BLD AUTO: SIGNIFICANT CHANGE UP (ref 0–0.5)
EOSINOPHIL NFR BLD AUTO: SIGNIFICANT CHANGE UP (ref 0–6)
GLUCOSE SERPL-MCNC: 96 MG/DL — SIGNIFICANT CHANGE UP (ref 70–99)
HCT VFR BLD CALC: 19.6 % — CRITICAL LOW (ref 39–50)
HGB BLD-MCNC: 6.7 G/DL — CRITICAL LOW (ref 13–17)
IMM GRANULOCYTES # BLD AUTO: SIGNIFICANT CHANGE UP (ref 0–0.07)
IMM GRANULOCYTES NFR BLD AUTO: SIGNIFICANT CHANGE UP (ref 0–0.9)
IMMATURE PLATELET FRACTION #: 0.2 K/UL — LOW (ref 3.9–12.5)
IMMATURE PLATELET FRACTION %: 2.2 % — SIGNIFICANT CHANGE UP (ref 1.6–7.1)
LYMPHOCYTES # BLD AUTO: SIGNIFICANT CHANGE UP (ref 1–3.3)
LYMPHOCYTES NFR BLD AUTO: SIGNIFICANT CHANGE UP (ref 13–44)
MAGNESIUM SERPL-MCNC: 1.4 MG/DL — LOW (ref 1.6–2.6)
MCHC RBC-ENTMCNC: 30.5 PG — SIGNIFICANT CHANGE UP (ref 27–34)
MCHC RBC-ENTMCNC: 34.2 G/DL — SIGNIFICANT CHANGE UP (ref 32–36)
MCV RBC AUTO: 89.1 FL — SIGNIFICANT CHANGE UP (ref 80–100)
METHOD TYPE: SIGNIFICANT CHANGE UP
MONOCYTES # BLD AUTO: SIGNIFICANT CHANGE UP (ref 0–0.9)
MONOCYTES NFR BLD AUTO: SIGNIFICANT CHANGE UP (ref 2–14)
NEUTROPHILS # BLD AUTO: SIGNIFICANT CHANGE UP (ref 1.8–7.4)
NEUTROPHILS NFR BLD AUTO: SIGNIFICANT CHANGE UP (ref 43–77)
NRBC # BLD AUTO: 0 K/UL — SIGNIFICANT CHANGE UP (ref 0–0)
NRBC # FLD: 0 K/UL — SIGNIFICANT CHANGE UP (ref 0–0)
NRBC BLD AUTO-RTO: 0 /100 WBCS — SIGNIFICANT CHANGE UP (ref 0–0)
ORGANISM # SPEC MICROSCOPIC CNT: ABNORMAL
PHOSPHATE SERPL-MCNC: 1.7 MG/DL — LOW (ref 2.5–4.5)
PLATELET # BLD AUTO: 9 K/UL — CRITICAL LOW (ref 150–400)
PMV BLD: SIGNIFICANT CHANGE UP FL (ref 7–13)
POTASSIUM SERPL-MCNC: 3.3 MMOL/L — LOW (ref 3.5–5.3)
POTASSIUM SERPL-SCNC: 3.3 MMOL/L — LOW (ref 3.5–5.3)
PROT SERPL-MCNC: 4.9 G/DL — LOW (ref 6–8.3)
RBC # BLD: 2.2 M/UL — LOW (ref 4.2–5.8)
RBC # FLD: 17.9 % — HIGH (ref 10.3–14.5)
SODIUM SERPL-SCNC: 127 MMOL/L — LOW (ref 135–145)
SPECIMEN SOURCE: SIGNIFICANT CHANGE UP
SPECIMEN SOURCE: SIGNIFICANT CHANGE UP
WBC # BLD: 0.22 K/UL — CRITICAL LOW (ref 3.8–10.5)
WBC # FLD AUTO: 0.22 K/UL — CRITICAL LOW (ref 3.8–10.5)

## 2025-06-28 PROCEDURE — 99233 SBSQ HOSP IP/OBS HIGH 50: CPT

## 2025-06-28 RX ORDER — MAGNESIUM SULFATE 500 MG/ML
2 SYRINGE (ML) INJECTION ONCE
Refills: 0 | Status: COMPLETED | OUTPATIENT
Start: 2025-06-28 | End: 2025-06-28

## 2025-06-28 RX ORDER — MEROPENEM 1 G/30ML
1000 INJECTION INTRAVENOUS EVERY 8 HOURS
Refills: 0 | Status: DISCONTINUED | OUTPATIENT
Start: 2025-06-28 | End: 2025-06-30

## 2025-06-28 RX ORDER — HYDROMORPHONE/SOD CHLOR,ISO/PF 2 MG/10 ML
0.5 SYRINGE (ML) INJECTION EVERY 4 HOURS
Refills: 0 | Status: DISCONTINUED | OUTPATIENT
Start: 2025-06-28 | End: 2025-07-05

## 2025-06-28 RX ADMIN — Medication 4 MILLIGRAM(S): at 18:26

## 2025-06-28 RX ADMIN — TAMSULOSIN HYDROCHLORIDE 0.4 MILLIGRAM(S): 0.4 CAPSULE ORAL at 21:52

## 2025-06-28 RX ADMIN — DILTIAZEM HYDROCHLORIDE 120 MILLIGRAM(S): 240 TABLET, EXTENDED RELEASE ORAL at 05:44

## 2025-06-28 RX ADMIN — Medication 25 GRAM(S): at 11:16

## 2025-06-28 RX ADMIN — MEROPENEM 100 MILLIGRAM(S): 1 INJECTION INTRAVENOUS at 21:52

## 2025-06-28 RX ADMIN — MEROPENEM 100 MILLIGRAM(S): 1 INJECTION INTRAVENOUS at 16:05

## 2025-06-28 RX ADMIN — Medication 650 MILLIGRAM(S): at 13:41

## 2025-06-28 RX ADMIN — POSACONAZOLE 300 MILLIGRAM(S): 100 TABLET, DELAYED RELEASE ORAL at 18:58

## 2025-06-28 RX ADMIN — Medication 500 MILLIGRAM(S): at 05:44

## 2025-06-28 RX ADMIN — Medication 650 MILLIGRAM(S): at 14:11

## 2025-06-28 RX ADMIN — MEROPENEM 100 MILLIGRAM(S): 1 INJECTION INTRAVENOUS at 05:51

## 2025-06-28 RX ADMIN — Medication 20 MILLIEQUIVALENT(S): at 11:17

## 2025-06-28 RX ADMIN — Medication 20 MILLIEQUIVALENT(S): at 16:05

## 2025-06-28 RX ADMIN — Medication 2000 UNIT(S): at 11:17

## 2025-06-28 RX ADMIN — Medication 15 MILLILITER(S): at 11:17

## 2025-06-28 RX ADMIN — Medication 650 MILLIGRAM(S): at 04:19

## 2025-06-28 RX ADMIN — Medication 40 MILLIGRAM(S): at 05:44

## 2025-06-28 RX ADMIN — Medication 20 MILLIEQUIVALENT(S): at 17:55

## 2025-06-28 RX ADMIN — Medication 4 MILLIGRAM(S): at 17:56

## 2025-06-28 RX ADMIN — Medication 15 MILLILITER(S): at 05:44

## 2025-06-28 RX ADMIN — Medication 0.5 MILLIGRAM(S): at 20:50

## 2025-06-28 RX ADMIN — Medication 300 MILLIGRAM(S): at 11:17

## 2025-06-28 RX ADMIN — Medication 15 MILLILITER(S): at 21:52

## 2025-06-28 RX ADMIN — Medication 0.5 MILLIGRAM(S): at 20:20

## 2025-06-28 RX ADMIN — Medication 500 MILLIGRAM(S): at 17:56

## 2025-06-28 RX ADMIN — Medication 650 MILLIGRAM(S): at 05:12

## 2025-06-28 NOTE — PROGRESS NOTE ADULT - NS ATTEND AMEND GEN_ALL_CORE FT
Primary: Chitty    Assessment: 69M HiDAC Cycle 1 Day 13 for ASXL1-mutated and EGF34-hemzgex AML. Admitted with neutropenic fevers    Heme:   - s/p Neulasta outpatient 6/20  - Continue allopurinol 300 mg once a day  - Continue daily oral B12 suppl  - Transfuse for Hgb <7, plts <10k    ID: Febrile neutropenia  - Started cefepime and Vanco (6/26 - ), Clinically pt looks unwell, slightly hypotensive, will broaden Cefepime to Meropenem (6/27 - )  - Cx's (+) GNR    MSK:  - acute on chronic pain: Dilaudid for now, will consult palliative care for f/u    Dispo:  - Discharge once stable Primary: Chitty    Assessment: 69M HiDAC Cycle 1 Day 14 for ASXL1-mutated and DNQ19-jzcxamz AML. Admitted with neutropenic fevers    Heme:   - s/p Neulasta outpatient 6/20  - Continue allopurinol 300 mg once a day  - Continue daily oral B12 suppl  - Transfuse for Hgb <7, plts <10k    ID: Febrile neutropenia  - Started cefepime and Vanco (6/26 - ), Clinically pt looks unwell, slightly hypotensive, will broaden Cefepime to Meropenem (6/27 - )  - BCx's (+) Psueomonas aeruginosa    MSK:  - acute on chronic pain: Dilaudid for now, will consult palliative care for f/u    Dispo:  - Discharge to [facility] once stable

## 2025-06-28 NOTE — PROGRESS NOTE ADULT - PROBLEM SELECTOR PLAN 2
patient is neutropenic, febrile  cont Meropenem , add IV Vanco  6/26 cultures BC + GNR , follow up surveillance cultures    cont ppx posaconazole, valtrex  6/26 CXR - clear lungs patient is neutropenic, febrile  cont Meropenem ,   6/27-2/28s/p  Vanco  6/26 cultures BC + GNR , follow up surveillance cultures    cont ppx posaconazole, valtrex  6/26 CXR - clear lungs  6/27 BC (+) Pseudomonas aeruginosa: follow up surveillance cultures

## 2025-06-28 NOTE — PROGRESS NOTE ADULT - PROBLEM SELECTOR PLAN 1
admitted with neutropenic fever   s/p C1 condensed HIDAC (2g/m2) consolidation on 6/15/25 - 6/17/25  s/p G-CSF Neulasta as outpatient 6/20  CBC w diff, CMP, coags  Hgb goal >8; PLTs goal >20k or if bleeding  stirct I&Os daily weights mouth care  6/27 Anemia PRBC x 1 admitted with neutropenic fever   s/p C1 condensed HIDAC (2g/m2) consolidation on 6/15/25 - 6/17/25  s/p G-CSF Neulasta as outpatient 6/20  CBC w diff, CMP, coags  Hgb goal >8; PLTs goal >20k or if bleeding  stirct I&Os daily weights mouth care  6/28 Anemia PRBC x 1  6/28 Thrombocytopenia Platelet x 1   6/28 Hypokalemia- Potassium supplement  6/28 Hypomagnesemia- Magnesium supplement

## 2025-06-28 NOTE — PROGRESS NOTE ADULT - SUBJECTIVE AND OBJECTIVE BOX
Diagnosis: MDS related AML, ASXL1, DDX41    Protocol/Chemo Regimen: s/p Cycle 1 HIDAC 2g/m2     Day: 14    Pt endorsed: SOB, generalized weakness     Review of Systems: Patient denies  nausea, vomiting, diarrhea, abdominal pain,  chest pain and headache.    Pain scale: denies now     Diet: DASH     Allergies    No Known Allergies    Intolerances        ANTIMICROBIALS  Meropenem    IVPB 1000 milliGRAM(s) IV Intermittent every 8 hours  posaconazole DR Tablet 300 milliGRAM(s) Oral every 12 hours  posaconazole DR Tablet   Oral   valACYclovir 500 milliGRAM(s) Oral two times a day      HEME/ONC MEDICATIONS      STANDING MEDICATIONS  allopurinol 300 milliGRAM(s) Oral daily  cholecalciferol 2000 Unit(s) Oral daily  diltiazem    milliGRAM(s) Oral daily  pantoprazole    Tablet 40 milliGRAM(s) Oral before breakfast  sodium chloride 0.9%. 1000 milliLiter(s) IV Continuous <Continuous>  tamsulosin 0.4 milliGRAM(s) Oral at bedtime      PRN MEDICATIONS  acetaminophen     Tablet .. 650 milliGRAM(s) Oral every 6 hours PRN  HYDROmorphone   Tablet 4 milliGRAM(s) Oral every 4 hours PRN        Vital Signs Last 24 Hrs  T(C): 36.8 (28 Jun 2025 07:41), Max: 39.4 (27 Jun 2025 12:20)  T(F): 98.3 (28 Jun 2025 07:41), Max: 102.9 (27 Jun 2025 12:20)  HR: 91 (28 Jun 2025 07:41) (82 - 111)  BP: 110/74 (28 Jun 2025 07:41) (109/57 - 131/80)  BP(mean): 74 (27 Jun 2025 14:20) (74 - 83)  RR: 19 (28 Jun 2025 07:41) (18 - 21)  SpO2: 99% (28 Jun 2025 07:41) (95% - 100%)    Parameters below as of 28 Jun 2025 07:41  Patient On (Oxygen Delivery Method): room air          PHYSICAL EXAM  General: NAD  HEENT:  clear oropharynx, anicteric sclera  CV: (+) S1/S2 RRR  Lungs: clear to auscultation, no wheezes or rales  Abdomen: soft, non-tender, non-distended (+) BS  Ext: Trace  edema b/l LE   Skin: no rash  Neuro: alert and oriented X 3,   Central Line: PIVL CDI     LABS:                          6.7    0.22  )-----------( 9        ( 28 Jun 2025 07:26 )             19.6         Mean Cell Volume : 89.1 fl  Mean Cell Hemoglobin : 30.5 pg  Mean Cell Hemoglobin Concentration : 34.2 g/dL  Auto Neutrophil # : Not measured  Auto Lymphocyte # : Not measured  Auto Monocyte # : Not measured  Auto Eosinophil # : Not measured  Auto Basophil # : Not measured  Auto Neutrophil % : Not measured Differential percentages must be correlated with absolute numbers for  clinical significance.  Auto Lymphocyte % : Not measured  Auto Monocyte % : Not measured  Auto Eosinophil % : Not measured  Auto Basophil % : Not measured      06-28    127[L]  |  96  |  16  ----------------------------<  96  3.3[L]   |  16[L]  |  0.92    Ca    7.7[L]      28 Jun 2025 07:26  Phos  1.7     06-28  Mg     1.4     06-28    TPro  4.9[L]  /  Alb  2.7[L]  /  TBili  1.0  /  DBili  x   /  AST  11  /  ALT  19  /  AlkPhos  74  06-28        RECENT CULTURES:  06-26 @ 10:27  Blood Blood  --  --  --    Growth in aerobic bottle: Gram Negative Rods  Direct identification is available within approximately 3-5  hours either by Blood Panel Multiplexed PCR or Direct  MALDI-TOF. Details: [email or web address]  --          RADIOLOGY & ADDITIONAL STUDIES:   CT Head No Cont (06.27.25 @ 14:46) >  IMPRESSION: Unremarkable noncontrast CT of the brain. No intracranial   hemorrhage. No change since 2/25/2020.     Xray Chest 1 View- PORTABLE-Urgent (06.26.25 @ 18:39) >  IMPRESSION:  Clear lungs.               Diagnosis: MDS related AML, ASXL1, DDX41    Protocol/Chemo Regimen: s/p Cycle 1 HIDAC 2g/m2     Day: 14    Pt endorsed: SOB, generalized weakness     Review of Systems: Patient denies  nausea, vomiting, diarrhea, abdominal pain,  chest pain and headache.    Pain scale: denies now     Diet: DASH     Allergies    No Known Allergies    Intolerances        ANTIMICROBIALS  Meropenem    IVPB 1000 milliGRAM(s) IV Intermittent every 8 hours  posaconazole DR Tablet 300 milliGRAM(s) Oral every 12 hours  posaconazole DR Tablet   Oral   valACYclovir 500 milliGRAM(s) Oral two times a day      HEME/ONC MEDICATIONS      STANDING MEDICATIONS  allopurinol 300 milliGRAM(s) Oral daily  cholecalciferol 2000 Unit(s) Oral daily  diltiazem    milliGRAM(s) Oral daily  pantoprazole    Tablet 40 milliGRAM(s) Oral before breakfast  sodium chloride 0.9%. 1000 milliLiter(s) IV Continuous <Continuous>  tamsulosin 0.4 milliGRAM(s) Oral at bedtime      PRN MEDICATIONS  acetaminophen     Tablet .. 650 milliGRAM(s) Oral every 6 hours PRN  HYDROmorphone   Tablet 4 milliGRAM(s) Oral every 4 hours PRN        Vital Signs Last 24 Hrs  T(C): 36.8 (28 Jun 2025 07:41), Max: 39.4 (27 Jun 2025 12:20)  T(F): 98.3 (28 Jun 2025 07:41), Max: 102.9 (27 Jun 2025 12:20)  HR: 91 (28 Jun 2025 07:41) (82 - 111)  BP: 110/74 (28 Jun 2025 07:41) (109/57 - 131/80)  BP(mean): 74 (27 Jun 2025 14:20) (74 - 83)  RR: 19 (28 Jun 2025 07:41) (18 - 21)  SpO2: 99% (28 Jun 2025 07:41) (95% - 100%)    Parameters below as of 28 Jun 2025 07:41  Patient On (Oxygen Delivery Method): room air          PHYSICAL EXAM  General: NAD  HEENT:  clear oropharynx, anicteric sclera  CV: (+) S1/S2 RRR  Lungs: clear to auscultation, no wheezes or rales  Abdomen: soft, non-tender, non-distended (+) BS  Ext: Trace  edema b/l LE   Skin: no rash  Neuro: alert and oriented X 3,   Central Line: PIVL CDI     LABS:                          6.7    0.22  )-----------( 9        ( 28 Jun 2025 07:26 )             19.6         Mean Cell Volume : 89.1 fl  Mean Cell Hemoglobin : 30.5 pg  Mean Cell Hemoglobin Concentration : 34.2 g/dL  Auto Neutrophil # : Not measured  Auto Lymphocyte # : Not measured  Auto Monocyte # : Not measured  Auto Eosinophil # : Not measured  Auto Basophil # : Not measured  Auto Neutrophil % : Not measured Differential percentages must be correlated with absolute numbers for  clinical significance.  Auto Lymphocyte % : Not measured  Auto Monocyte % : Not measured  Auto Eosinophil % : Not measured  Auto Basophil % : Not measured      06-28    127[L]  |  96  |  16  ----------------------------<  96  3.3[L]   |  16[L]  |  0.92    Ca    7.7[L]      28 Jun 2025 07:26  Phos  1.7     06-28  Mg     1.4     06-28    TPro  4.9[L]  /  Alb  2.7[L]  /  TBili  1.0  /  DBili  x   /  AST  11  /  ALT  19  /  AlkPhos  74  06-28      Culture - Blood (06.26.25 @ 10:27)   - Piperacillin/Tazobactam: S <=8  - Aztreonam: S <=4  - Cefepime: S <=2  - Ciprofloxacin: S <=0.25  - Ceftazidime: S <=1  - Imipenem: S <=1  - Levofloxacin: S <=0.5  - Meropenem: S <=1  Gram Stain:   Growth in aerobic bottle: Gram Negative Rods  - Pseudomonas aeruginosa: Detec  Specimen Source: Blood Blood  Organism: Blood Culture PCR  Organism: Pseudomonas aeruginosa  Culture Results:   Growth in aerobic bottle: Pseudomonas aeruginosa   Direct identification is available within approximately 3-5   hours either by Blood Panel Multiplexed PCR or Direct   MALDI-TOF. Details: [email or web address]  Organism Identification: Blood Culture PCR   Pseudomonas aeruginosa  Method Type: PCR  Method Type: CYNTHIA      --          RADIOLOGY & ADDITIONAL STUDIES:   CT Head No Cont (06.27.25 @ 14:46) >  IMPRESSION: Unremarkable noncontrast CT of the brain. No intracranial   hemorrhage. No change since 2/25/2020.     Xray Chest 1 View- PORTABLE-Urgent (06.26.25 @ 18:39) >  IMPRESSION:  Clear lungs.

## 2025-06-28 NOTE — PROGRESS NOTE ADULT - ASSESSMENT
70 yo male with ASXL1, DDX41 mutated AML in CR1,  Day 12 s/p cycle 1 condensed HIDAC (2g/m2) consolidation, presenting from [facility]'s EDC appointment with neutropenic fever,. Hospital course complicated by Gram negative bacteriemia Recent hospitalization complicated by gout flare requiring steroid taper treatment completed 6/20. Pt is pancytopenic secondary to chemotherapy  68 yo male with ASXL1, DDX41 mutated AML in CR1,  Day 12 s/p cycle 1 condensed HIDAC (2g/m2) consolidation, presenting from [facility]'s EDC appointment with neutropenic fever,. Hospital course complicated by Gram negative bacteriemia Recent hospitalization complicated by gout flare requiring steroid taper treatment completed 6/20. Hospital course complicated  by Pseudomonas aeruginosa ( 6/27-)  Pt is pancytopenic secondary to chemotherapy

## 2025-06-29 LAB
ALBUMIN SERPL ELPH-MCNC: 3.1 G/DL — LOW (ref 3.3–5)
ALP SERPL-CCNC: 75 U/L — SIGNIFICANT CHANGE UP (ref 40–120)
ALT FLD-CCNC: 24 U/L — SIGNIFICANT CHANGE UP (ref 10–45)
ANION GAP SERPL CALC-SCNC: 15 MMOL/L — SIGNIFICANT CHANGE UP (ref 5–17)
AST SERPL-CCNC: 10 U/L — SIGNIFICANT CHANGE UP (ref 10–40)
BASOPHILS # BLD AUTO: SIGNIFICANT CHANGE UP
BASOPHILS # BLD AUTO: SIGNIFICANT CHANGE UP (ref 0–0.2)
BASOPHILS NFR BLD AUTO: SIGNIFICANT CHANGE UP
BASOPHILS NFR BLD AUTO: SIGNIFICANT CHANGE UP (ref 0–2)
BILIRUB SERPL-MCNC: 1 MG/DL — SIGNIFICANT CHANGE UP (ref 0.2–1.2)
BUN SERPL-MCNC: 12 MG/DL — SIGNIFICANT CHANGE UP (ref 7–23)
CALCIUM SERPL-MCNC: 8 MG/DL — LOW (ref 8.4–10.5)
CHLORIDE SERPL-SCNC: 100 MMOL/L — SIGNIFICANT CHANGE UP (ref 96–108)
CO2 SERPL-SCNC: 18 MMOL/L — LOW (ref 22–31)
CREAT SERPL-MCNC: 0.87 MG/DL — SIGNIFICANT CHANGE UP (ref 0.5–1.3)
EGFR: 94 ML/MIN/1.73M2 — SIGNIFICANT CHANGE UP
EGFR: 94 ML/MIN/1.73M2 — SIGNIFICANT CHANGE UP
EOSINOPHIL # BLD AUTO: SIGNIFICANT CHANGE UP
EOSINOPHIL # BLD AUTO: SIGNIFICANT CHANGE UP (ref 0–0.5)
EOSINOPHIL NFR BLD AUTO: SIGNIFICANT CHANGE UP
EOSINOPHIL NFR BLD AUTO: SIGNIFICANT CHANGE UP (ref 0–6)
GLUCOSE SERPL-MCNC: 104 MG/DL — HIGH (ref 70–99)
HCT VFR BLD CALC: 22.5 % — LOW (ref 39–50)
HGB BLD-MCNC: 8 G/DL — LOW (ref 13–17)
IMM GRANULOCYTES # BLD AUTO: SIGNIFICANT CHANGE UP (ref 0–0.07)
IMM GRANULOCYTES NFR BLD AUTO: SIGNIFICANT CHANGE UP (ref 0–0.9)
IMMATURE PLATELET FRACTION #: 0.2 K/UL — LOW (ref 3.9–12.5)
IMMATURE PLATELET FRACTION %: 0.9 % — LOW (ref 1.6–7.1)
LYMPHOCYTES # BLD AUTO: SIGNIFICANT CHANGE UP
LYMPHOCYTES # BLD AUTO: SIGNIFICANT CHANGE UP
LYMPHOCYTES # BLD AUTO: SIGNIFICANT CHANGE UP (ref 1–3.3)
LYMPHOCYTES NFR BLD AUTO: SIGNIFICANT CHANGE UP (ref 13–44)
MAGNESIUM SERPL-MCNC: 1.6 MG/DL — SIGNIFICANT CHANGE UP (ref 1.6–2.6)
MCHC RBC-ENTMCNC: 31.4 PG — SIGNIFICANT CHANGE UP (ref 27–34)
MCHC RBC-ENTMCNC: 35.6 G/DL — SIGNIFICANT CHANGE UP (ref 32–36)
MCV RBC AUTO: 88.2 FL — SIGNIFICANT CHANGE UP (ref 80–100)
MONOCYTES # BLD AUTO: SIGNIFICANT CHANGE UP
MONOCYTES # BLD AUTO: SIGNIFICANT CHANGE UP (ref 0–0.9)
MONOCYTES NFR BLD AUTO: SIGNIFICANT CHANGE UP
MONOCYTES NFR BLD AUTO: SIGNIFICANT CHANGE UP (ref 2–14)
NEUTROPHILS # BLD AUTO: SIGNIFICANT CHANGE UP
NEUTROPHILS # BLD AUTO: SIGNIFICANT CHANGE UP (ref 1.8–7.4)
NEUTROPHILS NFR BLD AUTO: SIGNIFICANT CHANGE UP
NEUTROPHILS NFR BLD AUTO: SIGNIFICANT CHANGE UP (ref 43–77)
NRBC # BLD AUTO: 0 K/UL — SIGNIFICANT CHANGE UP (ref 0–0)
NRBC # FLD: 0 K/UL — SIGNIFICANT CHANGE UP (ref 0–0)
NRBC BLD AUTO-RTO: 0 /100 WBCS — SIGNIFICANT CHANGE UP (ref 0–0)
PHOSPHATE SERPL-MCNC: 1.8 MG/DL — LOW (ref 2.5–4.5)
PHOSPHATE SERPL-MCNC: 3.2 MG/DL — SIGNIFICANT CHANGE UP (ref 2.5–4.5)
PLATELET # BLD AUTO: 21 K/UL — LOW (ref 150–400)
PMV BLD: SIGNIFICANT CHANGE UP FL (ref 7–13)
POTASSIUM SERPL-MCNC: 3.6 MMOL/L — SIGNIFICANT CHANGE UP (ref 3.5–5.3)
POTASSIUM SERPL-SCNC: 3.6 MMOL/L — SIGNIFICANT CHANGE UP (ref 3.5–5.3)
PROT SERPL-MCNC: 5.3 G/DL — LOW (ref 6–8.3)
RBC # BLD: 2.55 M/UL — LOW (ref 4.2–5.8)
RBC # FLD: 17.2 % — HIGH (ref 10.3–14.5)
SODIUM SERPL-SCNC: 133 MMOL/L — LOW (ref 135–145)
WBC # BLD: 0.28 K/UL — CRITICAL LOW (ref 3.8–10.5)
WBC # FLD AUTO: 0.28 K/UL — CRITICAL LOW (ref 3.8–10.5)

## 2025-06-29 PROCEDURE — 99233 SBSQ HOSP IP/OBS HIGH 50: CPT

## 2025-06-29 RX ORDER — MAGNESIUM SULFATE 500 MG/ML
2 SYRINGE (ML) INJECTION ONCE
Refills: 0 | Status: COMPLETED | OUTPATIENT
Start: 2025-06-29 | End: 2025-06-29

## 2025-06-29 RX ORDER — POTASSIUM PHOSPHATE, MONOBASIC POTASSIUM PHOSPHATE, DIBASIC INJECTION, 236; 224 MG/ML; MG/ML
30 SOLUTION, CONCENTRATE INTRAVENOUS ONCE
Refills: 0 | Status: COMPLETED | OUTPATIENT
Start: 2025-06-29 | End: 2025-06-29

## 2025-06-29 RX ADMIN — Medication 0.5 MILLIGRAM(S): at 14:44

## 2025-06-29 RX ADMIN — MEROPENEM 100 MILLIGRAM(S): 1 INJECTION INTRAVENOUS at 21:26

## 2025-06-29 RX ADMIN — Medication 0.5 MILLIGRAM(S): at 00:26

## 2025-06-29 RX ADMIN — Medication 650 MILLIGRAM(S): at 12:26

## 2025-06-29 RX ADMIN — Medication 25 GRAM(S): at 10:13

## 2025-06-29 RX ADMIN — Medication 0.5 MILLIGRAM(S): at 10:32

## 2025-06-29 RX ADMIN — Medication 15 MILLILITER(S): at 14:43

## 2025-06-29 RX ADMIN — Medication 2000 UNIT(S): at 12:26

## 2025-06-29 RX ADMIN — Medication 40 MILLIEQUIVALENT(S): at 12:25

## 2025-06-29 RX ADMIN — Medication 4 MILLIGRAM(S): at 17:47

## 2025-06-29 RX ADMIN — Medication 4 MILLIGRAM(S): at 07:48

## 2025-06-29 RX ADMIN — Medication 15 MILLILITER(S): at 21:27

## 2025-06-29 RX ADMIN — Medication 650 MILLIGRAM(S): at 13:00

## 2025-06-29 RX ADMIN — Medication 4 MILLIGRAM(S): at 12:25

## 2025-06-29 RX ADMIN — Medication 0.5 MILLIGRAM(S): at 10:10

## 2025-06-29 RX ADMIN — Medication 500 MILLIGRAM(S): at 06:10

## 2025-06-29 RX ADMIN — TAMSULOSIN HYDROCHLORIDE 0.4 MILLIGRAM(S): 0.4 CAPSULE ORAL at 21:27

## 2025-06-29 RX ADMIN — Medication 0.5 MILLIGRAM(S): at 22:00

## 2025-06-29 RX ADMIN — Medication 0.5 MILLIGRAM(S): at 21:36

## 2025-06-29 RX ADMIN — Medication 0.5 MILLIGRAM(S): at 15:14

## 2025-06-29 RX ADMIN — DILTIAZEM HYDROCHLORIDE 120 MILLIGRAM(S): 240 TABLET, EXTENDED RELEASE ORAL at 06:10

## 2025-06-29 RX ADMIN — POSACONAZOLE 300 MILLIGRAM(S): 100 TABLET, DELAYED RELEASE ORAL at 17:08

## 2025-06-29 RX ADMIN — POTASSIUM PHOSPHATE, MONOBASIC POTASSIUM PHOSPHATE, DIBASIC INJECTION, 83.33 MILLIMOLE(S): 236; 224 SOLUTION, CONCENTRATE INTRAVENOUS at 14:13

## 2025-06-29 RX ADMIN — MEROPENEM 100 MILLIGRAM(S): 1 INJECTION INTRAVENOUS at 06:10

## 2025-06-29 RX ADMIN — Medication 40 MILLIGRAM(S): at 06:10

## 2025-06-29 RX ADMIN — Medication 4 MILLIGRAM(S): at 13:00

## 2025-06-29 RX ADMIN — Medication 15 MILLILITER(S): at 06:10

## 2025-06-29 RX ADMIN — Medication 500 MILLIGRAM(S): at 17:07

## 2025-06-29 RX ADMIN — Medication 4 MILLIGRAM(S): at 17:17

## 2025-06-29 RX ADMIN — Medication 4 MILLIGRAM(S): at 08:30

## 2025-06-29 RX ADMIN — Medication 300 MILLIGRAM(S): at 12:26

## 2025-06-29 RX ADMIN — MEROPENEM 100 MILLIGRAM(S): 1 INJECTION INTRAVENOUS at 16:57

## 2025-06-29 RX ADMIN — Medication 0.5 MILLIGRAM(S): at 01:00

## 2025-06-29 NOTE — PROGRESS NOTE ADULT - PROBLEM SELECTOR PLAN 1
admitted with neutropenic fever   s/p C1 condensed HIDAC (2g/m2) consolidation on 6/15/25 - 6/17/25  s/p G-CSF Neulasta as outpatient 6/20  CBC w diff, CMP, coags  Hgb goal >8; PLTs goal >20k or if bleeding  stirct I&Os daily weights mouth care  6/28 Anemia PRBC x 1  6/28 Thrombocytopenia Platelet x 1 admitted with neutropenic fever   s/p C1 condensed HIDAC (2g/m2) consolidation on 6/15/25 - 6/17/25  s/p G-CSF Neulasta as outpatient 6/20  CBC w diff, CMP, coags  Hgb goal >8; PLTs goal >20k or if bleeding  stirct I&Os daily weights mouth care  6/29: S/P 40 mEQ KCl x1, S/p Mg repletion for hypomagnesemia, S/p 30mm KPhos IV for hypophosphatemia.

## 2025-06-29 NOTE — PROVIDER CONTACT NOTE (MEDICATION) - SITUATION
Pt feels he finds no relief in oral pain meds. RN contacted NP/PA in order to find dose with efficacy in order to provide pt with relief. plan of care discussed with patient in that in time, must wean off of IV meds prior to dc home. Requested Chronic pain to come and see patient to assist him better with pain management
Pt requesting 1x dose IVP dilaudid because he is in an immense amount of pain and he "needs something to take the edge off"

## 2025-06-29 NOTE — PROVIDER CONTACT NOTE (MEDICATION) - REASON
pt requesting IVP dilaudid
Pt reports no relief from oral pain meds. IV pain meds relief provided but don't last

## 2025-06-29 NOTE — PROGRESS NOTE ADULT - PROBLEM SELECTOR PLAN 2
patient is neutropenic, febrile  cont Meropenem ,   6/27-2/28s/p  Vanco  6/26 cultures BC + GNR , follow up surveillance cultures    cont ppx posaconazole, valtrex  6/26 CXR - clear lungs  6/27 BC (+) Pseudomonas aeruginosa: follow up surveillance cultures patient is neutropenic, febrile  cont Meropenem ,   6/27-2/28s/p  Vanco  6/26 cultures BC + GNR , follow up surveillance cultures    cont ppx posaconazole, valtrex  6/26 CXR - clear lungs  6/27 BC (+) Pseudomonas aeruginosa: follow up surveillance cultures, patient is neutropenic, afebrile  cont Meropenem ,   6/27-2/28s/p  Vanco  6/26 cultures BC + GNR , follow up surveillance cultures    cont ppx posaconazole, valtrex  6/26 CXR - clear lungs  6/27 BC (+) Pseudomonas aeruginosa:  surveillance cultures NGT patient is neutropenic, afebrile  cont Meropenem ,   6/27-2/28s/p  Vanco  6/26 cultures BC + GNR , follow up surveillance cultures    cont ppx posaconazole, valtrex  6/26 CXR - clear lungs  6/26 BC (+) Pseudomonas aeruginosa:  surveillance cultures NGT

## 2025-06-29 NOTE — PROVIDER CONTACT NOTE (MEDICATION) - ACTION/TREATMENT ORDERED:
NP states order is in place, and plan to assess or evaluate patient at bedside, hopefully by tomorrow. Pt updated by RN
provider notified. provider ordered 1x 0.5mg IVP dilaudid.

## 2025-06-29 NOTE — PROGRESS NOTE ADULT - SUBJECTIVE AND OBJECTIVE BOX
Diagnosis: MDS related AML, ASXL1, DDX41    Protocol/Chemo Regimen: s/p Cycle 1 HIDAC 2g/m2     Day: 15    Pt endorsed: SOB, generalized weakness     Review of Systems: Patient denies  nausea, vomiting, diarrhea, abdominal pain,  chest pain and headache.    Pain scale: denies now     Diet: DASH     Allergies    No Known Allergies    Intolerances   Diagnosis: MDS related AML, ASXL1, DDX41    Protocol/Chemo Regimen: s/p Cycle 1 HIDAC 2g/m2     Day: 15    Pt endorsed: generalized weakness     Review of Systems: Patient denies  nausea, vomiting, diarrhea, abdominal pain,  chest pain and headache.    Pain scale: denies now     Diet: DASH     Allergies    No Known Allergies    Intolerances      ANTIMICROBIALS  meropenem  IVPB 1000 milliGRAM(s) IV Intermittent every 8 hours  posaconazole DR Tablet 300 milliGRAM(s) Oral every 24 hours  posaconazole DR Tablet   Oral   valACYclovir 500 milliGRAM(s) Oral two times a day    STANDING MEDICATIONS  allopurinol 300 milliGRAM(s) Oral daily  Biotene Dry Mouth Oral Rinse 15 milliLiter(s) Swish and Spit three times a day  cholecalciferol 2000 Unit(s) Oral daily  diltiazem    milliGRAM(s) Oral daily  magnesium sulfate  IVPB 2 Gram(s) IV Intermittent once  pantoprazole    Tablet 40 milliGRAM(s) Oral before breakfast  potassium chloride    Tablet ER 40 milliEquivalent(s) Oral once  potassium phosphate IVPB 30 milliMole(s) IV Intermittent once  sodium chloride 0.9%. 1000 milliLiter(s) IV Continuous <Continuous>  tamsulosin 0.4 milliGRAM(s) Oral at bedtime      PRN MEDICATIONS  acetaminophen     Tablet .. 650 milliGRAM(s) Oral every 6 hours PRN  HYDROmorphone   Tablet 4 milliGRAM(s) Oral every 4 hours PRN  HYDROmorphone  Injectable 0.5 milliGRAM(s) IV Push every 4 hours PRN  metoclopramide Injectable 10 milliGRAM(s) IV Push every 6 hours PRN        Vital Signs Last 24 Hrs  T(C): 36.6 (29 Jun 2025 08:27), Max: 37.7 (29 Jun 2025 06:04)  T(F): 97.9 (29 Jun 2025 08:27), Max: 99.9 (29 Jun 2025 06:04)  HR: 101 (29 Jun 2025 08:27) (82 - 101)  BP: 128/74 (29 Jun 2025 08:27) (116/75 - 128/74)  BP(mean): --  RR: 18 (29 Jun 2025 08:27) (18 - 18)  SpO2: 97% (29 Jun 2025 08:27) (95% - 100%)    Parameters below as of 29 Jun 2025 08:27  Patient On (Oxygen Delivery Method): room air        PHYSICAL EXAM  General: NAD  HEENT:  clear oropharynx, anicteric sclera  CV: (+) S1/S2 RRR  Lungs: clear to auscultation, no wheezes or rales  Abdomen: soft, non-tender, non-distended (+) BS  Ext: Trace  edema b/l LE   Skin: no rash  Neuro: alert and oriented X 3,   Central Line: PIVL CDI     LABS:                        8.0    0.28  )-----------( 21       ( 29 Jun 2025 07:14 )             22.5         Mean Cell Volume : 88.2 fl  Mean Cell Hemoglobin : 31.4 pg  Mean Cell Hemoglobin Concentration : 35.6 g/dL  Auto Neutrophil # : Not measured  Auto Lymphocyte # : Not measured  Auto Monocyte # : Not measured  Auto Eosinophil # : Not measured  Auto Basophil # : Not measured  Auto Neutrophil % : Not measured Differential percentages must be correlated with absolute numbers for  clinical significance.  Auto Lymphocyte % : Not measured  Auto Monocyte % : Not measured  Auto Eosinophil % : Not measured  Auto Basophil % : Not measured      06-29    133[L]  |  100  |  12  ----------------------------<  104[H]  3.6   |  18[L]  |  0.87    Ca    8.0[L]      29 Jun 2025 07:14  Phos  1.8     06-29  Mg     1.6     06-29    TPro  5.3[L]  /  Alb  3.1[L]  /  TBili  1.0  /  DBili  x   /  AST  10  /  ALT  24  /  AlkPhos  75  06-29      Mg 1.6  Phos 1.8         Diagnosis: MDS related AML, ASXL1, DDX41    Protocol/Chemo Regimen: s/p Cycle 1 HIDAC 2g/m2     Day: 15    Pt endorsed: generalized weakness, swollen lips     Review of Systems: Patient denies  nausea, vomiting, diarrhea, abdominal pain,  chest pain and headache.    Pain scale: denies now     Diet: DASH     Allergies    No Known Allergies    Intolerances      ANTIMICROBIALS  meropenem  IVPB 1000 milliGRAM(s) IV Intermittent every 8 hours  posaconazole DR Tablet 300 milliGRAM(s) Oral every 24 hours  posaconazole DR Tablet   Oral   valACYclovir 500 milliGRAM(s) Oral two times a day    STANDING MEDICATIONS  allopurinol 300 milliGRAM(s) Oral daily  Biotene Dry Mouth Oral Rinse 15 milliLiter(s) Swish and Spit three times a day  cholecalciferol 2000 Unit(s) Oral daily  diltiazem    milliGRAM(s) Oral daily  magnesium sulfate  IVPB 2 Gram(s) IV Intermittent once  pantoprazole    Tablet 40 milliGRAM(s) Oral before breakfast  potassium chloride    Tablet ER 40 milliEquivalent(s) Oral once  potassium phosphate IVPB 30 milliMole(s) IV Intermittent once  sodium chloride 0.9%. 1000 milliLiter(s) IV Continuous <Continuous>  tamsulosin 0.4 milliGRAM(s) Oral at bedtime      PRN MEDICATIONS  acetaminophen     Tablet .. 650 milliGRAM(s) Oral every 6 hours PRN  HYDROmorphone   Tablet 4 milliGRAM(s) Oral every 4 hours PRN  HYDROmorphone  Injectable 0.5 milliGRAM(s) IV Push every 4 hours PRN  metoclopramide Injectable 10 milliGRAM(s) IV Push every 6 hours PRN        Vital Signs Last 24 Hrs  T(C): 36.6 (29 Jun 2025 08:27), Max: 37.7 (29 Jun 2025 06:04)  T(F): 97.9 (29 Jun 2025 08:27), Max: 99.9 (29 Jun 2025 06:04)  HR: 101 (29 Jun 2025 08:27) (82 - 101)  BP: 128/74 (29 Jun 2025 08:27) (116/75 - 128/74)  BP(mean): --  RR: 18 (29 Jun 2025 08:27) (18 - 18)  SpO2: 97% (29 Jun 2025 08:27) (95% - 100%)    Parameters below as of 29 Jun 2025 08:27  Patient On (Oxygen Delivery Method): room air        PHYSICAL EXAM  General: NAD  HEENT:  clear oropharynx, anicteric sclera, + angioedema   CV: (+) S1/S2 RRR  Lungs: clear to auscultation, no wheezes or rales  Abdomen: soft, non-tender, non-distended (+) BS  Ext: Trace  edema b/l LE   Skin: no rash  Neuro: alert and oriented X 3,   Central Line: PIVL CDI     LABS:                        8.0    0.28  )-----------( 21       ( 29 Jun 2025 07:14 )             22.5         Mean Cell Volume : 88.2 fl  Mean Cell Hemoglobin : 31.4 pg  Mean Cell Hemoglobin Concentration : 35.6 g/dL  Auto Neutrophil # : Not measured  Auto Lymphocyte # : Not measured  Auto Monocyte # : Not measured  Auto Eosinophil # : Not measured  Auto Basophil # : Not measured  Auto Neutrophil % : Not measured Differential percentages must be correlated with absolute numbers for  clinical significance.  Auto Lymphocyte % : Not measured  Auto Monocyte % : Not measured  Auto Eosinophil % : Not measured  Auto Basophil % : Not measured      06-29    133[L]  |  100  |  12  ----------------------------<  104[H]  3.6   |  18[L]  |  0.87    Ca    8.0[L]      29 Jun 2025 07:14  Phos  1.8     06-29  Mg     1.6     06-29    TPro  5.3[L]  /  Alb  3.1[L]  /  TBili  1.0  /  DBili  x   /  AST  10  /  ALT  24  /  AlkPhos  75  06-29    Radiology      CT Head No Cont (06.27.25 @ 14:46) >  IMPRESSION: Unremarkable noncontrast CT of the brain. No intracranial   hemorrhage. No change since 2/25/2020.    : Xray Chest 1 View- PORTABLE-Urgent (06.26.25 @ 18:39) >  IMPRESSION:  Clear lungs.

## 2025-06-29 NOTE — PROGRESS NOTE ADULT - ASSESSMENT
68 yo male with ASXL1, DDX41 mutated AML in CR1,  Day 12 s/p cycle 1 condensed HIDAC (2g/m2) consolidation, presenting from [facility]'s EDC appointment with neutropenic fever,. Hospital course complicated by Gram negative bacteriemia Recent hospitalization complicated by gout flare requiring steroid taper treatment completed 6/20. Hospital course complicated  by Pseudomonas aeruginosa ( 6/27-)  Pt is pancytopenic secondary to chemotherapy  70 yo male with ASXL1, DDX41 mutated AML in CR1,  Day 12 s/p cycle 1 condensed HIDAC (2g/m2) consolidation, presenting from [facility]'s EDC appointment with neutropenic fever. Recent hospitalization complicated by gout flare requiring steroid taper treatment completed 6/20. Hospital course complicated  by Pseudomonas aeruginosa ( 6/27-), on Meropenem.  Pt is pancytopenic secondary to chemotherapy  68 yo male with ASXL1, DDX41 mutated AML in CR1,  Day 12 s/p cycle 1 condensed HIDAC (2g/m2) consolidation, presenting from [facility]'s EDC appointment with neutropenic fever.  Hospital course complicated  by Pseudomonas aeruginosa ( 6/27-28), on Meropenem and hyponatremia ( 6/27-)  Pt is pancytopenic secondary to chemotherapy  70 yo male with ASXL1, DDX41 mutated AML in CR1,  Day 12 s/p cycle 1 condensed HIDAC (2g/m2) consolidation, presenting from [facility]'s EDC appointment with neutropenic fever.  Hospital course complicated  by Pseudomonas aeruginosa ( 6/26) ), on Meropenem and hyponatremia ( 6/27-)  Pt is pancytopenic secondary to chemotherapy

## 2025-06-30 ENCOUNTER — APPOINTMENT (OUTPATIENT)
Dept: HEMATOLOGY ONCOLOGY | Facility: CLINIC | Age: 70
End: 2025-06-30

## 2025-06-30 ENCOUNTER — RX RENEWAL (OUTPATIENT)
Age: 70
End: 2025-06-30

## 2025-06-30 ENCOUNTER — APPOINTMENT (OUTPATIENT)
Dept: INFUSION THERAPY | Facility: HOSPITAL | Age: 70
End: 2025-06-30

## 2025-06-30 LAB
ALBUMIN SERPL ELPH-MCNC: 3.1 G/DL — LOW (ref 3.3–5)
ALP SERPL-CCNC: 116 U/L — SIGNIFICANT CHANGE UP (ref 40–120)
ALT FLD-CCNC: 112 U/L — HIGH (ref 10–45)
ANION GAP SERPL CALC-SCNC: 15 MMOL/L — SIGNIFICANT CHANGE UP (ref 5–17)
AST SERPL-CCNC: 87 U/L — HIGH (ref 10–40)
BASOPHILS # BLD AUTO: SIGNIFICANT CHANGE UP (ref 0–0.2)
BASOPHILS NFR BLD AUTO: SIGNIFICANT CHANGE UP (ref 0–2)
BILIRUB SERPL-MCNC: 0.7 MG/DL — SIGNIFICANT CHANGE UP (ref 0.2–1.2)
BLD GP AB SCN SERPL QL: NEGATIVE — SIGNIFICANT CHANGE UP
BUN SERPL-MCNC: 10 MG/DL — SIGNIFICANT CHANGE UP (ref 7–23)
CALCIUM SERPL-MCNC: 8.1 MG/DL — LOW (ref 8.4–10.5)
CHLORIDE SERPL-SCNC: 105 MMOL/L — SIGNIFICANT CHANGE UP (ref 96–108)
CO2 SERPL-SCNC: 18 MMOL/L — LOW (ref 22–31)
CREAT SERPL-MCNC: 0.87 MG/DL — SIGNIFICANT CHANGE UP (ref 0.5–1.3)
EGFR: 93 ML/MIN/1.73M2 — SIGNIFICANT CHANGE UP
EGFR: 93 ML/MIN/1.73M2 — SIGNIFICANT CHANGE UP
EOSINOPHIL # BLD AUTO: SIGNIFICANT CHANGE UP (ref 0–0.5)
EOSINOPHIL NFR BLD AUTO: SIGNIFICANT CHANGE UP (ref 0–6)
GLUCOSE BLDC GLUCOMTR-MCNC: 112 MG/DL — HIGH (ref 70–99)
GLUCOSE SERPL-MCNC: 104 MG/DL — HIGH (ref 70–99)
HCT VFR BLD CALC: 24.3 % — LOW (ref 39–50)
HGB BLD-MCNC: 8.2 G/DL — LOW (ref 13–17)
IMM GRANULOCYTES # BLD AUTO: SIGNIFICANT CHANGE UP (ref 0–0.07)
IMM GRANULOCYTES NFR BLD AUTO: SIGNIFICANT CHANGE UP (ref 0–0.9)
IMMATURE PLATELET FRACTION #: 0.2 K/UL — LOW (ref 3.9–12.5)
IMMATURE PLATELET FRACTION %: 1.7 % — SIGNIFICANT CHANGE UP (ref 1.6–7.1)
LYMPHOCYTES # BLD AUTO: SIGNIFICANT CHANGE UP (ref 1–3.3)
LYMPHOCYTES NFR BLD AUTO: SIGNIFICANT CHANGE UP (ref 13–44)
MAGNESIUM SERPL-MCNC: 2.1 MG/DL — SIGNIFICANT CHANGE UP (ref 1.6–2.6)
MCHC RBC-ENTMCNC: 30.9 PG — SIGNIFICANT CHANGE UP (ref 27–34)
MCHC RBC-ENTMCNC: 33.7 G/DL — SIGNIFICANT CHANGE UP (ref 32–36)
MCV RBC AUTO: 91.7 FL — SIGNIFICANT CHANGE UP (ref 80–100)
MONOCYTES # BLD AUTO: SIGNIFICANT CHANGE UP (ref 0–0.9)
MONOCYTES NFR BLD AUTO: SIGNIFICANT CHANGE UP (ref 2–14)
NEUTROPHILS # BLD AUTO: SIGNIFICANT CHANGE UP (ref 1.8–7.4)
NEUTROPHILS NFR BLD AUTO: SIGNIFICANT CHANGE UP (ref 43–77)
NRBC # BLD AUTO: 0 K/UL — SIGNIFICANT CHANGE UP (ref 0–0)
NRBC # FLD: 0 K/UL — SIGNIFICANT CHANGE UP (ref 0–0)
NRBC BLD AUTO-RTO: 0 /100 WBCS — SIGNIFICANT CHANGE UP (ref 0–0)
PHOSPHATE SERPL-MCNC: 2.1 MG/DL — LOW (ref 2.5–4.5)
PLATELET # BLD AUTO: 13 K/UL — CRITICAL LOW (ref 150–400)
PMV BLD: 12.6 FL — SIGNIFICANT CHANGE UP (ref 7–13)
POTASSIUM SERPL-MCNC: 4.1 MMOL/L — SIGNIFICANT CHANGE UP (ref 3.5–5.3)
POTASSIUM SERPL-SCNC: 4.1 MMOL/L — SIGNIFICANT CHANGE UP (ref 3.5–5.3)
PROT SERPL-MCNC: 5.6 G/DL — LOW (ref 6–8.3)
RBC # BLD: 2.65 M/UL — LOW (ref 4.2–5.8)
RBC # FLD: 17.4 % — HIGH (ref 10.3–14.5)
RH IG SCN BLD-IMP: POSITIVE — SIGNIFICANT CHANGE UP
SODIUM SERPL-SCNC: 138 MMOL/L — SIGNIFICANT CHANGE UP (ref 135–145)
WBC # BLD: 0.34 K/UL — CRITICAL LOW (ref 3.8–10.5)
WBC # FLD AUTO: 0.34 K/UL — CRITICAL LOW (ref 3.8–10.5)

## 2025-06-30 PROCEDURE — 99233 SBSQ HOSP IP/OBS HIGH 50: CPT

## 2025-06-30 PROCEDURE — 99222 1ST HOSP IP/OBS MODERATE 55: CPT

## 2025-06-30 RX ORDER — ACETAMINOPHEN 500 MG/5ML
1000 LIQUID (ML) ORAL ONCE
Refills: 0 | Status: COMPLETED | OUTPATIENT
Start: 2025-06-30 | End: 2025-06-30

## 2025-06-30 RX ORDER — CEFEPIME 2 G/20ML
2 INJECTION, POWDER, FOR SOLUTION INTRAVENOUS
Qty: 30 | Refills: 0
Start: 2025-06-30 | End: 2025-07-04

## 2025-06-30 RX ORDER — HYDROMORPHONE/SOD CHLOR,ISO/PF 2 MG/10 ML
6 SYRINGE (ML) INJECTION EVERY 4 HOURS
Refills: 0 | Status: DISCONTINUED | OUTPATIENT
Start: 2025-06-30 | End: 2025-07-06

## 2025-06-30 RX ORDER — CEFEPIME 2 G/20ML
2000 INJECTION, POWDER, FOR SOLUTION INTRAVENOUS EVERY 8 HOURS
Refills: 0 | Status: DISCONTINUED | OUTPATIENT
Start: 2025-06-30 | End: 2025-07-05

## 2025-06-30 RX ORDER — CEFEPIME 2 G/20ML
2 INJECTION, POWDER, FOR SOLUTION INTRAVENOUS
Qty: 60 | Refills: 0
Start: 2025-06-30 | End: 2025-07-09

## 2025-06-30 RX ADMIN — Medication 0.5 MILLIGRAM(S): at 18:45

## 2025-06-30 RX ADMIN — MEROPENEM 100 MILLIGRAM(S): 1 INJECTION INTRAVENOUS at 05:59

## 2025-06-30 RX ADMIN — Medication 0.5 MILLIGRAM(S): at 02:36

## 2025-06-30 RX ADMIN — Medication 4 MILLIGRAM(S): at 10:03

## 2025-06-30 RX ADMIN — Medication 500 MILLIGRAM(S): at 05:47

## 2025-06-30 RX ADMIN — Medication 40 MILLIGRAM(S): at 05:57

## 2025-06-30 RX ADMIN — DILTIAZEM HYDROCHLORIDE 120 MILLIGRAM(S): 240 TABLET, EXTENDED RELEASE ORAL at 05:47

## 2025-06-30 RX ADMIN — Medication 500 MILLIGRAM(S): at 18:06

## 2025-06-30 RX ADMIN — Medication 4 MILLIGRAM(S): at 11:00

## 2025-06-30 RX ADMIN — Medication 100 MILLILITER(S): at 11:08

## 2025-06-30 RX ADMIN — Medication 1000 MILLIGRAM(S): at 19:09

## 2025-06-30 RX ADMIN — Medication 2000 UNIT(S): at 11:08

## 2025-06-30 RX ADMIN — Medication 0.5 MILLIGRAM(S): at 06:15

## 2025-06-30 RX ADMIN — CEFEPIME 100 MILLIGRAM(S): 2 INJECTION, POWDER, FOR SOLUTION INTRAVENOUS at 21:41

## 2025-06-30 RX ADMIN — Medication 15 MILLILITER(S): at 13:03

## 2025-06-30 RX ADMIN — Medication 15 MILLILITER(S): at 21:42

## 2025-06-30 RX ADMIN — Medication 650 MILLIGRAM(S): at 13:20

## 2025-06-30 RX ADMIN — Medication 300 MILLIGRAM(S): at 11:08

## 2025-06-30 RX ADMIN — Medication 0.5 MILLIGRAM(S): at 22:45

## 2025-06-30 RX ADMIN — TAMSULOSIN HYDROCHLORIDE 0.4 MILLIGRAM(S): 0.4 CAPSULE ORAL at 21:41

## 2025-06-30 RX ADMIN — Medication 400 MILLIGRAM(S): at 18:46

## 2025-06-30 RX ADMIN — Medication 0.5 MILLIGRAM(S): at 01:36

## 2025-06-30 RX ADMIN — Medication 0.5 MILLIGRAM(S): at 11:30

## 2025-06-30 RX ADMIN — Medication 0.5 MILLIGRAM(S): at 05:45

## 2025-06-30 RX ADMIN — POSACONAZOLE 300 MILLIGRAM(S): 100 TABLET, DELAYED RELEASE ORAL at 18:06

## 2025-06-30 RX ADMIN — Medication 0.5 MILLIGRAM(S): at 11:07

## 2025-06-30 RX ADMIN — CEFEPIME 100 MILLIGRAM(S): 2 INJECTION, POWDER, FOR SOLUTION INTRAVENOUS at 13:03

## 2025-06-30 RX ADMIN — Medication 0.5 MILLIGRAM(S): at 18:06

## 2025-06-30 RX ADMIN — Medication 650 MILLIGRAM(S): at 14:00

## 2025-06-30 RX ADMIN — Medication 0.5 MILLIGRAM(S): at 22:06

## 2025-06-30 RX ADMIN — Medication 15 MILLILITER(S): at 05:47

## 2025-06-30 NOTE — PROGRESS NOTE ADULT - NS ATTEND AMEND GEN_ALL_CORE FT
.  Primary: Chitty    Vital Signs Last 24 Hrs  T(C): 36.8 (30 Jun 2025 00:17), Max: 37.2 (29 Jun 2025 15:27)  T(F): 98.3 (30 Jun 2025 00:17), Max: 98.9 (29 Jun 2025 15:27)  HR: 90 (30 Jun 2025 00:17) (76 - 101)  BP: 127/77 (30 Jun 2025 00:17) (108/63 - 128/74)  BP(mean): --  RR: 18 (30 Jun 2025 00:17) (18 - 18)  SpO2: 97% (30 Jun 2025 00:17) (95% - 98%)    Parameters below as of 30 Jun 2025 00:17  Patient On (Oxygen Delivery Method): room air    MEDICATIONS  (STANDING):  allopurinol 300 milliGRAM(s) Oral daily  Biotene Dry Mouth Oral Rinse 15 milliLiter(s) Swish and Spit three times a day  cholecalciferol 2000 Unit(s) Oral daily  diltiazem    milliGRAM(s) Oral daily  meropenem  IVPB 1000 milliGRAM(s) IV Intermittent every 8 hours  pantoprazole    Tablet 40 milliGRAM(s) Oral before breakfast  posaconazole DR Tablet 300 milliGRAM(s) Oral every 24 hours  posaconazole DR Tablet   Oral   sodium chloride 0.9%. 1000 milliLiter(s) (100 mL/Hr) IV Continuous <Continuous>  tamsulosin 0.4 milliGRAM(s) Oral at bedtime  valACYclovir 500 milliGRAM(s) Oral two times a day      Assessment: 69M HiDAC Cycle 1 Day 15 for ASXL1-mutated and [id number]-[id number] AML.  Course complicated by pseudomonal septicemia.     Heme:   - s/p Neulasta outpatient 6/20  - Continue allopurinol 300 mg once a day  - Continue daily oral B12 suppl  - Transfuse for Hgb <7, plts <10k    ID: Febrile neutropenia  - cefepime and Vanco (6/26), Meropenem (6/27 - ), return to cef and arrange for home cef.  - 6/27 negative    MSK:  - acute on chronic pain: Dilaudid for now, will consult palliative care for f/u    Dispo:  - Discharge to [facility] once stable    Over 55 minutes were spent in direct patient care and care coordination. .  Primary: Chitty    Vital Signs Last 24 Hrs  T(C): 36.8 (30 Jun 2025 00:17), Max: 37.2 (29 Jun 2025 15:27)  T(F): 98.3 (30 Jun 2025 00:17), Max: 98.9 (29 Jun 2025 15:27)  HR: 90 (30 Jun 2025 00:17) (76 - 101)  BP: 127/77 (30 Jun 2025 00:17) (108/63 - 128/74)  BP(mean): --  RR: 18 (30 Jun 2025 00:17) (18 - 18)  SpO2: 97% (30 Jun 2025 00:17) (95% - 98%)    Parameters below as of 30 Jun 2025 00:17  Patient On (Oxygen Delivery Method): room air    MEDICATIONS  (STANDING):  allopurinol 300 milliGRAM(s) Oral daily  Biotene Dry Mouth Oral Rinse 15 milliLiter(s) Swish and Spit three times a day  cholecalciferol 2000 Unit(s) Oral daily  diltiazem    milliGRAM(s) Oral daily  meropenem  IVPB 1000 milliGRAM(s) IV Intermittent every 8 hours  pantoprazole    Tablet 40 milliGRAM(s) Oral before breakfast  posaconazole DR Tablet 300 milliGRAM(s) Oral every 24 hours  posaconazole DR Tablet   Oral   sodium chloride 0.9%. 1000 milliLiter(s) (100 mL/Hr) IV Continuous <Continuous>  tamsulosin 0.4 milliGRAM(s) Oral at bedtime  valACYclovir 500 milliGRAM(s) Oral two times a day      Assessment: 69M HiDAC Cycle 1 Day 15 for ASXL1-mutated and [id number]-yhincwo AML.  Course complicated by pseudomonal septicemia.     Heme:   - s/p Neulasta outpatient 6/20  - Continue allopurinol 300 mg once a day - maybe able to stop in a few days  - Continue daily oral B12 suppl  - Transfuse for Hgb <7, plts <10k    ID: Febrile neutropenia  - cefepime and Vanco (6/26), Meropenem (6/27 - ), return to cef and arrange for home cef. -total of a 10 day course  - 6/27 negative    MSK:  - acute on chronic pain: Dilaudid     Dispo:  - Discharge to EDC in 24 hours; Place PICC    Over 55 minutes were spent in direct patient care and care coordination.

## 2025-06-30 NOTE — CONSULT NOTE ADULT - PROBLEM SELECTOR RECOMMENDATION 3
As indicated above, please consult chronic pain.   I will sign off.         Pollo Limon MD  Associate Chief Geriatrics and Palliative Care ([facility]) [facility]   [facility] Consult Service   , [employer] at [facility]      Please contact me via Teams from Monday through Friday between 9am-5pm. If not answering, please call the palliative care pager [phone]    After 5pm and on weekends, please see the contact information below:    In the event of newly developing, evolving, or worsening symptoms, please contact the Palliative Medicine team via pager (if the patient is at [facility] #[phone] or if the patient is at [facility] #[phone]) The Geriatric and Palliative Medicine service has coverage 24 hours a day/ 7 days a week to provide medical recommendations regarding symptom management needs via telephone

## 2025-06-30 NOTE — PROVIDER CONTACT NOTE (OTHER) - SITUATION
Patient oral temperature 101.4
Pt returned from [facility] and stated he felt weak, sweaty, and nauseous
pt febrile 102.8
Oral temp . Pt c/o chills and visibly shaking. Requesting Tylenol IVPB
Blood consent form needed. No consent form in the pts binder.
pt blood tx almost completed pt c/o chills vs documented in sunrise temp 99.6 oral
Oral temp 102.7  /80. Requesting Tylenol to manage Temp.
Oral temp 99.9
Pt in 8/10pain. Oral Temp 102.9
pt febrile 102.4

## 2025-06-30 NOTE — PROVIDER CONTACT NOTE (OTHER) - RECOMMENDATIONS
Ice packs, Cooling blanket
Tylenol, Ice packs
Fill out consent form with the patient to transfuse PRBCs
IVPB Tylenol
IV Tylenol, Ice packs, cooling blanket. Dilaudid to manage pain.
Zofran or Reglan

## 2025-06-30 NOTE — PROVIDER CONTACT NOTE (OTHER) - DATE AND TIME:
27-Jun-2025 18:45
27-Jun-2025 05:14
27-Jun-2025 18:06
30-Jun-2025 18:15
27-Jun-2025 07:47
27-Jun-2025 11:40
28-Jun-2025 05:11
26-Jun-2025 21:42
27-Jun-2025 12:20
27-Jun-2025 14:57

## 2025-06-30 NOTE — PROGRESS NOTE ADULT - SUBJECTIVE AND OBJECTIVE BOX
Diagnosis: MDS related AML, ASXL1, DDX41    Protocol/Chemo Regimen: s/p Cycle 1 HIDAC 2g/m2     Day: 16    Pt endorsed: generalized weakness    Review of Systems: Patient denies  nausea, vomiting, diarrhea, abdominal pain,  chest pain and headache.    Pain scale: denies now     Diet: DASH     Allergies: No Known Allergies    ANTIMICROBIALS  cefepime   IVPB 2000 milliGRAM(s) IV Intermittent every 8 hours  posaconazole DR Tablet 300 milliGRAM(s) Oral every 24 hours  valACYclovir 500 milliGRAM(s) Oral two times a day    STANDING MEDICATIONS  allopurinol 300 milliGRAM(s) Oral daily  Biotene Dry Mouth Oral Rinse 15 milliLiter(s) Swish and Spit three times a day  cholecalciferol 2000 Unit(s) Oral daily  diltiazem    milliGRAM(s) Oral daily  pantoprazole    Tablet 40 milliGRAM(s) Oral before breakfast  sodium chloride 0.9%. 1000 milliLiter(s) IV Continuous <Continuous>  tamsulosin 0.4 milliGRAM(s) Oral at bedtime    PRN MEDICATIONS  acetaminophen     Tablet .. 650 milliGRAM(s) Oral every 6 hours PRN  HYDROmorphone   Tablet 4 milliGRAM(s) Oral every 4 hours PRN  HYDROmorphone  Injectable 0.5 milliGRAM(s) IV Push every 4 hours PRN  metoclopramide Injectable 10 milliGRAM(s) IV Push every 6 hours PRN    Vital Signs Last 24 Hrs  T(C): 36.7 (30 Jun 2025 16:02), Max: 37.3 (30 Jun 2025 13:56)  T(F): 98 (30 Jun 2025 16:02), Max: 99.2 (30 Jun 2025 13:56)  HR: 75 (30 Jun 2025 16:02) (75 - 90)  BP: 115/75 (30 Jun 2025 16:02) (109/64 - 127/77)  RR: 18 (30 Jun 2025 16:02) (18 - 18)  SpO2: 99% (30 Jun 2025 16:02) (96% - 99%)    Parameters below as of 30 Jun 2025 16:02  Patient On (Oxygen Delivery Method): room air    PHYSICAL EXAM  General: NAD  HEENT:  clear oropharynx, anicteric sclera, + angioedema   CV: (+) S1/S2 RRR  Lungs: clear to auscultation, no wheezes or rales  Abdomen: soft, non-tender, non-distended (+) BS  Ext: Trace  edema b/l LE   Skin: no rash  Neuro: alert and oriented X 3,   Central Line: PIVL CDI     LABS:                      8.2    0.34  )-----------( 13       ( 30 Jun 2025 07:17 )             24.3     Mean Cell Volume : 91.7 fl  Mean Cell Hemoglobin : 30.9 pg  Mean Cell Hemoglobin Concentration : 33.7 g/dL  Auto Neutrophil # : Not measured  Auto Lymphocyte # : Not measured  Auto Monocyte # : Not measured  Auto Eosinophil # : Not measured  Auto Basophil # : Not measured  Auto Neutrophil % : Not measured Differential percentages must be correlated with absolute numbers for  clinical significance.  Auto Lymphocyte % : Not measured  Auto Monocyte % : Not measured  Auto Eosinophil % : Not measured  Auto Basophil % : Not measured    06-30    138  |  105  |  10  ----------------------------<  104[H]  4.1   |  18[L]  |  0.87    Ca    8.1[L]      30 Jun 2025 07:17  Phos  2.1     06-30  Mg     2.1     06-30    TPro  5.6[L]  /  Alb  3.1[L]  /  TBili  0.7  /  DBili  x   /  AST  87[H]  /  ALT  112[H]  /  AlkPhos  116  06-30  -------------------  Cultures:  Culture - Blood (06.28.25 @ 07:31)    Specimen Source: Blood Blood-Peripheral   Culture Results:   No growth at 48 Hours    Culture - Blood (06.28.25 @ 07:31)    Specimen Source: Blood Blood-Peripheral   Culture Results:   No growth at 48 Hours    Culture - Blood (06.27.25 @ 10:53)    Specimen Source: Blood Blood-Peripheral   Culture Results:   No growth at 72 Hours    ---------------------  RADIOLOGY & ADDITIONAL STUDIES:  from: CT Head No Cont (06.27.25 @ 14:46)   INTERPRETATION:  CLINICAL INFORMATION: s/p fall , AML    5mm axial sections of the brain were obtained from base to vertex,   without the intravenous administration of contrast material. Coronal and   sagittal computer generated reconstructed views are available.    Comparison is made with the prior CT of 2/25/2020 and demonstrates no   significant interval change.    The fourth, third and lateral ventricles are normal size and position.   There is no hemorrhage, mass or shift of the midline structures. There is   normal gray white matter differentiation. Bone window examination is   unremarkable.    IMPRESSION: Unremarkable noncontrast CT of the brain. No intracranial   hemorrhage. No change since 2/25/2020.

## 2025-06-30 NOTE — CONSULT NOTE ADULT - SUBJECTIVE AND OBJECTIVE BOX
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 He was admitted from the [facility] presenting with neutropenic fever and hypotension during an appointment at the [facility]charge Clinic. The history includes maintenance of a remission state with a recent cycle of HIDAC consolidation chemotherapy that began on 6/15/25. His recent hospitalization was further complicated by a gout flare which required treatment with a steroid taper. Despite ongoing fever possibly related to cytarabine, infectious workups have been negative. Upon admission, the patient began IV Cefepime treatment. Noteworthy events during hospitalization include recurrent fever with tachypnea and worsening back pain post completion and resumption of packed RBC transfusion, requiring NRB placement due to oxygen desaturation and tachycardia. He was transferred to the [facility] 8th floor. His past medical history includes spinal stenosis, hypertension, hyperlipidemia, GERD, BPH, and carrier of BRCA2 mutation.   Geriatrics and Palliative Medicine (GaP) Team was called for pain management. Over 24 ours (6am to 6am) he use Dilaudid 4mg PO x 3 and 0.5mg IV x 5. Today after 6am and until 2pm, he has used Dilaudid 4mg PO x 1 and 0.5mg IV x 1 (11am).     CONSULTANTS INPUTS:  - Hematology/Oncology: Continued treatment with Neulasta, consideration for stopping Allopurinol, B12 supplementation, and transfusion thresholds established.  - Infectious Disease: Antibiotic therapy with cefepime and Vanco initially, later transitioning to Meropenem, returning to cefepime for a full 10-day course.  - Musculoskeletal: Acute on chronic pain management with Dilaudid, disposal plans including discharge to Early Discharge Clinic and PICC placement.    PERTINENT PM/SXH:   - Significant for BRCA2 carrier status, hypertension, hyperlipidemia, gout, GERD, BPH, spinal stenosis, and ASXL1-mutated and IPD79-nihlnmt AML.    FAMILY HISTORY:  No pertinent family history in first degree relatives  Family Hx substance abuse [ ]yes [x]no  ITEMS NOT CHECKED ARE NOT PRESENT    SOCIAL HISTORY:   Significant other/partner[x]  Children[ ]  Hindu/Spirituality:  Substance hx:  [ ]   Tobacco hx:  [x]   Alcohol hx: [ ]   Home Opioid hx:  [ ] I-Stop Reference No:  #: [id number]  PDI	My Rx	Current Rx	Drug Type	Rx Written	Rx Dispensed	Drug	Quantity	Days Supply	Prescriber Name	Prescriber LORENZO #	Payment Method	Dispenser  A	N	Y	O	2025	hydromorphone 4 mg tablet	84	14	Santino Wallace	[id number]	[facility]	N	N	O	2025	oxycodone hcl (ir) 5 mg tablet	42	7	Lou Rincon	[id number]	[facility]	N	N	O	2025	tramadol hcl 50 mg tablet	60	20	Claude Rizzo)	[id number]	Medicare	[id number]  A	N	N	O	2025	oxycodone-acetaminophen 5-325 mg tablet	14	2	Dax Frye DDS	[id number]	Medicare	[id number]  A	N	N	O	2025	oxycodone hcl (ir) 5 mg tablet	20	5	Jocelyn Vasquez ([place])	[id number]	Medicare	[id number]  A	N	N	O	2025	oxycodone hcl (ir) 5 mg tablet	20	5	Konrad Herrera	[id number]	Medicare	[id number]  Living Situation: [x]Home  [ ]Long term care  [ ]Rehab [ ]Other  Also, social history as per Care Coordination notes:  from wife, lives at home with roommates.    ADVANCE DIRECTIVES:    DNR/MOLST  [ ]  Living Will  [ ]   DECISION MAKER(s):  [ ] Health Care Proxy(s)  [ ] Surrogate(s)  [ ] Guardian  [x] Emergency contact          Name(s): Phone Number(s): [place] [phone]    BASELINE (I)ADL(s) (prior to admission):  [place]: [ ]Total  [x] Moderate [ ]Dependent    Allergies    No Known Allergies    Intolerances    MEDICATIONS  (STANDING):  allopurinol 300 milliGRAM(s) Oral daily  Biotene Dry Mouth Oral Rinse 15 milliLiter(s) Swish and Spit three times a day  cholecalciferol 2000 Unit(s) Oral daily  diltiazem    milliGRAM(s) Oral daily  meropenem  IVPB 1000 milliGRAM(s) IV Intermittent every 8 hours  pantoprazole    Tablet 40 milliGRAM(s) Oral before breakfast  posaconazole DR Tablet 300 milliGRAM(s) Oral every 24 hours  posaconazole DR Tablet   Oral   sodium chloride 0.9%. 1000 milliLiter(s) (100 mL/Hr) IV Continuous <Continuous>  tamsulosin 0.4 milliGRAM(s) Oral at bedtime  valACYclovir 500 milliGRAM(s) Oral two times a day    MEDICATIONS  (PRN):  PO Dilaudid 4mg q4    ITEMS UNCHECKED ARE NOT PRESENT    PRESENT SYMPTOMS: [ ]Unable to self-report  [ ] CPOT [ ] PAINADs [ ] RDOS  Source if other than patient:  [ ]Family   [ ]Team     Pain: [ ]yes [x]no  QOL impact -   Location -                    Aggravating factors -  Quality -  Radiation -  Timing-  Severity (0-10 scale):  Minimal acceptable level (0-10 scale):     CPOT:    https://www.[facility].org/getattachment/mye65w72-2t1y-0a2w-[email or web address]/Critical-Care-Pain-Observation-Tool-(CPOT)    PAINAD Score: See PAINAD tool and score below     Dyspnea:                           [ ]Mild [ ]Moderate [ ]Severe    RDOS: See RDOS tool and score below   0 to 2  minimal or no respiratory distress   3  mild distress  4 to 6 moderate distress  >7 severe distress      Anxiety:                             [ ]Mild [ ]Moderate [ ]Severe  Fatigue:                             [ ]Mild [ ]Moderate [ ]Severe  Nausea:                             [ ]Mild [ ]Moderate [ ]Severe  Loss of appetite:              [ ]Mild [ ]Moderate [ ]Severe  Constipation:                    [ ]Mild [ ]Moderate [ ]Severe    Spiritual Screening:   Spiritual Pain (Do you have pain deep in your soul/being that is not physical?)  Severity (0-10): See ESAS numeral 10 for scoring.   Chaplaincy Referral: [ ] yes [ ] refused [ ] following [ ] Deferred     Caregiver [place]? [ ] yes [ ] no [ ] Deferred [ ] Declined               Social Work Referral [x] Social Work Following [ ] Patient & Family Centered Care Referral [ ] Patient & Family Centered Care Following [ ]    Anticipatory Grief present? [ ] yes [ ] no  [ ] Deferred                    Social Work Referral [ ] Chaplaincy Referral [x] Social Work Following [ ] Chaplaincy Following [ ]    Other Symptoms:  [ ] All other review of systems negative     Palliative Performance Status Version 2:   See PPSv2 tool and score below          PHYSICAL EXAM:  Vital Signs Last 24 Hrs  T(C): 36.8 (2025 00:17), Max: 37.2 (2025 15:27)  T(F): 98.3 (2025 00:17), Max: 98.9 (2025 15:27)  HR: 90 (:17) (76 - 101)  BP: 127/77 (2025 00:17) (108/63 - 128/74)  BP(mean): --  RR: 18 (:17) (18 - 18)  SpO2: 97% (:17) (95% - 98%)    Parameters below as of 2025 00:17  Patient On (Oxygen Delivery Method): room air     I&O's Summary    07 Dec 2022 07:  -  08 Dec 2022 07:00  --------------------------------------------------------  IN: 540 mL / OUT: 425 mL / NET: 115 mL    08 Dec 2022 07:01  - 08 Dec 2022 15:35  --------------------------------------------------------  IN: 240 mL / OUT: 100 mL / NET: 140 mL    GENERAL: [ ]Cachexia    [x]Alert  [x]Oriented x   [ ]Lethargic  [ ]Unarousable  [ ]Verbal  [ ]Non-Verbal  Behavioral:   [x] Anxiety  [ ] Delirium [ ] Agitation [ ] Other  HEENT:  [x]Normal   [ ]Dry mouth   [ ]ET Tube/Trach  [ ]Oral lesions  PULMONARY:   [x]Clear [ ]Tachypnea  [ ]Audible excessive secretions   [ ]Rhonchi        [ ]Right [ ]Left [ ]Bilateral  [ ]Crackles        [ ]Right [ ]Left [ ]Bilateral  [ ]Wheezing     [ ]Right [ ]Left [ ]Bilateral  [ ]Diminished breath sounds [ ]right [ ]left [x]bilateral  [ ]Labored Breathing   CARDIOVASCULAR:    [x]Regular [ ]Irregular [ ]Tachy  [ ]Lui [ ]Murmur [ ]Other  GASTROINTESTINAL:  [x]Soft  [ ]Distended   [x]+BS  [ ]Non tender [x]Tender  [ ]Other [ ]PEG [ ]OGT/ NGT  Last BM:  GENITOURINARY:  [x]Normal [ ] Incontinent   [ ]Oliguria/Anuria   [ ]Villalobos  MUSCULOSKELETAL:   [ ]Normal   [x]Weakness  [ ]Bed/Wheelchair bound [ ]Edema  NEUROLOGIC:   [x]No focal deficits  [ ]Cognitive impairment  [x]Dysphagia [x]Dysarthria [ ]Paresis [ ]Other   SKIN:   [x]Normal  [ ]Rash  [ ]Other  [ ]Pressure ulcer(s)       Present on admission [ ]y [ ]n    CRITICAL CARE:  [ ] Shock Present  [ ]Septic [ ]Cardiogenic [ ]Neurologic [ ]Hypovolemic  [ ]  Vasopressors [ ]  Inotropes   [ ]Respiratory failure present [ ]Mechanical ventilation [ ]Non-invasive ventilatory support [ ]High flow    [ ]Acute  [ ]Chronic [ ]Hypoxic  [ ]Hypercarbic [ ]Other  [ ]Other organ failure     LABS:                        10.6   3.79  )-----------( 117      ( 08 Dec 2022 08:45 )             32.7   12-07    139  |  99  |  12  ----------------------------<  185<H>  4.1   |  25  |  0.48<L>    Ca    9.0      07 Dec 2022 09:09  Phos  2.8     12-  Mg     2.3     -          RADIOLOGY & ADDITIONAL STUDIES:  2025 - CT BRAIN: IMPRESSION: Unremarkable noncontrast CT of the brain. No intracranial hemorrhage. No change since 2020.    2025 - CT CHEST, ABDOMEN AND PELVIS OC: IMPRESSION: No acute CT findings suspicious for infection.    QTc value from EC ms    PROTEIN CALORIE MALNUTRITION PRESENT: [ ]mild [ ]moderate [ ]severe [x]underweight [ ]morbid obesity  https://www.andeal.org/vault/2440/web/files/ONC/Table_Clinical%20Characteristics%20to%20Document%20Malnutrition-White%20JV%20et%20al%843992.pdf    Height (cm): 165.1 (22 @ 18:22), 165.1 (10-31-22 @ 11:22), 165.1 (10-23-22 @ 11:01)  Weight (kg): 81.6 (22 @ 18:22), 76 (10-31-22 @ 11:22), 82 (22 @ 16:00)  BMI (kg/m2): 29.9 (22 @ 18:22), 27.9 (10-31-22 @ :22), 30.1 (10-23-22 @ 11:01)    [x]PPSV2 < or = to 30% [x]significant weight loss  [ ]poor nutritional intake  [ ]anasarca[ ]Artificial Nutrition      Other REFERRALS:  [ ]Hospice  [ ]Child Life  [x]Social Work  []Case management [ ]Holistic Therapy     Goals of Care Document: Prior to admission, patient resided with spouse in an apartment. Patient requires assistance with ADLs, spouse provides assistance and utilizes RW for ambulation.  Case management will continue to collaborate with interdisciplinary team to assess needs and remains available. Discharge plan includes home care, anticipated transport upon discharge with personal vehicle/family.  Anticipated Discharge Plan and Services included discharge home with personal transport and home care services.  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 He was admitted from the [facility] presenting with neutropenic fever and hypotension during an appointment at the [facility] Discharge Clinic. The history includes maintenance of a remission state with a recent cycle of HIDAC consolidation chemotherapy that began on 6/15/25. His recent hospitalization was further complicated by a gout flare which required treatment with a steroid taper. Despite ongoing fever possibly related to cytarabine, infectious workups have been negative. Upon admission, the patient began IV Cefepime treatment. Noteworthy events during hospitalization include recurrent fever with tachypnea and worsening back pain post completion and resumption of packed RBC transfusion, requiring NRB placement due to oxygen desaturation and tachycardia. He was transferred to the [facility] 8th floor. His past medical history includes spinal stenosis, hypertension, hyperlipidemia, GERD, BPH, and carrier of BRCA2 mutation.   Geriatrics and Palliative Medicine (GaP) Team was called for pain management.   SUBJECTIVE AND OBJECTIVE: The patient was c/o pain over his neck and lower back which he associated to a h/o of spinal issues and prior spinal fusions.     Indication for Geriatrics and Palliative Care Services/INTERVAL HPI: Pain management   OVERNIGHT EVENTS: Over 24 ours (6am to 6am) he use Dilaudid 4mg PO x 3 and 0.5mg IV x 5. Today after 6am and until 2pm, he has used Dilaudid 4mg PO x 1 and 0.5mg IV x 1 (11am).       CONSULTANTS INPUTS:  - Hematology/Oncology: Continued treatment with Neulasta, consideration for stopping Allopurinol, B12 supplementation, and transfusion thresholds established.  - Infectious Disease: Antibiotic therapy with cefepime and Vanco initially, later transitioning to Meropenem, returning to cefepime for a full 10-day course.  - Musculoskeletal: Acute on chronic pain management with Dilaudid, disposal plans including discharge to Early Discharge Clinic and PICC placement.    PERTINENT PM/SXH:   - Significant for BRCA2 carrier status, hypertension, hyperlipidemia, gout, GERD, BPH, spinal stenosis, and ASXL1-mutated and VLK12-xflnpzx AML.    FAMILY HISTORY:  No pertinent family history in first degree relatives  Family Hx substance abuse [ ]yes [x]no  ITEMS NOT CHECKED ARE NOT PRESENT    SOCIAL HISTORY:   Significant other/partner[x]  Children[ ]  Sikhism/Spirituality:  Substance hx:  [ ]   Tobacco hx:  [x]   Alcohol hx: [ ]   Home Opioid hx:  [ ] I-Stop Reference No:  #: [id number]  PDI	My Rx	Current Rx	Drug Type	Rx Written	Rx Dispensed	Drug	Quantity	Days Supply	Prescriber Name	Prescriber LORENZO #	Payment Method	Dispenser  A	N	Y	O	2025	hydromorphone 4 mg tablet	84	14	Santino Wallace	[id number]	Insurance	[facility]	N	N	O	2025	oxycodone hcl (ir) 5 mg tablet	42	7	Bhavya RinconJennifer	[id number]	[facility]	N	N	O	2025	tramadol hcl 50 mg tablet	60	20	Claude Rizzo)	[id number]	Medicare	[id number]  A	N	N	O	2025	oxycodone-acetaminophen 5-325 mg tablet	14	2	Dax Frye DDS	[id number]	Medicare	[id number]  A	N	N	O	2025	oxycodone hcl (ir) 5 mg tablet	20	5	Jocelyn Vasquez)	[id number]	Medicare	[id number]  A	N	N	O	2025	oxycodone hcl (ir) 5 mg tablet	20	5	Konrad Herrera	[id number]	Medicare	[id number]  Living Situation: [x]Home  [ ]Long term care  [ ]Rehab [ ]Other  Also, social history as per Care Coordination notes:  from wife, lives at home with roommates.    ADVANCE DIRECTIVES:    DNR/MOLST  [ ]  Living Will  [ ]   DECISION MAKER(s):  [ ] Health Care Proxy(s)  [ x] Surrogate(s)  [ ] Guardian  [x] Emergency contact          Name(s): Phone Number(s): [place] [phone]    BASELINE (I)ADL(s) (prior to admission):  [place]: [ ]Total  [x] Moderate [ ]Dependent    Allergies    No Known Allergies    Intolerances    MEDICATIONS  (STANDING):  allopurinol 300 milliGRAM(s) Oral daily  Biotene Dry Mouth Oral Rinse 15 milliLiter(s) Swish and Spit three times a day  cholecalciferol 2000 Unit(s) Oral daily  diltiazem    milliGRAM(s) Oral daily  meropenem  IVPB 1000 milliGRAM(s) IV Intermittent every 8 hours  pantoprazole    Tablet 40 milliGRAM(s) Oral before breakfast  posaconazole DR Tablet 300 milliGRAM(s) Oral every 24 hours  posaconazole DR Tablet   Oral   sodium chloride 0.9%. 1000 milliLiter(s) (100 mL/Hr) IV Continuous <Continuous>  tamsulosin 0.4 milliGRAM(s) Oral at bedtime  valACYclovir 500 milliGRAM(s) Oral two times a day    MEDICATIONS  (PRN):  PO Dilaudid 4mg q4    ITEMS UNCHECKED ARE NOT PRESENT    PRESENT SYMPTOMS: [ ]Unable to self-report  [ ] CPOT [ ] PAINADs [ ] RDOS  Source if other than patient:  [ ]Family   [ ]Team     Pain: [ x]yes [ ]no  QOL impact - Not being able to rest   Location -                  lower back and cervical region   Aggravating factors - movement   Quality - ache   Radiation - none   Timing- with movement   Severity (0-10 scale): 6-9/10 and improving to 2-3 with Dilaudid IV PRN   Minimal acceptable level (0-10 scale):  < 4    CPOT:    https://www.[facility].org/getattachment/tbg51b92-6d1w-5r7q-6w1j-6159z6050h0k/Critical-Care-Pain-Observation-Tool-(CPOT)    PAINAD Score: See PAINAD tool and score below     Dyspnea:                           [ ]Mild [ ]Moderate [ ]Severe    RDOS: See RDOS tool and score below   0 to 2  minimal or no respiratory distress   3  mild distress  4 to 6 moderate distress  >7 severe distress      Anxiety:                             [ ]Mild [ ]Moderate [ ]Severe  Fatigue:                             [ ]Mild [ ]Moderate [ ]Severe  Nausea:                             [ ]Mild [ ]Moderate [ ]Severe  Loss of appetite:              [ ]Mild [ ]Moderate [ ]Severe  Constipation:                    [ ]Mild [ ]Moderate [ ]Severe    Spiritual Screening:   Spiritual Pain (Do you have pain deep in your soul/being that is not physical?)  Severity (0-10): See ESAS numeral 10 for scoring.   Chaplaincy Referral: [ ] yes [ ] refused [ ] following [ ] Deferred     Caregiver [place]? [ ] yes [ ] no [x ] Deferred [ ] Declined               Social Work Referral [ ] Social Work Following [ ] Patient & Family Centered Care Referral [ ] Patient & Family Centered Care Following [ ]    Anticipatory Grief present? [ ] yes [ ] no  [ x] Deferred                    Social Work Referral [ ] Chaplaincy Referral [ ] Social Work Following [ ] Chaplaincy Following [ ]    Other Symptoms:  [x ] All other review of systems negative     Palliative Performance Status Version 2:   See PPSv2 tool and score below          PHYSICAL EXAM:  Vital Signs Last 24 Hrs  T(C): 36.8 (2025 00:17), Max: 37.2 (2025 15:27)  T(F): 98.3 (2025 00:17), Max: 98.9 (2025 15:27)  HR: 90 (2025 00:17) (76 - 101)  BP: 127/77 (2025 00:17) (108/63 - 128/74)  BP(mean): --  RR: 18 (2025 00:17) (18 - 18)  SpO2: 97% (2025 00:17) (95% - 98%)    Parameters below as of 2025 00:17  Patient On (Oxygen Delivery Method): room air     I&O's Summary    07 Dec 2022 07:01  -  08 Dec 2022 07:00  --------------------------------------------------------  IN: 540 mL / OUT: 425 mL / NET: 115 mL    08 Dec 2022 07:01  - 08 Dec 2022 15:35  --------------------------------------------------------  IN: 240 mL / OUT: 100 mL / NET: 140 mL    GENERAL: [ ]Cachexia    [x]Alert  [x]Oriented x 3   [ ]Lethargic  [ ]Unarousable  [ ]Verbal  [ ]Non-Verbal  Behavioral:   [ ] Anxiety  [ ] Delirium [ ] Agitation [ ] Other  HEENT:  [x]Normal   [ ]Dry mouth   [ ]ET Tube/Trach  [ ]Oral lesions  PULMONARY:   [x]Clear [ ]Tachypnea  [ ]Audible excessive secretions   [ ]Rhonchi        [ ]Right [ ]Left [ ]Bilateral  [ ]Crackles        [ ]Right [ ]Left [ ]Bilateral  [ ]Wheezing     [ ]Right [ ]Left [ ]Bilateral  [ ]Diminished breath sounds [ ]right [ ]left [x]bilateral  [ ]Labored Breathing   CARDIOVASCULAR:    [x]Regular [ ]Irregular [ ]Tachy  [ ]Lui [ ]Murmur [ ]Other  GASTROINTESTINAL:  [x]Soft  [ ]Distended   [x]+BS  [ ]Non tender [x]Tender  [ ]Other [ ]PEG [ ]OGT/ NGT  Last BM:  GENITOURINARY:  [x]Normal [ ] Incontinent   [ ]Oliguria/Anuria   [ ]Villalobos  MUSCULOSKELETAL:   [ ]Normal   [x]Weakness  [ ]Bed/Wheelchair bound [ ]Edema  NEUROLOGIC:   [x]No focal deficits  [ ]Cognitive impairment  [x]Dysphagia [x]Dysarthria [ ]Paresis [ ]Other   SKIN:   [x]Normal  [ ]Rash  [ ]Other  [ ]Pressure ulcer(s)       Present on admission [ ]y [ ]n    CRITICAL CARE:  [ ] Shock Present  [ ]Septic [ ]Cardiogenic [ ]Neurologic [ ]Hypovolemic  [ ]  Vasopressors [ ]  Inotropes   [ ]Respiratory failure present [ ]Mechanical ventilation [ ]Non-invasive ventilatory support [ ]High flow    [ ]Acute  [ ]Chronic [ ]Hypoxic  [ ]Hypercarbic [ ]Other  [ ]Other organ failure     LABS:                        10.6   3.79  )-----------( 117      ( 08 Dec 2022 08:45 )             32.7   12-07    139  |  99  |  12  ----------------------------<  185<H>  4.1   |  25  |  0.48<L>    Ca    9.0      07 Dec 2022 09:09  Phos  2.8     12-07  Mg     2.3     12-07          RADIOLOGY & ADDITIONAL STUDIES:  2025 - CT BRAIN: IMPRESSION: Unremarkable noncontrast CT of the brain. No intracranial hemorrhage. No change since 2020.    2025 - CT CHEST, ABDOMEN AND PELVIS OC: IMPRESSION: No acute CT findings suspicious for infection.    QTc value from EC ms    PROTEIN CALORIE MALNUTRITION PRESENT: [ ]mild [ ]moderate [ ]severe [x]underweight [ ]morbid obesity  https://www.andeal.org/vault/2440/web/files/ONC/Table_Clinical%20Characteristics%20to%20Document%20Malnutrition-White%20JV%20et%20al%2020.pdf    Height (cm): 165.1 (22 @ 18:22), 165.1 (10-31-22 @ 11:22), 165.1 (10-23-22 @ 11:01)  Weight (kg): 81.6 (22 @ 18:22), 76 (10-31-22 @ 11:22), 82 (22 @ 16:00)  BMI (kg/m2): 29.9 (22 @ 18:22), 27.9 (10-31-22 @ 11:22), 30.1 (10-23-22 @ 11:01)    [x]PPSV2 < or = to 30% [x]significant weight loss  [ ]poor nutritional intake  [ ]anasarca[ ]Artificial Nutrition      Other REFERRALS:  [ ]Hospice  [ ]Child Life  [x]Social Work  []Case management [ ]Holistic Therapy     Goals of Care Document: Prior to admission, patient resided with spouse in an apartment. Patient requires assistance with ADLs, spouse provides assistance and utilizes RW for ambulation.  Case management will continue to collaborate with interdisciplinary team to assess needs and remains available. Discharge plan includes home care, anticipated transport upon discharge with personal vehicle/family.  Anticipated Discharge Plan and Services included discharge home with personal transport and home care services.

## 2025-06-30 NOTE — PROGRESS NOTE ADULT - PROBLEM SELECTOR PLAN 1
admitted with neutropenic fever   s/p C1 condensed HIDAC (2g/m2) consolidation on 6/15/25 - 6/17/25  s/p G-CSF Neulasta as outpatient 6/20  CBC w diff, CMP, coags  Hgb goal >8; PLTs goal >20k or if bleeding  stirct I&Os daily weights mouth care  6/29: S/P 40 mEQ KCl x1, S/p Mg repletion for hypomagnesemia, S/p 30mm KPhos IV for hypophosphatemia.  6/30 change Abx to cefepime. 1 bag PLTS in prep for midline and possible discharge tomorrow. need PT re-eval

## 2025-06-30 NOTE — PHARMACOTHERAPY INTERVENTION NOTE - COMMENTS
*NOTE IS NOT UPDATED*    Clinical Pharmacy Specialist- Hematology/Oncology- Progress Note    Pt is a 68 y/o male w/ PMH of HTN, HLD, GERD, BPH, Gout, spinal stenosis (s/p multiple surgeries) and newly diagnosed AML (ASXL1-mutated and DDX41) s/p 7+3 Induction therapy, course complicated by gout flare vs septic arthritis, andC1 HIDAC consolidation, now admitted for neutropenic fever    Antimicrobial Course:  - Cefepime (pseudomonas bacteremia- 6/26-6/27  --> Meropenem (pseudomonas; rigors w/ cefepime)- 6/27  - Valtrex- 6/12  - Vancomycin- 6/26  MRSA nasal swab    Last Neutropenic (ANC<1000): 6/27; ANC= 0  Last Febrile: 27-Jun-2025 12:20; T= 102.9  Days Non-Neutropenic: 0  Days afebrile: 0    Chemotherapy Course  -Current Regimen: HIDAC 2gm/m2 Consolidation  History:  (5/2/25)- 7+3 Induction   -Daunorubicin 60mg/m2 IVP D1-3   -Cytarabine 100mg/m2 IV cont inf D1-7  (6/15/25) C1 HIDAC 2gm/m2  -Day: 13 (6/27)  BmBx:  Access: R TLC (accessed 5/5/25)    Vancomycin Levels:   Date    Dose              Value    Ref Range	                             Status   5/21    750mg q12hr   7.8       Trough 10-20; AUC= 400-600   Final  5/23    1gm q12hr      10.9     Trough 10-20; AUC= 400-600   Final    History/Relevant clinical information used in assessment:  - Ht= 5’11”; IBW= 75.3kg; Actual BW= 107.3kg; Adj BW= 88kg  - 5/2- IRR? rigors, chills, and tachycardia 2 hours into day 1; rasburicase 3mg x 1  - 5/4 -Crcl= 67ml/min ; Scr=1.32; Wt= 88kg; Adjusted BW used since pt BMI>30  - 5/9- increase melatonin 3mg to 10mh qhs- c/o insomnia  - 5/13- mouth pain- has ulceration   - 5/14-5/28- severe mucositis   - 5/15- bloody diarrhea x 3 on reglan prn, but has not needed, monitor for d/c to not aggravate w/ prokinetic agent  - 5/15- HSV neg  - 5/16- bloody BM's decreased to 2x'd/day from 4x's/day yest  - 5/17 UCx- 5/17- 10-49k VRE- per ID, do not need to treat  - 5/18- midodrine 10mg PO tid  - 5/19- BlCx- Meth resistant Staphylococcus haemolyticus- on vancomycin 1gm q12hr, this will yield AUC= 457; per ID, likely contaminant, expect short course of vanco; - 5/19, still febrile since 5/11, on cefepime, BlCx NGTD + abd discomfort (added flagyl)-->CT CAP 5/15 - no infection (but groundglass opacities stable from previous?)  - 5/21- zarxio 480mcg daily 5/21- 5/23  - 5/27- gout flare- colchicine 0.6mg BID x 2 days (5/27-5/29) -can cont until flare resolves, some experts cont 24-48 hrs after resolution; if symptoms do not resolve after 3 days, consider other anti-inflammatory agents- pt pain not relieved- added prednisone 40mg daily x 2 days (5/28-29) f/b 20mg x 2 days (5/30-6/1)  - 5/29- pt had episodes of diarrhea d/t colchicine  - 5/29- - allopurinol 100mg (300mg d/c'd 5/15)-keeping for gout-->300mg- 5/29- given UA rising- 5/29  - 5/30 will decrease diltiazem CD 240mg to 120mg  Meds sent for auth: midodrine, allopurinol 300mg (increased), prednisone, simethicone- sent to [facility] 5/29  - 6/13- c/o pain in both feet now  - 6/16- Confirmed home medications with patient  Home Medications:  ·	cholecalciferol oral tablet: 2000 unit(s) PO daily  ·	omeprazole 40 mg PO daily  ·	tamsulosin 0.4 mg PO daily  ·	diltiazem CD 120mg PO daily  ·	Allopurinol 300mg PO daily  ·	Valtrex 500mg PO BID  ·	atorvastatin 80mg PO qhs  ·	colchicine 0,6mg PO - 59 days supply filled 6/2025- pt does not want or take  ·	hctz, tramadol, midodrine, oxy- no longer taking  - 6/12- r/o septic arthritis of L ankle- s/p arthrocentesis & allopurinol 300mg PO daily, - hydromorphone 4mg PO q4hrs prn- 6/17  - 6/13- methylprednisone 40mg IVP daily- taper: 30mg daily x 2 days- 6/17-6/18--> 20mg daily x 1 day- 6/19 -->10mg daily x 1 day- 6/20 --> stop  - 6/13- not neutropenic, on cefepime for fevers- switched to levaquin through thurs 6/19 (for 7 days duration)- today is last day  - 6/17- a bit confused- 2/2 to opioids? cytarabine?  Meds sent for auth: methylpred 10mg x 1 dose- sent to [facility] 6/19    Assessment/Plan/Recommendation:  Onc:  - on allopurinol 300mg PO daily- h/o gout & gout flare  ID:  - 6/26 BlCx +pseudomonas- pt febrile & rigoring- escalated to meropenem for FN  - 6/27- vancomycin 1gm q8hr- may need to decrease as this is predicted to yield AUC= 692 (goal 400-600); pt was therapeutic on 1gm q12hr in the past; if VT >20, would HOLD pm dose and obtain vanco random tomorrow am 6/28. If VT <20, would give 750mg-1gm q12hr (this will yield AUC= 436- 582)  Rheum:  - on allopurinol 300mg PO daily- h/o gout & gout flare  Cards:  - atorvastatin 80mg hold if on posa d/t DDI (Cat X)  Dispo:    Additional Monitoring Needed?   -Yes- Continue to monitor renal function & daily counts for abx escalation/de-escalation   -Discharge Planning:  --> New meds:   --> Meds sent for auth:   --> Delivered meds:    Case discussed with attending/primary team    Liza Jolley, PharmD, BCPS  Clinical Pharmacy Specialist | Hematology/Oncology  [facility]  Email: [email or web address] or available on [employer]   Clinical Pharmacy Specialist- Hematology/Oncology- Progress Note    Pt is a 68 y/o male w/ PMH of HTN, HLD, GERD, BPH, Gout, spinal stenosis (s/p multiple surgeries) and newly diagnosed AML (ASXL1-mutated and DDX41) s/p 7+3 Induction therapy, course complicated by gout flare vs septic arthritis, andC1 HIDAC consolidation, now admitted for neutropenic fever    Antimicrobial Course:  - Cefepime (pseudomonas bacteremia)- 6/26-6/27  --> Meropenem (pseudomonas; rigors w/ cefepime)- 6/27- 6/30  --> Cefepime (pseudomonas bacteremia)- 6/30  - Valtrex- 6/12  - Posaconazole- 6/26  - Vancomycin- 6/26- 6/27  MRSA nasal swab    Last Neutropenic (ANC<1000): 6/30; ANC= 0  Last Febrile: 28-Jun-2025 04:16; T= 101.4  Days Non-Neutropenic: 0  Days afebrile: 2    Chemotherapy Course  -Current Regimen: HIDAC 2gm/m2 Consolidation  History:  (5/2/25)- 7+3 Induction   -Daunorubicin 60mg/m2 IVP D1-3   -Cytarabine 100mg/m2 IV cont inf D1-7  (6/15/25) C1 HIDAC 2gm/m2  -Day: 16 (6/30)  BmBx:  Access: R TLC (accessed 5/5/25)    Vancomycin Levels:   Date    Dose              Value    Ref Range	                             Status   5/21    750mg q12hr   7.8       Trough 10-20; AUC= 400-600   Final  5/23    1gm q12hr      10.9     Trough 10-20; AUC= 400-600   Final    History/Relevant clinical information used in assessment:  - Ht= 5’11”; IBW= 75.3kg; Actual BW= 107.3kg; Adj BW= 88kg  - 5/2- IRR? rigors, chills, and tachycardia 2 hours into day 1; rasburicase 3mg x 1  - 5/4 -Crcl= 67ml/min ; Scr=1.32; Wt= 88kg; Adjusted BW used since pt BMI>30  - 5/9- increase melatonin 3mg to 10mh qhs- c/o insomnia  - 5/13- mouth pain- has ulceration   - 5/14-5/28- severe mucositis   - 5/15- bloody diarrhea x 3 on reglan prn, but has not needed, monitor for d/c to not aggravate w/ prokinetic agent  - 5/15- HSV neg  - 5/16- bloody BM's decreased to 2x'd/day from 4x's/day yest  - 5/17 UCx- 5/17- 10-49k VRE- per ID, do not need to treat  - 5/18- midodrine 10mg PO tid  - 5/19- BlCx- Meth resistant Staphylococcus haemolyticus- on vancomycin 1gm q12hr, this will yield AUC= 457; per ID, likely contaminant, expect short course of vanco; - 5/19, still febrile since 5/11, on cefepime, BlCx NGTD + abd discomfort (added flagyl)-->CT CAP 5/15 - no infection (but groundglass opacities stable from previous?)  - 5/21- zarxio 480mcg daily 5/21- 5/23  - 5/27- gout flare- colchicine 0.6mg BID x 2 days (5/27-5/29) -can cont until flare resolves, some experts cont 24-48 hrs after resolution; if symptoms do not resolve after 3 days, consider other anti-inflammatory agents- pt pain not relieved- added prednisone 40mg daily x 2 days (5/28-29) f/b 20mg x 2 days (5/30-6/1)  - 5/29- pt had episodes of diarrhea d/t colchicine  - 5/29- - allopurinol 100mg (300mg d/c'd 5/15)-keeping for gout-->300mg- 5/29- given UA rising- 5/29  - 5/30 will decrease diltiazem CD 240mg to 120mg  Meds sent for auth: midodrine, allopurinol 300mg (increased), prednisone, simethicone- sent to [facility] 5/29  - 6/13- c/o pain in both feet now  - 6/16- Confirmed home medications with patient  Home Medications:  ·	cholecalciferol oral tablet: 2000 unit(s) PO daily  ·	omeprazole 40 mg PO daily  ·	tamsulosin 0.4 mg PO daily  ·	diltiazem CD 120mg PO daily  ·	Allopurinol 300mg PO daily  ·	Valtrex 500mg PO BID  ·	atorvastatin 80mg PO qhs  ·	colchicine 0,6mg PO - 59 days supply filled 6/2025- pt does not want or take  ·	hctz, tramadol, midodrine, oxy- no longer taking  - 6/12- r/o septic arthritis of L ankle- s/p arthrocentesis & allopurinol 300mg PO daily, - hydromorphone 4mg PO q4hrs prn- 6/17  - 6/13- methylprednisone 40mg IVP daily- taper: 30mg daily x 2 days- 6/17-6/18--> 20mg daily x 1 day- 6/19 -->10mg daily x 1 day- 6/20 --> stop  - 6/13- not neutropenic, on cefepime for fevers- switched to levaquin through thurs 6/19 (for 7 days duration)- today is last day  - 6/17- a bit confused- 2/2 to opioids? cytarabine?  Meds sent for auth: methylpred 10mg x 1 dose- sent to [facility] 6/19  - 6/27- vancomycin 1gm q8hr- may need to decrease as this is predicted to yield AUC= 692 (goal 400-600); pt was therapeutic on 1gm q12hr in the past; if VT >20, would HOLD pm dose and obtain vanco random tomorrow am 6/28. If VT <20, would give 750mg-1gm q12hr (this will yield AUC= 436- 582)    Assessment/Plan/Recommendation:  Onc:  - on allopurinol 300mg PO daily- h/o gout & gout flare  ID:  - 6/26 BlCx +pseudomonas- pt febrile & rigoring- escalated to meropenem for FN, back to cefepime; today is day 4 since last neg BlCx on  6/27; discussed will do 10 days through 7/6- will eRx for home infusion  - 6/30- midline today for home abx  Rheum:  - on allopurinol 300mg PO daily- h/o gout & gout flare  Cards:  - atorvastatin 80mg hold if on posa d/t DDI (Cat X)  Dispo:  - 6/30- d/c on cefepime when approved & clinically stable  Additional Monitoring Needed?   -Yes- Continue to monitor renal function & daily counts for abx escalation/de-escalation   -Discharge Planning:  --> New meds:   --> Meds sent for auth:   --> Delivered meds:    Case discussed with attending/primary team    Liza Jolley, PharmD, BCPS  Clinical Pharmacy Specialist | Hematology/Oncology  [facility]  Email: [email or web address] or available on [employer]

## 2025-06-30 NOTE — PROVIDER CONTACT NOTE (OTHER) - ASSESSMENT
Pt resting in bed. VSS
Pt was resting in bed. No c/o chills. Pt advised he feels a little warm. VSS Oral temp 99.9. Tylenol last given at 0530 this morning.
VSS HR 82 110/59 Pt RR18 SPo2 100% on Ra pt c/o nausea and being sweaty.
oral temp 102.4, tachycardic 121
BP 90/55  HR 120s-130s  temp 102.8
Patient oral temperature 101.4, has chills and c/o feeling cold
Pt Oral temp 102.7  /80. Pt resting I  bed and advised the RN he feels warm.
Temp Oral 100.1 Hr 115 157/85 SPO2 100% on RA RR 20, Repeat VS Oral temp 100.1  154/83 SPO2 99% on RA RR18. Pt was visibly shaking before administration of Dilaudid. Pt c/o 8/ pain. Pt c/o chills
Pt was shivering in bed. Pt states it was due to chills and pain. Pain is 8/10 pain.

## 2025-06-30 NOTE — CONSULT NOTE ADULT - PROBLEM SELECTOR RECOMMENDATION 9
Pain is 2/2 to chronic spinal issues unrelated to an advanced illness process   This pathology appears to be as old as 2015, with MRI showing:     IMPRESSION: Multilevel lumbar spondylosis. Status post left   hemilaminectomy at the level of L3-L4. Trace retrolisthesis of L2 on L3   and L3 on L4.    L3-L4: There is bilateral neural foraminal narrowing, moderate to severe   on the left and moderate on the right.    L4-L5: There is mild to moderate central canal narrowing and prominent   bilateral lateral recess narrowing, left greater than right. There is   moderate to severe bilateral neural foraminal narrowing, right greater   than left.    Please consult CHRONIC PAIN; however, while waiting for their eval, may be able to continue the current IV regimen(Dilaudid 0.5mg IV q 4 PRN) and probably increasing the dose of the PO one (From Dilaudid 4mg to 6mg PO q 4 PRN ).   Narcan PRN   Bowel regimen while taking opioids.

## 2025-06-30 NOTE — ADVANCED PRACTICE NURSE CONSULT - REASON FOR CONSULT
Vascular Access Team    Evaluation for: Bedside Midline Placement  Requested by name: Misha Yoon (30-Jun-2025 10:19)    Indication: IV Access  Allergy to CHG or Heparin or Lidocaine: no    Anticoagulants/ antiplatelets: n/a    Platelets(>20): 13  INR(<3): not sent  eGFR(>40): 93  Blood cultures sent: n/a  Blood culture results in 48hrs: n/a    Arm restrictions: n/a    Pending: IR clearance for platelet level    Comments: Bedside midline order evaluated. call [id number] for the VAT RN.   Vascular Access Team    Evaluation for: Bedside Midline Placement  Requested by name: Misha Yoon (30-Jun-2025 10:19)    Indication: IV Access  Allergy to CHG or Heparin or Lidocaine: no    Anticoagulants/ antiplatelets: n/a    Platelets(>20): 25   INR(<3): not sent  eGFR(>40): 93  Blood cultures sent: n/a  Blood culture results in 48hrs: n/a    Arm restrictions: n/a    Comments: Bedside midline order evaluated. call [id number] for the VAT RN.

## 2025-06-30 NOTE — CONSULT NOTE ADULT - NS ATTEST RISKLEV GEN_ALL_CORE
1)  Probiotic = once daily Align, ibeatyou, or Lydia.    2)  Schedule EGD/colonoscopy in the next month.    EGD  -- Dx: Bloating, Altered bowel habits.  -- Sedation: Propofol.    Colonoscopy  -- Dx: Altered bowel habits, Rectal bleeding.  -- Prep: Sutab.  -- Sedation: Propofol.  
Moderate

## 2025-06-30 NOTE — PROVIDER CONTACT NOTE (OTHER) - NAME OF MD/NP/PA/DO NOTIFIED:
SABINA Turcios 7 lai
Wong Wallace
MADISYN Turcios
SABINA Wallace
MADISYN Turcios
MADISYN Turcios
Wong Wallace
MADISYN Turcios
MADISYN Turcios
Misha Yoon

## 2025-06-30 NOTE — PROGRESS NOTE ADULT - PROBLEM SELECTOR PLAN 2
patient is neutropenic, afebrile  cont Meropenem ,   6/27-2/28s/p  Vanco  6/26 cultures BC + GNR , follow up surveillance cultures    cont ppx posaconazole, valtrex  6/26 CXR - clear lungs  6/26 BC (+) Pseudomonas aeruginosa:  surveillance cultures NGT

## 2025-06-30 NOTE — PROVIDER CONTACT NOTE (OTHER) - REASON
Pt in 8/10pain. Oral Temp 102.9
Oral temp 99.9
Patient oral temperature 101.4
pt febrile 102.4
pt febrile 102.8
Blood consent form needed
Oral temp 100.1 Pt is visibly shaking
Pt returned from [facility] and stated he felt weak, sweaty, and nauseous.
Oral temp 102.7  /80. Requesting Tylenol to manage Temp.

## 2025-06-30 NOTE — CONSULT NOTE ADULT - ASSESSMENT
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 Following a recent HIDAC consolidation chemotherapy cycle, his hospitalization was complicated by a gout flare and treated with a steroid taper. Despite negative infectious workups, ongoing fever and respiratory symptoms required IV Cefepime and oxygen support. He has a notable past medical history including spinal stenosis and other chronic conditions. The Geriatrics and Palliative Medicine Team was called for pain management.

## 2025-06-30 NOTE — PROVIDER CONTACT NOTE (OTHER) - BACKGROUND
PMH Neutropenic fevers
Hg 6.5
pt hx AML, admitted with neutropenic fevers
Fevers
s/p fall, Febrile
hx AML, admitted with neutropenic fevers
Febrile upon arrival
Febrile

## 2025-06-30 NOTE — CONSULT NOTE ADULT - CONSULT REASON
Re-visit:  Geriatrics and Palliative Medicine Team previously  consulted to assist with pain control.  Sent home one PO dilaudid 4 mg q4 hours prn -= readmitted 6/27 with neutropenic fever, back on IV opioids

## 2025-06-30 NOTE — PROGRESS NOTE ADULT - ASSESSMENT
70 yo male with ASXL1, DDX41 mutated AML in CR1,  Day 12 s/p cycle 1 condensed HIDAC (2g/m2) consolidation, presenting from [facility]'s EDC appointment with neutropenic fever.  Hospital course complicated  by Pseudomonas aeruginosa ( 6/26) ), on Meropenem and hyponatremia ( 6/27-)  Pt is pancytopenic secondary to chemotherapy

## 2025-06-30 NOTE — PROVIDER CONTACT NOTE (OTHER) - ACTION/TREATMENT ORDERED:
MADISYN Turcios advised she will place an order for Dilaudid 0.5mg, IV Tylenol and abx Neropenum. Continue to monitor.
provider notified. IV tylenol ordered
MADISYN Turcios advised she will order Reglan for the patient. Continue to monitor.
MADISYN Turcios advised she will place an order for IV Tylenol to manage fever. Continue to monitor.
Oral Tylenol 650 mg given, ice packs applied. Blood cultures ordered
will order diluadid iv continue with blood tx till completed, notify provider as needed
MADISYN Turcios advised to continue to monitor. Pt may receive ice packs if he would like. Pt has mild grade fever and is not considered to be truly febrile.
Misha Yoon advised he will order IVPB Tylenol and continue to monitor the patient.
provider notified. EKG done. IV tylenol given
MADISYN Turcios advised she will fill out a consent form with the patient ASAP/ Continue to monitor.

## 2025-07-01 DIAGNOSIS — M54.9 DORSALGIA, UNSPECIFIED: ICD-10-CM

## 2025-07-01 DIAGNOSIS — Z51.5 ENCOUNTER FOR PALLIATIVE CARE: ICD-10-CM

## 2025-07-01 LAB
ALBUMIN SERPL ELPH-MCNC: 3.2 G/DL — LOW (ref 3.3–5)
ALP SERPL-CCNC: 132 U/L — HIGH (ref 40–120)
ALT FLD-CCNC: 114 U/L — HIGH (ref 10–45)
ANION GAP SERPL CALC-SCNC: 14 MMOL/L — SIGNIFICANT CHANGE UP (ref 5–17)
APPEARANCE UR: CLEAR — SIGNIFICANT CHANGE UP
AST SERPL-CCNC: 81 U/L — HIGH (ref 10–40)
BACTERIA # UR AUTO: NEGATIVE /HPF — SIGNIFICANT CHANGE UP
BASOPHILS # BLD AUTO: SIGNIFICANT CHANGE UP (ref 0–0.2)
BASOPHILS NFR BLD AUTO: SIGNIFICANT CHANGE UP (ref 0–2)
BILIRUB SERPL-MCNC: 0.5 MG/DL — SIGNIFICANT CHANGE UP (ref 0.2–1.2)
BILIRUB UR-MCNC: NEGATIVE — SIGNIFICANT CHANGE UP
BUN SERPL-MCNC: 12 MG/DL — SIGNIFICANT CHANGE UP (ref 7–23)
CALCIUM SERPL-MCNC: 8 MG/DL — LOW (ref 8.4–10.5)
CAST: 0 /LPF — SIGNIFICANT CHANGE UP (ref 0–4)
CHLORIDE SERPL-SCNC: 101 MMOL/L — SIGNIFICANT CHANGE UP (ref 96–108)
CO2 SERPL-SCNC: 19 MMOL/L — LOW (ref 22–31)
COLOR SPEC: YELLOW — SIGNIFICANT CHANGE UP
CREAT SERPL-MCNC: 1.01 MG/DL — SIGNIFICANT CHANGE UP (ref 0.5–1.3)
DIFF PNL FLD: NEGATIVE — SIGNIFICANT CHANGE UP
EGFR: 81 ML/MIN/1.73M2 — SIGNIFICANT CHANGE UP
EGFR: 81 ML/MIN/1.73M2 — SIGNIFICANT CHANGE UP
EOSINOPHIL # BLD AUTO: SIGNIFICANT CHANGE UP (ref 0–0.5)
EOSINOPHIL NFR BLD AUTO: SIGNIFICANT CHANGE UP (ref 0–6)
GLUCOSE BLDC GLUCOMTR-MCNC: 153 MG/DL — HIGH (ref 70–99)
GLUCOSE SERPL-MCNC: 122 MG/DL — HIGH (ref 70–99)
GLUCOSE UR QL: NEGATIVE MG/DL — SIGNIFICANT CHANGE UP
HCT VFR BLD CALC: 23.2 % — LOW (ref 39–50)
HGB BLD-MCNC: 7.8 G/DL — LOW (ref 13–17)
IMM GRANULOCYTES # BLD AUTO: SIGNIFICANT CHANGE UP (ref 0–0.07)
IMM GRANULOCYTES NFR BLD AUTO: SIGNIFICANT CHANGE UP (ref 0–0.9)
IMMATURE PLATELET FRACTION #: 0.3 K/UL — LOW (ref 3.9–12.5)
IMMATURE PLATELET FRACTION %: 1.2 % — LOW (ref 1.6–7.1)
KETONES UR QL: ABNORMAL MG/DL
LEUKOCYTE ESTERASE UR-ACNC: NEGATIVE — SIGNIFICANT CHANGE UP
LYMPHOCYTES # BLD AUTO: SIGNIFICANT CHANGE UP (ref 1–3.3)
LYMPHOCYTES NFR BLD AUTO: SIGNIFICANT CHANGE UP (ref 13–44)
MAGNESIUM SERPL-MCNC: 1.8 MG/DL — SIGNIFICANT CHANGE UP (ref 1.6–2.6)
MCHC RBC-ENTMCNC: 30.7 PG — SIGNIFICANT CHANGE UP (ref 27–34)
MCHC RBC-ENTMCNC: 33.6 G/DL — SIGNIFICANT CHANGE UP (ref 32–36)
MCV RBC AUTO: 91.3 FL — SIGNIFICANT CHANGE UP (ref 80–100)
MONOCYTES # BLD AUTO: SIGNIFICANT CHANGE UP (ref 0–0.9)
MONOCYTES NFR BLD AUTO: SIGNIFICANT CHANGE UP (ref 2–14)
NEUTROPHILS # BLD AUTO: SIGNIFICANT CHANGE UP (ref 1.8–7.4)
NEUTROPHILS NFR BLD AUTO: SIGNIFICANT CHANGE UP (ref 43–77)
NITRITE UR-MCNC: NEGATIVE — SIGNIFICANT CHANGE UP
NRBC # BLD AUTO: 0 K/UL — SIGNIFICANT CHANGE UP (ref 0–0)
NRBC # FLD: 0 K/UL — SIGNIFICANT CHANGE UP (ref 0–0)
NRBC BLD AUTO-RTO: 0 /100 WBCS — SIGNIFICANT CHANGE UP (ref 0–0)
PH UR: 6.5 — SIGNIFICANT CHANGE UP (ref 5–8)
PHOSPHATE SERPL-MCNC: 1.5 MG/DL — LOW (ref 2.5–4.5)
PLATELET # BLD AUTO: 25 K/UL — LOW (ref 150–400)
PMV BLD: 10.9 FL — SIGNIFICANT CHANGE UP (ref 7–13)
POTASSIUM SERPL-MCNC: 4.3 MMOL/L — SIGNIFICANT CHANGE UP (ref 3.5–5.3)
POTASSIUM SERPL-SCNC: 4.3 MMOL/L — SIGNIFICANT CHANGE UP (ref 3.5–5.3)
PROT SERPL-MCNC: 5.6 G/DL — LOW (ref 6–8.3)
PROT UR-MCNC: 30 MG/DL
RBC # BLD: 2.54 M/UL — LOW (ref 4.2–5.8)
RBC # FLD: 16.9 % — HIGH (ref 10.3–14.5)
RBC CASTS # UR COMP ASSIST: 2 /HPF — SIGNIFICANT CHANGE UP (ref 0–4)
SODIUM SERPL-SCNC: 134 MMOL/L — LOW (ref 135–145)
SP GR SPEC: 1.02 — SIGNIFICANT CHANGE UP (ref 1–1.03)
SQUAMOUS # UR AUTO: 1 /HPF — SIGNIFICANT CHANGE UP (ref 0–5)
UROBILINOGEN FLD QL: 1 MG/DL — SIGNIFICANT CHANGE UP (ref 0.2–1)
WBC # BLD: 0.15 K/UL — CRITICAL LOW (ref 3.8–10.5)
WBC # FLD AUTO: 0.15 K/UL — CRITICAL LOW (ref 3.8–10.5)
WBC UR QL: 1 /HPF — SIGNIFICANT CHANGE UP (ref 0–5)

## 2025-07-01 PROCEDURE — 86900 BLOOD TYPING SEROLOGIC ABO: CPT

## 2025-07-01 PROCEDURE — 97161 PT EVAL LOW COMPLEX 20 MIN: CPT

## 2025-07-01 PROCEDURE — 83735 ASSAY OF MAGNESIUM: CPT

## 2025-07-01 PROCEDURE — 82962 GLUCOSE BLOOD TEST: CPT

## 2025-07-01 PROCEDURE — 86901 BLOOD TYPING SEROLOGIC RH(D): CPT

## 2025-07-01 PROCEDURE — P9037: CPT

## 2025-07-01 PROCEDURE — 87040 BLOOD CULTURE FOR BACTERIA: CPT

## 2025-07-01 PROCEDURE — P9100: CPT

## 2025-07-01 PROCEDURE — 70450 CT HEAD/BRAIN W/O DYE: CPT

## 2025-07-01 PROCEDURE — 85025 COMPLETE CBC W/AUTO DIFF WBC: CPT

## 2025-07-01 PROCEDURE — 71045 X-RAY EXAM CHEST 1 VIEW: CPT | Mod: 26

## 2025-07-01 PROCEDURE — 83605 ASSAY OF LACTIC ACID: CPT

## 2025-07-01 PROCEDURE — 86850 RBC ANTIBODY SCREEN: CPT

## 2025-07-01 PROCEDURE — 71045 X-RAY EXAM CHEST 1 VIEW: CPT

## 2025-07-01 PROCEDURE — 97116 GAIT TRAINING THERAPY: CPT

## 2025-07-01 PROCEDURE — 80202 ASSAY OF VANCOMYCIN: CPT

## 2025-07-01 PROCEDURE — 81001 URINALYSIS AUTO W/SCOPE: CPT

## 2025-07-01 PROCEDURE — 97530 THERAPEUTIC ACTIVITIES: CPT

## 2025-07-01 PROCEDURE — 99233 SBSQ HOSP IP/OBS HIGH 50: CPT

## 2025-07-01 PROCEDURE — 86923 COMPATIBILITY TEST ELECTRIC: CPT

## 2025-07-01 PROCEDURE — 87086 URINE CULTURE/COLONY COUNT: CPT

## 2025-07-01 PROCEDURE — 84100 ASSAY OF PHOSPHORUS: CPT

## 2025-07-01 PROCEDURE — 97110 THERAPEUTIC EXERCISES: CPT

## 2025-07-01 PROCEDURE — P9040: CPT

## 2025-07-01 PROCEDURE — 36415 COLL VENOUS BLD VENIPUNCTURE: CPT

## 2025-07-01 PROCEDURE — 80053 COMPREHEN METABOLIC PANEL: CPT

## 2025-07-01 RX ORDER — MAGNESIUM SULFATE 500 MG/ML
1 SYRINGE (ML) INJECTION ONCE
Refills: 0 | Status: COMPLETED | OUTPATIENT
Start: 2025-07-01 | End: 2025-07-01

## 2025-07-01 RX ORDER — POTASSIUM PHOSPHATE, MONOBASIC POTASSIUM PHOSPHATE, DIBASIC INJECTION, 236; 224 MG/ML; MG/ML
30 SOLUTION, CONCENTRATE INTRAVENOUS ONCE
Refills: 0 | Status: COMPLETED | OUTPATIENT
Start: 2025-07-01 | End: 2025-07-01

## 2025-07-01 RX ORDER — NALOXONE HYDROCHLORIDE 0.4 MG/ML
0.1 INJECTION, SOLUTION INTRAMUSCULAR; INTRAVENOUS; SUBCUTANEOUS
Refills: 0 | Status: DISCONTINUED | OUTPATIENT
Start: 2025-07-01 | End: 2025-07-06

## 2025-07-01 RX ADMIN — Medication 650 MILLIGRAM(S): at 08:45

## 2025-07-01 RX ADMIN — Medication 2000 UNIT(S): at 11:04

## 2025-07-01 RX ADMIN — Medication 650 MILLIGRAM(S): at 15:13

## 2025-07-01 RX ADMIN — Medication 15 MILLILITER(S): at 21:12

## 2025-07-01 RX ADMIN — Medication 40 MILLIGRAM(S): at 05:21

## 2025-07-01 RX ADMIN — Medication 0.5 MILLIGRAM(S): at 15:11

## 2025-07-01 RX ADMIN — Medication 15 MILLILITER(S): at 13:03

## 2025-07-01 RX ADMIN — Medication 6 MILLIGRAM(S): at 18:17

## 2025-07-01 RX ADMIN — Medication 0.5 MILLIGRAM(S): at 03:41

## 2025-07-01 RX ADMIN — POSACONAZOLE 300 MILLIGRAM(S): 100 TABLET, DELAYED RELEASE ORAL at 17:04

## 2025-07-01 RX ADMIN — Medication 0.5 MILLIGRAM(S): at 22:00

## 2025-07-01 RX ADMIN — Medication 100 GRAM(S): at 13:45

## 2025-07-01 RX ADMIN — Medication 500 MILLIGRAM(S): at 17:04

## 2025-07-01 RX ADMIN — CEFEPIME 100 MILLIGRAM(S): 2 INJECTION, POWDER, FOR SOLUTION INTRAVENOUS at 05:23

## 2025-07-01 RX ADMIN — Medication 0.5 MILLIGRAM(S): at 11:48

## 2025-07-01 RX ADMIN — CEFEPIME 100 MILLIGRAM(S): 2 INJECTION, POWDER, FOR SOLUTION INTRAVENOUS at 21:20

## 2025-07-01 RX ADMIN — Medication 650 MILLIGRAM(S): at 16:07

## 2025-07-01 RX ADMIN — Medication 500 MILLIGRAM(S): at 05:22

## 2025-07-01 RX ADMIN — Medication 0.5 MILLIGRAM(S): at 21:20

## 2025-07-01 RX ADMIN — Medication 0.5 MILLIGRAM(S): at 16:07

## 2025-07-01 RX ADMIN — Medication 6 MILLIGRAM(S): at 18:53

## 2025-07-01 RX ADMIN — POTASSIUM PHOSPHATE, MONOBASIC POTASSIUM PHOSPHATE, DIBASIC INJECTION, 83.33 MILLIMOLE(S): 236; 224 SOLUTION, CONCENTRATE INTRAVENOUS at 13:59

## 2025-07-01 RX ADMIN — Medication 0.5 MILLIGRAM(S): at 03:11

## 2025-07-01 RX ADMIN — Medication 0.5 MILLIGRAM(S): at 08:47

## 2025-07-01 RX ADMIN — Medication 0.5 MILLIGRAM(S): at 08:03

## 2025-07-01 RX ADMIN — Medication 650 MILLIGRAM(S): at 07:34

## 2025-07-01 RX ADMIN — Medication 6 MILLIGRAM(S): at 10:03

## 2025-07-01 RX ADMIN — Medication 300 MILLIGRAM(S): at 11:04

## 2025-07-01 RX ADMIN — Medication 0.5 MILLIGRAM(S): at 12:30

## 2025-07-01 RX ADMIN — CEFEPIME 100 MILLIGRAM(S): 2 INJECTION, POWDER, FOR SOLUTION INTRAVENOUS at 13:03

## 2025-07-01 RX ADMIN — TAMSULOSIN HYDROCHLORIDE 0.4 MILLIGRAM(S): 0.4 CAPSULE ORAL at 21:12

## 2025-07-01 RX ADMIN — DILTIAZEM HYDROCHLORIDE 120 MILLIGRAM(S): 240 TABLET, EXTENDED RELEASE ORAL at 05:21

## 2025-07-01 RX ADMIN — Medication 15 MILLILITER(S): at 05:22

## 2025-07-01 RX ADMIN — Medication 6 MILLIGRAM(S): at 11:00

## 2025-07-01 NOTE — PROGRESS NOTE ADULT - PROBLEM SELECTOR PLAN 1
admitted with neutropenic fever   s/p C1 condensed HIDAC (2g/m2) consolidation on 6/15/25 - 6/17/25  s/p G-CSF Neulasta as outpatient 6/20  CBC w diff, CMP, coags  Hgb goal >8; PLTs goal >20k or if bleeding  stirct I&Os daily weights mouth care  6/30 change Abx to cefepime. 1 bag PLTS in prep for midline and possible discharge tomorrow. need PT re-eval  7/1: S/p Mg repletion for hypomagnesemia, Phos repletion for hypophosphatemia. admitted with neutropenic fever   s/p C1 condensed HIDAC (2g/m2) consolidation on 6/15/25 - 6/17/25  s/p G-CSF Neulasta as outpatient 6/20  CBC w diff, CMP, coags  Hgb goal >8; PLTs goal >20k or if bleeding  stirct I&Os daily weights mouth care  6/30 change Abx to cefepime. 1 bag PLTS in prep for midline and possible discharge tomorrow. need PT re-eval  7/1: S/p Mg repletion for hypomagnesemia, Phos repletion for hypophosphatemia. plan for PICC thursday if cultures clear > 48 hrs

## 2025-07-01 NOTE — PROGRESS NOTE ADULT - SUBJECTIVE AND OBJECTIVE BOX
Diagnosis: MDS related AML, ASXL1, DDX41    Protocol/Chemo Regimen: s/p Cycle 1 HIDAC 2g/m2     Day: 16    Pt endorsed: generalized weakness    Review of Systems: Patient denies  nausea, vomiting, diarrhea, abdominal pain,  chest pain and headache.    Pain scale: denies now     Diet: DASH     Allergies: No Known Allergies      ANTIMICROBIALS  cefepime   IVPB 2000 milliGRAM(s) IV Intermittent every 8 hours  posaconazole DR Tablet 300 milliGRAM(s) Oral every 24 hours  posaconazole DR Tablet   Oral   valACYclovir 500 milliGRAM(s) Oral two times a day      STANDING MEDICATIONS  allopurinol 300 milliGRAM(s) Oral daily  Biotene Dry Mouth Oral Rinse 15 milliLiter(s) Swish and Spit three times a day  cholecalciferol 2000 Unit(s) Oral daily  diltiazem    milliGRAM(s) Oral daily  pantoprazole    Tablet 40 milliGRAM(s) Oral before breakfast  sodium chloride 0.9%. 1000 milliLiter(s) IV Continuous <Continuous>  tamsulosin 0.4 milliGRAM(s) Oral at bedtime      PRN MEDICATIONS  acetaminophen     Tablet .. 650 milliGRAM(s) Oral every 6 hours PRN  HYDROmorphone   Tablet 6 milliGRAM(s) Oral every 4 hours PRN  HYDROmorphone  Injectable 0.5 milliGRAM(s) IV Push every 4 hours PRN  metoclopramide Injectable 10 milliGRAM(s) IV Push every 6 hours PRN  naloxone Injectable 0.1 milliGRAM(s) IV Push every 3 minutes PRN        Vital Signs Last 24 Hrs  T(C): 37.7 (01 Jul 2025 15:25), Max: 38.5 (01 Jul 2025 07:36)  T(F): 99.8 (01 Jul 2025 15:25), Max: 101.3 (01 Jul 2025 07:36)  HR: 93 (01 Jul 2025 15:25) (93 - 108)  BP: 105/69 (01 Jul 2025 15:25) (105/69 - 154/83)  BP(mean): --  RR: 18 (01 Jul 2025 15:25) (18 - 18)  SpO2: 98% (01 Jul 2025 15:25) (95% - 100%)    Parameters below as of 01 Jul 2025 15:25  Patient On (Oxygen Delivery Method): room air        PHYSICAL EXAM  General: NAD  HEENT:  clear oropharynx, anicteric sclera, + angioedema   CV: (+) S1/S2 RRR  Lungs: clear to auscultation, no wheezes or rales  Abdomen: soft, non-tender, non-distended (+) BS  Ext: Trace  edema b/l LE   Skin: no rash  Neuro: alert and oriented X 3,   Central Line: PIVL CD    LABS:                            7.8    0.15  )-----------( 25       ( 01 Jul 2025 07:16 )             23.2         Mean Cell Volume : 91.3 fl  Mean Cell Hemoglobin : 30.7 pg  Mean Cell Hemoglobin Concentration : 33.6 g/dL  Auto Neutrophil # : Not measured  Auto Lymphocyte # : Not measured  Auto Monocyte # : Not measured  Auto Eosinophil # : Not measured  Auto Basophil # : Not measured  Auto Neutrophil % : Not measured Manual Differential performed within last 72 hours. See prior Manual  Differential. Note Automated Differential does not count Blasts, Bands,  Reactive Lymphocytes, or Other Lymphocytes.  Differential percentages must be correlated with absolute numbers for  clinical significance.  Auto Lymphocyte % : Not measured  Auto Monocyte % : Not measured  Auto Eosinophil % : Not measured  Auto Basophil % : Not measured      07-01    134[L]  |  101  |  12  ----------------------------<  122[H]  4.3   |  19[L]  |  1.01    Ca    8.0[L]      01 Jul 2025 07:18  Phos  1.5     07-01  Mg     1.8     07-01    TPro  5.6[L]  /  Alb  3.2[L]  /  TBili  0.5  /  DBili  x   /  AST  81[H]  /  ALT  114[H]  /  AlkPhos  132[H]  07-01      Mg 1.8  Phos 1.5

## 2025-07-01 NOTE — PHARMACOTHERAPY INTERVENTION NOTE - COMMENTS
*NOTE IS NOT UPDATED*    Clinical Pharmacy Specialist- Hematology/Oncology- Progress Note    Pt is a 70 y/o male w/ PMH of HTN, HLD, GERD, BPH, Gout, spinal stenosis (s/p multiple surgeries) and newly diagnosed AML (ASXL1-mutated and DDX41) s/p 7+3 Induction therapy, course complicated by gout flare vs septic arthritis, andC1 HIDAC consolidation, now admitted for neutropenic fever    Antimicrobial Course:  - Cefepime (pseudomonas bacteremia)- 6/26-6/27  --> Meropenem (pseudomonas; rigors w/ cefepime)- 6/27- 6/30  --> Cefepime (pseudomonas bacteremia)- 6/30  - Valtrex- 6/12  - Posaconazole- 6/26  - Vancomycin- 6/26- 6/27  MRSA nasal swab    Last Neutropenic (ANC<1000): 6/30; ANC= 0  Last Febrile: 28-Jun-2025 04:16; T= 101.4  Days Non-Neutropenic: 0  Days afebrile: 2    Chemotherapy Course  -Current Regimen: HIDAC 2gm/m2 Consolidation  History:  (5/2/25)- 7+3 Induction   -Daunorubicin 60mg/m2 IVP D1-3   -Cytarabine 100mg/m2 IV cont inf D1-7  (6/15/25) C1 HIDAC 2gm/m2  -Day: 16 (6/30)  BmBx:  Access: R TLC (accessed 5/5/25)    Vancomycin Levels:   Date    Dose              Value    Ref Range	                             Status   5/21    750mg q12hr   7.8       Trough 10-20; AUC= 400-600   Final  5/23    1gm q12hr      10.9     Trough 10-20; AUC= 400-600   Final    History/Relevant clinical information used in assessment:  - Ht= 5’11”; IBW= 75.3kg; Actual BW= 107.3kg; Adj BW= 88kg  - 5/2- IRR? rigors, chills, and tachycardia 2 hours into day 1; rasburicase 3mg x 1  - 5/4 -Crcl= 67ml/min ; Scr=1.32; Wt= 88kg; Adjusted BW used since pt BMI>30  - 5/9- increase melatonin 3mg to 10mh qhs- c/o insomnia  - 5/13- mouth pain- has ulceration   - 5/14-5/28- severe mucositis   - 5/15- bloody diarrhea x 3 on reglan prn, but has not needed, monitor for d/c to not aggravate w/ prokinetic agent  - 5/15- HSV neg  - 5/16- bloody BM's decreased to 2x'd/day from 4x's/day yest  - 5/17 UCx- 5/17- 10-49k VRE- per ID, do not need to treat  - 5/18- midodrine 10mg PO tid  - 5/19- BlCx- Meth resistant Staphylococcus haemolyticus- on vancomycin 1gm q12hr, this will yield AUC= 457; per ID, likely contaminant, expect short course of vanco; - 5/19, still febrile since 5/11, on cefepime, BlCx NGTD + abd discomfort (added flagyl)-->CT CAP 5/15 - no infection (but groundglass opacities stable from previous?)  - 5/21- zarxio 480mcg daily 5/21- 5/23  - 5/27- gout flare- colchicine 0.6mg BID x 2 days (5/27-5/29) -can cont until flare resolves, some experts cont 24-48 hrs after resolution; if symptoms do not resolve after 3 days, consider other anti-inflammatory agents- pt pain not relieved- added prednisone 40mg daily x 2 days (5/28-29) f/b 20mg x 2 days (5/30-6/1)  - 5/29- pt had episodes of diarrhea d/t colchicine  - 5/29- - allopurinol 100mg (300mg d/c'd 5/15)-keeping for gout-->300mg- 5/29- given UA rising- 5/29  - 5/30 will decrease diltiazem CD 240mg to 120mg  Meds sent for auth: midodrine, allopurinol 300mg (increased), prednisone, simethicone- sent to [facility] 5/29  - 6/13- c/o pain in both feet now  - 6/16- Confirmed home medications with patient  Home Medications:  ·	cholecalciferol oral tablet: 2000 unit(s) PO daily  ·	omeprazole 40 mg PO daily  ·	tamsulosin 0.4 mg PO daily  ·	diltiazem CD 120mg PO daily  ·	Allopurinol 300mg PO daily  ·	Valtrex 500mg PO BID  ·	atorvastatin 80mg PO qhs  ·	colchicine 0,6mg PO - 59 days supply filled 6/2025- pt does not want or take  ·	hctz, tramadol, midodrine, oxy- no longer taking  - 6/12- r/o septic arthritis of L ankle- s/p arthrocentesis & allopurinol 300mg PO daily, - hydromorphone 4mg PO q4hrs prn- 6/17  - 6/13- methylprednisone 40mg IVP daily- taper: 30mg daily x 2 days- 6/17-6/18--> 20mg daily x 1 day- 6/19 -->10mg daily x 1 day- 6/20 --> stop  - 6/13- not neutropenic, on cefepime for fevers- switched to levaquin through thurs 6/19 (for 7 days duration)- today is last day  - 6/17- a bit confused- 2/2 to opioids? cytarabine?  Meds sent for auth: methylpred 10mg x 1 dose- sent to [facility] 6/19  - 6/27- vancomycin 1gm q8hr- may need to decrease as this is predicted to yield AUC= 692 (goal 400-600); pt was therapeutic on 1gm q12hr in the past; if VT >20, would HOLD pm dose and obtain vanco random tomorrow am 6/28. If VT <20, would give 750mg-1gm q12hr (this will yield AUC= 436- 582)    Assessment/Plan/Recommendation:  Onc:  - on allopurinol 300mg PO daily- h/o gout & gout flare  ID:  - 6/26 BlCx +pseudomonas- pt febrile & rigoring- escalated to meropenem for FN, back to cefepime; today is day 4 since last neg BlCx on  6/27; discussed will do 10 days through 7/6- will eRx for home infusion  - 6/30- midline today for home abx  Rheum:  - on allopurinol 300mg PO daily- h/o gout & gout flare  Cards:  - atorvastatin 80mg hold if on posa d/t DDI (Cat X)  Dispo:  - 6/30- d/c on cefepime when approved & clinically stable  Additional Monitoring Needed?   -Yes- Continue to monitor renal function & daily counts for abx escalation/de-escalation   -Discharge Planning:  --> New meds:   --> Meds sent for auth:   --> Delivered meds:    Case discussed with attending/primary team    Liza Jolley, PharmD, BCPS  Clinical Pharmacy Specialist | Hematology/Oncology  [facility]  Email: [email or web address] or available on [email or web address]   Clinical Pharmacy Specialist- Hematology/Oncology- Progress Note    Pt is a 71 y/o male w/ PMH of HTN, HLD, GERD, BPH, Gout, spinal stenosis (s/p multiple surgeries) and newly diagnosed AML (ASXL1-mutated and DDX41) s/p 7+3 Induction therapy, course complicated by gout flare vs septic arthritis, andC1 HIDAC consolidation, now admitted for neutropenic fever    Antimicrobial Course:  - Cefepime (pseudomonas bacteremia)- 6/26-6/27  --> Meropenem (pseudomonas; rigors w/ cefepime)- 6/27- 6/30  --> Cefepime (pseudomonas bacteremia)- 6/30  - Valtrex- 6/12  - Posaconazole- 6/26  - Vancomycin- 6/26- 6/27  MRSA nasal swab    Last Neutropenic (ANC<1000): 7/1; ANC= 0  Last Febrile: 01-Jul-2025 08:47; T= 100.7  Days Non-Neutropenic: 0  Days afebrile: 0    Chemotherapy Course  -Current Regimen: HIDAC 2gm/m2 Consolidation  History:  (5/2/25)- 7+3 Induction   -Daunorubicin 60mg/m2 IVP D1-3   -Cytarabine 100mg/m2 IV cont inf D1-7  (6/15/25) C1 HIDAC 2gm/m2  -Day: 17 (7/1)  BmBx:  Access: R TLC (accessed 5/5/25)    Vancomycin Levels:   Date    Dose              Value    Ref Range	                             Status   5/21    750mg q12hr   7.8       Trough 10-20; AUC= 400-600   Final  5/23    1gm q12hr      10.9     Trough 10-20; AUC= 400-600   Final    History/Relevant clinical information used in assessment:  - Ht= 5’11”; IBW= 75.3kg; Actual BW= 107.3kg; Adj BW= 88kg  - 5/2- IRR? rigors, chills, and tachycardia 2 hours into day 1; rasburicase 3mg x 1  - 5/4 -Crcl= 67ml/min ; Scr=1.32; Wt= 88kg; Adjusted BW used since pt BMI>30  - 5/9- increase melatonin 3mg to 10mh qhs- c/o insomnia  - 5/13- mouth pain- has ulceration   - 5/14-5/28- severe mucositis   - 5/15- bloody diarrhea x 3 on reglan prn, but has not needed, monitor for d/c to not aggravate w/ prokinetic agent  - 5/15- HSV neg  - 5/16- bloody BM's decreased to 2x'd/day from 4x's/day yest  - 5/17 UCx- 5/17- 10-49k VRE- per ID, do not need to treat  - 5/18- midodrine 10mg PO tid  - 5/19- BlCx- Meth resistant Staphylococcus haemolyticus- on vancomycin 1gm q12hr, this will yield AUC= 457; per ID, likely contaminant, expect short course of vanco; - 5/19, still febrile since 5/11, on cefepime, BlCx NGTD + abd discomfort (added flagyl)-->CT CAP 5/15 - no infection (but groundglass opacities stable from previous?)  - 5/21- zarxio 480mcg daily 5/21- 5/23  - 5/27- gout flare- colchicine 0.6mg BID x 2 days (5/27-5/29) -can cont until flare resolves, some experts cont 24-48 hrs after resolution; if symptoms do not resolve after 3 days, consider other anti-inflammatory agents- pt pain not relieved- added prednisone 40mg daily x 2 days (5/28-29) f/b 20mg x 2 days (5/30-6/1)  - 5/29- pt had episodes of diarrhea d/t colchicine  - 5/29- - allopurinol 100mg (300mg d/c'd 5/15)-keeping for gout-->300mg- 5/29- given UA rising- 5/29  - 5/30 will decrease diltiazem CD 240mg to 120mg  Meds sent for auth: midodrine, allopurinol 300mg (increased), prednisone, simethicone- sent to [facility] 5/29  - 6/13- c/o pain in both feet now  - 6/16- Confirmed home medications with patient  Home Medications:  ·	cholecalciferol oral tablet: 2000 unit(s) PO daily  ·	omeprazole 40 mg PO daily  ·	tamsulosin 0.4 mg PO daily  ·	diltiazem CD 120mg PO daily  ·	Allopurinol 300mg PO daily  ·	Valtrex 500mg PO BID  ·	atorvastatin 80mg PO qhs  ·	colchicine 0,6mg PO - 59 days supply filled 6/2025- pt does not want or take  ·	hctz, tramadol, midodrine, oxy- no longer taking  - 6/12- r/o septic arthritis of L ankle- s/p arthrocentesis & allopurinol 300mg PO daily, - hydromorphone 4mg PO q4hrs prn- 6/17  - 6/13- methylprednisone 40mg IVP daily- taper: 30mg daily x 2 days- 6/17-6/18--> 20mg daily x 1 day- 6/19 -->10mg daily x 1 day- 6/20 --> stop  - 6/13- not neutropenic, on cefepime for fevers- switched to levaquin through thurs 6/19 (for 7 days duration)- today is last day  - 6/17- a bit confused- 2/2 to opioids? cytarabine?  Meds sent for auth: methylpred 10mg x 1 dose- sent to [facility] 6/19  - 6/27- vancomycin 1gm q8hr- may need to decrease as this is predicted to yield AUC= 692 (goal 400-600); pt was therapeutic on 1gm q12hr in the past; if VT >20, would HOLD pm dose and obtain vanco random tomorrow am 6/28. If VT <20, would give 750mg-1gm q12hr (this will yield AUC= 436- 582)    Assessment/Plan/Recommendation:  Onc:  - on allopurinol 300mg PO daily- h/o gout & gout flare  ID:  - 6/26 BlCx +pseudomonas- pt febrile & rigoring- escalated to meropenem for FN, back to cefepime; today is day 5 since last neg BlCx on  6/27; discussed will do 9 days through 7/5- will eRx for home infusion  - 7/1- SOLO PICC once cultures are neg  Rheum:  - on allopurinol 300mg PO daily- h/o gout & gout flare  Cards:  - atorvastatin 80mg hold if on posa d/t DDI (Cat X)  Dispo:  - 7/1- d/c on cefepime when approved & clinically stable    Additional Monitoring Needed?   -Yes- Continue to monitor renal function & daily counts for abx escalation/de-escalation   -Discharge Planning:  --> New meds:   --> Meds sent for auth:   --> Delivered meds:    Case discussed with attending/primary team    Liza Jolley, PharmD, BCPS  Clinical Pharmacy Specialist | Hematology/Oncology  [facility]  Email: [email or web address] or available on [email or web address]

## 2025-07-01 NOTE — PROGRESS NOTE ADULT - PROBLEM SELECTOR PLAN 2
patient is neutropenic, afebrile  cont Meropenem ,   6/27-2/28s/p  Vanco  6/26 cultures BC + GNR , follow up surveillance cultures    cont ppx posaconazole, valtrex  6/26 CXR - clear lungs  6/26 BC (+) Pseudomonas aeruginosa:  surveillance cultures NGT  7/1: Pt spiked fever of 101.3F today. Pan cultures sent, CXR sent.

## 2025-07-01 NOTE — PROGRESS NOTE ADULT - ASSESSMENT
68 yo male with ASXL1, DDX41 mutated AML in CR1,  Day 12 s/p cycle 1 condensed HIDAC (2g/m2) consolidation, presenting from [facility]'s EDC appointment with neutropenic fever.  Hospital course complicated  by Pseudomonas aeruginosa ( 6/26) ), on Meropenem and hyponatremia ( 6/27-6/30), Cefepime 2g (6/30-  Pt is pancytopenic secondary to chemotherapy

## 2025-07-01 NOTE — PROGRESS NOTE ADULT - NS ATTEND AMEND GEN_ALL_CORE FT
.  Primary: Chitty    Vital Signs Last 24 Hrs  T(C): 37.7 (01 Jul 2025 01:15), Max: 37.8 (30 Jun 2025 18:10)  T(F): 99.8 (01 Jul 2025 01:15), Max: 100.1 (30 Jun 2025 18:10)  HR: 108 (01 Jul 2025 01:15) (75 - 115)  BP: 136/76 (01 Jul 2025 01:15) (109/64 - 157/85)  BP(mean): --  RR: 18 (01 Jul 2025 01:15) (18 - 18)  SpO2: 95% (01 Jul 2025 01:15) (95% - 100%)    Parameters below as of 01 Jul 2025 01:15  Patient On (Oxygen Delivery Method): room air    MEDICATIONS  (STANDING):  allopurinol 300 milliGRAM(s) Oral daily  Biotene Dry Mouth Oral Rinse 15 milliLiter(s) Swish and Spit three times a day  cefepime   IVPB 2000 milliGRAM(s) IV Intermittent every 8 hours  cholecalciferol 2000 Unit(s) Oral daily  diltiazem    milliGRAM(s) Oral daily  pantoprazole    Tablet 40 milliGRAM(s) Oral before breakfast  posaconazole DR Tablet 300 milliGRAM(s) Oral every 24 hours  posaconazole DR Tablet   Oral   sodium chloride 0.9%. 1000 milliLiter(s) (50 mL/Hr) IV Continuous <Continuous>  tamsulosin 0.4 milliGRAM(s) Oral at bedtime  valACYclovir 500 milliGRAM(s) Oral two times a day        Assessment: 69M HiDAC Cycle 1 Day 15 for ASXL1-mutated and [id number] AML.  Course complicated by pseudomonal septicemia.     Heme:   - s/p Neulasta outpatient 6/20  - Continue allopurinol 300 mg D/C today  - Continue daily oral B12 suppl  - Transfuse for Hgb <7, plts <10k    ID:   cefepime (6/26/25 - 6/27/25; 6/30/25 - ) - planning IV therapy through 7/5/25  Meropenem (6/27 - 6/30)  arrange for home cef    MSK:  acute on chronic pain: Dilaudid     Dispo:  - PICC planned for today; PT consulted for D/C home evaluation    Over 55 minutes were spent in direct patient care and care coordination. .  Primary: Chitty    Vital Signs Last 24 Hrs  T(C): 37.7 (01 Jul 2025 01:15), Max: 37.8 (30 Jun 2025 18:10)  T(F): 99.8 (01 Jul 2025 01:15), Max: 100.1 (30 Jun 2025 18:10)  HR: 108 (01 Jul 2025 01:15) (75 - 115)  BP: 136/76 (01 Jul 2025 01:15) (109/64 - 157/85)  BP(mean): --  RR: 18 (01 Jul 2025 01:15) (18 - 18)  SpO2: 95% (01 Jul 2025 01:15) (95% - 100%)    Parameters below as of 01 Jul 2025 01:15  Patient On (Oxygen Delivery Method): room air    MEDICATIONS  (STANDING):  allopurinol 300 milliGRAM(s) Oral daily  Biotene Dry Mouth Oral Rinse 15 milliLiter(s) Swish and Spit three times a day  cefepime   IVPB 2000 milliGRAM(s) IV Intermittent every 8 hours  cholecalciferol 2000 Unit(s) Oral daily  diltiazem    milliGRAM(s) Oral daily  pantoprazole    Tablet 40 milliGRAM(s) Oral before breakfast  posaconazole DR Tablet 300 milliGRAM(s) Oral every 24 hours  posaconazole DR Tablet   Oral   sodium chloride 0.9%. 1000 milliLiter(s) (50 mL/Hr) IV Continuous <Continuous>  tamsulosin 0.4 milliGRAM(s) Oral at bedtime  valACYclovir 500 milliGRAM(s) Oral two times a day        Assessment: 69M HiDAC Cycle 1 Day 15 for ASXL1-mutated and [id number] AML.  Course complicated by pseudomonal septicemia.     PMHx: gout    Heme:   - s/p Neulasta outpatient 6/20  - Continue daily oral B12 suppl  - Transfuse for Hgb <7, plts <10k    ID:   cefepime (6/26/25 - 6/27/25; 6/30/25 - ) - planning IV therapy through 7/5/25  Meropenem (6/27 - 6/30)  arrange for home cef -     MSK:  acute on chronic pain: Dilaudid     Gout: - allopurinol 300 mg qd    Dispo:  - PICC potentially to be placed this Thursday    Over 55 minutes were spent in direct patient care and care coordination.

## 2025-07-02 ENCOUNTER — APPOINTMENT (OUTPATIENT)
Dept: HEMATOLOGY ONCOLOGY | Facility: CLINIC | Age: 70
End: 2025-07-02

## 2025-07-02 ENCOUNTER — APPOINTMENT (OUTPATIENT)
Dept: INFUSION THERAPY | Facility: HOSPITAL | Age: 70
End: 2025-07-02

## 2025-07-02 LAB
ALBUMIN SERPL ELPH-MCNC: 3.2 G/DL — LOW (ref 3.3–5)
ALP SERPL-CCNC: 109 U/L — SIGNIFICANT CHANGE UP (ref 40–120)
ALT FLD-CCNC: 80 U/L — HIGH (ref 10–45)
ANION GAP SERPL CALC-SCNC: 13 MMOL/L — SIGNIFICANT CHANGE UP (ref 5–17)
AST SERPL-CCNC: 30 U/L — SIGNIFICANT CHANGE UP (ref 10–40)
BASOPHILS # BLD AUTO: SIGNIFICANT CHANGE UP (ref 0–0.2)
BASOPHILS NFR BLD AUTO: SIGNIFICANT CHANGE UP (ref 0–2)
BILIRUB SERPL-MCNC: 0.6 MG/DL — SIGNIFICANT CHANGE UP (ref 0.2–1.2)
BLD GP AB SCN SERPL QL: NEGATIVE — SIGNIFICANT CHANGE UP
BUN SERPL-MCNC: 9 MG/DL — SIGNIFICANT CHANGE UP (ref 7–23)
CALCIUM SERPL-MCNC: 7.8 MG/DL — LOW (ref 8.4–10.5)
CHLORIDE SERPL-SCNC: 100 MMOL/L — SIGNIFICANT CHANGE UP (ref 96–108)
CO2 SERPL-SCNC: 20 MMOL/L — LOW (ref 22–31)
CREAT SERPL-MCNC: 0.83 MG/DL — SIGNIFICANT CHANGE UP (ref 0.5–1.3)
CULTURE RESULTS: SIGNIFICANT CHANGE UP
CULTURE RESULTS: SIGNIFICANT CHANGE UP
EGFR: 95 ML/MIN/1.73M2 — SIGNIFICANT CHANGE UP
EGFR: 95 ML/MIN/1.73M2 — SIGNIFICANT CHANGE UP
EOSINOPHIL # BLD AUTO: SIGNIFICANT CHANGE UP (ref 0–0.5)
EOSINOPHIL NFR BLD AUTO: SIGNIFICANT CHANGE UP (ref 0–6)
GLUCOSE BLDC GLUCOMTR-MCNC: 108 MG/DL — HIGH (ref 70–99)
GLUCOSE SERPL-MCNC: 125 MG/DL — HIGH (ref 70–99)
HCT VFR BLD CALC: 21.9 % — LOW (ref 39–50)
HGB BLD-MCNC: 7.4 G/DL — LOW (ref 13–17)
IMM GRANULOCYTES # BLD AUTO: SIGNIFICANT CHANGE UP (ref 0–0.07)
IMM GRANULOCYTES NFR BLD AUTO: SIGNIFICANT CHANGE UP (ref 0–0.9)
IMMATURE PLATELET FRACTION #: 0.2 K/UL — LOW (ref 3.9–12.5)
IMMATURE PLATELET FRACTION %: 1.4 % — LOW (ref 1.6–7.1)
LYMPHOCYTES # BLD AUTO: SIGNIFICANT CHANGE UP (ref 1–3.3)
LYMPHOCYTES NFR BLD AUTO: SIGNIFICANT CHANGE UP (ref 13–44)
MAGNESIUM SERPL-MCNC: 1.8 MG/DL — SIGNIFICANT CHANGE UP (ref 1.6–2.6)
MCHC RBC-ENTMCNC: 30.7 PG — SIGNIFICANT CHANGE UP (ref 27–34)
MCHC RBC-ENTMCNC: 33.8 G/DL — SIGNIFICANT CHANGE UP (ref 32–36)
MCV RBC AUTO: 90.9 FL — SIGNIFICANT CHANGE UP (ref 80–100)
MONOCYTES # BLD AUTO: SIGNIFICANT CHANGE UP (ref 0–0.9)
MONOCYTES NFR BLD AUTO: SIGNIFICANT CHANGE UP (ref 2–14)
NEUTROPHILS # BLD AUTO: SIGNIFICANT CHANGE UP (ref 1.8–7.4)
NEUTROPHILS NFR BLD AUTO: SIGNIFICANT CHANGE UP (ref 43–77)
NRBC # BLD AUTO: 0 K/UL — SIGNIFICANT CHANGE UP (ref 0–0)
NRBC # FLD: 0 K/UL — SIGNIFICANT CHANGE UP (ref 0–0)
NRBC BLD AUTO-RTO: 0 /100 WBCS — SIGNIFICANT CHANGE UP (ref 0–0)
PHOSPHATE SERPL-MCNC: 1.7 MG/DL — LOW (ref 2.5–4.5)
PLATELET # BLD AUTO: 14 K/UL — CRITICAL LOW (ref 150–400)
PMV BLD: 10.4 FL — SIGNIFICANT CHANGE UP (ref 7–13)
POTASSIUM SERPL-MCNC: 4.2 MMOL/L — SIGNIFICANT CHANGE UP (ref 3.5–5.3)
POTASSIUM SERPL-SCNC: 4.2 MMOL/L — SIGNIFICANT CHANGE UP (ref 3.5–5.3)
PROT SERPL-MCNC: 5.5 G/DL — LOW (ref 6–8.3)
RBC # BLD: 2.41 M/UL — LOW (ref 4.2–5.8)
RBC # FLD: 16.3 % — HIGH (ref 10.3–14.5)
RH IG SCN BLD-IMP: POSITIVE — SIGNIFICANT CHANGE UP
SODIUM SERPL-SCNC: 133 MMOL/L — LOW (ref 135–145)
SPECIMEN SOURCE: SIGNIFICANT CHANGE UP
SPECIMEN SOURCE: SIGNIFICANT CHANGE UP
WBC # BLD: 0.18 K/UL — CRITICAL LOW (ref 3.8–10.5)
WBC # FLD AUTO: 0.18 K/UL — CRITICAL LOW (ref 3.8–10.5)

## 2025-07-02 PROCEDURE — 99233 SBSQ HOSP IP/OBS HIGH 50: CPT

## 2025-07-02 RX ORDER — POTASSIUM PHOSPHATE, MONOBASIC POTASSIUM PHOSPHATE, DIBASIC INJECTION, 236; 224 MG/ML; MG/ML
30 SOLUTION, CONCENTRATE INTRAVENOUS ONCE
Refills: 0 | Status: COMPLETED | OUTPATIENT
Start: 2025-07-02 | End: 2025-07-02

## 2025-07-02 RX ADMIN — TAMSULOSIN HYDROCHLORIDE 0.4 MILLIGRAM(S): 0.4 CAPSULE ORAL at 21:28

## 2025-07-02 RX ADMIN — Medication 0.5 MILLIGRAM(S): at 10:30

## 2025-07-02 RX ADMIN — Medication 15 MILLILITER(S): at 13:35

## 2025-07-02 RX ADMIN — CEFEPIME 100 MILLIGRAM(S): 2 INJECTION, POWDER, FOR SOLUTION INTRAVENOUS at 13:35

## 2025-07-02 RX ADMIN — Medication 2000 UNIT(S): at 11:58

## 2025-07-02 RX ADMIN — Medication 0.5 MILLIGRAM(S): at 14:32

## 2025-07-02 RX ADMIN — Medication 40 MILLIGRAM(S): at 05:07

## 2025-07-02 RX ADMIN — Medication 0.5 MILLIGRAM(S): at 15:02

## 2025-07-02 RX ADMIN — Medication 0.5 MILLIGRAM(S): at 06:18

## 2025-07-02 RX ADMIN — Medication 0.5 MILLIGRAM(S): at 23:09

## 2025-07-02 RX ADMIN — Medication 0.5 MILLIGRAM(S): at 06:45

## 2025-07-02 RX ADMIN — Medication 0.5 MILLIGRAM(S): at 19:13

## 2025-07-02 RX ADMIN — Medication 500 MILLIGRAM(S): at 17:08

## 2025-07-02 RX ADMIN — POSACONAZOLE 300 MILLIGRAM(S): 100 TABLET, DELAYED RELEASE ORAL at 17:08

## 2025-07-02 RX ADMIN — Medication 50 MILLILITER(S): at 11:59

## 2025-07-02 RX ADMIN — Medication 0.5 MILLIGRAM(S): at 01:55

## 2025-07-02 RX ADMIN — Medication 0.5 MILLIGRAM(S): at 01:25

## 2025-07-02 RX ADMIN — CEFEPIME 100 MILLIGRAM(S): 2 INJECTION, POWDER, FOR SOLUTION INTRAVENOUS at 21:29

## 2025-07-02 RX ADMIN — Medication 6 MILLIGRAM(S): at 21:28

## 2025-07-02 RX ADMIN — POTASSIUM PHOSPHATE, MONOBASIC POTASSIUM PHOSPHATE, DIBASIC INJECTION, 83.33 MILLIMOLE(S): 236; 224 SOLUTION, CONCENTRATE INTRAVENOUS at 14:33

## 2025-07-02 RX ADMIN — Medication 15 MILLILITER(S): at 05:07

## 2025-07-02 RX ADMIN — Medication 0.5 MILLIGRAM(S): at 18:43

## 2025-07-02 RX ADMIN — Medication 0.5 MILLIGRAM(S): at 11:00

## 2025-07-02 RX ADMIN — Medication 6 MILLIGRAM(S): at 22:31

## 2025-07-02 RX ADMIN — Medication 50 MILLILITER(S): at 05:08

## 2025-07-02 RX ADMIN — Medication 300 MILLIGRAM(S): at 11:58

## 2025-07-02 RX ADMIN — Medication 500 MILLIGRAM(S): at 05:05

## 2025-07-02 RX ADMIN — DILTIAZEM HYDROCHLORIDE 120 MILLIGRAM(S): 240 TABLET, EXTENDED RELEASE ORAL at 05:05

## 2025-07-02 RX ADMIN — CEFEPIME 100 MILLIGRAM(S): 2 INJECTION, POWDER, FOR SOLUTION INTRAVENOUS at 05:03

## 2025-07-02 NOTE — PROGRESS NOTE ADULT - ASSESSMENT
68 yo male with ASXL1, DDX41 mutated AML in CR1,  Day 12 s/p cycle 1 condensed HIDAC (2g/m2) consolidation, presenting from [facility]'s EDC appointment with neutropenic fever.  Hospital course complicated  by Pseudomonas aeruginosa ( 6/26) ), on Meropenem and hyponatremia ( 6/27-6/30), Cefepime 2g (6/30-  Pt is pancytopenic secondary to chemotherapy

## 2025-07-02 NOTE — PROGRESS NOTE ADULT - PROBLEM SELECTOR PLAN 1
admitted with neutropenic fever   s/p C1 condensed HIDAC (2g/m2) consolidation on 6/15/25 - 6/17/25  s/p G-CSF Neulasta as outpatient 6/20  CBC w diff, CMP, coags  Hgb goal >8; PLTs goal >20k or if bleeding  stirct I&Os daily weights mouth care  6/30 change Abx to cefepime. 1 bag PLTS in prep for midline and possible discharge tomorrow. need PT re-eval  7/2: plan for PICC Thursday if cultures clear > 48 hrs admitted with neutropenic fever   s/p C1 condensed HIDAC (2g/m2) consolidation on 6/15/25 - 6/17/25  s/p G-CSF Neulasta as outpatient 6/20  CBC w diff, CMP, coags  Hgb goal >8; PLTs goal >20k or if bleeding  stirct I&Os daily weights mouth care  6/30 change Abx to cefepime. 1 bag PLTS in prep for midline and possible discharge tomorrow. need PT re-eval  7/2: S/p phos repletion for hypophosphatemia. Plan for PICC Thursday if cultures clear > 48 hrs

## 2025-07-02 NOTE — PROGRESS NOTE ADULT - NS ATTEND AMEND GEN_ALL_CORE FT
.  Primary: Chitty    Vital Signs Last 24 Hrs  T(C): 36.6 (02 Jul 2025 00:49), Max: 38.5 (01 Jul 2025 07:36)  T(F): 97.8 (02 Jul 2025 00:49), Max: 101.3 (01 Jul 2025 07:36)  HR: 83 (02 Jul 2025 00:49) (83 - 100)  BP: 118/73 (02 Jul 2025 00:49) (105/69 - 118/73)  BP(mean): --  RR: 18 (02 Jul 2025 00:49) (18 - 18)  SpO2: 94% (02 Jul 2025 00:49) (94% - 98%)    Parameters below as of 02 Jul 2025 00:49  Patient On (Oxygen Delivery Method): room air    MEDICATIONS  (STANDING):  allopurinol 300 milliGRAM(s) Oral daily  Biotene Dry Mouth Oral Rinse 15 milliLiter(s) Swish and Spit three times a day  cefepime   IVPB 2000 milliGRAM(s) IV Intermittent every 8 hours  cholecalciferol 2000 Unit(s) Oral daily  diltiazem    milliGRAM(s) Oral daily  pantoprazole    Tablet 40 milliGRAM(s) Oral before breakfast  posaconazole DR Tablet 300 milliGRAM(s) Oral every 24 hours  posaconazole DR Tablet   Oral   sodium chloride 0.9%. 1000 milliLiter(s) (50 mL/Hr) IV Continuous <Continuous>  tamsulosin 0.4 milliGRAM(s) Oral at bedtime  valACYclovir 500 milliGRAM(s) Oral two times a day      Assessment: 69 year old day 16 Cycle 1 HiDAC for ASXL1-mutated and [id number]-snyllfv AML.  Course complicated by pseudomonal septicemia, mild transaminitis (active)    PMHx: gout    Heme:   - s/p Neulasta outpatient 6/20  - Continue daily oral B12 suppl  - Hgb goal >7.0g/dl, PLT goal >10,000    ID:   cefepime (6/26/25 - 6/27/25; 6/30/25 - ) - planning IV therapy through 7/5/25; last + blood Cx 6/26/25  Meropenem (6/27/25 - 6/30/25)  arrange for home cef -     Nutrition: low phos - please replace orally    MSK:  acute on chronic pain: Dilaudid     Gout: - allopurinol 300 mg qd    Dispo:  - PICC potentially to be placed this Thursday    Over 55 minutes were spent in direct patient care and care coordination. .  Primary: Chitty    Vital Signs Last 24 Hrs  T(C): 36.6 (02 Jul 2025 00:49), Max: 38.5 (01 Jul 2025 07:36)  T(F): 97.8 (02 Jul 2025 00:49), Max: 101.3 (01 Jul 2025 07:36)  HR: 83 (02 Jul 2025 00:49) (83 - 100)  BP: 118/73 (02 Jul 2025 00:49) (105/69 - 118/73)  BP(mean): --  RR: 18 (02 Jul 2025 00:49) (18 - 18)  SpO2: 94% (02 Jul 2025 00:49) (94% - 98%)    Parameters below as of 02 Jul 2025 00:49  Patient On (Oxygen Delivery Method): room air    MEDICATIONS  (STANDING):  allopurinol 300 milliGRAM(s) Oral daily  Biotene Dry Mouth Oral Rinse 15 milliLiter(s) Swish and Spit three times a day  cefepime   IVPB 2000 milliGRAM(s) IV Intermittent every 8 hours  cholecalciferol 2000 Unit(s) Oral daily  diltiazem    milliGRAM(s) Oral daily  pantoprazole    Tablet 40 milliGRAM(s) Oral before breakfast  posaconazole DR Tablet 300 milliGRAM(s) Oral every 24 hours  posaconazole DR Tablet   Oral   sodium chloride 0.9%. 1000 milliLiter(s) (50 mL/Hr) IV Continuous <Continuous>  tamsulosin 0.4 milliGRAM(s) Oral at bedtime  valACYclovir 500 milliGRAM(s) Oral two times a day      Assessment: 69 year old day 16 Cycle 1 HiDAC for ASXL1-mutated and [id number]-rulgvvr AML.  Course complicated by pseudomonal septicemia, mild transaminitis (active), and lack of ANC recovery (today is day +12 of Neulasta)     PMHx: gout    Heme:   - s/p Neulasta outpatient 6/20  - Continue daily oral B12 suppl  - Hgb goal >7.0g/dl, PLT goal >10,000    ID:   cefepime (6/26/25 - 6/27/25; 6/30/25 - ) - planning IV therapy through 7/5/25; last + blood Cx 6/26/25  Meropenem (6/27/25 - 6/30/25)  arrange for home cef -     Nutrition: low phos - please replace orally    MSK:  acute on chronic pain: Dilaudid     Gout: - allopurinol 300 mg qd    Dispo:  - PICC potentially to be placed this Thursday    Over 55 minutes were spent in direct patient care and care coordination.

## 2025-07-02 NOTE — PROGRESS NOTE ADULT - SUBJECTIVE AND OBJECTIVE BOX
Diagnosis: MDS related AML, ASXL1, DDX41    Protocol/Chemo Regimen: s/p Cycle 1 HIDAC 2g/m2     Day: 18    Pt endorsed: generalized weakness    Review of Systems: Patient denies  nausea, vomiting, diarrhea, abdominal pain,  chest pain and headache.    Pain scale: denies now     Diet: DASH     Allergies: No Known Allergies      ANTIMICROBIALS  cefepime   IVPB 2000 milliGRAM(s) IV Intermittent every 8 hours  posaconazole DR Tablet 300 milliGRAM(s) Oral every 24 hours  posaconazole DR Tablet   Oral   valACYclovir 500 milliGRAM(s) Oral two times a day      STANDING MEDICATIONS  allopurinol 300 milliGRAM(s) Oral daily  Biotene Dry Mouth Oral Rinse 15 milliLiter(s) Swish and Spit three times a day  cholecalciferol 2000 Unit(s) Oral daily  diltiazem    milliGRAM(s) Oral daily  pantoprazole    Tablet 40 milliGRAM(s) Oral before breakfast  potassium phosphate IVPB 30 milliMole(s) IV Intermittent once  sodium chloride 0.9%. 1000 milliLiter(s) IV Continuous <Continuous>  tamsulosin 0.4 milliGRAM(s) Oral at bedtime      PRN MEDICATIONS  acetaminophen     Tablet .. 650 milliGRAM(s) Oral every 6 hours PRN  HYDROmorphone   Tablet 6 milliGRAM(s) Oral every 4 hours PRN  HYDROmorphone  Injectable 0.5 milliGRAM(s) IV Push every 4 hours PRN  metoclopramide Injectable 10 milliGRAM(s) IV Push every 6 hours PRN  naloxone Injectable 0.1 milliGRAM(s) IV Push every 3 minutes PRN        Vital Signs Last 24 Hrs  T(C): 37.8 (02 Jul 2025 08:46), Max: 37.8 (02 Jul 2025 08:46)  T(F): 100 (02 Jul 2025 08:46), Max: 100 (02 Jul 2025 08:46)  HR: 94 (02 Jul 2025 08:46) (83 - 94)  BP: 127/76 (02 Jul 2025 08:46) (105/69 - 127/76)  BP(mean): --  RR: 18 (02 Jul 2025 08:46) (18 - 18)  SpO2: 97% (02 Jul 2025 08:46) (94% - 98%)    Parameters below as of 02 Jul 2025 08:46  Patient On (Oxygen Delivery Method): room air        PHYSICAL EXAM  General: NAD  HEENT:  clear oropharynx, anicteric sclera, + angioedema   CV: (+) S1/S2 RRR  Lungs: clear to auscultation, no wheezes or rales  Abdomen: soft, non-tender, non-distended (+) BS  Ext: Trace  edema b/l LE   Skin: no rash  Neuro: alert and oriented X 3,   Central Line: PIVL CD      LABS:                          7.4    0.18  )-----------( 14       ( 02 Jul 2025 10:17 )             21.9         Mean Cell Volume : 90.9 fl  Mean Cell Hemoglobin : 30.7 pg  Mean Cell Hemoglobin Concentration : 33.8 g/dL  Auto Neutrophil # : Not measured  Auto Lymphocyte # : Not measured  Auto Monocyte # : Not measured  Auto Eosinophil # : Not measured  Auto Basophil # : Not measured  Auto Neutrophil % : Not measured Differential percentages must be correlated with absolute numbers for  clinical significance.  Auto Lymphocyte % : Not measured  Auto Monocyte % : Not measured  Auto Eosinophil % : Not measured  Auto Basophil % : Not measured      07-02    133[L]  |  100  |  9   ----------------------------<  125[H]  4.2   |  20[L]  |  0.83    Ca    7.8[L]      02 Jul 2025 10:17  Phos  1.7     07-02  Mg     1.8     07-02    TPro  5.5[L]  /  Alb  3.2[L]  /  TBili  0.6  /  DBili  x   /  AST  30  /  ALT  80[H]  /  AlkPhos  109  07-02      Mg 1.8  Phos 1.7

## 2025-07-03 LAB
ALBUMIN SERPL ELPH-MCNC: 3 G/DL — LOW (ref 3.3–5)
ALP SERPL-CCNC: 105 U/L — SIGNIFICANT CHANGE UP (ref 40–120)
ALT FLD-CCNC: 91 U/L — HIGH (ref 10–45)
ANION GAP SERPL CALC-SCNC: 15 MMOL/L — SIGNIFICANT CHANGE UP (ref 5–17)
AST SERPL-CCNC: 39 U/L — SIGNIFICANT CHANGE UP (ref 10–40)
BASOPHILS # BLD AUTO: SIGNIFICANT CHANGE UP (ref 0–0.2)
BASOPHILS NFR BLD AUTO: SIGNIFICANT CHANGE UP (ref 0–2)
BILIRUB SERPL-MCNC: 0.8 MG/DL — SIGNIFICANT CHANGE UP (ref 0.2–1.2)
BUN SERPL-MCNC: 9 MG/DL — SIGNIFICANT CHANGE UP (ref 7–23)
CALCIUM SERPL-MCNC: 7.8 MG/DL — LOW (ref 8.4–10.5)
CHLORIDE SERPL-SCNC: 101 MMOL/L — SIGNIFICANT CHANGE UP (ref 96–108)
CO2 SERPL-SCNC: 19 MMOL/L — LOW (ref 22–31)
CREAT SERPL-MCNC: 0.84 MG/DL — SIGNIFICANT CHANGE UP (ref 0.5–1.3)
CULTURE RESULTS: NO GROWTH — SIGNIFICANT CHANGE UP
CULTURE RESULTS: NO GROWTH — SIGNIFICANT CHANGE UP
CULTURE RESULTS: SIGNIFICANT CHANGE UP
CULTURE RESULTS: SIGNIFICANT CHANGE UP
EGFR: 94 ML/MIN/1.73M2 — SIGNIFICANT CHANGE UP
EGFR: 94 ML/MIN/1.73M2 — SIGNIFICANT CHANGE UP
EOSINOPHIL # BLD AUTO: SIGNIFICANT CHANGE UP (ref 0–0.5)
EOSINOPHIL NFR BLD AUTO: SIGNIFICANT CHANGE UP (ref 0–6)
GLUCOSE BLDC GLUCOMTR-MCNC: 103 MG/DL — HIGH (ref 70–99)
GLUCOSE SERPL-MCNC: 107 MG/DL — HIGH (ref 70–99)
HCT VFR BLD CALC: 19.9 % — CRITICAL LOW (ref 39–50)
HGB BLD-MCNC: 6.9 G/DL — CRITICAL LOW (ref 13–17)
IMM GRANULOCYTES # BLD AUTO: SIGNIFICANT CHANGE UP (ref 0–0.07)
IMM GRANULOCYTES NFR BLD AUTO: SIGNIFICANT CHANGE UP (ref 0–0.9)
IMMATURE PLATELET FRACTION #: 0.8 K/UL — LOW (ref 3.9–12.5)
IMMATURE PLATELET FRACTION %: 2.7 % — SIGNIFICANT CHANGE UP (ref 1.6–7.1)
LYMPHOCYTES # BLD AUTO: SIGNIFICANT CHANGE UP (ref 1–3.3)
LYMPHOCYTES NFR BLD AUTO: SIGNIFICANT CHANGE UP (ref 13–44)
MAGNESIUM SERPL-MCNC: 1.9 MG/DL — SIGNIFICANT CHANGE UP (ref 1.6–2.6)
MCHC RBC-ENTMCNC: 31.2 PG — SIGNIFICANT CHANGE UP (ref 27–34)
MCHC RBC-ENTMCNC: 34.7 G/DL — SIGNIFICANT CHANGE UP (ref 32–36)
MCV RBC AUTO: 90 FL — SIGNIFICANT CHANGE UP (ref 80–100)
MONOCYTES # BLD AUTO: SIGNIFICANT CHANGE UP (ref 0–0.9)
MONOCYTES NFR BLD AUTO: SIGNIFICANT CHANGE UP (ref 2–14)
NEUTROPHILS # BLD AUTO: SIGNIFICANT CHANGE UP (ref 1.8–7.4)
NEUTROPHILS NFR BLD AUTO: SIGNIFICANT CHANGE UP (ref 43–77)
NRBC # BLD AUTO: 0 K/UL — SIGNIFICANT CHANGE UP (ref 0–0)
NRBC # FLD: 0 K/UL — SIGNIFICANT CHANGE UP (ref 0–0)
NRBC BLD AUTO-RTO: 0 /100 WBCS — SIGNIFICANT CHANGE UP (ref 0–0)
PHOSPHATE SERPL-MCNC: 2.2 MG/DL — LOW (ref 2.5–4.5)
PLATELET # BLD AUTO: 30 K/UL — LOW (ref 150–400)
PMV BLD: 12.6 FL — SIGNIFICANT CHANGE UP (ref 7–13)
POTASSIUM SERPL-MCNC: 3.8 MMOL/L — SIGNIFICANT CHANGE UP (ref 3.5–5.3)
POTASSIUM SERPL-SCNC: 3.8 MMOL/L — SIGNIFICANT CHANGE UP (ref 3.5–5.3)
PROT SERPL-MCNC: 5.5 G/DL — LOW (ref 6–8.3)
RBC # BLD: 2.21 M/UL — LOW (ref 4.2–5.8)
RBC # FLD: 16.4 % — HIGH (ref 10.3–14.5)
SODIUM SERPL-SCNC: 135 MMOL/L — SIGNIFICANT CHANGE UP (ref 135–145)
SPECIMEN SOURCE: SIGNIFICANT CHANGE UP
WBC # BLD: 0.22 K/UL — CRITICAL LOW (ref 3.8–10.5)
WBC # FLD AUTO: 0.22 K/UL — CRITICAL LOW (ref 3.8–10.5)

## 2025-07-03 PROCEDURE — 99233 SBSQ HOSP IP/OBS HIGH 50: CPT

## 2025-07-03 RX ORDER — SOD PHOS DI, MONO/K PHOS MONO 250 MG
2 TABLET ORAL
Refills: 0 | Status: COMPLETED | OUTPATIENT
Start: 2025-07-03 | End: 2025-07-03

## 2025-07-03 RX ORDER — HYDROMORPHONE/SOD CHLOR,ISO/PF 2 MG/10 ML
1 SYRINGE (ML) INJECTION
Qty: 0 | Refills: 0 | DISCHARGE
Start: 2025-07-03

## 2025-07-03 RX ORDER — POSACONAZOLE 100 MG/1
300 TABLET, DELAYED RELEASE ORAL
Qty: 0 | Refills: 0 | DISCHARGE
Start: 2025-07-03

## 2025-07-03 RX ADMIN — Medication 0.5 MILLIGRAM(S): at 16:37

## 2025-07-03 RX ADMIN — Medication 2 TABLET(S): at 17:14

## 2025-07-03 RX ADMIN — Medication 500 MILLIGRAM(S): at 04:51

## 2025-07-03 RX ADMIN — Medication 2000 UNIT(S): at 11:42

## 2025-07-03 RX ADMIN — Medication 0.5 MILLIGRAM(S): at 00:00

## 2025-07-03 RX ADMIN — CEFEPIME 100 MILLIGRAM(S): 2 INJECTION, POWDER, FOR SOLUTION INTRAVENOUS at 19:58

## 2025-07-03 RX ADMIN — Medication 500 MILLIGRAM(S): at 17:14

## 2025-07-03 RX ADMIN — POSACONAZOLE 300 MILLIGRAM(S): 100 TABLET, DELAYED RELEASE ORAL at 17:13

## 2025-07-03 RX ADMIN — Medication 6 MILLIGRAM(S): at 04:51

## 2025-07-03 RX ADMIN — Medication 0.5 MILLIGRAM(S): at 06:41

## 2025-07-03 RX ADMIN — Medication 0.5 MILLIGRAM(S): at 13:04

## 2025-07-03 RX ADMIN — DILTIAZEM HYDROCHLORIDE 120 MILLIGRAM(S): 240 TABLET, EXTENDED RELEASE ORAL at 04:51

## 2025-07-03 RX ADMIN — Medication 2 TABLET(S): at 15:25

## 2025-07-03 RX ADMIN — Medication 0.5 MILLIGRAM(S): at 17:07

## 2025-07-03 RX ADMIN — Medication 6 MILLIGRAM(S): at 05:21

## 2025-07-03 RX ADMIN — Medication 40 MILLIGRAM(S): at 05:25

## 2025-07-03 RX ADMIN — Medication 300 MILLIGRAM(S): at 11:42

## 2025-07-03 RX ADMIN — Medication 6 MILLIGRAM(S): at 21:16

## 2025-07-03 RX ADMIN — Medication 0.5 MILLIGRAM(S): at 22:45

## 2025-07-03 RX ADMIN — CEFEPIME 100 MILLIGRAM(S): 2 INJECTION, POWDER, FOR SOLUTION INTRAVENOUS at 13:22

## 2025-07-03 RX ADMIN — Medication 0.5 MILLIGRAM(S): at 07:11

## 2025-07-03 RX ADMIN — Medication 6 MILLIGRAM(S): at 19:57

## 2025-07-03 RX ADMIN — Medication 0.5 MILLIGRAM(S): at 21:59

## 2025-07-03 RX ADMIN — CEFEPIME 100 MILLIGRAM(S): 2 INJECTION, POWDER, FOR SOLUTION INTRAVENOUS at 04:51

## 2025-07-03 RX ADMIN — Medication 50 MILLILITER(S): at 11:43

## 2025-07-03 RX ADMIN — Medication 0.5 MILLIGRAM(S): at 13:34

## 2025-07-03 RX ADMIN — TAMSULOSIN HYDROCHLORIDE 0.4 MILLIGRAM(S): 0.4 CAPSULE ORAL at 19:57

## 2025-07-03 NOTE — PROGRESS NOTE ADULT - PROBLEM SELECTOR PLAN 2
patient is neutropenic, afebrile  cont Meropenem ,   6/27-2/28s/p  Vanco  6/26 cultures BC + GNR , follow up surveillance cultures    cont ppx posaconazole, valtrex  6/26 CXR - clear lungs  6/26 BC (+) Pseudomonas aeruginosa:  surveillance cultures NGT  6/30 change Abx to cefepime.  7/1: Pt spiked fever of 101.3F today. Pan cultures sent, CXR sent.

## 2025-07-03 NOTE — ADVANCED PRACTICE NURSE CONSULT - REASON FOR CONSULT
Vascular Access Team    Evaluation for: Bedside DL PICC Placement  Requested by name: Madelaine Turcios (03-Jul-2025 07:11)    Indication: Chemotherapy  Allergy to CHG or Heparin or Lidocaine: no    Anticoagulants/ antiplatelets: no    Platelets(>20): 30  INR(<3): not sent  eGFR(>40): 94  Blood cultures sent: yes July 1st 10:42am  Blood culture results in 48hrs: not yet resulted    Arm restrictions: n/a    Consent obtained: pending    Pending: consent and 48 hr BC results    Comments: Bedside picc order evaluated. Please obtain PICC placement consent and then call [phone] for the VAT RN.

## 2025-07-03 NOTE — PROGRESS NOTE ADULT - ASSESSMENT
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  Hospital course complicated  by Pseudomonas aeruginosa ( 6/26) ), on Meropenem and hyponatremia ( 6/27-6/30), Cefepime 2g (6/30-  Pt is pancytopenic secondary to chemotherapy

## 2025-07-03 NOTE — PROGRESS NOTE ADULT - SUBJECTIVE AND OBJECTIVE BOX
Diagnosis: MDS related AML, ASXL1, DDX41    Protocol/Chemo Regimen: s/p Cycle 1 HIDAC 2g/m2     Day: 19    Pt endorsed: generalized weakness    Review of Systems: Patient denies  nausea, vomiting, diarrhea, abdominal pain,  chest pain and headache.    Pain scale: denies now     Diet: DASH     Allergies: No Known Allergies      ANTIMICROBIALS  cefepime   IVPB 2000 milliGRAM(s) IV Intermittent every 8 hours  posaconazole DR Tablet 300 milliGRAM(s) Oral every 24 hours  posaconazole DR Tablet   Oral   valACYclovir 500 milliGRAM(s) Oral two times a day      STANDING MEDICATIONS  allopurinol 300 milliGRAM(s) Oral daily  Biotene Dry Mouth Oral Rinse 15 milliLiter(s) Swish and Spit three times a day  cholecalciferol 2000 Unit(s) Oral daily  diltiazem    milliGRAM(s) Oral daily  pantoprazole    Tablet 40 milliGRAM(s) Oral before breakfast  potassium phosphate IVPB 30 milliMole(s) IV Intermittent once  sodium chloride 0.9%. 1000 milliLiter(s) IV Continuous <Continuous>  tamsulosin 0.4 milliGRAM(s) Oral at bedtime      PRN MEDICATIONS  acetaminophen     Tablet .. 650 milliGRAM(s) Oral every 6 hours PRN  HYDROmorphone   Tablet 6 milliGRAM(s) Oral every 4 hours PRN  HYDROmorphone  Injectable 0.5 milliGRAM(s) IV Push every 4 hours PRN  metoclopramide Injectable 10 milliGRAM(s) IV Push every 6 hours PRN  naloxone Injectable 0.1 milliGRAM(s) IV Push every 3 minutes PRN        Vital Signs Last 24 Hrs  T(C): 37.1 (03 Jul 2025 13:30), Max: 37.7 (03 Jul 2025 04:37)  T(F): 98.8 (03 Jul 2025 13:30), Max: 99.8 (03 Jul 2025 04:37)  HR: 79 (03 Jul 2025 13:30) (78 - 89)  BP: 123/77 (03 Jul 2025 13:30) (116/72 - 140/74)  BP(mean): --  RR: 18 (03 Jul 2025 13:30) (18 - 18)  SpO2: 97% (03 Jul 2025 13:30) (95% - 98%)    Parameters below as of 03 Jul 2025 13:30  Patient On (Oxygen Delivery Method): room air          PHYSICAL EXAM  General: NAD  HEENT:  clear oropharynx, anicteric sclera, + angioedema   CV: (+) S1/S2 RRR  Lungs: clear to auscultation, no wheezes or rales  Abdomen: soft, non-tender, non-distended (+) BS  Ext: Trace  edema b/l LE   Skin: no rash  Neuro: alert and oriented X 3,   Central Line: PIVL CD      LABS:                          6.9    0.22  )-----------( 30       ( 03 Jul 2025 07:19 )             19.9         Mean Cell Volume : 90.0 fl  Mean Cell Hemoglobin : 31.2 pg  Mean Cell Hemoglobin Concentration : 34.7 g/dL  Auto Neutrophil # : Not measured  Auto Lymphocyte # : Not measured  Auto Monocyte # : Not measured  Auto Eosinophil # : Not measured  Auto Basophil # : Not measured  Auto Neutrophil % : Not measured Differential percentages must be correlated with absolute numbers for  clinical significance.  Auto Lymphocyte % : Not measured  Auto Monocyte % : Not measured  Auto Eosinophil % : Not measured  Auto Basophil % : Not measured      07-03    135  |  101  |  9   ----------------------------<  107[H]  3.8   |  19[L]  |  0.84    Ca    7.8[L]      03 Jul 2025 07:20  Phos  2.2     07-03  Mg     1.9     07-03    TPro  5.5[L]  /  Alb  3.0[L]  /  TBili  0.8  /  DBili  x   /  AST  39  /  ALT  91[H]  /  AlkPhos  105  07-03

## 2025-07-03 NOTE — PROGRESS NOTE ADULT - PROBLEM SELECTOR PLAN 1
admitted with neutropenic fever   s/p C1 condensed HIDAC (2g/m2) consolidation on 6/15/25 - 6/17/25  s/p G-CSF Neulasta as outpatient 6/20  CBC w diff, CMP, coags  Hgb goal >8; PLTs goal >20k or if bleeding  stirct I&Os daily weights mouth care  7/3 Anemia PRBC x 1  7/3phos repletion for hypophosphatemia. admitted with neutropenic fever   s/p C1 condensed HIDAC (2g/m2) consolidation on 6/15/25 - 6/17/25  s/p G-CSF Neulasta as outpatient 6/20  CBC w diff, CMP, coags  Hgb goal >8; PLTs goal >20k or if bleeding  stirct I&Os daily weights mouth care  7/3 Anemia PRBC x 1  7/3phos repletion for hypophosphatemia.  Discharge planning on 7/6

## 2025-07-03 NOTE — PHARMACOTHERAPY INTERVENTION NOTE - COMMENTS
Clinical Pharmacy Specialist- Hematology/Oncology- Progress Note    Pt is a 71 y/o male w/ PMH of HTN, HLD, GERD, BPH, Gout, spinal stenosis (s/p multiple surgeries) and newly diagnosed AML (ASXL1-mutated and DDX41) s/p 7+3 Induction therapy, course complicated by gout flare vs septic arthritis, andC1 HIDAC consolidation, now admitted for neutropenic fever    Antimicrobial Course:  - Cefepime (pseudomonas bacteremia)- 6/26-6/27  --> Meropenem (pseudomonas; rigors w/ cefepime)- 6/27- 6/30  --> Cefepime (pseudomonas bacteremia)- 6/30  - Valtrex- 6/12  - Posaconazole- 6/26  - Vancomycin- 6/26- 6/27  MRSA nasal swab    Last Neutropenic (ANC<1000): 7/3; ANC= 0  Last Febrile: 01-Jul-2025 08:47; T= 100.7  Days Non-Neutropenic: 0  Days afebrile: 1    Chemotherapy Course  -Current Regimen: HIDAC 2gm/m2 Consolidation  History:  (5/2/25)- 7+3 Induction   -Daunorubicin 60mg/m2 IVP D1-3   -Cytarabine 100mg/m2 IV cont inf D1-7  (6/15/25) C1 HIDAC 2gm/m2  -Day: 19 (7/3)  BmBx:  Access: R TLC (accessed 5/5/25)    Vancomycin Levels:   Date    Dose              Value    Ref Range	                             Status   5/21    750mg q12hr   7.8       Trough 10-20; AUC= 400-600   Final  5/23    1gm q12hr      10.9     Trough 10-20; AUC= 400-600   Final    History/Relevant clinical information used in assessment:  - Ht= 5’11”; IBW= 75.3kg; Actual BW= 107.3kg; Adj BW= 88kg  - 5/2- IRR? rigors, chills, and tachycardia 2 hours into day 1; rasburicase 3mg x 1  - 5/4 -Crcl= 67ml/min ; Scr=1.32; Wt= 88kg; Adjusted BW used since pt BMI>30  - 5/9- increase melatonin 3mg to 10mh qhs- c/o insomnia  - 5/13- mouth pain- has ulceration   - 5/14-5/28- severe mucositis   - 5/15- bloody diarrhea x 3 on reglan prn, but has not needed, monitor for d/c to not aggravate w/ prokinetic agent  - 5/15- HSV neg  - 5/16- bloody BM's decreased to 2x'd/day from 4x's/day yest  - 5/17 UCx- 5/17- 10-49k VRE- per ID, do not need to treat  - 5/18- midodrine 10mg PO tid  - 5/19- BlCx- Meth resistant Staphylococcus haemolyticus- on vancomycin 1gm q12hr, this will yield AUC= 457; per ID, likely contaminant, expect short course of vanco; - 5/19, still febrile since 5/11, on cefepime, BlCx NGTD + abd discomfort (added flagyl)-->CT CAP 5/15 - no infection (but groundglass opacities stable from previous?)  - 5/21- zarxio 480mcg daily 5/21- 5/23  - 5/27- gout flare- colchicine 0.6mg BID x 2 days (5/27-5/29) -can cont until flare resolves, some experts cont 24-48 hrs after resolution; if symptoms do not resolve after 3 days, consider other anti-inflammatory agents- pt pain not relieved- added prednisone 40mg daily x 2 days (5/28-29) f/b 20mg x 2 days (5/30-6/1)  - 5/29- pt had episodes of diarrhea d/t colchicine  - 5/29- - allopurinol 100mg (300mg d/c'd 5/15)-keeping for gout-->300mg- 5/29- given UA rising- 5/29  - 5/30 will decrease diltiazem CD 240mg to 120mg  Meds sent for auth: midodrine, allopurinol 300mg (increased), prednisone, simethicone- sent to [facility] 5/29  - 6/13- c/o pain in both feet now  - 6/16- Confirmed home medications with patient  Home Medications:  ·	cholecalciferol oral tablet: 2000 unit(s) PO daily  ·	omeprazole 40 mg PO daily  ·	tamsulosin 0.4 mg PO daily  ·	diltiazem CD 120mg PO daily  ·	Allopurinol 300mg PO daily  ·	Valtrex 500mg PO BID  ·	atorvastatin 80mg PO qhs  ·	colchicine 0,6mg PO - 59 days supply filled 6/2025- pt does not want or take  ·	hctz, tramadol, midodrine, oxy- no longer taking  - 6/12- r/o septic arthritis of L ankle- s/p arthrocentesis & allopurinol 300mg PO daily, - hydromorphone 4mg PO q4hrs prn- 6/17  - 6/13- methylprednisone 40mg IVP daily- taper: 30mg daily x 2 days- 6/17-6/18--> 20mg daily x 1 day- 6/19 -->10mg daily x 1 day- 6/20 --> stop  - 6/13- not neutropenic, on cefepime for fevers- switched to levaquin through thurs 6/19 (for 7 days duration)- today is last day  - 6/17- a bit confused- 2/2 to opioids? cytarabine?  Meds sent for auth: methylpred 10mg x 1 dose- sent to [facility] 6/19  - 6/27- vancomycin 1gm q8hr- may need to decrease as this is predicted to yield AUC= 692 (goal 400-600); pt was therapeutic on 1gm q12hr in the past; if VT >20, would HOLD pm dose and obtain vanco random tomorrow am 6/28. If VT <20, would give 750mg-1gm q12hr (this will yield AUC= 436- 582)    Assessment/Plan/Recommendation:  Onc:  - on allopurinol 300mg PO daily- h/o gout & gout flare  - 7/3- s/p neulasta 6/20, if ANC still low tmrw, will start zarxio 48omcg SC daily  ID:  - 6/26 BlCx +pseudomonas- pt febrile & rigoring- escalated to meropenem for FN, back to cefepime; today is day 7 since last neg BlCx on  6/27; discussed will do 9 days through 7/5  Rheum:  - on allopurinol 300mg PO daily- h/o gout & gout flare  Cards:  - atorvastatin 80mg hold if on posa d/t DDI (Cat X)    Additional Monitoring Needed?   -Yes- Continue to monitor renal function & daily counts for abx escalation/de-escalation   -Discharge Planning:  --> New meds:   --> Meds sent for auth:   --> Delivered meds:    Case discussed with attending/primary team    Liza Jolley, PharmD, BCPS  Clinical Pharmacy Specialist | Hematology/Oncology  [facility]  Email: [email or web address] or available on [email or web address]

## 2025-07-03 NOTE — PROGRESS NOTE ADULT - NS ATTEND AMEND GEN_ALL_CORE FT
.  Primary: Chitty    Vital Signs Last 24 Hrs  T(C): 37.7 (03 Jul 2025 04:37), Max: 37.8 (02 Jul 2025 08:46)  T(F): 99.8 (03 Jul 2025 04:37), Max: 100 (02 Jul 2025 08:46)  HR: 82 (03 Jul 2025 04:37) (82 - 94)  BP: 119/75 (03 Jul 2025 04:37) (116/72 - 132/72)  BP(mean): --  RR: 18 (03 Jul 2025 04:37) (18 - 18)  SpO2: 98% (03 Jul 2025 04:37) (95% - 98%)    Parameters below as of 03 Jul 2025 04:37  Patient On (Oxygen Delivery Method): room air    MEDICATIONS  (STANDING):  allopurinol 300 milliGRAM(s) Oral daily  Biotene Dry Mouth Oral Rinse 15 milliLiter(s) Swish and Spit three times a day  cefepime   IVPB 2000 milliGRAM(s) IV Intermittent every 8 hours  cholecalciferol 2000 Unit(s) Oral daily  diltiazem    milliGRAM(s) Oral daily  pantoprazole    Tablet 40 milliGRAM(s) Oral before breakfast  posaconazole DR Tablet   Oral   posaconazole DR Tablet 300 milliGRAM(s) Oral every 24 hours  sodium chloride 0.9%. 1000 milliLiter(s) (50 mL/Hr) IV Continuous <Continuous>  tamsulosin 0.4 milliGRAM(s) Oral at bedtime  valACYclovir 500 milliGRAM(s) Oral two times a day      Assessment: 69 year old day 17 Cycle 1 HiDAC for ASXL1-mutated and YCB17-wpysftz AML.  Course complicated by pseudomonal septicemia, mild transaminitis (active), and lack of ANC recovery (today is day +13 of Neulasta)     PMHx: gout    Heme:   - s/p Neulasta outpatient 6/20  - Continue daily oral B12 suppl  - Hgb goal >7.0g/dl, PLT goal >10,000    ID:   cefepime (6/26/25 - 6/27/25; 6/30/25 - ) - planning IV therapy through 7/5/25; last + blood Cx 6/26/25  Meropenem (6/27/25 - 6/30/25)      Nutrition: low phos - please repeat serum Phos - replace as needed    MSK:  acute on chronic pain: Dilaudid     Gout: - allopurinol 300 mg qd    Dispo:  - PICC potentially to be placed today - please check today's ANC first.     Over 55 minutes were spent in direct patient care and care coordination. .  Primary: Chitty    Vital Signs Last 24 Hrs  T(C): 37.7 (03 Jul 2025 04:37), Max: 37.8 (02 Jul 2025 08:46)  T(F): 99.8 (03 Jul 2025 04:37), Max: 100 (02 Jul 2025 08:46)  HR: 82 (03 Jul 2025 04:37) (82 - 94)  BP: 119/75 (03 Jul 2025 04:37) (116/72 - 132/72)  BP(mean): --  RR: 18 (03 Jul 2025 04:37) (18 - 18)  SpO2: 98% (03 Jul 2025 04:37) (95% - 98%)    Parameters below as of 03 Jul 2025 04:37  Patient On (Oxygen Delivery Method): room air    MEDICATIONS  (STANDING):  allopurinol 300 milliGRAM(s) Oral daily  Biotene Dry Mouth Oral Rinse 15 milliLiter(s) Swish and Spit three times a day  cefepime   IVPB 2000 milliGRAM(s) IV Intermittent every 8 hours  cholecalciferol 2000 Unit(s) Oral daily  diltiazem    milliGRAM(s) Oral daily  pantoprazole    Tablet 40 milliGRAM(s) Oral before breakfast  posaconazole DR Tablet   Oral   posaconazole DR Tablet 300 milliGRAM(s) Oral every 24 hours  sodium chloride 0.9%. 1000 milliLiter(s) (50 mL/Hr) IV Continuous <Continuous>  tamsulosin 0.4 milliGRAM(s) Oral at bedtime  valACYclovir 500 milliGRAM(s) Oral two times a day      Assessment: 69 year old day 17 Cycle 1 HiDAC for ASXL1-mutated and [id number]-emjyaqz AML.  Course complicated by pseudomonal septicemia, mild transaminitis (active), and lack of ANC recovery (today is day +13 of Neulasta)     PMHx: gout    Heme:   - s/p Neulasta outpatient 6/20  - Continue daily oral B12 suppl  - Hgb goal >7.0g/dl, PLT goal >10,000    ID:   cefepime (6/26/25 - 6/27/25; 6/30/25 - ) - planning IV therapy through 7/5/25; last + blood Cx 6/26/25  Meropenem (6/27/25 - 6/30/25)      Nutrition: low phos - please repeat serum Phos - replace as needed    MSK:  acute on chronic pain: Dilaudid     Gout: - allopurinol 300 mg qd    Potential Discharge this weekend.     Over 55 minutes were spent in direct patient care and care coordination.

## 2025-07-04 LAB
ALBUMIN SERPL ELPH-MCNC: 3 G/DL — LOW (ref 3.3–5)
ALP SERPL-CCNC: 126 U/L — HIGH (ref 40–120)
ALT FLD-CCNC: 151 U/L — HIGH (ref 10–45)
ANION GAP SERPL CALC-SCNC: 13 MMOL/L — SIGNIFICANT CHANGE UP (ref 5–17)
AST SERPL-CCNC: 105 U/L — HIGH (ref 10–40)
BASOPHILS # BLD AUTO: SIGNIFICANT CHANGE UP (ref 0–0.2)
BASOPHILS NFR BLD AUTO: SIGNIFICANT CHANGE UP (ref 0–2)
BILIRUB SERPL-MCNC: 0.8 MG/DL — SIGNIFICANT CHANGE UP (ref 0.2–1.2)
BLD GP AB SCN SERPL QL: NEGATIVE — SIGNIFICANT CHANGE UP
BUN SERPL-MCNC: 9 MG/DL — SIGNIFICANT CHANGE UP (ref 7–23)
CALCIUM SERPL-MCNC: 7.6 MG/DL — LOW (ref 8.4–10.5)
CHLORIDE SERPL-SCNC: 101 MMOL/L — SIGNIFICANT CHANGE UP (ref 96–108)
CO2 SERPL-SCNC: 20 MMOL/L — LOW (ref 22–31)
CREAT SERPL-MCNC: 0.83 MG/DL — SIGNIFICANT CHANGE UP (ref 0.5–1.3)
EGFR: 95 ML/MIN/1.73M2 — SIGNIFICANT CHANGE UP
EGFR: 95 ML/MIN/1.73M2 — SIGNIFICANT CHANGE UP
EOSINOPHIL # BLD AUTO: SIGNIFICANT CHANGE UP (ref 0–0.5)
EOSINOPHIL NFR BLD AUTO: SIGNIFICANT CHANGE UP (ref 0–6)
GLUCOSE SERPL-MCNC: 110 MG/DL — HIGH (ref 70–99)
HCT VFR BLD CALC: 25 % — LOW (ref 39–50)
HGB BLD-MCNC: 8.3 G/DL — LOW (ref 13–17)
IMM GRANULOCYTES # BLD AUTO: SIGNIFICANT CHANGE UP (ref 0–0.07)
IMM GRANULOCYTES NFR BLD AUTO: SIGNIFICANT CHANGE UP (ref 0–0.9)
IMMATURE PLATELET FRACTION #: 0.3 K/UL — LOW (ref 3.9–12.5)
IMMATURE PLATELET FRACTION %: 1.1 % — LOW (ref 1.6–7.1)
LYMPHOCYTES # BLD AUTO: SIGNIFICANT CHANGE UP (ref 1–3.3)
LYMPHOCYTES NFR BLD AUTO: SIGNIFICANT CHANGE UP (ref 13–44)
MAGNESIUM SERPL-MCNC: 2 MG/DL — SIGNIFICANT CHANGE UP (ref 1.6–2.6)
MCHC RBC-ENTMCNC: 31 PG — SIGNIFICANT CHANGE UP (ref 27–34)
MCHC RBC-ENTMCNC: 33.2 G/DL — SIGNIFICANT CHANGE UP (ref 32–36)
MCV RBC AUTO: 93.3 FL — SIGNIFICANT CHANGE UP (ref 80–100)
MONOCYTES # BLD AUTO: SIGNIFICANT CHANGE UP (ref 0–0.9)
MONOCYTES NFR BLD AUTO: SIGNIFICANT CHANGE UP (ref 2–14)
NEUTROPHILS # BLD AUTO: SIGNIFICANT CHANGE UP (ref 1.8–7.4)
NEUTROPHILS NFR BLD AUTO: SIGNIFICANT CHANGE UP (ref 43–77)
NRBC # BLD AUTO: 0 K/UL — SIGNIFICANT CHANGE UP (ref 0–0)
NRBC # FLD: 0 K/UL — SIGNIFICANT CHANGE UP (ref 0–0)
NRBC BLD AUTO-RTO: 0 /100 WBCS — SIGNIFICANT CHANGE UP (ref 0–0)
PHOSPHATE SERPL-MCNC: 2.3 MG/DL — LOW (ref 2.5–4.5)
PLATELET # BLD AUTO: 26 K/UL — LOW (ref 150–400)
PMV BLD: 13.3 FL — HIGH (ref 7–13)
POTASSIUM SERPL-MCNC: 3.7 MMOL/L — SIGNIFICANT CHANGE UP (ref 3.5–5.3)
POTASSIUM SERPL-SCNC: 3.7 MMOL/L — SIGNIFICANT CHANGE UP (ref 3.5–5.3)
PROT SERPL-MCNC: 5.7 G/DL — LOW (ref 6–8.3)
RBC # BLD: 2.68 M/UL — LOW (ref 4.2–5.8)
RBC # FLD: 17.7 % — HIGH (ref 10.3–14.5)
RH IG SCN BLD-IMP: POSITIVE — SIGNIFICANT CHANGE UP
SODIUM SERPL-SCNC: 134 MMOL/L — LOW (ref 135–145)
WBC # BLD: 0.3 K/UL — CRITICAL LOW (ref 3.8–10.5)
WBC # FLD AUTO: 0.3 K/UL — CRITICAL LOW (ref 3.8–10.5)

## 2025-07-04 PROCEDURE — 99233 SBSQ HOSP IP/OBS HIGH 50: CPT

## 2025-07-04 RX ORDER — SOD PHOS DI, MONO/K PHOS MONO 250 MG
1 TABLET ORAL
Refills: 0 | Status: COMPLETED | OUTPATIENT
Start: 2025-07-04 | End: 2025-07-06

## 2025-07-04 RX ADMIN — CEFEPIME 100 MILLIGRAM(S): 2 INJECTION, POWDER, FOR SOLUTION INTRAVENOUS at 04:17

## 2025-07-04 RX ADMIN — CEFEPIME 100 MILLIGRAM(S): 2 INJECTION, POWDER, FOR SOLUTION INTRAVENOUS at 13:01

## 2025-07-04 RX ADMIN — Medication 500 MILLIGRAM(S): at 04:16

## 2025-07-04 RX ADMIN — Medication 0.5 MILLIGRAM(S): at 14:03

## 2025-07-04 RX ADMIN — POSACONAZOLE 300 MILLIGRAM(S): 100 TABLET, DELAYED RELEASE ORAL at 17:13

## 2025-07-04 RX ADMIN — Medication 300 MILLIGRAM(S): at 11:09

## 2025-07-04 RX ADMIN — Medication 6 MILLIGRAM(S): at 18:05

## 2025-07-04 RX ADMIN — Medication 2000 UNIT(S): at 11:09

## 2025-07-04 RX ADMIN — DILTIAZEM HYDROCHLORIDE 120 MILLIGRAM(S): 240 TABLET, EXTENDED RELEASE ORAL at 04:16

## 2025-07-04 RX ADMIN — Medication 0.5 MILLIGRAM(S): at 10:00

## 2025-07-04 RX ADMIN — Medication 500 MILLIGRAM(S): at 17:13

## 2025-07-04 RX ADMIN — Medication 0.5 MILLIGRAM(S): at 22:31

## 2025-07-04 RX ADMIN — Medication 0.5 MILLIGRAM(S): at 09:19

## 2025-07-04 RX ADMIN — Medication 1 TABLET(S): at 21:06

## 2025-07-04 RX ADMIN — Medication 0.5 MILLIGRAM(S): at 04:16

## 2025-07-04 RX ADMIN — TAMSULOSIN HYDROCHLORIDE 0.4 MILLIGRAM(S): 0.4 CAPSULE ORAL at 21:07

## 2025-07-04 RX ADMIN — Medication 0.5 MILLIGRAM(S): at 05:08

## 2025-07-04 RX ADMIN — CEFEPIME 100 MILLIGRAM(S): 2 INJECTION, POWDER, FOR SOLUTION INTRAVENOUS at 21:07

## 2025-07-04 RX ADMIN — Medication 40 MILLIGRAM(S): at 04:16

## 2025-07-04 RX ADMIN — Medication 0.5 MILLIGRAM(S): at 23:31

## 2025-07-04 RX ADMIN — Medication 1 TABLET(S): at 17:13

## 2025-07-04 RX ADMIN — Medication 0.5 MILLIGRAM(S): at 14:45

## 2025-07-04 RX ADMIN — Medication 6 MILLIGRAM(S): at 17:16

## 2025-07-04 RX ADMIN — Medication 1 TABLET(S): at 13:49

## 2025-07-04 NOTE — PROGRESS NOTE ADULT - PROBLEM SELECTOR PLAN 1
admitted with neutropenic fever   s/p C1 condensed HIDAC (2g/m2) consolidation on 6/15/25 - 6/17/25  s/p G-CSF Neulasta as outpatient 6/20  CBC w diff, CMP, coags  Hgb goal >8; PLTs goal >20k or if bleeding  stirct I&Os daily weights mouth care  7/3 Anemia PRBC x 1  7/3 phos repletion for hypophosphatemia.  Discharge planning on 7/6

## 2025-07-04 NOTE — PROGRESS NOTE ADULT - ASSESSMENT
68 yo male with ASXL1, DDX41 mutated AML in CR1,  Day 12 s/p cycle 1 condensed HIDAC (2g/m2) consolidation, presenting from [facility]'s EDC appointment with neutropenic fever.  Hospital course complicated  by Pseudomonas aeruginosa ( 6/26) ), on Meropenem and hyponatremia ( 6/27-6/30), Cefepime 2g (6/30-  Pt is pancytopenic secondary to chemotherapy

## 2025-07-04 NOTE — PROGRESS NOTE ADULT - NS ATTEND AMEND GEN_ALL_CORE FT
.  Primary: Chitty    Vital Signs Last 24 Hrs  T(C): 36.8 (03 Jul 2025 23:15), Max: 37.2 (03 Jul 2025 18:20)  T(F): 98.2 (03 Jul 2025 23:15), Max: 99 (03 Jul 2025 18:20)  HR: 84 (04 Jul 2025 04:22) (75 - 84)  BP: 136/105 (04 Jul 2025 04:22) (117/73 - 140/78)  BP(mean): --  RR: 18 (03 Jul 2025 23:15) (18 - 18)  SpO2: 98% (03 Jul 2025 23:15) (97% - 98%)    Parameters below as of 03 Jul 2025 23:15  Patient On (Oxygen Delivery Method): room air    MEDICATIONS  (STANDING):  allopurinol 300 milliGRAM(s) Oral daily  Biotene Dry Mouth Oral Rinse 15 milliLiter(s) Swish and Spit three times a day  cefepime   IVPB 2000 milliGRAM(s) IV Intermittent every 8 hours  cholecalciferol 2000 Unit(s) Oral daily  diltiazem    milliGRAM(s) Oral daily  pantoprazole    Tablet 40 milliGRAM(s) Oral before breakfast  posaconazole DR Tablet 300 milliGRAM(s) Oral every 24 hours  posaconazole DR Tablet   Oral   sodium chloride 0.9%. 1000 milliLiter(s) (50 mL/Hr) IV Continuous <Continuous>  tamsulosin 0.4 milliGRAM(s) Oral at bedtime  valACYclovir 500 milliGRAM(s) Oral two times a day      Assessment: 69 year old day 18 Cycle 1 HiDAC for ASXL1-mutated and BVX97-rzhfddy AML.  Course complicated by pseudomonal septicemia, mild transaminitis (active), and lack of ANC recovery (today is day +14 of Neulasta)     PMHx: gout    Heme:   - s/p Neulasta outpatient 6/20  - Continue daily oral B12 suppl  - Hgb goal >7.0g/dl, PLT goal >10,000    ID:   cefepime (6/26/25 - 6/27/25; 6/30/25 - ) - planning IV therapy through 7/5/25; last + blood Cx 6/26/25  Meropenem (6/27/25 - 6/30/25)      Nutrition: low phos - please repeat serum Phos - replace as needed    MSK:  acute on chronic pain: Dilaudid     Gout: - allopurinol 300 mg qd    Potential Discharge this weekend.     Over 55 minutes were spent in direct patient care and care coordination.

## 2025-07-04 NOTE — PROGRESS NOTE ADULT - SUBJECTIVE AND OBJECTIVE BOX
Diagnosis: MDS related AML, ASXL1, DDX41    Protocol/Chemo Regimen: s/p Cycle 1 HIDAC 2g/m2     Day: 20    Pt endorsed: generalized weakness    Review of Systems: Patient denies  nausea, vomiting, diarrhea, abdominal pain,  chest pain and headache.    Pain scale: denies now     Diet: DASH     Allergies: No Known Allergies    ANTIMICROBIALS  cefepime   IVPB 2000 milliGRAM(s) IV Intermittent every 8 hours  posaconazole DR Tablet   Oral   posaconazole DR Tablet 300 milliGRAM(s) Oral every 24 hours  valACYclovir 500 milliGRAM(s) Oral two times a day      STANDING MEDICATIONS  allopurinol 300 milliGRAM(s) Oral daily  Biotene Dry Mouth Oral Rinse 15 milliLiter(s) Swish and Spit three times a day  cholecalciferol 2000 Unit(s) Oral daily  diltiazem    milliGRAM(s) Oral daily  pantoprazole    Tablet 40 milliGRAM(s) Oral before breakfast  potassium phosphate / sodium phosphate Tablet (K-PHOS No. 2) 1 Tablet(s) Oral four times a day with meals  sodium chloride 0.9%. 1000 milliLiter(s) IV Continuous <Continuous>  tamsulosin 0.4 milliGRAM(s) Oral at bedtime      PRN MEDICATIONS  acetaminophen     Tablet .. 650 milliGRAM(s) Oral every 6 hours PRN  HYDROmorphone   Tablet 6 milliGRAM(s) Oral every 4 hours PRN  HYDROmorphone  Injectable 0.5 milliGRAM(s) IV Push every 4 hours PRN  metoclopramide Injectable 10 milliGRAM(s) IV Push every 6 hours PRN  naloxone Injectable 0.1 milliGRAM(s) IV Push every 3 minutes PRN      Vital Signs Last 24 Hrs  T(C): 37.2 (04 Jul 2025 15:58), Max: 37.5 (04 Jul 2025 09:52)  T(F): 99 (04 Jul 2025 15:58), Max: 99.5 (04 Jul 2025 09:52)  HR: 81 (04 Jul 2025 15:58) (75 - 84)  BP: 112/93 (04 Jul 2025 15:58) (112/93 - 136/105)  RR: 18 (04 Jul 2025 15:58) (18 - 18)  SpO2: 99% (04 Jul 2025 15:58) (96% - 99%)    Parameters below as of 04 Jul 2025 15:58  Patient On (Oxygen Delivery Method): room air      PHYSICAL EXAM  General: NAD  HEENT:  clear oropharynx, anicteric sclera, + angioedema   CV: (+) S1/S2 RRR  Lungs: clear to auscultation, no wheezes or rales  Abdomen: soft, non-tender, non-distended (+) BS  Ext: Trace  edema b/l LE   Skin: no rash  Neuro: alert and oriented X 3,   Central Line: PIVL CD    LABS:                        8.3    0.30  )-----------( 26       ( 04 Jul 2025 08:30 )             25.0     Mean Cell Volume : 93.3 fl  Mean Cell Hemoglobin : 31.0 pg  Mean Cell Hemoglobin Concentration : 33.2 g/dL  Auto Neutrophil # : Not measured  Auto Lymphocyte # : Not measured  Auto Monocyte # : Not measured  Auto Eosinophil # : Not measured  Auto Basophil # : Not measured  Auto Neutrophil % : Not measured Differential percentages must be correlated with absolute numbers for  clinical significance.  Auto Lymphocyte % : Not measured  Auto Monocyte % : Not measured  Auto Eosinophil % : Not measured  Auto Basophil % : Not measured      07-04    134[L]  |  101  |  9   ----------------------------<  110[H]  3.7   |  20[L]  |  0.83    Ca    7.6[L]      04 Jul 2025 08:30  Phos  2.3     07-04  Mg     2.0     07-04    TPro  5.7[L]  /  Alb  3.0[L]  /  TBili  0.8  /  DBili  x   /  AST  105[H]  /  ALT  151[H]  /  AlkPhos  126[H]  07-04      RADIOLOGY & ADDITIONAL STUDIES:

## 2025-07-05 ENCOUNTER — APPOINTMENT (OUTPATIENT)
Dept: INFUSION THERAPY | Facility: HOSPITAL | Age: 70
End: 2025-07-05

## 2025-07-05 ENCOUNTER — APPOINTMENT (OUTPATIENT)
Dept: HEMATOLOGY ONCOLOGY | Facility: CLINIC | Age: 70
End: 2025-07-05

## 2025-07-05 LAB
ALBUMIN SERPL ELPH-MCNC: 3 G/DL — LOW (ref 3.3–5)
ALP SERPL-CCNC: 131 U/L — HIGH (ref 40–120)
ALT FLD-CCNC: 158 U/L — HIGH (ref 10–45)
ANION GAP SERPL CALC-SCNC: 13 MMOL/L — SIGNIFICANT CHANGE UP (ref 5–17)
AST SERPL-CCNC: 72 U/L — HIGH (ref 10–40)
BASOPHILS # BLD AUTO: SIGNIFICANT CHANGE UP (ref 0–0.2)
BASOPHILS NFR BLD AUTO: SIGNIFICANT CHANGE UP (ref 0–2)
BILIRUB SERPL-MCNC: 0.7 MG/DL — SIGNIFICANT CHANGE UP (ref 0.2–1.2)
BUN SERPL-MCNC: 6 MG/DL — LOW (ref 7–23)
CALCIUM SERPL-MCNC: 7.1 MG/DL — LOW (ref 8.4–10.5)
CHLORIDE SERPL-SCNC: 105 MMOL/L — SIGNIFICANT CHANGE UP (ref 96–108)
CO2 SERPL-SCNC: 19 MMOL/L — LOW (ref 22–31)
CREAT SERPL-MCNC: 0.79 MG/DL — SIGNIFICANT CHANGE UP (ref 0.5–1.3)
EGFR: 96 ML/MIN/1.73M2 — SIGNIFICANT CHANGE UP
EGFR: 96 ML/MIN/1.73M2 — SIGNIFICANT CHANGE UP
EOSINOPHIL # BLD AUTO: SIGNIFICANT CHANGE UP (ref 0–0.5)
EOSINOPHIL NFR BLD AUTO: SIGNIFICANT CHANGE UP (ref 0–6)
GLUCOSE SERPL-MCNC: 115 MG/DL — HIGH (ref 70–99)
HCT VFR BLD CALC: 22.7 % — LOW (ref 39–50)
HGB BLD-MCNC: 7.8 G/DL — LOW (ref 13–17)
IMM GRANULOCYTES # BLD AUTO: SIGNIFICANT CHANGE UP (ref 0–0.07)
IMM GRANULOCYTES NFR BLD AUTO: SIGNIFICANT CHANGE UP (ref 0–0.9)
IMMATURE PLATELET FRACTION #: 0.2 K/UL — LOW (ref 3.9–12.5)
IMMATURE PLATELET FRACTION %: 1.3 % — LOW (ref 1.6–7.1)
LYMPHOCYTES # BLD AUTO: SIGNIFICANT CHANGE UP (ref 1–3.3)
LYMPHOCYTES NFR BLD AUTO: SIGNIFICANT CHANGE UP (ref 13–44)
MAGNESIUM SERPL-MCNC: 1.9 MG/DL — SIGNIFICANT CHANGE UP (ref 1.6–2.6)
MCHC RBC-ENTMCNC: 30.7 PG — SIGNIFICANT CHANGE UP (ref 27–34)
MCHC RBC-ENTMCNC: 34.4 G/DL — SIGNIFICANT CHANGE UP (ref 32–36)
MCV RBC AUTO: 89.4 FL — SIGNIFICANT CHANGE UP (ref 80–100)
MONOCYTES # BLD AUTO: SIGNIFICANT CHANGE UP (ref 0–0.9)
MONOCYTES NFR BLD AUTO: SIGNIFICANT CHANGE UP (ref 2–14)
NEUTROPHILS # BLD AUTO: SIGNIFICANT CHANGE UP (ref 1.8–7.4)
NEUTROPHILS NFR BLD AUTO: SIGNIFICANT CHANGE UP (ref 43–77)
NRBC # BLD AUTO: SIGNIFICANT CHANGE UP (ref 0–0)
NRBC # FLD: SIGNIFICANT CHANGE UP (ref 0–0)
NRBC BLD AUTO-RTO: SIGNIFICANT CHANGE UP (ref 0–0)
PHOSPHATE SERPL-MCNC: 2.3 MG/DL — LOW (ref 2.5–4.5)
PLATELET # BLD AUTO: 15 K/UL — CRITICAL LOW (ref 150–400)
PMV BLD: SIGNIFICANT CHANGE UP FL (ref 7–13)
POTASSIUM SERPL-MCNC: 3.4 MMOL/L — LOW (ref 3.5–5.3)
POTASSIUM SERPL-SCNC: 3.4 MMOL/L — LOW (ref 3.5–5.3)
PROT SERPL-MCNC: 5.4 G/DL — LOW (ref 6–8.3)
RBC # BLD: 2.54 M/UL — LOW (ref 4.2–5.8)
RBC # FLD: 16.8 % — HIGH (ref 10.3–14.5)
SODIUM SERPL-SCNC: 137 MMOL/L — SIGNIFICANT CHANGE UP (ref 135–145)
WBC # BLD: 0.29 K/UL — CRITICAL LOW (ref 3.8–10.5)
WBC # FLD AUTO: 0.29 K/UL — CRITICAL LOW (ref 3.8–10.5)

## 2025-07-05 PROCEDURE — 99233 SBSQ HOSP IP/OBS HIGH 50: CPT

## 2025-07-05 RX ORDER — AMOXICILLIN 500 MG/1
500 CAPSULE ORAL
Refills: 0 | Status: DISCONTINUED | OUTPATIENT
Start: 2025-07-06 | End: 2025-07-06

## 2025-07-05 RX ORDER — HYDROMORPHONE/SOD CHLOR,ISO/PF 2 MG/10 ML
0.5 SYRINGE (ML) INJECTION EVERY 4 HOURS
Refills: 0 | Status: DISCONTINUED | OUTPATIENT
Start: 2025-07-05 | End: 2025-07-06

## 2025-07-05 RX ORDER — FILGRASTIM 300 UG/.5ML
480 INJECTION, SOLUTION INTRAVENOUS; SUBCUTANEOUS DAILY
Refills: 0 | Status: DISCONTINUED | OUTPATIENT
Start: 2025-07-05 | End: 2025-07-06

## 2025-07-05 RX ADMIN — CEFEPIME 100 MILLIGRAM(S): 2 INJECTION, POWDER, FOR SOLUTION INTRAVENOUS at 06:18

## 2025-07-05 RX ADMIN — Medication 0.5 MILLIGRAM(S): at 02:35

## 2025-07-05 RX ADMIN — Medication 0.5 MILLIGRAM(S): at 11:29

## 2025-07-05 RX ADMIN — Medication 1 TABLET(S): at 11:29

## 2025-07-05 RX ADMIN — Medication 40 MILLIEQUIVALENT(S): at 14:11

## 2025-07-05 RX ADMIN — Medication 0.5 MILLIGRAM(S): at 22:35

## 2025-07-05 RX ADMIN — Medication 500 MILLIGRAM(S): at 17:08

## 2025-07-05 RX ADMIN — Medication 500 MILLIGRAM(S): at 06:18

## 2025-07-05 RX ADMIN — Medication 0.5 MILLIGRAM(S): at 06:55

## 2025-07-05 RX ADMIN — FILGRASTIM 480 MICROGRAM(S): 300 INJECTION, SOLUTION INTRAVENOUS; SUBCUTANEOUS at 11:28

## 2025-07-05 RX ADMIN — Medication 300 MILLIGRAM(S): at 11:28

## 2025-07-05 RX ADMIN — DILTIAZEM HYDROCHLORIDE 120 MILLIGRAM(S): 240 TABLET, EXTENDED RELEASE ORAL at 06:18

## 2025-07-05 RX ADMIN — Medication 40 MILLIEQUIVALENT(S): at 09:40

## 2025-07-05 RX ADMIN — Medication 0.5 MILLIGRAM(S): at 03:35

## 2025-07-05 RX ADMIN — CEFEPIME 100 MILLIGRAM(S): 2 INJECTION, POWDER, FOR SOLUTION INTRAVENOUS at 14:11

## 2025-07-05 RX ADMIN — Medication 40 MILLIGRAM(S): at 06:18

## 2025-07-05 RX ADMIN — Medication 1 TABLET(S): at 21:29

## 2025-07-05 RX ADMIN — Medication 15 MILLILITER(S): at 21:29

## 2025-07-05 RX ADMIN — Medication 1 TABLET(S): at 07:45

## 2025-07-05 RX ADMIN — TAMSULOSIN HYDROCHLORIDE 0.4 MILLIGRAM(S): 0.4 CAPSULE ORAL at 21:30

## 2025-07-05 RX ADMIN — Medication 0.5 MILLIGRAM(S): at 07:48

## 2025-07-05 RX ADMIN — Medication 1 TABLET(S): at 17:08

## 2025-07-05 RX ADMIN — Medication 0.5 MILLIGRAM(S): at 21:35

## 2025-07-05 RX ADMIN — Medication 2000 UNIT(S): at 11:28

## 2025-07-05 RX ADMIN — CEFEPIME 100 MILLIGRAM(S): 2 INJECTION, POWDER, FOR SOLUTION INTRAVENOUS at 21:30

## 2025-07-05 RX ADMIN — Medication 6 MILLIGRAM(S): at 10:21

## 2025-07-05 RX ADMIN — Medication 6 MILLIGRAM(S): at 09:40

## 2025-07-05 RX ADMIN — Medication 0.5 MILLIGRAM(S): at 12:10

## 2025-07-05 NOTE — PROGRESS NOTE ADULT - SUBJECTIVE AND OBJECTIVE BOX
Diagnosis: MDS related AML, ASXL1, DDX41    Protocol/Chemo Regimen: s/p Cycle 1 HIDAC 2g/m2     Day: 21    Pt endorsed: generalized weakness    Review of Systems: Patient denies  nausea, vomiting, diarrhea, abdominal pain,  chest pain and headache.    Pain scale: denies now     Diet: DASH     Allergies: No Known Allergies    ANTIMICROBIALS  cefepime   IVPB 2000 milliGRAM(s) IV Intermittent every 8 hours  valACYclovir 500 milliGRAM(s) Oral two times a day    STANDING MEDICATIONS  allopurinol 300 milliGRAM(s) Oral daily  Biotene Dry Mouth Oral Rinse 15 milliLiter(s) Swish and Spit three times a day  cholecalciferol 2000 Unit(s) Oral daily  diltiazem    milliGRAM(s) Oral daily  filgrastim-sndz (ZARXIO) Injectable 480 MICROGram(s) SubCutaneous daily  pantoprazole    Tablet 40 milliGRAM(s) Oral before breakfast  potassium phosphate / sodium phosphate Tablet (K-PHOS No. 2) 1 Tablet(s) Oral four times a day with meals  sodium chloride 0.9%. 1000 milliLiter(s) IV Continuous <Continuous>  tamsulosin 0.4 milliGRAM(s) Oral at bedtime    PRN MEDICATIONS  acetaminophen     Tablet .. 650 milliGRAM(s) Oral every 6 hours PRN  HYDROmorphone   Tablet 6 milliGRAM(s) Oral every 4 hours PRN  HYDROmorphone  Injectable 0.5 milliGRAM(s) IV Push every 4 hours PRN  metoclopramide Injectable 10 milliGRAM(s) IV Push every 6 hours PRN  naloxone Injectable 0.1 milliGRAM(s) IV Push every 3 minutes PRN    Vital Signs Last 24 Hrs  T(C): 37.2 (05 Jul 2025 17:32), Max: 37.2 (05 Jul 2025 17:32)  T(F): 98.9 (05 Jul 2025 17:32), Max: 98.9 (05 Jul 2025 17:32)  HR: 74 (05 Jul 2025 17:32) (74 - 90)  BP: 115/80 (05 Jul 2025 17:32) (115/80 - 134/79)  RR: 18 (05 Jul 2025 17:32) (18 - 18)  SpO2: 96% (05 Jul 2025 17:32) (96% - 97%)    Parameters below as of 05 Jul 2025 17:32  Patient On (Oxygen Delivery Method): room air    PHYSICAL EXAM  General: NAD  HEENT:  clear oropharynx, anicteric sclera, + angioedema   CV: (+) S1/S2 RRR  Lungs: clear to auscultation, no wheezes or rales  Abdomen: soft, non-tender, non-distended (+) BS  Ext: Trace  edema b/l LE   Skin: no rash  Neuro: alert and oriented X 3,   Central Line: PIVL CD    LABS:                        7.8    0.29  )-----------( 15       ( 05 Jul 2025 06:50 )             22.7     Mean Cell Volume : 89.4 fl  Mean Cell Hemoglobin : 30.7 pg  Mean Cell Hemoglobin Concentration : 34.4 g/dL  Auto Neutrophil # : Not measured  Auto Lymphocyte # : Not measured  Auto Monocyte # : Not measured  Auto Eosinophil # : Not measured  Auto Basophil # : Not measured  Auto Neutrophil % : Not measured Differential percentages must be correlated with absolute numbers for  clinical significance.  Auto Lymphocyte % : Not measured  Auto Monocyte % : Not measured  Auto Eosinophil % : Not measured  Auto Basophil % : Not measured    07-05    137  |  105  |  6[L]  ----------------------------<  115[H]  3.4[L]   |  19[L]  |  0.79    Ca    7.1[L]      05 Jul 2025 06:51  Phos  2.3     07-05  Mg     1.9     07-05    TPro  5.4[L]  /  Alb  3.0[L]  /  TBili  0.7  /  DBili  x   /  AST  72[H]  /  ALT  158[H]  /  AlkPhos  131[H]  07-05  --------------  Cultures:  Culture - Urine (07.02.25 @ 17:13)    Specimen Source: Clean Catch Clean Catch (Midstream)   Culture Results:   No growth    Culture - Urine (07.01.25 @ 15:24)    Specimen Source: Clean Catch Clean Catch (Midstream)   Culture Results:   No growth    Culture - Blood (07.01.25 @ 10:42)    Specimen Source: Blood Blood-Peripheral   Culture Results:   No growth at 4 days    ---------------  RADIOLOGY & ADDITIONAL STUDIES:  from: Xray Chest 1 View- PORTABLE-Urgent (Xray Chest 1 View- PORTABLE-Urgent .) (07.01.25 @ 13:28)   FINDINGS:  The lungs are clear. No pleural effusion. No pneumothorax.  The heart size is normal.  No acute osseous abnormalities.    IMPRESSION:  Clear lungs.

## 2025-07-05 NOTE — PROGRESS NOTE ADULT - PROBLEM SELECTOR PLAN 5
PO Diladid 4mg q 4 prn  6/27 re-consulted  Palliative Care PO Dilaudid 4mg q 4 prn  6/27 re-consulted  Palliative Care

## 2025-07-05 NOTE — PROVIDER CONTACT NOTE (CRITICAL VALUE NOTIFICATION) - DATE AND TIME:
Gissel Weiss discharged to facility accompanied by other [facility].   Valuables and belongings sent with patient.   discharge summary, discharge instructions, medications and follow up appointments reviewed with patient and RN.  Patient and RN verbalized understanding Report given to Emelia at the [facility]. All questions answered. Patient discharged to the [facility] via [facility]. All belongings sent with patient.    05-Jul-2025 20:00 05-Jul-2025 20:50

## 2025-07-05 NOTE — PROGRESS NOTE ADULT - NS ATTEND AMEND GEN_ALL_CORE FT
.  Primary: Chitty    Vital Signs Last 24 Hrs  T(C): 36.9 (05 Jul 2025 01:05), Max: 37.5 (04 Jul 2025 09:52)  T(F): 98.4 (05 Jul 2025 01:05), Max: 99.5 (04 Jul 2025 09:52)  HR: 76 (05 Jul 2025 06:27) (75 - 90)  BP: 126/76 (05 Jul 2025 06:27) (112/93 - 134/79)  BP(mean): --  RR: 18 (05 Jul 2025 01:05) (18 - 18)  SpO2: 97% (05 Jul 2025 01:05) (96% - 99%)    Parameters below as of 05 Jul 2025 01:05  Patient On (Oxygen Delivery Method): room air    MEDICATIONS  (STANDING):  allopurinol 300 milliGRAM(s) Oral daily  Biotene Dry Mouth Oral Rinse 15 milliLiter(s) Swish and Spit three times a day  cefepime   IVPB 2000 milliGRAM(s) IV Intermittent every 8 hours  cholecalciferol 2000 Unit(s) Oral daily  diltiazem    milliGRAM(s) Oral daily  pantoprazole    Tablet 40 milliGRAM(s) Oral before breakfast  potassium phosphate / sodium phosphate Tablet (K-PHOS No. 2) 1 Tablet(s) Oral four times a day with meals  sodium chloride 0.9%. 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  Course complicated by pseudomonal septicemia, mild transaminitis (active), and lack of ANC recovery (today is day +15 of Neulasta)     PMHx: gout    Heme:   - s/p Neulasta outpatient 6/20  - Continue daily oral B12 suppl  - Hgb goal >7.0g/dl, PLT goal >10,000  Please stage G-CSF 480mcg daily today    ID:   cefepime (6/26/25 - 6/27/25; 6/30/25 - ) - planning IV therapy through 7/5/25 (today) ; last + blood Cx 6/26/25  Meropenem (6/27/25 - 6/30/25)      Nutrition: low phos - please repeat serum Phos - replace as needed    MSK:  acute on chronic pain: Dilaudid     Gout: - allopurinol 300 mg qd    Potential Discharge this weekend.     Over 55 minutes were spent in direct patient care and care coordination.  Potential discharge tomorrow. .  Primary: Chitty    Vital Signs Last 24 Hrs  T(C): 36.9 (05 Jul 2025 01:05), Max: 37.5 (04 Jul 2025 09:52)  T(F): 98.4 (05 Jul 2025 01:05), Max: 99.5 (04 Jul 2025 09:52)  HR: 76 (05 Jul 2025 06:27) (75 - 90)  BP: 126/76 (05 Jul 2025 06:27) (112/93 - 134/79)  BP(mean): --  RR: 18 (05 Jul 2025 01:05) (18 - 18)  SpO2: 97% (05 Jul 2025 01:05) (96% - 99%)    Parameters below as of 05 Jul 2025 01:05  Patient On (Oxygen Delivery Method): room air    MEDICATIONS  (STANDING):  allopurinol 300 milliGRAM(s) Oral daily  Biotene Dry Mouth Oral Rinse 15 milliLiter(s) Swish and Spit three times a day  cefepime   IVPB 2000 milliGRAM(s) IV Intermittent every 8 hours  cholecalciferol 2000 Unit(s) Oral daily  diltiazem    milliGRAM(s) Oral daily  pantoprazole    Tablet 40 milliGRAM(s) Oral before breakfast  potassium phosphate / sodium phosphate Tablet (K-PHOS No. 2) 1 Tablet(s) Oral four times a day with meals  sodium chloride 0.9%. 1000 milliLiter(s) (50 mL/Hr) IV Continuous <Continuous>  tamsulosin 0.4 milliGRAM(s) Oral at bedtime  valACYclovir 500 milliGRAM(s) Oral two times a day      Assessment: 69 year old day 19 Cycle 1 HiDAC for ASXL1-mutated and EKB57-dwykzht AML.  Course complicated by pseudomonal septicemia, mild transaminitis (active), and lack of ANC recovery (today is day +15 of Neulasta)     PMHx: gout    Heme:   - s/p Neulasta outpatient 6/20  - Continue daily oral B12 suppl  - Hgb goal >7.0g/dl, PLT goal >10,000  Please start G-CSF 480mcg daily today    ID:   cefepime (6/26/25 - 6/27/25; 6/30/25 - ) - planning IV therapy through 7/5/25 (today) ; last + blood Cx 6/26/25  Meropenem (6/27/25 - 6/30/25)      Nutrition: low phos - please repeat serum Phos - replace as needed    MSK:  acute on chronic pain: Dilaudid     Gout: - allopurinol 300 mg qd    Potential Discharge this weekend.     Over 55 minutes were spent in direct patient care and care coordination.  Potential discharge tomorrow.

## 2025-07-05 NOTE — PROGRESS NOTE ADULT - ASSESSMENT
70 yo male with ASXL1, DDX41 mutated AML in CR1,  Day 12 s/p cycle 1 condensed HIDAC (2g/m2) consolidation, presenting from [facility]'s EDC appointment with neutropenic fever.  Hospital course complicated  by Pseudomonas aeruginosa ( 6/26) ), on Meropenem and hyponatremia ( 6/27-6/30), Cefepime 2g (6/30-  Pt is pancytopenic secondary to chemotherapy

## 2025-07-06 VITALS
SYSTOLIC BLOOD PRESSURE: 136 MMHG | DIASTOLIC BLOOD PRESSURE: 85 MMHG | OXYGEN SATURATION: 95 % | RESPIRATION RATE: 18 BRPM | HEART RATE: 90 BPM | TEMPERATURE: 98 F

## 2025-07-06 LAB
ALBUMIN SERPL ELPH-MCNC: 3.2 G/DL — LOW (ref 3.3–5)
ALP SERPL-CCNC: 139 U/L — HIGH (ref 40–120)
ALT FLD-CCNC: 140 U/L — HIGH (ref 10–45)
ANION GAP SERPL CALC-SCNC: 17 MMOL/L — SIGNIFICANT CHANGE UP (ref 5–17)
AST SERPL-CCNC: 50 U/L — HIGH (ref 10–40)
BILIRUB SERPL-MCNC: 0.9 MG/DL — SIGNIFICANT CHANGE UP (ref 0.2–1.2)
BLD GP AB SCN SERPL QL: NEGATIVE — SIGNIFICANT CHANGE UP
BUN SERPL-MCNC: 8 MG/DL — SIGNIFICANT CHANGE UP (ref 7–23)
CALCIUM SERPL-MCNC: 7.5 MG/DL — LOW (ref 8.4–10.5)
CHLORIDE SERPL-SCNC: 106 MMOL/L — SIGNIFICANT CHANGE UP (ref 96–108)
CO2 SERPL-SCNC: 17 MMOL/L — LOW (ref 22–31)
CREAT SERPL-MCNC: 0.8 MG/DL — SIGNIFICANT CHANGE UP (ref 0.5–1.3)
CULTURE RESULTS: SIGNIFICANT CHANGE UP
CULTURE RESULTS: SIGNIFICANT CHANGE UP
EGFR: 96 ML/MIN/1.73M2 — SIGNIFICANT CHANGE UP
EGFR: 96 ML/MIN/1.73M2 — SIGNIFICANT CHANGE UP
GLUCOSE SERPL-MCNC: 114 MG/DL — HIGH (ref 70–99)
HCT VFR BLD CALC: 25.6 % — LOW (ref 39–50)
HGB BLD-MCNC: 8.6 G/DL — LOW (ref 13–17)
IMMATURE PLATELET FRACTION #: 0.2 K/UL — LOW (ref 3.9–12.5)
IMMATURE PLATELET FRACTION #: 0.3 K/UL — LOW (ref 3.9–12.5)
IMMATURE PLATELET FRACTION %: 1 % — LOW (ref 1.6–7.1)
IMMATURE PLATELET FRACTION %: 1.7 % — SIGNIFICANT CHANGE UP (ref 1.6–7.1)
MAGNESIUM SERPL-MCNC: 1.9 MG/DL — SIGNIFICANT CHANGE UP (ref 1.6–2.6)
MCHC RBC-ENTMCNC: 29.8 PG — SIGNIFICANT CHANGE UP (ref 27–34)
MCHC RBC-ENTMCNC: 33.6 G/DL — SIGNIFICANT CHANGE UP (ref 32–36)
MCV RBC AUTO: 88.6 FL — SIGNIFICANT CHANGE UP (ref 80–100)
NRBC # BLD AUTO: 0 K/UL — SIGNIFICANT CHANGE UP (ref 0–0)
NRBC # FLD: 0 K/UL — SIGNIFICANT CHANGE UP (ref 0–0)
NRBC BLD AUTO-RTO: 0 /100 WBCS — SIGNIFICANT CHANGE UP (ref 0–0)
PHOSPHATE SERPL-MCNC: 2 MG/DL — LOW (ref 2.5–4.5)
PLATELET # BLD AUTO: 11 K/UL — CRITICAL LOW (ref 150–400)
PLATELET # BLD AUTO: 32 K/UL — LOW (ref 150–400)
PMV BLD: SIGNIFICANT CHANGE UP FL (ref 7–13)
POTASSIUM SERPL-MCNC: 3.6 MMOL/L — SIGNIFICANT CHANGE UP (ref 3.5–5.3)
POTASSIUM SERPL-SCNC: 3.6 MMOL/L — SIGNIFICANT CHANGE UP (ref 3.5–5.3)
PROT SERPL-MCNC: 6 G/DL — SIGNIFICANT CHANGE UP (ref 6–8.3)
RBC # BLD: 2.89 M/UL — LOW (ref 4.2–5.8)
RBC # FLD: 16.4 % — HIGH (ref 10.3–14.5)
RH IG SCN BLD-IMP: POSITIVE — SIGNIFICANT CHANGE UP
SODIUM SERPL-SCNC: 140 MMOL/L — SIGNIFICANT CHANGE UP (ref 135–145)
SPECIMEN SOURCE: SIGNIFICANT CHANGE UP
SPECIMEN SOURCE: SIGNIFICANT CHANGE UP
WBC # BLD: 0.42 K/UL — CRITICAL LOW (ref 3.8–10.5)
WBC # FLD AUTO: 0.42 K/UL — CRITICAL LOW (ref 3.8–10.5)

## 2025-07-06 PROCEDURE — 85025 COMPLETE CBC W/AUTO DIFF WBC: CPT

## 2025-07-06 PROCEDURE — 70450 CT HEAD/BRAIN W/O DYE: CPT

## 2025-07-06 PROCEDURE — 87040 BLOOD CULTURE FOR BACTERIA: CPT

## 2025-07-06 PROCEDURE — 85049 AUTOMATED PLATELET COUNT: CPT

## 2025-07-06 PROCEDURE — 97161 PT EVAL LOW COMPLEX 20 MIN: CPT

## 2025-07-06 PROCEDURE — 97530 THERAPEUTIC ACTIVITIES: CPT

## 2025-07-06 PROCEDURE — 83735 ASSAY OF MAGNESIUM: CPT

## 2025-07-06 PROCEDURE — P9100: CPT

## 2025-07-06 PROCEDURE — 85027 COMPLETE CBC AUTOMATED: CPT

## 2025-07-06 PROCEDURE — 86850 RBC ANTIBODY SCREEN: CPT

## 2025-07-06 PROCEDURE — 86923 COMPATIBILITY TEST ELECTRIC: CPT

## 2025-07-06 PROCEDURE — 82962 GLUCOSE BLOOD TEST: CPT

## 2025-07-06 PROCEDURE — 36415 COLL VENOUS BLD VENIPUNCTURE: CPT

## 2025-07-06 PROCEDURE — P9037: CPT

## 2025-07-06 PROCEDURE — 71045 X-RAY EXAM CHEST 1 VIEW: CPT

## 2025-07-06 PROCEDURE — 97116 GAIT TRAINING THERAPY: CPT

## 2025-07-06 PROCEDURE — 87086 URINE CULTURE/COLONY COUNT: CPT

## 2025-07-06 PROCEDURE — 36430 TRANSFUSION BLD/BLD COMPNT: CPT

## 2025-07-06 PROCEDURE — P9040: CPT

## 2025-07-06 PROCEDURE — 80053 COMPREHEN METABOLIC PANEL: CPT

## 2025-07-06 PROCEDURE — 97110 THERAPEUTIC EXERCISES: CPT

## 2025-07-06 PROCEDURE — 86900 BLOOD TYPING SEROLOGIC ABO: CPT

## 2025-07-06 PROCEDURE — 80202 ASSAY OF VANCOMYCIN: CPT

## 2025-07-06 PROCEDURE — 99233 SBSQ HOSP IP/OBS HIGH 50: CPT

## 2025-07-06 PROCEDURE — 86901 BLOOD TYPING SEROLOGIC RH(D): CPT

## 2025-07-06 PROCEDURE — 84100 ASSAY OF PHOSPHORUS: CPT

## 2025-07-06 PROCEDURE — 83605 ASSAY OF LACTIC ACID: CPT

## 2025-07-06 PROCEDURE — 81001 URINALYSIS AUTO W/SCOPE: CPT

## 2025-07-06 RX ORDER — SOD PHOS DI, MONO/K PHOS MONO 250 MG
1 TABLET ORAL ONCE
Refills: 0 | Status: COMPLETED | OUTPATIENT
Start: 2025-07-06 | End: 2025-07-06

## 2025-07-06 RX ORDER — AMOXICILLIN 500 MG/1
1 CAPSULE ORAL
Qty: 0 | Refills: 0 | DISCHARGE
Start: 2025-07-06

## 2025-07-06 RX ORDER — AMOXICILLIN 500 MG/1
1 CAPSULE ORAL
Qty: 60 | Refills: 1
Start: 2025-07-06 | End: 2025-09-03

## 2025-07-06 RX ORDER — LEVOFLOXACIN 25 MG/ML
1 SOLUTION ORAL
Qty: 30 | Refills: 0
Start: 2025-07-06 | End: 2025-08-04

## 2025-07-06 RX ORDER — LEVOFLOXACIN 25 MG/ML
1 SOLUTION ORAL
Qty: 0 | Refills: 0 | DISCHARGE
Start: 2025-07-06

## 2025-07-06 RX ADMIN — Medication 300 MILLIGRAM(S): at 12:07

## 2025-07-06 RX ADMIN — Medication 1 TABLET(S): at 08:13

## 2025-07-06 RX ADMIN — Medication 2000 UNIT(S): at 12:08

## 2025-07-06 RX ADMIN — Medication 15 MILLILITER(S): at 05:46

## 2025-07-06 RX ADMIN — Medication 500 MILLIGRAM(S): at 05:49

## 2025-07-06 RX ADMIN — Medication 0.5 MILLIGRAM(S): at 08:13

## 2025-07-06 RX ADMIN — DILTIAZEM HYDROCHLORIDE 120 MILLIGRAM(S): 240 TABLET, EXTENDED RELEASE ORAL at 05:49

## 2025-07-06 RX ADMIN — FILGRASTIM 480 MICROGRAM(S): 300 INJECTION, SOLUTION INTRAVENOUS; SUBCUTANEOUS at 12:09

## 2025-07-06 RX ADMIN — Medication 50 MILLILITER(S): at 05:49

## 2025-07-06 RX ADMIN — AMOXICILLIN 500 MILLIGRAM(S): 500 CAPSULE ORAL at 05:45

## 2025-07-06 RX ADMIN — Medication 0.5 MILLIGRAM(S): at 12:21

## 2025-07-06 RX ADMIN — Medication 0.5 MILLIGRAM(S): at 08:43

## 2025-07-06 RX ADMIN — Medication 0.5 MILLIGRAM(S): at 12:51

## 2025-07-06 RX ADMIN — Medication 40 MILLIGRAM(S): at 05:49

## 2025-07-06 RX ADMIN — Medication 1 TABLET(S): at 12:08

## 2025-07-06 NOTE — PROGRESS NOTE ADULT - PROBLEM SELECTOR PROBLEM 2
Infectious disease

## 2025-07-06 NOTE — PROGRESS NOTE ADULT - NS ATTEND AMEND GEN_ALL_CORE FT
.  Primary: Chitty    Vital Signs Last 24 Hrs  T(C): 37.3 (05 Jul 2025 22:53), Max: 37.3 (05 Jul 2025 22:53)  T(F): 99.1 (05 Jul 2025 22:53), Max: 99.1 (05 Jul 2025 22:53)  HR: 82 (05 Jul 2025 22:53) (74 - 86)  BP: 133/77 (05 Jul 2025 22:53) (115/80 - 133/77)  BP(mean): --  RR: 18 (05 Jul 2025 22:53) (18 - 18)  SpO2: 95% (05 Jul 2025 22:53) (95% - 97%)    Parameters below as of 05 Jul 2025 22:53  Patient On (Oxygen Delivery Method): room air    MEDICATIONS  (STANDING):  allopurinol 300 milliGRAM(s) Oral daily  amoxicillin 500 milliGRAM(s) Oral two times a day  Biotene Dry Mouth Oral Rinse 15 milliLiter(s) Swish and Spit three times a day  cholecalciferol 2000 Unit(s) Oral daily  diltiazem    milliGRAM(s) Oral daily  filgrastim-sndz (ZARXIO) Injectable 480 MICROGram(s) SubCutaneous daily  levoFLOXacin  Tablet 500 milliGRAM(s) Oral every 24 hours  pantoprazole    Tablet 40 milliGRAM(s) Oral before breakfast  potassium phosphate / sodium phosphate Tablet (K-PHOS No. 2) 1 Tablet(s) Oral four times a day with meals  sodium chloride 0.9%. 1000 milliLiter(s) (50 mL/Hr) IV Continuous <Continuous>  tamsulosin 0.4 milliGRAM(s) Oral at bedtime  valACYclovir 500 milliGRAM(s) Oral two times a day      Assessment: 69 year old day 20 Cycle 1 HiDAC for ASXL1-mutated and HPZ50-owkeyzv AML.  Course complicated by pseudomonal septicemia, mild transaminitis (active), and lack of ANC recovery (today is day +16 of Neulasta)     PMHx: gout    Heme:   - s/p Neulasta outpatient 6/20  - Continue daily oral B12 suppl  - Hgb goal >7.0g/dl, PLT goal >10,000  Continue G-CSF 480mcg daily (7/5/25 - )    ID:   Discuss antimicrobials during rounds.   cefepime (6/26/25 - 6/27/25; 6/30/25 - 7/5/25)   Meropenem (6/27/25 - 6/30/25)    Nutrition: low phos - please repeat serum Phos - replace as needed    MSK:  acute on chronic pain: Dilaudid     Gout: - allopurinol 300 mg qd    Over 55 minutes were spent in direct patient care and care coordination.  Potential discharge tomorrow. .  Primary: Chitty    Vital Signs Last 24 Hrs  T(C): 37.3 (05 Jul 2025 22:53), Max: 37.3 (05 Jul 2025 22:53)  T(F): 99.1 (05 Jul 2025 22:53), Max: 99.1 (05 Jul 2025 22:53)  HR: 82 (05 Jul 2025 22:53) (74 - 86)  BP: 133/77 (05 Jul 2025 22:53) (115/80 - 133/77)  BP(mean): --  RR: 18 (05 Jul 2025 22:53) (18 - 18)  SpO2: 95% (05 Jul 2025 22:53) (95% - 97%)    Parameters below as of 05 Jul 2025 22:53  Patient On (Oxygen Delivery Method): room air    MEDICATIONS  (STANDING):  allopurinol 300 milliGRAM(s) Oral daily  amoxicillin 500 milliGRAM(s) Oral two times a day  Biotene Dry Mouth Oral Rinse 15 milliLiter(s) Swish and Spit three times a day  cholecalciferol 2000 Unit(s) Oral daily  diltiazem    milliGRAM(s) Oral daily  filgrastim-sndz (ZARXIO) Injectable 480 MICROGram(s) SubCutaneous daily  levoFLOXacin  Tablet 500 milliGRAM(s) Oral every 24 hours  pantoprazole    Tablet 40 milliGRAM(s) Oral before breakfast  potassium phosphate / sodium phosphate Tablet (K-PHOS No. 2) 1 Tablet(s) Oral four times a day with meals  sodium chloride 0.9%. 1000 milliLiter(s) (50 mL/Hr) IV Continuous <Continuous>  tamsulosin 0.4 milliGRAM(s) Oral at bedtime  valACYclovir 500 milliGRAM(s) Oral two times a day      Assessment: 69 year old day 20 Cycle 1 HiDAC for ASXL1-mutated and WSJ73-kzindni AML.  Course complicated by pseudomonal septicemia, mild transaminitis (active), and lack of ANC recovery (today is day +16 of Neulasta)     PMHx: gout    Heme:   - s/p Neulasta outpatient 6/20  - Continue daily oral B12 suppl  - Hgb goal >7.0g/dl, PLT goal >10,000  Continue G-CSF 480mcg daily (7/5/25 - )  Please draw a type and screen today.     ID:   amox/levo/valtrex; please restart posa  cefepime (6/26/25 - 6/27/25; 6/30/25 - 7/5/25)   Meropenem (6/27/25 - 6/30/25)    Nutrition: low phos - please repeat serum Phos - replace as needed    MSK:  acute on chronic pain: Dilaudid     Gout: - allopurinol 300 mg qd    Over 35 minutes were spent in discharge management. Follow-up with Dr. Apodaca tomorrow. .  Primary: Chitty    Vital Signs Last 24 Hrs  T(C): 37.3 (05 Jul 2025 22:53), Max: 37.3 (05 Jul 2025 22:53)  T(F): 99.1 (05 Jul 2025 22:53), Max: 99.1 (05 Jul 2025 22:53)  HR: 82 (05 Jul 2025 22:53) (74 - 86)  BP: 133/77 (05 Jul 2025 22:53) (115/80 - 133/77)  BP(mean): --  RR: 18 (05 Jul 2025 22:53) (18 - 18)  SpO2: 95% (05 Jul 2025 22:53) (95% - 97%)    Parameters below as of 05 Jul 2025 22:53  Patient On (Oxygen Delivery Method): room air    MEDICATIONS  (STANDING):  allopurinol 300 milliGRAM(s) Oral daily  amoxicillin 500 milliGRAM(s) Oral two times a day  Biotene Dry Mouth Oral Rinse 15 milliLiter(s) Swish and Spit three times a day  cholecalciferol 2000 Unit(s) Oral daily  diltiazem    milliGRAM(s) Oral daily  filgrastim-sndz (ZARXIO) Injectable 480 MICROGram(s) SubCutaneous daily  levoFLOXacin  Tablet 500 milliGRAM(s) Oral every 24 hours  pantoprazole    Tablet 40 milliGRAM(s) Oral before breakfast  potassium phosphate / sodium phosphate Tablet (K-PHOS No. 2) 1 Tablet(s) Oral four times a day with meals  sodium chloride 0.9%. 1000 milliLiter(s) (50 mL/Hr) IV Continuous <Continuous>  tamsulosin 0.4 milliGRAM(s) Oral at bedtime  valACYclovir 500 milliGRAM(s) Oral two times a day      Assessment: 69 year old day 22 Cycle 1 HiDAC for ASXL1-mutated and VQH46-apujgen AML.  Course complicated by pseudomonal septicemia, mild transaminitis (active), and lack of ANC recovery (today is day +16 of Neulasta)     PMHx: gout    Heme:   - s/p Neulasta outpatient 6/20  - Continue daily oral B12 suppl  - Hgb goal >7.0g/dl, PLT goal >10,000  Continue G-CSF 480mcg daily (7/5/25 - )  Please draw a type and screen today.     ID:   amox/levo/valtrex; please restart posa  cefepime (6/26/25 - 6/27/25; 6/30/25 - 7/5/25)   Meropenem (6/27/25 - 6/30/25)    Nutrition: low phos - please repeat serum Phos - replace as needed    MSK:  acute on chronic pain: Dilaudid     Gout: - allopurinol 300 mg qd    Over 35 minutes were spent in discharge management. Follow-up with Dr. Apodaca tomorrow.

## 2025-07-06 NOTE — PROGRESS NOTE ADULT - PROBLEM SELECTOR PROBLEM 5
Chronic pain of multiple sites

## 2025-07-06 NOTE — PROVIDER CONTACT NOTE (CRITICAL VALUE NOTIFICATION) - ASSESSMENT
patient s/p fever admitted for abx
stable.
No s/s of bleeding  Afebrile
Patient resting comfortably in bed at the moment
No s/s of distress  Afebrile
Pt resting comfortably in bed.
Pt was resting comfortably in bed. No c/o pain.
Stable.
VSS Pt resting in bed. Upon assessment pt appears to have swollen lymph node in his neck. Pt also appears to have swelling in the upper lip and cheek areas. No c/o SOB, difficulty breathing, or itchiness. Pt requested a better pain management regiment.
Pt is A+Ox4

## 2025-07-06 NOTE — PROGRESS NOTE ADULT - PROBLEM SELECTOR PROBLEM 1
AML (acute myeloid leukemia)

## 2025-07-06 NOTE — DISCHARGE NOTE NURSING/CASE MANAGEMENT/SOCIAL WORK - FINANCIAL ASSISTANCE
[facility] provides services at a reduced cost to those who are determined to be eligible through [facility]’s financial assistance program. Information regarding [facility]’s financial assistance program can be found by going to [email or web address] or by calling [phone].

## 2025-07-06 NOTE — PROVIDER CONTACT NOTE (CRITICAL VALUE NOTIFICATION) - SITUATION
WBC 0.20
abnormal labs
WBC 0.28
WBC 0.29 and Plt 15
WBC 0.17, Hg 6.5, Ht 19.5, Platelets 16
[facility] Blood Culture resulted for a 6/26 draw. Results are as follows Aerobic bottle growth. Vito negative rods present.
Patient with AML, s/p Chemotherapy.
Platelet count 13/ WBC 0.34
Patient admitted with dx of Nocardiosis. Serum Lactate level today 2.2
patient just admitted to floor for fevers from [facility]. lab called for elevated lactate as a notification
Abnormal lab

## 2025-07-06 NOTE — PROGRESS NOTE ADULT - PROBLEM SELECTOR PLAN 6
Hold VTE ppx due to thrombocytopenia  GI ppx protonix  PT Eval

## 2025-07-06 NOTE — PROGRESS NOTE ADULT - ASSESSMENT
68 yo male with ASXL1, DDX41 mutated AML in CR1,  Day 12 s/p cycle 1 condensed HIDAC (2g/m2) consolidation, presenting from [facility]'s EDC appointment with neutropenic fever.  Hospital course complicated  by Pseudomonas aeruginosa ( 6/26) ), on Meropenem and hyponatremia ( 6/27-6/30), Cefepime 2g (6/30-  Pt is pancytopenic secondary to chemotherapy

## 2025-07-06 NOTE — PROVIDER CONTACT NOTE (CRITICAL VALUE NOTIFICATION) - BACKGROUND
AML, Febrile, s/p fall
AML
AML
Adm with Dx of Nocardia infection.
pt hx AML, admitted for neutropenic fevers
AML
AML, Febrile
AML
AML
Patient admitted for malignant neoplasm of spinal meninges

## 2025-07-06 NOTE — PROVIDER CONTACT NOTE (CRITICAL VALUE NOTIFICATION) - ACTION/TREATMENT ORDERED:
monitor labs
no new orders will order transfusion
MADISYN Turcios received the message. Will continue to monitor the patient.
Misha Yoon advised he will round on the patient and contact palliative care to manage the patients pain regiment. Continue to monitor.
NP aware. repeat lactate ordered - night shift RN aware
Serum Lactate will repeat in AM
PRBC transfuse
provider notified. provider aware
MADISYN Turcios called with no answer. Message was sent via teams. Awaiting intervention order from the provider. Will continue to monitor.
Cassandra Kirk made aware. Stated value is patient's baseline the last few days. No intervention needed
Transfusing Platelets.

## 2025-07-06 NOTE — PROGRESS NOTE ADULT - REASON FOR ADMISSION
neutropenic fever, hypotension

## 2025-07-06 NOTE — PROVIDER CONTACT NOTE (CRITICAL VALUE NOTIFICATION) - NAME OF MD/NP/PA/DO NOTIFIED:
MADISYN Martinez
Misha MUHAMMAD
Brice post NP
Cassandra Maza
Madelaine Turcios NP
Misha Yoon
Ayla Carey
Cassandra Kirk
Wong Wallace
SABINA Tejeda
MADISYN Turcios
MADISYN Turcios

## 2025-07-06 NOTE — PROGRESS NOTE ADULT - PROBLEM SELECTOR PLAN 2
patient is neutropenic, afebrile  cont Meropenem ,   6/27-2/28s/p  Vanco  6/26 cultures BC + GNR , follow up surveillance cultures    cont ppx posaconazole, valtrex  6/26 CXR - clear lungs  6/26 BC (+) Pseudomonas aeruginosa:  surveillance cultures NGT  6/30 change Abx to cefepime.  7/1: Pt spiked fever of 101.3F today. Pan cultures sent, CXR sent.  7/5 completed course of cefepime, transition to levaquin/amox prophylaxis

## 2025-07-06 NOTE — PROGRESS NOTE ADULT - SUBJECTIVE AND OBJECTIVE BOX
Diagnosis: MDS related AML, ASXL1, DDX41    Protocol/Chemo Regimen: s/p Cycle 1 HIDAC 2g/m2     Day: 22    Pt endorsed: generalized weakness    Review of Systems: Patient denies  nausea, vomiting, diarrhea, abdominal pain,  chest pain and headache.    Pain scale: denies now     Diet: DASH     Allergies: No Known Allergies    ANTIMICROBIALS  amoxicillin 500 milliGRAM(s) Oral two times a day  levoFLOXacin  Tablet 500 milliGRAM(s) Oral every 24 hours  valACYclovir 500 milliGRAM(s) Oral two times a day    STANDING MEDICATIONS  allopurinol 300 milliGRAM(s) Oral daily  Biotene Dry Mouth Oral Rinse 15 milliLiter(s) Swish and Spit three times a day  cholecalciferol 2000 Unit(s) Oral daily  diltiazem    milliGRAM(s) Oral daily  filgrastim-sndz (ZARXIO) Injectable 480 MICROGram(s) SubCutaneous daily  pantoprazole    Tablet 40 milliGRAM(s) Oral before breakfast  sodium chloride 0.9%. 1000 milliLiter(s) IV Continuous <Continuous>  tamsulosin 0.4 milliGRAM(s) Oral at bedtime    PRN MEDICATIONS  acetaminophen     Tablet .. 650 milliGRAM(s) Oral every 6 hours PRN  HYDROmorphone   Tablet 6 milliGRAM(s) Oral every 4 hours PRN  HYDROmorphone  Injectable 0.5 milliGRAM(s) IV Push every 4 hours PRN  metoclopramide Injectable 10 milliGRAM(s) IV Push every 6 hours PRN  naloxone Injectable 0.1 milliGRAM(s) IV Push every 3 minutes PRN    Vital Signs Last 24 Hrs  T(C): 36.7 (06 Jul 2025 07:40), Max: 37.3 (05 Jul 2025 22:53)  T(F): 98.1 (06 Jul 2025 07:40), Max: 99.1 (05 Jul 2025 22:53)  HR: 90 (06 Jul 2025 07:40) (71 - 90)  BP: 136/85 (06 Jul 2025 07:40) (115/80 - 136/85)  RR: 18 (06 Jul 2025 07:40) (18 - 18)  SpO2: 95% (06 Jul 2025 07:40) (95% - 96%)    Parameters below as of 06 Jul 2025 07:40  Patient On (Oxygen Delivery Method): room air    PHYSICAL EXAM  General: NAD  HEENT:  clear oropharynx, anicteric sclera, + angioedema   CV: (+) S1/S2 RRR  Lungs: clear to auscultation, no wheezes or rales  Abdomen: soft, non-tender, non-distended (+) BS  Ext: Trace  edema b/l LE   Skin: no rash  Neuro: alert and oriented X 3,   Central Line: PIVL CD    LABS:                        x      x     )-----------( 32       ( 06 Jul 2025 11:19 )             x        Mean Cell Volume : 88.6 fl  Mean Cell Hemoglobin : 29.8 pg  Mean Cell Hemoglobin Concentration : 33.6 g/dL  Auto Neutrophil # : x  Auto Lymphocyte # : x  Auto Monocyte # : x  Auto Eosinophil # : x  Auto Basophil # : x  Auto Neutrophil % : x  Auto Lymphocyte % : x  Auto Monocyte % : x  Auto Eosinophil % : x  Auto Basophil % : x      07-06    140  |  106  |  8   ----------------------------<  114[H]  3.6   |  17[L]  |  0.80    Ca    7.5[L]      06 Jul 2025 06:05  Phos  2.0     07-06  Mg     1.9     07-06    TPro  6.0  /  Alb  3.2[L]  /  TBili  0.9  /  DBili  x   /  AST  50[H]  /  ALT  140[H]  /  AlkPhos  139[H]  07-06  --------------  Cultures:  Culture - Urine (07.02.25 @ 17:13)    Specimen Source: Clean Catch Clean Catch (Midstream)   Culture Results:   No growth    Culture - Urine (07.01.25 @ 15:24)    Specimen Source: Clean Catch Clean Catch (Midstream)   Culture Results:   No growth    Culture - Blood (07.01.25 @ 10:42)    Specimen Source: Blood Blood-Peripheral   Culture Results:   No growth at 4 days    ---------------  RADIOLOGY & ADDITIONAL STUDIES:  from: Xray Chest 1 View- PORTABLE-Urgent (Xray Chest 1 View- PORTABLE-Urgent .) (07.01.25 @ 13:28)   FINDINGS:  The lungs are clear. No pleural effusion. No pneumothorax.  The heart size is normal.  No acute osseous abnormalities.    IMPRESSION:  Clear lungs.

## 2025-07-06 NOTE — PROGRESS NOTE ADULT - PROBLEM SELECTOR PLAN 4
cont allopurinol daily  recently completed steroid taper 6/20
No

## 2025-07-06 NOTE — PROGRESS NOTE ADULT - PROBLEM SELECTOR PLAN 3
cont home med Diltiazem 120mg daily

## 2025-07-06 NOTE — PROGRESS NOTE ADULT - PROBLEM SELECTOR PLAN 1
admitted with neutropenic fever   s/p C1 condensed HIDAC (2g/m2) consolidation on 6/15/25 - 6/17/25  s/p G-CSF Neulasta as outpatient 6/20  CBC w diff, CMP, coags  Hgb goal >8; PLTs goal >20k or if bleeding  stirct I&Os daily weights mouth care  7/3 Anemia PRBC x 1  7/3 phos repletion for hypophosphatemia.  7/6 discharge home. follow [facility] at [email or web address] give dose of filgasgtrim today and draw T&S.

## 2025-07-06 NOTE — PROVIDER CONTACT NOTE (CRITICAL VALUE NOTIFICATION) - TEST AND RESULT REPORTED:
Platelet/ WBC result
WBC 0.18 Plt 14
WBC 0.42 and Platelett count 11
Lactate 2.9
Serum Lactate Level 2.2
Hematology Report
WBC 0.20
WBC 0.28
Hb 6.7, Hct 19.6, plts 9, wbc 0.22
WBC 0.22, Hbg 6.9, Hct 19.9
WBC 0.29, Plt 15
[facility] Blood Culture Results

## 2025-07-06 NOTE — PROVIDER CONTACT NOTE (CRITICAL VALUE NOTIFICATION) - PERSON GIVING RESULT:
phill simmons
Elijah Taveras
Marcia Alcaraz
Doris Tam
Rocio Hart / Lab
Vandana Tang from Lab
Bryan Ramey
Last Schroeder
Theo Latif from Lab
Elijah Herrera
Francisca Ricks
Bryan Ramey

## 2025-07-06 NOTE — PROGRESS NOTE ADULT - PROBLEM SELECTOR PROBLEM 4
Gout flare

## 2025-07-06 NOTE — DISCHARGE NOTE NURSING/CASE MANAGEMENT/SOCIAL WORK - PATIENT PORTAL LINK FT
You can access the FollowMyHealth Patient Portal offered by [facility] by registering at the following website: [email or web address]. By joining [facility]’s FollowMyHealth portal, you will also be able to view your health information using other applications (apps) compatible with our system.

## 2025-07-06 NOTE — PROVIDER CONTACT NOTE (CRITICAL VALUE NOTIFICATION) - RECOMMENDATIONS
n/a
Transfuse
Provider assessment at bedside, order a unit of platelets, and consult with Palliative care for pain regiment review.
Cassandra Kirk made aware
Repeat serum lactate in AM
monitor labs
n/a
Patient is receiving one bag of platelets

## 2025-07-07 ENCOUNTER — RESULT REVIEW (OUTPATIENT)
Age: 70
End: 2025-07-07

## 2025-07-07 ENCOUNTER — TRANSCRIPTION ENCOUNTER (OUTPATIENT)
Age: 70
End: 2025-07-07

## 2025-07-07 ENCOUNTER — APPOINTMENT (OUTPATIENT)
Dept: HEMATOLOGY ONCOLOGY | Facility: CLINIC | Age: 70
End: 2025-07-07
Payer: MEDICARE

## 2025-07-07 ENCOUNTER — APPOINTMENT (OUTPATIENT)
Dept: INFUSION THERAPY | Facility: HOSPITAL | Age: 70
End: 2025-07-07

## 2025-07-07 VITALS
RESPIRATION RATE: 16 BRPM | DIASTOLIC BLOOD PRESSURE: 69 MMHG | SYSTOLIC BLOOD PRESSURE: 105 MMHG | WEIGHT: 206 LBS | TEMPERATURE: 96.8 F | OXYGEN SATURATION: 98 % | BODY MASS INDEX: 28.73 KG/M2 | HEART RATE: 102 BPM

## 2025-07-07 LAB
ALBUMIN SERPL ELPH-MCNC: 3.7 G/DL — SIGNIFICANT CHANGE UP (ref 3.3–5)
ALP SERPL-CCNC: 151 U/L — HIGH (ref 40–120)
ALT FLD-CCNC: 184 U/L — HIGH (ref 10–45)
ANION GAP SERPL CALC-SCNC: 16 MMOL/L — SIGNIFICANT CHANGE UP (ref 5–17)
AST SERPL-CCNC: 74 U/L — HIGH (ref 10–40)
BASOPHILS # BLD AUTO: 0 K/UL — SIGNIFICANT CHANGE UP (ref 0–0.2)
BASOPHILS NFR BLD AUTO: 0 % — SIGNIFICANT CHANGE UP (ref 0–2)
BILIRUB SERPL-MCNC: 0.8 MG/DL — SIGNIFICANT CHANGE UP (ref 0.2–1.2)
BUN SERPL-MCNC: 8 MG/DL — SIGNIFICANT CHANGE UP (ref 7–23)
CALCIUM SERPL-MCNC: 8 MG/DL — LOW (ref 8.4–10.5)
CHLORIDE SERPL-SCNC: 106 MMOL/L — SIGNIFICANT CHANGE UP (ref 96–108)
CO2 SERPL-SCNC: 20 MMOL/L — LOW (ref 22–31)
CREAT SERPL-MCNC: 0.81 MG/DL — SIGNIFICANT CHANGE UP (ref 0.5–1.3)
EGFR: 95 ML/MIN/1.73M2 — SIGNIFICANT CHANGE UP
EGFR: 95 ML/MIN/1.73M2 — SIGNIFICANT CHANGE UP
EOSINOPHIL # BLD AUTO: 0 K/UL — SIGNIFICANT CHANGE UP (ref 0–0.5)
EOSINOPHIL NFR BLD AUTO: 0 % — SIGNIFICANT CHANGE UP (ref 0–6)
GLUCOSE SERPL-MCNC: 121 MG/DL — HIGH (ref 70–99)
HCT VFR BLD CALC: 24.7 % — LOW (ref 39–50)
HGB BLD-MCNC: 8.8 G/DL — LOW (ref 13–17)
IMM GRANULOCYTES NFR BLD AUTO: 1.6 % — HIGH (ref 0–0.9)
LYMPHOCYTES # BLD AUTO: 0.46 K/UL — LOW (ref 1–3.3)
LYMPHOCYTES # BLD AUTO: 71.9 % — HIGH (ref 13–44)
MCHC RBC-ENTMCNC: 30.6 PG — SIGNIFICANT CHANGE UP (ref 27–34)
MCHC RBC-ENTMCNC: 35.6 G/DL — SIGNIFICANT CHANGE UP (ref 32–36)
MCV RBC AUTO: 85.8 FL — SIGNIFICANT CHANGE UP (ref 80–100)
MONOCYTES # BLD AUTO: 0.04 K/UL — SIGNIFICANT CHANGE UP (ref 0–0.9)
MONOCYTES NFR BLD AUTO: 6.3 % — SIGNIFICANT CHANGE UP (ref 2–14)
NEUTROPHILS # BLD AUTO: 0.13 K/UL — LOW (ref 1.8–7.4)
NEUTROPHILS NFR BLD AUTO: 20.2 % — LOW (ref 43–77)
NRBC BLD AUTO-RTO: 0 /100 WBCS — SIGNIFICANT CHANGE UP (ref 0–0)
PLATELET # BLD AUTO: 32 K/UL — LOW (ref 150–400)
POTASSIUM SERPL-MCNC: 3.6 MMOL/L — SIGNIFICANT CHANGE UP (ref 3.5–5.3)
POTASSIUM SERPL-SCNC: 3.6 MMOL/L — SIGNIFICANT CHANGE UP (ref 3.5–5.3)
PROT SERPL-MCNC: 6.6 G/DL — SIGNIFICANT CHANGE UP (ref 6–8.3)
RBC # BLD: 2.88 M/UL — LOW (ref 4.2–5.8)
RBC # FLD: 16 % — HIGH (ref 10.3–14.5)
SODIUM SERPL-SCNC: 142 MMOL/L — SIGNIFICANT CHANGE UP (ref 135–145)
WBC # BLD: 0.64 K/UL — CRITICAL LOW (ref 3.8–10.5)
WBC # FLD AUTO: 0.64 K/UL — CRITICAL LOW (ref 3.8–10.5)

## 2025-07-07 PROCEDURE — 99214 OFFICE O/P EST MOD 30 MIN: CPT

## 2025-07-07 PROCEDURE — G2211 COMPLEX E/M VISIT ADD ON: CPT

## 2025-07-07 RX ORDER — VALACYCLOVIR 500 MG/1
500 TABLET, FILM COATED ORAL TWICE DAILY
Qty: 60 | Refills: 2 | Status: ACTIVE | COMMUNITY
Start: 2025-07-07 | End: 1900-01-01

## 2025-07-08 ENCOUNTER — TRANSCRIPTION ENCOUNTER (OUTPATIENT)
Age: 70
End: 2025-07-08

## 2025-07-09 ENCOUNTER — RESULT REVIEW (OUTPATIENT)
Age: 70
End: 2025-07-09

## 2025-07-09 ENCOUNTER — APPOINTMENT (OUTPATIENT)
Dept: HEMATOLOGY ONCOLOGY | Facility: CLINIC | Age: 70
End: 2025-07-09

## 2025-07-09 ENCOUNTER — APPOINTMENT (OUTPATIENT)
Dept: INFUSION THERAPY | Facility: HOSPITAL | Age: 70
End: 2025-07-09

## 2025-07-09 ENCOUNTER — NON-APPOINTMENT (OUTPATIENT)
Age: 70
End: 2025-07-09

## 2025-07-09 LAB
ALBUMIN SERPL ELPH-MCNC: 3.9 G/DL — SIGNIFICANT CHANGE UP (ref 3.3–5)
ALP SERPL-CCNC: 143 U/L — HIGH (ref 40–120)
ALT FLD-CCNC: 188 U/L — HIGH (ref 10–45)
ANION GAP SERPL CALC-SCNC: 17 MMOL/L — SIGNIFICANT CHANGE UP (ref 5–17)
AST SERPL-CCNC: 79 U/L — HIGH (ref 10–40)
BASOPHILS # BLD AUTO: 0 K/UL — SIGNIFICANT CHANGE UP (ref 0–0.2)
BASOPHILS NFR BLD AUTO: 0 % — SIGNIFICANT CHANGE UP (ref 0–2)
BILIRUB SERPL-MCNC: 0.7 MG/DL — SIGNIFICANT CHANGE UP (ref 0.2–1.2)
BUN SERPL-MCNC: 12 MG/DL — SIGNIFICANT CHANGE UP (ref 7–23)
CALCIUM SERPL-MCNC: 8.7 MG/DL — SIGNIFICANT CHANGE UP (ref 8.4–10.5)
CHLORIDE SERPL-SCNC: 106 MMOL/L — SIGNIFICANT CHANGE UP (ref 96–108)
CO2 SERPL-SCNC: 21 MMOL/L — LOW (ref 22–31)
CREAT SERPL-MCNC: 0.97 MG/DL — SIGNIFICANT CHANGE UP (ref 0.5–1.3)
DACRYOCYTES BLD QL SMEAR: SLIGHT — SIGNIFICANT CHANGE UP
EGFR: 85 ML/MIN/1.73M2 — SIGNIFICANT CHANGE UP
EGFR: 85 ML/MIN/1.73M2 — SIGNIFICANT CHANGE UP
ELLIPTOCYTES BLD QL SMEAR: SLIGHT — SIGNIFICANT CHANGE UP
EOSINOPHIL # BLD AUTO: 0 K/UL — SIGNIFICANT CHANGE UP (ref 0–0.5)
EOSINOPHIL NFR BLD AUTO: 0 % — SIGNIFICANT CHANGE UP (ref 0–6)
GLUCOSE SERPL-MCNC: 117 MG/DL — HIGH (ref 70–99)
HCT VFR BLD CALC: 24.3 % — LOW (ref 39–50)
HGB BLD-MCNC: 8.4 G/DL — LOW (ref 13–17)
HYPOCHROMIA BLD QL: SLIGHT — SIGNIFICANT CHANGE UP
LDH SERPL-CCNC: 287 U/L
LYMPHOCYTES # BLD AUTO: 0.86 K/UL — LOW (ref 1–3.3)
LYMPHOCYTES # BLD AUTO: 71 % — HIGH (ref 13–44)
MCHC RBC-ENTMCNC: 30.4 PG — SIGNIFICANT CHANGE UP (ref 27–34)
MCHC RBC-ENTMCNC: 34.6 G/DL — SIGNIFICANT CHANGE UP (ref 32–36)
MCV RBC AUTO: 88 FL — SIGNIFICANT CHANGE UP (ref 80–100)
MONOCYTES # BLD AUTO: 0.11 K/UL — SIGNIFICANT CHANGE UP (ref 0–0.9)
MONOCYTES NFR BLD AUTO: 9 % — SIGNIFICANT CHANGE UP (ref 2–14)
NEUTROPHILS # BLD AUTO: 0.24 K/UL — LOW (ref 1.8–7.4)
NEUTROPHILS NFR BLD AUTO: 20 % — LOW (ref 43–77)
NRBC # BLD: 0 /100 WBCS — SIGNIFICANT CHANGE UP (ref 0–0)
NRBC BLD AUTO-RTO: SIGNIFICANT CHANGE UP /100 WBCS (ref 0–0)
NRBC BLD-RTO: 0 /100 WBCS — SIGNIFICANT CHANGE UP (ref 0–0)
PLAT MORPH BLD: NORMAL — SIGNIFICANT CHANGE UP
PLATELET # BLD AUTO: 11 K/UL — CRITICAL LOW (ref 150–400)
POIKILOCYTOSIS BLD QL AUTO: SLIGHT — SIGNIFICANT CHANGE UP
POTASSIUM SERPL-MCNC: 3.9 MMOL/L — SIGNIFICANT CHANGE UP (ref 3.5–5.3)
POTASSIUM SERPL-SCNC: 3.9 MMOL/L — SIGNIFICANT CHANGE UP (ref 3.5–5.3)
PROT SERPL-MCNC: 6.3 G/DL — SIGNIFICANT CHANGE UP (ref 6–8.3)
RBC # BLD: 2.76 M/UL — LOW (ref 4.2–5.8)
RBC # FLD: 15.9 % — HIGH (ref 10.3–14.5)
RBC BLD AUTO: ABNORMAL
SODIUM SERPL-SCNC: 144 MMOL/L — SIGNIFICANT CHANGE UP (ref 135–145)
WBC # BLD: 1.21 K/UL — LOW (ref 3.8–10.5)
WBC # FLD AUTO: 1.21 K/UL — LOW (ref 3.8–10.5)

## 2025-07-11 ENCOUNTER — LABORATORY RESULT (OUTPATIENT)
Age: 70
End: 2025-07-11

## 2025-07-11 ENCOUNTER — RESULT REVIEW (OUTPATIENT)
Age: 70
End: 2025-07-11

## 2025-07-11 ENCOUNTER — APPOINTMENT (OUTPATIENT)
Dept: HEMATOLOGY ONCOLOGY | Facility: CLINIC | Age: 70
End: 2025-07-11

## 2025-07-11 ENCOUNTER — APPOINTMENT (OUTPATIENT)
Dept: HEMATOLOGY ONCOLOGY | Facility: CLINIC | Age: 70
End: 2025-07-11
Payer: MEDICARE

## 2025-07-11 ENCOUNTER — APPOINTMENT (OUTPATIENT)
Dept: INFUSION THERAPY | Facility: HOSPITAL | Age: 70
End: 2025-07-11

## 2025-07-11 LAB
BASOPHILS # BLD AUTO: 0.01 K/UL — SIGNIFICANT CHANGE UP (ref 0–0.2)
BASOPHILS NFR BLD AUTO: 0.6 % — SIGNIFICANT CHANGE UP (ref 0–2)
EOSINOPHIL # BLD AUTO: 0 K/UL — SIGNIFICANT CHANGE UP (ref 0–0.5)
EOSINOPHIL NFR BLD AUTO: 0 % — SIGNIFICANT CHANGE UP (ref 0–6)
HCT VFR BLD CALC: 21.1 % — LOW (ref 39–50)
HGB BLD-MCNC: 7.4 G/DL — LOW (ref 13–17)
IMM GRANULOCYTES NFR BLD AUTO: 3.4 % — HIGH (ref 0–0.9)
LDH SERPL-CCNC: 277 U/L
LYMPHOCYTES # BLD AUTO: 0.78 K/UL — LOW (ref 1–3.3)
LYMPHOCYTES # BLD AUTO: 44.6 % — HIGH (ref 13–44)
MCHC RBC-ENTMCNC: 30.7 PG — SIGNIFICANT CHANGE UP (ref 27–34)
MCHC RBC-ENTMCNC: 35.1 G/DL — SIGNIFICANT CHANGE UP (ref 32–36)
MCV RBC AUTO: 87.6 FL — SIGNIFICANT CHANGE UP (ref 80–100)
MONOCYTES # BLD AUTO: 0.19 K/UL — SIGNIFICANT CHANGE UP (ref 0–0.9)
MONOCYTES NFR BLD AUTO: 10.9 % — SIGNIFICANT CHANGE UP (ref 2–14)
NEUTROPHILS # BLD AUTO: 0.71 K/UL — LOW (ref 1.8–7.4)
NEUTROPHILS NFR BLD AUTO: 40.5 % — LOW (ref 43–77)
NRBC BLD AUTO-RTO: 1 /100 WBCS — HIGH (ref 0–0)
PLATELET # BLD AUTO: 29 K/UL — LOW (ref 150–400)
RBC # BLD: 2.41 M/UL — LOW (ref 4.2–5.8)
RBC # FLD: 15.9 % — HIGH (ref 10.3–14.5)
WBC # BLD: 1.75 K/UL — LOW (ref 3.8–10.5)
WBC # FLD AUTO: 1.75 K/UL — LOW (ref 3.8–10.5)

## 2025-07-11 PROCEDURE — 99214 OFFICE O/P EST MOD 30 MIN: CPT

## 2025-07-11 PROCEDURE — G2211 COMPLEX E/M VISIT ADD ON: CPT

## 2025-07-11 RX ORDER — TRIAMCINOLONE ACETONIDE 1 MG/G
0.1 PASTE DENTAL
Qty: 1 | Refills: 0 | Status: ACTIVE | COMMUNITY
Start: 2025-07-11 | End: 1900-01-01

## 2025-07-12 ENCOUNTER — RESULT REVIEW (OUTPATIENT)
Age: 70
End: 2025-07-12

## 2025-07-12 ENCOUNTER — APPOINTMENT (OUTPATIENT)
Dept: INFUSION THERAPY | Facility: HOSPITAL | Age: 70
End: 2025-07-12

## 2025-07-12 LAB
ALBUMIN SERPL ELPH-MCNC: 3.8 G/DL — SIGNIFICANT CHANGE UP (ref 3.3–5)
ALP SERPL-CCNC: 150 U/L — HIGH (ref 40–120)
ALT FLD-CCNC: 163 U/L — HIGH (ref 10–45)
ANION GAP SERPL CALC-SCNC: 16 MMOL/L — SIGNIFICANT CHANGE UP (ref 5–17)
AST SERPL-CCNC: 74 U/L — HIGH (ref 10–40)
BILIRUB SERPL-MCNC: 0.8 MG/DL — SIGNIFICANT CHANGE UP (ref 0.2–1.2)
BUN SERPL-MCNC: 11 MG/DL — SIGNIFICANT CHANGE UP (ref 7–23)
CALCIUM SERPL-MCNC: 9.6 MG/DL — SIGNIFICANT CHANGE UP (ref 8.4–10.5)
CHLORIDE SERPL-SCNC: 105 MMOL/L — SIGNIFICANT CHANGE UP (ref 96–108)
CO2 SERPL-SCNC: 21 MMOL/L — LOW (ref 22–31)
CREAT SERPL-MCNC: 0.93 MG/DL — SIGNIFICANT CHANGE UP (ref 0.5–1.3)
EGFR: 89 ML/MIN/1.73M2 — SIGNIFICANT CHANGE UP
EGFR: 89 ML/MIN/1.73M2 — SIGNIFICANT CHANGE UP
GLUCOSE SERPL-MCNC: 109 MG/DL — HIGH (ref 70–99)
HCT VFR BLD CALC: 26.7 % — LOW (ref 39–50)
HGB BLD-MCNC: 9.1 G/DL — LOW (ref 13–17)
MCHC RBC-ENTMCNC: 29.5 PG — SIGNIFICANT CHANGE UP (ref 27–34)
MCHC RBC-ENTMCNC: 34.1 G/DL — SIGNIFICANT CHANGE UP (ref 32–36)
MCV RBC AUTO: 86.7 FL — SIGNIFICANT CHANGE UP (ref 80–100)
PHOSPHATE SERPL-MCNC: 3.3 MG/DL — SIGNIFICANT CHANGE UP (ref 2.5–4.5)
PLATELET # BLD AUTO: 24 K/UL — LOW (ref 150–400)
POTASSIUM SERPL-MCNC: 4.4 MMOL/L — SIGNIFICANT CHANGE UP (ref 3.5–5.3)
POTASSIUM SERPL-SCNC: 4.4 MMOL/L — SIGNIFICANT CHANGE UP (ref 3.5–5.3)
PROT SERPL-MCNC: 6.4 G/DL — SIGNIFICANT CHANGE UP (ref 6–8.3)
RBC # BLD: 3.08 M/UL — LOW (ref 4.2–5.8)
RBC # FLD: 15.5 % — HIGH (ref 10.3–14.5)
SODIUM SERPL-SCNC: 143 MMOL/L — SIGNIFICANT CHANGE UP (ref 135–145)
WBC # BLD: 2.37 K/UL — LOW (ref 3.8–10.5)
WBC # FLD AUTO: 2.37 K/UL — LOW (ref 3.8–10.5)

## 2025-07-14 ENCOUNTER — APPOINTMENT (OUTPATIENT)
Dept: HEMATOLOGY ONCOLOGY | Facility: CLINIC | Age: 70
End: 2025-07-14

## 2025-07-14 ENCOUNTER — APPOINTMENT (OUTPATIENT)
Dept: INFUSION THERAPY | Facility: HOSPITAL | Age: 70
End: 2025-07-14

## 2025-07-14 ENCOUNTER — INPATIENT (INPATIENT)
Facility: HOSPITAL | Age: 70
LOS: 15 days | Discharge: HOME HEALTH | DRG: 554 | End: 2025-07-30
Attending: STUDENT IN AN ORGANIZED HEALTH CARE EDUCATION/TRAINING PROGRAM | Admitting: STUDENT IN AN ORGANIZED HEALTH CARE EDUCATION/TRAINING PROGRAM
Payer: COMMERCIAL

## 2025-07-14 VITALS
OXYGEN SATURATION: 97 % | TEMPERATURE: 97 F | RESPIRATION RATE: 20 BRPM | WEIGHT: 205.91 LBS | HEART RATE: 114 BPM | DIASTOLIC BLOOD PRESSURE: 74 MMHG | SYSTOLIC BLOOD PRESSURE: 107 MMHG | HEIGHT: 71 IN

## 2025-07-14 DIAGNOSIS — Z98.89 OTHER SPECIFIED POSTPROCEDURAL STATES: Chronic | ICD-10-CM

## 2025-07-14 DIAGNOSIS — M10.9 GOUT, UNSPECIFIED: ICD-10-CM

## 2025-07-14 DIAGNOSIS — C92.00 ACUTE MYELOBLASTIC LEUKEMIA, NOT HAVING ACHIEVED REMISSION: ICD-10-CM

## 2025-07-14 DIAGNOSIS — Z29.9 ENCOUNTER FOR PROPHYLACTIC MEASURES, UNSPECIFIED: ICD-10-CM

## 2025-07-14 DIAGNOSIS — R52 PAIN, UNSPECIFIED: ICD-10-CM

## 2025-07-14 DIAGNOSIS — M43.20 FUSION OF SPINE, SITE UNSPECIFIED: Chronic | ICD-10-CM

## 2025-07-14 DIAGNOSIS — A41.9 SEPSIS, UNSPECIFIED ORGANISM: ICD-10-CM

## 2025-07-14 DIAGNOSIS — I10 ESSENTIAL (PRIMARY) HYPERTENSION: ICD-10-CM

## 2025-07-14 LAB
ALBUMIN SERPL ELPH-MCNC: 3.5 G/DL — SIGNIFICANT CHANGE UP (ref 3.3–5)
ALP SERPL-CCNC: 135 U/L — HIGH (ref 40–120)
ALT FLD-CCNC: 90 U/L — HIGH (ref 10–45)
ANION GAP SERPL CALC-SCNC: 14 MMOL/L — SIGNIFICANT CHANGE UP (ref 5–17)
APTT BLD: 26.4 SEC — SIGNIFICANT CHANGE UP (ref 26.1–36.8)
AST SERPL-CCNC: 30 U/L — SIGNIFICANT CHANGE UP (ref 10–40)
BASOPHILS # BLD AUTO: 0.01 K/UL — SIGNIFICANT CHANGE UP (ref 0–0.2)
BASOPHILS # BLD MANUAL: 0.02 K/UL — SIGNIFICANT CHANGE UP (ref 0–0.2)
BASOPHILS NFR BLD AUTO: 0.4 % — SIGNIFICANT CHANGE UP (ref 0–2)
BASOPHILS NFR BLD MANUAL: 0.8 % — SIGNIFICANT CHANGE UP (ref 0–2)
BILIRUB SERPL-MCNC: 1 MG/DL — SIGNIFICANT CHANGE UP (ref 0.2–1.2)
BUN SERPL-MCNC: 12 MG/DL — SIGNIFICANT CHANGE UP (ref 7–23)
CALCIUM SERPL-MCNC: 8.9 MG/DL — SIGNIFICANT CHANGE UP (ref 8.4–10.5)
CHLORIDE SERPL-SCNC: 100 MMOL/L — SIGNIFICANT CHANGE UP (ref 96–108)
CO2 SERPL-SCNC: 21 MMOL/L — LOW (ref 22–31)
CREAT SERPL-MCNC: 0.9 MG/DL — SIGNIFICANT CHANGE UP (ref 0.5–1.3)
EGFR: 92 ML/MIN/1.73M2 — SIGNIFICANT CHANGE UP
EGFR: 92 ML/MIN/1.73M2 — SIGNIFICANT CHANGE UP
EOSINOPHIL # BLD AUTO: 0 K/UL — SIGNIFICANT CHANGE UP (ref 0–0.5)
EOSINOPHIL # BLD MANUAL: 0 K/UL — SIGNIFICANT CHANGE UP (ref 0–0.5)
EOSINOPHIL NFR BLD AUTO: 0 % — SIGNIFICANT CHANGE UP (ref 0–6)
EOSINOPHIL NFR BLD MANUAL: 0 % — SIGNIFICANT CHANGE UP (ref 0–6)
FLUAV AG NPH QL: SIGNIFICANT CHANGE UP
FLUBV AG NPH QL: SIGNIFICANT CHANGE UP
GAS PNL BLDV: SIGNIFICANT CHANGE UP
GLUCOSE SERPL-MCNC: 124 MG/DL — HIGH (ref 70–99)
HCT VFR BLD CALC: 22.4 % — LOW (ref 39–50)
HGB BLD-MCNC: 7.6 G/DL — LOW (ref 13–17)
IMM GRANULOCYTES # BLD AUTO: 0.08 K/UL — HIGH (ref 0–0.07)
IMM GRANULOCYTES NFR BLD AUTO: 2.9 % — HIGH (ref 0–0.9)
IMMATURE PLATELET FRACTION #: 0.9 K/UL — LOW (ref 3.9–12.5)
IMMATURE PLATELET FRACTION %: 8.5 % — HIGH (ref 1.6–7.1)
INR BLD: 1.27 RATIO — HIGH (ref 0.85–1.16)
LYMPHOCYTES # BLD AUTO: 0.52 K/UL — LOW (ref 1–3.3)
LYMPHOCYTES # BLD MANUAL: 0.65 K/UL — LOW (ref 1–3.3)
LYMPHOCYTES NFR BLD AUTO: 18.8 % — SIGNIFICANT CHANGE UP (ref 13–44)
LYMPHOCYTES NFR BLD MANUAL: 23.5 % — SIGNIFICANT CHANGE UP (ref 13–44)
MANUAL NEUTROPHIL BANDS #: 0.09 K/UL — SIGNIFICANT CHANGE UP (ref 0–0.84)
MCHC RBC-ENTMCNC: 29.2 PG — SIGNIFICANT CHANGE UP (ref 27–34)
MCHC RBC-ENTMCNC: 33.9 G/DL — SIGNIFICANT CHANGE UP (ref 32–36)
MCV RBC AUTO: 86.2 FL — SIGNIFICANT CHANGE UP (ref 80–100)
MONOCYTES # BLD AUTO: 0.74 K/UL — SIGNIFICANT CHANGE UP (ref 0–0.9)
MONOCYTES # BLD MANUAL: 0.37 K/UL — SIGNIFICANT CHANGE UP (ref 0–0.9)
MONOCYTES NFR BLD AUTO: 26.8 % — HIGH (ref 2–14)
MONOCYTES NFR BLD MANUAL: 13.4 % — SIGNIFICANT CHANGE UP (ref 2–14)
NEUTROPHILS # BLD AUTO: 1.41 K/UL — LOW (ref 1.8–7.4)
NEUTROPHILS # BLD MANUAL: 1.63 K/UL — LOW (ref 1.8–7.4)
NEUTROPHILS NFR BLD AUTO: 51.1 % — SIGNIFICANT CHANGE UP (ref 43–77)
NEUTROPHILS NFR BLD MANUAL: 58.9 % — SIGNIFICANT CHANGE UP (ref 43–77)
NEUTS BAND # BLD: 3.4 % — SIGNIFICANT CHANGE UP (ref 0–8)
NEUTS BAND NFR BLD: 3.4 % — SIGNIFICANT CHANGE UP (ref 0–8)
NRBC # BLD AUTO: 0 K/UL — SIGNIFICANT CHANGE UP (ref 0–0)
NRBC # FLD: 0 K/UL — SIGNIFICANT CHANGE UP (ref 0–0)
NRBC BLD AUTO-RTO: 0 /100 WBCS — SIGNIFICANT CHANGE UP (ref 0–0)
PLAT MORPH BLD: NORMAL — SIGNIFICANT CHANGE UP
PLATELET # BLD AUTO: 10 K/UL — CRITICAL LOW (ref 150–400)
PMV BLD: SIGNIFICANT CHANGE UP FL (ref 7–13)
POTASSIUM SERPL-MCNC: 3.6 MMOL/L — SIGNIFICANT CHANGE UP (ref 3.5–5.3)
POTASSIUM SERPL-SCNC: 3.6 MMOL/L — SIGNIFICANT CHANGE UP (ref 3.5–5.3)
PROT SERPL-MCNC: 5.9 G/DL — LOW (ref 6–8.3)
PROTHROM AB SERPL-ACNC: 14.5 SEC — HIGH (ref 9.9–13.4)
RBC # BLD: 2.6 M/UL — LOW (ref 4.2–5.8)
RBC # FLD: 14.8 % — HIGH (ref 10.3–14.5)
RBC BLD AUTO: SIGNIFICANT CHANGE UP
RSV RNA NPH QL NAA+NON-PROBE: SIGNIFICANT CHANGE UP
SARS-COV-2 RNA SPEC QL NAA+PROBE: SIGNIFICANT CHANGE UP
SODIUM SERPL-SCNC: 135 MMOL/L — SIGNIFICANT CHANGE UP (ref 135–145)
SOURCE RESPIRATORY: SIGNIFICANT CHANGE UP
WBC # BLD: 2.76 K/UL — LOW (ref 3.8–10.5)
WBC # FLD AUTO: 2.76 K/UL — LOW (ref 3.8–10.5)

## 2025-07-14 PROCEDURE — 87637 SARSCOV2&INF A&B&RSV AMP PRB: CPT

## 2025-07-14 PROCEDURE — 73080 X-RAY EXAM OF ELBOW: CPT | Mod: 26,RT,76

## 2025-07-14 PROCEDURE — 73630 X-RAY EXAM OF FOOT: CPT

## 2025-07-14 PROCEDURE — 73610 X-RAY EXAM OF ANKLE: CPT | Mod: 26,RT

## 2025-07-14 PROCEDURE — 82435 ASSAY OF BLOOD CHLORIDE: CPT

## 2025-07-14 PROCEDURE — 86140 C-REACTIVE PROTEIN: CPT

## 2025-07-14 PROCEDURE — 73090 X-RAY EXAM OF FOREARM: CPT | Mod: 26,LT

## 2025-07-14 PROCEDURE — 73110 X-RAY EXAM OF WRIST: CPT | Mod: 26,LT

## 2025-07-14 PROCEDURE — 99223 1ST HOSP IP/OBS HIGH 75: CPT

## 2025-07-14 PROCEDURE — 73120 X-RAY EXAM OF HAND: CPT | Mod: 26,LT

## 2025-07-14 PROCEDURE — 85018 HEMOGLOBIN: CPT

## 2025-07-14 PROCEDURE — 82330 ASSAY OF CALCIUM: CPT

## 2025-07-14 PROCEDURE — 85610 PROTHROMBIN TIME: CPT

## 2025-07-14 PROCEDURE — 87040 BLOOD CULTURE FOR BACTERIA: CPT

## 2025-07-14 PROCEDURE — 85025 COMPLETE CBC W/AUTO DIFF WBC: CPT

## 2025-07-14 PROCEDURE — 73630 X-RAY EXAM OF FOOT: CPT | Mod: 26,LT,RT

## 2025-07-14 PROCEDURE — 73090 X-RAY EXAM OF FOREARM: CPT

## 2025-07-14 PROCEDURE — 85730 THROMBOPLASTIN TIME PARTIAL: CPT

## 2025-07-14 PROCEDURE — 99291 CRITICAL CARE FIRST HOUR: CPT

## 2025-07-14 PROCEDURE — 93010 ELECTROCARDIOGRAM REPORT: CPT

## 2025-07-14 PROCEDURE — 73110 X-RAY EXAM OF WRIST: CPT

## 2025-07-14 PROCEDURE — 84295 ASSAY OF SERUM SODIUM: CPT

## 2025-07-14 PROCEDURE — 82803 BLOOD GASES ANY COMBINATION: CPT

## 2025-07-14 PROCEDURE — 73080 X-RAY EXAM OF ELBOW: CPT

## 2025-07-14 PROCEDURE — 82947 ASSAY GLUCOSE BLOOD QUANT: CPT

## 2025-07-14 PROCEDURE — 80053 COMPREHEN METABOLIC PANEL: CPT

## 2025-07-14 PROCEDURE — 73120 X-RAY EXAM OF HAND: CPT

## 2025-07-14 PROCEDURE — 71045 X-RAY EXAM CHEST 1 VIEW: CPT | Mod: 26

## 2025-07-14 PROCEDURE — 83605 ASSAY OF LACTIC ACID: CPT

## 2025-07-14 PROCEDURE — 71045 X-RAY EXAM CHEST 1 VIEW: CPT

## 2025-07-14 PROCEDURE — 73610 X-RAY EXAM OF ANKLE: CPT

## 2025-07-14 PROCEDURE — 84132 ASSAY OF SERUM POTASSIUM: CPT

## 2025-07-14 PROCEDURE — 85014 HEMATOCRIT: CPT

## 2025-07-14 RX ORDER — ACETAMINOPHEN 500 MG/5ML
1000 LIQUID (ML) ORAL ONCE
Refills: 0 | Status: COMPLETED | OUTPATIENT
Start: 2025-07-14 | End: 2025-07-14

## 2025-07-14 RX ORDER — HYDROMORPHONE/SOD CHLOR,ISO/PF 2 MG/10 ML
4 SYRINGE (ML) INJECTION EVERY 4 HOURS
Refills: 0 | Status: DISCONTINUED | OUTPATIENT
Start: 2025-07-14 | End: 2025-07-15

## 2025-07-14 RX ORDER — TAMSULOSIN HYDROCHLORIDE 0.4 MG/1
0.4 CAPSULE ORAL AT BEDTIME
Refills: 0 | Status: DISCONTINUED | OUTPATIENT
Start: 2025-07-14 | End: 2025-07-28

## 2025-07-14 RX ORDER — ACETAMINOPHEN 500 MG/5ML
650 LIQUID (ML) ORAL ONCE
Refills: 0 | Status: COMPLETED | OUTPATIENT
Start: 2025-07-14 | End: 2025-07-14

## 2025-07-14 RX ORDER — NAPROXEN SODIUM 275 MG
500 TABLET ORAL
Refills: 0 | Status: DISCONTINUED | OUTPATIENT
Start: 2025-07-14 | End: 2025-07-14

## 2025-07-14 RX ORDER — VANCOMYCIN HCL IN 5 % DEXTROSE 1.5G/250ML
1500 PLASTIC BAG, INJECTION (ML) INTRAVENOUS EVERY 12 HOURS
Refills: 0 | Status: DISCONTINUED | OUTPATIENT
Start: 2025-07-14 | End: 2025-07-18

## 2025-07-14 RX ORDER — ATORVASTATIN CALCIUM 80 MG/1
80 TABLET, FILM COATED ORAL AT BEDTIME
Refills: 0 | Status: DISCONTINUED | OUTPATIENT
Start: 2025-07-14 | End: 2025-07-14

## 2025-07-14 RX ORDER — POSACONAZOLE 100 MG/1
300 TABLET, DELAYED RELEASE ORAL DAILY
Refills: 0 | Status: DISCONTINUED | OUTPATIENT
Start: 2025-07-14 | End: 2025-07-22

## 2025-07-14 RX ORDER — CEFEPIME 2 G/20ML
2000 INJECTION, POWDER, FOR SOLUTION INTRAVENOUS ONCE
Refills: 0 | Status: COMPLETED | OUTPATIENT
Start: 2025-07-14 | End: 2025-07-14

## 2025-07-14 RX ORDER — CEFEPIME 2 G/20ML
INJECTION, POWDER, FOR SOLUTION INTRAVENOUS
Refills: 0 | Status: COMPLETED | OUTPATIENT
Start: 2025-07-14 | End: 2025-07-19

## 2025-07-14 RX ORDER — HYDROMORPHONE/SOD CHLOR,ISO/PF 2 MG/10 ML
0.25 SYRINGE (ML) INJECTION ONCE
Refills: 0 | Status: DISCONTINUED | OUTPATIENT
Start: 2025-07-14 | End: 2025-07-14

## 2025-07-14 RX ORDER — VANCOMYCIN HCL IN 5 % DEXTROSE 1.5G/250ML
1000 PLASTIC BAG, INJECTION (ML) INTRAVENOUS ONCE
Refills: 0 | Status: COMPLETED | OUTPATIENT
Start: 2025-07-14 | End: 2025-07-14

## 2025-07-14 RX ORDER — CEFEPIME 2 G/20ML
2000 INJECTION, POWDER, FOR SOLUTION INTRAVENOUS EVERY 8 HOURS
Refills: 0 | Status: COMPLETED | OUTPATIENT
Start: 2025-07-14 | End: 2025-07-19

## 2025-07-14 RX ORDER — ATORVASTATIN CALCIUM 80 MG/1
1 TABLET, FILM COATED ORAL
Refills: 0 | DISCHARGE

## 2025-07-14 RX ORDER — HYDROMORPHONE/SOD CHLOR,ISO/PF 2 MG/10 ML
0.5 SYRINGE (ML) INJECTION ONCE
Refills: 0 | Status: DISCONTINUED | OUTPATIENT
Start: 2025-07-14 | End: 2025-07-14

## 2025-07-14 RX ORDER — ACETAMINOPHEN 500 MG/5ML
975 LIQUID (ML) ORAL ONCE
Refills: 0 | Status: COMPLETED | OUTPATIENT
Start: 2025-07-14 | End: 2025-07-14

## 2025-07-14 RX ADMIN — Medication 0.25 MILLIGRAM(S): at 21:27

## 2025-07-14 RX ADMIN — Medication 500 MILLIGRAM(S): at 19:21

## 2025-07-14 RX ADMIN — CEFEPIME 100 MILLIGRAM(S): 2 INJECTION, POWDER, FOR SOLUTION INTRAVENOUS at 14:58

## 2025-07-14 RX ADMIN — Medication 250 MILLIGRAM(S): at 16:03

## 2025-07-14 RX ADMIN — Medication 0.5 MILLIGRAM(S): at 14:27

## 2025-07-14 RX ADMIN — Medication 400 MILLIGRAM(S): at 14:27

## 2025-07-14 RX ADMIN — Medication 4 MILLIGRAM(S): at 17:23

## 2025-07-14 RX ADMIN — CEFEPIME 100 MILLIGRAM(S): 2 INJECTION, POWDER, FOR SOLUTION INTRAVENOUS at 22:08

## 2025-07-14 RX ADMIN — Medication 1000 MILLILITER(S): at 14:00

## 2025-07-14 RX ADMIN — Medication 0.25 MILLIGRAM(S): at 20:17

## 2025-07-15 ENCOUNTER — APPOINTMENT (OUTPATIENT)
Dept: HEMATOLOGY ONCOLOGY | Facility: CLINIC | Age: 70
End: 2025-07-15

## 2025-07-15 LAB
ADD ON TEST-SPECIMEN IN LAB: SIGNIFICANT CHANGE UP
ALBUMIN SERPL ELPH-MCNC: 3.5 G/DL — SIGNIFICANT CHANGE UP (ref 3.3–5)
ALP SERPL-CCNC: 203 U/L — HIGH (ref 40–120)
ALT FLD-CCNC: 91 U/L — HIGH (ref 10–45)
ANION GAP SERPL CALC-SCNC: 17 MMOL/L — SIGNIFICANT CHANGE UP (ref 5–17)
APTT BLD: 27.2 SEC — SIGNIFICANT CHANGE UP (ref 26.1–36.8)
AST SERPL-CCNC: 54 U/L — HIGH (ref 10–40)
B PERT IGG+IGM PNL SER: ABNORMAL
BASOPHILS # BLD AUTO: 0.01 K/UL — SIGNIFICANT CHANGE UP (ref 0–0.2)
BASOPHILS # BLD AUTO: 0.02 K/UL — SIGNIFICANT CHANGE UP (ref 0–0.2)
BASOPHILS NFR BLD AUTO: 0.3 % — SIGNIFICANT CHANGE UP (ref 0–2)
BASOPHILS NFR BLD AUTO: 0.5 % — SIGNIFICANT CHANGE UP (ref 0–2)
BILIRUB SERPL-MCNC: 0.8 MG/DL — SIGNIFICANT CHANGE UP (ref 0.2–1.2)
BUN SERPL-MCNC: 12 MG/DL — SIGNIFICANT CHANGE UP (ref 7–23)
CALCIUM SERPL-MCNC: 9.2 MG/DL — SIGNIFICANT CHANGE UP (ref 8.4–10.5)
CHLORIDE SERPL-SCNC: 99 MMOL/L — SIGNIFICANT CHANGE UP (ref 96–108)
CK MB CFR SERPL CALC: 1.9 NG/ML — SIGNIFICANT CHANGE UP (ref 0–6.7)
CK SERPL-CCNC: 69 U/L — SIGNIFICANT CHANGE UP (ref 30–200)
CO2 SERPL-SCNC: 19 MMOL/L — LOW (ref 22–31)
COLOR FLD: YELLOW
CREAT SERPL-MCNC: 0.89 MG/DL — SIGNIFICANT CHANGE UP (ref 0.5–1.3)
EGFR: 93 ML/MIN/1.73M2 — SIGNIFICANT CHANGE UP
EGFR: 93 ML/MIN/1.73M2 — SIGNIFICANT CHANGE UP
EOSINOPHIL # BLD AUTO: 0 K/UL — SIGNIFICANT CHANGE UP (ref 0–0.5)
EOSINOPHIL # BLD AUTO: 0 K/UL — SIGNIFICANT CHANGE UP (ref 0–0.5)
EOSINOPHIL NFR BLD AUTO: 0 % — SIGNIFICANT CHANGE UP (ref 0–6)
EOSINOPHIL NFR BLD AUTO: 0 % — SIGNIFICANT CHANGE UP (ref 0–6)
ERYTHROCYTE [SEDIMENTATION RATE] IN BLOOD: 53 MM/HR — HIGH (ref 0–15)
FLUID INTAKE SUBSTANCE CLASS: SIGNIFICANT CHANGE UP
GLUCOSE SERPL-MCNC: 145 MG/DL — HIGH (ref 70–99)
HCT VFR BLD CALC: 19.2 % — CRITICAL LOW (ref 39–50)
HCT VFR BLD CALC: 22.8 % — LOW (ref 39–50)
HGB BLD-MCNC: 6.7 G/DL — CRITICAL LOW (ref 13–17)
HGB BLD-MCNC: 7.8 G/DL — LOW (ref 13–17)
IMM GRANULOCYTES # BLD AUTO: 0.06 K/UL — SIGNIFICANT CHANGE UP (ref 0–0.07)
IMM GRANULOCYTES # BLD AUTO: 0.07 K/UL — SIGNIFICANT CHANGE UP (ref 0–0.07)
IMM GRANULOCYTES NFR BLD AUTO: 1.6 % — HIGH (ref 0–0.9)
IMM GRANULOCYTES NFR BLD AUTO: 2.1 % — HIGH (ref 0–0.9)
IMMATURE PLATELET FRACTION #: 0.9 K/UL — LOW (ref 3.9–12.5)
IMMATURE PLATELET FRACTION #: 1 K/UL — LOW (ref 3.9–12.5)
IMMATURE PLATELET FRACTION %: 3 % — SIGNIFICANT CHANGE UP (ref 1.6–7.1)
IMMATURE PLATELET FRACTION %: 8.3 % — HIGH (ref 1.6–7.1)
INR BLD: 1.24 RATIO — HIGH (ref 0.85–1.16)
LDH SERPL L TO P-CCNC: 294 U/L — HIGH (ref 50–242)
LYMPHOCYTES # BLD AUTO: 0.47 K/UL — LOW (ref 1–3.3)
LYMPHOCYTES # BLD AUTO: 0.52 K/UL — LOW (ref 1–3.3)
LYMPHOCYTES # FLD: 4 % — SIGNIFICANT CHANGE UP
LYMPHOCYTES NFR BLD AUTO: 12.6 % — LOW (ref 13–44)
LYMPHOCYTES NFR BLD AUTO: 15.9 % — SIGNIFICANT CHANGE UP (ref 13–44)
MAGNESIUM SERPL-MCNC: 1.5 MG/DL — LOW (ref 1.6–2.6)
MCHC RBC-ENTMCNC: 29.5 PG — SIGNIFICANT CHANGE UP (ref 27–34)
MCHC RBC-ENTMCNC: 29.8 PG — SIGNIFICANT CHANGE UP (ref 27–34)
MCHC RBC-ENTMCNC: 34.2 G/DL — SIGNIFICANT CHANGE UP (ref 32–36)
MCHC RBC-ENTMCNC: 34.9 G/DL — SIGNIFICANT CHANGE UP (ref 32–36)
MCV RBC AUTO: 85.3 FL — SIGNIFICANT CHANGE UP (ref 80–100)
MCV RBC AUTO: 86.4 FL — SIGNIFICANT CHANGE UP (ref 80–100)
MONOCYTES # BLD AUTO: 0.55 K/UL — SIGNIFICANT CHANGE UP (ref 0–0.9)
MONOCYTES # BLD AUTO: 0.56 K/UL — SIGNIFICANT CHANGE UP (ref 0–0.9)
MONOCYTES NFR BLD AUTO: 15 % — HIGH (ref 2–14)
MONOCYTES NFR BLD AUTO: 16.8 % — HIGH (ref 2–14)
MONOS+MACROS # FLD: 3 % — SIGNIFICANT CHANGE UP
NEUTROPHILS # BLD AUTO: 2.12 K/UL — SIGNIFICANT CHANGE UP (ref 1.8–7.4)
NEUTROPHILS # BLD AUTO: 2.62 K/UL — SIGNIFICANT CHANGE UP (ref 1.8–7.4)
NEUTROPHILS NFR BLD AUTO: 64.9 % — SIGNIFICANT CHANGE UP (ref 43–77)
NEUTROPHILS NFR BLD AUTO: 70.3 % — SIGNIFICANT CHANGE UP (ref 43–77)
NEUTROPHILS-BODY FLUID: 93 % — SIGNIFICANT CHANGE UP
NRBC # BLD AUTO: 0 K/UL — SIGNIFICANT CHANGE UP (ref 0–0)
NRBC # BLD AUTO: 0 K/UL — SIGNIFICANT CHANGE UP (ref 0–0)
NRBC # FLD: 0 K/UL — SIGNIFICANT CHANGE UP (ref 0–0)
NRBC # FLD: 0 K/UL — SIGNIFICANT CHANGE UP (ref 0–0)
NRBC BLD AUTO-RTO: 0 /100 WBCS — SIGNIFICANT CHANGE UP (ref 0–0)
NRBC BLD AUTO-RTO: 0 /100 WBCS — SIGNIFICANT CHANGE UP (ref 0–0)
PHOSPHATE SERPL-MCNC: 4 MG/DL — SIGNIFICANT CHANGE UP (ref 2.5–4.5)
PLATELET # BLD AUTO: 11 K/UL — CRITICAL LOW (ref 150–400)
PLATELET # BLD AUTO: 32 K/UL — LOW (ref 150–400)
PMV BLD: 10.2 FL — SIGNIFICANT CHANGE UP (ref 7–13)
PMV BLD: SIGNIFICANT CHANGE UP FL (ref 7–13)
POTASSIUM SERPL-MCNC: 3.6 MMOL/L — SIGNIFICANT CHANGE UP (ref 3.5–5.3)
POTASSIUM SERPL-SCNC: 3.6 MMOL/L — SIGNIFICANT CHANGE UP (ref 3.5–5.3)
PROT SERPL-MCNC: 5.8 G/DL — LOW (ref 6–8.3)
PROTHROM AB SERPL-ACNC: 14.2 SEC — HIGH (ref 9.9–13.4)
RBC # BLD: 2.25 M/UL — LOW (ref 4.2–5.8)
RBC # BLD: 2.64 M/UL — LOW (ref 4.2–5.8)
RBC # FLD: 14.7 % — HIGH (ref 10.3–14.5)
RBC # FLD: 15 % — HIGH (ref 10.3–14.5)
RCV VOL RI: 1800 CELLS/UL — SIGNIFICANT CHANGE UP
SODIUM SERPL-SCNC: 135 MMOL/L — SIGNIFICANT CHANGE UP (ref 135–145)
SPECIMEN SOURCE FLD: SIGNIFICANT CHANGE UP
SYNOVIAL CRYSTALS CLARITY: ABNORMAL
SYNOVIAL CRYSTALS COLOR: YELLOW
SYNOVIAL CRYSTALS ID: ABNORMAL
SYNOVIAL CRYSTALS TUBE: SIGNIFICANT CHANGE UP
TOTAL NUCLEATED CELL COUNT, BODY FLUID: 8350 CELLS/UL — SIGNIFICANT CHANGE UP
TROPONIN T, HIGH SENSITIVITY RESULT: 121 NG/L — HIGH (ref 0–51)
TUBE TYPE: SIGNIFICANT CHANGE UP
URATE SERPL-MCNC: 3.2 MG/DL — LOW (ref 3.4–8.8)
WBC # BLD: 3.27 K/UL — LOW (ref 3.8–10.5)
WBC # BLD: 3.73 K/UL — LOW (ref 3.8–10.5)
WBC # FLD AUTO: 3.27 K/UL — LOW (ref 3.8–10.5)
WBC # FLD AUTO: 3.73 K/UL — LOW (ref 3.8–10.5)

## 2025-07-15 PROCEDURE — 86140 C-REACTIVE PROTEIN: CPT

## 2025-07-15 PROCEDURE — 84132 ASSAY OF SERUM POTASSIUM: CPT

## 2025-07-15 PROCEDURE — 84295 ASSAY OF SERUM SODIUM: CPT

## 2025-07-15 PROCEDURE — 85730 THROMBOPLASTIN TIME PARTIAL: CPT

## 2025-07-15 PROCEDURE — 73090 X-RAY EXAM OF FOREARM: CPT

## 2025-07-15 PROCEDURE — 85018 HEMOGLOBIN: CPT

## 2025-07-15 PROCEDURE — 86850 RBC ANTIBODY SCREEN: CPT

## 2025-07-15 PROCEDURE — 85652 RBC SED RATE AUTOMATED: CPT

## 2025-07-15 PROCEDURE — 87086 URINE CULTURE/COLONY COUNT: CPT

## 2025-07-15 PROCEDURE — 73080 X-RAY EXAM OF ELBOW: CPT

## 2025-07-15 PROCEDURE — 84484 ASSAY OF TROPONIN QUANT: CPT

## 2025-07-15 PROCEDURE — 82330 ASSAY OF CALCIUM: CPT

## 2025-07-15 PROCEDURE — 89051 BODY FLUID CELL COUNT: CPT

## 2025-07-15 PROCEDURE — 86900 BLOOD TYPING SEROLOGIC ABO: CPT

## 2025-07-15 PROCEDURE — 82803 BLOOD GASES ANY COMBINATION: CPT

## 2025-07-15 PROCEDURE — 87637 SARSCOV2&INF A&B&RSV AMP PRB: CPT

## 2025-07-15 PROCEDURE — 82947 ASSAY GLUCOSE BLOOD QUANT: CPT

## 2025-07-15 PROCEDURE — 82550 ASSAY OF CK (CPK): CPT

## 2025-07-15 PROCEDURE — 86901 BLOOD TYPING SEROLOGIC RH(D): CPT

## 2025-07-15 PROCEDURE — 93005 ELECTROCARDIOGRAM TRACING: CPT

## 2025-07-15 PROCEDURE — 99233 SBSQ HOSP IP/OBS HIGH 50: CPT

## 2025-07-15 PROCEDURE — 85610 PROTHROMBIN TIME: CPT

## 2025-07-15 PROCEDURE — 80053 COMPREHEN METABOLIC PANEL: CPT

## 2025-07-15 PROCEDURE — 87040 BLOOD CULTURE FOR BACTERIA: CPT

## 2025-07-15 PROCEDURE — 85025 COMPLETE CBC W/AUTO DIFF WBC: CPT

## 2025-07-15 PROCEDURE — 89060 EXAM SYNOVIAL FLUID CRYSTALS: CPT

## 2025-07-15 PROCEDURE — 83735 ASSAY OF MAGNESIUM: CPT

## 2025-07-15 PROCEDURE — 73120 X-RAY EXAM OF HAND: CPT

## 2025-07-15 PROCEDURE — 71045 X-RAY EXAM CHEST 1 VIEW: CPT

## 2025-07-15 PROCEDURE — 85014 HEMATOCRIT: CPT

## 2025-07-15 PROCEDURE — 73110 X-RAY EXAM OF WRIST: CPT

## 2025-07-15 PROCEDURE — 86923 COMPATIBILITY TEST ELECTRIC: CPT

## 2025-07-15 PROCEDURE — 83615 LACTATE (LD) (LDH) ENZYME: CPT

## 2025-07-15 PROCEDURE — 73630 X-RAY EXAM OF FOOT: CPT

## 2025-07-15 PROCEDURE — 84550 ASSAY OF BLOOD/URIC ACID: CPT

## 2025-07-15 PROCEDURE — 82435 ASSAY OF BLOOD CHLORIDE: CPT

## 2025-07-15 PROCEDURE — 73610 X-RAY EXAM OF ANKLE: CPT

## 2025-07-15 PROCEDURE — 82553 CREATINE MB FRACTION: CPT

## 2025-07-15 PROCEDURE — 84100 ASSAY OF PHOSPHORUS: CPT

## 2025-07-15 PROCEDURE — 83605 ASSAY OF LACTIC ACID: CPT

## 2025-07-15 RX ORDER — HYDROMORPHONE/SOD CHLOR,ISO/PF 2 MG/10 ML
0.5 SYRINGE (ML) INJECTION
Refills: 0 | Status: DISCONTINUED | OUTPATIENT
Start: 2025-07-15 | End: 2025-07-21

## 2025-07-15 RX ORDER — ACETAMINOPHEN 500 MG/5ML
1000 LIQUID (ML) ORAL ONCE
Refills: 0 | Status: COMPLETED | OUTPATIENT
Start: 2025-07-15 | End: 2025-07-15

## 2025-07-15 RX ORDER — COLCHICINE 0.6 MG/1
0.6 TABLET, FILM COATED ORAL DAILY
Refills: 0 | Status: DISCONTINUED | OUTPATIENT
Start: 2025-07-15 | End: 2025-07-30

## 2025-07-15 RX ORDER — HYDROMORPHONE/SOD CHLOR,ISO/PF 2 MG/10 ML
0.2 SYRINGE (ML) INJECTION ONCE
Refills: 0 | Status: DISCONTINUED | OUTPATIENT
Start: 2025-07-15 | End: 2025-07-15

## 2025-07-15 RX ORDER — LOPERAMIDE HCL 1 MG/7.5ML
2 SOLUTION ORAL EVERY 6 HOURS
Refills: 0 | Status: DISCONTINUED | OUTPATIENT
Start: 2025-07-15 | End: 2025-07-19

## 2025-07-15 RX ORDER — MAGNESIUM SULFATE 500 MG/ML
2 SYRINGE (ML) INJECTION ONCE
Refills: 0 | Status: COMPLETED | OUTPATIENT
Start: 2025-07-15 | End: 2025-07-15

## 2025-07-15 RX ORDER — ACETAMINOPHEN 500 MG/5ML
650 LIQUID (ML) ORAL ONCE
Refills: 0 | Status: COMPLETED | OUTPATIENT
Start: 2025-07-15 | End: 2025-07-15

## 2025-07-15 RX ORDER — ONDANSETRON HCL/PF 4 MG/2 ML
4 VIAL (ML) INJECTION ONCE
Refills: 0 | Status: COMPLETED | OUTPATIENT
Start: 2025-07-15 | End: 2025-07-15

## 2025-07-15 RX ORDER — HYDROMORPHONE/SOD CHLOR,ISO/PF 2 MG/10 ML
0.5 SYRINGE (ML) INJECTION ONCE
Refills: 0 | Status: DISCONTINUED | OUTPATIENT
Start: 2025-07-15 | End: 2025-07-15

## 2025-07-15 RX ORDER — OXYCODONE HYDROCHLORIDE 30 MG/1
2.5 TABLET ORAL ONCE
Refills: 0 | Status: DISCONTINUED | OUTPATIENT
Start: 2025-07-15 | End: 2025-07-15

## 2025-07-15 RX ORDER — HYDROMORPHONE/SOD CHLOR,ISO/PF 2 MG/10 ML
2 SYRINGE (ML) INJECTION EVERY 4 HOURS
Refills: 0 | Status: DISCONTINUED | OUTPATIENT
Start: 2025-07-15 | End: 2025-07-22

## 2025-07-15 RX ADMIN — Medication 300 MILLIGRAM(S): at 05:22

## 2025-07-15 RX ADMIN — COLCHICINE 0.6 MILLIGRAM(S): 0.6 TABLET, FILM COATED ORAL at 16:17

## 2025-07-15 RX ADMIN — Medication 1000 MILLIGRAM(S): at 12:45

## 2025-07-15 RX ADMIN — Medication 650 MILLIGRAM(S): at 23:04

## 2025-07-15 RX ADMIN — Medication 40 MILLIGRAM(S): at 05:23

## 2025-07-15 RX ADMIN — Medication 4 MILLIGRAM(S): at 09:33

## 2025-07-15 RX ADMIN — Medication 0.5 MILLIGRAM(S): at 23:00

## 2025-07-15 RX ADMIN — Medication 650 MILLIGRAM(S): at 00:17

## 2025-07-15 RX ADMIN — TAMSULOSIN HYDROCHLORIDE 0.4 MILLIGRAM(S): 0.4 CAPSULE ORAL at 22:07

## 2025-07-15 RX ADMIN — Medication 25 GRAM(S): at 11:44

## 2025-07-15 RX ADMIN — Medication 0.5 MILLIGRAM(S): at 22:22

## 2025-07-15 RX ADMIN — Medication 0.5 MILLIGRAM(S): at 16:24

## 2025-07-15 RX ADMIN — Medication 400 MILLIGRAM(S): at 12:30

## 2025-07-15 RX ADMIN — Medication 0.2 MILLIGRAM(S): at 18:10

## 2025-07-15 RX ADMIN — Medication 4 MILLIGRAM(S): at 08:33

## 2025-07-15 RX ADMIN — Medication 500 MILLIGRAM(S): at 05:22

## 2025-07-15 RX ADMIN — Medication 650 MILLIGRAM(S): at 23:29

## 2025-07-15 RX ADMIN — CEFEPIME 100 MILLIGRAM(S): 2 INJECTION, POWDER, FOR SOLUTION INTRAVENOUS at 16:18

## 2025-07-15 RX ADMIN — Medication 300 MILLIGRAM(S): at 17:59

## 2025-07-15 RX ADMIN — Medication 0.5 MILLIGRAM(S): at 12:29

## 2025-07-15 RX ADMIN — OXYCODONE HYDROCHLORIDE 2.5 MILLIGRAM(S): 30 TABLET ORAL at 05:50

## 2025-07-15 RX ADMIN — CEFEPIME 100 MILLIGRAM(S): 2 INJECTION, POWDER, FOR SOLUTION INTRAVENOUS at 22:08

## 2025-07-15 RX ADMIN — Medication 650 MILLIGRAM(S): at 01:17

## 2025-07-15 RX ADMIN — Medication 500 MILLIGRAM(S): at 17:59

## 2025-07-15 RX ADMIN — Medication 4 MILLIGRAM(S): at 19:25

## 2025-07-15 RX ADMIN — Medication 1000 MILLIGRAM(S): at 03:18

## 2025-07-15 RX ADMIN — OXYCODONE HYDROCHLORIDE 2.5 MILLIGRAM(S): 30 TABLET ORAL at 06:50

## 2025-07-15 RX ADMIN — Medication 0.5 MILLIGRAM(S): at 12:50

## 2025-07-15 RX ADMIN — POSACONAZOLE 300 MILLIGRAM(S): 100 TABLET, DELAYED RELEASE ORAL at 11:46

## 2025-07-15 RX ADMIN — Medication 400 MILLIGRAM(S): at 02:18

## 2025-07-15 RX ADMIN — Medication 300 MILLIGRAM(S): at 11:46

## 2025-07-15 RX ADMIN — Medication 0.2 MILLIGRAM(S): at 17:55

## 2025-07-15 RX ADMIN — Medication 0.5 MILLIGRAM(S): at 16:40

## 2025-07-15 RX ADMIN — CEFEPIME 100 MILLIGRAM(S): 2 INJECTION, POWDER, FOR SOLUTION INTRAVENOUS at 05:22

## 2025-07-15 NOTE — CHART NOTE - NSCHARTNOTEFT_GEN_A_CORE
Inpatient Diabetes Management Service: Daily Progress Note     HPI: Divina Delgadillo is a 40 year old with Type 2 Diabetes Mellitus and complicated by CKD stage 3,  as well as a comorbid history of asthma, bipolar disorder, Chiari malformation type I, history of deep vein thrombosis in the left arm in 2017, depression, hypertension, obesity, nonalcoholic fatty liver disease, and schizoaffective disorder, presented with right leg pain following a syncopal episode at home. Pertinent past surgical history includes pancreatic cancer treated with distal pancreatectomy, splenectomy and left adrenalectomy 2015. Also has a history of left ankle avascular necrosis and left femur fracture treated with IMN. S/p Open reduction internal fixation of right tibia fracture with intramedullary ulysses fixation and  Open reduction internal fixation of right fibular fracture with distal fibula plate and screw construct on evening of 7/13/25.   Inpatient Diabetes Service consulted for Insulin pump management , post-op, after receiving dexamethasone 2 mg.         Assessment/Plan:     Assessment:   Type 2 Diabetes Mellitus, complicated by nephropathy, peripheral neuropathy. Not in target  (A1c 8.3 %, 6/18/2025)  Steroid hyperglycemia/stress hyperglycemia  Insulin pump use    Plan/Recommendations:   -Stop Lantus 30 units q 24 hrs at 14:30   -Ordered Novolog vial and asked RN to get from pharm  - Increase this am and then Stop Novolog Meal Coverage: 1 units per 5.5 g CHO, TID AC and PRN with snacks/supplements, stop at 14:30 ( increased from 1 unit to 7g of CHO)  -Stop at 14:30 Novolog Correction Scale: high resistance (ISF: 25)  TID AC / HS / 0200   - BG monitoring: TID AC, HS, 0200 / and PRN  - Hypoglycemia protocol    - Carb counting protocol   -restart pump between 1:30 pm and 2 pm  Medtronic 780 G-- using Novolog  Runs in Simplify almost 100% of the time--> settings from review of device at bedside 7/14/25 and  Chart reviewed. Over the past 24 hours, patient required 1mg IV dilaudid x4.     Assessment:   69M with HTN, HLD, Gout, GERD, BPH, spinal stenosis (s/p multiple procedures 2018, 2020 laminectomies / fusions), hip surgeries (Oct 2024, Jan 2025), residual hand weakness related to surgeries, umbilical hernia repair (2014/15) referred here from Dr Atkinson at [facility] in [place] for new ASXL1-mutated and YHY57-aszaqro AML, admitted for induction chemo with dauno 60 mg/m2+ cytarabine. Hospital course c/b neutropenic fevers, pancytopenia and mucositis pain, Geriatrics and Palliative Medicine Team is consulted for assistance with pain control    Plan:   > Continue IV Dilaudid 0.5 mg TID prior to meals  > continue IV Dilaudid to 0.7 mg q3 hours prn for moderate pain. Hold for oversedation, SBP< 90 lethargy and RR less than 12  > Continue IV Dilaudid to 1 mg q3 hours prn for severe pain.  Hold for oversedation, SBP< 90 lethargy and RR less than 12  > bowel regimen while on opioids   > narcan prn     In the event of newly developing, evolving, or worsening symptoms, please contact the Palliative Medicine team via pager (if the patient is at [facility] #[phone] or if the patient is at [facility] #[phone]) The Geriatric and Palliative Medicine service has coverage 24 hours a day/ 7 days a week to provide medical recommendations regarding symptom management needs via telephone. "7/15/2025  ICR: 7.2  from 00:00 to 23:59  ISF: 25  from 00:00 to 23:59  BG target 100-140  Active insulin : 2 hour  \"Basal 1\": 1.5 units/h (36 units/24h) from 00:00 to 23:59  Discussion:   Pump removed and received lantus at 14:47 yesterday.  Since that time BG elevated 240-344.  Trended down to 206 this am. Dex 2 mg given on 7/13/2025 at 15:06.  She has supplies today so will put paump back on as instructed above.  Called guardian and guardian says she can management pump.  She can fill it and put it back on.Steroid may be hanging on a little bit longer due to elevated creat.    Discharge Planning: (tentative)  Medications: Novolog via hybrid closed loop insulin pump  Test Claims:  none needed.   Education:  Needs to be assessed closer to discharge.    Outpatient Follow-up:  recommend [facility] Shital Roberts 9/2/2025 and may benefit from interval visit with Dolly Riley-- would need to be scheduled       TENTATIVE Diabetes Management Discharge Plan  Instructions to patient were posted in AVS and discussed on day of discharge.   Medications and supplies are to be ordered by primary service on discharge.   *please use the DIAB non-branded discharge supply order set ([id number])*    Patient will need the following supplies prescribed:       Blood glucose monitoring: Three times daily before meals, and at bedtime.  Blood Glucose (BG) goals:  mg/dL before meals, less than 180 mg/dL 2 hrs after meals.     Glucose Control Regimen:  Medtronic 780 G-- using Novolog  Run in [employer]   ICR: 7.2  from 00:00 to 23:59  ISF: 25  from 00:00 to 23:59  BG target 100-140  Active insulin : 2 hour  \"Basal 1\": 1.5 units/h (36 units/24h) from 00:00 to 23:59    Outpatient Diabetes Follow-Up:   Follow up with your Primary Care Provider (PCP) in 1-2 weeks, bring glucose data to your appointment.      Follow up sooner if blood glucose runs consistently greater than 200 mg/dL or if having more than two episodes less than 70 " mg/dL.     If you have urgent questions or concerns regarding your blood sugars or insulin, you may contact [phone] (the main hospital ). Ask to speak with the Endocrinologist on call.             Thank you for letting the Diabetes Management Team be involved in your care!      Hypoglycemia (Low Blood Glucose) Management:  Signs/symptoms:  Shaking, sweating, fast heartbeat  Feeling dizzy, tired, or weak   Feeling anxious and easy to irritate  Feeling nervous, crabby, or confused  Hunger  Vision problems, headache  Numb or tingling mouth    If blood glucose is 51 to 70:   Eat or drink 15 grams of carbohydrate. Examples:   1/2 cup (4 ounces) apple juice or regular soda pop, or   1 cup (8 ounces) milk, or   15 skittles, or   3 to 4 glucose tablets.   Re-check your blood glucose in 15 minutes.   Repeat these steps every 15 minutes until your blood glucose is above 80.       If blood glucose is 50 or less:   Eat or drink 30 grams of carbohydrate. Examples:   1 cup (8 ounces) apple juice or regular soda pop, or   2 cups (16 ounces) milk, or   1 banana, or   6 to 8 glucose tablets.   Re-check your blood glucose in 15 minutes.   Repeat these steps every 15 minutes until your blood glucose is above 80.            Please notify Inpatient Diabetes Service if changes are planned to steroids, nutrition, TPN/TF and anticipated procedures requiring prolonged NPO status.         Interval History/Review of Systems :   The last 24 hours progress and nursing notes reviewed.  Having pain in leg from surgery. No n,v,d.  No labs today, creat=1.73 last on 7/13/2025  P  lanned Procedures/Surgeries: none    Inpatient Glucose Control:       Recent Labs   Lab 07/15/25  0203 07/14/25  2214 07/14/25  2136 07/14/25  1751 07/14/25  0807 07/14/25  0141   * 344* 350* 240* 262* 300*             Medications Impacting Glycemia:   Steroids: dexamethasone 2 mg evening 7/13  D5W containing solutions/medications: no  Other medications  "impacting glucose: aripiprazole, quetiapine         Nutrition:   Orders Placed This Encounter      Regular Diet Adult      Diet    Supplements: none   TF: none   TPN: none         Diabetes History: see full consult note for complete diabetes history   Diabetes Type and Duration: 2, dx 2011     PTA Medication Regimen: Medtronic 780 G-- using Novolog  Runs in Smartguard almost 100% of the time--> settings from review of device at bedside 7/14  ICR: 7.2  ISF: 25  BG target 100-140  Active insulin : 2 hour  \"Basal 1\": 1.5 units/h (36 units/24h)     Historical Diabetes Medications: metformin-- stopped d/t liver and kidney dsyfunction.  GLP-1-- stopped/excluded d/t pancreatitis     Glucose monitoring devices: Guardian 4  Hypoglycemia PTA:   - Frequency: rare  - Severity:  no history of severe unconscious lows   - Awareness:  intact  , starts with symptoms around 70 mg/dL  - Treatment: oral carbs      Outpatient Diabetes Provider: Shital Roberts,   Formal Diabetes Education/Educator: Rose Riley         Physical Exam:   BP (!) 159/67 (BP Location: Left arm)   Pulse 81   Temp 98.4  F (36.9  C) (Oral)   Resp 18   LMP  (LMP Unknown)   SpO2 (!) 90%   General:  well appearing, in no acute distress.  HEENT:  NC/AT. MMM, sclera not injected  Lungs:  unremarkable, no work of breathing on room air  ABD:  rounded  Skin:  warm and dry, no obvious lesions  MSK:   moving all extremities    Mental Status:  Alert and oriented  Psych:   Cooperative, good eye contact, full/appropriate affect           Data:     Lab Results   Component Value Date    A1C 8.3 (H) 06/18/2025    A1C 7.9 (H) 03/10/2025    A1C 7.7 (H) 02/28/2025    A1C 8.1 (H) 10/29/2024    A1C 7.8 (H) 07/30/2024    A1C 7.4 (H) 07/08/2021    A1C 8.0 (H) 03/26/2021    A1C 9.0 (H) 08/14/2020    A1C 7.8 (H) 05/18/2020    A1C 7.4 (H) 02/17/2020       ROUTINE IP LABS (Last four results)  BMP  Recent Labs   Lab 07/15/25  0203 07/14/25 2214 07/14/25 2136 07/14/25  1751 " 07/13/25  1138 07/13/25  0506 07/12/25  1946 07/12/25  1938   NA  --   --   --   --   --  140  --  141   POTASSIUM  --   --   --   --   --  4.3  --  4.9   CHLORIDE  --   --   --   --   --  104  --  104   CLAY  --   --   --   --   --  9.0  --  9.3   CO2  --   --   --   --   --  25  --  25   BUN  --   --   --   --   --  28.9*  --  31.0*   CR  --   --   --   --   --  1.73*  --  1.86*   * 344* 350* 240*   < > 94   < > 83    < > = values in this interval not displayed.     CBC  Recent Labs   Lab 07/14/25  1526 07/14/25  0605 07/13/25  0506 07/12/25  1938   WBC 17.1* 16.0* 14.8* 14.6*   RBC  --   --  4.24 4.09   HGB  --  9.9* 11.2* 10.8*   HCT  --   --  36.8 36.1   MCV 90 90  89 87 88   MCH  --   --  26.4* 26.4*   MCHC  --   --  30.4* 29.9*   RDW  --   --  17.1* 16.8*   PLT  --   --  413 441     INR  Recent Labs   Lab 07/13/25  0506   INR 1.02       Inpatient Diabetes Service will continue to follow, please don't hesitate to contact the team with any questions or concerns.     Vandana Serna PA-C  Inpatient Diabetes Service  [phone]  Available by Payteller  Date of Service: 7/15/2025      Plan discussed with patient, bedside RN in person, and primary team via this note.    To contact Inpatient Diabetes Service:     7 AM - 5 PM: Page the [employer] DEEPTI following the patient that day (see filed or incomplete progress notes/consult notes under Endocrinology)    OR if uncertain of provider assignment: page job code [phone]    5 PM - 7 AM: First call after hours is to primary service.    For urgent after-hours questions, page job code for on call fellow: [phone]     I spent a total of 50 minutes on the date of the encounter doing prep/post-work, chart review, history and exam, documentation and further activities per the note including lab review, multidisciplinary communication, counseling the patient and/or coordinating care regarding acute hyper/hypoglycemic management, as well as discharge management and  planning/communication.

## 2025-07-16 ENCOUNTER — APPOINTMENT (OUTPATIENT)
Dept: HEMATOLOGY ONCOLOGY | Facility: CLINIC | Age: 70
End: 2025-07-16

## 2025-07-16 ENCOUNTER — TRANSCRIPTION ENCOUNTER (OUTPATIENT)
Age: 70
End: 2025-07-16

## 2025-07-16 ENCOUNTER — APPOINTMENT (OUTPATIENT)
Dept: INFUSION THERAPY | Facility: HOSPITAL | Age: 70
End: 2025-07-16

## 2025-07-16 LAB
ALBUMIN SERPL ELPH-MCNC: 3.2 G/DL — LOW (ref 3.3–5)
ALP SERPL-CCNC: 166 U/L — HIGH (ref 40–120)
ALT FLD-CCNC: 67 U/L — HIGH (ref 10–45)
ANION GAP SERPL CALC-SCNC: 14 MMOL/L — SIGNIFICANT CHANGE UP (ref 5–17)
AST SERPL-CCNC: 37 U/L — SIGNIFICANT CHANGE UP (ref 10–40)
BASOPHILS # BLD AUTO: 0.01 K/UL — SIGNIFICANT CHANGE UP (ref 0–0.2)
BASOPHILS NFR BLD AUTO: 0.3 % — SIGNIFICANT CHANGE UP (ref 0–2)
BILIRUB SERPL-MCNC: 0.8 MG/DL — SIGNIFICANT CHANGE UP (ref 0.2–1.2)
BUN SERPL-MCNC: 11 MG/DL — SIGNIFICANT CHANGE UP (ref 7–23)
CALCIUM SERPL-MCNC: 8.5 MG/DL — SIGNIFICANT CHANGE UP (ref 8.4–10.5)
CHLORIDE SERPL-SCNC: 96 MMOL/L — SIGNIFICANT CHANGE UP (ref 96–108)
CO2 SERPL-SCNC: 21 MMOL/L — LOW (ref 22–31)
CREAT SERPL-MCNC: 0.85 MG/DL — SIGNIFICANT CHANGE UP (ref 0.5–1.3)
CULTURE RESULTS: NO GROWTH — SIGNIFICANT CHANGE UP
EGFR: 94 ML/MIN/1.73M2 — SIGNIFICANT CHANGE UP
EGFR: 94 ML/MIN/1.73M2 — SIGNIFICANT CHANGE UP
EOSINOPHIL # BLD AUTO: 0.01 K/UL — SIGNIFICANT CHANGE UP (ref 0–0.5)
EOSINOPHIL NFR BLD AUTO: 0.3 % — SIGNIFICANT CHANGE UP (ref 0–6)
GLUCOSE SERPL-MCNC: 98 MG/DL — SIGNIFICANT CHANGE UP (ref 70–99)
GRAM STN FLD: SIGNIFICANT CHANGE UP
HCT VFR BLD CALC: 22.4 % — LOW (ref 39–50)
HGB BLD-MCNC: 7.9 G/DL — LOW (ref 13–17)
IMM GRANULOCYTES # BLD AUTO: 0.06 K/UL — SIGNIFICANT CHANGE UP (ref 0–0.07)
IMM GRANULOCYTES NFR BLD AUTO: 1.9 % — HIGH (ref 0–0.9)
IMMATURE PLATELET FRACTION #: 1.2 K/UL — LOW (ref 3.9–12.5)
IMMATURE PLATELET FRACTION %: 4.3 % — SIGNIFICANT CHANGE UP (ref 1.6–7.1)
INR BLD: 1.24 RATIO — HIGH (ref 0.85–1.16)
LDH SERPL L TO P-CCNC: 318 U/L — HIGH (ref 50–242)
LYMPHOCYTES # BLD AUTO: 0.74 K/UL — LOW (ref 1–3.3)
LYMPHOCYTES NFR BLD AUTO: 22.8 % — SIGNIFICANT CHANGE UP (ref 13–44)
MAGNESIUM SERPL-MCNC: 1.8 MG/DL — SIGNIFICANT CHANGE UP (ref 1.6–2.6)
MCHC RBC-ENTMCNC: 29.3 PG — SIGNIFICANT CHANGE UP (ref 27–34)
MCHC RBC-ENTMCNC: 35.3 G/DL — SIGNIFICANT CHANGE UP (ref 32–36)
MCV RBC AUTO: 83 FL — SIGNIFICANT CHANGE UP (ref 80–100)
MONOCYTES # BLD AUTO: 0.8 K/UL — SIGNIFICANT CHANGE UP (ref 0–0.9)
MONOCYTES NFR BLD AUTO: 24.7 % — HIGH (ref 2–14)
NEUTROPHILS # BLD AUTO: 1.62 K/UL — LOW (ref 1.8–7.4)
NEUTROPHILS NFR BLD AUTO: 50 % — SIGNIFICANT CHANGE UP (ref 43–77)
NRBC # BLD AUTO: 0 K/UL — SIGNIFICANT CHANGE UP (ref 0–0)
NRBC # FLD: 0 K/UL — SIGNIFICANT CHANGE UP (ref 0–0)
NRBC BLD AUTO-RTO: 0 /100 WBCS — SIGNIFICANT CHANGE UP (ref 0–0)
PHOSPHATE SERPL-MCNC: 3.4 MG/DL — SIGNIFICANT CHANGE UP (ref 2.5–4.5)
PLATELET # BLD AUTO: 27 K/UL — LOW (ref 150–400)
PMV BLD: 10.9 FL — SIGNIFICANT CHANGE UP (ref 7–13)
POTASSIUM SERPL-MCNC: 3.6 MMOL/L — SIGNIFICANT CHANGE UP (ref 3.5–5.3)
POTASSIUM SERPL-SCNC: 3.6 MMOL/L — SIGNIFICANT CHANGE UP (ref 3.5–5.3)
PROT SERPL-MCNC: 5.6 G/DL — LOW (ref 6–8.3)
PROTHROM AB SERPL-ACNC: 14.1 SEC — HIGH (ref 9.9–13.4)
RBC # BLD: 2.7 M/UL — LOW (ref 4.2–5.8)
RBC # FLD: 15.2 % — HIGH (ref 10.3–14.5)
SODIUM SERPL-SCNC: 131 MMOL/L — LOW (ref 135–145)
SPECIMEN SOURCE: SIGNIFICANT CHANGE UP
SPECIMEN SOURCE: SIGNIFICANT CHANGE UP
URATE SERPL-MCNC: 2.8 MG/DL — LOW (ref 3.4–8.8)
VANCOMYCIN TROUGH SERPL-MCNC: 10.8 UG/ML — SIGNIFICANT CHANGE UP (ref 10–20)
WBC # BLD: 3.24 K/UL — LOW (ref 3.8–10.5)
WBC # FLD AUTO: 3.24 K/UL — LOW (ref 3.8–10.5)

## 2025-07-16 PROCEDURE — 99233 SBSQ HOSP IP/OBS HIGH 50: CPT

## 2025-07-16 RX ORDER — ACETAMINOPHEN 500 MG/5ML
1000 LIQUID (ML) ORAL ONCE
Refills: 0 | Status: COMPLETED | OUTPATIENT
Start: 2025-07-16 | End: 2025-07-16

## 2025-07-16 RX ORDER — MAGNESIUM SULFATE 500 MG/ML
1 SYRINGE (ML) INJECTION ONCE
Refills: 0 | Status: COMPLETED | OUTPATIENT
Start: 2025-07-16 | End: 2025-07-16

## 2025-07-16 RX ADMIN — Medication 0.5 MILLIGRAM(S): at 21:49

## 2025-07-16 RX ADMIN — Medication 0.5 MILLIGRAM(S): at 15:10

## 2025-07-16 RX ADMIN — TAMSULOSIN HYDROCHLORIDE 0.4 MILLIGRAM(S): 0.4 CAPSULE ORAL at 20:53

## 2025-07-16 RX ADMIN — Medication 400 MILLIGRAM(S): at 16:28

## 2025-07-16 RX ADMIN — Medication 300 MILLIGRAM(S): at 11:30

## 2025-07-16 RX ADMIN — Medication 0.5 MILLIGRAM(S): at 02:47

## 2025-07-16 RX ADMIN — COLCHICINE 0.6 MILLIGRAM(S): 0.6 TABLET, FILM COATED ORAL at 11:43

## 2025-07-16 RX ADMIN — Medication 0.5 MILLIGRAM(S): at 08:01

## 2025-07-16 RX ADMIN — POSACONAZOLE 300 MILLIGRAM(S): 100 TABLET, DELAYED RELEASE ORAL at 11:43

## 2025-07-16 RX ADMIN — CEFEPIME 100 MILLIGRAM(S): 2 INJECTION, POWDER, FOR SOLUTION INTRAVENOUS at 20:53

## 2025-07-16 RX ADMIN — CEFEPIME 100 MILLIGRAM(S): 2 INJECTION, POWDER, FOR SOLUTION INTRAVENOUS at 14:19

## 2025-07-16 RX ADMIN — Medication 0.5 MILLIGRAM(S): at 09:49

## 2025-07-16 RX ADMIN — Medication 0.5 MILLIGRAM(S): at 12:44

## 2025-07-16 RX ADMIN — Medication 300 MILLIGRAM(S): at 11:43

## 2025-07-16 RX ADMIN — Medication 0.5 MILLIGRAM(S): at 06:33

## 2025-07-16 RX ADMIN — Medication 0.5 MILLIGRAM(S): at 08:48

## 2025-07-16 RX ADMIN — Medication 500 MILLIGRAM(S): at 06:34

## 2025-07-16 RX ADMIN — Medication 100 GRAM(S): at 18:19

## 2025-07-16 RX ADMIN — CEFEPIME 100 MILLIGRAM(S): 2 INJECTION, POWDER, FOR SOLUTION INTRAVENOUS at 06:34

## 2025-07-16 RX ADMIN — Medication 0.5 MILLIGRAM(S): at 16:10

## 2025-07-16 RX ADMIN — Medication 500 MILLIGRAM(S): at 18:19

## 2025-07-16 RX ADMIN — Medication 40 MILLIGRAM(S): at 06:35

## 2025-07-16 RX ADMIN — Medication 0.5 MILLIGRAM(S): at 11:44

## 2025-07-16 RX ADMIN — Medication 0.5 MILLIGRAM(S): at 20:53

## 2025-07-17 LAB
ALBUMIN SERPL ELPH-MCNC: 3.2 G/DL — LOW (ref 3.3–5)
ALP SERPL-CCNC: 162 U/L — HIGH (ref 40–120)
ALT FLD-CCNC: 68 U/L — HIGH (ref 10–45)
ANION GAP SERPL CALC-SCNC: 13 MMOL/L — SIGNIFICANT CHANGE UP (ref 5–17)
APTT BLD: 28 SEC — SIGNIFICANT CHANGE UP (ref 26.1–36.8)
AST SERPL-CCNC: 28 U/L — SIGNIFICANT CHANGE UP (ref 10–40)
BASOPHILS # BLD AUTO: 0.01 K/UL — SIGNIFICANT CHANGE UP (ref 0–0.2)
BASOPHILS # BLD MANUAL: 0 K/UL — SIGNIFICANT CHANGE UP (ref 0–0.2)
BASOPHILS NFR BLD AUTO: 0.3 % — SIGNIFICANT CHANGE UP (ref 0–2)
BASOPHILS NFR BLD MANUAL: 0 % — SIGNIFICANT CHANGE UP (ref 0–2)
BILIRUB SERPL-MCNC: 0.6 MG/DL — SIGNIFICANT CHANGE UP (ref 0.2–1.2)
BLD GP AB SCN SERPL QL: NEGATIVE — SIGNIFICANT CHANGE UP
BUN SERPL-MCNC: 11 MG/DL — SIGNIFICANT CHANGE UP (ref 7–23)
CALCIUM SERPL-MCNC: 8.9 MG/DL — SIGNIFICANT CHANGE UP (ref 8.4–10.5)
CHLORIDE SERPL-SCNC: 98 MMOL/L — SIGNIFICANT CHANGE UP (ref 96–108)
CO2 SERPL-SCNC: 23 MMOL/L — SIGNIFICANT CHANGE UP (ref 22–31)
CREAT SERPL-MCNC: 0.83 MG/DL — SIGNIFICANT CHANGE UP (ref 0.5–1.3)
EGFR: 95 ML/MIN/1.73M2 — SIGNIFICANT CHANGE UP
EGFR: 95 ML/MIN/1.73M2 — SIGNIFICANT CHANGE UP
ELLIPTOCYTES BLD QL SMEAR: SLIGHT — SIGNIFICANT CHANGE UP
EOSINOPHIL # BLD AUTO: 0 K/UL — SIGNIFICANT CHANGE UP (ref 0–0.5)
EOSINOPHIL # BLD MANUAL: 0 K/UL — SIGNIFICANT CHANGE UP (ref 0–0.5)
EOSINOPHIL NFR BLD AUTO: 0 % — SIGNIFICANT CHANGE UP (ref 0–6)
EOSINOPHIL NFR BLD MANUAL: 0 % — SIGNIFICANT CHANGE UP (ref 0–6)
GLUCOSE SERPL-MCNC: 150 MG/DL — HIGH (ref 70–99)
HCT VFR BLD CALC: 23.5 % — LOW (ref 39–50)
HGB BLD-MCNC: 8.1 G/DL — LOW (ref 13–17)
IMM GRANULOCYTES # BLD AUTO: 0.05 K/UL — SIGNIFICANT CHANGE UP (ref 0–0.07)
IMM GRANULOCYTES NFR BLD AUTO: 1.7 % — HIGH (ref 0–0.9)
IMMATURE PLATELET FRACTION #: 1.5 K/UL — LOW (ref 3.9–12.5)
IMMATURE PLATELET FRACTION %: 5.9 % — SIGNIFICANT CHANGE UP (ref 1.6–7.1)
INR BLD: 1.11 RATIO — SIGNIFICANT CHANGE UP (ref 0.85–1.16)
LDH SERPL L TO P-CCNC: 242 U/L — SIGNIFICANT CHANGE UP (ref 50–242)
LYMPHOCYTES # BLD AUTO: 0.67 K/UL — LOW (ref 1–3.3)
LYMPHOCYTES # BLD MANUAL: 0.66 K/UL — LOW (ref 1–3.3)
LYMPHOCYTES NFR BLD AUTO: 22.9 % — SIGNIFICANT CHANGE UP (ref 13–44)
LYMPHOCYTES NFR BLD MANUAL: 22.5 % — SIGNIFICANT CHANGE UP (ref 13–44)
MAGNESIUM SERPL-MCNC: 1.9 MG/DL — SIGNIFICANT CHANGE UP (ref 1.6–2.6)
MCHC RBC-ENTMCNC: 29.3 PG — SIGNIFICANT CHANGE UP (ref 27–34)
MCHC RBC-ENTMCNC: 34.5 G/DL — SIGNIFICANT CHANGE UP (ref 32–36)
MCV RBC AUTO: 85.1 FL — SIGNIFICANT CHANGE UP (ref 80–100)
MONOCYTES # BLD AUTO: 0.67 K/UL — SIGNIFICANT CHANGE UP (ref 0–0.9)
MONOCYTES # BLD MANUAL: 0.27 K/UL — SIGNIFICANT CHANGE UP (ref 0–0.9)
MONOCYTES NFR BLD AUTO: 22.9 % — HIGH (ref 2–14)
MONOCYTES NFR BLD MANUAL: 9.2 % — SIGNIFICANT CHANGE UP (ref 2–14)
NEUTROPHILS # BLD AUTO: 1.52 K/UL — LOW (ref 1.8–7.4)
NEUTROPHILS # BLD MANUAL: 1.99 K/UL — SIGNIFICANT CHANGE UP (ref 1.8–7.4)
NEUTROPHILS NFR BLD AUTO: 52.2 % — SIGNIFICANT CHANGE UP (ref 43–77)
NEUTROPHILS NFR BLD MANUAL: 68.3 % — SIGNIFICANT CHANGE UP (ref 43–77)
NRBC # BLD AUTO: 0 K/UL — SIGNIFICANT CHANGE UP (ref 0–0)
NRBC # FLD: 0 K/UL — SIGNIFICANT CHANGE UP (ref 0–0)
NRBC BLD AUTO-RTO: 0 /100 WBCS — SIGNIFICANT CHANGE UP (ref 0–0)
PHOSPHATE SERPL-MCNC: 2.6 MG/DL — SIGNIFICANT CHANGE UP (ref 2.5–4.5)
PLAT MORPH BLD: NORMAL — SIGNIFICANT CHANGE UP
PLATELET # BLD AUTO: 26 K/UL — LOW (ref 150–400)
PMV BLD: 12.1 FL — SIGNIFICANT CHANGE UP (ref 7–13)
POIKILOCYTOSIS BLD QL AUTO: SLIGHT — SIGNIFICANT CHANGE UP
POTASSIUM SERPL-MCNC: 3.3 MMOL/L — LOW (ref 3.5–5.3)
POTASSIUM SERPL-SCNC: 3.3 MMOL/L — LOW (ref 3.5–5.3)
PROT SERPL-MCNC: 5.7 G/DL — LOW (ref 6–8.3)
PROTHROM AB SERPL-ACNC: 12.8 SEC — SIGNIFICANT CHANGE UP (ref 9.9–13.4)
RBC # BLD: 2.76 M/UL — LOW (ref 4.2–5.8)
RBC # FLD: 15.4 % — HIGH (ref 10.3–14.5)
RBC BLD AUTO: ABNORMAL
RH IG SCN BLD-IMP: POSITIVE — SIGNIFICANT CHANGE UP
SODIUM SERPL-SCNC: 134 MMOL/L — LOW (ref 135–145)
URATE SERPL-MCNC: 3.1 MG/DL — LOW (ref 3.4–8.8)
WBC # BLD: 2.92 K/UL — LOW (ref 3.8–10.5)
WBC # FLD AUTO: 2.92 K/UL — LOW (ref 3.8–10.5)

## 2025-07-17 PROCEDURE — 99233 SBSQ HOSP IP/OBS HIGH 50: CPT

## 2025-07-17 PROCEDURE — 71045 X-RAY EXAM CHEST 1 VIEW: CPT | Mod: 26

## 2025-07-17 RX ADMIN — Medication 0.5 MILLIGRAM(S): at 16:06

## 2025-07-17 RX ADMIN — Medication 0.5 MILLIGRAM(S): at 22:05

## 2025-07-17 RX ADMIN — Medication 0.5 MILLIGRAM(S): at 02:30

## 2025-07-17 RX ADMIN — Medication 0.5 MILLIGRAM(S): at 14:07

## 2025-07-17 RX ADMIN — TAMSULOSIN HYDROCHLORIDE 0.4 MILLIGRAM(S): 0.4 CAPSULE ORAL at 21:28

## 2025-07-17 RX ADMIN — Medication 0.5 MILLIGRAM(S): at 00:05

## 2025-07-17 RX ADMIN — Medication 500 MILLIGRAM(S): at 05:01

## 2025-07-17 RX ADMIN — Medication 300 MILLIGRAM(S): at 12:39

## 2025-07-17 RX ADMIN — Medication 0.5 MILLIGRAM(S): at 13:30

## 2025-07-17 RX ADMIN — Medication 300 MILLIGRAM(S): at 00:05

## 2025-07-17 RX ADMIN — Medication 500 MILLIGRAM(S): at 17:28

## 2025-07-17 RX ADMIN — Medication 40 MILLIGRAM(S): at 05:00

## 2025-07-17 RX ADMIN — CEFEPIME 100 MILLIGRAM(S): 2 INJECTION, POWDER, FOR SOLUTION INTRAVENOUS at 05:00

## 2025-07-17 RX ADMIN — Medication 300 MILLIGRAM(S): at 11:30

## 2025-07-17 RX ADMIN — Medication 0.5 MILLIGRAM(S): at 03:35

## 2025-07-17 RX ADMIN — CEFEPIME 100 MILLIGRAM(S): 2 INJECTION, POWDER, FOR SOLUTION INTRAVENOUS at 14:04

## 2025-07-17 RX ADMIN — Medication 0.5 MILLIGRAM(S): at 01:05

## 2025-07-17 RX ADMIN — Medication 0.5 MILLIGRAM(S): at 09:23

## 2025-07-17 RX ADMIN — Medication 0.5 MILLIGRAM(S): at 06:16

## 2025-07-17 RX ADMIN — COLCHICINE 0.6 MILLIGRAM(S): 0.6 TABLET, FILM COATED ORAL at 12:39

## 2025-07-17 RX ADMIN — Medication 1 APPLICATION(S): at 21:32

## 2025-07-17 RX ADMIN — Medication 0.5 MILLIGRAM(S): at 16:40

## 2025-07-17 RX ADMIN — POSACONAZOLE 300 MILLIGRAM(S): 100 TABLET, DELAYED RELEASE ORAL at 12:39

## 2025-07-17 RX ADMIN — Medication 0.5 MILLIGRAM(S): at 07:15

## 2025-07-17 RX ADMIN — Medication 40 MILLIEQUIVALENT(S): at 12:39

## 2025-07-17 RX ADMIN — CEFEPIME 100 MILLIGRAM(S): 2 INJECTION, POWDER, FOR SOLUTION INTRAVENOUS at 21:29

## 2025-07-17 RX ADMIN — Medication 0.5 MILLIGRAM(S): at 09:55

## 2025-07-17 RX ADMIN — Medication 0.5 MILLIGRAM(S): at 21:29

## 2025-07-18 ENCOUNTER — APPOINTMENT (OUTPATIENT)
Dept: INFUSION THERAPY | Facility: HOSPITAL | Age: 70
End: 2025-07-18

## 2025-07-18 ENCOUNTER — APPOINTMENT (OUTPATIENT)
Dept: HEMATOLOGY ONCOLOGY | Facility: CLINIC | Age: 70
End: 2025-07-18

## 2025-07-18 LAB
ALBUMIN SERPL ELPH-MCNC: 2.6 G/DL — LOW (ref 3.3–5)
ALBUMIN SERPL ELPH-MCNC: 3.3 G/DL — SIGNIFICANT CHANGE UP (ref 3.3–5)
ALP SERPL-CCNC: 128 U/L — HIGH (ref 40–120)
ALP SERPL-CCNC: 164 U/L — HIGH (ref 40–120)
ALT FLD-CCNC: 55 U/L — HIGH (ref 10–45)
ALT FLD-CCNC: 67 U/L — HIGH (ref 10–45)
ANION GAP SERPL CALC-SCNC: 12 MMOL/L — SIGNIFICANT CHANGE UP (ref 5–17)
ANION GAP SERPL CALC-SCNC: 13 MMOL/L — SIGNIFICANT CHANGE UP (ref 5–17)
APTT BLD: 26.8 SEC — SIGNIFICANT CHANGE UP (ref 26.1–36.8)
APTT BLD: 29.5 SEC — SIGNIFICANT CHANGE UP (ref 26.1–36.8)
AST SERPL-CCNC: 28 U/L — SIGNIFICANT CHANGE UP (ref 10–40)
AST SERPL-CCNC: 32 U/L — SIGNIFICANT CHANGE UP (ref 10–40)
BASOPHILS # BLD AUTO: 0.01 K/UL — SIGNIFICANT CHANGE UP (ref 0–0.2)
BASOPHILS # BLD AUTO: 0.01 K/UL — SIGNIFICANT CHANGE UP (ref 0–0.2)
BASOPHILS NFR BLD AUTO: 0.3 % — SIGNIFICANT CHANGE UP (ref 0–2)
BASOPHILS NFR BLD AUTO: 0.3 % — SIGNIFICANT CHANGE UP (ref 0–2)
BILIRUB SERPL-MCNC: 0.4 MG/DL — SIGNIFICANT CHANGE UP (ref 0.2–1.2)
BILIRUB SERPL-MCNC: 0.5 MG/DL — SIGNIFICANT CHANGE UP (ref 0.2–1.2)
BUN SERPL-MCNC: 8 MG/DL — SIGNIFICANT CHANGE UP (ref 7–23)
BUN SERPL-MCNC: 9 MG/DL — SIGNIFICANT CHANGE UP (ref 7–23)
CALCIUM SERPL-MCNC: 6.8 MG/DL — LOW (ref 8.4–10.5)
CALCIUM SERPL-MCNC: 9 MG/DL — SIGNIFICANT CHANGE UP (ref 8.4–10.5)
CHLORIDE SERPL-SCNC: 104 MMOL/L — SIGNIFICANT CHANGE UP (ref 96–108)
CHLORIDE SERPL-SCNC: 83 MMOL/L — LOW (ref 96–108)
CO2 SERPL-SCNC: 16 MMOL/L — LOW (ref 22–31)
CO2 SERPL-SCNC: 20 MMOL/L — LOW (ref 22–31)
CREAT SERPL-MCNC: 0.7 MG/DL — SIGNIFICANT CHANGE UP (ref 0.5–1.3)
CREAT SERPL-MCNC: 0.86 MG/DL — SIGNIFICANT CHANGE UP (ref 0.5–1.3)
EGFR: 100 ML/MIN/1.73M2 — SIGNIFICANT CHANGE UP
EGFR: 100 ML/MIN/1.73M2 — SIGNIFICANT CHANGE UP
EGFR: 94 ML/MIN/1.73M2 — SIGNIFICANT CHANGE UP
EGFR: 94 ML/MIN/1.73M2 — SIGNIFICANT CHANGE UP
EOSINOPHIL # BLD AUTO: 0 K/UL — SIGNIFICANT CHANGE UP (ref 0–0.5)
EOSINOPHIL # BLD AUTO: 0.01 K/UL — SIGNIFICANT CHANGE UP (ref 0–0.5)
EOSINOPHIL NFR BLD AUTO: 0 % — SIGNIFICANT CHANGE UP (ref 0–6)
EOSINOPHIL NFR BLD AUTO: 0.3 % — SIGNIFICANT CHANGE UP (ref 0–6)
GLUCOSE BLDC GLUCOMTR-MCNC: 131 MG/DL — HIGH (ref 70–99)
GLUCOSE SERPL-MCNC: 108 MG/DL — HIGH (ref 70–99)
GLUCOSE SERPL-MCNC: 861 MG/DL — CRITICAL HIGH (ref 70–99)
HCT VFR BLD CALC: 18.7 % — CRITICAL LOW (ref 39–50)
HCT VFR BLD CALC: 22.6 % — LOW (ref 39–50)
HGB BLD-MCNC: 6.1 G/DL — CRITICAL LOW (ref 13–17)
HGB BLD-MCNC: 7.7 G/DL — LOW (ref 13–17)
IMM GRANULOCYTES # BLD AUTO: 0.06 K/UL — SIGNIFICANT CHANGE UP (ref 0–0.07)
IMM GRANULOCYTES # BLD AUTO: 0.07 K/UL — SIGNIFICANT CHANGE UP (ref 0–0.07)
IMM GRANULOCYTES NFR BLD AUTO: 1.6 % — HIGH (ref 0–0.9)
IMM GRANULOCYTES NFR BLD AUTO: 2.3 % — HIGH (ref 0–0.9)
IMMATURE PLATELET FRACTION #: 1.2 K/UL — LOW (ref 3.9–12.5)
IMMATURE PLATELET FRACTION #: 1.7 K/UL — LOW (ref 3.9–12.5)
IMMATURE PLATELET FRACTION %: 6.6 % — SIGNIFICANT CHANGE UP (ref 1.6–7.1)
IMMATURE PLATELET FRACTION %: 6.7 % — SIGNIFICANT CHANGE UP (ref 1.6–7.1)
INR BLD: 1.15 RATIO — SIGNIFICANT CHANGE UP (ref 0.85–1.16)
INR BLD: 1.5 RATIO — HIGH (ref 0.85–1.16)
LDH SERPL L TO P-CCNC: 204 U/L — SIGNIFICANT CHANGE UP (ref 50–242)
LDH SERPL L TO P-CCNC: 238 U/L — SIGNIFICANT CHANGE UP (ref 50–242)
LYMPHOCYTES # BLD AUTO: 0.77 K/UL — LOW (ref 1–3.3)
LYMPHOCYTES # BLD AUTO: 0.98 K/UL — LOW (ref 1–3.3)
LYMPHOCYTES NFR BLD AUTO: 25.7 % — SIGNIFICANT CHANGE UP (ref 13–44)
LYMPHOCYTES NFR BLD AUTO: 25.9 % — SIGNIFICANT CHANGE UP (ref 13–44)
MAGNESIUM SERPL-MCNC: 1.5 MG/DL — LOW (ref 1.6–2.6)
MAGNESIUM SERPL-MCNC: 2 MG/DL — SIGNIFICANT CHANGE UP (ref 1.6–2.6)
MCHC RBC-ENTMCNC: 29.2 PG — SIGNIFICANT CHANGE UP (ref 27–34)
MCHC RBC-ENTMCNC: 29.6 PG — SIGNIFICANT CHANGE UP (ref 27–34)
MCHC RBC-ENTMCNC: 32.6 G/DL — SIGNIFICANT CHANGE UP (ref 32–36)
MCHC RBC-ENTMCNC: 34.1 G/DL — SIGNIFICANT CHANGE UP (ref 32–36)
MCV RBC AUTO: 85.6 FL — SIGNIFICANT CHANGE UP (ref 80–100)
MCV RBC AUTO: 90.8 FL — SIGNIFICANT CHANGE UP (ref 80–100)
MONOCYTES # BLD AUTO: 0.84 K/UL — SIGNIFICANT CHANGE UP (ref 0–0.9)
MONOCYTES # BLD AUTO: 1.07 K/UL — HIGH (ref 0–0.9)
MONOCYTES NFR BLD AUTO: 28 % — HIGH (ref 2–14)
MONOCYTES NFR BLD AUTO: 28.3 % — HIGH (ref 2–14)
NEUTROPHILS # BLD AUTO: 1.31 K/UL — LOW (ref 1.8–7.4)
NEUTROPHILS # BLD AUTO: 1.65 K/UL — LOW (ref 1.8–7.4)
NEUTROPHILS NFR BLD AUTO: 43.6 % — SIGNIFICANT CHANGE UP (ref 43–77)
NEUTROPHILS NFR BLD AUTO: 43.7 % — SIGNIFICANT CHANGE UP (ref 43–77)
NRBC # BLD AUTO: 0 K/UL — SIGNIFICANT CHANGE UP (ref 0–0)
NRBC # BLD AUTO: 0 K/UL — SIGNIFICANT CHANGE UP (ref 0–0)
NRBC # FLD: 0 K/UL — SIGNIFICANT CHANGE UP (ref 0–0)
NRBC # FLD: 0 K/UL — SIGNIFICANT CHANGE UP (ref 0–0)
NRBC BLD AUTO-RTO: 0 /100 WBCS — SIGNIFICANT CHANGE UP (ref 0–0)
NRBC BLD AUTO-RTO: 0 /100 WBCS — SIGNIFICANT CHANGE UP (ref 0–0)
PHOSPHATE SERPL-MCNC: 1.9 MG/DL — LOW (ref 2.5–4.5)
PHOSPHATE SERPL-MCNC: 2.7 MG/DL — SIGNIFICANT CHANGE UP (ref 2.5–4.5)
PLATELET # BLD AUTO: 18 K/UL — CRITICAL LOW (ref 150–400)
PLATELET # BLD AUTO: 25 K/UL — LOW (ref 150–400)
PMV BLD: 12.8 FL — SIGNIFICANT CHANGE UP (ref 7–13)
PMV BLD: 14.3 FL — HIGH (ref 7–13)
POTASSIUM SERPL-MCNC: 2.9 MMOL/L — CRITICAL LOW (ref 3.5–5.3)
POTASSIUM SERPL-MCNC: 3.7 MMOL/L — SIGNIFICANT CHANGE UP (ref 3.5–5.3)
POTASSIUM SERPL-SCNC: 2.9 MMOL/L — CRITICAL LOW (ref 3.5–5.3)
POTASSIUM SERPL-SCNC: 3.7 MMOL/L — SIGNIFICANT CHANGE UP (ref 3.5–5.3)
PROT SERPL-MCNC: 4.1 G/DL — LOW (ref 6–8.3)
PROT SERPL-MCNC: 5.6 G/DL — LOW (ref 6–8.3)
PROTHROM AB SERPL-ACNC: 13.1 SEC — SIGNIFICANT CHANGE UP (ref 9.9–13.4)
PROTHROM AB SERPL-ACNC: 17 SEC — HIGH (ref 9.9–13.4)
RBC # BLD: 2.06 M/UL — LOW (ref 4.2–5.8)
RBC # BLD: 2.64 M/UL — LOW (ref 4.2–5.8)
RBC # FLD: 15.4 % — HIGH (ref 10.3–14.5)
RBC # FLD: 16.2 % — HIGH (ref 10.3–14.5)
SODIUM SERPL-SCNC: 111 MMOL/L — CRITICAL LOW (ref 135–145)
SODIUM SERPL-SCNC: 137 MMOL/L — SIGNIFICANT CHANGE UP (ref 135–145)
URATE SERPL-MCNC: 2.5 MG/DL — LOW (ref 3.4–8.8)
URATE SERPL-MCNC: 3.4 MG/DL — SIGNIFICANT CHANGE UP (ref 3.4–8.8)
VANCOMYCIN TROUGH SERPL-MCNC: 22.7 UG/ML — HIGH (ref 10–20)
WBC # BLD: 3 K/UL — LOW (ref 3.8–10.5)
WBC # BLD: 3.78 K/UL — LOW (ref 3.8–10.5)
WBC # FLD AUTO: 3 K/UL — LOW (ref 3.8–10.5)
WBC # FLD AUTO: 3.78 K/UL — LOW (ref 3.8–10.5)

## 2025-07-18 PROCEDURE — 99233 SBSQ HOSP IP/OBS HIGH 50: CPT

## 2025-07-18 RX ORDER — COLCHICINE 0.6 MG/1
0.6 TABLET, FILM COATED ORAL ONCE
Refills: 0 | Status: COMPLETED | OUTPATIENT
Start: 2025-07-18 | End: 2025-07-18

## 2025-07-18 RX ORDER — LOPERAMIDE HCL 1 MG/7.5ML
2 SOLUTION ORAL
Refills: 0 | Status: DISCONTINUED | OUTPATIENT
Start: 2025-07-18 | End: 2025-07-19

## 2025-07-18 RX ADMIN — Medication 0.5 MILLIGRAM(S): at 22:08

## 2025-07-18 RX ADMIN — Medication 0.5 MILLIGRAM(S): at 07:36

## 2025-07-18 RX ADMIN — Medication 0.5 MILLIGRAM(S): at 11:28

## 2025-07-18 RX ADMIN — Medication 40 MILLIGRAM(S): at 05:20

## 2025-07-18 RX ADMIN — Medication 500 MILLIGRAM(S): at 17:38

## 2025-07-18 RX ADMIN — TAMSULOSIN HYDROCHLORIDE 0.4 MILLIGRAM(S): 0.4 CAPSULE ORAL at 21:31

## 2025-07-18 RX ADMIN — Medication 0.5 MILLIGRAM(S): at 15:57

## 2025-07-18 RX ADMIN — CEFEPIME 100 MILLIGRAM(S): 2 INJECTION, POWDER, FOR SOLUTION INTRAVENOUS at 14:34

## 2025-07-18 RX ADMIN — POSACONAZOLE 300 MILLIGRAM(S): 100 TABLET, DELAYED RELEASE ORAL at 11:28

## 2025-07-18 RX ADMIN — Medication 300 MILLIGRAM(S): at 00:11

## 2025-07-18 RX ADMIN — CEFEPIME 100 MILLIGRAM(S): 2 INJECTION, POWDER, FOR SOLUTION INTRAVENOUS at 05:16

## 2025-07-18 RX ADMIN — Medication 300 MILLIGRAM(S): at 11:27

## 2025-07-18 RX ADMIN — Medication 0.5 MILLIGRAM(S): at 20:56

## 2025-07-18 RX ADMIN — Medication 0.5 MILLIGRAM(S): at 00:11

## 2025-07-18 RX ADMIN — Medication 1 APPLICATION(S): at 05:16

## 2025-07-18 RX ADMIN — Medication 0.5 MILLIGRAM(S): at 08:00

## 2025-07-18 RX ADMIN — Medication 0.5 MILLIGRAM(S): at 05:36

## 2025-07-18 RX ADMIN — COLCHICINE 0.6 MILLIGRAM(S): 0.6 TABLET, FILM COATED ORAL at 13:20

## 2025-07-18 RX ADMIN — Medication 0.5 MILLIGRAM(S): at 12:00

## 2025-07-18 RX ADMIN — Medication 0.5 MILLIGRAM(S): at 00:50

## 2025-07-18 RX ADMIN — Medication 0.5 MILLIGRAM(S): at 22:31

## 2025-07-18 RX ADMIN — Medication 500 MILLIGRAM(S): at 05:15

## 2025-07-18 RX ADMIN — LOPERAMIDE HCL 2 MILLIGRAM(S): 1 SOLUTION ORAL at 13:20

## 2025-07-18 RX ADMIN — Medication 0.5 MILLIGRAM(S): at 16:20

## 2025-07-18 RX ADMIN — Medication 0.5 MILLIGRAM(S): at 03:03

## 2025-07-18 RX ADMIN — Medication 40 MILLIEQUIVALENT(S): at 17:37

## 2025-07-18 RX ADMIN — Medication 0.5 MILLIGRAM(S): at 05:09

## 2025-07-18 RX ADMIN — Medication 0.5 MILLIGRAM(S): at 20:06

## 2025-07-18 RX ADMIN — Medication 0.5 MILLIGRAM(S): at 03:36

## 2025-07-18 RX ADMIN — CEFEPIME 100 MILLIGRAM(S): 2 INJECTION, POWDER, FOR SOLUTION INTRAVENOUS at 21:33

## 2025-07-19 LAB
ALBUMIN SERPL ELPH-MCNC: 3.3 G/DL — SIGNIFICANT CHANGE UP (ref 3.3–5)
ALP SERPL-CCNC: 173 U/L — HIGH (ref 40–120)
ALT FLD-CCNC: 67 U/L — HIGH (ref 10–45)
ANION GAP SERPL CALC-SCNC: 15 MMOL/L — SIGNIFICANT CHANGE UP (ref 5–17)
APTT BLD: 25.6 SEC — LOW (ref 26.1–36.8)
AST SERPL-CCNC: 36 U/L — SIGNIFICANT CHANGE UP (ref 10–40)
BASOPHILS # BLD AUTO: 0.01 K/UL — SIGNIFICANT CHANGE UP (ref 0–0.2)
BASOPHILS # BLD MANUAL: 0 K/UL — SIGNIFICANT CHANGE UP (ref 0–0.2)
BASOPHILS NFR BLD AUTO: 0.3 % — SIGNIFICANT CHANGE UP (ref 0–2)
BASOPHILS NFR BLD MANUAL: 0 % — SIGNIFICANT CHANGE UP (ref 0–2)
BILIRUB SERPL-MCNC: 0.6 MG/DL — SIGNIFICANT CHANGE UP (ref 0.2–1.2)
BLD GP AB SCN SERPL QL: NEGATIVE — SIGNIFICANT CHANGE UP
BUN SERPL-MCNC: 8 MG/DL — SIGNIFICANT CHANGE UP (ref 7–23)
CALCIUM SERPL-MCNC: 9.2 MG/DL — SIGNIFICANT CHANGE UP (ref 8.4–10.5)
CHLORIDE SERPL-SCNC: 104 MMOL/L — SIGNIFICANT CHANGE UP (ref 96–108)
CO2 SERPL-SCNC: 21 MMOL/L — LOW (ref 22–31)
CREAT SERPL-MCNC: 0.86 MG/DL — SIGNIFICANT CHANGE UP (ref 0.5–1.3)
CULTURE RESULTS: SIGNIFICANT CHANGE UP
CULTURE RESULTS: SIGNIFICANT CHANGE UP
EGFR: 94 ML/MIN/1.73M2 — SIGNIFICANT CHANGE UP
EGFR: 94 ML/MIN/1.73M2 — SIGNIFICANT CHANGE UP
EOSINOPHIL # BLD AUTO: 0.01 K/UL — SIGNIFICANT CHANGE UP (ref 0–0.5)
EOSINOPHIL # BLD MANUAL: 0 K/UL — SIGNIFICANT CHANGE UP (ref 0–0.5)
EOSINOPHIL NFR BLD AUTO: 0.3 % — SIGNIFICANT CHANGE UP (ref 0–6)
EOSINOPHIL NFR BLD MANUAL: 0 % — SIGNIFICANT CHANGE UP (ref 0–6)
GLUCOSE BLDC GLUCOMTR-MCNC: 131 MG/DL — HIGH (ref 70–99)
GLUCOSE SERPL-MCNC: 94 MG/DL — SIGNIFICANT CHANGE UP (ref 70–99)
HCT VFR BLD CALC: 23.9 % — LOW (ref 39–50)
HGB BLD-MCNC: 8 G/DL — LOW (ref 13–17)
IMM GRANULOCYTES # BLD AUTO: 0.06 K/UL — SIGNIFICANT CHANGE UP (ref 0–0.07)
IMM GRANULOCYTES NFR BLD AUTO: 1.8 % — HIGH (ref 0–0.9)
IMMATURE PLATELET FRACTION #: 2 K/UL — LOW (ref 3.9–12.5)
IMMATURE PLATELET FRACTION %: 8.5 % — HIGH (ref 1.6–7.1)
INR BLD: 1.28 RATIO — HIGH (ref 0.85–1.16)
LDH SERPL L TO P-CCNC: 258 U/L — HIGH (ref 50–242)
LYMPHOCYTES # BLD AUTO: 0.94 K/UL — LOW (ref 1–3.3)
LYMPHOCYTES # BLD MANUAL: 1.48 K/UL — SIGNIFICANT CHANGE UP (ref 1–3.3)
LYMPHOCYTES NFR BLD AUTO: 28.9 % — SIGNIFICANT CHANGE UP (ref 13–44)
LYMPHOCYTES NFR BLD MANUAL: 45.5 % — HIGH (ref 13–44)
MAGNESIUM SERPL-MCNC: 1.8 MG/DL — SIGNIFICANT CHANGE UP (ref 1.6–2.6)
MANUAL NEUTROPHIL BANDS #: 0.03 K/UL — SIGNIFICANT CHANGE UP (ref 0–0.84)
MANUAL NRBC #: 0.03 K/UL — HIGH (ref 0–0)
MANUAL PROMYELOCYTE #: 0.03 K/UL — HIGH (ref 0–0)
MCHC RBC-ENTMCNC: 28.9 PG — SIGNIFICANT CHANGE UP (ref 27–34)
MCHC RBC-ENTMCNC: 33.5 G/DL — SIGNIFICANT CHANGE UP (ref 32–36)
MCV RBC AUTO: 86.3 FL — SIGNIFICANT CHANGE UP (ref 80–100)
MONOCYTES # BLD AUTO: 1.11 K/UL — HIGH (ref 0–0.9)
MONOCYTES # BLD MANUAL: 0.56 K/UL — SIGNIFICANT CHANGE UP (ref 0–0.9)
MONOCYTES NFR BLD AUTO: 34.2 % — HIGH (ref 2–14)
MONOCYTES NFR BLD MANUAL: 17.1 % — HIGH (ref 2–14)
NEUTROPHILS # BLD AUTO: 1.12 K/UL — LOW (ref 1.8–7.4)
NEUTROPHILS # BLD MANUAL: 1.16 K/UL — LOW (ref 1.8–7.4)
NEUTROPHILS NFR BLD AUTO: 34.5 % — LOW (ref 43–77)
NEUTROPHILS NFR BLD MANUAL: 35.8 % — LOW (ref 43–77)
NEUTS BAND # BLD: 0.8 % — SIGNIFICANT CHANGE UP (ref 0–8)
NEUTS BAND NFR BLD: 0.8 % — SIGNIFICANT CHANGE UP (ref 0–8)
NRBC # BLD AUTO: 0.02 K/UL — HIGH (ref 0–0)
NRBC # BLD: 1 /100 WBCS — HIGH (ref 0–0)
NRBC # FLD: 0.02 K/UL — HIGH (ref 0–0)
NRBC BLD AUTO-RTO: 0 /100 WBCS — SIGNIFICANT CHANGE UP (ref 0–0)
NRBC BLD-RTO: 1 /100 WBCS — HIGH (ref 0–0)
PHOSPHATE SERPL-MCNC: 3 MG/DL — SIGNIFICANT CHANGE UP (ref 2.5–4.5)
PLAT MORPH BLD: NORMAL — SIGNIFICANT CHANGE UP
PLATELET # BLD AUTO: 24 K/UL — LOW (ref 150–400)
PMV BLD: 12 FL — SIGNIFICANT CHANGE UP (ref 7–13)
POTASSIUM SERPL-MCNC: 3.7 MMOL/L — SIGNIFICANT CHANGE UP (ref 3.5–5.3)
POTASSIUM SERPL-SCNC: 3.7 MMOL/L — SIGNIFICANT CHANGE UP (ref 3.5–5.3)
PROMYELOCYTES # FLD: 0.8 % — HIGH (ref 0–0)
PROMYELOCYTES NFR BLD: 0.8 % — HIGH (ref 0–0)
PROT SERPL-MCNC: 5.7 G/DL — LOW (ref 6–8.3)
PROTHROM AB SERPL-ACNC: 14.6 SEC — HIGH (ref 9.9–13.4)
RBC # BLD: 2.77 M/UL — LOW (ref 4.2–5.8)
RBC # FLD: 15.4 % — HIGH (ref 10.3–14.5)
RBC BLD AUTO: SIGNIFICANT CHANGE UP
RH IG SCN BLD-IMP: POSITIVE — SIGNIFICANT CHANGE UP
SODIUM SERPL-SCNC: 140 MMOL/L — SIGNIFICANT CHANGE UP (ref 135–145)
SPECIMEN SOURCE: SIGNIFICANT CHANGE UP
SPECIMEN SOURCE: SIGNIFICANT CHANGE UP
URATE SERPL-MCNC: 3.3 MG/DL — LOW (ref 3.4–8.8)
WBC # BLD: 3.25 K/UL — LOW (ref 3.8–10.5)
WBC # FLD AUTO: 3.25 K/UL — LOW (ref 3.8–10.5)

## 2025-07-19 PROCEDURE — 99233 SBSQ HOSP IP/OBS HIGH 50: CPT

## 2025-07-19 RX ORDER — LOPERAMIDE HCL 1 MG/7.5ML
1 SOLUTION ORAL
Refills: 0 | Status: DISCONTINUED | OUTPATIENT
Start: 2025-07-19 | End: 2025-07-19

## 2025-07-19 RX ORDER — SODIUM CHLORIDE 9 G/1000ML
500 INJECTION, SOLUTION INTRAVENOUS ONCE
Refills: 0 | Status: COMPLETED | OUTPATIENT
Start: 2025-07-19 | End: 2025-07-19

## 2025-07-19 RX ORDER — LOPERAMIDE HCL 1 MG/7.5ML
1 SOLUTION ORAL EVERY 12 HOURS
Refills: 0 | Status: DISCONTINUED | OUTPATIENT
Start: 2025-07-19 | End: 2025-07-21

## 2025-07-19 RX ADMIN — TAMSULOSIN HYDROCHLORIDE 0.4 MILLIGRAM(S): 0.4 CAPSULE ORAL at 21:12

## 2025-07-19 RX ADMIN — Medication 0.5 MILLIGRAM(S): at 00:35

## 2025-07-19 RX ADMIN — COLCHICINE 0.6 MILLIGRAM(S): 0.6 TABLET, FILM COATED ORAL at 11:54

## 2025-07-19 RX ADMIN — Medication 500 MILLIGRAM(S): at 04:44

## 2025-07-19 RX ADMIN — Medication 0.5 MILLIGRAM(S): at 15:07

## 2025-07-19 RX ADMIN — Medication 15 MILLILITER(S): at 11:54

## 2025-07-19 RX ADMIN — Medication 0.5 MILLIGRAM(S): at 02:50

## 2025-07-19 RX ADMIN — LOPERAMIDE HCL 1 MILLIGRAM(S): 1 SOLUTION ORAL at 17:43

## 2025-07-19 RX ADMIN — Medication 0.5 MILLIGRAM(S): at 07:05

## 2025-07-19 RX ADMIN — Medication 0.5 MILLIGRAM(S): at 02:35

## 2025-07-19 RX ADMIN — Medication 500 MILLIGRAM(S): at 17:43

## 2025-07-19 RX ADMIN — POSACONAZOLE 300 MILLIGRAM(S): 100 TABLET, DELAYED RELEASE ORAL at 11:55

## 2025-07-19 RX ADMIN — Medication 0.5 MILLIGRAM(S): at 14:37

## 2025-07-19 RX ADMIN — Medication 40 MILLIGRAM(S): at 04:44

## 2025-07-19 RX ADMIN — Medication 2 MILLIGRAM(S): at 05:16

## 2025-07-19 RX ADMIN — Medication 1 APPLICATION(S): at 04:44

## 2025-07-19 RX ADMIN — SODIUM CHLORIDE 500 MILLILITER(S): 9 INJECTION, SOLUTION INTRAVENOUS at 21:14

## 2025-07-19 RX ADMIN — Medication 0.5 MILLIGRAM(S): at 21:45

## 2025-07-19 RX ADMIN — Medication 300 MILLIGRAM(S): at 11:54

## 2025-07-19 RX ADMIN — Medication 2 MILLIGRAM(S): at 04:46

## 2025-07-19 RX ADMIN — CEFEPIME 100 MILLIGRAM(S): 2 INJECTION, POWDER, FOR SOLUTION INTRAVENOUS at 04:45

## 2025-07-19 RX ADMIN — Medication 15 MILLILITER(S): at 18:07

## 2025-07-19 RX ADMIN — Medication 0.5 MILLIGRAM(S): at 21:14

## 2025-07-19 RX ADMIN — LOPERAMIDE HCL 2 MILLIGRAM(S): 1 SOLUTION ORAL at 00:35

## 2025-07-19 RX ADMIN — Medication 0.5 MILLIGRAM(S): at 06:35

## 2025-07-19 RX ADMIN — Medication 0.5 MILLIGRAM(S): at 01:00

## 2025-07-20 LAB
ALBUMIN SERPL ELPH-MCNC: 3.4 G/DL — SIGNIFICANT CHANGE UP (ref 3.3–5)
ALP SERPL-CCNC: 194 U/L — HIGH (ref 40–120)
ALT FLD-CCNC: 59 U/L — HIGH (ref 10–45)
ANION GAP SERPL CALC-SCNC: 15 MMOL/L — SIGNIFICANT CHANGE UP (ref 5–17)
APTT BLD: 27.3 SEC — SIGNIFICANT CHANGE UP (ref 26.1–36.8)
AST SERPL-CCNC: 31 U/L — SIGNIFICANT CHANGE UP (ref 10–40)
BASOPHILS # BLD AUTO: 0.01 K/UL — SIGNIFICANT CHANGE UP (ref 0–0.2)
BASOPHILS NFR BLD AUTO: 0.3 % — SIGNIFICANT CHANGE UP (ref 0–2)
BILIRUB SERPL-MCNC: 0.5 MG/DL — SIGNIFICANT CHANGE UP (ref 0.2–1.2)
BUN SERPL-MCNC: 12 MG/DL — SIGNIFICANT CHANGE UP (ref 7–23)
CALCIUM SERPL-MCNC: 9.2 MG/DL — SIGNIFICANT CHANGE UP (ref 8.4–10.5)
CHLORIDE SERPL-SCNC: 102 MMOL/L — SIGNIFICANT CHANGE UP (ref 96–108)
CO2 SERPL-SCNC: 20 MMOL/L — LOW (ref 22–31)
CREAT SERPL-MCNC: 0.94 MG/DL — SIGNIFICANT CHANGE UP (ref 0.5–1.3)
EGFR: 88 ML/MIN/1.73M2 — SIGNIFICANT CHANGE UP
EGFR: 88 ML/MIN/1.73M2 — SIGNIFICANT CHANGE UP
EOSINOPHIL # BLD AUTO: 0.01 K/UL — SIGNIFICANT CHANGE UP (ref 0–0.5)
EOSINOPHIL NFR BLD AUTO: 0.3 % — SIGNIFICANT CHANGE UP (ref 0–6)
GLUCOSE SERPL-MCNC: 96 MG/DL — SIGNIFICANT CHANGE UP (ref 70–99)
HCT VFR BLD CALC: 23.9 % — LOW (ref 39–50)
HGB BLD-MCNC: 8.2 G/DL — LOW (ref 13–17)
IMM GRANULOCYTES # BLD AUTO: 0.05 K/UL — SIGNIFICANT CHANGE UP (ref 0–0.07)
IMM GRANULOCYTES NFR BLD AUTO: 1.3 % — HIGH (ref 0–0.9)
IMMATURE PLATELET FRACTION #: 3.1 K/UL — LOW (ref 3.9–12.5)
IMMATURE PLATELET FRACTION %: 12.3 % — HIGH (ref 1.6–7.1)
INR BLD: 1.39 RATIO — HIGH (ref 0.85–1.16)
LDH SERPL L TO P-CCNC: 252 U/L — HIGH (ref 50–242)
LYMPHOCYTES # BLD AUTO: 1.3 K/UL — SIGNIFICANT CHANGE UP (ref 1–3.3)
LYMPHOCYTES NFR BLD AUTO: 33.4 % — SIGNIFICANT CHANGE UP (ref 13–44)
MAGNESIUM SERPL-MCNC: 1.9 MG/DL — SIGNIFICANT CHANGE UP (ref 1.6–2.6)
MCHC RBC-ENTMCNC: 29.5 PG — SIGNIFICANT CHANGE UP (ref 27–34)
MCHC RBC-ENTMCNC: 34.3 G/DL — SIGNIFICANT CHANGE UP (ref 32–36)
MCV RBC AUTO: 86 FL — SIGNIFICANT CHANGE UP (ref 80–100)
MONOCYTES # BLD AUTO: 1.47 K/UL — HIGH (ref 0–0.9)
MONOCYTES NFR BLD AUTO: 37.8 % — HIGH (ref 2–14)
NEUTROPHILS # BLD AUTO: 1.05 K/UL — LOW (ref 1.8–7.4)
NEUTROPHILS NFR BLD AUTO: 26.9 % — LOW (ref 43–77)
NRBC # BLD AUTO: 0 K/UL — SIGNIFICANT CHANGE UP (ref 0–0)
NRBC # FLD: 0 K/UL — SIGNIFICANT CHANGE UP (ref 0–0)
NRBC BLD AUTO-RTO: 0 /100 WBCS — SIGNIFICANT CHANGE UP (ref 0–0)
PHOSPHATE SERPL-MCNC: 3.2 MG/DL — SIGNIFICANT CHANGE UP (ref 2.5–4.5)
PLATELET # BLD AUTO: 25 K/UL — LOW (ref 150–400)
PMV BLD: 14.2 FL — HIGH (ref 7–13)
POTASSIUM SERPL-MCNC: 3.5 MMOL/L — SIGNIFICANT CHANGE UP (ref 3.5–5.3)
POTASSIUM SERPL-SCNC: 3.5 MMOL/L — SIGNIFICANT CHANGE UP (ref 3.5–5.3)
PROT SERPL-MCNC: 5.5 G/DL — LOW (ref 6–8.3)
PROTHROM AB SERPL-ACNC: 15.8 SEC — HIGH (ref 9.9–13.4)
RBC # BLD: 2.78 M/UL — LOW (ref 4.2–5.8)
RBC # FLD: 15.5 % — HIGH (ref 10.3–14.5)
SODIUM SERPL-SCNC: 137 MMOL/L — SIGNIFICANT CHANGE UP (ref 135–145)
URATE SERPL-MCNC: 3.5 MG/DL — SIGNIFICANT CHANGE UP (ref 3.4–8.8)
WBC # BLD: 3.89 K/UL — SIGNIFICANT CHANGE UP (ref 3.8–10.5)
WBC # FLD AUTO: 3.89 K/UL — SIGNIFICANT CHANGE UP (ref 3.8–10.5)

## 2025-07-20 PROCEDURE — 99233 SBSQ HOSP IP/OBS HIGH 50: CPT

## 2025-07-20 RX ORDER — DEXAMETHASONE 0.5 MG/1
2 TABLET ORAL ONCE
Refills: 0 | Status: COMPLETED | OUTPATIENT
Start: 2025-07-20 | End: 2025-07-20

## 2025-07-20 RX ADMIN — Medication 0.5 MILLIGRAM(S): at 06:38

## 2025-07-20 RX ADMIN — Medication 0.5 MILLIGRAM(S): at 13:45

## 2025-07-20 RX ADMIN — Medication 0.5 MILLIGRAM(S): at 10:17

## 2025-07-20 RX ADMIN — Medication 0.5 MILLIGRAM(S): at 09:47

## 2025-07-20 RX ADMIN — TAMSULOSIN HYDROCHLORIDE 0.4 MILLIGRAM(S): 0.4 CAPSULE ORAL at 20:53

## 2025-07-20 RX ADMIN — Medication 0.5 MILLIGRAM(S): at 21:55

## 2025-07-20 RX ADMIN — Medication 500 MILLIGRAM(S): at 17:53

## 2025-07-20 RX ADMIN — Medication 1 APPLICATION(S): at 06:43

## 2025-07-20 RX ADMIN — Medication 500 MILLIGRAM(S): at 06:06

## 2025-07-20 RX ADMIN — Medication 40 MILLIGRAM(S): at 06:06

## 2025-07-20 RX ADMIN — LOPERAMIDE HCL 1 MILLIGRAM(S): 1 SOLUTION ORAL at 19:03

## 2025-07-20 RX ADMIN — Medication 15 MILLILITER(S): at 06:43

## 2025-07-20 RX ADMIN — Medication 15 MILLILITER(S): at 17:53

## 2025-07-20 RX ADMIN — Medication 15 MILLILITER(S): at 12:33

## 2025-07-20 RX ADMIN — Medication 0.5 MILLIGRAM(S): at 06:08

## 2025-07-20 RX ADMIN — DEXAMETHASONE 2 MILLIGRAM(S): 0.5 TABLET ORAL at 13:45

## 2025-07-20 RX ADMIN — Medication 0.5 MILLIGRAM(S): at 20:54

## 2025-07-20 RX ADMIN — Medication 0.5 MILLIGRAM(S): at 14:15

## 2025-07-20 RX ADMIN — COLCHICINE 0.6 MILLIGRAM(S): 0.6 TABLET, FILM COATED ORAL at 12:33

## 2025-07-20 RX ADMIN — POSACONAZOLE 300 MILLIGRAM(S): 100 TABLET, DELAYED RELEASE ORAL at 12:33

## 2025-07-20 RX ADMIN — LOPERAMIDE HCL 1 MILLIGRAM(S): 1 SOLUTION ORAL at 06:07

## 2025-07-20 RX ADMIN — Medication 300 MILLIGRAM(S): at 12:33

## 2025-07-21 LAB
ALBUMIN SERPL ELPH-MCNC: 3.4 G/DL — SIGNIFICANT CHANGE UP (ref 3.3–5)
ALP SERPL-CCNC: 195 U/L — HIGH (ref 40–120)
ALT FLD-CCNC: 54 U/L — HIGH (ref 10–45)
ANION GAP SERPL CALC-SCNC: 14 MMOL/L — SIGNIFICANT CHANGE UP (ref 5–17)
APTT BLD: 23.4 SEC — LOW (ref 26.1–36.8)
AST SERPL-CCNC: 34 U/L — SIGNIFICANT CHANGE UP (ref 10–40)
BASOPHILS # BLD AUTO: 0.01 K/UL — SIGNIFICANT CHANGE UP (ref 0–0.2)
BASOPHILS NFR BLD AUTO: 0.4 % — SIGNIFICANT CHANGE UP (ref 0–2)
BILIRUB SERPL-MCNC: 0.4 MG/DL — SIGNIFICANT CHANGE UP (ref 0.2–1.2)
BUN SERPL-MCNC: 18 MG/DL — SIGNIFICANT CHANGE UP (ref 7–23)
CALCIUM SERPL-MCNC: 9 MG/DL — SIGNIFICANT CHANGE UP (ref 8.4–10.5)
CHLORIDE SERPL-SCNC: 103 MMOL/L — SIGNIFICANT CHANGE UP (ref 96–108)
CO2 SERPL-SCNC: 20 MMOL/L — LOW (ref 22–31)
CREAT SERPL-MCNC: 0.84 MG/DL — SIGNIFICANT CHANGE UP (ref 0.5–1.3)
EGFR: 94 ML/MIN/1.73M2 — SIGNIFICANT CHANGE UP
EGFR: 94 ML/MIN/1.73M2 — SIGNIFICANT CHANGE UP
EOSINOPHIL # BLD AUTO: 0 K/UL — SIGNIFICANT CHANGE UP (ref 0–0.5)
EOSINOPHIL NFR BLD AUTO: 0 % — SIGNIFICANT CHANGE UP (ref 0–6)
GLUCOSE SERPL-MCNC: 123 MG/DL — HIGH (ref 70–99)
HCT VFR BLD CALC: 23.9 % — LOW (ref 39–50)
HGB BLD-MCNC: 8 G/DL — LOW (ref 13–17)
IMM GRANULOCYTES # BLD AUTO: 0.04 K/UL — SIGNIFICANT CHANGE UP (ref 0–0.07)
IMM GRANULOCYTES NFR BLD AUTO: 1.4 % — HIGH (ref 0–0.9)
IMMATURE PLATELET FRACTION #: 3.4 K/UL — LOW (ref 3.9–12.5)
IMMATURE PLATELET FRACTION %: 13.5 % — HIGH (ref 1.6–7.1)
INR BLD: 1.22 RATIO — HIGH (ref 0.85–1.16)
LDH SERPL L TO P-CCNC: 265 U/L — HIGH (ref 50–242)
LYMPHOCYTES # BLD AUTO: 0.89 K/UL — LOW (ref 1–3.3)
LYMPHOCYTES NFR BLD AUTO: 31.2 % — SIGNIFICANT CHANGE UP (ref 13–44)
MAGNESIUM SERPL-MCNC: 1.9 MG/DL — SIGNIFICANT CHANGE UP (ref 1.6–2.6)
MCHC RBC-ENTMCNC: 29 PG — SIGNIFICANT CHANGE UP (ref 27–34)
MCHC RBC-ENTMCNC: 33.5 G/DL — SIGNIFICANT CHANGE UP (ref 32–36)
MCV RBC AUTO: 86.6 FL — SIGNIFICANT CHANGE UP (ref 80–100)
MONOCYTES # BLD AUTO: 0.84 K/UL — SIGNIFICANT CHANGE UP (ref 0–0.9)
MONOCYTES NFR BLD AUTO: 29.5 % — HIGH (ref 2–14)
NEUTROPHILS # BLD AUTO: 1.07 K/UL — LOW (ref 1.8–7.4)
NEUTROPHILS NFR BLD AUTO: 37.5 % — LOW (ref 43–77)
NRBC # BLD AUTO: 0 K/UL — SIGNIFICANT CHANGE UP (ref 0–0)
NRBC # FLD: 0 K/UL — SIGNIFICANT CHANGE UP (ref 0–0)
PHOSPHATE SERPL-MCNC: 3.9 MG/DL — SIGNIFICANT CHANGE UP (ref 2.5–4.5)
PLATELET # BLD AUTO: 25 K/UL — LOW (ref 150–400)
PMV BLD: SIGNIFICANT CHANGE UP FL (ref 7–13)
POTASSIUM SERPL-MCNC: 4.3 MMOL/L — SIGNIFICANT CHANGE UP (ref 3.5–5.3)
POTASSIUM SERPL-SCNC: 4.3 MMOL/L — SIGNIFICANT CHANGE UP (ref 3.5–5.3)
PROT SERPL-MCNC: 5.5 G/DL — LOW (ref 6–8.3)
PROTHROM AB SERPL-ACNC: 14 SEC — HIGH (ref 9.9–13.4)
RBC # BLD: 2.76 M/UL — LOW (ref 4.2–5.8)
RBC # FLD: 15.4 % — HIGH (ref 10.3–14.5)
SODIUM SERPL-SCNC: 137 MMOL/L — SIGNIFICANT CHANGE UP (ref 135–145)
URATE SERPL-MCNC: 3.3 MG/DL — LOW (ref 3.4–8.8)
WBC # BLD: 2.85 K/UL — LOW (ref 3.8–10.5)
WBC # FLD AUTO: 2.85 K/UL — LOW (ref 3.8–10.5)

## 2025-07-21 PROCEDURE — 99233 SBSQ HOSP IP/OBS HIGH 50: CPT

## 2025-07-21 RX ORDER — LIDOCAINE HYDROCHLORIDE 20 MG/ML
2 JELLY TOPICAL
Refills: 0 | Status: DISCONTINUED | OUTPATIENT
Start: 2025-07-21 | End: 2025-07-22

## 2025-07-21 RX ORDER — COLCHICINE 0.6 MG/1
1 TABLET, FILM COATED ORAL
Qty: 0 | Refills: 0 | DISCHARGE
Start: 2025-07-21

## 2025-07-21 RX ORDER — ASPIRIN 325 MG
1 TABLET ORAL
Refills: 0 | DISCHARGE

## 2025-07-21 RX ORDER — MIDODRINE HYDROCHLORIDE 5 MG/1
5 TABLET ORAL EVERY 8 HOURS
Refills: 0 | Status: DISCONTINUED | OUTPATIENT
Start: 2025-07-21 | End: 2025-07-23

## 2025-07-21 RX ADMIN — Medication 1 APPLICATION(S): at 05:03

## 2025-07-21 RX ADMIN — LIDOCAINE HYDROCHLORIDE 2 MILLILITER(S): 20 JELLY TOPICAL at 18:27

## 2025-07-21 RX ADMIN — Medication 15 MILLILITER(S): at 05:01

## 2025-07-21 RX ADMIN — MIDODRINE HYDROCHLORIDE 5 MILLIGRAM(S): 5 TABLET ORAL at 19:58

## 2025-07-21 RX ADMIN — Medication 15 MILLILITER(S): at 18:27

## 2025-07-21 RX ADMIN — LIDOCAINE HYDROCHLORIDE 2 MILLILITER(S): 20 JELLY TOPICAL at 21:41

## 2025-07-21 RX ADMIN — Medication 15 MILLILITER(S): at 11:55

## 2025-07-21 RX ADMIN — Medication 500 MILLIGRAM(S): at 18:36

## 2025-07-21 RX ADMIN — Medication 500 MILLIGRAM(S): at 05:00

## 2025-07-21 RX ADMIN — Medication 40 MILLIGRAM(S): at 05:00

## 2025-07-21 RX ADMIN — Medication 0.5 MILLIGRAM(S): at 02:55

## 2025-07-21 RX ADMIN — POSACONAZOLE 300 MILLIGRAM(S): 100 TABLET, DELAYED RELEASE ORAL at 11:55

## 2025-07-21 RX ADMIN — LIDOCAINE HYDROCHLORIDE 2 MILLILITER(S): 20 JELLY TOPICAL at 20:00

## 2025-07-21 RX ADMIN — LOPERAMIDE HCL 1 MILLIGRAM(S): 1 SOLUTION ORAL at 05:02

## 2025-07-21 RX ADMIN — TAMSULOSIN HYDROCHLORIDE 0.4 MILLIGRAM(S): 0.4 CAPSULE ORAL at 21:39

## 2025-07-21 RX ADMIN — Medication 300 MILLIGRAM(S): at 11:55

## 2025-07-21 RX ADMIN — Medication 2 MILLIGRAM(S): at 11:00

## 2025-07-21 RX ADMIN — Medication 0.5 MILLIGRAM(S): at 01:51

## 2025-07-22 LAB
ALBUMIN SERPL ELPH-MCNC: 3.5 G/DL — SIGNIFICANT CHANGE UP (ref 3.3–5)
ALP SERPL-CCNC: 203 U/L — HIGH (ref 40–120)
ALT FLD-CCNC: 56 U/L — HIGH (ref 10–45)
ANION GAP SERPL CALC-SCNC: 13 MMOL/L — SIGNIFICANT CHANGE UP (ref 5–17)
AST SERPL-CCNC: 40 U/L — SIGNIFICANT CHANGE UP (ref 10–40)
BASOPHILS # BLD AUTO: 0.01 K/UL — SIGNIFICANT CHANGE UP (ref 0–0.2)
BASOPHILS NFR BLD AUTO: 0.3 % — SIGNIFICANT CHANGE UP (ref 0–2)
BILIRUB SERPL-MCNC: 0.5 MG/DL — SIGNIFICANT CHANGE UP (ref 0.2–1.2)
BLD GP AB SCN SERPL QL: NEGATIVE — SIGNIFICANT CHANGE UP
BUN SERPL-MCNC: 20 MG/DL — SIGNIFICANT CHANGE UP (ref 7–23)
CALCIUM SERPL-MCNC: 9.3 MG/DL — SIGNIFICANT CHANGE UP (ref 8.4–10.5)
CHLORIDE SERPL-SCNC: 104 MMOL/L — SIGNIFICANT CHANGE UP (ref 96–108)
CO2 SERPL-SCNC: 22 MMOL/L — SIGNIFICANT CHANGE UP (ref 22–31)
CREAT SERPL-MCNC: 0.92 MG/DL — SIGNIFICANT CHANGE UP (ref 0.5–1.3)
EGFR: 90 ML/MIN/1.73M2 — SIGNIFICANT CHANGE UP
EGFR: 90 ML/MIN/1.73M2 — SIGNIFICANT CHANGE UP
EOSINOPHIL # BLD AUTO: 0 K/UL — SIGNIFICANT CHANGE UP (ref 0–0.5)
EOSINOPHIL NFR BLD AUTO: 0 % — SIGNIFICANT CHANGE UP (ref 0–6)
FLUAV AG NPH QL: SIGNIFICANT CHANGE UP
FLUBV AG NPH QL: SIGNIFICANT CHANGE UP
GLUCOSE SERPL-MCNC: 100 MG/DL — HIGH (ref 70–99)
HCT VFR BLD CALC: 23.6 % — LOW (ref 39–50)
HGB BLD-MCNC: 7.9 G/DL — LOW (ref 13–17)
IMM GRANULOCYTES # BLD AUTO: 0.04 K/UL — SIGNIFICANT CHANGE UP (ref 0–0.07)
IMM GRANULOCYTES NFR BLD AUTO: 1 % — HIGH (ref 0–0.9)
IMMATURE PLATELET FRACTION #: 4 K/UL — SIGNIFICANT CHANGE UP (ref 3.9–12.5)
IMMATURE PLATELET FRACTION %: 12.8 % — HIGH (ref 1.6–7.1)
LDH SERPL L TO P-CCNC: 264 U/L — HIGH (ref 50–242)
LYMPHOCYTES # BLD AUTO: 1 K/UL — SIGNIFICANT CHANGE UP (ref 1–3.3)
LYMPHOCYTES NFR BLD AUTO: 26 % — SIGNIFICANT CHANGE UP (ref 13–44)
MAGNESIUM SERPL-MCNC: 2.1 MG/DL — SIGNIFICANT CHANGE UP (ref 1.6–2.6)
MCHC RBC-ENTMCNC: 29.4 PG — SIGNIFICANT CHANGE UP (ref 27–34)
MCHC RBC-ENTMCNC: 33.5 G/DL — SIGNIFICANT CHANGE UP (ref 32–36)
MCV RBC AUTO: 87.7 FL — SIGNIFICANT CHANGE UP (ref 80–100)
MONOCYTES # BLD AUTO: 1.31 K/UL — HIGH (ref 0–0.9)
MONOCYTES NFR BLD AUTO: 34.1 % — HIGH (ref 2–14)
NEUTROPHILS # BLD AUTO: 1.48 K/UL — LOW (ref 1.8–7.4)
NEUTROPHILS NFR BLD AUTO: 38.6 % — LOW (ref 43–77)
NRBC # BLD AUTO: 0.02 K/UL — HIGH (ref 0–0)
NRBC # FLD: 0.02 K/UL — HIGH (ref 0–0)
NRBC BLD AUTO-RTO: 0 /100 WBCS — SIGNIFICANT CHANGE UP (ref 0–0)
PHOSPHATE SERPL-MCNC: 3.3 MG/DL — SIGNIFICANT CHANGE UP (ref 2.5–4.5)
PLATELET # BLD AUTO: 31 K/UL — LOW (ref 150–400)
PMV BLD: 12.9 FL — SIGNIFICANT CHANGE UP (ref 7–13)
POTASSIUM SERPL-MCNC: 3.7 MMOL/L — SIGNIFICANT CHANGE UP (ref 3.5–5.3)
POTASSIUM SERPL-SCNC: 3.7 MMOL/L — SIGNIFICANT CHANGE UP (ref 3.5–5.3)
PROT SERPL-MCNC: 5.6 G/DL — LOW (ref 6–8.3)
RAPID RVP RESULT: DETECTED
RBC # BLD: 2.69 M/UL — LOW (ref 4.2–5.8)
RBC # FLD: 15.7 % — HIGH (ref 10.3–14.5)
RH IG SCN BLD-IMP: POSITIVE — SIGNIFICANT CHANGE UP
RSV RNA NPH QL NAA+NON-PROBE: SIGNIFICANT CHANGE UP
RV+EV RNA SPEC QL NAA+PROBE: DETECTED
SARS-COV-2 RNA SPEC QL NAA+PROBE: SIGNIFICANT CHANGE UP
SARS-COV-2 RNA SPEC QL NAA+PROBE: SIGNIFICANT CHANGE UP
SODIUM SERPL-SCNC: 139 MMOL/L — SIGNIFICANT CHANGE UP (ref 135–145)
SOURCE RESPIRATORY: SIGNIFICANT CHANGE UP
URATE SERPL-MCNC: 3.7 MG/DL — SIGNIFICANT CHANGE UP (ref 3.4–8.8)
WBC # BLD: 3.84 K/UL — SIGNIFICANT CHANGE UP (ref 3.8–10.5)
WBC # FLD AUTO: 3.84 K/UL — SIGNIFICANT CHANGE UP (ref 3.8–10.5)

## 2025-07-22 PROCEDURE — 99233 SBSQ HOSP IP/OBS HIGH 50: CPT

## 2025-07-22 RX ORDER — HYDROMORPHONE/SOD CHLOR,ISO/PF 2 MG/10 ML
2 SYRINGE (ML) INJECTION EVERY 4 HOURS
Refills: 0 | Status: DISCONTINUED | OUTPATIENT
Start: 2025-07-22 | End: 2025-07-29

## 2025-07-22 RX ORDER — LIDOCAINE HYDROCHLORIDE 20 MG/ML
2 JELLY TOPICAL EVERY 6 HOURS
Refills: 0 | Status: DISCONTINUED | OUTPATIENT
Start: 2025-07-22 | End: 2025-07-30

## 2025-07-22 RX ORDER — BENZONATATE 100 MG
100 CAPSULE ORAL EVERY 8 HOURS
Refills: 0 | Status: DISCONTINUED | OUTPATIENT
Start: 2025-07-22 | End: 2025-07-30

## 2025-07-22 RX ADMIN — Medication 15 MILLILITER(S): at 06:10

## 2025-07-22 RX ADMIN — Medication 15 MILLILITER(S): at 13:35

## 2025-07-22 RX ADMIN — COLCHICINE 0.6 MILLIGRAM(S): 0.6 TABLET, FILM COATED ORAL at 14:45

## 2025-07-22 RX ADMIN — Medication 100 MILLIGRAM(S): at 21:19

## 2025-07-22 RX ADMIN — MIDODRINE HYDROCHLORIDE 5 MILLIGRAM(S): 5 TABLET ORAL at 13:34

## 2025-07-22 RX ADMIN — Medication 100 MILLIGRAM(S): at 14:53

## 2025-07-22 RX ADMIN — LIDOCAINE HYDROCHLORIDE 2 MILLILITER(S): 20 JELLY TOPICAL at 13:33

## 2025-07-22 RX ADMIN — Medication 500 MILLIGRAM(S): at 06:12

## 2025-07-22 RX ADMIN — Medication 40 MILLIGRAM(S): at 06:13

## 2025-07-22 RX ADMIN — Medication 1 APPLICATION(S): at 06:18

## 2025-07-22 RX ADMIN — LIDOCAINE HYDROCHLORIDE 2 MILLILITER(S): 20 JELLY TOPICAL at 04:21

## 2025-07-22 RX ADMIN — TAMSULOSIN HYDROCHLORIDE 0.4 MILLIGRAM(S): 0.4 CAPSULE ORAL at 21:19

## 2025-07-22 RX ADMIN — MIDODRINE HYDROCHLORIDE 5 MILLIGRAM(S): 5 TABLET ORAL at 21:19

## 2025-07-22 RX ADMIN — LIDOCAINE HYDROCHLORIDE 2 MILLILITER(S): 20 JELLY TOPICAL at 17:31

## 2025-07-22 RX ADMIN — Medication 2 MILLIGRAM(S): at 18:30

## 2025-07-22 RX ADMIN — LIDOCAINE HYDROCHLORIDE 2 MILLILITER(S): 20 JELLY TOPICAL at 08:26

## 2025-07-22 RX ADMIN — Medication 2 MILLIGRAM(S): at 17:31

## 2025-07-22 RX ADMIN — Medication 500 MILLIGRAM(S): at 17:31

## 2025-07-22 RX ADMIN — MIDODRINE HYDROCHLORIDE 5 MILLIGRAM(S): 5 TABLET ORAL at 06:19

## 2025-07-22 RX ADMIN — Medication 15 MILLILITER(S): at 17:31

## 2025-07-22 RX ADMIN — LIDOCAINE HYDROCHLORIDE 2 MILLILITER(S): 20 JELLY TOPICAL at 06:10

## 2025-07-22 RX ADMIN — Medication 300 MILLIGRAM(S): at 13:34

## 2025-07-23 ENCOUNTER — TRANSCRIPTION ENCOUNTER (OUTPATIENT)
Age: 70
End: 2025-07-23

## 2025-07-23 LAB
ALBUMIN SERPL ELPH-MCNC: 3.5 G/DL — SIGNIFICANT CHANGE UP (ref 3.3–5)
ALP SERPL-CCNC: 217 U/L — HIGH (ref 40–120)
ALT FLD-CCNC: 62 U/L — HIGH (ref 10–45)
ANION GAP SERPL CALC-SCNC: 16 MMOL/L — SIGNIFICANT CHANGE UP (ref 5–17)
AST SERPL-CCNC: 40 U/L — SIGNIFICANT CHANGE UP (ref 10–40)
BASOPHILS # BLD AUTO: 0.02 K/UL — SIGNIFICANT CHANGE UP (ref 0–0.2)
BASOPHILS NFR BLD AUTO: 0.4 % — SIGNIFICANT CHANGE UP (ref 0–2)
BILIRUB SERPL-MCNC: 0.3 MG/DL — SIGNIFICANT CHANGE UP (ref 0.2–1.2)
BUN SERPL-MCNC: 24 MG/DL — HIGH (ref 7–23)
CALCIUM SERPL-MCNC: 9 MG/DL — SIGNIFICANT CHANGE UP (ref 8.4–10.5)
CHLORIDE SERPL-SCNC: 108 MMOL/L — SIGNIFICANT CHANGE UP (ref 96–108)
CO2 SERPL-SCNC: 20 MMOL/L — LOW (ref 22–31)
CREAT SERPL-MCNC: 0.95 MG/DL — SIGNIFICANT CHANGE UP (ref 0.5–1.3)
EGFR: 87 ML/MIN/1.73M2 — SIGNIFICANT CHANGE UP
EGFR: 87 ML/MIN/1.73M2 — SIGNIFICANT CHANGE UP
EOSINOPHIL # BLD AUTO: 0.01 K/UL — SIGNIFICANT CHANGE UP (ref 0–0.5)
EOSINOPHIL NFR BLD AUTO: 0.2 % — SIGNIFICANT CHANGE UP (ref 0–6)
GLUCOSE SERPL-MCNC: 94 MG/DL — SIGNIFICANT CHANGE UP (ref 70–99)
HCT VFR BLD CALC: 23.1 % — LOW (ref 39–50)
HGB BLD-MCNC: 7.6 G/DL — LOW (ref 13–17)
IMM GRANULOCYTES # BLD AUTO: 0.06 K/UL — SIGNIFICANT CHANGE UP (ref 0–0.07)
IMM GRANULOCYTES NFR BLD AUTO: 1.3 % — HIGH (ref 0–0.9)
IMMATURE PLATELET FRACTION #: 5.4 K/UL — SIGNIFICANT CHANGE UP (ref 3.9–12.5)
IMMATURE PLATELET FRACTION %: 13.8 % — HIGH (ref 1.6–7.1)
LDH SERPL L TO P-CCNC: 257 U/L — HIGH (ref 50–242)
LYMPHOCYTES # BLD AUTO: 1.47 K/UL — SIGNIFICANT CHANGE UP (ref 1–3.3)
LYMPHOCYTES NFR BLD AUTO: 32.2 % — SIGNIFICANT CHANGE UP (ref 13–44)
MAGNESIUM SERPL-MCNC: 2 MG/DL — SIGNIFICANT CHANGE UP (ref 1.6–2.6)
MCHC RBC-ENTMCNC: 28.9 PG — SIGNIFICANT CHANGE UP (ref 27–34)
MCHC RBC-ENTMCNC: 32.9 G/DL — SIGNIFICANT CHANGE UP (ref 32–36)
MCV RBC AUTO: 87.8 FL — SIGNIFICANT CHANGE UP (ref 80–100)
MONOCYTES # BLD AUTO: 1.36 K/UL — HIGH (ref 0–0.9)
MONOCYTES NFR BLD AUTO: 29.8 % — HIGH (ref 2–14)
NEUTROPHILS # BLD AUTO: 1.65 K/UL — LOW (ref 1.8–7.4)
NEUTROPHILS NFR BLD AUTO: 36.1 % — LOW (ref 43–77)
NRBC # BLD AUTO: 0.03 K/UL — HIGH (ref 0–0)
NRBC # FLD: 0.03 K/UL — HIGH (ref 0–0)
PHOSPHATE SERPL-MCNC: 3.7 MG/DL — SIGNIFICANT CHANGE UP (ref 2.5–4.5)
PLATELET # BLD AUTO: 39 K/UL — LOW (ref 150–400)
PMV BLD: SIGNIFICANT CHANGE UP FL (ref 7–13)
POTASSIUM SERPL-MCNC: 3.4 MMOL/L — LOW (ref 3.5–5.3)
POTASSIUM SERPL-SCNC: 3.4 MMOL/L — LOW (ref 3.5–5.3)
PROT SERPL-MCNC: 5.6 G/DL — LOW (ref 6–8.3)
RBC # BLD: 2.63 M/UL — LOW (ref 4.2–5.8)
RBC # FLD: 15.9 % — HIGH (ref 10.3–14.5)
SODIUM SERPL-SCNC: 144 MMOL/L — SIGNIFICANT CHANGE UP (ref 135–145)
URATE SERPL-MCNC: 3.5 MG/DL — SIGNIFICANT CHANGE UP (ref 3.4–8.8)
WBC # BLD: 4.57 K/UL — SIGNIFICANT CHANGE UP (ref 3.8–10.5)
WBC # FLD AUTO: 4.57 K/UL — SIGNIFICANT CHANGE UP (ref 3.8–10.5)

## 2025-07-23 PROCEDURE — 99233 SBSQ HOSP IP/OBS HIGH 50: CPT

## 2025-07-23 RX ORDER — MIDODRINE HYDROCHLORIDE 5 MG/1
10 TABLET ORAL EVERY 8 HOURS
Refills: 0 | Status: DISCONTINUED | OUTPATIENT
Start: 2025-07-23 | End: 2025-07-30

## 2025-07-23 RX ORDER — ACETAMINOPHEN 500 MG/5ML
1000 LIQUID (ML) ORAL ONCE
Refills: 0 | Status: COMPLETED | OUTPATIENT
Start: 2025-07-23 | End: 2025-07-23

## 2025-07-23 RX ADMIN — Medication 100 MILLIGRAM(S): at 20:19

## 2025-07-23 RX ADMIN — Medication 2 MILLIGRAM(S): at 21:00

## 2025-07-23 RX ADMIN — Medication 40 MILLIEQUIVALENT(S): at 12:23

## 2025-07-23 RX ADMIN — Medication 400 MILLIGRAM(S): at 23:46

## 2025-07-23 RX ADMIN — COLCHICINE 0.6 MILLIGRAM(S): 0.6 TABLET, FILM COATED ORAL at 12:23

## 2025-07-23 RX ADMIN — LIDOCAINE HYDROCHLORIDE 2 MILLILITER(S): 20 JELLY TOPICAL at 05:47

## 2025-07-23 RX ADMIN — Medication 15 MILLILITER(S): at 17:40

## 2025-07-23 RX ADMIN — LIDOCAINE HYDROCHLORIDE 2 MILLILITER(S): 20 JELLY TOPICAL at 12:24

## 2025-07-23 RX ADMIN — Medication 500 MILLIGRAM(S): at 17:39

## 2025-07-23 RX ADMIN — Medication 2 MILLIGRAM(S): at 20:19

## 2025-07-23 RX ADMIN — Medication 500 MILLIGRAM(S): at 05:44

## 2025-07-23 RX ADMIN — Medication 15 MILLILITER(S): at 23:45

## 2025-07-23 RX ADMIN — MIDODRINE HYDROCHLORIDE 5 MILLIGRAM(S): 5 TABLET ORAL at 12:24

## 2025-07-23 RX ADMIN — Medication 40 MILLIGRAM(S): at 06:09

## 2025-07-23 RX ADMIN — Medication 100 MILLIGRAM(S): at 12:23

## 2025-07-23 RX ADMIN — LIDOCAINE HYDROCHLORIDE 2 MILLILITER(S): 20 JELLY TOPICAL at 17:39

## 2025-07-23 RX ADMIN — TAMSULOSIN HYDROCHLORIDE 0.4 MILLIGRAM(S): 0.4 CAPSULE ORAL at 20:19

## 2025-07-23 RX ADMIN — LIDOCAINE HYDROCHLORIDE 2 MILLILITER(S): 20 JELLY TOPICAL at 23:45

## 2025-07-23 RX ADMIN — Medication 15 MILLILITER(S): at 05:44

## 2025-07-23 RX ADMIN — Medication 100 MILLIGRAM(S): at 05:45

## 2025-07-23 RX ADMIN — MIDODRINE HYDROCHLORIDE 5 MILLIGRAM(S): 5 TABLET ORAL at 05:45

## 2025-07-23 RX ADMIN — Medication 1 APPLICATION(S): at 05:50

## 2025-07-23 RX ADMIN — Medication 15 MILLILITER(S): at 12:24

## 2025-07-23 RX ADMIN — Medication 300 MILLIGRAM(S): at 12:24

## 2025-07-23 RX ADMIN — MIDODRINE HYDROCHLORIDE 10 MILLIGRAM(S): 5 TABLET ORAL at 20:19

## 2025-07-23 RX ADMIN — LIDOCAINE HYDROCHLORIDE 2 MILLILITER(S): 20 JELLY TOPICAL at 00:58

## 2025-07-24 LAB
ALBUMIN SERPL ELPH-MCNC: 3.4 G/DL — SIGNIFICANT CHANGE UP (ref 3.3–5)
ALP SERPL-CCNC: 191 U/L — HIGH (ref 40–120)
ALT FLD-CCNC: 51 U/L — HIGH (ref 10–45)
ANION GAP SERPL CALC-SCNC: 12 MMOL/L — SIGNIFICANT CHANGE UP (ref 5–17)
APTT BLD: 21.9 SEC — LOW (ref 26.1–36.8)
AST SERPL-CCNC: 25 U/L — SIGNIFICANT CHANGE UP (ref 10–40)
BASOPHILS # BLD AUTO: 0.01 K/UL — SIGNIFICANT CHANGE UP (ref 0–0.2)
BASOPHILS NFR BLD AUTO: 0.3 % — SIGNIFICANT CHANGE UP (ref 0–2)
BILIRUB SERPL-MCNC: 0.4 MG/DL — SIGNIFICANT CHANGE UP (ref 0.2–1.2)
BLD GP AB SCN SERPL QL: NEGATIVE — SIGNIFICANT CHANGE UP
BUN SERPL-MCNC: 17 MG/DL — SIGNIFICANT CHANGE UP (ref 7–23)
CALCIUM SERPL-MCNC: 9.2 MG/DL — SIGNIFICANT CHANGE UP (ref 8.4–10.5)
CHLORIDE SERPL-SCNC: 110 MMOL/L — HIGH (ref 96–108)
CO2 SERPL-SCNC: 21 MMOL/L — LOW (ref 22–31)
CREAT SERPL-MCNC: 0.93 MG/DL — SIGNIFICANT CHANGE UP (ref 0.5–1.3)
EGFR: 89 ML/MIN/1.73M2 — SIGNIFICANT CHANGE UP
EGFR: 89 ML/MIN/1.73M2 — SIGNIFICANT CHANGE UP
EOSINOPHIL # BLD AUTO: 0.01 K/UL — SIGNIFICANT CHANGE UP (ref 0–0.5)
EOSINOPHIL NFR BLD AUTO: 0.3 % — SIGNIFICANT CHANGE UP (ref 0–6)
GLUCOSE SERPL-MCNC: 101 MG/DL — HIGH (ref 70–99)
HCT VFR BLD CALC: 23.2 % — LOW (ref 39–50)
HGB BLD-MCNC: 7.7 G/DL — LOW (ref 13–17)
IMM GRANULOCYTES # BLD AUTO: 0.07 K/UL — SIGNIFICANT CHANGE UP (ref 0–0.07)
IMM GRANULOCYTES NFR BLD AUTO: 1.8 % — HIGH (ref 0–0.9)
IMMATURE PLATELET FRACTION #: 4.9 K/UL — SIGNIFICANT CHANGE UP (ref 3.9–12.5)
IMMATURE PLATELET FRACTION %: 12.3 % — HIGH (ref 1.6–7.1)
INR BLD: 1.12 RATIO — SIGNIFICANT CHANGE UP (ref 0.85–1.16)
LDH SERPL L TO P-CCNC: 225 U/L — SIGNIFICANT CHANGE UP (ref 50–242)
LYMPHOCYTES # BLD AUTO: 1.38 K/UL — SIGNIFICANT CHANGE UP (ref 1–3.3)
LYMPHOCYTES NFR BLD AUTO: 34.8 % — SIGNIFICANT CHANGE UP (ref 13–44)
MAGNESIUM SERPL-MCNC: 2 MG/DL — SIGNIFICANT CHANGE UP (ref 1.6–2.6)
MCHC RBC-ENTMCNC: 29.6 PG — SIGNIFICANT CHANGE UP (ref 27–34)
MCHC RBC-ENTMCNC: 33.2 G/DL — SIGNIFICANT CHANGE UP (ref 32–36)
MCV RBC AUTO: 89.2 FL — SIGNIFICANT CHANGE UP (ref 80–100)
MONOCYTES # BLD AUTO: 1.24 K/UL — HIGH (ref 0–0.9)
MONOCYTES NFR BLD AUTO: 31.3 % — HIGH (ref 2–14)
NEUTROPHILS # BLD AUTO: 1.25 K/UL — LOW (ref 1.8–7.4)
NEUTROPHILS NFR BLD AUTO: 31.5 % — LOW (ref 43–77)
NRBC # BLD AUTO: 0.03 K/UL — HIGH (ref 0–0)
NRBC # FLD: 0.03 K/UL — HIGH (ref 0–0)
NRBC BLD AUTO-RTO: 0 /100 WBCS — SIGNIFICANT CHANGE UP (ref 0–0)
PHOSPHATE SERPL-MCNC: 4.3 MG/DL — SIGNIFICANT CHANGE UP (ref 2.5–4.5)
PLATELET # BLD AUTO: 40 K/UL — LOW (ref 150–400)
PMV BLD: 13.8 FL — HIGH (ref 7–13)
POTASSIUM SERPL-MCNC: 3.9 MMOL/L — SIGNIFICANT CHANGE UP (ref 3.5–5.3)
POTASSIUM SERPL-SCNC: 3.9 MMOL/L — SIGNIFICANT CHANGE UP (ref 3.5–5.3)
PROT SERPL-MCNC: 5.4 G/DL — LOW (ref 6–8.3)
PROTHROM AB SERPL-ACNC: 12.7 SEC — SIGNIFICANT CHANGE UP (ref 9.9–13.4)
RBC # BLD: 2.6 M/UL — LOW (ref 4.2–5.8)
RBC # FLD: 16.4 % — HIGH (ref 10.3–14.5)
RH IG SCN BLD-IMP: POSITIVE — SIGNIFICANT CHANGE UP
SODIUM SERPL-SCNC: 143 MMOL/L — SIGNIFICANT CHANGE UP (ref 135–145)
URATE SERPL-MCNC: 3.8 MG/DL — SIGNIFICANT CHANGE UP (ref 3.4–8.8)
WBC # BLD: 3.96 K/UL — SIGNIFICANT CHANGE UP (ref 3.8–10.5)
WBC # FLD AUTO: 3.96 K/UL — SIGNIFICANT CHANGE UP (ref 3.8–10.5)

## 2025-07-24 PROCEDURE — 99232 SBSQ HOSP IP/OBS MODERATE 35: CPT

## 2025-07-24 RX ORDER — HYDROMORPHONE/SOD CHLOR,ISO/PF 2 MG/10 ML
4 SYRINGE (ML) INJECTION EVERY 4 HOURS
Refills: 0 | Status: DISCONTINUED | OUTPATIENT
Start: 2025-07-24 | End: 2025-07-29

## 2025-07-24 RX ORDER — LIDOCAINE HYDROCHLORIDE 20 MG/ML
1 JELLY TOPICAL EVERY 24 HOURS
Refills: 0 | Status: DISCONTINUED | OUTPATIENT
Start: 2025-07-24 | End: 2025-07-30

## 2025-07-24 RX ORDER — MIDODRINE HYDROCHLORIDE 5 MG/1
2 TABLET ORAL
Qty: 84 | Refills: 0
Start: 2025-07-24 | End: 2025-08-06

## 2025-07-24 RX ADMIN — Medication 4 MILLIGRAM(S): at 21:15

## 2025-07-24 RX ADMIN — LIDOCAINE HYDROCHLORIDE 2 MILLILITER(S): 20 JELLY TOPICAL at 23:25

## 2025-07-24 RX ADMIN — Medication 15 MILLILITER(S): at 05:15

## 2025-07-24 RX ADMIN — Medication 500 MILLIGRAM(S): at 17:47

## 2025-07-24 RX ADMIN — MIDODRINE HYDROCHLORIDE 10 MILLIGRAM(S): 5 TABLET ORAL at 13:01

## 2025-07-24 RX ADMIN — TAMSULOSIN HYDROCHLORIDE 0.4 MILLIGRAM(S): 0.4 CAPSULE ORAL at 21:16

## 2025-07-24 RX ADMIN — Medication 15 MILLILITER(S): at 23:25

## 2025-07-24 RX ADMIN — Medication 2 MILLIGRAM(S): at 05:43

## 2025-07-24 RX ADMIN — Medication 2 MILLIGRAM(S): at 17:46

## 2025-07-24 RX ADMIN — LIDOCAINE HYDROCHLORIDE 1 PATCH: 20 JELLY TOPICAL at 21:15

## 2025-07-24 RX ADMIN — LIDOCAINE HYDROCHLORIDE 2 MILLILITER(S): 20 JELLY TOPICAL at 17:46

## 2025-07-24 RX ADMIN — Medication 15 MILLILITER(S): at 12:58

## 2025-07-24 RX ADMIN — Medication 2 MILLIGRAM(S): at 05:16

## 2025-07-24 RX ADMIN — Medication 1 APPLICATION(S): at 05:17

## 2025-07-24 RX ADMIN — Medication 100 MILLIGRAM(S): at 05:16

## 2025-07-24 RX ADMIN — MIDODRINE HYDROCHLORIDE 10 MILLIGRAM(S): 5 TABLET ORAL at 05:16

## 2025-07-24 RX ADMIN — LIDOCAINE HYDROCHLORIDE 2 MILLILITER(S): 20 JELLY TOPICAL at 05:17

## 2025-07-24 RX ADMIN — Medication 4 MILLIGRAM(S): at 22:00

## 2025-07-24 RX ADMIN — Medication 100 MILLIGRAM(S): at 21:15

## 2025-07-24 RX ADMIN — Medication 300 MILLIGRAM(S): at 12:58

## 2025-07-24 RX ADMIN — Medication 500 MILLIGRAM(S): at 05:16

## 2025-07-24 RX ADMIN — MIDODRINE HYDROCHLORIDE 10 MILLIGRAM(S): 5 TABLET ORAL at 21:16

## 2025-07-24 RX ADMIN — Medication 40 MILLIGRAM(S): at 05:36

## 2025-07-24 RX ADMIN — Medication 2 MILLIGRAM(S): at 18:45

## 2025-07-24 RX ADMIN — LIDOCAINE HYDROCHLORIDE 2 MILLILITER(S): 20 JELLY TOPICAL at 12:58

## 2025-07-24 RX ADMIN — Medication 1000 MILLIGRAM(S): at 00:26

## 2025-07-24 RX ADMIN — Medication 15 MILLILITER(S): at 17:46

## 2025-07-24 RX ADMIN — COLCHICINE 0.6 MILLIGRAM(S): 0.6 TABLET, FILM COATED ORAL at 12:59

## 2025-07-24 RX ADMIN — Medication 100 MILLIGRAM(S): at 13:01

## 2025-07-25 LAB
ALBUMIN SERPL ELPH-MCNC: 3.5 G/DL — SIGNIFICANT CHANGE UP (ref 3.3–5)
ALP SERPL-CCNC: 186 U/L — HIGH (ref 40–120)
ALT FLD-CCNC: 46 U/L — HIGH (ref 10–45)
ANION GAP SERPL CALC-SCNC: 15 MMOL/L — SIGNIFICANT CHANGE UP (ref 5–17)
AST SERPL-CCNC: 24 U/L — SIGNIFICANT CHANGE UP (ref 10–40)
BASOPHILS # BLD AUTO: 0.02 K/UL — SIGNIFICANT CHANGE UP (ref 0–0.2)
BASOPHILS NFR BLD AUTO: 0.4 % — SIGNIFICANT CHANGE UP (ref 0–2)
BILIRUB SERPL-MCNC: 0.3 MG/DL — SIGNIFICANT CHANGE UP (ref 0.2–1.2)
BUN SERPL-MCNC: 17 MG/DL — SIGNIFICANT CHANGE UP (ref 7–23)
CALCIUM SERPL-MCNC: 8.9 MG/DL — SIGNIFICANT CHANGE UP (ref 8.4–10.5)
CHLORIDE SERPL-SCNC: 107 MMOL/L — SIGNIFICANT CHANGE UP (ref 96–108)
CO2 SERPL-SCNC: 19 MMOL/L — LOW (ref 22–31)
CREAT SERPL-MCNC: 0.97 MG/DL — SIGNIFICANT CHANGE UP (ref 0.5–1.3)
EGFR: 85 ML/MIN/1.73M2 — SIGNIFICANT CHANGE UP
EGFR: 85 ML/MIN/1.73M2 — SIGNIFICANT CHANGE UP
EOSINOPHIL # BLD AUTO: 0.01 K/UL — SIGNIFICANT CHANGE UP (ref 0–0.5)
EOSINOPHIL NFR BLD AUTO: 0.2 % — SIGNIFICANT CHANGE UP (ref 0–6)
GLUCOSE SERPL-MCNC: 120 MG/DL — HIGH (ref 70–99)
HCT VFR BLD CALC: 23.2 % — LOW (ref 39–50)
HCT VFR BLD CALC: 27.1 % — LOW (ref 39–50)
HGB BLD-MCNC: 7.6 G/DL — LOW (ref 13–17)
HGB BLD-MCNC: 8.9 G/DL — LOW (ref 13–17)
IMM GRANULOCYTES # BLD AUTO: 0.06 K/UL — SIGNIFICANT CHANGE UP (ref 0–0.07)
IMM GRANULOCYTES NFR BLD AUTO: 1.2 % — HIGH (ref 0–0.9)
IMMATURE PLATELET FRACTION #: 5.1 K/UL — SIGNIFICANT CHANGE UP (ref 3.9–12.5)
IMMATURE PLATELET FRACTION #: 5.8 K/UL — SIGNIFICANT CHANGE UP (ref 3.9–12.5)
IMMATURE PLATELET FRACTION %: 11.6 % — HIGH (ref 1.6–7.1)
IMMATURE PLATELET FRACTION %: 9.9 % — HIGH (ref 1.6–7.1)
LDH SERPL L TO P-CCNC: 256 U/L — HIGH (ref 50–242)
LYMPHOCYTES # BLD AUTO: 1.64 K/UL — SIGNIFICANT CHANGE UP (ref 1–3.3)
LYMPHOCYTES NFR BLD AUTO: 34 % — SIGNIFICANT CHANGE UP (ref 13–44)
MAGNESIUM SERPL-MCNC: 1.8 MG/DL — SIGNIFICANT CHANGE UP (ref 1.6–2.6)
MCHC RBC-ENTMCNC: 29.3 PG — SIGNIFICANT CHANGE UP (ref 27–34)
MCHC RBC-ENTMCNC: 29.4 PG — SIGNIFICANT CHANGE UP (ref 27–34)
MCHC RBC-ENTMCNC: 32.8 G/DL — SIGNIFICANT CHANGE UP (ref 32–36)
MCHC RBC-ENTMCNC: 32.8 G/DL — SIGNIFICANT CHANGE UP (ref 32–36)
MCV RBC AUTO: 89.4 FL — SIGNIFICANT CHANGE UP (ref 80–100)
MCV RBC AUTO: 89.6 FL — SIGNIFICANT CHANGE UP (ref 80–100)
MONOCYTES # BLD AUTO: 1.28 K/UL — HIGH (ref 0–0.9)
MONOCYTES NFR BLD AUTO: 26.6 % — HIGH (ref 2–14)
NEUTROPHILS # BLD AUTO: 1.81 K/UL — SIGNIFICANT CHANGE UP (ref 1.8–7.4)
NEUTROPHILS NFR BLD AUTO: 37.6 % — LOW (ref 43–77)
NRBC # BLD AUTO: 0.03 K/UL — HIGH (ref 0–0)
NRBC # BLD AUTO: 0.04 K/UL — HIGH (ref 0–0)
NRBC # FLD: 0.03 K/UL — HIGH (ref 0–0)
NRBC # FLD: 0.04 K/UL — HIGH (ref 0–0)
NRBC BLD AUTO-RTO: 0 /100 WBCS — SIGNIFICANT CHANGE UP (ref 0–0)
NRBC BLD AUTO-RTO: 0 /100 WBCS — SIGNIFICANT CHANGE UP (ref 0–0)
PHOSPHATE SERPL-MCNC: 4 MG/DL — SIGNIFICANT CHANGE UP (ref 2.5–4.5)
PLATELET # BLD AUTO: 50 K/UL — LOW (ref 150–400)
PLATELET # BLD AUTO: 52 K/UL — LOW (ref 150–400)
PMV BLD: 13.8 FL — HIGH (ref 7–13)
PMV BLD: 14.2 FL — HIGH (ref 7–13)
POTASSIUM SERPL-MCNC: 3.5 MMOL/L — SIGNIFICANT CHANGE UP (ref 3.5–5.3)
POTASSIUM SERPL-SCNC: 3.5 MMOL/L — SIGNIFICANT CHANGE UP (ref 3.5–5.3)
PROT SERPL-MCNC: 5.4 G/DL — LOW (ref 6–8.3)
RBC # BLD: 2.59 M/UL — LOW (ref 4.2–5.8)
RBC # BLD: 3.03 M/UL — LOW (ref 4.2–5.8)
RBC # FLD: 16.8 % — HIGH (ref 10.3–14.5)
RBC # FLD: 17 % — HIGH (ref 10.3–14.5)
SODIUM SERPL-SCNC: 141 MMOL/L — SIGNIFICANT CHANGE UP (ref 135–145)
URATE SERPL-MCNC: 3.9 MG/DL — SIGNIFICANT CHANGE UP (ref 3.4–8.8)
WBC # BLD: 4.82 K/UL — SIGNIFICANT CHANGE UP (ref 3.8–10.5)
WBC # BLD: 5.39 K/UL — SIGNIFICANT CHANGE UP (ref 3.8–10.5)
WBC # FLD AUTO: 4.82 K/UL — SIGNIFICANT CHANGE UP (ref 3.8–10.5)
WBC # FLD AUTO: 5.39 K/UL — SIGNIFICANT CHANGE UP (ref 3.8–10.5)

## 2025-07-25 PROCEDURE — 99232 SBSQ HOSP IP/OBS MODERATE 35: CPT

## 2025-07-25 RX ADMIN — Medication 100 MILLIGRAM(S): at 21:51

## 2025-07-25 RX ADMIN — Medication 4 MILLIGRAM(S): at 22:00

## 2025-07-25 RX ADMIN — MIDODRINE HYDROCHLORIDE 10 MILLIGRAM(S): 5 TABLET ORAL at 21:51

## 2025-07-25 RX ADMIN — LIDOCAINE HYDROCHLORIDE 2 MILLILITER(S): 20 JELLY TOPICAL at 18:33

## 2025-07-25 RX ADMIN — Medication 1 APPLICATION(S): at 05:37

## 2025-07-25 RX ADMIN — Medication 40 MILLIGRAM(S): at 05:39

## 2025-07-25 RX ADMIN — Medication 15 MILLILITER(S): at 05:29

## 2025-07-25 RX ADMIN — LIDOCAINE HYDROCHLORIDE 1 PATCH: 20 JELLY TOPICAL at 21:50

## 2025-07-25 RX ADMIN — LIDOCAINE HYDROCHLORIDE 1 PATCH: 20 JELLY TOPICAL at 08:07

## 2025-07-25 RX ADMIN — LIDOCAINE HYDROCHLORIDE 2 MILLILITER(S): 20 JELLY TOPICAL at 11:49

## 2025-07-25 RX ADMIN — Medication 4 MILLIGRAM(S): at 05:28

## 2025-07-25 RX ADMIN — Medication 500 MILLIGRAM(S): at 18:33

## 2025-07-25 RX ADMIN — LIDOCAINE HYDROCHLORIDE 1 PATCH: 20 JELLY TOPICAL at 09:15

## 2025-07-25 RX ADMIN — Medication 300 MILLIGRAM(S): at 11:50

## 2025-07-25 RX ADMIN — Medication 15 MILLILITER(S): at 11:50

## 2025-07-25 RX ADMIN — TAMSULOSIN HYDROCHLORIDE 0.4 MILLIGRAM(S): 0.4 CAPSULE ORAL at 21:50

## 2025-07-25 RX ADMIN — COLCHICINE 0.6 MILLIGRAM(S): 0.6 TABLET, FILM COATED ORAL at 11:51

## 2025-07-25 RX ADMIN — Medication 500 MILLIGRAM(S): at 05:26

## 2025-07-25 RX ADMIN — LIDOCAINE HYDROCHLORIDE 2 MILLILITER(S): 20 JELLY TOPICAL at 22:17

## 2025-07-25 RX ADMIN — Medication 15 MILLILITER(S): at 18:33

## 2025-07-25 RX ADMIN — MIDODRINE HYDROCHLORIDE 10 MILLIGRAM(S): 5 TABLET ORAL at 13:15

## 2025-07-25 RX ADMIN — MIDODRINE HYDROCHLORIDE 10 MILLIGRAM(S): 5 TABLET ORAL at 05:26

## 2025-07-25 RX ADMIN — LIDOCAINE HYDROCHLORIDE 2 MILLILITER(S): 20 JELLY TOPICAL at 05:36

## 2025-07-25 RX ADMIN — Medication 100 MILLIGRAM(S): at 05:26

## 2025-07-25 RX ADMIN — Medication 100 MILLIGRAM(S): at 13:15

## 2025-07-26 DIAGNOSIS — I95.1 ORTHOSTATIC HYPOTENSION: ICD-10-CM

## 2025-07-26 LAB
ALBUMIN SERPL ELPH-MCNC: 3.4 G/DL — SIGNIFICANT CHANGE UP (ref 3.3–5)
ALP SERPL-CCNC: 179 U/L — HIGH (ref 40–120)
ALT FLD-CCNC: 42 U/L — SIGNIFICANT CHANGE UP (ref 10–45)
ANION GAP SERPL CALC-SCNC: 15 MMOL/L — SIGNIFICANT CHANGE UP (ref 5–17)
AST SERPL-CCNC: 25 U/L — SIGNIFICANT CHANGE UP (ref 10–40)
BASOPHILS # BLD AUTO: 0.03 K/UL — SIGNIFICANT CHANGE UP (ref 0–0.2)
BASOPHILS NFR BLD AUTO: 0.7 % — SIGNIFICANT CHANGE UP (ref 0–2)
BILIRUB SERPL-MCNC: 0.6 MG/DL — SIGNIFICANT CHANGE UP (ref 0.2–1.2)
BUN SERPL-MCNC: 10 MG/DL — SIGNIFICANT CHANGE UP (ref 7–23)
CALCIUM SERPL-MCNC: 9.2 MG/DL — SIGNIFICANT CHANGE UP (ref 8.4–10.5)
CHLORIDE SERPL-SCNC: 106 MMOL/L — SIGNIFICANT CHANGE UP (ref 96–108)
CO2 SERPL-SCNC: 20 MMOL/L — LOW (ref 22–31)
CREAT SERPL-MCNC: 0.93 MG/DL — SIGNIFICANT CHANGE UP (ref 0.5–1.3)
EGFR: 89 ML/MIN/1.73M2 — SIGNIFICANT CHANGE UP
EGFR: 89 ML/MIN/1.73M2 — SIGNIFICANT CHANGE UP
EOSINOPHIL # BLD AUTO: 0.02 K/UL — SIGNIFICANT CHANGE UP (ref 0–0.5)
EOSINOPHIL NFR BLD AUTO: 0.5 % — SIGNIFICANT CHANGE UP (ref 0–6)
GLUCOSE SERPL-MCNC: 92 MG/DL — SIGNIFICANT CHANGE UP (ref 70–99)
HCT VFR BLD CALC: 27.5 % — LOW (ref 39–50)
HGB BLD-MCNC: 9.1 G/DL — LOW (ref 13–17)
IMM GRANULOCYTES # BLD AUTO: 0.07 K/UL — SIGNIFICANT CHANGE UP (ref 0–0.07)
IMM GRANULOCYTES NFR BLD AUTO: 1.7 % — HIGH (ref 0–0.9)
IMMATURE PLATELET FRACTION #: 5.4 K/UL — SIGNIFICANT CHANGE UP (ref 3.9–12.5)
IMMATURE PLATELET FRACTION %: 10.1 % — HIGH (ref 1.6–7.1)
LDH SERPL L TO P-CCNC: 262 U/L — HIGH (ref 50–242)
LYMPHOCYTES # BLD AUTO: 1.39 K/UL — SIGNIFICANT CHANGE UP (ref 1–3.3)
LYMPHOCYTES NFR BLD AUTO: 34.1 % — SIGNIFICANT CHANGE UP (ref 13–44)
MAGNESIUM SERPL-MCNC: 1.9 MG/DL — SIGNIFICANT CHANGE UP (ref 1.6–2.6)
MCHC RBC-ENTMCNC: 29.8 PG — SIGNIFICANT CHANGE UP (ref 27–34)
MCHC RBC-ENTMCNC: 33.1 G/DL — SIGNIFICANT CHANGE UP (ref 32–36)
MCV RBC AUTO: 90.2 FL — SIGNIFICANT CHANGE UP (ref 80–100)
MONOCYTES # BLD AUTO: 1.06 K/UL — HIGH (ref 0–0.9)
MONOCYTES NFR BLD AUTO: 26 % — HIGH (ref 2–14)
NEUTROPHILS # BLD AUTO: 1.51 K/UL — LOW (ref 1.8–7.4)
NEUTROPHILS NFR BLD AUTO: 37 % — LOW (ref 43–77)
NRBC # BLD AUTO: 0.02 K/UL — HIGH (ref 0–0)
NRBC # FLD: 0.02 K/UL — HIGH (ref 0–0)
PHOSPHATE SERPL-MCNC: 3.9 MG/DL — SIGNIFICANT CHANGE UP (ref 2.5–4.5)
PLATELET # BLD AUTO: 53 K/UL — LOW (ref 150–400)
PMV BLD: SIGNIFICANT CHANGE UP FL (ref 7–13)
POTASSIUM SERPL-MCNC: 3.7 MMOL/L — SIGNIFICANT CHANGE UP (ref 3.5–5.3)
POTASSIUM SERPL-SCNC: 3.7 MMOL/L — SIGNIFICANT CHANGE UP (ref 3.5–5.3)
PROT SERPL-MCNC: 5.6 G/DL — LOW (ref 6–8.3)
RBC # BLD: 3.05 M/UL — LOW (ref 4.2–5.8)
RBC # FLD: 17.7 % — HIGH (ref 10.3–14.5)
SODIUM SERPL-SCNC: 141 MMOL/L — SIGNIFICANT CHANGE UP (ref 135–145)
URATE SERPL-MCNC: 4.1 MG/DL — SIGNIFICANT CHANGE UP (ref 3.4–8.8)
WBC # BLD: 4.08 K/UL — SIGNIFICANT CHANGE UP (ref 3.8–10.5)
WBC # FLD AUTO: 4.08 K/UL — SIGNIFICANT CHANGE UP (ref 3.8–10.5)

## 2025-07-26 PROCEDURE — 99233 SBSQ HOSP IP/OBS HIGH 50: CPT

## 2025-07-26 RX ORDER — MAGNESIUM, ALUMINUM HYDROXIDE 200-200 MG
30 TABLET,CHEWABLE ORAL EVERY 4 HOURS
Refills: 0 | Status: DISCONTINUED | OUTPATIENT
Start: 2025-07-26 | End: 2025-07-30

## 2025-07-26 RX ADMIN — Medication 500 MILLIGRAM(S): at 17:14

## 2025-07-26 RX ADMIN — Medication 30 MILLILITER(S): at 23:29

## 2025-07-26 RX ADMIN — Medication 300 MILLIGRAM(S): at 12:04

## 2025-07-26 RX ADMIN — Medication 15 MILLILITER(S): at 23:29

## 2025-07-26 RX ADMIN — TAMSULOSIN HYDROCHLORIDE 0.4 MILLIGRAM(S): 0.4 CAPSULE ORAL at 21:46

## 2025-07-26 RX ADMIN — Medication 15 MILLILITER(S): at 12:04

## 2025-07-26 RX ADMIN — MIDODRINE HYDROCHLORIDE 10 MILLIGRAM(S): 5 TABLET ORAL at 05:23

## 2025-07-26 RX ADMIN — MIDODRINE HYDROCHLORIDE 10 MILLIGRAM(S): 5 TABLET ORAL at 23:56

## 2025-07-26 RX ADMIN — LIDOCAINE HYDROCHLORIDE 2 MILLILITER(S): 20 JELLY TOPICAL at 05:22

## 2025-07-26 RX ADMIN — LIDOCAINE HYDROCHLORIDE 1 PATCH: 20 JELLY TOPICAL at 21:45

## 2025-07-26 RX ADMIN — Medication 500 MILLIGRAM(S): at 05:23

## 2025-07-26 RX ADMIN — Medication 100 MILLIGRAM(S): at 05:23

## 2025-07-26 RX ADMIN — Medication 100 MILLIGRAM(S): at 21:46

## 2025-07-26 RX ADMIN — Medication 15 MILLILITER(S): at 17:13

## 2025-07-26 RX ADMIN — LIDOCAINE HYDROCHLORIDE 1 PATCH: 20 JELLY TOPICAL at 09:25

## 2025-07-26 RX ADMIN — LIDOCAINE HYDROCHLORIDE 2 MILLILITER(S): 20 JELLY TOPICAL at 12:04

## 2025-07-26 RX ADMIN — COLCHICINE 0.6 MILLIGRAM(S): 0.6 TABLET, FILM COATED ORAL at 12:04

## 2025-07-26 RX ADMIN — Medication 100 MILLIGRAM(S): at 14:02

## 2025-07-26 RX ADMIN — Medication 40 MILLIGRAM(S): at 05:23

## 2025-07-26 RX ADMIN — LIDOCAINE HYDROCHLORIDE 2 MILLILITER(S): 20 JELLY TOPICAL at 23:29

## 2025-07-26 RX ADMIN — Medication 15 MILLILITER(S): at 05:22

## 2025-07-26 RX ADMIN — LIDOCAINE HYDROCHLORIDE 1 PATCH: 20 JELLY TOPICAL at 07:15

## 2025-07-26 RX ADMIN — LIDOCAINE HYDROCHLORIDE 2 MILLILITER(S): 20 JELLY TOPICAL at 17:14

## 2025-07-26 RX ADMIN — MIDODRINE HYDROCHLORIDE 10 MILLIGRAM(S): 5 TABLET ORAL at 14:02

## 2025-07-27 LAB
ALBUMIN SERPL ELPH-MCNC: 3.6 G/DL — SIGNIFICANT CHANGE UP (ref 3.3–5)
ALP SERPL-CCNC: 168 U/L — HIGH (ref 40–120)
ALT FLD-CCNC: 36 U/L — SIGNIFICANT CHANGE UP (ref 10–45)
ANION GAP SERPL CALC-SCNC: 16 MMOL/L — SIGNIFICANT CHANGE UP (ref 5–17)
AST SERPL-CCNC: 21 U/L — SIGNIFICANT CHANGE UP (ref 10–40)
BASOPHILS # BLD AUTO: 0.02 K/UL — SIGNIFICANT CHANGE UP (ref 0–0.2)
BASOPHILS NFR BLD AUTO: 0.5 % — SIGNIFICANT CHANGE UP (ref 0–2)
BILIRUB SERPL-MCNC: 0.6 MG/DL — SIGNIFICANT CHANGE UP (ref 0.2–1.2)
BUN SERPL-MCNC: 9 MG/DL — SIGNIFICANT CHANGE UP (ref 7–23)
CALCIUM SERPL-MCNC: 9.1 MG/DL — SIGNIFICANT CHANGE UP (ref 8.4–10.5)
CHLORIDE SERPL-SCNC: 109 MMOL/L — HIGH (ref 96–108)
CO2 SERPL-SCNC: 19 MMOL/L — LOW (ref 22–31)
CREAT SERPL-MCNC: 0.99 MG/DL — SIGNIFICANT CHANGE UP (ref 0.5–1.3)
EGFR: 82 ML/MIN/1.73M2 — SIGNIFICANT CHANGE UP
EGFR: 82 ML/MIN/1.73M2 — SIGNIFICANT CHANGE UP
EOSINOPHIL # BLD AUTO: 0.01 K/UL — SIGNIFICANT CHANGE UP (ref 0–0.5)
EOSINOPHIL NFR BLD AUTO: 0.3 % — SIGNIFICANT CHANGE UP (ref 0–6)
GLUCOSE SERPL-MCNC: 105 MG/DL — HIGH (ref 70–99)
HCT VFR BLD CALC: 26.8 % — LOW (ref 39–50)
HGB BLD-MCNC: 8.8 G/DL — LOW (ref 13–17)
IMM GRANULOCYTES # BLD AUTO: 0.06 K/UL — SIGNIFICANT CHANGE UP (ref 0–0.07)
IMM GRANULOCYTES NFR BLD AUTO: 1.5 % — HIGH (ref 0–0.9)
IMMATURE PLATELET FRACTION #: 6.3 K/UL — SIGNIFICANT CHANGE UP (ref 3.9–12.5)
IMMATURE PLATELET FRACTION %: 10.1 % — HIGH (ref 1.6–7.1)
LDH SERPL L TO P-CCNC: 249 U/L — HIGH (ref 50–242)
LYMPHOCYTES # BLD AUTO: 1.19 K/UL — SIGNIFICANT CHANGE UP (ref 1–3.3)
LYMPHOCYTES NFR BLD AUTO: 30.1 % — SIGNIFICANT CHANGE UP (ref 13–44)
MAGNESIUM SERPL-MCNC: 1.8 MG/DL — SIGNIFICANT CHANGE UP (ref 1.6–2.6)
MCHC RBC-ENTMCNC: 29.8 PG — SIGNIFICANT CHANGE UP (ref 27–34)
MCHC RBC-ENTMCNC: 32.8 G/DL — SIGNIFICANT CHANGE UP (ref 32–36)
MCV RBC AUTO: 90.8 FL — SIGNIFICANT CHANGE UP (ref 80–100)
MONOCYTES # BLD AUTO: 0.98 K/UL — HIGH (ref 0–0.9)
MONOCYTES NFR BLD AUTO: 24.7 % — HIGH (ref 2–14)
NEUTROPHILS # BLD AUTO: 1.7 K/UL — LOW (ref 1.8–7.4)
NEUTROPHILS NFR BLD AUTO: 42.9 % — LOW (ref 43–77)
NRBC # BLD AUTO: 0 K/UL — SIGNIFICANT CHANGE UP (ref 0–0)
NRBC # FLD: 0 K/UL — SIGNIFICANT CHANGE UP (ref 0–0)
NRBC BLD AUTO-RTO: 0 /100 WBCS — SIGNIFICANT CHANGE UP (ref 0–0)
PHOSPHATE SERPL-MCNC: 3.5 MG/DL — SIGNIFICANT CHANGE UP (ref 2.5–4.5)
PLATELET # BLD AUTO: 62 K/UL — LOW (ref 150–400)
PMV BLD: 13.1 FL — HIGH (ref 7–13)
POTASSIUM SERPL-MCNC: 3.3 MMOL/L — LOW (ref 3.5–5.3)
POTASSIUM SERPL-SCNC: 3.3 MMOL/L — LOW (ref 3.5–5.3)
PROT SERPL-MCNC: 5.5 G/DL — LOW (ref 6–8.3)
RBC # BLD: 2.95 M/UL — LOW (ref 4.2–5.8)
RBC # FLD: 18.5 % — HIGH (ref 10.3–14.5)
SODIUM SERPL-SCNC: 144 MMOL/L — SIGNIFICANT CHANGE UP (ref 135–145)
URATE SERPL-MCNC: 4.4 MG/DL — SIGNIFICANT CHANGE UP (ref 3.4–8.8)
WBC # BLD: 3.96 K/UL — SIGNIFICANT CHANGE UP (ref 3.8–10.5)
WBC # FLD AUTO: 3.96 K/UL — SIGNIFICANT CHANGE UP (ref 3.8–10.5)

## 2025-07-27 PROCEDURE — 99233 SBSQ HOSP IP/OBS HIGH 50: CPT

## 2025-07-27 RX ADMIN — Medication 100 MILLIGRAM(S): at 05:49

## 2025-07-27 RX ADMIN — LIDOCAINE HYDROCHLORIDE 2 MILLILITER(S): 20 JELLY TOPICAL at 23:33

## 2025-07-27 RX ADMIN — Medication 100 MILLIGRAM(S): at 13:36

## 2025-07-27 RX ADMIN — LIDOCAINE HYDROCHLORIDE 2 MILLILITER(S): 20 JELLY TOPICAL at 17:13

## 2025-07-27 RX ADMIN — Medication 15 MILLILITER(S): at 23:33

## 2025-07-27 RX ADMIN — Medication 15 MILLILITER(S): at 17:14

## 2025-07-27 RX ADMIN — MIDODRINE HYDROCHLORIDE 10 MILLIGRAM(S): 5 TABLET ORAL at 13:36

## 2025-07-27 RX ADMIN — LIDOCAINE HYDROCHLORIDE 1 PATCH: 20 JELLY TOPICAL at 09:45

## 2025-07-27 RX ADMIN — MIDODRINE HYDROCHLORIDE 10 MILLIGRAM(S): 5 TABLET ORAL at 21:25

## 2025-07-27 RX ADMIN — MIDODRINE HYDROCHLORIDE 10 MILLIGRAM(S): 5 TABLET ORAL at 05:49

## 2025-07-27 RX ADMIN — Medication 500 MILLIGRAM(S): at 17:14

## 2025-07-27 RX ADMIN — LIDOCAINE HYDROCHLORIDE 1 PATCH: 20 JELLY TOPICAL at 07:11

## 2025-07-27 RX ADMIN — COLCHICINE 0.6 MILLIGRAM(S): 0.6 TABLET, FILM COATED ORAL at 12:01

## 2025-07-27 RX ADMIN — Medication 1 APPLICATION(S): at 05:58

## 2025-07-27 RX ADMIN — Medication 300 MILLIGRAM(S): at 12:01

## 2025-07-27 RX ADMIN — Medication 15 MILLILITER(S): at 12:01

## 2025-07-27 RX ADMIN — Medication 500 MILLIGRAM(S): at 05:49

## 2025-07-27 RX ADMIN — Medication 100 MILLIGRAM(S): at 21:25

## 2025-07-27 RX ADMIN — Medication 40 MILLIGRAM(S): at 05:49

## 2025-07-27 RX ADMIN — LIDOCAINE HYDROCHLORIDE 2 MILLILITER(S): 20 JELLY TOPICAL at 05:48

## 2025-07-27 RX ADMIN — LIDOCAINE HYDROCHLORIDE 2 MILLILITER(S): 20 JELLY TOPICAL at 12:01

## 2025-07-27 RX ADMIN — TAMSULOSIN HYDROCHLORIDE 0.4 MILLIGRAM(S): 0.4 CAPSULE ORAL at 21:25

## 2025-07-27 RX ADMIN — Medication 15 MILLILITER(S): at 06:02

## 2025-07-27 RX ADMIN — Medication 40 MILLIEQUIVALENT(S): at 12:00

## 2025-07-28 ENCOUNTER — APPOINTMENT (OUTPATIENT)
Dept: HEMATOLOGY ONCOLOGY | Facility: CLINIC | Age: 70
End: 2025-07-28

## 2025-07-28 LAB
ALBUMIN SERPL ELPH-MCNC: 2.9 G/DL — LOW (ref 3.3–5)
ALP SERPL-CCNC: 129 U/L — HIGH (ref 40–120)
ALT FLD-CCNC: 26 U/L — SIGNIFICANT CHANGE UP (ref 10–45)
ANION GAP SERPL CALC-SCNC: 10 MMOL/L — SIGNIFICANT CHANGE UP (ref 5–17)
APTT BLD: 25.7 SEC — LOW (ref 26.1–36.8)
AST SERPL-CCNC: 18 U/L — SIGNIFICANT CHANGE UP (ref 10–40)
BASOPHILS # BLD AUTO: 0.02 K/UL — SIGNIFICANT CHANGE UP (ref 0–0.2)
BASOPHILS NFR BLD AUTO: 0.6 % — SIGNIFICANT CHANGE UP (ref 0–2)
BILIRUB SERPL-MCNC: 0.5 MG/DL — SIGNIFICANT CHANGE UP (ref 0.2–1.2)
BUN SERPL-MCNC: 5 MG/DL — LOW (ref 7–23)
CALCIUM SERPL-MCNC: 7.3 MG/DL — LOW (ref 8.4–10.5)
CHLORIDE SERPL-SCNC: 116 MMOL/L — HIGH (ref 96–108)
CO2 SERPL-SCNC: 19 MMOL/L — LOW (ref 22–31)
CORTIS AM PEAK SERPL-MCNC: 18.9 UG/DL — HIGH (ref 6–18.4)
CREAT SERPL-MCNC: 0.75 MG/DL — SIGNIFICANT CHANGE UP (ref 0.5–1.3)
EGFR: 98 ML/MIN/1.73M2 — SIGNIFICANT CHANGE UP
EGFR: 98 ML/MIN/1.73M2 — SIGNIFICANT CHANGE UP
EOSINOPHIL # BLD AUTO: 0.02 K/UL — SIGNIFICANT CHANGE UP (ref 0–0.5)
EOSINOPHIL NFR BLD AUTO: 0.6 % — SIGNIFICANT CHANGE UP (ref 0–6)
GLUCOSE SERPL-MCNC: 78 MG/DL — SIGNIFICANT CHANGE UP (ref 70–99)
HCT VFR BLD CALC: 22.2 % — LOW (ref 39–50)
HGB BLD-MCNC: 7.1 G/DL — LOW (ref 13–17)
IMM GRANULOCYTES # BLD AUTO: 0.06 K/UL — SIGNIFICANT CHANGE UP (ref 0–0.07)
IMM GRANULOCYTES NFR BLD AUTO: 1.8 % — HIGH (ref 0–0.9)
IMMATURE PLATELET FRACTION #: 4 K/UL — SIGNIFICANT CHANGE UP (ref 3.9–12.5)
IMMATURE PLATELET FRACTION %: 7.4 % — HIGH (ref 1.6–7.1)
INR BLD: 1.41 RATIO — HIGH (ref 0.85–1.16)
LDH SERPL L TO P-CCNC: 204 U/L — SIGNIFICANT CHANGE UP (ref 50–242)
LYMPHOCYTES # BLD AUTO: 1.16 K/UL — SIGNIFICANT CHANGE UP (ref 1–3.3)
LYMPHOCYTES NFR BLD AUTO: 35.4 % — SIGNIFICANT CHANGE UP (ref 13–44)
MAGNESIUM SERPL-MCNC: 1.4 MG/DL — LOW (ref 1.6–2.6)
MCHC RBC-ENTMCNC: 29.2 PG — SIGNIFICANT CHANGE UP (ref 27–34)
MCHC RBC-ENTMCNC: 32 G/DL — SIGNIFICANT CHANGE UP (ref 32–36)
MCV RBC AUTO: 91.4 FL — SIGNIFICANT CHANGE UP (ref 80–100)
MONOCYTES # BLD AUTO: 0.82 K/UL — SIGNIFICANT CHANGE UP (ref 0–0.9)
MONOCYTES NFR BLD AUTO: 25 % — HIGH (ref 2–14)
NEUTROPHILS # BLD AUTO: 1.2 K/UL — LOW (ref 1.8–7.4)
NEUTROPHILS NFR BLD AUTO: 36.6 % — LOW (ref 43–77)
NRBC # BLD AUTO: 0.02 K/UL — HIGH (ref 0–0)
NRBC # FLD: 0.02 K/UL — HIGH (ref 0–0)
NRBC BLD AUTO-RTO: 0 /100 WBCS — SIGNIFICANT CHANGE UP (ref 0–0)
PHOSPHATE SERPL-MCNC: 3.1 MG/DL — SIGNIFICANT CHANGE UP (ref 2.5–4.5)
PLATELET # BLD AUTO: 54 K/UL — LOW (ref 150–400)
PMV BLD: 12.9 FL — SIGNIFICANT CHANGE UP (ref 7–13)
POTASSIUM SERPL-MCNC: 2.9 MMOL/L — CRITICAL LOW (ref 3.5–5.3)
POTASSIUM SERPL-SCNC: 2.9 MMOL/L — CRITICAL LOW (ref 3.5–5.3)
PROT SERPL-MCNC: 4.4 G/DL — LOW (ref 6–8.3)
PROTHROM AB SERPL-ACNC: 16.2 SEC — HIGH (ref 9.9–13.4)
RBC # BLD: 2.43 M/UL — LOW (ref 4.2–5.8)
RBC # FLD: 18.4 % — HIGH (ref 10.3–14.5)
SODIUM SERPL-SCNC: 145 MMOL/L — SIGNIFICANT CHANGE UP (ref 135–145)
URATE SERPL-MCNC: 3.4 MG/DL — SIGNIFICANT CHANGE UP (ref 3.4–8.8)
WBC # BLD: 3.28 K/UL — LOW (ref 3.8–10.5)
WBC # FLD AUTO: 3.28 K/UL — LOW (ref 3.8–10.5)

## 2025-07-28 RX ORDER — MAGNESIUM SULFATE 500 MG/ML
1 SYRINGE (ML) INJECTION ONCE
Refills: 0 | Status: COMPLETED | OUTPATIENT
Start: 2025-07-28 | End: 2025-07-28

## 2025-07-28 RX ADMIN — Medication 50 MILLIEQUIVALENT(S): at 07:51

## 2025-07-28 RX ADMIN — LIDOCAINE HYDROCHLORIDE 2 MILLILITER(S): 20 JELLY TOPICAL at 17:19

## 2025-07-28 RX ADMIN — Medication 50 MILLIEQUIVALENT(S): at 09:09

## 2025-07-28 RX ADMIN — LIDOCAINE HYDROCHLORIDE 2 MILLILITER(S): 20 JELLY TOPICAL at 11:09

## 2025-07-28 RX ADMIN — Medication 100 MILLIGRAM(S): at 21:08

## 2025-07-28 RX ADMIN — Medication 40 MILLIEQUIVALENT(S): at 09:08

## 2025-07-28 RX ADMIN — MIDODRINE HYDROCHLORIDE 10 MILLIGRAM(S): 5 TABLET ORAL at 05:14

## 2025-07-28 RX ADMIN — Medication 40 MILLIGRAM(S): at 05:14

## 2025-07-28 RX ADMIN — LIDOCAINE HYDROCHLORIDE 2 MILLILITER(S): 20 JELLY TOPICAL at 23:19

## 2025-07-28 RX ADMIN — Medication 1000 MILLILITER(S): at 14:53

## 2025-07-28 RX ADMIN — Medication 100 MILLIGRAM(S): at 05:14

## 2025-07-28 RX ADMIN — Medication 100 GRAM(S): at 06:45

## 2025-07-28 RX ADMIN — Medication 15 MILLILITER(S): at 17:19

## 2025-07-28 RX ADMIN — Medication 1 APPLICATION(S): at 05:15

## 2025-07-28 RX ADMIN — MIDODRINE HYDROCHLORIDE 10 MILLIGRAM(S): 5 TABLET ORAL at 21:08

## 2025-07-28 RX ADMIN — Medication 15 MILLILITER(S): at 11:09

## 2025-07-28 RX ADMIN — Medication 500 MILLIGRAM(S): at 17:19

## 2025-07-28 RX ADMIN — LIDOCAINE HYDROCHLORIDE 2 MILLILITER(S): 20 JELLY TOPICAL at 05:15

## 2025-07-28 RX ADMIN — MIDODRINE HYDROCHLORIDE 10 MILLIGRAM(S): 5 TABLET ORAL at 12:56

## 2025-07-28 RX ADMIN — Medication 100 MILLIGRAM(S): at 13:14

## 2025-07-28 RX ADMIN — Medication 15 MILLILITER(S): at 05:15

## 2025-07-28 RX ADMIN — Medication 40 MILLIEQUIVALENT(S): at 06:44

## 2025-07-28 RX ADMIN — Medication 50 MILLIEQUIVALENT(S): at 10:21

## 2025-07-28 RX ADMIN — COLCHICINE 0.6 MILLIGRAM(S): 0.6 TABLET, FILM COATED ORAL at 11:09

## 2025-07-28 RX ADMIN — Medication 300 MILLIGRAM(S): at 11:09

## 2025-07-28 RX ADMIN — Medication 500 MILLIGRAM(S): at 05:14

## 2025-07-28 RX ADMIN — Medication 30 MILLILITER(S): at 21:09

## 2025-07-29 LAB
ALBUMIN SERPL ELPH-MCNC: 3.6 G/DL — SIGNIFICANT CHANGE UP (ref 3.3–5)
ALP SERPL-CCNC: 163 U/L — HIGH (ref 40–120)
ALT FLD-CCNC: 32 U/L — SIGNIFICANT CHANGE UP (ref 10–45)
ANION GAP SERPL CALC-SCNC: 11 MMOL/L — SIGNIFICANT CHANGE UP (ref 5–17)
AST SERPL-CCNC: 26 U/L — SIGNIFICANT CHANGE UP (ref 10–40)
BASOPHILS # BLD AUTO: 0.02 K/UL — SIGNIFICANT CHANGE UP (ref 0–0.2)
BASOPHILS NFR BLD AUTO: 0.4 % — SIGNIFICANT CHANGE UP (ref 0–2)
BILIRUB SERPL-MCNC: 1 MG/DL — SIGNIFICANT CHANGE UP (ref 0.2–1.2)
BLD GP AB SCN SERPL QL: NEGATIVE — SIGNIFICANT CHANGE UP
BUN SERPL-MCNC: 7 MG/DL — SIGNIFICANT CHANGE UP (ref 7–23)
CALCIUM SERPL-MCNC: 9.1 MG/DL — SIGNIFICANT CHANGE UP (ref 8.4–10.5)
CHLORIDE SERPL-SCNC: 111 MMOL/L — HIGH (ref 96–108)
CO2 SERPL-SCNC: 20 MMOL/L — LOW (ref 22–31)
CREAT SERPL-MCNC: 0.93 MG/DL — SIGNIFICANT CHANGE UP (ref 0.5–1.3)
EGFR: 89 ML/MIN/1.73M2 — SIGNIFICANT CHANGE UP
EGFR: 89 ML/MIN/1.73M2 — SIGNIFICANT CHANGE UP
EOSINOPHIL # BLD AUTO: 0.02 K/UL — SIGNIFICANT CHANGE UP (ref 0–0.5)
EOSINOPHIL NFR BLD AUTO: 0.4 % — SIGNIFICANT CHANGE UP (ref 0–6)
GLUCOSE SERPL-MCNC: 98 MG/DL — SIGNIFICANT CHANGE UP (ref 70–99)
HCT VFR BLD CALC: 30.6 % — LOW (ref 39–50)
HGB BLD-MCNC: 10.1 G/DL — LOW (ref 13–17)
IMM GRANULOCYTES # BLD AUTO: 0.05 K/UL — SIGNIFICANT CHANGE UP (ref 0–0.07)
IMM GRANULOCYTES NFR BLD AUTO: 1 % — HIGH (ref 0–0.9)
IMMATURE PLATELET FRACTION #: 5.7 K/UL — SIGNIFICANT CHANGE UP (ref 3.9–12.5)
IMMATURE PLATELET FRACTION %: 7.6 % — HIGH (ref 1.6–7.1)
LDH SERPL L TO P-CCNC: 298 U/L — HIGH (ref 50–242)
LYMPHOCYTES # BLD AUTO: 1.19 K/UL — SIGNIFICANT CHANGE UP (ref 1–3.3)
LYMPHOCYTES NFR BLD AUTO: 24.1 % — SIGNIFICANT CHANGE UP (ref 13–44)
MAGNESIUM SERPL-MCNC: 1.9 MG/DL — SIGNIFICANT CHANGE UP (ref 1.6–2.6)
MCHC RBC-ENTMCNC: 30.1 PG — SIGNIFICANT CHANGE UP (ref 27–34)
MCHC RBC-ENTMCNC: 33 G/DL — SIGNIFICANT CHANGE UP (ref 32–36)
MCV RBC AUTO: 91.1 FL — SIGNIFICANT CHANGE UP (ref 80–100)
MONOCYTES # BLD AUTO: 1.26 K/UL — HIGH (ref 0–0.9)
MONOCYTES NFR BLD AUTO: 25.5 % — HIGH (ref 2–14)
NEUTROPHILS # BLD AUTO: 2.4 K/UL — SIGNIFICANT CHANGE UP (ref 1.8–7.4)
NEUTROPHILS NFR BLD AUTO: 48.6 % — SIGNIFICANT CHANGE UP (ref 43–77)
NRBC # BLD AUTO: 0.02 K/UL — HIGH (ref 0–0)
NRBC # FLD: 0.02 K/UL — HIGH (ref 0–0)
NRBC BLD AUTO-RTO: 0 /100 WBCS — SIGNIFICANT CHANGE UP (ref 0–0)
PHOSPHATE SERPL-MCNC: 3 MG/DL — SIGNIFICANT CHANGE UP (ref 2.5–4.5)
PLATELET # BLD AUTO: 75 K/UL — LOW (ref 150–400)
PMV BLD: 12 FL — SIGNIFICANT CHANGE UP (ref 7–13)
POTASSIUM SERPL-MCNC: 4.1 MMOL/L — SIGNIFICANT CHANGE UP (ref 3.5–5.3)
POTASSIUM SERPL-SCNC: 4.1 MMOL/L — SIGNIFICANT CHANGE UP (ref 3.5–5.3)
PROT SERPL-MCNC: 5.5 G/DL — LOW (ref 6–8.3)
RBC # BLD: 3.36 M/UL — LOW (ref 4.2–5.8)
RBC # FLD: 19.1 % — HIGH (ref 10.3–14.5)
RH IG SCN BLD-IMP: POSITIVE — SIGNIFICANT CHANGE UP
SODIUM SERPL-SCNC: 142 MMOL/L — SIGNIFICANT CHANGE UP (ref 135–145)
URATE SERPL-MCNC: 4 MG/DL — SIGNIFICANT CHANGE UP (ref 3.4–8.8)
WBC # BLD: 4.94 K/UL — SIGNIFICANT CHANGE UP (ref 3.8–10.5)
WBC # FLD AUTO: 4.94 K/UL — SIGNIFICANT CHANGE UP (ref 3.8–10.5)

## 2025-07-29 PROCEDURE — 99233 SBSQ HOSP IP/OBS HIGH 50: CPT

## 2025-07-29 RX ORDER — LOPERAMIDE HCL 1 MG/7.5ML
2 SOLUTION ORAL EVERY 4 HOURS
Refills: 0 | Status: DISCONTINUED | OUTPATIENT
Start: 2025-07-29 | End: 2025-07-30

## 2025-07-29 RX ORDER — HYDROMORPHONE/SOD CHLOR,ISO/PF 2 MG/10 ML
4 SYRINGE (ML) INJECTION EVERY 4 HOURS
Refills: 0 | Status: DISCONTINUED | OUTPATIENT
Start: 2025-07-29 | End: 2025-07-30

## 2025-07-29 RX ADMIN — Medication 100 MILLIGRAM(S): at 21:25

## 2025-07-29 RX ADMIN — MIDODRINE HYDROCHLORIDE 10 MILLIGRAM(S): 5 TABLET ORAL at 21:25

## 2025-07-29 RX ADMIN — LIDOCAINE HYDROCHLORIDE 2 MILLILITER(S): 20 JELLY TOPICAL at 17:27

## 2025-07-29 RX ADMIN — Medication 2 MILLIGRAM(S): at 22:39

## 2025-07-29 RX ADMIN — Medication 100 MILLIGRAM(S): at 05:26

## 2025-07-29 RX ADMIN — Medication 100 MILLIGRAM(S): at 13:20

## 2025-07-29 RX ADMIN — Medication 300 MILLIGRAM(S): at 13:20

## 2025-07-29 RX ADMIN — Medication 500 MILLIGRAM(S): at 17:28

## 2025-07-29 RX ADMIN — Medication 2 MILLIGRAM(S): at 21:39

## 2025-07-29 RX ADMIN — MIDODRINE HYDROCHLORIDE 10 MILLIGRAM(S): 5 TABLET ORAL at 13:20

## 2025-07-29 RX ADMIN — Medication 15 MILLILITER(S): at 13:21

## 2025-07-29 RX ADMIN — MIDODRINE HYDROCHLORIDE 10 MILLIGRAM(S): 5 TABLET ORAL at 05:26

## 2025-07-29 RX ADMIN — Medication 40 MILLIGRAM(S): at 06:20

## 2025-07-29 RX ADMIN — Medication 500 MILLIGRAM(S): at 05:26

## 2025-07-29 RX ADMIN — LIDOCAINE HYDROCHLORIDE 2 MILLILITER(S): 20 JELLY TOPICAL at 05:26

## 2025-07-29 RX ADMIN — Medication 1 APPLICATION(S): at 06:20

## 2025-07-29 RX ADMIN — Medication 15 MILLILITER(S): at 05:25

## 2025-07-29 RX ADMIN — Medication 15 MILLILITER(S): at 17:28

## 2025-07-29 RX ADMIN — LIDOCAINE HYDROCHLORIDE 2 MILLILITER(S): 20 JELLY TOPICAL at 13:21

## 2025-07-30 ENCOUNTER — RESULT REVIEW (OUTPATIENT)
Age: 70
End: 2025-07-30

## 2025-07-30 VITALS
OXYGEN SATURATION: 97 % | HEART RATE: 87 BPM | SYSTOLIC BLOOD PRESSURE: 146 MMHG | TEMPERATURE: 98 F | DIASTOLIC BLOOD PRESSURE: 89 MMHG | RESPIRATION RATE: 18 BRPM

## 2025-07-30 LAB
ALBUMIN SERPL ELPH-MCNC: 3.4 G/DL — SIGNIFICANT CHANGE UP (ref 3.3–5)
ALP SERPL-CCNC: 145 U/L — HIGH (ref 40–120)
ALT FLD-CCNC: 26 U/L — SIGNIFICANT CHANGE UP (ref 10–45)
ANION GAP SERPL CALC-SCNC: 14 MMOL/L — SIGNIFICANT CHANGE UP (ref 5–17)
ANISOCYTOSIS BLD QL: SLIGHT — SIGNIFICANT CHANGE UP
AST SERPL-CCNC: 18 U/L — SIGNIFICANT CHANGE UP (ref 10–40)
BASOPHILS # BLD AUTO: 0.03 K/UL — SIGNIFICANT CHANGE UP (ref 0–0.2)
BASOPHILS # BLD MANUAL: 0 K/UL — SIGNIFICANT CHANGE UP (ref 0–0.2)
BASOPHILS NFR BLD AUTO: 0.5 % — SIGNIFICANT CHANGE UP (ref 0–2)
BASOPHILS NFR BLD MANUAL: 0 % — SIGNIFICANT CHANGE UP (ref 0–2)
BILIRUB SERPL-MCNC: 0.7 MG/DL — SIGNIFICANT CHANGE UP (ref 0.2–1.2)
BUN SERPL-MCNC: 9 MG/DL — SIGNIFICANT CHANGE UP (ref 7–23)
CALCIUM SERPL-MCNC: 9 MG/DL — SIGNIFICANT CHANGE UP (ref 8.4–10.5)
CHLORIDE SERPL-SCNC: 108 MMOL/L — SIGNIFICANT CHANGE UP (ref 96–108)
CO2 SERPL-SCNC: 19 MMOL/L — LOW (ref 22–31)
CREAT SERPL-MCNC: 0.89 MG/DL — SIGNIFICANT CHANGE UP (ref 0.5–1.3)
CULTURE RESULTS: SIGNIFICANT CHANGE UP
DACRYOCYTES BLD QL SMEAR: SLIGHT — SIGNIFICANT CHANGE UP
EGFR: 93 ML/MIN/1.73M2 — SIGNIFICANT CHANGE UP
EGFR: 93 ML/MIN/1.73M2 — SIGNIFICANT CHANGE UP
EOSINOPHIL # BLD AUTO: 0.02 K/UL — SIGNIFICANT CHANGE UP (ref 0–0.5)
EOSINOPHIL # BLD MANUAL: 0 K/UL — SIGNIFICANT CHANGE UP (ref 0–0.5)
EOSINOPHIL NFR BLD AUTO: 0.4 % — SIGNIFICANT CHANGE UP (ref 0–6)
EOSINOPHIL NFR BLD MANUAL: 0 % — SIGNIFICANT CHANGE UP (ref 0–6)
GLUCOSE SERPL-MCNC: 87 MG/DL — SIGNIFICANT CHANGE UP (ref 70–99)
HCT VFR BLD CALC: 29 % — LOW (ref 39–50)
HGB BLD-MCNC: 9.6 G/DL — LOW (ref 13–17)
IMM GRANULOCYTES # BLD AUTO: 0.05 K/UL — SIGNIFICANT CHANGE UP (ref 0–0.07)
IMM GRANULOCYTES NFR BLD AUTO: 0.9 % — SIGNIFICANT CHANGE UP (ref 0–0.9)
IMMATURE PLATELET FRACTION #: 5.5 K/UL — SIGNIFICANT CHANGE UP (ref 3.9–12.5)
IMMATURE PLATELET FRACTION %: 7.6 % — HIGH (ref 1.6–7.1)
LDH SERPL L TO P-CCNC: 251 U/L — HIGH (ref 50–242)
LYMPHOCYTES # BLD AUTO: 1.36 K/UL — SIGNIFICANT CHANGE UP (ref 1–3.3)
LYMPHOCYTES # BLD MANUAL: 1.54 K/UL — SIGNIFICANT CHANGE UP (ref 1–3.3)
LYMPHOCYTES NFR BLD AUTO: 24.2 % — SIGNIFICANT CHANGE UP (ref 13–44)
LYMPHOCYTES NFR BLD MANUAL: 27.4 % — SIGNIFICANT CHANGE UP (ref 13–44)
MAGNESIUM SERPL-MCNC: 1.7 MG/DL — SIGNIFICANT CHANGE UP (ref 1.6–2.6)
MCHC RBC-ENTMCNC: 30.2 PG — SIGNIFICANT CHANGE UP (ref 27–34)
MCHC RBC-ENTMCNC: 33.1 G/DL — SIGNIFICANT CHANGE UP (ref 32–36)
MCV RBC AUTO: 91.2 FL — SIGNIFICANT CHANGE UP (ref 80–100)
MONOCYTES # BLD AUTO: 1.54 K/UL — HIGH (ref 0–0.9)
MONOCYTES # BLD MANUAL: 0.43 K/UL — SIGNIFICANT CHANGE UP (ref 0–0.9)
MONOCYTES NFR BLD AUTO: 27.4 % — HIGH (ref 2–14)
MONOCYTES NFR BLD MANUAL: 7.7 % — SIGNIFICANT CHANGE UP (ref 2–14)
NEUTROPHILS # BLD AUTO: 2.63 K/UL — SIGNIFICANT CHANGE UP (ref 1.8–7.4)
NEUTROPHILS # BLD MANUAL: 3.65 K/UL — SIGNIFICANT CHANGE UP (ref 1.8–7.4)
NEUTROPHILS NFR BLD AUTO: 46.6 % — SIGNIFICANT CHANGE UP (ref 43–77)
NEUTROPHILS NFR BLD MANUAL: 64.9 % — SIGNIFICANT CHANGE UP (ref 43–77)
NRBC # BLD AUTO: 0 K/UL — SIGNIFICANT CHANGE UP (ref 0–0)
NRBC # FLD: 0 K/UL — SIGNIFICANT CHANGE UP (ref 0–0)
NRBC BLD AUTO-RTO: 0 /100 WBCS — SIGNIFICANT CHANGE UP (ref 0–0)
OVALOCYTES BLD QL SMEAR: SLIGHT — SIGNIFICANT CHANGE UP
PHOSPHATE SERPL-MCNC: 3.9 MG/DL — SIGNIFICANT CHANGE UP (ref 2.5–4.5)
PLAT MORPH BLD: NORMAL — SIGNIFICANT CHANGE UP
PLATELET # BLD AUTO: 72 K/UL — LOW (ref 150–400)
PMV BLD: 12.7 FL — SIGNIFICANT CHANGE UP (ref 7–13)
POIKILOCYTOSIS BLD QL AUTO: SLIGHT — SIGNIFICANT CHANGE UP
POTASSIUM SERPL-MCNC: 3.6 MMOL/L — SIGNIFICANT CHANGE UP (ref 3.5–5.3)
POTASSIUM SERPL-SCNC: 3.6 MMOL/L — SIGNIFICANT CHANGE UP (ref 3.5–5.3)
PROT SERPL-MCNC: 5.3 G/DL — LOW (ref 6–8.3)
RBC # BLD: 3.18 M/UL — LOW (ref 4.2–5.8)
RBC # FLD: 19.6 % — HIGH (ref 10.3–14.5)
RBC BLD AUTO: ABNORMAL
SODIUM SERPL-SCNC: 141 MMOL/L — SIGNIFICANT CHANGE UP (ref 135–145)
SPECIMEN SOURCE: SIGNIFICANT CHANGE UP
URATE SERPL-MCNC: 3.8 MG/DL — SIGNIFICANT CHANGE UP (ref 3.4–8.8)
WBC # BLD: 5.63 K/UL — SIGNIFICANT CHANGE UP (ref 3.8–10.5)
WBC # FLD AUTO: 5.63 K/UL — SIGNIFICANT CHANGE UP (ref 3.8–10.5)

## 2025-07-30 PROCEDURE — 85730 THROMBOPLASTIN TIME PARTIAL: CPT

## 2025-07-30 PROCEDURE — 96374 THER/PROPH/DIAG INJ IV PUSH: CPT

## 2025-07-30 PROCEDURE — 80202 ASSAY OF VANCOMYCIN: CPT

## 2025-07-30 PROCEDURE — P9100: CPT

## 2025-07-30 PROCEDURE — 83605 ASSAY OF LACTIC ACID: CPT

## 2025-07-30 PROCEDURE — 87070 CULTURE OTHR SPECIMN AEROBIC: CPT

## 2025-07-30 PROCEDURE — 85027 COMPLETE CBC AUTOMATED: CPT

## 2025-07-30 PROCEDURE — 89060 EXAM SYNOVIAL FLUID CRYSTALS: CPT

## 2025-07-30 PROCEDURE — 84132 ASSAY OF SERUM POTASSIUM: CPT

## 2025-07-30 PROCEDURE — 85025 COMPLETE CBC W/AUTO DIFF WBC: CPT

## 2025-07-30 PROCEDURE — 82550 ASSAY OF CK (CPK): CPT

## 2025-07-30 PROCEDURE — 87086 URINE CULTURE/COLONY COUNT: CPT

## 2025-07-30 PROCEDURE — 89051 BODY FLUID CELL COUNT: CPT

## 2025-07-30 PROCEDURE — 87637 SARSCOV2&INF A&B&RSV AMP PRB: CPT

## 2025-07-30 PROCEDURE — 0225U NFCT DS DNA&RNA 21 SARSCOV2: CPT

## 2025-07-30 PROCEDURE — 86900 BLOOD TYPING SEROLOGIC ABO: CPT

## 2025-07-30 PROCEDURE — 87015 SPECIMEN INFECT AGNT CONCNTJ: CPT

## 2025-07-30 PROCEDURE — 71045 X-RAY EXAM CHEST 1 VIEW: CPT

## 2025-07-30 PROCEDURE — 86140 C-REACTIVE PROTEIN: CPT

## 2025-07-30 PROCEDURE — 36569 INSJ PICC 5 YR+ W/O IMAGING: CPT

## 2025-07-30 PROCEDURE — 82533 TOTAL CORTISOL: CPT

## 2025-07-30 PROCEDURE — 86923 COMPATIBILITY TEST ELECTRIC: CPT

## 2025-07-30 PROCEDURE — 73090 X-RAY EXAM OF FOREARM: CPT

## 2025-07-30 PROCEDURE — 83735 ASSAY OF MAGNESIUM: CPT

## 2025-07-30 PROCEDURE — 82803 BLOOD GASES ANY COMBINATION: CPT

## 2025-07-30 PROCEDURE — 85018 HEMOGLOBIN: CPT

## 2025-07-30 PROCEDURE — 73120 X-RAY EXAM OF HAND: CPT

## 2025-07-30 PROCEDURE — 82947 ASSAY GLUCOSE BLOOD QUANT: CPT

## 2025-07-30 PROCEDURE — 82330 ASSAY OF CALCIUM: CPT

## 2025-07-30 PROCEDURE — 73610 X-RAY EXAM OF ANKLE: CPT

## 2025-07-30 PROCEDURE — 86850 RBC ANTIBODY SCREEN: CPT

## 2025-07-30 PROCEDURE — 84100 ASSAY OF PHOSPHORUS: CPT

## 2025-07-30 PROCEDURE — 84484 ASSAY OF TROPONIN QUANT: CPT

## 2025-07-30 PROCEDURE — 83615 LACTATE (LD) (LDH) ENZYME: CPT

## 2025-07-30 PROCEDURE — 80053 COMPREHEN METABOLIC PANEL: CPT

## 2025-07-30 PROCEDURE — 36415 COLL VENOUS BLD VENIPUNCTURE: CPT

## 2025-07-30 PROCEDURE — 85652 RBC SED RATE AUTOMATED: CPT

## 2025-07-30 PROCEDURE — 87205 SMEAR GRAM STAIN: CPT

## 2025-07-30 PROCEDURE — 85014 HEMATOCRIT: CPT

## 2025-07-30 PROCEDURE — 97116 GAIT TRAINING THERAPY: CPT

## 2025-07-30 PROCEDURE — 84295 ASSAY OF SERUM SODIUM: CPT

## 2025-07-30 PROCEDURE — 82553 CREATINE MB FRACTION: CPT

## 2025-07-30 PROCEDURE — 97535 SELF CARE MNGMENT TRAINING: CPT

## 2025-07-30 PROCEDURE — 97110 THERAPEUTIC EXERCISES: CPT

## 2025-07-30 PROCEDURE — 85610 PROTHROMBIN TIME: CPT

## 2025-07-30 PROCEDURE — 84550 ASSAY OF BLOOD/URIC ACID: CPT

## 2025-07-30 PROCEDURE — 97161 PT EVAL LOW COMPLEX 20 MIN: CPT

## 2025-07-30 PROCEDURE — 93306 TTE W/DOPPLER COMPLETE: CPT

## 2025-07-30 PROCEDURE — 87075 CULTR BACTERIA EXCEPT BLOOD: CPT

## 2025-07-30 PROCEDURE — 97166 OT EVAL MOD COMPLEX 45 MIN: CPT

## 2025-07-30 PROCEDURE — P9040: CPT

## 2025-07-30 PROCEDURE — C1751: CPT

## 2025-07-30 PROCEDURE — 87040 BLOOD CULTURE FOR BACTERIA: CPT

## 2025-07-30 PROCEDURE — 82962 GLUCOSE BLOOD TEST: CPT

## 2025-07-30 PROCEDURE — 82435 ASSAY OF BLOOD CHLORIDE: CPT

## 2025-07-30 PROCEDURE — 73630 X-RAY EXAM OF FOOT: CPT

## 2025-07-30 PROCEDURE — 96375 TX/PRO/DX INJ NEW DRUG ADDON: CPT

## 2025-07-30 PROCEDURE — 73080 X-RAY EXAM OF ELBOW: CPT

## 2025-07-30 PROCEDURE — 93306 TTE W/DOPPLER COMPLETE: CPT | Mod: 26

## 2025-07-30 PROCEDURE — 73110 X-RAY EXAM OF WRIST: CPT

## 2025-07-30 PROCEDURE — 93005 ELECTROCARDIOGRAM TRACING: CPT

## 2025-07-30 PROCEDURE — 99285 EMERGENCY DEPT VISIT HI MDM: CPT | Mod: 25

## 2025-07-30 PROCEDURE — 97530 THERAPEUTIC ACTIVITIES: CPT

## 2025-07-30 PROCEDURE — P9037: CPT

## 2025-07-30 PROCEDURE — 86901 BLOOD TYPING SEROLOGIC RH(D): CPT

## 2025-07-30 PROCEDURE — 36430 TRANSFUSION BLD/BLD COMPNT: CPT

## 2025-07-30 PROCEDURE — 96376 TX/PRO/DX INJ SAME DRUG ADON: CPT

## 2025-07-30 RX ORDER — MIDODRINE HYDROCHLORIDE 5 MG/1
2 TABLET ORAL
Qty: 84 | Refills: 0
Start: 2025-07-30 | End: 2025-08-12

## 2025-07-30 RX ORDER — HYDROMORPHONE/SOD CHLOR,ISO/PF 2 MG/10 ML
1 SYRINGE (ML) INJECTION
Qty: 0 | Refills: 0 | DISCHARGE
Start: 2025-07-30

## 2025-07-30 RX ORDER — COLCHICINE 0.6 MG/1
1 TABLET, FILM COATED ORAL
Qty: 30 | Refills: 1
Start: 2025-07-30 | End: 2025-09-27

## 2025-07-30 RX ORDER — MAGNESIUM SULFATE 500 MG/ML
1 SYRINGE (ML) INJECTION ONCE
Refills: 0 | Status: COMPLETED | OUTPATIENT
Start: 2025-07-30 | End: 2025-07-30

## 2025-07-30 RX ORDER — HYDROMORPHONE/SOD CHLOR,ISO/PF 2 MG/10 ML
2 SYRINGE (ML) INJECTION EVERY 4 HOURS
Refills: 0 | Status: DISCONTINUED | OUTPATIENT
Start: 2025-07-30 | End: 2025-07-30

## 2025-07-30 RX ORDER — OMEPRAZOLE 20 MG/1
1 CAPSULE, DELAYED RELEASE ORAL
Qty: 30 | Refills: 1
Start: 2025-07-30 | End: 2025-09-27

## 2025-07-30 RX ADMIN — Medication 100 MILLIGRAM(S): at 05:02

## 2025-07-30 RX ADMIN — Medication 500 MILLIGRAM(S): at 05:01

## 2025-07-30 RX ADMIN — Medication 1 APPLICATION(S): at 17:14

## 2025-07-30 RX ADMIN — Medication 15 MILLILITER(S): at 12:09

## 2025-07-30 RX ADMIN — Medication 500 MILLIGRAM(S): at 17:14

## 2025-07-30 RX ADMIN — Medication 300 MILLIGRAM(S): at 12:09

## 2025-07-30 RX ADMIN — Medication 20 MILLIEQUIVALENT(S): at 10:46

## 2025-07-30 RX ADMIN — Medication 15 MILLILITER(S): at 17:14

## 2025-07-30 RX ADMIN — LIDOCAINE HYDROCHLORIDE 2 MILLILITER(S): 20 JELLY TOPICAL at 17:14

## 2025-07-30 RX ADMIN — MIDODRINE HYDROCHLORIDE 10 MILLIGRAM(S): 5 TABLET ORAL at 05:02

## 2025-07-30 RX ADMIN — LIDOCAINE HYDROCHLORIDE 2 MILLILITER(S): 20 JELLY TOPICAL at 05:02

## 2025-07-30 RX ADMIN — Medication 40 MILLIGRAM(S): at 09:08

## 2025-07-30 RX ADMIN — MIDODRINE HYDROCHLORIDE 10 MILLIGRAM(S): 5 TABLET ORAL at 13:25

## 2025-07-30 RX ADMIN — LIDOCAINE HYDROCHLORIDE 2 MILLILITER(S): 20 JELLY TOPICAL at 12:09

## 2025-07-30 RX ADMIN — Medication 100 GRAM(S): at 10:46

## 2025-07-30 RX ADMIN — Medication 100 MILLIGRAM(S): at 13:25

## 2025-07-30 RX ADMIN — Medication 15 MILLILITER(S): at 05:02

## 2025-07-31 ENCOUNTER — RX RENEWAL (OUTPATIENT)
Age: 70
End: 2025-07-31

## 2025-07-31 ENCOUNTER — TRANSCRIPTION ENCOUNTER (OUTPATIENT)
Age: 70
End: 2025-07-31

## 2025-08-04 ENCOUNTER — APPOINTMENT (OUTPATIENT)
Dept: HEMATOLOGY ONCOLOGY | Facility: CLINIC | Age: 70
End: 2025-08-04
Payer: MEDICARE

## 2025-08-04 ENCOUNTER — RESULT REVIEW (OUTPATIENT)
Age: 70
End: 2025-08-04

## 2025-08-04 ENCOUNTER — NON-APPOINTMENT (OUTPATIENT)
Age: 70
End: 2025-08-04

## 2025-08-04 ENCOUNTER — APPOINTMENT (OUTPATIENT)
Dept: INFUSION THERAPY | Facility: HOSPITAL | Age: 70
End: 2025-08-04

## 2025-08-04 LAB
ALBUMIN SERPL ELPH-MCNC: 3.6 G/DL — SIGNIFICANT CHANGE UP (ref 3.3–5)
ALP SERPL-CCNC: 159 U/L — HIGH (ref 40–120)
ALT FLD-CCNC: 20 U/L — SIGNIFICANT CHANGE UP (ref 10–45)
ANION GAP SERPL CALC-SCNC: 15 MMOL/L — SIGNIFICANT CHANGE UP (ref 5–17)
AST SERPL-CCNC: 19 U/L — SIGNIFICANT CHANGE UP (ref 10–40)
BASOPHILS # BLD AUTO: 0.03 K/UL — SIGNIFICANT CHANGE UP (ref 0–0.2)
BASOPHILS NFR BLD AUTO: 0.3 % — SIGNIFICANT CHANGE UP (ref 0–2)
BILIRUB SERPL-MCNC: 0.5 MG/DL — SIGNIFICANT CHANGE UP (ref 0.2–1.2)
BUN SERPL-MCNC: 15 MG/DL — SIGNIFICANT CHANGE UP (ref 7–23)
CALCIUM SERPL-MCNC: 9.3 MG/DL — SIGNIFICANT CHANGE UP (ref 8.4–10.5)
CHLORIDE SERPL-SCNC: 105 MMOL/L — SIGNIFICANT CHANGE UP (ref 96–108)
CO2 SERPL-SCNC: 22 MMOL/L — SIGNIFICANT CHANGE UP (ref 22–31)
CREAT SERPL-MCNC: 0.95 MG/DL — SIGNIFICANT CHANGE UP (ref 0.5–1.3)
EGFR: 87 ML/MIN/1.73M2 — SIGNIFICANT CHANGE UP
EGFR: 87 ML/MIN/1.73M2 — SIGNIFICANT CHANGE UP
EOSINOPHIL # BLD AUTO: 0.04 K/UL — SIGNIFICANT CHANGE UP (ref 0–0.5)
EOSINOPHIL NFR BLD AUTO: 0.4 % — SIGNIFICANT CHANGE UP (ref 0–6)
GLUCOSE SERPL-MCNC: 114 MG/DL — HIGH (ref 70–99)
HCT VFR BLD CALC: 29 % — LOW (ref 39–50)
HGB BLD-MCNC: 9.8 G/DL — LOW (ref 13–17)
IMM GRANULOCYTES NFR BLD AUTO: 0.6 % — SIGNIFICANT CHANGE UP (ref 0–0.9)
LDH SERPL L TO P-CCNC: 300 U/L — HIGH (ref 50–242)
LYMPHOCYTES # BLD AUTO: 1.89 K/UL — SIGNIFICANT CHANGE UP (ref 1–3.3)
LYMPHOCYTES # BLD AUTO: 18.5 % — SIGNIFICANT CHANGE UP (ref 13–44)
MCHC RBC-ENTMCNC: 31 PG — SIGNIFICANT CHANGE UP (ref 27–34)
MCHC RBC-ENTMCNC: 33.8 G/DL — SIGNIFICANT CHANGE UP (ref 32–36)
MCV RBC AUTO: 91.8 FL — SIGNIFICANT CHANGE UP (ref 80–100)
MONOCYTES # BLD AUTO: 1.69 K/UL — HIGH (ref 0–0.9)
MONOCYTES NFR BLD AUTO: 16.5 % — HIGH (ref 2–14)
NEUTROPHILS # BLD AUTO: 6.52 K/UL — SIGNIFICANT CHANGE UP (ref 1.8–7.4)
NEUTROPHILS NFR BLD AUTO: 63.7 % — SIGNIFICANT CHANGE UP (ref 43–77)
NRBC BLD AUTO-RTO: 0 /100 WBCS — SIGNIFICANT CHANGE UP (ref 0–0)
PHOSPHATE SERPL-MCNC: 3.4 MG/DL — SIGNIFICANT CHANGE UP (ref 2.5–4.5)
PLATELET # BLD AUTO: 105 K/UL — LOW (ref 150–400)
POTASSIUM SERPL-MCNC: 4 MMOL/L — SIGNIFICANT CHANGE UP (ref 3.5–5.3)
POTASSIUM SERPL-SCNC: 4 MMOL/L — SIGNIFICANT CHANGE UP (ref 3.5–5.3)
PROT SERPL-MCNC: 6.2 G/DL — SIGNIFICANT CHANGE UP (ref 6–8.3)
RBC # BLD: 3.16 M/UL — LOW (ref 4.2–5.8)
RBC # FLD: 20.3 % — HIGH (ref 10.3–14.5)
SODIUM SERPL-SCNC: 142 MMOL/L — SIGNIFICANT CHANGE UP (ref 135–145)
URATE SERPL-MCNC: 3.6 MG/DL — SIGNIFICANT CHANGE UP (ref 3.4–8.8)
WBC # BLD: 10.23 K/UL — SIGNIFICANT CHANGE UP (ref 3.8–10.5)
WBC # FLD AUTO: 10.23 K/UL — SIGNIFICANT CHANGE UP (ref 3.8–10.5)

## 2025-08-04 PROCEDURE — G2211 COMPLEX E/M VISIT ADD ON: CPT

## 2025-08-04 PROCEDURE — 99215 OFFICE O/P EST HI 40 MIN: CPT

## 2025-08-04 RX ORDER — VENETOCLAX 100 MG/1
100 TABLET, FILM COATED ORAL
Qty: 120 | Refills: 0 | Status: ACTIVE | COMMUNITY
Start: 2025-08-04 | End: 1900-01-01

## 2025-08-05 ENCOUNTER — APPOINTMENT (OUTPATIENT)
Dept: HEMATOLOGY ONCOLOGY | Facility: CLINIC | Age: 70
End: 2025-08-05
Payer: MEDICARE

## 2025-08-05 ENCOUNTER — RESULT REVIEW (OUTPATIENT)
Age: 70
End: 2025-08-05

## 2025-08-05 ENCOUNTER — NON-APPOINTMENT (OUTPATIENT)
Age: 70
End: 2025-08-05

## 2025-08-05 ENCOUNTER — APPOINTMENT (OUTPATIENT)
Dept: INFUSION THERAPY | Facility: HOSPITAL | Age: 70
End: 2025-08-05

## 2025-08-05 VITALS
BODY MASS INDEX: 27.59 KG/M2 | HEART RATE: 115 BPM | DIASTOLIC BLOOD PRESSURE: 92 MMHG | RESPIRATION RATE: 18 BRPM | OXYGEN SATURATION: 97 % | SYSTOLIC BLOOD PRESSURE: 131 MMHG | WEIGHT: 194.89 LBS | TEMPERATURE: 98.3 F | HEIGHT: 70.28 IN

## 2025-08-05 DIAGNOSIS — Z01.818 ENCOUNTER FOR OTHER PREPROCEDURAL EXAMINATION: ICD-10-CM

## 2025-08-05 DIAGNOSIS — R35.89 OTHER POLYURIA: ICD-10-CM

## 2025-08-05 LAB
HAV IGM SER-ACNC: SIGNIFICANT CHANGE UP
HBV CORE IGM SER-ACNC: SIGNIFICANT CHANGE UP
HBV SURFACE AG SER-ACNC: SIGNIFICANT CHANGE UP
HCV AB S/CO SERPL IA: 0.05 S/CO — SIGNIFICANT CHANGE UP (ref 0–0.79)
HCV AB SERPL-IMP: SIGNIFICANT CHANGE UP

## 2025-08-05 PROCEDURE — 99215 OFFICE O/P EST HI 40 MIN: CPT | Mod: 25

## 2025-08-05 PROCEDURE — G2212 PROLONG OUTPT/OFFICE VIS: CPT

## 2025-08-05 RX ORDER — FINASTERIDE 5 MG/1
5 TABLET, FILM COATED ORAL DAILY
Qty: 90 | Refills: 3 | Status: ACTIVE | COMMUNITY
Start: 2025-08-05 | End: 1900-01-01

## 2025-08-05 RX ORDER — METOCLOPRAMIDE 10 MG/1
10 TABLET ORAL EVERY 6 HOURS
Qty: 40 | Refills: 3 | Status: ACTIVE | COMMUNITY
Start: 2025-08-05 | End: 1900-01-01

## 2025-08-06 ENCOUNTER — APPOINTMENT (OUTPATIENT)
Dept: INFUSION THERAPY | Facility: HOSPITAL | Age: 70
End: 2025-08-06

## 2025-08-06 LAB
CMV IGG FLD QL: 2.9 U/ML — HIGH
CMV IGG SERPL-IMP: POSITIVE

## 2025-08-07 ENCOUNTER — APPOINTMENT (OUTPATIENT)
Dept: INFUSION THERAPY | Facility: HOSPITAL | Age: 70
End: 2025-08-07

## 2025-08-07 ENCOUNTER — RESULT REVIEW (OUTPATIENT)
Age: 70
End: 2025-08-07

## 2025-08-07 LAB
BASOPHILS # BLD AUTO: 0.02 K/UL — SIGNIFICANT CHANGE UP (ref 0–0.2)
BASOPHILS NFR BLD AUTO: 0.3 % — SIGNIFICANT CHANGE UP (ref 0–2)
EOSINOPHIL # BLD AUTO: 0.07 K/UL — SIGNIFICANT CHANGE UP (ref 0–0.5)
EOSINOPHIL NFR BLD AUTO: 1.1 % — SIGNIFICANT CHANGE UP (ref 0–6)
HCT VFR BLD CALC: 29.8 % — LOW (ref 39–50)
HGB BLD-MCNC: 9.9 G/DL — LOW (ref 13–17)
IMM GRANULOCYTES NFR BLD AUTO: 1.1 % — HIGH (ref 0–0.9)
LYMPHOCYTES # BLD AUTO: 1.84 K/UL — SIGNIFICANT CHANGE UP (ref 1–3.3)
LYMPHOCYTES # BLD AUTO: 29.3 % — SIGNIFICANT CHANGE UP (ref 13–44)
MCHC RBC-ENTMCNC: 30.5 PG — SIGNIFICANT CHANGE UP (ref 27–34)
MCHC RBC-ENTMCNC: 33.2 G/DL — SIGNIFICANT CHANGE UP (ref 32–36)
MCV RBC AUTO: 91.7 FL — SIGNIFICANT CHANGE UP (ref 80–100)
MONOCYTES # BLD AUTO: 0.68 K/UL — SIGNIFICANT CHANGE UP (ref 0–0.9)
MONOCYTES NFR BLD AUTO: 10.8 % — SIGNIFICANT CHANGE UP (ref 2–14)
NEUTROPHILS # BLD AUTO: 3.6 K/UL — SIGNIFICANT CHANGE UP (ref 1.8–7.4)
NEUTROPHILS NFR BLD AUTO: 57.4 % — SIGNIFICANT CHANGE UP (ref 43–77)
NRBC BLD AUTO-RTO: 0 /100 WBCS — SIGNIFICANT CHANGE UP (ref 0–0)
PLATELET # BLD AUTO: 125 K/UL — LOW (ref 150–400)
RBC # BLD: 3.25 M/UL — LOW (ref 4.2–5.8)
RBC # FLD: 20.3 % — HIGH (ref 10.3–14.5)
WBC # BLD: 6.28 K/UL — SIGNIFICANT CHANGE UP (ref 3.8–10.5)
WBC # FLD AUTO: 6.28 K/UL — SIGNIFICANT CHANGE UP (ref 3.8–10.5)

## 2025-08-08 ENCOUNTER — RESULT REVIEW (OUTPATIENT)
Age: 70
End: 2025-08-08

## 2025-08-08 ENCOUNTER — APPOINTMENT (OUTPATIENT)
Dept: INFUSION THERAPY | Facility: HOSPITAL | Age: 70
End: 2025-08-08

## 2025-08-08 DIAGNOSIS — Z86.79 PERSONAL HISTORY OF OTHER DISEASES OF THE CIRCULATORY SYSTEM: ICD-10-CM

## 2025-08-08 LAB
BASOPHILS # BLD AUTO: 0.02 K/UL — SIGNIFICANT CHANGE UP (ref 0–0.2)
BASOPHILS NFR BLD AUTO: 0.3 % — SIGNIFICANT CHANGE UP (ref 0–2)
EOSINOPHIL # BLD AUTO: 0.06 K/UL — SIGNIFICANT CHANGE UP (ref 0–0.5)
EOSINOPHIL NFR BLD AUTO: 1 % — SIGNIFICANT CHANGE UP (ref 0–6)
HCT VFR BLD CALC: 29.1 % — LOW (ref 39–50)
HGB BLD-MCNC: 9.6 G/DL — LOW (ref 13–17)
IMM GRANULOCYTES NFR BLD AUTO: 0.9 % — SIGNIFICANT CHANGE UP (ref 0–0.9)
LYMPHOCYTES # BLD AUTO: 1.59 K/UL — SIGNIFICANT CHANGE UP (ref 1–3.3)
LYMPHOCYTES # BLD AUTO: 27.4 % — SIGNIFICANT CHANGE UP (ref 13–44)
MCHC RBC-ENTMCNC: 30.5 PG — SIGNIFICANT CHANGE UP (ref 27–34)
MCHC RBC-ENTMCNC: 33 G/DL — SIGNIFICANT CHANGE UP (ref 32–36)
MCV RBC AUTO: 92.4 FL — SIGNIFICANT CHANGE UP (ref 80–100)
MONOCYTES # BLD AUTO: 0.65 K/UL — SIGNIFICANT CHANGE UP (ref 0–0.9)
MONOCYTES NFR BLD AUTO: 11.2 % — SIGNIFICANT CHANGE UP (ref 2–14)
NEUTROPHILS # BLD AUTO: 3.43 K/UL — SIGNIFICANT CHANGE UP (ref 1.8–7.4)
NEUTROPHILS NFR BLD AUTO: 59.2 % — SIGNIFICANT CHANGE UP (ref 43–77)
NRBC BLD AUTO-RTO: 0 /100 WBCS — SIGNIFICANT CHANGE UP (ref 0–0)
PLATELET # BLD AUTO: 124 K/UL — LOW (ref 150–400)
RBC # BLD: 3.15 M/UL — LOW (ref 4.2–5.8)
RBC # FLD: 20.4 % — HIGH (ref 10.3–14.5)
WBC # BLD: 5.8 K/UL — SIGNIFICANT CHANGE UP (ref 3.8–10.5)
WBC # FLD AUTO: 5.8 K/UL — SIGNIFICANT CHANGE UP (ref 3.8–10.5)

## 2025-08-08 RX ORDER — MIDODRINE HYDROCHLORIDE 5 MG/1
5 TABLET ORAL
Refills: 0 | Status: ACTIVE | COMMUNITY
Start: 2025-08-08

## 2025-08-09 LAB
ALBUMIN SERPL ELPH-MCNC: 3.7 G/DL — SIGNIFICANT CHANGE UP (ref 3.3–5)
ALP SERPL-CCNC: 133 U/L — HIGH (ref 40–120)
ALT FLD-CCNC: 30 U/L — SIGNIFICANT CHANGE UP (ref 10–50)
ANION GAP SERPL CALC-SCNC: 14 MMOL/L — SIGNIFICANT CHANGE UP (ref 5–17)
AST SERPL-CCNC: 34 U/L — SIGNIFICANT CHANGE UP (ref 10–40)
BILIRUB SERPL-MCNC: 0.4 MG/DL — SIGNIFICANT CHANGE UP (ref 0.2–1.2)
BUN SERPL-MCNC: 12 MG/DL — SIGNIFICANT CHANGE UP (ref 7–23)
CALCIUM SERPL-MCNC: 9.1 MG/DL — SIGNIFICANT CHANGE UP (ref 8.4–10.5)
CHLORIDE SERPL-SCNC: 107 MMOL/L — SIGNIFICANT CHANGE UP (ref 96–108)
CO2 SERPL-SCNC: 23 MMOL/L — SIGNIFICANT CHANGE UP (ref 22–31)
CREAT SERPL-MCNC: 0.79 MG/DL — SIGNIFICANT CHANGE UP (ref 0.5–1.3)
EGFR: 96 ML/MIN/1.73M2 — SIGNIFICANT CHANGE UP
EGFR: 96 ML/MIN/1.73M2 — SIGNIFICANT CHANGE UP
GLUCOSE SERPL-MCNC: 100 MG/DL — HIGH (ref 70–99)
POTASSIUM SERPL-MCNC: 3.7 MMOL/L — SIGNIFICANT CHANGE UP (ref 3.5–5.3)
POTASSIUM SERPL-SCNC: 3.7 MMOL/L — SIGNIFICANT CHANGE UP (ref 3.5–5.3)
PROT SERPL-MCNC: 6.2 G/DL — SIGNIFICANT CHANGE UP (ref 6–8.3)
SODIUM SERPL-SCNC: 144 MMOL/L — SIGNIFICANT CHANGE UP (ref 135–145)

## 2025-08-11 ENCOUNTER — APPOINTMENT (OUTPATIENT)
Dept: INTERNAL MEDICINE | Facility: CLINIC | Age: 70
End: 2025-08-11

## 2025-08-11 VITALS
WEIGHT: 190 LBS | OXYGEN SATURATION: 98 % | TEMPERATURE: 97.7 F | HEIGHT: 70 IN | SYSTOLIC BLOOD PRESSURE: 121 MMHG | HEART RATE: 120 BPM | BODY MASS INDEX: 27.2 KG/M2 | DIASTOLIC BLOOD PRESSURE: 82 MMHG

## 2025-08-11 DIAGNOSIS — Z80.42 FAMILY HISTORY OF MALIGNANT NEOPLASM OF PROSTATE: ICD-10-CM

## 2025-08-11 DIAGNOSIS — G47.33 OBSTRUCTIVE SLEEP APNEA (ADULT) (PEDIATRIC): ICD-10-CM

## 2025-08-11 DIAGNOSIS — F41.8 OTHER SPECIFIED ANXIETY DISORDERS: ICD-10-CM

## 2025-08-11 DIAGNOSIS — Z87.891 PERSONAL HISTORY OF NICOTINE DEPENDENCE: ICD-10-CM

## 2025-08-11 DIAGNOSIS — E78.5 HYPERLIPIDEMIA, UNSPECIFIED: ICD-10-CM

## 2025-08-11 DIAGNOSIS — Z80.3 FAMILY HISTORY OF MALIGNANT NEOPLASM OF BREAST: ICD-10-CM

## 2025-08-11 DIAGNOSIS — M10.9 GOUT, UNSPECIFIED: ICD-10-CM

## 2025-08-11 DIAGNOSIS — E78.1 PURE HYPERGLYCERIDEMIA: ICD-10-CM

## 2025-08-11 DIAGNOSIS — Z00.00 ENCOUNTER FOR GENERAL ADULT MEDICAL EXAMINATION W/OUT ABNORMAL FINDINGS: ICD-10-CM

## 2025-08-11 DIAGNOSIS — Z86.2 PERSONAL HISTORY OF DISEASES OF THE BLOOD AND BLOOD-FORMING ORGANS AND CERTAIN DISORDERS INVOLVING THE IMMUNE MECHANISM: ICD-10-CM

## 2025-08-11 DIAGNOSIS — R52 PAIN, UNSPECIFIED: ICD-10-CM

## 2025-08-11 DIAGNOSIS — D69.6 THROMBOCYTOPENIA, UNSPECIFIED: ICD-10-CM

## 2025-08-11 DIAGNOSIS — K21.9 GASTRO-ESOPHAGEAL REFLUX DISEASE W/OUT ESOPHAGITIS: ICD-10-CM

## 2025-08-11 DIAGNOSIS — I10 ESSENTIAL (PRIMARY) HYPERTENSION: ICD-10-CM

## 2025-08-11 DIAGNOSIS — I70.0 ATHEROSCLEROSIS OF AORTA: ICD-10-CM

## 2025-08-11 DIAGNOSIS — R79.89 OTHER SPECIFIED ABNORMAL FINDINGS OF BLOOD CHEMISTRY: ICD-10-CM

## 2025-08-11 DIAGNOSIS — N18.31 CHRONIC KIDNEY DISEASE, STAGE 3A: ICD-10-CM

## 2025-08-11 DIAGNOSIS — E66.9 OBESITY, UNSPECIFIED: ICD-10-CM

## 2025-08-11 DIAGNOSIS — D64.9 ANEMIA, UNSPECIFIED: ICD-10-CM

## 2025-08-11 DIAGNOSIS — B37.2 CANDIDIASIS OF SKIN AND NAIL: ICD-10-CM

## 2025-08-11 PROCEDURE — G2211 COMPLEX E/M VISIT ADD ON: CPT

## 2025-08-11 PROCEDURE — 99214 OFFICE O/P EST MOD 30 MIN: CPT

## 2025-08-12 ENCOUNTER — APPOINTMENT (OUTPATIENT)
Dept: HEMATOLOGY ONCOLOGY | Facility: CLINIC | Age: 70
End: 2025-08-12

## 2025-08-12 LAB — SARS-COV-2 N GENE NPH QL NAA+PROBE: NOT DETECTED

## 2025-08-13 ENCOUNTER — LABORATORY RESULT (OUTPATIENT)
Age: 70
End: 2025-08-13

## 2025-08-14 PROBLEM — R79.89 ELEVATED SERUM CREATININE: Status: RESOLVED | Noted: 2024-10-08 | Resolved: 2025-08-14

## 2025-08-14 PROBLEM — Z86.2 HISTORY OF LEUKOCYTOSIS: Status: RESOLVED | Noted: 2019-04-16 | Resolved: 2025-08-14

## 2025-08-14 PROBLEM — B37.2 MONILIAL RASH: Status: RESOLVED | Noted: 2023-05-03 | Resolved: 2025-08-14

## 2025-08-15 ENCOUNTER — APPOINTMENT (OUTPATIENT)
Dept: RHEUMATOLOGY | Facility: CLINIC | Age: 70
End: 2025-08-15
Payer: MEDICARE

## 2025-08-15 VITALS — HEIGHT: 70 IN | BODY MASS INDEX: 27.77 KG/M2 | WEIGHT: 194 LBS | OXYGEN SATURATION: 98 % | RESPIRATION RATE: 16 BRPM

## 2025-08-15 PROCEDURE — G2211 COMPLEX E/M VISIT ADD ON: CPT

## 2025-08-15 PROCEDURE — 99215 OFFICE O/P EST HI 40 MIN: CPT

## 2025-08-18 ENCOUNTER — TRANSCRIPTION ENCOUNTER (OUTPATIENT)
Age: 70
End: 2025-08-18

## 2025-08-18 ENCOUNTER — RESULT REVIEW (OUTPATIENT)
Age: 70
End: 2025-08-18

## 2025-08-18 ENCOUNTER — APPOINTMENT (OUTPATIENT)
Dept: HEMATOLOGY ONCOLOGY | Facility: CLINIC | Age: 70
End: 2025-08-18
Payer: MEDICARE

## 2025-08-18 ENCOUNTER — APPOINTMENT (OUTPATIENT)
Dept: INFUSION THERAPY | Facility: HOSPITAL | Age: 70
End: 2025-08-18

## 2025-08-18 LAB
ALBUMIN SERPL ELPH-MCNC: 4.3 G/DL
ALP BLD-CCNC: 112 U/L
ALT SERPL-CCNC: 21 U/L
ANION GAP SERPL CALC-SCNC: 19 MMOL/L
AST SERPL-CCNC: 22 U/L
BASOPHILS # BLD AUTO: 0.01 K/UL — SIGNIFICANT CHANGE UP (ref 0–0.2)
BASOPHILS NFR BLD AUTO: 0.4 % — SIGNIFICANT CHANGE UP (ref 0–2)
BILIRUB SERPL-MCNC: 1 MG/DL
BUN SERPL-MCNC: 14 MG/DL
CALCIUM SERPL-MCNC: 9.4 MG/DL
CHLORIDE SERPL-SCNC: 109 MMOL/L
CO2 SERPL-SCNC: 20 MMOL/L
CREAT SERPL-MCNC: 0.93 MG/DL
EGFRCR SERPLBLD CKD-EPI 2021: 89 ML/MIN/1.73M2
EOSINOPHIL # BLD AUTO: 0.01 K/UL — SIGNIFICANT CHANGE UP (ref 0–0.5)
EOSINOPHIL NFR BLD AUTO: 0.4 % — SIGNIFICANT CHANGE UP (ref 0–6)
HCT VFR BLD CALC: 25.7 % — LOW (ref 39–50)
HGB BLD-MCNC: 8.7 G/DL — LOW (ref 13–17)
IMM GRANULOCYTES NFR BLD AUTO: 0.7 % — SIGNIFICANT CHANGE UP (ref 0–0.9)
LYMPHOCYTES # BLD AUTO: 1.37 K/UL — SIGNIFICANT CHANGE UP (ref 1–3.3)
LYMPHOCYTES # BLD AUTO: 48.1 % — HIGH (ref 13–44)
MCHC RBC-ENTMCNC: 31.2 PG — SIGNIFICANT CHANGE UP (ref 27–34)
MCHC RBC-ENTMCNC: 33.9 G/DL — SIGNIFICANT CHANGE UP (ref 32–36)
MCV RBC AUTO: 92.1 FL — SIGNIFICANT CHANGE UP (ref 80–100)
MONOCYTES # BLD AUTO: 0.17 K/UL — SIGNIFICANT CHANGE UP (ref 0–0.9)
MONOCYTES NFR BLD AUTO: 6 % — SIGNIFICANT CHANGE UP (ref 2–14)
NEUTROPHILS # BLD AUTO: 1.27 K/UL — LOW (ref 1.8–7.4)
NEUTROPHILS NFR BLD AUTO: 44.4 % — SIGNIFICANT CHANGE UP (ref 43–77)
NRBC BLD AUTO-RTO: 1 /100 WBCS — HIGH (ref 0–0)
PLATELET # BLD AUTO: 20 K/UL — CRITICAL LOW (ref 150–400)
POTASSIUM SERPL-SCNC: 3.7 MMOL/L
PROT SERPL-MCNC: 6.1 G/DL
RBC # BLD: 2.79 M/UL — LOW (ref 4.2–5.8)
RBC # FLD: 21.1 % — HIGH (ref 10.3–14.5)
SODIUM SERPL-SCNC: 147 MMOL/L
URATE SERPL-MCNC: 4.5 MG/DL
WBC # BLD: 2.85 K/UL — LOW (ref 3.8–10.5)
WBC # FLD AUTO: 2.85 K/UL — LOW (ref 3.8–10.5)

## 2025-08-18 PROCEDURE — 99213 OFFICE O/P EST LOW 20 MIN: CPT

## 2025-08-19 ENCOUNTER — APPOINTMENT (OUTPATIENT)
Dept: HEMATOLOGY ONCOLOGY | Facility: CLINIC | Age: 70
End: 2025-08-19
Payer: MEDICARE

## 2025-08-19 ENCOUNTER — RESULT REVIEW (OUTPATIENT)
Age: 70
End: 2025-08-19

## 2025-08-19 VITALS
HEART RATE: 127 BPM | HEIGHT: 72.05 IN | OXYGEN SATURATION: 93 % | SYSTOLIC BLOOD PRESSURE: 139 MMHG | DIASTOLIC BLOOD PRESSURE: 89 MMHG | RESPIRATION RATE: 17 BRPM | BODY MASS INDEX: 26.22 KG/M2 | TEMPERATURE: 96.8 F | WEIGHT: 193.56 LBS

## 2025-08-19 LAB
ALBUMIN SERPL ELPH-MCNC: 4.4 G/DL
ALP BLD-CCNC: 114 U/L
ALT SERPL-CCNC: 16 U/L
ANION GAP SERPL CALC-SCNC: 17 MMOL/L
APTT BLD: 29.2 SEC
AST SERPL-CCNC: 24 U/L
BILIRUB SERPL-MCNC: 1 MG/DL
BUN SERPL-MCNC: 10 MG/DL
CALCIUM SERPL-MCNC: 8.9 MG/DL
CHLORIDE SERPL-SCNC: 105 MMOL/L
CO2 SERPL-SCNC: 22 MMOL/L
CREAT SERPL-MCNC: 0.78 MG/DL
EGFRCR SERPLBLD CKD-EPI 2021: 97 ML/MIN/1.73M2
GLUCOSE SERPL-MCNC: 98 MG/DL
INR PPP: 1.08 RATIO
LDH SERPL-CCNC: 310 U/L
MAGNESIUM SERPL-MCNC: 1.1 MG/DL
PHOSPHATE SERPL-MCNC: 3.2 MG/DL
POTASSIUM SERPL-SCNC: 3.3 MMOL/L
PROT SERPL-MCNC: 6.5 G/DL
PT BLD: 12.5 SEC
SODIUM SERPL-SCNC: 144 MMOL/L

## 2025-08-19 PROCEDURE — 99214 OFFICE O/P EST MOD 30 MIN: CPT

## 2025-08-19 RX ORDER — OMEGA-3/DHA/EPA/FISH OIL 300-1000MG
400 CAPSULE ORAL
Qty: 90 | Refills: 0 | Status: DISCONTINUED | COMMUNITY
Start: 2025-08-19 | End: 2025-08-19

## 2025-08-19 RX ORDER — POTASSIUM CHLORIDE 1500 MG/1
20 TABLET, FILM COATED, EXTENDED RELEASE ORAL
Qty: 30 | Refills: 0 | Status: DISCONTINUED | COMMUNITY
Start: 2025-08-19 | End: 2025-08-19

## 2025-08-19 RX ORDER — POTASSIUM CHLORIDE 1500 MG/1
20 TABLET, FILM COATED, EXTENDED RELEASE ORAL
Qty: 7 | Refills: 0 | Status: ACTIVE | COMMUNITY
Start: 2025-08-19 | End: 1900-01-01

## 2025-08-19 RX ORDER — OMEGA-3/DHA/EPA/FISH OIL 300-1000MG
400 CAPSULE ORAL
Qty: 14 | Refills: 0 | Status: ACTIVE | COMMUNITY
Start: 2025-08-19 | End: 1900-01-01

## 2025-08-20 ENCOUNTER — APPOINTMENT (OUTPATIENT)
Dept: INFUSION THERAPY | Facility: HOSPITAL | Age: 70
End: 2025-08-20

## 2025-08-20 ENCOUNTER — APPOINTMENT (OUTPATIENT)
Dept: HEMATOLOGY ONCOLOGY | Facility: CLINIC | Age: 70
End: 2025-08-20

## 2025-08-20 LAB
APPEARANCE: CLEAR
BILIRUBIN URINE: NEGATIVE
BLOOD URINE: NEGATIVE
CMV IGM SERPL QL: <8 AU/ML
CMV IGM SERPL QL: NEGATIVE
COLOR: YELLOW
EBV EA AB SER IA-ACNC: <5 U/ML
EBV EA AB TITR SER IF: POSITIVE
EBV EA IGG SER QL IA: 197 U/ML
EBV EA IGG SER-ACNC: NEGATIVE
EBV EA IGM SER IA-ACNC: NEGATIVE
EBV PATRN SPEC IB-IMP: NORMAL
EBV VCA IGG SER IA-ACNC: 87.7 U/ML
EBV VCA IGM SER QL IA: <10 U/ML
EPSTEIN-BARR VIRUS CAPSID ANTIGEN IGG: POSITIVE
GLUCOSE QUALITATIVE U: NEGATIVE MG/DL
HAV IGM SER QL: NONREACTIVE
HBV SURFACE AB SER QL: NONREACTIVE
HGB A MFR BLD: 94.5 %
HGB A2 MFR BLD: 2.7 %
HGB F MFR BLD: 2.8 %
HGB FRACT BLD-IMP: NORMAL
HSV 1+2 IGG SER IA-IMP: NEGATIVE
HSV 1+2 IGG SER IA-IMP: POSITIVE
HSV1 IGG SER QL: 31.9 INDEX
HSV2 IGG SER QL: 0.14 INDEX
KETONES URINE: NEGATIVE MG/DL
LEUKOCYTE ESTERASE URINE: NEGATIVE
MEV IGG FLD QL IA: 49.3 AU/ML
MEV IGG+IGM SER-IMP: POSITIVE
NITRITE URINE: NEGATIVE
PH URINE: 6.5
PROTEIN URINE: NORMAL MG/DL
RUBV IGG FLD-ACNC: 9.05 INDEX
RUBV IGG SER-IMP: POSITIVE
SPECIFIC GRAVITY URINE: 1.02
T GONDII AB SER-IMP: NEGATIVE
T GONDII IGM SER QL: <3 AU/ML
UROBILINOGEN URINE: 1 MG/DL
VZV AB TITR SER: POSITIVE
VZV IGG SER IF-ACNC: 15.3 S/CO

## 2025-08-22 ENCOUNTER — RESULT REVIEW (OUTPATIENT)
Age: 70
End: 2025-08-22

## 2025-08-22 ENCOUNTER — NON-APPOINTMENT (OUTPATIENT)
Age: 70
End: 2025-08-22

## 2025-08-22 ENCOUNTER — APPOINTMENT (OUTPATIENT)
Dept: INFUSION THERAPY | Facility: HOSPITAL | Age: 70
End: 2025-08-22

## 2025-08-22 ENCOUNTER — APPOINTMENT (OUTPATIENT)
Dept: HEMATOLOGY ONCOLOGY | Facility: CLINIC | Age: 70
End: 2025-08-22

## 2025-08-22 LAB
ANISOCYTOSIS BLD QL: SLIGHT — SIGNIFICANT CHANGE UP
BASOPHILS # BLD AUTO: 0 K/UL — SIGNIFICANT CHANGE UP (ref 0–0.2)
BASOPHILS NFR BLD AUTO: 0 % — SIGNIFICANT CHANGE UP (ref 0–2)
DACRYOCYTES BLD QL SMEAR: SLIGHT — SIGNIFICANT CHANGE UP
ELLIPTOCYTES BLD QL SMEAR: SLIGHT — SIGNIFICANT CHANGE UP
EOSINOPHIL # BLD AUTO: 0 K/UL — SIGNIFICANT CHANGE UP (ref 0–0.5)
EOSINOPHIL NFR BLD AUTO: 0 % — SIGNIFICANT CHANGE UP (ref 0–6)
HCT VFR BLD CALC: 24.5 % — LOW (ref 39–50)
HGB BLD-MCNC: 8.5 G/DL — LOW (ref 13–17)
LYMPHOCYTES # BLD AUTO: 1.4 K/UL — SIGNIFICANT CHANGE UP (ref 1–3.3)
LYMPHOCYTES # BLD AUTO: 91 % — HIGH (ref 13–44)
MACROCYTES BLD QL: SLIGHT — SIGNIFICANT CHANGE UP
MCHC RBC-ENTMCNC: 31.6 PG — SIGNIFICANT CHANGE UP (ref 27–34)
MCHC RBC-ENTMCNC: 34.7 G/DL — SIGNIFICANT CHANGE UP (ref 32–36)
MCV RBC AUTO: 91.1 FL — SIGNIFICANT CHANGE UP (ref 80–100)
MONOCYTES # BLD AUTO: 0.05 K/UL — SIGNIFICANT CHANGE UP (ref 0–0.9)
MONOCYTES NFR BLD AUTO: 3 % — SIGNIFICANT CHANGE UP (ref 2–14)
NEUTROPHILS # BLD AUTO: 0.09 K/UL — LOW (ref 1.8–7.4)
NEUTROPHILS NFR BLD AUTO: 6 % — LOW (ref 43–77)
NRBC # BLD: 1 /100 WBCS — HIGH (ref 0–0)
NRBC BLD AUTO-RTO: SIGNIFICANT CHANGE UP /100 WBCS (ref 0–0)
NRBC BLD-RTO: 1 /100 WBCS — HIGH (ref 0–0)
PLAT MORPH BLD: NORMAL — SIGNIFICANT CHANGE UP
PLATELET # BLD AUTO: 18 K/UL — CRITICAL LOW (ref 150–400)
RBC # BLD: 2.69 M/UL — LOW (ref 4.2–5.8)
RBC # FLD: 21.8 % — HIGH (ref 10.3–14.5)
RBC BLD AUTO: ABNORMAL
SCHISTOCYTES BLD QL AUTO: SLIGHT — SIGNIFICANT CHANGE UP
WBC # BLD: 1.54 K/UL — LOW (ref 3.8–10.5)
WBC # FLD AUTO: 1.54 K/UL — LOW (ref 3.8–10.5)

## 2025-08-22 RX ORDER — PREDNISONE 5 MG/1
5 TABLET ORAL
Qty: 40 | Refills: 0 | Status: ACTIVE | COMMUNITY
Start: 2025-08-15 | End: 1900-01-01

## 2025-08-25 ENCOUNTER — RESULT REVIEW (OUTPATIENT)
Age: 70
End: 2025-08-25

## 2025-08-25 ENCOUNTER — APPOINTMENT (OUTPATIENT)
Dept: HEMATOLOGY ONCOLOGY | Facility: CLINIC | Age: 70
End: 2025-08-25

## 2025-08-25 ENCOUNTER — APPOINTMENT (OUTPATIENT)
Dept: INFUSION THERAPY | Facility: HOSPITAL | Age: 70
End: 2025-08-25

## 2025-08-25 ENCOUNTER — APPOINTMENT (OUTPATIENT)
Dept: PULMONOLOGY | Facility: CLINIC | Age: 70
End: 2025-08-25
Payer: MEDICARE

## 2025-08-25 LAB
ALBUMIN SERPL ELPH-MCNC: 4.2 G/DL
ALP BLD-CCNC: 111 U/L
ALT SERPL-CCNC: 18 U/L
ANION GAP SERPL CALC-SCNC: 15 MMOL/L
AST SERPL-CCNC: 22 U/L
BILIRUB SERPL-MCNC: 1 MG/DL
BUN SERPL-MCNC: 14 MG/DL
CALCIUM SERPL-MCNC: 8.9 MG/DL
CHLORIDE SERPL-SCNC: 109 MMOL/L
CO2 SERPL-SCNC: 23 MMOL/L
CREAT SERPL-MCNC: 0.88 MG/DL
EGFRCR SERPLBLD CKD-EPI 2021: 93 ML/MIN/1.73M2
G6PD SER-CCNC: 20.2 U/G HGB
GLUCOSE SERPL-MCNC: 111 MG/DL
LDH SERPL-CCNC: 288 U/L
M TB IFN-G BLD-IMP: NEGATIVE
POTASSIUM SERPL-SCNC: 3.8 MMOL/L
PROT SERPL-MCNC: 5.8 G/DL
QUANTIFERON TB PLUS MITOGEN MINUS NIL: 4.88 IU/ML
QUANTIFERON TB PLUS NIL: 0.04 IU/ML
QUANTIFERON TB PLUS TB1 MINUS NIL: 0 IU/ML
QUANTIFERON TB PLUS TB2 MINUS NIL: 0 IU/ML
SODIUM SERPL-SCNC: 147 MMOL/L
STRONGYLOIDES AB SER IA-ACNC: NEGATIVE
T GONDII AB SER-IMP: NEGATIVE
T GONDII IGG SER QL: <3 IU/ML

## 2025-08-25 PROCEDURE — 94010 BREATHING CAPACITY TEST: CPT

## 2025-08-25 PROCEDURE — 94729 DIFFUSING CAPACITY: CPT

## 2025-08-25 PROCEDURE — 94726 PLETHYSMOGRAPHY LUNG VOLUMES: CPT

## 2025-08-26 ENCOUNTER — APPOINTMENT (OUTPATIENT)
Dept: INFUSION THERAPY | Facility: HOSPITAL | Age: 70
End: 2025-08-26

## 2025-08-26 ENCOUNTER — APPOINTMENT (OUTPATIENT)
Dept: HEMATOLOGY ONCOLOGY | Facility: CLINIC | Age: 70
End: 2025-08-26
Payer: MEDICARE

## 2025-08-26 ENCOUNTER — NON-APPOINTMENT (OUTPATIENT)
Age: 70
End: 2025-08-26

## 2025-08-26 ENCOUNTER — RESULT REVIEW (OUTPATIENT)
Age: 70
End: 2025-08-26

## 2025-08-26 ENCOUNTER — APPOINTMENT (OUTPATIENT)
Dept: DERMATOLOGY | Facility: CLINIC | Age: 70
End: 2025-08-26

## 2025-08-26 ENCOUNTER — RESULT CHARGE (OUTPATIENT)
Age: 70
End: 2025-08-26

## 2025-08-26 ENCOUNTER — APPOINTMENT (OUTPATIENT)
Dept: CARDIOLOGY | Facility: CLINIC | Age: 70
End: 2025-08-26
Payer: MEDICARE

## 2025-08-26 VITALS
HEART RATE: 121 BPM | TEMPERATURE: 97.9 F | HEIGHT: 72.05 IN | DIASTOLIC BLOOD PRESSURE: 63 MMHG | SYSTOLIC BLOOD PRESSURE: 100 MMHG | OXYGEN SATURATION: 99 % | WEIGHT: 195.11 LBS | BODY MASS INDEX: 26.43 KG/M2

## 2025-08-26 DIAGNOSIS — Z01.818 ENCOUNTER FOR OTHER PREPROCEDURAL EXAMINATION: ICD-10-CM

## 2025-08-26 DIAGNOSIS — I25.2 OLD MYOCARDIAL INFARCTION: ICD-10-CM

## 2025-08-26 DIAGNOSIS — E78.5 HYPERLIPIDEMIA, UNSPECIFIED: ICD-10-CM

## 2025-08-26 DIAGNOSIS — Z86.03 PERSONAL HISTORY OF NEOPLASM OF UNCERTAIN BEHAVIOR: ICD-10-CM

## 2025-08-26 DIAGNOSIS — Z12.83 ENCOUNTER FOR SCREENING FOR MALIGNANT NEOPLASM OF SKIN: ICD-10-CM

## 2025-08-26 DIAGNOSIS — R06.02 SHORTNESS OF BREATH: ICD-10-CM

## 2025-08-26 DIAGNOSIS — M16.12 UNILATERAL PRIMARY OSTEOARTHRITIS, LEFT HIP: ICD-10-CM

## 2025-08-26 DIAGNOSIS — I45.10 UNSPECIFIED RIGHT BUNDLE-BRANCH BLOCK: ICD-10-CM

## 2025-08-26 DIAGNOSIS — M25.551 PAIN IN RIGHT HIP: ICD-10-CM

## 2025-08-26 DIAGNOSIS — I10 ESSENTIAL (PRIMARY) HYPERTENSION: ICD-10-CM

## 2025-08-26 DIAGNOSIS — N18.31 CHRONIC KIDNEY DISEASE, STAGE 3A: ICD-10-CM

## 2025-08-26 PROCEDURE — G2211 COMPLEX E/M VISIT ADD ON: CPT

## 2025-08-26 PROCEDURE — 11104 PUNCH BX SKIN SINGLE LESION: CPT

## 2025-08-26 PROCEDURE — 99215 OFFICE O/P EST HI 40 MIN: CPT

## 2025-08-26 PROCEDURE — 93000 ELECTROCARDIOGRAM COMPLETE: CPT

## 2025-08-26 PROCEDURE — 99213 OFFICE O/P EST LOW 20 MIN: CPT | Mod: 25

## 2025-08-26 PROCEDURE — 99205 OFFICE O/P NEW HI 60 MIN: CPT | Mod: 25

## 2025-08-27 ENCOUNTER — APPOINTMENT (OUTPATIENT)
Dept: INFUSION THERAPY | Facility: HOSPITAL | Age: 70
End: 2025-08-27

## 2025-08-27 ENCOUNTER — APPOINTMENT (OUTPATIENT)
Dept: HEMATOLOGY ONCOLOGY | Facility: CLINIC | Age: 70
End: 2025-08-27

## 2025-08-27 ENCOUNTER — TRANSCRIPTION ENCOUNTER (OUTPATIENT)
Age: 70
End: 2025-08-27

## 2025-08-27 ENCOUNTER — NON-APPOINTMENT (OUTPATIENT)
Age: 70
End: 2025-08-27

## 2025-08-29 ENCOUNTER — APPOINTMENT (OUTPATIENT)
Dept: INFUSION THERAPY | Facility: HOSPITAL | Age: 70
End: 2025-08-29

## 2025-08-29 ENCOUNTER — APPOINTMENT (OUTPATIENT)
Dept: HEMATOLOGY ONCOLOGY | Facility: CLINIC | Age: 70
End: 2025-08-29
Payer: MEDICARE

## 2025-08-29 ENCOUNTER — RESULT REVIEW (OUTPATIENT)
Age: 70
End: 2025-08-29

## 2025-08-29 ENCOUNTER — APPOINTMENT (OUTPATIENT)
Dept: HEMATOLOGY ONCOLOGY | Facility: CLINIC | Age: 70
End: 2025-08-29

## 2025-08-29 VITALS
BODY MASS INDEX: 26.43 KG/M2 | WEIGHT: 195.11 LBS | OXYGEN SATURATION: 98 % | TEMPERATURE: 97 F | SYSTOLIC BLOOD PRESSURE: 104 MMHG | RESPIRATION RATE: 16 BRPM | DIASTOLIC BLOOD PRESSURE: 71 MMHG | HEIGHT: 72.05 IN | HEART RATE: 80 BPM

## 2025-08-29 PROCEDURE — 88291 CYTO/MOLECULAR REPORT: CPT

## 2025-08-29 PROCEDURE — 38222 DX BONE MARROW BX & ASPIR: CPT | Mod: RT

## 2025-08-31 PROBLEM — R06.02 SHORTNESS OF BREATH ON EXERTION: Status: ACTIVE | Noted: 2025-08-31

## 2025-08-31 PROBLEM — Z94.84: Status: ACTIVE | Noted: 2025-08-31

## 2025-08-31 PROBLEM — I45.10 RIGHT BUNDLE BRANCH BLOCK: Status: ACTIVE | Noted: 2025-08-31

## 2025-09-02 ENCOUNTER — RESULT REVIEW (OUTPATIENT)
Age: 70
End: 2025-09-02

## 2025-09-02 ENCOUNTER — APPOINTMENT (OUTPATIENT)
Dept: HEMATOLOGY ONCOLOGY | Facility: CLINIC | Age: 70
End: 2025-09-02
Payer: MEDICARE

## 2025-09-02 ENCOUNTER — APPOINTMENT (OUTPATIENT)
Dept: HEMATOLOGY ONCOLOGY | Facility: CLINIC | Age: 70
End: 2025-09-02

## 2025-09-02 ENCOUNTER — APPOINTMENT (OUTPATIENT)
Dept: INFUSION THERAPY | Facility: HOSPITAL | Age: 70
End: 2025-09-02

## 2025-09-02 VITALS
OXYGEN SATURATION: 99 % | RESPIRATION RATE: 16 BRPM | HEART RATE: 128 BPM | SYSTOLIC BLOOD PRESSURE: 102 MMHG | WEIGHT: 191.8 LBS | TEMPERATURE: 96.7 F | DIASTOLIC BLOOD PRESSURE: 68 MMHG | BODY MASS INDEX: 25.98 KG/M2

## 2025-09-02 LAB
ALBUMIN SERPL ELPH-MCNC: 4.3 G/DL
ALP BLD-CCNC: 102 U/L
ALT SERPL-CCNC: 16 U/L
ANION GAP SERPL CALC-SCNC: 15 MMOL/L
AST SERPL-CCNC: 22 U/L
BILIRUB SERPL-MCNC: 1.2 MG/DL
BUN SERPL-MCNC: 22 MG/DL
CALCIUM SERPL-MCNC: 9.1 MG/DL
CHLORIDE SERPL-SCNC: 106 MMOL/L
CO2 SERPL-SCNC: 21 MMOL/L
CREAT SERPL-MCNC: 1.03 MG/DL
EGFRCR SERPLBLD CKD-EPI 2021: 79 ML/MIN/1.73M2
GLUCOSE SERPL-MCNC: 113 MG/DL
LDH SERPL-CCNC: 288 U/L
POTASSIUM SERPL-SCNC: 4.5 MMOL/L
PROT SERPL-MCNC: 5.9 G/DL
SODIUM SERPL-SCNC: 142 MMOL/L

## 2025-09-02 PROCEDURE — 99215 OFFICE O/P EST HI 40 MIN: CPT

## 2025-09-02 PROCEDURE — G2211 COMPLEX E/M VISIT ADD ON: CPT

## 2025-09-03 ENCOUNTER — APPOINTMENT (OUTPATIENT)
Dept: HEMATOLOGY ONCOLOGY | Facility: CLINIC | Age: 70
End: 2025-09-03

## 2025-09-03 ENCOUNTER — APPOINTMENT (OUTPATIENT)
Dept: HEMATOLOGY ONCOLOGY | Facility: CLINIC | Age: 70
End: 2025-09-03
Payer: MEDICARE

## 2025-09-03 ENCOUNTER — APPOINTMENT (OUTPATIENT)
Dept: RHEUMATOLOGY | Facility: CLINIC | Age: 70
End: 2025-09-03

## 2025-09-03 ENCOUNTER — APPOINTMENT (OUTPATIENT)
Dept: INFUSION THERAPY | Facility: HOSPITAL | Age: 70
End: 2025-09-03

## 2025-09-03 VITALS
TEMPERATURE: 98.4 F | RESPIRATION RATE: 16 BRPM | BODY MASS INDEX: 26.99 KG/M2 | OXYGEN SATURATION: 98 % | HEART RATE: 104 BPM | DIASTOLIC BLOOD PRESSURE: 73 MMHG | SYSTOLIC BLOOD PRESSURE: 109 MMHG | WEIGHT: 199.3 LBS

## 2025-09-03 DIAGNOSIS — Z94.84 STEM CELLS TRANSPLANT STATUS: ICD-10-CM

## 2025-09-03 PROCEDURE — 99214 OFFICE O/P EST MOD 30 MIN: CPT

## 2025-09-04 ENCOUNTER — APPOINTMENT (OUTPATIENT)
Dept: HEMATOLOGY ONCOLOGY | Facility: CLINIC | Age: 70
End: 2025-09-04

## 2025-09-04 ENCOUNTER — RESULT REVIEW (OUTPATIENT)
Age: 70
End: 2025-09-04

## 2025-09-04 ENCOUNTER — APPOINTMENT (OUTPATIENT)
Dept: INFUSION THERAPY | Facility: HOSPITAL | Age: 70
End: 2025-09-04

## 2025-09-04 LAB
ALBUMIN SERPL ELPH-MCNC: 4.2 G/DL
ALP BLD-CCNC: 110 U/L
ALT SERPL-CCNC: 17 U/L
ANION GAP SERPL CALC-SCNC: 16 MMOL/L
AST SERPL-CCNC: 27 U/L
BILIRUB SERPL-MCNC: 0.8 MG/DL
BUN SERPL-MCNC: 19 MG/DL
CALCIUM SERPL-MCNC: 7.7 MG/DL
CHLORIDE SERPL-SCNC: 111 MMOL/L
CO2 SERPL-SCNC: 19 MMOL/L
CREAT SERPL-MCNC: 1.02 MG/DL
EGFRCR SERPLBLD CKD-EPI 2021: 80 ML/MIN/1.73M2
GLUCOSE SERPL-MCNC: 133 MG/DL
LDH SERPL-CCNC: 300 U/L
POTASSIUM SERPL-SCNC: 3.7 MMOL/L
PROT SERPL-MCNC: 5.6 G/DL
SODIUM SERPL-SCNC: 146 MMOL/L

## 2025-09-05 ENCOUNTER — APPOINTMENT (OUTPATIENT)
Dept: HEMATOLOGY ONCOLOGY | Facility: CLINIC | Age: 70
End: 2025-09-05

## 2025-09-05 ENCOUNTER — APPOINTMENT (OUTPATIENT)
Dept: INFUSION THERAPY | Facility: HOSPITAL | Age: 70
End: 2025-09-05

## 2025-09-06 ENCOUNTER — APPOINTMENT (OUTPATIENT)
Dept: HEMATOLOGY ONCOLOGY | Facility: CLINIC | Age: 70
End: 2025-09-06

## 2025-09-06 ENCOUNTER — RESULT REVIEW (OUTPATIENT)
Age: 70
End: 2025-09-06

## 2025-09-06 ENCOUNTER — APPOINTMENT (OUTPATIENT)
Dept: INFUSION THERAPY | Facility: HOSPITAL | Age: 70
End: 2025-09-06

## 2025-09-08 ENCOUNTER — APPOINTMENT (OUTPATIENT)
Dept: HEMATOLOGY ONCOLOGY | Facility: CLINIC | Age: 70
End: 2025-09-08

## 2025-09-08 ENCOUNTER — APPOINTMENT (OUTPATIENT)
Dept: INFUSION THERAPY | Facility: HOSPITAL | Age: 70
End: 2025-09-08

## 2025-09-09 ENCOUNTER — APPOINTMENT (OUTPATIENT)
Dept: INFUSION THERAPY | Facility: HOSPITAL | Age: 70
End: 2025-09-09

## 2025-09-09 ENCOUNTER — APPOINTMENT (OUTPATIENT)
Dept: HEMATOLOGY ONCOLOGY | Facility: CLINIC | Age: 70
End: 2025-09-09

## 2025-09-09 ENCOUNTER — APPOINTMENT (OUTPATIENT)
Dept: DERMATOLOGY | Facility: CLINIC | Age: 70
End: 2025-09-09

## 2025-09-10 ENCOUNTER — APPOINTMENT (OUTPATIENT)
Dept: INFUSION THERAPY | Facility: HOSPITAL | Age: 70
End: 2025-09-10

## 2025-09-10 ENCOUNTER — RESULT REVIEW (OUTPATIENT)
Age: 70
End: 2025-09-10

## 2025-09-10 ENCOUNTER — APPOINTMENT (OUTPATIENT)
Dept: HEMATOLOGY ONCOLOGY | Facility: CLINIC | Age: 70
End: 2025-09-10
Payer: MEDICARE

## 2025-09-10 ENCOUNTER — APPOINTMENT (OUTPATIENT)
Dept: HEMATOLOGY ONCOLOGY | Facility: CLINIC | Age: 70
End: 2025-09-10

## 2025-09-10 VITALS
WEIGHT: 192.24 LBS | DIASTOLIC BLOOD PRESSURE: 80 MMHG | TEMPERATURE: 97.3 F | BODY MASS INDEX: 26.04 KG/M2 | SYSTOLIC BLOOD PRESSURE: 117 MMHG | HEART RATE: 125 BPM | RESPIRATION RATE: 16 BRPM | OXYGEN SATURATION: 99 %

## 2025-09-10 LAB
ALBUMIN SERPL ELPH-MCNC: 3.9 G/DL
ALBUMIN SERPL ELPH-MCNC: 4 G/DL
ALP BLD-CCNC: 108 U/L
ALP BLD-CCNC: 120 U/L
ALT SERPL-CCNC: 34 U/L
ALT SERPL-CCNC: 36 U/L
ANION GAP SERPL CALC-SCNC: 14 MMOL/L
ANION GAP SERPL CALC-SCNC: 17 MMOL/L
APTT BLD: 26.6 SEC
AST SERPL-CCNC: 30 U/L
AST SERPL-CCNC: 31 U/L
BILIRUB SERPL-MCNC: 0.9 MG/DL
BILIRUB SERPL-MCNC: 0.9 MG/DL
BUN SERPL-MCNC: 21 MG/DL
BUN SERPL-MCNC: 21 MG/DL
CALCIUM SERPL-MCNC: 8.3 MG/DL
CALCIUM SERPL-MCNC: 8.5 MG/DL
CHLORIDE SERPL-SCNC: 104 MMOL/L
CHLORIDE SERPL-SCNC: 105 MMOL/L
CO2 SERPL-SCNC: 22 MMOL/L
CO2 SERPL-SCNC: 24 MMOL/L
CREAT SERPL-MCNC: 1 MG/DL
CREAT SERPL-MCNC: 1.03 MG/DL
EGFRCR SERPLBLD CKD-EPI 2021: 79 ML/MIN/1.73M2
EGFRCR SERPLBLD CKD-EPI 2021: 81 ML/MIN/1.73M2
GLUCOSE SERPL-MCNC: 125 MG/DL
GLUCOSE SERPL-MCNC: 129 MG/DL
INR PPP: 1.15 RATIO
LDH SERPL-CCNC: 324 U/L
LDH SERPL-CCNC: 330 U/L
MAGNESIUM SERPL-MCNC: 1.5 MG/DL
POTASSIUM SERPL-SCNC: 3.7 MMOL/L
POTASSIUM SERPL-SCNC: 3.7 MMOL/L
PROT SERPL-MCNC: 5.7 G/DL
PROT SERPL-MCNC: 6.1 G/DL
PT BLD: 13.3 SEC
SODIUM SERPL-SCNC: 143 MMOL/L
SODIUM SERPL-SCNC: 145 MMOL/L
URATE SERPL-MCNC: 5.2 MG/DL

## 2025-09-10 PROCEDURE — 99214 OFFICE O/P EST MOD 30 MIN: CPT

## 2025-09-10 PROCEDURE — G2211 COMPLEX E/M VISIT ADD ON: CPT

## 2025-09-10 RX ORDER — OMEGA-3/DHA/EPA/FISH OIL 300-1000MG
400 CAPSULE ORAL
Qty: 7 | Refills: 0 | Status: ACTIVE | COMMUNITY
Start: 2025-09-10 | End: 1900-01-01

## 2025-09-11 ENCOUNTER — APPOINTMENT (OUTPATIENT)
Dept: CV DIAGNOSITCS | Facility: HOSPITAL | Age: 70
End: 2025-09-11

## 2025-09-11 ENCOUNTER — RESULT REVIEW (OUTPATIENT)
Age: 70
End: 2025-09-11

## 2025-09-11 ENCOUNTER — EMERGENCY (EMERGENCY)
Facility: HOSPITAL | Age: 70
LOS: 1 days | End: 2025-09-11
Attending: EMERGENCY MEDICINE | Admitting: EMERGENCY MEDICINE
Payer: MEDICARE

## 2025-09-11 VITALS
RESPIRATION RATE: 16 BRPM | HEIGHT: 71 IN | SYSTOLIC BLOOD PRESSURE: 147 MMHG | OXYGEN SATURATION: 99 % | HEART RATE: 95 BPM | DIASTOLIC BLOOD PRESSURE: 86 MMHG | WEIGHT: 173.94 LBS | TEMPERATURE: 98 F

## 2025-09-11 VITALS
TEMPERATURE: 98 F | SYSTOLIC BLOOD PRESSURE: 146 MMHG | RESPIRATION RATE: 16 BRPM | HEART RATE: 96 BPM | DIASTOLIC BLOOD PRESSURE: 91 MMHG | OXYGEN SATURATION: 100 %

## 2025-09-11 DIAGNOSIS — Z98.89 OTHER SPECIFIED POSTPROCEDURAL STATES: Chronic | ICD-10-CM

## 2025-09-11 DIAGNOSIS — M43.20 FUSION OF SPINE, SITE UNSPECIFIED: Chronic | ICD-10-CM

## 2025-09-11 LAB
ALBUMIN SERPL ELPH-MCNC: 3.5 G/DL — SIGNIFICANT CHANGE UP (ref 3.3–5)
ALP SERPL-CCNC: 93 U/L — SIGNIFICANT CHANGE UP (ref 40–120)
ALT FLD-CCNC: 22 U/L — SIGNIFICANT CHANGE UP (ref 4–41)
ANION GAP SERPL CALC-SCNC: 12 MMOL/L — SIGNIFICANT CHANGE UP (ref 7–14)
ANISOCYTOSIS BLD QL: SLIGHT — SIGNIFICANT CHANGE UP
APTT BLD: 25.4 SEC — LOW (ref 26.1–36.8)
AST SERPL-CCNC: 13 U/L — SIGNIFICANT CHANGE UP (ref 4–40)
BASOPHILS # BLD AUTO: 0 K/UL — SIGNIFICANT CHANGE UP (ref 0–0.2)
BASOPHILS # BLD MANUAL: 0 K/UL — SIGNIFICANT CHANGE UP (ref 0–0.2)
BASOPHILS NFR BLD AUTO: 0 % — SIGNIFICANT CHANGE UP (ref 0–2)
BASOPHILS NFR BLD MANUAL: 0 % — SIGNIFICANT CHANGE UP (ref 0–2)
BILIRUB SERPL-MCNC: 0.8 MG/DL — SIGNIFICANT CHANGE UP (ref 0.2–1.2)
BUN SERPL-MCNC: 21 MG/DL — SIGNIFICANT CHANGE UP (ref 7–23)
CALCIUM SERPL-MCNC: 8.3 MG/DL — LOW (ref 8.4–10.5)
CHLORIDE SERPL-SCNC: 108 MMOL/L — HIGH (ref 98–107)
CO2 SERPL-SCNC: 24 MMOL/L — SIGNIFICANT CHANGE UP (ref 22–31)
CREAT SERPL-MCNC: 0.9 MG/DL — SIGNIFICANT CHANGE UP (ref 0.5–1.3)
EGFR: 92 ML/MIN/1.73M2 — SIGNIFICANT CHANGE UP
EGFR: 92 ML/MIN/1.73M2 — SIGNIFICANT CHANGE UP
EOSINOPHIL # BLD AUTO: 0 K/UL — SIGNIFICANT CHANGE UP (ref 0–0.5)
EOSINOPHIL # BLD MANUAL: 0 K/UL — SIGNIFICANT CHANGE UP (ref 0–0.5)
EOSINOPHIL NFR BLD AUTO: 0 % — SIGNIFICANT CHANGE UP (ref 0–6)
EOSINOPHIL NFR BLD MANUAL: 0 % — SIGNIFICANT CHANGE UP (ref 0–6)
GLUCOSE SERPL-MCNC: 103 MG/DL — HIGH (ref 70–99)
HCT VFR BLD CALC: 21.2 % — LOW (ref 39–50)
HGB BLD-MCNC: 6.9 G/DL — CRITICAL LOW (ref 13–17)
IMM GRANULOCYTES # BLD AUTO: 0.02 K/UL — SIGNIFICANT CHANGE UP (ref 0–0.07)
IMM GRANULOCYTES NFR BLD AUTO: 0.7 % — SIGNIFICANT CHANGE UP (ref 0–0.9)
IMMATURE PLATELET FRACTION #: 1.2 K/UL — LOW (ref 3.9–12.5)
IMMATURE PLATELET FRACTION %: 7.2 % — HIGH (ref 1.6–7.1)
INR BLD: 1.15 RATIO — SIGNIFICANT CHANGE UP (ref 0.85–1.16)
LYMPHOCYTES # BLD AUTO: 0.9 K/UL — LOW (ref 1–3.3)
LYMPHOCYTES # BLD MANUAL: 1.27 K/UL — SIGNIFICANT CHANGE UP (ref 1–3.3)
LYMPHOCYTES NFR BLD AUTO: 33.5 % — SIGNIFICANT CHANGE UP (ref 13–44)
LYMPHOCYTES NFR BLD MANUAL: 47.3 % — HIGH (ref 13–44)
MACROCYTES BLD QL: SLIGHT — SIGNIFICANT CHANGE UP
MANUAL REACTIVE LYMPHOCYTES #: 0.02 K/UL — SIGNIFICANT CHANGE UP (ref 0–0.63)
MCHC RBC-ENTMCNC: 29 PG — SIGNIFICANT CHANGE UP (ref 27–34)
MCHC RBC-ENTMCNC: 32.5 G/DL — SIGNIFICANT CHANGE UP (ref 32–36)
MCV RBC AUTO: 89.1 FL — SIGNIFICANT CHANGE UP (ref 80–100)
MONOCYTES # BLD AUTO: 0.68 K/UL — SIGNIFICANT CHANGE UP (ref 0–0.9)
MONOCYTES # BLD MANUAL: 0.21 K/UL — SIGNIFICANT CHANGE UP (ref 0–0.9)
MONOCYTES NFR BLD AUTO: 25.3 % — HIGH (ref 2–14)
MONOCYTES NFR BLD MANUAL: 7.9 % — SIGNIFICANT CHANGE UP (ref 2–14)
NEUTROPHILS # BLD AUTO: 1.09 K/UL — LOW (ref 1.8–7.4)
NEUTROPHILS # BLD MANUAL: 1.18 K/UL — LOW (ref 1.8–7.4)
NEUTROPHILS NFR BLD AUTO: 40.5 % — LOW (ref 43–77)
NEUTROPHILS NFR BLD MANUAL: 43.9 % — SIGNIFICANT CHANGE UP (ref 43–77)
NRBC # BLD AUTO: 0 K/UL — SIGNIFICANT CHANGE UP (ref 0–0)
NRBC # FLD: 0 K/UL — SIGNIFICANT CHANGE UP (ref 0–0)
NRBC BLD AUTO-RTO: 0 /100 WBCS — SIGNIFICANT CHANGE UP (ref 0–0)
OVALOCYTES BLD QL SMEAR: SLIGHT — SIGNIFICANT CHANGE UP
PLAT MORPH BLD: NORMAL — SIGNIFICANT CHANGE UP
PLATELET # BLD AUTO: 17 K/UL — CRITICAL LOW (ref 150–400)
PLATELET COUNT - ESTIMATE: ABNORMAL
PMV BLD: SIGNIFICANT CHANGE UP FL (ref 7–13)
POIKILOCYTOSIS BLD QL AUTO: SLIGHT — SIGNIFICANT CHANGE UP
POLYCHROMASIA BLD QL SMEAR: SLIGHT — SIGNIFICANT CHANGE UP
POTASSIUM SERPL-MCNC: 3.9 MMOL/L — SIGNIFICANT CHANGE UP (ref 3.5–5.3)
POTASSIUM SERPL-SCNC: 3.9 MMOL/L — SIGNIFICANT CHANGE UP (ref 3.5–5.3)
PROT SERPL-MCNC: 5.6 G/DL — LOW (ref 6–8.3)
PROTHROM AB SERPL-ACNC: 13.3 SEC — SIGNIFICANT CHANGE UP (ref 9.9–13.4)
RBC # BLD: 2.38 M/UL — LOW (ref 4.2–5.8)
RBC # FLD: 20.6 % — HIGH (ref 10.3–14.5)
RBC BLD AUTO: ABNORMAL
SCHISTOCYTES BLD QL AUTO: SLIGHT — SIGNIFICANT CHANGE UP
SODIUM SERPL-SCNC: 144 MMOL/L — SIGNIFICANT CHANGE UP (ref 135–145)
VARIANT LYMPHS # BLD: 0.9 % — SIGNIFICANT CHANGE UP (ref 0–6)
VARIANT LYMPHS NFR BLD MANUAL: 0.9 % — SIGNIFICANT CHANGE UP (ref 0–6)
WBC # BLD: 2.69 K/UL — LOW (ref 3.8–10.5)
WBC # FLD AUTO: 2.69 K/UL — LOW (ref 3.8–10.5)

## 2025-09-11 PROCEDURE — 72125 CT NECK SPINE W/O DYE: CPT | Mod: 26

## 2025-09-11 PROCEDURE — 70450 CT HEAD/BRAIN W/O DYE: CPT | Mod: 26

## 2025-09-11 PROCEDURE — 99284 EMERGENCY DEPT VISIT MOD MDM: CPT | Mod: GC

## 2025-09-11 PROCEDURE — 99285 EMERGENCY DEPT VISIT HI MDM: CPT

## 2025-09-11 PROCEDURE — 73110 X-RAY EXAM OF WRIST: CPT | Mod: 26,LT

## 2025-09-11 PROCEDURE — 71046 X-RAY EXAM CHEST 2 VIEWS: CPT | Mod: 26

## 2025-09-11 PROCEDURE — 93010 ELECTROCARDIOGRAM REPORT: CPT

## 2025-09-11 RX ORDER — HYDROMORPHONE/SOD CHLOR,ISO/PF 2 MG/10 ML
0.5 SYRINGE (ML) INJECTION ONCE
Refills: 0 | Status: DISCONTINUED | OUTPATIENT
Start: 2025-09-11 | End: 2025-09-11

## 2025-09-11 RX ADMIN — Medication 0.5 MILLIGRAM(S): at 11:20

## 2025-09-11 RX ADMIN — Medication 0.5 MILLIGRAM(S): at 12:50

## 2025-09-11 RX ADMIN — Medication 0.5 MILLIGRAM(S): at 12:20

## 2025-09-11 RX ADMIN — Medication 0.5 MILLIGRAM(S): at 13:50

## 2025-09-11 RX ADMIN — Medication 0.5 MILLIGRAM(S): at 18:26

## 2025-09-12 ENCOUNTER — APPOINTMENT (OUTPATIENT)
Dept: INFUSION THERAPY | Facility: HOSPITAL | Age: 70
End: 2025-09-12

## 2025-09-12 ENCOUNTER — RESULT REVIEW (OUTPATIENT)
Age: 70
End: 2025-09-12

## 2025-09-12 ENCOUNTER — APPOINTMENT (OUTPATIENT)
Dept: HEMATOLOGY ONCOLOGY | Facility: CLINIC | Age: 70
End: 2025-09-12

## 2025-09-12 ENCOUNTER — NON-APPOINTMENT (OUTPATIENT)
Age: 70
End: 2025-09-12

## 2025-09-13 ENCOUNTER — APPOINTMENT (OUTPATIENT)
Dept: INFUSION THERAPY | Facility: HOSPITAL | Age: 70
End: 2025-09-13

## 2025-09-15 ENCOUNTER — APPOINTMENT (OUTPATIENT)
Dept: CV DIAGNOSTICS | Facility: HOSPITAL | Age: 70
End: 2025-09-15

## 2025-09-15 ENCOUNTER — RESULT REVIEW (OUTPATIENT)
Age: 70
End: 2025-09-15

## 2025-09-16 ENCOUNTER — APPOINTMENT (OUTPATIENT)
Dept: HEMATOLOGY ONCOLOGY | Facility: CLINIC | Age: 70
End: 2025-09-16
Payer: MEDICARE

## 2025-09-16 ENCOUNTER — RESULT REVIEW (OUTPATIENT)
Age: 70
End: 2025-09-16

## 2025-09-16 ENCOUNTER — APPOINTMENT (OUTPATIENT)
Dept: HEMATOLOGY ONCOLOGY | Facility: CLINIC | Age: 70
End: 2025-09-16

## 2025-09-16 ENCOUNTER — APPOINTMENT (OUTPATIENT)
Dept: INFUSION THERAPY | Facility: HOSPITAL | Age: 70
End: 2025-09-16

## 2025-09-16 ENCOUNTER — NON-APPOINTMENT (OUTPATIENT)
Age: 70
End: 2025-09-16

## 2025-09-16 DIAGNOSIS — C92.00 ACUTE MYELOBLASTIC LEUKEMIA, NOT HAVING ACHIEVED REMISSION: ICD-10-CM

## 2025-09-16 PROCEDURE — 99213 OFFICE O/P EST LOW 20 MIN: CPT

## 2025-09-17 ENCOUNTER — APPOINTMENT (OUTPATIENT)
Dept: INFUSION THERAPY | Facility: HOSPITAL | Age: 70
End: 2025-09-17

## 2025-09-17 ENCOUNTER — NON-APPOINTMENT (OUTPATIENT)
Age: 70
End: 2025-09-17

## 2025-09-18 ENCOUNTER — RESULT REVIEW (OUTPATIENT)
Age: 70
End: 2025-09-18

## 2025-09-18 ENCOUNTER — APPOINTMENT (OUTPATIENT)
Dept: INFUSION THERAPY | Facility: HOSPITAL | Age: 70
End: 2025-09-18

## 2025-09-18 ENCOUNTER — APPOINTMENT (OUTPATIENT)
Dept: RHEUMATOLOGY | Facility: CLINIC | Age: 70
End: 2025-09-18
Payer: MEDICARE

## 2025-09-18 VITALS
RESPIRATION RATE: 16 BRPM | HEART RATE: 153 BPM | BODY MASS INDEX: 25.87 KG/M2 | OXYGEN SATURATION: 97 % | HEIGHT: 72 IN | DIASTOLIC BLOOD PRESSURE: 81 MMHG | SYSTOLIC BLOOD PRESSURE: 119 MMHG | WEIGHT: 191 LBS

## 2025-09-18 DIAGNOSIS — Z79.899 OTHER LONG TERM (CURRENT) DRUG THERAPY: ICD-10-CM

## 2025-09-18 PROCEDURE — G2211 COMPLEX E/M VISIT ADD ON: CPT

## 2025-09-18 PROCEDURE — 99215 OFFICE O/P EST HI 40 MIN: CPT

## 2025-09-18 RX ORDER — PREDNISONE 5 MG/1
5 TABLET ORAL DAILY
Qty: 30 | Refills: 0 | Status: ACTIVE | COMMUNITY
Start: 2025-09-18 | End: 1900-01-01

## 2025-09-19 ENCOUNTER — LABORATORY RESULT (OUTPATIENT)
Age: 70
End: 2025-09-19

## 2025-09-19 ENCOUNTER — RESULT REVIEW (OUTPATIENT)
Age: 70
End: 2025-09-19

## 2025-09-19 ENCOUNTER — NON-APPOINTMENT (OUTPATIENT)
Age: 70
End: 2025-09-19

## 2025-09-19 ENCOUNTER — APPOINTMENT (OUTPATIENT)
Dept: HEMATOLOGY ONCOLOGY | Facility: CLINIC | Age: 70
End: 2025-09-19

## 2025-09-19 ENCOUNTER — APPOINTMENT (OUTPATIENT)
Dept: INFUSION THERAPY | Facility: HOSPITAL | Age: 70
End: 2025-09-19

## 2025-09-20 ENCOUNTER — RESULT REVIEW (OUTPATIENT)
Age: 70
End: 2025-09-20

## 2025-09-20 ENCOUNTER — APPOINTMENT (OUTPATIENT)
Dept: HEMATOLOGY ONCOLOGY | Facility: CLINIC | Age: 70
End: 2025-09-20

## 2025-09-20 ENCOUNTER — APPOINTMENT (OUTPATIENT)
Dept: INFUSION THERAPY | Facility: HOSPITAL | Age: 70
End: 2025-09-20

## 2025-09-23 LAB
ALBUMIN SERPL ELPH-MCNC: 4 G/DL
ALP BLD-CCNC: 107 U/L
ALT SERPL-CCNC: 25 U/L
ANION GAP SERPL CALC-SCNC: 17 MMOL/L
AST SERPL-CCNC: 17 U/L
BACTERIA UR CULT: NORMAL
BASOPHILS # BLD AUTO: 0 K/UL
BASOPHILS NFR BLD AUTO: 0 %
BILIRUB SERPL-MCNC: 0.6 MG/DL
BUN SERPL-MCNC: 19 MG/DL
CALCIUM SERPL-MCNC: 8.8 MG/DL
CHLORIDE SERPL-SCNC: 106 MMOL/L
CO2 SERPL-SCNC: 20 MMOL/L
CREAT SERPL-MCNC: 0.99 MG/DL
CRP SERPL-MCNC: 145 MG/L
EGFRCR SERPLBLD CKD-EPI 2021: 82 ML/MIN/1.73M2
EOSINOPHIL # BLD AUTO: 0 K/UL
EOSINOPHIL NFR BLD AUTO: 0 %
ERYTHROCYTE [SEDIMENTATION RATE] IN BLOOD BY WESTERGREN METHOD: 41 MM/HR
HCT VFR BLD CALC: 27.8 %
HGB BLD-MCNC: 9.2 G/DL
LYMPHOCYTES # BLD AUTO: 1.23 K/UL
LYMPHOCYTES NFR BLD AUTO: 23.7 %
MAN DIFF?: NORMAL
MCHC RBC-ENTMCNC: 29.4 PG
MCHC RBC-ENTMCNC: 33.1 G/DL
MCV RBC AUTO: 88.8 FL
MONOCYTES # BLD AUTO: 0.23 K/UL
MONOCYTES NFR BLD AUTO: 4.4 %
NEUTROPHILS # BLD AUTO: 3.69 K/UL
NEUTROPHILS NFR BLD AUTO: 70.1 %
NT-PROBNP SERPL-MCNC: 349 PG/ML
PLATELET # BLD AUTO: 32 K/UL
POTASSIUM SERPL-SCNC: 3.8 MMOL/L
PROT SERPL-MCNC: 5.6 G/DL
RBC # BLD: 3.13 M/UL
RBC # FLD: 20.3 %
SODIUM SERPL-SCNC: 143 MMOL/L
TROPONIN I SERPL HS-MCNC: 40 NG/L
TROPONIN-T, HIGH SENSITIVITY: 142 NG/L
URATE SERPL-MCNC: 5 MG/DL
WBC # FLD AUTO: 5.2 K/UL

## 2025-09-24 LAB — BACTERIA BLD CULT: NORMAL

## 2025-09-25 LAB
ALBUMIN SERPL ELPH-MCNC: 3.8 G/DL
ALBUMIN SERPL ELPH-MCNC: 3.9 G/DL
ALBUMIN SERPL ELPH-MCNC: 4.2 G/DL
ALP BLD-CCNC: 110 U/L
ALP BLD-CCNC: 111 U/L
ALP BLD-CCNC: 117 U/L
ALT SERPL-CCNC: 25 U/L
ALT SERPL-CCNC: 33 U/L
ALT SERPL-CCNC: 34 U/L
ANION GAP SERPL CALC-SCNC: 14 MMOL/L
ANION GAP SERPL CALC-SCNC: 16 MMOL/L
ANION GAP SERPL CALC-SCNC: 16 MMOL/L
AST SERPL-CCNC: 18 U/L
AST SERPL-CCNC: 19 U/L
AST SERPL-CCNC: 27 U/L
BILIRUB SERPL-MCNC: 0.8 MG/DL
BILIRUB SERPL-MCNC: 0.9 MG/DL
BILIRUB SERPL-MCNC: 1.1 MG/DL
BUN SERPL-MCNC: 19 MG/DL
BUN SERPL-MCNC: 20 MG/DL
BUN SERPL-MCNC: 22 MG/DL
CALCIUM SERPL-MCNC: 9 MG/DL
CALCIUM SERPL-MCNC: 9 MG/DL
CALCIUM SERPL-MCNC: 9.2 MG/DL
CHLORIDE SERPL-SCNC: 103 MMOL/L
CHLORIDE SERPL-SCNC: 106 MMOL/L
CHLORIDE SERPL-SCNC: 107 MMOL/L
CO2 SERPL-SCNC: 22 MMOL/L
CO2 SERPL-SCNC: 23 MMOL/L
CO2 SERPL-SCNC: 24 MMOL/L
CREAT SERPL-MCNC: 0.9 MG/DL
CREAT SERPL-MCNC: 1.01 MG/DL
CREAT SERPL-MCNC: 1.27 MG/DL
EGFRCR SERPLBLD CKD-EPI 2021: 61 ML/MIN/1.73M2
EGFRCR SERPLBLD CKD-EPI 2021: 81 ML/MIN/1.73M2
EGFRCR SERPLBLD CKD-EPI 2021: 92 ML/MIN/1.73M2
GLUCOSE SERPL-MCNC: 111 MG/DL
GLUCOSE SERPL-MCNC: 139 MG/DL
GLUCOSE SERPL-MCNC: 145 MG/DL
LDH SERPL-CCNC: 299 U/L
LDH SERPL-CCNC: 328 U/L
LDH SERPL-CCNC: 342 U/L
POTASSIUM SERPL-SCNC: 3.6 MMOL/L
POTASSIUM SERPL-SCNC: 3.7 MMOL/L
POTASSIUM SERPL-SCNC: 4.3 MMOL/L
PROT SERPL-MCNC: 5.5 G/DL
PROT SERPL-MCNC: 5.9 G/DL
PROT SERPL-MCNC: 6.2 G/DL
SODIUM SERPL-SCNC: 141 MMOL/L
SODIUM SERPL-SCNC: 145 MMOL/L
SODIUM SERPL-SCNC: 146 MMOL/L
URATE SERPL-MCNC: 4.4 MG/DL
URATE SERPL-MCNC: 4.6 MG/DL
URATE SERPL-MCNC: 5 MG/DL

## (undated) DEVICE — CLAMP BX HOT RAD JAW 3

## (undated) DEVICE — SENSOR O2 FINGER ADULT 24/CA

## (undated) DEVICE — CATH IV SAFE BC 20G X 1.16" (PINK)

## (undated) DEVICE — IRRIGATOR BIO SHIELD

## (undated) DEVICE — POLY TRAP ETRAP

## (undated) DEVICE — BITE BLOCK ADULT 20 X 27MM (GREEN)

## (undated) DEVICE — TUBING SUCTION CONN 6FT STERILE

## (undated) DEVICE — BALLOON US ENDO

## (undated) DEVICE — FORCEP RADIAL JAW 4 JUMBO 2.8MM 3.2MM 240CM ORANGE DISP

## (undated) DEVICE — BRUSH COLONOSCOPY CYTOLOGY

## (undated) DEVICE — SUCTION YANKAUER NO CONTROL VENT

## (undated) DEVICE — CATH IV SAFE BC 22G X 1" (BLUE)

## (undated) DEVICE — SOL INJ NS 0.9% 500ML 2 PORT

## (undated) DEVICE — SYR LUER LOK 50CC

## (undated) DEVICE — FOLEY HOLDER STATLOCK 2 WAY ADULT

## (undated) DEVICE — PACK IV START WITH CHG

## (undated) DEVICE — TUBING SUCTION 20FT

## (undated) DEVICE — Device

## (undated) DEVICE — ELCTR GROUNDING PAD ADULT COVIDIEN

## (undated) DEVICE — BIOPSY FORCEP RADIAL JAW 4 STANDARD WITH NEEDLE

## (undated) DEVICE — TUBING IV SET GRAVITY 3Y 100" MACRO

## (undated) DEVICE — SYR ALLIANCE II INFLATION 60ML